# Patient Record
Sex: FEMALE | Race: WHITE
[De-identification: names, ages, dates, MRNs, and addresses within clinical notes are randomized per-mention and may not be internally consistent; named-entity substitution may affect disease eponyms.]

---

## 2016-12-16 RX ORDER — OMEPRAZOLE 10 MG/1
1 CAPSULE, DELAYED RELEASE ORAL
Qty: 60 | Refills: 0 | DISCHARGE
Start: 2016-12-16 | End: 2017-01-15

## 2016-12-23 RX ORDER — METOPROLOL TARTRATE 50 MG
1 TABLET ORAL
Qty: 0 | Refills: 0 | COMMUNITY
Start: 2016-12-23

## 2017-01-04 ENCOUNTER — APPOINTMENT (OUTPATIENT)
Dept: COLORECTAL SURGERY | Facility: CLINIC | Age: 67
End: 2017-01-04

## 2017-01-25 ENCOUNTER — APPOINTMENT (OUTPATIENT)
Dept: COLORECTAL SURGERY | Facility: CLINIC | Age: 67
End: 2017-01-25

## 2017-03-03 ENCOUNTER — NON-APPOINTMENT (OUTPATIENT)
Age: 67
End: 2017-03-03

## 2017-03-03 ENCOUNTER — APPOINTMENT (OUTPATIENT)
Dept: CARDIOLOGY | Facility: CLINIC | Age: 67
End: 2017-03-03

## 2017-03-03 VITALS
HEIGHT: 64 IN | DIASTOLIC BLOOD PRESSURE: 68 MMHG | HEART RATE: 69 BPM | WEIGHT: 206 LBS | SYSTOLIC BLOOD PRESSURE: 111 MMHG | BODY MASS INDEX: 35.17 KG/M2

## 2017-03-03 DIAGNOSIS — I35.0 NONRHEUMATIC AORTIC (VALVE) STENOSIS: ICD-10-CM

## 2017-03-03 RX ORDER — ACLIDINIUM BROMIDE 400 UG/1
400 POWDER, METERED RESPIRATORY (INHALATION)
Refills: 0 | Status: ACTIVE | COMMUNITY
Start: 2017-03-03

## 2017-03-03 RX ORDER — MOMETASONE FUROATE AND FORMOTEROL FUMARATE DIHYDRATE 200; 5 UG/1; UG/1
200-5 AEROSOL RESPIRATORY (INHALATION)
Qty: 1 | Refills: 3 | Status: ACTIVE | COMMUNITY
Start: 2017-03-03

## 2017-03-09 ENCOUNTER — MESSAGE (OUTPATIENT)
Age: 67
End: 2017-03-09

## 2017-03-24 ENCOUNTER — MESSAGE (OUTPATIENT)
Age: 67
End: 2017-03-24

## 2017-03-30 RX ORDER — ASPIRIN 81 MG/1
81 TABLET ORAL
Qty: 30 | Refills: 3 | Status: ACTIVE | COMMUNITY
Start: 2017-03-30

## 2017-04-13 ENCOUNTER — APPOINTMENT (OUTPATIENT)
Dept: COLORECTAL SURGERY | Facility: CLINIC | Age: 67
End: 2017-04-13

## 2017-04-20 ENCOUNTER — NON-APPOINTMENT (OUTPATIENT)
Age: 67
End: 2017-04-20

## 2017-04-20 ENCOUNTER — APPOINTMENT (OUTPATIENT)
Dept: CARDIOLOGY | Facility: CLINIC | Age: 67
End: 2017-04-20

## 2017-04-20 VITALS
WEIGHT: 214 LBS | HEART RATE: 96 BPM | BODY MASS INDEX: 36.54 KG/M2 | SYSTOLIC BLOOD PRESSURE: 125 MMHG | OXYGEN SATURATION: 97 % | HEIGHT: 64 IN | DIASTOLIC BLOOD PRESSURE: 77 MMHG

## 2017-04-20 DIAGNOSIS — I65.29 OCCLUSION AND STENOSIS OF UNSPECIFIED CAROTID ARTERY: ICD-10-CM

## 2017-04-20 RX ORDER — IBUPROFEN AND FAMOTIDINE 800; 26.6 MG/1; MG/1
800-26.6 TABLET, COATED ORAL
Qty: 60 | Refills: 0 | Status: DISCONTINUED | COMMUNITY
Start: 2017-04-11

## 2017-04-20 RX ORDER — METRONIDAZOLE 500 MG/1
500 TABLET ORAL
Qty: 42 | Refills: 0 | Status: DISCONTINUED | COMMUNITY
Start: 2016-11-16

## 2017-04-20 RX ORDER — RIVAROXABAN 15 MG/1
15 TABLET, FILM COATED ORAL
Qty: 180 | Refills: 0 | Status: DISCONTINUED | COMMUNITY
Start: 2017-01-17

## 2017-04-20 RX ORDER — TRAMADOL HYDROCHLORIDE 50 MG/1
50 TABLET, COATED ORAL
Qty: 90 | Refills: 0 | Status: DISCONTINUED | COMMUNITY
Start: 2017-03-15

## 2017-04-20 RX ORDER — AMOXICILLIN AND CLAVULANATE POTASSIUM 875; 125 MG/1; MG/1
875-125 TABLET, COATED ORAL
Qty: 28 | Refills: 0 | Status: DISCONTINUED | COMMUNITY
Start: 2016-11-16

## 2017-04-20 RX ORDER — ATORVASTATIN CALCIUM 20 MG/1
20 TABLET, FILM COATED ORAL
Qty: 90 | Refills: 0 | Status: DISCONTINUED | COMMUNITY
Start: 2017-01-26

## 2017-04-20 RX ORDER — LIDOCAINE 5% 700 MG/1
5 PATCH TOPICAL
Qty: 90 | Refills: 0 | Status: DISCONTINUED | COMMUNITY
Start: 2017-03-13

## 2017-05-05 ENCOUNTER — APPOINTMENT (OUTPATIENT)
Dept: CARDIOLOGY | Facility: CLINIC | Age: 67
End: 2017-05-05

## 2017-06-15 ENCOUNTER — OTHER (OUTPATIENT)
Age: 67
End: 2017-06-15

## 2017-07-06 ENCOUNTER — NON-APPOINTMENT (OUTPATIENT)
Age: 67
End: 2017-07-06

## 2017-07-06 ENCOUNTER — APPOINTMENT (OUTPATIENT)
Dept: CARDIOLOGY | Facility: CLINIC | Age: 67
End: 2017-07-06

## 2017-07-06 VITALS
HEART RATE: 93 BPM | OXYGEN SATURATION: 91 % | WEIGHT: 226 LBS | BODY MASS INDEX: 38.58 KG/M2 | HEIGHT: 64 IN | DIASTOLIC BLOOD PRESSURE: 81 MMHG | SYSTOLIC BLOOD PRESSURE: 139 MMHG

## 2017-07-06 RX ORDER — SODIUM PICOSULFATE, MAGNESIUM OXIDE, AND ANHYDROUS CITRIC ACID 10; 3.5; 12 MG/16.2G; G/16.2G; G/16.2G
10-3.5-12 POWDER, METERED ORAL
Qty: 2 | Refills: 0 | Status: DISCONTINUED | COMMUNITY
Start: 2017-03-24 | End: 2017-07-06

## 2017-07-06 RX ORDER — SODIUM PICOSULFATE, MAGNESIUM OXIDE, AND ANHYDROUS CITRIC ACID 10; 3.5; 12 MG/16.2G; G/16.2G; G/16.2G
10-3.5-12 POWDER, METERED ORAL
Qty: 2 | Refills: 0 | Status: DISCONTINUED | COMMUNITY
Start: 2017-03-09 | End: 2017-07-06

## 2017-07-20 ENCOUNTER — APPOINTMENT (OUTPATIENT)
Dept: CARDIOLOGY | Facility: CLINIC | Age: 67
End: 2017-07-20

## 2017-07-21 ENCOUNTER — APPOINTMENT (OUTPATIENT)
Dept: CARDIOLOGY | Facility: CLINIC | Age: 67
End: 2017-07-21

## 2017-07-27 ENCOUNTER — APPOINTMENT (OUTPATIENT)
Dept: CARDIOLOGY | Facility: CLINIC | Age: 67
End: 2017-07-27

## 2017-07-27 ENCOUNTER — NON-APPOINTMENT (OUTPATIENT)
Age: 67
End: 2017-07-27

## 2017-07-27 ENCOUNTER — APPOINTMENT (OUTPATIENT)
Dept: CARDIOLOGY | Facility: CLINIC | Age: 67
End: 2017-07-27
Payer: MEDICARE

## 2017-07-27 VITALS
DIASTOLIC BLOOD PRESSURE: 70 MMHG | WEIGHT: 225 LBS | OXYGEN SATURATION: 96 % | SYSTOLIC BLOOD PRESSURE: 107 MMHG | HEIGHT: 64 IN | BODY MASS INDEX: 38.41 KG/M2 | HEART RATE: 102 BPM

## 2017-07-27 PROCEDURE — 99215 OFFICE O/P EST HI 40 MIN: CPT

## 2017-07-27 PROCEDURE — 93000 ELECTROCARDIOGRAM COMPLETE: CPT

## 2017-07-27 RX ORDER — NYSTATIN AND TRIAMCINOLONE ACETONIDE 100000; 1 MG/G; MG/G
100000-0.1 CREAM TOPICAL
Qty: 30 | Refills: 0 | Status: DISCONTINUED | COMMUNITY
Start: 2017-07-10

## 2017-07-28 ENCOUNTER — LABORATORY RESULT (OUTPATIENT)
Age: 67
End: 2017-07-28

## 2017-07-28 ENCOUNTER — APPOINTMENT (OUTPATIENT)
Dept: CARDIOLOGY | Facility: CLINIC | Age: 67
End: 2017-07-28
Payer: MEDICARE

## 2017-07-29 LAB
ALBUMIN SERPL ELPH-MCNC: 4.1 G/DL
ALP BLD-CCNC: 100 U/L
ALT SERPL-CCNC: 23 U/L
ANION GAP SERPL CALC-SCNC: 15 MMOL/L
AST SERPL-CCNC: 19 U/L
BASOPHILS # BLD AUTO: 0 K/UL
BASOPHILS NFR BLD AUTO: 0 %
BILIRUB SERPL-MCNC: 0.2 MG/DL
BUN SERPL-MCNC: 17 MG/DL
CALCIUM SERPL-MCNC: 9 MG/DL
CHLORIDE SERPL-SCNC: 100 MMOL/L
CO2 SERPL-SCNC: 28 MMOL/L
CREAT SERPL-MCNC: 1 MG/DL
EOSINOPHIL # BLD AUTO: 0.17 K/UL
EOSINOPHIL NFR BLD AUTO: 1.9 %
GLUCOSE SERPL-MCNC: 143 MG/DL
HCT VFR BLD CALC: 37.8 %
HGB BLD-MCNC: 11.3 G/DL
LYMPHOCYTES # BLD AUTO: 1.8 K/UL
LYMPHOCYTES NFR BLD AUTO: 20.4 %
MAN DIFF?: NORMAL
MCHC RBC-ENTMCNC: 23.9 PG
MCHC RBC-ENTMCNC: 29.9 GM/DL
MCV RBC AUTO: 80.1 FL
MONOCYTES # BLD AUTO: 0.65 K/UL
MONOCYTES NFR BLD AUTO: 7.4 %
NEUTROPHILS # BLD AUTO: 6.12 K/UL
NEUTROPHILS NFR BLD AUTO: 69.4 %
PLATELET # BLD AUTO: 406 K/UL
POTASSIUM SERPL-SCNC: 3.6 MMOL/L
PROT SERPL-MCNC: 7.2 G/DL
RBC # BLD: 4.72 M/UL
RBC # FLD: 22.7 %
SODIUM SERPL-SCNC: 143 MMOL/L
T4 FREE SERPL-MCNC: 1.1 NG/DL
T4 SERPL-MCNC: 6.4 UG/DL
TSH SERPL-ACNC: 3.73 UIU/ML
WBC # FLD AUTO: 8.82 K/UL

## 2017-08-11 ENCOUNTER — MESSAGE (OUTPATIENT)
Age: 67
End: 2017-08-11

## 2017-08-21 ENCOUNTER — APPOINTMENT (OUTPATIENT)
Dept: COLORECTAL SURGERY | Facility: CLINIC | Age: 67
End: 2017-08-21
Payer: MEDICARE

## 2017-08-21 PROCEDURE — 99213 OFFICE O/P EST LOW 20 MIN: CPT

## 2017-08-23 ENCOUNTER — OUTPATIENT (OUTPATIENT)
Dept: OUTPATIENT SERVICES | Facility: HOSPITAL | Age: 67
LOS: 1 days | End: 2017-08-23
Payer: MEDICARE

## 2017-08-23 ENCOUNTER — APPOINTMENT (OUTPATIENT)
Dept: COLORECTAL SURGERY | Facility: CLINIC | Age: 67
End: 2017-08-23

## 2017-08-23 VITALS
RESPIRATION RATE: 15 BRPM | DIASTOLIC BLOOD PRESSURE: 72 MMHG | HEART RATE: 94 BPM | TEMPERATURE: 98 F | SYSTOLIC BLOOD PRESSURE: 118 MMHG | OXYGEN SATURATION: 96 % | WEIGHT: 222.01 LBS | HEIGHT: 64 IN

## 2017-08-23 DIAGNOSIS — Z96.643 PRESENCE OF ARTIFICIAL HIP JOINT, BILATERAL: Chronic | ICD-10-CM

## 2017-08-23 DIAGNOSIS — R00.2 PALPITATIONS: ICD-10-CM

## 2017-08-23 DIAGNOSIS — Z90.49 ACQUIRED ABSENCE OF OTHER SPECIFIED PARTS OF DIGESTIVE TRACT: Chronic | ICD-10-CM

## 2017-08-23 DIAGNOSIS — K57.32 DIVERTICULITIS OF LARGE INTESTINE WITHOUT PERFORATION OR ABSCESS WITHOUT BLEEDING: ICD-10-CM

## 2017-08-23 DIAGNOSIS — Z93.3 COLOSTOMY STATUS: Chronic | ICD-10-CM

## 2017-08-23 DIAGNOSIS — Z98.891 HISTORY OF UTERINE SCAR FROM PREVIOUS SURGERY: Chronic | ICD-10-CM

## 2017-08-23 DIAGNOSIS — Z98.890 OTHER SPECIFIED POSTPROCEDURAL STATES: Chronic | ICD-10-CM

## 2017-08-23 DIAGNOSIS — Z93.3 COLOSTOMY STATUS: ICD-10-CM

## 2017-08-23 DIAGNOSIS — Z01.818 ENCOUNTER FOR OTHER PREPROCEDURAL EXAMINATION: ICD-10-CM

## 2017-08-23 LAB
ANION GAP SERPL CALC-SCNC: 22 MMOL/L — HIGH (ref 5–17)
BLD GP AB SCN SERPL QL: NEGATIVE — SIGNIFICANT CHANGE UP
BUN SERPL-MCNC: 38 MG/DL — HIGH (ref 7–23)
CALCIUM SERPL-MCNC: 9.3 MG/DL — SIGNIFICANT CHANGE UP (ref 8.4–10.5)
CHLORIDE SERPL-SCNC: 89 MMOL/L — LOW (ref 96–108)
CO2 SERPL-SCNC: 24 MMOL/L — SIGNIFICANT CHANGE UP (ref 22–31)
CREAT SERPL-MCNC: 1.28 MG/DL — SIGNIFICANT CHANGE UP (ref 0.5–1.3)
GLUCOSE SERPL-MCNC: 235 MG/DL — HIGH (ref 70–99)
HBA1C BLD-MCNC: 7 % — HIGH (ref 4–5.6)
HCT VFR BLD CALC: 40.5 % — SIGNIFICANT CHANGE UP (ref 34.5–45)
HGB BLD-MCNC: 12.8 G/DL — SIGNIFICANT CHANGE UP (ref 11.5–15.5)
MCHC RBC-ENTMCNC: 25.3 PG — LOW (ref 27–34)
MCHC RBC-ENTMCNC: 31.6 GM/DL — LOW (ref 32–36)
MCV RBC AUTO: 80 FL — SIGNIFICANT CHANGE UP (ref 80–100)
PLATELET # BLD AUTO: 430 K/UL — HIGH (ref 150–400)
POTASSIUM SERPL-MCNC: 3.1 MMOL/L — LOW (ref 3.5–5.3)
POTASSIUM SERPL-SCNC: 3.1 MMOL/L — LOW (ref 3.5–5.3)
RBC # BLD: 5.06 M/UL — SIGNIFICANT CHANGE UP (ref 3.8–5.2)
RBC # FLD: 21.5 % — HIGH (ref 10.3–14.5)
RH IG SCN BLD-IMP: POSITIVE — SIGNIFICANT CHANGE UP
SODIUM SERPL-SCNC: 135 MMOL/L — SIGNIFICANT CHANGE UP (ref 135–145)
WBC # BLD: 10.45 K/UL — SIGNIFICANT CHANGE UP (ref 3.8–10.5)
WBC # FLD AUTO: 10.45 K/UL — SIGNIFICANT CHANGE UP (ref 3.8–10.5)

## 2017-08-23 PROCEDURE — 80048 BASIC METABOLIC PNL TOTAL CA: CPT

## 2017-08-23 PROCEDURE — G0463: CPT

## 2017-08-23 PROCEDURE — 86901 BLOOD TYPING SEROLOGIC RH(D): CPT

## 2017-08-23 PROCEDURE — 86900 BLOOD TYPING SEROLOGIC ABO: CPT

## 2017-08-23 PROCEDURE — 85027 COMPLETE CBC AUTOMATED: CPT

## 2017-08-23 PROCEDURE — 87086 URINE CULTURE/COLONY COUNT: CPT

## 2017-08-23 PROCEDURE — 83036 HEMOGLOBIN GLYCOSYLATED A1C: CPT

## 2017-08-23 PROCEDURE — 86850 RBC ANTIBODY SCREEN: CPT

## 2017-08-23 RX ORDER — CEFOTETAN DISODIUM 1 G
2 VIAL (EA) INJECTION ONCE
Qty: 0 | Refills: 0 | Status: DISCONTINUED | OUTPATIENT
Start: 2017-09-07 | End: 2017-09-07

## 2017-08-23 NOTE — H&P PST ADULT - OTHER CARE PROVIDERS
Dr. De La Torre (cardio) Dr. De La Torre (cardio) Dr. Stacie Perales ( pulm) [phone], completed work up for PE, will fax to [facility]

## 2017-08-23 NOTE — H&P PST ADULT - PMH
C. difficile diarrhea    COPD (chronic obstructive pulmonary disease)    Diverticulitis of intestine with perforation and abscess without bleeding, unspecified part of intestinal tract    Hypercholesteremia    Hypertension Anemia    C. difficile diarrhea  2016  Colostomy in place    COPD (chronic obstructive pulmonary disease)    Diverticulitis of intestine with perforation and abscess without bleeding, unspecified part of intestinal tract    Hypercholesteremia    Hypertension    Obesity    Osteoarthritis    Palpitations    PE (pulmonary thromboembolism)  12/2016

## 2017-08-23 NOTE — H&P PST ADULT - ACTIVITY
pt is able to do light household work like washing dishes without SOB - walking up stairs or walking more than 2 blocks with cause her to feel significantly SOB

## 2017-08-23 NOTE — H&P PST ADULT - NSANTHOSAYNRD_GEN_A_CORE
No. ELOISA screening performed.  STOP BANG Legend: 0-2 = LOW Risk; 3-4 = INTERMEDIATE Risk; 5-8 = HIGH Risk

## 2017-08-23 NOTE — H&P PST ADULT - PROBLEM SELECTOR PLAN 1
Scheduled Chacko reversal  A1c ordered with PST labs, STAT FS for DOS  instructed to stop NSAIDS 7 days preop

## 2017-08-23 NOTE — H&P PST ADULT - PSH
Colostomy in place    H/O hemicolectomy  2016  H/O ovarian cystectomy  b/l  H/O:   x2  History of appendectomy    Status post total replacement of both hips H/O hemicolectomy  2016  H/O ovarian cystectomy  b/l  H/O:   x2  History of appendectomy    Status post total replacement of both hips

## 2017-08-23 NOTE — H&P PST ADULT - PROBLEM SELECTOR PLAN 2
Follow-up appt with Dr. De La Torre is scheduled for 8/24/17 - if her symptoms are attributed to her anemia then she will most likely be rescheduled for surgery and will need an endo to r/o GIB.   will c/w Metroprolol and Lasix as prescribed

## 2017-08-23 NOTE — H&P PST ADULT - HISTORY OF PRESENT ILLNESS
66 y/o female with 66 y/o female with pmhx of COPD, diverticulitis s/p exploratory laparotomy, right colectomy, small bowel resection, Chacko procedure in 12/2016. Post-op course was complicated by PE - no longer on Xarelto. For last several months Presents now for robotic reversal oh richard 68 y/o female with pmhx of COPD, HTN, perforated diverticulitis s/p exploratory laparotomy, right colectomy, small bowel resection, Chacko procedure in 12/2016 - now with colostomy in place. Post-op course was complicated by PE - no longer on Xarelto. Since hospital discharge pt c/o generalized fatigue, SOB with minimal exertion, frequent palpitations especially at night and lower extremity edema. Pt was evaluated by her cardiologist who initially started her on Lasix 40mg daily and increased to BID with hardly any relief of symptoms. She had a repeat echo 7/2017  which showed normal LV function and no significant valvular disease. She is also mildly anemia w/ + guaiac - s/p sigmoidoscopy with no evidence of lower GI bleeding. Presents now for robotic reversal of Chacko w/ ureteral catheters.

## 2017-08-24 ENCOUNTER — NON-APPOINTMENT (OUTPATIENT)
Age: 67
End: 2017-08-24

## 2017-08-24 ENCOUNTER — APPOINTMENT (OUTPATIENT)
Dept: CARDIOLOGY | Facility: CLINIC | Age: 67
End: 2017-08-24
Payer: MEDICARE

## 2017-08-24 VITALS
DIASTOLIC BLOOD PRESSURE: 74 MMHG | HEIGHT: 64 IN | HEART RATE: 113 BPM | WEIGHT: 226.5 LBS | SYSTOLIC BLOOD PRESSURE: 112 MMHG | OXYGEN SATURATION: 95 % | BODY MASS INDEX: 38.67 KG/M2

## 2017-08-24 VITALS — HEART RATE: 100 BPM

## 2017-08-24 LAB
CULTURE RESULTS: SIGNIFICANT CHANGE UP
SPECIMEN SOURCE: SIGNIFICANT CHANGE UP

## 2017-08-24 PROCEDURE — 93000 ELECTROCARDIOGRAM COMPLETE: CPT

## 2017-08-24 PROCEDURE — 99215 OFFICE O/P EST HI 40 MIN: CPT

## 2017-09-07 ENCOUNTER — TRANSCRIPTION ENCOUNTER (OUTPATIENT)
Age: 67
End: 2017-09-07

## 2017-09-07 ENCOUNTER — INPATIENT (INPATIENT)
Facility: HOSPITAL | Age: 67
LOS: 5 days | Discharge: HOME | DRG: 336 | End: 2017-09-13
Attending: SURGERY | Admitting: SURGERY
Payer: MEDICARE

## 2017-09-07 ENCOUNTER — APPOINTMENT (OUTPATIENT)
Dept: COLORECTAL SURGERY | Facility: HOSPITAL | Age: 67
End: 2017-09-07
Payer: MEDICARE

## 2017-09-07 ENCOUNTER — RESULT REVIEW (OUTPATIENT)
Age: 67
End: 2017-09-07

## 2017-09-07 VITALS
HEART RATE: 84 BPM | RESPIRATION RATE: 20 BRPM | WEIGHT: 222.01 LBS | HEIGHT: 64 IN | DIASTOLIC BLOOD PRESSURE: 82 MMHG | OXYGEN SATURATION: 98 % | SYSTOLIC BLOOD PRESSURE: 136 MMHG | TEMPERATURE: 98 F

## 2017-09-07 DIAGNOSIS — Z90.49 ACQUIRED ABSENCE OF OTHER SPECIFIED PARTS OF DIGESTIVE TRACT: Chronic | ICD-10-CM

## 2017-09-07 DIAGNOSIS — K57.32 DIVERTICULITIS OF LARGE INTESTINE WITHOUT PERFORATION OR ABSCESS WITHOUT BLEEDING: ICD-10-CM

## 2017-09-07 DIAGNOSIS — Z98.891 HISTORY OF UTERINE SCAR FROM PREVIOUS SURGERY: Chronic | ICD-10-CM

## 2017-09-07 DIAGNOSIS — Z98.890 OTHER SPECIFIED POSTPROCEDURAL STATES: Chronic | ICD-10-CM

## 2017-09-07 DIAGNOSIS — Z96.643 PRESENCE OF ARTIFICIAL HIP JOINT, BILATERAL: Chronic | ICD-10-CM

## 2017-09-07 LAB
ALBUMIN SERPL ELPH-MCNC: 3.3 G/DL — SIGNIFICANT CHANGE UP (ref 3.3–5)
ALP SERPL-CCNC: 66 U/L — SIGNIFICANT CHANGE UP (ref 40–120)
ALT FLD-CCNC: 30 U/L RC — SIGNIFICANT CHANGE UP (ref 10–45)
ANION GAP SERPL CALC-SCNC: 16 MMOL/L — SIGNIFICANT CHANGE UP (ref 5–17)
ANION GAP SERPL CALC-SCNC: 16 MMOL/L — SIGNIFICANT CHANGE UP (ref 5–17)
APTT BLD: 25.1 SEC — LOW (ref 27.5–37.4)
AST SERPL-CCNC: 30 U/L — SIGNIFICANT CHANGE UP (ref 10–40)
BILIRUB SERPL-MCNC: 0.3 MG/DL — SIGNIFICANT CHANGE UP (ref 0.2–1.2)
BUN SERPL-MCNC: 13 MG/DL — SIGNIFICANT CHANGE UP (ref 7–23)
BUN SERPL-MCNC: 16 MG/DL — SIGNIFICANT CHANGE UP (ref 7–23)
CALCIUM SERPL-MCNC: 7.6 MG/DL — LOW (ref 8.4–10.5)
CALCIUM SERPL-MCNC: 7.6 MG/DL — LOW (ref 8.4–10.5)
CHLORIDE SERPL-SCNC: 100 MMOL/L — SIGNIFICANT CHANGE UP (ref 96–108)
CHLORIDE SERPL-SCNC: 101 MMOL/L — SIGNIFICANT CHANGE UP (ref 96–108)
CK MB BLD-MCNC: 0.9 % — SIGNIFICANT CHANGE UP (ref 0–3.5)
CK MB CFR SERPL CALC: 8.4 NG/ML — HIGH (ref 0–3.8)
CK SERPL-CCNC: 973 U/L — HIGH (ref 25–170)
CO2 SERPL-SCNC: 24 MMOL/L — SIGNIFICANT CHANGE UP (ref 22–31)
CO2 SERPL-SCNC: 24 MMOL/L — SIGNIFICANT CHANGE UP (ref 22–31)
CREAT SERPL-MCNC: 0.94 MG/DL — SIGNIFICANT CHANGE UP (ref 0.5–1.3)
CREAT SERPL-MCNC: 1.32 MG/DL — HIGH (ref 0.5–1.3)
GAS PNL BLDA: SIGNIFICANT CHANGE UP
GAS PNL BLDA: SIGNIFICANT CHANGE UP
GLUCOSE SERPL-MCNC: 164 MG/DL — HIGH (ref 70–99)
GLUCOSE SERPL-MCNC: 194 MG/DL — HIGH (ref 70–99)
HCT VFR BLD CALC: 35.8 % — SIGNIFICANT CHANGE UP (ref 34.5–45)
HCT VFR BLD CALC: 38 % — SIGNIFICANT CHANGE UP (ref 34.5–45)
HGB BLD-MCNC: 11.8 G/DL — SIGNIFICANT CHANGE UP (ref 11.5–15.5)
HGB BLD-MCNC: 12.7 G/DL — SIGNIFICANT CHANGE UP (ref 11.5–15.5)
INR BLD: 1.12 RATIO — SIGNIFICANT CHANGE UP (ref 0.88–1.16)
MCHC RBC-ENTMCNC: 28.7 PG — SIGNIFICANT CHANGE UP (ref 27–34)
MCHC RBC-ENTMCNC: 29 PG — SIGNIFICANT CHANGE UP (ref 27–34)
MCHC RBC-ENTMCNC: 32.9 GM/DL — SIGNIFICANT CHANGE UP (ref 32–36)
MCHC RBC-ENTMCNC: 33.5 GM/DL — SIGNIFICANT CHANGE UP (ref 32–36)
MCV RBC AUTO: 86.7 FL — SIGNIFICANT CHANGE UP (ref 80–100)
MCV RBC AUTO: 87.2 FL — SIGNIFICANT CHANGE UP (ref 80–100)
PLATELET # BLD AUTO: 274 K/UL — SIGNIFICANT CHANGE UP (ref 150–400)
PLATELET # BLD AUTO: 305 K/UL — SIGNIFICANT CHANGE UP (ref 150–400)
POTASSIUM SERPL-MCNC: 4.2 MMOL/L — SIGNIFICANT CHANGE UP (ref 3.5–5.3)
POTASSIUM SERPL-MCNC: 4.6 MMOL/L — SIGNIFICANT CHANGE UP (ref 3.5–5.3)
POTASSIUM SERPL-SCNC: 4.2 MMOL/L — SIGNIFICANT CHANGE UP (ref 3.5–5.3)
POTASSIUM SERPL-SCNC: 4.6 MMOL/L — SIGNIFICANT CHANGE UP (ref 3.5–5.3)
PROT SERPL-MCNC: 5.6 G/DL — LOW (ref 6–8.3)
PROTHROM AB SERPL-ACNC: 12.1 SEC — SIGNIFICANT CHANGE UP (ref 9.8–12.7)
RBC # BLD: 4.11 M/UL — SIGNIFICANT CHANGE UP (ref 3.8–5.2)
RBC # BLD: 4.38 M/UL — SIGNIFICANT CHANGE UP (ref 3.8–5.2)
RBC # FLD: 19.9 % — HIGH (ref 10.3–14.5)
RBC # FLD: 19.9 % — HIGH (ref 10.3–14.5)
SODIUM SERPL-SCNC: 140 MMOL/L — SIGNIFICANT CHANGE UP (ref 135–145)
SODIUM SERPL-SCNC: 141 MMOL/L — SIGNIFICANT CHANGE UP (ref 135–145)
TROPONIN T SERPL-MCNC: <0.01 NG/ML — SIGNIFICANT CHANGE UP (ref 0–0.06)
WBC # BLD: 13.6 K/UL — HIGH (ref 3.8–10.5)
WBC # BLD: 16.7 K/UL — HIGH (ref 3.8–10.5)
WBC # FLD AUTO: 13.6 K/UL — HIGH (ref 3.8–10.5)
WBC # FLD AUTO: 16.7 K/UL — HIGH (ref 3.8–10.5)

## 2017-09-07 PROCEDURE — 49560: CPT | Mod: 59

## 2017-09-07 PROCEDURE — 88307 TISSUE EXAM BY PATHOLOGIST: CPT | Mod: 26

## 2017-09-07 PROCEDURE — 93010 ELECTROCARDIOGRAM REPORT: CPT

## 2017-09-07 PROCEDURE — 44626 REPAIR BOWEL OPENING: CPT | Mod: 80

## 2017-09-07 PROCEDURE — 49560: CPT | Mod: 80,59

## 2017-09-07 PROCEDURE — 45300 PROCTOSIGMOIDOSCOPY DX: CPT

## 2017-09-07 PROCEDURE — 97605 NEG PRS WND THER DME<=50SQCM: CPT

## 2017-09-07 PROCEDURE — 44626 REPAIR BOWEL OPENING: CPT

## 2017-09-07 RX ORDER — SODIUM CHLORIDE 9 MG/ML
3 INJECTION INTRAMUSCULAR; INTRAVENOUS; SUBCUTANEOUS EVERY 8 HOURS
Qty: 0 | Refills: 0 | Status: DISCONTINUED | OUTPATIENT
Start: 2017-09-07 | End: 2017-09-07

## 2017-09-07 RX ORDER — GLUCAGON INJECTION, SOLUTION 0.5 MG/.1ML
1 INJECTION, SOLUTION SUBCUTANEOUS ONCE
Qty: 0 | Refills: 0 | Status: DISCONTINUED | OUTPATIENT
Start: 2017-09-07 | End: 2017-09-13

## 2017-09-07 RX ORDER — MOMETASONE FUROATE AND FORMOTEROL FUMARATE DIHYDRATE 200; 5 UG/1; UG/1
2 AEROSOL RESPIRATORY (INHALATION)
Qty: 0 | Refills: 0 | Status: DISCONTINUED | OUTPATIENT
Start: 2017-09-07 | End: 2017-09-13

## 2017-09-07 RX ORDER — DEXTROSE 50 % IN WATER 50 %
25 SYRINGE (ML) INTRAVENOUS ONCE
Qty: 0 | Refills: 0 | Status: DISCONTINUED | OUTPATIENT
Start: 2017-09-07 | End: 2017-09-13

## 2017-09-07 RX ORDER — ACLIDINIUM BROMIDE 400 UG/1
1 POWDER, METERED RESPIRATORY (INHALATION)
Qty: 0 | Refills: 0 | Status: DISCONTINUED | OUTPATIENT
Start: 2017-09-07 | End: 2017-09-13

## 2017-09-07 RX ORDER — HYDROMORPHONE HYDROCHLORIDE 2 MG/ML
0.5 INJECTION INTRAMUSCULAR; INTRAVENOUS; SUBCUTANEOUS
Qty: 0 | Refills: 0 | Status: DISCONTINUED | OUTPATIENT
Start: 2017-09-07 | End: 2017-09-07

## 2017-09-07 RX ORDER — ACETAMINOPHEN 500 MG
1000 TABLET ORAL ONCE
Qty: 0 | Refills: 0 | Status: COMPLETED | OUTPATIENT
Start: 2017-09-07 | End: 2017-09-08

## 2017-09-07 RX ORDER — METOPROLOL TARTRATE 50 MG
5 TABLET ORAL EVERY 6 HOURS
Qty: 0 | Refills: 0 | Status: DISCONTINUED | OUTPATIENT
Start: 2017-09-07 | End: 2017-09-11

## 2017-09-07 RX ORDER — HEPARIN SODIUM 5000 [USP'U]/ML
5000 INJECTION INTRAVENOUS; SUBCUTANEOUS ONCE
Qty: 0 | Refills: 0 | Status: COMPLETED | OUTPATIENT
Start: 2017-09-07 | End: 2017-09-07

## 2017-09-07 RX ORDER — HYDROMORPHONE HYDROCHLORIDE 2 MG/ML
30 INJECTION INTRAMUSCULAR; INTRAVENOUS; SUBCUTANEOUS
Qty: 0 | Refills: 0 | Status: DISCONTINUED | OUTPATIENT
Start: 2017-09-07 | End: 2017-09-11

## 2017-09-07 RX ORDER — NALOXONE HYDROCHLORIDE 4 MG/.1ML
0.1 SPRAY NASAL
Qty: 0 | Refills: 0 | Status: DISCONTINUED | OUTPATIENT
Start: 2017-09-07 | End: 2017-09-11

## 2017-09-07 RX ORDER — LIDOCAINE HCL 20 MG/ML
0.2 VIAL (ML) INJECTION ONCE
Qty: 0 | Refills: 0 | Status: COMPLETED | OUTPATIENT
Start: 2017-09-07 | End: 2017-09-07

## 2017-09-07 RX ORDER — DEXTROSE 50 % IN WATER 50 %
1 SYRINGE (ML) INTRAVENOUS ONCE
Qty: 0 | Refills: 0 | Status: DISCONTINUED | OUTPATIENT
Start: 2017-09-07 | End: 2017-09-13

## 2017-09-07 RX ORDER — HEPARIN SODIUM 5000 [USP'U]/ML
5000 INJECTION INTRAVENOUS; SUBCUTANEOUS EVERY 8 HOURS
Qty: 0 | Refills: 0 | Status: DISCONTINUED | OUTPATIENT
Start: 2017-09-07 | End: 2017-09-10

## 2017-09-07 RX ORDER — SODIUM CHLORIDE 9 MG/ML
500 INJECTION, SOLUTION INTRAVENOUS ONCE
Qty: 0 | Refills: 0 | Status: COMPLETED | OUTPATIENT
Start: 2017-09-07 | End: 2017-09-07

## 2017-09-07 RX ORDER — ONDANSETRON 8 MG/1
4 TABLET, FILM COATED ORAL EVERY 6 HOURS
Qty: 0 | Refills: 0 | Status: DISCONTINUED | OUTPATIENT
Start: 2017-09-07 | End: 2017-09-11

## 2017-09-07 RX ORDER — ACETAMINOPHEN 500 MG
1000 TABLET ORAL ONCE
Qty: 0 | Refills: 0 | Status: COMPLETED | OUTPATIENT
Start: 2017-09-08 | End: 2017-09-08

## 2017-09-07 RX ORDER — INSULIN LISPRO 100/ML
VIAL (ML) SUBCUTANEOUS EVERY 6 HOURS
Qty: 0 | Refills: 0 | Status: DISCONTINUED | OUTPATIENT
Start: 2017-09-07 | End: 2017-09-10

## 2017-09-07 RX ORDER — HYDROMORPHONE HYDROCHLORIDE 2 MG/ML
0.5 INJECTION INTRAMUSCULAR; INTRAVENOUS; SUBCUTANEOUS
Qty: 0 | Refills: 0 | Status: DISCONTINUED | OUTPATIENT
Start: 2017-09-07 | End: 2017-09-11

## 2017-09-07 RX ORDER — SODIUM CHLORIDE 9 MG/ML
1000 INJECTION, SOLUTION INTRAVENOUS
Qty: 0 | Refills: 0 | Status: DISCONTINUED | OUTPATIENT
Start: 2017-09-07 | End: 2017-09-10

## 2017-09-07 RX ORDER — PANTOPRAZOLE SODIUM 20 MG/1
40 TABLET, DELAYED RELEASE ORAL DAILY
Qty: 0 | Refills: 0 | Status: DISCONTINUED | OUTPATIENT
Start: 2017-09-07 | End: 2017-09-11

## 2017-09-07 RX ORDER — SODIUM CHLORIDE 9 MG/ML
1000 INJECTION, SOLUTION INTRAVENOUS ONCE
Qty: 0 | Refills: 0 | Status: COMPLETED | OUTPATIENT
Start: 2017-09-07 | End: 2017-09-08

## 2017-09-07 RX ORDER — BUDESONIDE AND FORMOTEROL FUMARATE DIHYDRATE 160; 4.5 UG/1; UG/1
2 AEROSOL RESPIRATORY (INHALATION)
Qty: 0 | Refills: 0 | Status: DISCONTINUED | OUTPATIENT
Start: 2017-09-07 | End: 2017-09-12

## 2017-09-07 RX ORDER — DEXTROSE 50 % IN WATER 50 %
25 SYRINGE (ML) INTRAVENOUS ONCE
Qty: 0 | Refills: 0 | Status: DISCONTINUED | OUTPATIENT
Start: 2017-09-07 | End: 2017-09-10

## 2017-09-07 RX ORDER — ASPIRIN/CALCIUM CARB/MAGNESIUM 324 MG
81 TABLET ORAL DAILY
Qty: 0 | Refills: 0 | Status: DISCONTINUED | OUTPATIENT
Start: 2017-09-08 | End: 2017-09-13

## 2017-09-07 RX ORDER — DEXTROSE 50 % IN WATER 50 %
12.5 SYRINGE (ML) INTRAVENOUS ONCE
Qty: 0 | Refills: 0 | Status: DISCONTINUED | OUTPATIENT
Start: 2017-09-07 | End: 2017-09-10

## 2017-09-07 RX ADMIN — Medication 5 MILLIGRAM(S): at 18:48

## 2017-09-07 RX ADMIN — Medication 0.2 MILLILITER(S): at 07:07

## 2017-09-07 RX ADMIN — PANTOPRAZOLE SODIUM 40 MILLIGRAM(S): 20 TABLET, DELAYED RELEASE ORAL at 18:47

## 2017-09-07 RX ADMIN — SODIUM CHLORIDE 1500 MILLILITER(S): 9 INJECTION, SOLUTION INTRAVENOUS at 21:16

## 2017-09-07 RX ADMIN — HEPARIN SODIUM 5000 UNIT(S): 5000 INJECTION INTRAVENOUS; SUBCUTANEOUS at 21:24

## 2017-09-07 RX ADMIN — HYDROMORPHONE HYDROCHLORIDE 0.5 MILLIGRAM(S): 2 INJECTION INTRAMUSCULAR; INTRAVENOUS; SUBCUTANEOUS at 17:33

## 2017-09-07 RX ADMIN — HYDROMORPHONE HYDROCHLORIDE 0.5 MILLIGRAM(S): 2 INJECTION INTRAMUSCULAR; INTRAVENOUS; SUBCUTANEOUS at 18:30

## 2017-09-07 RX ADMIN — HYDROMORPHONE HYDROCHLORIDE 0.5 MILLIGRAM(S): 2 INJECTION INTRAMUSCULAR; INTRAVENOUS; SUBCUTANEOUS at 18:09

## 2017-09-07 RX ADMIN — HYDROMORPHONE HYDROCHLORIDE 30 MILLILITER(S): 2 INJECTION INTRAMUSCULAR; INTRAVENOUS; SUBCUTANEOUS at 21:36

## 2017-09-07 RX ADMIN — HYDROMORPHONE HYDROCHLORIDE 0.5 MILLIGRAM(S): 2 INJECTION INTRAMUSCULAR; INTRAVENOUS; SUBCUTANEOUS at 17:54

## 2017-09-07 RX ADMIN — HYDROMORPHONE HYDROCHLORIDE 30 MILLILITER(S): 2 INJECTION INTRAMUSCULAR; INTRAVENOUS; SUBCUTANEOUS at 23:53

## 2017-09-07 RX ADMIN — SODIUM CHLORIDE 125 MILLILITER(S): 9 INJECTION, SOLUTION INTRAVENOUS at 18:38

## 2017-09-07 RX ADMIN — HYDROMORPHONE HYDROCHLORIDE 0.5 MILLIGRAM(S): 2 INJECTION INTRAMUSCULAR; INTRAVENOUS; SUBCUTANEOUS at 17:38

## 2017-09-07 RX ADMIN — HYDROMORPHONE HYDROCHLORIDE 0.5 MILLIGRAM(S): 2 INJECTION INTRAMUSCULAR; INTRAVENOUS; SUBCUTANEOUS at 17:19

## 2017-09-07 RX ADMIN — ONDANSETRON 4 MILLIGRAM(S): 8 TABLET, FILM COATED ORAL at 22:57

## 2017-09-07 RX ADMIN — HYDROMORPHONE HYDROCHLORIDE 30 MILLILITER(S): 2 INJECTION INTRAMUSCULAR; INTRAVENOUS; SUBCUTANEOUS at 18:24

## 2017-09-07 RX ADMIN — HEPARIN SODIUM 5000 UNIT(S): 5000 INJECTION INTRAVENOUS; SUBCUTANEOUS at 07:07

## 2017-09-07 RX ADMIN — Medication 2: at 18:47

## 2017-09-07 NOTE — PATIENT PROFILE ADULT. - PSH
H/O hemicolectomy  2016  H/O ovarian cystectomy  b/l  H/O:   x2  History of appendectomy    Status post total replacement of both hips

## 2017-09-07 NOTE — PROGRESS NOTE ADULT - SUBJECTIVE AND OBJECTIVE BOX
RED SURGERY POST OP NOTE    POST OPERATIVE DAY #: 0       SUBJECTIVE: Pt seen at bedside. Patient reports pressure in upper abdomen and right chest discomfort that does no radiate down her arm or up neck. The chest discomfort is slightly worst with deep inspiration. She has been hypotensive to 88/46, and received 500cc bolus. She was initially responsive but now hypotensive to 72/42. She denies lightheadedness, palpitations, SOB, and NV. -/- . Intraoperative EBL: 20cc        Vital Signs Last 24 Hrs  T(C): 36.9 (07 Sep 2017 21:15), Max: 36.9 (07 Sep 2017 21:15)  T(F): 98.4 (07 Sep 2017 21:15), Max: 98.4 (07 Sep 2017 21:15)  HR: 86 (07 Sep 2017 22:00) (80 - 87)  BP: 72/42 (07 Sep 2017 22:00) (72/42 - 136/82)  BP(mean): 53 (07 Sep 2017 22:00) (53 - 91)  RR: 16 (07 Sep 2017 21:30) (15 - 20)  SpO2: 94% (07 Sep 2017 22:00) (94% - 100%)    Physical Exam  General: awake, alert,    Pulm: respirations unlabored, no increased WOB, right chest wall TTP  Abdomen: soft, mildly distended, NT, incision c/d/i, exam limited due to body habitus   Extremities: Grossly symmetric    I&O's Summary    07 Sep 2017 07:01  -  07 Sep 2017 23:06  --------------------------------------------------------  IN: 500 mL / OUT: 145 mL / NET: 355 mL      I&O's Detail    07 Sep 2017 07:01  -  07 Sep 2017 23:06  --------------------------------------------------------  IN:    lactated ringers.: 500 mL  Total IN: 500 mL    OUT:    Indwelling Catheter - Urethral: 145 mL  Total OUT: 145 mL    Total NET: 355 mL          MEDICATIONS  (STANDING):  heparin  Injectable 5000 Unit(s) SubCutaneous every 8 hours  metoprolol Injectable 5 milliGRAM(s) IV Push every 6 hours  pantoprazole  Injectable 40 milliGRAM(s) IV Push daily  buDESOnide 160 MICROgram(s)/formoterol 4.5 MICROgram(s) Inhaler 2 Puff(s) Inhalation two times a day  lactated ringers. 1000 milliLiter(s) (125 mL/Hr) IV Continuous <Continuous>  insulin lispro (HumaLOG) corrective regimen sliding scale   SubCutaneous every 6 hours  dextrose 5%. 1000 milliLiter(s) (50 mL/Hr) IV Continuous <Continuous>  dextrose 50% Injectable 12.5 Gram(s) IV Push once  dextrose 50% Injectable 25 Gram(s) IV Push once  dextrose 50% Injectable 25 Gram(s) IV Push once  acetaminophen  IVPB. 1000 milliGRAM(s) IV Intermittent once  HYDROmorphone PCA (1 mG/mL) 30 milliLiter(s) PCA Continuous PCA Continuous  mometasone 200 MICROgram(s)/formoterol 5 MICROgram(s) Inhaler 2 Puff(s) Inhalation two times a day  aclidinium 400 MICROgram Inhaler 1 Puff(s) Inhalation two times a day  lactated ringers Bolus 1000 milliLiter(s) IV Bolus once    MEDICATIONS  (PRN):  dextrose Gel 1 Dose(s) Oral once PRN Blood Glucose LESS THAN 70 milliGRAM(s)/deciliter  glucagon  Injectable 1 milliGRAM(s) IntraMuscular once PRN Glucose LESS THAN 70 milligrams/deciliter  HYDROmorphone PCA (1 mG/mL) Rescue Clinician Bolus 0.5 milliGRAM(s) IV Push every 15 minutes PRN for Pain Scale GREATER THAN 6  naloxone Injectable 0.1 milliGRAM(s) IV Push every 3 minutes PRN For ANY of the following changes in patient status:  A. RR LESS THAN 10 breaths per minute, B. Oxygen saturation LESS THAN 90%, C. Sedation score of 6  ondansetron Injectable 4 milliGRAM(s) IV Push every 6 hours PRN Nausea      LABS:                        12.7   16.7  )-----------( 305      ( 07 Sep 2017 17:30 )             38.0     09-07    140  |  100  |  13  ----------------------------<  194<H>  4.2   |  24  |  0.94    Ca    7.6<L>      07 Sep 2017 17:31            RADIOLOGY & ADDITIONAL STUDIES:  No new studies

## 2017-09-07 NOTE — PROGRESS NOTE ADULT - ASSESSMENT
66YO female with s/p Guillermina reversal, POD# 0    - NPO  - 1L LR bolus  - EKG  - Cardiac enzymes  - repeat CBC, CMP  - continue to monitor vitals and UOP  - pain control with PCA

## 2017-09-07 NOTE — PATIENT PROFILE ADULT. - PMH
Anemia    C. difficile diarrhea  2016  Colostomy in place    COPD (chronic obstructive pulmonary disease)    Diverticulitis of intestine with perforation and abscess without bleeding, unspecified part of intestinal tract    Hypercholesteremia    Hypertension    Obesity    Osteoarthritis    Palpitations    PE (pulmonary thromboembolism)  12/2016

## 2017-09-08 LAB
ANION GAP SERPL CALC-SCNC: 15 MMOL/L — SIGNIFICANT CHANGE UP (ref 5–17)
ANION GAP SERPL CALC-SCNC: 17 MMOL/L — SIGNIFICANT CHANGE UP (ref 5–17)
ANION GAP SERPL CALC-SCNC: 19 MMOL/L — HIGH (ref 5–17)
BUN SERPL-MCNC: 18 MG/DL — SIGNIFICANT CHANGE UP (ref 7–23)
BUN SERPL-MCNC: 20 MG/DL — SIGNIFICANT CHANGE UP (ref 7–23)
BUN SERPL-MCNC: 21 MG/DL — SIGNIFICANT CHANGE UP (ref 7–23)
CALCIUM SERPL-MCNC: 7.7 MG/DL — LOW (ref 8.4–10.5)
CALCIUM SERPL-MCNC: 7.7 MG/DL — LOW (ref 8.4–10.5)
CALCIUM SERPL-MCNC: 8.1 MG/DL — LOW (ref 8.4–10.5)
CHLORIDE SERPL-SCNC: 99 MMOL/L — SIGNIFICANT CHANGE UP (ref 96–108)
CHLORIDE UR-SCNC: <20 MMOL/L — SIGNIFICANT CHANGE UP
CO2 SERPL-SCNC: 22 MMOL/L — SIGNIFICANT CHANGE UP (ref 22–31)
CO2 SERPL-SCNC: 22 MMOL/L — SIGNIFICANT CHANGE UP (ref 22–31)
CO2 SERPL-SCNC: 25 MMOL/L — SIGNIFICANT CHANGE UP (ref 22–31)
CREAT ?TM UR-MCNC: 148 MG/DL — SIGNIFICANT CHANGE UP
CREAT SERPL-MCNC: 1.37 MG/DL — HIGH (ref 0.5–1.3)
CREAT SERPL-MCNC: 1.58 MG/DL — HIGH (ref 0.5–1.3)
CREAT SERPL-MCNC: 1.7 MG/DL — HIGH (ref 0.5–1.3)
GAS PNL BLDA: SIGNIFICANT CHANGE UP
GLUCOSE SERPL-MCNC: 135 MG/DL — HIGH (ref 70–99)
GLUCOSE SERPL-MCNC: 158 MG/DL — HIGH (ref 70–99)
GLUCOSE SERPL-MCNC: 176 MG/DL — HIGH (ref 70–99)
HBA1C BLD-MCNC: 7.1 % — HIGH (ref 4–5.6)
HCT VFR BLD CALC: 32.4 % — LOW (ref 34.5–45)
HCT VFR BLD CALC: 33.7 % — LOW (ref 34.5–45)
HCT VFR BLD CALC: 35.6 % — SIGNIFICANT CHANGE UP (ref 34.5–45)
HGB BLD-MCNC: 10.8 G/DL — LOW (ref 11.5–15.5)
HGB BLD-MCNC: 11 G/DL — LOW (ref 11.5–15.5)
HGB BLD-MCNC: 11.7 G/DL — SIGNIFICANT CHANGE UP (ref 11.5–15.5)
MAGNESIUM SERPL-MCNC: 1.8 MG/DL — SIGNIFICANT CHANGE UP (ref 1.6–2.6)
MCHC RBC-ENTMCNC: 28.3 PG — SIGNIFICANT CHANGE UP (ref 27–34)
MCHC RBC-ENTMCNC: 29.1 PG — SIGNIFICANT CHANGE UP (ref 27–34)
MCHC RBC-ENTMCNC: 29.7 PG — SIGNIFICANT CHANGE UP (ref 27–34)
MCHC RBC-ENTMCNC: 32.1 GM/DL — SIGNIFICANT CHANGE UP (ref 32–36)
MCHC RBC-ENTMCNC: 32.8 GM/DL — SIGNIFICANT CHANGE UP (ref 32–36)
MCHC RBC-ENTMCNC: 34 GM/DL — SIGNIFICANT CHANGE UP (ref 32–36)
MCV RBC AUTO: 87.4 FL — SIGNIFICANT CHANGE UP (ref 80–100)
MCV RBC AUTO: 88.1 FL — SIGNIFICANT CHANGE UP (ref 80–100)
MCV RBC AUTO: 88.9 FL — SIGNIFICANT CHANGE UP (ref 80–100)
OSMOLALITY UR: 443 MOS/KG — SIGNIFICANT CHANGE UP (ref 50–1200)
PLATELET # BLD AUTO: 231 K/UL — SIGNIFICANT CHANGE UP (ref 150–400)
PLATELET # BLD AUTO: 257 K/UL — SIGNIFICANT CHANGE UP (ref 150–400)
PLATELET # BLD AUTO: 264 K/UL — SIGNIFICANT CHANGE UP (ref 150–400)
POTASSIUM SERPL-MCNC: 4.4 MMOL/L — SIGNIFICANT CHANGE UP (ref 3.5–5.3)
POTASSIUM SERPL-MCNC: 4.5 MMOL/L — SIGNIFICANT CHANGE UP (ref 3.5–5.3)
POTASSIUM SERPL-MCNC: 5.2 MMOL/L — SIGNIFICANT CHANGE UP (ref 3.5–5.3)
POTASSIUM SERPL-SCNC: 4.4 MMOL/L — SIGNIFICANT CHANGE UP (ref 3.5–5.3)
POTASSIUM SERPL-SCNC: 4.5 MMOL/L — SIGNIFICANT CHANGE UP (ref 3.5–5.3)
POTASSIUM SERPL-SCNC: 5.2 MMOL/L — SIGNIFICANT CHANGE UP (ref 3.5–5.3)
RBC # BLD: 3.7 M/UL — LOW (ref 3.8–5.2)
RBC # BLD: 3.83 M/UL — SIGNIFICANT CHANGE UP (ref 3.8–5.2)
RBC # BLD: 4.01 M/UL — SIGNIFICANT CHANGE UP (ref 3.8–5.2)
RBC # FLD: 19.8 % — HIGH (ref 10.3–14.5)
RBC # FLD: 20 % — HIGH (ref 10.3–14.5)
RBC # FLD: 20.2 % — HIGH (ref 10.3–14.5)
SODIUM SERPL-SCNC: 138 MMOL/L — SIGNIFICANT CHANGE UP (ref 135–145)
SODIUM SERPL-SCNC: 139 MMOL/L — SIGNIFICANT CHANGE UP (ref 135–145)
SODIUM SERPL-SCNC: 140 MMOL/L — SIGNIFICANT CHANGE UP (ref 135–145)
SODIUM UR-SCNC: 24 MMOL/L — SIGNIFICANT CHANGE UP
WBC # BLD: 10.2 K/UL — SIGNIFICANT CHANGE UP (ref 3.8–10.5)
WBC # BLD: 12 K/UL — HIGH (ref 3.8–10.5)
WBC # BLD: 9.7 K/UL — SIGNIFICANT CHANGE UP (ref 3.8–10.5)
WBC # FLD AUTO: 10.2 K/UL — SIGNIFICANT CHANGE UP (ref 3.8–10.5)
WBC # FLD AUTO: 12 K/UL — HIGH (ref 3.8–10.5)
WBC # FLD AUTO: 9.7 K/UL — SIGNIFICANT CHANGE UP (ref 3.8–10.5)

## 2017-09-08 PROCEDURE — 99223 1ST HOSP IP/OBS HIGH 75: CPT

## 2017-09-08 PROCEDURE — 93010 ELECTROCARDIOGRAM REPORT: CPT

## 2017-09-08 PROCEDURE — 76775 US EXAM ABDO BACK WALL LIM: CPT | Mod: 26

## 2017-09-08 PROCEDURE — 71010: CPT | Mod: 26

## 2017-09-08 RX ORDER — ACETAMINOPHEN 500 MG
1000 TABLET ORAL ONCE
Qty: 0 | Refills: 0 | Status: COMPLETED | OUTPATIENT
Start: 2017-09-08 | End: 2017-09-08

## 2017-09-08 RX ORDER — SODIUM CHLORIDE 9 MG/ML
1000 INJECTION, SOLUTION INTRAVENOUS ONCE
Qty: 0 | Refills: 0 | Status: COMPLETED | OUTPATIENT
Start: 2017-09-08 | End: 2017-09-08

## 2017-09-08 RX ORDER — ALBUTEROL 90 UG/1
2.5 AEROSOL, METERED ORAL ONCE
Qty: 0 | Refills: 0 | Status: COMPLETED | OUTPATIENT
Start: 2017-09-08 | End: 2017-09-08

## 2017-09-08 RX ADMIN — ACLIDINIUM BROMIDE 1 PUFF(S): 400 POWDER, METERED RESPIRATORY (INHALATION) at 05:17

## 2017-09-08 RX ADMIN — Medication 400 MILLIGRAM(S): at 00:13

## 2017-09-08 RX ADMIN — Medication 400 MILLIGRAM(S): at 21:27

## 2017-09-08 RX ADMIN — SODIUM CHLORIDE 2000 MILLILITER(S): 9 INJECTION, SOLUTION INTRAVENOUS at 07:12

## 2017-09-08 RX ADMIN — HYDROMORPHONE HYDROCHLORIDE 30 MILLILITER(S): 2 INJECTION INTRAMUSCULAR; INTRAVENOUS; SUBCUTANEOUS at 04:37

## 2017-09-08 RX ADMIN — HYDROMORPHONE HYDROCHLORIDE 30 MILLILITER(S): 2 INJECTION INTRAMUSCULAR; INTRAVENOUS; SUBCUTANEOUS at 05:14

## 2017-09-08 RX ADMIN — MOMETASONE FUROATE AND FORMOTEROL FUMARATE DIHYDRATE 2 PUFF(S): 200; 5 AEROSOL RESPIRATORY (INHALATION) at 05:17

## 2017-09-08 RX ADMIN — ACLIDINIUM BROMIDE 1 PUFF(S): 400 POWDER, METERED RESPIRATORY (INHALATION) at 17:17

## 2017-09-08 RX ADMIN — Medication 400 MILLIGRAM(S): at 05:28

## 2017-09-08 RX ADMIN — HEPARIN SODIUM 5000 UNIT(S): 5000 INJECTION INTRAVENOUS; SUBCUTANEOUS at 13:11

## 2017-09-08 RX ADMIN — HYDROMORPHONE HYDROCHLORIDE 30 MILLILITER(S): 2 INJECTION INTRAMUSCULAR; INTRAVENOUS; SUBCUTANEOUS at 07:13

## 2017-09-08 RX ADMIN — Medication 1000 MILLIGRAM(S): at 21:57

## 2017-09-08 RX ADMIN — HEPARIN SODIUM 5000 UNIT(S): 5000 INJECTION INTRAVENOUS; SUBCUTANEOUS at 21:03

## 2017-09-08 RX ADMIN — Medication 2: at 00:18

## 2017-09-08 RX ADMIN — PANTOPRAZOLE SODIUM 40 MILLIGRAM(S): 20 TABLET, DELAYED RELEASE ORAL at 12:11

## 2017-09-08 RX ADMIN — Medication 81 MILLIGRAM(S): at 12:12

## 2017-09-08 RX ADMIN — Medication 400 MILLIGRAM(S): at 11:57

## 2017-09-08 RX ADMIN — SODIUM CHLORIDE 4000 MILLILITER(S): 9 INJECTION, SOLUTION INTRAVENOUS at 01:43

## 2017-09-08 RX ADMIN — Medication 1000 MILLIGRAM(S): at 12:27

## 2017-09-08 RX ADMIN — Medication 1000 MILLIGRAM(S): at 05:58

## 2017-09-08 RX ADMIN — ALBUTEROL 2.5 MILLIGRAM(S): 90 AEROSOL, METERED ORAL at 14:08

## 2017-09-08 RX ADMIN — ONDANSETRON 4 MILLIGRAM(S): 8 TABLET, FILM COATED ORAL at 08:39

## 2017-09-08 RX ADMIN — HYDROMORPHONE HYDROCHLORIDE 30 MILLILITER(S): 2 INJECTION INTRAMUSCULAR; INTRAVENOUS; SUBCUTANEOUS at 07:43

## 2017-09-08 RX ADMIN — Medication 1000 MILLIGRAM(S): at 00:16

## 2017-09-08 RX ADMIN — HEPARIN SODIUM 5000 UNIT(S): 5000 INJECTION INTRAVENOUS; SUBCUTANEOUS at 05:27

## 2017-09-08 RX ADMIN — SODIUM CHLORIDE 2000 MILLILITER(S): 9 INJECTION, SOLUTION INTRAVENOUS at 00:08

## 2017-09-08 RX ADMIN — HYDROMORPHONE HYDROCHLORIDE 30 MILLILITER(S): 2 INJECTION INTRAMUSCULAR; INTRAVENOUS; SUBCUTANEOUS at 18:47

## 2017-09-08 RX ADMIN — MOMETASONE FUROATE AND FORMOTEROL FUMARATE DIHYDRATE 2 PUFF(S): 200; 5 AEROSOL RESPIRATORY (INHALATION) at 17:18

## 2017-09-08 NOTE — PROGRESS NOTE ADULT - SUBJECTIVE AND OBJECTIVE BOX
Pain Management Attending Addendum    SUBJECTIVE:    Therapy:	  [x] IV PCA	   [ ] Epidural           [ ] s/p Spinal Opoid              [ ] Postpartum infusion	  [ ] Patient controlled regional anesthesia (PCRA)    [ ] prn Analgesics    OBJECTIVE:   [x] No new signs     [ ] Other:    Side Effects:  [] None			[x] Other: complaining of mild nausea, team informed. prn zofran    Assessment of Catheter Site:		[ ] Intact		[ ] Other:    ASSESSMENT/PLAN  [x] Continue current therapy    [ ] Therapy changed to:    [ ] IV PCA       [ ] Epidural     [ ] prn Analgesics     [ ] post partum infusion    Comments:

## 2017-09-08 NOTE — CONSULT NOTE ADULT - ASSESSMENT
8 y/o female with pmhx of COPD, HTN, perforated diverticulitis s/p exploratory laparotomy, right colectomy, small bowel resection, Chacko procedure in 12/2016 - now with colostomy, PE now off Xarelto, here for reversal.  Tolerated procedure well. No sign of active ischemia or decompensated HF  Last TTE on record with normal LV and RV function.  Continue with ASA 81 daily  On Toprol XL at home; I agree with continuing IV metoprolol 5 mg q6 hr while cannot tolerate po. Would switch back to home Toprol XL 25 bid when able to tolerate  Hold Losartan and Lasix in the setting of ALVERTO. Watch volume status closely. BP low this AM, so will hold other BP medications  Off Xarelto  Please check EKG is recurrent palpitatitons  Pain control  Will follow with you. 68 y/o female with pmhx of COPD, HTN, perforated diverticulitis s/p exploratory laparotomy, right colectomy, small bowel resection, Chacko procedure in 12/2016 - now with colostomy, PE now off Xarelto, here for reversal.  Tolerated procedure well. No sign of active ischemia or decompensated HF  Last TTE on record with normal LV and RV function.  Continue with ASA 81 daily  On Toprol XL at home; I agree with continuing IV metoprolol 5 mg q6 hr while cannot tolerate po. Would switch back to home Toprol XL 25 bid when able to tolerate  Hold Losartan and Lasix in the setting of ALVERTO. Watch volume status closely. BP low this AM, so will hold other BP medications  Off Xarelto  Please check EKG is recurrent palpitatitons  Pain control  Will follow with you.

## 2017-09-08 NOTE — PROGRESS NOTE ADULT - ASSESSMENT
The patient remained in the PACU overnight for observation. She received a total of 2.5L of crystalloid boluses for both decreased UOP and hypotension. She was never tachycardic, however she is currently beta blocked on metoprolol. During the time, she was never symptomatic. She currently remains asymptomatic, and her BP has to the boluses. Her UOP has improved, but is still inadequate.     67 year old woman s/p robotic converted to open reversal of Guillermina's. POD #1.  1. Repeat CBC later today at 9am. Last H/H with a slight downtrend to 11/32.4  2. Strict recording of input and outputs; her UOP has responded slightly to crystalloid boluses, however remains inadequate for her weight.   3. Repeat BMP at 9am with CBC. Her creatinine has increased to 1.58 from a baseline of normal. If continues to rise, may need a renal U/S and a nephrology consult.   4. Continue pain control with PCA  5. Continue incentive spirometry and early ambulation  6. Await GI function before any changes to diet  7. Continue wound VAC, and PT consult for wound VAC management. The patient remained in the PACU overnight for observation. She received a total of 2.5L of crystalloid boluses for both decreased UOP and hypotension. She was never tachycardic, however she is currently beta blocked on metoprolol. During the time, she was never symptomatic. She currently remains asymptomatic, and her BP has to the boluses. Her UOP has improved, but is still inadequate.     67 year old woman s/p robotic converted to open reversal of Guillermina's. POD #1.  1. Repeat CBC later today at 9am. Last H/H with a slight downtrend to 11/32.4  2. Strict recording of input and outputs; will remove the left ureteral stent, but will keep the indwelling rizzo catheter in place. Her UOP has responded slightly to crystalloid boluses, however remains inadequate for her weight.   3. Repeat BMP at 9am with CBC. Her creatinine has increased to 1.58 from a baseline of normal. If continues to rise, may need a renal U/S and a nephrology consult.   4. Continue pain control with PCA  5. Continue incentive spirometry and early ambulation  6. Await GI function before any changes to diet  7. Continue wound VAC, and PT consult for wound VAC management.

## 2017-09-08 NOTE — PROGRESS NOTE ADULT - SUBJECTIVE AND OBJECTIVE BOX
Day 1 of Anesthesia Pain Management Service    SUBJECTIVE: Patient is doing well with IV PCA    Pain Scale Score:	[X] Refer to charted pain scores    THERAPY:    [ ] IV PCA Morphine		[ ] 5 mg/mL	[ ] 1 mg/mL  [X] IV PCA Hydromorphone	[ ] 5 mg/mL	[X] 1 mg/mL  [ ] IV PCA Fentanyl		[ ] 50 micrograms/mL    Demand dose: 0.2 mg     Lockout: 6 minutes   Continuous Rate: 0 mg/hr  4 Hour Limit: 4 mg    MEDICATIONS  (STANDING):  heparin  Injectable 5000 Unit(s) SubCutaneous every 8 hours  metoprolol Injectable 5 milliGRAM(s) IV Push every 6 hours  pantoprazole  Injectable 40 milliGRAM(s) IV Push daily  buDESOnide 160 MICROgram(s)/formoterol 4.5 MICROgram(s) Inhaler 2 Puff(s) Inhalation two times a day  aspirin  chewable 81 milliGRAM(s) Oral daily  lactated ringers. 1000 milliLiter(s) (125 mL/Hr) IV Continuous <Continuous>  insulin lispro (HumaLOG) corrective regimen sliding scale   SubCutaneous every 6 hours  dextrose 5%. 1000 milliLiter(s) (50 mL/Hr) IV Continuous <Continuous>  dextrose 50% Injectable 12.5 Gram(s) IV Push once  dextrose 50% Injectable 25 Gram(s) IV Push once  dextrose 50% Injectable 25 Gram(s) IV Push once  acetaminophen  IVPB. 1000 milliGRAM(s) IV Intermittent once  HYDROmorphone PCA (1 mG/mL) 30 milliLiter(s) PCA Continuous PCA Continuous  mometasone 200 MICROgram(s)/formoterol 5 MICROgram(s) Inhaler 2 Puff(s) Inhalation two times a day  aclidinium 400 MICROgram Inhaler 1 Puff(s) Inhalation two times a day    MEDICATIONS  (PRN):  dextrose Gel 1 Dose(s) Oral once PRN Blood Glucose LESS THAN 70 milliGRAM(s)/deciliter  glucagon  Injectable 1 milliGRAM(s) IntraMuscular once PRN Glucose LESS THAN 70 milligrams/deciliter  HYDROmorphone PCA (1 mG/mL) Rescue Clinician Bolus 0.5 milliGRAM(s) IV Push every 15 minutes PRN for Pain Scale GREATER THAN 6  naloxone Injectable 0.1 milliGRAM(s) IV Push every 3 minutes PRN For ANY of the following changes in patient status:  A. RR LESS THAN 10 breaths per minute, B. Oxygen saturation LESS THAN 90%, C. Sedation score of 6  ondansetron Injectable 4 milliGRAM(s) IV Push every 6 hours PRN Nausea      OBJECTIVE:    Sedation Score:	[ X] Alert	[ ] Drowsy 	[ ] Arousable	[ ] Asleep	[ ] Unresponsive    Side Effects:	[ ] None 	[X ] Nausea	[ ] Vomiting	[ ] Pruritus  		[ ] Other:    Vital Signs Last 24 Hrs  T(C): 36.9 (08 Sep 2017 08:14), Max: 37.1 (07 Sep 2017 23:00)  T(F): 98.5 (08 Sep 2017 08:14), Max: 98.8 (07 Sep 2017 23:00)  HR: 90 (08 Sep 2017 08:14) (64 - 93)  BP: 104/66 (08 Sep 2017 08:14) (72/42 - 130/59)  BP(mean): 72 (08 Sep 2017 04:00) (52 - 91)  RR: 18 (08 Sep 2017 08:14) (15 - 18)  SpO2: 92% (08 Sep 2017 08:14) (91% - 100%)    ASSESSMENT/ PLAN    Therapy to  be:               [X] Continued   [ ] Discontinued   [ ] Changed to PRN Analgesics    Documentation and Verification of current medications:   [X] Done	[ ] Not done, not eligible    Comments: Antiemetics PRN.

## 2017-09-08 NOTE — PROGRESS NOTE ADULT - SUBJECTIVE AND OBJECTIVE BOX
SURGERY DAILY PROGRESS NOTE:     Hospital Day: 2  Postoperative Day: 1    The patient is comfortable in bed.  She is awake, alert, and conversant.   She denies any chest pain, shortness of breath, dizziness, palpitations, or feelings of being light headed.   She states that her abdominal pain is well controlled with the PCA.      Physical Exam  Vital Signs Last 24 Hrs  T(C): 36.4 (08 Sep 2017 04:00), Max: 37.1 (07 Sep 2017 23:00)  T(F): 97.5 (08 Sep 2017 04:00), Max: 98.8 (07 Sep 2017 23:00)  HR: 86 (08 Sep 2017 04:00) (80 - 87)  BP: 101/54 (08 Sep 2017 04:00) (72/42 - 136/82)  BP(mean): 72 (08 Sep 2017 04:00) (52 - 91)  RR: 16 (08 Sep 2017 04:00) (15 - 20)  SpO2: 95% (08 Sep 2017 04:00) (94% - 100%)    Gen: No apparent distress  HEENT: normocephalic, atraumatic, no scleral icterus  Pulm: Airway patent, breathing comfortably on 2L of NC.  Abd: Soft, obese, minimal distension, minimal tenderness at incision sites. Port sites covered with Bandaids. Midline incision covered with VAC dressing, which continues to hold suction well. VAC output is minimal and not bloody. Woodruff catheter and left ureteral stent remain in place. Woodruff output still appropriately blood tinged.    I&O's Detail    07 Sep 2017 07:01  -  08 Sep 2017 04:58  --------------------------------------------------------  IN:    IV PiggyBack: 100 mL    Lactated Ringers IV Bolus: 2500 mL    lactated ringers.: 1000 mL  Total IN: 3600 mL    OUT:    Indwelling Catheter - Urethral: 295 mL  Total OUT: 295 mL    Total NET: 3305 mL          Labs:                        11.0   10.2  )-----------( 231      ( 08 Sep 2017 04:28 )             32.4     09-08    139  |  99  |  18  ----------------------------<  158<H>  4.5   |  25  |  1.58<H>    Ca    7.7<L>      08 Sep 2017 04:28  Mg     1.8     09-08    TPro  5.6<L>  /  Alb  3.3  /  TBili  0.3  /  DBili  x   /  AST  30  /  ALT  30  /  AlkPhos  66  09-07    PT/INR - ( 07 Sep 2017 23:10 )   PT: 12.1 sec;   INR: 1.12 ratio         PTT - ( 07 Sep 2017 23:10 )  PTT:25.1 sec    ABG - ( 07 Sep 2017 15:33 )  pH: 7.33  /  pCO2: 47    /  pO2: 167   / HCO3: 24    / Base Excess: -1.3  /  SaO2: 99

## 2017-09-09 LAB
ANION GAP SERPL CALC-SCNC: 13 MMOL/L — SIGNIFICANT CHANGE UP (ref 5–17)
BUN SERPL-MCNC: 12 MG/DL — SIGNIFICANT CHANGE UP (ref 7–23)
CALCIUM SERPL-MCNC: 8.2 MG/DL — LOW (ref 8.4–10.5)
CHLORIDE SERPL-SCNC: 100 MMOL/L — SIGNIFICANT CHANGE UP (ref 96–108)
CO2 SERPL-SCNC: 24 MMOL/L — SIGNIFICANT CHANGE UP (ref 22–31)
CREAT SERPL-MCNC: 0.87 MG/DL — SIGNIFICANT CHANGE UP (ref 0.5–1.3)
GLUCOSE SERPL-MCNC: 112 MG/DL — HIGH (ref 70–99)
HCT VFR BLD CALC: 30.2 % — LOW (ref 34.5–45)
HGB BLD-MCNC: 9.2 G/DL — LOW (ref 11.5–15.5)
MAGNESIUM SERPL-MCNC: 1.9 MG/DL — SIGNIFICANT CHANGE UP (ref 1.6–2.6)
MCHC RBC-ENTMCNC: 26.6 PG — LOW (ref 27–34)
MCHC RBC-ENTMCNC: 30.5 GM/DL — LOW (ref 32–36)
MCV RBC AUTO: 87.3 FL — SIGNIFICANT CHANGE UP (ref 80–100)
PHOSPHATE SERPL-MCNC: 2 MG/DL — LOW (ref 2.5–4.5)
PLATELET # BLD AUTO: 241 K/UL — SIGNIFICANT CHANGE UP (ref 150–400)
POTASSIUM SERPL-MCNC: 4.2 MMOL/L — SIGNIFICANT CHANGE UP (ref 3.5–5.3)
POTASSIUM SERPL-SCNC: 4.2 MMOL/L — SIGNIFICANT CHANGE UP (ref 3.5–5.3)
RBC # BLD: 3.46 M/UL — LOW (ref 3.8–5.2)
RBC # FLD: 22.3 % — HIGH (ref 10.3–14.5)
SODIUM SERPL-SCNC: 137 MMOL/L — SIGNIFICANT CHANGE UP (ref 135–145)
WBC # BLD: 9.18 K/UL — SIGNIFICANT CHANGE UP (ref 3.8–10.5)
WBC # FLD AUTO: 9.18 K/UL — SIGNIFICANT CHANGE UP (ref 3.8–10.5)

## 2017-09-09 PROCEDURE — 99233 SBSQ HOSP IP/OBS HIGH 50: CPT

## 2017-09-09 RX ORDER — ACETAMINOPHEN 500 MG
1000 TABLET ORAL ONCE
Qty: 0 | Refills: 0 | Status: COMPLETED | OUTPATIENT
Start: 2017-09-09 | End: 2017-09-09

## 2017-09-09 RX ORDER — KETOROLAC TROMETHAMINE 30 MG/ML
15 SYRINGE (ML) INJECTION EVERY 6 HOURS
Qty: 0 | Refills: 0 | Status: DISCONTINUED | OUTPATIENT
Start: 2017-09-09 | End: 2017-09-10

## 2017-09-09 RX ORDER — MAGNESIUM SULFATE 500 MG/ML
1 VIAL (ML) INJECTION ONCE
Qty: 0 | Refills: 0 | Status: COMPLETED | OUTPATIENT
Start: 2017-09-09 | End: 2017-09-09

## 2017-09-09 RX ADMIN — Medication 5 MILLIGRAM(S): at 01:30

## 2017-09-09 RX ADMIN — Medication 81 MILLIGRAM(S): at 12:00

## 2017-09-09 RX ADMIN — HEPARIN SODIUM 5000 UNIT(S): 5000 INJECTION INTRAVENOUS; SUBCUTANEOUS at 21:12

## 2017-09-09 RX ADMIN — HYDROMORPHONE HYDROCHLORIDE 30 MILLILITER(S): 2 INJECTION INTRAMUSCULAR; INTRAVENOUS; SUBCUTANEOUS at 19:18

## 2017-09-09 RX ADMIN — Medication 63.75 MILLIMOLE(S): at 20:15

## 2017-09-09 RX ADMIN — ACLIDINIUM BROMIDE 1 PUFF(S): 400 POWDER, METERED RESPIRATORY (INHALATION) at 05:00

## 2017-09-09 RX ADMIN — MOMETASONE FUROATE AND FORMOTEROL FUMARATE DIHYDRATE 2 PUFF(S): 200; 5 AEROSOL RESPIRATORY (INHALATION) at 05:00

## 2017-09-09 RX ADMIN — Medication 400 MILLIGRAM(S): at 23:13

## 2017-09-09 RX ADMIN — Medication 400 MILLIGRAM(S): at 12:00

## 2017-09-09 RX ADMIN — HEPARIN SODIUM 5000 UNIT(S): 5000 INJECTION INTRAVENOUS; SUBCUTANEOUS at 05:00

## 2017-09-09 RX ADMIN — Medication 1000 MILLIGRAM(S): at 05:03

## 2017-09-09 RX ADMIN — HEPARIN SODIUM 5000 UNIT(S): 5000 INJECTION INTRAVENOUS; SUBCUTANEOUS at 17:18

## 2017-09-09 RX ADMIN — HYDROMORPHONE HYDROCHLORIDE 0.5 MILLIGRAM(S): 2 INJECTION INTRAMUSCULAR; INTRAVENOUS; SUBCUTANEOUS at 10:20

## 2017-09-09 RX ADMIN — ACLIDINIUM BROMIDE 1 PUFF(S): 400 POWDER, METERED RESPIRATORY (INHALATION) at 17:54

## 2017-09-09 RX ADMIN — PANTOPRAZOLE SODIUM 40 MILLIGRAM(S): 20 TABLET, DELAYED RELEASE ORAL at 12:00

## 2017-09-09 RX ADMIN — Medication 15 MILLIGRAM(S): at 20:40

## 2017-09-09 RX ADMIN — Medication 1000 MILLIGRAM(S): at 23:43

## 2017-09-09 RX ADMIN — Medication 400 MILLIGRAM(S): at 04:33

## 2017-09-09 RX ADMIN — Medication 1000 MILLIGRAM(S): at 18:45

## 2017-09-09 RX ADMIN — Medication 5 MILLIGRAM(S): at 17:17

## 2017-09-09 RX ADMIN — Medication 400 MILLIGRAM(S): at 18:15

## 2017-09-09 RX ADMIN — Medication 100 GRAM(S): at 18:28

## 2017-09-09 RX ADMIN — MOMETASONE FUROATE AND FORMOTEROL FUMARATE DIHYDRATE 2 PUFF(S): 200; 5 AEROSOL RESPIRATORY (INHALATION) at 17:54

## 2017-09-09 RX ADMIN — ONDANSETRON 4 MILLIGRAM(S): 8 TABLET, FILM COATED ORAL at 17:17

## 2017-09-09 RX ADMIN — Medication 15 MILLIGRAM(S): at 20:10

## 2017-09-09 NOTE — PHYSICAL THERAPY INITIAL EVALUATION ADULT - MODALITIES TREATMENT COMMENTS
Patient with clean full thickness open post surgical wound RUQ at former stoma site ans with interrupted closure midline abd surgical wound

## 2017-09-09 NOTE — PHYSICAL THERAPY INITIAL EVALUATION ADULT - PERTINENT HX OF CURRENT PROBLEM, REHAB EVAL
PAtient adm for reversal of Hartmans 66 y/o female with pmhx of COPD, HTN, perforated diverticulitis s/p exploratory laparotomy, right colectomy, small bowel resection, Chacko procedure in 12/2016 - now with colostomy in place. Presents now for robotic reversal of Chacko w/ ureteral catheters.

## 2017-09-09 NOTE — PROGRESS NOTE ADULT - ASSESSMENT
A: 67 year old woman s/p robotic converted to open reversal of Guillermina's. POD #2.    P:  - d/c rizzo with decreased Cr  - Monitor GI function- currently negative for flatus and bowel movements  - PCA for pain control  - PT for wound vac management  - Out of bed, Ambulation  - SQH DVT prophylaxis

## 2017-09-09 NOTE — PHYSICAL THERAPY INITIAL EVALUATION ADULT - PLANNED THERAPY INTERVENTIONS, PT EVAL
bed mobility training/transfer training/strengthening/Negative Pressure Wound Therapy/gait training/balance training balance training/gait training/postural re-education/strengthening/transfer training/bed mobility training/Negative Pressure Wound Therapy

## 2017-09-09 NOTE — PHYSICAL THERAPY INITIAL EVALUATION ADULT - ADDITIONAL COMMENTS
Pt lives alone in private home with no stairs to enter or within. Pt owns cane, walker, elevated toilet seat, tub bench.

## 2017-09-09 NOTE — PHYSICAL THERAPY INITIAL EVALUATION ADULT - IMPAIRMENTS FOUND, PT EVAL
gait, locomotion, and balance/integumentary integrity gait, locomotion, and balance/integumentary integrity/muscle strength/aerobic capacity/endurance/posture

## 2017-09-09 NOTE — PHYSICAL THERAPY INITIAL EVALUATION ADULT - GENERAL OBSERVATIONS, REHAB EVAL
Rec'd in recliner, +c/o nausea, +IV, +VAC Rec'd in recliner, +c/o nausea, +IV, +VAC, + 2L NCO2, +PCA pump. Pt alert and agreeable to PT eval.

## 2017-09-09 NOTE — PHYSICAL THERAPY INITIAL EVALUATION ADULT - DISCHARGE DISPOSITION, PT EVAL
to be determined upon completion of functional assessment home w/ outpatient services/Home with outpatient PT for gait & endurance training. Rolling walker with ambulation (pt owns).

## 2017-09-09 NOTE — PHYSICAL THERAPY INITIAL EVALUATION ADULT - PRECAUTIONS/LIMITATIONS, REHAB EVAL
no known precautions/limitations surgical precautions/no known precautions/limitations/fall precautions fall precautions/surgical precautions

## 2017-09-09 NOTE — PROGRESS NOTE ADULT - ATTENDING COMMENTS
patient seen yesterday and today. Computer system down all day yesterday.     9/10/17  Pt with increased SOB and panick attacks. + flatus, pain controlled    AAOx3  abd soft, obese, tender at incision, VAC intact    A/P s/p Guillermina's reversal  pt with h/o PE - will d/w cards re value of CT to eval PE  can restart home anticoag  clears  OOB

## 2017-09-09 NOTE — PROGRESS NOTE ADULT - ASSESSMENT
66 y/o female with pmhx of COPD, HTN, perforated diverticulitis s/p exploratory laparotomy, right colectomy, small bowel resection, Chacko procedure in 12/2016 - now with colostomy, PE now off Xarelto, here for reversal.  Tolerated procedure well. No sign of active ischemia or decompensated HF  Last TTE on record with normal LV and RV function.  Continue with ASA 81 daily  On Toprol XL at home; I agree with continuing IV metoprolol 5 mg q6 hr while cannot tolerate po. Would switch back to home Toprol XL 25 bid when able to tolerate  Hold Losartan and Lasix in the setting of ALVERTO.  cr has improved.   Off Xarelto. resume when ok with surgery  Monitor and replete electrolytes. Keep K>4.0 and Mg>2.0.   Pain control  Further cardiac workup will depend on clinical course.   Incentive Spirometry  Will follow with you.

## 2017-09-09 NOTE — PROGRESS NOTE ADULT - SUBJECTIVE AND OBJECTIVE BOX
[facility] -- Diann Cardenas, Anu Smith Pannella, Patel, Savella  Office # [phone]      Follow Up:  htn    Subjective/Observations: Patient seen and examined. Events noted. Resting comfortably in bed. No complaints of chest pain,   or palpitations reported. Last night complained of dyspnea but was thought to have a panic attack. Now symptoms have resolved.       REVIEW OF SYSTEMS: All other review of systems is negative unless indicated above    PAST MEDICAL & SURGICAL HISTORY:  Colostomy in place  Anemia  Osteoarthritis  Obesity  Palpitations  PE (pulmonary thromboembolism): 2016  Diverticulitis of intestine with perforation and abscess without bleeding, unspecified part of intestinal tract  C. difficile diarrhea: 2016  COPD (chronic obstructive pulmonary disease)  Hypercholesteremia  Hypertension  H/O hemicolectomy: 2016  Status post total replacement of both hips  H/O ovarian cystectomy: b/l  H/O: : x2  History of appendectomy      MEDICATIONS  (STANDING):  heparin  Injectable 5000 Unit(s) SubCutaneous every 8 hours  metoprolol Injectable 5 milliGRAM(s) IV Push every 6 hours  pantoprazole  Injectable 40 milliGRAM(s) IV Push daily  buDESOnide 160 MICROgram(s)/formoterol 4.5 MICROgram(s) Inhaler 2 Puff(s) Inhalation two times a day  aspirin  chewable 81 milliGRAM(s) Oral daily  lactated ringers. 1000 milliLiter(s) (125 mL/Hr) IV Continuous <Continuous>  insulin lispro (HumaLOG) corrective regimen sliding scale   SubCutaneous every 6 hours  dextrose 5%. 1000 milliLiter(s) (50 mL/Hr) IV Continuous <Continuous>  mometasone 200 MICROgram(s)/formoterol 5 MICROgram(s) Inhaler 2 Puff(s) Inhalation two times a day  aclidinium 400 MICROgram Inhaler 1 Puff(s) Inhalation two times a day  dextrose 50% Injectable 12.5 Gram(s) IV Push once  dextrose 50% Injectable 25 Gram(s) IV Push once  dextrose 50% Injectable 25 Gram(s) IV Push once  HYDROmorphone PCA (1 mG/mL) 30 milliLiter(s) PCA Continuous PCA Continuous    MEDICATIONS  (PRN):  dextrose Gel 1 Dose(s) Oral once PRN Blood Glucose LESS THAN 70 milliGRAM(s)/deciliter  glucagon  Injectable 1 milliGRAM(s) IntraMuscular once PRN Glucose LESS THAN 70 milligrams/deciliter  HYDROmorphone PCA (1 mG/mL) Rescue Clinician Bolus 0.5 milliGRAM(s) IV Push every 15 minutes PRN for Pain Scale GREATER THAN 6  naloxone Injectable 0.1 milliGRAM(s) IV Push every 3 minutes PRN For ANY of the following changes in patient status:  A. RR LESS THAN 10 breaths per minute, B. Oxygen saturation LESS THAN 90%, C. Sedation score of 6  ondansetron Injectable 4 milliGRAM(s) IV Push every 6 hours PRN Nausea      Allergies    Spiriva (Unknown)    Intolerances            Vital Signs Last 24 Hrs  T(C): 37.7 (08 Sep 2017 21:16), Max: 37.7 (08 Sep 2017 21:16)  T(F): 99.9 (08 Sep 2017 21:16), Max: 99.9 (08 Sep 2017 21:16)  HR: 101 (08 Sep 2017 21:16) (97 - 101)  BP: 117/70 (08 Sep 2017 21:16) (112/66 - 117/70)  BP(mean): --  RR: 18 (08 Sep 2017 21:16) (18 - 18)  SpO2: 93% (08 Sep 2017 21:16) (92% - 93%)    I&O's Summary    08 Sep 2017 07:  -  09 Sep 2017 07:00  --------------------------------------------------------  IN: 3200 mL / OUT: 975 mL / NET: 2225 mL    09 Sep 2017 07:01  -  09 Sep 2017 17:22  --------------------------------------------------------  IN: 0 mL / OUT: 1275 mL / NET: -1275 mL          PHYSICAL EXAM:     Constitutional: NAD, awake and alert, well-developed  HEENT: Moist Mucous Membranes, Anicteric  Pulmonary: Decreased breath sounds b/l.   Cardiovascular: Regular, S1 and S2, distant  Gastrointestinal: Bowel Sounds present, soft, nontender. distended  Lymph: No peripheral edema. No lymphadenopathy.  Skin: No visible rashes or ulcers.  Psych:  Mood & affect appropriate    LABS: All Labs Reviewed:                        9.2    9.18  )-----------( 241      ( 09 Sep 2017 10:07 )             30.2                         10.8   12.0  )-----------( 264      ( 08 Sep 2017 14:34 )             33.7                         11.7   9.7   )-----------( 257      ( 08 Sep 2017 09:29 )             35.6     09 Sep 2017 10:07    137    |  100    |  12     ----------------------------<  112    4.2     |  24     |  0.87   08 Sep 2017 14:34    138    |  99     |  21     ----------------------------<  135    4.4     |  22     |  1.37   08 Sep 2017 09:29    140    |  99     |  20     ----------------------------<  176    5.2     |  22     |  1.70     Ca    8.2        09 Sep 2017 10:07  Ca    7.7        08 Sep 2017 14:34  Ca    8.1        08 Sep 2017 09:29  Phos  2.0       09 Sep 2017 10:07  Mg     1.9       09 Sep 2017 10:07  Mg     1.8       08 Sep 2017 04:28    TPro  5.6    /  Alb  3.3    /  TBili  0.3    /  DBili  x      /  AST  30     /  ALT  30     /  AlkPhos  66     07 Sep 2017 23:10    PT/INR - ( 07 Sep 2017 23:10 )   PT: 12.1 sec;   INR: 1.12 ratio         PTT - ( 07 Sep 2017 23:10 )  PTT:25.1 sec  CARDIAC MARKERS ( 07 Sep 2017 23:10 )  x     / <0.01 ng/mL / 973 U/L / x     / 8.4 ng/mL         < from: Xray Chest 1 View AP- PORTABLE-Urgent (17 @ 14:27) >    EXAM:  CHEST-PORTABLE URGENT                            PROCEDURE DATE:  2017            INTERPRETATION:  CLINICAL INFORMATION: Dyspnea.    EXAM: Frontal radiograph of the chest.    COMPARISON: Chest radiograph from 2016.    FINDINGS:  Heart size is normal.    No pleural effusions or pneumothorax. No focal area of consolidation.    Multilevel degenerative disease of the spine.    IMPRESSION: Clear lungs.                RAS DWYER M.D., RADIOLOGY RESIDENT  This document has beenelectronically signed.  JENNIFER DELGADO M.D., ATTENDING RADIOLOGIST  This document has been electronically signed. Sep  8 2017  4:59PM                < end of copied text >

## 2017-09-09 NOTE — PROGRESS NOTE ADULT - SUBJECTIVE AND OBJECTIVE BOX
[back-dated note; [facility] down 9/9/17]    Day 2 of Anesthesia Pain Management Service    SUBJECTIVE: "this stuff makes me very nauseous, but this is good for now"    Pain Scale Score	At rest: ___ 	With Activity: ___ 	[x ] Refer to charted pain scores    THERAPY:    [ ] IV PCA Morphine		[ ] 5 mg/mL	[ ] 1 mg/mL  [x ] IV PCA Hydromorphone	[ ] 5 mg/mL	[x ] 1 mg/mL  [ ] IV PCA Fentanyl		[ ] 50 micrograms/mL    Demand dose:  0.2mg lockout: 6 minutes  Continuous Rate: 0      MEDICATIONS  (STANDING):  metoprolol Injectable 5 milliGRAM(s) IV Push every 6 hours  pantoprazole  Injectable 40 milliGRAM(s) IV Push daily  buDESOnide 160 MICROgram(s)/formoterol 4.5 MICROgram(s) Inhaler 2 Puff(s) Inhalation two times a day  aspirin  chewable 81 milliGRAM(s) Oral daily  insulin lispro (HumaLOG) corrective regimen sliding scale   SubCutaneous every 6 hours  dextrose 5%. 1000 milliLiter(s) (50 mL/Hr) IV Continuous <Continuous>  mometasone 200 MICROgram(s)/formoterol 5 MICROgram(s) Inhaler 2 Puff(s) Inhalation two times a day  aclidinium 400 MICROgram Inhaler 1 Puff(s) Inhalation two times a day  dextrose 50% Injectable 12.5 Gram(s) IV Push once  dextrose 50% Injectable 25 Gram(s) IV Push once  dextrose 50% Injectable 25 Gram(s) IV Push once  HYDROmorphone PCA (1 mG/mL) 30 milliLiter(s) PCA Continuous PCA Continuous  dextrose 5% + sodium chloride 0.45% with potassium chloride 20 mEq/L 1000 milliLiter(s) (50 mL/Hr) IV Continuous <Continuous>  rivaroxaban 15 milliGRAM(s) Oral two times a day  rivaroxaban 20 milliGRAM(s) Oral every 24 hours  furosemide   Injectable 40 milliGRAM(s) IV Push once  acetaminophen  IVPB. 1000 milliGRAM(s) IV Intermittent once  potassium phosphate IVPB 15 milliMole(s) IV Intermittent once    MEDICATIONS  (PRN):  dextrose Gel 1 Dose(s) Oral once PRN Blood Glucose LESS THAN 70 milliGRAM(s)/deciliter  glucagon  Injectable 1 milliGRAM(s) IntraMuscular once PRN Glucose LESS THAN 70 milligrams/deciliter  HYDROmorphone PCA (1 mG/mL) Rescue Clinician Bolus 0.5 milliGRAM(s) IV Push every 15 minutes PRN for Pain Scale GREATER THAN 6  naloxone Injectable 0.1 milliGRAM(s) IV Push every 3 minutes PRN For ANY of the following changes in patient status:  A. RR LESS THAN 10 breaths per minute, B. Oxygen saturation LESS THAN 90%, C. Sedation score of 6  ondansetron Injectable 4 milliGRAM(s) IV Push every 6 hours PRN Nausea      OBJECTIVE:    Sedation Score:	[x ] Alert	[ ] Drowsy 	[ ] Arousable	[ ] Asleep	[ ] Unresponsive    Side Effects:	[ ] None	[x ] Nausea	[ ] Vomiting	[ ] Pruritus  		[ ] Other:    Vital Signs Last 24 Hrs  T(C): 37.2 (10 Sep 2017 13:16), Max: 37.7 (09 Sep 2017 17:37)  T(F): 99 (10 Sep 2017 13:16), Max: 99.8 (09 Sep 2017 17:37)  HR: 79 (10 Sep 2017 13:16) (79 - 94)  BP: 126/69 (10 Sep 2017 13:16) (119/72 - 157/86)  BP(mean): --  RR: 18 (10 Sep 2017 13:16) (18 - 18)  SpO2: 99% (10 Sep 2017 13:16) (92% - 99%)    ASSESSMENT/ PLAN    Therapy to  be:	[x ] Continue   [ ] Discontinued   [ ] Change to prn Analgesics    Documentation and Verification of current medications:   [X] Done	[ ] Not done, not eligible

## 2017-09-09 NOTE — PROGRESS NOTE ADULT - SUBJECTIVE AND OBJECTIVE BOX
RED SURGERY DAILY PROGRESS NOTE:     Hospital Day: 3  Postoperative Day: 2    S: Patient says she has discomfort, but no pain. Her PCA in helping. She is ambulating, but is negative for flatus and bowel movements. She has had waves of nausea. We are going to follow her Cr level and will d/c rizzo accordingly. Renal ultrasound showed no hydronephrosis.    O:  Vital Signs Last 24 Hrs  T(C): 37.7 (08 Sep 2017 21:16), Max: 37.7 (08 Sep 2017 21:16)  T(F): 99.9 (08 Sep 2017 21:16), Max: 99.9 (08 Sep 2017 21:16)  HR: 101 (08 Sep 2017 21:16) (87 - 101)  BP: 117/70 (08 Sep 2017 21:16) (96/61 - 117/70)  BP(mean): --  RR: 18 (08 Sep 2017 21:16) (18 - 20)  SpO2: 93% (08 Sep 2017 21:16) (92% - 95%)    Physical Exam  Gen: No apparent distress, rizzo in  HEENT: normocephalic, atraumatic, no scleral icterus  Pulm: Airway patent, breathing comfortably  Abdomen: soft, mildly distended, non-tender, vac midline    I&O's Detail    08 Sep 2017 07:01  -  09 Sep 2017 07:00  --------------------------------------------------------  IN:    IV PiggyBack: 100 mL    lactated ringers.: 1500 mL  Total IN: 1600 mL    OUT:    Indwelling Catheter - Urethral: 975 mL  Total OUT: 975 mL    Total NET: 625 mL    09-09    137  |  100  |  12  ----------------------------<  112<H>  4.2   |  24  |  0.87    Ca    8.2<L>      09 Sep 2017 10:07  Phos  2.0     09-09  Mg     1.9     09-09    TPro  5.6<L>  /  Alb  3.3  /  TBili  0.3  /  DBili  x   /  AST  30  /  ALT  30  /  AlkPhos  66  09-07

## 2017-09-10 LAB
ANION GAP SERPL CALC-SCNC: 12 MMOL/L — SIGNIFICANT CHANGE UP (ref 5–17)
BUN SERPL-MCNC: 12 MG/DL — SIGNIFICANT CHANGE UP (ref 7–23)
CALCIUM SERPL-MCNC: 8.2 MG/DL — LOW (ref 8.4–10.5)
CHLORIDE SERPL-SCNC: 102 MMOL/L — SIGNIFICANT CHANGE UP (ref 96–108)
CO2 SERPL-SCNC: 25 MMOL/L — SIGNIFICANT CHANGE UP (ref 22–31)
CREAT SERPL-MCNC: 0.9 MG/DL — SIGNIFICANT CHANGE UP (ref 0.5–1.3)
GLUCOSE SERPL-MCNC: 94 MG/DL — SIGNIFICANT CHANGE UP (ref 70–99)
HCT VFR BLD CALC: 29.5 % — LOW (ref 34.5–45)
HGB BLD-MCNC: 9.2 G/DL — LOW (ref 11.5–15.5)
MAGNESIUM SERPL-MCNC: 2.3 MG/DL — SIGNIFICANT CHANGE UP (ref 1.6–2.6)
MCHC RBC-ENTMCNC: 27.4 PG — SIGNIFICANT CHANGE UP (ref 27–34)
MCHC RBC-ENTMCNC: 31.2 GM/DL — LOW (ref 32–36)
MCV RBC AUTO: 87.8 FL — SIGNIFICANT CHANGE UP (ref 80–100)
PHOSPHATE SERPL-MCNC: 1.9 MG/DL — LOW (ref 2.5–4.5)
PLATELET # BLD AUTO: 252 K/UL — SIGNIFICANT CHANGE UP (ref 150–400)
POTASSIUM SERPL-MCNC: 4.1 MMOL/L — SIGNIFICANT CHANGE UP (ref 3.5–5.3)
POTASSIUM SERPL-SCNC: 4.1 MMOL/L — SIGNIFICANT CHANGE UP (ref 3.5–5.3)
RBC # BLD: 3.36 M/UL — LOW (ref 3.8–5.2)
RBC # FLD: 22 % — HIGH (ref 10.3–14.5)
SODIUM SERPL-SCNC: 139 MMOL/L — SIGNIFICANT CHANGE UP (ref 135–145)
WBC # BLD: 8.64 K/UL — SIGNIFICANT CHANGE UP (ref 3.8–10.5)
WBC # FLD AUTO: 8.64 K/UL — SIGNIFICANT CHANGE UP (ref 3.8–10.5)

## 2017-09-10 PROCEDURE — 99233 SBSQ HOSP IP/OBS HIGH 50: CPT

## 2017-09-10 PROCEDURE — 93970 EXTREMITY STUDY: CPT | Mod: 26

## 2017-09-10 PROCEDURE — 71275 CT ANGIOGRAPHY CHEST: CPT | Mod: 26

## 2017-09-10 RX ORDER — RIVAROXABAN 15 MG-20MG
15 KIT ORAL
Qty: 0 | Refills: 0 | Status: DISCONTINUED | OUTPATIENT
Start: 2017-09-10 | End: 2017-09-11

## 2017-09-10 RX ORDER — DEXTROSE 50 % IN WATER 50 %
1 SYRINGE (ML) INTRAVENOUS ONCE
Qty: 0 | Refills: 0 | Status: DISCONTINUED | OUTPATIENT
Start: 2017-09-10 | End: 2017-09-13

## 2017-09-10 RX ORDER — DEXTROSE MONOHYDRATE, SODIUM CHLORIDE, AND POTASSIUM CHLORIDE 50; .745; 4.5 G/1000ML; G/1000ML; G/1000ML
1000 INJECTION, SOLUTION INTRAVENOUS
Qty: 0 | Refills: 0 | Status: DISCONTINUED | OUTPATIENT
Start: 2017-09-10 | End: 2017-09-11

## 2017-09-10 RX ORDER — ACETAMINOPHEN 500 MG
1000 TABLET ORAL ONCE
Qty: 0 | Refills: 0 | Status: COMPLETED | OUTPATIENT
Start: 2017-09-10 | End: 2017-09-10

## 2017-09-10 RX ORDER — POTASSIUM PHOSPHATE, MONOBASIC POTASSIUM PHOSPHATE, DIBASIC 236; 224 MG/ML; MG/ML
15 INJECTION, SOLUTION INTRAVENOUS ONCE
Qty: 0 | Refills: 0 | Status: COMPLETED | OUTPATIENT
Start: 2017-09-10 | End: 2017-09-10

## 2017-09-10 RX ORDER — INSULIN LISPRO 100/ML
VIAL (ML) SUBCUTANEOUS
Qty: 0 | Refills: 0 | Status: DISCONTINUED | OUTPATIENT
Start: 2017-09-10 | End: 2017-09-13

## 2017-09-10 RX ORDER — SODIUM CHLORIDE 9 MG/ML
1000 INJECTION, SOLUTION INTRAVENOUS
Qty: 0 | Refills: 0 | Status: DISCONTINUED | OUTPATIENT
Start: 2017-09-10 | End: 2017-09-13

## 2017-09-10 RX ORDER — RIVAROXABAN 15 MG-20MG
20 KIT ORAL EVERY 24 HOURS
Qty: 0 | Refills: 0 | Status: DISCONTINUED | OUTPATIENT
Start: 2017-10-02 | End: 2017-09-11

## 2017-09-10 RX ORDER — FUROSEMIDE 40 MG
40 TABLET ORAL ONCE
Qty: 0 | Refills: 0 | Status: COMPLETED | OUTPATIENT
Start: 2017-09-10 | End: 2017-09-10

## 2017-09-10 RX ORDER — GLUCAGON INJECTION, SOLUTION 0.5 MG/.1ML
1 INJECTION, SOLUTION SUBCUTANEOUS ONCE
Qty: 0 | Refills: 0 | Status: DISCONTINUED | OUTPATIENT
Start: 2017-09-10 | End: 2017-09-13

## 2017-09-10 RX ORDER — DEXTROSE 50 % IN WATER 50 %
12.5 SYRINGE (ML) INTRAVENOUS ONCE
Qty: 0 | Refills: 0 | Status: DISCONTINUED | OUTPATIENT
Start: 2017-09-10 | End: 2017-09-13

## 2017-09-10 RX ORDER — DEXTROSE 50 % IN WATER 50 %
25 SYRINGE (ML) INTRAVENOUS ONCE
Qty: 0 | Refills: 0 | Status: DISCONTINUED | OUTPATIENT
Start: 2017-09-10 | End: 2017-09-13

## 2017-09-10 RX ORDER — DEXTROSE 50 % IN WATER 50 %
25 SYRINGE (ML) INTRAVENOUS ONCE
Qty: 0 | Refills: 0 | Status: DISCONTINUED | OUTPATIENT
Start: 2017-09-10 | End: 2017-09-10

## 2017-09-10 RX ORDER — INSULIN LISPRO 100/ML
VIAL (ML) SUBCUTANEOUS AT BEDTIME
Qty: 0 | Refills: 0 | Status: DISCONTINUED | OUTPATIENT
Start: 2017-09-10 | End: 2017-09-13

## 2017-09-10 RX ADMIN — ACLIDINIUM BROMIDE 1 PUFF(S): 400 POWDER, METERED RESPIRATORY (INHALATION) at 17:46

## 2017-09-10 RX ADMIN — HEPARIN SODIUM 5000 UNIT(S): 5000 INJECTION INTRAVENOUS; SUBCUTANEOUS at 05:15

## 2017-09-10 RX ADMIN — ONDANSETRON 4 MILLIGRAM(S): 8 TABLET, FILM COATED ORAL at 17:35

## 2017-09-10 RX ADMIN — RIVAROXABAN 15 MILLIGRAM(S): KIT at 17:43

## 2017-09-10 RX ADMIN — ACLIDINIUM BROMIDE 1 PUFF(S): 400 POWDER, METERED RESPIRATORY (INHALATION) at 05:22

## 2017-09-10 RX ADMIN — Medication 15 MILLIGRAM(S): at 08:56

## 2017-09-10 RX ADMIN — Medication 400 MILLIGRAM(S): at 17:32

## 2017-09-10 RX ADMIN — Medication 81 MILLIGRAM(S): at 13:35

## 2017-09-10 RX ADMIN — POTASSIUM PHOSPHATE, MONOBASIC POTASSIUM PHOSPHATE, DIBASIC 62.5 MILLIMOLE(S): 236; 224 INJECTION, SOLUTION INTRAVENOUS at 17:41

## 2017-09-10 RX ADMIN — Medication 400 MILLIGRAM(S): at 05:14

## 2017-09-10 RX ADMIN — Medication 40 MILLIGRAM(S): at 17:40

## 2017-09-10 RX ADMIN — Medication 5 MILLIGRAM(S): at 05:14

## 2017-09-10 RX ADMIN — Medication 15 MILLIGRAM(S): at 02:45

## 2017-09-10 RX ADMIN — Medication 5 MILLIGRAM(S): at 00:38

## 2017-09-10 RX ADMIN — HYDROMORPHONE HYDROCHLORIDE 30 MILLILITER(S): 2 INJECTION INTRAMUSCULAR; INTRAVENOUS; SUBCUTANEOUS at 07:22

## 2017-09-10 RX ADMIN — Medication 400 MILLIGRAM(S): at 11:17

## 2017-09-10 RX ADMIN — DEXTROSE MONOHYDRATE, SODIUM CHLORIDE, AND POTASSIUM CHLORIDE 50 MILLILITER(S): 50; .745; 4.5 INJECTION, SOLUTION INTRAVENOUS at 17:41

## 2017-09-10 RX ADMIN — Medication 1000 MILLIGRAM(S): at 11:47

## 2017-09-10 RX ADMIN — MOMETASONE FUROATE AND FORMOTEROL FUMARATE DIHYDRATE 2 PUFF(S): 200; 5 AEROSOL RESPIRATORY (INHALATION) at 17:47

## 2017-09-10 RX ADMIN — MOMETASONE FUROATE AND FORMOTEROL FUMARATE DIHYDRATE 2 PUFF(S): 200; 5 AEROSOL RESPIRATORY (INHALATION) at 05:22

## 2017-09-10 RX ADMIN — PANTOPRAZOLE SODIUM 40 MILLIGRAM(S): 20 TABLET, DELAYED RELEASE ORAL at 13:36

## 2017-09-10 RX ADMIN — Medication 5 MILLIGRAM(S): at 17:43

## 2017-09-10 RX ADMIN — Medication 15 MILLIGRAM(S): at 03:15

## 2017-09-10 RX ADMIN — Medication 5 MILLIGRAM(S): at 13:36

## 2017-09-10 RX ADMIN — HYDROMORPHONE HYDROCHLORIDE 30 MILLILITER(S): 2 INJECTION INTRAMUSCULAR; INTRAVENOUS; SUBCUTANEOUS at 18:56

## 2017-09-10 RX ADMIN — Medication 1000 MILLIGRAM(S): at 16:00

## 2017-09-10 RX ADMIN — Medication 15 MILLIGRAM(S): at 08:26

## 2017-09-10 NOTE — PROGRESS NOTE ADULT - SUBJECTIVE AND OBJECTIVE BOX
[facility] -- Diann Cardenas, Anu Smith Pannella, Patel, Savella  Office # [phone]      Follow Up:  dyspnea, htn    Subjective/Observations: Patient seen and examined. Events noted. Resting  in bed. Now passing gas. Had episode of vomitting yesterday. Complains of increased dyspnea overall especially with ambulation. No complaints of chest pain,  or palpitations reported.       REVIEW OF SYSTEMS: All other review of systems is negative unless indicated above    PAST MEDICAL & SURGICAL HISTORY:  Colostomy in place  Anemia  Osteoarthritis  Obesity  Palpitations  PE (pulmonary thromboembolism): 2016  Diverticulitis of intestine with perforation and abscess without bleeding, unspecified part of intestinal tract  C. difficile diarrhea:   COPD (chronic obstructive pulmonary disease)  Hypercholesteremia  Hypertension  H/O hemicolectomy: 2016  Status post total replacement of both hips  H/O ovarian cystectomy: b/l  H/O: : x2  History of appendectomy      MEDICATIONS  (STANDING):  heparin  Injectable 5000 Unit(s) SubCutaneous every 8 hours  metoprolol Injectable 5 milliGRAM(s) IV Push every 6 hours  pantoprazole  Injectable 40 milliGRAM(s) IV Push daily  buDESOnide 160 MICROgram(s)/formoterol 4.5 MICROgram(s) Inhaler 2 Puff(s) Inhalation two times a day  aspirin  chewable 81 milliGRAM(s) Oral daily  insulin lispro (HumaLOG) corrective regimen sliding scale   SubCutaneous every 6 hours  dextrose 5%. 1000 milliLiter(s) (50 mL/Hr) IV Continuous <Continuous>  mometasone 200 MICROgram(s)/formoterol 5 MICROgram(s) Inhaler 2 Puff(s) Inhalation two times a day  aclidinium 400 MICROgram Inhaler 1 Puff(s) Inhalation two times a day  dextrose 50% Injectable 12.5 Gram(s) IV Push once  dextrose 50% Injectable 25 Gram(s) IV Push once  dextrose 50% Injectable 25 Gram(s) IV Push once  HYDROmorphone PCA (1 mG/mL) 30 milliLiter(s) PCA Continuous PCA Continuous  dextrose 5% + sodium chloride 0.45% with potassium chloride 20 mEq/L 1000 milliLiter(s) (100 mL/Hr) IV Continuous <Continuous>    MEDICATIONS  (PRN):  dextrose Gel 1 Dose(s) Oral once PRN Blood Glucose LESS THAN 70 milliGRAM(s)/deciliter  glucagon  Injectable 1 milliGRAM(s) IntraMuscular once PRN Glucose LESS THAN 70 milligrams/deciliter  HYDROmorphone PCA (1 mG/mL) Rescue Clinician Bolus 0.5 milliGRAM(s) IV Push every 15 minutes PRN for Pain Scale GREATER THAN 6  naloxone Injectable 0.1 milliGRAM(s) IV Push every 3 minutes PRN For ANY of the following changes in patient status:  A. RR LESS THAN 10 breaths per minute, B. Oxygen saturation LESS THAN 90%, C. Sedation score of 6  ondansetron Injectable 4 milliGRAM(s) IV Push every 6 hours PRN Nausea  ketorolac   Injectable 15 milliGRAM(s) IV Push every 6 hours PRN Moderate Pain (4 - 6)      Allergies    Spiriva (Unknown)    Intolerances            Vital Signs Last 24 Hrs  T(C): 37 (10 Sep 2017 05:02), Max: 37.7 (09 Sep 2017 17:37)  T(F): 98.6 (10 Sep 2017 05:02), Max: 99.8 (09 Sep 2017 17:37)  HR: 90 (10 Sep 2017 05:02) (84 - 94)  BP: 157/86 (10 Sep 2017 05:02) (119/72 - 157/86)  BP(mean): --  RR: 18 (10 Sep 2017 05:02) (18 - 18)  SpO2: 96% (10 Sep 2017 05:02) (92% - 96%)    I&O's Summary    09 Sep 2017 07:01  -  10 Sep 2017 07:00  --------------------------------------------------------  IN: 3450 mL / OUT: 2175 mL / NET: 1275 mL          PHYSICAL EXAM:   Constitutional: NAD, awake and alert, well-developed  HEENT: Moist Mucous Membranes, Anicteric  Pulmonary: Decreased breath sounds b/l. with crackles at bases  Cardiovascular: Regular, S1 and S2, distant  Gastrointestinal: Bowel Sounds present, soft, nontender. distended  Lymph: trace - 1 lower extremity edema. No lymphadenopathy.  Skin: No visible rashes or ulcers.  Psych:  Mood & affect appropriate    LABS: All Labs Reviewed:                        9.2    9.18  )-----------( 241      ( 09 Sep 2017 10:07 )             30.2                         10.8   12.0  )-----------( 264      ( 08 Sep 2017 14:34 )             33.7                         11.7   9.7   )-----------( 257      ( 08 Sep 2017 09:29 )             35.6     09 Sep 2017 10:07    137    |  100    |  12     ----------------------------<  112    4.2     |  24     |  0.87   08 Sep 2017 14:34    138    |  99     |  21     ----------------------------<  135    4.4     |  22     |  1.37   08 Sep 2017 09:29    140    |  99     |  20     ----------------------------<  176    5.2     |  22     |  1.70     Ca    8.2        09 Sep 2017 10:07  Ca    7.7        08 Sep 2017 14:34  Ca    8.1        08 Sep 2017 09:29  Phos  2.0       09 Sep 2017 10:07  Mg     1.9       09 Sep 2017 10:07  Mg     1.8       08 Sep 2017 04:28    TPro  5.6    /  Alb  3.3    /  TBili  0.3    /  DBili  x      /  AST  30     /  ALT  30     /  AlkPhos  66     07 Sep 2017 23:10

## 2017-09-10 NOTE — PROVIDER CONTACT NOTE (OTHER) - SITUATION
discrepancy noted w/ PCA Dilaudid reservoir amount
pt c/o SOB
pt c/o blood tinged sputum; coughing
pt still c.o SOB
pt w/ decreased b/p and decreased urine output

## 2017-09-10 NOTE — PROGRESS NOTE ADULT - ASSESSMENT
66 y/o female with pmhx of COPD, HTN, perforated diverticulitis s/p exploratory laparotomy, right colectomy, small bowel resection, Chacko procedure in 12/2016 - now with colostomy, PE now off Xarelto, here for reversal. Tolerated procedure well.    Last TTE on record with normal LV and RV function.  She appears to be now volume overloaded. I would consider decrease IVF rate. Also I would give Lasix 40mg IV x 1 today and see response.   Continue with ASA 81 daily  On Toprol XL at home; I agree with continuing IV metoprolol 5 mg q6 hr while cannot tolerate po. Would switch back to home Toprol XL 25 bid when able to tolerate   Off Xarelto. resume when ok with surgery  Monitor and replete electrolytes. Keep K>4.0 and Mg>2.0.   Pain control  Further cardiac workup will depend on clinical course.   Incentive Spirometry  Will follow with you.

## 2017-09-10 NOTE — PROVIDER CONTACT NOTE (OTHER) - ACTION/TREATMENT ORDERED:
will continue to monitor, AM meds will be given.
MD at bedside. vss, will continue to monitor.
Md aware at bedside on rounds, LR 1L bolus ordered and in progress
pain service, manager and supervisor at bedside, PCA pump and vial brought to pharmacy, new pump and vial placed, will continue to monitor

## 2017-09-10 NOTE — PROGRESS NOTE ADULT - SUBJECTIVE AND OBJECTIVE BOX
RED SURGERY DAILY PROGRESS NOTE:     Hospital Day: 4  Postoperative Day: 3    S: Patient says she has had some "panic attacks" with difficulty breathing. Because of her history of PEs, difficulty breathing, and lower leg edema/tenderness R>L, we are getting a b/l LE Duplex and CTA chest. We are restarting her Xarelto and giving her Lasix per Cardiology's note. She is now positive for flatus and negative for bowel movements.     O:  Vital Signs Last 24 Hrs  T(C): 37.2 (10 Sep 2017 13:16), Max: 37.7 (09 Sep 2017 17:37)  T(F): 99 (10 Sep 2017 13:16), Max: 99.8 (09 Sep 2017 17:37)  HR: 79 (10 Sep 2017 13:16) (79 - 94)  BP: 126/69 (10 Sep 2017 13:16) (119/72 - 157/86)  BP(mean): --  RR: 18 (10 Sep 2017 13:16) (18 - 18)  SpO2: 99% (10 Sep 2017 13:16) (92% - 99%)    Physical Exam  Gen: No apparent distress  HEENT: normocephalic, atraumatic, no scleral icterus  Pulm: Airway patent, breathing comfortably, with nasal canula  Abdomen: soft, non-distended, non-tender, vac midline  Extremities: lower extremities edematous and tender R>L    I&O's Detail    09 Sep 2017 07:01  -  10 Sep 2017 07:00  --------------------------------------------------------  IN:    lactated ringers.: 3000 mL    Solution: 250 mL    Solution: 200 mL  Total IN: 3450 mL    OUT:    Indwelling Catheter - Urethral: 1675 mL    Voided: 500 mL  Total OUT: 2175 mL    Total NET: 1275 mL      10 Sep 2017 07:01  -  10 Sep 2017 13:18  --------------------------------------------------------  IN:  Total IN: 0 mL    OUT:    Voided: 650 mL  Total OUT: 650 mL    Total NET: -650 mL    09-10    139  |  102  |  12  ----------------------------<  94  4.1   |  25  |  0.90    Ca    8.2<L>      10 Sep 2017 08:29  Phos  1.9     09-10  Mg     2.3     09-10

## 2017-09-10 NOTE — CHART NOTE - NSCHARTNOTEFT_GEN_A_CORE
Day __3_ of Anesthesia Pain Management Service    SUBJECTIVE:    Pain Scale Score	At rest: ___ 	With Activity: ___ 	[x ] Refer to charted pain scores    THERAPY:    [ ] IV PCA Morphine		[ ] 5 mg/mL	[ ] 1 mg/mL  [x ] IV PCA Hydromorphone	[ ] 5 mg/mL	[x ] 1 mg/mL  [ ] IV PCA Fentanyl		[ ] 50 micrograms/mL    Demand dose  0.2  lockout  6  (minutes) Continuous Rate 0   Total:     Daily  OBJECTIVE:    Sedation Score:	[ x] Alert	[ ] Drowsy 	[ ] Arousable	[ ] Asleep	[ ] Unresponsive    Side Effects:	[ x] None	[ ] Nausea	[ ] Vomiting	[ ] Pruritus  		[ ] Other:    Vital Signs Last 24 Hrs  T(C): 36.7 (10 Sep 2017 09:36), Max: 37.7 (09 Sep 2017 17:37)  T(F): 98 (10 Sep 2017 09:36), Max: 99.8 (09 Sep 2017 17:37)  HR: 81 (10 Sep 2017 10:01) (80 - 94)  BP: 147/70 (10 Sep 2017 10:01) (119/72 - 157/86)  BP(mean): --  RR: 18 (10 Sep 2017 09:36) (18 - 18)  SpO2: 98% (10 Sep 2017 10:01) (92% - 98%)    ASSESSMENT/ PLAN    Therapy to  be:	[x ] Continue   [ ] Discontinued   [ ] Change to prn Analgesics    Comments: Day __3_ of Anesthesia Pain Management Service    SUBJECTIVE:    Pain Scale Score	At rest: ___ 	With Activity: ___ 	[x ] Refer to charted pain scores    THERAPY:    [ ] IV PCA Morphine		[ ] 5 mg/mL	[ ] 1 mg/mL  [x ] IV PCA Hydromorphone	[ ] 5 mg/mL	[x ] 1 mg/mL  [ ] IV PCA Fentanyl		[ ] 50 micrograms/mL    Demand dose  0.2  lockout  6  (minutes) Continuous Rate 0   Total:     Daily  OBJECTIVE:    Sedation Score:	[ x] Alert	[ ] Drowsy 	[ ] Arousable	[ ] Asleep	[ ] Unresponsive    Side Effects:	[ ] None	[x ] Nausea	[ ] Vomiting	[ ] Pruritus  		[ ] Other:    Vital Signs Last 24 Hrs  T(C): 36.7 (10 Sep 2017 09:36), Max: 37.7 (09 Sep 2017 17:37)  T(F): 98 (10 Sep 2017 09:36), Max: 99.8 (09 Sep 2017 17:37)  HR: 81 (10 Sep 2017 10:01) (80 - 94)  BP: 147/70 (10 Sep 2017 10:01) (119/72 - 157/86)  BP(mean): --  RR: 18 (10 Sep 2017 09:36) (18 - 18)  SpO2: 98% (10 Sep 2017 10:01) (92% - 98%)    ASSESSMENT/ PLAN    Therapy to  be:	[x ] Continue   [ ] Discontinued   [ ] Change to prn Analgesics    Comments:

## 2017-09-10 NOTE — PROGRESS NOTE ADULT - ASSESSMENT
A: 67 year old woman s/p robotic converted to open reversal of Guillermina's. POD #3.    P:  - f/u CTA  - f/u Duplex  - PCA for pain control  - PT for wound vac management  - Out of bed, Ambulation  - NPO  - Add Xarelto  - Lasix IV

## 2017-09-10 NOTE — PHYSICAL THERAPY INITIAL EVALUATION ADULT - PERTINENT HX OF CURRENT PROBLEM, REHAB EVAL
68 y/o female with pmhx of COPD, HTN, perforated diverticulitis s/p exploratory laparotomy, right colectomy, small bowel resection, Chacko procedure in 12/2016 - now with colostomy in place. Presents now for robotic reversal of Chacko w/ ureteral catheters.

## 2017-09-10 NOTE — PROVIDER CONTACT NOTE (OTHER) - ASSESSMENT
O2 sat w/ 2 L O2; 96%. pt walked around the hallway once w/, as pt states "diffulty breathing". AM meds given.
o2 sat 96% on 2L NC, desats to 86% w/o O2
noticed leakage during PCA check at shift change.   pump reservoir states 19ml, visually noted 17ml
pt on 2 L O2 NC, c/o coughing and blood tinged sputum
pt resting comfortably w/ no complaints, pt w/ decreased b/p and urine output

## 2017-09-11 LAB
ANION GAP SERPL CALC-SCNC: 14 MMOL/L — SIGNIFICANT CHANGE UP (ref 5–17)
BUN SERPL-MCNC: 8 MG/DL — SIGNIFICANT CHANGE UP (ref 7–23)
CALCIUM SERPL-MCNC: 8.7 MG/DL — SIGNIFICANT CHANGE UP (ref 8.4–10.5)
CHLORIDE SERPL-SCNC: 104 MMOL/L — SIGNIFICANT CHANGE UP (ref 96–108)
CO2 SERPL-SCNC: 26 MMOL/L — SIGNIFICANT CHANGE UP (ref 22–31)
CREAT SERPL-MCNC: 0.78 MG/DL — SIGNIFICANT CHANGE UP (ref 0.5–1.3)
GLUCOSE SERPL-MCNC: 102 MG/DL — HIGH (ref 70–99)
HCT VFR BLD CALC: 30 % — LOW (ref 34.5–45)
HGB BLD-MCNC: 9.1 G/DL — LOW (ref 11.5–15.5)
MAGNESIUM SERPL-MCNC: 2.1 MG/DL — SIGNIFICANT CHANGE UP (ref 1.6–2.6)
MCHC RBC-ENTMCNC: 26.8 PG — LOW (ref 27–34)
MCHC RBC-ENTMCNC: 30.3 GM/DL — LOW (ref 32–36)
MCV RBC AUTO: 88.2 FL — SIGNIFICANT CHANGE UP (ref 80–100)
PHOSPHATE SERPL-MCNC: 2.1 MG/DL — LOW (ref 2.5–4.5)
PLATELET # BLD AUTO: 288 K/UL — SIGNIFICANT CHANGE UP (ref 150–400)
POTASSIUM SERPL-MCNC: 4 MMOL/L — SIGNIFICANT CHANGE UP (ref 3.5–5.3)
POTASSIUM SERPL-SCNC: 4 MMOL/L — SIGNIFICANT CHANGE UP (ref 3.5–5.3)
RBC # BLD: 3.4 M/UL — LOW (ref 3.8–5.2)
RBC # FLD: 22.1 % — HIGH (ref 10.3–14.5)
SODIUM SERPL-SCNC: 144 MMOL/L — SIGNIFICANT CHANGE UP (ref 135–145)
SURGICAL PATHOLOGY STUDY: SIGNIFICANT CHANGE UP
WBC # BLD: 6.75 K/UL — SIGNIFICANT CHANGE UP (ref 3.8–10.5)
WBC # FLD AUTO: 6.75 K/UL — SIGNIFICANT CHANGE UP (ref 3.8–10.5)

## 2017-09-11 PROCEDURE — 99233 SBSQ HOSP IP/OBS HIGH 50: CPT

## 2017-09-11 RX ORDER — ATORVASTATIN CALCIUM 80 MG/1
20 TABLET, FILM COATED ORAL AT BEDTIME
Qty: 0 | Refills: 0 | Status: DISCONTINUED | OUTPATIENT
Start: 2017-09-11 | End: 2017-09-13

## 2017-09-11 RX ORDER — METOPROLOL TARTRATE 50 MG
25 TABLET ORAL
Qty: 0 | Refills: 0 | Status: DISCONTINUED | OUTPATIENT
Start: 2017-09-11 | End: 2017-09-13

## 2017-09-11 RX ORDER — LOSARTAN POTASSIUM 100 MG/1
50 TABLET, FILM COATED ORAL DAILY
Qty: 0 | Refills: 0 | Status: DISCONTINUED | OUTPATIENT
Start: 2017-09-11 | End: 2017-09-13

## 2017-09-11 RX ORDER — ACETAMINOPHEN 500 MG
650 TABLET ORAL EVERY 6 HOURS
Qty: 0 | Refills: 0 | Status: DISCONTINUED | OUTPATIENT
Start: 2017-09-11 | End: 2017-09-13

## 2017-09-11 RX ORDER — IBUPROFEN 200 MG
400 TABLET ORAL EVERY 6 HOURS
Qty: 0 | Refills: 0 | Status: DISCONTINUED | OUTPATIENT
Start: 2017-09-11 | End: 2017-09-13

## 2017-09-11 RX ORDER — HEPARIN SODIUM 5000 [USP'U]/ML
5000 INJECTION INTRAVENOUS; SUBCUTANEOUS EVERY 8 HOURS
Qty: 0 | Refills: 0 | Status: DISCONTINUED | OUTPATIENT
Start: 2017-09-11 | End: 2017-09-13

## 2017-09-11 RX ORDER — OXYCODONE HYDROCHLORIDE 5 MG/1
5 TABLET ORAL EVERY 4 HOURS
Qty: 0 | Refills: 0 | Status: DISCONTINUED | OUTPATIENT
Start: 2017-09-11 | End: 2017-09-13

## 2017-09-11 RX ORDER — FUROSEMIDE 40 MG
40 TABLET ORAL DAILY
Qty: 0 | Refills: 0 | Status: DISCONTINUED | OUTPATIENT
Start: 2017-09-11 | End: 2017-09-11

## 2017-09-11 RX ORDER — ONDANSETRON 8 MG/1
4 TABLET, FILM COATED ORAL EVERY 6 HOURS
Qty: 0 | Refills: 0 | Status: DISCONTINUED | OUTPATIENT
Start: 2017-09-11 | End: 2017-09-11

## 2017-09-11 RX ORDER — NALOXONE HYDROCHLORIDE 4 MG/.1ML
0.1 SPRAY NASAL
Qty: 0 | Refills: 0 | Status: DISCONTINUED | OUTPATIENT
Start: 2017-09-11 | End: 2017-09-11

## 2017-09-11 RX ORDER — FUROSEMIDE 40 MG
40 TABLET ORAL
Qty: 0 | Refills: 0 | Status: DISCONTINUED | OUTPATIENT
Start: 2017-09-11 | End: 2017-09-13

## 2017-09-11 RX ORDER — ACETAMINOPHEN 500 MG
1000 TABLET ORAL ONCE
Qty: 0 | Refills: 0 | Status: COMPLETED | OUTPATIENT
Start: 2017-09-11 | End: 2017-09-11

## 2017-09-11 RX ORDER — HYDROMORPHONE HYDROCHLORIDE 2 MG/ML
0.5 INJECTION INTRAMUSCULAR; INTRAVENOUS; SUBCUTANEOUS
Qty: 0 | Refills: 0 | Status: DISCONTINUED | OUTPATIENT
Start: 2017-09-11 | End: 2017-09-11

## 2017-09-11 RX ORDER — ACETAMINOPHEN 500 MG
650 TABLET ORAL EVERY 6 HOURS
Qty: 0 | Refills: 0 | Status: DISCONTINUED | OUTPATIENT
Start: 2017-09-11 | End: 2017-09-11

## 2017-09-11 RX ORDER — PANTOPRAZOLE SODIUM 20 MG/1
40 TABLET, DELAYED RELEASE ORAL
Qty: 0 | Refills: 0 | Status: DISCONTINUED | OUTPATIENT
Start: 2017-09-11 | End: 2017-09-13

## 2017-09-11 RX ADMIN — OXYCODONE HYDROCHLORIDE 5 MILLIGRAM(S): 5 TABLET ORAL at 22:45

## 2017-09-11 RX ADMIN — MOMETASONE FUROATE AND FORMOTEROL FUMARATE DIHYDRATE 2 PUFF(S): 200; 5 AEROSOL RESPIRATORY (INHALATION) at 05:47

## 2017-09-11 RX ADMIN — OXYCODONE HYDROCHLORIDE 5 MILLIGRAM(S): 5 TABLET ORAL at 18:29

## 2017-09-11 RX ADMIN — HEPARIN SODIUM 5000 UNIT(S): 5000 INJECTION INTRAVENOUS; SUBCUTANEOUS at 12:00

## 2017-09-11 RX ADMIN — Medication 40 MILLIGRAM(S): at 17:58

## 2017-09-11 RX ADMIN — Medication 5 MILLIGRAM(S): at 00:03

## 2017-09-11 RX ADMIN — OXYCODONE HYDROCHLORIDE 5 MILLIGRAM(S): 5 TABLET ORAL at 22:15

## 2017-09-11 RX ADMIN — Medication 5 MILLIGRAM(S): at 12:00

## 2017-09-11 RX ADMIN — OXYCODONE HYDROCHLORIDE 5 MILLIGRAM(S): 5 TABLET ORAL at 14:32

## 2017-09-11 RX ADMIN — ATORVASTATIN CALCIUM 20 MILLIGRAM(S): 80 TABLET, FILM COATED ORAL at 21:29

## 2017-09-11 RX ADMIN — Medication 1000 MILLIGRAM(S): at 12:15

## 2017-09-11 RX ADMIN — LOSARTAN POTASSIUM 50 MILLIGRAM(S): 100 TABLET, FILM COATED ORAL at 17:58

## 2017-09-11 RX ADMIN — ACLIDINIUM BROMIDE 1 PUFF(S): 400 POWDER, METERED RESPIRATORY (INHALATION) at 05:47

## 2017-09-11 RX ADMIN — HYDROMORPHONE HYDROCHLORIDE 30 MILLILITER(S): 2 INJECTION INTRAMUSCULAR; INTRAVENOUS; SUBCUTANEOUS at 07:17

## 2017-09-11 RX ADMIN — Medication 400 MILLIGRAM(S): at 20:40

## 2017-09-11 RX ADMIN — HEPARIN SODIUM 5000 UNIT(S): 5000 INJECTION INTRAVENOUS; SUBCUTANEOUS at 21:29

## 2017-09-11 RX ADMIN — Medication 650 MILLIGRAM(S): at 17:58

## 2017-09-11 RX ADMIN — Medication 1000 MILLIGRAM(S): at 00:33

## 2017-09-11 RX ADMIN — Medication 650 MILLIGRAM(S): at 11:59

## 2017-09-11 RX ADMIN — OXYCODONE HYDROCHLORIDE 5 MILLIGRAM(S): 5 TABLET ORAL at 10:13

## 2017-09-11 RX ADMIN — Medication 400 MILLIGRAM(S): at 00:03

## 2017-09-11 RX ADMIN — Medication 400 MILLIGRAM(S): at 06:17

## 2017-09-11 RX ADMIN — Medication 5 MILLIGRAM(S): at 05:40

## 2017-09-11 RX ADMIN — MOMETASONE FUROATE AND FORMOTEROL FUMARATE DIHYDRATE 2 PUFF(S): 200; 5 AEROSOL RESPIRATORY (INHALATION) at 18:01

## 2017-09-11 RX ADMIN — OXYCODONE HYDROCHLORIDE 5 MILLIGRAM(S): 5 TABLET ORAL at 17:59

## 2017-09-11 RX ADMIN — ACLIDINIUM BROMIDE 1 PUFF(S): 400 POWDER, METERED RESPIRATORY (INHALATION) at 18:01

## 2017-09-11 RX ADMIN — OXYCODONE HYDROCHLORIDE 5 MILLIGRAM(S): 5 TABLET ORAL at 14:02

## 2017-09-11 RX ADMIN — Medication 1000 MILLIGRAM(S): at 06:47

## 2017-09-11 RX ADMIN — Medication 81 MILLIGRAM(S): at 11:59

## 2017-09-11 RX ADMIN — OXYCODONE HYDROCHLORIDE 5 MILLIGRAM(S): 5 TABLET ORAL at 09:43

## 2017-09-11 RX ADMIN — Medication 400 MILLIGRAM(S): at 21:10

## 2017-09-11 RX ADMIN — PANTOPRAZOLE SODIUM 40 MILLIGRAM(S): 20 TABLET, DELAYED RELEASE ORAL at 11:59

## 2017-09-11 NOTE — PROGRESS NOTE ADULT - SUBJECTIVE AND OBJECTIVE BOX
RED SURGERY DAILY PROGRESS NOTE:     Hospital Day: 5  Postoperative Day: 4    S: Patient had a CTA and a bilateral Duplex study of her lower extremities yesterday. No DVTs or PEs were seen, but she still states that she is dyspnic on exertion. She is currently positive for flatus and bowel movements and wants to be off of her PCA.    O:  Vital Signs Last 24 Hrs  T(C): 36.8 (11 Sep 2017 05:36), Max: 37.2 (10 Sep 2017 13:16)  T(F): 98.3 (11 Sep 2017 05:36), Max: 99 (10 Sep 2017 13:16)  HR: 69 (11 Sep 2017 05:36) (69 - 81)  BP: 148/75 (11 Sep 2017 05:36) (119/70 - 155/77)  BP(mean): --  RR: 18 (11 Sep 2017 05:36) (18 - 18)  SpO2: 95% (11 Sep 2017 05:36) (95% - 100%)    Physical Exam  Gen: No apparent distress  HEENT: normocephalic, atraumatic, no scleral icterus  Pulm: Airway patent, breathing comfortably, with nasal canula  Abdomen: soft, non-distended, non-tender, vac midline  Extremities: lower extremities edematous and tender R>L    I&O's Detail    10 Sep 2017 07:01  -  11 Sep 2017 07:00  --------------------------------------------------------  IN:    dextrose 5% + sodium chloride 0.45% with potassium chloride 20 mEq/L: 1100 mL    Solution: 250 mL    Solution: 300 mL  Total IN: 1650 mL    OUT:    Voided: 2000 mL  Total OUT: 2000 mL    Total NET: -350 mL    09-10    139  |  102  |  12  ----------------------------<  94  4.1   |  25  |  0.90    Ca    8.2<L>      10 Sep 2017 08:29  Phos  1.9     09-10  Mg     2.3     09-10

## 2017-09-11 NOTE — PROGRESS NOTE ADULT - ASSESSMENT
A: 67 year old woman s/p robotic converted to open reversal of Guillermina's. POD #4.    P:  - d/c PCA  - Tylenol and Toradol for pain control (pending Cr)  - PT for wound vac management  - Out of bed, Ambulation  - Follow-up with Pulmonology  - d/c Xarelto and re-start SQH  - Lasix bid and Metolazone  - Advance diet

## 2017-09-11 NOTE — PROGRESS NOTE ADULT - SUBJECTIVE AND OBJECTIVE BOX
Pain Management Attending Addendum    SUBJECTIVE:    Therapy:	  [ x] IV PCA	   [ ] Epidural           [ ] s/p Spinal Opoid              [ ] Postpartum infusion	  [ ] Patient controlled regional anesthesia (PCRA)    [ ] prn Analgesics    OBJECTIVE:   [ x] No new signs     [ ] Other:    Side Effects:  [x ] None			[ ] Other:    Assessment of Catheter Site:		[ ] Intact		[ ] Other:    ASSESSMENT/PLAN  [ ] Continue current therapy    [ ] Therapy changed to:    [ ] IV PCA       [ ] Epidural     [x ] prn Analgesics     [ ] post partum infusion    Comments:

## 2017-09-11 NOTE — PROGRESS NOTE ADULT - SUBJECTIVE AND OBJECTIVE BOX
Day 4 of Anesthesia Pain Management Service    SUBJECTIVE: Patient is doing well with IV PCA    Pain Scale Score:	[X] Refer to charted pain scores    THERAPY:    [ ] IV PCA Morphine		[ ] 5 mg/mL	[ ] 1 mg/mL  [X] IV PCA Hydromorphone	[ ] 5 mg/mL	[X] 1 mg/mL  [ ] IV PCA Fentanyl		[ ] 50 micrograms/mL    Demand dose: 0.2 mg     Lockout: 6 minutes   Continuous Rate: 0 mg/hr  4 Hour Limit: 4 mg    MEDICATIONS  (STANDING):  metoprolol Injectable 5 milliGRAM(s) IV Push every 6 hours  pantoprazole  Injectable 40 milliGRAM(s) IV Push daily  buDESOnide 160 MICROgram(s)/formoterol 4.5 MICROgram(s) Inhaler 2 Puff(s) Inhalation two times a day  aspirin  chewable 81 milliGRAM(s) Oral daily  mometasone 200 MICROgram(s)/formoterol 5 MICROgram(s) Inhaler 2 Puff(s) Inhalation two times a day  aclidinium 400 MICROgram Inhaler 1 Puff(s) Inhalation two times a day  dextrose 50% Injectable 25 Gram(s) IV Push once  insulin lispro (HumaLOG) corrective regimen sliding scale   SubCutaneous three times a day before meals  insulin lispro (HumaLOG) corrective regimen sliding scale   SubCutaneous at bedtime  dextrose 5%. 1000 milliLiter(s) (50 mL/Hr) IV Continuous <Continuous>  dextrose 50% Injectable 12.5 Gram(s) IV Push once  dextrose 50% Injectable 25 Gram(s) IV Push once  heparin  Injectable 5000 Unit(s) SubCutaneous every 8 hours  metolazone 5 milliGRAM(s) Oral daily  furosemide    Tablet 40 milliGRAM(s) Oral two times a day    MEDICATIONS  (PRN):  dextrose Gel 1 Dose(s) Oral once PRN Blood Glucose LESS THAN 70 milliGRAM(s)/deciliter  glucagon  Injectable 1 milliGRAM(s) IntraMuscular once PRN Glucose LESS THAN 70 milligrams/deciliter  dextrose Gel 1 Dose(s) Oral once PRN Blood Glucose LESS THAN 70 milliGRAM(s)/deciliter  glucagon  Injectable 1 milliGRAM(s) IntraMuscular once PRN Glucose LESS THAN 70 milligrams/deciliter  oxyCODONE    IR 5 milliGRAM(s) Oral every 4 hours PRN Moderate Pain (4 - 6)  acetaminophen   Tablet 650 milliGRAM(s) Oral every 6 hours PRN mild pain      OBJECTIVE:    Sedation Score:	[ X] Alert	[ ] Drowsy 	[ ] Arousable	[ ] Asleep	[ ] Unresponsive    Side Effects:	[X ] None	[ ] Nausea	[ ] Vomiting	[ ] Pruritus  		[ ] Other:    Vital Signs Last 24 Hrs  T(C): 36.9 (11 Sep 2017 09:00), Max: 37.2 (10 Sep 2017 13:16)  T(F): 98.5 (11 Sep 2017 09:00), Max: 99 (10 Sep 2017 13:16)  HR: 69 (11 Sep 2017 09:00) (69 - 79)  BP: 120/56 (11 Sep 2017 09:00) (119/70 - 155/77)  BP(mean): --  RR: 18 (11 Sep 2017 09:00) (18 - 18)  SpO2: 98% (11 Sep 2017 09:00) (95% - 100%)    ASSESSMENT/ PLAN    Therapy to  be:               [ ] Continued   [X ] Discontinued   [X ] Changed to PRN Analgesics    Documentation and Verification of current medications:   [X] Done	[ ] Not done, not eligible    Comments:

## 2017-09-11 NOTE — PROGRESS NOTE ADULT - ASSESSMENT
66 y/o female with pmhx of COPD, HTN, perforated diverticulitis s/p exploratory laparotomy, right colectomy, small bowel resection, Chacko procedure in 12/2016 - now with colostomy, PE now off Xarelto, here for reversal. Tolerated procedure well:    - Last TTE on record with normal LV and RV function.  - She continue volume overloaded. D/C IVF as patient is planned to start PO today.  I would give Lasix 40 IV BID in addition to metolazone today and follow urine output.     - Continue with ASA 81 daily  - Continue metoprolol IV at the current dose.  When able to take PO, start 25 PO BID  - Monitor and replete electrolytes. Keep K>4.0 and Mg>2.0.   - Pain control  - Further cardiac workup will depend on clinical course.   - Incentive Spirometry  - Ambulate  - Advance diet per surgery  - DVT ppx  - Will follow with you.

## 2017-09-11 NOTE — PROGRESS NOTE ADULT - SUBJECTIVE AND OBJECTIVE BOX
[facility] - Diann Cardenas, Luis, Anu, Britt, Eric Manuel  Office Number:  [phone]    Patient resting comfortably in bed in chair.  She is still short of breath with exertion and has increasing LE edema.       MEDICATIONS  (STANDING):  metoprolol Injectable 5 milliGRAM(s) IV Push every 6 hours  pantoprazole  Injectable 40 milliGRAM(s) IV Push daily  buDESOnide 160 MICROgram(s)/formoterol 4.5 MICROgram(s) Inhaler 2 Puff(s) Inhalation two times a day  aspirin  chewable 81 milliGRAM(s) Oral daily  mometasone 200 MICROgram(s)/formoterol 5 MICROgram(s) Inhaler 2 Puff(s) Inhalation two times a day  aclidinium 400 MICROgram Inhaler 1 Puff(s) Inhalation two times a day  dextrose 50% Injectable 25 Gram(s) IV Push once  insulin lispro (HumaLOG) corrective regimen sliding scale   SubCutaneous three times a day before meals  insulin lispro (HumaLOG) corrective regimen sliding scale   SubCutaneous at bedtime  dextrose 5%. 1000 milliLiter(s) (50 mL/Hr) IV Continuous <Continuous>  dextrose 50% Injectable 12.5 Gram(s) IV Push once  dextrose 50% Injectable 25 Gram(s) IV Push once  heparin  Injectable 5000 Unit(s) SubCutaneous every 8 hours  furosemide    Tablet 40 milliGRAM(s) Oral daily  metolazone 5 milliGRAM(s) Oral daily    MEDICATIONS  (PRN):  dextrose Gel 1 Dose(s) Oral once PRN Blood Glucose LESS THAN 70 milliGRAM(s)/deciliter  glucagon  Injectable 1 milliGRAM(s) IntraMuscular once PRN Glucose LESS THAN 70 milligrams/deciliter  dextrose Gel 1 Dose(s) Oral once PRN Blood Glucose LESS THAN 70 milliGRAM(s)/deciliter  glucagon  Injectable 1 milliGRAM(s) IntraMuscular once PRN Glucose LESS THAN 70 milligrams/deciliter  oxyCODONE    IR 5 milliGRAM(s) Oral every 4 hours PRN Moderate Pain (4 - 6)  acetaminophen   Tablet 650 milliGRAM(s) Oral every 6 hours PRN mild pain      Allergies    Spiriva (Unknown)        Vital Signs Last 24 Hrs  T(C): 36.9 (11 Sep 2017 09:00), Max: 37.2 (10 Sep 2017 13:16)  T(F): 98.5 (11 Sep 2017 09:00), Max: 99 (10 Sep 2017 13:16)  HR: 69 (11 Sep 2017 09:00) (69 - 79)  BP: 120/56 (11 Sep 2017 09:00) (119/70 - 155/77)  BP(mean): --  RR: 18 (11 Sep 2017 09:00) (18 - 18)  SpO2: 98% (11 Sep 2017 09:00) (95% - 100%)    I&O's Summary    10 Sep 2017 07:01  -  11 Sep 2017 07:00  --------------------------------------------------------  IN: 1650 mL / OUT: 2000 mL / NET: -350 mL    11 Sep 2017 07:01  -  11 Sep 2017 10:01  --------------------------------------------------------  IN: 480 mL / OUT: 400 mL / NET: 80 mL        ON EXAM:    General: NAD, awake and alert, oriented x 3  HEENT: Mucous membranes are moist, anicteric  Lungs: Non-labored, breath sounds are clear bilaterally, No wheezing, rales or rhonchi.  Decreased breath sounds b/l  Cardiovascular: Regular, S1 and S2, no murmurs, rubs, or gallops.  Distant  Gastrointestinal: Bowel Sounds present, soft, nontender.   Lymph: 1+ b/l peripheral edema. No lymphadenopathy.  Skin: No rashes or ulcers  Psych:  Mood & affect appropriate    LABS: All Labs Reviewed:                        9.1    6.75  )-----------( 288      ( 11 Sep 2017 08:47 )             30.0                         9.2    8.64  )-----------( 252      ( 10 Sep 2017 08:21 )             29.5                         9.2    9.18  )-----------( 241      ( 09 Sep 2017 10:07 )             30.2     11 Sep 2017 08:45    144    |  104    |  8      ----------------------------<  102    4.0     |  26     |  0.78   10 Sep 2017 08:29    139    |  102    |  12     ----------------------------<  94     4.1     |  25     |  0.90   09 Sep 2017 10:07    137    |  100    |  12     ----------------------------<  112    4.2     |  24     |  0.87     Ca    8.7        11 Sep 2017 08:45  Ca    8.2        10 Sep 2017 08:29  Ca    8.2        09 Sep 2017 10:07  Phos  2.1       11 Sep 2017 08:45  Phos  1.9       10 Sep 2017 08:29  Phos  2.0       09 Sep 2017 10:07  Mg     2.1       11 Sep 2017 08:45  Mg     2.3       10 Sep 2017 08:29  Mg     1.9       09 Sep 2017 10:07            Blood Culture:

## 2017-09-12 ENCOUNTER — TRANSCRIPTION ENCOUNTER (OUTPATIENT)
Age: 67
End: 2017-09-12

## 2017-09-12 LAB
ANION GAP SERPL CALC-SCNC: 18 MMOL/L — HIGH (ref 5–17)
BUN SERPL-MCNC: 8 MG/DL — SIGNIFICANT CHANGE UP (ref 7–23)
CALCIUM SERPL-MCNC: 9 MG/DL — SIGNIFICANT CHANGE UP (ref 8.4–10.5)
CHLORIDE SERPL-SCNC: 96 MMOL/L — SIGNIFICANT CHANGE UP (ref 96–108)
CO2 SERPL-SCNC: 28 MMOL/L — SIGNIFICANT CHANGE UP (ref 22–31)
CREAT SERPL-MCNC: 0.91 MG/DL — SIGNIFICANT CHANGE UP (ref 0.5–1.3)
GLUCOSE SERPL-MCNC: 114 MG/DL — HIGH (ref 70–99)
HCT VFR BLD CALC: 31.2 % — LOW (ref 34.5–45)
HGB BLD-MCNC: 9.6 G/DL — LOW (ref 11.5–15.5)
MAGNESIUM SERPL-MCNC: 1.8 MG/DL — SIGNIFICANT CHANGE UP (ref 1.6–2.6)
MCHC RBC-ENTMCNC: 27.1 PG — SIGNIFICANT CHANGE UP (ref 27–34)
MCHC RBC-ENTMCNC: 30.8 GM/DL — LOW (ref 32–36)
MCV RBC AUTO: 88.1 FL — SIGNIFICANT CHANGE UP (ref 80–100)
PHOSPHATE SERPL-MCNC: 3.7 MG/DL — SIGNIFICANT CHANGE UP (ref 2.5–4.5)
PLATELET # BLD AUTO: 329 K/UL — SIGNIFICANT CHANGE UP (ref 150–400)
POTASSIUM SERPL-MCNC: 3.7 MMOL/L — SIGNIFICANT CHANGE UP (ref 3.5–5.3)
POTASSIUM SERPL-SCNC: 3.7 MMOL/L — SIGNIFICANT CHANGE UP (ref 3.5–5.3)
RBC # BLD: 3.54 M/UL — LOW (ref 3.8–5.2)
RBC # FLD: 21.3 % — HIGH (ref 10.3–14.5)
SODIUM SERPL-SCNC: 142 MMOL/L — SIGNIFICANT CHANGE UP (ref 135–145)
WBC # BLD: 7.57 K/UL — SIGNIFICANT CHANGE UP (ref 3.8–10.5)
WBC # FLD AUTO: 7.57 K/UL — SIGNIFICANT CHANGE UP (ref 3.8–10.5)

## 2017-09-12 PROCEDURE — 99233 SBSQ HOSP IP/OBS HIGH 50: CPT

## 2017-09-12 PROCEDURE — 71010: CPT | Mod: 26

## 2017-09-12 RX ORDER — POTASSIUM CHLORIDE 20 MEQ
40 PACKET (EA) ORAL ONCE
Qty: 0 | Refills: 0 | Status: COMPLETED | OUTPATIENT
Start: 2017-09-12 | End: 2017-09-12

## 2017-09-12 RX ORDER — FUROSEMIDE 40 MG
40 TABLET ORAL ONCE
Qty: 0 | Refills: 0 | Status: COMPLETED | OUTPATIENT
Start: 2017-09-12 | End: 2017-09-12

## 2017-09-12 RX ORDER — IPRATROPIUM/ALBUTEROL SULFATE 18-103MCG
3 AEROSOL WITH ADAPTER (GRAM) INHALATION EVERY 6 HOURS
Qty: 0 | Refills: 0 | Status: DISCONTINUED | OUTPATIENT
Start: 2017-09-12 | End: 2017-09-13

## 2017-09-12 RX ORDER — FLUTICASONE PROPIONATE 50 MCG
1 SPRAY, SUSPENSION NASAL
Qty: 0 | Refills: 0 | Status: DISCONTINUED | OUTPATIENT
Start: 2017-09-12 | End: 2017-09-13

## 2017-09-12 RX ORDER — MAGNESIUM SULFATE 500 MG/ML
2 VIAL (ML) INJECTION ONCE
Qty: 0 | Refills: 0 | Status: COMPLETED | OUTPATIENT
Start: 2017-09-12 | End: 2017-09-12

## 2017-09-12 RX ADMIN — OXYCODONE HYDROCHLORIDE 5 MILLIGRAM(S): 5 TABLET ORAL at 10:10

## 2017-09-12 RX ADMIN — HEPARIN SODIUM 5000 UNIT(S): 5000 INJECTION INTRAVENOUS; SUBCUTANEOUS at 05:22

## 2017-09-12 RX ADMIN — Medication 1 SPRAY(S): at 18:21

## 2017-09-12 RX ADMIN — OXYCODONE HYDROCHLORIDE 5 MILLIGRAM(S): 5 TABLET ORAL at 02:15

## 2017-09-12 RX ADMIN — Medication 3 MILLILITER(S): at 23:11

## 2017-09-12 RX ADMIN — OXYCODONE HYDROCHLORIDE 5 MILLIGRAM(S): 5 TABLET ORAL at 02:45

## 2017-09-12 RX ADMIN — Medication 40 MILLIGRAM(S): at 06:00

## 2017-09-12 RX ADMIN — OXYCODONE HYDROCHLORIDE 5 MILLIGRAM(S): 5 TABLET ORAL at 18:21

## 2017-09-12 RX ADMIN — OXYCODONE HYDROCHLORIDE 5 MILLIGRAM(S): 5 TABLET ORAL at 22:50

## 2017-09-12 RX ADMIN — Medication 400 MILLIGRAM(S): at 05:22

## 2017-09-12 RX ADMIN — OXYCODONE HYDROCHLORIDE 5 MILLIGRAM(S): 5 TABLET ORAL at 06:20

## 2017-09-12 RX ADMIN — MOMETASONE FUROATE AND FORMOTEROL FUMARATE DIHYDRATE 2 PUFF(S): 200; 5 AEROSOL RESPIRATORY (INHALATION) at 18:24

## 2017-09-12 RX ADMIN — OXYCODONE HYDROCHLORIDE 5 MILLIGRAM(S): 5 TABLET ORAL at 23:20

## 2017-09-12 RX ADMIN — Medication 40 MILLIEQUIVALENT(S): at 12:43

## 2017-09-12 RX ADMIN — Medication 650 MILLIGRAM(S): at 00:08

## 2017-09-12 RX ADMIN — Medication 25 MILLIGRAM(S): at 06:00

## 2017-09-12 RX ADMIN — Medication 40 MILLIGRAM(S): at 12:45

## 2017-09-12 RX ADMIN — Medication 81 MILLIGRAM(S): at 12:44

## 2017-09-12 RX ADMIN — HEPARIN SODIUM 5000 UNIT(S): 5000 INJECTION INTRAVENOUS; SUBCUTANEOUS at 14:18

## 2017-09-12 RX ADMIN — Medication 25 MILLIGRAM(S): at 18:23

## 2017-09-12 RX ADMIN — MOMETASONE FUROATE AND FORMOTEROL FUMARATE DIHYDRATE 2 PUFF(S): 200; 5 AEROSOL RESPIRATORY (INHALATION) at 05:23

## 2017-09-12 RX ADMIN — OXYCODONE HYDROCHLORIDE 5 MILLIGRAM(S): 5 TABLET ORAL at 10:40

## 2017-09-12 RX ADMIN — LOSARTAN POTASSIUM 50 MILLIGRAM(S): 100 TABLET, FILM COATED ORAL at 12:44

## 2017-09-12 RX ADMIN — Medication 3 MILLILITER(S): at 12:47

## 2017-09-12 RX ADMIN — Medication 3 MILLILITER(S): at 18:22

## 2017-09-12 RX ADMIN — Medication 400 MILLIGRAM(S): at 05:52

## 2017-09-12 RX ADMIN — Medication 50 GRAM(S): at 12:43

## 2017-09-12 RX ADMIN — HEPARIN SODIUM 5000 UNIT(S): 5000 INJECTION INTRAVENOUS; SUBCUTANEOUS at 21:56

## 2017-09-12 RX ADMIN — ACLIDINIUM BROMIDE 1 PUFF(S): 400 POWDER, METERED RESPIRATORY (INHALATION) at 05:23

## 2017-09-12 RX ADMIN — Medication 650 MILLIGRAM(S): at 18:23

## 2017-09-12 RX ADMIN — PANTOPRAZOLE SODIUM 40 MILLIGRAM(S): 20 TABLET, DELAYED RELEASE ORAL at 05:22

## 2017-09-12 RX ADMIN — Medication 650 MILLIGRAM(S): at 12:44

## 2017-09-12 RX ADMIN — Medication 40 MILLIGRAM(S): at 18:23

## 2017-09-12 RX ADMIN — Medication 650 MILLIGRAM(S): at 07:58

## 2017-09-12 RX ADMIN — OXYCODONE HYDROCHLORIDE 5 MILLIGRAM(S): 5 TABLET ORAL at 19:00

## 2017-09-12 RX ADMIN — ACLIDINIUM BROMIDE 1 PUFF(S): 400 POWDER, METERED RESPIRATORY (INHALATION) at 18:23

## 2017-09-12 RX ADMIN — Medication 650 MILLIGRAM(S): at 23:12

## 2017-09-12 RX ADMIN — ATORVASTATIN CALCIUM 20 MILLIGRAM(S): 80 TABLET, FILM COATED ORAL at 21:56

## 2017-09-12 RX ADMIN — OXYCODONE HYDROCHLORIDE 5 MILLIGRAM(S): 5 TABLET ORAL at 06:50

## 2017-09-12 RX ADMIN — OXYCODONE HYDROCHLORIDE 5 MILLIGRAM(S): 5 TABLET ORAL at 14:17

## 2017-09-12 NOTE — PROGRESS NOTE ADULT - ASSESSMENT
A: 67 year old woman s/p robotic converted to open reversal of Guillermina's. POD #5.    P:  - Tylenol and Toradol for pain control  - PT for wound vac management  - Out of bed, Ambulation  - Follow-up with Pulmonology  - [facility] DVT prophylaxis  - LRD, d/c fluids  - Lasix 40 IV BID in addition to metolazone today and follow urine output     - Continue with ASA 81 daily  - Per Cards, Metoprolol 25mg PO BID A: 67 year old woman s/p robotic converted to open reversal of Guillermina's. POD #5.    P:  - Tylenol and Motrin for pain control  - PT for wound vac management  - Out of bed, Ambulation  - Follow-up with Pulmonology  - [facility] DVT prophylaxis  - LRD, d/c fluids  - Lasix 40mg BID in addition to metolazone today and follow urine output     - Continue with ASA 81 daily  - Per Cards, Metoprolol 25mg PO BID  - Daily weights  - CXR to assess for pulmonary edema

## 2017-09-12 NOTE — PROGRESS NOTE ADULT - ASSESSMENT
68 y/o female with pmhx of COPD, HTN, perforated diverticulitis s/p exploratory laparotomy, right colectomy, small bowel resection, Chacko procedure in 12/2016 - now with colostomy, PE now off Xarelto, here for reversal. Tolerated procedure well. Now with signs of volume overload with worsening lower extremity edema.    - Hold IVF. I agree with restarting Lasix 40 mg po bid. I have recommended to give her an extra 40 IV x 1 today; She will like need 40 IV bid dosing, but we can re-assess tomorrow and see how she responds  - Last TTE on record with normal LV and RV function.   - Continue with ASA 81 daily  - Continue metoprolol 25 po bid  - Monitor and replete electrolytes. Keep K>4.0 and Mg>2.0.  Watch closely while on metolazone.  - Pain control  - Further cardiac workup will depend on clinical course.   - Incentive Spirometry  - Ambulate  - Advance diet per surgery  - DVT ppx  - Will follow with you.  - Discussed with Surgical PA

## 2017-09-12 NOTE — PROGRESS NOTE ADULT - SUBJECTIVE AND OBJECTIVE BOX
[facility] - Diann Cardenas Grossman, [place], Britt, Eric Manuel  Office Number:  [phone]    Patient resting comfortably in bed in NAD.  + SOB when walking to bathroom. Reports worsening of her lower extremity edema    ROS: negative unless otherwise mentioned.    Telemetry:  Not on tele    MEDICATIONS  (STANDING):  aspirin  chewable 81 milliGRAM(s) Oral daily  mometasone 200 MICROgram(s)/formoterol 5 MICROgram(s) Inhaler 2 Puff(s) Inhalation two times a day  aclidinium 400 MICROgram Inhaler 1 Puff(s) Inhalation two times a day  dextrose 50% Injectable 25 Gram(s) IV Push once  insulin lispro (HumaLOG) corrective regimen sliding scale   SubCutaneous three times a day before meals  insulin lispro (HumaLOG) corrective regimen sliding scale   SubCutaneous at bedtime  dextrose 5%. 1000 milliLiter(s) (50 mL/Hr) IV Continuous <Continuous>  dextrose 50% Injectable 12.5 Gram(s) IV Push once  dextrose 50% Injectable 25 Gram(s) IV Push once  heparin  Injectable 5000 Unit(s) SubCutaneous every 8 hours  metolazone 5 milliGRAM(s) Oral daily  furosemide    Tablet 40 milliGRAM(s) Oral two times a day  acetaminophen   Tablet 650 milliGRAM(s) Oral every 6 hours  pantoprazole    Tablet 40 milliGRAM(s) Oral before breakfast  metoprolol 25 milliGRAM(s) Oral two times a day  atorvastatin 20 milliGRAM(s) Oral at bedtime  losartan 50 milliGRAM(s) Oral daily  ALBUTerol/ipratropium for Nebulization 3 milliLiter(s) Nebulizer every 6 hours  fluticasone propionate 50 MICROgram(s)/spray Nasal Spray 1 Spray(s) Both Nostrils two times a day    MEDICATIONS  (PRN):  dextrose Gel 1 Dose(s) Oral once PRN Blood Glucose LESS THAN 70 milliGRAM(s)/deciliter  glucagon  Injectable 1 milliGRAM(s) IntraMuscular once PRN Glucose LESS THAN 70 milligrams/deciliter  dextrose Gel 1 Dose(s) Oral once PRN Blood Glucose LESS THAN 70 milliGRAM(s)/deciliter  glucagon  Injectable 1 milliGRAM(s) IntraMuscular once PRN Glucose LESS THAN 70 milligrams/deciliter  oxyCODONE    IR 5 milliGRAM(s) Oral every 4 hours PRN Moderate Pain (4 - 6)  ibuprofen  Tablet 400 milliGRAM(s) Oral every 6 hours PRN moderate pain      Allergies    Spiriva (Unknown)    Intolerances        Vital Signs Last 24 Hrs  T(C): 36.8 (12 Sep 2017 13:45), Max: 37.1 (11 Sep 2017 16:46)  T(F): 98.3 (12 Sep 2017 13:45), Max: 98.8 (11 Sep 2017 20:50)  HR: 82 (12 Sep 2017 13:45) (68 - 84)  BP: 123/54 (12 Sep 2017 13:45) (113/73 - 136/71)  BP(mean): --  RR: 18 (12 Sep 2017 13:45) (18 - 18)  SpO2: 96% (12 Sep 2017 13:45) (94% - 98%)    I&O's Summary    11 Sep 2017 07:01  -  12 Sep 2017 07:00  --------------------------------------------------------  IN: 1090 mL / OUT: 3650 mL / NET: -2560 mL    12 Sep 2017 07:01  -  12 Sep 2017 14:32  --------------------------------------------------------  IN: 540 mL / OUT: 2700 mL / NET: -2160 mL        ON EXAM:    General: NAD, awake and alert, oriented x 3  HEENT: Mucous membranes are moist, anicteric  Lungs: Non-labored, breath sounds are clear bilaterally, No wheezing, rales or rhonchi.  Decreased breath sounds b/l  Cardiovascular: Regular, S1 and S2, no murmurs, rubs, or gallops.  Distant  Gastrointestinal: Bowel Sounds present, soft, nontender.   Lymph: 2+ b/l peripheral edema. No lymphadenopathy.  Skin: No rashes or ulcers  Psych:  Mood & affect appropriate    LABS: All Labs Reviewed:                        9.6    7.57  )-----------( 329      ( 12 Sep 2017 08:41 )             31.2                         9.1    6.75  )-----------( 288      ( 11 Sep 2017 08:47 )             30.0                         9.2    8.64  )-----------( 252      ( 10 Sep 2017 08:21 )             29.5     12 Sep 2017 08:46    142    |  96     |  8      ----------------------------<  114    3.7     |  28     |  0.91   11 Sep 2017 08:45    144    |  104    |  8      ----------------------------<  102    4.0     |  26     |  0.78   10 Sep 2017 08:29    139    |  102    |  12     ----------------------------<  94     4.1     |  25     |  0.90     Ca    9.0        12 Sep 2017 08:46  Ca    8.7        11 Sep 2017 08:45  Ca    8.2        10 Sep 2017 08:29  Phos  3.7       12 Sep 2017 08:46  Phos  2.1       11 Sep 2017 08:45  Phos  1.9       10 Sep 2017 08:29  Mg     1.8       12 Sep 2017 08:46  Mg     2.1       11 Sep 2017 08:45  Mg     2.3       10 Sep 2017 08:29            Blood Culture:

## 2017-09-12 NOTE — PROGRESS NOTE ADULT - SUBJECTIVE AND OBJECTIVE BOX
RED SURGERY DAILY PROGRESS NOTE:     Hospital Day: 6  Postoperative Day: 5    S: Yesterday, the patient had her PCA discontinued, she was seen by Cardiology for recommendations, and we contacted her Pulmonologist.    O:  Vital Signs Last 24 Hrs  T(C): 36.9 (12 Sep 2017 05:38), Max: 37.2 (11 Sep 2017 13:10)  T(F): 98.5 (12 Sep 2017 05:38), Max: 98.9 (11 Sep 2017 13:10)  HR: 75 (12 Sep 2017 05:38) (69 - 84)  BP: 136/71 (12 Sep 2017 05:38) (107/60 - 136/71)  BP(mean): --  RR: 18 (12 Sep 2017 05:38) (18 - 18)  SpO2: 98% (12 Sep 2017 05:38) (93% - 98%)    Physical Exam  Gen: No apparent distress  HEENT: normocephalic, atraumatic, no scleral icterus  Pulm: Airway patent, breathing comfortably, with nasal canula  Abdomen: soft, non-distended, non-tender, vac midline  Extremities: lower extremities edematous and tender R>L    I&O's Detail    10 Sep 2017 07:01  -  11 Sep 2017 07:00  --------------------------------------------------------  IN:    dextrose 5% + sodium chloride 0.45% with potassium chloride 20 mEq/L: 1100 mL    Solution: 250 mL    Solution: 300 mL  Total IN: 1650 mL    OUT:    Voided: 2000 mL  Total OUT: 2000 mL    Total NET: -350 mL      11 Sep 2017 07:01  -  12 Sep 2017 05:43  --------------------------------------------------------  IN:    Oral Fluid: 1090 mL  Total IN: 1090 mL    OUT:    Voided: 3250 mL  Total OUT: 3250 mL    Total NET: -2160 mL    09-11    144  |  104  |  8   ----------------------------<  102<H>  4.0   |  26  |  0.78    Ca    8.7      11 Sep 2017 08:45  Phos  2.1     09-11  Mg     2.1     09-11 RED SURGERY DAILY PROGRESS NOTE:     Hospital Day: 6  Postoperative Day: 5    S: Yesterday, the patient had her PCA discontinued, she was seen by Cardiology for recommendations, and we contacted her Pulmonologist who has not given recommendations yet. She still feels dyspnic, so we are going to be more proactive with diuresing her.    O:  Vital Signs Last 24 Hrs  T(C): 36.9 (12 Sep 2017 05:38), Max: 37.2 (11 Sep 2017 13:10)  T(F): 98.5 (12 Sep 2017 05:38), Max: 98.9 (11 Sep 2017 13:10)  HR: 75 (12 Sep 2017 05:38) (69 - 84)  BP: 136/71 (12 Sep 2017 05:38) (107/60 - 136/71)  BP(mean): --  RR: 18 (12 Sep 2017 05:38) (18 - 18)  SpO2: 98% (12 Sep 2017 05:38) (93% - 98%)    Physical Exam  Gen: No apparent distress  HEENT: normocephalic, atraumatic, no scleral icterus  Pulm: Airway patent, breathing comfortably, with nasal canula  Abdomen: softly distended, non-tender, vac midline  Extremities: lower extremities edematous    I&O's Detail    10 Sep 2017 07:01  -  11 Sep 2017 07:00  --------------------------------------------------------  IN:    dextrose 5% + sodium chloride 0.45% with potassium chloride 20 mEq/L: 1100 mL    Solution: 250 mL    Solution: 300 mL  Total IN: 1650 mL    OUT:    Voided: 2000 mL  Total OUT: 2000 mL    Total NET: -350 mL      11 Sep 2017 07:01  -  12 Sep 2017 05:43  --------------------------------------------------------  IN:    Oral Fluid: 1090 mL  Total IN: 1090 mL    OUT:    Voided: 3250 mL  Total OUT: 3250 mL    Total NET: -2160 mL    09-11    144  |  104  |  8   ----------------------------<  102<H>  4.0   |  26  |  0.78    Ca    8.7      11 Sep 2017 08:45  Phos  2.1     09-11  Mg     2.1     09-11

## 2017-09-12 NOTE — CONSULT NOTE ADULT - SUBJECTIVE AND OBJECTIVE BOX
[facility] - Diann Cardenas, Luis, Anu, Kaleb De La Torre  Office Number: [phone]    Initial Consult Note    CHIEF COMPLAINT: Patient is a 67y old  Female who presents with a chief complaint of "colostomy reversal" (07 Sep 2017 07:15)      HPI:  68 y/o female with pmhx of COPD, HTN, perforated diverticulitis s/p exploratory laparotomy, right colectomy, small bowel resection, Chacko procedure in 2016 - now with colostomy in place. Post-op course was complicated by PE - no longer on Xarelto. Since hospital discharge pt c/o generalized fatigue, SOB with minimal exertion, frequent palpitations especially at night and lower extremity edema. Pt was evaluated by her cardiologist who initially started her on Lasix 40mg daily and increased to BID with hardly any relief of symptoms. She had a repeat echo 2017  which showed normal LV function and no significant valvular disease. She is also mildly anemia w/ + guaiac - s/p sigmoidoscopy with no evidence of lower GI bleeding. Presents now for robotic reversal of Chacko w/ ureteral catheters. (23 Aug 2017 11:51)    She was last seen in our office 17     Of note, history of PE, now off Xarelto. She had an echocardiogram that showed normal RV and LV function. She has been having increased LE edema, and was started on Lasix.  Her urine output did not increase much, so I added metolazone to her regimen.  She has started urinating more, and her LE edema has resolved.  She weighed 224 pounds this AM.  She was found to have brief SVT on her Holter, and was started on Toprol 25 QD.  She continues to feel occasional palpitations.     Today she feels better, s/p reversal. Episode of palpitations when walking, but now resolved. Not tolerating po yet. Reports episode of hypotension.    PAST MEDICAL & SURGICAL HISTORY:  Colostomy in place  Anemia  Osteoarthritis  Obesity  Palpitations  PE (pulmonary thromboembolism): 2016  Diverticulitis of intestine with perforation and abscess without bleeding, unspecified part of intestinal tract  C. difficile diarrhea:   COPD (chronic obstructive pulmonary disease)  Hypercholesteremia  Hypertension  H/O hemicolectomy: 2016  Status post total replacement of both hips  H/O ovarian cystectomy: b/l  H/O: : x2  History of appendectomy      SOCIAL HISTORY:  No tobacco, ethanol, or drug abuse.    FAMILY HISTORY:  Family history of hiatal hernia (Sibling)  Family history of chronic kidney disease  Family history of COPD (chronic obstructive pulmonary disease)    No family history of acute MI or sudden cardiac death.    MEDICATIONS  (STANDING):  heparin  Injectable 5000 Unit(s) SubCutaneous every 8 hours  metoprolol Injectable 5 milliGRAM(s) IV Push every 6 hours  pantoprazole  Injectable 40 milliGRAM(s) IV Push daily  buDESOnide 160 MICROgram(s)/formoterol 4.5 MICROgram(s) Inhaler 2 Puff(s) Inhalation two times a day  aspirin  chewable 81 milliGRAM(s) Oral daily  lactated ringers. 1000 milliLiter(s) (125 mL/Hr) IV Continuous <Continuous>  insulin lispro (HumaLOG) corrective regimen sliding scale   SubCutaneous every 6 hours  dextrose 5%. 1000 milliLiter(s) (50 mL/Hr) IV Continuous <Continuous>  dextrose 50% Injectable 12.5 Gram(s) IV Push once  dextrose 50% Injectable 25 Gram(s) IV Push once  dextrose 50% Injectable 25 Gram(s) IV Push once  acetaminophen  IVPB. 1000 milliGRAM(s) IV Intermittent once  HYDROmorphone PCA (1 mG/mL) 30 milliLiter(s) PCA Continuous PCA Continuous  mometasone 200 MICROgram(s)/formoterol 5 MICROgram(s) Inhaler 2 Puff(s) Inhalation two times a day  aclidinium 400 MICROgram Inhaler 1 Puff(s) Inhalation two times a day    MEDICATIONS  (PRN):  dextrose Gel 1 Dose(s) Oral once PRN Blood Glucose LESS THAN 70 milliGRAM(s)/deciliter  glucagon  Injectable 1 milliGRAM(s) IntraMuscular once PRN Glucose LESS THAN 70 milligrams/deciliter  HYDROmorphone PCA (1 mG/mL) Rescue Clinician Bolus 0.5 milliGRAM(s) IV Push every 15 minutes PRN for Pain Scale GREATER THAN 6  naloxone Injectable 0.1 milliGRAM(s) IV Push every 3 minutes PRN For ANY of the following changes in patient status:  A. RR LESS THAN 10 breaths per minute, B. Oxygen saturation LESS THAN 90%, C. Sedation score of 6  ondansetron Injectable 4 milliGRAM(s) IV Push every 6 hours PRN Nausea      Allergies    Spiriva (Unknown)    Intolerances        REVIEW OF SYSTEMS:    CONSTITUTIONAL: No weakness, fevers or chills  EYES/ENT: No visual changes;  No vertigo or throat pain   NECK: No pain or stiffness  RESPIRATORY: No cough, wheezing, hemoptysis; No shortness of breath  CARDIOVASCULAR: No chest pain or palpitations  GASTROINTESTINAL: +abdominal pain. No nausea, vomiting, or hematemesis; No diarrhea or constipation. No melena or hematochezia.  GENITOURINARY: No dysuria, frequency or hematuria  NEUROLOGICAL: No numbness or weakness  SKIN: No itching or rash  All other review of systems is negative unless indicated above    VITAL SIGNS:   Vital Signs Last 24 Hrs  T(C): 36.9 (08 Sep 2017 08:14), Max: 37.1 (07 Sep 2017 23:00)  T(F): 98.5 (08 Sep 2017 08:14), Max: 98.8 (07 Sep 2017 23:00)  HR: 90 (08 Sep 2017 08:14) (64 - 93)  BP: 104/66 (08 Sep 2017 08:14) (72/42 - 130/59)  BP(mean): 72 (08 Sep 2017 04:00) (52 - 91)  RR: 18 (08 Sep 2017 08:14) (15 - 18)  SpO2: 92% (08 Sep 2017 08:14) (91% - 100%)    I&O's Summary    07 Sep 2017 07:01  -  08 Sep 2017 07:00  --------------------------------------------------------  IN: 5075 mL / OUT: 330 mL / NET: 4745 mL    08 Sep 2017 07:01  -  08 Sep 2017 11:38  --------------------------------------------------------  IN: 0 mL / OUT: 150 mL / NET: -150 mL        On Exam:    Constitutional: NAD, alert and oriented x 3  Lungs:  Non-labored, breath sounds are clear bilaterally, No wheezing, rales or rhonchi  Cardiovascular: RRR.  S1 and S2 positive.  No murmurs, rubs, gallops or clicks  Gastrointestinal: Bowel Sounds present, soft, tender, Wound vac in place  Lymph: Trace peripheral edema. No cervical lymphadenopathy.  Neurological: Alert, no focal deficits  Skin: No rashes or ulcers   Psych:  Mood & affect appropriate.    LABS: All Labs Reviewed:                        11.7   9.7   )-----------( 257      ( 08 Sep 2017 09:29 )             35.6                         11.0   10.2  )-----------( 231      ( 08 Sep 2017 04:28 )             32.4                         11.8   13.6  )-----------( 274      ( 07 Sep 2017 23:10 )             35.8     08 Sep 2017 09:29    140    |  99     |  20     ----------------------------<  176    5.2     |  22     |  1.70   08 Sep 2017 04:28    139    |  99     |  18     ----------------------------<  158    4.5     |  25     |  1.58   07 Sep 2017 23:10    141    |  101    |  16     ----------------------------<  164    4.6     |  24     |  1.32     Ca    8.1        08 Sep 2017 09:29  Ca    7.7        08 Sep 2017 04:28  Ca    7.6        07 Sep 2017 23:10  Mg     1.8       08 Sep 2017 04:28    TPro  5.6    /  Alb  3.3    /  TBili  0.3    /  DBili  x      /  AST  30     /  ALT  30     /  AlkPhos  66     07 Sep 2017 23:10    PT/INR - ( 07 Sep 2017 23:10 )   PT: 12.1 sec;   INR: 1.12 ratio         PTT - ( 07 Sep 2017 23:10 )  PTT:25.1 sec  CARDIAC MARKERS ( 07 Sep 2017 23:10 )  x     / <0.01 ng/mL / 973 U/L / x     / 8.4 ng/mL      Blood Culture:         RADIOLOGY:    EKG: SR/ST at 100 bpm at EKG  in office, no ischemic EKG changes    TELE: off tele
Pulmonary Consult Note    RACHANA BARGER  MRN-[id number]    Chief Complaint: Patient is a 67y old  Female who presents with a chief complaint of "colostomy reversal" (07 Sep 2017 07:15)      HPI:  67yFemale   -feeling more short of breath over last few days, required IV fluid for hypotension.  denies cough or sputum production.  +PND.    ROS:  All other systems reviewed and negative    PAST MEDICAL HISTORY: HEALTH ISSUES - PROBLEM Dx:  COPD  Hx PE    SOCIAL HISTORY: former smoker    ACTIVE MEDICATION LIST:  MEDICATIONS  (STANDING):  aspirin  chewable 81 milliGRAM(s) Oral daily  mometasone 200 MICROgram(s)/formoterol 5 MICROgram(s) Inhaler 2 Puff(s) Inhalation two times a day  aclidinium 400 MICROgram Inhaler 1 Puff(s) Inhalation two times a day  dextrose 50% Injectable 25 Gram(s) IV Push once  insulin lispro (HumaLOG) corrective regimen sliding scale   SubCutaneous three times a day before meals  insulin lispro (HumaLOG) corrective regimen sliding scale   SubCutaneous at bedtime  dextrose 5%. 1000 milliLiter(s) (50 mL/Hr) IV Continuous <Continuous>  dextrose 50% Injectable 12.5 Gram(s) IV Push once  dextrose 50% Injectable 25 Gram(s) IV Push once  heparin  Injectable 5000 Unit(s) SubCutaneous every 8 hours  metolazone 5 milliGRAM(s) Oral daily  furosemide    Tablet 40 milliGRAM(s) Oral two times a day  acetaminophen   Tablet 650 milliGRAM(s) Oral every 6 hours  pantoprazole    Tablet 40 milliGRAM(s) Oral before breakfast  metoprolol 25 milliGRAM(s) Oral two times a day  atorvastatin 20 milliGRAM(s) Oral at bedtime  losartan 50 milliGRAM(s) Oral daily  magnesium sulfate  IVPB 2 Gram(s) IV Intermittent once  potassium chloride    Tablet ER 40 milliEquivalent(s) Oral once  ALBUTerol/ipratropium for Nebulization 3 milliLiter(s) Nebulizer every 6 hours    MEDICATIONS  (PRN):  dextrose Gel 1 Dose(s) Oral once PRN Blood Glucose LESS THAN 70 milliGRAM(s)/deciliter  glucagon  Injectable 1 milliGRAM(s) IntraMuscular once PRN Glucose LESS THAN 70 milligrams/deciliter  dextrose Gel 1 Dose(s) Oral once PRN Blood Glucose LESS THAN 70 milliGRAM(s)/deciliter  glucagon  Injectable 1 milliGRAM(s) IntraMuscular once PRN Glucose LESS THAN 70 milligrams/deciliter  oxyCODONE    IR 5 milliGRAM(s) Oral every 4 hours PRN Moderate Pain (4 - 6)  ibuprofen  Tablet 400 milliGRAM(s) Oral every 6 hours PRN moderate pain      EXAM:  Vital Signs Last 24 Hrs  T(C): 37 (12 Sep 2017 09:15), Max: 37.2 (11 Sep 2017 13:10)  T(F): 98.6 (12 Sep 2017 09:15), Max: 98.9 (11 Sep 2017 13:10)  HR: 68 (12 Sep 2017 09:15) (68 - 84)  BP: 113/73 (12 Sep 2017 09:15) (107/60 - 136/71)  BP(mean): --  RR: 18 (12 Sep 2017 09:15) (18 - 18)  SpO2: 96% (12 Sep 2017 09:15) (93% - 98%)  GENERAL: No acute distress  NEURO: Alert and oriented x 3  LUNGS: Clear to auscultation bilaterally, no rales or wheezes  CV: S1/S2, no murmur  ABDOMEN: +BS, nontender  EXTREMITIES: no clubbing, cyanosis, +++LE edema    LABS/IMAGING: reviewed                        9.6    7.57  )-----------( 329      ( 12 Sep 2017 08:41 )             31.2     09-12    142  |  96  |  8   ----------------------------<  114<H>  3.7   |  28  |  0.91    Ca    9.0      12 Sep 2017 08:46  Phos  3.7     09-12  Mg     1.8     09-12      < from: CT Angio Chest w/ IV Cont (09.10.17 @ 16:39) >  IMPRESSION:    No evidence of a pulmonary embolism.  Small bilateral pleural effusions; adjacent subsegmental atelectasis.  Emphysematous changes of the lungs.   7 mm right lower lobe pulmonary nodule, unchanged since 12/22/2016.     < end of copied text >      PROBLEM LIST:  67yFemale with HEALTH ISSUES - PROBLEM Dx:  COPD  Hx PE  pulmonary nodule    RECS:  -COPD: continue home medications dulera and tudorza, steffanie symbicort, add duonebs q6  -pulmonary nodule stable over 1 year, will need fu in 6 months to document stability.  -Add flonase for post nasal drip  -Needs IV diuresis as grossly volume overloaded  -Bowel regimen  -PPI for acid reflux  -Would check ambulatory sat to assess need for home O2 (pt did not need this prior to hospitalization) would check after diuresis.    Thank you for this consultation, please feel free to call with any questions [phone]  Stacie Perales MD

## 2017-09-13 ENCOUNTER — TRANSCRIPTION ENCOUNTER (OUTPATIENT)
Age: 67
End: 2017-09-13

## 2017-09-13 VITALS
OXYGEN SATURATION: 95 % | RESPIRATION RATE: 18 BRPM | SYSTOLIC BLOOD PRESSURE: 129 MMHG | HEART RATE: 84 BPM | TEMPERATURE: 98 F | DIASTOLIC BLOOD PRESSURE: 70 MMHG

## 2017-09-13 LAB
ANION GAP SERPL CALC-SCNC: 16 MMOL/L — SIGNIFICANT CHANGE UP (ref 5–17)
ANION GAP SERPL CALC-SCNC: 20 MMOL/L — HIGH (ref 5–17)
BUN SERPL-MCNC: 12 MG/DL — SIGNIFICANT CHANGE UP (ref 7–23)
BUN SERPL-MCNC: 15 MG/DL — SIGNIFICANT CHANGE UP (ref 7–23)
CALCIUM SERPL-MCNC: 10.1 MG/DL — SIGNIFICANT CHANGE UP (ref 8.4–10.5)
CALCIUM SERPL-MCNC: 9.6 MG/DL — SIGNIFICANT CHANGE UP (ref 8.4–10.5)
CHLORIDE SERPL-SCNC: 87 MMOL/L — LOW (ref 96–108)
CHLORIDE SERPL-SCNC: 88 MMOL/L — LOW (ref 96–108)
CO2 SERPL-SCNC: 32 MMOL/L — HIGH (ref 22–31)
CO2 SERPL-SCNC: 36 MMOL/L — HIGH (ref 22–31)
CREAT SERPL-MCNC: 0.79 MG/DL — SIGNIFICANT CHANGE UP (ref 0.5–1.3)
CREAT SERPL-MCNC: 1.01 MG/DL — SIGNIFICANT CHANGE UP (ref 0.5–1.3)
GLUCOSE SERPL-MCNC: 131 MG/DL — HIGH (ref 70–99)
GLUCOSE SERPL-MCNC: 151 MG/DL — HIGH (ref 70–99)
HCT VFR BLD CALC: 32.5 % — LOW (ref 34.5–45)
HGB BLD-MCNC: 10.1 G/DL — LOW (ref 11.5–15.5)
MAGNESIUM SERPL-MCNC: 1.7 MG/DL — SIGNIFICANT CHANGE UP (ref 1.6–2.6)
MCHC RBC-ENTMCNC: 26.9 PG — LOW (ref 27–34)
MCHC RBC-ENTMCNC: 31.1 GM/DL — LOW (ref 32–36)
MCV RBC AUTO: 86.7 FL — SIGNIFICANT CHANGE UP (ref 80–100)
NT-PROBNP SERPL-SCNC: 529 PG/ML — HIGH (ref 0–300)
PHOSPHATE SERPL-MCNC: 4.2 MG/DL — SIGNIFICANT CHANGE UP (ref 2.5–4.5)
PLATELET # BLD AUTO: 389 K/UL — SIGNIFICANT CHANGE UP (ref 150–400)
POTASSIUM SERPL-MCNC: 3.5 MMOL/L — SIGNIFICANT CHANGE UP (ref 3.5–5.3)
POTASSIUM SERPL-MCNC: 3.8 MMOL/L — SIGNIFICANT CHANGE UP (ref 3.5–5.3)
POTASSIUM SERPL-SCNC: 3.5 MMOL/L — SIGNIFICANT CHANGE UP (ref 3.5–5.3)
POTASSIUM SERPL-SCNC: 3.8 MMOL/L — SIGNIFICANT CHANGE UP (ref 3.5–5.3)
RBC # BLD: 3.75 M/UL — LOW (ref 3.8–5.2)
RBC # FLD: 20.9 % — HIGH (ref 10.3–14.5)
SODIUM SERPL-SCNC: 139 MMOL/L — SIGNIFICANT CHANGE UP (ref 135–145)
SODIUM SERPL-SCNC: 140 MMOL/L — SIGNIFICANT CHANGE UP (ref 135–145)
WBC # BLD: 8.46 K/UL — SIGNIFICANT CHANGE UP (ref 3.8–10.5)
WBC # FLD AUTO: 8.46 K/UL — SIGNIFICANT CHANGE UP (ref 3.8–10.5)

## 2017-09-13 PROCEDURE — 99233 SBSQ HOSP IP/OBS HIGH 50: CPT

## 2017-09-13 RX ORDER — ALBUTEROL 90 UG/1
2 AEROSOL, METERED ORAL
Qty: 4 | Refills: 0 | OUTPATIENT
Start: 2017-09-13

## 2017-09-13 RX ORDER — ALBUTEROL 90 UG/1
2 AEROSOL, METERED ORAL
Qty: 1 | Refills: 0 | OUTPATIENT
Start: 2017-09-13

## 2017-09-13 RX ORDER — FUROSEMIDE 40 MG
1 TABLET ORAL
Qty: 0 | Refills: 0 | COMMUNITY

## 2017-09-13 RX ORDER — IPRATROPIUM/ALBUTEROL SULFATE 18-103MCG
3 AEROSOL WITH ADAPTER (GRAM) INHALATION
Qty: 0 | Refills: 0 | COMMUNITY
Start: 2017-09-13

## 2017-09-13 RX ORDER — POTASSIUM CHLORIDE 20 MEQ
40 PACKET (EA) ORAL ONCE
Qty: 0 | Refills: 0 | Status: COMPLETED | OUTPATIENT
Start: 2017-09-13 | End: 2017-09-13

## 2017-09-13 RX ORDER — FUROSEMIDE 40 MG
1 TABLET ORAL
Qty: 0 | Refills: 0 | DISCHARGE
Start: 2017-09-13

## 2017-09-13 RX ORDER — FUROSEMIDE 40 MG
1 TABLET ORAL
Qty: 0 | Refills: 0 | COMMUNITY
Start: 2017-09-13

## 2017-09-13 RX ORDER — OXYCODONE HYDROCHLORIDE 5 MG/1
1 TABLET ORAL
Qty: 20 | Refills: 0 | OUTPATIENT
Start: 2017-09-13

## 2017-09-13 RX ORDER — OXYCODONE HYDROCHLORIDE 5 MG/1
1 TABLET ORAL
Qty: 0 | Refills: 0 | COMMUNITY
Start: 2017-09-13

## 2017-09-13 RX ORDER — FLUTICASONE PROPIONATE 50 MCG
1 SPRAY, SUSPENSION NASAL
Qty: 0 | Refills: 0 | COMMUNITY
Start: 2017-09-13

## 2017-09-13 RX ORDER — MAGNESIUM SULFATE 500 MG/ML
2 VIAL (ML) INJECTION ONCE
Qty: 0 | Refills: 0 | Status: COMPLETED | OUTPATIENT
Start: 2017-09-13 | End: 2017-09-13

## 2017-09-13 RX ADMIN — Medication 400 MILLIGRAM(S): at 05:58

## 2017-09-13 RX ADMIN — Medication 400 MILLIGRAM(S): at 06:28

## 2017-09-13 RX ADMIN — MOMETASONE FUROATE AND FORMOTEROL FUMARATE DIHYDRATE 2 PUFF(S): 200; 5 AEROSOL RESPIRATORY (INHALATION) at 05:28

## 2017-09-13 RX ADMIN — Medication 50 GRAM(S): at 12:28

## 2017-09-13 RX ADMIN — PANTOPRAZOLE SODIUM 40 MILLIGRAM(S): 20 TABLET, DELAYED RELEASE ORAL at 05:26

## 2017-09-13 RX ADMIN — Medication 400 MILLIGRAM(S): at 12:00

## 2017-09-13 RX ADMIN — LOSARTAN POTASSIUM 50 MILLIGRAM(S): 100 TABLET, FILM COATED ORAL at 05:27

## 2017-09-13 RX ADMIN — Medication 400 MILLIGRAM(S): at 17:18

## 2017-09-13 RX ADMIN — HEPARIN SODIUM 5000 UNIT(S): 5000 INJECTION INTRAVENOUS; SUBCUTANEOUS at 05:26

## 2017-09-13 RX ADMIN — OXYCODONE HYDROCHLORIDE 5 MILLIGRAM(S): 5 TABLET ORAL at 08:56

## 2017-09-13 RX ADMIN — Medication 81 MILLIGRAM(S): at 11:15

## 2017-09-13 RX ADMIN — Medication 650 MILLIGRAM(S): at 12:29

## 2017-09-13 RX ADMIN — OXYCODONE HYDROCHLORIDE 5 MILLIGRAM(S): 5 TABLET ORAL at 14:45

## 2017-09-13 RX ADMIN — Medication 1 SPRAY(S): at 05:27

## 2017-09-13 RX ADMIN — Medication 650 MILLIGRAM(S): at 17:19

## 2017-09-13 RX ADMIN — HEPARIN SODIUM 5000 UNIT(S): 5000 INJECTION INTRAVENOUS; SUBCUTANEOUS at 14:07

## 2017-09-13 RX ADMIN — Medication 650 MILLIGRAM(S): at 05:27

## 2017-09-13 RX ADMIN — Medication 3 MILLILITER(S): at 05:26

## 2017-09-13 RX ADMIN — Medication 3 MILLILITER(S): at 12:18

## 2017-09-13 RX ADMIN — Medication 25 MILLIGRAM(S): at 05:27

## 2017-09-13 RX ADMIN — OXYCODONE HYDROCHLORIDE 5 MILLIGRAM(S): 5 TABLET ORAL at 09:30

## 2017-09-13 RX ADMIN — Medication 40 MILLIGRAM(S): at 05:27

## 2017-09-13 RX ADMIN — OXYCODONE HYDROCHLORIDE 5 MILLIGRAM(S): 5 TABLET ORAL at 03:15

## 2017-09-13 RX ADMIN — Medication 3 MILLILITER(S): at 17:19

## 2017-09-13 RX ADMIN — Medication 40 MILLIEQUIVALENT(S): at 12:28

## 2017-09-13 RX ADMIN — OXYCODONE HYDROCHLORIDE 5 MILLIGRAM(S): 5 TABLET ORAL at 14:05

## 2017-09-13 RX ADMIN — Medication 400 MILLIGRAM(S): at 11:15

## 2017-09-13 RX ADMIN — OXYCODONE HYDROCHLORIDE 5 MILLIGRAM(S): 5 TABLET ORAL at 03:45

## 2017-09-13 RX ADMIN — ACLIDINIUM BROMIDE 1 PUFF(S): 400 POWDER, METERED RESPIRATORY (INHALATION) at 05:28

## 2017-09-13 NOTE — DISCHARGE NOTE ADULT - CARE PROVIDER_API CALL
Luis Martínez), Surgery  [address]  [address]  [place] [address]  Phone: [phone]  Fax: [phone]    Stacie Perales), Internal Medicine; Pulmonary Disease  [address]  [address]  [place] [address]  Phone: [phone]  Fax: [phone]    Edvin Manuel), Internal Medicine  [address] [place] [address]  Phone: [phone]  Fax: [phone]

## 2017-09-13 NOTE — DISCHARGE NOTE ADULT - PATIENT PORTAL LINK FT
“You can access the Follow[facility] Patient Portal, offered by [facility], by registering with the following website: [email or web address]”

## 2017-09-13 NOTE — DIETITIAN INITIAL EVALUATION ADULT. - ENERGY NEEDS
Height: 64 inches, Weight: 220 pounds, BMI: 38.1 kg/m2 IBW: 120 pounds (+/-10%), %IBW: 183%  1+ guille. foot edema, no pressure ulcers.

## 2017-09-13 NOTE — DISCHARGE NOTE ADULT - PLAN OF CARE
WOUND CARE: Staples will be removed at follow up office visit.  Please change wound daily with Aquacel.   BATHING: Please do not submerge wound underwater. You may shower and/or sponge bathe.  ACTIVITY: No heavy lifting anything more than 10-15lbs or straining. Otherwise, you may return to your usual level of physical activity. If you are taking narcotic pain medication (such as Percocet), do NOT drive a car, operate machinery or make important decisions.  DIET: Low fiber diet  NOTIFY YOUR SURGEON IF: You have any bleeding that does not stop, any pus draining from your wound, any fever (over 100.4 F) or chills, persistent nausea/vomiting with inability to tolerate food or liquids, persistent diarrhea, or if your pain is not controlled on your discharge pain medications.  FOLLOW-UP:  1. Please call to make a follow-up appointment within one week of discharge   2. Please follow up with your primary care physician in one week regarding your hospitalization. Resolution of diverticulitis Adequate management of COPD Please take your COPD medication and follow with your pulmonologist

## 2017-09-13 NOTE — DISCHARGE NOTE ADULT - MEDICATION SUMMARY - MEDICATIONS TO TAKE
I will START or STAY ON the medications listed below when I get home from the hospital:    oxyCODONE 5 mg oral tablet  -- 1 tab(s) by mouth every 4 hours, As needed, Moderate Pain (4 - 6) MDD:6  For moderate to severe pain  -- Indication: For Pain control    aspirin 81 mg oral tablet  -- 1 tab(s) by mouth once a day  -- Indication: For Coronary Artery diseae    losartan 50 mg oral tablet  -- 1 tab(s) by mouth once a day  -- Indication: For Blood pressure    atorvastatin 20 mg oral tablet  -- 1 tab(s) by mouth once a day  -- Indication: For Cholesterol     Metoprolol Succinate ER 25 mg oral tablet, extended release  -- 1 tab(s) by mouth 2 times a day  -- Indication: For Blood pressure    ipratropium-albuterol 0.5 mg-2.5 mg/3 mLinhalation solution  -- 3 milliliter(s) inhaled every 6 hours  -- Indication: For CoPD    Dulera 200 mcg-5 mcg/inh inhalation aerosol  -- 2 puff(s) inhaled 2 times a day  -- Indication: For Copd    Tudorza Pressair 400 mcg/inh inhalation powder  -- 1 puff(s) inhaled 2 times a day  -- Indication: For Copd    Lasix 40 mg oral tablet  -- 1 tab(s) by mouth once a day  -- Indication: For Diuretic    furosemide 40 mg oral tablet  -- 1 tab(s) by mouth 2 times a day  -- Indication: For Diuretic    metOLazone 5 mg oral tablet  -- 1 tab(s) by mouth once a day  -- Indication: For Diuretic    Erik-Sequels 50 mg oral tablet, extended release  -- 1 tab(s) by mouth 2 times a day  -- Indication: For Supplement    fluticasone 50 mcg/inh nasal spray  -- 1 spray(s) into nose 2 times a day  -- Indication: For Nasal spray    Florastor 250 mg oral capsule  -- 1 cap(s) by mouth once a day  -- Indication: For Probiotic    omeprazole 40 mg oral delayed release capsule  -- 1 cap(s) by mouth once a day  -- It is very important that you take or use this exactly as directed.  Do not skip doses or discontinue unless directed by your doctor.  Obtain medical advice before taking any non-prescription drugs as some may affect the action of this medication.  Swallow whole.  Do not crush.    -- Indication: For Reflux medication I will START or STAY ON the medications listed below when I get home from the hospital:    oxyCODONE 5 mg oral tablet  -- 1 tab(s) by mouth every 4 hours, As needed, Moderate Pain (4 - 6) MDD:6  For moderate to severe pain  -- Indication: For Pain control    aspirin 81 mg oral tablet  -- 1 tab(s) by mouth once a day  -- Indication: For Coronary Artery diseae    losartan 50 mg oral tablet  -- 1 tab(s) by mouth once a day  -- Indication: For Blood pressure    atorvastatin 20 mg oral tablet  -- 1 tab(s) by mouth once a day  -- Indication: For Cholesterol     Metoprolol Succinate ER 25 mg oral tablet, extended release  -- 1 tab(s) by mouth 2 times a day  Please hold for systolic blood pressure less than 100 or heart rate less than 60 beats per minute  -- Indication: For high blood pressure    albuterol 90 mcg/inh inhalation aerosol  -- 2 puff(s) inhaled every 6 hours, As Needed   -- For inhalation only.  It is very important that you take or use this exactly as directed.  Do not skip doses or discontinue unless directed by your doctor.  Obtain medical advice before taking any non-prescription drugs as some may affect the action of this medication.  Shake well before use.    -- Indication: For trouble breathing    ipratropium-albuterol 0.5 mg-2.5 mg/3 mLinhalation solution  -- 3 milliliter(s) inhaled every 6 hours  -- Indication: For CoPD    Dulera 200 mcg-5 mcg/inh inhalation aerosol  -- 2 puff(s) inhaled 2 times a day  -- Indication: For Copd    Tudorza Pressair 400 mcg/inh inhalation powder  -- 1 puff(s) inhaled 2 times a day  -- Indication: For Copd    furosemide 40 mg oral tablet  -- 1 tab(s) by mouth 2 times a day  -- Indication: For Diuretic    metOLazone 5 mg oral tablet  -- 1 tab(s) by mouth once a day  -- Indication: For Diuretic    Erik-Sequels 50 mg oral tablet, extended release  -- 1 tab(s) by mouth 2 times a day  -- Indication: For Supplement    fluticasone 50 mcg/inh nasal spray  -- 1 spray(s) into nose 2 times a day  -- Indication: For Nasal spray    Florastor 250 mg oral capsule  -- 1 cap(s) by mouth once a day  -- Indication: For Probiotic    omeprazole 40 mg oral delayed release capsule  -- 1 cap(s) by mouth once a day  -- It is very important that you take or use this exactly as directed.  Do not skip doses or discontinue unless directed by your doctor.  Obtain medical advice before taking any non-prescription drugs as some may affect the action of this medication.  Swallow whole.  Do not crush.    -- Indication: For Reflux medication I will START or STAY ON the medications listed below when I get home from the hospital:    oxyCODONE 5 mg oral tablet  -- 1 tab(s) by mouth every 4 hours, As needed, Moderate Pain (4 - 6) MDD:6  For moderate to severe pain  -- Indication: For Pain control    aspirin 81 mg oral tablet  -- 1 tab(s) by mouth once a day  -- Indication: For Coronary Artery diseae    losartan 50 mg oral tablet  -- 1 tab(s) by mouth once a day  -- Indication: For Blood pressure    atorvastatin 20 mg oral tablet  -- 1 tab(s) by mouth once a day  -- Indication: For Cholesterol     Metoprolol Succinate ER 25 mg oral tablet, extended release  -- 1 tab(s) by mouth 2 times a day  Please hold for systolic blood pressure less than 100 or heart rate less than 60 beats per minute  -- Indication: For high blood pressure    albuterol 90 mcg/inh inhalation aerosol  -- 2 puff(s) inhaled every 6 hours, As Needed   -- For inhalation only.  It is very important that you take or use this exactly as directed.  Do not skip doses or discontinue unless directed by your doctor.  Obtain medical advice before taking any non-prescription drugs as some may affect the action of this medication.  Shake well before use.    -- Indication: For trouble breathing    Dulera 200 mcg-5 mcg/inh inhalation aerosol  -- 2 puff(s) inhaled 2 times a day  -- Indication: For Copd    Tudorza Pressair 400 mcg/inh inhalation powder  -- 1 puff(s) inhaled 2 times a day  -- Indication: For Copd    furosemide 40 mg oral tablet  -- 1 tab(s) by mouth 2 times a day  -- Indication: For Diuretic    metOLazone 5 mg oral tablet  -- 1 tab(s) by mouth once a day  -- Indication: For Diuretic    Erik-Sequels 50 mg oral tablet, extended release  -- 1 tab(s) by mouth 2 times a day  -- Indication: For Supplement    fluticasone 50 mcg/inh nasal spray  -- 1 spray(s) into nose 2 times a day  -- Indication: For Nasal spray    Florastor 250 mg oral capsule  -- 1 cap(s) by mouth once a day  -- Indication: For Probiotic    omeprazole 40 mg oral delayed release capsule  -- 1 cap(s) by mouth once a day  -- It is very important that you take or use this exactly as directed.  Do not skip doses or discontinue unless directed by your doctor.  Obtain medical advice before taking any non-prescription drugs as some may affect the action of this medication.  Swallow whole.  Do not crush.    -- Indication: For Reflux medication

## 2017-09-13 NOTE — DIETITIAN INITIAL EVALUATION ADULT. - NS AS NUTRI INTERV ED CONTENT
Type 2 DM and Low Fiber nutrition education reviewed/reinforced. RD to remain available for further nutritional interventions as indicated/requested by medical team/pt.

## 2017-09-13 NOTE — DIETITIAN INITIAL EVALUATION ADULT. - PERTINENT MEDS FT
Lipitor, Lasix, Protonix, Insulin lispro Humalog 3x/day before meals and corrective regimen sliding scale

## 2017-09-13 NOTE — DISCHARGE NOTE ADULT - CARE PLAN
Instructions for follow-up, activity and diet:	WOUND CARE: Staples will be removed at follow up office visit.  Please change wound daily with Aquacel.   BATHING: Please do not submerge wound underwater. You may shower and/or sponge bathe.  ACTIVITY: No heavy lifting anything more than 10-15lbs or straining. Otherwise, you may return to your usual level of physical activity. If you are taking narcotic pain medication (such as Percocet), do NOT drive a car, operate machinery or make important decisions.  DIET: Low fiber diet  NOTIFY YOUR SURGEON IF: You have any bleeding that does not stop, any pus draining from your wound, any fever (over 100.4 F) or chills, persistent nausea/vomiting with inability to tolerate food or liquids, persistent diarrhea, or if your pain is not controlled on your discharge pain medications.  FOLLOW-UP:  1. Please call to make a follow-up appointment within one week of discharge   2. Please follow up with your primary care physician in one week regarding your hospitalization. Principal Discharge DX:	Diverticulitis of intestine with perforation and abscess without bleeding, unspecified part of intestinal tract  Goal:	Resolution of diverticulitis  Instructions for follow-up, activity and diet:	WOUND CARE: Staples will be removed at follow up office visit.  Please change wound daily with Aquacel.   BATHING: Please do not submerge wound underwater. You may shower and/or sponge bathe.  ACTIVITY: No heavy lifting anything more than 10-15lbs or straining. Otherwise, you may return to your usual level of physical activity. If you are taking narcotic pain medication (such as Percocet), do NOT drive a car, operate machinery or make important decisions.  DIET: Low fiber diet  NOTIFY YOUR SURGEON IF: You have any bleeding that does not stop, any pus draining from your wound, any fever (over 100.4 F) or chills, persistent nausea/vomiting with inability to tolerate food or liquids, persistent diarrhea, or if your pain is not controlled on your discharge pain medications.  FOLLOW-UP:  1. Please call to make a follow-up appointment within one week of discharge   2. Please follow up with your primary care physician in one week regarding your hospitalization.  Secondary Diagnosis:	COPD (chronic obstructive pulmonary disease)  Goal:	Adequate management of COPD  Instructions for follow-up, activity and diet:	Please take your COPD medication and follow with your pulmonologist

## 2017-09-13 NOTE — DISCHARGE NOTE ADULT - CARE PROVIDERS DIRECT ADDRESSES
[email or web address]

## 2017-09-13 NOTE — PROGRESS NOTE ADULT - SUBJECTIVE AND OBJECTIVE BOX
RED SURGERY DAILY PROGRESS NOTE:     Hospital Day: 7  Postoperative Day: 6    S: Yesterday, the patient was seen by Pulmonology and Cardiology who provided recommendations. She also had a CXR that showed cleared lungs.    O:  Vital Signs Last 24 Hrs  T(C): 36.8 (13 Sep 2017 01:29), Max: 37.1 (12 Sep 2017 16:57)  T(F): 98.2 (13 Sep 2017 01:29), Max: 98.7 (12 Sep 2017 16:57)  HR: 77 (13 Sep 2017 01:29) (68 - 90)  BP: 117/69 (13 Sep 2017 01:29) (102/65 - 132/75)  BP(mean): --  RR: 18 (13 Sep 2017 01:29) (18 - 18)  SpO2: 96% (13 Sep 2017 01:29) (96% - 97%)    Physical Exam  Gen: No apparent distress  HEENT: normocephalic, atraumatic, no scleral icterus  Pulm: Airway patent, breathing comfortably, with nasal canula  Abdomen: softly distended, non-tender, vac midline  Extremities: lower extremities edematous    I&O's Detail    11 Sep 2017 07:01  -  12 Sep 2017 07:00  --------------------------------------------------------  IN:    Oral Fluid: 1090 mL  Total IN: 1090 mL    OUT:    Voided: 3650 mL  Total OUT: 3650 mL    Total NET: -2560 mL      12 Sep 2017 07:01  -  13 Sep 2017 05:43  --------------------------------------------------------  IN:    IV PiggyBack: 50 mL    Oral Fluid: 1140 mL  Total IN: 1190 mL    OUT:    Voided: 4350 mL  Total OUT: 4350 mL    Total NET: -3160 mL    09-12    142  |  96  |  8   ----------------------------<  114<H>  3.7   |  28  |  0.91    Ca    9.0      12 Sep 2017 08:46  Phos  3.7     09-12  Mg     1.8     09-12    < from: Xray Chest 1 View AP- PORTABLE-Urgent (09.12.17 @ 07:36) >  EXAM:  CHEST-PORTABLE URGENT                            PROCEDURE DATE:  09/12/2017        INTERPRETATION:  CLINICAL INFORMATION: Dyspnea.    EXAM: Frontal radiograph of the chest.    COMPARISON: Chest radiograph from 9/8/2017.    FINDINGS:  Heart size cannot be determined on this AP projection.    No pleural effusions or pneumothorax.    Multilevel degenerative disease of the spine.    IMPRESSION: Clear lungs.    < end of copied text > RED SURGERY DAILY PROGRESS NOTE:     Hospital Day: 7  Postoperative Day: 6    S: Yesterday, the patient was seen by Pulmonology and Cardiology who provided recommendations. She also had a CXR that showed cleared lungs. She is positive for flatus and bowel movements. She was on room air when we saw her. She is very concerned about her pain medication timing.    O:  Vital Signs Last 24 Hrs  T(C): 36.8 (13 Sep 2017 01:29), Max: 37.1 (12 Sep 2017 16:57)  T(F): 98.2 (13 Sep 2017 01:29), Max: 98.7 (12 Sep 2017 16:57)  HR: 77 (13 Sep 2017 01:29) (68 - 90)  BP: 117/69 (13 Sep 2017 01:29) (102/65 - 132/75)  BP(mean): --  RR: 18 (13 Sep 2017 01:29) (18 - 18)  SpO2: 96% (13 Sep 2017 01:29) (96% - 97%)    Physical Exam  Gen: No apparent distress  HEENT: normocephalic, atraumatic, no scleral icterus  Pulm: Airway patent, breathing comfortably, put on nasal canula while we were there  Abdomen: softly distended, non-tender, vac midline  Extremities: lower extremities edematous    I&O's Detail    11 Sep 2017 07:01  -  12 Sep 2017 07:00  --------------------------------------------------------  IN:    Oral Fluid: 1090 mL  Total IN: 1090 mL    OUT:    Voided: 3650 mL  Total OUT: 3650 mL    Total NET: -2560 mL      12 Sep 2017 07:01  -  13 Sep 2017 05:43  --------------------------------------------------------  IN:    IV PiggyBack: 50 mL    Oral Fluid: 1140 mL  Total IN: 1190 mL    OUT:    Voided: 4350 mL  Total OUT: 4350 mL    Total NET: -3160 mL    09-12    142  |  96  |  8   ----------------------------<  114<H>  3.7   |  28  |  0.91    Ca    9.0      12 Sep 2017 08:46  Phos  3.7     09-12  Mg     1.8     09-12    < from: Xray Chest 1 View AP- PORTABLE-Urgent (09.12.17 @ 07:36) >  EXAM:  CHEST-PORTABLE URGENT                            PROCEDURE DATE:  09/12/2017        INTERPRETATION:  CLINICAL INFORMATION: Dyspnea.    EXAM: Frontal radiograph of the chest.    COMPARISON: Chest radiograph from 9/8/2017.    FINDINGS:  Heart size cannot be determined on this AP projection.    No pleural effusions or pneumothorax.    Multilevel degenerative disease of the spine.    IMPRESSION: Clear lungs.    < end of copied text >

## 2017-09-13 NOTE — DISCHARGE NOTE ADULT - HOSPITAL COURSE
66 y/o female with pmhx of COPD, HTN, perforated diverticulitis s/p exploratory laparotomy, right colectomy, small bowel resection, Chacko procedure in 12/2016 - now with colostomy in place. Post-op course was complicated by PE - no longer on Xarelto. Since hospital discharge pt c/o generalized fatigue, SOB with minimal exertion, frequent palpitations especially at night and lower extremity edema. Pt was evaluated by her cardiologist who initially started her on Lasix 40mg daily and increased to BID with hardly any relief of symptoms. She had a repeat echo 7/2017  which showed normal LV function and no significant valvular disease. She is also mildly anemia w/ + guaiac - s/p sigmoidoscopy with no evidence of lower GI bleeding. She had robotic converted to ope Guillermina reversal, cystoscopy and insertion of ureteral catheters.  She tolerated the procedure. 9/7 Patient reports pressure in upper abdomen and right chest discomfort that does no radiate down her arm or up neck. The chest discomfort is slightly worst with deep inspiration. She has been hypotensive to 88/46, and received 500cc bolus. She was initially responsive but now hypotensive to 72/42. She responded after a bolus of lactated Ringers. 9/8 POD2. Her pain is well controlled with PCA. Feels comfortable. Her Creatinine increased to 1.58 from baseline.Was placed on IV metoprolol 5 mg q 6 hours per cards. Was placed off Xarelto.  9/9 PCA controlling pain. Ambulating, but no GI function. She was waves of nausea. Renal ultrasound showed no hydronephrosis. 9/10 Got a b/l LE duplex and CTA chest because of her history of PEs, difficulty breathing, and lower leg edema/tenderness R>L. Xarelto was restarted and she is getting Lasix per Cardiology. She is now positive for flatus and negative for bowel movements. 9/11 No DVTs or PEs were seen on imaging, but she still states that she is dyspneic on exertion. She is currently positive for flatus and bowel movements and wants to be off of her PCA. 9/12 PCA discontinued the day prior. Pulmonology team gave recommendations for continuing her COPD meds (dulera and tudorza). Lasix 40mg PO BID started due to signs of volume overload. IVF held. 9/13 CXR showed clear lungs. Positive for flatus and bowel movements. Tolerating diet. Ready for discharge today. 66 y/o female with pmhx of COPD, HTN, perforated diverticulitis s/p exploratory laparotomy, right colectomy, small bowel resection, Chacko procedure in 12/2016 - now with colostomy in place. Post-op course was complicated by PE - no longer on Xarelto. Since hospital discharge pt c/o generalized fatigue, SOB with minimal exertion, frequent palpitations especially at night and lower extremity edema. Pt was evaluated by her cardiologist who initially started her on Lasix 40mg daily and increased to BID with hardly any relief of symptoms. She had a repeat echo 7/2017  which showed normal LV function and no significant valvular disease. She is also mildly anemia w/ + guaiac - s/p sigmoidoscopy with no evidence of lower GI bleeding. She had robotic converted to ope Guillermina reversal, cystoscopy and insertion of ureteral catheters.  She tolerated the procedure. 9/7 Patient reports pressure in upper abdomen and right chest discomfort that does no radiate down her arm or up neck. The chest discomfort is slightly worst with deep inspiration. She has been hypotensive to 88/46, and received 500cc bolus. She was initially responsive but now hypotensive to 72/42. She responded after a bolus of lactated Ringers. 9/8 POD2. Her pain is well controlled with PCA. Feels comfortable. Her Creatinine increased to 1.58 from baseline with ALVERTO Was placed on IV metoprolol 5 mg q 6 hours per cards. Was placed off Xarelto.  9/9 PCA controlling pain. Ambulating, but no GI function. She was waves of nausea. Renal ultrasound showed no hydronephrosis. 9/10 Got a b/l LE duplex and CTA chest because of her history of PEs, difficulty breathing, and lower leg edema/tenderness R>L. Xarelto was restarted and she is getting Lasix per Cardiology. She is now positive for flatus and negative for bowel movements. 9/11 No DVTs or PEs were seen on imaging, but she still states that she is dyspneic on exertion. She is currently positive for flatus and bowel movements and wants to be off of her PCA. 9/12 PCA discontinued the day prior. Pulmonology team gave recommendations for continuing her COPD meds (dulera and tudorza). Lasix 40mg PO BID started due to signs of volume overload. IVF held. 9/13 CXR showed clear lungs. Positive for flatus and bowel movements. Tolerating diet. Ready for discharge today.

## 2017-09-13 NOTE — DISCHARGE NOTE ADULT - INSTRUCTIONS
Low fiber diet call MD // go to ER:  temperature, nausea, vomiting; check site daily for redness, swelling, warmth, bleeding, drainage with foul odor

## 2017-09-13 NOTE — DISCHARGE NOTE ADULT - ADDITIONAL INSTRUCTIONS
Please follow with Dr. Martínez in 7 to 10 days.  Call their office [phone] for appointment. Please follow with Dr. Martínez, Surgeon in 7 to 10 days.  Call their office [phone] for appointment.  Please follow with Dr. Perales, Pulmonology in 7 to 10 days.  Call their office [phone] for appointment.  Please follow with Dr. Manuel, Cardiology in 7 to 10 days.  Call their office [phone] for appointment.  Notify your surgeon and return to ER for temperatures greater than 101, chills sweats, pain not controlled with pain medications, persistent nausea and vomiting, or acutely concerning matters to you, that may require urgent medical attention.

## 2017-09-13 NOTE — PROGRESS NOTE ADULT - SUBJECTIVE AND OBJECTIVE BOX
[facility] -- Diann Cardenas Grossman, Wachsman, Pannella, Patel, Savella  Office # [phone]      Follow Up:  vol ol    Subjective/Observations: Patient seen and examined. Events noted. Resting comfortably in bed. No complaints of chest pain,  or palpitations reported. Dyspnea and lower extremity edema has improved.       REVIEW OF SYSTEMS: All other review of systems is negative unless indicated above    PAST MEDICAL & SURGICAL HISTORY:  Colostomy in place  Anemia  Osteoarthritis  Obesity  Palpitations  PE (pulmonary thromboembolism): 2016  Diverticulitis of intestine with perforation and abscess without bleeding, unspecified part of intestinal tract  C. difficile diarrhea: 2016  COPD (chronic obstructive pulmonary disease)  Hypercholesteremia  Hypertension  H/O hemicolectomy: 2016  Status post total replacement of both hips  H/O ovarian cystectomy: b/l  H/O: : x2  History of appendectomy      MEDICATIONS  (STANDING):  aspirin  chewable 81 milliGRAM(s) Oral daily  mometasone 200 MICROgram(s)/formoterol 5 MICROgram(s) Inhaler 2 Puff(s) Inhalation two times a day  aclidinium 400 MICROgram Inhaler 1 Puff(s) Inhalation two times a day  dextrose 50% Injectable 25 Gram(s) IV Push once  insulin lispro (HumaLOG) corrective regimen sliding scale   SubCutaneous three times a day before meals  insulin lispro (HumaLOG) corrective regimen sliding scale   SubCutaneous at bedtime  dextrose 5%. 1000 milliLiter(s) (50 mL/Hr) IV Continuous <Continuous>  dextrose 50% Injectable 12.5 Gram(s) IV Push once  dextrose 50% Injectable 25 Gram(s) IV Push once  heparin  Injectable 5000 Unit(s) SubCutaneous every 8 hours  metolazone 5 milliGRAM(s) Oral daily  furosemide    Tablet 40 milliGRAM(s) Oral two times a day  acetaminophen   Tablet 650 milliGRAM(s) Oral every 6 hours  pantoprazole    Tablet 40 milliGRAM(s) Oral before breakfast  metoprolol 25 milliGRAM(s) Oral two times a day  atorvastatin 20 milliGRAM(s) Oral at bedtime  losartan 50 milliGRAM(s) Oral daily  ALBUTerol/ipratropium for Nebulization 3 milliLiter(s) Nebulizer every 6 hours  fluticasone propionate 50 MICROgram(s)/spray Nasal Spray 1 Spray(s) Both Nostrils two times a day    MEDICATIONS  (PRN):  dextrose Gel 1 Dose(s) Oral once PRN Blood Glucose LESS THAN 70 milliGRAM(s)/deciliter  glucagon  Injectable 1 milliGRAM(s) IntraMuscular once PRN Glucose LESS THAN 70 milligrams/deciliter  dextrose Gel 1 Dose(s) Oral once PRN Blood Glucose LESS THAN 70 milliGRAM(s)/deciliter  glucagon  Injectable 1 milliGRAM(s) IntraMuscular once PRN Glucose LESS THAN 70 milligrams/deciliter  oxyCODONE    IR 5 milliGRAM(s) Oral every 4 hours PRN Moderate Pain (4 - 6)  ibuprofen  Tablet 400 milliGRAM(s) Oral every 6 hours PRN moderate pain      Allergies    Spiriva (Unknown)    Intolerances            Vital Signs Last 24 Hrs  T(C): 36.7 (13 Sep 2017 05:42), Max: 37.1 (12 Sep 2017 16:57)  T(F): 98.1 (13 Sep 2017 05:42), Max: 98.7 (12 Sep 2017 16:57)  HR: 85 (13 Sep 2017 05:42) (77 - 90)  BP: 118/71 (13 Sep 2017 05:42) (102/65 - 132/75)  BP(mean): --  RR: 18 (13 Sep 2017 05:42) (18 - 18)  SpO2: 100% (13 Sep 2017 05:42) (96% - 100%)    I&O's Summary    12 Sep 2017 07:01  -  13 Sep 2017 07:00  --------------------------------------------------------  IN: 1190 mL / OUT: 4850 mL / NET: -3660 mL          PHYSICAL EXAM:    Constitutional: NAD, awake and alert, well-developed  HEENT: Moist Mucous Membranes, Anicteric  Pulmonary: Decreased breath sounds b/l.   Cardiovascular: Regular, S1 and S2, No murmurs, rubs, gallops or clicks  Gastrointestinal: Bowel Sounds present, soft, nontender.   Lymph: trace peripheral edema. No lymphadenopathy.  Skin: No visible rashes or ulcers.  Psych:  Mood & affect appropriate    LABS: All Labs Reviewed:                        9.6    7.57  )-----------( 329      ( 12 Sep 2017 08:41 )             31.2                         9.1    6.75  )-----------( 288      ( 11 Sep 2017 08:47 )             30.0     12 Sep 2017 08:46    142    |  96     |  8      ----------------------------<  114    3.7     |  28     |  0.91   11 Sep 2017 08:45    144    |  104    |  8      ----------------------------<  102    4.0     |  26     |  0.78     Ca    9.0        12 Sep 2017 08:46  Ca    8.7        11 Sep 2017 08:45  Phos  3.7       12 Sep 2017 08:46  Phos  2.1       11 Sep 2017 08:45  Mg     1.8       12 Sep 2017 08:46  Mg     2.1       11 Sep 2017 08:45

## 2017-09-13 NOTE — DISCHARGE NOTE ADULT - HOME CARE AGENCY
Your case has been referred to [facility]. A nurse will call you to schedule a visit the day after discharge. If you have any questions you may call [facility] at .

## 2017-09-13 NOTE — PROGRESS NOTE ADULT - PROVIDER SPECIALTY LIST ADULT
Anesthesia
Cardiology
Pulmonology
Surgery
Cardiology

## 2017-09-13 NOTE — DIETITIAN INITIAL EVALUATION ADULT. - ORAL INTAKE PTA
3 meals/day. Pt reports monitoring/restricting CHO intake. Reports consuming protein, fruits/vegetables, starches at meals. Reports incorporating fiber at meals/snacks. Consumed Vitamin B12, Vitamin D3, Vitamin C, Tumeric, Cinnamon supplements pTA./good

## 2017-09-13 NOTE — PROGRESS NOTE ADULT - SUBJECTIVE AND OBJECTIVE BOX
PULMONARY PROGRESS NOTE    RACHANA BARGER  MRN-[id number]    Patient is a 67y old  Female who presents with a chief complaint of "colostomy reversal" (13 Sep 2017 10:28)      HPI:  -Breathing feels much better today, down 8lbs since diuresis.  No cough or wheeze, able to walk without O2.    ROS:   -otherwise negative    ACTIVE MEDICATION LIST:  MEDICATIONS  (STANDING):  aspirin  chewable 81 milliGRAM(s) Oral daily  mometasone 200 MICROgram(s)/formoterol 5 MICROgram(s) Inhaler 2 Puff(s) Inhalation two times a day  aclidinium 400 MICROgram Inhaler 1 Puff(s) Inhalation two times a day  dextrose 50% Injectable 25 Gram(s) IV Push once  insulin lispro (HumaLOG) corrective regimen sliding scale   SubCutaneous three times a day before meals  insulin lispro (HumaLOG) corrective regimen sliding scale   SubCutaneous at bedtime  dextrose 5%. 1000 milliLiter(s) (50 mL/Hr) IV Continuous <Continuous>  dextrose 50% Injectable 12.5 Gram(s) IV Push once  dextrose 50% Injectable 25 Gram(s) IV Push once  heparin  Injectable 5000 Unit(s) SubCutaneous every 8 hours  metolazone 5 milliGRAM(s) Oral daily  furosemide    Tablet 40 milliGRAM(s) Oral two times a day  acetaminophen   Tablet 650 milliGRAM(s) Oral every 6 hours  pantoprazole    Tablet 40 milliGRAM(s) Oral before breakfast  metoprolol 25 milliGRAM(s) Oral two times a day  atorvastatin 20 milliGRAM(s) Oral at bedtime  losartan 50 milliGRAM(s) Oral daily  ALBUTerol/ipratropium for Nebulization 3 milliLiter(s) Nebulizer every 6 hours  fluticasone propionate 50 MICROgram(s)/spray Nasal Spray 1 Spray(s) Both Nostrils two times a day    MEDICATIONS  (PRN):  dextrose Gel 1 Dose(s) Oral once PRN Blood Glucose LESS THAN 70 milliGRAM(s)/deciliter  glucagon  Injectable 1 milliGRAM(s) IntraMuscular once PRN Glucose LESS THAN 70 milligrams/deciliter  dextrose Gel 1 Dose(s) Oral once PRN Blood Glucose LESS THAN 70 milliGRAM(s)/deciliter  glucagon  Injectable 1 milliGRAM(s) IntraMuscular once PRN Glucose LESS THAN 70 milligrams/deciliter  oxyCODONE    IR 5 milliGRAM(s) Oral every 4 hours PRN Moderate Pain (4 - 6)  ibuprofen  Tablet 400 milliGRAM(s) Oral every 6 hours PRN moderate pain      EXAM:  Vital Signs Last 24 Hrs  T(C): 36.6 (13 Sep 2017 09:44), Max: 37.1 (12 Sep 2017 16:57)  T(F): 97.9 (13 Sep 2017 09:44), Max: 98.7 (12 Sep 2017 16:57)  HR: 89 (13 Sep 2017 10:52) (77 - 97)  BP: 111/69 (13 Sep 2017 10:52) (97/49 - 132/75)  BP(mean): --  RR: 18 (13 Sep 2017 10:52) (18 - 18)  SpO2: 96% (13 Sep 2017 10:52) (80% - 100%)    GENERAL: The patient is awake and alert in no apparent distress.     LUNGS: Clear to auscultation without wheezing, rales or rhonchi; respirations unlabored    HEART: Regular rate and rhythm without murmur.    LABS/IMAGING: reviewed                          10.1   8.46  )-----------( 389      ( 13 Sep 2017 08:53 )             32.5     09-13    140  |  88<L>  |  15  ----------------------------<  151<H>  3.8   |  36<H>  |  1.01    Ca    10.1      13 Sep 2017 12:02  Phos  4.2     09-13  Mg     1.7     09-13        PROBLEM LIST:  67y Female with HEALTH ISSUES - PROBLEM Dx:  COPD    RECS:  -COPD stable, continue dulera and tudorza, please send home with albuterol rescue inhaler for PRN use.  -Continue flonase for post nasal drip  -Out patient follow up for pulmonary nodule in 6 months, as well as COPD management.  patient has my information.    Stacie Perales MD   [phone]

## 2017-09-13 NOTE — PROGRESS NOTE ADULT - ASSESSMENT
A: 67 year old woman s/p robotic converted to open reversal of Guillermina's. POD #6.    P:  - Tylenol and Motrin for pain control  - PT for wound vac management  - Out of bed, Ambulation  - SQH DVT prophylaxis  - LRD  - Lasix 40mg BID in addition to metolazone today and follow urine output     - Continue with ASA 81 daily  - Per Cards, Metoprolol 25mg PO BID  - Daily weights  - Per Pulmonology:   	- COPD: continue home medications dulera and tudorza, steffanie symbicort, add duonebs q6  	- pulmonary nodule stable over 1 year, will need fu in 6 months to document stability.  	- Add flonase for post nasal drip  - Per Cardiology  	- Hold IVF. I agree with restarting Lasix 40 mg po bid. I have recommended to give her an extra 40 IV x 1 today; She will like 	need 40 IV bid dosing, but we can re-assess tomorrow and see how she responds A: 67 year old woman s/p robotic converted to open reversal of Guillermina's. POD #6.    P:  - Tylenol and Motrin for pain control  - PT for wound vac management  - Out of bed, Ambulation  - SQH DVT prophylaxis  - LRD  - Lasix 40mg BID in addition to metolazone today and follow urine output     - Continue with ASA 81 daily  - Per Cards, Metoprolol 25mg PO BID  - Daily weights  - Per Pulmonology:   	- COPD: continue home medications dulera and tudorza, steffanie symbicort, add duonebs q6  	- pulmonary nodule stable over 1 year, will need fu in 6 months to document stability.  	- Add flonase for post nasal drip

## 2017-09-13 NOTE — DISCHARGE NOTE ADULT - PRINCIPAL DIAGNOSIS
Diverticulitis of intestine with perforation and abscess without bleeding, unspecified part of intestinal tract

## 2017-09-13 NOTE — DIETITIAN INITIAL EVALUATION ADULT. - OTHER INFO
Pt seen for Length Of Stay. POD #6 s/p Guillermina reversal. Known to RD from previous admission/surgery. Pt Denies N+V. +Flatus. +BMs. States NKFA. No difficulty chewing/swallowing. Reports limited appetite/intake (<50% of meals). Food preferences and snacks discussed. Pt amenable to small/frequent meals/snacks. Pt states prior knowledge of DM and low fiber edu, accepts review/reinforcement.

## 2017-09-13 NOTE — DIETITIAN INITIAL EVALUATION ADULT. - NS AS NUTRI INTERV MEALS SNACK
Continue with low fiber + consistent carbohydrate diet as tolerated. Recommend small/frequent meals/snacks. Provide food preferences as requested.

## 2017-09-13 NOTE — PROGRESS NOTE ADULT - ASSESSMENT
68 y/o female with pmhx of COPD, HTN, perforated diverticulitis s/p exploratory laparotomy, right colectomy, small bowel resection, Chacko procedure in 12/2016 - now with colostomy, PE now off Xarelto, here for reversal. Tolerated procedure well. Now with signs of volume overload with worsening lower extremity edema.    - She has responded to Lasix. I would continue with Lasix 40mg PO Q12. She can continue with Metolazone for now. Monitor Cr.   - Last TTE on record with normal LV and RV function.   - Continue with ASA 81 daily  - Continue metoprolol 25 po bid  - Monitor and replete electrolytes. Keep K>4.0 and Mg>2.0.  Watch closely while on metolazone.  - Pain control  - Further cardiac workup will depend on clinical course.   - Incentive Spirometry  - Ambulate  - Advance diet per surgery  - DVT ppx  - Will follow with you.

## 2017-09-13 NOTE — DISCHARGE NOTE ADULT - OTHER SIGNIFICANT FINDINGS
CT Angio Chest w/ IV Cont (09.10.17 @ 16:39)   EXAM:  CT ANGIO CHEST (W)AW IC                            PROCEDURE DATE:  09/10/2017    No evidence of a pulmonary embolism.  Small bilateral pleural effusions; adjacent subsegmental atelectasis.  Emphysematous changes of the lungs.   7 mm right lower lobe pulmonary nodule, unchanged since 12/22/2016.       EXAM:  CHEST-PORTABLE URGENT                        PROCEDURE DATE:  09/12/2017      IMPRESSION: Clear lungs.

## 2017-09-15 RX ORDER — OXYCODONE HYDROCHLORIDE 5 MG/1
1 TABLET ORAL
Qty: 20 | Refills: 0 | OUTPATIENT
Start: 2017-09-15 | End: 2017-09-20

## 2017-09-18 ENCOUNTER — RX RENEWAL (OUTPATIENT)
Age: 67
End: 2017-09-18

## 2017-09-18 RX ORDER — OXYCODONE HYDROCHLORIDE 5 MG/1
1 TABLET ORAL
Qty: 20 | Refills: 0 | OUTPATIENT
Start: 2017-09-18 | End: 2017-09-23

## 2017-09-20 ENCOUNTER — RX RENEWAL (OUTPATIENT)
Age: 67
End: 2017-09-20

## 2017-09-20 ENCOUNTER — APPOINTMENT (OUTPATIENT)
Dept: COLORECTAL SURGERY | Facility: CLINIC | Age: 67
End: 2017-09-20
Payer: MEDICARE

## 2017-09-20 DIAGNOSIS — Z87.09 PERSONAL HISTORY OF OTHER DISEASES OF THE RESPIRATORY SYSTEM: ICD-10-CM

## 2017-09-20 PROCEDURE — 17250 CHEM CAUT OF GRANLTJ TISSUE: CPT | Mod: 78

## 2017-09-27 ENCOUNTER — APPOINTMENT (OUTPATIENT)
Dept: COLORECTAL SURGERY | Facility: CLINIC | Age: 67
End: 2017-09-27
Payer: MEDICARE

## 2017-09-27 PROCEDURE — 99024 POSTOP FOLLOW-UP VISIT: CPT

## 2017-09-29 ENCOUNTER — APPOINTMENT (OUTPATIENT)
Dept: CARDIOLOGY | Facility: CLINIC | Age: 67
End: 2017-09-29
Payer: MEDICARE

## 2017-09-29 ENCOUNTER — NON-APPOINTMENT (OUTPATIENT)
Age: 67
End: 2017-09-29

## 2017-09-29 VITALS
HEIGHT: 64 IN | DIASTOLIC BLOOD PRESSURE: 80 MMHG | HEART RATE: 75 BPM | WEIGHT: 221 LBS | OXYGEN SATURATION: 97 % | BODY MASS INDEX: 37.73 KG/M2 | SYSTOLIC BLOOD PRESSURE: 131 MMHG

## 2017-09-29 DIAGNOSIS — I26.99 OTHER PULMONARY EMBOLISM W/OUT ACUTE COR PULMONALE: ICD-10-CM

## 2017-09-29 PROCEDURE — 99215 OFFICE O/P EST HI 40 MIN: CPT

## 2017-09-29 PROCEDURE — 93000 ELECTROCARDIOGRAM COMPLETE: CPT

## 2017-09-29 RX ORDER — METRONIDAZOLE 500 MG/1
500 TABLET ORAL
Qty: 3 | Refills: 0 | Status: DISCONTINUED | COMMUNITY
Start: 2017-08-11 | End: 2017-09-29

## 2017-09-29 RX ORDER — NEOMYCIN SULFATE 500 MG/1
500 TABLET ORAL
Qty: 6 | Refills: 0 | Status: DISCONTINUED | COMMUNITY
Start: 2017-08-11 | End: 2017-09-29

## 2017-09-29 RX ORDER — OXYCODONE AND ACETAMINOPHEN 5; 325 MG/1; MG/1
5-325 TABLET ORAL
Qty: 12 | Refills: 0 | Status: DISCONTINUED | COMMUNITY
Start: 2017-09-18 | End: 2017-09-29

## 2017-10-04 ENCOUNTER — LABORATORY RESULT (OUTPATIENT)
Age: 67
End: 2017-10-04

## 2017-10-05 LAB
ALBUMIN SERPL ELPH-MCNC: 4 G/DL
ALP BLD-CCNC: 88 U/L
ALT SERPL-CCNC: 28 U/L
ANION GAP SERPL CALC-SCNC: 20 MMOL/L
AST SERPL-CCNC: 24 U/L
BASOPHILS # BLD AUTO: 0.07 K/UL
BASOPHILS NFR BLD AUTO: 0.8 %
BILIRUB SERPL-MCNC: 0.3 MG/DL
BUN SERPL-MCNC: 35 MG/DL
CALCIUM SERPL-MCNC: 10.1 MG/DL
CHLORIDE SERPL-SCNC: 94 MMOL/L
CO2 SERPL-SCNC: 28 MMOL/L
CREAT SERPL-MCNC: 1.38 MG/DL
EOSINOPHIL # BLD AUTO: 0.35 K/UL
EOSINOPHIL NFR BLD AUTO: 4 %
GLUCOSE SERPL-MCNC: 130 MG/DL
HCT VFR BLD CALC: 37.6 %
HGB BLD-MCNC: 12 G/DL
IMM GRANULOCYTES NFR BLD AUTO: 0.1 %
LYMPHOCYTES # BLD AUTO: 2.73 K/UL
LYMPHOCYTES NFR BLD AUTO: 31.1 %
MAN DIFF?: NORMAL
MCHC RBC-ENTMCNC: 27.9 PG
MCHC RBC-ENTMCNC: 31.9 GM/DL
MCV RBC AUTO: 87.4 FL
MONOCYTES # BLD AUTO: 0.76 K/UL
MONOCYTES NFR BLD AUTO: 8.7 %
NEUTROPHILS # BLD AUTO: 4.86 K/UL
NEUTROPHILS NFR BLD AUTO: 55.3 %
PLATELET # BLD AUTO: 425 K/UL
POTASSIUM SERPL-SCNC: 3.8 MMOL/L
PROT SERPL-MCNC: 7.8 G/DL
RBC # BLD: 4.3 M/UL
RBC # FLD: 16.9 %
SODIUM SERPL-SCNC: 142 MMOL/L
WBC # FLD AUTO: 8.78 K/UL

## 2017-10-11 ENCOUNTER — APPOINTMENT (OUTPATIENT)
Dept: COLORECTAL SURGERY | Facility: CLINIC | Age: 67
End: 2017-10-11
Payer: MEDICARE

## 2017-10-11 DIAGNOSIS — Z93.3 COLOSTOMY STATUS: ICD-10-CM

## 2017-10-11 PROCEDURE — 17250 CHEM CAUT OF GRANLTJ TISSUE: CPT | Mod: 78

## 2017-10-11 RX ORDER — SACCHAROMYCES BOULARDII 50 MG
250 CAPSULE ORAL TWICE DAILY
Refills: 0 | Status: DISCONTINUED | COMMUNITY
Start: 2017-03-03 | End: 2017-10-11

## 2017-10-12 ENCOUNTER — APPOINTMENT (OUTPATIENT)
Dept: CARDIOLOGY | Facility: CLINIC | Age: 67
End: 2017-10-12
Payer: MEDICARE

## 2017-10-12 VITALS
WEIGHT: 217 LBS | OXYGEN SATURATION: 97 % | SYSTOLIC BLOOD PRESSURE: 126 MMHG | BODY MASS INDEX: 37.05 KG/M2 | HEIGHT: 64 IN | HEART RATE: 87 BPM | DIASTOLIC BLOOD PRESSURE: 76 MMHG

## 2017-10-12 DIAGNOSIS — M79.606 PAIN IN LEG, UNSPECIFIED: ICD-10-CM

## 2017-10-12 PROCEDURE — 99215 OFFICE O/P EST HI 40 MIN: CPT

## 2017-10-12 PROCEDURE — 93000 ELECTROCARDIOGRAM COMPLETE: CPT

## 2017-10-18 ENCOUNTER — APPOINTMENT (OUTPATIENT)
Dept: COLORECTAL SURGERY | Facility: CLINIC | Age: 67
End: 2017-10-18
Payer: MEDICARE

## 2017-10-18 PROCEDURE — 17250 CHEM CAUT OF GRANLTJ TISSUE: CPT | Mod: 78

## 2017-10-19 PROCEDURE — 97162 PT EVAL MOD COMPLEX 30 MIN: CPT

## 2017-10-19 PROCEDURE — 84100 ASSAY OF PHOSPHORUS: CPT

## 2017-10-19 PROCEDURE — 82553 CREATINE MB FRACTION: CPT

## 2017-10-19 PROCEDURE — 93005 ELECTROCARDIOGRAM TRACING: CPT

## 2017-10-19 PROCEDURE — C1765: CPT

## 2017-10-19 PROCEDURE — 80053 COMPREHEN METABOLIC PANEL: CPT

## 2017-10-19 PROCEDURE — 94640 AIRWAY INHALATION TREATMENT: CPT

## 2017-10-19 PROCEDURE — 83036 HEMOGLOBIN GLYCOSYLATED A1C: CPT

## 2017-10-19 PROCEDURE — C1758: CPT

## 2017-10-19 PROCEDURE — 93970 EXTREMITY STUDY: CPT

## 2017-10-19 PROCEDURE — 85730 THROMBOPLASTIN TIME PARTIAL: CPT

## 2017-10-19 PROCEDURE — 82570 ASSAY OF URINE CREATININE: CPT

## 2017-10-19 PROCEDURE — 80048 BASIC METABOLIC PNL TOTAL CA: CPT

## 2017-10-19 PROCEDURE — 82435 ASSAY OF BLOOD CHLORIDE: CPT

## 2017-10-19 PROCEDURE — S2900: CPT

## 2017-10-19 PROCEDURE — 83935 ASSAY OF URINE OSMOLALITY: CPT

## 2017-10-19 PROCEDURE — 84132 ASSAY OF SERUM POTASSIUM: CPT

## 2017-10-19 PROCEDURE — 85027 COMPLETE CBC AUTOMATED: CPT

## 2017-10-19 PROCEDURE — 82803 BLOOD GASES ANY COMBINATION: CPT

## 2017-10-19 PROCEDURE — 84295 ASSAY OF SERUM SODIUM: CPT

## 2017-10-19 PROCEDURE — 82947 ASSAY GLUCOSE BLOOD QUANT: CPT

## 2017-10-19 PROCEDURE — 82565 ASSAY OF CREATININE: CPT

## 2017-10-19 PROCEDURE — 82330 ASSAY OF CALCIUM: CPT

## 2017-10-19 PROCEDURE — 84484 ASSAY OF TROPONIN QUANT: CPT

## 2017-10-19 PROCEDURE — 71045 X-RAY EXAM CHEST 1 VIEW: CPT

## 2017-10-19 PROCEDURE — 85014 HEMATOCRIT: CPT

## 2017-10-19 PROCEDURE — 82550 ASSAY OF CK (CPK): CPT

## 2017-10-19 PROCEDURE — 82436 ASSAY OF URINE CHLORIDE: CPT

## 2017-10-19 PROCEDURE — 97530 THERAPEUTIC ACTIVITIES: CPT

## 2017-10-19 PROCEDURE — 84300 ASSAY OF URINE SODIUM: CPT

## 2017-10-19 PROCEDURE — 97116 GAIT TRAINING THERAPY: CPT

## 2017-10-19 PROCEDURE — 83735 ASSAY OF MAGNESIUM: CPT

## 2017-10-19 PROCEDURE — 83880 ASSAY OF NATRIURETIC PEPTIDE: CPT

## 2017-10-19 PROCEDURE — 83605 ASSAY OF LACTIC ACID: CPT

## 2017-10-19 PROCEDURE — 88307 TISSUE EXAM BY PATHOLOGIST: CPT

## 2017-10-19 PROCEDURE — 71275 CT ANGIOGRAPHY CHEST: CPT

## 2017-10-19 PROCEDURE — 97605 NEG PRS WND THER DME<=50SQCM: CPT

## 2017-10-19 PROCEDURE — 76775 US EXAM ABDO BACK WALL LIM: CPT

## 2017-10-19 PROCEDURE — 97602 WOUND(S) CARE NON-SELECTIVE: CPT

## 2017-10-19 PROCEDURE — 85610 PROTHROMBIN TIME: CPT

## 2017-11-01 ENCOUNTER — NON-APPOINTMENT (OUTPATIENT)
Age: 67
End: 2017-11-01

## 2017-11-01 ENCOUNTER — APPOINTMENT (OUTPATIENT)
Dept: CARDIOLOGY | Facility: CLINIC | Age: 67
End: 2017-11-01
Payer: MEDICARE

## 2017-11-01 VITALS
HEART RATE: 80 BPM | BODY MASS INDEX: 37.05 KG/M2 | DIASTOLIC BLOOD PRESSURE: 67 MMHG | SYSTOLIC BLOOD PRESSURE: 119 MMHG | HEIGHT: 64 IN | WEIGHT: 217 LBS

## 2017-11-01 DIAGNOSIS — I10 ESSENTIAL (PRIMARY) HYPERTENSION: ICD-10-CM

## 2017-11-01 LAB
ANION GAP SERPL CALC-SCNC: 20 MMOL/L
BUN SERPL-MCNC: 21 MG/DL
CALCIUM SERPL-MCNC: 9.6 MG/DL
CHLORIDE SERPL-SCNC: 96 MMOL/L
CO2 SERPL-SCNC: 25 MMOL/L
CREAT SERPL-MCNC: 1.15 MG/DL
GLUCOSE SERPL-MCNC: 154 MG/DL
POTASSIUM SERPL-SCNC: 3.7 MMOL/L
SODIUM SERPL-SCNC: 141 MMOL/L

## 2017-11-01 PROCEDURE — 93000 ELECTROCARDIOGRAM COMPLETE: CPT

## 2017-11-01 PROCEDURE — 99215 OFFICE O/P EST HI 40 MIN: CPT

## 2017-11-02 LAB
CHOLEST SERPL-MCNC: 233 MG/DL
CHOLEST/HDLC SERPL: 5.4 RATIO
HBA1C MFR BLD HPLC: 7.2 %
HDLC SERPL-MCNC: 43 MG/DL
LDLC SERPL CALC-MCNC: 152 MG/DL
TRIGL SERPL-MCNC: 188 MG/DL

## 2017-11-08 ENCOUNTER — APPOINTMENT (OUTPATIENT)
Dept: COLORECTAL SURGERY | Facility: CLINIC | Age: 67
End: 2017-11-08
Payer: MEDICARE

## 2017-11-08 DIAGNOSIS — R06.09 OTHER FORMS OF DYSPNEA: ICD-10-CM

## 2017-11-08 DIAGNOSIS — Z87.19 PERSONAL HISTORY OF OTHER DISEASES OF THE DIGESTIVE SYSTEM: ICD-10-CM

## 2017-11-08 PROCEDURE — 17250 CHEM CAUT OF GRANLTJ TISSUE: CPT | Mod: 78

## 2017-11-08 RX ORDER — ALBUTEROL SULFATE 90 UG/1
108 (90 BASE) AEROSOL, METERED RESPIRATORY (INHALATION)
Qty: 8 | Refills: 0 | Status: ACTIVE | COMMUNITY
Start: 2017-09-13

## 2017-11-29 ENCOUNTER — APPOINTMENT (OUTPATIENT)
Dept: COLORECTAL SURGERY | Facility: CLINIC | Age: 67
End: 2017-11-29
Payer: MEDICARE

## 2017-11-29 PROCEDURE — 46600 DIAGNOSTIC ANOSCOPY SPX: CPT | Mod: 78

## 2017-11-29 PROCEDURE — 17250 CHEM CAUT OF GRANLTJ TISSUE: CPT | Mod: 78

## 2017-12-06 ENCOUNTER — NON-APPOINTMENT (OUTPATIENT)
Age: 67
End: 2017-12-06

## 2017-12-06 ENCOUNTER — APPOINTMENT (OUTPATIENT)
Dept: CARDIOLOGY | Facility: CLINIC | Age: 67
End: 2017-12-06
Payer: MEDICARE

## 2017-12-06 VITALS
BODY MASS INDEX: 37.05 KG/M2 | HEIGHT: 64 IN | HEART RATE: 79 BPM | SYSTOLIC BLOOD PRESSURE: 104 MMHG | WEIGHT: 217 LBS | OXYGEN SATURATION: 94 % | DIASTOLIC BLOOD PRESSURE: 64 MMHG

## 2017-12-06 DIAGNOSIS — E78.5 HYPERLIPIDEMIA, UNSPECIFIED: ICD-10-CM

## 2017-12-06 DIAGNOSIS — Z00.00 ENCOUNTER FOR GENERAL ADULT MEDICAL EXAMINATION W/OUT ABNORMAL FINDINGS: ICD-10-CM

## 2017-12-06 PROCEDURE — 93000 ELECTROCARDIOGRAM COMPLETE: CPT

## 2017-12-06 PROCEDURE — 99215 OFFICE O/P EST HI 40 MIN: CPT

## 2017-12-06 RX ORDER — METFORMIN HYDROCHLORIDE 500 MG/1
500 TABLET, COATED ORAL
Qty: 60 | Refills: 0 | Status: DISCONTINUED | COMMUNITY
Start: 2017-11-02 | End: 2017-12-06

## 2017-12-06 RX ORDER — FLUTICASONE PROPIONATE 50 UG/1
50 SPRAY, METERED NASAL
Refills: 0 | Status: DISCONTINUED | COMMUNITY
End: 2017-12-06

## 2017-12-06 RX ORDER — METOPROLOL SUCCINATE 25 MG/1
25 TABLET, EXTENDED RELEASE ORAL TWICE DAILY
Qty: 60 | Refills: 2 | Status: ACTIVE | COMMUNITY
Start: 2017-07-27

## 2017-12-06 RX ORDER — METOLAZONE 5 MG/1
5 TABLET ORAL
Qty: 90 | Refills: 0 | Status: DISCONTINUED | COMMUNITY
Start: 2017-09-20 | End: 2017-12-06

## 2017-12-06 RX ORDER — ATORVASTATIN CALCIUM 40 MG/1
40 TABLET, FILM COATED ORAL
Qty: 30 | Refills: 0 | Status: DISCONTINUED | COMMUNITY
Start: 2017-09-29 | End: 2017-12-06

## 2017-12-28 ENCOUNTER — MEDICATION RENEWAL (OUTPATIENT)
Age: 67
End: 2017-12-28

## 2017-12-28 RX ORDER — ATORVASTATIN CALCIUM 20 MG/1
20 TABLET, FILM COATED ORAL
Qty: 90 | Refills: 3 | Status: ACTIVE | COMMUNITY
Start: 2017-03-03 | End: 1900-01-01

## 2018-01-17 ENCOUNTER — APPOINTMENT (OUTPATIENT)
Dept: CARDIOLOGY | Facility: CLINIC | Age: 68
End: 2018-01-17
Payer: MEDICARE

## 2018-01-17 ENCOUNTER — NON-APPOINTMENT (OUTPATIENT)
Age: 68
End: 2018-01-17

## 2018-01-17 VITALS
BODY MASS INDEX: 37.05 KG/M2 | WEIGHT: 217 LBS | HEART RATE: 87 BPM | DIASTOLIC BLOOD PRESSURE: 60 MMHG | OXYGEN SATURATION: 97 % | SYSTOLIC BLOOD PRESSURE: 94 MMHG | HEIGHT: 64 IN

## 2018-01-17 DIAGNOSIS — R00.2 PALPITATIONS: ICD-10-CM

## 2018-01-17 PROCEDURE — 93000 ELECTROCARDIOGRAM COMPLETE: CPT

## 2018-01-17 PROCEDURE — 99214 OFFICE O/P EST MOD 30 MIN: CPT

## 2018-01-17 RX ORDER — POTASSIUM CHLORIDE 1500 MG/1
20 TABLET, FILM COATED, EXTENDED RELEASE ORAL
Qty: 30 | Refills: 1 | Status: DISCONTINUED | COMMUNITY
Start: 2017-10-10 | End: 2018-01-17

## 2018-02-14 ENCOUNTER — APPOINTMENT (OUTPATIENT)
Dept: CARDIOLOGY | Facility: CLINIC | Age: 68
End: 2018-02-14
Payer: MEDICARE

## 2018-02-14 ENCOUNTER — NON-APPOINTMENT (OUTPATIENT)
Age: 68
End: 2018-02-14

## 2018-02-14 VITALS
HEIGHT: 64 IN | SYSTOLIC BLOOD PRESSURE: 168 MMHG | DIASTOLIC BLOOD PRESSURE: 82 MMHG | WEIGHT: 222 LBS | OXYGEN SATURATION: 97 % | HEART RATE: 87 BPM | BODY MASS INDEX: 37.9 KG/M2

## 2018-02-14 VITALS — SYSTOLIC BLOOD PRESSURE: 130 MMHG | DIASTOLIC BLOOD PRESSURE: 70 MMHG

## 2018-02-14 DIAGNOSIS — E87.70 FLUID OVERLOAD, UNSPECIFIED: ICD-10-CM

## 2018-02-14 DIAGNOSIS — K21.9 GASTRO-ESOPHAGEAL REFLUX DISEASE W/OUT ESOPHAGITIS: ICD-10-CM

## 2018-02-14 DIAGNOSIS — R60.0 LOCALIZED EDEMA: ICD-10-CM

## 2018-02-14 DIAGNOSIS — E66.9 OBESITY, UNSPECIFIED: ICD-10-CM

## 2018-02-14 DIAGNOSIS — I47.1 SUPRAVENTRICULAR TACHYCARDIA: ICD-10-CM

## 2018-02-14 LAB
ALBUMIN SERPL ELPH-MCNC: 3.6 G/DL
ALP BLD-CCNC: 86 U/L
ALT SERPL-CCNC: 39 U/L
ANION GAP SERPL CALC-SCNC: 15 MMOL/L
AST SERPL-CCNC: 26 U/L
BILIRUB SERPL-MCNC: 0.3 MG/DL
BUN SERPL-MCNC: 16 MG/DL
CALCIUM SERPL-MCNC: 9.3 MG/DL
CHLORIDE SERPL-SCNC: 100 MMOL/L
CHOLEST SERPL-MCNC: 156 MG/DL
CHOLEST/HDLC SERPL: 3.5 RATIO
CO2 SERPL-SCNC: 28 MMOL/L
CREAT SERPL-MCNC: 1.31 MG/DL
GLUCOSE SERPL-MCNC: 116 MG/DL
HBA1C MFR BLD HPLC: 9.4 %
HDLC SERPL-MCNC: 44 MG/DL
LDLC SERPL CALC-MCNC: 75 MG/DL
POTASSIUM SERPL-SCNC: 4.5 MMOL/L
PROT SERPL-MCNC: 6.9 G/DL
SODIUM SERPL-SCNC: 143 MMOL/L
TRIGL SERPL-MCNC: 185 MG/DL

## 2018-02-14 PROCEDURE — 99215 OFFICE O/P EST HI 40 MIN: CPT | Mod: PD

## 2018-02-14 PROCEDURE — 93000 ELECTROCARDIOGRAM COMPLETE: CPT | Mod: PD

## 2018-02-14 RX ORDER — POTASSIUM CHLORIDE 750 MG/1
10 TABLET, FILM COATED, EXTENDED RELEASE ORAL
Qty: 30 | Refills: 0 | Status: ACTIVE | COMMUNITY
Start: 2018-02-14 | End: 1900-01-01

## 2018-02-14 RX ORDER — POTASSIUM CHLORIDE 20 MEQ/15ML
20 MEQ/15ML SOLUTION ORAL
Qty: 450 | Refills: 3 | Status: DISCONTINUED | COMMUNITY
Start: 2018-01-17 | End: 2018-02-14

## 2018-02-14 RX ORDER — FUROSEMIDE 40 MG/1
40 TABLET ORAL DAILY
Qty: 30 | Refills: 0 | Status: ACTIVE | COMMUNITY
Start: 2017-07-06 | End: 1900-01-01

## 2018-02-15 ENCOUNTER — APPOINTMENT (OUTPATIENT)
Dept: CARDIOLOGY | Facility: CLINIC | Age: 68
End: 2018-02-15

## 2018-02-16 ENCOUNTER — INPATIENT (INPATIENT)
Facility: HOSPITAL | Age: 68
LOS: 5 days | Discharge: HOME | DRG: 190 | End: 2018-02-22
Attending: HOSPITALIST | Admitting: HOSPITALIST
Payer: MEDICARE

## 2018-02-16 VITALS
TEMPERATURE: 99 F | HEART RATE: 88 BPM | DIASTOLIC BLOOD PRESSURE: 67 MMHG | OXYGEN SATURATION: 98 % | RESPIRATION RATE: 22 BRPM | SYSTOLIC BLOOD PRESSURE: 170 MMHG

## 2018-02-16 DIAGNOSIS — Z98.891 HISTORY OF UTERINE SCAR FROM PREVIOUS SURGERY: Chronic | ICD-10-CM

## 2018-02-16 DIAGNOSIS — Z98.890 OTHER SPECIFIED POSTPROCEDURAL STATES: Chronic | ICD-10-CM

## 2018-02-16 DIAGNOSIS — Z90.49 ACQUIRED ABSENCE OF OTHER SPECIFIED PARTS OF DIGESTIVE TRACT: Chronic | ICD-10-CM

## 2018-02-16 DIAGNOSIS — J44.1 CHRONIC OBSTRUCTIVE PULMONARY DISEASE WITH (ACUTE) EXACERBATION: ICD-10-CM

## 2018-02-16 DIAGNOSIS — Z96.643 PRESENCE OF ARTIFICIAL HIP JOINT, BILATERAL: Chronic | ICD-10-CM

## 2018-02-16 LAB
ALBUMIN SERPL ELPH-MCNC: 3.5 G/DL — SIGNIFICANT CHANGE UP (ref 3.3–5)
ALP SERPL-CCNC: 98 U/L — SIGNIFICANT CHANGE UP (ref 40–120)
ALT FLD-CCNC: 37 U/L RC — SIGNIFICANT CHANGE UP (ref 10–45)
ANION GAP SERPL CALC-SCNC: 13 MMOL/L — SIGNIFICANT CHANGE UP (ref 5–17)
AST SERPL-CCNC: 21 U/L — SIGNIFICANT CHANGE UP (ref 10–40)
BASE EXCESS BLDV CALC-SCNC: 1.2 MMOL/L — SIGNIFICANT CHANGE UP (ref -2–2)
BASOPHILS # BLD AUTO: 0.1 K/UL — SIGNIFICANT CHANGE UP (ref 0–0.2)
BASOPHILS NFR BLD AUTO: 0.9 % — SIGNIFICANT CHANGE UP (ref 0–2)
BILIRUB SERPL-MCNC: 0.2 MG/DL — SIGNIFICANT CHANGE UP (ref 0.2–1.2)
BUN SERPL-MCNC: 13 MG/DL — SIGNIFICANT CHANGE UP (ref 7–23)
CA-I SERPL-SCNC: 1.29 MMOL/L — SIGNIFICANT CHANGE UP (ref 1.12–1.3)
CALCIUM SERPL-MCNC: 9.6 MG/DL — SIGNIFICANT CHANGE UP (ref 8.4–10.5)
CHLORIDE BLDV-SCNC: 105 MMOL/L — SIGNIFICANT CHANGE UP (ref 96–108)
CHLORIDE SERPL-SCNC: 102 MMOL/L — SIGNIFICANT CHANGE UP (ref 96–108)
CK MB CFR SERPL CALC: 1.9 NG/ML — SIGNIFICANT CHANGE UP (ref 0–3.8)
CO2 BLDV-SCNC: 28 MMOL/L — SIGNIFICANT CHANGE UP (ref 22–30)
CO2 SERPL-SCNC: 24 MMOL/L — SIGNIFICANT CHANGE UP (ref 22–31)
CREAT SERPL-MCNC: 0.92 MG/DL — SIGNIFICANT CHANGE UP (ref 0.5–1.3)
EOSINOPHIL # BLD AUTO: 0.2 K/UL — SIGNIFICANT CHANGE UP (ref 0–0.5)
EOSINOPHIL NFR BLD AUTO: 1.8 % — SIGNIFICANT CHANGE UP (ref 0–6)
GAS PNL BLDV: 139 MMOL/L — SIGNIFICANT CHANGE UP (ref 136–145)
GAS PNL BLDV: SIGNIFICANT CHANGE UP
GAS PNL BLDV: SIGNIFICANT CHANGE UP
GLUCOSE BLDV-MCNC: 225 MG/DL — HIGH (ref 70–99)
GLUCOSE SERPL-MCNC: 231 MG/DL — HIGH (ref 70–99)
HCO3 BLDV-SCNC: 26 MMOL/L — SIGNIFICANT CHANGE UP (ref 21–29)
HCT VFR BLD CALC: 31.2 % — LOW (ref 34.5–45)
HCT VFR BLDA CALC: 30 % — LOW (ref 39–50)
HGB BLD CALC-MCNC: 9.8 G/DL — LOW (ref 11.5–15.5)
HGB BLD-MCNC: 9.9 G/DL — LOW (ref 11.5–15.5)
LACTATE BLDV-MCNC: 2.2 MMOL/L — HIGH (ref 0.7–2)
LYMPHOCYTES # BLD AUTO: 2.6 K/UL — SIGNIFICANT CHANGE UP (ref 1–3.3)
LYMPHOCYTES # BLD AUTO: 24.6 % — SIGNIFICANT CHANGE UP (ref 13–44)
MCHC RBC-ENTMCNC: 26.1 PG — LOW (ref 27–34)
MCHC RBC-ENTMCNC: 31.6 GM/DL — LOW (ref 32–36)
MCV RBC AUTO: 82.5 FL — SIGNIFICANT CHANGE UP (ref 80–100)
MONOCYTES # BLD AUTO: 0.9 K/UL — SIGNIFICANT CHANGE UP (ref 0–0.9)
MONOCYTES NFR BLD AUTO: 8.8 % — SIGNIFICANT CHANGE UP (ref 2–14)
NEUTROPHILS # BLD AUTO: 6.8 K/UL — SIGNIFICANT CHANGE UP (ref 1.8–7.4)
NEUTROPHILS NFR BLD AUTO: 63.9 % — SIGNIFICANT CHANGE UP (ref 43–77)
NT-PROBNP SERPL-SCNC: 524 PG/ML — HIGH (ref 0–300)
OTHER CELLS CSF MANUAL: 8 ML/DL — LOW (ref 18–22)
PCO2 BLDV: 46 MMHG — SIGNIFICANT CHANGE UP (ref 35–50)
PH BLDV: 7.37 — SIGNIFICANT CHANGE UP (ref 7.35–7.45)
PLATELET # BLD AUTO: 346 K/UL — SIGNIFICANT CHANGE UP (ref 150–400)
PO2 BLDV: 37 MMHG — SIGNIFICANT CHANGE UP (ref 25–45)
POTASSIUM BLDV-SCNC: 3.9 MMOL/L — SIGNIFICANT CHANGE UP (ref 3.5–5)
POTASSIUM SERPL-MCNC: 4.2 MMOL/L — SIGNIFICANT CHANGE UP (ref 3.5–5.3)
POTASSIUM SERPL-SCNC: 4.2 MMOL/L — SIGNIFICANT CHANGE UP (ref 3.5–5.3)
PROT SERPL-MCNC: 6.6 G/DL — SIGNIFICANT CHANGE UP (ref 6–8.3)
RBC # BLD: 3.79 M/UL — LOW (ref 3.8–5.2)
RBC # FLD: 15.2 % — HIGH (ref 10.3–14.5)
SAO2 % BLDV: 61 % — LOW (ref 67–88)
SODIUM SERPL-SCNC: 139 MMOL/L — SIGNIFICANT CHANGE UP (ref 135–145)
TROPONIN T SERPL-MCNC: <0.01 NG/ML — SIGNIFICANT CHANGE UP (ref 0–0.06)
WBC # BLD: 10.6 K/UL — HIGH (ref 3.8–10.5)
WBC # FLD AUTO: 10.6 K/UL — HIGH (ref 3.8–10.5)

## 2018-02-16 PROCEDURE — 71045 X-RAY EXAM CHEST 1 VIEW: CPT | Mod: 26

## 2018-02-16 PROCEDURE — 93010 ELECTROCARDIOGRAM REPORT: CPT

## 2018-02-16 PROCEDURE — 99285 EMERGENCY DEPT VISIT HI MDM: CPT | Mod: 25,GC

## 2018-02-16 RX ORDER — SODIUM CHLORIDE 9 MG/ML
500 INJECTION INTRAMUSCULAR; INTRAVENOUS; SUBCUTANEOUS ONCE
Qty: 0 | Refills: 0 | Status: COMPLETED | OUTPATIENT
Start: 2018-02-16 | End: 2018-02-16

## 2018-02-16 RX ORDER — IPRATROPIUM/ALBUTEROL SULFATE 18-103MCG
3 AEROSOL WITH ADAPTER (GRAM) INHALATION ONCE
Qty: 0 | Refills: 0 | Status: COMPLETED | OUTPATIENT
Start: 2018-02-16 | End: 2018-02-16

## 2018-02-16 RX ORDER — ACETAMINOPHEN 500 MG
1000 TABLET ORAL ONCE
Qty: 0 | Refills: 0 | Status: COMPLETED | OUTPATIENT
Start: 2018-02-16 | End: 2018-02-16

## 2018-02-16 RX ADMIN — Medication 3 MILLILITER(S): at 22:15

## 2018-02-16 RX ADMIN — SODIUM CHLORIDE 500 MILLILITER(S): 9 INJECTION INTRAMUSCULAR; INTRAVENOUS; SUBCUTANEOUS at 22:45

## 2018-02-16 RX ADMIN — Medication 400 MILLIGRAM(S): at 23:49

## 2018-02-16 RX ADMIN — Medication 125 MILLIGRAM(S): at 22:15

## 2018-02-16 NOTE — ED PROVIDER NOTE - ATTENDING CONTRIBUTION TO CARE
Patient is a 66 yo F with hx of COPD, HTN, extensive surgical history, hx of PE, borderline pulmonary HTN here for progressive shortness of breath x 1 month. Patient reports she has had persistent shortness of breath since mid-January after developing URI symptoms. She was started on a course of prednisone on 2/6/18 and also completed a 5 day course of azithromycin without any improvement in symptoms. Patient states her doctor evaluated her for PE. + lower extremity edema. She reports dyspnea with minimal exertion. She has been skipping doses of her diuretics. Denies fevers, chills, chest pain. + cough. No n/v/d.   VS noted  Gen. no acute distress, Non toxic   HEENT: EOMI, mmm,   Lungs: diffuse expiratory wheezing  CVS: RRR   Abd; Soft non tender, non distended   Ext: b/l LE edema  Skin: no rash  Neuro alert, non focal clear speech  a/p: wheezing, cough, likely copd exacerbation - failing outpatient treatment. Will check labs including cbc, cmp, probnp, ekg, CXR. Will treat with nebs/ steroids. Likely admission.   - Tracee BARBER

## 2018-02-16 NOTE — ED PROVIDER NOTE - MEDICAL DECISION MAKING DETAILS
68 yo F with h/o COPD (not on home O2, no h/o exacerbations), HTN, perforated diverticulitis s/p exlap, R colectomy, small bowel resection, and Chacko procedure (12/2016) with reversal (9/2017), PE (2016, completed tx with Lovenox), borderline pulm HTN, and chronic b/l LE edema presents with progressive SOB x 1 month.

## 2018-02-16 NOTE — ED ADULT TRIAGE NOTE - CHIEF COMPLAINT QUOTE
Pt's lung doctor Dr. Perales put her on Prednisone for "breathing problem," finished one week ago, pt went to PCP today for a follow-up, PCP sent pt in today for IV Prednisone. Pt currently c/o SOB.

## 2018-02-16 NOTE — ED ADULT NURSE NOTE - OBJECTIVE STATEMENT
67y female with hx of diverticulitis and COPD presents to the ER from PCP for shortness of breath. pt is alert and oriented x 4 and speaking coherently. PT states she had a "copd attack" last week. pt put on po steroids and told to come to ER since breath sounds were still decreased. pt in NAD. Pt states she is sob with walking. pt vss. Denies cp, n/v/d. will reassess.

## 2018-02-16 NOTE — ED PROVIDER NOTE - FAMILY HISTORY
Family history of COPD (chronic obstructive pulmonary disease)     Family history of chronic kidney disease     Sibling  Still living? Unknown  Family history of hiatal hernia, Age at diagnosis: Age Unknown

## 2018-02-16 NOTE — ED PROVIDER NOTE - OBJECTIVE STATEMENT
68 yo F with h/o COPD (not on home O2, no h/o exacerbations), HTN, perforated diverticulitis s/p exlap, R colectomy, small bowel resection, and Chacko procedure (12/2016) with reversal (9/2017), PE (2016, completed tx with Lovenox), borderline pulm HTN, and chronic b/l LE edema presents with progressive SOB x 1 month. Pt states that in mid-January, she developed URI symptoms 2/2 influenza. Since then, pt has been having persistent SOB and has been following with a pulmonologist, Dr. Stacie Perales. Was placed on 4 days of Prednisone 40 on 2/6/18 without improvement of her dyspnea, so was also started on 5-day course of Z-pack. Also had PFTs that showed a decline in her FEV1/FVC ratio (55%). Was r/o for PE with insignificantly elevated D-dimer. Pt states that over the past 1 month, she has been getting short of breath with minimal exertion (walking from bed to bathroom in her house) and has needed to use her rescue inhaler every 2 hours. Prior to getting the flu in January, never needed an inhaler. Also endorses rhinorrhea, nonproductive cough, and chest congestion since her flu diagnosis. Has also been having worsening of her chronic b/l LE edema, as pt has been skipping doses of her diuretics due to difficulty getting out of bed to take her meds as scheduled. Denies any recent F/C, CP, palpitations, N/V, diarrhea, or urinary symptoms. No known sick contacts or recent travel. 66 yo F with h/o COPD (not on home O2, no h/o exacerbations), HTN, perforated diverticulitis s/p exlap, R colectomy, small bowel resection, and Chacko procedure (12/2016) with reversal (9/2017), PE (2016, completed tx with Lovenox), borderline pulm HTN, and chronic b/l LE edema presents with progressive SOB x 1 month. Pt states that in mid-January, she developed URI symptoms 2/2 influenza. Since then, pt has been having persistent SOB and has been following with a pulmonologist, Dr. Stacie Perales. Was placed on 4 days of Prednisone 40 on 2/6/18 without improvement of her dyspnea, so was also started on 5-day course of Z-pack. Also had PFTs that showed a decline in her FEV1/FVC ratio (55%). Was r/o for PE with insignificantly elevated D-dimer. Pt states that over the past 1 month, she has been getting short of breath with minimal exertion (walking from bed to bathroom in her house) and has needed to use her rescue inhaler every 2 hours. Prior to getting the flu in January, never needed an inhaler. Also endorses rhinorrhea, nonproductive cough, and chest congestion since her flu diagnosis. Has also been having worsening of her chronic b/l LE edema, as pt has been skipping doses of her diuretics due to difficulty getting out of bed to take her meds as scheduled. Denies any recent F/C, CP, palpitations, N/V, diarrhea, or urinary symptoms. No known sick contacts or recent travel.  Pulmonologist: Dr. Stacie Perales  PCP: Dr. Tiny Guadarrama

## 2018-02-17 DIAGNOSIS — Z29.9 ENCOUNTER FOR PROPHYLACTIC MEASURES, UNSPECIFIED: ICD-10-CM

## 2018-02-17 DIAGNOSIS — M19.91 PRIMARY OSTEOARTHRITIS, UNSPECIFIED SITE: ICD-10-CM

## 2018-02-17 DIAGNOSIS — I50.32 CHRONIC DIASTOLIC (CONGESTIVE) HEART FAILURE: ICD-10-CM

## 2018-02-17 DIAGNOSIS — R10.84 GENERALIZED ABDOMINAL PAIN: ICD-10-CM

## 2018-02-17 DIAGNOSIS — R06.09 OTHER FORMS OF DYSPNEA: ICD-10-CM

## 2018-02-17 DIAGNOSIS — J44.1 CHRONIC OBSTRUCTIVE PULMONARY DISEASE WITH (ACUTE) EXACERBATION: ICD-10-CM

## 2018-02-17 LAB
HBA1C BLD-MCNC: 8.8 % — HIGH (ref 4–5.6)
RAPID RVP RESULT: SIGNIFICANT CHANGE UP

## 2018-02-17 PROCEDURE — 12345: CPT | Mod: NC

## 2018-02-17 PROCEDURE — 99223 1ST HOSP IP/OBS HIGH 75: CPT

## 2018-02-17 RX ORDER — ALBUTEROL 90 UG/1
2 AEROSOL, METERED ORAL EVERY 4 HOURS
Qty: 0 | Refills: 0 | Status: DISCONTINUED | OUTPATIENT
Start: 2018-02-17 | End: 2018-02-17

## 2018-02-17 RX ORDER — DEXTROSE 50 % IN WATER 50 %
25 SYRINGE (ML) INTRAVENOUS ONCE
Qty: 0 | Refills: 0 | Status: DISCONTINUED | OUTPATIENT
Start: 2018-02-17 | End: 2018-02-22

## 2018-02-17 RX ORDER — ALBUTEROL 90 UG/1
2.5 AEROSOL, METERED ORAL EVERY 6 HOURS
Qty: 0 | Refills: 0 | Status: DISCONTINUED | OUTPATIENT
Start: 2018-02-17 | End: 2018-02-22

## 2018-02-17 RX ORDER — ASPIRIN/CALCIUM CARB/MAGNESIUM 324 MG
81 TABLET ORAL DAILY
Qty: 0 | Refills: 0 | Status: DISCONTINUED | OUTPATIENT
Start: 2018-02-17 | End: 2018-02-22

## 2018-02-17 RX ORDER — MOMETASONE FUROATE AND FORMOTEROL FUMARATE DIHYDRATE 200; 5 UG/1; UG/1
2 AEROSOL RESPIRATORY (INHALATION)
Qty: 0 | Refills: 0 | Status: DISCONTINUED | OUTPATIENT
Start: 2018-02-17 | End: 2018-02-22

## 2018-02-17 RX ORDER — METOPROLOL TARTRATE 50 MG
25 TABLET ORAL
Qty: 0 | Refills: 0 | Status: DISCONTINUED | OUTPATIENT
Start: 2018-02-17 | End: 2018-02-22

## 2018-02-17 RX ORDER — IPRATROPIUM/ALBUTEROL SULFATE 18-103MCG
3 AEROSOL WITH ADAPTER (GRAM) INHALATION EVERY 6 HOURS
Qty: 0 | Refills: 0 | Status: DISCONTINUED | OUTPATIENT
Start: 2018-02-17 | End: 2018-02-17

## 2018-02-17 RX ORDER — IBUPROFEN 200 MG
400 TABLET ORAL DAILY
Qty: 0 | Refills: 0 | Status: DISCONTINUED | OUTPATIENT
Start: 2018-02-17 | End: 2018-02-18

## 2018-02-17 RX ORDER — INSULIN LISPRO 100/ML
VIAL (ML) SUBCUTANEOUS
Qty: 0 | Refills: 0 | Status: DISCONTINUED | OUTPATIENT
Start: 2018-02-17 | End: 2018-02-18

## 2018-02-17 RX ORDER — SODIUM CHLORIDE 9 MG/ML
1000 INJECTION, SOLUTION INTRAVENOUS
Qty: 0 | Refills: 0 | Status: DISCONTINUED | OUTPATIENT
Start: 2018-02-17 | End: 2018-02-22

## 2018-02-17 RX ORDER — FUROSEMIDE 40 MG
40 TABLET ORAL DAILY
Qty: 0 | Refills: 0 | Status: DISCONTINUED | OUTPATIENT
Start: 2018-02-17 | End: 2018-02-19

## 2018-02-17 RX ORDER — ACETAMINOPHEN 500 MG
650 TABLET ORAL EVERY 6 HOURS
Qty: 0 | Refills: 0 | Status: DISCONTINUED | OUTPATIENT
Start: 2018-02-17 | End: 2018-02-22

## 2018-02-17 RX ORDER — GLUCAGON INJECTION, SOLUTION 0.5 MG/.1ML
1 INJECTION, SOLUTION SUBCUTANEOUS ONCE
Qty: 0 | Refills: 0 | Status: DISCONTINUED | OUTPATIENT
Start: 2018-02-17 | End: 2018-02-22

## 2018-02-17 RX ORDER — DEXTROSE 50 % IN WATER 50 %
1 SYRINGE (ML) INTRAVENOUS ONCE
Qty: 0 | Refills: 0 | Status: DISCONTINUED | OUTPATIENT
Start: 2018-02-17 | End: 2018-02-22

## 2018-02-17 RX ORDER — LOSARTAN POTASSIUM 100 MG/1
25 TABLET, FILM COATED ORAL DAILY
Qty: 0 | Refills: 0 | Status: DISCONTINUED | OUTPATIENT
Start: 2018-02-17 | End: 2018-02-18

## 2018-02-17 RX ORDER — ALBUTEROL 90 UG/1
2 AEROSOL, METERED ORAL EVERY 4 HOURS
Qty: 0 | Refills: 0 | Status: COMPLETED | OUTPATIENT
Start: 2018-02-17 | End: 2019-01-16

## 2018-02-17 RX ORDER — PANTOPRAZOLE SODIUM 20 MG/1
40 TABLET, DELAYED RELEASE ORAL
Qty: 0 | Refills: 0 | Status: DISCONTINUED | OUTPATIENT
Start: 2018-02-17 | End: 2018-02-22

## 2018-02-17 RX ORDER — SACCHAROMYCES BOULARDII 250 MG
1 POWDER IN PACKET (EA) ORAL
Qty: 0 | Refills: 0 | COMMUNITY

## 2018-02-17 RX ORDER — DEXTROSE 50 % IN WATER 50 %
12.5 SYRINGE (ML) INTRAVENOUS ONCE
Qty: 0 | Refills: 0 | Status: DISCONTINUED | OUTPATIENT
Start: 2018-02-17 | End: 2018-02-22

## 2018-02-17 RX ORDER — ACLIDINIUM BROMIDE 400 UG/1
1 POWDER, METERED RESPIRATORY (INHALATION)
Qty: 0 | Refills: 0 | Status: DISCONTINUED | OUTPATIENT
Start: 2018-02-17 | End: 2018-02-22

## 2018-02-17 RX ORDER — INSULIN LISPRO 100/ML
VIAL (ML) SUBCUTANEOUS AT BEDTIME
Qty: 0 | Refills: 0 | Status: DISCONTINUED | OUTPATIENT
Start: 2018-02-17 | End: 2018-02-19

## 2018-02-17 RX ORDER — HEPARIN SODIUM 5000 [USP'U]/ML
5000 INJECTION INTRAVENOUS; SUBCUTANEOUS EVERY 8 HOURS
Qty: 0 | Refills: 0 | Status: DISCONTINUED | OUTPATIENT
Start: 2018-02-17 | End: 2018-02-22

## 2018-02-17 RX ADMIN — Medication 25 MILLIGRAM(S): at 17:46

## 2018-02-17 RX ADMIN — HEPARIN SODIUM 5000 UNIT(S): 5000 INJECTION INTRAVENOUS; SUBCUTANEOUS at 06:27

## 2018-02-17 RX ADMIN — Medication 400 MILLIGRAM(S): at 06:37

## 2018-02-17 RX ADMIN — HEPARIN SODIUM 5000 UNIT(S): 5000 INJECTION INTRAVENOUS; SUBCUTANEOUS at 21:21

## 2018-02-17 RX ADMIN — LOSARTAN POTASSIUM 25 MILLIGRAM(S): 100 TABLET, FILM COATED ORAL at 06:25

## 2018-02-17 RX ADMIN — Medication 5: at 17:52

## 2018-02-17 RX ADMIN — PANTOPRAZOLE SODIUM 40 MILLIGRAM(S): 20 TABLET, DELAYED RELEASE ORAL at 06:26

## 2018-02-17 RX ADMIN — Medication 20 MILLIGRAM(S): at 06:27

## 2018-02-17 RX ADMIN — MOMETASONE FUROATE AND FORMOTEROL FUMARATE DIHYDRATE 2 PUFF(S): 200; 5 AEROSOL RESPIRATORY (INHALATION) at 06:34

## 2018-02-17 RX ADMIN — Medication 81 MILLIGRAM(S): at 12:37

## 2018-02-17 RX ADMIN — Medication 40 MILLIGRAM(S): at 06:25

## 2018-02-17 RX ADMIN — Medication 650 MILLIGRAM(S): at 21:43

## 2018-02-17 RX ADMIN — Medication 400 MILLIGRAM(S): at 07:10

## 2018-02-17 RX ADMIN — Medication 20 MILLIGRAM(S): at 21:14

## 2018-02-17 RX ADMIN — Medication 650 MILLIGRAM(S): at 22:08

## 2018-02-17 RX ADMIN — ACLIDINIUM BROMIDE 1 PUFF(S): 400 POWDER, METERED RESPIRATORY (INHALATION) at 06:33

## 2018-02-17 RX ADMIN — ACLIDINIUM BROMIDE 1 PUFF(S): 400 POWDER, METERED RESPIRATORY (INHALATION) at 17:47

## 2018-02-17 RX ADMIN — Medication 25 MILLIGRAM(S): at 06:26

## 2018-02-17 RX ADMIN — ALBUTEROL 2.5 MILLIGRAM(S): 90 AEROSOL, METERED ORAL at 11:54

## 2018-02-17 RX ADMIN — ALBUTEROL 2.5 MILLIGRAM(S): 90 AEROSOL, METERED ORAL at 17:46

## 2018-02-17 RX ADMIN — MOMETASONE FUROATE AND FORMOTEROL FUMARATE DIHYDRATE 2 PUFF(S): 200; 5 AEROSOL RESPIRATORY (INHALATION) at 17:47

## 2018-02-17 RX ADMIN — HEPARIN SODIUM 5000 UNIT(S): 5000 INJECTION INTRAVENOUS; SUBCUTANEOUS at 14:06

## 2018-02-17 RX ADMIN — Medication 5: at 12:36

## 2018-02-17 RX ADMIN — Medication 2: at 22:13

## 2018-02-17 RX ADMIN — Medication 20 MILLIGRAM(S): at 14:06

## 2018-02-17 NOTE — CHART NOTE - NSCHARTNOTEFT_GEN_A_CORE
Pt seen at bedside. For full assessment and plan please refer to H/P from earlier today.  Briefly, this is a 67 F pmh COPD (not on home O2), HTN, perforated diverticulitis s/p exlap, R colectomy, small bowel resection, and Chacko procedure (12/2016) with reversal (9/2017), PE (2016 provoked, prev on Xarelto and completed tx with Lovenox), and chronic b/l LE edema presents with progressive SOB in setting of recent Flu infection and failure of oral steroids admitted for COPD exacerbation  - Continuing with IV Solumedrol, Nebulizers Q6, Tudorza  - Pt was refusing insulin this am, FS elevated in 300's. Likely steroid induced hyperglycemia although pt with type two DM, not on any medications currently. Pt counseled on importance of insulin usage  - c/w Lasix and metolazone, check daily weight.   - Should breathing not be improved with above measure would check CTA given h/o provoked PE in the past  - Pulm f/u

## 2018-02-17 NOTE — H&P ADULT - HISTORY OF PRESENT ILLNESS
67 F pmh COPD (not on home O2), HTN, perforated diverticulitis s/p exlap, R colectomy, small bowel resection, and Chacko procedure (12/2016) with reversal (9/2017), PE (2016 provoked, prev on xarelto and completed tx with Lovenox), and chronic b/l LE edema presents with progressive SOB x 1 month.    Pt last admitted in September 2017 for sob/fatigue, during which she had her Chacko procedure reversal. Multiple complications included guaiac + anemia with subsequent negative sigmoidoscopy study, ongoing SOB/edema managed both as chf exacerbation (but with normal LV fnx on echo) as well as possibly copd exac, hypotensive episodes, and ALVERTO.     VS: 98.1, 82, 168/61, 20, 98% RA.  Labs: mild leukocytosis (10.6), chronic stable anemia, stable ckd2, CE neg x 1, and elevated probnp (524) at baseline. VBG shows normal pH with no sig hypercapnia.  CXR prelim shows no emergent findings. Received tylenol, nebs, solumedrol 125 in ER. 67 F pmh COPD (not on home O2), HTN, perforated diverticulitis s/p exlap, R colectomy, small bowel resection, and Chacko procedure (12/2016) with reversal (9/2017), PE (2016 provoked, prev on xarelto and completed tx with Lovenox), and chronic b/l LE edema presents with progressive SOB x 1 month. About 1 month ago, patient had a "chest cold" 2/2 viral uri.  After a few days, her sx worsened, and she was eventually diagnosed with the flu.  She states during this time, she was largely bedridden and very SOB; as a result was very noncompliant with her water pills. Sx persisted, and on 2/6/18 saw her o/p pulmonologist, who started her on a 5 day course of prednisone 40. 2 days later, she was still not feeling well, and she as given 5 day course of Z-jacek, which she finished. During this time, she was noted to have a worsening compared to baseline of her PFTs, with strong improvement after bronchodilater administration. Currently, patient has had a persistent cough, occasionally productive of whitish/yellow sputum. +abd pain in setting of cough; feels there is a lump in her stomach worse with coughing. +LE edema/orthopnea. SOB is constant at rest but worse with movement. Denies CP/f/c/n/v/d/dysuria.  Due to persistent sx, pt was referred to ER by her pulmonologist for IV steroids.     Pt last admitted in September 2017 for sob/fatigue, during which she had her Chacko procedure reversal. Multiple complications included guaiac + anemia with subsequent negative sigmoidoscopy study, ongoing SOB/edema managed both as chf exacerbation (but with normal LV fnx on echo) as well as possibly copd exac, hypotensive episodes, and ALVERTO.     VS: 98.1, 82, 168/61, 20, 98% RA.  Labs: mild leukocytosis (10.6), chronic stable anemia, stable ckd2, CE neg x 1, and elevated probnp (524) at baseline. VBG shows normal pH with no sig hypercapnia.  CXR prelim shows no emergent findings. Received tylenol, nebs, solumedrol 125 in ER. 67 F pmh COPD (not on home O2), HTN, perforated diverticulitis s/p exlap, R colectomy, small bowel resection, and Chacko procedure (12/2016) with reversal (9/2017), PE (2016 provoked, prev on xarelto and completed tx with Lovenox), and chronic b/l LE edema presents with progressive SOB x 1 month. About 1 month ago, patient had a "chest cold" 2/2 viral uri.  After a few days, her sx worsened, and she was eventually diagnosed with the flu.  She states during this time, she was largely bedridden and very SOB; as a result was very noncompliant with her water pills. Sx persisted, and on 2/6/18 saw her o/p pulmonologist, who started her on a 5 day course of prednisone 40. 2 days later, she was still not feeling well, and she as given 5 day course of Z-jacek, which she finished. During this time, she was noted to have a worsening compared to baseline of her PFTs, with strong improvement after bronchodilater administration. Currently, patient has had a persistent cough, occasionally productive of whitish/yellow sputum. +abd pain in setting of cough; feels there is a lump in her stomach worse with coughing. +LE edema/orthopnea. SOB is constant at rest but worse with movement. Denies CP/f/c/n/v/d/dysuria.  Due to persistent sx, pt was referred to ER by her pulmonologist for IV steroids.     Pt last admitted in September 2017 for sob/fatigue, during which she had her Chacko procedure reversal. Multiple complications included guaiac + anemia with subsequent negative sigmoidoscopy study, ongoing SOB/edema managed both as chf exacerbation (but with normal LV fnx on echo) as well as possibly copd exac, hypotensive episodes, and ALVERTO.     VS: 98.1, 82, 168/61, 20, 98% RA.  Labs: mild leukocytosis (10.6), chronic stable anemia, stable ckd2, CE neg x 1, and elevated probnp (524) at baseline. VBG shows normal pH with no sig hypercapnia. RVP negative. CXR prelim shows no emergent findings. Received tylenol, nebs, solumedrol 125 in ER.

## 2018-02-17 NOTE — H&P ADULT - PROBLEM SELECTOR PLAN 1
-Pt with cough, increased sputum, poor air entry b/l on exam, with antecedent viral uri, and interval worsening of o/p PFT with +bronchodilator response.   -However, VBG without acidosis or elevated hypercapnia  -s/p solumedrol 125 x 1; c/w solumedrol 20 q8  -duonebs atc q4 hours  -holding tudorza 2/2 atc short acting ipatropium  -c/w dulera or formulary equivalent  -s/p 5 days Z-jacek; CXR clear; afebrile/hemodyanamically stable/leukocytosis 2/2 steroids, will hold further abx  -Pulm eval in am -Pt with cough, increased sputum, poor air entry b/l on exam, with antecedent viral uri, and interval worsening of o/p PFT with +bronchodilator response.   -However, VBG without acidosis or elevated hypercapnia  -s/p solumedrol 125 x 1; c/w solumedrol 20 q8  -pt with documented intolerance to spiriva; will avoid duonebs then, start albuterol nebs atc q4 hours  -c/w tudorza for long acting anticholinergic tx (pt has own med)  -c/w dulera or formulary equivalent  -s/p 5 days Z-jacek; CXR clear; afebrile/hemodyanamically stable/leukocytosis 2/2 steroids, will hold further abx  -Pulm eval in am

## 2018-02-17 NOTE — H&P ADULT - NSHPREVIEWOFSYSTEMS_GEN_ALL_CORE
REVIEW OF SYSTEMS:  CONSTITUTIONAL: No weakness. No fevers. No chills. No weight loss. Good appetite.  EYES: No visual changes. No eye pain.  ENT: No hearing difficulty. No vertigo. No dysphagia. No Sinusitis/rhinorrhea.  NECK: No pain. No stiffness/rigidity.  CARDIAC: No chest pain. No palpitations.  RESPIRATORY: No cough. No SOB. No hemoptysis.  GASTROINTESTINAL: No abdominal pain. No nausea. No vomiting. No hematemesis. No diarrhea. No constipation. No melena. No hematochezia.  GENITOURINARY: No dysuria. No frequency. No hesitancy. No hematuria.  NEUROLOGICAL: No numbness. No focal weakness. No incontinence. No headache.  BACK: No back pain.  EXTREMITIES: No lower extremity edema. Full ROM.  SKIN: No rashes. No itching. No other lesions.  PSYCHIATRIC: No depression. No anxiety. No SI/HI.  ALLERGIC: No lip swelling. No hives.  All other review of systems is negative unless indicated above.  [  ] Unable to assess ROS because REVIEW OF SYSTEMS:  CONSTITUTIONAL: +weakness. No fevers. No chills. No weight loss. Good appetite.  EYES: No visual changes. No eye pain.  ENT: No hearing difficulty. No vertigo. No dysphagia. No Sinusitis/rhinorrhea.  NECK: No pain. No stiffness/rigidity.  CARDIAC: No chest pain. No palpitations.  RESPIRATORY: +cough. +SOB. No hemoptysis.  GASTROINTESTINAL: +abdominal pain. No nausea. No vomiting. No hematemesis. No diarrhea. No constipation. No melena. No hematochezia.  GENITOURINARY: No dysuria. No frequency. No hesitancy. No hematuria.  NEUROLOGICAL: No numbness. No focal weakness. No incontinence. No headache.  BACK: No back pain.  EXTREMITIES: +lower extremity edema. Full ROM.  SKIN: No rashes. No itching. No other lesions.  PSYCHIATRIC: No depression. No anxiety. No SI/HI.  ALLERGIC: No lip swelling. No hives.  All other review of systems is negative unless indicated above.

## 2018-02-17 NOTE — H&P ADULT - NSHPLABSRESULTS_GEN_ALL_CORE
Labs personally reviewed mild leukocytosis (10.6), chronic stable anemia, stable ckd2, CE neg x 1, and elevated probnp (524) at baseline. VBG shows normal pH with no sig hypercapnia.      Imaging personally reviewed CXR prelim shows no emergent findings.    EKG personally reviewed NSR no overt ischemia Labs personally reviewed mild leukocytosis (10.6), chronic stable anemia, stable ckd2, CE neg x 1, and elevated probnp (524) at baseline. VBG shows normal pH with no sig hypercapnia.   RVP negative    Imaging personally reviewed CXR prelim shows no emergent findings.    EKG personally reviewed NSR no overt ischemia

## 2018-02-17 NOTE — PROGRESS NOTE ADULT - SUBJECTIVE AND OBJECTIVE BOX
PULMONARY PROGRESS NOTE    RACHANA BARGER  MRN-[id number]    Patient is a 67y old  Female who presents with a chief complaint of sob (17 Feb 2018 01:23)      HPI:  -sob, chest tightness, resolved yesterday with nebulizers and steroids.  today  with productive cough and chest tightness.      ROS:   - abdominal discomfort in area of previous surgery      ACTIVE MEDICATION LIST:  MEDICATIONS  (STANDING):  aclidinium 400 MICROgram Inhaler 1 Puff(s) Inhalation two times a day  ALBUTerol    0.083% 2.5 milliGRAM(s) Nebulizer every 6 hours  aspirin enteric coated 81 milliGRAM(s) Oral daily  dextrose 5%. 1000 milliLiter(s) (50 mL/Hr) IV Continuous <Continuous>  dextrose 50% Injectable 12.5 Gram(s) IV Push once  dextrose 50% Injectable 25 Gram(s) IV Push once  dextrose 50% Injectable 25 Gram(s) IV Push once  furosemide    Tablet 40 milliGRAM(s) Oral daily  heparin  Injectable 5000 Unit(s) SubCutaneous every 8 hours  insulin lispro (HumaLOG) corrective regimen sliding scale   SubCutaneous three times a day before meals  insulin lispro (HumaLOG) corrective regimen sliding scale   SubCutaneous at bedtime  losartan 25 milliGRAM(s) Oral daily  methylPREDNISolone sodium succinate Injectable 20 milliGRAM(s) IV Push every 8 hours  metolazone 5 milliGRAM(s) Oral daily  metoprolol     tartrate 25 milliGRAM(s) Oral two times a day  mometasone 200 MICROgram(s)/formoterol 5 MICROgram(s) Inhaler 2 Puff(s) Inhalation two times a day  pantoprazole    Tablet 40 milliGRAM(s) Oral before breakfast    MEDICATIONS  (PRN):  acetaminophen   Tablet. 650 milliGRAM(s) Oral every 6 hours PRN Mild Pain (1 - 3)  dextrose Gel 1 Dose(s) Oral once PRN Blood Glucose LESS THAN 70 milliGRAM(s)/deciliter  glucagon  Injectable 1 milliGRAM(s) IntraMuscular once PRN Glucose LESS THAN 70 milligrams/deciliter  guaiFENesin    Syrup 200 milliGRAM(s) Oral every 6 hours PRN Cough  ibuprofen  Tablet 400 milliGRAM(s) Oral daily PRN moderate or severe pain      EXAM:  Vital Signs Last 24 Hrs  T(C): 36.5 (17 Feb 2018 06:20), Max: 37.1 (16 Feb 2018 20:02)  T(F): 97.7 (17 Feb 2018 06:20), Max: 98.8 (16 Feb 2018 20:02)  HR: 68 (17 Feb 2018 06:20) (68 - 88)  BP: 154/76 (17 Feb 2018 06:20) (151/73 - 170/67)  BP(mean): --  RR: 18 (17 Feb 2018 06:20) (18 - 22)  SpO2: 97% (17 Feb 2018 06:20) (96% - 98%)    GENERAL: The patient is awake and alert in no apparent distress.     SKIN: Warm, dry, no rashes    LUNGS: decreased bs, faint exp wheeze    HEART: Regular rate and rhythm without murmur.    ABDOMEN: +BS, Soft    EXTREMITIES: No clubbing, cyanosis    LABS/IMGAING: reviewed                          9.9    10.6  )-----------( 346      ( 16 Feb 2018 21:46 )             31.2     02-16    139  |  102  |  13    < end of copied text >  --------------------------<  231<H>  4.2   |  24  |  0.92    Ca    9.6      16 Feb 2018 21:46    TPro  6.6  /  Alb  3.5  /  TBili  0.2  /  DBili  x   /  AST  21  /  ALT  37  /  AlkPhos  98  02-16    --< from: Xray Chest 1 View AP/PA (02.16.18 @ 22:30) >  IMPRESSION:   Clear lungs.      PROBLEM LIST:  67y Female with HEALTH ISSUES - PROBLEM Dx:  COPD exacerbation  history of PE  Primary osteoarthritis  Generalized abdominal pain  Chronic diastolic heart failure    RECS:  -COPD exacerbation: continue IV solumedrol, tudorza, dulera and albuterol nebs Q6  -Hx of PE: if breathing does not improve could consider cta given hx of provoked PE in the past.  -Surgical follow up for abdominal pain at previous surgical site, hernia?      Stacie Perales MD  [phone]

## 2018-02-17 NOTE — H&P ADULT - NSHPPHYSICALEXAM_GEN_ALL_CORE
PHYSICAL EXAM:  GENERAL: NAD, well-groomed, well-developed  HEAD:  Atraumatic, Normocephalic  EYES: EOMI, PERRLA, conjunctiva and sclera clear  ENMT: No tonsillar erythema, exudates, or enlargement; Moist mucous membranes, Good dentition, No lesions  NECK: Supple, No JVD, Normal thyroid  CHEST/LUNG: poor air entry b/l, no overt wheezing currently.  no crackles.  no accessory muscle usage, talking in full sentences at rest with mild distress while moving around  HEART: Regular rate and rhythm; No murmurs, rubs, or gallops 2+ b/l Le edema  ABDOMEN: Soft, Nontender, Nondistended; Bowel sounds present  EXTREMITIES:  2+ Peripheral Pulses, No clubbing, cyanosis  LYMPH: No lymphadenopathy noted  SKIN: No rashes or lesions  NERVOUS SYSTEM:  Alert & Oriented X3, Good concentration; Motor Strength 5/5 B/L upper and lower extremities

## 2018-02-17 NOTE — H&P ADULT - ATTENDING COMMENTS
Case endorsed to NP at  Care to be assumed by day hospitalist in am  This patient was assigned to me by the hospitalist in charge; my involvement in this case has consisted of the initial history, physical, chart review, and management plan.  This patient was previously unknown to me. Case endorsed to [place] [id number] [address] at 315 am  Care to be assumed by day hospitalist in am  This patient was assigned to me by the hospitalist in charge; my involvement in this case has consisted of the initial history, physical, chart review, and management plan.  This patient was previously unknown to me.

## 2018-02-17 NOTE — H&P ADULT - NSHPSOCIALHISTORY_GEN_ALL_CORE
Social History:    Marital Status:  (   )    (   ) Single    (   )    ( x )   Occupation: currently works as , previously was special   Lives with: ( x ) alone  (  ) children   (  ) spouse   (  ) parents  (  ) other    Substance Use (street drugs): (x  ) never used  (  ) other:  Tobacco Usage:  (   ) never smoked   ( x  ) former smoker   (   ) current smoker  (   30  ) pack year  (        ) last cigarette date  Alcohol Usage: denies

## 2018-02-17 NOTE — H&P ADULT - PROBLEM SELECTOR PLAN 2
-Pt with likely mildly decompensated dCHF; worsening LE edema and orthopnea in setting of medication nonadherence  -resume Lasix 40 PO qd and metolazone 5 qd; aim to keep net negative and titrate as needed  -Check TTE

## 2018-02-17 NOTE — H&P ADULT - PROBLEM SELECTOR PLAN 5
-pt reports she is taking ibuprofen 600 qhs daily for many weeks  -I explained to her the negative side effects, including but not limted to gastritis/pud/bleeding and renal dysfunction.  She is aware of this and says she has her labs frequently monitored for this; her ibuprofen is the only thing that is helping her with her arthritis. Given ckd is at baseline, anemia is at baseline, and no BUN elevation suggestive of UGIB, will continue with PRN once daily ibuprofen for OA.

## 2018-02-17 NOTE — H&P ADULT - PROBLEM SELECTOR PLAN 4
-pt says worse with coughing; suspect msk etiology related to cough but given extensive abdominal surgery concerned for possible intraabd process vs. hernia  -Will start with abdominal XR to evaluate pain and to r/o free air in setting of frequent coughing, mildly +lactate. Can consider CT a/p if pain persists. Belly is currently mildly TTP over lower quadrants.

## 2018-02-17 NOTE — H&P ADULT - PROBLEM SELECTOR PLAN 3
-Likely multifactorial, with copd exac + mild decompensated dCHF +/- obesity/deconditioning  -differential also includes PE especially given prior hx; however, currently patient is not significantly hypoxic and not tachycardic, with alternate diagnoses accounting for her worsening sob  -If patient does not improve with above outlined treatment, would consider repeat CTA chest to r/o PE and further eval pulmonary parenchyma in setting of progressive dyspnea

## 2018-02-18 DIAGNOSIS — E11.9 TYPE 2 DIABETES MELLITUS WITHOUT COMPLICATIONS: ICD-10-CM

## 2018-02-18 LAB
HBA1C BLD-MCNC: 8.9 % — HIGH (ref 4–5.6)
HCT VFR BLD CALC: 32.6 % — LOW (ref 34.5–45)
HGB BLD-MCNC: 10.1 G/DL — LOW (ref 11.5–15.5)
MCHC RBC-ENTMCNC: 25.3 PG — LOW (ref 27–34)
MCHC RBC-ENTMCNC: 31 GM/DL — LOW (ref 32–36)
MCV RBC AUTO: 81.7 FL — SIGNIFICANT CHANGE UP (ref 80–100)
PLATELET # BLD AUTO: 414 K/UL — HIGH (ref 150–400)
RAPID RVP RESULT: SIGNIFICANT CHANGE UP
RBC # BLD: 3.99 M/UL — SIGNIFICANT CHANGE UP (ref 3.8–5.2)
RBC # FLD: 16.2 % — HIGH (ref 10.3–14.5)
WBC # BLD: 13.58 K/UL — HIGH (ref 3.8–10.5)
WBC # FLD AUTO: 13.58 K/UL — HIGH (ref 3.8–10.5)

## 2018-02-18 PROCEDURE — 93306 TTE W/DOPPLER COMPLETE: CPT | Mod: 26

## 2018-02-18 PROCEDURE — 74019 RADEX ABDOMEN 2 VIEWS: CPT | Mod: 26

## 2018-02-18 PROCEDURE — 99233 SBSQ HOSP IP/OBS HIGH 50: CPT

## 2018-02-18 RX ORDER — IBUPROFEN 200 MG
400 TABLET ORAL EVERY 6 HOURS
Qty: 0 | Refills: 0 | Status: DISCONTINUED | OUTPATIENT
Start: 2018-02-18 | End: 2018-02-22

## 2018-02-18 RX ORDER — FLUTICASONE PROPIONATE 220 MCG
1 AEROSOL WITH ADAPTER (GRAM) INHALATION
Qty: 0 | Refills: 0 | Status: DISCONTINUED | OUTPATIENT
Start: 2018-02-18 | End: 2018-02-22

## 2018-02-18 RX ORDER — DORNASE ALFA 1 MG/ML
2.5 SOLUTION RESPIRATORY (INHALATION) DAILY
Qty: 0 | Refills: 0 | Status: DISCONTINUED | OUTPATIENT
Start: 2018-02-18 | End: 2018-02-20

## 2018-02-18 RX ORDER — INSULIN LISPRO 100/ML
6 VIAL (ML) SUBCUTANEOUS
Qty: 0 | Refills: 0 | Status: DISCONTINUED | OUTPATIENT
Start: 2018-02-18 | End: 2018-02-19

## 2018-02-18 RX ORDER — INSULIN GLARGINE 100 [IU]/ML
12 INJECTION, SOLUTION SUBCUTANEOUS EVERY MORNING
Qty: 0 | Refills: 0 | Status: DISCONTINUED | OUTPATIENT
Start: 2018-02-18 | End: 2018-02-19

## 2018-02-18 RX ORDER — INSULIN LISPRO 100/ML
VIAL (ML) SUBCUTANEOUS
Qty: 0 | Refills: 0 | Status: DISCONTINUED | OUTPATIENT
Start: 2018-02-18 | End: 2018-02-22

## 2018-02-18 RX ORDER — ATORVASTATIN CALCIUM 80 MG/1
20 TABLET, FILM COATED ORAL DAILY
Qty: 0 | Refills: 0 | Status: DISCONTINUED | OUTPATIENT
Start: 2018-02-18 | End: 2018-02-22

## 2018-02-18 RX ORDER — PSYLLIUM SEED (WITH DEXTROSE)
1 POWDER (GRAM) ORAL AT BEDTIME
Qty: 0 | Refills: 0 | Status: DISCONTINUED | OUTPATIENT
Start: 2018-02-18 | End: 2018-02-21

## 2018-02-18 RX ORDER — INSULIN GLARGINE 100 [IU]/ML
8 INJECTION, SOLUTION SUBCUTANEOUS EVERY MORNING
Qty: 0 | Refills: 0 | Status: DISCONTINUED | OUTPATIENT
Start: 2018-02-18 | End: 2018-02-18

## 2018-02-18 RX ORDER — LOSARTAN POTASSIUM 100 MG/1
25 TABLET, FILM COATED ORAL ONCE
Qty: 0 | Refills: 0 | Status: COMPLETED | OUTPATIENT
Start: 2018-02-18 | End: 2018-02-18

## 2018-02-18 RX ORDER — LOSARTAN POTASSIUM 100 MG/1
50 TABLET, FILM COATED ORAL DAILY
Qty: 0 | Refills: 0 | Status: DISCONTINUED | OUTPATIENT
Start: 2018-02-19 | End: 2018-02-22

## 2018-02-18 RX ADMIN — INSULIN GLARGINE 8 UNIT(S): 100 INJECTION, SOLUTION SUBCUTANEOUS at 09:19

## 2018-02-18 RX ADMIN — ACLIDINIUM BROMIDE 1 PUFF(S): 400 POWDER, METERED RESPIRATORY (INHALATION) at 05:39

## 2018-02-18 RX ADMIN — Medication 650 MILLIGRAM(S): at 23:35

## 2018-02-18 RX ADMIN — MOMETASONE FUROATE AND FORMOTEROL FUMARATE DIHYDRATE 2 PUFF(S): 200; 5 AEROSOL RESPIRATORY (INHALATION) at 05:39

## 2018-02-18 RX ADMIN — Medication 6 UNIT(S): at 17:50

## 2018-02-18 RX ADMIN — Medication 400 MILLIGRAM(S): at 20:48

## 2018-02-18 RX ADMIN — LOSARTAN POTASSIUM 25 MILLIGRAM(S): 100 TABLET, FILM COATED ORAL at 05:48

## 2018-02-18 RX ADMIN — Medication 20 MILLIGRAM(S): at 23:05

## 2018-02-18 RX ADMIN — ACLIDINIUM BROMIDE 1 PUFF(S): 400 POWDER, METERED RESPIRATORY (INHALATION) at 17:07

## 2018-02-18 RX ADMIN — Medication 25 MILLIGRAM(S): at 17:09

## 2018-02-18 RX ADMIN — Medication 25 MILLIGRAM(S): at 05:46

## 2018-02-18 RX ADMIN — HEPARIN SODIUM 5000 UNIT(S): 5000 INJECTION INTRAVENOUS; SUBCUTANEOUS at 12:17

## 2018-02-18 RX ADMIN — Medication 20 MILLIGRAM(S): at 17:09

## 2018-02-18 RX ADMIN — Medication 40 MILLIGRAM(S): at 06:21

## 2018-02-18 RX ADMIN — Medication 650 MILLIGRAM(S): at 23:05

## 2018-02-18 RX ADMIN — ALBUTEROL 2.5 MILLIGRAM(S): 90 AEROSOL, METERED ORAL at 12:16

## 2018-02-18 RX ADMIN — ALBUTEROL 2.5 MILLIGRAM(S): 90 AEROSOL, METERED ORAL at 00:19

## 2018-02-18 RX ADMIN — Medication 400 MILLIGRAM(S): at 01:00

## 2018-02-18 RX ADMIN — Medication 1 PUFF(S): at 17:09

## 2018-02-18 RX ADMIN — DORNASE ALFA 2.5 MILLIGRAM(S): 1 SOLUTION RESPIRATORY (INHALATION) at 13:20

## 2018-02-18 RX ADMIN — Medication 4: at 09:18

## 2018-02-18 RX ADMIN — Medication 650 MILLIGRAM(S): at 03:48

## 2018-02-18 RX ADMIN — Medication 4: at 22:02

## 2018-02-18 RX ADMIN — Medication 400 MILLIGRAM(S): at 00:17

## 2018-02-18 RX ADMIN — ALBUTEROL 2.5 MILLIGRAM(S): 90 AEROSOL, METERED ORAL at 05:53

## 2018-02-18 RX ADMIN — Medication 650 MILLIGRAM(S): at 18:11

## 2018-02-18 RX ADMIN — Medication 20 MILLIGRAM(S): at 05:43

## 2018-02-18 RX ADMIN — HEPARIN SODIUM 5000 UNIT(S): 5000 INJECTION INTRAVENOUS; SUBCUTANEOUS at 21:56

## 2018-02-18 RX ADMIN — ALBUTEROL 2.5 MILLIGRAM(S): 90 AEROSOL, METERED ORAL at 17:09

## 2018-02-18 RX ADMIN — Medication 650 MILLIGRAM(S): at 04:15

## 2018-02-18 RX ADMIN — MOMETASONE FUROATE AND FORMOTEROL FUMARATE DIHYDRATE 2 PUFF(S): 200; 5 AEROSOL RESPIRATORY (INHALATION) at 17:07

## 2018-02-18 RX ADMIN — LOSARTAN POTASSIUM 25 MILLIGRAM(S): 100 TABLET, FILM COATED ORAL at 12:45

## 2018-02-18 RX ADMIN — ALBUTEROL 2.5 MILLIGRAM(S): 90 AEROSOL, METERED ORAL at 23:05

## 2018-02-18 RX ADMIN — PANTOPRAZOLE SODIUM 40 MILLIGRAM(S): 20 TABLET, DELAYED RELEASE ORAL at 05:48

## 2018-02-18 RX ADMIN — Medication 4: at 17:50

## 2018-02-18 RX ADMIN — ATORVASTATIN CALCIUM 20 MILLIGRAM(S): 80 TABLET, FILM COATED ORAL at 12:16

## 2018-02-18 RX ADMIN — Medication: at 13:04

## 2018-02-18 RX ADMIN — Medication 650 MILLIGRAM(S): at 17:11

## 2018-02-18 RX ADMIN — HEPARIN SODIUM 5000 UNIT(S): 5000 INJECTION INTRAVENOUS; SUBCUTANEOUS at 05:48

## 2018-02-18 RX ADMIN — Medication 20 MILLIGRAM(S): at 12:16

## 2018-02-18 RX ADMIN — Medication 400 MILLIGRAM(S): at 20:18

## 2018-02-18 RX ADMIN — Medication 81 MILLIGRAM(S): at 12:16

## 2018-02-18 NOTE — PROGRESS NOTE ADULT - PROBLEM SELECTOR PLAN 6
HSQ -pt reports she is taking ibuprofen 600 qhs daily for many weeks  - Pt aware of side effects (PUD, worsening Kidney function)

## 2018-02-18 NOTE — PROGRESS NOTE ADULT - SUBJECTIVE AND OBJECTIVE BOX
PULMONARY PROGRESS NOTE    RACHANA BARGER  MRN-[id number]    Patient is a 67y old  Female who presents with a chief complaint of sob (17 Feb 2018 01:23)  Admitted with COPD exac. severe cough    HPI: Still with severe cough. Noted some SOB.   -Noted bulge in abd wall at site of prior surg  Headache noted from coughing  -  --  -    ACTIVE MEDICATION LIST:  MEDICATIONS  (STANDING):  aclidinium 400 MICROgram Inhaler 1 Puff(s) Inhalation two times a day  ALBUTerol    0.083% 2.5 milliGRAM(s) Nebulizer every 6 hours  aspirin enteric coated 81 milliGRAM(s) Oral daily  atorvastatin 20 milliGRAM(s) Oral daily  dextrose 5%. 1000 milliLiter(s) (50 mL/Hr) IV Continuous <Continuous>  dextrose 50% Injectable 12.5 Gram(s) IV Push once  dextrose 50% Injectable 25 Gram(s) IV Push once  dextrose 50% Injectable 25 Gram(s) IV Push once  dornase daron Solution 2.5 milliGRAM(s) Inhalation daily  fluticasone propionate   220 MICROgram(s) HFA Inhaler 1 Puff(s) Inhalation two times a day  furosemide    Tablet 40 milliGRAM(s) Oral daily  heparin  Injectable 5000 Unit(s) SubCutaneous every 8 hours  insulin glargine Injectable (LANTUS) 8 Unit(s) SubCutaneous every morning  insulin lispro (HumaLOG) corrective regimen sliding scale   SubCutaneous three times a day before meals  insulin lispro (HumaLOG) corrective regimen sliding scale   SubCutaneous at bedtime  losartan 25 milliGRAM(s) Oral daily  methylPREDNISolone sodium succinate Injectable 20 milliGRAM(s) IV Push four times a day  metolazone 5 milliGRAM(s) Oral daily  metoprolol     tartrate 25 milliGRAM(s) Oral two times a day  mometasone 200 MICROgram(s)/formoterol 5 MICROgram(s) Inhaler 2 Puff(s) Inhalation two times a day  pantoprazole    Tablet 40 milliGRAM(s) Oral before breakfast  psyllium Powder 1 Packet(s) Oral at bedtime    MEDICATIONS  (PRN):  acetaminophen   Tablet. 650 milliGRAM(s) Oral every 6 hours PRN Mild Pain (1 - 3)  dextrose Gel 1 Dose(s) Oral once PRN Blood Glucose LESS THAN 70 milliGRAM(s)/deciliter  glucagon  Injectable 1 milliGRAM(s) IntraMuscular once PRN Glucose LESS THAN 70 milligrams/deciliter  guaiFENesin    Syrup 200 milliGRAM(s) Oral every 6 hours PRN Cough  HYDROcodone/homatropine Syrup 5 milliLiter(s) Oral every 6 hours PRN sever cough  ibuprofen  Tablet 400 milliGRAM(s) Oral every 6 hours PRN headache      EXAM:  Vital Signs Last 24 Hrs  T(C): 36.7 (18 Feb 2018 05:35), Max: 37 (17 Feb 2018 14:12)  T(F): 98.1 (18 Feb 2018 05:35), Max: 98.6 (17 Feb 2018 14:12)  HR: 85 (18 Feb 2018 05:35) (78 - 88)  BP: 159/78 (18 Feb 2018 05:35) (124/66 - 159/78)  BP(mean): --  RR: 18 (18 Feb 2018 05:35) (16 - 18)  SpO2: 95% (18 Feb 2018 05:35) (94% - 98%)    GENERAL: The patient is awake and alert in no apparent distress.     SKIN: Warm, dry, no rashes    LUNGS: Clear to auscultation without wheezing, rales or rhonchi; respirations unlabored    HEART: Regular rate and rhythm without murmur.    ABDOMEN: +BS, Soft, Nontender. Mild epigastric tenderness.     EXTREMITIES: No clubbing, cyanosis, edema                              10.1   13.58 )-----------( 414      ( 18 Feb 2018 08:44 )             32.6       02-16    139  |  102  |  13  ----------------------------<  231<H>  4.2   |  24  |  0.92    Ca    9.6      16 Feb 2018 21:46    TPro  6.6  /  Alb  3.5  /  TBili  0.2  /  DBili  x   /  AST  21  /  ALT  37  /  AlkPhos  98  02-16      LIVER FUNCTIONS - ( 16 Feb 2018 21:46 )  Alb: 3.5 g/dL / Pro: 6.6 g/dL / ALK PHOS: 98 U/L / ALT: 37 U/L RC / AST: 21 U/L / GGT: x           < from: Xray Chest 1 View AP/PA (02.16.18 @ 22:30) >    IMPRESSION:   Clear lungs.      < end of copied text >            PROBLEM LIST:  67y Female with HEALTH ISSUES - PROBLEM Dx:  Prophylactic measure: Prophylactic measure  Primary osteoarthritis, unspecified site: Primary osteoarthritis, unspecified site  Generalized abdominal pain: Generalized abdominal pain  Dyspnea on exertion: Dyspnea on exertion  Chronic diastolic heart failure: Chronic diastolic heart failure  COPD exacerbation: COPD exacerbation  Persistent cough        RECS:  Increase steroid dose.  Add hycodan PRN  Increase ibuprofen for Headache  RVP      Clemente Ferrell MD  [phone]

## 2018-02-18 NOTE — PROGRESS NOTE ADULT - PROBLEM SELECTOR PLAN 2
-Pt with likely mildly decompensated dCHF; worsening LE edema and orthopnea in setting of medication nonadherence  -c/w Lasix 40 PO qd and metolazone 5 qd  -Check TTE  - Cardiology consulted

## 2018-02-18 NOTE — PROVIDER CONTACT NOTE (OTHER) - ASSESSMENT
Pt currently on IV solumedrol. Pt resting in bed, daughter at bedside
a&ox4; pt appears to be resting comfortably in bed; call bell within reach

## 2018-02-18 NOTE — PROGRESS NOTE ADULT - SUBJECTIVE AND OBJECTIVE BOX
RACHANA BARGER  67y  Female      Patient is a 67y old  Female who presents with a chief complaint of sob (17 Feb 2018 01:23)      INTERVAL HPI/OVERNIGHT EVENTS:  Seen at bedside        T(C): 36.7 (02-18-18 @ 05:35), Max: 37 (02-17-18 @ 14:12)  HR: 85 (02-18-18 @ 05:35) (78 - 88)  BP: 159/78 (02-18-18 @ 05:35) (124/66 - 159/78)  RR: 18 (02-18-18 @ 05:35) (16 - 18)  SpO2: 95% (02-18-18 @ 05:35) (94% - 98%)  Wt(kg): --Vital Signs Last 24 Hrs  T(C): 36.7 (18 Feb 2018 05:35), Max: 37 (17 Feb 2018 14:12)  T(F): 98.1 (18 Feb 2018 05:35), Max: 98.6 (17 Feb 2018 14:12)  HR: 85 (18 Feb 2018 05:35) (78 - 88)  BP: 159/78 (18 Feb 2018 05:35) (124/66 - 159/78)  BP(mean): --  RR: 18 (18 Feb 2018 05:35) (16 - 18)  SpO2: 95% (18 Feb 2018 05:35) (94% - 98%)    PHYSICAL EXAM:  Constitutional	Well-developed, well nourished  Eyes	EOMI; PERRL; no drainage or redness  ENMT	No oral lesions; no gross abnormalities  Respiratory	CTA b/l,   Cardiovascular	Regular rate & rhythm, normal S1, S2; no murmurs, gallops or rubs; no S3, S4  Gastrointestinal	Soft, non-tender, no hepatosplenomegaly, normal bowel sounds  Extremities	No cyanosis, clubbing or edema  Neurological	Alert & oriented; no sensory, motor or coordination deficits, normal reflexes    Consultant(s) Notes Reviewed:  [x ] YES  [ ] NO  Care Discussed with Consultants/Other Providers [ x] YES  [ ] NO    LABS:                        10.1   13.58 )-----------( 414      ( 18 Feb 2018 08:44 )             32.6     02-16    139  |  102  |  13  ----------------------------<  231<H>  4.2   |  24  |  0.92    Ca    9.6      16 Feb 2018 21:46    TPro  6.6  /  Alb  3.5  /  TBili  0.2  /  DBili  x   /  AST  21  /  ALT  37  /  AlkPhos  98  02-16        CAPILLARY BLOOD GLUCOSE      POCT Blood Glucose.: 324 mg/dL (18 Feb 2018 08:42)  POCT Blood Glucose.: 339 mg/dL (17 Feb 2018 21:31)  POCT Blood Glucose.: 357 mg/dL (17 Feb 2018 17:38)  POCT Blood Glucose.: 396 mg/dL (17 Feb 2018 12:17)            RADIOLOGY & ADDITIONAL TESTS:    Imaging Personally Reviewed:  [ ] YES  [ ] NO RACHANA BARGER  67y  Female      Patient is a 67y old  Female who presents with a chief complaint of sob (17 Feb 2018 01:23)      INTERVAL HPI/OVERNIGHT EVENTS:  Seen at bedside. Continued cough overnight, developed headache. Had difficulty obtaining Motrin for her headache. Did not sleep well.         T(C): 36.7 (02-18-18 @ 05:35), Max: 37 (02-17-18 @ 14:12)  HR: 85 (02-18-18 @ 05:35) (78 - 88)  BP: 159/78 (02-18-18 @ 05:35) (124/66 - 159/78)  RR: 18 (02-18-18 @ 05:35) (16 - 18)  SpO2: 95% (02-18-18 @ 05:35) (94% - 98%)  Wt(kg): --Vital Signs Last 24 Hrs  T(C): 36.7 (18 Feb 2018 05:35), Max: 37 (17 Feb 2018 14:12)  T(F): 98.1 (18 Feb 2018 05:35), Max: 98.6 (17 Feb 2018 14:12)  HR: 85 (18 Feb 2018 05:35) (78 - 88)  BP: 159/78 (18 Feb 2018 05:35) (124/66 - 159/78)  BP(mean): --  RR: 18 (18 Feb 2018 05:35) (16 - 18)  SpO2: 95% (18 Feb 2018 05:35) (94% - 98%)    PHYSICAL EXAM:  Constitutional	Well-developed, well nourished  Eyes	EOMI; PERRL; no drainage or redness  ENMT	No oral lesions; no gross abnormalities  Respiratory	CTA b/l,   Cardiovascular	Regular rate & rhythm, normal S1, S2; no murmurs, gallops or rubs; no S3, S4  Gastrointestinal	Soft, non-tender, no hepatosplenomegaly, normal bowel sounds  Extremities	No cyanosis, clubbing or edema  Neurological	Alert & oriented; no sensory, motor or coordination deficits, normal reflexes    Consultant(s) Notes Reviewed:  [x ] YES  [ ] NO  Care Discussed with Consultants/Other Providers [ x] YES  [ ] NO    LABS:                        10.1   13.58 )-----------( 414      ( 18 Feb 2018 08:44 )             32.6     02-16    139  |  102  |  13  ----------------------------<  231<H>  4.2   |  24  |  0.92    Ca    9.6      16 Feb 2018 21:46    TPro  6.6  /  Alb  3.5  /  TBili  0.2  /  DBili  x   /  AST  21  /  ALT  37  /  AlkPhos  98  02-16        CAPILLARY BLOOD GLUCOSE      POCT Blood Glucose.: 324 mg/dL (18 Feb 2018 08:42)  POCT Blood Glucose.: 339 mg/dL (17 Feb 2018 21:31)  POCT Blood Glucose.: 357 mg/dL (17 Feb 2018 17:38)  POCT Blood Glucose.: 396 mg/dL (17 Feb 2018 12:17)            RADIOLOGY & ADDITIONAL TESTS:    Imaging Personally Reviewed:  [ ] YES  [ ] NO

## 2018-02-18 NOTE — PROVIDER CONTACT NOTE (OTHER) - ACTION/TREATMENT ORDERED:
per provider to order fiber supplementation
NP Kenya Jensen aware, to adjust Pt sliding scale and consult endocrine if necessary, cont to monitor

## 2018-02-18 NOTE — CONSULT NOTE ADULT - ASSESSMENT
67 F PMH COPD (not on home O2), HTN, DM, obesity, PE (2016 provoked, prev on xarelto and completed tx with Lovenox) with RVE, perforated diverticulitis s/p exlap, R colectomy, small bowel resection, and Chacko procedure (12/2016) with reversal (9/2017), , and chronic b/l LE edema presents with progressive SOB x 1 month.  Admitted by her pulmonologist for further management.  She tells me that she is not experiencing any anginal symptoms.  Her trop was negative, BNP = 524, ECG NSR without ST-T wave abnormalities.      Her symptoms are not related with cardiac ischemia  She is mildly volume overloaded and I agree with continuing her home diuretic schedule: metolazone 5mg 30 minutes prior to furosemide 40mg until a weight of 214lbs at which point she stops the metolazone and continues just with furosemide.  Check daily weights, strict I&Os  Monitor electrolytes closely, especially K, as she takes daily KCl supplements as an outpt  TTE 8/2017 showed normal LV function with RVE but preserved RV function, min TR, normal pulmonary pressure. Will repeat TTE   Her BP is elevated, can titrate up losartan   c/w metoprolol  c/w statin  Further management as per pulm and medicine  Thank you for this consult.  Will follow along with you!

## 2018-02-18 NOTE — PROGRESS NOTE ADULT - PROBLEM SELECTOR PROBLEM 5
Primary osteoarthritis, unspecified site Type 2 diabetes mellitus without complication, without long-term current use of insulin

## 2018-02-18 NOTE — PROGRESS NOTE ADULT - PROBLEM SELECTOR PLAN 5
-pt reports she is taking ibuprofen 600 qhs daily for many weeks  - Pt aware of side effects (PUD, worsening Kidney function) A1c 8.9 on admission. Prior A1c was 7. Had been tried on Metformin in the past however developed diarrhea and was stopped. Was not on medication prior to hospitalization. FS now elevated and steroids increased today  - Start Lantus 12 units and Humalog 6 units TID premeal   - MISS

## 2018-02-18 NOTE — PROGRESS NOTE ADULT - PROBLEM SELECTOR PLAN 4
-pt says worse with coughing; suspect msk etiology related to cough but given extensive abdominal surgery concerned for possible intraabd process vs. hernia  -abdominal XR perfomed today    - Can consider CT a/p if pain persists -pt says worse with coughing; suspect msk etiology related to cough but given extensive abdominal surgery concerned for possible intraabd process vs. hernia  -abdominal XR performed today    - Surgery eval for abdominal hernia  - Can consider CT a/p if pain persists

## 2018-02-18 NOTE — CONSULT NOTE ADULT - SUBJECTIVE AND OBJECTIVE BOX
· Subjective and Objective:   [facility] CARDIOLOGY CONSULTANTS:    Diann Cardenas, Luis, Anu, Kaleb De La Torre, Sosa Reyes      [phone]    CHIEF COMPLAINT: Patient is a 67y old  Female who presents with a chief complaint of sob (2018 01:23)    HPI: 67 F PMH COPD (not on home O2), HTN, perforated diverticulitis s/p exlap, R colectomy, small bowel resection, and Chacko procedure (2016) with reversal (2017), PE ( provoked, prev on xarelto and completed tx with Lovenox) with RVE, and chronic b/l LE edema presents with progressive SOB x 1 month. About 1 month ago, patient had a "chest cold" 2/2 viral uri.  After a few days, her sx worsened, and she was eventually diagnosed with the flu.  She states during this time, she was largely bedridden and very SOB; as a result was very noncompliant with her water pills. Sx persisted, and on 18 saw her o/p pulmonologist, who started her on a 5 day course of prednisone 40. 2 days later, she was still not feeling well, and she as given 5 day course of Z-jacek, which she finished. During this time, she was noted to have a worsening compared to baseline of her PFTs, with strong improvement after bronchodilater administration. Currently, patient has had a persistent cough, occasionally productive of whitish/yellow sputum. +abd pain in setting of cough; feels there is a lump in her stomach worse with coughing. +LE edema/orthopnea. SOB is constant at rest but worse with movement. Denies CP/f/c/n/v/d/dysuria.  Due to persistent sx, pt was referred to ER by her pulmonologist for IV steroids.     Pt last admitted in 2017 for sob/fatigue, during which she had her Chacko procedure reversal. Multiple complications included guaiac + anemia with subsequent negative sigmoidoscopy study, ongoing SOB/edema managed both as chf exacerbation (but with normal LV fnx on echo) as well as possibly copd exac, hypotensive episodes, and ALVERTO.     REVIEW OF SYSTEMS:  CONSTITUTIONAL: No fever, weight loss, or fatigue  EYES: No eye pain, visual disturbances, or discharge  ENMT:  No difficulty hearing, tinnitus, vertigo; No sinus or throat pain  NECK: No pain or stiffness  RESPIRATORY: +cough +wheezing, chills or hemoptysis; +shortness of breath  CARDIOVASCULAR: No chest pain,No palpitations, dizziness +leg swelling  GASTROINTESTINAL: No abdominal or epigastric pain. No nausea, vomiting, or hematemesis; No diarrhea , no constipation. No melena or hematochezia.  GENITOURINARY: No dysuria, frequency, hematuria, or incontinence  NEUROLOGICAL: No headaches, memory loss, loss of strength, numbness, or tremors  SKIN: No itching, burning, rashes, or lesions   LYMPH NODES: No enlarged glands  ENDOCRINE: No heat or cold intolerance; No hair loss  MUSCULOSKELETAL: No joint pain or swelling; No muscle, back, or extremity pain  PSYCHIATRIC: No depression, anxiety, mood swings, or difficulty sleeping  HEME/LYMPH: No easy bruising, or bleeding gums  ALLERGY AND IMMUNOLOGIC: No hives or eczema          PAST MEDICAL & SURGICAL HISTORY:  Colostomy in place  Anemia  Osteoarthritis  Obesity  Palpitations  PE (pulmonary thromboembolism): 2016  Diverticulitis of intestine with perforation and abscess without bleeding, unspecified part of intestinal tract  C. difficile diarrhea: 2016  COPD (chronic obstructive pulmonary disease)  Hypercholesteremia  Hypertension  H/O hemicolectomy: 2016  Status post total replacement of both hips  H/O ovarian cystectomy: b/l  H/O: : x2  History of appendectomy      MEDICATIONS  (STANDING):  aclidinium 400 MICROgram Inhaler 1 Puff(s) Inhalation two times a day  ALBUTerol    0.083% 2.5 milliGRAM(s) Nebulizer every 6 hours  aspirin enteric coated 81 milliGRAM(s) Oral daily  atorvastatin 20 milliGRAM(s) Oral daily  dextrose 5%. 1000 milliLiter(s) (50 mL/Hr) IV Continuous <Continuous>  dextrose 50% Injectable 12.5 Gram(s) IV Push once  dextrose 50% Injectable 25 Gram(s) IV Push once  dextrose 50% Injectable 25 Gram(s) IV Push once  dornase daron Solution 2.5 milliGRAM(s) Inhalation daily  fluticasone propionate   220 MICROgram(s) HFA Inhaler 1 Puff(s) Inhalation two times a day  furosemide    Tablet 40 milliGRAM(s) Oral daily  heparin  Injectable 5000 Unit(s) SubCutaneous every 8 hours  insulin glargine Injectable (LANTUS) 8 Unit(s) SubCutaneous every morning  insulin lispro (HumaLOG) corrective regimen sliding scale   SubCutaneous three times a day before meals  insulin lispro (HumaLOG) corrective regimen sliding scale   SubCutaneous at bedtime  losartan 25 milliGRAM(s) Oral daily  methylPREDNISolone sodium succinate Injectable 20 milliGRAM(s) IV Push four times a day  metolazone 5 milliGRAM(s) Oral daily  metoprolol     tartrate 25 milliGRAM(s) Oral two times a day  mometasone 200 MICROgram(s)/formoterol 5 MICROgram(s) Inhaler 2 Puff(s) Inhalation two times a day  pantoprazole    Tablet 40 milliGRAM(s) Oral before breakfast  psyllium Powder 1 Packet(s) Oral at bedtime      Allergies    Spiriva (Unknown)                            10.1   13.58 )-----------( 414      ( 2018 08:44 )             32.6       02-16    139  |  102  |  13  ----------------------------<  231<H>  4.2   |  24  |  0.92    Ca    9.6      2018 21:46    TPro  6.6  /  Alb  3.5  /  TBili  0.2  /  DBili  x   /  AST  21  /  ALT  37  /  AlkPhos  98  02-16      LIVER FUNCTIONS - ( 2018 21:46 )  Alb: 3.5 g/dL / Pro: 6.6 g/dL / ALK PHOS: 98 U/L / ALT: 37 U/L RC / AST: 21 U/L / GGT: x                 CARDIAC MARKERS ( 2018 21:46 )  x     / <0.01 ng/mL / 62 U/L / x     / 1.9 ng/mL                    Daily     Daily     I&O's Summary    2018 07:01  -  2018 07:00  --------------------------------------------------------  IN: 940 mL / OUT: 0 mL / NET: 940 mL        Vital Signs Last 24 Hrs  T(C): 36.7 (2018 05:35), Max: 37 (2018 14:12)  T(F): 98.1 (2018 05:35), Max: 98.6 (2018 14:12)  HR: 85 (2018 05:35) (78 - 88)  BP: 159/78 (2018 05:35) (124/66 - 159/78)  BP(mean): --  RR: 18 (2018 05:35) (16 - 18)  SpO2: 95% (2018 05:35) (94% - 98%)    PHYSICAL EXAM:   · Constitutional	Well-developed, well nourished  · Eyes	EOMI; PERRL; no drainage or redness  · ENMT	No oral lesions; no gross abnormalities  · Neck	No bruits; no thyromegaly or nodules  · Respiratory	Normal breath sounds b/l, No RRW  · Cardiovascular	Regular rate & rhythm, normal S1, S2; no murmurs, gallops or rubs; no S3, S4  · Gastrointestinal	Soft, non-tender, no hepatosplenomegaly, normal bowel sounds  · Extremities	No cyanosis, clubbing or edema  · Vascular	Equal and normal pulses (carotid, femoral, dorsalis pedis)  · Neurological	Alert & oriented; no sensory, motor or coordination deficits, normal reflexes

## 2018-02-19 LAB
ANION GAP SERPL CALC-SCNC: 14 MMOL/L — SIGNIFICANT CHANGE UP (ref 5–17)
BUN SERPL-MCNC: 42 MG/DL — HIGH (ref 7–23)
CALCIUM SERPL-MCNC: 10.3 MG/DL — SIGNIFICANT CHANGE UP (ref 8.4–10.5)
CHLORIDE SERPL-SCNC: 95 MMOL/L — LOW (ref 96–108)
CO2 SERPL-SCNC: 25 MMOL/L — SIGNIFICANT CHANGE UP (ref 22–31)
CREAT SERPL-MCNC: 1.07 MG/DL — SIGNIFICANT CHANGE UP (ref 0.5–1.3)
GLUCOSE SERPL-MCNC: 300 MG/DL — HIGH (ref 70–99)
POTASSIUM SERPL-MCNC: 4.6 MMOL/L — SIGNIFICANT CHANGE UP (ref 3.5–5.3)
POTASSIUM SERPL-SCNC: 4.6 MMOL/L — SIGNIFICANT CHANGE UP (ref 3.5–5.3)
SODIUM SERPL-SCNC: 134 MMOL/L — LOW (ref 135–145)

## 2018-02-19 PROCEDURE — 99233 SBSQ HOSP IP/OBS HIGH 50: CPT

## 2018-02-19 PROCEDURE — 99232 SBSQ HOSP IP/OBS MODERATE 35: CPT

## 2018-02-19 RX ORDER — INSULIN LISPRO 100/ML
VIAL (ML) SUBCUTANEOUS AT BEDTIME
Qty: 0 | Refills: 0 | Status: DISCONTINUED | OUTPATIENT
Start: 2018-02-19 | End: 2018-02-22

## 2018-02-19 RX ORDER — INSULIN LISPRO 100/ML
10 VIAL (ML) SUBCUTANEOUS
Qty: 0 | Refills: 0 | Status: DISCONTINUED | OUTPATIENT
Start: 2018-02-19 | End: 2018-02-20

## 2018-02-19 RX ORDER — INSULIN LISPRO 100/ML
8 VIAL (ML) SUBCUTANEOUS
Qty: 0 | Refills: 0 | Status: DISCONTINUED | OUTPATIENT
Start: 2018-02-19 | End: 2018-02-19

## 2018-02-19 RX ORDER — INSULIN GLARGINE 100 [IU]/ML
20 INJECTION, SOLUTION SUBCUTANEOUS EVERY MORNING
Qty: 0 | Refills: 0 | Status: DISCONTINUED | OUTPATIENT
Start: 2018-02-19 | End: 2018-02-20

## 2018-02-19 RX ORDER — FUROSEMIDE 40 MG
40 TABLET ORAL DAILY
Qty: 0 | Refills: 0 | Status: DISCONTINUED | OUTPATIENT
Start: 2018-02-20 | End: 2018-02-20

## 2018-02-19 RX ADMIN — HEPARIN SODIUM 5000 UNIT(S): 5000 INJECTION INTRAVENOUS; SUBCUTANEOUS at 06:11

## 2018-02-19 RX ADMIN — ATORVASTATIN CALCIUM 20 MILLIGRAM(S): 80 TABLET, FILM COATED ORAL at 22:25

## 2018-02-19 RX ADMIN — MOMETASONE FUROATE AND FORMOTEROL FUMARATE DIHYDRATE 2 PUFF(S): 200; 5 AEROSOL RESPIRATORY (INHALATION) at 06:13

## 2018-02-19 RX ADMIN — Medication 4: at 22:25

## 2018-02-19 RX ADMIN — Medication 6 UNIT(S): at 09:08

## 2018-02-19 RX ADMIN — Medication 6 UNIT(S): at 12:38

## 2018-02-19 RX ADMIN — Medication 20 MILLIGRAM(S): at 17:13

## 2018-02-19 RX ADMIN — Medication 400 MILLIGRAM(S): at 03:05

## 2018-02-19 RX ADMIN — Medication 400 MILLIGRAM(S): at 03:35

## 2018-02-19 RX ADMIN — ALBUTEROL 2.5 MILLIGRAM(S): 90 AEROSOL, METERED ORAL at 22:34

## 2018-02-19 RX ADMIN — ACLIDINIUM BROMIDE 1 PUFF(S): 400 POWDER, METERED RESPIRATORY (INHALATION) at 06:12

## 2018-02-19 RX ADMIN — Medication 25 MILLIGRAM(S): at 06:12

## 2018-02-19 RX ADMIN — ALBUTEROL 2.5 MILLIGRAM(S): 90 AEROSOL, METERED ORAL at 17:14

## 2018-02-19 RX ADMIN — INSULIN GLARGINE 12 UNIT(S): 100 INJECTION, SOLUTION SUBCUTANEOUS at 09:09

## 2018-02-19 RX ADMIN — HEPARIN SODIUM 5000 UNIT(S): 5000 INJECTION INTRAVENOUS; SUBCUTANEOUS at 14:18

## 2018-02-19 RX ADMIN — Medication 650 MILLIGRAM(S): at 07:55

## 2018-02-19 RX ADMIN — Medication 650 MILLIGRAM(S): at 17:50

## 2018-02-19 RX ADMIN — Medication 20 MILLIGRAM(S): at 11:32

## 2018-02-19 RX ADMIN — Medication 400 MILLIGRAM(S): at 22:55

## 2018-02-19 RX ADMIN — Medication 25 MILLIGRAM(S): at 17:13

## 2018-02-19 RX ADMIN — Medication 40 MILLIGRAM(S): at 06:49

## 2018-02-19 RX ADMIN — ALBUTEROL 2.5 MILLIGRAM(S): 90 AEROSOL, METERED ORAL at 11:32

## 2018-02-19 RX ADMIN — Medication 8: at 09:08

## 2018-02-19 RX ADMIN — HEPARIN SODIUM 5000 UNIT(S): 5000 INJECTION INTRAVENOUS; SUBCUTANEOUS at 22:25

## 2018-02-19 RX ADMIN — ALBUTEROL 2.5 MILLIGRAM(S): 90 AEROSOL, METERED ORAL at 06:11

## 2018-02-19 RX ADMIN — Medication 400 MILLIGRAM(S): at 12:30

## 2018-02-19 RX ADMIN — Medication 20 MILLIGRAM(S): at 22:35

## 2018-02-19 RX ADMIN — Medication 650 MILLIGRAM(S): at 08:30

## 2018-02-19 RX ADMIN — MOMETASONE FUROATE AND FORMOTEROL FUMARATE DIHYDRATE 2 PUFF(S): 200; 5 AEROSOL RESPIRATORY (INHALATION) at 17:15

## 2018-02-19 RX ADMIN — Medication 400 MILLIGRAM(S): at 12:01

## 2018-02-19 RX ADMIN — Medication 20 MILLIGRAM(S): at 06:11

## 2018-02-19 RX ADMIN — DORNASE ALFA 2.5 MILLIGRAM(S): 1 SOLUTION RESPIRATORY (INHALATION) at 11:33

## 2018-02-19 RX ADMIN — Medication 81 MILLIGRAM(S): at 11:33

## 2018-02-19 RX ADMIN — LOSARTAN POTASSIUM 50 MILLIGRAM(S): 100 TABLET, FILM COATED ORAL at 06:11

## 2018-02-19 RX ADMIN — PANTOPRAZOLE SODIUM 40 MILLIGRAM(S): 20 TABLET, DELAYED RELEASE ORAL at 06:11

## 2018-02-19 RX ADMIN — Medication 1 PUFF(S): at 06:13

## 2018-02-19 RX ADMIN — Medication 650 MILLIGRAM(S): at 17:18

## 2018-02-19 RX ADMIN — Medication 1 PUFF(S): at 17:15

## 2018-02-19 RX ADMIN — Medication 10 UNIT(S): at 17:36

## 2018-02-19 RX ADMIN — Medication 400 MILLIGRAM(S): at 22:25

## 2018-02-19 RX ADMIN — Medication 6: at 12:38

## 2018-02-19 RX ADMIN — Medication 8: at 17:36

## 2018-02-19 RX ADMIN — ACLIDINIUM BROMIDE 1 PUFF(S): 400 POWDER, METERED RESPIRATORY (INHALATION) at 17:15

## 2018-02-19 NOTE — PHYSICAL THERAPY INITIAL EVALUATION ADULT - DISCHARGE DISPOSITION, PT EVAL
home w/ outpatient services/pt has PT in mind to go to needs prescription ;Outpt PT to increase strength, endurance, balance standing dynamic , gait pattern

## 2018-02-19 NOTE — DIETITIAN INITIAL EVALUATION ADULT. - PROBLEM SELECTOR PLAN 1
-Pt with cough, increased sputum, poor air entry b/l on exam, with antecedent viral uri, and interval worsening of o/p PFT with +bronchodilator response.   -However, VBG without acidosis or elevated hypercapnia  -s/p solumedrol 125 x 1; c/w solumedrol 20 q8  -pt with documented intolerance to spiriva; will avoid duonebs then, start albuterol nebs atc q4 hours  -c/w tudorza for long acting anticholinergic tx (pt has own med)  -c/w dulera or formulary equivalent  -s/p 5 days Z-jacek; CXR clear; afebrile/hemodyanamically stable/leukocytosis 2/2 steroids, will hold further abx  -Pulm eval in am

## 2018-02-19 NOTE — CHART NOTE - NSCHARTNOTEFT_GEN_A_CORE
Pt w/ hyperglycemia- on solumedrol  discussed w/ Endocrine(Dr. Storm)- will see pt in am  Increased Lantus to 20units,  Pre-meal Humalog increased to 10units  Bedtime corrective scale changed to moderate

## 2018-02-19 NOTE — PROGRESS NOTE ADULT - SUBJECTIVE AND OBJECTIVE BOX
RACHANA BARGER  67y  Female      Patient is a 67y old  Female who presents with a chief complaint of sob (17 Feb 2018 01:23)      INTERVAL HPI/OVERNIGHT EVENTS:  Seen sitting in chair. Slept better last evening. Cough medication appears to be helping. No fever/chills, chest pain.       T(C): 36.3 (02-19-18 @ 11:48), Max: 36.9 (02-19-18 @ 06:05)  HR: 70 (02-19-18 @ 11:48) (63 - 80)  BP: 139/66 (02-19-18 @ 11:48) (108/66 - 155/75)  RR: 18 (02-19-18 @ 11:48) (18 - 18)  SpO2: 95% (02-19-18 @ 11:48) (95% - 97%)  Wt(kg): --Vital Signs Last 24 Hrs  T(C): 36.3 (19 Feb 2018 11:48), Max: 36.9 (19 Feb 2018 06:05)  T(F): 97.3 (19 Feb 2018 11:48), Max: 98.4 (19 Feb 2018 06:05)  HR: 70 (19 Feb 2018 11:48) (63 - 80)  BP: 139/66 (19 Feb 2018 11:48) (108/66 - 155/75)  BP(mean): --  RR: 18 (19 Feb 2018 11:48) (18 - 18)  SpO2: 95% (19 Feb 2018 11:48) (95% - 97%)    PHYSICAL EXAM:  Constitutional: Well-developed, well nourished  Eyes: EOMI; PERRL; no drainage or redness  ENMT: No oral lesions; no gross abnormalities  Respiratory: CTA b/l,   Cardiovascular: Regular rate & rhythm, normal S1, S2; no murmurs, gallops or rubs; no S3, S4  Gastrointestinal: Soft, non-tender, no hepatosplenomegaly, normal bowel sounds  Extremities: No cyanosis, clubbing or edema  Neurological: Alert & oriented; no sensory, motor or coordination deficits, normal reflexes    Consultant(s) Notes Reviewed:  [x ] YES  [ ] NO  Care Discussed with Consultants/Other Providers [ x] YES  [ ] NO    LABS:                        10.1   13.58 )-----------( 414      ( 18 Feb 2018 08:44 )             32.6     02-19    134<L>  |  95<L>  |  42<H>  ----------------------------<  300<H>  4.6   |  25  |  1.07    Ca    10.3      19 Feb 2018 07:45          CAPILLARY BLOOD GLUCOSE      POCT Blood Glucose.: 291 mg/dL (19 Feb 2018 12:10)  POCT Blood Glucose.: 328 mg/dL (19 Feb 2018 08:37)  POCT Blood Glucose.: 440 mg/dL (18 Feb 2018 22:00)  POCT Blood Glucose.: 236 mg/dL (18 Feb 2018 17:39)            RADIOLOGY & ADDITIONAL TESTS:    Imaging Personally Reviewed:  [ ] YES  [ ] NO

## 2018-02-19 NOTE — DIETITIAN INITIAL EVALUATION ADULT. - PERTINENT LABORATORY DATA
2/19/18:  glucose - 300;  F/S - 2/19/18: 328;   2/18/18: 236 - 440;   2/17/18: 311 - 396 2/19/18:  glucose - 300;  F/S - 2/19/18: 328;   2/18/18: 236 - 440;   2/17/18: 311 - 396   HbA1c - 2/18/18 - 8.9   2/17/18 - 8.8

## 2018-02-19 NOTE — PHYSICAL THERAPY INITIAL EVALUATION ADULT - ADDITIONAL COMMENTS
pt lives alone ; amb with rolling walker or cane as need ;decline id caregiver ; pt is not on Home O2 pt lives alone in house no steps to enter and everything on one level ; amb with rolling walker or cane as needed when go out usually does not use w/in home ;decline id caregiver ; pt is not on Home O2; pt works from home as  ; pt reports limits self how far goes from home due to BR issues and breathing ;pt may benefit from rollator ; discussed and how to obtain/where

## 2018-02-19 NOTE — PHYSICAL THERAPY INITIAL EVALUATION ADULT - IMPAIRMENTS CONTRIBUTING TO GAIT DEVIATIONS, PT EVAL
impaired postural control/balance fair + but waddling gait w/o device steady independent ; with rolling walekr pt amb steady normalized gait not compensating as much (h/o thr bilateral , abd hernia )/impaired balance

## 2018-02-19 NOTE — PHYSICAL THERAPY INITIAL EVALUATION ADULT - BRACE/ORTHOTICS
no device first amb 200 ft pulse ox 96% RA mild SOB resolve with rest ;pt understood top pace self , positions to control breathlessness ; pt amb with rolling walker 50 ft steady gait more normalized gait pattern

## 2018-02-19 NOTE — PHYSICAL THERAPY INITIAL EVALUATION ADULT - STAIR PATTERN, REHAB EVAL
step to step/pt tires easily on steps decrease endurance (to point where she does not visit dtr as dtr has full flight of steps

## 2018-02-19 NOTE — PROGRESS NOTE ADULT - SUBJECTIVE AND OBJECTIVE BOX
[facility] - Diann Cardenas, Luis, Anu, Britt, Eric Manuel  Office Number:  [phone]    Patient resting comfortably in bed in NAD. Breathing seems better. She reports losing weight and is upset about getting her lasix and metolazone.    ROS: negative unless otherwise mentioned.    Telemetry:  not on tele    MEDICATIONS  (STANDING):  aclidinium 400 MICROgram Inhaler 1 Puff(s) Inhalation two times a day  ALBUTerol    0.083% 2.5 milliGRAM(s) Nebulizer every 6 hours  aspirin enteric coated 81 milliGRAM(s) Oral daily  atorvastatin 20 milliGRAM(s) Oral daily  dextrose 5%. 1000 milliLiter(s) (50 mL/Hr) IV Continuous <Continuous>  dextrose 50% Injectable 12.5 Gram(s) IV Push once  dextrose 50% Injectable 25 Gram(s) IV Push once  dextrose 50% Injectable 25 Gram(s) IV Push once  dornase daron Solution 2.5 milliGRAM(s) Inhalation daily  fluticasone propionate   220 MICROgram(s) HFA Inhaler 1 Puff(s) Inhalation two times a day  furosemide    Tablet 40 milliGRAM(s) Oral daily  heparin  Injectable 5000 Unit(s) SubCutaneous every 8 hours  insulin glargine Injectable (LANTUS) 12 Unit(s) SubCutaneous every morning  insulin lispro (HumaLOG) corrective regimen sliding scale   SubCutaneous three times a day before meals  insulin lispro (HumaLOG) corrective regimen sliding scale   SubCutaneous at bedtime  insulin lispro Injectable (HumaLOG) 6 Unit(s) SubCutaneous three times a day before meals  losartan 50 milliGRAM(s) Oral daily  methylPREDNISolone sodium succinate Injectable 20 milliGRAM(s) IV Push four times a day  metolazone 5 milliGRAM(s) Oral daily  metoprolol     tartrate 25 milliGRAM(s) Oral two times a day  mometasone 200 MICROgram(s)/formoterol 5 MICROgram(s) Inhaler 2 Puff(s) Inhalation two times a day  pantoprazole    Tablet 40 milliGRAM(s) Oral before breakfast  psyllium Powder 1 Packet(s) Oral at bedtime    MEDICATIONS  (PRN):  acetaminophen   Tablet. 650 milliGRAM(s) Oral every 6 hours PRN Mild Pain (1 - 3)  dextrose Gel 1 Dose(s) Oral once PRN Blood Glucose LESS THAN 70 milliGRAM(s)/deciliter  glucagon  Injectable 1 milliGRAM(s) IntraMuscular once PRN Glucose LESS THAN 70 milligrams/deciliter  guaiFENesin    Syrup 200 milliGRAM(s) Oral every 6 hours PRN Cough  HYDROcodone/homatropine Syrup 5 milliLiter(s) Oral every 6 hours PRN sever cough  ibuprofen  Tablet 400 milliGRAM(s) Oral every 6 hours PRN headache      Allergies    Spiriva (Unknown)    Intolerances        Vital Signs Last 24 Hrs  T(C): 36.3 (19 Feb 2018 11:48), Max: 36.9 (19 Feb 2018 06:05)  T(F): 97.3 (19 Feb 2018 11:48), Max: 98.4 (19 Feb 2018 06:05)  HR: 70 (19 Feb 2018 11:48) (63 - 91)  BP: 139/66 (19 Feb 2018 11:48) (108/66 - 155/75)  BP(mean): --  RR: 18 (19 Feb 2018 11:48) (18 - 18)  SpO2: 95% (19 Feb 2018 11:48) (95% - 98%)    I&O's Summary    18 Feb 2018 07:01  -  19 Feb 2018 07:00  --------------------------------------------------------  IN: 120 mL / OUT: 0 mL / NET: 120 mL        ON EXAM:    General: NAD, awake and alert, oriented x 3  HEENT: Mucous membranes are moist, anicteric  Lungs: Non-labored, breath sounds are clear bilaterally, No wheezing, rales or rhonchi  Cardiovascular: Regular, S1 and S2, no murmurs, rubs, or gallops  Gastrointestinal: Bowel Sounds present, soft, nontender.   Lymph: No peripheral edema. No lymphadenopathy.  Skin: No rashes or ulcers  Psych:  Mood & affect appropriate    LABS: All Labs Reviewed:                        10.1   13.58 )-----------( 414      ( 18 Feb 2018 08:44 )             32.6                         9.9    10.6  )-----------( 346      ( 16 Feb 2018 21:46 )             31.2     19 Feb 2018 07:45    134    |  95     |  42     ----------------------------<  300    4.6     |  25     |  1.07   16 Feb 2018 21:46    139    |  102    |  13     ----------------------------<  231    4.2     |  24     |  0.92     Ca    10.3       19 Feb 2018 07:45  Ca    9.6        16 Feb 2018 21:46    TPro  6.6    /  Alb  3.5    /  TBili  0.2    /  DBili  x      /  AST  21     /  ALT  37     /  AlkPhos  98     16 Feb 2018 21:46          Blood Culture:   02-16 @ 21:46  Pro Bnp 524

## 2018-02-19 NOTE — PROGRESS NOTE ADULT - ASSESSMENT
67 F PMH COPD (not on home O2), HTN, DM, obesity, PE (2016 provoked, prev on xarelto and completed tx with Lovenox) with RVE, perforated diverticulitis s/p exlap, R colectomy, small bowel resection, and Chacko procedure (12/2016) with reversal (9/2017), , and chronic b/l LE edema presents with progressive SOB x 1 month.  Admitted by her pulmonologist for further management.  She tells me that she is not experiencing any anginal symptoms.  Her trop was negative, BNP = 524, ECG NSR without ST-T wave abnormalities.      Her symptoms are not related to cardiac ischemia  I think she is dry today with a rise in BUN noted. I would stop her metolazone and lasix for today, then consider restarting only lasix tomorrow if she gains weight.  We will likely restart Lasix tomorrow and continue to hold metolazone.  Check daily weights, strict I&Os  Monitor electrolytes closely, especially K, as she takes daily KCl supplements as an outpt  Echocardiogram from this admission reviewed. Normal LV function, moderate diastolic dysfunction. Her pulmonary pressures have improved with euvolemia.  Continue with losartan and metoprolol  Further management as per pulm and medicine

## 2018-02-19 NOTE — DIETITIAN INITIAL EVALUATION ADULT. - ENERGY NEEDS
Height:  5' 4"    Weight:  213.8 pounds    BMI:  36.6 kg/m2   IBW: 120 pounds  (+/- 10%)    % IBW: 178 %   Edema:  1+ right/left ankle; right /left foot     Skin: no pressure ulcer injury   BM:  last BM 2/18/18    Other pertinent information:  Patient was admitted with COPD exacerbation, chronic diastolic heart failure and dyspnea on exertion.   Noted PMH includes perforated diverticulitis s/p exlap, right colectomy, small bowel resection and Chacko's procedure 12/2016 - reversed 9/2017,  PE 2016,  chronic B/L lower extremity edema.

## 2018-02-19 NOTE — PROGRESS NOTE ADULT - ASSESSMENT
67 F pmh COPD (not on home O2), HTN, perforated diverticulitis s/p exlap, R colectomy, small bowel resection, and Chacko procedure (12/2016) with reversal (9/2017), PE (2016 provoked, prev on xarelto and completed tx with Lovenox), and chronic b/l LE edema presents with progressive SOB x 1 month admitted for COPD exacerbation

## 2018-02-19 NOTE — PHYSICAL THERAPY INITIAL EVALUATION ADULT - ASR EQUIP NEEDS DISCH PT EVAL
rollator/pt can benefit from rollator as she needs to sit when out at times due to breathing Sob ;pt wants to know if ins cover and if not how much

## 2018-02-19 NOTE — PHYSICAL THERAPY INITIAL EVALUATION ADULT - PERTINENT HX OF CURRENT PROBLEM, REHAB EVAL
recent   ; TTE 2/18/18 moderate diastolic dysfunction ; CXR (-) recent   ; TTE 2/18/18 moderate diastolic dysfunction ; CXR (-); surgical consult for abd hernia recent   ; TTE 2/18/18 moderate diastolic dysfunction ; CXR (-); surgical consult for abd hernia (mild bump R uq abd )

## 2018-02-19 NOTE — PROGRESS NOTE ADULT - PROBLEM SELECTOR PLAN 2
-Pt p/w likely mildly decompensated dCHF with worsening LE edema and orthopnea in setting of medication nonadherence  - TTE performed yesterday. Mild diastolic disfunction, normal LV systolic function  -Given rise in Cr and BUN will hold Metolazone for now. Will c/w Lasix should pt gain weight tomorrow as per Cardiology recommendations

## 2018-02-19 NOTE — DIETITIAN INITIAL EVALUATION ADULT. - ORAL INTAKE PTA
good/Patient reports having a good appetite at home.  She enjoys cooking and rarely eats out.  reports eating a whole wheat muffin at breakfast, a large salad at lunch and a protein, starch and vegetable at dinner meal. Patient reports having a good appetite at home.  She enjoys cooking and rarely eats out.  Reports eating a whole wheat muffin at breakfast, a large salad at lunch and a protein, starch and vegetable at dinner meal./good

## 2018-02-19 NOTE — PHYSICAL THERAPY INITIAL EVALUATION ADULT - TRANSFER SAFETY CONCERNS NOTED: SIT/STAND, REHAB EVAL
decreased step length/pt independent no device and at rolling walker demo both properly/decreased weight-shifting ability

## 2018-02-19 NOTE — DIETITIAN INITIAL EVALUATION ADULT. - PERTINENT MEDS FT
lantus insulin - 12 units q am, humalog insulin, atorvastatin, losartan, furosimide, metamucil powder

## 2018-02-19 NOTE — PROGRESS NOTE ADULT - PROBLEM SELECTOR PLAN 5
A1c 8.9 on admission. Prior A1c was 7. Had been tried on Metformin in the past however developed diarrhea and was stopped. Was not on medication prior to hospitalization. FS remain uncontrolled  - Increase Lantus to 20 units and Humalog 10 units TID premeal. Endo consult   - MISS

## 2018-02-19 NOTE — PROGRESS NOTE ADULT - SUBJECTIVE AND OBJECTIVE BOX
PULMONARY PROGRESS NOTE    RACHANA BARGER  MRN-[id number]    Patient is a 67y old  Female who presents with a chief complaint of sob (17 Feb 2018 01:23)      HPI:  -feels a lot better with additional inhalers that were added yesterday, cough "breaking up"     ROS:   - abdominal discomfort around hernia    MEDICATIONS  (STANDING):  aclidinium 400 MICROgram Inhaler 1 Puff(s) Inhalation two times a day  ALBUTerol    0.083% 2.5 milliGRAM(s) Nebulizer every 6 hours  aspirin enteric coated 81 milliGRAM(s) Oral daily  atorvastatin 20 milliGRAM(s) Oral daily  dextrose 5%. 1000 milliLiter(s) (50 mL/Hr) IV Continuous <Continuous>  dextrose 50% Injectable 12.5 Gram(s) IV Push once  dextrose 50% Injectable 25 Gram(s) IV Push once  dextrose 50% Injectable 25 Gram(s) IV Push once  dornase daron Solution 2.5 milliGRAM(s) Inhalation daily  fluticasone propionate   220 MICROgram(s) HFA Inhaler 1 Puff(s) Inhalation two times a day  heparin  Injectable 5000 Unit(s) SubCutaneous every 8 hours  insulin glargine Injectable (LANTUS) 20 Unit(s) SubCutaneous every morning  insulin lispro (HumaLOG) corrective regimen sliding scale   SubCutaneous at bedtime  insulin lispro (HumaLOG) corrective regimen sliding scale   SubCutaneous three times a day before meals  insulin lispro Injectable (HumaLOG) 10 Unit(s) SubCutaneous three times a day before meals  losartan 50 milliGRAM(s) Oral daily  methylPREDNISolone sodium succinate Injectable 20 milliGRAM(s) IV Push four times a day  metoprolol     tartrate 25 milliGRAM(s) Oral two times a day  mometasone 200 MICROgram(s)/formoterol 5 MICROgram(s) Inhaler 2 Puff(s) Inhalation two times a day  pantoprazole    Tablet 40 milliGRAM(s) Oral before breakfast  psyllium Powder 1 Packet(s) Oral at bedtime    MEDICATIONS  (PRN):  acetaminophen   Tablet. 650 milliGRAM(s) Oral every 6 hours PRN Mild Pain (1 - 3)  dextrose Gel 1 Dose(s) Oral once PRN Blood Glucose LESS THAN 70 milliGRAM(s)/deciliter  glucagon  Injectable 1 milliGRAM(s) IntraMuscular once PRN Glucose LESS THAN 70 milligrams/deciliter  guaiFENesin    Syrup 200 milliGRAM(s) Oral every 6 hours PRN Cough  HYDROcodone/homatropine Syrup 5 milliLiter(s) Oral every 6 hours PRN sever cough  ibuprofen  Tablet 400 milliGRAM(s) Oral every 6 hours PRN headache        EXAM:  Vital Signs Last 24 Hrs  T(C): 36.3 (19 Feb 2018 11:48), Max: 36.9 (19 Feb 2018 06:05)  T(F): 97.3 (19 Feb 2018 11:48), Max: 98.4 (19 Feb 2018 06:05)  HR: 70 (19 Feb 2018 11:48) (63 - 80)  BP: 139/66 (19 Feb 2018 11:48) (108/66 - 155/75)  BP(mean): --  RR: 18 (19 Feb 2018 11:48) (18 - 18)  SpO2: 95% (19 Feb 2018 11:48) (95% - 97%)    GENERAL: The patient is awake and alert in no apparent distress.     SKIN: Warm, dry, no rashes    LUNGS: clear    HEART: Regular rate and rhythm without murmur.    ABDOMEN: +BS, Soft    EXTREMITIES: No clubbing, cyanosis    LABS/IMGAING: reviewed                          10.1   13.58 )-----------( 414      ( 18 Feb 2018 08:44 )             32.6     02-19    134<L>  |  95<L>  |  42<H>  ----------------------------<  300<H>  4.6   |  25  |  1.07    Ca    10.3      19 Feb 2018 07:45        --< from: Xray Chest 1 View AP/PA (02.16.18 @ 22:30) >  IMPRESSION:   Clear lungs.      PROBLEM LIST:  67y Female with HEALTH ISSUES - PROBLEM Dx:  COPD exacerbation  history of PE  Primary osteoarthritis  Generalized abdominal pain  Chronic diastolic heart failure    RECS:  -COPD exacerbation: continue IV solumedrol, tudorza, dulera, flovent, albuterol nebs Q6, pulmozyme nebs   -lung nodule: will follow up as outpatient when not acutely ill, planned for march  -hx PE: pt improving on COPD treatment, would not scan for PE at this time as she is much better.      Stacie Perales MD  [phone]

## 2018-02-19 NOTE — DIETITIAN INITIAL EVALUATION ADULT. - OTHER INFO
Patient reports her appetite and meal intakes have been good since her admission.  She denies any difficulty chewing or swallowing and has no nausea/emesis or GI discomfort at the present time.  Patient noted to have dentures.  Lima reports having fluctuations in her weight related to fluid retention.  She weighs herself every morning and her weight ranges from 190 - 220 pounds.  She reports her ability to walk and exercise has been limited.  RD provided verbal and printed diet education on consistent carbohydrate and cardiac diets.  Patient was receptive and verbalized an understanding of carbohydrates, portions and meal planning as she does prepare meals at home on a regular basis.  She has also been observing meals served in the hospital.  She Confirmed NKFA. Patient reports her appetite and meal intakes have been good since her admission.  She denies any difficulty chewing or swallowing and has no nausea/emesis or GI discomfort at the present time.  Patient noted to have dentures.  Patient reports having fluctuations in her weight related to fluid retention.  She weighs herself every morning and her weight ranges from 190 - 220 pounds.  She reports her ability to walk and exercise has been limited.  RD provided verbal and printed diet education on consistent carbohydrate and cardiac diets.  Patient was receptive and verbalized an understanding of carbohydrates, portion control and meal planning as she does prepare meals at home on a regular basis.  She has also been observing meals served in the hospital.  She Confirmed NKFA. Nutrition consult for diet education received and appreciated.  Patient reports her appetite and meal intakes have been good since her admission.  She denies any difficulty chewing or swallowing and has no nausea/emesis or GI discomfort at the present time.  Patient noted to have dentures.  Patient reports having fluctuations in her weight related to fluid retention.  She weighs herself every morning and her weight ranges from 190 - 220 pounds.  She reports her ability to walk and exercise has been limited.  RD provided verbal and printed diet education on consistent carbohydrate and cardiac diets.  Patient was receptive and verbalized an understanding of carbohydrates, portion control and meal planning as she does prepare meals at home on a regular basis.  She has also been observing meals served in the hospital.  She Confirmed NKFA.

## 2018-02-19 NOTE — PHYSICAL THERAPY INITIAL EVALUATION ADULT - ACTIVE RANGE OF MOTION EXAMINATION, REHAB EVAL
except L shoulder 100 degrees , abd 90 ; R shopulder 90 abd , flexion 105 degrees ;pt with frozen shoulders per pt had PT in past for ;rest wfl's ue's/bilateral upper extremity Active ROM was WFL (within functional limits)/bilateral  lower extremity Active ROM was WFL (within functional limits)

## 2018-02-19 NOTE — PHYSICAL THERAPY INITIAL EVALUATION ADULT - GENERAL OBSERVATIONS, REHAB EVAL
pt received coming out of BR amb w/o device steady but waddling gait (h/o thr bilateral ) ; pt agreeable for PT EVAL , mild SIb with exertion pulse ox 96% RA resolve with rest 97-98% RA

## 2018-02-19 NOTE — PHYSICAL THERAPY INITIAL EVALUATION ADULT - PRECAUTIONS/LIMITATIONS, REHAB EVAL
fall precautions/67 F pmh COPD (not on home O2), HTN, perforated diverticulitis s/p exlap, R colectomy, small bowel resection, and Chacko procedure (12/2016) with reversal (9/2017), PE (2016 provoked, prev on xarelto and completed tx with Lovenox), and chronic b/l LE edema presents with progressive SOB x 1 month. About 1 month ago, patient had a "chest cold" 2/2 viral uri.  After a few days, her sx worsened, and she was eventually diagnosed with the flu.  She states during this time, she was largely bedridden and very SOB; as a result was very noncompliant with her water pills. Sx persisted, and on 2/6/18 saw her o/p pulmonologist, who started her on a 5 day course of prednisone 40. 2 days later, she was still not feeling well, and she as given 5 day course of Z-jacek, which she finished. During this time, she was noted to have a worsening compared to baseline of her PFTs, with strong improvement after bronchodilater administration. Currently, patient has had a persistent cough, occasionally productive of whitish/yellow sputum. +abd pain in setting of cough; feels there is a lump in her stomach worse with coughing. +LE edema/orthopnea. SOB is constant at rest but worse with movement

## 2018-02-19 NOTE — PHYSICAL THERAPY INITIAL EVALUATION ADULT - IMPAIRMENTS FOUND, PT EVAL
aerobic capacity/endurance/muscle strength/ventilation and respiration/gas exchange/gait, locomotion, and balance/pt villanueva pulse ox 96% on RA during amb

## 2018-02-19 NOTE — DIETITIAN INITIAL EVALUATION ADULT. - NS AS NUTRI INTERV ED CONTENT
Purpose of the nutrition education Discussed T2DM diet education with emphasis on which foods contain carbohydrates, portion sizes, portion control especially of carbohydrate rich foods, benefit of fiber-rich food choices, mixed meals and combining protein and carbohydrates at meal and snack times, and need for consistency in carbohydrate intake.  Encouraged checking blood glucose levels.  Discussed how to modify previous diet to better control blood glucose.  Reviewed heart healthy nutrition therapy- label reading and recommended food choices to reduce saturated fat/sodium intake./Purpose of the nutrition education

## 2018-02-19 NOTE — PROGRESS NOTE ADULT - PROBLEM SELECTOR PLAN 4
-pt says worse with coughing; suspect msk etiology related to cough but given extensive abdominal surgery concerned for possible intraabd process vs. hernia  -abdominal XR performed today    - Surgery eval for abdominal hernia

## 2018-02-19 NOTE — PHYSICAL THERAPY INITIAL EVALUATION ADULT - PREDICTED DURATION OF THERAPY (DAYS/WKS), PT EVAL
3 weeks ; pt clear for discharge from PT standpoint but will keep on program 1-2 x/wk as pt going for colonoscopy thurs / possible surgical consult for abd hernia

## 2018-02-19 NOTE — PHYSICAL THERAPY INITIAL EVALUATION ADULT - GAIT DEVIATIONS NOTED, PT EVAL
increased time in double stance/pt independent with rolling walker and w/o device/decreased sam/decreased weight-shifting ability/decreased step length/decreased stride length

## 2018-02-20 DIAGNOSIS — I50.33 ACUTE ON CHRONIC DIASTOLIC (CONGESTIVE) HEART FAILURE: ICD-10-CM

## 2018-02-20 DIAGNOSIS — R73.9 HYPERGLYCEMIA, UNSPECIFIED: ICD-10-CM

## 2018-02-20 DIAGNOSIS — I10 ESSENTIAL (PRIMARY) HYPERTENSION: ICD-10-CM

## 2018-02-20 DIAGNOSIS — E78.5 HYPERLIPIDEMIA, UNSPECIFIED: ICD-10-CM

## 2018-02-20 LAB
ANION GAP SERPL CALC-SCNC: 18 MMOL/L — HIGH (ref 5–17)
BUN SERPL-MCNC: 40 MG/DL — HIGH (ref 7–23)
CALCIUM SERPL-MCNC: 10.5 MG/DL — SIGNIFICANT CHANGE UP (ref 8.4–10.5)
CHLORIDE SERPL-SCNC: 94 MMOL/L — LOW (ref 96–108)
CO2 SERPL-SCNC: 23 MMOL/L — SIGNIFICANT CHANGE UP (ref 22–31)
CREAT SERPL-MCNC: 0.94 MG/DL — SIGNIFICANT CHANGE UP (ref 0.5–1.3)
GLUCOSE SERPL-MCNC: 283 MG/DL — HIGH (ref 70–99)
HCT VFR BLD CALC: 36.6 % — SIGNIFICANT CHANGE UP (ref 34.5–45)
HGB BLD-MCNC: 11.5 G/DL — SIGNIFICANT CHANGE UP (ref 11.5–15.5)
MCHC RBC-ENTMCNC: 25.7 PG — LOW (ref 27–34)
MCHC RBC-ENTMCNC: 31.4 GM/DL — LOW (ref 32–36)
MCV RBC AUTO: 81.7 FL — SIGNIFICANT CHANGE UP (ref 80–100)
PLATELET # BLD AUTO: 462 K/UL — HIGH (ref 150–400)
POTASSIUM SERPL-MCNC: 4.5 MMOL/L — SIGNIFICANT CHANGE UP (ref 3.5–5.3)
POTASSIUM SERPL-SCNC: 4.5 MMOL/L — SIGNIFICANT CHANGE UP (ref 3.5–5.3)
RBC # BLD: 4.48 M/UL — SIGNIFICANT CHANGE UP (ref 3.8–5.2)
RBC # FLD: 16.1 % — HIGH (ref 10.3–14.5)
SODIUM SERPL-SCNC: 135 MMOL/L — SIGNIFICANT CHANGE UP (ref 135–145)
WBC # BLD: 12.57 K/UL — HIGH (ref 3.8–10.5)
WBC # FLD AUTO: 12.57 K/UL — HIGH (ref 3.8–10.5)

## 2018-02-20 PROCEDURE — 99233 SBSQ HOSP IP/OBS HIGH 50: CPT

## 2018-02-20 PROCEDURE — 99223 1ST HOSP IP/OBS HIGH 75: CPT | Mod: GC

## 2018-02-20 RX ORDER — INSULIN GLARGINE 100 [IU]/ML
12 INJECTION, SOLUTION SUBCUTANEOUS AT BEDTIME
Qty: 0 | Refills: 0 | Status: COMPLETED | OUTPATIENT
Start: 2018-02-20 | End: 2018-02-20

## 2018-02-20 RX ORDER — DORNASE ALFA 1 MG/ML
2.5 SOLUTION RESPIRATORY (INHALATION) DAILY
Qty: 0 | Refills: 0 | Status: DISCONTINUED | OUTPATIENT
Start: 2018-02-20 | End: 2018-02-20

## 2018-02-20 RX ORDER — INSULIN LISPRO 100/ML
13 VIAL (ML) SUBCUTANEOUS
Qty: 0 | Refills: 0 | Status: DISCONTINUED | OUTPATIENT
Start: 2018-02-20 | End: 2018-02-20

## 2018-02-20 RX ORDER — INSULIN LISPRO 100/ML
16 VIAL (ML) SUBCUTANEOUS
Qty: 0 | Refills: 0 | Status: DISCONTINUED | OUTPATIENT
Start: 2018-02-20 | End: 2018-02-22

## 2018-02-20 RX ORDER — INSULIN GLARGINE 100 [IU]/ML
32 INJECTION, SOLUTION SUBCUTANEOUS AT BEDTIME
Qty: 0 | Refills: 0 | Status: DISCONTINUED | OUTPATIENT
Start: 2018-02-21 | End: 2018-02-22

## 2018-02-20 RX ORDER — DORNASE ALFA 1 MG/ML
2.5 SOLUTION RESPIRATORY (INHALATION)
Qty: 0 | Refills: 0 | Status: DISCONTINUED | OUTPATIENT
Start: 2018-02-20 | End: 2018-02-22

## 2018-02-20 RX ADMIN — ACLIDINIUM BROMIDE 1 PUFF(S): 400 POWDER, METERED RESPIRATORY (INHALATION) at 18:05

## 2018-02-20 RX ADMIN — Medication 650 MILLIGRAM(S): at 18:04

## 2018-02-20 RX ADMIN — Medication 20 MILLIGRAM(S): at 06:16

## 2018-02-20 RX ADMIN — Medication 40 MILLIGRAM(S): at 10:30

## 2018-02-20 RX ADMIN — Medication 400 MILLIGRAM(S): at 06:48

## 2018-02-20 RX ADMIN — INSULIN GLARGINE 20 UNIT(S): 100 INJECTION, SOLUTION SUBCUTANEOUS at 12:50

## 2018-02-20 RX ADMIN — DORNASE ALFA 2.5 MILLIGRAM(S): 1 SOLUTION RESPIRATORY (INHALATION) at 21:24

## 2018-02-20 RX ADMIN — Medication 10 UNIT(S): at 12:49

## 2018-02-20 RX ADMIN — Medication 8: at 17:58

## 2018-02-20 RX ADMIN — Medication 1 PUFF(S): at 06:14

## 2018-02-20 RX ADMIN — Medication 400 MILLIGRAM(S): at 13:28

## 2018-02-20 RX ADMIN — Medication 1 PUFF(S): at 18:00

## 2018-02-20 RX ADMIN — Medication 400 MILLIGRAM(S): at 12:58

## 2018-02-20 RX ADMIN — Medication 25 MILLIGRAM(S): at 18:01

## 2018-02-20 RX ADMIN — ATORVASTATIN CALCIUM 20 MILLIGRAM(S): 80 TABLET, FILM COATED ORAL at 21:24

## 2018-02-20 RX ADMIN — HEPARIN SODIUM 5000 UNIT(S): 5000 INJECTION INTRAVENOUS; SUBCUTANEOUS at 06:17

## 2018-02-20 RX ADMIN — Medication 650 MILLIGRAM(S): at 18:34

## 2018-02-20 RX ADMIN — Medication 4: at 12:48

## 2018-02-20 RX ADMIN — INSULIN GLARGINE 12 UNIT(S): 100 INJECTION, SOLUTION SUBCUTANEOUS at 22:13

## 2018-02-20 RX ADMIN — PANTOPRAZOLE SODIUM 40 MILLIGRAM(S): 20 TABLET, DELAYED RELEASE ORAL at 06:17

## 2018-02-20 RX ADMIN — HEPARIN SODIUM 5000 UNIT(S): 5000 INJECTION INTRAVENOUS; SUBCUTANEOUS at 17:58

## 2018-02-20 RX ADMIN — ALBUTEROL 2.5 MILLIGRAM(S): 90 AEROSOL, METERED ORAL at 12:54

## 2018-02-20 RX ADMIN — Medication 10 UNIT(S): at 08:50

## 2018-02-20 RX ADMIN — Medication 400 MILLIGRAM(S): at 21:00

## 2018-02-20 RX ADMIN — ACLIDINIUM BROMIDE 1 PUFF(S): 400 POWDER, METERED RESPIRATORY (INHALATION) at 06:13

## 2018-02-20 RX ADMIN — MOMETASONE FUROATE AND FORMOTEROL FUMARATE DIHYDRATE 2 PUFF(S): 200; 5 AEROSOL RESPIRATORY (INHALATION) at 06:14

## 2018-02-20 RX ADMIN — Medication 200 MILLIGRAM(S): at 10:31

## 2018-02-20 RX ADMIN — LOSARTAN POTASSIUM 50 MILLIGRAM(S): 100 TABLET, FILM COATED ORAL at 06:17

## 2018-02-20 RX ADMIN — Medication 650 MILLIGRAM(S): at 11:00

## 2018-02-20 RX ADMIN — ALBUTEROL 2.5 MILLIGRAM(S): 90 AEROSOL, METERED ORAL at 17:59

## 2018-02-20 RX ADMIN — ALBUTEROL 2.5 MILLIGRAM(S): 90 AEROSOL, METERED ORAL at 06:16

## 2018-02-20 RX ADMIN — Medication 650 MILLIGRAM(S): at 10:30

## 2018-02-20 RX ADMIN — Medication 400 MILLIGRAM(S): at 21:40

## 2018-02-20 RX ADMIN — Medication 400 MILLIGRAM(S): at 06:18

## 2018-02-20 RX ADMIN — Medication 10: at 08:49

## 2018-02-20 RX ADMIN — Medication 25 MILLIGRAM(S): at 06:17

## 2018-02-20 RX ADMIN — Medication 2: at 21:34

## 2018-02-20 RX ADMIN — Medication 16 UNIT(S): at 17:58

## 2018-02-20 RX ADMIN — MOMETASONE FUROATE AND FORMOTEROL FUMARATE DIHYDRATE 2 PUFF(S): 200; 5 AEROSOL RESPIRATORY (INHALATION) at 18:06

## 2018-02-20 RX ADMIN — Medication 81 MILLIGRAM(S): at 12:55

## 2018-02-20 RX ADMIN — HEPARIN SODIUM 5000 UNIT(S): 5000 INJECTION INTRAVENOUS; SUBCUTANEOUS at 21:24

## 2018-02-20 RX ADMIN — Medication 20 MILLIGRAM(S): at 12:54

## 2018-02-20 NOTE — CONSULT NOTE ADULT - PROBLEM SELECTOR RECOMMENDATION 2
C/w lasix, losartan, and lopressor w/ hold parameters  Monitor BP  Low Na diet FSs in the high 200s/low 300s in setting of high-dose steroids  HbA1C 8.9 on this admission  -Give Lantus 12 units sq tonight and then will need to increase to 32 units sq qhs on 2/21/18  -increase humalog to 16 units tid-ac  -diabetic diet  -FS checks w/ moderate correctional sliding scale at bedtime and premeals

## 2018-02-20 NOTE — PROGRESS NOTE ADULT - PROBLEM SELECTOR PLAN 4
-pt says worse with coughing; suspect msk etiology related to cough vs hernia  -abdominal XR performed full results pending   -C/w abdominal binder

## 2018-02-20 NOTE — PROGRESS NOTE ADULT - PROBLEM SELECTOR PLAN 5
A1c 8.9 on admission. Prior A1c was 7. Had been tried on Metformin in the past however developed diarrhea and was stopped. Was not on medication prior to hospitalization. FS remain uncontrolled  -Lantus increased to 20 units yesterday, increase humalog 13 units TID premeal. Endo consult pending   -ISS

## 2018-02-20 NOTE — CONSULT NOTE ADULT - ATTENDING COMMENTS
Health Care Proxy (HCP) Pt seen and examined. Agree with note as above with addendum: pt had n/v/d with metformin 500mg po bid after 5 days of use, thus on discharge will avoid. She does not have a meter or know how to test. Will likely need a ALVARADO on discharge. RD consult. Pt can f/u at our practice.

## 2018-02-20 NOTE — PROGRESS NOTE ADULT - PROBLEM SELECTOR PLAN 2
-Pt p/w likely mildly decompensated dCHF with worsening LE edema and orthopnea in setting of medication nonadherence  -TTE results noted- Mild diastolic disfunction, normal LV systolic function  -Given rise in Cr and BUN will hold Metolazone for now.   -Lasix 40mg qd resumed today per cardiology   -Monitor I/Os  -Cardiology recs appreciated

## 2018-02-20 NOTE — CONSULT NOTE ADULT - ASSESSMENT
67 F pmh COPD (not on home O2), HTN, perforated diverticulitis s/p exlap, R colectomy, small bowel resection, and Chacko procedure (12/2016) with reversal (9/2017), PE (2016 provoked, prev on xarelto and completed tx with Lovenox), and chronic b/l LE edema presents with progressive SOB x 1 month admitted for COPD exacerbation, endocrine consulted for hyperglycemia in setting of high-dose steroids. 67 F pmh pre-diabetes, COPD (not on home O2), HTN, perforated diverticulitis s/p exlap, R colectomy, small bowel resection, and Chacko procedure (12/2016) with reversal (9/2017), PE (2016 provoked, prev on xarelto and completed tx with Lovenox), and chronic b/l LE edema presents with progressive SOB x 1 month admitted for COPD exacerbation, endocrine consulted for hyperglycemia in setting of high-dose steroids and newly dx DM.

## 2018-02-20 NOTE — CONSULT NOTE ADULT - PROBLEM SELECTOR RECOMMENDATION 9
FSs in the high 200s/low 300s in setting of high-dose steroids  HbA1C 8.9 on this admission  Lantus 20 qAM and humalog 10 tid premeals  Low carb diet  FS checks w/ moderate correctional sliding scale at bedtime and premeals -patient newly diagnosed, so she needs a glucometer with supplies as well as a RD consult.

## 2018-02-20 NOTE — PROGRESS NOTE ADULT - SUBJECTIVE AND OBJECTIVE BOX
Patient is a 67y old  Female who presents with a chief complaint of sob (17 Feb 2018 01:23)      SUBJECTIVE / OVERNIGHT EVENTS: Feels better than before, still dypnic on ambulation in hallway, no cp, chills, abdominal pain, had bm     MEDICATIONS  (STANDING):  aclidinium 400 MICROgram Inhaler 1 Puff(s) Inhalation two times a day  ALBUTerol    0.083% 2.5 milliGRAM(s) Nebulizer every 6 hours  aspirin enteric coated 81 milliGRAM(s) Oral daily  atorvastatin 20 milliGRAM(s) Oral daily  dextrose 5%. 1000 milliLiter(s) (50 mL/Hr) IV Continuous <Continuous>  dextrose 50% Injectable 12.5 Gram(s) IV Push once  dextrose 50% Injectable 25 Gram(s) IV Push once  dextrose 50% Injectable 25 Gram(s) IV Push once  dornase daron Solution 2.5 milliGRAM(s) Inhalation two times a day  fluticasone propionate   220 MICROgram(s) HFA Inhaler 1 Puff(s) Inhalation two times a day  furosemide    Tablet 40 milliGRAM(s) Oral daily  heparin  Injectable 5000 Unit(s) SubCutaneous every 8 hours  insulin glargine Injectable (LANTUS) 20 Unit(s) SubCutaneous every morning  insulin lispro (HumaLOG) corrective regimen sliding scale   SubCutaneous at bedtime  insulin lispro (HumaLOG) corrective regimen sliding scale   SubCutaneous three times a day before meals  insulin lispro Injectable (HumaLOG) 13 Unit(s) SubCutaneous three times a day before meals  losartan 50 milliGRAM(s) Oral daily  metoprolol     tartrate 25 milliGRAM(s) Oral two times a day  mometasone 200 MICROgram(s)/formoterol 5 MICROgram(s) Inhaler 2 Puff(s) Inhalation two times a day  pantoprazole    Tablet 40 milliGRAM(s) Oral before breakfast  predniSONE   Tablet 40 milliGRAM(s) Oral daily  psyllium Powder 1 Packet(s) Oral at bedtime    MEDICATIONS  (PRN):  acetaminophen   Tablet. 650 milliGRAM(s) Oral every 6 hours PRN Mild Pain (1 - 3)  dextrose Gel 1 Dose(s) Oral once PRN Blood Glucose LESS THAN 70 milliGRAM(s)/deciliter  glucagon  Injectable 1 milliGRAM(s) IntraMuscular once PRN Glucose LESS THAN 70 milligrams/deciliter  guaiFENesin   Syrup  (Sugar-Free) 200 milliGRAM(s) Oral every 6 hours PRN Cough  HYDROcodone/homatropine Syrup 5 milliLiter(s) Oral every 6 hours PRN sever cough  ibuprofen  Tablet 400 milliGRAM(s) Oral every 6 hours PRN headache        CAPILLARY BLOOD GLUCOSE      POCT Blood Glucose.: 220 mg/dL (20 Feb 2018 12:38)  POCT Blood Glucose.: 357 mg/dL (20 Feb 2018 08:46)  POCT Blood Glucose.: 314 mg/dL (19 Feb 2018 21:55)  POCT Blood Glucose.: 320 mg/dL (19 Feb 2018 17:21)    I&O's Summary    19 Feb 2018 07:01  -  20 Feb 2018 07:00  --------------------------------------------------------  IN: 1500 mL / OUT: 0 mL / NET: 1500 mL        PHYSICAL EXAM:  GENERAL: NAD, well-developed  HEAD:  Atraumatic, Normocephalic  EYES: EOMI, PERRLA, conjunctiva and sclera clear  NECK: Supple, No JVD  CHEST/LUNG: Clear to auscultation bilaterally; No wheeze  HEART: Regular rate and rhythm; S1S2  ABDOMEN: Soft, Nontender, Nondistended; Bowel sounds present, midline hernia   EXTREMITIES:  trace edema le b/l   PSYCH: AAOx3  NEUROLOGY: non-focal  SKIN: No rashes or lesions    LABS:                        11.5   12.57 )-----------( 462      ( 20 Feb 2018 07:23 )             36.6     02-20    135  |  94<L>  |  40<H>  ----------------------------<  283<H>  4.5   |  23  |  0.94    Ca    10.5      20 Feb 2018 07:19                RADIOLOGY & ADDITIONAL TESTS:    Imaging Personally Reviewed:    Consultant(s) Notes Reviewed:  pulm, cardiology     Care Discussed with Consultants/Other Providers:

## 2018-02-20 NOTE — PROGRESS NOTE ADULT - PROBLEM SELECTOR PLAN 6
-pt reports she is taking ibuprofen 600 qhs daily for many weeks  - Pt aware of side effects (PUD, worsening Kidney function)

## 2018-02-20 NOTE — CONSULT NOTE ADULT - PROBLEM SELECTOR RECOMMENDATION 3
C/w atorvastatin  Low cholesterol diet C/w lasix, losartan, and lopressor w/ hold parameters  Monitor BP  Low Na diet

## 2018-02-20 NOTE — PROGRESS NOTE ADULT - SUBJECTIVE AND OBJECTIVE BOX
PULMONARY PROGRESS NOTE    RACHANA BARGER  MRN-[id number]    Patient is a 67y old  Female who presents with a chief complaint of sob (17 Feb 2018 01:23)      HPI:  -feeling better, able to walk the halls.  still has  alot of congestion and feels pulmozyme helps a lot    ROS:   - abdominal discomfort around hernia    MEDICATIONS  (STANDING):  aclidinium 400 MICROgram Inhaler 1 Puff(s) Inhalation two times a day  ALBUTerol    0.083% 2.5 milliGRAM(s) Nebulizer every 6 hours  aspirin enteric coated 81 milliGRAM(s) Oral daily  atorvastatin 20 milliGRAM(s) Oral daily  dextrose 5%. 1000 milliLiter(s) (50 mL/Hr) IV Continuous <Continuous>  dextrose 50% Injectable 12.5 Gram(s) IV Push once  dextrose 50% Injectable 25 Gram(s) IV Push once  dextrose 50% Injectable 25 Gram(s) IV Push once  dornase daron Solution 2.5 milliGRAM(s) Inhalation two times a day  fluticasone propionate   220 MICROgram(s) HFA Inhaler 1 Puff(s) Inhalation two times a day  furosemide    Tablet 40 milliGRAM(s) Oral daily  heparin  Injectable 5000 Unit(s) SubCutaneous every 8 hours  insulin glargine Injectable (LANTUS) 20 Unit(s) SubCutaneous every morning  insulin lispro (HumaLOG) corrective regimen sliding scale   SubCutaneous at bedtime  insulin lispro (HumaLOG) corrective regimen sliding scale   SubCutaneous three times a day before meals  insulin lispro Injectable (HumaLOG) 10 Unit(s) SubCutaneous three times a day before meals  losartan 50 milliGRAM(s) Oral daily  methylPREDNISolone sodium succinate Injectable 20 milliGRAM(s) IV Push four times a day  metoprolol     tartrate 25 milliGRAM(s) Oral two times a day  mometasone 200 MICROgram(s)/formoterol 5 MICROgram(s) Inhaler 2 Puff(s) Inhalation two times a day  pantoprazole    Tablet 40 milliGRAM(s) Oral before breakfast  psyllium Powder 1 Packet(s) Oral at bedtime    MEDICATIONS  (PRN):  acetaminophen   Tablet. 650 milliGRAM(s) Oral every 6 hours PRN Mild Pain (1 - 3)  dextrose Gel 1 Dose(s) Oral once PRN Blood Glucose LESS THAN 70 milliGRAM(s)/deciliter  glucagon  Injectable 1 milliGRAM(s) IntraMuscular once PRN Glucose LESS THAN 70 milligrams/deciliter  guaiFENesin   Syrup  (Sugar-Free) 200 milliGRAM(s) Oral every 6 hours PRN Cough  HYDROcodone/homatropine Syrup 5 milliLiter(s) Oral every 6 hours PRN sever cough  ibuprofen  Tablet 400 milliGRAM(s) Oral every 6 hours PRN headache          EXAM:  Vital Signs Last 24 Hrs  T(C): 36.7 (20 Feb 2018 06:09), Max: 36.9 (19 Feb 2018 21:10)  T(F): 98 (20 Feb 2018 06:09), Max: 98.5 (19 Feb 2018 21:10)  HR: 84 (20 Feb 2018 06:09) (58 - 84)  BP: 141/76 (20 Feb 2018 06:09) (108/66 - 147/93)  BP(mean): --  RR: 18 (20 Feb 2018 06:09) (18 - 18)  SpO2: 97% (20 Feb 2018 06:09) (95% - 97%)    GENERAL: The patient is awake and alert in no apparent distress.     SKIN: Warm, dry, no rashes    LUNGS: clear    HEART: Regular rate and rhythm without murmur.    ABDOMEN: +BS, Soft    EXTREMITIES: No clubbing, cyanosis    LABS/IMGAING: reviewed                        11.5   12.57 )-----------( 462      ( 20 Feb 2018 07:23 )             36.6     02-20    135  |  94<L>  |  40<H>  ----------------------------<  283<H>  4.5   |  23  |  0.94    Ca    10.5      20 Feb 2018 07:19      --< from: Xray Chest 1 View AP/PA (02.16.18 @ 22:30) >  IMPRESSION:   Clear lungs.      PROBLEM LIST:  67y Female with HEALTH ISSUES - PROBLEM Dx:  COPD exacerbation  history of PE  Primary osteoarthritis  Generalized abdominal pain  Chronic diastolic heart failure    RECS:  -COPD exacerbation: would change solumedrol to prednisone 40mg PO daily, continue symbicort, dulera, flovent, albuterol nebs Q6, pulmozyme nebs--will increase to BID at pt request  -provide patient with acapella to aid in airway clearance  -provide patient with abdominal binder to help with hernia when coughing.   -lung nodule: will follow up as outpatient when not acutely ill, planned for march -hx PE: pt improving on COPD treatment, would not scan for PE at this time as she is much better.  -discharge planning--rehab?  will set patient up with pulmonary rehab when she follows up in the office.      Stacie Perales MD  [phone]

## 2018-02-20 NOTE — CONSULT NOTE ADULT - PROBLEM SELECTOR PROBLEM 1
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

## 2018-02-20 NOTE — CONSULT NOTE ADULT - SUBJECTIVE AND OBJECTIVE BOX
HPI:  67 F pmh COPD (not on home O2), HTN, perforated diverticulitis s/p exlap, R colectomy, small bowel resection, and Chacko procedure (2016) with reversal (2017), PE ( provoked, prev on xarelto and completed tx with Lovenox), and chronic b/l LE edema presents with progressive SOB x 1 month. About 1 month ago, patient had a "chest cold" 2/2 viral uri.  After a few days, her sx worsened, and she was eventually diagnosed with the flu.  She states during this time, she was largely bedridden and very SOB; as a result was very noncompliant with her water pills. Sx persisted, and on 18 saw her o/p pulmonologist, who started her on a 5 day course of prednisone 40. 2 days later, she was still not feeling well, and she as given 5 day course of Z-jacek, which she finished. During this time, she was noted to have a worsening compared to baseline of her PFTs, with strong improvement after bronchodilater administration. Currently, patient has had a persistent cough, occasionally productive of whitish/yellow sputum. +abd pain in setting of cough; feels there is a lump in her stomach worse with coughing. +LE edema/orthopnea. SOB is constant at rest but worse with movement. Denies CP/f/c/n/v/d/dysuria.  Due to persistent sx, pt was referred to ER by her pulmonologist for IV steroids.     Pt last admitted in 2017 for sob/fatigue, during which she had her Chacko procedure reversal. Multiple complications included guaiac + anemia with subsequent negative sigmoidoscopy study, ongoing SOB/edema managed both as chf exacerbation (but with normal LV fnx on echo) as well as possibly copd exac, hypotensive episodes, and ALVERTO.     VS: 98.1, 82, 168/61, 20, 98% RA.  Labs: mild leukocytosis (10.6), chronic stable anemia, stable ckd2, CE neg x 1, and elevated probnp (524) at baseline. VBG shows normal pH with no sig hypercapnia. RVP negative. CXR prelim shows no emergent findings. Received tylenol, nebs, solumedrol 125 in ER. (2018 01:23)    Glycemic hx:  Pt reports having been told that her sugars are borderline high x2 years. Prior to initiation of 5 day course of prednisone as noted in the HPI, pt states her A1C was 6.8. Pt was started on metformin for 5 days. At that time, she experienced vomiting and diarrhea. Prior to that, pt had not been on medication for hyperglycemia. Sees ophthalmologist, Dr. Corwin Villar, for cataracts but has noted worsening clouding over the past few weeks. During this admission for COPD exacerbation, pt has been on high-dose steroids. She is presently on solumedrol 20 q6h. Her fingersticks have been persistently elevated in the high 200s/low 300s. A basal-bolus regimen was started in addition to correctional sliding scale.     Subjective: Endorses cough. Although not productive, pt has sensation of chest congestion. No fever, chills, CP, palpitations, SOB, abd pain, nausea, diarrhea, constipation, numbness, or weakness.    PAST MEDICAL & SURGICAL HISTORY:  Colostomy in place  Anemia  Osteoarthritis  Obesity  Palpitations  PE (pulmonary thromboembolism): 2016  Diverticulitis of intestine with perforation and abscess without bleeding, unspecified part of intestinal tract  C. difficile diarrhea: 2016  COPD (chronic obstructive pulmonary disease)  Hypercholesteremia  Hypertension  H/O hemicolectomy: 2016  Status post total replacement of both hips  H/O ovarian cystectomy: b/l  H/O: : x2  History of appendectomy      FAMILY HISTORY:  Family history of hiatal hernia (Sibling)  Family history of COPD (chronic obstructive pulmonary disease)      SOCIAL HISTORY: Former smoker. Smoked 1 PPD x40 yrs, quit 8 years ago. No alcohol or other illicit substance use.    MEDICATIONS  (STANDING):  aclidinium 400 MICROgram Inhaler 1 Puff(s) Inhalation two times a day  ALBUTerol    0.083% 2.5 milliGRAM(s) Nebulizer every 6 hours  aspirin enteric coated 81 milliGRAM(s) Oral daily  atorvastatin 20 milliGRAM(s) Oral daily  dextrose 5%. 1000 milliLiter(s) (50 mL/Hr) IV Continuous <Continuous>  dextrose 50% Injectable 12.5 Gram(s) IV Push once  dextrose 50% Injectable 25 Gram(s) IV Push once  dextrose 50% Injectable 25 Gram(s) IV Push once  dornase daron Solution 2.5 milliGRAM(s) Inhalation two times a day  fluticasone propionate   220 MICROgram(s) HFA Inhaler 1 Puff(s) Inhalation two times a day  furosemide    Tablet 40 milliGRAM(s) Oral daily  heparin  Injectable 5000 Unit(s) SubCutaneous every 8 hours  insulin glargine Injectable (LANTUS) 20 Unit(s) SubCutaneous every morning  insulin lispro (HumaLOG) corrective regimen sliding scale   SubCutaneous at bedtime  insulin lispro (HumaLOG) corrective regimen sliding scale   SubCutaneous three times a day before meals  insulin lispro Injectable (HumaLOG) 13 Unit(s) SubCutaneous three times a day before meals  losartan 50 milliGRAM(s) Oral daily  metoprolol     tartrate 25 milliGRAM(s) Oral two times a day  mometasone 200 MICROgram(s)/formoterol 5 MICROgram(s) Inhaler 2 Puff(s) Inhalation two times a day  pantoprazole    Tablet 40 milliGRAM(s) Oral before breakfast  predniSONE   Tablet 40 milliGRAM(s) Oral daily  psyllium Powder 1 Packet(s) Oral at bedtime    MEDICATIONS  (PRN):  acetaminophen   Tablet. 650 milliGRAM(s) Oral every 6 hours PRN Mild Pain (1 - 3)  dextrose Gel 1 Dose(s) Oral once PRN Blood Glucose LESS THAN 70 milliGRAM(s)/deciliter  glucagon  Injectable 1 milliGRAM(s) IntraMuscular once PRN Glucose LESS THAN 70 milligrams/deciliter  guaiFENesin   Syrup  (Sugar-Free) 200 milliGRAM(s) Oral every 6 hours PRN Cough  HYDROcodone/homatropine Syrup 5 milliLiter(s) Oral every 6 hours PRN sever cough  ibuprofen  Tablet 400 milliGRAM(s) Oral every 6 hours PRN headache      Allergies    Spiriva - palpitations/increased HR        REVIEW OF SYSTEMS      General: No fever or chills	    Skin/Breast: No rash or lesions  	  Ophthalmologic: +b/l clouding of vision  	  ENMT:	no nasal congestion or sore throat    Respiratory and Thorax: +cough (non-productive), + chest congestion, no SOB  	  Cardiovascular:	no CP or palpitations    Gastrointestinal:	no abd pain, nausea, diarrhea, or constipation    Genitourinary:	+increased urinary frequency in setting of diuretic use    Musculoskeletal:	 no joint pain or swelling    Neurological:	no numbness, tingling, or muscle weakness    Endocrine:  no polydipsia or polyphagia	    CAPILLARY BLOOD GLUCOSE      CAPILLARY BLOOD GLUCOSE      POCT Blood Glucose.: 220 mg/dL (2018 12:38)  POCT Blood Glucose.: 357 mg/dL (2018 08:46)  POCT Blood Glucose.: 314 mg/dL (2018 21:55)  POCT Blood Glucose.: 320 mg/dL (2018 17:21)      Vital Signs Last 24 Hrs  T(C): 36.7 (2018 06:09), Max: 36.9 (2018 21:10)  T(F): 98 (2018 06:09), Max: 98.5 (2018 21:10)  HR: 84 (2018 06:09) (58 - 84)  BP: 141/76 (2018 06:09) (128/58 - 147/93)  BP(mean): --  RR: 18 (2018 06:09) (18 - 18)  SpO2: 97% (2018 06:09) (95% - 97%)    PHYSICAL EXAM:      Constitutional:  NAD, sitting up in chair    Eyes:  PERRL, EOMI b/l    ENMT: MMM, oropharynx clear and nonerythematous    Neck: Supple, no LAD    Respiratory: Breath sounds CTA b/l, no wheeze    Cardiovascular: RRR, S1 and S2 present, no murmur    Gastrointestinal: Abd soft, NT, ND, + bowel sounds, + hernia    Extremities: no clubbing, cyanosis, or edema    Vascular: peripheral pulses 2+ b/l    Neurological: no focal deficits, sensation intact to vibration b/l LEs    Skin: warm and dry, no rash        LABS:                        11.5   12.57 )-----------( 462      ( 2018 07:23 )             36.6     02-20    135  |  94<L>  |  40<H>  ----------------------------<  283<H>  4.5   |  23  |  0.94    Ca    10.5      2018 07:19    Hemoglobin A1C, Whole Blood (18 @ 08:44)    Hemoglobin A1C, Whole Blood: 8.9: Method: Immunoassay       Reference Range                4.0-5.6%       High risk (prediabetic)        5.7-6.4%       Diabetic, diagnostic             >=6.5%       ADA diabetic treatment goal       <7.0%  The Hemoglobin A1c testing is NGSP-certified.Reference ranges are based  upon the 2010 recommendations of  the American Diabetes Association.  Interpretation may vary for children  and adolescents. % HPI: 67 F pmh pre-diabetes, COPD (not on home O2), HTN, perforated diverticulitis s/p exlap, R colectomy, small bowel resection, and Chacko procedure (2016) with reversal (2017), PE ( provoked, prev on xarelto and completed tx with Lovenox), and chronic b/l LE edema presents with progressive SOB x 1 month. About 1 month ago, patient had a "chest cold" 2/2 viral uri.  After a few days, her sx worsened, and she was eventually diagnosed with the flu.  She states during this time, she was largely bedridden and very SOB; as a result was very noncompliant with her water pills. Sx persisted, and on 18 saw her o/p pulmonologist, who started her on a 5 day course of prednisone 40. 2 days later, she was still not feeling well, and she as given 5 day course of Z-jacek, which she finished. During this time, she was noted to have a worsening compared to baseline of her PFTs, with strong improvement after bronchodilater administration. Currently, patient has had a persistent cough, occasionally productive of whitish/yellow sputum. +abd pain in setting of cough; feels there is a lump in her stomach worse with coughing. +LE edema/orthopnea. SOB is constant at rest but worse with movement. Denies CP/f/c/n/v/d/dysuria.  Due to persistent sx, pt was referred to ER by her pulmonologist for IV steroids.     Pt last admitted in 2017 for sob/fatigue, during which she had her Chacko procedure reversal. Multiple complications included guaiac + anemia with subsequent negative sigmoidoscopy study, ongoing SOB/edema managed both as chf exacerbation (but with normal LV fnx on echo) as well as possibly copd exac, hypotensive episodes, and ALVERTO.     VS: 98.1, 82, 168/61, 20, 98% RA.  Labs: mild leukocytosis (10.6), chronic stable anemia, stable ckd2, CE neg x 1, and elevated probnp (524) at baseline. VBG shows normal pH with no sig hypercapnia. RVP negative. CXR prelim shows no emergent findings. Received tylenol, nebs, solumedrol 125 in ER. (2018 01:23)    Glycemic hx:  Pt reports having been told that her sugars are borderline high x2 years. Prior to initiation of 5 day course of prednisone as noted in the HPI, pt states her A1C was 6.8. Pt was started on metformin for 5 days. At that time, she experienced vomiting and diarrhea. Prior to that, pt had not been on medication for hyperglycemia. Sees ophthalmologist, Dr. Corwin Villar, for cataracts but has noted worsening clouding over the past few weeks. During this admission for COPD exacerbation, pt has been on high-dose steroids. She is presently on solumedrol 20 q6h. Her fingersticks have been persistently elevated in the high 200s/low 300s. A basal-bolus regimen was started in addition to correctional sliding scale.     Subjective: Endorses cough. Although not productive, pt has sensation of chest congestion. No fever, chills, CP, palpitations, SOB, abd pain, nausea, diarrhea, constipation, numbness, or weakness.    PAST MEDICAL & SURGICAL HISTORY:  Colostomy in place  Anemia  Osteoarthritis  Obesity  Palpitations  PE (pulmonary thromboembolism): 2016  Diverticulitis of intestine with perforation and abscess without bleeding, unspecified part of intestinal tract  C. difficile diarrhea: 2016  COPD (chronic obstructive pulmonary disease)  Hypercholesteremia  Hypertension  H/O hemicolectomy: 2016  Status post total replacement of both hips  H/O ovarian cystectomy: b/l  H/O: : x2  History of appendectomy      FAMILY HISTORY:  Family history of hiatal hernia (Sibling)  Family history of COPD (chronic obstructive pulmonary disease)      SOCIAL HISTORY: Former smoker. Smoked 1 PPD x40 yrs, quit 8 years ago. No alcohol or other illicit substance use.    MEDICATIONS  (STANDING):  aclidinium 400 MICROgram Inhaler 1 Puff(s) Inhalation two times a day  ALBUTerol    0.083% 2.5 milliGRAM(s) Nebulizer every 6 hours  aspirin enteric coated 81 milliGRAM(s) Oral daily  atorvastatin 20 milliGRAM(s) Oral daily  dextrose 5%. 1000 milliLiter(s) (50 mL/Hr) IV Continuous <Continuous>  dextrose 50% Injectable 12.5 Gram(s) IV Push once  dextrose 50% Injectable 25 Gram(s) IV Push once  dextrose 50% Injectable 25 Gram(s) IV Push once  dornase daron Solution 2.5 milliGRAM(s) Inhalation two times a day  fluticasone propionate   220 MICROgram(s) HFA Inhaler 1 Puff(s) Inhalation two times a day  furosemide    Tablet 40 milliGRAM(s) Oral daily  heparin  Injectable 5000 Unit(s) SubCutaneous every 8 hours  insulin glargine Injectable (LANTUS) 20 Unit(s) SubCutaneous every morning  insulin lispro (HumaLOG) corrective regimen sliding scale   SubCutaneous at bedtime  insulin lispro (HumaLOG) corrective regimen sliding scale   SubCutaneous three times a day before meals  insulin lispro Injectable (HumaLOG) 13 Unit(s) SubCutaneous three times a day before meals  losartan 50 milliGRAM(s) Oral daily  metoprolol     tartrate 25 milliGRAM(s) Oral two times a day  mometasone 200 MICROgram(s)/formoterol 5 MICROgram(s) Inhaler 2 Puff(s) Inhalation two times a day  pantoprazole    Tablet 40 milliGRAM(s) Oral before breakfast  predniSONE   Tablet 40 milliGRAM(s) Oral daily  psyllium Powder 1 Packet(s) Oral at bedtime    MEDICATIONS  (PRN):  acetaminophen   Tablet. 650 milliGRAM(s) Oral every 6 hours PRN Mild Pain (1 - 3)  dextrose Gel 1 Dose(s) Oral once PRN Blood Glucose LESS THAN 70 milliGRAM(s)/deciliter  glucagon  Injectable 1 milliGRAM(s) IntraMuscular once PRN Glucose LESS THAN 70 milligrams/deciliter  guaiFENesin   Syrup  (Sugar-Free) 200 milliGRAM(s) Oral every 6 hours PRN Cough  HYDROcodone/homatropine Syrup 5 milliLiter(s) Oral every 6 hours PRN sever cough  ibuprofen  Tablet 400 milliGRAM(s) Oral every 6 hours PRN headache      Allergies    Spiriva - palpitations/increased HR        REVIEW OF SYSTEMS      General: No fever or chills	    Skin/Breast: No rash or lesions  	  Ophthalmologic: +b/l clouding of vision  	  ENMT:	no nasal congestion or sore throat    Respiratory and Thorax: +cough (non-productive), + chest congestion, no SOB  	  Cardiovascular:	no CP or palpitations    Gastrointestinal:	no abd pain, nausea, diarrhea, or constipation    Genitourinary:	+increased urinary frequency in setting of diuretic use    Musculoskeletal:	 no joint pain or swelling    Neurological:	no numbness, tingling, or muscle weakness    Endocrine:  no polydipsia or polyphagia	    CAPILLARY BLOOD GLUCOSE          POCT Blood Glucose.: 220 mg/dL (2018 12:38)  POCT Blood Glucose.: 357 mg/dL (2018 08:46)  POCT Blood Glucose.: 314 mg/dL (2018 21:55)  POCT Blood Glucose.: 320 mg/dL (2018 17:21)      Vital Signs Last 24 Hrs  T(C): 36.7 (2018 06:09), Max: 36.9 (2018 21:10)  T(F): 98 (2018 06:09), Max: 98.5 (2018 21:10)  HR: 84 (2018 06:09) (58 - 84)  BP: 141/76 (2018 06:09) (128/58 - 147/93)  BP(mean): --  RR: 18 (2018 06:09) (18 - 18)  SpO2: 97% (2018 06:09) (95% - 97%)    PHYSICAL EXAM:      Constitutional:  NAD, sitting up in chair    Eyes:  PERRL, EOMI b/l    ENMT: MMM, oropharynx clear and nonerythematous    Neck: Supple, no LAD    Respiratory: Breath sounds CTA b/l, no wheeze    Cardiovascular: RRR, S1 and S2 present, no murmur    Gastrointestinal: Abd soft, NT, ND, + bowel sounds, + hernia    Extremities: no clubbing, cyanosis, or edema    Vascular: peripheral pulses 2+ b/l    Neurological: no focal deficits, sensation intact to vibration b/l LEs    Skin: warm and dry, no rash, no foot ulcers b/l        LABS:                        11.5   12.57 )-----------( 462      ( 2018 07:23 )             36.6     02-20    135  |  94<L>  |  40<H>  ----------------------------<  283<H>  4.5   |  23  |  0.94    Ca    10.5      2018 07:19    Hemoglobin A1C, Whole Blood (18 @ 08:44)    Hemoglobin A1C, Whole Blood: 8.9: Method: Immunoassay       Reference Range                4.0-5.6%       High risk (prediabetic)        5.7-6.4%       Diabetic, diagnostic             >=6.5%       ADA diabetic treatment goal       <7.0%  The Hemoglobin A1c testing is NGSP-certified.Reference ranges are based  upon the 2010 recommendations of  the American Diabetes Association.  Interpretation may vary for children  and adolescents. %

## 2018-02-21 LAB
ANION GAP SERPL CALC-SCNC: 16 MMOL/L — SIGNIFICANT CHANGE UP (ref 5–17)
BASOPHILS # BLD AUTO: 0 K/UL — SIGNIFICANT CHANGE UP (ref 0–0.2)
BASOPHILS NFR BLD AUTO: 0 % — SIGNIFICANT CHANGE UP (ref 0–2)
BUN SERPL-MCNC: 53 MG/DL — HIGH (ref 7–23)
CALCIUM SERPL-MCNC: 9.6 MG/DL — SIGNIFICANT CHANGE UP (ref 8.4–10.5)
CHLORIDE SERPL-SCNC: 94 MMOL/L — LOW (ref 96–108)
CO2 SERPL-SCNC: 26 MMOL/L — SIGNIFICANT CHANGE UP (ref 22–31)
CREAT SERPL-MCNC: 1.3 MG/DL — SIGNIFICANT CHANGE UP (ref 0.5–1.3)
EOSINOPHIL # BLD AUTO: 0.01 K/UL — SIGNIFICANT CHANGE UP (ref 0–0.5)
EOSINOPHIL NFR BLD AUTO: 0.1 % — SIGNIFICANT CHANGE UP (ref 0–6)
GLUCOSE SERPL-MCNC: 143 MG/DL — HIGH (ref 70–99)
HCT VFR BLD CALC: 34.6 % — SIGNIFICANT CHANGE UP (ref 34.5–45)
HGB BLD-MCNC: 10.8 G/DL — LOW (ref 11.5–15.5)
IMM GRANULOCYTES NFR BLD AUTO: 0.3 % — SIGNIFICANT CHANGE UP (ref 0–1.5)
LYMPHOCYTES # BLD AUTO: 28.5 % — SIGNIFICANT CHANGE UP (ref 13–44)
LYMPHOCYTES # BLD AUTO: 3.81 K/UL — HIGH (ref 1–3.3)
MAGNESIUM SERPL-MCNC: 2 MG/DL — SIGNIFICANT CHANGE UP (ref 1.6–2.6)
MCHC RBC-ENTMCNC: 25.4 PG — LOW (ref 27–34)
MCHC RBC-ENTMCNC: 31.2 GM/DL — LOW (ref 32–36)
MCV RBC AUTO: 81.2 FL — SIGNIFICANT CHANGE UP (ref 80–100)
MONOCYTES # BLD AUTO: 1.4 K/UL — HIGH (ref 0–0.9)
MONOCYTES NFR BLD AUTO: 10.5 % — SIGNIFICANT CHANGE UP (ref 2–14)
NEUTROPHILS # BLD AUTO: 8.13 K/UL — HIGH (ref 1.8–7.4)
NEUTROPHILS NFR BLD AUTO: 60.6 % — SIGNIFICANT CHANGE UP (ref 43–77)
PHOSPHATE SERPL-MCNC: 4.2 MG/DL — SIGNIFICANT CHANGE UP (ref 2.5–4.5)
PLATELET # BLD AUTO: 456 K/UL — HIGH (ref 150–400)
POTASSIUM SERPL-MCNC: 3.5 MMOL/L — SIGNIFICANT CHANGE UP (ref 3.5–5.3)
POTASSIUM SERPL-SCNC: 3.5 MMOL/L — SIGNIFICANT CHANGE UP (ref 3.5–5.3)
RBC # BLD: 4.26 M/UL — SIGNIFICANT CHANGE UP (ref 3.8–5.2)
RBC # FLD: 16.1 % — HIGH (ref 10.3–14.5)
SODIUM SERPL-SCNC: 136 MMOL/L — SIGNIFICANT CHANGE UP (ref 135–145)
WBC # BLD: 13.39 K/UL — HIGH (ref 3.8–10.5)
WBC # FLD AUTO: 13.39 K/UL — HIGH (ref 3.8–10.5)

## 2018-02-21 PROCEDURE — 99232 SBSQ HOSP IP/OBS MODERATE 35: CPT

## 2018-02-21 PROCEDURE — 99232 SBSQ HOSP IP/OBS MODERATE 35: CPT | Mod: GC

## 2018-02-21 PROCEDURE — 99233 SBSQ HOSP IP/OBS HIGH 50: CPT

## 2018-02-21 RX ORDER — POTASSIUM CHLORIDE 20 MEQ
40 PACKET (EA) ORAL ONCE
Qty: 0 | Refills: 0 | Status: COMPLETED | OUTPATIENT
Start: 2018-02-21 | End: 2018-02-21

## 2018-02-21 RX ADMIN — DORNASE ALFA 2.5 MILLIGRAM(S): 1 SOLUTION RESPIRATORY (INHALATION) at 10:31

## 2018-02-21 RX ADMIN — Medication 650 MILLIGRAM(S): at 19:47

## 2018-02-21 RX ADMIN — Medication 4: at 09:05

## 2018-02-21 RX ADMIN — ACLIDINIUM BROMIDE 1 PUFF(S): 400 POWDER, METERED RESPIRATORY (INHALATION) at 05:12

## 2018-02-21 RX ADMIN — Medication 650 MILLIGRAM(S): at 19:48

## 2018-02-21 RX ADMIN — Medication 1 PUFF(S): at 05:11

## 2018-02-21 RX ADMIN — Medication 81 MILLIGRAM(S): at 11:04

## 2018-02-21 RX ADMIN — Medication 6: at 13:08

## 2018-02-21 RX ADMIN — ALBUTEROL 2.5 MILLIGRAM(S): 90 AEROSOL, METERED ORAL at 00:11

## 2018-02-21 RX ADMIN — HEPARIN SODIUM 5000 UNIT(S): 5000 INJECTION INTRAVENOUS; SUBCUTANEOUS at 13:08

## 2018-02-21 RX ADMIN — Medication 400 MILLIGRAM(S): at 04:15

## 2018-02-21 RX ADMIN — ALBUTEROL 2.5 MILLIGRAM(S): 90 AEROSOL, METERED ORAL at 05:20

## 2018-02-21 RX ADMIN — Medication 400 MILLIGRAM(S): at 09:36

## 2018-02-21 RX ADMIN — Medication 16 UNIT(S): at 13:09

## 2018-02-21 RX ADMIN — Medication 25 MILLIGRAM(S): at 17:21

## 2018-02-21 RX ADMIN — HEPARIN SODIUM 5000 UNIT(S): 5000 INJECTION INTRAVENOUS; SUBCUTANEOUS at 21:12

## 2018-02-21 RX ADMIN — Medication 650 MILLIGRAM(S): at 00:15

## 2018-02-21 RX ADMIN — Medication 16 UNIT(S): at 09:06

## 2018-02-21 RX ADMIN — ACLIDINIUM BROMIDE 1 PUFF(S): 400 POWDER, METERED RESPIRATORY (INHALATION) at 17:24

## 2018-02-21 RX ADMIN — ALBUTEROL 2.5 MILLIGRAM(S): 90 AEROSOL, METERED ORAL at 17:21

## 2018-02-21 RX ADMIN — PANTOPRAZOLE SODIUM 40 MILLIGRAM(S): 20 TABLET, DELAYED RELEASE ORAL at 05:21

## 2018-02-21 RX ADMIN — Medication 650 MILLIGRAM(S): at 12:30

## 2018-02-21 RX ADMIN — ALBUTEROL 2.5 MILLIGRAM(S): 90 AEROSOL, METERED ORAL at 11:04

## 2018-02-21 RX ADMIN — Medication 650 MILLIGRAM(S): at 01:15

## 2018-02-21 RX ADMIN — ALBUTEROL 2.5 MILLIGRAM(S): 90 AEROSOL, METERED ORAL at 23:15

## 2018-02-21 RX ADMIN — Medication 400 MILLIGRAM(S): at 22:30

## 2018-02-21 RX ADMIN — Medication 400 MILLIGRAM(S): at 10:05

## 2018-02-21 RX ADMIN — Medication 400 MILLIGRAM(S): at 16:50

## 2018-02-21 RX ADMIN — Medication 40 MILLIGRAM(S): at 05:19

## 2018-02-21 RX ADMIN — INSULIN GLARGINE 32 UNIT(S): 100 INJECTION, SOLUTION SUBCUTANEOUS at 22:29

## 2018-02-21 RX ADMIN — MOMETASONE FUROATE AND FORMOTEROL FUMARATE DIHYDRATE 2 PUFF(S): 200; 5 AEROSOL RESPIRATORY (INHALATION) at 05:12

## 2018-02-21 RX ADMIN — MOMETASONE FUROATE AND FORMOTEROL FUMARATE DIHYDRATE 2 PUFF(S): 200; 5 AEROSOL RESPIRATORY (INHALATION) at 17:23

## 2018-02-21 RX ADMIN — HEPARIN SODIUM 5000 UNIT(S): 5000 INJECTION INTRAVENOUS; SUBCUTANEOUS at 05:14

## 2018-02-21 RX ADMIN — Medication 400 MILLIGRAM(S): at 03:00

## 2018-02-21 RX ADMIN — Medication 25 MILLIGRAM(S): at 05:20

## 2018-02-21 RX ADMIN — ATORVASTATIN CALCIUM 20 MILLIGRAM(S): 80 TABLET, FILM COATED ORAL at 21:12

## 2018-02-21 RX ADMIN — Medication 1 PUFF(S): at 17:23

## 2018-02-21 RX ADMIN — DORNASE ALFA 2.5 MILLIGRAM(S): 1 SOLUTION RESPIRATORY (INHALATION) at 21:12

## 2018-02-21 RX ADMIN — Medication 2: at 18:09

## 2018-02-21 RX ADMIN — LOSARTAN POTASSIUM 50 MILLIGRAM(S): 100 TABLET, FILM COATED ORAL at 05:20

## 2018-02-21 RX ADMIN — Medication 650 MILLIGRAM(S): at 12:01

## 2018-02-21 RX ADMIN — Medication 650 MILLIGRAM(S): at 06:04

## 2018-02-21 RX ADMIN — Medication 650 MILLIGRAM(S): at 07:10

## 2018-02-21 RX ADMIN — Medication 16 UNIT(S): at 18:09

## 2018-02-21 RX ADMIN — Medication 40 MILLIEQUIVALENT(S): at 12:27

## 2018-02-21 RX ADMIN — Medication 400 MILLIGRAM(S): at 16:21

## 2018-02-21 NOTE — PROVIDER CONTACT NOTE (MEDICATION) - RECOMMENDATIONS
PA to speak to patient about not taking her own motrin from home. Pt educated on not being allowed to take her own motrin from home.
PA to consult with pharmacy about getting both nebulizer treatments and patient's own proair (albuterol sulfate) inhaler from home.
PA to order fiber for pt
per provider Grace Fiore NP

## 2018-02-21 NOTE — PROGRESS NOTE ADULT - SUBJECTIVE AND OBJECTIVE BOX
Patient is a 67y old  Female who presents with a chief complaint of sob (17 Feb 2018 01:23)      SUBJECTIVE / OVERNIGHT EVENTS: Breathing and cough are better, no cp, abdominal pain better with binder    MEDICATIONS  (STANDING):  aclidinium 400 MICROgram Inhaler 1 Puff(s) Inhalation two times a day  ALBUTerol    0.083% 2.5 milliGRAM(s) Nebulizer every 6 hours  aspirin enteric coated 81 milliGRAM(s) Oral daily  atorvastatin 20 milliGRAM(s) Oral daily  dextrose 5%. 1000 milliLiter(s) (50 mL/Hr) IV Continuous <Continuous>  dextrose 50% Injectable 12.5 Gram(s) IV Push once  dextrose 50% Injectable 25 Gram(s) IV Push once  dextrose 50% Injectable 25 Gram(s) IV Push once  dornase daron Solution 2.5 milliGRAM(s) Inhalation two times a day  fluticasone propionate   220 MICROgram(s) HFA Inhaler 1 Puff(s) Inhalation two times a day  heparin  Injectable 5000 Unit(s) SubCutaneous every 8 hours  insulin glargine Injectable (LANTUS) 32 Unit(s) SubCutaneous at bedtime  insulin lispro (HumaLOG) corrective regimen sliding scale   SubCutaneous at bedtime  insulin lispro (HumaLOG) corrective regimen sliding scale   SubCutaneous three times a day before meals  insulin lispro Injectable (HumaLOG) 16 Unit(s) SubCutaneous three times a day before meals  losartan 50 milliGRAM(s) Oral daily  metoprolol     tartrate 25 milliGRAM(s) Oral two times a day  mometasone 200 MICROgram(s)/formoterol 5 MICROgram(s) Inhaler 2 Puff(s) Inhalation two times a day  Natural Daily Fiber 4 Tablet(s) 4 Tablet(s) Oral <User Schedule>  pantoprazole    Tablet 40 milliGRAM(s) Oral before breakfast  potassium chloride    Tablet ER 40 milliEquivalent(s) Oral once  predniSONE   Tablet 40 milliGRAM(s) Oral daily    MEDICATIONS  (PRN):  acetaminophen   Tablet. 650 milliGRAM(s) Oral every 6 hours PRN Mild Pain (1 - 3)  dextrose Gel 1 Dose(s) Oral once PRN Blood Glucose LESS THAN 70 milliGRAM(s)/deciliter  glucagon  Injectable 1 milliGRAM(s) IntraMuscular once PRN Glucose LESS THAN 70 milligrams/deciliter  guaiFENesin   Syrup  (Sugar-Free) 200 milliGRAM(s) Oral every 6 hours PRN Cough  HYDROcodone/homatropine Syrup 5 milliLiter(s) Oral every 6 hours PRN sever cough  ibuprofen  Tablet 400 milliGRAM(s) Oral every 6 hours PRN headache        CAPILLARY BLOOD GLUCOSE      POCT Blood Glucose.: 211 mg/dL (21 Feb 2018 08:37)  POCT Blood Glucose.: 267 mg/dL (20 Feb 2018 21:22)  POCT Blood Glucose.: 319 mg/dL (20 Feb 2018 17:44)  POCT Blood Glucose.: 220 mg/dL (20 Feb 2018 12:38)    I&O's Summary    20 Feb 2018 07:01  -  21 Feb 2018 07:00  --------------------------------------------------------  IN: 1040 mL / OUT: 0 mL / NET: 1040 mL        PHYSICAL EXAM:  GENERAL: NAD, well-developed  HEAD:  Atraumatic, Normocephalic  EYES: EOMI, PERRLA, conjunctiva and sclera clear  NECK: Supple, No JVD  CHEST/LUNG: Clear to auscultation bilaterally; No wheeze  HEART: Regular rate and rhythm; S1S2  ABDOMEN: Soft, Nontender, Nondistended;binder in place  EXTREMITIES:  trace edema le b/l  PSYCH: AAOx3  NEUROLOGY: non-focal  SKIN: No rashes or lesions    LABS:                        10.8   13.39 )-----------( 456      ( 21 Feb 2018 09:24 )             34.6     02-21    136  |  94<L>  |  53<H>  ----------------------------<  143<H>  3.5   |  26  |  1.30    Ca    9.6      21 Feb 2018 09:21  Phos  4.2     02-21  Mg     2.0     02-21                RADIOLOGY & ADDITIONAL TESTS:    Imaging Personally Reviewed:    Consultant(s) Notes Reviewed:  pulm, cardiology    Care Discussed with Consultants/Other Providers:

## 2018-02-21 NOTE — PROVIDER CONTACT NOTE (MEDICATION) - ACTION/TREATMENT ORDERED:
PA to speak to patient about not taking her own motrin from home
PA to order fiber for pt
per provider Grace Fiore NP
per provider PAPO Alfred to consult with pharmacy about getting both nebulizer treatments and patient's own proair (albuterol sulfate) inhaler from home.

## 2018-02-21 NOTE — PROVIDER CONTACT NOTE (MEDICATION) - REASON
pt asking if she can take her proair (albuterol sulfate) from home along with hospital nebulizer treatment
pt requests daily fiber to be ordered
pt wants to take own fiber capsules from home
pt requests motrin to be administered more often than once daily for pain

## 2018-02-21 NOTE — PROGRESS NOTE ADULT - SUBJECTIVE AND OBJECTIVE BOX
PULMONARY PROGRESS NOTE    RACHANA BARGER  MRN-[id number]    Patient is a 67y old  Female who presents with a chief complaint of sob (17 Feb 2018 01:23)      HPI:  -feeling better, clearing a lot of sputum, no cp or sob, has abdominal binder on and feels better    ROS:   - negative    MEDICATIONS  (STANDING):  aclidinium 400 MICROgram Inhaler 1 Puff(s) Inhalation two times a day  ALBUTerol    0.083% 2.5 milliGRAM(s) Nebulizer every 6 hours  aspirin enteric coated 81 milliGRAM(s) Oral daily  atorvastatin 20 milliGRAM(s) Oral daily  dextrose 5%. 1000 milliLiter(s) (50 mL/Hr) IV Continuous <Continuous>  dextrose 50% Injectable 12.5 Gram(s) IV Push once  dextrose 50% Injectable 25 Gram(s) IV Push once  dextrose 50% Injectable 25 Gram(s) IV Push once  dornase daron Solution 2.5 milliGRAM(s) Inhalation two times a day  fluticasone propionate   220 MICROgram(s) HFA Inhaler 1 Puff(s) Inhalation two times a day  heparin  Injectable 5000 Unit(s) SubCutaneous every 8 hours  insulin glargine Injectable (LANTUS) 32 Unit(s) SubCutaneous at bedtime  insulin lispro (HumaLOG) corrective regimen sliding scale   SubCutaneous at bedtime  insulin lispro (HumaLOG) corrective regimen sliding scale   SubCutaneous three times a day before meals  insulin lispro Injectable (HumaLOG) 16 Unit(s) SubCutaneous three times a day before meals  losartan 50 milliGRAM(s) Oral daily  metoprolol     tartrate 25 milliGRAM(s) Oral two times a day  mometasone 200 MICROgram(s)/formoterol 5 MICROgram(s) Inhaler 2 Puff(s) Inhalation two times a day  Natural Daily Fiber 4 Tablet(s) 4 Tablet(s) Oral <User Schedule>  pantoprazole    Tablet 40 milliGRAM(s) Oral before breakfast  predniSONE   Tablet 40 milliGRAM(s) Oral daily    MEDICATIONS  (PRN):  acetaminophen   Tablet. 650 milliGRAM(s) Oral every 6 hours PRN Mild Pain (1 - 3)  dextrose Gel 1 Dose(s) Oral once PRN Blood Glucose LESS THAN 70 milliGRAM(s)/deciliter  glucagon  Injectable 1 milliGRAM(s) IntraMuscular once PRN Glucose LESS THAN 70 milligrams/deciliter  guaiFENesin   Syrup  (Sugar-Free) 200 milliGRAM(s) Oral every 6 hours PRN Cough  HYDROcodone/homatropine Syrup 5 milliLiter(s) Oral every 6 hours PRN sever cough  ibuprofen  Tablet 400 milliGRAM(s) Oral every 6 hours PRN headache              EXAM:  Vital Signs Last 24 Hrs  T(C): 36.7 (21 Feb 2018 10:12), Max: 36.7 (21 Feb 2018 10:12)  T(F): 98.1 (21 Feb 2018 10:12), Max: 98.1 (21 Feb 2018 10:12)  HR: 60 (21 Feb 2018 10:12) (60 - 77)  BP: 114/72 (21 Feb 2018 10:12) (114/72 - 127/59)  BP(mean): --  RR: 18 (21 Feb 2018 10:12) (18 - 18)  SpO2: 96% (21 Feb 2018 10:12) (96% - 98%)    GENERAL: The patient is awake and alert in no apparent distress.     SKIN: Warm, dry, no rashes    LUNGS: clear    HEART: Regular rate and rhythm without murmur.    ABDOMEN: +BS, Soft    EXTREMITIES: No clubbing, cyanosis    LABS/IMGAING: reviewed                        10.8   13.39 )-----------( 456      ( 21 Feb 2018 09:24 )             34.6   02-21    136  |  94<L>  |  53<H>  ----------------------------<  143<H>  3.5   |  26  |  1.30    Ca    9.6      21 Feb 2018 09:21  Phos  4.2     02-21  Mg     2.0     02-21          --< from: Xray Chest 1 View AP/PA (02.16.18 @ 22:30) >  IMPRESSION:   Clear lungs.      PROBLEM LIST:  67y Female with HEALTH ISSUES - PROBLEM Dx:  COPD exacerbation  history of PE  Primary osteoarthritis  Generalized abdominal pain  Chronic diastolic heart failure    RECS:  -COPD exacerbation: prednisone 40mg PO daily x 5 days, continue symbicort, dulera, flovent, albuterol nebs Q6, pulmozyme nebs  -acapella to aid in airway clearance  -lung nodule: will follow up as outpatient when not acutely ill, planned for march -hx PE: pt improving on COPD treatment, would not scan for PE at this time as she is much better.  -discharge planning--rehab?  will set patient up with pulmonary rehab when she follows up in the office.      Stacie Perales MD  [phone]

## 2018-02-21 NOTE — PROGRESS NOTE ADULT - PROBLEM SELECTOR PLAN 2
-Pt p/w likely mildly decompensated dCHF with worsening LE edema and orthopnea in setting of medication nonadherence  -TTE results noted- Mild diastolic disfunction, normal LV systolic function  -Given rise in Cr and BUN will hold Metolazone for now.   -Will dose lasix depending on weight, per patient takes lasix 40mg when weight is over 214lbs  -Monitor I/Os  -Supplement Kcl 40meq today   -Cardiology recs appreciated

## 2018-02-21 NOTE — PROGRESS NOTE ADULT - ASSESSMENT
67 F pmh pre-diabetes, COPD (not on home O2), HTN, perforated diverticulitis s/p exlap, R colectomy, small bowel resection, and Chacko procedure (12/2016) with reversal (9/2017), PE (2016 provoked, prev on xarelto and completed tx with Lovenox), and chronic b/l LE edema presents with progressive SOB x 1 month admitted for COPD exacerbation, endocrine consulted for hyperglycemia in setting of high-dose steroids and newly dx DM.

## 2018-02-21 NOTE — PROVIDER CONTACT NOTE (MEDICATION) - BACKGROUND
admit dx COPD with acute exacerbation
admit dx: COPD with acute exacerbation

## 2018-02-21 NOTE — PROVIDER CONTACT NOTE (MEDICATION) - ASSESSMENT
a&ox4; anxious and agitated; ambulates independently; pt had her own fiber capsules in her bag and was   collected from her on 2/18/18 and placed into security bag sent to the safe
a&ox4; anxious and irritable; pt requesting to take own fiber capsules from home
pt a&ox4; resting in bed; lung sounds clear on room air
a&ox4; ambulates independently; pt reports than pain is best managed with motrin and requests it to be given more often than once daily; pt stated "I will take my own motrin from my bag if they don't order me more"; Jasvir BARROS notified that pt has her own supply of motrin in her bag.

## 2018-02-21 NOTE — PROGRESS NOTE ADULT - PROBLEM SELECTOR PLAN 4
-pt says worse with coughing; suspect msk etiology related to cough vs hernia  -abdominal XR performed with retained stool, pt having bm  -C/w abdominal binder

## 2018-02-21 NOTE — PROGRESS NOTE ADULT - PROBLEM SELECTOR PLAN 5
A1c 8.9 on admission. Prior A1c was 7. Had been tried on Metformin in the past however developed diarrhea and was stopped. Was not on medication prior to hospitalization. FS remain uncontrolled  -Lantus increased to 32u, humalog 16 units TID premeal. Endo recs appreciated  -Pending recs for outpt regimen

## 2018-02-21 NOTE — PROVIDER CONTACT NOTE (MEDICATION) - SITUATION
pt asking if she can take her proair (albuterol sulfate) from home along with hospital nebulizer treatment
pt requests daily fiber to be ordered
pt wants to take own fiber capsules from home
pt requests motrin to be administered more often than once daily for pain; she states that "the tylenol is not as effective"

## 2018-02-21 NOTE — PROGRESS NOTE ADULT - SUBJECTIVE AND OBJECTIVE BOX
[facility] - Diann Cardenas, Luis, Anu, Britt, Eric Manuel  Office Number:  [phone]    Patient resting comfortably in bed in NAD.  Laying flat with no respiratory distress.  No complaints of chest pain, dyspnea, palpitations, PND, or orthopnea.  Breathing much better. weight is 212 lbs    ROS: negative unless otherwise mentioned.    Telemetry:  not on tele    MEDICATIONS  (STANDING):  aclidinium 400 MICROgram Inhaler 1 Puff(s) Inhalation two times a day  ALBUTerol    0.083% 2.5 milliGRAM(s) Nebulizer every 6 hours  aspirin enteric coated 81 milliGRAM(s) Oral daily  atorvastatin 20 milliGRAM(s) Oral daily  dextrose 5%. 1000 milliLiter(s) (50 mL/Hr) IV Continuous <Continuous>  dextrose 50% Injectable 12.5 Gram(s) IV Push once  dextrose 50% Injectable 25 Gram(s) IV Push once  dextrose 50% Injectable 25 Gram(s) IV Push once  dornase daron Solution 2.5 milliGRAM(s) Inhalation two times a day  fluticasone propionate   220 MICROgram(s) HFA Inhaler 1 Puff(s) Inhalation two times a day  heparin  Injectable 5000 Unit(s) SubCutaneous every 8 hours  insulin glargine Injectable (LANTUS) 32 Unit(s) SubCutaneous at bedtime  insulin lispro (HumaLOG) corrective regimen sliding scale   SubCutaneous at bedtime  insulin lispro (HumaLOG) corrective regimen sliding scale   SubCutaneous three times a day before meals  insulin lispro Injectable (HumaLOG) 16 Unit(s) SubCutaneous three times a day before meals  losartan 50 milliGRAM(s) Oral daily  metoprolol     tartrate 25 milliGRAM(s) Oral two times a day  mometasone 200 MICROgram(s)/formoterol 5 MICROgram(s) Inhaler 2 Puff(s) Inhalation two times a day  Natural Daily Fiber 4 Tablet(s) 4 Tablet(s) Oral <User Schedule>  pantoprazole    Tablet 40 milliGRAM(s) Oral before breakfast  predniSONE   Tablet 40 milliGRAM(s) Oral daily    MEDICATIONS  (PRN):  acetaminophen   Tablet. 650 milliGRAM(s) Oral every 6 hours PRN Mild Pain (1 - 3)  dextrose Gel 1 Dose(s) Oral once PRN Blood Glucose LESS THAN 70 milliGRAM(s)/deciliter  glucagon  Injectable 1 milliGRAM(s) IntraMuscular once PRN Glucose LESS THAN 70 milligrams/deciliter  guaiFENesin   Syrup  (Sugar-Free) 200 milliGRAM(s) Oral every 6 hours PRN Cough  HYDROcodone/homatropine Syrup 5 milliLiter(s) Oral every 6 hours PRN sever cough  ibuprofen  Tablet 400 milliGRAM(s) Oral every 6 hours PRN headache      Allergies    Spiriva (Unknown)    Intolerances        Vital Signs Last 24 Hrs  T(C): 36.4 (21 Feb 2018 05:15), Max: 36.8 (20 Feb 2018 13:39)  T(F): 97.5 (21 Feb 2018 05:15), Max: 98.2 (20 Feb 2018 13:39)  HR: 77 (21 Feb 2018 05:15) (70 - 77)  BP: 116/73 (21 Feb 2018 05:15) (111/54 - 127/59)  BP(mean): --  RR: 18 (21 Feb 2018 05:15) (18 - 18)  SpO2: 98% (21 Feb 2018 05:15) (96% - 98%)    I&O's Summary    20 Feb 2018 07:01  -  21 Feb 2018 07:00  --------------------------------------------------------  IN: 1040 mL / OUT: 0 mL / NET: 1040 mL        ON EXAM:    General: NAD, awake and alert, oriented x 3  HEENT: Mucous membranes are moist, anicteric  Lungs: Non-labored, breath sounds are clear bilaterally, No wheezing, rales or rhonchi  Cardiovascular: Regular, S1 and S2, no murmurs, rubs, or gallops  Gastrointestinal: Bowel Sounds present, soft, nontender.   Lymph: No peripheral edema. No lymphadenopathy.  Skin: No rashes or ulcers  Psych:  Mood & affect appropriate  LABS: All Labs Reviewed:                        10.8   13.39 )-----------( 456      ( 21 Feb 2018 09:24 )             34.6                         11.5   12.57 )-----------( 462      ( 20 Feb 2018 07:23 )             36.6     21 Feb 2018 09:21    136    |  94     |  53     ----------------------------<  143    3.5     |  26     |  1.30   20 Feb 2018 07:19    135    |  94     |  40     ----------------------------<  283    4.5     |  23     |  0.94   19 Feb 2018 07:45    134    |  95     |  42     ----------------------------<  300    4.6     |  25     |  1.07     Ca    9.6        21 Feb 2018 09:21  Ca    10.5       20 Feb 2018 07:19  Ca    10.3       19 Feb 2018 07:45  Phos  4.2       21 Feb 2018 09:21  Mg     2.0       21 Feb 2018 09:21            Blood Culture:

## 2018-02-21 NOTE — PROGRESS NOTE ADULT - ASSESSMENT
67 F PMH COPD (not on home O2), HTN, DM, obesity, PE (2016 provoked, prev on xarelto and completed tx with Lovenox) with RVE, perforated diverticulitis s/p exlap, R colectomy, small bowel resection, and Chacko procedure (12/2016) with reversal (9/2017), , and chronic b/l LE edema presents with progressive SOB x 1 month.  Admitted by her pulmonologist for further management.  She tells me that she is not experiencing any anginal symptoms.  Her trop was negative, BNP = 524, ECG NSR without ST-T wave abnormalities.      Her symptoms are not related to cardiac ischemia  I think she is still dry. Her diuretics have been held. She should be restarted on Lasix alone tomorrow  Check daily weights, strict I&Os.  Her baseline weight is 214 lbs, and she has a strict regimen at home.  Monitor electrolytes closely, especially K, as she takes daily KCl supplements as an outpt  Echocardiogram from this admission reviewed. Normal LV function, moderate diastolic dysfunction. Her pulmonary pressures have improved with euvolemia.  Continue with losartan and metoprolol.   Further management as per pulm and medicine  She will follow up with Dr. De La Torre and have her blood work checked next week. 67 F PMH COPD (not on home O2), HTN, DM, obesity, PE (2016 provoked, prev on xarelto and completed tx with Lovenox) with RVE, perforated diverticulitis s/p exlap, R colectomy, small bowel resection, and Chacko procedure (12/2016) with reversal (9/2017), , and chronic b/l LE edema presents with progressive SOB x 1 month.  Admitted by her pulmonologist for further management.  She tells me that she is not experiencing any anginal symptoms.  Her trop was negative, BNP = 524, ECG NSR without ST-T wave abnormalities.      Her symptoms are not related to cardiac ischemia  I think she is still dry, and her BUN rising. Her diuretics have been held. She should be restarted on Lasix alone tomorrow, pending her weights  Check daily weights, strict I&Os.  Her baseline weight is 214 lbs, and she has a strict regimen at home.  Monitor electrolytes closely, especially K, as she takes daily KCl supplements as an outpt. I would supplement her K today.  Echocardiogram from this admission reviewed. Normal LV function, moderate diastolic dysfunction. Her pulmonary pressures have improved with euvolemia.  Continue with losartan and metoprolol.   Further management as per pulm and medicine  She will follow up with Dr. De La Torre and have her blood work checked next week.

## 2018-02-21 NOTE — PROGRESS NOTE ADULT - SUBJECTIVE AND OBJECTIVE BOX
INTERVAL HPI/OVERNIGHT EVENTS: Weaned off solumedrol and started on prednisone 40, first dose given this AM. Lasix was d/pushpa. Pt c/o HA. Reports relief of chest congestion w/ pulmozyme. Has been bringing up mucous. Denies fever, SOB, CP, palpitations, abd pain, changes in bowel habits, and urinary complaints. Eating and voiding well.    MEDICATIONS  (STANDING):  aclidinium 400 MICROgram Inhaler 1 Puff(s) Inhalation two times a day  ALBUTerol    0.083% 2.5 milliGRAM(s) Nebulizer every 6 hours  aspirin enteric coated 81 milliGRAM(s) Oral daily  atorvastatin 20 milliGRAM(s) Oral daily  dextrose 5%. 1000 milliLiter(s) (50 mL/Hr) IV Continuous <Continuous>  dextrose 50% Injectable 12.5 Gram(s) IV Push once  dextrose 50% Injectable 25 Gram(s) IV Push once  dextrose 50% Injectable 25 Gram(s) IV Push once  dornase daron Solution 2.5 milliGRAM(s) Inhalation two times a day  fluticasone propionate   220 MICROgram(s) HFA Inhaler 1 Puff(s) Inhalation two times a day  heparin  Injectable 5000 Unit(s) SubCutaneous every 8 hours  insulin glargine Injectable (LANTUS) 32 Unit(s) SubCutaneous at bedtime  insulin lispro (HumaLOG) corrective regimen sliding scale   SubCutaneous at bedtime  insulin lispro (HumaLOG) corrective regimen sliding scale   SubCutaneous three times a day before meals  insulin lispro Injectable (HumaLOG) 16 Unit(s) SubCutaneous three times a day before meals  losartan 50 milliGRAM(s) Oral daily  metoprolol     tartrate 25 milliGRAM(s) Oral two times a day  mometasone 200 MICROgram(s)/formoterol 5 MICROgram(s) Inhaler 2 Puff(s) Inhalation two times a day  Natural Daily Fiber 4 Tablet(s) 4 Tablet(s) Oral <User Schedule>  pantoprazole    Tablet 40 milliGRAM(s) Oral before breakfast  predniSONE   Tablet 40 milliGRAM(s) Oral daily    MEDICATIONS  (PRN):  acetaminophen   Tablet. 650 milliGRAM(s) Oral every 6 hours PRN Mild Pain (1 - 3)  dextrose Gel 1 Dose(s) Oral once PRN Blood Glucose LESS THAN 70 milliGRAM(s)/deciliter  glucagon  Injectable 1 milliGRAM(s) IntraMuscular once PRN Glucose LESS THAN 70 milligrams/deciliter  guaiFENesin   Syrup  (Sugar-Free) 200 milliGRAM(s) Oral every 6 hours PRN Cough  HYDROcodone/homatropine Syrup 5 milliLiter(s) Oral every 6 hours PRN sever cough  ibuprofen  Tablet 400 milliGRAM(s) Oral every 6 hours PRN headache      Allergies    Spiriva (Unknown)    Intolerances        REVIEW OF SYSTEMS      General: No fever or chills	    Respiratory and Thorax: + cough w/ sputum production, no SOB  	  Cardiovascular:	No CP or palpitations    Gastrointestinal:	 No abd pain, + good appetite    Genitourinary: No dysuria or polyuria    Vital Signs Last 24 Hrs  T(C): 36.4 (21 Feb 2018 05:15), Max: 36.8 (20 Feb 2018 13:39)  T(F): 97.5 (21 Feb 2018 05:15), Max: 98.2 (20 Feb 2018 13:39)  HR: 77 (21 Feb 2018 05:15) (70 - 77)  BP: 116/73 (21 Feb 2018 05:15) (111/54 - 127/59)  BP(mean): --  RR: 18 (21 Feb 2018 05:15) (18 - 18)  SpO2: 98% (21 Feb 2018 05:15) (96% - 98%)    PHYSICAL EXAM:      Constitutional: NAD, sitting up in chair    Respiratory: Breath sounds CTA b/l, no wheeze    Cardiovascular: RRR, S1 and S2 present, no murmur    Gastrointestinal: Abd soft, + tenderness at site of hernia, ND, + bowel sounds    Extremities: No clubbing, cyanosis, edema, or LE ulcers    Vascular: Peripheral pulses 2+ b/l    Neurological: Alert and oriented, no focal deficits    Skin: Warm and dry, no rash    Musculoskeletal: No joint swelling or muscle weakness    Psychiatric: Reactive affect, appropriate mood        LABS:                        11.5   12.57 )-----------( 462      ( 20 Feb 2018 07:23 )             36.6     02-21    136  |  94<L>  |  53<H>  ----------------------------<  143<H>  3.5   |  26  |  1.30    Ca    9.6      21 Feb 2018 09:21  Phos  4.2     02-21  Mg     2.0     02-21      CAPILLARY BLOOD GLUCOSE      POCT Blood Glucose.: 211 mg/dL (21 Feb 2018 08:37)  POCT Blood Glucose.: 267 mg/dL (20 Feb 2018 21:22)  POCT Blood Glucose.: 319 mg/dL (20 Feb 2018 17:44)  POCT Blood Glucose.: 220 mg/dL (20 Feb 2018 12:38)

## 2018-02-22 ENCOUNTER — TRANSCRIPTION ENCOUNTER (OUTPATIENT)
Age: 68
End: 2018-02-22

## 2018-02-22 ENCOUNTER — APPOINTMENT (OUTPATIENT)
Dept: COLORECTAL SURGERY | Facility: CLINIC | Age: 68
End: 2018-02-22

## 2018-02-22 VITALS
TEMPERATURE: 98 F | HEART RATE: 96 BPM | RESPIRATION RATE: 18 BRPM | SYSTOLIC BLOOD PRESSURE: 143 MMHG | OXYGEN SATURATION: 96 % | DIASTOLIC BLOOD PRESSURE: 83 MMHG

## 2018-02-22 LAB
ANION GAP SERPL CALC-SCNC: 18 MMOL/L — HIGH (ref 5–17)
BUN SERPL-MCNC: 44 MG/DL — HIGH (ref 7–23)
CALCIUM SERPL-MCNC: 9.7 MG/DL — SIGNIFICANT CHANGE UP (ref 8.4–10.5)
CHLORIDE SERPL-SCNC: 95 MMOL/L — LOW (ref 96–108)
CO2 SERPL-SCNC: 24 MMOL/L — SIGNIFICANT CHANGE UP (ref 22–31)
CREAT SERPL-MCNC: 1.13 MG/DL — SIGNIFICANT CHANGE UP (ref 0.5–1.3)
GLUCOSE SERPL-MCNC: 135 MG/DL — HIGH (ref 70–99)
HCT VFR BLD CALC: 35.8 % — SIGNIFICANT CHANGE UP (ref 34.5–45)
HGB BLD-MCNC: 11.2 G/DL — LOW (ref 11.5–15.5)
MAGNESIUM SERPL-MCNC: 2.1 MG/DL — SIGNIFICANT CHANGE UP (ref 1.6–2.6)
MCHC RBC-ENTMCNC: 25.2 PG — LOW (ref 27–34)
MCHC RBC-ENTMCNC: 31.3 GM/DL — LOW (ref 32–36)
MCV RBC AUTO: 80.4 FL — SIGNIFICANT CHANGE UP (ref 80–100)
PLATELET # BLD AUTO: 449 K/UL — HIGH (ref 150–400)
POTASSIUM SERPL-MCNC: 3.5 MMOL/L — SIGNIFICANT CHANGE UP (ref 3.5–5.3)
POTASSIUM SERPL-SCNC: 3.5 MMOL/L — SIGNIFICANT CHANGE UP (ref 3.5–5.3)
RBC # BLD: 4.45 M/UL — SIGNIFICANT CHANGE UP (ref 3.8–5.2)
RBC # FLD: 15.9 % — HIGH (ref 10.3–14.5)
SODIUM SERPL-SCNC: 137 MMOL/L — SIGNIFICANT CHANGE UP (ref 135–145)
WBC # BLD: 12.54 K/UL — HIGH (ref 3.8–10.5)
WBC # FLD AUTO: 12.54 K/UL — HIGH (ref 3.8–10.5)

## 2018-02-22 PROCEDURE — 84132 ASSAY OF SERUM POTASSIUM: CPT

## 2018-02-22 PROCEDURE — 83735 ASSAY OF MAGNESIUM: CPT

## 2018-02-22 PROCEDURE — 97161 PT EVAL LOW COMPLEX 20 MIN: CPT

## 2018-02-22 PROCEDURE — 93306 TTE W/DOPPLER COMPLETE: CPT

## 2018-02-22 PROCEDURE — 96374 THER/PROPH/DIAG INJ IV PUSH: CPT

## 2018-02-22 PROCEDURE — 99232 SBSQ HOSP IP/OBS MODERATE 35: CPT

## 2018-02-22 PROCEDURE — 80048 BASIC METABOLIC PNL TOTAL CA: CPT

## 2018-02-22 PROCEDURE — 83605 ASSAY OF LACTIC ACID: CPT

## 2018-02-22 PROCEDURE — 85027 COMPLETE CBC AUTOMATED: CPT

## 2018-02-22 PROCEDURE — 83036 HEMOGLOBIN GLYCOSYLATED A1C: CPT

## 2018-02-22 PROCEDURE — 99285 EMERGENCY DEPT VISIT HI MDM: CPT | Mod: 25

## 2018-02-22 PROCEDURE — 85014 HEMATOCRIT: CPT

## 2018-02-22 PROCEDURE — 82435 ASSAY OF BLOOD CHLORIDE: CPT

## 2018-02-22 PROCEDURE — 82962 GLUCOSE BLOOD TEST: CPT

## 2018-02-22 PROCEDURE — 87798 DETECT AGENT NOS DNA AMP: CPT

## 2018-02-22 PROCEDURE — 82947 ASSAY GLUCOSE BLOOD QUANT: CPT

## 2018-02-22 PROCEDURE — 93005 ELECTROCARDIOGRAM TRACING: CPT

## 2018-02-22 PROCEDURE — 82553 CREATINE MB FRACTION: CPT

## 2018-02-22 PROCEDURE — 82550 ASSAY OF CK (CPK): CPT

## 2018-02-22 PROCEDURE — 82330 ASSAY OF CALCIUM: CPT

## 2018-02-22 PROCEDURE — 84295 ASSAY OF SERUM SODIUM: CPT

## 2018-02-22 PROCEDURE — 80053 COMPREHEN METABOLIC PANEL: CPT

## 2018-02-22 PROCEDURE — 84100 ASSAY OF PHOSPHORUS: CPT

## 2018-02-22 PROCEDURE — 71045 X-RAY EXAM CHEST 1 VIEW: CPT

## 2018-02-22 PROCEDURE — 87486 CHLMYD PNEUM DNA AMP PROBE: CPT

## 2018-02-22 PROCEDURE — 74019 RADEX ABDOMEN 2 VIEWS: CPT

## 2018-02-22 PROCEDURE — 87581 M.PNEUMON DNA AMP PROBE: CPT

## 2018-02-22 PROCEDURE — 84484 ASSAY OF TROPONIN QUANT: CPT

## 2018-02-22 PROCEDURE — 99239 HOSP IP/OBS DSCHRG MGMT >30: CPT

## 2018-02-22 PROCEDURE — 83880 ASSAY OF NATRIURETIC PEPTIDE: CPT

## 2018-02-22 PROCEDURE — 94640 AIRWAY INHALATION TREATMENT: CPT

## 2018-02-22 PROCEDURE — 87633 RESP VIRUS 12-25 TARGETS: CPT

## 2018-02-22 PROCEDURE — 82803 BLOOD GASES ANY COMBINATION: CPT

## 2018-02-22 RX ORDER — LOSARTAN POTASSIUM 100 MG/1
1 TABLET, FILM COATED ORAL
Qty: 0 | Refills: 0 | COMMUNITY

## 2018-02-22 RX ORDER — LOSARTAN POTASSIUM 100 MG/1
1 TABLET, FILM COATED ORAL
Qty: 30 | Refills: 0
Start: 2018-02-22 | End: 2018-03-23

## 2018-02-22 RX ORDER — ASPIRIN/CALCIUM CARB/MAGNESIUM 324 MG
1 TABLET ORAL
Qty: 0 | Refills: 0 | DISCHARGE
Start: 2018-02-22

## 2018-02-22 RX ORDER — ASPIRIN/CALCIUM CARB/MAGNESIUM 324 MG
1 TABLET ORAL
Qty: 0 | Refills: 0 | COMMUNITY

## 2018-02-22 RX ORDER — MOMETASONE FUROATE AND FORMOTEROL FUMARATE DIHYDRATE 200; 5 UG/1; UG/1
2 AEROSOL RESPIRATORY (INHALATION)
Qty: 0 | Refills: 0 | COMMUNITY

## 2018-02-22 RX ORDER — ALBUTEROL 90 UG/1
2 AEROSOL, METERED ORAL
Qty: 1 | Refills: 0
Start: 2018-02-22 | End: 2018-03-23

## 2018-02-22 RX ORDER — METOPROLOL TARTRATE 50 MG
1 TABLET ORAL
Qty: 60 | Refills: 0
Start: 2018-02-22 | End: 2018-03-23

## 2018-02-22 RX ORDER — ALBUTEROL 90 UG/1
1 AEROSOL, METERED ORAL
Qty: 120 | Refills: 0 | OUTPATIENT
Start: 2018-02-22 | End: 2018-03-23

## 2018-02-22 RX ORDER — FLUTICASONE PROPIONATE 220 MCG
1 AEROSOL WITH ADAPTER (GRAM) INHALATION
Qty: 1 | Refills: 0
Start: 2018-02-22 | End: 2018-03-23

## 2018-02-22 RX ORDER — DORNASE ALFA 1 MG/ML
2.5 SOLUTION RESPIRATORY (INHALATION)
Qty: 150 | Refills: 0
Start: 2018-02-22 | End: 2018-03-23

## 2018-02-22 RX ORDER — ALBUTEROL 90 UG/1
1 AEROSOL, METERED ORAL
Qty: 120 | Refills: 0
Start: 2018-02-22 | End: 2018-03-23

## 2018-02-22 RX ORDER — MOMETASONE FUROATE AND FORMOTEROL FUMARATE DIHYDRATE 200; 5 UG/1; UG/1
2 AEROSOL RESPIRATORY (INHALATION)
Qty: 2 | Refills: 0
Start: 2018-02-22 | End: 2018-03-23

## 2018-02-22 RX ORDER — GLIMEPIRIDE 1 MG
1 TABLET ORAL
Qty: 30 | Refills: 0
Start: 2018-02-22 | End: 2018-03-23

## 2018-02-22 RX ADMIN — Medication 400 MILLIGRAM(S): at 11:26

## 2018-02-22 RX ADMIN — HEPARIN SODIUM 5000 UNIT(S): 5000 INJECTION INTRAVENOUS; SUBCUTANEOUS at 05:09

## 2018-02-22 RX ADMIN — ACLIDINIUM BROMIDE 1 PUFF(S): 400 POWDER, METERED RESPIRATORY (INHALATION) at 05:15

## 2018-02-22 RX ADMIN — Medication 4: at 08:51

## 2018-02-22 RX ADMIN — ALBUTEROL 2.5 MILLIGRAM(S): 90 AEROSOL, METERED ORAL at 11:00

## 2018-02-22 RX ADMIN — PANTOPRAZOLE SODIUM 40 MILLIGRAM(S): 20 TABLET, DELAYED RELEASE ORAL at 05:09

## 2018-02-22 RX ADMIN — ALBUTEROL 2.5 MILLIGRAM(S): 90 AEROSOL, METERED ORAL at 05:10

## 2018-02-22 RX ADMIN — Medication 16 UNIT(S): at 13:03

## 2018-02-22 RX ADMIN — Medication 650 MILLIGRAM(S): at 03:21

## 2018-02-22 RX ADMIN — Medication 25 MILLIGRAM(S): at 05:10

## 2018-02-22 RX ADMIN — Medication 81 MILLIGRAM(S): at 11:00

## 2018-02-22 RX ADMIN — Medication 16 UNIT(S): at 08:52

## 2018-02-22 RX ADMIN — Medication 400 MILLIGRAM(S): at 10:57

## 2018-02-22 RX ADMIN — LOSARTAN POTASSIUM 50 MILLIGRAM(S): 100 TABLET, FILM COATED ORAL at 05:10

## 2018-02-22 RX ADMIN — HEPARIN SODIUM 5000 UNIT(S): 5000 INJECTION INTRAVENOUS; SUBCUTANEOUS at 13:04

## 2018-02-22 RX ADMIN — Medication 40 MILLIGRAM(S): at 05:10

## 2018-02-22 RX ADMIN — Medication 1 PUFF(S): at 05:16

## 2018-02-22 RX ADMIN — Medication 400 MILLIGRAM(S): at 05:14

## 2018-02-22 RX ADMIN — DORNASE ALFA 2.5 MILLIGRAM(S): 1 SOLUTION RESPIRATORY (INHALATION) at 09:48

## 2018-02-22 RX ADMIN — Medication 650 MILLIGRAM(S): at 09:49

## 2018-02-22 RX ADMIN — MOMETASONE FUROATE AND FORMOTEROL FUMARATE DIHYDRATE 2 PUFF(S): 200; 5 AEROSOL RESPIRATORY (INHALATION) at 05:16

## 2018-02-22 RX ADMIN — Medication 650 MILLIGRAM(S): at 10:30

## 2018-02-22 RX ADMIN — Medication 4: at 13:03

## 2018-02-22 NOTE — DISCHARGE NOTE ADULT - PROVIDER TOKENS
TOKEN:'[id number]',TOKEN:'[id number]',FREE:[LAST:[Aida],FIRST:[Tiny],PHONE:[(   )    -],FAX:[(   )    -],ADDRESS:[Address: [address] [place] [address]  Phone: [phone]]],FREE:[LAST:[[facility]],PHONE:[phone]],FAX:[(   )    -],ADDRESS:[[phone]  [address] [place] [address]]]

## 2018-02-22 NOTE — PROGRESS NOTE ADULT - PROBLEM SELECTOR PROBLEM 2
Type 2 diabetes mellitus without complication, without long-term current use of insulin
Acute on chronic diastolic congestive heart failure
Acute on chronic diastolic congestive heart failure
Chronic diastolic heart failure
Chronic diastolic heart failure
Type 2 diabetes mellitus without complication, without long-term current use of insulin
Acute on chronic diastolic congestive heart failure

## 2018-02-22 NOTE — PROGRESS NOTE ADULT - PROBLEM SELECTOR PLAN 2
-Pt p/w likely mildly decompensated dCHF with worsening LE edema and orthopnea in setting of medication nonadherence  -TTE results noted- Mild diastolic disfunction, normal LV systolic function  -Given rise in Cr and BUN will hold Metolazone for now.   -Will dose lasix depending on weight, per patient takes lasix 40mg when weight is over 214lbs  -Monitor I/Os  -Cardiology recs appreciated

## 2018-02-22 NOTE — DISCHARGE NOTE ADULT - HOME CARE AGENCY
Your home care services has been referred to [facility] ([phone]).  Please call them to inquire about  time of Registered Nurse visit.

## 2018-02-22 NOTE — DISCHARGE NOTE ADULT - HOSPITAL COURSE
Would discharge on glimepiride 4mg qd; If FSs are running low once patient off prednisone, would lower glimepiride to 2mg qd.   Patient was advised to call our office [phone] if she has any concerns or if any hyperglycemia >300 mg/dl or hypoglycemia <70mg/dl events.   Scheduled for appointment with MARITA on 3/2 at 3:30 and Dr. Amaya on 4/2 at 5:50 at [address] [place] [address]. 67 F pmh COPD (not on home O2), HTN, perforated diverticulitis s/p Ex lap, R colectomy, small bowel resection, and Chacko procedure (12/2016) with reversal (9/2017), PE (2016 provoked, prev on Xarelto and completed tx with Lovenox), and chronic b/l LE edema presented  with progressive SOB x 1 month admitted for COPD exacerbation. Pt treated with Tamiflu and Steroids. Pt admitted with Hemoglobin A1C of 8.9 and diagnosed with Hyperglycemia. She was evaluated by Endocrine Service and managed with Insulin as In Pt.  She is now stable for discharge. Pt has appointments scheduled with Endocrine Clinic and is instructed to follow up with her Pulmonologist, Cardiologist and PCP within 1 week after discharge from hospital. 67 F pmh COPD (not on home O2), HTN, perforated diverticulitis s/p Ex lap, R colectomy, small bowel resection, and Chacko procedure (12/2016) with reversal (9/2017), PE (2016 provoked, prev on Xarelto and completed tx with Lovenox), and chronic b/l LE edema presented  with progressive SOB x 1 month admitted for COPD exacerbation. Pt treated with nebs and Steroids. Pt admitted with Hemoglobin A1C of 8.9 and diagnosed with DM2. She was evaluated by Endocrine Service and managed with Insulin as In Pt.  She is now stable for discharge. Pt has appointments scheduled with Endocrine Clinic and is instructed to follow up with her Pulmonologist, Cardiologist and PCP within 1 week after discharge from hospital.

## 2018-02-22 NOTE — DISCHARGE NOTE ADULT - ADDITIONAL INSTRUCTIONS
Take oral Diabetic medication Glimepiride 4mg every day.  If Fingerstick Glucose  are running low after you finish taking Prednisone, decrease Glimepiride to 2mg every day.    Please call Endocrine MD's  office at [phone] for  any concerns or if any hyperglycemia >300 mg/dl or hypoglycemia <70mg/dl events.   You  are scheduled for appointment with Registered Dietician  on 3/2 at 3:30 and Dr. Amaya on 4/2 at 5:50 at [address] [place] [address].  Follow up with your Pulmonologist, Cardiologist and PCP within 1 week after discharge from hospital.   Follow up with your Surgeon Dr. Lim as scheduled.   Call to schedule and confirm appointments.

## 2018-02-22 NOTE — DISCHARGE NOTE ADULT - PLAN OF CARE
Call your Health Care provider upon arrival home to make a follow up appointment within one week.  Take all inhalers as prescribed by your Health Care Provider.  Take steroids as prescribed by your Health Care Provider.  If your cough increases infrequency and severity and/or you have shortness of breath or increased shortness of breath call your Health Care Provider.  If you develop fever, chills, night sweats, malaise, and/or change in mental status call your Health care Provider.  Nutrition is very important.  Eat small frequent meals.  Increase your activity as tolerated.  Do not stay in bed all day Weigh yourself daily.  If you gain 3lbs in 3 days, or 5lbs in a week call your Health Care Provider.  Do not eat or drink foods containing more than 2000mg of salt (sodium) in your diet every day.  Call your Health Care Provider if you have any swelling or increased swelling in your feet, ankles, and/or stomach.  Take all of your medication as directed.  If you become dizzy call your Health Care Provider. Your hemoglobin A1C (HgA1C)was 8.9 on 2/18 on  this admission.  Make sure you get your HgA1c checked every three months.  If you take oral diabetes medications, check your blood glucose two times a day.  If you start to take insulin, check your blood glucose before meals and at bedtime.  It's important not to skip any meals.  Keep a log of your blood glucose results and always take it with you to your doctor appointments.  Keep a list of your current medications including injectables and over the counter medications and bring this medication list with you to all your doctor appointments.  If you have not seen your ophthalmologist this year call for appointment.  Check your feet daily for redness, sores, or openings. Do not self treat. If no improvement in two days call your primary care physician for an appointment.  Low blood sugar (hypoglycemia) is a blood sugar below 70mg/dl. Check your blood sugar if you feel signs/symptoms of hypoglycemia. If your blood sugar is below 70 take 15 grams of carbohydrates (ex 4 oz of apple juice, 3-4 glucose tablets, or 4-6 oz of regular soda) wait 15 minutes and repeat blood sugar to make sure it comes up above 70.  If your blood sugar is above 70 and you are due for a meal, have a meal.  If you are not due for a meal have a snack.  This snack helps keeps your blood sugar at a safe range. Take your medication as prescribed.  Follow up with your medical doctor for routine blood pressure monitoring, and to establish long term blood pressure treatment goals.  Low salt diet  Activity as tolerated.  Notify your doctor if you have any of the following symptoms:   Dizziness, Lightheadedness, Blurry vision, Headache, Chest pain, Shortness of breath Take your medication as prescribed.   Follow up with your medical doctor for routine blood  work monitoring, and to establish long term treatment goals. Take your medication as prescribed.   Follow up with your medical doctor for routine monitoring, and to establish long term treatment goals. Resolution of symptoms.

## 2018-02-22 NOTE — PROGRESS NOTE ADULT - PROBLEM SELECTOR PROBLEM 4
Hyperlipidemia
Generalized abdominal pain
Hyperlipidemia
Generalized abdominal pain

## 2018-02-22 NOTE — PROGRESS NOTE ADULT - PROBLEM SELECTOR PROBLEM 1
Hyperglycemia, drug-induced
COPD exacerbation
Hyperglycemia, drug-induced
COPD exacerbation

## 2018-02-22 NOTE — PROGRESS NOTE ADULT - PROVIDER SPECIALTY LIST ADULT
Cardiology
Cardiology
Endocrinology
Hospitalist
Hospitalist
Internal Medicine
Internal Medicine
Pulmonology
Cardiology
Hospitalist
Endocrinology

## 2018-02-22 NOTE — PROGRESS NOTE ADULT - PROBLEM SELECTOR PLAN 3
C/w losartan and lopressor w/ hold parameters  Monitor BP  Low Na diet
-Likely multifactorial, with copd exac + mild decompensated dCHF +/- obesity/deconditioning  -If patient does not improve with above outlined treatment, would consider repeat CTA chest to r/o PE and further eval pulmonary parenchyma in setting of progressive dyspnea
-Likely multifactorial, with copd exac + mild decompensated dCHF +/- obesity/deconditioning  -If patient does not improve with above outlined treatment, would consider repeat CTA chest to r/o PE and further eval pulmonary parenchyma in setting of progressive dyspnea. Pt was due for repeat CT scan in march
-Likely multifactorial, with copd exac + mild decompensated dCHF +/- obesity/deconditioning  -Outpt pulm follow up for repeat CT chest/eval pulmonary nodule
-Likely multifactorial, with copd exac + mild decompensated dCHF +/- obesity/deconditioning  -Outpt pulm follow up for repeat CT chest/eval pulmonary nodule
C/w losartan and lopressor w/ hold parameters  Monitor BP  Low Na diet
-Likely multifactorial, with copd exac + mild decompensated dCHF +/- obesity/deconditioning  -Outpt pulm follow up for repeat CT chest/eval pulmonary nodule

## 2018-02-22 NOTE — PROGRESS NOTE ADULT - SUBJECTIVE AND OBJECTIVE BOX
Patient is a 67y old  Female who presents with a chief complaint of sob (22 Feb 2018 09:16)      SUBJECTIVE / OVERNIGHT EVENTS: Feels better, cough and breathing are better, no cp, had bm,    MEDICATIONS  (STANDING):  aclidinium 400 MICROgram Inhaler 1 Puff(s) Inhalation two times a day  ALBUTerol    0.083% 2.5 milliGRAM(s) Nebulizer every 6 hours  aspirin enteric coated 81 milliGRAM(s) Oral daily  atorvastatin 20 milliGRAM(s) Oral daily  dextrose 5%. 1000 milliLiter(s) (50 mL/Hr) IV Continuous <Continuous>  dextrose 50% Injectable 12.5 Gram(s) IV Push once  dextrose 50% Injectable 25 Gram(s) IV Push once  dextrose 50% Injectable 25 Gram(s) IV Push once  dornase daron Solution 2.5 milliGRAM(s) Inhalation two times a day  fluticasone propionate   220 MICROgram(s) HFA Inhaler 1 Puff(s) Inhalation two times a day  heparin  Injectable 5000 Unit(s) SubCutaneous every 8 hours  insulin glargine Injectable (LANTUS) 32 Unit(s) SubCutaneous at bedtime  insulin lispro (HumaLOG) corrective regimen sliding scale   SubCutaneous at bedtime  insulin lispro (HumaLOG) corrective regimen sliding scale   SubCutaneous three times a day before meals  insulin lispro Injectable (HumaLOG) 16 Unit(s) SubCutaneous three times a day before meals  losartan 50 milliGRAM(s) Oral daily  metoprolol     tartrate 25 milliGRAM(s) Oral two times a day  mometasone 200 MICROgram(s)/formoterol 5 MICROgram(s) Inhaler 2 Puff(s) Inhalation two times a day  Natural Daily Fiber 4 Tablet(s) 4 Tablet(s) Oral <User Schedule>  pantoprazole    Tablet 40 milliGRAM(s) Oral before breakfast  predniSONE   Tablet 40 milliGRAM(s) Oral daily    MEDICATIONS  (PRN):  acetaminophen   Tablet. 650 milliGRAM(s) Oral every 6 hours PRN Mild Pain (1 - 3)  dextrose Gel 1 Dose(s) Oral once PRN Blood Glucose LESS THAN 70 milliGRAM(s)/deciliter  glucagon  Injectable 1 milliGRAM(s) IntraMuscular once PRN Glucose LESS THAN 70 milligrams/deciliter  guaiFENesin   Syrup  (Sugar-Free) 200 milliGRAM(s) Oral every 6 hours PRN Cough  HYDROcodone/homatropine Syrup 5 milliLiter(s) Oral every 6 hours PRN sever cough  ibuprofen  Tablet 400 milliGRAM(s) Oral every 6 hours PRN headache        CAPILLARY BLOOD GLUCOSE      POCT Blood Glucose.: 213 mg/dL (22 Feb 2018 08:35)  POCT Blood Glucose.: 236 mg/dL (21 Feb 2018 22:20)  POCT Blood Glucose.: 192 mg/dL (21 Feb 2018 17:35)  POCT Blood Glucose.: 276 mg/dL (21 Feb 2018 12:57)    I&O's Summary    21 Feb 2018 07:01  -  22 Feb 2018 07:00  --------------------------------------------------------  IN: 1180 mL / OUT: 0 mL / NET: 1180 mL        PHYSICAL EXAM:  GENERAL: NAD, well-developed  HEAD:  Atraumatic, Normocephalic  EYES: EOMI, PERRLA, conjunctiva and sclera clear  NECK: Supple, No JVD  CHEST/LUNG: Clear to auscultation bilaterally; No wheeze  HEART: Regular rate and rhythm; No murmurs, rubs, or gallops  ABDOMEN: Soft, Nontender, Nondistended; Bowel sounds present  EXTREMITIES: trace edema le b/l  PSYCH: AAOx3  NEUROLOGY: non-focal  SKIN: No rashes or lesions    LABS:                        11.2   12.54 )-----------( 449      ( 22 Feb 2018 07:29 )             35.8     02-22    137  |  95<L>  |  44<H>  ----------------------------<  135<H>  3.5   |  24  |  1.13    Ca    9.7      22 Feb 2018 09:37  Phos  4.2     02-21  Mg     2.1     02-22                RADIOLOGY & ADDITIONAL TESTS:    Imaging Personally Reviewed:    Consultant(s) Notes Reviewed:  endocrine    Care Discussed with Consultants/Other Providers:

## 2018-02-22 NOTE — PROGRESS NOTE ADULT - ASSESSMENT
67 F PMH COPD (not on home O2), HTN, DM, obesity, PE (2016 provoked, prev on xarelto and completed tx with Lovenox) with RVE, perforated diverticulitis s/p exlap, R colectomy, small bowel resection, and Chacko procedure (12/2016) with reversal (9/2017), , and chronic b/l LE edema presents with progressive SOB x 1 month.  Admitted by her pulmonologist for further management.  She tells me that she is not experiencing any anginal symptoms.  Her trop was negative, BNP = 524, ECG NSR without ST-T wave abnormalities.      Her symptoms are not related to cardiac ischemia  She remains dry but BUN improving. Her diuretics have been held. She should be restarted on Lasix alone tomorrow, at home.  Check daily weights, strict I&Os.  Her baseline weight is 214 lbs, and she has a strict regimen at home.  Monitor electrolytes closely, especially K, as she takes daily KCl supplements as an outpt. I would supplement her K today.  Echocardiogram from this admission reviewed. Normal LV function, moderate diastolic dysfunction. Her pulmonary pressures have improved with euvolemia.  Continue with losartan and metoprolol.   Further management as per pulm and medicine  She will follow up with Dr. De La Torre and have her blood work checked next week.

## 2018-02-22 NOTE — PROGRESS NOTE ADULT - PROBLEM SELECTOR PROBLEM 3
Hypertension
Dyspnea on exertion
Hypertension
Dyspnea on exertion

## 2018-02-22 NOTE — PROGRESS NOTE ADULT - SUBJECTIVE AND OBJECTIVE BOX
PULMONARY PROGRESS NOTE    RACHANA BARGER  MRN-[id number]    Patient is a 67y old  Female who presents with a chief complaint of sob (17 Feb 2018 01:23)      HPI:  -no complaints, breathing comfortably, ready to go home.  requests script for pulmozyme and nebulizer to go home with     ROS:   - negative    MEDICATIONS  (STANDING):  aclidinium 400 MICROgram Inhaler 1 Puff(s) Inhalation two times a day  ALBUTerol    0.083% 2.5 milliGRAM(s) Nebulizer every 6 hours  aspirin enteric coated 81 milliGRAM(s) Oral daily  atorvastatin 20 milliGRAM(s) Oral daily  dextrose 5%. 1000 milliLiter(s) (50 mL/Hr) IV Continuous <Continuous>  dextrose 50% Injectable 12.5 Gram(s) IV Push once  dextrose 50% Injectable 25 Gram(s) IV Push once  dextrose 50% Injectable 25 Gram(s) IV Push once  dornase daron Solution 2.5 milliGRAM(s) Inhalation two times a day  fluticasone propionate   220 MICROgram(s) HFA Inhaler 1 Puff(s) Inhalation two times a day  heparin  Injectable 5000 Unit(s) SubCutaneous every 8 hours  insulin glargine Injectable (LANTUS) 32 Unit(s) SubCutaneous at bedtime  insulin lispro (HumaLOG) corrective regimen sliding scale   SubCutaneous at bedtime  insulin lispro (HumaLOG) corrective regimen sliding scale   SubCutaneous three times a day before meals  insulin lispro Injectable (HumaLOG) 16 Unit(s) SubCutaneous three times a day before meals  losartan 50 milliGRAM(s) Oral daily  metoprolol     tartrate 25 milliGRAM(s) Oral two times a day  mometasone 200 MICROgram(s)/formoterol 5 MICROgram(s) Inhaler 2 Puff(s) Inhalation two times a day  Natural Daily Fiber 4 Tablet(s) 4 Tablet(s) Oral <User Schedule>  pantoprazole    Tablet 40 milliGRAM(s) Oral before breakfast  predniSONE   Tablet 40 milliGRAM(s) Oral daily    MEDICATIONS  (PRN):  acetaminophen   Tablet. 650 milliGRAM(s) Oral every 6 hours PRN Mild Pain (1 - 3)  dextrose Gel 1 Dose(s) Oral once PRN Blood Glucose LESS THAN 70 milliGRAM(s)/deciliter  glucagon  Injectable 1 milliGRAM(s) IntraMuscular once PRN Glucose LESS THAN 70 milligrams/deciliter  guaiFENesin   Syrup  (Sugar-Free) 200 milliGRAM(s) Oral every 6 hours PRN Cough  HYDROcodone/homatropine Syrup 5 milliLiter(s) Oral every 6 hours PRN sever cough  ibuprofen  Tablet 400 milliGRAM(s) Oral every 6 hours PRN headache      EXAM:  Vital Signs Last 24 Hrs  T(C): 36.7 (21 Feb 2018 10:12), Max: 36.7 (21 Feb 2018 10:12)  T(F): 98.1 (21 Feb 2018 10:12), Max: 98.1 (21 Feb 2018 10:12)  HR: 60 (21 Feb 2018 10:12) (60 - 77)  BP: 114/72 (21 Feb 2018 10:12) (114/72 - 127/59)  BP(mean): --  RR: 18 (21 Feb 2018 10:12) (18 - 18)  SpO2: 96% (21 Feb 2018 10:12) (96% - 98%)    GENERAL: The patient is awake and alert in no apparent distress.     SKIN: Warm, dry, no rashes    LUNGS: clear    HEART: Regular rate and rhythm without murmur.    ABDOMEN: +BS, Soft      LABS/IMGAING: reviewed                                    11.2   12.54 )-----------( 449      ( 22 Feb 2018 07:29 )             35.8     02-22    137  |  95<L>  |  44<H>  ----------------------------<  135<H>  3.5   |  24  |  1.13    Ca    9.7      22 Feb 2018 09:37  Phos  4.2     02-21  Mg     2.1     02-22        --< from: Xray Chest 1 View AP/PA (02.16.18 @ 22:30) >  IMPRESSION:   Clear lungs.      PROBLEM LIST:  67y Female with HEALTH ISSUES - PROBLEM Dx:  COPD exacerbation  history of PE  Primary osteoarthritis  Generalized abdominal pain  Chronic diastolic heart failure    RECS:  -COPD exacerbation: prednisone 40mg PO daily x 5 days, continue dulera, flovent, tudorza albuterol nebs Q6, pulmozyme nebs BID (pt will need new scripts for flovent and pulmozyme--warned her this may be very expensive)  -acapella to aid in airway clearance  -lung nodule: will follow up as outpatient when not acutely ill, planned for march  -discharge planning, will set patient up with pulmonary rehab when she follows up in the office.      Stacie Perales MD  [phone]

## 2018-02-22 NOTE — PROGRESS NOTE ADULT - PROBLEM SELECTOR PLAN 1
FSs in the 200s/low 300s in setting of steroids  HbA1C 8.9 on this admission  -Given Lantus 12 units sq last night and will increase to 32 units sq qhs tonight  -C/w humalog 16 units tid-ac  -diabetic diet  -FS checks w/ moderate correctional sliding scale at bedtime and premeals
-Pt with cough, increased sputum with antecedent viral uri, and interval worsening of o/p PFT with +bronchodilator response .   -Previously on IV solumedrol, now prednisone 40mg qd x5 days  -c/w Pulmzyme bid and Flovent  -c/w albuterol nebs q6 hours  -c/w tudorza for long acting anticholinergic tx (pt has own med)  -c/w dulera (Pt has own med)  -c/w Hycodan PRN  -Pulm recs appreciated
-Pt with cough, increased sputum with antecedent viral uri, and interval worsening of o/p PFT with +bronchodilator response .   -Previously on IV solumedrol, now prednisone 40mg qd x5 days  -c/w Pulmzyme bid and Flovent  -c/w albuterol nebs q6 hours  -c/w tudorza for long acting anticholinergic tx (pt has own med)  -c/w dulera (Pt has own med)  -c/w Hycodan PRN  -Pulm recs appreciated
-Pt with cough, increased sputum with antecedent viral uri, and interval worsening of o/p PFT with +bronchodilator response .   -Pulm following. Solumedrol Increased to 20mg Q6 yesterday. f/u further recommendations regarding steroid therapy  - c/w Pulmzyme and Flovent, added yesterday  -c/w albuterol nebs q6 hours  -c/w tudorza for long acting anticholinergic tx (pt has own med)  -c/w dulera (Pt has own med)  -c/w Hycodan PRN
-Pt with cough, increased sputum, poor air entry b/l on exam, with antecedent viral uri, and interval worsening of o/p PFT with +bronchodilator response.   -Pulm following. Solumedrol Increased to 20mg Q6  -c/w albuterol nebs q6 hours  -c/w tudorza for long acting anticholinergic tx (pt has own med)  -c/w dulera or formulary equivalent  -Add Hycodan PRN
FSs initially were in the 200s/low 300s in setting of steroids; Presently improved to high 100s/low 200s  HbA1C 8.9 on this admission  -Lantus 32 units sq and humalog 16 units tid-ac  -Diabetic diet  -FS checks w/ moderate correctional sliding scale at bedtime and premeals
-Pt with cough, increased sputum with antecedent viral uri, and interval worsening of o/p PFT with +bronchodilator response .   -Previously on IV solumedrol, now prednisone 40mg qd x5 days last day 2/25  -c/w Pulmzyne bid and Flovent  -c/w albuterol nebs q6 hours  -c/w tudorza for long acting anticholinergic tx (pt has own med)  -c/w dulera (Pt has own med)  -c/w Hycodan PRN  -Pulm recs appreciated

## 2018-02-22 NOTE — DISCHARGE NOTE ADULT - MEDICATION SUMMARY - MEDICATIONS TO TAKE
I will START or STAY ON the medications listed below when I get home from the hospital:    Glucometer  --   Fingerstick Glucose Check 4 times per day -    3 times before meals and at bedtime.   -- Indication: For Type 2 diabetes mellitus without complication, without long-term current use of insulin    Lancets  -- Fingerstick Glucose Check 4 times per day -    3 times before meals and at bedtime.     Dispense 1 bottle  -- Indication: For Type 2 diabetes mellitus without complication, without long-term current use of insulin    Glucose Test Strips  -- Fingerstick Glucose Check 4 times per day -    3 times before meals and at bedtime.     Dispense 1 bottle  -- Indication: For Type 2 diabetes mellitus without complication, without long-term current use of insulin    Alcohol Pads  -- Fingerstick Glu Check 4 times per day -  3 times before meals and at bedtime.    Dispense 1 box  -- Indication: For Type 2 diabetes mellitus without complication, without long-term current use of insulin    Nebulizer  -- Indication: For COPD exacerbation    predniSONE 20 mg oral tablet  -- 2 tab(s) by mouth once a day   For 3 days total.  -- Indication: For COPD exacerbation    ibuprofen 600 mg oral tablet  -- 1  by mouth once a day (at bedtime)  -- Indication: For Pain    aspirin 81 mg oral delayed release tablet  -- 1 tab(s) by mouth once a day  -- Indication: For CAD    losartan 50 mg oral tablet  -- 1 tab(s) by mouth once a day  -- Indication: For Hypertension    Amaryl 4 mg oral tablet  -- 1 tab(s) by mouth once a day    PLEASE DISPENSE 2 MG TABS - PATIENT WILL NEED TO DECREASE DOSE IF GLU IS LOW.  -- Avoid prolonged or excessive exposure to direct and/or artificial sunlight while taking this medication.  Do not drink alcoholic beverages when taking this medication.  It is very important that you take or use this exactly as directed.  Do not skip doses or discontinue unless directed by your doctor.    -- Indication: For Type 2 diabetes mellitus without complication, without long-term current use of insulin    atorvastatin 20 mg oral tablet  -- 1 tab(s) by mouth once a day  -- Indication: For Cholesterol    metoprolol tartrate 25 mg oral tablet  -- 1 tab(s) by mouth 2 times a day  -- Indication: For Hypertension    Tudorza Pressair 400 mcg/inh inhalation powder  -- 1 puff(s) inhaled 2 times a day  -- Indication: For COPD exacerbation    albuterol 90 mcg/inh inhalation aerosol  -- 2 puff(s) inhaled every 6 hours, As Needed   -- For inhalation only.  It is very important that you take or use this exactly as directed.  Do not skip doses or discontinue unless directed by your doctor.  Obtain medical advice before taking any non-prescription drugs as some may affect the action of this medication.  Shake well before use.    -- Indication: For Chronic obstructive pulmonary disease with acute exacerbation    albuterol 2.5 mg/3 mL (0.083%) inhalation solution  -- 1  inhaled every 6 hours, As Needed   -- Indication: For COPD exacerbation    Dulera 200 mcg-5 mcg/inh inhalation aerosol  -- 2 puff(s) inhaled 2 times a day  -- Indication: For COPD exacerbation    furosemide 40 mg oral tablet  -- 1 tab(s) by mouth once a day  -- Indication: For Chronic diastolic heart failure    guaiFENesin 100 mg/5 mL oral liquid  -- 10 milliliter(s) by mouth every 6 hours, As needed, Cough  -- Indication: For COugh    Erik-Sequels 50 mg oral tablet, extended release  -- 1 tab(s) by mouth 2 times a day  -- Indication: For Supplement    dornase daron 2.5 mg/2.5 mL inhalation solution  -- 2.5 milliliter(s) inhaled 2 times a day  -- Indication: For COPD exacerbation    omeprazole 40 mg oral delayed release capsule  -- 1 cap(s) by mouth once a day  -- It is very important that you take or use this exactly as directed.  Do not skip doses or discontinue unless directed by your doctor.  Obtain medical advice before taking any non-prescription drugs as some may affect the action of this medication.  Swallow whole.  Do not crush.    -- Indication: For Stomach acid    fluticasone CFC free 220 mcg/inh inhalation aerosol  -- 1 puff(s) inhaled 2 times a day  -- Indication: For COPD exacerbation    HYDROcodone-homatropine 5 mg-1.5 mg/5 mL oral syrup  -- 5 milliliter(s) by mouth every 8 hours MDD:3  -- Caution federal law prohibits the transfer of this drug to any person other  than the person for whom it was prescribed.  May cause drowsiness.  Alcohol may intensify this effect.  Use care when operating dangerous machinery.  Obtain medical advice before taking any non-prescription drugs as some may affect the action of this medication.    -- Indication: For COugh

## 2018-02-22 NOTE — DISCHARGE NOTE ADULT - NSFTFSERV2RD_GEN_ALL_CORE
Airway patent, Nasal mucosa clear. Mouth with normal mucosa. Throat has no vesicles, no oropharyngeal exudates and uvula is midline. TMs clear bilaterally, no graham sign no raccoon eyes
Physical Therapy

## 2018-02-22 NOTE — PROGRESS NOTE ADULT - SUBJECTIVE AND OBJECTIVE BOX
INTERVAL HPI/OVERNIGHT EVENTS: Given education on diabetes yesterday. Got 2nd dose of 5 day course of prednisone 40 this AM. Continues to have cough w/ sputum production. Denies fever, SOB, CP, palpitations, abd pain, changes in bowel habits, and urinary complaints. Eating and voiding well.    MEDICATIONS  (STANDING):  aclidinium 400 MICROgram Inhaler 1 Puff(s) Inhalation two times a day  ALBUTerol    0.083% 2.5 milliGRAM(s) Nebulizer every 6 hours  aspirin enteric coated 81 milliGRAM(s) Oral daily  atorvastatin 20 milliGRAM(s) Oral daily  dextrose 5%. 1000 milliLiter(s) (50 mL/Hr) IV Continuous <Continuous>  dextrose 50% Injectable 12.5 Gram(s) IV Push once  dextrose 50% Injectable 25 Gram(s) IV Push once  dextrose 50% Injectable 25 Gram(s) IV Push once  dornase daron Solution 2.5 milliGRAM(s) Inhalation two times a day  fluticasone propionate   220 MICROgram(s) HFA Inhaler 1 Puff(s) Inhalation two times a day  heparin  Injectable 5000 Unit(s) SubCutaneous every 8 hours  insulin glargine Injectable (LANTUS) 32 Unit(s) SubCutaneous at bedtime  insulin lispro (HumaLOG) corrective regimen sliding scale   SubCutaneous at bedtime  insulin lispro (HumaLOG) corrective regimen sliding scale   SubCutaneous three times a day before meals  insulin lispro Injectable (HumaLOG) 16 Unit(s) SubCutaneous three times a day before meals  losartan 50 milliGRAM(s) Oral daily  metoprolol     tartrate 25 milliGRAM(s) Oral two times a day  mometasone 200 MICROgram(s)/formoterol 5 MICROgram(s) Inhaler 2 Puff(s) Inhalation two times a day  Natural Daily Fiber 4 Tablet(s) 4 Tablet(s) Oral <User Schedule>  pantoprazole    Tablet 40 milliGRAM(s) Oral before breakfast  predniSONE   Tablet 40 milliGRAM(s) Oral daily    MEDICATIONS  (PRN):  acetaminophen   Tablet. 650 milliGRAM(s) Oral every 6 hours PRN Mild Pain (1 - 3)  dextrose Gel 1 Dose(s) Oral once PRN Blood Glucose LESS THAN 70 milliGRAM(s)/deciliter  glucagon  Injectable 1 milliGRAM(s) IntraMuscular once PRN Glucose LESS THAN 70 milligrams/deciliter  guaiFENesin   Syrup  (Sugar-Free) 200 milliGRAM(s) Oral every 6 hours PRN Cough  HYDROcodone/homatropine Syrup 5 milliLiter(s) Oral every 6 hours PRN sever cough  ibuprofen  Tablet 400 milliGRAM(s) Oral every 6 hours PRN headache      Allergies    Spiriva (Unknown)    REVIEW OF SYSTEMS    General: No fever or chills	    Respiratory and Thorax: + cough w/ sputum production, no SOB  	  Cardiovascular:	No CP or palpitations    Gastrointestinal:	 No abd pain, + good appetite    Genitourinary: No dysuria or polyuria    Vital Signs Last 24 Hrs  T(C): 36.6 (22 Feb 2018 04:10), Max: 36.8 (21 Feb 2018 20:30)  T(F): 97.8 (22 Feb 2018 04:10), Max: 98.3 (21 Feb 2018 20:30)  HR: 67 (22 Feb 2018 04:10) (67 - 80)  BP: 134/80 (22 Feb 2018 04:10) (116/55 - 134/80)  BP(mean): --  RR: 18 (22 Feb 2018 04:10) (18 - 18)  SpO2: 96% (22 Feb 2018 04:10) (96% - 97%)    PHYSICAL EXAM:    Constitutional: NAD, laying in bed    Respiratory: Breath sounds CTA b/l, no wheeze    Cardiovascular: RRR, S1 and S2 present, no murmur    Gastrointestinal: Abd soft, + tenderness at site of hernia, ND, + bowel sounds    Extremities: No clubbing, cyanosis, edema, or LE ulcers    Vascular: Peripheral pulses 2+ b/l    Neurological: Alert and oriented, no focal deficits    Skin: Warm and dry, no rash    Musculoskeletal: No joint swelling or muscle weakness    Psychiatric: Reactive affect, appropriate mood          LABS:                        11.2   12.54 )-----------( 449      ( 22 Feb 2018 07:29 )             35.8     02-22    137  |  95<L>  |  44<H>  ----------------------------<  135<H>  3.5   |  24  |  1.13    Ca    9.7      22 Feb 2018 09:37  Phos  4.2     02-21  Mg     2.1     02-22      CAPILLARY BLOOD GLUCOSE      POCT Blood Glucose.: 213 mg/dL (22 Feb 2018 08:35)  POCT Blood Glucose.: 236 mg/dL (21 Feb 2018 22:20)  POCT Blood Glucose.: 192 mg/dL (21 Feb 2018 17:35)  POCT Blood Glucose.: 276 mg/dL (21 Feb 2018 12:57)          RADIOLOGY & ADDITIONAL TESTS:

## 2018-02-22 NOTE — DISCHARGE NOTE ADULT - CARE PLAN
Principal Discharge DX:	COPD exacerbation  Goal:	Resolution of symptoms.  Assessment and plan of treatment:	Call your Health Care provider upon arrival home to make a follow up appointment within one week.  Take all inhalers as prescribed by your Health Care Provider.  Take steroids as prescribed by your Health Care Provider.  If your cough increases infrequency and severity and/or you have shortness of breath or increased shortness of breath call your Health Care Provider.  If you develop fever, chills, night sweats, malaise, and/or change in mental status call your Health care Provider.  Nutrition is very important.  Eat small frequent meals.  Increase your activity as tolerated.  Do not stay in bed all day  Secondary Diagnosis:	Acute on chronic diastolic congestive heart failure  Assessment and plan of treatment:	Weigh yourself daily.  If you gain 3lbs in 3 days, or 5lbs in a week call your Health Care Provider.  Do not eat or drink foods containing more than 2000mg of salt (sodium) in your diet every day.  Call your Health Care Provider if you have any swelling or increased swelling in your feet, ankles, and/or stomach.  Take all of your medication as directed.  If you become dizzy call your Health Care Provider.  Secondary Diagnosis:	Type 2 diabetes mellitus without complication, without long-term current use of insulin  Assessment and plan of treatment:	Your hemoglobin A1C (HgA1C)was 8.9 on 2/18 on  this admission.  Make sure you get your HgA1c checked every three months.  If you take oral diabetes medications, check your blood glucose two times a day.  If you start to take insulin, check your blood glucose before meals and at bedtime.  It's important not to skip any meals.  Keep a log of your blood glucose results and always take it with you to your doctor appointments.  Keep a list of your current medications including injectables and over the counter medications and bring this medication list with you to all your doctor appointments.  If you have not seen your ophthalmologist this year call for appointment.  Check your feet daily for redness, sores, or openings. Do not self treat. If no improvement in two days call your primary care physician for an appointment.  Low blood sugar (hypoglycemia) is a blood sugar below 70mg/dl. Check your blood sugar if you feel signs/symptoms of hypoglycemia. If your blood sugar is below 70 take 15 grams of carbohydrates (ex 4 oz of apple juice, 3-4 glucose tablets, or 4-6 oz of regular soda) wait 15 minutes and repeat blood sugar to make sure it comes up above 70.  If your blood sugar is above 70 and you are due for a meal, have a meal.  If you are not due for a meal have a snack.  This snack helps keeps your blood sugar at a safe range.  Secondary Diagnosis:	Hypertension  Assessment and plan of treatment:	Take your medication as prescribed.  Follow up with your medical doctor for routine blood pressure monitoring, and to establish long term blood pressure treatment goals.  Low salt diet  Activity as tolerated.  Notify your doctor if you have any of the following symptoms:   Dizziness, Lightheadedness, Blurry vision, Headache, Chest pain, Shortness of breath  Secondary Diagnosis:	Hyperlipidemia  Assessment and plan of treatment:	Take your medication as prescribed.   Follow up with your medical doctor for routine blood  work monitoring, and to establish long term treatment goals.  Secondary Diagnosis:	Osteoarthritis  Assessment and plan of treatment:	Take your medication as prescribed.   Follow up with your medical doctor for routine monitoring, and to establish long term treatment goals.

## 2018-02-22 NOTE — DISCHARGE NOTE ADULT - MEDICATION SUMMARY - MEDICATIONS TO CHANGE
I will SWITCH the dose or number of times a day I take the medications listed below when I get home from the hospital:    losartan 25 mg oral tablet  -- 1 tab(s) by mouth once a day

## 2018-02-22 NOTE — DISCHARGE NOTE ADULT - CARE PROVIDERS DIRECT ADDRESSES
[email or web address],[email or web address],DirectAddress_Unknown

## 2018-02-22 NOTE — PROGRESS NOTE ADULT - PROBLEM SELECTOR PLAN 2
Patient newly diagnosed  Given education on diabetes yesterday  Will need glucometer with supplies on discharge  Would discharge on sulfonylurea as patient previously experienced GI upset on metformin Patient newly diagnosed  Given education on diabetes yesterday  Will need glucometer with supplies on discharge  Would avoid metformin on discharge as patient previously experienced GI upset on metformin  Would discharge on glimepiride 4mg qd; If FSs are running low once patient off prednisone, would lower glimepiride to 2mg qd Patient newly diagnosed  Given education on diabetes yesterday  Will need glucometer with supplies on discharge  Would avoid metformin on discharge as patient previously experienced GI upset on metformin  Would discharge on glimepiride 4mg qd; If FSs are running low once patient off prednisone, would lower glimepiride to 2mg qd  Scheduled for appointment with MARITA on 3/2 at 3:30 and Dr. Amaya on 4/2 at 5:50 at [address] [place] [address] Patient newly diagnosed  Given education on diabetes yesterday  Will need glucometer with supplies on discharge  Would avoid metformin on discharge as patient previously experienced GI upset on metformin  Would discharge on glimepiride 4mg qd; If FSs are running low once patient off prednisone, would lower glimepiride to 2mg qd.   Patient was advised to call our office [phone] if she has any concerns or if any hyperglycemia >300 mg/dl or hypoglycemia <70mg/dl events.   Scheduled for appointment with MARITA on 3/2 at 3:30 and Dr. Amaya on 4/2 at 5:50 at [address] [place] [address]

## 2018-02-22 NOTE — DISCHARGE NOTE ADULT - PATIENT PORTAL LINK FT
You can access the [facility] Portal, offered by [facility], by registering with the following website: [email or web address]

## 2018-02-22 NOTE — DISCHARGE NOTE ADULT - SECONDARY DIAGNOSIS.
Acute on chronic diastolic congestive heart failure Type 2 diabetes mellitus without complication, without long-term current use of insulin Hypertension Hyperlipidemia Osteoarthritis

## 2018-02-22 NOTE — PROGRESS NOTE ADULT - PROBLEM SELECTOR PLAN 5
A1c 8.9 on admission. Prior A1c was 7. Had been tried on Metformin in the past however developed diarrhea and was stopped. Was not on medication prior to hospitalization. FS remain uncontrolled  -Lantus increased to 32u, humalog 16 units TID premeal. Endo recs appreciated

## 2018-02-22 NOTE — DISCHARGE NOTE ADULT - CARE PROVIDER_API CALL
Stacie Perales), Critical Care Medicine; Internal Medicine; Pulmonary Disease  [address] [facility]  [address]  [place] [address]  Phone: [phone]  Fax: [phone]    Ganesh De La Torre), Internal Medicine  [address] [place] [address]  Phone: [phone]  Fax: [phone]    Tiny Parra  Address: [address] [place] [address]  Phone: [phone]  Phone: (   )    -  Fax: (   )    -    Endocrine (Diabetic) Clinic,   [phone] [address] [place], [place]  Phone: [phone]  Fax: (   )    -

## 2018-02-22 NOTE — PROGRESS NOTE ADULT - SUBJECTIVE AND OBJECTIVE BOX
[facility] - Diann Cardenas Grossman, Anu, Britt, Eric Manuel  Office Number:  [phone]    Patient resting comfortably in bed in NAD.  Laying flat with no respiratory distress.  No complaints of chest pain, dyspnea, palpitations, PND, or orthopnea.  Breathing much better    ROS: negative unless otherwise mentioned.    Telemetry:  not on tele    MEDICATIONS  (STANDING):  aclidinium 400 MICROgram Inhaler 1 Puff(s) Inhalation two times a day  ALBUTerol    0.083% 2.5 milliGRAM(s) Nebulizer every 6 hours  aspirin enteric coated 81 milliGRAM(s) Oral daily  atorvastatin 20 milliGRAM(s) Oral daily  dextrose 5%. 1000 milliLiter(s) (50 mL/Hr) IV Continuous <Continuous>  dextrose 50% Injectable 12.5 Gram(s) IV Push once  dextrose 50% Injectable 25 Gram(s) IV Push once  dextrose 50% Injectable 25 Gram(s) IV Push once  dornase daron Solution 2.5 milliGRAM(s) Inhalation two times a day  fluticasone propionate   220 MICROgram(s) HFA Inhaler 1 Puff(s) Inhalation two times a day  heparin  Injectable 5000 Unit(s) SubCutaneous every 8 hours  insulin glargine Injectable (LANTUS) 32 Unit(s) SubCutaneous at bedtime  insulin lispro (HumaLOG) corrective regimen sliding scale   SubCutaneous at bedtime  insulin lispro (HumaLOG) corrective regimen sliding scale   SubCutaneous three times a day before meals  insulin lispro Injectable (HumaLOG) 16 Unit(s) SubCutaneous three times a day before meals  losartan 50 milliGRAM(s) Oral daily  metoprolol     tartrate 25 milliGRAM(s) Oral two times a day  mometasone 200 MICROgram(s)/formoterol 5 MICROgram(s) Inhaler 2 Puff(s) Inhalation two times a day  Natural Daily Fiber 4 Tablet(s) 4 Tablet(s) Oral <User Schedule>  pantoprazole    Tablet 40 milliGRAM(s) Oral before breakfast  predniSONE   Tablet 40 milliGRAM(s) Oral daily    MEDICATIONS  (PRN):  acetaminophen   Tablet. 650 milliGRAM(s) Oral every 6 hours PRN Mild Pain (1 - 3)  dextrose Gel 1 Dose(s) Oral once PRN Blood Glucose LESS THAN 70 milliGRAM(s)/deciliter  glucagon  Injectable 1 milliGRAM(s) IntraMuscular once PRN Glucose LESS THAN 70 milligrams/deciliter  guaiFENesin   Syrup  (Sugar-Free) 200 milliGRAM(s) Oral every 6 hours PRN Cough  HYDROcodone/homatropine Syrup 5 milliLiter(s) Oral every 6 hours PRN sever cough  ibuprofen  Tablet 400 milliGRAM(s) Oral every 6 hours PRN headache      Allergies    Spiriva (Unknown)    Intolerances        Vital Signs Last 24 Hrs  T(C): 36.6 (22 Feb 2018 04:10), Max: 36.8 (21 Feb 2018 20:30)  T(F): 97.8 (22 Feb 2018 04:10), Max: 98.3 (21 Feb 2018 20:30)  HR: 67 (22 Feb 2018 04:10) (67 - 80)  BP: 134/80 (22 Feb 2018 04:10) (116/55 - 134/80)  BP(mean): --  RR: 18 (22 Feb 2018 04:10) (18 - 18)  SpO2: 96% (22 Feb 2018 04:10) (96% - 97%)    I&O's Summary    21 Feb 2018 07:01  -  22 Feb 2018 07:00  --------------------------------------------------------  IN: 1180 mL / OUT: 0 mL / NET: 1180 mL        ON EXAM:    General: NAD, awake and alert, oriented x 3  HEENT: Mucous membranes are moist, anicteric  Lungs: Non-labored, breath sounds are clear bilaterally, No wheezing, rales or rhonchi  Cardiovascular: Regular, S1 and S2, no murmurs, rubs, or gallops  Gastrointestinal: Bowel Sounds present, soft, nontender.   Lymph: No peripheral edema. No lymphadenopathy.  Skin: No rashes or ulcers  Psych:  Mood & affect appropriate    LABS: All Labs Reviewed:                        11.2   12.54 )-----------( 449      ( 22 Feb 2018 07:29 )             35.8                         10.8   13.39 )-----------( 456      ( 21 Feb 2018 09:24 )             34.6                         11.5   12.57 )-----------( 462      ( 20 Feb 2018 07:23 )             36.6     22 Feb 2018 09:37    137    |  95     |  44     ----------------------------<  135    3.5     |  24     |  1.13   21 Feb 2018 09:21    136    |  94     |  53     ----------------------------<  143    3.5     |  26     |  1.30   20 Feb 2018 07:19    135    |  94     |  40     ----------------------------<  283    4.5     |  23     |  0.94     Ca    9.7        22 Feb 2018 09:37  Ca    9.6        21 Feb 2018 09:21  Ca    10.5       20 Feb 2018 07:19  Phos  4.2       21 Feb 2018 09:21  Mg     2.1       22 Feb 2018 09:37  Mg     2.0       21 Feb 2018 09:21            Blood Culture:

## 2018-02-27 ENCOUNTER — CHART COPY (OUTPATIENT)
Age: 68
End: 2018-02-27

## 2018-03-02 ENCOUNTER — APPOINTMENT (OUTPATIENT)
Dept: ENDOCRINOLOGY | Facility: CLINIC | Age: 68
End: 2018-03-02

## 2018-03-07 ENCOUNTER — APPOINTMENT (OUTPATIENT)
Dept: CARDIOLOGY | Facility: CLINIC | Age: 68
End: 2018-03-07

## 2018-03-13 ENCOUNTER — RX RENEWAL (OUTPATIENT)
Age: 68
End: 2018-03-13

## 2018-03-13 RX ORDER — METOLAZONE 5 MG/1
5 TABLET ORAL DAILY
Qty: 90 | Refills: 3 | Status: ACTIVE | COMMUNITY
Start: 2017-08-17 | End: 1900-01-01

## 2018-03-13 RX ORDER — POTASSIUM CHLORIDE 750 MG/1
10 TABLET, FILM COATED, EXTENDED RELEASE ORAL
Qty: 90 | Refills: 3 | Status: ACTIVE | COMMUNITY
Start: 2017-10-10 | End: 1900-01-01

## 2018-04-02 ENCOUNTER — APPOINTMENT (OUTPATIENT)
Dept: ENDOCRINOLOGY | Facility: CLINIC | Age: 68
End: 2018-04-02

## 2018-07-18 NOTE — PROGRESS NOTE ADULT - ASSESSMENT
Yes  Patient: Ana Rosa Rascon Date: 2018   : 1931    87 year old female      INPATIENT WOUND CARE CONSULT NOTE   Supervising Wound Care / Hyperbaric Medicine Physician: Not Applicable  Consulting Provider:  Yassine Harmon MD  Date of Consultation/Last Comprehensive Exam:  2018  Referring  Provider:  MONIQUE Palmer  SUBJECTIVE:    Chief Complaint:  Bilateral ankle wounds      Wound/Ulcer Present:    Venous leg ulcer:  Current Vascular Assessment:  Ankle-brachial indices (HOWARD), Arterial duplex study and Venous insufficiency study   Current Compression Therapy: Dome/Cotton/Coban    Additional Wound Category:  Arterial insufficiency ulcer     Maximum Baseline Ambulatory Status:  Walks with walker, primarily wheelchair bound    History of Present Illness:  This is a 87 year old diabetic female with chronic recurrent venous wounds. She has peripheral arterial disease with prior intervention by Dr. Jauregui. She last saw Dr. Jauregui on 2016, who recommended conservative wound care with no acute need for vascular intervention.    Saw IR on 17 for further evaluation of her venous insufficiency. She was given an Rx for thigh high or CHAP hosiery 30-40 mmHg and recommended bilateral EVLA starting with left leg. Daughter Is not interested in pursuing this due to the need for sedation/anesthesia.    Interval history 2018:   Well known to the wound care service and last seen 2018 at the wound care clinic, we have been following over the years for BLE mixed etiology ulcerations (arterial; venous).  Admit to the hospital on 2018 with weakness and left sided facial droop. Patient seen with the floor RN. Patient is lethargic. Oriented to person only. Opens eyes to verbal stimuli. Unable to provide any history. Appears comfortable. No fever or chills.     Current Treatment Regimen:  Dressing:  Medium tetra HFB and Therabond.   Frequency:  Three times a week   Changed by:  Family      Review of Systems:  Review of systems not obtained due to patient factors.    Past Medical History:   Diagnosis Date   • Anemia    • Arthritis     BACK, USES A CANE   • Asthma    • Asthma    • Breast cancer (CMS/[facility])    • Cardiac failure congestive    • Cardiomegaly    • COPD (chronic obstructive pulmonary disease) (CMS/[facility])    • Diabetes mellitus (CMS/[facility])    • Diverticula of colon    • GERD (gastroesophageal reflux disease)    • Head ache    • High cholesterol    • [place] (hard of hearing)    • HTN (hypertension)    • Hyperlipidemia    • Non-healing ulcer of foot (CMS/[facility])    • Obesity    • PAD (peripheral artery disease) (CMS/[facility])    • Pneumonia    • Sleep apnea     C-PAP   • TIA (transient ischemic attack)    • Venous insufficiency     POOR HEALING WOUNDS, TREATED WITH HYPERBARIC   • Wears glasses      Past Surgical History:   Procedure Laterality Date   • Breast surgery  1994    LEFT MASTECTOMY   • Colonoscopy  12/2000    diverticuli   • Peripheral angiogram/possible pta/possible stent  6/12/12    focus on LLE   • Pta  12/2007    Right proximanl anterior tibial and peroneal arteries   • Pta with stent  4/2010    Right distal SFA and proximal popliteal artery   • Pta with stent  5/9/2012    Right SFA     Social History     Social History   • Marital status:      Spouse name: N/A   • Number of children: N/A   • Years of education: N/A     Occupational History   • Not on file.     Social History Main Topics   • Smoking status: Never Smoker   • Smokeless tobacco: Never Used   • Alcohol use No   • Drug use: No   • Sexual activity: Not on file     Other Topics Concern   • Not on file     Social History Narrative   • No narrative on file     Family History   Problem Relation Age of Onset   • Diabetes Mother    • Diabetes Sister    • Cancer Son        Current Facility-Administered Medications   Medication   • atorvastatin (LIPITOR) tablet 20 mg   • clopidogrel (PLAVIX) tablet 75 mg   • aspirin (ECOTRIN) enteric  67 F pmh COPD (not on home O2), HTN, perforated diverticulitis s/p exlap, R colectomy, small bowel resection, and Chacko procedure (12/2016) with reversal (9/2017), PE (2016 provoked, prev on xarelto and completed tx with Lovenox), and chronic b/l LE edema presents with progressive SOB x 1 month admitted for COPD exacerbation coated tablet 81 mg   • metoPROLOL tartrate (LOPRESSOR) tablet 25 mg   • OLANZapine (ZyPREXA) tablet 2.5 mg   • OLANZapine (ZyPREXA) tablet 5 mg   • polyethylene glycol (GLYCOLAX, MIRALAX) packet 17 g   • verapamil tablet 80 mg   • cholecalciferol (VITAMIN D3) tablet 2,000 Units   • zinc oxide 20 % ointment   • sodium chloride (PF) 0.9 % injection 2 mL   • sodium chloride (PF) 0.9 % injection 2 mL   • sodium chloride (PF) 0.9 % injection 20 mL   • potassium chloride (KLOR-CON M) regina ER tablet 20 mEq   • potassium chloride (KLOR-CON) packet 20 mEq   • potassium chloride (KLOR-CON M) regina ER tablet 40 mEq   • potassium chloride (KLOR-CON) packet 40 mEq   • sodium chloride 0.9 % flush bag 500 mL   • heparin (porcine) injection 5,000 Units   • acetaminophen (TYLENOL) tablet 650 mg   • docusate sodium-sennosides (SENOKOT S) 50-8.6 MG 2 tablet   • magnesium hydroxide (MILK OF MAGNESIA) concentrate 2,400 mg   • bisacodyl (DULCOLAX) suppository 10 mg   • aluminum-magnesium hydroxide-simethicone (MAALOX) 200-200-20 MG/5ML suspension 30 mL   • magnesium sulfate 1 g in dextrose 5% 100 mL IVPB premix   • magnesium sulfate 2 g in 50 mL premix IVPB   • magnesium sulfate 2 g in 50 mL premix IVPB   • sodium chloride 0.45 % infusion   • gentamicin (GARAMYCIN) 0.1 % cream   • dextrose 50 % injection 25 g   • dextrose 5 % infusion   • glucagon (GLUCAGEN) injection 1 mg   • dextrose (GLUTOSE) 40 % gel 15 g   • insulin lispro (HumaLOG) sliding scale injection   • docusate sodium-sennosides (SENOKOT S) 50-8.6 MG 1 tablet        ALLERGIES: no known allergies.    OBJECTIVE:  Vital Signs:    Visit Vitals  /82   Pulse 72   Temp 97.4 °F (36.3 °C) (Oral)   Resp 16   Ht 4' 11\" (1.499 m)   Wt 68.8 kg   SpO2 100%   BMI 30.63 kg/m²         Physical Exam:  General appearance: Appears stated age, thin, oriented to Person and in no distress  Head:   normocephalic without obvious abnormality  Eye:  conjunctivae/sclerae normal. No erythema,  edema or exudate.  Mouth:   dry mucous membranes   Pulmonary: normal respiratory effort  Abdomen: soft, non-tender  Neurologic:  left facial droop  Extremities: No clubbing, No cyanosis and no edema to the BLE  Ulcer and Wound: see below      Bilateral legs without edema.  Flaking epidermis and extremely dry skin to the BLE.     Left medial full thickness ankle wound is healed. Left foot lateral eschars have resolved.  Left dorsum with a small superficial >pea sized granular ulcer, granular.     Right medial full thickness dry ankle wound with viable and nonviable tissue.  Periwound intact.  No warmth, purulence, or malodor. No drainage.     Bilateral heels soft. No erythema. No open ulcers. Nonpalpable pedal pulses.     Wound Bed Quality:  as above      Raine-wound Quality:    as above    Additional Descriptors:  none    Wound Measurements Per Flowsheet:  Wound Ankle Right Medial Non-pressure ulcer (Active)   Wound Length (cm) 2 cm 7/9/2018  2:26 PM   Wound Width (cm) 1 cm 7/9/2018  2:26 PM   Wound Depth (cm) 0.2 cm 7/9/2018  2:26 PM   Wound Surface Area (cm2) 2 cm2 7/9/2018  2:26 PM   Wound Volume (cm3) 0.4 cm3 7/9/2018  2:26 PM   Number of days: 2164       Wound Sloughing Left Medial Ankle (Active)   Number of days: 2164       Wound Sloughing Left Medial Ankle (Active)   Number of days: 2164       Wound Right Medial Ankle (Active)   Number of days: 2164       Wound Non-pressure ulcer Right Medial Ankle (Active)   Number of days: 2164       Wound Heel Right Plantar (Active)   Number of days: 1598       Wound Ankle Left Medial Non-pressure ulcer (Active)   Wound Length (cm) 1 cm 7/9/2018  2:26 PM   Wound Width (cm) 1.3 cm 7/9/2018  2:26 PM   Wound Depth (cm) 0.2 cm 7/9/2018  2:26 PM   Wound Surface Area (cm2) 1.3 cm2 7/9/2018  2:26 PM   Wound Volume (cm3) 0.26 cm3 7/9/2018  2:26 PM   Number of days: 803       Wound Foot Left Lateral (Active)   Wound Length (cm) 6.5 cm 7/9/2018  2:26 PM   Wound Width (cm) 1.2 cm  7/9/2018  2:26 PM   Wound Depth (cm) 0.2 cm 7/9/2018  2:26 PM   Wound Surface Area (cm2) 7.8 cm2 7/9/2018  2:26 PM   Wound Volume (cm3) 1.56 cm3 7/9/2018  2:26 PM   Number of days: 23       Wound Foot Left Medial (Active)   Wound Length (cm) 1 cm 7/9/2018  2:26 PM   Wound Width (cm) 1.7 cm 7/9/2018  2:26 PM   Wound Surface Area (cm2) 1.7 cm2 7/9/2018  2:26 PM   Number of days: 23       Wound Buttock Middle;Upper Pressure injury (Active)   Number of days: 1     PROCEDURE:None performed  Not indicated    Procedure was Performed by:  Not applicable   Laboratory assessments reviewed:  PREALBUMIN (mg/dL)   Date Value   08/17/2012 14.9 (L)      Albumin (g/dL)   Date Value   07/17/2018 2.7 (L)   08/14/2012 3.0 (L)        Recent Labs  Lab 07/18/18  0811   GLUCOSE BEDSIDE 80       Lab Results   Component Value Date    WBC 4.5 07/18/2018    GLUCOSE 93 07/18/2018    CRP 2.9 (H) 08/17/2012    RESR 35 (H) 08/17/2012    CREATININE 0.86 07/18/2018    GFRA 70 07/18/2018    GFRNA 61 07/18/2018        Culture results:  Specimen Description (no units)   Date Value   09/23/2017 URINE, CLEAN CATCH/MIDSTREAM   07/31/2017 SWAB WOUND, DEEP ANKLE RIGHT   04/15/2016 SWAB FOOT FOOT RIGHT MEDIAL ANKLE   02/16/2015 SWAB LEFT POST LEG    CULTURE (no units)   Date Value   09/23/2017 >100,000 CFU/mL ESCHERICHIA COLI (P)   07/31/2017 MANY TRUEPERELLA BERNARDIAE (P)   07/31/2017 (P)    MANY PORPHYROMONAS ASACCHAROLYTICUS Note: The Prevotella/Porphyromonas typically respond to metronidazole, amp/sulbactam, pip/tazo, or meropenem.   07/31/2017     Upon further incubation,culture found to be mixed. Further identification and susceptibility testing discontinued. MODERATE MIXED ANAEROBIC JOEY No B. fragilis group or C. perfringens isolated.   07/31/2017 MODERATE ESCHERICHIA COLI (P)   07/31/2017 MODERATE ENTEROCOCCUS FAECALIS (P)   07/31/2017 MODERATE STAPHYLOCOCCUS AUREUS (P)   07/31/2017 MANY DIPHTHEROIDS (P)        Diagnostic Assessments  Reviewed:  No new update       Nutritional Assessment:  Reviewed    Wound treatment goals are palliative:  Yes    DIAGNOSES:  Peripheral arterial disease BLE  Venous insufficiency ulcer, chronic, lower extremity bilateral medial ankles  Has the patient received adequate compression therapy for equal to or greater than 4 weeks?  Yes  Venous insufficiency, chronic BLEs Has the patient received adequate compression therapy for equal to or greater than 4 weeks?  Yes  Protein malnutrition     Medical Decision Making: reconsult  Bilateral medial ankle wounds, in the setting of PAD and venous insufficiency.    Not amenable to further arterial interventions and patient and family not interested in pursuing venous intervention.     Admit to [facility] 7/17/18: weakness and left sided facial droop.     BLE without edema and extremely dry skin with a small open stable ulcer on the left dorsum and also a stable medial right ankle ulcer. Wounds are very dry. No s/sx of infection. Hydrofera blue was dry and adherent to the wounds upon removal.      -Silvadene daily to the left foot wound and also the right medial ankle wound after washing gently with soap and water.   -Remedy skin repair cream to the BLE BID.   -Needs BLE Prevalon boots when in bed and chair. S  -Skin prep to heels daily.   -No compression needed at this time.       Vascular  Has had recommendation from IR for EVLA but the patient's daughter is not interested in putting her mother through that procedure due to the need for sedation/anesthesia. Reviewed and confirmed again 6/25/18    HOWARD: No evidence of resting arterial insufficiency. Arterial Duplex 6/12/17 demonstrated mild right femoral and popliteal stenoses and moderate left SFA origin stenosis. She last saw Dr. Jauregui on 5/6/2016, who recommended conservative wound care with no acute need for vascular intervention.    Nutrition  Encourage protein rich diet to promote wound healing.    DM  Continue good glucose  control. A1C 5.8 [facility]11[facility]. Repeat HgA1C ordered today 7/18/2018.     ID  Wounds do not appear infected.      Plan of Care:  Advanced Wound Care Recommendations:  See above.  Percent Wound Closure from consult:  NOAH  Care plan to augment wound closure:   NOAH Suarez NP

## 2019-06-03 ENCOUNTER — INPATIENT (INPATIENT)
Facility: HOSPITAL | Age: 69
LOS: 2 days | Discharge: NURSING, REHAB OR LONG-TERM CARE | End: 2019-06-06
Attending: FAMILY MEDICINE | Admitting: FAMILY MEDICINE
Payer: MEDICARE

## 2019-06-03 VITALS
SYSTOLIC BLOOD PRESSURE: 109 MMHG | HEIGHT: 64 IN | RESPIRATION RATE: 17 BRPM | OXYGEN SATURATION: 95 % | WEIGHT: 214.95 LBS | DIASTOLIC BLOOD PRESSURE: 47 MMHG | HEART RATE: 80 BPM | TEMPERATURE: 99 F

## 2019-06-03 DIAGNOSIS — Z90.49 ACQUIRED ABSENCE OF OTHER SPECIFIED PARTS OF DIGESTIVE TRACT: Chronic | ICD-10-CM

## 2019-06-03 DIAGNOSIS — Z96.643 PRESENCE OF ARTIFICIAL HIP JOINT, BILATERAL: Chronic | ICD-10-CM

## 2019-06-03 DIAGNOSIS — Z98.890 OTHER SPECIFIED POSTPROCEDURAL STATES: Chronic | ICD-10-CM

## 2019-06-03 DIAGNOSIS — Z98.891 HISTORY OF UTERINE SCAR FROM PREVIOUS SURGERY: Chronic | ICD-10-CM

## 2019-06-03 LAB
ALBUMIN SERPL ELPH-MCNC: 2.8 G/DL — LOW (ref 3.3–5)
ALP SERPL-CCNC: 104 U/L — SIGNIFICANT CHANGE UP (ref 40–120)
ALT FLD-CCNC: 20 U/L — SIGNIFICANT CHANGE UP (ref 12–78)
ANION GAP SERPL CALC-SCNC: 8 MMOL/L — SIGNIFICANT CHANGE UP (ref 5–17)
ANISOCYTOSIS BLD QL: SLIGHT — SIGNIFICANT CHANGE UP
APPEARANCE UR: ABNORMAL
APTT BLD: 39.3 SEC — HIGH (ref 27.5–36.3)
AST SERPL-CCNC: 10 U/L — LOW (ref 15–37)
BACTERIA # UR AUTO: ABNORMAL
BASOPHILS # BLD AUTO: 0.07 K/UL — SIGNIFICANT CHANGE UP (ref 0–0.2)
BASOPHILS NFR BLD AUTO: 1 % — SIGNIFICANT CHANGE UP (ref 0–2)
BILIRUB SERPL-MCNC: 0.2 MG/DL — SIGNIFICANT CHANGE UP (ref 0.2–1.2)
BILIRUB UR-MCNC: NEGATIVE — SIGNIFICANT CHANGE UP
BUN SERPL-MCNC: 12 MG/DL — SIGNIFICANT CHANGE UP (ref 7–23)
CALCIUM SERPL-MCNC: 8.5 MG/DL — SIGNIFICANT CHANGE UP (ref 8.5–10.1)
CHLORIDE SERPL-SCNC: 104 MMOL/L — SIGNIFICANT CHANGE UP (ref 96–108)
CO2 SERPL-SCNC: 28 MMOL/L — SIGNIFICANT CHANGE UP (ref 22–31)
COLOR SPEC: YELLOW — SIGNIFICANT CHANGE UP
CREAT SERPL-MCNC: 0.85 MG/DL — SIGNIFICANT CHANGE UP (ref 0.5–1.3)
DIFF PNL FLD: ABNORMAL
ELLIPTOCYTES BLD QL SMEAR: SLIGHT — SIGNIFICANT CHANGE UP
EOSINOPHIL # BLD AUTO: 0.06 K/UL — SIGNIFICANT CHANGE UP (ref 0–0.5)
EOSINOPHIL NFR BLD AUTO: 0.8 % — SIGNIFICANT CHANGE UP (ref 0–6)
EPI CELLS # UR: ABNORMAL
GLUCOSE SERPL-MCNC: 159 MG/DL — HIGH (ref 70–99)
GLUCOSE UR QL: NEGATIVE MG/DL — SIGNIFICANT CHANGE UP
HCT VFR BLD CALC: 31.1 % — LOW (ref 34.5–45)
HGB BLD-MCNC: 8.8 G/DL — LOW (ref 11.5–15.5)
IMM GRANULOCYTES NFR BLD AUTO: 0.3 % — SIGNIFICANT CHANGE UP (ref 0–1.5)
INR BLD: 3.12 RATIO — HIGH (ref 0.88–1.16)
KETONES UR-MCNC: ABNORMAL
LEUKOCYTE ESTERASE UR-ACNC: ABNORMAL
LIDOCAIN IGE QN: 115 U/L — SIGNIFICANT CHANGE UP (ref 73–393)
LYMPHOCYTES # BLD AUTO: 2.54 K/UL — SIGNIFICANT CHANGE UP (ref 1–3.3)
LYMPHOCYTES # BLD AUTO: 34.7 % — SIGNIFICANT CHANGE UP (ref 13–44)
MANUAL SMEAR VERIFICATION: SIGNIFICANT CHANGE UP
MCHC RBC-ENTMCNC: 20.1 PG — LOW (ref 27–34)
MCHC RBC-ENTMCNC: 28.3 GM/DL — LOW (ref 32–36)
MCV RBC AUTO: 71.2 FL — LOW (ref 80–100)
MICROCYTES BLD QL: SLIGHT — SIGNIFICANT CHANGE UP
MONOCYTES # BLD AUTO: 0.54 K/UL — SIGNIFICANT CHANGE UP (ref 0–0.9)
MONOCYTES NFR BLD AUTO: 7.4 % — SIGNIFICANT CHANGE UP (ref 2–14)
NEUTROPHILS # BLD AUTO: 4.08 K/UL — SIGNIFICANT CHANGE UP (ref 1.8–7.4)
NEUTROPHILS NFR BLD AUTO: 55.8 % — SIGNIFICANT CHANGE UP (ref 43–77)
NITRITE UR-MCNC: NEGATIVE — SIGNIFICANT CHANGE UP
PH UR: 6 — SIGNIFICANT CHANGE UP (ref 5–8)
PLAT MORPH BLD: NORMAL — SIGNIFICANT CHANGE UP
PLATELET # BLD AUTO: 429 K/UL — HIGH (ref 150–400)
PLATELET COUNT - ESTIMATE: NORMAL — SIGNIFICANT CHANGE UP
POIKILOCYTOSIS BLD QL AUTO: SLIGHT — SIGNIFICANT CHANGE UP
POTASSIUM SERPL-MCNC: 3.5 MMOL/L — SIGNIFICANT CHANGE UP (ref 3.5–5.3)
POTASSIUM SERPL-SCNC: 3.5 MMOL/L — SIGNIFICANT CHANGE UP (ref 3.5–5.3)
PROT SERPL-MCNC: 7 GM/DL — SIGNIFICANT CHANGE UP (ref 6–8.3)
PROT UR-MCNC: 100 MG/DL
PROTHROM AB SERPL-ACNC: 35.9 SEC — HIGH (ref 10–12.9)
RBC # BLD: 4.37 M/UL — SIGNIFICANT CHANGE UP (ref 3.8–5.2)
RBC # FLD: 19.6 % — HIGH (ref 10.3–14.5)
RBC BLD AUTO: SIGNIFICANT CHANGE UP
RBC CASTS # UR COMP ASSIST: >50 /HPF (ref 0–4)
SODIUM SERPL-SCNC: 140 MMOL/L — SIGNIFICANT CHANGE UP (ref 135–145)
SP GR SPEC: 1.02 — SIGNIFICANT CHANGE UP (ref 1.01–1.02)
UROBILINOGEN FLD QL: NEGATIVE MG/DL — SIGNIFICANT CHANGE UP
WBC # BLD: 7.31 K/UL — SIGNIFICANT CHANGE UP (ref 3.8–10.5)
WBC # FLD AUTO: 7.31 K/UL — SIGNIFICANT CHANGE UP (ref 3.8–10.5)
WBC UR QL: >50

## 2019-06-03 PROCEDURE — 93010 ELECTROCARDIOGRAM REPORT: CPT

## 2019-06-03 PROCEDURE — 99285 EMERGENCY DEPT VISIT HI MDM: CPT

## 2019-06-03 PROCEDURE — 74177 CT ABD & PELVIS W/CONTRAST: CPT | Mod: 26

## 2019-06-03 RX ORDER — METHOCARBAMOL 500 MG/1
750 TABLET, FILM COATED ORAL EVERY 8 HOURS
Refills: 0 | Status: DISCONTINUED | OUTPATIENT
Start: 2019-06-03 | End: 2019-06-06

## 2019-06-03 RX ORDER — MORPHINE SULFATE 50 MG/1
4 CAPSULE, EXTENDED RELEASE ORAL ONCE
Refills: 0 | Status: DISCONTINUED | OUTPATIENT
Start: 2019-06-03 | End: 2019-06-03

## 2019-06-03 RX ORDER — SODIUM CHLORIDE 9 MG/ML
3 INJECTION INTRAMUSCULAR; INTRAVENOUS; SUBCUTANEOUS ONCE
Refills: 0 | Status: COMPLETED | OUTPATIENT
Start: 2019-06-03 | End: 2019-06-03

## 2019-06-03 RX ORDER — MAGNESIUM HYDROXIDE 400 MG/1
30 TABLET, CHEWABLE ORAL DAILY
Refills: 0 | Status: DISCONTINUED | OUTPATIENT
Start: 2019-06-03 | End: 2019-06-06

## 2019-06-03 RX ORDER — DEXTROSE 50 % IN WATER 50 %
12.5 SYRINGE (ML) INTRAVENOUS ONCE
Refills: 0 | Status: DISCONTINUED | OUTPATIENT
Start: 2019-06-03 | End: 2019-06-04

## 2019-06-03 RX ORDER — ONDANSETRON 8 MG/1
4 TABLET, FILM COATED ORAL ONCE
Refills: 0 | Status: COMPLETED | OUTPATIENT
Start: 2019-06-03 | End: 2019-06-03

## 2019-06-03 RX ORDER — PANTOPRAZOLE SODIUM 20 MG/1
8 TABLET, DELAYED RELEASE ORAL
Qty: 80 | Refills: 0 | Status: DISCONTINUED | OUTPATIENT
Start: 2019-06-03 | End: 2019-06-04

## 2019-06-03 RX ORDER — FERROUS SULFATE 325(65) MG
325 TABLET ORAL DAILY
Refills: 0 | Status: DISCONTINUED | OUTPATIENT
Start: 2019-06-03 | End: 2019-06-06

## 2019-06-03 RX ORDER — INSULIN LISPRO 100/ML
VIAL (ML) SUBCUTANEOUS AT BEDTIME
Refills: 0 | Status: DISCONTINUED | OUTPATIENT
Start: 2019-06-03 | End: 2019-06-06

## 2019-06-03 RX ORDER — SODIUM CHLORIDE 9 MG/ML
1000 INJECTION INTRAMUSCULAR; INTRAVENOUS; SUBCUTANEOUS
Refills: 0 | Status: DISCONTINUED | OUTPATIENT
Start: 2019-06-03 | End: 2019-06-04

## 2019-06-03 RX ORDER — LEVOTHYROXINE SODIUM 125 MCG
25 TABLET ORAL DAILY
Refills: 0 | Status: DISCONTINUED | OUTPATIENT
Start: 2019-06-03 | End: 2019-06-06

## 2019-06-03 RX ORDER — INSULIN LISPRO 100/ML
VIAL (ML) SUBCUTANEOUS
Refills: 0 | Status: DISCONTINUED | OUTPATIENT
Start: 2019-06-03 | End: 2019-06-06

## 2019-06-03 RX ORDER — DEXTROSE 50 % IN WATER 50 %
15 SYRINGE (ML) INTRAVENOUS ONCE
Refills: 0 | Status: DISCONTINUED | OUTPATIENT
Start: 2019-06-03 | End: 2019-06-04

## 2019-06-03 RX ORDER — ALBUTEROL 90 UG/1
2.5 AEROSOL, METERED ORAL EVERY 6 HOURS
Refills: 0 | Status: DISCONTINUED | OUTPATIENT
Start: 2019-06-03 | End: 2019-06-06

## 2019-06-03 RX ORDER — CEFTRIAXONE 500 MG/1
1000 INJECTION, POWDER, FOR SOLUTION INTRAMUSCULAR; INTRAVENOUS ONCE
Refills: 0 | Status: COMPLETED | OUTPATIENT
Start: 2019-06-03 | End: 2019-06-03

## 2019-06-03 RX ORDER — ACETAMINOPHEN 500 MG
650 TABLET ORAL EVERY 4 HOURS
Refills: 0 | Status: DISCONTINUED | OUTPATIENT
Start: 2019-06-03 | End: 2019-06-06

## 2019-06-03 RX ORDER — ATORVASTATIN CALCIUM 80 MG/1
20 TABLET, FILM COATED ORAL AT BEDTIME
Refills: 0 | Status: DISCONTINUED | OUTPATIENT
Start: 2019-06-03 | End: 2019-06-06

## 2019-06-03 RX ORDER — SODIUM CHLORIDE 9 MG/ML
1000 INJECTION, SOLUTION INTRAVENOUS
Refills: 0 | Status: DISCONTINUED | OUTPATIENT
Start: 2019-06-03 | End: 2019-06-04

## 2019-06-03 RX ORDER — DEXTROSE 50 % IN WATER 50 %
25 SYRINGE (ML) INTRAVENOUS ONCE
Refills: 0 | Status: DISCONTINUED | OUTPATIENT
Start: 2019-06-03 | End: 2019-06-04

## 2019-06-03 RX ORDER — PHYTONADIONE (VIT K1) 5 MG
10 TABLET ORAL ONCE
Refills: 0 | Status: COMPLETED | OUTPATIENT
Start: 2019-06-03 | End: 2019-06-03

## 2019-06-03 RX ORDER — ESCITALOPRAM OXALATE 10 MG/1
5 TABLET, FILM COATED ORAL DAILY
Refills: 0 | Status: DISCONTINUED | OUTPATIENT
Start: 2019-06-03 | End: 2019-06-05

## 2019-06-03 RX ORDER — GLUCAGON INJECTION, SOLUTION 0.5 MG/.1ML
1 INJECTION, SOLUTION SUBCUTANEOUS ONCE
Refills: 0 | Status: DISCONTINUED | OUTPATIENT
Start: 2019-06-03 | End: 2019-06-04

## 2019-06-03 RX ORDER — FUROSEMIDE 40 MG
20 TABLET ORAL
Refills: 0 | Status: DISCONTINUED | OUTPATIENT
Start: 2019-06-03 | End: 2019-06-06

## 2019-06-03 RX ORDER — CEFTRIAXONE 500 MG/1
1000 INJECTION, POWDER, FOR SOLUTION INTRAMUSCULAR; INTRAVENOUS EVERY 24 HOURS
Refills: 0 | Status: DISCONTINUED | OUTPATIENT
Start: 2019-06-04 | End: 2019-06-06

## 2019-06-03 RX ORDER — SODIUM CHLORIDE 9 MG/ML
1000 INJECTION INTRAMUSCULAR; INTRAVENOUS; SUBCUTANEOUS ONCE
Refills: 0 | Status: COMPLETED | OUTPATIENT
Start: 2019-06-03 | End: 2019-06-03

## 2019-06-03 RX ORDER — FLUTICASONE PROPIONATE 50 MCG
1 SPRAY, SUSPENSION NASAL DAILY
Refills: 0 | Status: DISCONTINUED | OUTPATIENT
Start: 2019-06-03 | End: 2019-06-06

## 2019-06-03 RX ORDER — PANTOPRAZOLE SODIUM 20 MG/1
40 TABLET, DELAYED RELEASE ORAL ONCE
Refills: 0 | Status: COMPLETED | OUTPATIENT
Start: 2019-06-03 | End: 2019-06-03

## 2019-06-03 RX ORDER — DRONEDARONE 400 MG/1
400 TABLET, FILM COATED ORAL
Refills: 0 | Status: DISCONTINUED | OUTPATIENT
Start: 2019-06-03 | End: 2019-06-06

## 2019-06-03 RX ORDER — OXYCODONE HYDROCHLORIDE 5 MG/1
10 TABLET ORAL THREE TIMES A DAY
Refills: 0 | Status: DISCONTINUED | OUTPATIENT
Start: 2019-06-03 | End: 2019-06-04

## 2019-06-03 RX ORDER — LACTOBACILLUS ACIDOPHILUS 100MM CELL
1 CAPSULE ORAL
Refills: 0 | Status: DISCONTINUED | OUTPATIENT
Start: 2019-06-03 | End: 2019-06-06

## 2019-06-03 RX ADMIN — MORPHINE SULFATE 4 MILLIGRAM(S): 50 CAPSULE, EXTENDED RELEASE ORAL at 15:01

## 2019-06-03 RX ADMIN — MORPHINE SULFATE 4 MILLIGRAM(S): 50 CAPSULE, EXTENDED RELEASE ORAL at 20:30

## 2019-06-03 RX ADMIN — Medication 102 MILLIGRAM(S): at 19:52

## 2019-06-03 RX ADMIN — ONDANSETRON 4 MILLIGRAM(S): 8 TABLET, FILM COATED ORAL at 16:46

## 2019-06-03 RX ADMIN — MORPHINE SULFATE 4 MILLIGRAM(S): 50 CAPSULE, EXTENDED RELEASE ORAL at 20:45

## 2019-06-03 RX ADMIN — SODIUM CHLORIDE 3 MILLILITER(S): 9 INJECTION INTRAMUSCULAR; INTRAVENOUS; SUBCUTANEOUS at 20:30

## 2019-06-03 RX ADMIN — SODIUM CHLORIDE 100 MILLILITER(S): 9 INJECTION INTRAMUSCULAR; INTRAVENOUS; SUBCUTANEOUS at 21:09

## 2019-06-03 RX ADMIN — CEFTRIAXONE 1000 MILLIGRAM(S): 500 INJECTION, POWDER, FOR SOLUTION INTRAMUSCULAR; INTRAVENOUS at 19:51

## 2019-06-03 RX ADMIN — SODIUM CHLORIDE 1000 MILLILITER(S): 9 INJECTION INTRAMUSCULAR; INTRAVENOUS; SUBCUTANEOUS at 16:46

## 2019-06-03 RX ADMIN — PANTOPRAZOLE SODIUM 10 MG/HR: 20 TABLET, DELAYED RELEASE ORAL at 20:30

## 2019-06-03 RX ADMIN — MORPHINE SULFATE 4 MILLIGRAM(S): 50 CAPSULE, EXTENDED RELEASE ORAL at 16:46

## 2019-06-03 RX ADMIN — SODIUM CHLORIDE 1000 MILLILITER(S): 9 INJECTION INTRAMUSCULAR; INTRAVENOUS; SUBCUTANEOUS at 17:46

## 2019-06-03 RX ADMIN — PANTOPRAZOLE SODIUM 40 MILLIGRAM(S): 20 TABLET, DELAYED RELEASE ORAL at 18:45

## 2019-06-03 NOTE — H&P ADULT - NSICDXPASTMEDICALHX_GEN_ALL_CORE_FT
PAST MEDICAL HISTORY:  Cerebrovascular accident (CVA)     Diabetes mellitus     DVT of lower limb, acute     History of atrial fibrillation     HTN (hypertension)

## 2019-06-03 NOTE — ED ADULT NURSE NOTE - CHIEF COMPLAINT QUOTE
pt brought by EMS from [facility] living presenting for eval of rlq abd pain for past 2 weeks. states she has been worked up for UTI and possible c.diff w/ negative results.

## 2019-06-03 NOTE — ED ADULT NURSE NOTE - NSIMPLEMENTINTERV_GEN_ALL_ED
Implemented All Fall Risk Interventions:  [place] to call system. Call bell, personal items and telephone within reach. Instruct patient to call for assistance. Room bathroom lighting operational. Non-slip footwear when patient is off stretcher. Physically safe environment: no spills, clutter or unnecessary equipment. Stretcher in lowest position, wheels locked, appropriate side rails in place. Provide visual cue, wrist band, yellow gown, etc. Monitor gait and stability. Monitor for mental status changes and reorient to person, place, and time. Review medications for side effects contributing to fall risk. Reinforce activity limits and safety measures with patient and family.

## 2019-06-03 NOTE — H&P ADULT - NSHPPHYSICALEXAM_GEN_ALL_CORE
PHYSICAL EXAM:    GENERAL: obese, pale, NAD    HEENT:  pupils equal and reactive no oropharyngeal lesions, erythema, exudates, oral thrush    NECK: supple, no carotid bruits, no palpable lymph nodes, no thyromegaly    CV:  S1S2, RRR,  no m/c/r    RESP:  lungs clear to auscultation bilaterally, no wheezing, rales, rhonchi, good air entry bilaterally    BREAST:  not examined    GI: abnormally pale abdominal skin (?d/t stretching from distention) +abdominal distention with visible hernia, roughly healed midline surgical scar with small area of dehiscence, soft, +tenderness lower abd wall, normal BS, no bruits, no palpable masses    RECTAL:  not examined,     :  +tenderness    MSK/EXT:  L hemparesis, cannot lift LUE/LLE, +drop foot to LLE, moderately swollen (chronic as per pt), chronic vascular changes to BLE, no cyanosis, no calf pain or erythema    VASCULAR:  pulses equal and symmetric in the upper and lower extremities    NEURO:  AAOX3, good historian,  follows all commands, able to move RUE/RLE spontaneously    SKIN:  impressively pales especially in abd    Vital Signs Last 24 Hrs  T(C): 37.4 (03 Jun 2019 19:58), Max: 37.4 (03 Jun 2019 19:58)  T(F): 99.3 (03 Jun 2019 19:58), Max: 99.3 (03 Jun 2019 19:58)  HR: 68 (03 Jun 2019 19:58) (68 - 80)  BP: 103/54 (03 Jun 2019 19:58) (103/54 - 109/47)  BP(mean): --  RR: 18 (03 Jun 2019 19:59) (17 - 18)  SpO2: 96% (03 Jun 2019 19:59) (91% - 96%)

## 2019-06-03 NOTE — ED PROVIDER NOTE - NEUROLOGICAL, MLM
Alert and oriented. +left hemiparesis- residual from prior CVA. Otherwise no focal deficits, no motor or sensory deficits.

## 2019-06-03 NOTE — H&P ADULT - NSHPREVIEWOFSYSTEMS_GEN_ALL_CORE
REVIEW OF SYSTEM: ROS comprehensively negative except as above and reports she has external hemorrhoids

## 2019-06-03 NOTE — ED ADULT NURSE NOTE - OBJECTIVE STATEMENT
Pt presents to ED for abdominal pain X 2 weeks. Pt states pain is in RLQ and is 10/10. Pt also reports pain with bowel movements and urination. She has been r/o for UTI. Last BM 1hr PTA. Complete left side paralysis from CVA 1yr prior. Denies any other discomfort at this time.

## 2019-06-03 NOTE — H&P ADULT - NSHPSOCIALHISTORY_GEN_ALL_CORE
Quit 40 pack hx cigarette smoking 10 yrs ago  lives at [facility] x ~2 yrs  total assistance with bed mobility (yuliana lifted)

## 2019-06-03 NOTE — H&P ADULT - HISTORY OF PRESENT ILLNESS
69 year old Woman with hx of dyslipidemia, hypothyroid, DM, pAfib, R MCA CVA with residual left hemiparesis 3/2018 at [facility], course c/b LLE DVT & PNA,  COPD on O2 HS, polyneuropathy, GERD,  diverticulitis s/p colostomy and reversal, abd hernia, hip replacement, presented to the ED BIBA from [facility] for abd pain. Pt reports she has been have lower abd pain with urination and BM, and lower abd pressure at rest. Was oozing stool per [facility]'s chart documentation,. Denies CP/palpitation/SOB/HA/dizziness/n/v/d/f/c.     In the ED T 993 (oral), HR 80, /47, O2 95% rm air, H/H/plt at baseline, WBC wnl,  INR 3.1, ED doc reports +guiac, CT a/p devoid of bowel obstruction or gross bowel wall thickening +multiple large anterior abdominal wall hernias containing nonobstructive small and large bowel as well as a right upper quadrant hernia containing nonobstructive gastric antrum and Intramural bladder diverticula with circumferential thickening of the bladder wall. No hydronephrosis.  Urinalysis grossly abnormal. 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 Pt reports she has been have lower abd pain with urination and BM, and lower abd pressure at rest. Was oozing stool per [facility]'s chart documentation,. Denies CP/palpitation/SOB/HA/dizziness/n/v/d/f/c.     In the ED T 993 (oral), HR 80, /47, O2 95% rm air, H/H/plt at baseline, WBC wnl,  INR 3.1, ED doc reports +guiac, CT a/p devoid of bowel obstruction or gross bowel wall thickening +multiple large anterior abdominal wall hernias containing nonobstructive small and large bowel as well as a right upper quadrant hernia containing nonobstructive gastric antrum and Intramural bladder diverticula with circumferential thickening of the bladder wall. No hydronephrosis.  Urinalysis grossly abnormal. In ED She received CFXN 1gm IVP,  had large BM    FAMILY HX: Mother-emphysema; Father-CVA

## 2019-06-03 NOTE — ED PROVIDER NOTE - OBJECTIVE STATEMENT
70 y/o female with a PMHx of CVA with residual left hemiparesis, COPD, neuropathy, diverticulitis s/p colostomy and reversal, hip replacement,  x3 presents to the ED BIBEMS from [facility] c/o right lower abd pain described as pressure x2 weeks. +constipation and associated rectal pain with straining to have a BM. +dysuria. Pt had urine culture done at [facility] which was negative. Pt had an ultrasound recently which showed uterine fibroids. Sent into ED for CT. On oxycodone- last dose 1pm. Denies vomiting, diarrhea. Pt is DNR/DNI. Allergic to Cipro and Spiriva.

## 2019-06-03 NOTE — H&P ADULT - ASSESSMENT
Acute symptomatic UTI/fecal retention likely cause of abd pain +distention  con't ceftriaxone IVP 1gm Q24hrs  f/u urine cx / blood cx  TAP water enema x1  suspect +guiac likely hemorrhoidal related vs GIB in setting of borderline high INR  protonix IVP f/b drip ordered in ED  GI consult  monitor     Anemia - Iron def  4/14/19 iron studies: Serum iron 17 / Unsaturated Iron Binding Capacity 276 ug/dL / TIBC 293 ug/dL/ iron sat 6%  H/H stable (baseline 8.3 - 9.5 / 30 - 32.9 March to May)  con't home dose iron supplement  monitor CBC    Hypothyroid  TSH 1.36 (3/2019)  con't home dose LT4    DM - poorly controlled  A1c 8.3% (3/2019)  con't home regimen  fingerstick QID with RAZIA    pAfib  NSR  [id number] score = 6 (age-1, female-1, HTN-1, CVA-2, DM-1)  hold coumadin for supratherapetic INR  daily INR  con't home dose multaq    CVA w/ L sided residual  assist with all ADL's  con't statin  holding AC for supratherapeutic iNR    Neuropathy  con't home meds    Abd Wound dehiscence (chronic)  wound consult    COPD - stable  con't home nebs PRN  O2 HS    Dispo  DNR/DNI- MOLST accompanied pt from [facility] - reviewed with patient and updated (no change)  pt agreed with discussed plan of care Acute symptomatic UTI/fecal retention likely cause of abd pain +distention, GI Bleed  con't ceftriaxone IVP 1gm Q24hrs  f/u urine cx / blood cx  TAP water enema x1  suspect +guiac likely hemorrhoidal related vs GIB in setting of borderline high INR  protonix IVP f/b drip ordered in ED  GI consult  monitor     Anemia - Iron def  4/14/19 iron studies: Serum iron 17 / Unsaturated Iron Binding Capacity 276 ug/dL / TIBC 293 ug/dL/ iron sat 6%  H/H stable (baseline 8.3 - 9.5 / 30 - 32.9 March to May)  con't home dose iron supplement  monitor CBC    Hypothyroid  TSH 1.36 (3/2019)  con't home dose LT4    DM - poorly controlled  A1c 8.3% (3/2019)  con't home regimen  fingerstick QID with RAZIA    pAfib  NSR  [id number] score = 6 (age-1, female-1, HTN-1, CVA-2, DM-1)  hold coumadin for supratherapetic INR  daily INR  con't home dose multaq    CVA w/ L sided residual  assist with all ADL's  con't statin  holding AC for supratherapeutic iNR    Neuropathy  con't home meds    Abd Wound dehiscence (chronic)  wound consult    COPD - stable  con't home nebs PRN  O2 HS    Dispo  DNR/DNI- MOLST accompanied pt from [facility] - reviewed with patient and updated (no change)  pt agreed with discussed plan of care

## 2019-06-03 NOTE — ED PROVIDER NOTE - CLINICAL SUMMARY MEDICAL DECISION MAKING FREE TEXT BOX
CT with nonobstructive bowel containing hernias.  Hgb 8.8.  Guaiac positive, but no active bleeding in ED.  Hemodynamically stable.  UA grossly dirty.  Rocephin given.  Protonix started.  Admit to medicine.

## 2019-06-03 NOTE — ED ADULT TRIAGE NOTE - CHIEF COMPLAINT QUOTE
pt brought by EMS from [facility] living presenting for eval of rlq abd pain for past 2 weeks. states she has been worked up for UTI and possible c.diff w/ negative results.

## 2019-06-04 DIAGNOSIS — Z96.649 PRESENCE OF UNSPECIFIED ARTIFICIAL HIP JOINT: Chronic | ICD-10-CM

## 2019-06-04 DIAGNOSIS — Z93.3 COLOSTOMY STATUS: Chronic | ICD-10-CM

## 2019-06-04 DIAGNOSIS — Z98.890 OTHER SPECIFIED POSTPROCEDURAL STATES: Chronic | ICD-10-CM

## 2019-06-04 DIAGNOSIS — Z98.891 HISTORY OF UTERINE SCAR FROM PREVIOUS SURGERY: Chronic | ICD-10-CM

## 2019-06-04 LAB
ANION GAP SERPL CALC-SCNC: 6 MMOL/L — SIGNIFICANT CHANGE UP (ref 5–17)
BASOPHILS # BLD AUTO: 0.04 K/UL — SIGNIFICANT CHANGE UP (ref 0–0.2)
BASOPHILS NFR BLD AUTO: 0.5 % — SIGNIFICANT CHANGE UP (ref 0–2)
BUN SERPL-MCNC: 13 MG/DL — SIGNIFICANT CHANGE UP (ref 7–23)
CALCIUM SERPL-MCNC: 8.6 MG/DL — SIGNIFICANT CHANGE UP (ref 8.5–10.1)
CHLORIDE SERPL-SCNC: 105 MMOL/L — SIGNIFICANT CHANGE UP (ref 96–108)
CO2 SERPL-SCNC: 27 MMOL/L — SIGNIFICANT CHANGE UP (ref 22–31)
CREAT SERPL-MCNC: 0.75 MG/DL — SIGNIFICANT CHANGE UP (ref 0.5–1.3)
EOSINOPHIL # BLD AUTO: 0.1 K/UL — SIGNIFICANT CHANGE UP (ref 0–0.5)
EOSINOPHIL NFR BLD AUTO: 1.3 % — SIGNIFICANT CHANGE UP (ref 0–6)
GLUCOSE BLDC GLUCOMTR-MCNC: 123 MG/DL — HIGH (ref 70–99)
GLUCOSE BLDC GLUCOMTR-MCNC: 159 MG/DL — HIGH (ref 70–99)
GLUCOSE BLDC GLUCOMTR-MCNC: 173 MG/DL — HIGH (ref 70–99)
GLUCOSE BLDC GLUCOMTR-MCNC: 199 MG/DL — HIGH (ref 70–99)
GLUCOSE BLDC GLUCOMTR-MCNC: 248 MG/DL — HIGH (ref 70–99)
GLUCOSE SERPL-MCNC: 150 MG/DL — HIGH (ref 70–99)
HBA1C BLD-MCNC: 8 % — HIGH (ref 4–5.6)
HCT VFR BLD CALC: 29.4 % — LOW (ref 34.5–45)
HCV AB S/CO SERPL IA: 0.15 S/CO — SIGNIFICANT CHANGE UP (ref 0–0.99)
HCV AB SERPL-IMP: SIGNIFICANT CHANGE UP
HGB BLD-MCNC: 8.5 G/DL — LOW (ref 11.5–15.5)
IMM GRANULOCYTES NFR BLD AUTO: 0.4 % — SIGNIFICANT CHANGE UP (ref 0–1.5)
INR BLD: 1.45 RATIO — HIGH (ref 0.88–1.16)
LYMPHOCYTES # BLD AUTO: 2.4 K/UL — SIGNIFICANT CHANGE UP (ref 1–3.3)
LYMPHOCYTES # BLD AUTO: 31.1 % — SIGNIFICANT CHANGE UP (ref 13–44)
MCHC RBC-ENTMCNC: 20.5 PG — LOW (ref 27–34)
MCHC RBC-ENTMCNC: 28.9 GM/DL — LOW (ref 32–36)
MCV RBC AUTO: 71 FL — LOW (ref 80–100)
MONOCYTES # BLD AUTO: 0.54 K/UL — SIGNIFICANT CHANGE UP (ref 0–0.9)
MONOCYTES NFR BLD AUTO: 7 % — SIGNIFICANT CHANGE UP (ref 2–14)
NEUTROPHILS # BLD AUTO: 4.61 K/UL — SIGNIFICANT CHANGE UP (ref 1.8–7.4)
NEUTROPHILS NFR BLD AUTO: 59.7 % — SIGNIFICANT CHANGE UP (ref 43–77)
PLATELET # BLD AUTO: 388 K/UL — SIGNIFICANT CHANGE UP (ref 150–400)
POTASSIUM SERPL-MCNC: 3.7 MMOL/L — SIGNIFICANT CHANGE UP (ref 3.5–5.3)
POTASSIUM SERPL-SCNC: 3.7 MMOL/L — SIGNIFICANT CHANGE UP (ref 3.5–5.3)
PROTHROM AB SERPL-ACNC: 16.3 SEC — HIGH (ref 10–12.9)
RBC # BLD: 4.14 M/UL — SIGNIFICANT CHANGE UP (ref 3.8–5.2)
RBC # FLD: 19.8 % — HIGH (ref 10.3–14.5)
SODIUM SERPL-SCNC: 138 MMOL/L — SIGNIFICANT CHANGE UP (ref 135–145)
WBC # BLD: 7.72 K/UL — SIGNIFICANT CHANGE UP (ref 3.8–10.5)
WBC # FLD AUTO: 7.72 K/UL — SIGNIFICANT CHANGE UP (ref 3.8–10.5)

## 2019-06-04 PROCEDURE — 99223 1ST HOSP IP/OBS HIGH 75: CPT

## 2019-06-04 PROCEDURE — 93971 EXTREMITY STUDY: CPT | Mod: 26,LT

## 2019-06-04 RX ORDER — PANTOPRAZOLE SODIUM 20 MG/1
40 TABLET, DELAYED RELEASE ORAL
Refills: 0 | Status: DISCONTINUED | OUTPATIENT
Start: 2019-06-04 | End: 2019-06-06

## 2019-06-04 RX ORDER — SIMETHICONE 80 MG/1
80 TABLET, CHEWABLE ORAL THREE TIMES A DAY
Refills: 0 | Status: DISCONTINUED | OUTPATIENT
Start: 2019-06-04 | End: 2019-06-06

## 2019-06-04 RX ORDER — WARFARIN SODIUM 2.5 MG/1
4.5 TABLET ORAL ONCE
Refills: 0 | Status: COMPLETED | OUTPATIENT
Start: 2019-06-04 | End: 2019-06-04

## 2019-06-04 RX ORDER — MULTIVIT WITH MIN/MFOLATE/K2 340-15/3 G
1 POWDER (GRAM) ORAL ONCE
Refills: 0 | Status: COMPLETED | OUTPATIENT
Start: 2019-06-04 | End: 2019-06-04

## 2019-06-04 RX ORDER — OXYCODONE HYDROCHLORIDE 5 MG/1
10 TABLET ORAL
Refills: 0 | Status: DISCONTINUED | OUTPATIENT
Start: 2019-06-04 | End: 2019-06-06

## 2019-06-04 RX ADMIN — Medication 650 MILLIGRAM(S): at 02:24

## 2019-06-04 RX ADMIN — Medication 25 MICROGRAM(S): at 05:33

## 2019-06-04 RX ADMIN — PANTOPRAZOLE SODIUM 40 MILLIGRAM(S): 20 TABLET, DELAYED RELEASE ORAL at 12:08

## 2019-06-04 RX ADMIN — Medication 1 TABLET(S): at 17:20

## 2019-06-04 RX ADMIN — CEFTRIAXONE 1000 MILLIGRAM(S): 500 INJECTION, POWDER, FOR SOLUTION INTRAMUSCULAR; INTRAVENOUS at 23:44

## 2019-06-04 RX ADMIN — Medication 1: at 08:20

## 2019-06-04 RX ADMIN — DRONEDARONE 400 MILLIGRAM(S): 400 TABLET, FILM COATED ORAL at 17:20

## 2019-06-04 RX ADMIN — OXYCODONE HYDROCHLORIDE 10 MILLIGRAM(S): 5 TABLET ORAL at 09:34

## 2019-06-04 RX ADMIN — Medication 650 MILLIGRAM(S): at 10:38

## 2019-06-04 RX ADMIN — Medication 1 SPRAY(S): at 17:21

## 2019-06-04 RX ADMIN — OXYCODONE HYDROCHLORIDE 10 MILLIGRAM(S): 5 TABLET ORAL at 08:34

## 2019-06-04 RX ADMIN — OXYCODONE HYDROCHLORIDE 10 MILLIGRAM(S): 5 TABLET ORAL at 15:56

## 2019-06-04 RX ADMIN — OXYCODONE HYDROCHLORIDE 10 MILLIGRAM(S): 5 TABLET ORAL at 14:56

## 2019-06-04 RX ADMIN — Medication 1 TABLET(S): at 05:33

## 2019-06-04 RX ADMIN — ATORVASTATIN CALCIUM 20 MILLIGRAM(S): 80 TABLET, FILM COATED ORAL at 22:00

## 2019-06-04 RX ADMIN — Medication 650 MILLIGRAM(S): at 17:26

## 2019-06-04 RX ADMIN — OXYCODONE HYDROCHLORIDE 10 MILLIGRAM(S): 5 TABLET ORAL at 06:36

## 2019-06-04 RX ADMIN — OXYCODONE HYDROCHLORIDE 10 MILLIGRAM(S): 5 TABLET ORAL at 05:31

## 2019-06-04 RX ADMIN — Medication 650 MILLIGRAM(S): at 03:25

## 2019-06-04 RX ADMIN — ESCITALOPRAM OXALATE 5 MILLIGRAM(S): 10 TABLET, FILM COATED ORAL at 12:08

## 2019-06-04 RX ADMIN — DRONEDARONE 400 MILLIGRAM(S): 400 TABLET, FILM COATED ORAL at 05:33

## 2019-06-04 RX ADMIN — Medication 325 MILLIGRAM(S): at 12:08

## 2019-06-04 RX ADMIN — SIMETHICONE 80 MILLIGRAM(S): 80 TABLET, CHEWABLE ORAL at 22:14

## 2019-06-04 RX ADMIN — WARFARIN SODIUM 4.5 MILLIGRAM(S): 2.5 TABLET ORAL at 22:15

## 2019-06-04 RX ADMIN — Medication 50 MILLIGRAM(S): at 22:00

## 2019-06-04 RX ADMIN — Medication 20 MILLIGRAM(S): at 17:20

## 2019-06-04 RX ADMIN — Medication 1 BOTTLE: at 12:08

## 2019-06-04 RX ADMIN — PANTOPRAZOLE SODIUM 10 MG/HR: 20 TABLET, DELAYED RELEASE ORAL at 06:11

## 2019-06-04 RX ADMIN — Medication 1: at 12:08

## 2019-06-04 RX ADMIN — Medication 20 MILLIGRAM(S): at 05:33

## 2019-06-04 RX ADMIN — Medication 50 MILLIGRAM(S): at 14:24

## 2019-06-04 RX ADMIN — OXYCODONE HYDROCHLORIDE 10 MILLIGRAM(S): 5 TABLET ORAL at 01:03

## 2019-06-04 RX ADMIN — Medication 650 MILLIGRAM(S): at 18:26

## 2019-06-04 RX ADMIN — Medication 1: at 17:21

## 2019-06-04 RX ADMIN — CEFTRIAXONE 1000 MILLIGRAM(S): 500 INJECTION, POWDER, FOR SOLUTION INTRAMUSCULAR; INTRAVENOUS at 00:17

## 2019-06-04 RX ADMIN — SIMETHICONE 80 MILLIGRAM(S): 80 TABLET, CHEWABLE ORAL at 17:21

## 2019-06-04 RX ADMIN — Medication 50 MILLIGRAM(S): at 05:35

## 2019-06-04 RX ADMIN — OXYCODONE HYDROCHLORIDE 10 MILLIGRAM(S): 5 TABLET ORAL at 22:00

## 2019-06-04 RX ADMIN — Medication 650 MILLIGRAM(S): at 09:38

## 2019-06-04 NOTE — CONSULT NOTE ADULT - ASSESSMENT
70yo female with multiple medical issues admitted with issues with BM and UTI    pt with chronic anemia - hemoccult positive stool with no evidence of significant active bleeding. no plans for endoscopic evaluation unless signs increase gi bleeding  will d/c protonix drip and start PO  will give bottle magnesium citrate as main issue seems to be constipation causing her discomfort

## 2019-06-04 NOTE — ADVANCED PRACTICE NURSE CONSULT - RECOMMEDATIONS
1) Continue to turn and position every 2 hours  2) Continue to elevate heels off mattress.   3) Change dressing to midline incision every other day with foam dressing  4) Albumin 2.8 on 6/3.  Nutrition consult placed to assist with wound healing.

## 2019-06-04 NOTE — CONSULT NOTE ADULT - SUBJECTIVE AND OBJECTIVE BOX
Patient is a 69y old  Female who presents with a chief complaint of Abdominal pain / UTI (03 Jun 2019 22:19)      HPI:  69 year old female with hx of dyslipidemia, hypothyroid, DM, pAfib, R MCA CVA with residual left hemiparesis 3/2018 at [facility], course c/b LLE DVT & PNA,  COPD on O2 HS, polyneuropathy, GERD,  diverticulitis s/p colostomy and reversal, abd hernia, hip replacement, presented to the ED BIBA from [facility] for abd pain. Pt reports she has been have lower abd pain with difficulty with urination and BM, and lower abd pressure at rest.   Patient feeling a little better this AM with less discomfort.  She had larger BM in the ER but still feels backed up. CT scan reviewed with multiple abdominal wall hernias with stool retention but no obstruction  Patient also notes some discomfort from hemorrhoids at times.    PAST MEDICAL & SURGICAL HISTORY:  DVT of lower limb, acute  Cerebrovascular accident (CVA)  Diabetes mellitus  HTN (hypertension)  History of atrial fibrillation  History of colostomy reversal  MEDICATIONS  (STANDING):  atorvastatin 20 milliGRAM(s) Oral at bedtime  cefTRIAXone Injectable. 1000 milliGRAM(s) IV Push every 24 hours  dextrose 5%. 1000 milliLiter(s) (50 mL/Hr) IV Continuous <Continuous>  dextrose 50% Injectable 12.5 Gram(s) IV Push once  dextrose 50% Injectable 25 Gram(s) IV Push once  dextrose 50% Injectable 25 Gram(s) IV Push once  dronedarone 400 milliGRAM(s) Oral two times a day  escitalopram 5 milliGRAM(s) Oral daily  ferrous    sulfate 325 milliGRAM(s) Oral daily  fluticasone propionate 50 MICROgram(s)/spray Nasal Spray 1 Spray(s) Both Nostrils daily  furosemide    Tablet 20 milliGRAM(s) Oral two times a day  insulin lispro (HumaLOG) corrective regimen sliding scale   SubCutaneous three times a day before meals  insulin lispro (HumaLOG) corrective regimen sliding scale   SubCutaneous at bedtime  lactobacillus acidophilus 1 Tablet(s) Oral two times a day  levothyroxine 25 MICROGram(s) Oral daily  magnesium citrate Oral Solution 1 Bottle Oral once  pantoprazole    Tablet 40 milliGRAM(s) Oral before breakfast  pregabalin 50 milliGRAM(s) Oral three times a day  sodium chloride 0.45%. 1000 milliLiter(s) (75 mL/Hr) IV Continuous <Continuous>  sodium chloride 0.9%. 1000 milliLiter(s) (100 mL/Hr) IV Continuous <Continuous>    MEDICATIONS  (PRN):  acetaminophen   Tablet .. 650 milliGRAM(s) Oral every 4 hours PRN Temp greater or equal to 38C (100.4F), Mild Pain (1 - 3)  ALBUTerol   0.5% 2.5 milliGRAM(s) Nebulizer every 6 hours PRN Shortness of Breath and/or Wheezing  dextrose 40% Gel 15 Gram(s) Oral once PRN Blood Glucose LESS THAN 70 milliGRAM(s)/deciliter  glucagon  Injectable 1 milliGRAM(s) IntraMuscular once PRN Glucose LESS THAN 70 milligrams/deciliter  magnesium hydroxide Suspension 30 milliLiter(s) Oral daily PRN Constipation  methocarbamol 750 milliGRAM(s) Oral every 8 hours PRN for muscle spasm, for moderate pain  oxyCODONE    IR 10 milliGRAM(s) Oral three times a day PRN Severe Pain (7 - 10)      Allergies  Cipro (Rash)  Spiriva (Rash)    SOCIAL HISTORY: lives at [facility]    FAMILY HISTORY: noncontributory    REVIEW OF SYSTEMS:    CONSTITUTIONAL: weakness  EYES/ENT: No visual changes;  No vertigo or throat pain   NECK: No pain or stiffness  RESPIRATORY: No cough, wheezing, hemoptysis; No shortness of breath  CARDIOVASCULAR: No chest pain or palpitations  GASTROINTESTINAL: as above  GENITOURINARY: as above  NEUROLOGICAL: No numbness  SKIN: No itching, burning, rashes, or lesions   PSYCH: Normal mood and affect  All other review of systems is negative unless indicated above.    Vital Signs Last 24 Hrs  T(C): 36.7 (04 Jun 2019 05:16), Max: 37.4 (03 Jun 2019 19:58)  T(F): 98 (04 Jun 2019 05:16), Max: 99.3 (03 Jun 2019 19:58)  HR: 74 (04 Jun 2019 05:16) (68 - 80)  BP: 141/64 (04 Jun 2019 05:16) (103/54 - 141/64)  BP(mean): --  RR: 18 (04 Jun 2019 05:16) (17 - 18)  SpO2: 97% (04 Jun 2019 05:16) (91% - 98%)    PHYSICAL EXAM:    Constitutional: NAD  HEENT: MMM  Neck: No LAD  Respiratory: CTA  Cardiovascular: S1 and S2  Gastrointestinal: BS+, soft, mildly distended with hernias, no focal tenderness  Extremities: trace edema  Vascular: 1+ peripheral pulses  Neurological: A/O x 3, no focal deficits  Psychiatric: Normal mood, normal affect  Skin: No rashes    LABS:                        8.8    7.31  )-----------( 429      ( 03 Jun 2019 16:34 )             31.1     06-03    140  |  104  |  12  ----------------------------<  159<H>  3.5   |  28  |  0.85    Ca    8.5      03 Jun 2019 16:34    TPro  7.0  /  Alb  2.8<L>  /  TBili  0.2  /  DBili  x   /  AST  10<L>  /  ALT  20  /  AlkPhos  104  06-03    PT/INR - ( 03 Jun 2019 16:34 )   PT: 35.9 sec;   INR: 3.12 ratio         PTT - ( 03 Jun 2019 16:34 )  PTT:39.3 sec  LIVER FUNCTIONS - ( 03 Jun 2019 16:34 )  Alb: 2.8 g/dL / Pro: 7.0 gm/dL / ALK PHOS: 104 U/L / ALT: 20 U/L / AST: 10 U/L / GGT: x             RADIOLOGY & ADDITIONAL STUDIES:

## 2019-06-04 NOTE — ADVANCED PRACTICE NURSE CONSULT - ASSESSMENT
This is a 69 year female that was admitted to the hospital on 6/3/2019 for abdominal pain.  PMH- dyslipidemia, hypothyroid, DM, pAfib, R MCA CVA with residual left hemiparesis 3/2018 at [facility], course c/b LLE DVT & PNA,  COPD on O2 HS, polyneuropathy, GERD,  diverticulitis s/p colostomy and reversal, abd hernia, hip replacement.    Requested by NP to assess patient's abdominal wound. Patient presents resting on an [employer] 9000 MRS on her right side. Patient reports that she had her revision of her colostomy at [place]. Wound to midline incision noted. Wound is partial thickness and measures 2cmx0.5cmx0.2cm.  Wound bed with 100% pink tissue. No odor, periwound intact. Foam dressing placed over wound to promote moist wound healing. Patient remains on her right side and both heels remain elevated off mattress.

## 2019-06-05 LAB
GLUCOSE BLDC GLUCOMTR-MCNC: 139 MG/DL — HIGH (ref 70–99)
GLUCOSE BLDC GLUCOMTR-MCNC: 186 MG/DL — HIGH (ref 70–99)
GLUCOSE BLDC GLUCOMTR-MCNC: 214 MG/DL — HIGH (ref 70–99)
GLUCOSE BLDC GLUCOMTR-MCNC: 214 MG/DL — HIGH (ref 70–99)
INR BLD: 1.19 RATIO — HIGH (ref 0.88–1.16)
PROTHROM AB SERPL-ACNC: 13.3 SEC — HIGH (ref 10–12.9)

## 2019-06-05 PROCEDURE — 99231 SBSQ HOSP IP/OBS SF/LOW 25: CPT

## 2019-06-05 RX ORDER — ESCITALOPRAM OXALATE 10 MG/1
10 TABLET, FILM COATED ORAL DAILY
Refills: 0 | Status: DISCONTINUED | OUTPATIENT
Start: 2019-06-05 | End: 2019-06-06

## 2019-06-05 RX ORDER — POLYETHYLENE GLYCOL 3350 17 G/17G
17 POWDER, FOR SOLUTION ORAL DAILY
Refills: 0 | Status: DISCONTINUED | OUTPATIENT
Start: 2019-06-05 | End: 2019-06-06

## 2019-06-05 RX ORDER — DOCUSATE SODIUM 100 MG
100 CAPSULE ORAL THREE TIMES A DAY
Refills: 0 | Status: DISCONTINUED | OUTPATIENT
Start: 2019-06-05 | End: 2019-06-06

## 2019-06-05 RX ORDER — WARFARIN SODIUM 2.5 MG/1
4.5 TABLET ORAL ONCE
Refills: 0 | Status: COMPLETED | OUTPATIENT
Start: 2019-06-05 | End: 2019-06-05

## 2019-06-05 RX ORDER — ZINC OXIDE 200 MG/G
1 OINTMENT TOPICAL
Refills: 0 | Status: DISCONTINUED | OUTPATIENT
Start: 2019-06-05 | End: 2019-06-06

## 2019-06-05 RX ORDER — SENNA PLUS 8.6 MG/1
2 TABLET ORAL AT BEDTIME
Refills: 0 | Status: DISCONTINUED | OUTPATIENT
Start: 2019-06-05 | End: 2019-06-06

## 2019-06-05 RX ADMIN — Medication 650 MILLIGRAM(S): at 23:37

## 2019-06-05 RX ADMIN — DRONEDARONE 400 MILLIGRAM(S): 400 TABLET, FILM COATED ORAL at 05:19

## 2019-06-05 RX ADMIN — Medication 650 MILLIGRAM(S): at 05:17

## 2019-06-05 RX ADMIN — Medication 20 MILLIGRAM(S): at 05:19

## 2019-06-05 RX ADMIN — Medication 25 MICROGRAM(S): at 05:19

## 2019-06-05 RX ADMIN — Medication 50 MILLIGRAM(S): at 13:35

## 2019-06-05 RX ADMIN — Medication 1 TABLET(S): at 17:01

## 2019-06-05 RX ADMIN — ESCITALOPRAM OXALATE 5 MILLIGRAM(S): 10 TABLET, FILM COATED ORAL at 11:55

## 2019-06-05 RX ADMIN — CEFTRIAXONE 1000 MILLIGRAM(S): 500 INJECTION, POWDER, FOR SOLUTION INTRAMUSCULAR; INTRAVENOUS at 23:37

## 2019-06-05 RX ADMIN — Medication 20 MILLIGRAM(S): at 17:01

## 2019-06-05 RX ADMIN — Medication 1 TABLET(S): at 05:19

## 2019-06-05 RX ADMIN — PANTOPRAZOLE SODIUM 40 MILLIGRAM(S): 20 TABLET, DELAYED RELEASE ORAL at 05:19

## 2019-06-05 RX ADMIN — Medication 1 SPRAY(S): at 11:55

## 2019-06-05 RX ADMIN — Medication 650 MILLIGRAM(S): at 17:01

## 2019-06-05 RX ADMIN — Medication 325 MILLIGRAM(S): at 11:55

## 2019-06-05 RX ADMIN — POLYETHYLENE GLYCOL 3350 17 GRAM(S): 17 POWDER, FOR SOLUTION ORAL at 14:22

## 2019-06-05 RX ADMIN — Medication 2: at 17:07

## 2019-06-05 RX ADMIN — Medication 100 MILLIGRAM(S): at 13:47

## 2019-06-05 RX ADMIN — WARFARIN SODIUM 4.5 MILLIGRAM(S): 2.5 TABLET ORAL at 21:45

## 2019-06-05 RX ADMIN — SIMETHICONE 80 MILLIGRAM(S): 80 TABLET, CHEWABLE ORAL at 05:19

## 2019-06-05 RX ADMIN — Medication 650 MILLIGRAM(S): at 10:02

## 2019-06-05 RX ADMIN — OXYCODONE HYDROCHLORIDE 10 MILLIGRAM(S): 5 TABLET ORAL at 07:43

## 2019-06-05 RX ADMIN — SIMETHICONE 80 MILLIGRAM(S): 80 TABLET, CHEWABLE ORAL at 21:45

## 2019-06-05 RX ADMIN — OXYCODONE HYDROCHLORIDE 10 MILLIGRAM(S): 5 TABLET ORAL at 14:25

## 2019-06-05 RX ADMIN — Medication 50 MILLIGRAM(S): at 21:45

## 2019-06-05 RX ADMIN — ATORVASTATIN CALCIUM 20 MILLIGRAM(S): 80 TABLET, FILM COATED ORAL at 21:45

## 2019-06-05 RX ADMIN — Medication 2: at 13:28

## 2019-06-05 RX ADMIN — OXYCODONE HYDROCHLORIDE 10 MILLIGRAM(S): 5 TABLET ORAL at 21:42

## 2019-06-05 RX ADMIN — Medication 50 MILLIGRAM(S): at 05:19

## 2019-06-05 RX ADMIN — ZINC OXIDE 1 APPLICATION(S): 200 OINTMENT TOPICAL at 17:02

## 2019-06-05 RX ADMIN — DRONEDARONE 400 MILLIGRAM(S): 400 TABLET, FILM COATED ORAL at 17:01

## 2019-06-05 RX ADMIN — METHOCARBAMOL 750 MILLIGRAM(S): 500 TABLET, FILM COATED ORAL at 10:02

## 2019-06-05 NOTE — CONSULT NOTE ADULT - ASSESSMENT
69F with multiple comorbidities, including COPD on Home O2, CVA, DNR, with ventral hernia non obstructive in nature      -poor surgical candidate  -either way patient is refusing any surgical intervention  -stool softeners  -signing off    thanks   d/w Dr Richard

## 2019-06-05 NOTE — PROGRESS NOTE ADULT - ASSESSMENT
68 yo female with chronic constipation and abdominal pain. Difficult to assess patient. Would continue bowel regimen and pain control.

## 2019-06-05 NOTE — CONSULT NOTE ADULT - SUBJECTIVE AND OBJECTIVE BOX
Surgery called to evaluate this 69 year old Woman with hx of dyslipidemia, hypothyroid, DM, pAfib, R MCA CVA with residual left hemiparesis 3/2018 at [facility], course c/b LLE DVT & PNA,  COPD on O2 HS, polyneuropathy, GERD,  diverticulitis s/p colostomy and reversal, abd hernia, hip replacement, presented to the ED BIBA from [facility] for abd pain. Pt reports she has been have lower abd pain with urination and BM. Surgery called to evaluate her ventral hernias which patient states occured after her ostomy reversal. As per patient she had a BM an hour ago, however complains of constipation, chronic in nature- attributes her abdominal pain to her constipation episodes. Denies any fevers or chills. No bloody BM      ICU Vital Signs Last 24 Hrs  T(C): 37.2 (05 Jun 2019 16:10), Max: 37.2 (05 Jun 2019 16:10)  T(F): 99 (05 Jun 2019 16:10), Max: 99 (05 Jun 2019 16:10)  HR: 77 (05 Jun 2019 16:10) (65 - 82)  BP: 123/48 (05 Jun 2019 16:10) (107/42 - 124/46)  BP(mean): 65 (05 Jun 2019 05:13) (65 - 65)  ABP: --  ABP(mean): --  RR: 18 (05 Jun 2019 16:10) (16 - 18)  SpO2: 92% (05 Jun 2019 16:10) (91% - 99%)      Gen obese, alert awkae  cardiac s1 s2   Lungs SOB at rest,   Abd soft nt nd, ventral hernias palpable, easily reducible, no signs of incarceration or stranglation +BS  'ext full rom +Dp b.l                            8.5    7.72  )-----------( 388      ( 04 Jun 2019 08:31 )             29.4   06-04    138  |  105  |  13  ----------------------------<  150<H>  3.7   |  27  |  0.75    Ca    8.6      04 Jun 2019 08:31

## 2019-06-06 VITALS
TEMPERATURE: 98 F | RESPIRATION RATE: 18 BRPM | HEART RATE: 75 BPM | DIASTOLIC BLOOD PRESSURE: 74 MMHG | OXYGEN SATURATION: 92 % | SYSTOLIC BLOOD PRESSURE: 190 MMHG

## 2019-06-06 LAB
GLUCOSE BLDC GLUCOMTR-MCNC: 139 MG/DL — HIGH (ref 70–99)
GLUCOSE BLDC GLUCOMTR-MCNC: 143 MG/DL — HIGH (ref 70–99)
GLUCOSE BLDC GLUCOMTR-MCNC: 239 MG/DL — HIGH (ref 70–99)
HCT VFR BLD CALC: 28.7 % — LOW (ref 34.5–45)
HGB BLD-MCNC: 8 G/DL — LOW (ref 11.5–15.5)
INR BLD: 1.2 RATIO — HIGH (ref 0.88–1.16)
MCHC RBC-ENTMCNC: 20.1 PG — LOW (ref 27–34)
MCHC RBC-ENTMCNC: 27.9 GM/DL — LOW (ref 32–36)
MCV RBC AUTO: 72.1 FL — LOW (ref 80–100)
PLATELET # BLD AUTO: 358 K/UL — SIGNIFICANT CHANGE UP (ref 150–400)
PROTHROM AB SERPL-ACNC: 13.4 SEC — HIGH (ref 10–12.9)
RBC # BLD: 3.98 M/UL — SIGNIFICANT CHANGE UP (ref 3.8–5.2)
RBC # FLD: 20.3 % — HIGH (ref 10.3–14.5)
WBC # BLD: 6.2 K/UL — SIGNIFICANT CHANGE UP (ref 3.8–10.5)
WBC # FLD AUTO: 6.2 K/UL — SIGNIFICANT CHANGE UP (ref 3.8–10.5)

## 2019-06-06 PROCEDURE — 99231 SBSQ HOSP IP/OBS SF/LOW 25: CPT

## 2019-06-06 RX ORDER — ACETAMINOPHEN 500 MG
2 TABLET ORAL
Qty: 0 | Refills: 0 | DISCHARGE
Start: 2019-06-06

## 2019-06-06 RX ORDER — ESCITALOPRAM OXALATE 10 MG/1
1 TABLET, FILM COATED ORAL
Qty: 0 | Refills: 0 | DISCHARGE
Start: 2019-06-06

## 2019-06-06 RX ORDER — POLYETHYLENE GLYCOL 3350 17 G/17G
17 POWDER, FOR SOLUTION ORAL
Qty: 0 | Refills: 0 | DISCHARGE
Start: 2019-06-06

## 2019-06-06 RX ORDER — SIMETHICONE 80 MG/1
1 TABLET, CHEWABLE ORAL
Qty: 0 | Refills: 0 | DISCHARGE
Start: 2019-06-06

## 2019-06-06 RX ORDER — SENNA PLUS 8.6 MG/1
2 TABLET ORAL
Qty: 0 | Refills: 0 | DISCHARGE
Start: 2019-06-06

## 2019-06-06 RX ORDER — ZINC OXIDE 200 MG/G
1 OINTMENT TOPICAL
Qty: 0 | Refills: 0 | DISCHARGE
Start: 2019-06-06

## 2019-06-06 RX ORDER — DOCUSATE SODIUM 100 MG
1 CAPSULE ORAL
Qty: 0 | Refills: 0 | DISCHARGE
Start: 2019-06-06

## 2019-06-06 RX ORDER — OXYCODONE HYDROCHLORIDE 5 MG/1
1 TABLET ORAL
Qty: 0 | Refills: 0 | DISCHARGE
Start: 2019-06-06

## 2019-06-06 RX ADMIN — Medication 50 MILLIGRAM(S): at 05:19

## 2019-06-06 RX ADMIN — Medication 20 MILLIGRAM(S): at 05:19

## 2019-06-06 RX ADMIN — OXYCODONE HYDROCHLORIDE 10 MILLIGRAM(S): 5 TABLET ORAL at 03:26

## 2019-06-06 RX ADMIN — Medication 100 MILLIGRAM(S): at 13:35

## 2019-06-06 RX ADMIN — POLYETHYLENE GLYCOL 3350 17 GRAM(S): 17 POWDER, FOR SOLUTION ORAL at 11:23

## 2019-06-06 RX ADMIN — PANTOPRAZOLE SODIUM 40 MILLIGRAM(S): 20 TABLET, DELAYED RELEASE ORAL at 05:19

## 2019-06-06 RX ADMIN — DRONEDARONE 400 MILLIGRAM(S): 400 TABLET, FILM COATED ORAL at 05:19

## 2019-06-06 RX ADMIN — METHOCARBAMOL 750 MILLIGRAM(S): 500 TABLET, FILM COATED ORAL at 12:28

## 2019-06-06 RX ADMIN — Medication 650 MILLIGRAM(S): at 12:29

## 2019-06-06 RX ADMIN — Medication 1 SPRAY(S): at 11:23

## 2019-06-06 RX ADMIN — Medication 50 MILLIGRAM(S): at 13:34

## 2019-06-06 RX ADMIN — ZINC OXIDE 1 APPLICATION(S): 200 OINTMENT TOPICAL at 05:20

## 2019-06-06 RX ADMIN — Medication 2: at 12:23

## 2019-06-06 RX ADMIN — Medication 1 TABLET(S): at 17:09

## 2019-06-06 RX ADMIN — Medication 25 MICROGRAM(S): at 05:19

## 2019-06-06 RX ADMIN — Medication 0.2 MILLIGRAM(S): at 17:15

## 2019-06-06 RX ADMIN — OXYCODONE HYDROCHLORIDE 10 MILLIGRAM(S): 5 TABLET ORAL at 09:49

## 2019-06-06 RX ADMIN — Medication 325 MILLIGRAM(S): at 11:23

## 2019-06-06 RX ADMIN — SIMETHICONE 80 MILLIGRAM(S): 80 TABLET, CHEWABLE ORAL at 05:19

## 2019-06-06 RX ADMIN — Medication 20 MILLIGRAM(S): at 17:09

## 2019-06-06 RX ADMIN — ESCITALOPRAM OXALATE 10 MILLIGRAM(S): 10 TABLET, FILM COATED ORAL at 11:23

## 2019-06-06 RX ADMIN — Medication 1 TABLET(S): at 05:19

## 2019-06-06 RX ADMIN — DRONEDARONE 400 MILLIGRAM(S): 400 TABLET, FILM COATED ORAL at 17:09

## 2019-06-06 RX ADMIN — OXYCODONE HYDROCHLORIDE 10 MILLIGRAM(S): 5 TABLET ORAL at 16:53

## 2019-06-06 NOTE — DISCHARGE NOTE NURSING/CASE MANAGEMENT/SOCIAL WORK - NSDCDPATPORTLINK_GEN_ALL_CORE
You can access the [facility], offered by [facility], by registering with the following website: [email or web address]

## 2019-06-06 NOTE — DISCHARGE NOTE PROVIDER - NSDCCPCAREPLAN_GEN_ALL_CORE_FT
PRINCIPAL DISCHARGE DIAGNOSIS  Diagnosis: GI bleed  Assessment and Plan of Treatment:       SECONDARY DISCHARGE DIAGNOSES  Diagnosis: UTI (urinary tract infection)  Assessment and Plan of Treatment:

## 2019-06-06 NOTE — PROGRESS NOTE ADULT - ASSESSMENT
68yo female with UTI, multiple abdominal wall hernias and constipation  I agree with Miralax added yesterday as having more regular looser Bm should lead to less distention and pain  continue colace, senokot  increased pain meds may further slow her bowels- we could consider adding movantik for opioid-induced constipation to her regimen as outpt if current measures are ineffective

## 2019-06-06 NOTE — DIETITIAN INITIAL EVALUATION ADULT. - OTHER INFO
Pt seen as consult for unhealing wound. Pt is a 68 yo w/ PMH dyslipidemia, hypothyroid, DM, pAfib, R MCA CVA with residual left hemiparesis 3/2018 at [facility], course c/b LLE DVT & PNA,  COPD on O2 HS, polyneuropathy, GERD,  diverticulitis s/p colostomy and reversal, abd hernia, hip replacement, presented to the ED BIBA from [facility] for abd pain. Upon observation pt appears obese w/ BMI 36.9. As per pt she has a good appetite. Pt reports intentional wt loss of ~20# d/t altering diet. Pt currently noted w/ moderate edema l/r foot and ankle. D/w pt consistent CHO diet to further control BGs. Pt was receptive to information. A1c 8.0%. Pt noted w/ x 3 stage 1 on heels and buttocks and  abd wound dehiscence and stage 2 on abdomen. Pt requires increased protein to promote wound healing. PT noted w/ chronic constipation and c/o difficuly w/ BM x2 weeks PTA. since admission pt reports BMs daily however pt still strains which causes pain to abdomen. GUIAC +. Recommendations: 1. add consistent cho to diet order. 2. add glucerna 1x/day and encourage protein at each meal. 3. reinforce diet edu PRN. 4. weekly wt. 5. monitor poct bg 6. add vitamin c 500 mg daily and 220 mg zinc BID x 10days Pt seen as consult for unhealing wound. Pt is a 68 yo w/ PMH dyslipidemia, hypothyroid, DM, pAfib, R MCA CVA with residual left hemiparesis 3/2018 at [facility], course c/b LLE DVT & PNA,  COPD on O2 HS, polyneuropathy, GERD,  diverticulitis s/p colostomy and reversal, abd hernia, hip replacement, presented to the ED BIBA from [facility] for abd pain. Upon observation pt appears obese w/ BMI 36.9. As per pt she has a good appetite. Pt reports intentional wt loss of ~20# d/t altering diet. Pt currently noted w/ moderate edema l/r foot and ankle. D/w pt consistent CHO diet to further control BGs. Pt was receptive to information. A1c 8.0%. Pt noted w/ x 3 stage 1 on heels and buttocks and  abd wound dehiscence and stage 2 on abdomen. Pt requires increased protein to promote wound healing. PT noted w/ chronic constipation and c/o difficuly w/ BM x2 weeks PTA. since admission pt reports BMs daily however pt still strains which causes pain to abdomen. GUIAC +. Recommendations: 1. add consistent cho to diet order. 2.encourage protein at each meal. 3. reinforce diet edu PRN. 4. weekly wt. 5. monitor poct bg 6. add vitamin c 500 mg daily and 220 mg zinc BID x 10days Pt seen as consult for unhealing wound. Pt is a 70 yo w/ PMH dyslipidemia, hypothyroid, DM, pAfib, R MCA CVA with residual left hemiparesis 3/2018 at [facility], course c/b LLE DVT & PNA,  COPD on O2 HS, polyneuropathy, GERD,  diverticulitis s/p colostomy and reversal, abd hernia, hip replacement, presented to the ED BIBA from [facility] for abd pain. Upon observation pt appears obese w/ BMI 36.9. As per pt she has a good appetite. Pt reports intentional wt loss of ~20# d/t altering diet. Pt currently noted w/ moderate edema l/r foot and ankle. D/w pt consistent CHO diet to further control BGs. Pt was receptive to information. A1c 8.0%. Pt noted w/ x 3 stage 1 on heels and buttocks and  abd wound dehiscence and stage 2 on abdomen. Pt requires increased protein to promote wound healing. PT noted w/ chronic constipation and c/o difficuly w/ BM x2 weeks PTA. since admission pt reports BMs daily however pt still strains which causes pain to abdomen. GUIAC +. Recommendations: 1. add consistent cho to diet order. 2.encourage protein at each meal and w/ snacks. 3. reinforce diet edu PRN. 4. weekly wt. 5. monitor poct bg 6. add vitamin c 500 mg daily and 220 mg zinc BID x 10days

## 2019-06-06 NOTE — DISCHARGE NOTE PROVIDER - HOSPITAL COURSE
69 year old Woman with hx of dyslipidemia, hypothyroid, DM, pAfib, R MCA CVA with residual left hemiparesis 3/2018 at [facility], course c/b LLE DVT & PNA,  COPD on O2 HS, polyneuropathy, GERD,  diverticulitis s/p colostomy and reversal, abd hernia, hip replacement, presented to the ED BIBA from [facility] for abd pain. Pt reports she has been have lower abd pain with urination and BM, and lower abd pressure at rest. Was oozing stool per [facility]'s chart documentation,. Denies CP/palpitation/SOB/HA/dizziness/n/v/d/f/c.         In the ED T 993 (oral), HR 80, /47, O2 95% rm air, H/H/plt at baseline, WBC wnl,  INR 3.1, ED doc reports +guiac, CT a/p devoid of bowel obstruction or gross bowel wall thickening +multiple large anterior abdominal wall hernias containing nonobstructive small and large bowel as well as a right upper quadrant hernia containing nonobstructive gastric antrum and Intramural bladder diverticula with circumferential thickening of the bladder wall. No hydronephrosis.  Urinalysis grossly abnormal. In ED She received CFXN 1gm IVP,  had llarge BM. Medicine was asked for evaluation and admission. She was placed on the medical floor and bowel regimen continued with the help of GI. Course complicated by intractable pain, for which the patient continued to request narcotics. She also developed some leg swelling for which US was ordered. Pain persisted and pt not cooperative with assessment, so urology and surgery called to help exclude organic source of pain.                *Suspected opioid induced constipation with intractable abdominal pain    -can not exclude constipation worsened due to urinary retention as well    -reports increased narcotics at [facility], taking oxycodone 10 TID without stool softeners due to hip pain    -CT with large amount of stool and bladder diverticuli, abdomen still distended    -magnesium citrate today, tap water enema yesterday    -continue bowel regimen    -attempt to minimize narcotics    -simethicone PRN    -For completeness, consulted surgery given hernias as pt difficult to assess (agree with GI notes). no intervention indicated            symptomatic UTI    -UA abnormal, but dirty with many epitheleal cells, culture not sent. Repeat UA with culture given continued disuria    -CTX day 3/3    -Urology consult given pt's continued complaints and CT abnormalities, can be completed at [facility]            Anemia - Iron def    4/14/19 iron studies: Serum iron 17 /  Unsaturated Iron Binding Capacity 276 ug/dL / TIBC 293 ug/dL/ iron sat 6%    H/H stable (baseline 8.3 - 9.5 / 30 - 32.9 March to May)    con't home dose iron supplement    monitor CBC    suspect +guiac likely hemorrhoidal related vs GIB in setting of borderline high INR    protonix IVP f/b drip ordered in ED    GI consult        Hypothyroid    TSH 1.36 (3/2019)    con't home dose LT4        DM - poorly controlled    A1c 8    con't home regimen        pAfib on chronic anticoagulation    NSR    [id number] score = 6 (age-1, female-1, HTN-1, CVA-2, DM-1)    held coumadin for supratherapetic INR on arrival, now resuming    daily INR    con't home dose multaq for rhythm control        CVA w/ L sided residual deficit    assist with all ADL's    con't statin            Neuropathy    con't home meds        Abd Wound dehiscence (chronic)    wound consult: Change dressing to midline incision every other day with foam dressing        COPD with chronic hypoxic respiratory failure - stable    con't home nebs PRN    O2 HS        Groin Pain with Bilateral hip replacements    -CT reports bilateral hip replacements.        Dispo    DNR/DNI- MOLST accompanied pt from [place] - reviewed with patient and updated (no change)    pt agreed with discussed plan of care

## 2019-06-06 NOTE — DIETITIAN INITIAL EVALUATION ADULT. - PERTINENT MEDS FT
MEDICATIONS  (STANDING):  atorvastatin 20 milliGRAM(s) Oral at bedtime  cefTRIAXone Injectable. 1000 milliGRAM(s) IV Push every 24 hours  docusate sodium 100 milliGRAM(s) Oral three times a day  dronedarone 400 milliGRAM(s) Oral two times a day  escitalopram 10 milliGRAM(s) Oral daily  ferrous    sulfate 325 milliGRAM(s) Oral daily  fluticasone propionate 50 MICROgram(s)/spray Nasal Spray 1 Spray(s) Both Nostrils daily  furosemide    Tablet 20 milliGRAM(s) Oral two times a day  insulin lispro (HumaLOG) corrective regimen sliding scale   SubCutaneous three times a day before meals  insulin lispro (HumaLOG) corrective regimen sliding scale   SubCutaneous at bedtime  lactobacillus acidophilus 1 Tablet(s) Oral two times a day  levothyroxine 25 MICROGram(s) Oral daily  pantoprazole    Tablet 40 milliGRAM(s) Oral before breakfast  polyethylene glycol 3350 17 Gram(s) Oral daily  pregabalin 50 milliGRAM(s) Oral three times a day  senna 2 Tablet(s) Oral at bedtime  simethicone 80 milliGRAM(s) Chew three times a day  zinc oxide 40% Ointment 1 Application(s) Topical two times a day    MEDICATIONS  (PRN):  acetaminophen   Tablet .. 650 milliGRAM(s) Oral every 4 hours PRN Temp greater or equal to 38C (100.4F), Mild Pain (1 - 3)  ALBUTerol   0.5% 2.5 milliGRAM(s) Nebulizer every 6 hours PRN Shortness of Breath and/or Wheezing  magnesium hydroxide Suspension 30 milliLiter(s) Oral daily PRN Constipation  methocarbamol 750 milliGRAM(s) Oral every 8 hours PRN for muscle spasm, for moderate pain  oxyCODONE    IR 10 milliGRAM(s) Oral four times a day PRN Severe Pain (7 - 10)

## 2019-06-06 NOTE — PROGRESS NOTE ADULT - SUBJECTIVE AND OBJECTIVE BOX
HOSPITAL COURSE AND ASSESSMENT    69 year old Woman with hx of dyslipidemia, hypothyroid, DM, pAfib, R MCA CVA with residual left hemiparesis 3/2018 at [facility], course c/b LLE DVT & PNA,  COPD on O2 HS, polyneuropathy, GERD,  diverticulitis s/p colostomy and reversal, abd hernia, hip replacement, presented to the ED BIBA from [facility] for abd pain. Pt reports she has been have lower abd pain with urination and BM, and lower abd pressure at rest. Was oozing stool per [facility]'s chart documentation,. Denies CP/palpitation/SOB/HA/dizziness/n/v/d/f/c.     In the ED T 993 (oral), HR 80, /47, O2 95% rm air, H/H/plt at baseline, WBC wnl,  INR 3.1, ED doc reports +guiac, CT a/p devoid of bowel obstruction or gross bowel wall thickening +multiple large anterior abdominal wall hernias containing nonobstructive small and large bowel as well as a right upper quadrant hernia containing nonobstructive gastric antrum and Intramural bladder diverticula with circumferential thickening of the bladder wall. No hydronephrosis.  Urinalysis grossly abnormal. In ED She received CFXN 1gm IVP,  had llarge BM. Medicine was asked for evaluation and admission. She was placed on the medical floor and bowel regimen continued with the help of GI. Course complicated by intractable pain, for which the patient continued to request narcotics. She also developed some leg swelling for which US was ordered. Pain persisted and pt not coopreative with assessment, so urology and surgery called to help exclude organic source of pain.    Anticipate discharge in 1-2 days with pain controlled.   Possible repeat CT in AM should pain persist.      *Suspected opioid induced constipation with intractable abdominal pain  -can not exclude constipation worsened due to urinary retention as well  -reports increased narcotics at [facility], taking oxycodone 10 TID without stool softeners due to hip pain  -CT with large amount of stool and bladder diverticuli, abdomen still distended  -magnesium citrate today, tap water enema yesterday  -continue bowel regimen  -attempt to minimize narcotics  -simethicone PRN  -For completeness, will consult surgery given hernias as pt difficult to assess (agree with GI notes)      symptomatic UTI  -UA abnormal, but dirty with many epitheleal cells, culture not sent. Repeat UA with culture given continued disuria  -CTX day 3/3  -Urology consult given pt's continued complaints and CT abnormalities      Anemia - Iron def  4/14/19 iron studies: Serum iron 17 / {\l ""} Unsaturated Iron Binding Capacity 276 ug/dL / TIBC 293 ug/dL/ iron sat 6%  H/H stable (baseline 8.3 - 9.5 / 30 - 32.9 March to May)  con't home dose iron supplement  monitor CBC  suspect +guiac likely hemorrhoidal related vs GIB in setting of borderline high INR  protonix IVP f/b drip ordered in ED  GI consult    Hypothyroid  TSH 1.36 (3/2019)  con't home dose LT4    DM - poorly controlled  A1c 8  con't home regimen    pAfib on chronic anticoagulation  NSR  [id number] score = 6 (age-1, female-1, HTN-1, CVA-2, DM-1)  held coumadin for supratherapetic INR on arrival, now resuming  daily INR  con't home dose multaq for rhythm control    CVA w/ L sided residual deficit  assist with all ADL's  con't statin      Neuropathy  con't home meds    Abd Wound dehiscence (chronic)  wound consult: Change dressing to midline incision every other day with foam dressing    COPD with chronic hypoxic respiratory failure - stable  con't home nebs PRN  O2 HS    Groin Pain with Bilateral hip replacements  -CT reports bilateral hip replacements.    Dispo  DNR/DNI- MOLST accompanied pt from [facility] - reviewed with patient and updated (no change)  pt agreed with discussed plan of care      ----------------------------------------------------------------------------------------------  CHIEF COMPLAINT:  abdominal pain    SUBJECTIVE:     tolerating PO. patient sleeping and awoken for exam. She reports pain better, but still with dysuria with urination and abdominal pain. reports she will go home. Called the nurse to call the MD back to say she is intractable abdominal pain and she will not leave.    REVIEW OF SYSTEMS:  All other review of systems is negative unless indicated above    Vital Signs Last 24 Hrs  T(C): 36.8 (05 Jun 2019 11:47), Max: 37.1 (04 Jun 2019 16:26)  T(F): 98.3 (05 Jun 2019 11:47), Max: 98.7 (04 Jun 2019 16:26)  HR: 75 (05 Jun 2019 11:47) (65 - 87)  BP: 124/46 (05 Jun 2019 11:47) (107/42 - 148/63)  BP(mean): 65 (05 Jun 2019 05:13) (65 - 65)  RR: 16 (05 Jun 2019 11:47) (16 - 18)  SpO2: 91% (05 Jun 2019 11:47) (91% - 99%)  CAPILLARY BLOOD GLUCOSE      POCT Blood Glucose.: 139 mg/dL (05 Jun 2019 08:14)  POCT Blood Glucose.: 248 mg/dL (04 Jun 2019 22:04)  POCT Blood Glucose.: 199 mg/dL (04 Jun 2019 17:19)      PHYSICAL EXAM:  Constitutional: NAD, NCAT  HEENT: EOMI, Normal Hearing, MMM, fair dentition  Neck: trachea midline, No JVD  Respiratory: Breath sounds are clear, unlabored respiration  Cardiovascular: S1 and S2, regular rate   Gastrointestinal: Bowel Sounds present, soft, tender RLQ, nondistended  Extremities: No peripheral edema  Vascular: 2+ radial pulse  Neurological: awake, alert, follows commands, sensation grossly intact  Psych: flat  affect, fair insight  Musculoskeletal: moves all extremities  Skin: No rashes    ALLERGIES  Cipro (Rash)  Spiriva (Rash)      MEDICATIONS  (STANDING):  atorvastatin 20 milliGRAM(s) Oral at bedtime  cefTRIAXone Injectable. 1000 milliGRAM(s) IV Push every 24 hours  docusate sodium 100 milliGRAM(s) Oral three times a day  dronedarone 400 milliGRAM(s) Oral two times a day  escitalopram 10 milliGRAM(s) Oral daily  ferrous    sulfate 325 milliGRAM(s) Oral daily  fluticasone propionate 50 MICROgram(s)/spray Nasal Spray 1 Spray(s) Both Nostrils daily  furosemide    Tablet 20 milliGRAM(s) Oral two times a day  insulin lispro (HumaLOG) corrective regimen sliding scale   SubCutaneous three times a day before meals  insulin lispro (HumaLOG) corrective regimen sliding scale   SubCutaneous at bedtime  lactobacillus acidophilus 1 Tablet(s) Oral two times a day  levothyroxine 25 MICROGram(s) Oral daily  pantoprazole    Tablet 40 milliGRAM(s) Oral before breakfast  polyethylene glycol 3350 17 Gram(s) Oral daily  pregabalin 50 milliGRAM(s) Oral three times a day  senna 2 Tablet(s) Oral at bedtime  simethicone 80 milliGRAM(s) Chew three times a day  warfarin 4.5 milliGRAM(s) Oral once      LABS: All Labs Reviewed:                        8.5    7.72  )-----------( 388      ( 04 Jun 2019 08:31 )             29.4     06-04    138  |  105  |  13  ----------------------------<  150<H>  3.7   |  27  |  0.75    Ca    8.6      04 Jun 2019 08:31    TPro  7.0  /  Alb  2.8<L>  /  TBili  0.2  /  DBili  x   /  AST  10<L>  /  ALT  20  /  AlkPhos  104  06-03    PT/INR - ( 05 Jun 2019 08:31 )   PT: 13.3 sec;   INR: 1.19 ratio         PTT - ( 03 Jun 2019 16:34 )  PTT:39.3 sec      Blood Culture:
HOSPITAL COURSE AND ASSESSMENT  69 year old Woman with hx of dyslipidemia, hypothyroid, DM, pAfib, R MCA CVA with residual left hemiparesis 3/2018 at [facility], course c/b LLE DVT & PNA,  COPD on O2 HS, polyneuropathy, GERD,  diverticulitis s/p colostomy and reversal, abd hernia, hip replacement, presented to the ED BIBA from [facility] for abd pain. Pt reports she has been have lower abd pain with urination and BM, and lower abd pressure at rest. Was oozing stool per [facility]'s chart documentation,. Denies CP/palpitation/SOB/HA/dizziness/n/v/d/f/c.     In the ED T 993 (oral), HR 80, /47, O2 95% rm air, H/H/plt at baseline, WBC wnl,  INR 3.1, ED doc reports +guiac, CT a/p devoid of bowel obstruction or gross bowel wall thickening +multiple large anterior abdominal wall hernias containing nonobstructive small and large bowel as well as a right upper quadrant hernia containing nonobstructive gastric antrum and Intramural bladder diverticula with circumferential thickening of the bladder wall. No hydronephrosis.  Urinalysis grossly abnormal. In ED She received CFXN 1gm IVP,  had llarge BM. Medicine was asked for evaluation and admission. She was placed on the medical floor and bowel regimen continued with the help of GI. Course complicated by intractable pain, for which the patient continued to request narcotics. She also developed some leg swelling.      *Suspected opioid induced constipation with intractable abdominal pain  -can not exclude constipation worsened due to urinary retention as well  -reports increased narcotics at [facility], taking oxycodone 10 TID without stool softners due to hip pain  -CT with large amount of stool, abdomen still distended  -magnesium citrate today, tap water enema yesterday  -continue bowel reigmen  -may need to consider relistor/movantik  -attempt to minimize narcotics  -simethicone PRN      symptomatic UTI  -UA abnormal, but dirty with many epitheleal cells, culture pending  -CTX day 2/3      Anemia - Iron def  4/14/19 iron studies: Serum iron 17 / {\l ""} Unsaturated Iron Binding Capacity 276 ug/dL / TIBC 293 ug/dL/ iron sat 6%  H/H stable (baseline 8.3 - 9.5 / 30 - 32.9 March to May)  con't home dose iron supplement  monitor CBC  suspect +guiac likely hemorrhoidal related vs GIB in setting of borderline high INR  protonix IVP f/b drip ordered in ED  GI consult    Hypothyroid  TSH 1.36 (3/2019)  con't home dose LT4    DM - poorly controlled  A1c 8  con't home regimen    pAfib on chronic anticoagulation  NSR  [id number] score = 6 (age-1, female-1, HTN-1, CVA-2, DM-1)  held coumadin for supratherapetic INR on arrival, now resuming  daily INR  con't home dose multaq for rhythm control    CVA w/ L sided residual deficit  assist with all ADL's  con't statin      Neuropathy  con't home meds    Abd Wound dehiscence (chronic)  wound consult: Change dressing to midline incision every other day with foam dressing    COPD with chronic hypoxic respiratory failure - stable  con't home nebs PRN  O2 HS    Groin Pain with BIlateral hip replacements  -CT reports bilateral hip replacements.    Dispo  DNR/DNI- MOLST accompanied pt from [facility] - reviewed with patient and updated (no change)  pt agreed with discussed plan of care          ----------------------------------------------------------------------------------------------  CHIEF COMPLAINT:  abdominal pain    SUBJECTIVE:     tolerating PO. not oob. wants morphine IV. reports groin pain.    REVIEW OF SYSTEMS:  All other review of systems is negative unless indicated above    Vital Signs Last 24 Hrs  T(C): 36.7 (04 Jun 2019 11:34), Max: 37.4 (03 Jun 2019 19:58)  T(F): 98.1 (04 Jun 2019 11:34), Max: 99.3 (03 Jun 2019 19:58)  HR: 76 (04 Jun 2019 11:34) (68 - 80)  BP: 117/54 (04 Jun 2019 11:34) (103/54 - 141/64)  BP(mean): --  RR: 18 (04 Jun 2019 11:34) (17 - 18)  SpO2: 94% (04 Jun 2019 11:34) (91% - 98%)  CAPILLARY BLOOD GLUCOSE      POCT Blood Glucose.: 173 mg/dL (04 Jun 2019 12:05)  POCT Blood Glucose.: 159 mg/dL (04 Jun 2019 08:18)  POCT Blood Glucose.: 123 mg/dL (04 Jun 2019 00:23)      PHYSICAL EXAM:  Constitutional: NAD, NCAT  HEENT: EOMI, Normal Hearing, MMM, fair dentition  Neck: trachea midline, No JVD  Respiratory: Breath sounds are clear, unlabored respiration  Cardiovascular: S1 and S2, regular rate   Gastrointestinal: Bowel Sounds present, soft, nontender, distended  Extremities: No peripheral edema  Vascular: 2+ radial pulse  Neurological: awake, alert, follows commands, sensation grossly intact  Psych: appropriate affect, fair insight  Musculoskeletal: moves all extremities  Skin: No rashes    ALLERGIES  Cipro (Rash)  Spiriva (Rash)      MEDICATIONS  (STANDING):  atorvastatin 20 milliGRAM(s) Oral at bedtime  cefTRIAXone Injectable. 1000 milliGRAM(s) IV Push every 24 hours  dronedarone 400 milliGRAM(s) Oral two times a day  escitalopram 5 milliGRAM(s) Oral daily  ferrous    sulfate 325 milliGRAM(s) Oral daily  fluticasone propionate 50 MICROgram(s)/spray Nasal Spray 1 Spray(s) Both Nostrils daily  furosemide    Tablet 20 milliGRAM(s) Oral two times a day  insulin lispro (HumaLOG) corrective regimen sliding scale   SubCutaneous three times a day before meals  insulin lispro (HumaLOG) corrective regimen sliding scale   SubCutaneous at bedtime  lactobacillus acidophilus 1 Tablet(s) Oral two times a day  levothyroxine 25 MICROGram(s) Oral daily  pantoprazole    Tablet 40 milliGRAM(s) Oral before breakfast  pregabalin 50 milliGRAM(s) Oral three times a day  simethicone 80 milliGRAM(s) Chew three times a day      LABS: All Labs Reviewed:                        8.5    7.72  )-----------( 388      ( 04 Jun 2019 08:31 )             29.4     06-04    138  |  105  |  13  ----------------------------<  150<H>  3.7   |  27  |  0.75    Ca    8.6      04 Jun 2019 08:31    TPro  7.0  /  Alb  2.8<L>  /  TBili  0.2  /  DBili  x   /  AST  10<L>  /  ALT  20  /  AlkPhos  104  06-03    PT/INR - ( 04 Jun 2019 08:31 )   PT: 16.3 sec;   INR: 1.45 ratio         PTT - ( 03 Jun 2019 16:34 )  PTT:39.3 sec      Blood Culture:
Patient is a 69y old  Female who presents with a chief complaint of Abdominal pain / UTI (2019 15:04)      HPI:  69 year old Woman with hx of dyslipidemia, hypothyroid, DM, pAfib, R MCA CVA with residual left hemiparesis 3/2018 at [facility], course c/b LLE DVT & PNA,  COPD on O2 HS, polyneuropathy, GERD,  diverticulitis s/p colostomy and reversal, abd hernia, hip replacement, presented to the ED BIBA from [facility] for abd pain. Pt reports she has been have lower abd pain with urination and BM, and lower abd pressure at rest. Was oozing stool per [facility]'s chart documentation,. Denies CP/palpitation/SOB/HA/dizziness/n/v/d/f/c.     In the ED T 993 (oral), HR 80, /47, O2 95% rm air, H/H/plt at baseline, WBC wnl,  INR 3.1, ED doc reports +guiac, CT a/p devoid of bowel obstruction or gross bowel wall thickening +multiple large anterior abdominal wall hernias containing nonobstructive small and large bowel as well as a right upper quadrant hernia containing nonobstructive gastric antrum and Intramural bladder diverticula with circumferential thickening of the bladder wall. No hydronephrosis.  Urinalysis grossly abnormal. In ED She received CFXN 1gm IVP,  had large BM      Patient feels mildly improved after bowel movement. Still notes some abdominal pain.    FAMILY HX: Mother-emphysema; Father-CVA (2019 22:19)      PAST MEDICAL & SURGICAL HISTORY:  Diverticulitis  Neuropathy  COPD (chronic obstructive pulmonary disease)  CVA (cerebral vascular accident)  DVT of lower limb, acute  Cerebrovascular accident (CVA)  Diabetes mellitus  HTN (hypertension)  History of atrial fibrillation  S/P hip replacement  S/P   S/P colostomy  History of colostomy reversal  History of colostomy reversal      MEDICATIONS  (STANDING):  atorvastatin 20 milliGRAM(s) Oral at bedtime  cefTRIAXone Injectable. 1000 milliGRAM(s) IV Push every 24 hours  dronedarone 400 milliGRAM(s) Oral two times a day  escitalopram 5 milliGRAM(s) Oral daily  ferrous    sulfate 325 milliGRAM(s) Oral daily  fluticasone propionate 50 MICROgram(s)/spray Nasal Spray 1 Spray(s) Both Nostrils daily  furosemide    Tablet 20 milliGRAM(s) Oral two times a day  insulin lispro (HumaLOG) corrective regimen sliding scale   SubCutaneous three times a day before meals  insulin lispro (HumaLOG) corrective regimen sliding scale   SubCutaneous at bedtime  lactobacillus acidophilus 1 Tablet(s) Oral two times a day  levothyroxine 25 MICROGram(s) Oral daily  pantoprazole    Tablet 40 milliGRAM(s) Oral before breakfast  pregabalin 50 milliGRAM(s) Oral three times a day  simethicone 80 milliGRAM(s) Chew three times a day    MEDICATIONS  (PRN):  acetaminophen   Tablet .. 650 milliGRAM(s) Oral every 4 hours PRN Temp greater or equal to 38C (100.4F), Mild Pain (1 - 3)  ALBUTerol   0.5% 2.5 milliGRAM(s) Nebulizer every 6 hours PRN Shortness of Breath and/or Wheezing  magnesium hydroxide Suspension 30 milliLiter(s) Oral daily PRN Constipation  methocarbamol 750 milliGRAM(s) Oral every 8 hours PRN for muscle spasm, for moderate pain  oxyCODONE    IR 10 milliGRAM(s) Oral four times a day PRN Severe Pain (7 - 10)      Allergies    Cipro (Rash)  Spiriva (Rash)    Intolerances          Vital Signs Last 24 Hrs  T(C): 36.8 (2019 11:47), Max: 37.1 (2019 16:26)  T(F): 98.3 (2019 11:47), Max: 98.7 (2019 16:26)  HR: 75 (2019 11:47) (65 - 87)  BP: 124/46 (2019 11:47) (107/42 - 148/63)  BP(mean): 65 (2019 05:13) (65 - 65)  RR: 16 (2019 11:47) (16 - 18)  SpO2: 91% (2019 11:47) (91% - 99%)    PHYSICAL EXAM:      Respiratory: CTAB  Cardiovascular: S1 and S2, RRR, no M/G/R  Gastrointestinal: multiple abdominal wall hernias  LABS:                        8.5    7.72  )-----------( 388      ( 2019 08:31 )             29.4     06-04    138  |  105  |  13  ----------------------------<  150<H>  3.7   |  27  |  0.75    Ca    8.6      2019 08:31    TPro  7.0  /  Alb  2.8<L>  /  TBili  0.2  /  DBili  x   /  AST  10<L>  /  ALT  20  /  AlkPhos  104  06-03    PT/INR - ( 2019 08:31 )   PT: 13.3 sec;   INR: 1.19 ratio         PTT - ( 2019 16:34 )  PTT:39.3 sec  LIVER FUNCTIONS - ( 2019 16:34 )  Alb: 2.8 g/dL / Pro: 7.0 gm/dL / ALK PHOS: 104 U/L / ALT: 20 U/L / AST: 10 U/L / GGT: x             RADIOLOGY & ADDITIONAL STUDIES:
Patient is a 69y old  Female who presents with a chief complaint of Abdominal pain / UTI (2019 17:46)      HPI:  pt resting comfortably and is sleeping.  She was easily roused this AM.  States she currently has abdominal pain and feels a BM coming.  She inquires as to what can be done with her pain medications as she feels they are inadequate for her abdominal pain exacerbated by her constipation.  She is having BM daily 1-2/day    PAST MEDICAL & SURGICAL HISTORY:  Diverticulitis  Neuropathy  COPD (chronic obstructive pulmonary disease)  CVA (cerebral vascular accident)  DVT of lower limb, acute  Cerebrovascular accident (CVA)  Diabetes mellitus  HTN (hypertension)  History of atrial fibrillation  S/P hip replacement  S/P   S/P colostomy  History of colostomy reversal  History of colostomy reversal    MEDICATIONS  (STANDING):  atorvastatin 20 milliGRAM(s) Oral at bedtime  cefTRIAXone Injectable. 1000 milliGRAM(s) IV Push every 24 hours  docusate sodium 100 milliGRAM(s) Oral three times a day  dronedarone 400 milliGRAM(s) Oral two times a day  escitalopram 10 milliGRAM(s) Oral daily  ferrous    sulfate 325 milliGRAM(s) Oral daily  fluticasone propionate 50 MICROgram(s)/spray Nasal Spray 1 Spray(s) Both Nostrils daily  furosemide    Tablet 20 milliGRAM(s) Oral two times a day  insulin lispro (HumaLOG) corrective regimen sliding scale   SubCutaneous three times a day before meals  insulin lispro (HumaLOG) corrective regimen sliding scale   SubCutaneous at bedtime  lactobacillus acidophilus 1 Tablet(s) Oral two times a day  levothyroxine 25 MICROGram(s) Oral daily  pantoprazole    Tablet 40 milliGRAM(s) Oral before breakfast  polyethylene glycol 3350 17 Gram(s) Oral daily  pregabalin 50 milliGRAM(s) Oral three times a day  senna 2 Tablet(s) Oral at bedtime  simethicone 80 milliGRAM(s) Chew three times a day  zinc oxide 40% Ointment 1 Application(s) Topical two times a day    MEDICATIONS  (PRN):  acetaminophen   Tablet .. 650 milliGRAM(s) Oral every 4 hours PRN Temp greater or equal to 38C (100.4F), Mild Pain (1 - 3)  ALBUTerol   0.5% 2.5 milliGRAM(s) Nebulizer every 6 hours PRN Shortness of Breath and/or Wheezing  magnesium hydroxide Suspension 30 milliLiter(s) Oral daily PRN Constipation  methocarbamol 750 milliGRAM(s) Oral every 8 hours PRN for muscle spasm, for moderate pain  oxyCODONE    IR 10 milliGRAM(s) Oral four times a day PRN Severe Pain (7 - 10)    Allergies  Cipro (Rash)  Spiriva (Rash)    REVIEW OF SYSTEMS:    CONSTITUTIONAL: weakness  RESPIRATORY: No cough, wheezing, hemoptysis; No shortness of breath  CARDIOVASCULAR: No chest pain or palpitations  GASTROINTESTINAL: as above  All other review of systems is negative unless indicated above.    Vital Signs Last 24 Hrs  T(C): 36.5 (2019 04:55), Max: 37.2 (2019 16:10)  T(F): 97.7 (2019 04:55), Max: 99 (2019 16:10)  HR: 60 (2019 04:55) (60 - 77)  BP: 129/61 (2019 04:55) (123/48 - 129/61)  BP(mean): --  RR: 18 (2019 04:55) (16 - 18)  SpO2: 94% (2019 04:55) (91% - 94%)    PHYSICAL EXAM:    Constitutional: appears chronically ill  Cardiovascular: S1 and S2  Gastrointestinal: BS+, soft, mildly distended with multiple hernias  Extremities: peripheral edema 1+    LABS:                        8.0    6.20  )-----------( 358      ( 2019 06:23 )             28.7     -    138  |  105  |  13  ----------------------------<  150<H>  3.7   |  27  |  0.75    Ca    8.6      2019 08:31      PT/INR - ( 2019 06:23 )   PT: 13.4 sec;   INR: 1.20 ratio               RADIOLOGY & ADDITIONAL STUDIES:

## 2019-06-06 NOTE — DISCHARGE NOTE PROVIDER - NSDCFUADDINST_GEN_ALL_CORE_FT
Rehab MD: follow up 1-3 days.     If no bowel movement in 24-48 hours, may need to consider movantik.     INR monitoring per protocol.    Glucose monitoring per protocol.     Urology assessment as available at the facility.     Pain management as available at the facility.     Wound care to abdomen.

## 2019-06-06 NOTE — DIETITIAN INITIAL EVALUATION ADULT. - ADHERENCE
a1c 8.0%; pt reports she is on a NCS diet at [facility] however dietary recall reveals diet high in CHO/poor

## 2019-06-10 DIAGNOSIS — T40.2X5A ADVERSE EFFECT OF OTHER OPIOIDS, INITIAL ENCOUNTER: ICD-10-CM

## 2019-06-10 DIAGNOSIS — T81.30XA DISRUPTION OF WOUND, UNSPECIFIED, INITIAL ENCOUNTER: ICD-10-CM

## 2019-06-10 DIAGNOSIS — K43.9 VENTRAL HERNIA WITHOUT OBSTRUCTION OR GANGRENE: ICD-10-CM

## 2019-06-10 DIAGNOSIS — Z79.84 LONG TERM (CURRENT) USE OF ORAL HYPOGLYCEMIC DRUGS: ICD-10-CM

## 2019-06-10 DIAGNOSIS — D50.9 IRON DEFICIENCY ANEMIA, UNSPECIFIED: ICD-10-CM

## 2019-06-10 DIAGNOSIS — J96.11 CHRONIC RESPIRATORY FAILURE WITH HYPOXIA: ICD-10-CM

## 2019-06-10 DIAGNOSIS — N39.0 URINARY TRACT INFECTION, SITE NOT SPECIFIED: ICD-10-CM

## 2019-06-10 DIAGNOSIS — Z79.51 LONG TERM (CURRENT) USE OF INHALED STEROIDS: ICD-10-CM

## 2019-06-10 DIAGNOSIS — K59.03 DRUG INDUCED CONSTIPATION: ICD-10-CM

## 2019-06-10 DIAGNOSIS — Z87.01 PERSONAL HISTORY OF PNEUMONIA (RECURRENT): ICD-10-CM

## 2019-06-10 DIAGNOSIS — E03.9 HYPOTHYROIDISM, UNSPECIFIED: ICD-10-CM

## 2019-06-10 DIAGNOSIS — Z86.718 PERSONAL HISTORY OF OTHER VENOUS THROMBOSIS AND EMBOLISM: ICD-10-CM

## 2019-06-10 DIAGNOSIS — E78.5 HYPERLIPIDEMIA, UNSPECIFIED: ICD-10-CM

## 2019-06-10 DIAGNOSIS — E11.65 TYPE 2 DIABETES MELLITUS WITH HYPERGLYCEMIA: ICD-10-CM

## 2019-06-10 DIAGNOSIS — Z79.01 LONG TERM (CURRENT) USE OF ANTICOAGULANTS: ICD-10-CM

## 2019-06-10 DIAGNOSIS — K21.9 GASTRO-ESOPHAGEAL REFLUX DISEASE WITHOUT ESOPHAGITIS: ICD-10-CM

## 2019-06-10 DIAGNOSIS — Z87.19 PERSONAL HISTORY OF OTHER DISEASES OF THE DIGESTIVE SYSTEM: ICD-10-CM

## 2019-06-10 DIAGNOSIS — E11.42 TYPE 2 DIABETES MELLITUS WITH DIABETIC POLYNEUROPATHY: ICD-10-CM

## 2019-06-10 DIAGNOSIS — Z66 DO NOT RESUSCITATE: ICD-10-CM

## 2019-06-10 DIAGNOSIS — I69.354 HEMIPLEGIA AND HEMIPARESIS FOLLOWING CEREBRAL INFARCTION AFFECTING LEFT NON-DOMINANT SIDE: ICD-10-CM

## 2019-06-10 DIAGNOSIS — Z88.1 ALLERGY STATUS TO OTHER ANTIBIOTIC AGENTS STATUS: ICD-10-CM

## 2019-06-10 DIAGNOSIS — Z88.8 ALLERGY STATUS TO OTHER DRUGS, MEDICAMENTS AND BIOLOGICAL SUBSTANCES: ICD-10-CM

## 2019-06-10 DIAGNOSIS — Z99.81 DEPENDENCE ON SUPPLEMENTAL OXYGEN: ICD-10-CM

## 2019-06-10 DIAGNOSIS — I48.0 PAROXYSMAL ATRIAL FIBRILLATION: ICD-10-CM

## 2019-06-10 DIAGNOSIS — J44.9 CHRONIC OBSTRUCTIVE PULMONARY DISEASE, UNSPECIFIED: ICD-10-CM

## 2019-07-08 NOTE — H&P PST ADULT - NSANTHAGERD_ENT_A_CORE
Include Z78.9 (Other Specified Conditions Influencing Health Status) As An Associated Diagnosis?: No Medical Necessity Clause: This procedure was medically necessary because the lesions that were treated were: Consent: The patient's consent was obtained including but not limited to risks of crusting, scabbing, blistering, scarring, darker or lighter pigmentary change, recurrence, incomplete removal and infection. Post-Care Instructions: I reviewed with the patient in detail post-care instructions. Patient is to wear sunprotection, and avoid picking at any of the treated lesions. Pt may apply Vaseline to crusted or scabbing areas. Duration Of Freeze Thaw-Cycle (Seconds): 10 Medical Necessity Information: It is in your best interest to select a reason for this procedure from the list below. All of these items fulfill various CMS LCD requirements except the new and changing color options. Number Of Freeze-Thaw Cycles: 1 freeze-thaw cycle Detail Level: Detailed Yes

## 2019-07-19 NOTE — H&P ADULT - ASSESSMENT
67 F pmh COPD (not on home O2), HTN, perforated diverticulitis s/p exlap, R colectomy, small bowel resection, and Chacko procedure (12/2016) with reversal (9/2017), PE (2016 provoked, prev on xarelto and completed tx with Lovenox), and chronic b/l LE edema presents with progressive SOB x 1 month.
Spine appears normal, range of motion is not limited, no muscle or joint tenderness

## 2019-10-28 PROBLEM — E66.9 OBESITY, UNSPECIFIED: Chronic | Status: ACTIVE | Noted: 2017-08-23

## 2019-10-28 PROBLEM — R00.2 PALPITATIONS: Chronic | Status: ACTIVE | Noted: 2017-08-23

## 2019-10-28 PROBLEM — I26.99 OTHER PULMONARY EMBOLISM WITHOUT ACUTE COR PULMONALE: Chronic | Status: ACTIVE | Noted: 2017-08-23

## 2019-10-28 PROBLEM — Z93.3 COLOSTOMY STATUS: Chronic | Status: ACTIVE | Noted: 2017-08-23

## 2019-10-28 PROBLEM — D64.9 ANEMIA, UNSPECIFIED: Chronic | Status: ACTIVE | Noted: 2017-08-23

## 2019-10-28 PROBLEM — M19.90 UNSPECIFIED OSTEOARTHRITIS, UNSPECIFIED SITE: Chronic | Status: ACTIVE | Noted: 2017-08-23

## 2019-11-11 ENCOUNTER — APPOINTMENT (OUTPATIENT)
Dept: COLORECTAL SURGERY | Facility: CLINIC | Age: 69
End: 2019-11-11

## 2020-01-10 NOTE — PROVIDER CONTACT NOTE (OTHER) - RECOMMENDATIONS
MD at bedside, administer AM meds; call MD if pt does not feel better after taking AM respiratory meds
continue to monitor- MD will come do rounds within the hour.will relay information to day RN. pt currently in bed resting. vss
MD at bedside. - continue to monitor
bolus?
notified pain service, Manager and supervisor
0 = swallows foods/liquids without difficulty

## 2020-01-21 ENCOUNTER — APPOINTMENT (OUTPATIENT)
Dept: CT IMAGING | Facility: CLINIC | Age: 70
End: 2020-01-21
Payer: MEDICARE

## 2020-01-28 ENCOUNTER — APPOINTMENT (OUTPATIENT)
Dept: CT IMAGING | Facility: CLINIC | Age: 70
End: 2020-01-28

## 2020-01-28 ENCOUNTER — OUTPATIENT (OUTPATIENT)
Dept: OUTPATIENT SERVICES | Facility: HOSPITAL | Age: 70
LOS: 1 days | End: 2020-01-28
Payer: MEDICARE

## 2020-01-28 DIAGNOSIS — Z00.8 ENCOUNTER FOR OTHER GENERAL EXAMINATION: ICD-10-CM

## 2020-01-28 DIAGNOSIS — Z98.891 HISTORY OF UTERINE SCAR FROM PREVIOUS SURGERY: Chronic | ICD-10-CM

## 2020-01-28 DIAGNOSIS — Z96.649 PRESENCE OF UNSPECIFIED ARTIFICIAL HIP JOINT: Chronic | ICD-10-CM

## 2020-01-28 DIAGNOSIS — Z90.49 ACQUIRED ABSENCE OF OTHER SPECIFIED PARTS OF DIGESTIVE TRACT: Chronic | ICD-10-CM

## 2020-01-28 DIAGNOSIS — Z98.890 OTHER SPECIFIED POSTPROCEDURAL STATES: Chronic | ICD-10-CM

## 2020-01-28 DIAGNOSIS — Z96.643 PRESENCE OF ARTIFICIAL HIP JOINT, BILATERAL: Chronic | ICD-10-CM

## 2020-01-28 DIAGNOSIS — R19.4 CHANGE IN BOWEL HABIT: ICD-10-CM

## 2020-01-28 DIAGNOSIS — Z93.3 COLOSTOMY STATUS: Chronic | ICD-10-CM

## 2020-01-28 PROCEDURE — 74261 CT COLONOGRAPHY DX: CPT | Mod: 26

## 2020-01-28 PROCEDURE — 74261 CT COLONOGRAPHY DX: CPT

## 2020-01-31 NOTE — PHYSICAL THERAPY INITIAL EVALUATION ADULT - SITTING BALANCE: STATIC
HPI     [place]:01/2019  Glasses? no  Contacts? Yes [employer] toric OS having issues with clarity    H/o eye surgery, injections or laser: scleral buckle OS   H/o eye injury: no  Known eye conditions? RD OS   Family h/o eye conditions? no  Eye gtts?Rewetting drops     (-) Flashes (+) Floaters OU unchanged  (-) Mucous   (-) Tearing (-) Itching (-) Burning   (+) Headaches not sure location, time varies  (-) Eye Pain/discomfort (-)   Irritation   (-) Redness (-) Double vision (+) Blurry vision night va     Diabetic? (-)  A1c?  (Hemoglobin A1C       Date                     Value               Ref Range             Status                11/14/2019               5.4                 4.0 - 5.6 %           Final              Comment:    ADA Screening Guidelines:  5.7-6.4%  Consistent with   prediabetes  >or=6.5%  Consistent with diabetes  High levels of fetal   hemoglobin interfere with the HbA1C  assay. Heterozygous hemoglobin   variants (HbS, HgC, etc)do  not significantly interfere with this assay.     However, presence of multiple variants may affect accuracy.         08/29/2017               5.1                 4.0 - 5.6 %           Final              Comment:    According to ADA guidelines, hemoglobin A1c <7.0% represents  optimal   control in non-pregnant diabetic patients. Different  metrics may apply to   specific patient populations.   Standards of Medical Care in   Diabetes-2016.  For the purpose of screening for the presence of   diabetes:  <5.7%     Consistent with the absence of diabetes  5.7-6.4%    Consistent with increasing risk for diabetes   (prediabetes)  >or=6.5%    Consistent with diabetes  Currently, no consensus exists for use of   hemoglobin A1c  for diagnosis of diabetes for children.  This Hemoglobin   A1c assay has significant interference with fetal   hemoglobin   (HbF).   The results are invalid for patients with abnormal amounts of   HbF,     including those with known Hereditary Persistence   of  Fetal Hemoglobin.   Heterozygous hemoglobin variants (HbAS, HbAC,   HbAD, HbAE, HbA2) do not   significantly interfere with this assay;   however, presence of multiple   variants in a sample may impact the %   interference.    ----------)        Last edited by Italia Blankenship on 1/31/2020  8:19 AM. (History)            Assessment /Plan     For exam results, see Encounter Report.    Myopia with astigmatism, bilateral    Visual floaters, bilateral    History of retinal detachment      1. CLRx and SRx released to patient. Patient educated on lens options. Normal ocular health. RTC 1 year for routine exam.   2-3. Pt declined DFE today b/c of work. Pt educated on importance of DFE and need for DFE. Pt shows understanding.                   fair balance good minus

## 2021-03-02 NOTE — PATIENT PROFILE ADULT. - NS PRO AD PATIENT TYPE
"Chief Complaint   Patient presents with     Procedure     skin lesion right shin       Initial /62 (BP Location: Right arm, Patient Position: Sitting, Cuff Size: Adult Regular)   Pulse 64   Temp 98.3  F (36.8  C) (Tympanic)   Resp 16   Wt 79.8 kg (176 lb)   BMI 27.62 kg/m   Estimated body mass index is 27.62 kg/m  as calculated from the following:    Height as of 2/23/21: 1.7 m (5' 6.93\").    Weight as of this encounter: 79.8 kg (176 lb).  Medication Reconciliation: Completed     Prior to the start of the procedure and with procedural staff participation, I verbally confirmed the patient s identity using two indicators, relevant allergies, that the procedure was appropriate and matched the consent or emergent situation, and that the correct equipment/implants were available. Immediately prior to starting the procedure I conducted the Time Out with the procedural staff and re-confirmed the patient s name, procedure, and site/side. (The Joint Commission universal protocol was followed.)  Yes    Sedation (Moderate or Deep): None      Tiarra Martin LPN  " Health Care Proxy (HCP)

## 2021-04-21 ENCOUNTER — EMERGENCY (EMERGENCY)
Facility: HOSPITAL | Age: 71
LOS: 0 days | Discharge: HOME | End: 2021-04-21
Attending: HOSPITALIST
Payer: MEDICARE

## 2021-04-21 VITALS
DIASTOLIC BLOOD PRESSURE: 66 MMHG | SYSTOLIC BLOOD PRESSURE: 121 MMHG | HEART RATE: 95 BPM | RESPIRATION RATE: 18 BRPM | WEIGHT: 220.02 LBS | HEIGHT: 64 IN | OXYGEN SATURATION: 92 % | TEMPERATURE: 99 F

## 2021-04-21 VITALS
HEART RATE: 80 BPM | DIASTOLIC BLOOD PRESSURE: 67 MMHG | OXYGEN SATURATION: 99 % | RESPIRATION RATE: 18 BRPM | TEMPERATURE: 98 F | SYSTOLIC BLOOD PRESSURE: 126 MMHG

## 2021-04-21 DIAGNOSIS — R10.30 LOWER ABDOMINAL PAIN, UNSPECIFIED: ICD-10-CM

## 2021-04-21 DIAGNOSIS — Z20.822 CONTACT WITH AND (SUSPECTED) EXPOSURE TO COVID-19: ICD-10-CM

## 2021-04-21 DIAGNOSIS — Z98.890 OTHER SPECIFIED POSTPROCEDURAL STATES: Chronic | ICD-10-CM

## 2021-04-21 DIAGNOSIS — Z99.81 DEPENDENCE ON SUPPLEMENTAL OXYGEN: ICD-10-CM

## 2021-04-21 DIAGNOSIS — Z90.49 ACQUIRED ABSENCE OF OTHER SPECIFIED PARTS OF DIGESTIVE TRACT: Chronic | ICD-10-CM

## 2021-04-21 DIAGNOSIS — Z96.643 PRESENCE OF ARTIFICIAL HIP JOINT, BILATERAL: Chronic | ICD-10-CM

## 2021-04-21 DIAGNOSIS — Z96.649 PRESENCE OF UNSPECIFIED ARTIFICIAL HIP JOINT: Chronic | ICD-10-CM

## 2021-04-21 DIAGNOSIS — Z93.3 COLOSTOMY STATUS: Chronic | ICD-10-CM

## 2021-04-21 DIAGNOSIS — I48.91 UNSPECIFIED ATRIAL FIBRILLATION: ICD-10-CM

## 2021-04-21 DIAGNOSIS — Z79.01 LONG TERM (CURRENT) USE OF ANTICOAGULANTS: ICD-10-CM

## 2021-04-21 DIAGNOSIS — J44.9 CHRONIC OBSTRUCTIVE PULMONARY DISEASE, UNSPECIFIED: ICD-10-CM

## 2021-04-21 DIAGNOSIS — E11.9 TYPE 2 DIABETES MELLITUS WITHOUT COMPLICATIONS: ICD-10-CM

## 2021-04-21 DIAGNOSIS — Z98.891 HISTORY OF UTERINE SCAR FROM PREVIOUS SURGERY: Chronic | ICD-10-CM

## 2021-04-21 DIAGNOSIS — Z88.1 ALLERGY STATUS TO OTHER ANTIBIOTIC AGENTS STATUS: ICD-10-CM

## 2021-04-21 DIAGNOSIS — I10 ESSENTIAL (PRIMARY) HYPERTENSION: ICD-10-CM

## 2021-04-21 LAB
ALBUMIN SERPL ELPH-MCNC: 2.8 G/DL — LOW (ref 3.3–5)
ALP SERPL-CCNC: 87 U/L — SIGNIFICANT CHANGE UP (ref 40–120)
ALT FLD-CCNC: 21 U/L — SIGNIFICANT CHANGE UP (ref 12–78)
ANION GAP SERPL CALC-SCNC: 6 MMOL/L — SIGNIFICANT CHANGE UP (ref 5–17)
APPEARANCE UR: ABNORMAL
AST SERPL-CCNC: 15 U/L — SIGNIFICANT CHANGE UP (ref 15–37)
BASOPHILS # BLD AUTO: 0.1 K/UL — SIGNIFICANT CHANGE UP (ref 0–0.2)
BASOPHILS NFR BLD AUTO: 1.2 % — SIGNIFICANT CHANGE UP (ref 0–2)
BILIRUB SERPL-MCNC: 0.2 MG/DL — SIGNIFICANT CHANGE UP (ref 0.2–1.2)
BILIRUB UR-MCNC: NEGATIVE — SIGNIFICANT CHANGE UP
BUN SERPL-MCNC: 11 MG/DL — SIGNIFICANT CHANGE UP (ref 7–23)
CALCIUM SERPL-MCNC: 8.2 MG/DL — LOW (ref 8.5–10.1)
CHLORIDE SERPL-SCNC: 103 MMOL/L — SIGNIFICANT CHANGE UP (ref 96–108)
CO2 SERPL-SCNC: 28 MMOL/L — SIGNIFICANT CHANGE UP (ref 22–31)
COLOR SPEC: YELLOW — SIGNIFICANT CHANGE UP
CREAT SERPL-MCNC: 0.83 MG/DL — SIGNIFICANT CHANGE UP (ref 0.5–1.3)
DIFF PNL FLD: ABNORMAL
EOSINOPHIL # BLD AUTO: 0.21 K/UL — SIGNIFICANT CHANGE UP (ref 0–0.5)
EOSINOPHIL NFR BLD AUTO: 2.4 % — SIGNIFICANT CHANGE UP (ref 0–6)
GLUCOSE SERPL-MCNC: 181 MG/DL — HIGH (ref 70–99)
GLUCOSE UR QL: NEGATIVE MG/DL — SIGNIFICANT CHANGE UP
HCT VFR BLD CALC: 35 % — SIGNIFICANT CHANGE UP (ref 34.5–45)
HGB BLD-MCNC: 10.8 G/DL — LOW (ref 11.5–15.5)
IMM GRANULOCYTES NFR BLD AUTO: 0.2 % — SIGNIFICANT CHANGE UP (ref 0–1.5)
KETONES UR-MCNC: NEGATIVE — SIGNIFICANT CHANGE UP
LACTATE SERPL-SCNC: 2 MMOL/L — SIGNIFICANT CHANGE UP (ref 0.7–2)
LEUKOCYTE ESTERASE UR-ACNC: ABNORMAL
LIDOCAIN IGE QN: 55 U/L — LOW (ref 73–393)
LYMPHOCYTES # BLD AUTO: 2.58 K/UL — SIGNIFICANT CHANGE UP (ref 1–3.3)
LYMPHOCYTES # BLD AUTO: 29.9 % — SIGNIFICANT CHANGE UP (ref 13–44)
MCHC RBC-ENTMCNC: 23.3 PG — LOW (ref 27–34)
MCHC RBC-ENTMCNC: 30.9 GM/DL — LOW (ref 32–36)
MCV RBC AUTO: 75.6 FL — LOW (ref 80–100)
MONOCYTES # BLD AUTO: 0.59 K/UL — SIGNIFICANT CHANGE UP (ref 0–0.9)
MONOCYTES NFR BLD AUTO: 6.8 % — SIGNIFICANT CHANGE UP (ref 2–14)
NEUTROPHILS # BLD AUTO: 5.13 K/UL — SIGNIFICANT CHANGE UP (ref 1.8–7.4)
NEUTROPHILS NFR BLD AUTO: 59.5 % — SIGNIFICANT CHANGE UP (ref 43–77)
NITRITE UR-MCNC: NEGATIVE — SIGNIFICANT CHANGE UP
PH UR: 5 — SIGNIFICANT CHANGE UP (ref 5–8)
PLATELET # BLD AUTO: 362 K/UL — SIGNIFICANT CHANGE UP (ref 150–400)
POTASSIUM SERPL-MCNC: 3.6 MMOL/L — SIGNIFICANT CHANGE UP (ref 3.5–5.3)
POTASSIUM SERPL-SCNC: 3.6 MMOL/L — SIGNIFICANT CHANGE UP (ref 3.5–5.3)
PROT SERPL-MCNC: 7 GM/DL — SIGNIFICANT CHANGE UP (ref 6–8.3)
PROT UR-MCNC: 100 MG/DL
RBC # BLD: 4.63 M/UL — SIGNIFICANT CHANGE UP (ref 3.8–5.2)
RBC # FLD: 17.6 % — HIGH (ref 10.3–14.5)
SODIUM SERPL-SCNC: 137 MMOL/L — SIGNIFICANT CHANGE UP (ref 135–145)
SP GR SPEC: 1.01 — SIGNIFICANT CHANGE UP (ref 1.01–1.02)
UROBILINOGEN FLD QL: NEGATIVE MG/DL — SIGNIFICANT CHANGE UP
WBC # BLD: 8.63 K/UL — SIGNIFICANT CHANGE UP (ref 3.8–10.5)
WBC # FLD AUTO: 8.63 K/UL — SIGNIFICANT CHANGE UP (ref 3.8–10.5)

## 2021-04-21 PROCEDURE — 87040 BLOOD CULTURE FOR BACTERIA: CPT

## 2021-04-21 PROCEDURE — 86850 RBC ANTIBODY SCREEN: CPT

## 2021-04-21 PROCEDURE — U0005: CPT

## 2021-04-21 PROCEDURE — 86901 BLOOD TYPING SEROLOGIC RH(D): CPT

## 2021-04-21 PROCEDURE — 36415 COLL VENOUS BLD VENIPUNCTURE: CPT

## 2021-04-21 PROCEDURE — 87086 URINE CULTURE/COLONY COUNT: CPT

## 2021-04-21 PROCEDURE — 83690 ASSAY OF LIPASE: CPT

## 2021-04-21 PROCEDURE — 83605 ASSAY OF LACTIC ACID: CPT

## 2021-04-21 PROCEDURE — 74177 CT ABD & PELVIS W/CONTRAST: CPT | Mod: 26

## 2021-04-21 PROCEDURE — 96374 THER/PROPH/DIAG INJ IV PUSH: CPT | Mod: XU

## 2021-04-21 PROCEDURE — 96361 HYDRATE IV INFUSION ADD-ON: CPT

## 2021-04-21 PROCEDURE — 93005 ELECTROCARDIOGRAM TRACING: CPT

## 2021-04-21 PROCEDURE — U0003: CPT

## 2021-04-21 PROCEDURE — 86900 BLOOD TYPING SEROLOGIC ABO: CPT

## 2021-04-21 PROCEDURE — 74177 CT ABD & PELVIS W/CONTRAST: CPT

## 2021-04-21 PROCEDURE — 80053 COMPREHEN METABOLIC PANEL: CPT

## 2021-04-21 PROCEDURE — 99284 EMERGENCY DEPT VISIT MOD MDM: CPT | Mod: 25

## 2021-04-21 PROCEDURE — 85025 COMPLETE CBC W/AUTO DIFF WBC: CPT

## 2021-04-21 PROCEDURE — 99285 EMERGENCY DEPT VISIT HI MDM: CPT

## 2021-04-21 PROCEDURE — 93010 ELECTROCARDIOGRAM REPORT: CPT

## 2021-04-21 PROCEDURE — 81001 URINALYSIS AUTO W/SCOPE: CPT

## 2021-04-21 RX ORDER — MORPHINE SULFATE 50 MG/1
4 CAPSULE, EXTENDED RELEASE ORAL ONCE
Refills: 0 | Status: DISCONTINUED | OUTPATIENT
Start: 2021-04-21 | End: 2021-04-21

## 2021-04-21 RX ORDER — SODIUM CHLORIDE 9 MG/ML
1000 INJECTION INTRAMUSCULAR; INTRAVENOUS; SUBCUTANEOUS ONCE
Refills: 0 | Status: COMPLETED | OUTPATIENT
Start: 2021-04-21 | End: 2021-04-21

## 2021-04-21 RX ADMIN — SODIUM CHLORIDE 1000 MILLILITER(S): 9 INJECTION INTRAMUSCULAR; INTRAVENOUS; SUBCUTANEOUS at 20:10

## 2021-04-21 RX ADMIN — SODIUM CHLORIDE 1000 MILLILITER(S): 9 INJECTION INTRAMUSCULAR; INTRAVENOUS; SUBCUTANEOUS at 21:28

## 2021-04-21 RX ADMIN — MORPHINE SULFATE 4 MILLIGRAM(S): 50 CAPSULE, EXTENDED RELEASE ORAL at 20:14

## 2021-04-21 RX ADMIN — MORPHINE SULFATE 4 MILLIGRAM(S): 50 CAPSULE, EXTENDED RELEASE ORAL at 20:35

## 2021-04-21 NOTE — ED PROVIDER NOTE - OBJECTIVE STATEMENT
71 y/o female with PMHx of diverticulitis s/p colostomy and reversal, neuropathy, COPD on supplemental O2, CVA with residual left hemiparesis (2018), DVT, DM, HTN, Afib on Coumadin presents to the ED BIBEMS from [facility] c/o groin pain, x weeks, worsening today. Pt states she has been having frequent BMs since colostomy reversal; states it is a "constant flow."

## 2021-04-21 NOTE — ED ADULT NURSE NOTE - NSIMPLEMENTINTERV_GEN_ALL_ED
Implemented All Fall Risk Interventions:  [place] to call system. Call bell, personal items and telephone within reach. Instruct patient to call for assistance. Room bathroom lighting operational. Non-slip footwear when patient is off stretcher. Physically safe environment: no spills, clutter or unnecessary equipment. Stretcher in lowest position, wheels locked, appropriate side rails in place. Provide visual cue, wrist band, yellow gown, etc. Monitor gait and stability. Monitor for mental status changes and reorient to person, place, and time. Review medications for side effects contributing to fall risk. Reinforce activity limits and safety measures with patient and family.

## 2021-04-21 NOTE — ED PROVIDER NOTE - PATIENT PORTAL LINK FT
You can access the FollowMyHealth Patient Portal offered by [facility] by registering at the following website: [email or web address]. By joining [employer]’s FollowMyHealth portal, you will also be able to view your health information using other applications (apps) compatible with our system.

## 2021-04-21 NOTE — ED PROVIDER NOTE - PMH
Cerebrovascular accident (CVA)    COPD (chronic obstructive pulmonary disease)    CVA (cerebral vascular accident)    Diabetes mellitus    Diverticulitis    DVT of lower limb, acute    History of atrial fibrillation    HTN (hypertension)    Neuropathy

## 2021-04-21 NOTE — ED PROVIDER NOTE - CLINICAL SUMMARY MEDICAL DECISION MAKING FREE TEXT BOX
69 y/o s/p reversal of colostomy with worsening abd pain. Continuos leakage of stool and abnormal urinary habits. Will obtain labs, CT abd pelvis, pain control.

## 2021-04-21 NOTE — ED ADULT NURSE REASSESSMENT NOTE - NS ED NURSE REASSESS COMMENT FT1
Pt incontinent, damien-care performed. Pt attached to [employer], pending urine collection at this time. Patient went for CT A&P, back in ER, resting in stretcher in nad. Will ctm.

## 2021-04-21 NOTE — ED ADULT TRIAGE NOTE - CHIEF COMPLAINT QUOTE
PT. c/o abd. pain, hx of stroke with left sided deficits, pt. sent from [facility] for CT scan

## 2021-04-21 NOTE — ED PROVIDER NOTE - SKIN, MLM
Skin normal color for race, warm, dry and intact. No evidence of rash. Skin normal color for race, warm, dry and intact. No evidence of rash. +healing ventral scar to lower abd

## 2021-04-21 NOTE — ED PROVIDER NOTE - GASTROINTESTINAL, MLM
Abdomen soft, non-tender, no guarding. Abdomen soft, no guarding. +RLQ TTP, +healing ventral scar to lower abd

## 2021-04-21 NOTE — ED PROVIDER NOTE - NS_ ATTENDINGSCRIBEDETAILS _ED_A_ED_FT
Theresa Vale MD: The history, relevant review of systems, past medical and surgical history, medical decision making, and physical examination was documented by the scribe in my presence and I attest to the accuracy of the documentation.

## 2021-04-21 NOTE — ED ADULT NURSE NOTE - OBJECTIVE STATEMENT
Patient sent in from [facility] for abd pain. Patient states she had a colostomy reversal and since then has had abd pain and loose stool

## 2021-04-21 NOTE — ED ADULT NURSE NOTE - CHIEF COMPLAINT QUOTE
PT. c/o abd. pain, hx of stroke with left sided deficits, pt. sent from [facility] for CT scan

## 2021-04-22 LAB — SARS-COV-2 RNA SPEC QL NAA+PROBE: SIGNIFICANT CHANGE UP

## 2021-04-23 LAB
CULTURE RESULTS: SIGNIFICANT CHANGE UP
SPECIMEN SOURCE: SIGNIFICANT CHANGE UP

## 2021-04-27 LAB
CULTURE RESULTS: SIGNIFICANT CHANGE UP
CULTURE RESULTS: SIGNIFICANT CHANGE UP
SPECIMEN SOURCE: SIGNIFICANT CHANGE UP
SPECIMEN SOURCE: SIGNIFICANT CHANGE UP

## 2021-05-13 NOTE — PHYSICAL THERAPY INITIAL EVALUATION ADULT - AMBULATION SKILLS, REHAB EVAL
Received referral from Lisa Pierre PA-C for finger wound not healing. Please schedule with Dr. Robles or Dr. Holley or Elaine Abarca PA-C   
Scheduled patient 5/24/21 with Dr. Holley.  
independent

## 2021-07-15 NOTE — H&P PST ADULT - EYES
bilateral upper extremities/bilateral lower extremities/normal detailed exam PERRL/conjunctiva clear

## 2021-08-11 ENCOUNTER — EMERGENCY (EMERGENCY)
Facility: HOSPITAL | Age: 71
LOS: 0 days | Discharge: HOME | End: 2021-08-12
Attending: EMERGENCY MEDICINE
Payer: MEDICARE

## 2021-08-11 VITALS
TEMPERATURE: 99 F | HEIGHT: 64 IN | HEART RATE: 96 BPM | DIASTOLIC BLOOD PRESSURE: 54 MMHG | SYSTOLIC BLOOD PRESSURE: 101 MMHG | RESPIRATION RATE: 18 BRPM | OXYGEN SATURATION: 96 % | WEIGHT: 201.94 LBS

## 2021-08-11 DIAGNOSIS — J44.9 CHRONIC OBSTRUCTIVE PULMONARY DISEASE, UNSPECIFIED: ICD-10-CM

## 2021-08-11 DIAGNOSIS — Z79.01 LONG TERM (CURRENT) USE OF ANTICOAGULANTS: ICD-10-CM

## 2021-08-11 DIAGNOSIS — Z96.649 PRESENCE OF UNSPECIFIED ARTIFICIAL HIP JOINT: ICD-10-CM

## 2021-08-11 DIAGNOSIS — N12 TUBULO-INTERSTITIAL NEPHRITIS, NOT SPECIFIED AS ACUTE OR CHRONIC: ICD-10-CM

## 2021-08-11 DIAGNOSIS — Z93.3 COLOSTOMY STATUS: ICD-10-CM

## 2021-08-11 DIAGNOSIS — Z86.718 PERSONAL HISTORY OF OTHER VENOUS THROMBOSIS AND EMBOLISM: ICD-10-CM

## 2021-08-11 DIAGNOSIS — Z98.890 OTHER SPECIFIED POSTPROCEDURAL STATES: Chronic | ICD-10-CM

## 2021-08-11 DIAGNOSIS — Z86.73 PERSONAL HISTORY OF TRANSIENT ISCHEMIC ATTACK (TIA), AND CEREBRAL INFARCTION WITHOUT RESIDUAL DEFICITS: ICD-10-CM

## 2021-08-11 DIAGNOSIS — Z79.891 LONG TERM (CURRENT) USE OF OPIATE ANALGESIC: ICD-10-CM

## 2021-08-11 DIAGNOSIS — Z87.19 PERSONAL HISTORY OF OTHER DISEASES OF THE DIGESTIVE SYSTEM: ICD-10-CM

## 2021-08-11 DIAGNOSIS — Z96.643 PRESENCE OF ARTIFICIAL HIP JOINT, BILATERAL: Chronic | ICD-10-CM

## 2021-08-11 DIAGNOSIS — R10.2 PELVIC AND PERINEAL PAIN: ICD-10-CM

## 2021-08-11 DIAGNOSIS — Z79.899 OTHER LONG TERM (CURRENT) DRUG THERAPY: ICD-10-CM

## 2021-08-11 DIAGNOSIS — R10.9 UNSPECIFIED ABDOMINAL PAIN: ICD-10-CM

## 2021-08-11 DIAGNOSIS — R30.0 DYSURIA: ICD-10-CM

## 2021-08-11 DIAGNOSIS — Z93.3 COLOSTOMY STATUS: Chronic | ICD-10-CM

## 2021-08-11 DIAGNOSIS — Z98.891 HISTORY OF UTERINE SCAR FROM PREVIOUS SURGERY: Chronic | ICD-10-CM

## 2021-08-11 DIAGNOSIS — Z90.49 ACQUIRED ABSENCE OF OTHER SPECIFIED PARTS OF DIGESTIVE TRACT: Chronic | ICD-10-CM

## 2021-08-11 DIAGNOSIS — R39.198 OTHER DIFFICULTIES WITH MICTURITION: ICD-10-CM

## 2021-08-11 DIAGNOSIS — Z88.1 ALLERGY STATUS TO OTHER ANTIBIOTIC AGENTS STATUS: ICD-10-CM

## 2021-08-11 DIAGNOSIS — I10 ESSENTIAL (PRIMARY) HYPERTENSION: ICD-10-CM

## 2021-08-11 DIAGNOSIS — Z79.02 LONG TERM (CURRENT) USE OF ANTITHROMBOTICS/ANTIPLATELETS: ICD-10-CM

## 2021-08-11 DIAGNOSIS — Z79.890 HORMONE REPLACEMENT THERAPY: ICD-10-CM

## 2021-08-11 DIAGNOSIS — R50.9 FEVER, UNSPECIFIED: ICD-10-CM

## 2021-08-11 DIAGNOSIS — Z96.649 PRESENCE OF UNSPECIFIED ARTIFICIAL HIP JOINT: Chronic | ICD-10-CM

## 2021-08-11 DIAGNOSIS — K59.00 CONSTIPATION, UNSPECIFIED: ICD-10-CM

## 2021-08-11 DIAGNOSIS — E11.9 TYPE 2 DIABETES MELLITUS WITHOUT COMPLICATIONS: ICD-10-CM

## 2021-08-11 LAB
APPEARANCE UR: ABNORMAL
BILIRUB UR-MCNC: NEGATIVE — SIGNIFICANT CHANGE UP
COLOR SPEC: YELLOW — SIGNIFICANT CHANGE UP
DIFF PNL FLD: ABNORMAL
GLUCOSE UR QL: NEGATIVE MG/DL — SIGNIFICANT CHANGE UP
KETONES UR-MCNC: NEGATIVE — SIGNIFICANT CHANGE UP
LEUKOCYTE ESTERASE UR-ACNC: ABNORMAL
NITRITE UR-MCNC: NEGATIVE — SIGNIFICANT CHANGE UP
PH UR: 5 — SIGNIFICANT CHANGE UP (ref 5–8)
PROT UR-MCNC: 100 MG/DL
RAPID RVP RESULT: DETECTED
RV+EV RNA SPEC QL NAA+PROBE: DETECTED
SARS-COV-2 RNA SPEC QL NAA+PROBE: SIGNIFICANT CHANGE UP
SP GR SPEC: 1.02 — SIGNIFICANT CHANGE UP (ref 1.01–1.02)
UROBILINOGEN FLD QL: NEGATIVE MG/DL — SIGNIFICANT CHANGE UP

## 2021-08-11 PROCEDURE — 0225U NFCT DS DNA&RNA 21 SARSCOV2: CPT

## 2021-08-11 PROCEDURE — 71045 X-RAY EXAM CHEST 1 VIEW: CPT | Mod: 26

## 2021-08-11 PROCEDURE — 80053 COMPREHEN METABOLIC PANEL: CPT

## 2021-08-11 PROCEDURE — 99284 EMERGENCY DEPT VISIT MOD MDM: CPT | Mod: 25

## 2021-08-11 PROCEDURE — 71045 X-RAY EXAM CHEST 1 VIEW: CPT

## 2021-08-11 PROCEDURE — 99285 EMERGENCY DEPT VISIT HI MDM: CPT

## 2021-08-11 PROCEDURE — 36415 COLL VENOUS BLD VENIPUNCTURE: CPT

## 2021-08-11 PROCEDURE — 85025 COMPLETE CBC W/AUTO DIFF WBC: CPT

## 2021-08-11 PROCEDURE — 74176 CT ABD & PELVIS W/O CONTRAST: CPT | Mod: 26,MA

## 2021-08-11 PROCEDURE — 83605 ASSAY OF LACTIC ACID: CPT

## 2021-08-11 PROCEDURE — 87040 BLOOD CULTURE FOR BACTERIA: CPT

## 2021-08-11 PROCEDURE — 80048 BASIC METABOLIC PNL TOTAL CA: CPT

## 2021-08-11 PROCEDURE — 96374 THER/PROPH/DIAG INJ IV PUSH: CPT

## 2021-08-11 PROCEDURE — 74176 CT ABD & PELVIS W/O CONTRAST: CPT

## 2021-08-11 PROCEDURE — 81001 URINALYSIS AUTO W/SCOPE: CPT

## 2021-08-11 PROCEDURE — 87086 URINE CULTURE/COLONY COUNT: CPT

## 2021-08-11 RX ORDER — CEFTRIAXONE 500 MG/1
1000 INJECTION, POWDER, FOR SOLUTION INTRAMUSCULAR; INTRAVENOUS ONCE
Refills: 0 | Status: COMPLETED | OUTPATIENT
Start: 2021-08-11 | End: 2021-08-11

## 2021-08-11 RX ORDER — SODIUM CHLORIDE 9 MG/ML
500 INJECTION INTRAMUSCULAR; INTRAVENOUS; SUBCUTANEOUS ONCE
Refills: 0 | Status: COMPLETED | OUTPATIENT
Start: 2021-08-11 | End: 2021-08-11

## 2021-08-11 RX ADMIN — CEFTRIAXONE 1000 MILLIGRAM(S): 500 INJECTION, POWDER, FOR SOLUTION INTRAMUSCULAR; INTRAVENOUS at 23:08

## 2021-08-11 RX ADMIN — SODIUM CHLORIDE 500 MILLILITER(S): 9 INJECTION INTRAMUSCULAR; INTRAVENOUS; SUBCUTANEOUS at 18:31

## 2021-08-11 NOTE — ED ADULT NURSE NOTE - NSIMPLEMENTINTERV_GEN_ALL_ED
Implemented All Universal Safety Interventions:  [place] to call system. Call bell, personal items and telephone within reach. Instruct patient to call for assistance. Room bathroom lighting operational. Non-slip footwear when patient is off stretcher. Physically safe environment: no spills, clutter or unnecessary equipment. Stretcher in lowest position, wheels locked, appropriate side rails in place.

## 2021-08-11 NOTE — ED PROVIDER NOTE - MUSCULOSKELETAL, MLM
No focal extremity weakness or edema, neck nontender. Spine appears normal, range of motion is not limited, no muscle or joint tenderness

## 2021-08-11 NOTE — ED PROVIDER NOTE - PATIENT PORTAL LINK FT
You can access the FollowMyHealth Patient Portal offered by [facility] by registering at the following website: [email or web address]. By joining [employer]’s FollowMyHealth portal, you will also be able to view your health information using other applications (apps) compatible with our system.

## 2021-08-11 NOTE — ED PROVIDER NOTE - CONSTITUTIONAL, MLM
normal... bese elderly white female Well appearing, awake, alert, oriented to person, place, time/situation and in no apparent distress.

## 2021-08-11 NOTE — ED ADULT TRIAGE NOTE - CHIEF COMPLAINT QUOTE
pt. c/o right flank pain x 1 month with dysuria. hx of stroke with left sided residual weakness, pt. A&oX4. 18G placed by EMS and fluids started. pt. from [place].

## 2021-08-11 NOTE — ED PROVIDER NOTE - OBJECTIVE STATEMENT
70 y/o female with a PMHx left sided weakness, +Polst form, COPD, DM type 2 , morbit obesity, HTN, GERD, anemia, HLD, hypothyroidism, prior COVID infection, Afib reports to the ED from [facility] c/o groin pain x 1 month with dysuria. Pt describes constant pain last night.+ dysuria, +difficult to urinate, +Belly pain, + feeling constipation. Has daily bowel movements and diet is normal. Last night lower back/"kidney" pain. Saw the urologist a month ago. Denies N/V. + 101 degree fever.  Pt had a colostomy bag a year ago. 72 y/o female with a PMHx of left sided weakness, COPD, DM type 2 , morbid obesity, HTN, GERD, anemia, HLD, hypothyroidism, prior COVID infection, Afib reports to the ED from [facility] c/o groin pain x 1 month with dysuria. Pt describes constant pain last night.+ dysuria, +difficult to urinate, +Belly pain, + feeling constipation, +101 degree fever. Last night, pt reported having lower back/"kidney" pain. Has daily bowel movements and diet is normal. Saw the urologist a month ago. Denies N/V. Pt had a colostomy bag a year ago. 70 y/o female with a PMHx of left sided weakness, COPD, DM type 2 , morbid obesity, HTN, GERD, anemia, HLD, hypothyroidism, prior COVID infection, Afib reports to the ED from [facility] c/o groin pain x 1 month with dysuria. Pt describes constant pain last night. + dysuria, +difficult to urinate, +Belly pain, + feeling constipation, +101 degree fever. Last night, pt reported having lower back/"kidney" pain. Has daily bowel movements and diet is normal. Saw the urologist a month ago. Denies N/V. Pt had a colostomy bag a year ago.

## 2021-08-11 NOTE — ED PROVIDER NOTE - NSICDXPASTMEDICALHX_GEN_ALL_CORE_FT
PAST MEDICAL HISTORY:  Cerebrovascular accident (CVA)     COPD (chronic obstructive pulmonary disease)     CVA (cerebral vascular accident)     Diabetes mellitus     Diverticulitis     DVT of lower limb, acute     History of atrial fibrillation     HTN (hypertension)     Neuropathy

## 2021-08-11 NOTE — ED ADULT NURSE NOTE - OBJECTIVE STATEMENT
Pt c/o dysuria, abd pain, constipation. Pt states she felt pain to her back. Pt has left arm paralysis of past stroke.

## 2021-08-11 NOTE — ED PROVIDER NOTE - RESPIRATORY, MLM
Pulse oxygen 93-96 % chronic 3 L/min, pt baseline on o2 at facility. Normal radial pulse. Breath sounds clear and equal bilaterally.

## 2021-08-11 NOTE — ED PROVIDER NOTE - NSICDXPASTSURGICALHX_GEN_ALL_CORE_FT
PAST SURGICAL HISTORY:  History of colostomy reversal     History of colostomy reversal     S/P      S/P colostomy     S/P hip replacement

## 2021-08-11 NOTE — ED PROVIDER NOTE - PROGRESS NOTE DETAILS
TRINY Murillo MD:  Pt w/ UTI, clinically suspect pyelo.  No obstructive uropathy on CT.  No fever in ED, WBC/lactate normal.  Pt stable for D/C back to NH for continued IV ceftriaxone next 3 days.

## 2021-08-11 NOTE — ED PROVIDER NOTE - NSFOLLOWUPINSTRUCTIONS_ED_ALL_ED_FT
She will require IV ceftriaxone 1000 mg daily next 3 days for pyelonephritis.  [facility] doctor to arrange the IV antibiotic.  Continue regular medications as per routine.  Pt is constipated, consider oral laxative.        Kidney Infection    WHAT YOU NEED TO KNOW:    A kidney infection, or pyelonephritis, is a bacterial infection. The infection usually starts in your bladder or urethra and moves into your kidney. One or both kidneys may be infected.     Kidney, Ureters, Bladder         DISCHARGE INSTRUCTIONS:    Return to the emergency department if:   •You have a fever and chills.       •You cannot stop vomiting.      •You have severe pain in your abdomen, lower back, or sides.      Contact your healthcare provider if:   •You continue to have a fever after you take antibiotics for 3 days.      •You have pain when you urinate, even after treatment.      •Your signs and symptoms return.      •You have questions or concerns about your condition or care.      Medicines: You may need any of the following:   •Antibiotics treat your bacterial infection.       •Acetaminophen decreases pain and fever. It is available without a doctor's order. Ask how much to take and how often to take it. Follow directions. Read the labels of all other medicines you are using to see if they also contain acetaminophen, or ask your doctor or pharmacist. Acetaminophen can cause liver damage if not taken correctly. Do not use more than 4 grams (4,000 milligrams) total of acetaminophen in one day.       •NSAIDs, such as ibuprofen, help decrease swelling, pain, and fever. This medicine is available with or without a doctor's order. NSAIDs can cause stomach bleeding or kidney problems in certain people. If you take blood thinner medicine, always ask if NSAIDs are safe for you. Always read the medicine label and follow directions. Do not give these medicines to children under 6 months of age without direction from your child's healthcare provider.      •Prescription pain medicine may be given. Ask how to take this medicine safely.      •Take your medicine as directed. Contact your healthcare provider if you think your medicine is not helping or if you have side effects. Tell him of her if you are allergic to any medicine. Keep a list of the medicines, vitamins, and herbs you take. Include the amounts, and when and why you take them. Bring the list or the pill bottles to follow-up visits. Carry your medicine list with you in case of an emergency.      Drink liquids as directed: You may need to drink extra liquids to help flush your kidneys and urinary system. Water is the best liquid to drink. Ask your healthcare provider how much liquid to drink each day and which liquids are best for you.    Urinate as soon as you feel the urge: This will help flush bacteria from your urinary system. Do not wait or hold your urine for too long.     Clean your genital area every day with soap and water: Wipe from front to back after you urinate or have a bowel movement. Wear cotton underwear. Fabrics such as nylon and polyester can stay damp. This can increase your risk for infection. Urinate within 15 minutes after you have sex.    Follow up with your doctor as directed: Write down your questions so you remember to ask them during your visits.      How to get your Coronavirus (COVID-19) Testing Results:   Please be advised that you were tested for the coronavirus (COVID-19) in the Emergency Department at [facility].  You are to maintain self-quarantine procedures for 14 days until instructed otherwise by one of our healthcare agents. Please note that the test may take up to 2-4 days to result.  If you do not hear from us within 72 hours and you'd like to check on your results, you can call on of our coronavirus specialists at [address] [place] [address] (available 24/7).  Please DO NOT call the site where you received the test to obtain your results.

## 2021-08-11 NOTE — ED PROVIDER NOTE - GASTROINTESTINAL, MLM
Bowel sounds positive. Midline abd surgical wound within place, no discharge ,no obvious infection. Abdomen soft, non-tender, no guarding.

## 2021-08-11 NOTE — ED PROVIDER NOTE - CLINICAL SUMMARY MEDICAL DECISION MAKING FREE TEXT BOX
70 y/o female s/p prior CVA, COPD , DM type 2, obesity , afib , on warfarin .BIBA from [facility], regarding suprapubic discomfort, dysuria, 101 fever last night, mild b/l CVA tenderness, abd benign , pt didn't meet sepsis criteria upon initial evaluation. Plan: CXR, labs: pan culture, serum lactate, COVID test, IV fluid. Monitor, observe, reassess.

## 2021-08-11 NOTE — ED PROVIDER NOTE - ENMT, MLM
Oropharynx clear, mucous membranes mildly dry, +denture. Airway patent, Nasal mucosa clear. Mouth with normal mucosa. Throat has no vesicles, no oropharyngeal exudates and uvula is midline.

## 2021-08-12 VITALS
TEMPERATURE: 98 F | RESPIRATION RATE: 17 BRPM | HEART RATE: 84 BPM | DIASTOLIC BLOOD PRESSURE: 59 MMHG | OXYGEN SATURATION: 95 % | SYSTOLIC BLOOD PRESSURE: 110 MMHG

## 2021-08-13 LAB
CULTURE RESULTS: SIGNIFICANT CHANGE UP
SPECIMEN SOURCE: SIGNIFICANT CHANGE UP

## 2021-09-03 NOTE — PATIENT PROFILE ADULT. - PRO SERVICES AT [facility]
Rn aware of pending medication administration. Pt aox4 resting on cot in stable condition no s/s of acute distress noted at this time.    none

## 2021-10-06 PROBLEM — I10 ESSENTIAL HYPERTENSION: Status: ACTIVE | Noted: 2017-03-03

## 2021-11-11 NOTE — H&P PST ADULT - BACK EXAM
Personally reviewed labs.   Personally reviewed imaging.   Personally reviewed EKG.                           12.3   5.11  )-----------( 177      ( 10 Nov 2021 11:36 )             33.9     143  |  104  |  17  ----------------------------<  105<H>  3.5   |  23  |  0.49<L>    Ca    10.1      10 Nov 2021 11:36    TPro  7.3  /  Alb  5.0  /  TBili  0.7  /  DBili  x   /  AST  18  /  ALT  19  /  AlkPhos  82  11-10    LIVER FUNCTIONS - ( 10 Nov 2021 11:36 )  Alb: 5.0 g/dL / Pro: 7.3 g/dL / ALK PHOS: 82 U/L / ALT: 19 U/L / AST: 18 U/L / GGT: x             PT/INR - ( 10 Nov 2021 11:36 )   PT: 12.5 sec;   INR: 1.04 ratio    PTT - ( 10 Nov 2021 11:36 )  PTT:31.3 sec    Urinalysis Basic - ( 10 Nov 2021 11:20 )  Color: Light Yellow / Appearance: Clear / S.019 / pH: x  Gluc: x / Ketone: Negative  / Bili: Negative / Urobili: Negative   Blood: x / Protein: 30 mg/dL / Nitrite: Negative   Leuk Esterase: Negative / RBC: 17 /hpf / WBC 1 /HPF   Sq Epi: x / Non Sq Epi: 0 /hpf / Bacteria: Negative Personally reviewed labs.   Personally reviewed imaging.   Personally reviewed EKG.                           12.3   5.11  )-----------( 177      ( 10 Nov 2021 11:36 )             33.9     143  |  104  |  17  ----------------------------<  105<H>  3.5   |  23  |  0.49<L>    Ca    10.1      10 Nov 2021 11:36    TPro  7.3  /  Alb  5.0  /  TBili  0.7  /  DBili  x   /  AST  18  /  ALT  19  /  AlkPhos  82  11-10    LIVER FUNCTIONS - ( 10 Nov 2021 11:36 )  Alb: 5.0 g/dL / Pro: 7.3 g/dL / ALK PHOS: 82 U/L / ALT: 19 U/L / AST: 18 U/L / GGT: x             PT/INR - ( 10 Nov 2021 11:36 )   PT: 12.5 sec;   INR: 1.04 ratio    PTT - ( 10 Nov 2021 11:36 )  PTT:31.3 sec    Urinalysis Basic - ( 10 Nov 2021 11:20 )  Color: Light Yellow / Appearance: Clear / S.019 / pH: x  Gluc: x / Ketone: Negative  / Bili: Negative / Urobili: Negative   Blood: x / Protein: 30 mg/dL / Nitrite: Negative   Leuk Esterase: Negative / RBC: 17 /hpf / WBC 1 /HPF   Sq Epi: x / Non Sq Epi: 0 /hpf / Bacteria: Negative    MR thoracic and lumbar spine:   Multilevel degenerative changes, most prominent level as follows:    L4-L5 disc bulge and superimposed right central disc protrusion with slight superior migration. Resultant moderate to severe spinal canal stenosis and mild bilateral neuroforaminal stenosis. normal shape

## 2021-11-20 RX ORDER — AZTREONAM 2 G
1 VIAL (EA) INJECTION
Qty: 0 | Refills: 0 | DISCHARGE
Start: 2021-11-20 | End: 2021-11-27

## 2021-11-27 RX ORDER — POVIDONE-IODINE 5 %
0 AEROSOL (ML) TOPICAL
Qty: 0 | Refills: 0 | DISCHARGE
Start: 2021-11-27

## 2021-11-29 ENCOUNTER — INPATIENT (INPATIENT)
Facility: HOSPITAL | Age: 71
LOS: 8 days | Discharge: NURSING, REHAB OR LONG-TERM CARE | DRG: 388 | End: 2021-12-08
Attending: STUDENT IN AN ORGANIZED HEALTH CARE EDUCATION/TRAINING PROGRAM | Admitting: STUDENT IN AN ORGANIZED HEALTH CARE EDUCATION/TRAINING PROGRAM
Payer: MEDICARE

## 2021-11-29 VITALS
TEMPERATURE: 98 F | DIASTOLIC BLOOD PRESSURE: 74 MMHG | RESPIRATION RATE: 18 BRPM | HEIGHT: 64 IN | WEIGHT: 119.93 LBS | SYSTOLIC BLOOD PRESSURE: 157 MMHG | OXYGEN SATURATION: 95 % | HEART RATE: 105 BPM

## 2021-11-29 DIAGNOSIS — K56.609 UNSPECIFIED INTESTINAL OBSTRUCTION, UNSPECIFIED AS TO PARTIAL VERSUS COMPLETE OBSTRUCTION: ICD-10-CM

## 2021-11-29 DIAGNOSIS — Z93.3 COLOSTOMY STATUS: Chronic | ICD-10-CM

## 2021-11-29 DIAGNOSIS — Z90.49 ACQUIRED ABSENCE OF OTHER SPECIFIED PARTS OF DIGESTIVE TRACT: Chronic | ICD-10-CM

## 2021-11-29 DIAGNOSIS — Z98.891 HISTORY OF UTERINE SCAR FROM PREVIOUS SURGERY: Chronic | ICD-10-CM

## 2021-11-29 DIAGNOSIS — Z98.890 OTHER SPECIFIED POSTPROCEDURAL STATES: Chronic | ICD-10-CM

## 2021-11-29 DIAGNOSIS — Z96.649 PRESENCE OF UNSPECIFIED ARTIFICIAL HIP JOINT: Chronic | ICD-10-CM

## 2021-11-29 DIAGNOSIS — Z96.643 PRESENCE OF ARTIFICIAL HIP JOINT, BILATERAL: Chronic | ICD-10-CM

## 2021-11-29 LAB
ALBUMIN SERPL ELPH-MCNC: 2.7 G/DL — LOW (ref 3.3–5)
ALP SERPL-CCNC: 63 U/L — SIGNIFICANT CHANGE UP (ref 40–120)
ALT FLD-CCNC: 22 U/L — SIGNIFICANT CHANGE UP (ref 12–78)
ANION GAP SERPL CALC-SCNC: 9 MMOL/L — SIGNIFICANT CHANGE UP (ref 5–17)
APTT BLD: 33.9 SEC — SIGNIFICANT CHANGE UP (ref 27.5–35.5)
AST SERPL-CCNC: 15 U/L — SIGNIFICANT CHANGE UP (ref 15–37)
BASOPHILS # BLD AUTO: 0.06 K/UL — SIGNIFICANT CHANGE UP (ref 0–0.2)
BASOPHILS NFR BLD AUTO: 0.4 % — SIGNIFICANT CHANGE UP (ref 0–2)
BILIRUB SERPL-MCNC: 0.5 MG/DL — SIGNIFICANT CHANGE UP (ref 0.2–1.2)
BUN SERPL-MCNC: 20 MG/DL — SIGNIFICANT CHANGE UP (ref 7–23)
CALCIUM SERPL-MCNC: 8.8 MG/DL — SIGNIFICANT CHANGE UP (ref 8.5–10.1)
CHLORIDE SERPL-SCNC: 105 MMOL/L — SIGNIFICANT CHANGE UP (ref 96–108)
CO2 SERPL-SCNC: 25 MMOL/L — SIGNIFICANT CHANGE UP (ref 22–31)
CREAT SERPL-MCNC: 0.9 MG/DL — SIGNIFICANT CHANGE UP (ref 0.5–1.3)
EOSINOPHIL # BLD AUTO: 0 K/UL — SIGNIFICANT CHANGE UP (ref 0–0.5)
EOSINOPHIL NFR BLD AUTO: 0 % — SIGNIFICANT CHANGE UP (ref 0–6)
GLUCOSE BLDC GLUCOMTR-MCNC: 120 MG/DL — HIGH (ref 70–99)
GLUCOSE SERPL-MCNC: 155 MG/DL — HIGH (ref 70–99)
HCT VFR BLD CALC: 38.2 % — SIGNIFICANT CHANGE UP (ref 34.5–45)
HGB BLD-MCNC: 12.1 G/DL — SIGNIFICANT CHANGE UP (ref 11.5–15.5)
IMM GRANULOCYTES NFR BLD AUTO: 0.6 % — SIGNIFICANT CHANGE UP (ref 0–1.5)
INR BLD: 2.2 RATIO — HIGH (ref 0.88–1.16)
LACTATE SERPL-SCNC: 1 MMOL/L — SIGNIFICANT CHANGE UP (ref 0.7–2)
LIDOCAIN IGE QN: 77 U/L — SIGNIFICANT CHANGE UP (ref 73–393)
LYMPHOCYTES # BLD AUTO: 1.88 K/UL — SIGNIFICANT CHANGE UP (ref 1–3.3)
LYMPHOCYTES # BLD AUTO: 14.1 % — SIGNIFICANT CHANGE UP (ref 13–44)
MCHC RBC-ENTMCNC: 24.9 PG — LOW (ref 27–34)
MCHC RBC-ENTMCNC: 31.7 GM/DL — LOW (ref 32–36)
MCV RBC AUTO: 78.6 FL — LOW (ref 80–100)
MONOCYTES # BLD AUTO: 0.83 K/UL — SIGNIFICANT CHANGE UP (ref 0–0.9)
MONOCYTES NFR BLD AUTO: 6.2 % — SIGNIFICANT CHANGE UP (ref 2–14)
NEUTROPHILS # BLD AUTO: 10.52 K/UL — HIGH (ref 1.8–7.4)
NEUTROPHILS NFR BLD AUTO: 78.7 % — HIGH (ref 43–77)
PLATELET # BLD AUTO: 326 K/UL — SIGNIFICANT CHANGE UP (ref 150–400)
POTASSIUM SERPL-MCNC: 3.5 MMOL/L — SIGNIFICANT CHANGE UP (ref 3.5–5.3)
POTASSIUM SERPL-SCNC: 3.5 MMOL/L — SIGNIFICANT CHANGE UP (ref 3.5–5.3)
PROT SERPL-MCNC: 7.6 GM/DL — SIGNIFICANT CHANGE UP (ref 6–8.3)
PROTHROM AB SERPL-ACNC: 24.6 SEC — HIGH (ref 10.6–13.6)
RBC # BLD: 4.86 M/UL — SIGNIFICANT CHANGE UP (ref 3.8–5.2)
RBC # FLD: 19.6 % — HIGH (ref 10.3–14.5)
SODIUM SERPL-SCNC: 139 MMOL/L — SIGNIFICANT CHANGE UP (ref 135–145)
WBC # BLD: 13.37 K/UL — HIGH (ref 3.8–10.5)
WBC # FLD AUTO: 13.37 K/UL — HIGH (ref 3.8–10.5)

## 2021-11-29 PROCEDURE — 83690 ASSAY OF LIPASE: CPT

## 2021-11-29 PROCEDURE — 85025 COMPLETE CBC W/AUTO DIFF WBC: CPT

## 2021-11-29 PROCEDURE — 71045 X-RAY EXAM CHEST 1 VIEW: CPT | Mod: 26,76

## 2021-11-29 PROCEDURE — 81001 URINALYSIS AUTO W/SCOPE: CPT

## 2021-11-29 PROCEDURE — 83735 ASSAY OF MAGNESIUM: CPT

## 2021-11-29 PROCEDURE — 99285 EMERGENCY DEPT VISIT HI MDM: CPT

## 2021-11-29 PROCEDURE — 87631 RESP VIRUS 3-5 TARGETS: CPT

## 2021-11-29 PROCEDURE — 80076 HEPATIC FUNCTION PANEL: CPT

## 2021-11-29 PROCEDURE — U0003: CPT

## 2021-11-29 PROCEDURE — 71250 CT THORAX DX C-: CPT

## 2021-11-29 PROCEDURE — 36415 COLL VENOUS BLD VENIPUNCTURE: CPT

## 2021-11-29 PROCEDURE — 85610 PROTHROMBIN TIME: CPT

## 2021-11-29 PROCEDURE — U0005: CPT

## 2021-11-29 PROCEDURE — 87040 BLOOD CULTURE FOR BACTERIA: CPT

## 2021-11-29 PROCEDURE — 80048 BASIC METABOLIC PNL TOTAL CA: CPT

## 2021-11-29 PROCEDURE — 84443 ASSAY THYROID STIM HORMONE: CPT

## 2021-11-29 PROCEDURE — 93005 ELECTROCARDIOGRAM TRACING: CPT

## 2021-11-29 PROCEDURE — 92526 ORAL FUNCTION THERAPY: CPT | Mod: GN

## 2021-11-29 PROCEDURE — 71045 X-RAY EXAM CHEST 1 VIEW: CPT

## 2021-11-29 PROCEDURE — 87449 NOS EACH ORGANISM AG IA: CPT

## 2021-11-29 PROCEDURE — 84439 ASSAY OF FREE THYROXINE: CPT

## 2021-11-29 PROCEDURE — 87899 AGENT NOS ASSAY W/OPTIC: CPT

## 2021-11-29 PROCEDURE — 83880 ASSAY OF NATRIURETIC PEPTIDE: CPT

## 2021-11-29 PROCEDURE — 74176 CT ABD & PELVIS W/O CONTRAST: CPT

## 2021-11-29 PROCEDURE — 85027 COMPLETE CBC AUTOMATED: CPT

## 2021-11-29 PROCEDURE — 87086 URINE CULTURE/COLONY COUNT: CPT

## 2021-11-29 PROCEDURE — 0225U NFCT DS DNA&RNA 21 SARSCOV2: CPT

## 2021-11-29 PROCEDURE — 83036 HEMOGLOBIN GLYCOSYLATED A1C: CPT

## 2021-11-29 PROCEDURE — 84100 ASSAY OF PHOSPHORUS: CPT

## 2021-11-29 PROCEDURE — 82962 GLUCOSE BLOOD TEST: CPT

## 2021-11-29 PROCEDURE — 83605 ASSAY OF LACTIC ACID: CPT

## 2021-11-29 PROCEDURE — 93306 TTE W/DOPPLER COMPLETE: CPT

## 2021-11-29 PROCEDURE — 74250 X-RAY XM SM INT 1CNTRST STD: CPT

## 2021-11-29 PROCEDURE — 92610 EVALUATE SWALLOWING FUNCTION: CPT | Mod: GN

## 2021-11-29 PROCEDURE — 94640 AIRWAY INHALATION TREATMENT: CPT

## 2021-11-29 PROCEDURE — 85730 THROMBOPLASTIN TIME PARTIAL: CPT

## 2021-11-29 PROCEDURE — 74019 RADEX ABDOMEN 2 VIEWS: CPT

## 2021-11-29 PROCEDURE — 74177 CT ABD & PELVIS W/CONTRAST: CPT | Mod: 26,MA

## 2021-11-29 PROCEDURE — C9113: CPT

## 2021-11-29 RX ORDER — DRONEDARONE 400 MG/1
400 TABLET, FILM COATED ORAL
Refills: 0 | Status: DISCONTINUED | OUTPATIENT
Start: 2021-11-29 | End: 2021-12-03

## 2021-11-29 RX ORDER — INSULIN GLARGINE 100 [IU]/ML
10 INJECTION, SOLUTION SUBCUTANEOUS AT BEDTIME
Refills: 0 | Status: DISCONTINUED | OUTPATIENT
Start: 2021-11-29 | End: 2021-12-02

## 2021-11-29 RX ORDER — ENOXAPARIN SODIUM 100 MG/ML
40 INJECTION SUBCUTANEOUS DAILY
Refills: 0 | Status: DISCONTINUED | OUTPATIENT
Start: 2021-11-29 | End: 2021-12-01

## 2021-11-29 RX ORDER — INSULIN LISPRO 100/ML
VIAL (ML) SUBCUTANEOUS
Refills: 0 | Status: DISCONTINUED | OUTPATIENT
Start: 2021-11-29 | End: 2021-12-08

## 2021-11-29 RX ORDER — ONDANSETRON 8 MG/1
4 TABLET, FILM COATED ORAL ONCE
Refills: 0 | Status: COMPLETED | OUTPATIENT
Start: 2021-11-29 | End: 2021-11-29

## 2021-11-29 RX ORDER — MORPHINE SULFATE 50 MG/1
4 CAPSULE, EXTENDED RELEASE ORAL ONCE
Refills: 0 | Status: DISCONTINUED | OUTPATIENT
Start: 2021-11-29 | End: 2021-11-29

## 2021-11-29 RX ORDER — FUROSEMIDE 40 MG
20 TABLET ORAL DAILY
Refills: 0 | Status: DISCONTINUED | OUTPATIENT
Start: 2021-11-29 | End: 2021-12-01

## 2021-11-29 RX ORDER — DEXTROSE 50 % IN WATER 50 %
15 SYRINGE (ML) INTRAVENOUS ONCE
Refills: 0 | Status: DISCONTINUED | OUTPATIENT
Start: 2021-11-29 | End: 2021-12-08

## 2021-11-29 RX ORDER — ONDANSETRON 8 MG/1
4 TABLET, FILM COATED ORAL EVERY 6 HOURS
Refills: 0 | Status: DISCONTINUED | OUTPATIENT
Start: 2021-11-29 | End: 2021-12-08

## 2021-11-29 RX ORDER — DEXTROSE 50 % IN WATER 50 %
12.5 SYRINGE (ML) INTRAVENOUS ONCE
Refills: 0 | Status: DISCONTINUED | OUTPATIENT
Start: 2021-11-29 | End: 2021-12-08

## 2021-11-29 RX ORDER — ACETAMINOPHEN 500 MG
1000 TABLET ORAL EVERY 6 HOURS
Refills: 0 | Status: DISCONTINUED | OUTPATIENT
Start: 2021-11-29 | End: 2021-12-04

## 2021-11-29 RX ORDER — DEXTROSE 50 % IN WATER 50 %
25 SYRINGE (ML) INTRAVENOUS ONCE
Refills: 0 | Status: DISCONTINUED | OUTPATIENT
Start: 2021-11-29 | End: 2021-12-08

## 2021-11-29 RX ORDER — MORPHINE SULFATE 50 MG/1
2 CAPSULE, EXTENDED RELEASE ORAL EVERY 4 HOURS
Refills: 0 | Status: DISCONTINUED | OUTPATIENT
Start: 2021-11-29 | End: 2021-12-04

## 2021-11-29 RX ORDER — SODIUM CHLORIDE 9 MG/ML
1000 INJECTION, SOLUTION INTRAVENOUS
Refills: 0 | Status: DISCONTINUED | OUTPATIENT
Start: 2021-11-29 | End: 2021-12-06

## 2021-11-29 RX ORDER — ALBUTEROL 90 UG/1
2 AEROSOL, METERED ORAL EVERY 6 HOURS
Refills: 0 | Status: DISCONTINUED | OUTPATIENT
Start: 2021-11-29 | End: 2021-12-04

## 2021-11-29 RX ORDER — SODIUM CHLORIDE 9 MG/ML
1000 INJECTION INTRAMUSCULAR; INTRAVENOUS; SUBCUTANEOUS
Refills: 0 | Status: DISCONTINUED | OUTPATIENT
Start: 2021-11-29 | End: 2021-12-02

## 2021-11-29 RX ORDER — SODIUM CHLORIDE 9 MG/ML
1000 INJECTION INTRAMUSCULAR; INTRAVENOUS; SUBCUTANEOUS ONCE
Refills: 0 | Status: COMPLETED | OUTPATIENT
Start: 2021-11-29 | End: 2021-11-29

## 2021-11-29 RX ORDER — FLUTICASONE PROPIONATE 50 MCG
1 SPRAY, SUSPENSION NASAL
Refills: 0 | Status: DISCONTINUED | OUTPATIENT
Start: 2021-11-29 | End: 2021-12-08

## 2021-11-29 RX ORDER — PANTOPRAZOLE SODIUM 20 MG/1
40 TABLET, DELAYED RELEASE ORAL DAILY
Refills: 0 | Status: DISCONTINUED | OUTPATIENT
Start: 2021-11-29 | End: 2021-12-04

## 2021-11-29 RX ORDER — GLUCAGON INJECTION, SOLUTION 0.5 MG/.1ML
1 INJECTION, SOLUTION SUBCUTANEOUS ONCE
Refills: 0 | Status: DISCONTINUED | OUTPATIENT
Start: 2021-11-29 | End: 2021-12-04

## 2021-11-29 RX ADMIN — SODIUM CHLORIDE 1000 MILLILITER(S): 9 INJECTION INTRAMUSCULAR; INTRAVENOUS; SUBCUTANEOUS at 13:13

## 2021-11-29 RX ADMIN — SODIUM CHLORIDE 1000 MILLILITER(S): 9 INJECTION INTRAMUSCULAR; INTRAVENOUS; SUBCUTANEOUS at 17:17

## 2021-11-29 RX ADMIN — MORPHINE SULFATE 2 MILLIGRAM(S): 50 CAPSULE, EXTENDED RELEASE ORAL at 20:30

## 2021-11-29 RX ADMIN — MORPHINE SULFATE 4 MILLIGRAM(S): 50 CAPSULE, EXTENDED RELEASE ORAL at 13:26

## 2021-11-29 RX ADMIN — DRONEDARONE 400 MILLIGRAM(S): 400 TABLET, FILM COATED ORAL at 23:42

## 2021-11-29 RX ADMIN — Medication 1 SPRAY(S): at 23:42

## 2021-11-29 RX ADMIN — ONDANSETRON 4 MILLIGRAM(S): 8 TABLET, FILM COATED ORAL at 13:27

## 2021-11-29 NOTE — ED ADULT NURSE NOTE - OBJECTIVE STATEMENT
pt presents to ED from  [facility], via EMS, pt alert and orientedx4, vSs pt c/o abdominal pain with NV since friday with fever, pt denies diarrhea, pt states she has multiple abdominal hernias and therefore she becomes constiapted at times, pt denies fall trauma or blooduy stools, safety maintained pt does nto have use of left arm and is not a,bulatory due to stroke 1.5 years ago,

## 2021-11-29 NOTE — H&P ADULT - HISTORY OF PRESENT ILLNESS
70 y/o female with a PMHx of Afib, CVA, COPD, DM, diverticulitis, DVT, HTN neuropathy presents to the ED BIBA from [facility] c/o abd pain, abd distention and n/v/d x3 days. Pt reports her emesis and BMs are green. No other complaints at this time. Patient reports she had BM in the ED. patient reports she had nausea and vomiting since friday. Patient reports she has abdominal distension and pain. Patient denies CP, SOB, dizziness, and headache.

## 2021-11-29 NOTE — ED ADULT TRIAGE NOTE - NURSING HOM[facility]

## 2021-11-29 NOTE — ED PROVIDER NOTE - OBJECTIVE STATEMENT
70 y/o female with a PMHx of Afib, CVA, COPD, DM, diverticulitis, DVT, HTN neuropathy presents to the ED BIBA from [facility] c/o abd pain, abd distention and n/v/d x3 days. Pt reports her emesis and BMs are green. No other complaints at this time.

## 2021-11-29 NOTE — ED PROVIDER NOTE - PRINCIPAL DIAGNOSIS
RUTH LOWENBERG is a 75y Female s/p LEFT TOTAL SHOULDER VS LEFT REVERSE SHOULDER ARTHROPLASTY,BI  by Dr. Navas on 9/14/20; complains of postop pain; patient tolerated surgery well.    ROS: no pulmonary, cardiovascular, gastrointestinal, urological or neurological symptoms.     PHYS: T(C): 36.8 (09-15-20 @ 03:30), Max: 37.2 (09-14-20 @ 23:30)  HR: 63 (09-15-20 @ 03:30) (54 - 74)  BP: 103/51 (09-15-20 @ 03:30) (103/51 - 140/76)  RR: 16 (09-15-20 @ 03:30) (15 - 20)  SpO2: 98% (09-15-20 @ 03:30) (93% - 99%)  vss; lungs, clear; heart, reg rhythm, no murmurs, rubs or gallops;   abd, soft, non tender, normal bowel sounds, no calf tenderness or edema                         10.4   7.20  )-----------( 141      ( 15 Sep 2020 06:40 )             32.1     Assessment and Plan: status post left total shoulder replacement; history of migraine; pain control; deep vein thrombophlebitis prophylaxis; physical therapy; bowel regimen; nutrition support; follow up labs; will follow. SBO (small bowel obstruction)

## 2021-11-29 NOTE — ED ADULT NURSE NOTE - CHIEF COMPLAINT QUOTE
Pt comes to ED from [facility] for abd pain, N/V/D that started over the weekend. Pt reports epigastric pain 6/10. Denies chest pain, fever chills. Reports SOB.

## 2021-11-29 NOTE — H&P ADULT - NSHPPHYSICALEXAM_GEN_ALL_CORE
Gen: NAD, cooperative   HEENT: supple, no JVD, EOMI  Lungs: CTA b/l, aerating well   CV: S1 and S2 present, tachycardic   Abd: BS present, Distended, nontender, abdomina wound dressing in place   Ext: weakness of the RUE and RLE, no cynosis, No edema   Skin: warm, dry

## 2021-11-29 NOTE — ED ADULT TRIAGE NOTE - CHIEF COMPLAINT QUOTE
Pt comes to ED from [facility] for abd pain, N/V/D that started over the weekend. Pt reports epigastric pain 6/10. Denies chest pain, fever chills. Reports SOB.

## 2021-11-29 NOTE — PHARMACOTHERAPY INTERVENTION NOTE - COMMENTS
Medication reconciliation completed.  Reviewed Medication list and confirmed med allergies with list from [facility]; confirmed with Dr. First Medgabriel.

## 2021-11-29 NOTE — H&P ADULT - ASSESSMENT
70 yo F presents with nausea and vomiting with hx of ventral hernia and SBO  72 yo F presents with nausea and vomiting with hx of ventral hernia and SBO     Plan:   admit to Dr. Harley   monitor vitals   pain control   nausea control   NPO, IVF hydration   encourage IS use   med rec  medical consult   hold AC currently on therapuetic Lovenox  Home o2 continue NC   NGT to LWS, replete 1:1 over a 1L output   monitor I and OS   serial abdominal exams   monitor bowel function   DVT/GI ppx     plan discussed with Dr. Harley

## 2021-11-29 NOTE — ED ADULT NURSE REASSESSMENT NOTE - NS ED NURSE REASSESS COMMENT FT1
Pt. received by VALERIA Cardoso.  VSS, pt. resting comfortably. Pt. has no complaints at this time, NG tube in place at low intermittent suction output 100ml

## 2021-11-30 LAB
A1C WITH ESTIMATED AVERAGE GLUCOSE RESULT: 7 % — HIGH (ref 4–5.6)
ANION GAP SERPL CALC-SCNC: 8 MMOL/L — SIGNIFICANT CHANGE UP (ref 5–17)
BUN SERPL-MCNC: 20 MG/DL — SIGNIFICANT CHANGE UP (ref 7–23)
CALCIUM SERPL-MCNC: 8.8 MG/DL — SIGNIFICANT CHANGE UP (ref 8.5–10.1)
CHLORIDE SERPL-SCNC: 108 MMOL/L — SIGNIFICANT CHANGE UP (ref 96–108)
CO2 SERPL-SCNC: 25 MMOL/L — SIGNIFICANT CHANGE UP (ref 22–31)
CREAT SERPL-MCNC: 0.71 MG/DL — SIGNIFICANT CHANGE UP (ref 0.5–1.3)
ESTIMATED AVERAGE GLUCOSE: 154 MG/DL — HIGH (ref 68–114)
GLUCOSE BLDC GLUCOMTR-MCNC: 100 MG/DL — HIGH (ref 70–99)
GLUCOSE BLDC GLUCOMTR-MCNC: 121 MG/DL — HIGH (ref 70–99)
GLUCOSE BLDC GLUCOMTR-MCNC: 127 MG/DL — HIGH (ref 70–99)
GLUCOSE BLDC GLUCOMTR-MCNC: 94 MG/DL — SIGNIFICANT CHANGE UP (ref 70–99)
GLUCOSE SERPL-MCNC: 125 MG/DL — HIGH (ref 70–99)
HCT VFR BLD CALC: 36.8 % — SIGNIFICANT CHANGE UP (ref 34.5–45)
HGB BLD-MCNC: 11.3 G/DL — LOW (ref 11.5–15.5)
MAGNESIUM SERPL-MCNC: 2.2 MG/DL — SIGNIFICANT CHANGE UP (ref 1.6–2.6)
MCHC RBC-ENTMCNC: 24.6 PG — LOW (ref 27–34)
MCHC RBC-ENTMCNC: 30.7 GM/DL — LOW (ref 32–36)
MCV RBC AUTO: 80 FL — SIGNIFICANT CHANGE UP (ref 80–100)
PHOSPHATE SERPL-MCNC: 2.5 MG/DL — SIGNIFICANT CHANGE UP (ref 2.5–4.5)
PLATELET # BLD AUTO: 324 K/UL — SIGNIFICANT CHANGE UP (ref 150–400)
POTASSIUM SERPL-MCNC: 3.6 MMOL/L — SIGNIFICANT CHANGE UP (ref 3.5–5.3)
POTASSIUM SERPL-SCNC: 3.6 MMOL/L — SIGNIFICANT CHANGE UP (ref 3.5–5.3)
RBC # BLD: 4.6 M/UL — SIGNIFICANT CHANGE UP (ref 3.8–5.2)
RBC # FLD: 19.3 % — HIGH (ref 10.3–14.5)
SODIUM SERPL-SCNC: 141 MMOL/L — SIGNIFICANT CHANGE UP (ref 135–145)
WBC # BLD: 11.09 K/UL — HIGH (ref 3.8–10.5)
WBC # FLD AUTO: 11.09 K/UL — HIGH (ref 3.8–10.5)

## 2021-11-30 PROCEDURE — 71045 X-RAY EXAM CHEST 1 VIEW: CPT | Mod: 26

## 2021-11-30 RX ORDER — BENZOCAINE AND MENTHOL 5; 1 G/100ML; G/100ML
1 LIQUID ORAL
Refills: 0 | Status: DISCONTINUED | OUTPATIENT
Start: 2021-11-30 | End: 2021-12-04

## 2021-11-30 RX ADMIN — ONDANSETRON 4 MILLIGRAM(S): 8 TABLET, FILM COATED ORAL at 13:05

## 2021-11-30 RX ADMIN — SODIUM CHLORIDE 80 MILLILITER(S): 9 INJECTION INTRAMUSCULAR; INTRAVENOUS; SUBCUTANEOUS at 23:00

## 2021-11-30 RX ADMIN — MORPHINE SULFATE 2 MILLIGRAM(S): 50 CAPSULE, EXTENDED RELEASE ORAL at 06:03

## 2021-11-30 RX ADMIN — Medication 1 SPRAY(S): at 21:49

## 2021-11-30 RX ADMIN — Medication 20 MILLIGRAM(S): at 10:05

## 2021-11-30 RX ADMIN — ENOXAPARIN SODIUM 40 MILLIGRAM(S): 100 INJECTION SUBCUTANEOUS at 13:05

## 2021-11-30 RX ADMIN — DRONEDARONE 400 MILLIGRAM(S): 400 TABLET, FILM COATED ORAL at 21:50

## 2021-11-30 RX ADMIN — DRONEDARONE 400 MILLIGRAM(S): 400 TABLET, FILM COATED ORAL at 10:05

## 2021-11-30 RX ADMIN — MORPHINE SULFATE 2 MILLIGRAM(S): 50 CAPSULE, EXTENDED RELEASE ORAL at 22:11

## 2021-11-30 RX ADMIN — Medication 1 SPRAY(S): at 10:05

## 2021-11-30 RX ADMIN — PANTOPRAZOLE SODIUM 40 MILLIGRAM(S): 20 TABLET, DELAYED RELEASE ORAL at 10:05

## 2021-11-30 RX ADMIN — MORPHINE SULFATE 2 MILLIGRAM(S): 50 CAPSULE, EXTENDED RELEASE ORAL at 21:56

## 2021-11-30 RX ADMIN — BENZOCAINE AND MENTHOL 1 LOZENGE: 5; 1 LIQUID ORAL at 23:01

## 2021-11-30 RX ADMIN — MORPHINE SULFATE 2 MILLIGRAM(S): 50 CAPSULE, EXTENDED RELEASE ORAL at 06:18

## 2021-11-30 NOTE — PROGRESS NOTE ADULT - SUBJECTIVE AND OBJECTIVE BOX
SURGERY DAILY PROGRESS NOTE:     Subjective:  Patient seen and examined this AM at bedside. No acute events overnight and patient resting comfortably. Feeling better after NG tube. Had one small BM overnight. Denies fever/chills, shortness of breath, chest pain. VS reviewed    Objective:    MEDICATIONS  (STANDING):  acetaminophen   IVPB .. 1000 milliGRAM(s) IV Intermittent every 6 hours  dextrose 40% Gel 15 Gram(s) Oral once  dextrose 5%. 1000 milliLiter(s) (50 mL/Hr) IV Continuous <Continuous>  dextrose 5%. 1000 milliLiter(s) (100 mL/Hr) IV Continuous <Continuous>  dextrose 50% Injectable 25 Gram(s) IV Push once  dextrose 50% Injectable 12.5 Gram(s) IV Push once  dextrose 50% Injectable 25 Gram(s) IV Push once  dronedarone 400 milliGRAM(s) Oral two times a day  enoxaparin Injectable 40 milliGRAM(s) SubCutaneous daily  fluticasone propionate 50 MICROgram(s)/spray Nasal Spray 1 Spray(s) Both Nostrils two times a day  furosemide   Injectable 20 milliGRAM(s) IV Push daily  glucagon  Injectable 1 milliGRAM(s) IntraMuscular once  insulin glargine Injectable (LANTUS) 10 Unit(s) SubCutaneous at bedtime  insulin lispro (ADMELOG) corrective regimen sliding scale   SubCutaneous three times a day before meals  pantoprazole  Injectable 40 milliGRAM(s) IV Push daily  sodium chloride 0.9%. 1000 milliLiter(s) (80 mL/Hr) IV Continuous <Continuous>    MEDICATIONS  (PRN):  ALBUTerol    90 MICROgram(s) HFA Inhaler 2 Puff(s) Inhalation every 6 hours PRN Bronchospasm  morphine  - Injectable 2 milliGRAM(s) IV Push every 4 hours PRN Severe Pain (7 - 10)  ondansetron Injectable 4 milliGRAM(s) IV Push every 6 hours PRN Nausea and/or Vomiting      Vital Signs Last 24 Hrs  T(C): 36.6 (30 Nov 2021 09:34), Max: 37.4 (29 Nov 2021 19:56)  T(F): 97.8 (30 Nov 2021 09:34), Max: 99.3 (29 Nov 2021 19:56)  HR: 88 (30 Nov 2021 09:34) (88 - 105)  BP: 152/64 (30 Nov 2021 09:34) (132/51 - 161/72)  BP(mean): 101 (29 Nov 2021 19:56) (101 - 101)  RR: 18 (30 Nov 2021 09:34) (15 - 19)  SpO2: 98% (30 Nov 2021 09:34) (92% - 99%)      PHYSICAL EXAM   Gen: NAD, cooperative   HEENT: supple, no JVD, EOMI  Lungs: CTA b/l, aerating well   CV: S1 and S2 present, tachycardic   Abd: BS present, Distended, nontender, abdomina wound dressing in place . NG tube in place  Ext: weakness of the RUE and RLE, no cynosis, No edema   Skin: warm, dry    I&O's Detail    29 Nov 2021 07:01  -  30 Nov 2021 07:00  --------------------------------------------------------  IN:  Total IN: 0 mL    OUT:    Nasogastric/Oral tube (mL): 100 mL  Total OUT: 100 mL    Total NET: -100 mL          Daily Height in cm: 162.56 (29 Nov 2021 12:18)    Daily     LABS:                        11.3   11.09 )-----------( 324      ( 30 Nov 2021 09:21 )             36.8     11-30    141  |  108  |  20  ----------------------------<  125<H>  3.6   |  25  |  0.71    Ca    8.8      30 Nov 2021 09:21  Phos  2.5     11-30  Mg     2.2     11-30    TPro  7.6  /  Alb  2.7<L>  /  TBili  0.5  /  DBili  x   /  AST  15  /  ALT  22  /  AlkPhos  63  11-29    PT/INR - ( 29 Nov 2021 17:30 )   PT: 24.6 sec;   INR: 2.20 ratio         PTT - ( 29 Nov 2021 17:30 )  PTT:33.9 sec      RADIOLOGY & ADDITIONAL STUDIES:

## 2021-11-30 NOTE — PROGRESS NOTE ADULT - ASSESSMENT
6 yo F presents with nausea and vomiting with hx of ventral hernia and SBO  NG tube placed, small bowel movement overnight     Plan:   monitor vitals   pain control   nausea control   NPO  NG tube to LWS - replace 1:1  small bowel series today  monitor BF  Serial abd exam  IVF hydration   med rec  medical consult   hold AC currently on therapuetic Lovenox  Home o2 continue NC   Encourage IS/OOB/ambulation    Plan discussed with surgery team and attending, Dr. Harley

## 2021-12-01 LAB
ANION GAP SERPL CALC-SCNC: 10 MMOL/L — SIGNIFICANT CHANGE UP (ref 5–17)
BASOPHILS # BLD AUTO: 0 K/UL — SIGNIFICANT CHANGE UP (ref 0–0.2)
BASOPHILS NFR BLD AUTO: 0 % — SIGNIFICANT CHANGE UP (ref 0–2)
BUN SERPL-MCNC: 15 MG/DL — SIGNIFICANT CHANGE UP (ref 7–23)
CALCIUM SERPL-MCNC: 8.8 MG/DL — SIGNIFICANT CHANGE UP (ref 8.5–10.1)
CHLORIDE SERPL-SCNC: 111 MMOL/L — HIGH (ref 96–108)
CO2 SERPL-SCNC: 24 MMOL/L — SIGNIFICANT CHANGE UP (ref 22–31)
CREAT SERPL-MCNC: 0.64 MG/DL — SIGNIFICANT CHANGE UP (ref 0.5–1.3)
EOSINOPHIL # BLD AUTO: 0 K/UL — SIGNIFICANT CHANGE UP (ref 0–0.5)
EOSINOPHIL NFR BLD AUTO: 0 % — SIGNIFICANT CHANGE UP (ref 0–6)
GLUCOSE BLDC GLUCOMTR-MCNC: 131 MG/DL — HIGH (ref 70–99)
GLUCOSE BLDC GLUCOMTR-MCNC: 157 MG/DL — HIGH (ref 70–99)
GLUCOSE BLDC GLUCOMTR-MCNC: 186 MG/DL — HIGH (ref 70–99)
GLUCOSE BLDC GLUCOMTR-MCNC: 203 MG/DL — HIGH (ref 70–99)
GLUCOSE SERPL-MCNC: 149 MG/DL — HIGH (ref 70–99)
HCT VFR BLD CALC: 38.1 % — SIGNIFICANT CHANGE UP (ref 34.5–45)
HGB BLD-MCNC: 11.7 G/DL — SIGNIFICANT CHANGE UP (ref 11.5–15.5)
INR BLD: 1.71 RATIO — HIGH (ref 0.88–1.16)
LYMPHOCYTES # BLD AUTO: 1.34 K/UL — SIGNIFICANT CHANGE UP (ref 1–3.3)
LYMPHOCYTES # BLD AUTO: 9 % — LOW (ref 13–44)
MAGNESIUM SERPL-MCNC: 2.3 MG/DL — SIGNIFICANT CHANGE UP (ref 1.6–2.6)
MCHC RBC-ENTMCNC: 24.7 PG — LOW (ref 27–34)
MCHC RBC-ENTMCNC: 30.7 GM/DL — LOW (ref 32–36)
MCV RBC AUTO: 80.5 FL — SIGNIFICANT CHANGE UP (ref 80–100)
MONOCYTES # BLD AUTO: 0.3 K/UL — SIGNIFICANT CHANGE UP (ref 0–0.9)
MONOCYTES NFR BLD AUTO: 2 % — SIGNIFICANT CHANGE UP (ref 2–14)
NEUTROPHILS # BLD AUTO: 13.24 K/UL — HIGH (ref 1.8–7.4)
NEUTROPHILS NFR BLD AUTO: 88 % — HIGH (ref 43–77)
NRBC # BLD: SIGNIFICANT CHANGE UP /100 WBCS (ref 0–0)
PHOSPHATE SERPL-MCNC: 2.1 MG/DL — LOW (ref 2.5–4.5)
PLATELET # BLD AUTO: 319 K/UL — SIGNIFICANT CHANGE UP (ref 150–400)
POTASSIUM SERPL-MCNC: 3.1 MMOL/L — LOW (ref 3.5–5.3)
POTASSIUM SERPL-SCNC: 3.1 MMOL/L — LOW (ref 3.5–5.3)
PROTHROM AB SERPL-ACNC: 19.3 SEC — HIGH (ref 10.6–13.6)
RBC # BLD: 4.73 M/UL — SIGNIFICANT CHANGE UP (ref 3.8–5.2)
RBC # FLD: 19.5 % — HIGH (ref 10.3–14.5)
SODIUM SERPL-SCNC: 145 MMOL/L — SIGNIFICANT CHANGE UP (ref 135–145)
WBC # BLD: 14.88 K/UL — HIGH (ref 3.8–10.5)
WBC # FLD AUTO: 14.88 K/UL — HIGH (ref 3.8–10.5)

## 2021-12-01 PROCEDURE — 99221 1ST HOSP IP/OBS SF/LOW 40: CPT

## 2021-12-01 PROCEDURE — 74250 X-RAY XM SM INT 1CNTRST STD: CPT | Mod: 26

## 2021-12-01 PROCEDURE — 99223 1ST HOSP IP/OBS HIGH 75: CPT

## 2021-12-01 RX ORDER — POTASSIUM PHOSPHATE, MONOBASIC POTASSIUM PHOSPHATE, DIBASIC 236; 224 MG/ML; MG/ML
15 INJECTION, SOLUTION INTRAVENOUS ONCE
Refills: 0 | Status: COMPLETED | OUTPATIENT
Start: 2021-12-01 | End: 2021-12-01

## 2021-12-01 RX ORDER — POTASSIUM CHLORIDE 20 MEQ
40 PACKET (EA) ORAL EVERY 4 HOURS
Refills: 0 | Status: DISCONTINUED | OUTPATIENT
Start: 2021-12-01 | End: 2021-12-01

## 2021-12-01 RX ORDER — ENOXAPARIN SODIUM 100 MG/ML
60 INJECTION SUBCUTANEOUS ONCE
Refills: 0 | Status: COMPLETED | OUTPATIENT
Start: 2021-12-01 | End: 2021-12-01

## 2021-12-01 RX ORDER — ENOXAPARIN SODIUM 100 MG/ML
40 INJECTION SUBCUTANEOUS ONCE
Refills: 0 | Status: COMPLETED | OUTPATIENT
Start: 2021-12-01 | End: 2021-12-01

## 2021-12-01 RX ORDER — SODIUM,POTASSIUM PHOSPHATES 278-250MG
1 POWDER IN PACKET (EA) ORAL ONCE
Refills: 0 | Status: DISCONTINUED | OUTPATIENT
Start: 2021-12-01 | End: 2021-12-01

## 2021-12-01 RX ORDER — LEVOTHYROXINE SODIUM 125 MCG
25 TABLET ORAL DAILY
Refills: 0 | Status: DISCONTINUED | OUTPATIENT
Start: 2021-12-01 | End: 2021-12-08

## 2021-12-01 RX ORDER — POTASSIUM CHLORIDE 20 MEQ
10 PACKET (EA) ORAL
Refills: 0 | Status: COMPLETED | OUTPATIENT
Start: 2021-12-01 | End: 2021-12-01

## 2021-12-01 RX ORDER — WARFARIN SODIUM 2.5 MG/1
4 TABLET ORAL DAILY
Refills: 0 | Status: DISCONTINUED | OUTPATIENT
Start: 2021-12-01 | End: 2021-12-01

## 2021-12-01 RX ORDER — METHOCARBAMOL 500 MG/1
750 TABLET, FILM COATED ORAL EVERY 6 HOURS
Refills: 0 | Status: DISCONTINUED | OUTPATIENT
Start: 2021-12-01 | End: 2021-12-08

## 2021-12-01 RX ORDER — OXYBUTYNIN CHLORIDE 5 MG
5 TABLET ORAL DAILY
Refills: 0 | Status: DISCONTINUED | OUTPATIENT
Start: 2021-12-01 | End: 2021-12-08

## 2021-12-01 RX ORDER — ENOXAPARIN SODIUM 100 MG/ML
100 INJECTION SUBCUTANEOUS EVERY 12 HOURS
Refills: 0 | Status: DISCONTINUED | OUTPATIENT
Start: 2021-12-01 | End: 2021-12-04

## 2021-12-01 RX ORDER — WARFARIN SODIUM 2.5 MG/1
5 TABLET ORAL
Refills: 0 | Status: DISCONTINUED | OUTPATIENT
Start: 2021-12-01 | End: 2021-12-04

## 2021-12-01 RX ORDER — WARFARIN SODIUM 2.5 MG/1
4 TABLET ORAL
Refills: 0 | Status: DISCONTINUED | OUTPATIENT
Start: 2021-12-01 | End: 2021-12-04

## 2021-12-01 RX ORDER — ATORVASTATIN CALCIUM 80 MG/1
20 TABLET, FILM COATED ORAL AT BEDTIME
Refills: 0 | Status: DISCONTINUED | OUTPATIENT
Start: 2021-12-01 | End: 2021-12-08

## 2021-12-01 RX ORDER — LOSARTAN POTASSIUM 100 MG/1
25 TABLET, FILM COATED ORAL DAILY
Refills: 0 | Status: DISCONTINUED | OUTPATIENT
Start: 2021-12-01 | End: 2021-12-04

## 2021-12-01 RX ORDER — WARFARIN SODIUM 2.5 MG/1
5 TABLET ORAL DAILY
Refills: 0 | Status: DISCONTINUED | OUTPATIENT
Start: 2021-12-01 | End: 2021-12-01

## 2021-12-01 RX ORDER — DULOXETINE HYDROCHLORIDE 30 MG/1
60 CAPSULE, DELAYED RELEASE ORAL DAILY
Refills: 0 | Status: DISCONTINUED | OUTPATIENT
Start: 2021-12-01 | End: 2021-12-08

## 2021-12-01 RX ADMIN — LOSARTAN POTASSIUM 25 MILLIGRAM(S): 100 TABLET, FILM COATED ORAL at 18:53

## 2021-12-01 RX ADMIN — Medication 1 SPRAY(S): at 11:08

## 2021-12-01 RX ADMIN — WARFARIN SODIUM 4 MILLIGRAM(S): 2.5 TABLET ORAL at 22:04

## 2021-12-01 RX ADMIN — POTASSIUM PHOSPHATE, MONOBASIC POTASSIUM PHOSPHATE, DIBASIC 62.5 MILLIMOLE(S): 236; 224 INJECTION, SOLUTION INTRAVENOUS at 21:57

## 2021-12-01 RX ADMIN — ALBUTEROL 2 PUFF(S): 90 AEROSOL, METERED ORAL at 19:11

## 2021-12-01 RX ADMIN — Medication 100 MILLIEQUIVALENT(S): at 18:52

## 2021-12-01 RX ADMIN — Medication 100 MILLIEQUIVALENT(S): at 12:20

## 2021-12-01 RX ADMIN — PANTOPRAZOLE SODIUM 40 MILLIGRAM(S): 20 TABLET, DELAYED RELEASE ORAL at 11:08

## 2021-12-01 RX ADMIN — Medication 100 MILLIGRAM(S): at 22:04

## 2021-12-01 RX ADMIN — Medication 1 SPRAY(S): at 22:01

## 2021-12-01 RX ADMIN — DRONEDARONE 400 MILLIGRAM(S): 400 TABLET, FILM COATED ORAL at 11:08

## 2021-12-01 RX ADMIN — INSULIN GLARGINE 10 UNIT(S): 100 INJECTION, SOLUTION SUBCUTANEOUS at 21:56

## 2021-12-01 RX ADMIN — MORPHINE SULFATE 2 MILLIGRAM(S): 50 CAPSULE, EXTENDED RELEASE ORAL at 02:06

## 2021-12-01 RX ADMIN — DULOXETINE HYDROCHLORIDE 60 MILLIGRAM(S): 30 CAPSULE, DELAYED RELEASE ORAL at 11:08

## 2021-12-01 RX ADMIN — Medication 5 MILLIGRAM(S): at 14:50

## 2021-12-01 RX ADMIN — DRONEDARONE 400 MILLIGRAM(S): 400 TABLET, FILM COATED ORAL at 22:04

## 2021-12-01 RX ADMIN — Medication 100 MILLIEQUIVALENT(S): at 14:51

## 2021-12-01 RX ADMIN — Medication 100 MILLIGRAM(S): at 14:50

## 2021-12-01 RX ADMIN — ENOXAPARIN SODIUM 60 MILLIGRAM(S): 100 INJECTION SUBCUTANEOUS at 13:19

## 2021-12-01 RX ADMIN — Medication 4: at 13:18

## 2021-12-01 RX ADMIN — Medication 2: at 18:53

## 2021-12-01 RX ADMIN — ATORVASTATIN CALCIUM 20 MILLIGRAM(S): 80 TABLET, FILM COATED ORAL at 22:04

## 2021-12-01 RX ADMIN — ENOXAPARIN SODIUM 40 MILLIGRAM(S): 100 INJECTION SUBCUTANEOUS at 14:49

## 2021-12-01 RX ADMIN — MORPHINE SULFATE 2 MILLIGRAM(S): 50 CAPSULE, EXTENDED RELEASE ORAL at 01:51

## 2021-12-01 NOTE — PHYSICAL THERAPY INITIAL EVALUATION ADULT - PASSIVE RANGE OF MOTION EXAMINATION, REHAB EVAL
PROM R ankle to 40degrees PF (unable to DF ) L ankle 45 degrees (+spasticity,non-sustained clonus elicited)

## 2021-12-01 NOTE — CONSULT NOTE ADULT - SUBJECTIVE AND OBJECTIVE BOX
HPI:  72 y/o female with a PMHx of Afib on coumadin,[id number] Score: 6  , CVA, COPD, DM, diverticulitis, DVT, HTN neuropathy presents to the ED BIBA from [facility] c/o abd pain, abd distention and n/v/d x3 days. Pt reports her emesis and BMs are green. No other complaints at this time. Patient reports she had BM in the ED. patient reports she had nausea and vomiting since . Patient reports she has abdominal distension and pain. Patient denies CP, SOB, dizziness, and headache.  (2021 17:24)      Patient is a 71y old  Female who presents with a chief complaint of abdominal pain with n/v and diarrhea.      Consulted by Dr. Manjeet Harley for VTE prophylaxis, risk stratification, and anticoagulation management.    PAST MEDICAL & SURGICAL HISTORY:  History of atrial fibrillation    HTN (hypertension)    Diabetes mellitus    Cerebrovascular accident (CVA)    DVT of lower limb    CVA (cerebral vascular accident)    COPD (chronic obstructive pulmonary disease)    Neuropathy    Diverticulitis    History of colostomy reversal    History of colostomy reversal    S/P colostomy    S/P     S/P hip replacement        FAMILY HISTORY:      Interval Note:        Caprini VTE Risk Score:    IMPROVE VTE Risk Score:    [id number] Score:     IMPROVE Bleeding Risk Score    Falls Risk:   High (  )  Mod (  )  Low (  )  crcl:         cr:          BMI             EBL:   ml  Time procedure    : starts:              :ends      Tourniquet time:        Denies any personal or familial history of clotting or bleeding disorders.    Allergies    Cipro (Rash)  Spiriva (Rash)    Intolerances        REVIEW OF SYSTEMS    (  )Fever	     (  )Constipation	(  )SOB				(  )Headache	(  )Dysuria  (  )Chills	     (  )Melena	(  )Dyspnea present on exertion	                    (  )Dizziness                    (  )Polyuria  (  )Nausea	     (  )Hematochezia	(  )Cough			                    (  )Syncope   	(  )Hematuria  (  )Vomiting    (  )Chest Pain	(  )Wheezing			(  )Weakness  (  )Diarrhea     (  )Palpitations	(  )Anorexia			(  )Myalgia    Pertinent positives in HPI and daily subjective.  All other ROS negative.    Unable to obtain review of systems due to:  Vital Signs Last 24 Hrs  T(C): 36.7 (01 Dec 2021 09:10), Max: 37.2 (2021 20:17)  T(F): 98 (01 Dec 2021 09:10), Max: 99 (2021 20:17)  HR: 87 (01 Dec 2021 09:10) (82 - 87)  BP: 178/73 (01 Dec 2021 09:10) (155/62 - 178/73)  BP(mean): 98 (01 Dec 2021 09:10) (98 - 98)  RR: 16 (01 Dec 2021 09:10) (16 - 18)  SpO2: 96% (01 Dec 2021 09:10) (94% - 97%)    PHYSICAL EXAM:    Constitutional: Appears Well    Neurological: A& O x 3    Skin: Warm    Respiratory and Thorax: normal effort; Breath sounds: normal; No rales/wheezing/rhonchi  	  Cardiovascular: S1, S2, regular, NMBR	    Gastrointestinal: BS + x 4Q, nontender	    Genitourinary:  Bladder nondistended, nontender    Musculoskeletal:   General Right:   no muscle/joint tenderness,   normal tone, no joint swelling,   ROM: limited/full	    General Left:   no muscle/joint tenderness,   normal tone, no joint swelling,   ROM: limited/full    Hip:  Right: Dressing CDI; Drain: hemovac ; Type of drng.: serosang.; Amt. of drng: small            Left: Dressing CDI; Drain: hemovac ; Type of drng.: serosang.; Amt. of drng: small      Knee:  Right: Incision: ; Dressing CDI; Drain: hemovac ; Type of drng.: serosang.; Amt. of drng: small               Left: Incision: ; Dressing CDI; Drain: hemovac ; Type of drng.: serosang.; Amt. of drng: small    Shoulder:  Rt: Drsing CDI; Sling/immob. noted; Cap refill good; Radial pulse +; Sensation intact                     Lt: Drsing CDI; Sling/immob. noted; Cap refill good; Radial pulse +; Sensation intact    Lower extrems:   Right: no calf tenderness              negative shara's sign               + pedal pulses    Left:   no calf tenderness              negative shara's sign               + pedal pulses                          11.7   14.88 )-----------( 319      ( 01 Dec 2021 07:39 )             38.1       12-    145  |  111<H>  |  15  ----------------------------<  149<H>  3.1<L>   |  24  |  0.64    Ca    8.8      01 Dec 2021 07:39  Phos  2.1     12  Mg     2.3         TPro  7.6  /  Alb  2.7<L>  /  TBili  0.5  /  DBili  x   /  AST  15  /  ALT  22  /  AlkPhos  63  11      PT/INR - ( 01 Dec 2021 09:57 )   PT: 19.3 sec;   INR: 1.71 ratio         PTT - ( 2021 17:30 )  PTT:33.9 sec				    MEDICATIONS  (STANDING):  acetaminophen   IVPB .. 1000 milliGRAM(s) IV Intermittent every 6 hours  atorvastatin 20 milliGRAM(s) Oral at bedtime  dextrose 40% Gel 15 Gram(s) Oral once  dextrose 5%. 1000 milliLiter(s) IV Continuous <Continuous>  dextrose 5%. 1000 milliLiter(s) IV Continuous <Continuous>  dextrose 50% Injectable 25 Gram(s) IV Push once  dextrose 50% Injectable 12.5 Gram(s) IV Push once  dextrose 50% Injectable 25 Gram(s) IV Push once  dronedarone 400 milliGRAM(s) Oral two times a day  DULoxetine 60 milliGRAM(s) Oral daily  enoxaparin Injectable 60 milliGRAM(s) SubCutaneous once  fluticasone propionate 50 MICROgram(s)/spray Nasal Spray 1 Spray(s) Both Nostrils two times a day  glucagon  Injectable 1 milliGRAM(s) IntraMuscular once  insulin glargine Injectable (LANTUS) 10 Unit(s) SubCutaneous at bedtime  insulin lispro (ADMELOG) corrective regimen sliding scale   SubCutaneous three times a day before meals  levothyroxine 25 MICROGram(s) Oral daily  oxybutynin 5 milliGRAM(s) Oral daily  pantoprazole  Injectable 40 milliGRAM(s) IV Push daily  potassium chloride  10 mEq/100 mL IVPB 10 milliEquivalent(s) IV Intermittent every 1 hour  potassium phosphate IVPB 15 milliMole(s) IV Intermittent once  pregabalin 100 milliGRAM(s) Oral three times a day  sodium chloride 0.9%. 1000 milliLiter(s) IV Continuous <Continuous>  warfarin 4 milliGRAM(s) Oral <User Schedule>  warfarin 5 milliGRAM(s) Oral <User Schedule>          DVT Prophylaxis:  LMWH                   (  )  Heparin SQ           (  )  Coumadin             (  )  Xarelto                  (  )  Eliquis                   (  )  Venodynes           (  )  Ambulation          (  )  UFH                       (  )  Contraindicated  (  )  EC Aspirin             (  )           HPI:  72 y/o female with a PMHx of Afib on coumadin,[id number] Score: 6  , CVA, COPD, DM, diverticulitis, DVT, HTN neuropathy presents to the ED BIBA from [facility] c/o abd pain, abd distention and n/v/d x3 days. Pt reports her emesis and BMs are green. No other complaints at this time. Patient reports she had BM in the ED. patient reports she had nausea and vomiting since . Patient reports she has abdominal distension and pain. Patient denies CP, SOB, dizziness, and headache.  (2021 17:24)      Patient is a 71y old  Female who presents with a chief complaint of abdominal pain with n/v and diarrhea.  Pt has a hx of SOB and ventral hernia repair.  NGT  was placed yesterday and now it is out.  Pt having clear liquids and asper surg note pt refused to have NGT  reinserted.      Consulted by Dr. Manjeet Harley for VTE prophylaxis, risk stratification, and anticoagulation management. Asper Surg note they want pt to resume her coumadin today.     PAST MEDICAL & SURGICAL HISTORY:  History of atrial fibrillation on coumadin    HTN (hypertension)    Diabetes mellitus    Cerebrovascular accident (CVA)    DVT of lower limb    CVA (cerebral vascular accident)    COPD (chronic obstructive pulmonary disease)    Neuropathy    Diverticulitis    History of colostomy reversal    History of colostomy reversal    S/P colostomy    S/P     S/P hip replacement        FAMILY HISTORY:      Interval Note:  2021 Pt seen at bedside on [facility].   PT alert an oriented x 3.  Discussed her resuming her coumadin tonight.  States she dose not want to go back to [facility].  She wants to stay here. Pt informed she will need to go to an assisted living once stable. Pt states she has not been able to walk since she had her CVA  ABOUT 1 1/2 YEARS AGO.        IMPROVE VTE Individual Risk Assessment    RISK                                                                Points    [x  ] Previous VTE                                                  3    [  ] Thrombophilia                                               2    [x  ] Lower limb paralysis                                      2        (unable to hold up >15 seconds)      [  ] Current Cancer                                              2         (within 6 months)    [ x ] Immobilization > 24 hrs                                1    [  ] ICU/CCU stay > 24 hours                              1    [ x ] Age > 60                                                      1    IMPROVE VTE Score ___7______    IMPROVE Score 0-1: Low Risk, No VTE prophylaxis required for most patients, encourage ambulation.   IMPROVE Score 2-3: At risk, pharmacologic VTE prophylaxis is indicated for most patients (in the absence of a contraindication)  IMPROVE Score > or = 4: High Risk, pharmacologic VTE prophylaxis is indicated for most patients (in the absence of a contraindication)      [id number] Score: 6    IMPROVE Bleeding Risk Score: 1.5    Falls Risk:   High ( x )  Mod (  )  Low (  )  crcl:  68.7       cr: .64         BMI               Denies any personal or familial history of clotting or bleeding disorders.    Allergies    Cipro (Rash)  Spiriva (Rash)    Intolerances        REVIEW OF SYSTEMS    (  )Fever	     (  )Constipation	(  )SOB				(  )Headache	(  )Dysuria  (  )Chills	     (  )Melena	(  )Dyspnea present on exertion	                    (  )Dizziness                    (  )Polyuria  (  )Nausea	     (  )Hematochezia	(  )Cough			                    (  )Syncope   	(  )Hematuria  (  )Vomiting    (  )Chest Pain	(  )Wheezing			(x  )Weakness  (x)  abdominal pain  (  )Diarrhea     (  )Palpitations	(  )Anorexia			(  )Myalgia    Pertinent positives in HPI and daily subjective.  All other ROS negative.      Vital Signs Last 24 Hrs  T(C): 36.7 (01 Dec 2021 09:10), Max: 37.2 (2021 20:17)  T(F): 98 (01 Dec 2021 09:10), Max: 99 (2021 20:17)  HR: 87 (01 Dec 2021 09:10) (82 - 87)  BP: 178/73 (01 Dec 2021 09:10) (155/62 - 178/73)  BP(mean): 98 (01 Dec 2021 09:10) (98 - 98)  RR: 16 (01 Dec 2021 09:10) (16 - 18)  SpO2: 96% (01 Dec 2021 09:10) (94% - 97%)    PHYSICAL EXAM:    Constitutional: Appears Well    Neurological: A& O x 3    Skin: Warm    Respiratory and Thorax: normal effort; Breath sounds: normal; No rales/wheezing/rhonchi  	  Cardiovascular: S1, S2, regular, NMBR	    Gastrointestinal: BS + x 4Q, + genralitender	    Genitourinary:  Bladder nondistended, nontender    Musculoskeletal:   General Right:   no muscle/joint tenderness,   normal tone, no joint swelling,   ROM: limited	    General Left:   no muscle/joint tenderness,   normal tone, no joint swelling,   ROM: limited      Lower extrems:   Right: no calf tenderness              negative shara's sign               + pedal pulses    Left:   no calf tenderness              negative shara's sign               + pedal pulses                          11.7   14.88 )-----------( 319      ( 01 Dec 2021 07:39 )             38.1       12-    145  |  111<H>  |  15  ----------------------------<  149<H>  3.1<L>   |  24  |  0.64    Ca    8.8      01 Dec 2021 07:39  Phos  2.1     12  Mg     2.3         TPro  7.6  /  Alb  2.7<L>  /  TBili  0.5  /  DBili  x   /  AST  15  /  ALT  22  /  AlkPhos  63  11-      PT/INR - ( 01 Dec 2021 09:57 )   PT: 19.3 sec;   INR: 1.71 ratio         PTT - ( 2021 17:30 )  PTT:33.9 sec				  MEDICATIONS  (STANDING):  acetaminophen   IVPB .. 1000 milliGRAM(s) IV Intermittent every 6 hours  atorvastatin 20 milliGRAM(s) Oral at bedtime  dextrose 40% Gel 15 Gram(s) Oral once  dextrose 5%. 1000 milliLiter(s) IV Continuous <Continuous>  dextrose 5%. 1000 milliLiter(s) IV Continuous <Continuous>  dextrose 50% Injectable 25 Gram(s) IV Push once  dextrose 50% Injectable 12.5 Gram(s) IV Push once  dextrose 50% Injectable 25 Gram(s) IV Push once  dronedarone 400 milliGRAM(s) Oral two times a day  DULoxetine 60 milliGRAM(s) Oral daily  enoxaparin Injectable 40 milliGRAM(s) SubCutaneous once  fluticasone propionate 50 MICROgram(s)/spray Nasal Spray 1 Spray(s) Both Nostrils two times a day  glucagon  Injectable 1 milliGRAM(s) IntraMuscular once  insulin glargine Injectable (LANTUS) 10 Unit(s) SubCutaneous at bedtime  insulin lispro (ADMELOG) corrective regimen sliding scale   SubCutaneous three times a day before meals  levothyroxine 25 MICROGram(s) Oral daily  oxybutynin 5 milliGRAM(s) Oral daily  pantoprazole  Injectable 40 milliGRAM(s) IV Push daily  potassium chloride  10 mEq/100 mL IVPB 10 milliEquivalent(s) IV Intermittent every 1 hour  potassium phosphate IVPB 15 milliMole(s) IV Intermittent once  pregabalin 100 milliGRAM(s) Oral three times a day  sodium chloride 0.9%. 1000 milliLiter(s) IV Continuous <Continuous>  warfarin 4 milliGRAM(s) Oral <User Schedule>  warfarin 5 milliGRAM(s) Oral <User Schedule>            DVT Prophylaxis:  LMWH                   ( x )  Heparin SQ           (  )  Coumadin             ( x )  Xarelto                  (  )  Eliquis                   (  )  Venodynes           ( x )  Ambulation          (  )  UFH                       (  )  Contraindicated  (  )  EC Aspirin             (  )           HPI:  72 y/o female with a PMHx of Afib on coumadin,[id number] Score: 6  , CVA, COPD, DM, diverticulitis, DVT, HTN neuropathy presents to the ED BIBA from [facility] c/o abd pain, abd distention and n/v/d x3 days. Pt reports her emesis and BMs are green. No other complaints at this time. Patient reports she had BM in the ED. patient reports she had nausea and vomiting since . Patient reports she has abdominal distension and pain. Patient denies CP, SOB, dizziness, and headache.  (2021 17:24)      Patient is a 71y old  Female who presents with a chief complaint of abdominal pain with n/v and diarrhea.  Pt has a hx of SOB and ventral hernia repair.  NGT  was placed yesterday and now it is out.  Pt having clear liquids and asper surg note pt refused to have NGT  reinserted.      Consulted by Dr. Manjeet Harley for VTE prophylaxis, risk stratification, and anticoagulation management. Asper Surg note they want pt to resume her coumadin today.     PAST MEDICAL & SURGICAL HISTORY:  History of atrial fibrillation on coumadin    HTN (hypertension)    Diabetes mellitus    Cerebrovascular accident (CVA)    DVT of lower limb    CVA (cerebral vascular accident)    COPD (chronic obstructive pulmonary disease)    Neuropathy    Diverticulitis    History of colostomy reversal    History of colostomy reversal    S/P colostomy    S/P     S/P hip replacement        FAMILY HISTORY:      Interval Note:  2021 Pt seen at bedside on [facility].   PT alert an oriented x 3.  Discussed her resuming her coumadin tonight.  States she dose not want to go back to [facility].  She wants to stay here. Pt informed she will need to go to an assisted living once stable. Pt states she has not been able to walk since she had her CVA  ABOUT 1 1/2 YEARS AGO.        IMPROVE VTE Individual Risk Assessment    RISK                                                                Points    [x  ] Previous VTE                                                  3    [  ] Thrombophilia                                               2    [x  ] Lower limb paralysis                                      2        (unable to hold up >15 seconds)      [  ] Current Cancer                                              2         (within 6 months)    [ x ] Immobilization > 24 hrs                                1    [  ] ICU/CCU stay > 24 hours                              1    [ x ] Age > 60                                                      1    IMPROVE VTE Score ___7______    IMPROVE Score 0-1: Low Risk, No VTE prophylaxis required for most patients, encourage ambulation.   IMPROVE Score 2-3: At risk, pharmacologic VTE prophylaxis is indicated for most patients (in the absence of a contraindication)  IMPROVE Score > or = 4: High Risk, pharmacologic VTE prophylaxis is indicated for most patients (in the absence of a contraindication)      [id number] Score: 6    IMPROVE Bleeding Risk Score: 1.5    Falls Risk:   High ( x )  Mod (  )  Low (  )  crcl:  68.7       cr: .64         BMI               Denies any personal or familial history of clotting or bleeding disorders.    Allergies    Cipro (Rash)  Spiriva (Rash)    Intolerances        REVIEW OF SYSTEMS    (  )Fever	     (  )Constipation	(  )SOB				(  )Headache	(  )Dysuria  (  )Chills	     (  )Melena	(  )Dyspnea present on exertion	                    (  )Dizziness                    (  )Polyuria  (  )Nausea	     (  )Hematochezia	(  )Cough			                    (  )Syncope   	(  )Hematuria  (  )Vomiting    (  )Chest Pain	(  )Wheezing			(x  )Weakness  (x)  abdominal pain  (  )Diarrhea     (  )Palpitations	(  )Anorexia			(  )Myalgia    Pertinent positives in HPI and daily subjective.  All other ROS negative.      Vital Signs Last 24 Hrs  T(C): 36.7 (01 Dec 2021 09:10), Max: 37.2 (2021 20:17)  T(F): 98 (01 Dec 2021 09:10), Max: 99 (2021 20:17)  HR: 87 (01 Dec 2021 09:10) (82 - 87)  BP: 178/73 (01 Dec 2021 09:10) (155/62 - 178/73)  BP(mean): 98 (01 Dec 2021 09:10) (98 - 98)  RR: 16 (01 Dec 2021 09:10) (16 - 18)  SpO2: 96% (01 Dec 2021 09:10) (94% - 97%)    PHYSICAL EXAM:    Constitutional: Appears Well    Neurological: A& O x 3    Skin: Warm    Respiratory and Thorax: normal effort; Breath sounds: normal; No rales/wheezing/rhonchi  	  Cardiovascular: S1, S2, regular, NMBR	    Gastrointestinal: BS + x 4Q, distended abdomen, dressing without drainage, beneath wound unhealed not approximated, but without drainage	    Genitourinary:  Bladder nondistended, nontender    Musculoskeletal:   General Right:   no muscle/joint tenderness,   normal tone, no joint swelling,   ROM: limited	    General Left:   no muscle/joint tenderness,   normal tone, no joint swelling,   ROM: limited      Lower extrems:   Right: no calf tenderness              negative shara's sign               + pedal pulses    Left:   no calf tenderness              negative shara's sign               + pedal pulses                          11.7   14.88 )-----------( 319      ( 01 Dec 2021 07:39 )             38.1       12    145  |  111<H>  |  15  ----------------------------<  149<H>  3.1<L>   |  24  |  0.64    Ca    8.8      01 Dec 2021 07:39  Phos  2.1       Mg     2.3         TPro  7.6  /  Alb  2.7<L>  /  TBili  0.5  /  DBili  x   /  AST  15  /  ALT  22  /  AlkPhos  63  11      PT/INR - ( 01 Dec 2021 09:57 )   PT: 19.3 sec;   INR: 1.71 ratio         PTT - ( 2021 17:30 )  PTT:33.9 sec				  MEDICATIONS  (STANDING):  acetaminophen   IVPB .. 1000 milliGRAM(s) IV Intermittent every 6 hours  atorvastatin 20 milliGRAM(s) Oral at bedtime  dextrose 40% Gel 15 Gram(s) Oral once  dextrose 5%. 1000 milliLiter(s) IV Continuous <Continuous>  dextrose 5%. 1000 milliLiter(s) IV Continuous <Continuous>  dextrose 50% Injectable 25 Gram(s) IV Push once  dextrose 50% Injectable 12.5 Gram(s) IV Push once  dextrose 50% Injectable 25 Gram(s) IV Push once  dronedarone 400 milliGRAM(s) Oral two times a day  DULoxetine 60 milliGRAM(s) Oral daily  enoxaparin Injectable 40 milliGRAM(s) SubCutaneous once  fluticasone propionate 50 MICROgram(s)/spray Nasal Spray 1 Spray(s) Both Nostrils two times a day  glucagon  Injectable 1 milliGRAM(s) IntraMuscular once  insulin glargine Injectable (LANTUS) 10 Unit(s) SubCutaneous at bedtime  insulin lispro (ADMELOG) corrective regimen sliding scale   SubCutaneous three times a day before meals  levothyroxine 25 MICROGram(s) Oral daily  oxybutynin 5 milliGRAM(s) Oral daily  pantoprazole  Injectable 40 milliGRAM(s) IV Push daily  potassium chloride  10 mEq/100 mL IVPB 10 milliEquivalent(s) IV Intermittent every 1 hour  potassium phosphate IVPB 15 milliMole(s) IV Intermittent once  pregabalin 100 milliGRAM(s) Oral three times a day  sodium chloride 0.9%. 1000 milliLiter(s) IV Continuous <Continuous>  warfarin 4 milliGRAM(s) Oral <User Schedule>  warfarin 5 milliGRAM(s) Oral <User Schedule>            DVT Prophylaxis:  LMWH                   ( x )  Heparin SQ           (  )  Coumadin             ( x )  Xarelto                  (  )  Eliquis                   (  )  Venodynes           ( x )  Ambulation          (  )  UFH                       (  )  Contraindicated  (  )  EC Aspirin             (  )

## 2021-12-01 NOTE — PHYSICAL THERAPY INITIAL EVALUATION ADULT - MANUAL MUSCLE TESTING RESULTS, REHAB EVAL
dense L spastic hemiplegia with adaptive muscle shortening LUE flexor mm groups including elbow/wrist/hand ; B ankle PFs (severe)

## 2021-12-01 NOTE — PROGRESS NOTE ADULT - ASSESSMENT
8 yo F presents with nausea and vomiting with hx of ventral hernia and SBO  NG tube removed, bowel movement overnight     Plan:   monitor vitals   pain control   nausea control   CLD today  monitor BF  Serial abd exam  IVF hydration   med rec  resume AC warfarin and therapeutic Lovenox  Home o2 continue NC   Encourage IS/OOB/ambulation    Plan discussed with surgery team and attending, Dr. Harley

## 2021-12-01 NOTE — CONSULT NOTE ADULT - ASSESSMENT
This is a Afib on coumadin,[id number] Score: 6  , CVA, COPD, DM, diverticulitis, DVT, HTN neuropathy presents to the ED BIBA from [facility] c/o abd pain, abd distention and n/v/d x3 days.  Pt admitted to [facility] for SBO and NGT  inserted.  NGT out today and pt is on clear liquid.  Pt has high thrombosis risk and requires treatment dose of anticoagulation.      12-1-2021 Spoke with Dr Berrios (surgery) and he states cleared to resume her lovenox over lapping with coumadin.    plan:  start coumadin TO PT'S Normal dose 4mg Mon, Wed and Friday, 5mg all the other days.   Increase lovenox to 100mg sq q12h,  pt's wt is 103kgs, treatment dose  :daily cbc/pt/inr  JANUSZ bentley  :thanks for consult will f/u

## 2021-12-01 NOTE — PHYSICAL THERAPY INITIAL EVALUATION ADULT - GENERAL OBSERVATIONS, REHAB EVAL
recumbent in bed, awake. alert but c/o feeling loopy when sister phoned; B plantiflexion contracture deformities ,LUE with flexion deformities L elbow/wrist/hand ,contracted digits

## 2021-12-01 NOTE — PROGRESS NOTE ADULT - SUBJECTIVE AND OBJECTIVE BOX
SURGERY DAILY PROGRESS NOTE:     Subjective:  Patient seen and examined this AM at bedside. Pt removed her NGT overnight and refused to have it replaced.  Patient is resting comfortably.  Had BMs overnight and passing flatus. Denies fever/chills, shortness of breath, chest pain. VS reviewed    Objective:  MEDICATIONS  (STANDING):  acetaminophen   IVPB .. 1000 milliGRAM(s) IV Intermittent every 6 hours  dextrose 40% Gel 15 Gram(s) Oral once  dextrose 5%. 1000 milliLiter(s) (50 mL/Hr) IV Continuous <Continuous>  dextrose 5%. 1000 milliLiter(s) (100 mL/Hr) IV Continuous <Continuous>  dextrose 50% Injectable 25 Gram(s) IV Push once  dextrose 50% Injectable 12.5 Gram(s) IV Push once  dextrose 50% Injectable 25 Gram(s) IV Push once  dronedarone 400 milliGRAM(s) Oral two times a day  enoxaparin Injectable 40 milliGRAM(s) SubCutaneous daily  fluticasone propionate 50 MICROgram(s)/spray Nasal Spray 1 Spray(s) Both Nostrils two times a day  furosemide   Injectable 20 milliGRAM(s) IV Push daily  glucagon  Injectable 1 milliGRAM(s) IntraMuscular once  insulin glargine Injectable (LANTUS) 10 Unit(s) SubCutaneous at bedtime  insulin lispro (ADMELOG) corrective regimen sliding scale   SubCutaneous three times a day before meals  pantoprazole  Injectable 40 milliGRAM(s) IV Push daily  sodium chloride 0.9%. 1000 milliLiter(s) (80 mL/Hr) IV Continuous <Continuous>    MEDICATIONS  (PRN):  ALBUTerol    90 MICROgram(s) HFA Inhaler 2 Puff(s) Inhalation every 6 hours PRN Bronchospasm  benzocaine 15 mG/menthol 3.6 mG (Sugar-Free) Lozenge 1 Lozenge Oral every 3 hours PRN Sore Throat  morphine  - Injectable 2 milliGRAM(s) IV Push every 4 hours PRN Severe Pain (7 - 10)  ondansetron Injectable 4 milliGRAM(s) IV Push every 6 hours PRN Nausea and/or Vomiting    Vital Signs Last 24 Hrs  T(C): 37.1 (01 Dec 2021 00:38), Max: 37.2 (30 Nov 2021 20:17)  T(F): 98.7 (01 Dec 2021 00:38), Max: 99 (30 Nov 2021 20:17)  HR: 82 (01 Dec 2021 02:19) (82 - 88)  BP: 159/59 (01 Dec 2021 02:19) (152/64 - 159/59)  RR: 18 (01 Dec 2021 00:38) (18 - 18)  SpO2: 94% (01 Dec 2021 00:38) (94% - 98%)    PHYSICAL EXAM   Gen: NAD, cooperative   HEENT: supple, no JVD, EOMI  Lungs: CTA b/l, aerating well   CV: S1 and S2 present, tachycardic   Abd: BS present, Distended, nontender, abdomina wound dressing in place . NG tube in place  Ext: weakness of the RUE and RLE, no cynosis, No edema   Skin: warm, dry    I&O's Detail    30 Nov 2021 07:01  -  01 Dec 2021 07:00  --------------------------------------------------------  IN:  Total IN: 0 mL    OUT:    Nasogastric/Oral tube (mL): 20 mL  Total OUT: 20 mL    Total NET: -20 mL      LABS:                        11.7   14.88 )-----------( 319      ( 01 Dec 2021 07:39 )             38.1     01 Dec 2021 07:39    145    |  111    |  15     ----------------------------<  149    3.1     |  24     |  0.64     Ca    8.8        01 Dec 2021 07:39  Phos  2.1       01 Dec 2021 07:39  Mg     2.3       01 Dec 2021 07:39      PT/INR - ( 29 Nov 2021 17:30 )   PT: 24.6 sec;   INR: 2.20 ratio         PTT - ( 29 Nov 2021 17:30 )  PTT:33.9 sec

## 2021-12-01 NOTE — CONSULT NOTE ADULT - SUBJECTIVE AND OBJECTIVE BOX
PCP:     CHIEF COMPLAINT: abdominal pain    HISTORY OF THE PRESENT ILLNESS: this is a 70 yo female who resides a [facility], with a pmh afib CV#6 on coumadin long term, CVA in 2018, COPD, Type 2 DM, neuropathy,  diverticulitis, dvt found in 2018 at time of CVA, HTN who was BIBA from [facility] with cc of abd pain/distention and N/V/D since friday 11/26.  Per pt her emesis and BM's were green. Denies f/c/r, denies CP/SOB/dizziness/Headaches.  Seen By Surgery.  Imaging + for SBO, NGT was placed to LWS, however pt pulled out her NGT and refused to have it replaced.  Pt is seen at Bedside, awake alert, answering questions appropriately, génesis clear liquid diet, no further N/V, + BM overnight.  we are consulted for medical management    PAST MEDICAL HISTORY: as above    PAST SURGICAL HISTORY: colostomy and then reversal, C/S, THR    FAMILY HISTORY: Dad: dec age 92: MI,  mother: dec: age 91:  COPD    SOCIAL HISTORY: former smoker quit 5 years ago,  no alcohol, no drugs    ALLERGIES:  Cipro, spiriva----> rash    HOME MEDS: see med rec    REVIEW OF SYSTEMS:   All 10 systems reviewed in detailed and found to be negative with the exception of what has already been described above    MEDICATIONS  (STANDING):  acetaminophen   IVPB .. 1000 milliGRAM(s) IV Intermittent every 6 hours  atorvastatin 20 milliGRAM(s) Oral at bedtime  dextrose 40% Gel 15 Gram(s) Oral once  dextrose 5%. 1000 milliLiter(s) (50 mL/Hr) IV Continuous <Continuous>  dextrose 5%. 1000 milliLiter(s) (100 mL/Hr) IV Continuous <Continuous>  dextrose 50% Injectable 25 Gram(s) IV Push once  dextrose 50% Injectable 12.5 Gram(s) IV Push once  dextrose 50% Injectable 25 Gram(s) IV Push once  dronedarone 400 milliGRAM(s) Oral two times a day  DULoxetine 60 milliGRAM(s) Oral daily  fluticasone propionate 50 MICROgram(s)/spray Nasal Spray 1 Spray(s) Both Nostrils two times a day  glucagon  Injectable 1 milliGRAM(s) IntraMuscular once  insulin glargine Injectable (LANTUS) 10 Unit(s) SubCutaneous at bedtime  insulin lispro (ADMELOG) corrective regimen sliding scale   SubCutaneous three times a day before meals  levothyroxine 25 MICROGram(s) Oral daily  oxybutynin 5 milliGRAM(s) Oral daily  pantoprazole  Injectable 40 milliGRAM(s) IV Push daily  potassium chloride  10 mEq/100 mL IVPB 10 milliEquivalent(s) IV Intermittent every 1 hour  potassium phosphate IVPB 15 milliMole(s) IV Intermittent once  pregabalin 100 milliGRAM(s) Oral three times a day  sodium chloride 0.9%. 1000 milliLiter(s) (80 mL/Hr) IV Continuous <Continuous>  warfarin 4 milliGRAM(s) Oral <User Schedule>  warfarin 5 milliGRAM(s) Oral <User Schedule>    MEDICATIONS  (PRN):  ALBUTerol    90 MICROgram(s) HFA Inhaler 2 Puff(s) Inhalation every 6 hours PRN Bronchospasm  benzocaine 15 mG/menthol 3.6 mG (Sugar-Free) Lozenge 1 Lozenge Oral every 3 hours PRN Sore Throat  methocarbamol 750 milliGRAM(s) Oral every 6 hours PRN Muscle Spasm  morphine  - Injectable 2 milliGRAM(s) IV Push every 4 hours PRN Severe Pain (7 - 10)  ondansetron Injectable 4 milliGRAM(s) IV Push every 6 hours PRN Nausea and/or Vomiting      VITALS:  T(F): 98 (12-01-21 @ 09:10), Max: 99 (11-30-21 @ 20:17)  HR: 87 (12-01-21 @ 09:10) (82 - 87)  BP: 178/73 (12-01-21 @ 09:10) (155/62 - 178/73)  RR: 16 (12-01-21 @ 09:10) (16 - 18)  SpO2: 96% (12-01-21 @ 09:10) (94% - 97%)  Wt(kg): --    I&O's Summary    30 Nov 2021 07:01  -  01 Dec 2021 07:00  --------------------------------------------------------  IN: 0 mL / OUT: 20 mL / NET: -20 mL        CAPILLARY BLOOD GLUCOSE      POCT Blood Glucose.: 203 mg/dL (01 Dec 2021 12:26)  POCT Blood Glucose.: 131 mg/dL (01 Dec 2021 05:17)  POCT Blood Glucose.: 94 mg/dL (30 Nov 2021 21:47)  POCT Blood Glucose.: 100 mg/dL (30 Nov 2021 18:20)    PHYSICAL EXAM:    GENERAL: Comfortable, no acute distress   HEAD:  Normocephalic, atraumatic  EYES: EOMI, PERRLA  HEENT: Moist mucous membranes  NECK: Supple, No JVD  NERVOUS SYSTEM:  Alert & Oriented X3, Motor Strength 5/5 B/L upper and lower extremities  CHEST/LUNG: Clear to auscultation bilaterally  HEART: Regular rate and rhythm  ABDOMEN: Soft, obese, distended, Bowel sounds present, pt noted with LLQ chronic appearing wound, no drainage  GENITOURINARY: Voiding, no palpable bladder  EXTREMITIES:   No clubbing, cyanosis, or edema  MUSCULOSKELETAL- No muscle tenderness, no joint tenderness  SKIN-no rash      LABS:                        11.7   14.88 )-----------( 319      ( 01 Dec 2021 07:39 )             38.1     12-01    145  |  111<H>  |  15  ----------------------------<  149<H>  3.1<L>   |  24  |  0.64    Ca    8.8      01 Dec 2021 07:39  Phos  2.1     12-01  Mg     2.3     12-01          PT/INR - ( 01 Dec 2021 09:57 )   PT: 19.3 sec;   INR: 1.71 ratio         PTT - ( 29 Nov 2021 17:30 )  PTT:33.9 sec          RADIOLOGY STUDIES:    < from: Xray Small Bowel Series (12.01.21 @ 09:08) >    EXAM:  XR SM INTEST SNGL CON STDY                            PROCEDURE DATE:  12/01/2021          INTERPRETATION:  XR SMALL BOWEL ONLY    HISTORY:  Follow-up small bowel obstruction    VIEWS: Serial Supine views of the abdomen are obtained before and after oral contrast administration through a pre-existing NG tube    COMPARISON:  CT abdomen pelvis 11/29/2021     image demonstrates bilateral hip arthroplasty and NG tube in the stomach. No dilated bowel loops.    2 hour film shows contrast in the stomach but not in the small bowel.    4 hour film shows continued presence of contrast in the stomach without distal passage into small bowel.    16 hours postcontrast administration, oral contrast seen to transit all the way to the rectum with still persistent contrast in the stomach is well. No dilated bowel loops.    IMPRESSION:    Markedly delayed gastric emptying greater than 4 hours.    Contrast passes into the rectum at 16 hours without visualization of dilated bowel loops.    Persistent contrast retention in the stomach at 16 hours as well. Correlate for gastroparesis.    < end of copied text >  < from: Xray Chest 1 View-PORTABLE IMMEDIATE (Xray Chest 1 View-PORTABLE IMMEDIATE .) (11.29.21 @ 19:43) >    EXAM:  XR CHEST PORTABLE IMMED 1V                          EXAM:  XR CHEST PORTABLE ROUTINE 1V                            PROCEDURE DATE:  11/29/2021          INTERPRETATION:  Portable chest radiograph    CLINICAL INFORMATION: Abdominal pain. Admission chest    TECHNIQUE:  Portable  AP view of the chest.    COMPARISON: No previous examinations are available for review.    FINDINGS:  RIGHT basilar multifocal  airspace opacities/infiltrates. Remaining visualized lung parenchyma clear.  No pleuraleffusion..   No pneumothorax.    The heart and mediastinum size and configuration are within normal limits.    Visualized osseous structures are intact.    IMPRESSION:   Bibasilar multifocal airspace opacities/infiltrates..      < from: CT Abdomen and Pelvis w/ IV Cont (11.29.21 @ 14:47) >  EXAM:  CT ABDOMEN AND PELVIS IC                            PROCEDURE DATE:  11/29/2021          INTERPRETATION:  CLINICAL INFORMATION: Abdominal pain. Question small bowel obstruction.    COMPARISON: CT 8/11/2021.    CONTRAST/COMPLICATIONS:  IV Contrast: Omnipaque 350  90 cc administered   10 cc discarded  Oral Contrast: NONE  Complications: None reported at time of study completion    PROCEDURE:  CT of the Abdomen and Pelvis was performed.  Sagittal and coronal reformats were performed.    FINDINGS:  LOWER CHEST: Coronary artery calcifications. Numerous ill-defined right middle and bilateral lower lobe nodular opacities, question infection.    LIVER: Within normal limits.  BILE DUCTS: Normal caliber.  GALLBLADDER: Within normal limits.  SPLEEN: Within normal limits.  PANCREAS: Fatty changes.  ADRENALS: Within normal limits.  KIDNEYS/URETERS: 1 cm left lower pole renal calcification with overlying cortical scar. The right kidney is within normal limits. No hydronephrosis.    BLADDER: Limited visualization due to streak artifact, grossly unremarkable.  REPRODUCTIVE ORGANS: Uterus and adnexa within normal limits.    BOWEL: Dilated small bowel loops noted with decompressed distal/right lower quadrant loops, in keeping with small bowel obstruction. Transition point is noted in the pelvis (2, 83) just distal to the small bowel anastomosis, however mildly prominent, air-filled loops are noted distal to this point, with gradual decrease in caliber within the hernia. No bowel wall thickening, pneumatosis or pneumoperitoneum. Rectosigmoid, right colonic and small bowel anastomoses again noted from prior resection. Scattered colonic diverticula. Appendix is not visualized. No evidence of inflammation in the pericecal region.  PERITONEUM: No ascites or pneumoperitoneum.  VESSELS: Atherosclerotic changes.  RETROPERITONEUM/LYMPH NODES: No lymphadenopathy.  ABDOMINAL WALL: Large wide mouth ventral hernia containing small and large bowel. A separate upper abdominal hernia contains partof the distal stomach.  BONES: Bilateral hip prostheses create streak artifact that limits visualization of the pelvis. Degenerative changes.    IMPRESSION:  Small bowel obstruction, with a mild transition point just distal to the small bowel anastomosis in the anterior pelvis. Mildly prominent air-filled loops are noted distal to this point within a large, widemouth ventral hernia, with gradual transition to decompressed distal small bowel. No bowel wall thickening, pneumatosis, pneumoperitoneumor ascites.    Numerous ill-defined bibasilar lung opacities, suspicious for infection, including atypical viral etiologies such as Covid 19 pneumonia. Consider dedicated chest CT for full evaluation, as clinically indicated.        IMPRESSION:  70 yo female with above pmh a/w:  #SBO  admit to surgery  génesis clears presently  monitor cbc/bmp  pain control    # H/o afib CV#6/DVT/h/o CVA  on coumadin/Multaq  AC services consult appreciated    #DM/neuropathy  BGM/ss  Lantus q hs  cont Lyrica    #HLD  cont statin    #H/o HTN  does not appear to be on anti-htns'    #COPD  cont inhalers             PCP:     CHIEF COMPLAINT: abdominal pain    HISTORY OF THE PRESENT ILLNESS: this is a 72 yo female who resides a [facility], with a pmh afib CV#6 on coumadin long term, CVA in 2018 with left sided weakness, Morbid obesity, peripheral edema, COPD, Type 2 DM, neuropathy,  diverticulitis, dvt found in 2018 at time of CVA, HTN who was BIBA from [facility] with cc of abd pain/distention and N/V/D since friday 11/26.  Per pt her emesis and BM's were green. Denies f/c/r, denies CP/SOB/dizziness/Headaches.  Seen By Surgery.  Imaging + for SBO, NGT was placed to LWS, however pt pulled out her NGT and refused to have it replaced.  Pt is seen at Bedside, awake alert, answering questions appropriately, génesis clear liquid diet, no further N/V, + BM overnight.  we are consulted for medical management    PAST MEDICAL HISTORY: as above    PAST SURGICAL HISTORY: colostomy and then reversal, C/S, THR    FAMILY HISTORY: Dad: dec age 92: MI,  mother: dec: age 91:  COPD    SOCIAL HISTORY: former smoker quit 5 years ago,  no alcohol, no drugs    ALLERGIES:  Cipro, spiriva----> rash    HOME MEDS: see med rec    REVIEW OF SYSTEMS:   All 10 systems reviewed in detailed and found to be negative with the exception of what has already been described above    MEDICATIONS  (STANDING):  acetaminophen   IVPB .. 1000 milliGRAM(s) IV Intermittent every 6 hours  atorvastatin 20 milliGRAM(s) Oral at bedtime  dextrose 40% Gel 15 Gram(s) Oral once  dextrose 5%. 1000 milliLiter(s) (50 mL/Hr) IV Continuous <Continuous>  dextrose 5%. 1000 milliLiter(s) (100 mL/Hr) IV Continuous <Continuous>  dextrose 50% Injectable 25 Gram(s) IV Push once  dextrose 50% Injectable 12.5 Gram(s) IV Push once  dextrose 50% Injectable 25 Gram(s) IV Push once  dronedarone 400 milliGRAM(s) Oral two times a day  DULoxetine 60 milliGRAM(s) Oral daily  fluticasone propionate 50 MICROgram(s)/spray Nasal Spray 1 Spray(s) Both Nostrils two times a day  glucagon  Injectable 1 milliGRAM(s) IntraMuscular once  insulin glargine Injectable (LANTUS) 10 Unit(s) SubCutaneous at bedtime  insulin lispro (ADMELOG) corrective regimen sliding scale   SubCutaneous three times a day before meals  levothyroxine 25 MICROGram(s) Oral daily  oxybutynin 5 milliGRAM(s) Oral daily  pantoprazole  Injectable 40 milliGRAM(s) IV Push daily  potassium chloride  10 mEq/100 mL IVPB 10 milliEquivalent(s) IV Intermittent every 1 hour  potassium phosphate IVPB 15 milliMole(s) IV Intermittent once  pregabalin 100 milliGRAM(s) Oral three times a day  sodium chloride 0.9%. 1000 milliLiter(s) (80 mL/Hr) IV Continuous <Continuous>  warfarin 4 milliGRAM(s) Oral <User Schedule>  warfarin 5 milliGRAM(s) Oral <User Schedule>    MEDICATIONS  (PRN):  ALBUTerol    90 MICROgram(s) HFA Inhaler 2 Puff(s) Inhalation every 6 hours PRN Bronchospasm  benzocaine 15 mG/menthol 3.6 mG (Sugar-Free) Lozenge 1 Lozenge Oral every 3 hours PRN Sore Throat  methocarbamol 750 milliGRAM(s) Oral every 6 hours PRN Muscle Spasm  morphine  - Injectable 2 milliGRAM(s) IV Push every 4 hours PRN Severe Pain (7 - 10)  ondansetron Injectable 4 milliGRAM(s) IV Push every 6 hours PRN Nausea and/or Vomiting      VITALS:  T(F): 98 (12-01-21 @ 09:10), Max: 99 (11-30-21 @ 20:17)  HR: 87 (12-01-21 @ 09:10) (82 - 87)  BP: 178/73 (12-01-21 @ 09:10) (155/62 - 178/73)  RR: 16 (12-01-21 @ 09:10) (16 - 18)  SpO2: 96% (12-01-21 @ 09:10) (94% - 97%)  Wt(kg): --    I&O's Summary    30 Nov 2021 07:01  -  01 Dec 2021 07:00  --------------------------------------------------------  IN: 0 mL / OUT: 20 mL / NET: -20 mL        CAPILLARY BLOOD GLUCOSE      POCT Blood Glucose.: 203 mg/dL (01 Dec 2021 12:26)  POCT Blood Glucose.: 131 mg/dL (01 Dec 2021 05:17)  POCT Blood Glucose.: 94 mg/dL (30 Nov 2021 21:47)  POCT Blood Glucose.: 100 mg/dL (30 Nov 2021 18:20)    PHYSICAL EXAM:    GENERAL: Comfortable, no acute distress   HEAD:  Normocephalic, atraumatic  EYES: EOMI, PERRLA  HEENT: Moist mucous membranes  NECK: Supple, No JVD  NERVOUS SYSTEM:  Alert & Oriented X3, Motor Strength 5/5 B/L upper and lower extremities  CHEST/LUNG: Clear to auscultation bilaterally  HEART: Regular rate and rhythm  ABDOMEN: Soft, obese, distended, Bowel sounds present, pt noted with LLQ chronic appearing wound, no drainage  GENITOURINARY: Voiding, no palpable bladder  EXTREMITIES:   No clubbing, cyanosis, or edema  MUSCULOSKELETAL- No muscle tenderness, no joint tenderness  SKIN-no rash      LABS:                        11.7   14.88 )-----------( 319      ( 01 Dec 2021 07:39 )             38.1     12-01    145  |  111<H>  |  15  ----------------------------<  149<H>  3.1<L>   |  24  |  0.64    Ca    8.8      01 Dec 2021 07:39  Phos  2.1     12-01  Mg     2.3     12-01          PT/INR - ( 01 Dec 2021 09:57 )   PT: 19.3 sec;   INR: 1.71 ratio         PTT - ( 29 Nov 2021 17:30 )  PTT:33.9 sec          RADIOLOGY STUDIES:    < from: Xray Small Bowel Series (12.01.21 @ 09:08) >    EXAM:  XR SM INTEST SNGL CON STDY                            PROCEDURE DATE:  12/01/2021          INTERPRETATION:  XR SMALL BOWEL ONLY    HISTORY:  Follow-up small bowel obstruction    VIEWS: Serial Supine views of the abdomen are obtained before and after oral contrast administration through a pre-existing NG tube    COMPARISON:  CT abdomen pelvis 11/29/2021     image demonstrates bilateral hip arthroplasty and NG tube in the stomach. No dilated bowel loops.    2 hour film shows contrast in the stomach but not in the small bowel.    4 hour film shows continued presence of contrast in the stomach without distal passage into small bowel.    16 hours postcontrast administration, oral contrast seen to transit all the way to the rectum with still persistent contrast in the stomach is well. No dilated bowel loops.    IMPRESSION:    Markedly delayed gastric emptying greater than 4 hours.    Contrast passes into the rectum at 16 hours without visualization of dilated bowel loops.    Persistent contrast retention in the stomach at 16 hours as well. Correlate for gastroparesis.    < end of copied text >  < from: Xray Chest 1 View-PORTABLE IMMEDIATE (Xray Chest 1 View-PORTABLE IMMEDIATE .) (11.29.21 @ 19:43) >    EXAM:  XR CHEST PORTABLE IMMED 1V                          EXAM:  XR CHEST PORTABLE ROUTINE 1V                            PROCEDURE DATE:  11/29/2021          INTERPRETATION:  Portable chest radiograph    CLINICAL INFORMATION: Abdominal pain. Admission chest    TECHNIQUE:  Portable  AP view of the chest.    COMPARISON: No previous examinations are available for review.    FINDINGS:  RIGHT basilar multifocal  airspace opacities/infiltrates. Remaining visualized lung parenchyma clear.  No pleuraleffusion..   No pneumothorax.    The heart and mediastinum size and configuration are within normal limits.    Visualized osseous structures are intact.    IMPRESSION:   Bibasilar multifocal airspace opacities/infiltrates..      < from: CT Abdomen and Pelvis w/ IV Cont (11.29.21 @ 14:47) >  EXAM:  CT ABDOMEN AND PELVIS IC                            PROCEDURE DATE:  11/29/2021          INTERPRETATION:  CLINICAL INFORMATION: Abdominal pain. Question small bowel obstruction.    COMPARISON: CT 8/11/2021.    CONTRAST/COMPLICATIONS:  IV Contrast: Omnipaque 350  90 cc administered   10 cc discarded  Oral Contrast: NONE  Complications: None reported at time of study completion    PROCEDURE:  CT of the Abdomen and Pelvis was performed.  Sagittal and coronal reformats were performed.    FINDINGS:  LOWER CHEST: Coronary artery calcifications. Numerous ill-defined right middle and bilateral lower lobe nodular opacities, question infection.    LIVER: Within normal limits.  BILE DUCTS: Normal caliber.  GALLBLADDER: Within normal limits.  SPLEEN: Within normal limits.  PANCREAS: Fatty changes.  ADRENALS: Within normal limits.  KIDNEYS/URETERS: 1 cm left lower pole renal calcification with overlying cortical scar. The right kidney is within normal limits. No hydronephrosis.    BLADDER: Limited visualization due to streak artifact, grossly unremarkable.  REPRODUCTIVE ORGANS: Uterus and adnexa within normal limits.    BOWEL: Dilated small bowel loops noted with decompressed distal/right lower quadrant loops, in keeping with small bowel obstruction. Transition point is noted in the pelvis (2, 83) just distal to the small bowel anastomosis, however mildly prominent, air-filled loops are noted distal to this point, with gradual decrease in caliber within the hernia. No bowel wall thickening, pneumatosis or pneumoperitoneum. Rectosigmoid, right colonic and small bowel anastomoses again noted from prior resection. Scattered colonic diverticula. Appendix is not visualized. No evidence of inflammation in the pericecal region.  PERITONEUM: No ascites or pneumoperitoneum.  VESSELS: Atherosclerotic changes.  RETROPERITONEUM/LYMPH NODES: No lymphadenopathy.  ABDOMINAL WALL: Large wide mouth ventral hernia containing small and large bowel. A separate upper abdominal hernia contains partof the distal stomach.  BONES: Bilateral hip prostheses create streak artifact that limits visualization of the pelvis. Degenerative changes.    IMPRESSION:  Small bowel obstruction, with a mild transition point just distal to the small bowel anastomosis in the anterior pelvis. Mildly prominent air-filled loops are noted distal to this point within a large, widemouth ventral hernia, with gradual transition to decompressed distal small bowel. No bowel wall thickening, pneumatosis, pneumoperitoneumor ascites.    Numerous ill-defined bibasilar lung opacities, suspicious for infection, including atypical viral etiologies such as Covid 19 pneumonia. Consider dedicated chest CT for full evaluation, as clinically indicated.        IMPRESSION:  72 yo female with above pmh a/w:  #SBO  admit to surgery  génesis clears presently  monitor cbc/bmp  pain control        # H/o afib CV#6/DVT/h/o CVA with left sided weakness  on coumadin/Multaq  AC services consult appreciated  PT eval        #DM/neuropathy  BGM/ss  Lantus q hs  cont Lyrica    #HLD  cont statin    #H/o HTN  does not appear to be on anti-htn's  will start Losartan 25 mg po daily  hold lasix for now     #COPD  cont inhalers    #chronic abd wound  wound care per surgery      #vte prophylaxis    on coumadin   venodynes  mobilize    thank you for the consult, will follow with you           PCP:     CHIEF COMPLAINT: abdominal pain    HISTORY OF THE PRESENT ILLNESS: this is a 70 yo female who resides a [facility], with a pmh afib CV#6 on coumadin long term, CVA in 2018 with left sided weakness, Morbid obesity, peripheral edema, COPD, Type 2 DM, neuropathy,  diverticulitis, dvt found in 2018 at time of CVA, HTN who was BIBA from [facility] with cc of abd pain/distention and N/V/D since friday 11/26.  Per pt her emesis and BM's were green. Denies f/c/r, denies CP/SOB/dizziness/Headaches.  Seen By Surgery.  Imaging + for SBO, NGT was placed to LWS, however pt pulled out her NGT and refused to have it replaced.  Pt is seen at Bedside, awake alert, answering questions appropriately, génesis clear liquid diet, no further N/V, + BM overnight.  we are consulted for medical management    PAST MEDICAL HISTORY: as above    PAST SURGICAL HISTORY: colostomy and then reversal, C/S, THR    FAMILY HISTORY: Dad: dec age 92: MI,  mother: dec: age 91:  COPD    SOCIAL HISTORY: former smoker quit 5 years ago,  no alcohol, no drugs    ALLERGIES:  Cipro, spiriva----> rash    HOME MEDS: see med rec    REVIEW OF SYSTEMS:   All 10 systems reviewed in detailed and found to be negative with the exception of what has already been described above    MEDICATIONS  (STANDING):  acetaminophen   IVPB .. 1000 milliGRAM(s) IV Intermittent every 6 hours  atorvastatin 20 milliGRAM(s) Oral at bedtime  dextrose 40% Gel 15 Gram(s) Oral once  dextrose 5%. 1000 milliLiter(s) (50 mL/Hr) IV Continuous <Continuous>  dextrose 5%. 1000 milliLiter(s) (100 mL/Hr) IV Continuous <Continuous>  dextrose 50% Injectable 25 Gram(s) IV Push once  dextrose 50% Injectable 12.5 Gram(s) IV Push once  dextrose 50% Injectable 25 Gram(s) IV Push once  dronedarone 400 milliGRAM(s) Oral two times a day  DULoxetine 60 milliGRAM(s) Oral daily  fluticasone propionate 50 MICROgram(s)/spray Nasal Spray 1 Spray(s) Both Nostrils two times a day  glucagon  Injectable 1 milliGRAM(s) IntraMuscular once  insulin glargine Injectable (LANTUS) 10 Unit(s) SubCutaneous at bedtime  insulin lispro (ADMELOG) corrective regimen sliding scale   SubCutaneous three times a day before meals  levothyroxine 25 MICROGram(s) Oral daily  oxybutynin 5 milliGRAM(s) Oral daily  pantoprazole  Injectable 40 milliGRAM(s) IV Push daily  potassium chloride  10 mEq/100 mL IVPB 10 milliEquivalent(s) IV Intermittent every 1 hour  potassium phosphate IVPB 15 milliMole(s) IV Intermittent once  pregabalin 100 milliGRAM(s) Oral three times a day  sodium chloride 0.9%. 1000 milliLiter(s) (80 mL/Hr) IV Continuous <Continuous>  warfarin 4 milliGRAM(s) Oral <User Schedule>  warfarin 5 milliGRAM(s) Oral <User Schedule>    MEDICATIONS  (PRN):  ALBUTerol    90 MICROgram(s) HFA Inhaler 2 Puff(s) Inhalation every 6 hours PRN Bronchospasm  benzocaine 15 mG/menthol 3.6 mG (Sugar-Free) Lozenge 1 Lozenge Oral every 3 hours PRN Sore Throat  methocarbamol 750 milliGRAM(s) Oral every 6 hours PRN Muscle Spasm  morphine  - Injectable 2 milliGRAM(s) IV Push every 4 hours PRN Severe Pain (7 - 10)  ondansetron Injectable 4 milliGRAM(s) IV Push every 6 hours PRN Nausea and/or Vomiting      VITALS:  T(F): 98 (12-01-21 @ 09:10), Max: 99 (11-30-21 @ 20:17)  HR: 87 (12-01-21 @ 09:10) (82 - 87)  BP: 178/73 (12-01-21 @ 09:10) (155/62 - 178/73)  RR: 16 (12-01-21 @ 09:10) (16 - 18)  SpO2: 96% (12-01-21 @ 09:10) (94% - 97%)  Wt(kg): --    I&O's Summary    30 Nov 2021 07:01  -  01 Dec 2021 07:00  --------------------------------------------------------  IN: 0 mL / OUT: 20 mL / NET: -20 mL        CAPILLARY BLOOD GLUCOSE      POCT Blood Glucose.: 203 mg/dL (01 Dec 2021 12:26)  POCT Blood Glucose.: 131 mg/dL (01 Dec 2021 05:17)  POCT Blood Glucose.: 94 mg/dL (30 Nov 2021 21:47)  POCT Blood Glucose.: 100 mg/dL (30 Nov 2021 18:20)    PHYSICAL EXAM:    GENERAL: Comfortable, no acute distress   HEAD:  Normocephalic, atraumatic  EYES: EOMI, PERRLA  HEENT: Moist mucous membranes  NECK: Supple, No JVD  NERVOUS SYSTEM:  Alert & Oriented X3, Motor Strength 5/5 B/L upper and lower extremities  CHEST/LUNG: Clear to auscultation bilaterally  HEART: Regular rate and rhythm  ABDOMEN: Soft, obese, distended, Bowel sounds present, pt noted with LLQ chronic appearing wound, no drainage  GENITOURINARY: Voiding, no palpable bladder  EXTREMITIES:   No clubbing, cyanosis, or edema  MUSCULOSKELETAL- No muscle tenderness, no joint tenderness  SKIN-no rash      LABS:                        11.7   14.88 )-----------( 319      ( 01 Dec 2021 07:39 )             38.1     12-01    145  |  111<H>  |  15  ----------------------------<  149<H>  3.1<L>   |  24  |  0.64    Ca    8.8      01 Dec 2021 07:39  Phos  2.1     12-01  Mg     2.3     12-01          PT/INR - ( 01 Dec 2021 09:57 )   PT: 19.3 sec;   INR: 1.71 ratio         PTT - ( 29 Nov 2021 17:30 )  PTT:33.9 sec          RADIOLOGY STUDIES:    < from: Xray Small Bowel Series (12.01.21 @ 09:08) >    EXAM:  XR SM INTEST SNGL CON STDY                            PROCEDURE DATE:  12/01/2021          INTERPRETATION:  XR SMALL BOWEL ONLY    HISTORY:  Follow-up small bowel obstruction    VIEWS: Serial Supine views of the abdomen are obtained before and after oral contrast administration through a pre-existing NG tube    COMPARISON:  CT abdomen pelvis 11/29/2021     image demonstrates bilateral hip arthroplasty and NG tube in the stomach. No dilated bowel loops.    2 hour film shows contrast in the stomach but not in the small bowel.    4 hour film shows continued presence of contrast in the stomach without distal passage into small bowel.    16 hours postcontrast administration, oral contrast seen to transit all the way to the rectum with still persistent contrast in the stomach is well. No dilated bowel loops.    IMPRESSION:    Markedly delayed gastric emptying greater than 4 hours.    Contrast passes into the rectum at 16 hours without visualization of dilated bowel loops.    Persistent contrast retention in the stomach at 16 hours as well. Correlate for gastroparesis.    < end of copied text >  < from: Xray Chest 1 View-PORTABLE IMMEDIATE (Xray Chest 1 View-PORTABLE IMMEDIATE .) (11.29.21 @ 19:43) >    EXAM:  XR CHEST PORTABLE IMMED 1V                          EXAM:  XR CHEST PORTABLE ROUTINE 1V                            PROCEDURE DATE:  11/29/2021          INTERPRETATION:  Portable chest radiograph    CLINICAL INFORMATION: Abdominal pain. Admission chest    TECHNIQUE:  Portable  AP view of the chest.    COMPARISON: No previous examinations are available for review.    FINDINGS:  RIGHT basilar multifocal  airspace opacities/infiltrates. Remaining visualized lung parenchyma clear.  No pleuraleffusion..   No pneumothorax.    The heart and mediastinum size and configuration are within normal limits.    Visualized osseous structures are intact.    IMPRESSION:   Bibasilar multifocal airspace opacities/infiltrates..      < from: CT Abdomen and Pelvis w/ IV Cont (11.29.21 @ 14:47) >  EXAM:  CT ABDOMEN AND PELVIS IC                            PROCEDURE DATE:  11/29/2021          INTERPRETATION:  CLINICAL INFORMATION: Abdominal pain. Question small bowel obstruction.    COMPARISON: CT 8/11/2021.    CONTRAST/COMPLICATIONS:  IV Contrast: Omnipaque 350  90 cc administered   10 cc discarded  Oral Contrast: NONE  Complications: None reported at time of study completion    PROCEDURE:  CT of the Abdomen and Pelvis was performed.  Sagittal and coronal reformats were performed.    FINDINGS:  LOWER CHEST: Coronary artery calcifications. Numerous ill-defined right middle and bilateral lower lobe nodular opacities, question infection.    LIVER: Within normal limits.  BILE DUCTS: Normal caliber.  GALLBLADDER: Within normal limits.  SPLEEN: Within normal limits.  PANCREAS: Fatty changes.  ADRENALS: Within normal limits.  KIDNEYS/URETERS: 1 cm left lower pole renal calcification with overlying cortical scar. The right kidney is within normal limits. No hydronephrosis.    BLADDER: Limited visualization due to streak artifact, grossly unremarkable.  REPRODUCTIVE ORGANS: Uterus and adnexa within normal limits.    BOWEL: Dilated small bowel loops noted with decompressed distal/right lower quadrant loops, in keeping with small bowel obstruction. Transition point is noted in the pelvis (2, 83) just distal to the small bowel anastomosis, however mildly prominent, air-filled loops are noted distal to this point, with gradual decrease in caliber within the hernia. No bowel wall thickening, pneumatosis or pneumoperitoneum. Rectosigmoid, right colonic and small bowel anastomoses again noted from prior resection. Scattered colonic diverticula. Appendix is not visualized. No evidence of inflammation in the pericecal region.  PERITONEUM: No ascites or pneumoperitoneum.  VESSELS: Atherosclerotic changes.  RETROPERITONEUM/LYMPH NODES: No lymphadenopathy.  ABDOMINAL WALL: Large wide mouth ventral hernia containing small and large bowel. A separate upper abdominal hernia contains partof the distal stomach.  BONES: Bilateral hip prostheses create streak artifact that limits visualization of the pelvis. Degenerative changes.    IMPRESSION:  Small bowel obstruction, with a mild transition point just distal to the small bowel anastomosis in the anterior pelvis. Mildly prominent air-filled loops are noted distal to this point within a large, widemouth ventral hernia, with gradual transition to decompressed distal small bowel. No bowel wall thickening, pneumatosis, pneumoperitoneumor ascites.    Numerous ill-defined bibasilar lung opacities, suspicious for infection, including atypical viral etiologies such as Covid 19 pneumonia. Consider dedicated chest CT for full evaluation, as clinically indicated.        IMPRESSION:  70 yo female with above pmh a/w:  #SBO  admit to surgery  génesis clears presently  monitor cbc/bmp  pain control        # H/o afib CV#6/DVT/h/o CVA with left sided weakness  on coumadin/Multaq  AC services consult appreciated  PT eval    #hypokalemia/hypophosphatemia  replete and recheck in am    #DM/neuropathy  BGM/ss  Lantus q hs  cont Lyrica    #HLD  cont statin    #H/o HTN  does not appear to be on anti-htn's  will start Losartan 25 mg po daily  hold lasix for now     #COPD  cont inhalers    #chronic abd wound  wound care per surgery        #vte prophylaxis    on coumadin   venodynes  mobilize    thank you for the consult, will follow with you              CHIEF COMPLAINT: abdominal pain    HISTORY OF THE PRESENT ILLNESS: this is a 72 yo female who resides a [facility], with a pmh afib CV#6 on coumadin long term, CVA in 2018 with left sided weakness, Morbid obesity, peripheral edema, COPD, Type 2 DM, neuropathy,  diverticulitis, dvt found in 2018 at time of CVA, HTN who was BIBA from [facility] with cc of abd pain/distention and N/V/D since friday 11/26.  Per pt her emesis and BM's were green. Denies f/c/r, denies CP/SOB/dizziness/Headaches.  Seen By Surgery.  Imaging + for SBO, NGT was placed to LWS, however pt pulled out her NGT and refused to have it replaced.  Pt is seen at Bedside, awake alert, answering questions appropriately, génesis clear liquid diet, no further N/V, + BM overnight.  we are consulted for medical management    PAST MEDICAL HISTORY: as above    PAST SURGICAL HISTORY: colostomy and then reversal, C/S, THR    FAMILY HISTORY: Dad: dec age 92: MI,  mother: dec: age 91:  COPD    SOCIAL HISTORY: former smoker quit 5 years ago,  no alcohol, no drugs    ALLERGIES:  Cipro, spiriva----> rash    HOME MEDS: see med rec    REVIEW OF SYSTEMS:   All 10 systems reviewed in detailed and found to be negative with the exception of what has already been described above    MEDICATIONS  (STANDING):  acetaminophen   IVPB .. 1000 milliGRAM(s) IV Intermittent every 6 hours  atorvastatin 20 milliGRAM(s) Oral at bedtime  dextrose 40% Gel 15 Gram(s) Oral once  dextrose 5%. 1000 milliLiter(s) (50 mL/Hr) IV Continuous <Continuous>  dextrose 5%. 1000 milliLiter(s) (100 mL/Hr) IV Continuous <Continuous>  dextrose 50% Injectable 25 Gram(s) IV Push once  dextrose 50% Injectable 12.5 Gram(s) IV Push once  dextrose 50% Injectable 25 Gram(s) IV Push once  dronedarone 400 milliGRAM(s) Oral two times a day  DULoxetine 60 milliGRAM(s) Oral daily  fluticasone propionate 50 MICROgram(s)/spray Nasal Spray 1 Spray(s) Both Nostrils two times a day  glucagon  Injectable 1 milliGRAM(s) IntraMuscular once  insulin glargine Injectable (LANTUS) 10 Unit(s) SubCutaneous at bedtime  insulin lispro (ADMELOG) corrective regimen sliding scale   SubCutaneous three times a day before meals  levothyroxine 25 MICROGram(s) Oral daily  oxybutynin 5 milliGRAM(s) Oral daily  pantoprazole  Injectable 40 milliGRAM(s) IV Push daily  potassium chloride  10 mEq/100 mL IVPB 10 milliEquivalent(s) IV Intermittent every 1 hour  potassium phosphate IVPB 15 milliMole(s) IV Intermittent once  pregabalin 100 milliGRAM(s) Oral three times a day  sodium chloride 0.9%. 1000 milliLiter(s) (80 mL/Hr) IV Continuous <Continuous>  warfarin 4 milliGRAM(s) Oral <User Schedule>  warfarin 5 milliGRAM(s) Oral <User Schedule>    MEDICATIONS  (PRN):  ALBUTerol    90 MICROgram(s) HFA Inhaler 2 Puff(s) Inhalation every 6 hours PRN Bronchospasm  benzocaine 15 mG/menthol 3.6 mG (Sugar-Free) Lozenge 1 Lozenge Oral every 3 hours PRN Sore Throat  methocarbamol 750 milliGRAM(s) Oral every 6 hours PRN Muscle Spasm  morphine  - Injectable 2 milliGRAM(s) IV Push every 4 hours PRN Severe Pain (7 - 10)  ondansetron Injectable 4 milliGRAM(s) IV Push every 6 hours PRN Nausea and/or Vomiting      VITALS:  T(F): 98 (12-01-21 @ 09:10), Max: 99 (11-30-21 @ 20:17)  HR: 87 (12-01-21 @ 09:10) (82 - 87)  BP: 178/73 (12-01-21 @ 09:10) (155/62 - 178/73)  RR: 16 (12-01-21 @ 09:10) (16 - 18)  SpO2: 96% (12-01-21 @ 09:10) (94% - 97%)    PHYSICAL EXAM:    GENERAL: Comfortable, no acute distress   HEAD:  Normocephalic, atraumatic  EYES: EOMI, PERRLA  HEENT: Moist mucous membranes  NECK: Supple, No JVD  NERVOUS SYSTEM:  Alert & Oriented X3, Motor Strength 5/5 B/L upper and lower extremities  CHEST/LUNG: Clear to auscultation bilaterally  HEART: Regular rate and rhythm  ABDOMEN: Soft, obese, distended, Bowel sounds present, pt noted with LLQ chronic appearing wound, no drainage  GENITOURINARY: Voiding, no palpable bladder  EXTREMITIES:   No clubbing, cyanosis, or edema  MUSCULOSKELETAL- No muscle tenderness, no joint tenderness  SKIN-no rash      LABS:                        11.7   14.88 )-----------( 319      ( 01 Dec 2021 07:39 )             38.1     12-01    145  |  111<H>  |  15  ----------------------------<  149<H>  3.1<L>   |  24  |  0.64    Ca    8.8      01 Dec 2021 07:39  Phos  2.1     12-01  Mg     2.3     12-01          PT/INR - ( 01 Dec 2021 09:57 )   PT: 19.3 sec;   INR: 1.71 ratio         PTT - ( 29 Nov 2021 17:30 )  PTT:33.9 sec          RADIOLOGY STUDIES:    < from: Xray Small Bowel Series (12.01.21 @ 09:08) >    EXAM:  XR SM INTEST SNGL CON STDY                            PROCEDURE DATE:  12/01/2021          INTERPRETATION:  XR SMALL BOWEL ONLY    HISTORY:  Follow-up small bowel obstruction    VIEWS: Serial Supine views of the abdomen are obtained before and after oral contrast administration through a pre-existing NG tube    COMPARISON:  CT abdomen pelvis 11/29/2021     image demonstrates bilateral hip arthroplasty and NG tube in the stomach. No dilated bowel loops.    2 hour film shows contrast in the stomach but not in the small bowel.    4 hour film shows continued presence of contrast in the stomach without distal passage into small bowel.    16 hours postcontrast administration, oral contrast seen to transit all the way to the rectum with still persistent contrast in the stomach is well. No dilated bowel loops.    IMPRESSION:    Markedly delayed gastric emptying greater than 4 hours.    Contrast passes into the rectum at 16 hours without visualization of dilated bowel loops.    Persistent contrast retention in the stomach at 16 hours as well. Correlate for gastroparesis.    < end of copied text >  < from: Xray Chest 1 View-PORTABLE IMMEDIATE (Xray Chest 1 View-PORTABLE IMMEDIATE .) (11.29.21 @ 19:43) >    EXAM:  XR CHEST PORTABLE IMMED 1V                          EXAM:  XR CHEST PORTABLE ROUTINE 1V                            PROCEDURE DATE:  11/29/2021          INTERPRETATION:  Portable chest radiograph    CLINICAL INFORMATION: Abdominal pain. Admission chest    TECHNIQUE:  Portable  AP view of the chest.    COMPARISON: No previous examinations are available for review.    FINDINGS:  RIGHT basilar multifocal  airspace opacities/infiltrates. Remaining visualized lung parenchyma clear.  No pleuraleffusion..   No pneumothorax.    The heart and mediastinum size and configuration are within normal limits.    Visualized osseous structures are intact.    IMPRESSION:   Bibasilar multifocal airspace opacities/infiltrates..      < from: CT Abdomen and Pelvis w/ IV Cont (11.29.21 @ 14:47) >  EXAM:  CT ABDOMEN AND PELVIS IC                            PROCEDURE DATE:  11/29/2021          INTERPRETATION:  CLINICAL INFORMATION: Abdominal pain. Question small bowel obstruction.    COMPARISON: CT 8/11/2021.    CONTRAST/COMPLICATIONS:  IV Contrast: Omnipaque 350  90 cc administered   10 cc discarded  Oral Contrast: NONE  Complications: None reported at time of study completion    PROCEDURE:  CT of the Abdomen and Pelvis was performed.  Sagittal and coronal reformats were performed.    FINDINGS:  LOWER CHEST: Coronary artery calcifications. Numerous ill-defined right middle and bilateral lower lobe nodular opacities, question infection.    LIVER: Within normal limits.  BILE DUCTS: Normal caliber.  GALLBLADDER: Within normal limits.  SPLEEN: Within normal limits.  PANCREAS: Fatty changes.  ADRENALS: Within normal limits.  KIDNEYS/URETERS: 1 cm left lower pole renal calcification with overlying cortical scar. The right kidney is within normal limits. No hydronephrosis.    BLADDER: Limited visualization due to streak artifact, grossly unremarkable.  REPRODUCTIVE ORGANS: Uterus and adnexa within normal limits.    BOWEL: Dilated small bowel loops noted with decompressed distal/right lower quadrant loops, in keeping with small bowel obstruction. Transition point is noted in the pelvis (2, 83) just distal to the small bowel anastomosis, however mildly prominent, air-filled loops are noted distal to this point, with gradual decrease in caliber within the hernia. No bowel wall thickening, pneumatosis or pneumoperitoneum. Rectosigmoid, right colonic and small bowel anastomoses again noted from prior resection. Scattered colonic diverticula. Appendix is not visualized. No evidence of inflammation in the pericecal region.  PERITONEUM: No ascites or pneumoperitoneum.  VESSELS: Atherosclerotic changes.  RETROPERITONEUM/LYMPH NODES: No lymphadenopathy.  ABDOMINAL WALL: Large wide mouth ventral hernia containing small and large bowel. A separate upper abdominal hernia contains partof the distal stomach.  BONES: Bilateral hip prostheses create streak artifact that limits visualization of the pelvis. Degenerative changes.    IMPRESSION:  Small bowel obstruction, with a mild transition point just distal to the small bowel anastomosis in the anterior pelvis. Mildly prominent air-filled loops are noted distal to this point within a large, widemouth ventral hernia, with gradual transition to decompressed distal small bowel. No bowel wall thickening, pneumatosis, pneumoperitoneumor ascites.    Numerous ill-defined bibasilar lung opacities, suspicious for infection, including atypical viral etiologies such as Covid 19 pneumonia. Consider dedicated chest CT for full evaluation, as clinically indicated.        IMPRESSION:  72 yo female with above pmh a/w:    #SBO  admitted to surgery  génesis clears presently  monitor cbc/bmp  pain control  ivf  incentive spirometry        # H/o afib CV#6/DVT/h/o CVA with left sided weakness  on coumadin/Multaq  AC services consult appreciated  PT eval    #hypokalemia/hypophosphatemia  replete and recheck in am    #DM/neuropathy  BGM/ss  Lantus q hs  cont Lyrica    #HLD  cont statin    #H/o HTN  does not appear to be on anti-htn's  will start Losartan 25 mg po daily  hold lasix for now     #COPD  cont inhalers    #chronic abd wound  wound care per surgery        #vte prophylaxis    on coumadin   venodynes  mobilize    thank you for the consult, will follow with you

## 2021-12-01 NOTE — PROGRESS NOTE ADULT - ATTENDING COMMENTS
patient seen and examined, this AM. Will advance CLD, restart anticoagulation and home rx    DCP tomorrow pending tolerating diet

## 2021-12-01 NOTE — PHYSICAL THERAPY INITIAL EVALUATION ADULT - LEVEL OF INDEPENDENCE: SIT/STAND, REHAB EVAL
NTd ,pt with severe PF contractures B ankles from prolonged bed/chair rest ,has been non-amb >1 year

## 2021-12-01 NOTE — CONSULT NOTE ADULT - ATTENDING COMMENTS
PT seen and examined with np bert leon this am. agree with documentation as above with changes made where appropriate.     71F, from [facility], with pmh afib on coumadin, CVA with left sided waekness, COPD, DMII, DVT, HTN who presented to the ER with n/v, admitted with SBO.   NGT self discontined  bowel function seems to have returned  tolerating clears  ivf  incentive spirometry  physical therapy eval  continue home meds as above except for lasix  start arb for HTN  thank you, will follow

## 2021-12-01 NOTE — PHYSICAL THERAPY INITIAL EVALUATION ADULT - PRECAUTIONS/LIMITATIONS, REHAB EVAL
chronic dense L spastic hemiplegia; per patient was on thickened liquids and NO STRAW drinking at SNF,h/o OA s/p B THRs,prior h/o provoked PE 12/ 2016/aspiration precautions/fall precautions/bilateral hip precautions

## 2021-12-02 DIAGNOSIS — R10.9 UNSPECIFIED ABDOMINAL PAIN: ICD-10-CM

## 2021-12-02 DIAGNOSIS — J44.1 CHRONIC OBSTRUCTIVE PULMONARY DISEASE WITH (ACUTE) EXACERBATION: ICD-10-CM

## 2021-12-02 DIAGNOSIS — J98.01 ACUTE BRONCHOSPASM: ICD-10-CM

## 2021-12-02 LAB
ANION GAP SERPL CALC-SCNC: 12 MMOL/L — SIGNIFICANT CHANGE UP (ref 5–17)
APTT BLD: 25.6 SEC — LOW (ref 27.5–35.5)
BASOPHILS # BLD AUTO: 0.19 K/UL — SIGNIFICANT CHANGE UP (ref 0–0.2)
BASOPHILS NFR BLD AUTO: 1.5 % — SIGNIFICANT CHANGE UP (ref 0–2)
BUN SERPL-MCNC: 16 MG/DL — SIGNIFICANT CHANGE UP (ref 7–23)
CALCIUM SERPL-MCNC: 9.1 MG/DL — SIGNIFICANT CHANGE UP (ref 8.5–10.1)
CHLORIDE SERPL-SCNC: 111 MMOL/L — HIGH (ref 96–108)
CO2 SERPL-SCNC: 22 MMOL/L — SIGNIFICANT CHANGE UP (ref 22–31)
CREAT SERPL-MCNC: 0.89 MG/DL — SIGNIFICANT CHANGE UP (ref 0.5–1.3)
EOSINOPHIL # BLD AUTO: 0 K/UL — SIGNIFICANT CHANGE UP (ref 0–0.5)
EOSINOPHIL NFR BLD AUTO: 0 % — SIGNIFICANT CHANGE UP (ref 0–6)
GLUCOSE BLDC GLUCOMTR-MCNC: 194 MG/DL — HIGH (ref 70–99)
GLUCOSE BLDC GLUCOMTR-MCNC: 201 MG/DL — HIGH (ref 70–99)
GLUCOSE BLDC GLUCOMTR-MCNC: 214 MG/DL — HIGH (ref 70–99)
GLUCOSE BLDC GLUCOMTR-MCNC: 214 MG/DL — HIGH (ref 70–99)
GLUCOSE SERPL-MCNC: 217 MG/DL — HIGH (ref 70–99)
HCT VFR BLD CALC: 44.4 % — SIGNIFICANT CHANGE UP (ref 34.5–45)
HGB BLD-MCNC: 13.6 G/DL — SIGNIFICANT CHANGE UP (ref 11.5–15.5)
IMM GRANULOCYTES NFR BLD AUTO: 4.6 % — HIGH (ref 0–1.5)
INR BLD: 1.43 RATIO — HIGH (ref 0.88–1.16)
LACTATE SERPL-SCNC: 1.1 MMOL/L — SIGNIFICANT CHANGE UP (ref 0.7–2)
LYMPHOCYTES # BLD AUTO: 2.77 K/UL — SIGNIFICANT CHANGE UP (ref 1–3.3)
LYMPHOCYTES # BLD AUTO: 21.5 % — SIGNIFICANT CHANGE UP (ref 13–44)
MAGNESIUM SERPL-MCNC: 2.2 MG/DL — SIGNIFICANT CHANGE UP (ref 1.6–2.6)
MCHC RBC-ENTMCNC: 24.7 PG — LOW (ref 27–34)
MCHC RBC-ENTMCNC: 30.6 GM/DL — LOW (ref 32–36)
MCV RBC AUTO: 80.7 FL — SIGNIFICANT CHANGE UP (ref 80–100)
MONOCYTES # BLD AUTO: 1.25 K/UL — HIGH (ref 0–0.9)
MONOCYTES NFR BLD AUTO: 9.7 % — SIGNIFICANT CHANGE UP (ref 2–14)
NEUTROPHILS # BLD AUTO: 8.07 K/UL — HIGH (ref 1.8–7.4)
NEUTROPHILS NFR BLD AUTO: 62.7 % — SIGNIFICANT CHANGE UP (ref 43–77)
PHOSPHATE SERPL-MCNC: 2.6 MG/DL — SIGNIFICANT CHANGE UP (ref 2.5–4.5)
PLATELET # BLD AUTO: 374 K/UL — SIGNIFICANT CHANGE UP (ref 150–400)
POTASSIUM SERPL-MCNC: 3.3 MMOL/L — LOW (ref 3.5–5.3)
POTASSIUM SERPL-SCNC: 3.3 MMOL/L — LOW (ref 3.5–5.3)
PROTHROM AB SERPL-ACNC: 16.5 SEC — HIGH (ref 10.6–13.6)
RBC # BLD: 5.5 M/UL — HIGH (ref 3.8–5.2)
RBC # FLD: 20.2 % — HIGH (ref 10.3–14.5)
SODIUM SERPL-SCNC: 145 MMOL/L — SIGNIFICANT CHANGE UP (ref 135–145)
WBC # BLD: 12.87 K/UL — HIGH (ref 3.8–10.5)
WBC # FLD AUTO: 12.87 K/UL — HIGH (ref 3.8–10.5)

## 2021-12-02 PROCEDURE — 99223 1ST HOSP IP/OBS HIGH 75: CPT

## 2021-12-02 PROCEDURE — 99232 SBSQ HOSP IP/OBS MODERATE 35: CPT

## 2021-12-02 PROCEDURE — 99231 SBSQ HOSP IP/OBS SF/LOW 25: CPT

## 2021-12-02 PROCEDURE — 74019 RADEX ABDOMEN 2 VIEWS: CPT | Mod: 26

## 2021-12-02 PROCEDURE — 93010 ELECTROCARDIOGRAM REPORT: CPT | Mod: 76

## 2021-12-02 PROCEDURE — 71045 X-RAY EXAM CHEST 1 VIEW: CPT | Mod: 26

## 2021-12-02 RX ORDER — ACETAMINOPHEN 500 MG
1000 TABLET ORAL ONCE
Refills: 0 | Status: COMPLETED | OUTPATIENT
Start: 2021-12-02 | End: 2021-12-02

## 2021-12-02 RX ORDER — POTASSIUM CHLORIDE 20 MEQ
10 PACKET (EA) ORAL
Refills: 0 | Status: DISCONTINUED | OUTPATIENT
Start: 2021-12-02 | End: 2021-12-02

## 2021-12-02 RX ORDER — SODIUM CHLORIDE 9 MG/ML
500 INJECTION, SOLUTION INTRAVENOUS ONCE
Refills: 0 | Status: COMPLETED | OUTPATIENT
Start: 2021-12-02 | End: 2021-12-02

## 2021-12-02 RX ORDER — BUDESONIDE AND FORMOTEROL FUMARATE DIHYDRATE 160; 4.5 UG/1; UG/1
2 AEROSOL RESPIRATORY (INHALATION)
Refills: 0 | Status: DISCONTINUED | OUTPATIENT
Start: 2021-12-02 | End: 2021-12-08

## 2021-12-02 RX ORDER — HUMAN INSULIN 100 [IU]/ML
16 INJECTION, SUSPENSION SUBCUTANEOUS AT BEDTIME
Refills: 0 | Status: DISCONTINUED | OUTPATIENT
Start: 2021-12-02 | End: 2021-12-04

## 2021-12-02 RX ORDER — INSULIN LISPRO 100/ML
4 VIAL (ML) SUBCUTANEOUS
Refills: 0 | Status: DISCONTINUED | OUTPATIENT
Start: 2021-12-02 | End: 2021-12-03

## 2021-12-02 RX ORDER — POTASSIUM CHLORIDE 20 MEQ
40 PACKET (EA) ORAL ONCE
Refills: 0 | Status: COMPLETED | OUTPATIENT
Start: 2021-12-02 | End: 2021-12-02

## 2021-12-02 RX ADMIN — Medication 100 MILLIGRAM(S): at 21:38

## 2021-12-02 RX ADMIN — Medication 40 MILLIGRAM(S): at 14:47

## 2021-12-02 RX ADMIN — ALBUTEROL 2 PUFF(S): 90 AEROSOL, METERED ORAL at 21:27

## 2021-12-02 RX ADMIN — HUMAN INSULIN 16 UNIT(S): 100 INJECTION, SUSPENSION SUBCUTANEOUS at 21:39

## 2021-12-02 RX ADMIN — Medication 5 MILLIGRAM(S): at 09:19

## 2021-12-02 RX ADMIN — Medication 40 MILLIGRAM(S): at 09:18

## 2021-12-02 RX ADMIN — LOSARTAN POTASSIUM 25 MILLIGRAM(S): 100 TABLET, FILM COATED ORAL at 09:19

## 2021-12-02 RX ADMIN — Medication 100 MILLIGRAM(S): at 05:34

## 2021-12-02 RX ADMIN — Medication 1000 MILLIGRAM(S): at 23:21

## 2021-12-02 RX ADMIN — ENOXAPARIN SODIUM 100 MILLIGRAM(S): 100 INJECTION SUBCUTANEOUS at 09:18

## 2021-12-02 RX ADMIN — WARFARIN SODIUM 5 MILLIGRAM(S): 2.5 TABLET ORAL at 21:38

## 2021-12-02 RX ADMIN — DRONEDARONE 400 MILLIGRAM(S): 400 TABLET, FILM COATED ORAL at 09:19

## 2021-12-02 RX ADMIN — ATORVASTATIN CALCIUM 20 MILLIGRAM(S): 80 TABLET, FILM COATED ORAL at 21:38

## 2021-12-02 RX ADMIN — ALBUTEROL 2 PUFF(S): 90 AEROSOL, METERED ORAL at 08:17

## 2021-12-02 RX ADMIN — Medication 100 MILLIGRAM(S): at 14:36

## 2021-12-02 RX ADMIN — ENOXAPARIN SODIUM 100 MILLIGRAM(S): 100 INJECTION SUBCUTANEOUS at 21:38

## 2021-12-02 RX ADMIN — Medication 4: at 18:08

## 2021-12-02 RX ADMIN — Medication 1 SPRAY(S): at 09:19

## 2021-12-02 RX ADMIN — MORPHINE SULFATE 2 MILLIGRAM(S): 50 CAPSULE, EXTENDED RELEASE ORAL at 21:37

## 2021-12-02 RX ADMIN — Medication 4: at 08:33

## 2021-12-02 RX ADMIN — Medication 4 UNIT(S): at 18:08

## 2021-12-02 RX ADMIN — DULOXETINE HYDROCHLORIDE 60 MILLIGRAM(S): 30 CAPSULE, DELAYED RELEASE ORAL at 09:19

## 2021-12-02 RX ADMIN — DRONEDARONE 400 MILLIGRAM(S): 400 TABLET, FILM COATED ORAL at 21:38

## 2021-12-02 RX ADMIN — ALBUTEROL 2 PUFF(S): 90 AEROSOL, METERED ORAL at 16:01

## 2021-12-02 RX ADMIN — Medication 1 SPRAY(S): at 21:39

## 2021-12-02 RX ADMIN — SODIUM CHLORIDE 500 MILLILITER(S): 9 INJECTION, SOLUTION INTRAVENOUS at 22:00

## 2021-12-02 RX ADMIN — PANTOPRAZOLE SODIUM 40 MILLIGRAM(S): 20 TABLET, DELAYED RELEASE ORAL at 09:18

## 2021-12-02 RX ADMIN — Medication 40 MILLIEQUIVALENT(S): at 14:36

## 2021-12-02 RX ADMIN — Medication 40 MILLIGRAM(S): at 21:38

## 2021-12-02 RX ADMIN — BUDESONIDE AND FORMOTEROL FUMARATE DIHYDRATE 2 PUFF(S): 160; 4.5 AEROSOL RESPIRATORY (INHALATION) at 21:58

## 2021-12-02 RX ADMIN — Medication 25 MICROGRAM(S): at 05:34

## 2021-12-02 RX ADMIN — Medication 400 MILLIGRAM(S): at 22:51

## 2021-12-02 RX ADMIN — MORPHINE SULFATE 2 MILLIGRAM(S): 50 CAPSULE, EXTENDED RELEASE ORAL at 22:07

## 2021-12-02 RX ADMIN — Medication 2: at 12:01

## 2021-12-02 NOTE — CONSULT NOTE ADULT - ASSESSMENT
- cxr noted  - start symbicort; cont albuterol  - add iv steroids  - will need to monitor blood sugars  - incentive jeff  - dvt proph

## 2021-12-02 NOTE — PROGRESS NOTE ADULT - SUBJECTIVE AND OBJECTIVE BOX
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 with cc of abd pain/distention and N/V/D since friday 11/26.  Per pt her emesis and BM's were green. Denies f/c/r, denies CP/SOB/dizziness/Headaches.  Seen By Surgery.  Imaging + for SBO, NGT was placed to LWS, however pt pulled out her NGT and refused to have it replaced. Her diet is slowly being advanced.     pt seen and examined this am. HAving productive cough/wheeze. Low grade temps early this am. denies pain        REVIEW OF SYSTEMS:   All 10 systems reviewed in detailed and found to be negative with the exception of what has already been described above    Vital Signs Last 24 Hrs  T(C): 37.2 (02 Dec 2021 09:06), Max: 37.8 (01 Dec 2021 23:59)  T(F): 99 (02 Dec 2021 09:06), Max: 100.1 (01 Dec 2021 23:59)  HR: 125 (02 Dec 2021 09:06) (62 - 125)  BP: 159/92 (02 Dec 2021 09:06) (140/75 - 178/91)  BP(mean): 109 (02 Dec 2021 09:06) (109 - 109)  RR: 16 (02 Dec 2021 09:06) (16 - 20)  SpO2: 94% (02 Dec 2021 09:06) (92% - 95%)  PHYSICAL EXAM:    GENERAL: Comfortable, no acute distress   HEAD:  Normocephalic, atraumatic  EYES: EOMI, PERRLA  HEENT: Moist mucous membranes  NECK: Supple, No JVD  NERVOUS SYSTEM: forgetful  CHEST/LUNG: Clear to auscultation bilaterally  HEART: Regular rate and rhythm  ABDOMEN: Soft, obese, distended, Bowel sounds present, dressing over chronic wound c/d/i  GENITOURINARY: Voiding, no palpable bladder  EXTREMITIES:   No clubbing, cyanosis, or edema  MUSCULOSKELETAL- No muscle tenderness, no joint tenderness  SKIN-no rash      LABS:                        13.6   12.87 )-----------( 374      ( 02 Dec 2021 09:16 )             44.4     12-02    145  |  111<H>  |  16  ----------------------------<  217<H>  3.3<L>   |  22  |  0.89    Ca    9.1      02 Dec 2021 09:16  Phos  2.6     12-02  Mg     2.2     12-02      PT/INR - ( 02 Dec 2021 09:16 )   PT: 16.5 sec;   INR: 1.43 ratio        RADIOLOGY STUDIES:    Xray Small Bowel Series (12.01.21 @ 09:08) >  EXAM:  XR SM INTEST SNGL CON STDY                        PROCEDURE DATE:  12/01/2021    INTERPRETATION:  XR SMALL BOWEL ONLY    HISTORY:  Follow-up small bowel obstruction    VIEWS: Serial Supine views of the abdomen are obtained before and after oral contrast administration through a pre-existing NG tube    COMPARISON:  CT abdomen pelvis 11/29/2021     image demonstrates bilateral hip arthroplasty and NG tube in the stomach. No dilated bowel loops.    2 hour film shows contrast in the stomach but not in the small bowel.    4 hour film shows continued presence of contrast in the stomach without distal passage into small bowel.    16 hours postcontrast administration, oral contrast seen to transit all the way to the rectum with still persistent contrast in the stomach is well. No dilated bowel loops.    IMPRESSION:    Markedly delayed gastric emptying greater than 4 hours.    Contrast passes into the rectum at 16 hours without visualization of dilated bowel loops.    Persistent contrast retention in the stomach at 16 hours as well. Correlate for gastroparesis.      Xray Chest 1 View-PORTABLE IMMEDIATE (Xray Chest 1 View-PORTABLE IMMEDIATE .) (11.29.21 @ 19:43) >    EXAM:  XR CHEST PORTABLE IMMED 1V                          EXAM:  XR CHEST PORTABLE ROUTINE 1V                            PROCEDURE DATE:  11/29/2021          INTERPRETATION:  Portable chest radiograph    CLINICAL INFORMATION: Abdominal pain. Admission chest    TECHNIQUE:  Portable  AP view of the chest.    COMPARISON: No previous examinations are available for review.    FINDINGS:  RIGHT basilar multifocal  airspace opacities/infiltrates. Remaining visualized lung parenchyma clear.  No pleuraleffusion..   No pneumothorax.    The heart and mediastinum size and configuration are within normal limits.    Visualized osseous structures are intact.    IMPRESSION:   Bibasilar multifocal airspace opacities/infiltrates..      < from: CT Abdomen and Pelvis w/ IV Cont (11.29.21 @ 14:47) >  EXAM:  CT ABDOMEN AND PELVIS IC                            PROCEDURE DATE:  11/29/2021          INTERPRETATION:  CLINICAL INFORMATION: Abdominal pain. Question small bowel obstruction.    COMPARISON: CT 8/11/2021.    CONTRAST/COMPLICATIONS:  IV Contrast: Omnipaque 350  90 cc administered   10 cc discarded  Oral Contrast: NONE  Complications: None reported at time of study completion    PROCEDURE:  CT of the Abdomen and Pelvis was performed.  Sagittal and coronal reformats were performed.    FINDINGS:  LOWER CHEST: Coronary artery calcifications. Numerous ill-defined right middle and bilateral lower lobe nodular opacities, question infection.    LIVER: Within normal limits.  BILE DUCTS: Normal caliber.  GALLBLADDER: Within normal limits.  SPLEEN: Within normal limits.  PANCREAS: Fatty changes.  ADRENALS: Within normal limits.  KIDNEYS/URETERS: 1 cm left lower pole renal calcification with overlying cortical scar. The right kidney is within normal limits. No hydronephrosis.    BLADDER: Limited visualization due to streak artifact, grossly unremarkable.  REPRODUCTIVE ORGANS: Uterus and adnexa within normal limits.    BOWEL: Dilated small bowel loops noted with decompressed distal/right lower quadrant loops, in keeping with small bowel obstruction. Transition point is noted in the pelvis (2, 83) just distal to the small bowel anastomosis, however mildly prominent, air-filled loops are noted distal to this point, with gradual decrease in caliber within the hernia. No bowel wall thickening, pneumatosis or pneumoperitoneum. Rectosigmoid, right colonic and small bowel anastomoses again noted from prior resection. Scattered colonic diverticula. Appendix is not visualized. No evidence of inflammation in the pericecal region.  PERITONEUM: No ascites or pneumoperitoneum.  VESSELS: Atherosclerotic changes.  RETROPERITONEUM/LYMPH NODES: No lymphadenopathy.  ABDOMINAL WALL: Large wide mouth ventral hernia containing small and large bowel. A separate upper abdominal hernia contains partof the distal stomach.  BONES: Bilateral hip prostheses create streak artifact that limits visualization of the pelvis. Degenerative changes.    IMPRESSION:  Small bowel obstruction, with a mild transition point just distal to the small bowel anastomosis in the anterior pelvis. Mildly prominent air-filled loops are noted distal to this point within a large, widemouth ventral hernia, with gradual transition to decompressed distal small bowel. No bowel wall thickening, pneumatosis, pneumoperitoneumor ascites.    Numerous ill-defined bibasilar lung opacities, suspicious for infection, including atypical viral etiologies such as Covid 19 pneumonia. Consider dedicated chest CT for full evaluation, as clinically indicated.        IMPRESSION:  70 yo female with above pmh a/w:    #SBO  -improving.   -diet being advanced by surgery  -NGT out.      # H/o afib CV#6/DVT/h/o CVA with left sided weakness  on coumadin/Multaq  AC services consult appreciated  HR fast, will check ekg.    #hypokalemia/hypophosphatemia  repleted    #DM/neuropathy  -start premeal insulin  -increase lantus/ continue ss    #HLD  cont statin    #H/o HTN  does not appear to be on anti-htn's  will start Losartan 25 mg po daily  hold lasix for now     #Acute copd exacerbation:  -pulm consulted noted  -started on iv steroids/inhalers/singulair    #chronic abd wound  wound care per surgery    #vte prophylaxis    on coumadin   venodynes  mobilize            CHIEF COMPLAINT: abdominal pain    HPI: 71F,  who resides a [facility], with a pmh afib on coumadin long term, CVA in 2018 with left sided weakness, Morbid obesity, peripheral edema, COPD, Type 2 DM, neuropathy,  diverticulitis, dvt found in 2018 at time of CVA, HTN who was BIBA from [facility] with cc of abd pain/distention and N/V/D since friday 11/26.  Per pt her emesis and BM's were green. Denies f/c/r, denies CP/SOB/dizziness/Headaches.  Seen By Surgery.  Imaging + for SBO, NGT was placed to LWS, however pt pulled out her NGT and refused to have it replaced. Her diet is slowly being advanced.     pt seen and examined this am. HAving productive cough/wheeze. Low grade temps early this am. denies pain        REVIEW OF SYSTEMS:   All 10 systems reviewed in detailed and found to be negative with the exception of what has already been described above    Vital Signs Last 24 Hrs  T(C): 37.2 (02 Dec 2021 09:06), Max: 37.8 (01 Dec 2021 23:59)  T(F): 99 (02 Dec 2021 09:06), Max: 100.1 (01 Dec 2021 23:59)  HR: 125 (02 Dec 2021 09:06) (62 - 125)  BP: 159/92 (02 Dec 2021 09:06) (140/75 - 178/91)  BP(mean): 109 (02 Dec 2021 09:06) (109 - 109)  RR: 16 (02 Dec 2021 09:06) (16 - 20)  SpO2: 94% (02 Dec 2021 09:06) (92% - 95%)  PHYSICAL EXAM:    GENERAL: Comfortable, no acute distress   HEAD:  Normocephalic, atraumatic  EYES: EOMI, PERRLA  HEENT: Moist mucous membranes  NECK: Supple, No JVD  NERVOUS SYSTEM: forgetful  CHEST/LUNG: Clear to auscultation bilaterally  HEART: Regular rate and rhythm  ABDOMEN: Soft, obese, distended, Bowel sounds present, dressing over chronic wound c/d/i  GENITOURINARY: Voiding, no palpable bladder  EXTREMITIES:   No clubbing, cyanosis, or edema  MUSCULOSKELETAL- No muscle tenderness, no joint tenderness  SKIN-no rash      LABS:                        13.6   12.87 )-----------( 374      ( 02 Dec 2021 09:16 )             44.4     12-02    145  |  111<H>  |  16  ----------------------------<  217<H>  3.3<L>   |  22  |  0.89    Ca    9.1      02 Dec 2021 09:16  Phos  2.6     12-02  Mg     2.2     12-02      PT/INR - ( 02 Dec 2021 09:16 )   PT: 16.5 sec;   INR: 1.43 ratio        RADIOLOGY STUDIES:    Xray Small Bowel Series (12.01.21 @ 09:08) >  EXAM:  XR SM INTEST SNGL CON STDY                        PROCEDURE DATE:  12/01/2021    INTERPRETATION:  XR SMALL BOWEL ONLY    HISTORY:  Follow-up small bowel obstruction    VIEWS: Serial Supine views of the abdomen are obtained before and after oral contrast administration through a pre-existing NG tube    COMPARISON:  CT abdomen pelvis 11/29/2021     image demonstrates bilateral hip arthroplasty and NG tube in the stomach. No dilated bowel loops.    2 hour film shows contrast in the stomach but not in the small bowel.    4 hour film shows continued presence of contrast in the stomach without distal passage into small bowel.    16 hours postcontrast administration, oral contrast seen to transit all the way to the rectum with still persistent contrast in the stomach is well. No dilated bowel loops.    IMPRESSION:    Markedly delayed gastric emptying greater than 4 hours.    Contrast passes into the rectum at 16 hours without visualization of dilated bowel loops.    Persistent contrast retention in the stomach at 16 hours as well. Correlate for gastroparesis.      Xray Chest 1 View-PORTABLE IMMEDIATE (Xray Chest 1 View-PORTABLE IMMEDIATE .) (11.29.21 @ 19:43) >    EXAM:  XR CHEST PORTABLE IMMED 1V                          EXAM:  XR CHEST PORTABLE ROUTINE 1V                            PROCEDURE DATE:  11/29/2021          INTERPRETATION:  Portable chest radiograph    CLINICAL INFORMATION: Abdominal pain. Admission chest    TECHNIQUE:  Portable  AP view of the chest.    COMPARISON: No previous examinations are available for review.    FINDINGS:  RIGHT basilar multifocal  airspace opacities/infiltrates. Remaining visualized lung parenchyma clear.  No pleuraleffusion..   No pneumothorax.    The heart and mediastinum size and configuration are within normal limits.    Visualized osseous structures are intact.    IMPRESSION:   Bibasilar multifocal airspace opacities/infiltrates..      < from: CT Abdomen and Pelvis w/ IV Cont (11.29.21 @ 14:47) >  EXAM:  CT ABDOMEN AND PELVIS IC                            PROCEDURE DATE:  11/29/2021          INTERPRETATION:  CLINICAL INFORMATION: Abdominal pain. Question small bowel obstruction.    COMPARISON: CT 8/11/2021.    CONTRAST/COMPLICATIONS:  IV Contrast: Omnipaque 350  90 cc administered   10 cc discarded  Oral Contrast: NONE  Complications: None reported at time of study completion    PROCEDURE:  CT of the Abdomen and Pelvis was performed.  Sagittal and coronal reformats were performed.    FINDINGS:  LOWER CHEST: Coronary artery calcifications. Numerous ill-defined right middle and bilateral lower lobe nodular opacities, question infection.    LIVER: Within normal limits.  BILE DUCTS: Normal caliber.  GALLBLADDER: Within normal limits.  SPLEEN: Within normal limits.  PANCREAS: Fatty changes.  ADRENALS: Within normal limits.  KIDNEYS/URETERS: 1 cm left lower pole renal calcification with overlying cortical scar. The right kidney is within normal limits. No hydronephrosis.    BLADDER: Limited visualization due to streak artifact, grossly unremarkable.  REPRODUCTIVE ORGANS: Uterus and adnexa within normal limits.    BOWEL: Dilated small bowel loops noted with decompressed distal/right lower quadrant loops, in keeping with small bowel obstruction. Transition point is noted in the pelvis (2, 83) just distal to the small bowel anastomosis, however mildly prominent, air-filled loops are noted distal to this point, with gradual decrease in caliber within the hernia. No bowel wall thickening, pneumatosis or pneumoperitoneum. Rectosigmoid, right colonic and small bowel anastomoses again noted from prior resection. Scattered colonic diverticula. Appendix is not visualized. No evidence of inflammation in the pericecal region.  PERITONEUM: No ascites or pneumoperitoneum.  VESSELS: Atherosclerotic changes.  RETROPERITONEUM/LYMPH NODES: No lymphadenopathy.  ABDOMINAL WALL: Large wide mouth ventral hernia containing small and large bowel. A separate upper abdominal hernia contains partof the distal stomach.  BONES: Bilateral hip prostheses create streak artifact that limits visualization of the pelvis. Degenerative changes.    IMPRESSION:  Small bowel obstruction, with a mild transition point just distal to the small bowel anastomosis in the anterior pelvis. Mildly prominent air-filled loops are noted distal to this point within a large, widemouth ventral hernia, with gradual transition to decompressed distal small bowel. No bowel wall thickening, pneumatosis, pneumoperitoneumor ascites.    Numerous ill-defined bibasilar lung opacities, suspicious for infection, including atypical viral etiologies such as Covid 19 pneumonia. Consider dedicated chest CT for full evaluation, as clinically indicated.        IMPRESSION:  70 yo female with above pmh a/w:    #SBO  -improving.   -diet being advanced by surgery  -NGT out.      # H/o paroxysmal afib CV#6/DVT/h/o CVA with left sided weakness  on coumadin/Multaq  AC services consult appreciated  HR fast, will check ekg.    #hypokalemia/hypophosphatemia  repleted    #DM/neuropathy  -start premeal insulin  -increase lantus/ continue ss    #HLD  cont statin    #H/o HTN  does not appear to be on anti-htn's  will start Losartan 25 mg po daily  hold lasix for now     #Acute copd exacerbation:  -pulm consulted noted  -started on iv steroids/inhalers/singulair    #chronic abd wound  wound care per surgery    #vte prophylaxis    on coumadin   venodynes  mobilize

## 2021-12-02 NOTE — PROGRESS NOTE ADULT - SUBJECTIVE AND OBJECTIVE BOX
SURGERY DAILY PROGRESS NOTE:     Subjective:  Patient seen and examined this AM at bedside. Pt tolerated liquid yesterday.  Patient is resting comfortably.  Pt had BMs overnight and passing flatus. Denies fever/chills, shortness of breath, chest pain. VS reviewed    Objective:    MEDICATIONS  (STANDING):  acetaminophen   IVPB .. 1000 milliGRAM(s) IV Intermittent every 6 hours  atorvastatin 20 milliGRAM(s) Oral at bedtime  budesonide 160 MICROgram(s)/formoterol 4.5 MICROgram(s) Inhaler 2 Puff(s) Inhalation two times a day  dextrose 40% Gel 15 Gram(s) Oral once  dextrose 5%. 1000 milliLiter(s) (50 mL/Hr) IV Continuous <Continuous>  dextrose 5%. 1000 milliLiter(s) (100 mL/Hr) IV Continuous <Continuous>  dextrose 50% Injectable 25 Gram(s) IV Push once  dextrose 50% Injectable 12.5 Gram(s) IV Push once  dextrose 50% Injectable 25 Gram(s) IV Push once  dronedarone 400 milliGRAM(s) Oral two times a day  DULoxetine 60 milliGRAM(s) Oral daily  enoxaparin Injectable 100 milliGRAM(s) SubCutaneous every 12 hours  fluticasone propionate 50 MICROgram(s)/spray Nasal Spray 1 Spray(s) Both Nostrils two times a day  glucagon  Injectable 1 milliGRAM(s) IntraMuscular once  insulin glargine Injectable (LANTUS) 10 Unit(s) SubCutaneous at bedtime  insulin lispro (ADMELOG) corrective regimen sliding scale   SubCutaneous three times a day before meals  levothyroxine 25 MICROGram(s) Oral daily  losartan 25 milliGRAM(s) Oral daily  methylPREDNISolone sodium succinate Injectable 40 milliGRAM(s) IV Push every 8 hours  oxybutynin 5 milliGRAM(s) Oral daily  pantoprazole  Injectable 40 milliGRAM(s) IV Push daily  pregabalin 100 milliGRAM(s) Oral three times a day  sodium chloride 0.9%. 1000 milliLiter(s) (80 mL/Hr) IV Continuous <Continuous>  warfarin 4 milliGRAM(s) Oral <User Schedule>  warfarin 5 milliGRAM(s) Oral <User Schedule>    MEDICATIONS  (PRN):  ALBUTerol    90 MICROgram(s) HFA Inhaler 2 Puff(s) Inhalation every 6 hours PRN Bronchospasm  benzocaine 15 mG/menthol 3.6 mG (Sugar-Free) Lozenge 1 Lozenge Oral every 3 hours PRN Sore Throat  methocarbamol 750 milliGRAM(s) Oral every 6 hours PRN Muscle Spasm  morphine  - Injectable 2 milliGRAM(s) IV Push every 4 hours PRN Severe Pain (7 - 10)  ondansetron Injectable 4 milliGRAM(s) IV Push every 6 hours PRN Nausea and/or Vomiting    Vital Signs Last 24 Hrs  T(C): 37.2 (02 Dec 2021 09:06), Max: 37.8 (01 Dec 2021 23:59)  T(F): 99 (02 Dec 2021 09:06), Max: 100.1 (01 Dec 2021 23:59)  HR: 125 (02 Dec 2021 05:21) (62 - 125)  BP: 159/92 (02 Dec 2021 09:06) (140/75 - 178/91)  BP(mean): 109 (02 Dec 2021 09:06) (98 - 109)  RR: 16 (02 Dec 2021 09:06) (16 - 20)  SpO2: 94% (02 Dec 2021 09:06) (92% - 96%)    PHYSICAL EXAM   Gen: NAD, cooperative   HEENT: supple, no JVD, EOMI  Lungs: CTA b/l, aerating well   CV: S1 and S2 present, tachycardic   Abd: BS present, Distended, nontender, abdominal wound dressing in place .  Ext: weakness of the RUE and RLE, no cynosis, No edema   Skin: warm, dry    I&O's Detail    LABS:      Ca    8.8        01 Dec 2021 07:39      PT/INR - ( 01 Dec 2021 09:57 )   PT: 19.3 sec;   INR: 1.71 ratio

## 2021-12-02 NOTE — CONSULT NOTE ADULT - SUBJECTIVE AND OBJECTIVE BOX
HPI:  70 y/o female with a PMHx of Afib, CVA, COPD, DM, diverticulitis, DVT, HTN neuropathy presents to the ED BIBA from [facility] c/o abd pain, abd distention and n/v/d x3 days. Pt reports her emesis and BMs are green. No other complaints at this time. Patient reports she had BM in the ED. patient reports she had nausea and vomiting since friday. Patient reports she has abdominal distension and pain. Patient denies CP, SOB, dizziness, and headache.  (2021 17:24)    History as above. Patient c/o sob. Has audible wwheezing.      PAST MEDICAL & SURGICAL HISTORY:  History of atrial fibrillation    HTN (hypertension)    Diabetes mellitus    Cerebrovascular accident (CVA)    DVT of lower limb, acute    CVA (cerebral vascular accident)    COPD (chronic obstructive pulmonary disease)    Neuropathy    Diverticulitis    History of colostomy reversal    History of colostomy reversal    S/P colostomy    S/P     S/P hip replacement        MEDICATIONS  (STANDING):  acetaminophen   IVPB .. 1000 milliGRAM(s) IV Intermittent every 6 hours  atorvastatin 20 milliGRAM(s) Oral at bedtime  budesonide 160 MICROgram(s)/formoterol 4.5 MICROgram(s) Inhaler 2 Puff(s) Inhalation two times a day  dextrose 40% Gel 15 Gram(s) Oral once  dextrose 5%. 1000 milliLiter(s) (50 mL/Hr) IV Continuous <Continuous>  dextrose 5%. 1000 milliLiter(s) (100 mL/Hr) IV Continuous <Continuous>  dextrose 50% Injectable 25 Gram(s) IV Push once  dextrose 50% Injectable 12.5 Gram(s) IV Push once  dextrose 50% Injectable 25 Gram(s) IV Push once  dronedarone 400 milliGRAM(s) Oral two times a day  DULoxetine 60 milliGRAM(s) Oral daily  enoxaparin Injectable 100 milliGRAM(s) SubCutaneous every 12 hours  fluticasone propionate 50 MICROgram(s)/spray Nasal Spray 1 Spray(s) Both Nostrils two times a day  glucagon  Injectable 1 milliGRAM(s) IntraMuscular once  insulin glargine Injectable (LANTUS) 10 Unit(s) SubCutaneous at bedtime  insulin lispro (ADMELOG) corrective regimen sliding scale   SubCutaneous three times a day before meals  levothyroxine 25 MICROGram(s) Oral daily  losartan 25 milliGRAM(s) Oral daily  methylPREDNISolone sodium succinate Injectable 40 milliGRAM(s) IV Push every 8 hours  oxybutynin 5 milliGRAM(s) Oral daily  pantoprazole  Injectable 40 milliGRAM(s) IV Push daily  pregabalin 100 milliGRAM(s) Oral three times a day  sodium chloride 0.9%. 1000 milliLiter(s) (80 mL/Hr) IV Continuous <Continuous>  warfarin 4 milliGRAM(s) Oral <User Schedule>  warfarin 5 milliGRAM(s) Oral <User Schedule>    MEDICATIONS  (PRN):  ALBUTerol    90 MICROgram(s) HFA Inhaler 2 Puff(s) Inhalation every 6 hours PRN Bronchospasm  benzocaine 15 mG/menthol 3.6 mG (Sugar-Free) Lozenge 1 Lozenge Oral every 3 hours PRN Sore Throat  methocarbamol 750 milliGRAM(s) Oral every 6 hours PRN Muscle Spasm  morphine  - Injectable 2 milliGRAM(s) IV Push every 4 hours PRN Severe Pain (7 - 10)  ondansetron Injectable 4 milliGRAM(s) IV Push every 6 hours PRN Nausea and/or Vomiting      Allergies    Cipro (Rash)  Spiriva (Rash)    Intolerances        SOCIAL HISTORY: Denies tobacco, etoh abuse or illicit drug use    FAMILY HISTORY:      Vital Signs Last 24 Hrs  T(C): 37.3 (02 Dec 2021 05:21), Max: 37.8 (01 Dec 2021 23:59)  T(F): 99.2 (02 Dec 2021 05:21), Max: 100.1 (01 Dec 2021 23:59)  HR: 125 (02 Dec 2021 05:21) (62 - 125)  BP: 145/86 (02 Dec 2021 05:21) (140/75 - 178/91)  BP(mean): 98 (01 Dec 2021 09:10) (98 - 98)  RR: 18 (02 Dec 2021 05:21) (16 - 20)  SpO2: 95% (02 Dec 2021 05:21) (92% - 96%)    REVIEW OF SYSTEMS:    CONSTITUTIONAL:  As per HPI.  SKIN: no rashes  HEENT:  Eyes:  No diplopia or blurred vision. ENT:  No earache, sore throat or runny nose.  CARDIOVASCULAR:  No pressure, squeezing, tightness, heaviness or aching about the chest, neck, axilla or epigastrium.  RESPIRATORY:  No cough, shortness of breath, PND or orthopnea.  GASTROINTESTINAL: N+V  GENITOURINARY:  No dysuria, frequency or urgency.  MUSCULOSKELETAL:  As per HPI.  SKIN:  No change in skin, hair or nails.  NEUROLOGIC:  No paresthesias, fasciculations, seizures or weakness.  PSYCHIATRIC:  No disorder of thought or mood.  ENDOCRINE:  No heat or cold intolerance, polyuria or polydipsia.  HEMATOLOGICAL:  No easy bruising or bleedings:  .     PHYSICAL EXAMINATION:    GENERAL APPEARANCE:  Pt. is not currently dyspneic, in no distress. Pt. is alert, oriented, and pleasant.  HEENT:  Pupils are normal and react normally. No icterus. Mucous membranes well colored.  NECK:  Supple. No lymphadenopathy. Jugular venous pressure not elevated. Carotids equal.   HEART:   The cardiac impulse has a normal quality. Regular. Normal S1 and S2. There are no murmurs, rubs or gallops noted  CHEST:  bilat exp ronchi  ABDOMEN:  Soft and nontender. distended  EXTREMITIES:  There is no cyanosis, clubbing or edema.   SKIN:  No rash or significant lesions are noted.    LABS:                        11.7   14.88 )-----------( 319      ( 01 Dec 2021 07:39 )             38.1     12-    145  |  111<H>  |  15  ----------------------------<  149<H>  3.1<L>   |  24  |  0.64    Ca    8.8      01 Dec 2021 07:39  Phos  2.1     12  Mg     2.3     12        PT/INR - ( 01 Dec 2021 09:57 )   PT: 19.3 sec;   INR: 1.71 ratio                     RADIOLOGY & ADDITIONAL STUDIES:

## 2021-12-02 NOTE — CDI QUERY NOTE - NSCDIOTHERTXTBX_GEN_ALL_CORE_HH
Clinical documentation indicates that this patient has a history of atrial fibrillation.  Please further specify:    -Chronic:  -Paroxysmal:   -Other:  -Unable to determine    SUPPORTING DOCUMENTATION AND/OR CLINICAL EVIDENCE:    EKG:< from: 12 Lead ECG (11.29.21 @ 12:30) >Diagnosis Line Sinus rhythm withPremature supraventricular complexes  Nonspecific ST and T wave abnormality  Abnormal ECGWhen compared with ECG of 21-APR-2021 19:34,  Premature supraventricular complexes are now PresentT wave inversion now evident in Anterior leadsConfirmed by RENITA SOLIS MD ([id number]) on 11/29/2021 1:04:59 PM      dronedarone 400 milliGRAM(s) Oral two times a daylosartan 25 milliGRAM(s) Oral daily  	  dronedarone 400 milliGRAM(s) Oral (12-02-21 @ 09:19) 400 milliGRAM(s) Oral (12-01-21 @ 22:04) 400 milliGRAM(s) Oral (12-01-21 @ 11:08)    losartan 25 milliGRAM(s) Oral (12-02-21 @ 09:19) 25 milliGRAM(s) Oral (12-01-21 @ 18:53)    PN Hospitalist HPI: 71F,  who resides a [facility], with a pmh afib on coumadin long term, 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  Per pt her emesis and BM's were green. Denies f/c/r, denies CP/SOB/dizziness/Headaches.  Seen By Surgery.  Imaging + for SBO, NGT was placed to LWS, however pt pulled out her NGT and refused to have it replaced. Her diet is slowly being advanced.     pt seen and examined this am. HAving productive cough/wheeze. Low grade temps early this am. denies pain    # H/o afib CV#6/DVT/h/o CVA with left sided weaknesson coumadin/MultaqAC services consult appreciated HR fast, will check ekg.

## 2021-12-02 NOTE — PROGRESS NOTE ADULT - ASSESSMENT
70 yo F presents with nausea and vomiting with hx of ventral hernia and SBO  SBO resolving, treated conservatively, NG tube removed, bowel movement overnight     Plan:   monitor vitals   pain control   nausea control   advance to LRD today  monitor BF  Serial abd exam  IVF hydration   med rec  resume AC warfarin and therapeutic Lovenox  Home o2 continue NC   Encourage IS/OOB/ambulation  Dispo: plan for discharge to nursing home tomorrow    Plan discussed with surgery team and attending, Dr. Harley

## 2021-12-02 NOTE — PROGRESS NOTE ADULT - SUBJECTIVE AND OBJECTIVE BOX
HPI:  70 y/o female with a PMHx of Afib on coumadin,[id number] Score: 6  , CVA, COPD, DM, diverticulitis, DVT, HTN neuropathy presents to the ED BIBA from [facility] c/o abd pain, abd distention and n/v/d x3 days. Pt reports her emesis and BMs are green. No other complaints at this time. Patient reports she had BM in the ED. patient reports she had nausea and vomiting since . Patient reports she has abdominal distension and pain. Patient denies CP, SOB, dizziness, and headache.  (2021 17:24)      Patient is a 71y old  Female who presents with a chief complaint of abdominal pain with n/v and diarrhea.  Pt has a hx of SOB and ventral hernia repair.  NGT  was placed yesterday and now it is out.  Pt having clear liquids and asper surg note pt refused to have NGT  reinserted.      Consulted by Dr. Manjeet Harley for VTE prophylaxis, risk stratification, and anticoagulation management. Asper Surg note they want pt to resume her coumadin today.     PAST MEDICAL & SURGICAL HISTORY:  History of atrial fibrillation on coumadin    HTN (hypertension)    Diabetes mellitus    Cerebrovascular accident (CVA)    DVT of lower limb    CVA (cerebral vascular accident)    COPD (chronic obstructive pulmonary disease)    Neuropathy    Diverticulitis    History of colostomy reversal    History of colostomy reversal    S/P colostomy    S/P     S/P hip replacement        FAMILY HISTORY:      Interval Note:  2021 Pt seen at bedside on [facility].   PT alert an oriented x 3.  Discussed her resuming her coumadin tonight.  States she dose not want to go back to [facility].  She wants to stay here. Pt informed she will need to go to an assisted living once stable. Pt states she has not been able to walk since she had her CVA  ABOUT 1 1/2 YEARS AGO.    21: Patient seen at bedside with increased shortness of breath, reporting productive cough. Denies nausea. Tolerating diet thus far. Dressing- abd CDI, wound without drainage.       IMPROVE VTE Individual Risk Assessment    RISK                                                                Points    [x  ] Previous VTE                                                  3    [  ] Thrombophilia                                               2    [x  ] Lower limb paralysis                                      2        (unable to hold up >15 seconds)      [  ] Current Cancer                                              2         (within 6 months)    [ x ] Immobilization > 24 hrs                                1    [  ] ICU/CCU stay > 24 hours                              1    [ x ] Age > 60                                                      1    IMPROVE VTE Score ___7______    IMPROVE Score 0-1: Low Risk, No VTE prophylaxis required for most patients, encourage ambulation.   IMPROVE Score 2-3: At risk, pharmacologic VTE prophylaxis is indicated for most patients (in the absence of a contraindication)  IMPROVE Score > or = 4: High Risk, pharmacologic VTE prophylaxis is indicated for most patients (in the absence of a contraindication)      [id number] Score: 6    IMPROVE Bleeding Risk Score: 1.5    Falls Risk:   High ( x )  Mod (  )  Low (  )  crcl:  68.7       cr: .64         BMI               Denies any personal or familial history of clotting or bleeding disorders.    Allergies    Cipro (Rash)  Spiriva (Rash)    Intolerances        REVIEW OF SYSTEMS    (  )Fever	     (  )Constipation	(  )SOB				(  )Headache	(  )Dysuria  (  )Chills	     (  )Melena	(  )Dyspnea present on exertion	                    (  )Dizziness                    (  )Polyuria  (  )Nausea	     (  )Hematochezia	(  )Cough			                    (  )Syncope   	(  )Hematuria  (  )Vomiting    (  )Chest Pain	(  )Wheezing			(x  )Weakness  (x)  abdominal pain  (  )Diarrhea     (  )Palpitations	(  )Anorexia			(  )Myalgia    Pertinent positives in HPI and daily subjective.  All other ROS negative.    Vital Signs Last 24 Hrs  T(C): 37.2 (21 @ 09:06), Max: 37.8 (21 @ 23:59)  T(F): 99 (21 @ 09:06), Max: 100.1 (21 @ 23:59)  HR: 125 (21 @ 05:21) (62 - 125)  BP: 159/92 (21 @ 09:06) (140/75 - 178/91)  BP(mean): 109 (21 @ 09:06) (109 - 109)  RR: 16 (21 @ 09:06) (16 - 20)  SpO2: 94% (21 @ 09:06) (92% - 95%)    PHYSICAL EXAM:    Constitutional: Appears frail    Neurological: A& O x 3    Skin: Warm    Respiratory and Thorax: normal effort; Breath sounds: normal; No rales/wheezing/rhonchi  	  Cardiovascular: S1, S2, regular, NMBR	    Gastrointestinal: BS + x 4Q, distended abdomen, dressing without drainage, beneath wound unhealed not approximated, but without drainage	    Musculoskeletal:   General Right:   no muscle/joint tenderness,   normal tone, no joint swelling,   ROM: limited	    General Left:   no muscle/joint tenderness,   normal tone, no joint swelling,   ROM: limited      Lower extrems:   Right: no calf tenderness              negative shara's sign               + pedal pulses    Left:   no calf tenderness              negative shara's sign               + pedal pulses                          13.6   12.87 )-----------( 374      ( 02 Dec 2021 09:16 )             44.4       12-    145  |  111<H>  |  16  ----------------------------<  217<H>  3.3<L>   |  22  |  0.89    Ca    9.1      02 Dec 2021 09:16  Phos  2.6     12-02  Mg     2.2     12-        PT/INR - ( 02 Dec 2021 09:16 )   PT: 16.5 sec;   INR: 1.43 ratio         PTT - ( 02 Dec 2021 09:16 )  PTT:25.6 sec                        11.7   14.88 )-----------( 319      ( 01 Dec 2021 07:39 )             38.1       12-    145  |  111<H>  |  15  ----------------------------<  149<H>  3.1<L>   |  24  |  0.64    Ca    8.8      01 Dec 2021 07:39  Phos  2.1     12-  Mg     2.3     12-    TPro  7.6  /  Alb  2.7<L>  /  TBili  0.5  /  DBili  x   /  AST  15  /  ALT  22  /  AlkPhos  63  11-29    PT/INR - ( 02 Dec 2021 09:16 )   PT: 16.5 sec;   INR: 1.43 ratio    PT/INR - ( 01 Dec 2021 09:57 )   PT: 19.3 sec;   INR: 1.71 ratio         MEDICATIONS  (STANDING):  acetaminophen   IVPB .. 1000 milliGRAM(s) IV Intermittent every 6 hours  atorvastatin 20 milliGRAM(s) Oral at bedtime  budesonide 160 MICROgram(s)/formoterol 4.5 MICROgram(s) Inhaler 2 Puff(s) Inhalation two times a day  dextrose 40% Gel 15 Gram(s) Oral once  dextrose 5%. 1000 milliLiter(s) IV Continuous <Continuous>  dextrose 5%. 1000 milliLiter(s) IV Continuous <Continuous>  dextrose 50% Injectable 25 Gram(s) IV Push once  dextrose 50% Injectable 12.5 Gram(s) IV Push once  dextrose 50% Injectable 25 Gram(s) IV Push once  dronedarone 400 milliGRAM(s) Oral two times a day  DULoxetine 60 milliGRAM(s) Oral daily  enoxaparin Injectable 100 milliGRAM(s) SubCutaneous every 12 hours  fluticasone propionate 50 MICROgram(s)/spray Nasal Spray 1 Spray(s) Both Nostrils two times a day  glucagon  Injectable 1 milliGRAM(s) IntraMuscular once  insulin glargine Injectable (LANTUS) 10 Unit(s) SubCutaneous at bedtime  insulin lispro (ADMELOG) corrective regimen sliding scale   SubCutaneous three times a day before meals  levothyroxine 25 MICROGram(s) Oral daily  losartan 25 milliGRAM(s) Oral daily  methylPREDNISolone sodium succinate Injectable 40 milliGRAM(s) IV Push every 8 hours  oxybutynin 5 milliGRAM(s) Oral daily  pantoprazole  Injectable 40 milliGRAM(s) IV Push daily  pregabalin 100 milliGRAM(s) Oral three times a day  warfarin 4 milliGRAM(s) Oral <User Schedule>  warfarin 5 milliGRAM(s) Oral <User Schedule>            DVT Prophylaxis:  LMWH                   ( x )  Heparin SQ           (  )  Coumadin             ( x )  Xarelto                  (  )  Eliquis                   (  )  Venodynes           ( x )  Ambulation          (  )  UFH                       (  )  Contraindicated  (  )  EC Aspirin             (  )

## 2021-12-02 NOTE — PROGRESS NOTE ADULT - ASSESSMENT
This is a Afib on coumadin,[id number] Score: 6  , CVA, COPD, DM, diverticulitis, DVT, HTN neuropathy presents to the ED BIBA from [facility] c/o abd pain, abd distention and n/v/d x3 days.  Pt admitted to [facility] for SBO and NGT  inserted.  NGT out today and pt is on clear liquid.  Pt has high thrombosis risk and requires treatment dose of anticoagulation.    12-1-2021 Spoke with Dr Berrios (surgery) and he states cleared to resume her lovenox over lapping with coumadin.    Plan:  ::Continue Coumadin: 4mg Mon, Wed and Friday, 5mg all the other days. (usual dose)  ::Maintain Lovenox 100mg sq q12h until INR 2.0 or >  ::Daily CBC/PT/INR  ::JANUSZ bentley    Will continue to follow.  Dispo:

## 2021-12-03 LAB
ADD ON TEST-SPECIMEN IN LAB: SIGNIFICANT CHANGE UP
ANION GAP SERPL CALC-SCNC: 10 MMOL/L — SIGNIFICANT CHANGE UP (ref 5–17)
APPEARANCE UR: CLEAR — SIGNIFICANT CHANGE UP
BASOPHILS # BLD AUTO: 0.09 K/UL — SIGNIFICANT CHANGE UP (ref 0–0.2)
BASOPHILS NFR BLD AUTO: 0.6 % — SIGNIFICANT CHANGE UP (ref 0–2)
BILIRUB UR-MCNC: NEGATIVE — SIGNIFICANT CHANGE UP
BUN SERPL-MCNC: 26 MG/DL — HIGH (ref 7–23)
CALCIUM SERPL-MCNC: 9 MG/DL — SIGNIFICANT CHANGE UP (ref 8.5–10.1)
CHLORIDE SERPL-SCNC: 116 MMOL/L — HIGH (ref 96–108)
CO2 SERPL-SCNC: 22 MMOL/L — SIGNIFICANT CHANGE UP (ref 22–31)
COLOR SPEC: YELLOW — SIGNIFICANT CHANGE UP
CREAT SERPL-MCNC: 1.09 MG/DL — SIGNIFICANT CHANGE UP (ref 0.5–1.3)
DIFF PNL FLD: ABNORMAL
EOSINOPHIL # BLD AUTO: 0 K/UL — SIGNIFICANT CHANGE UP (ref 0–0.5)
EOSINOPHIL NFR BLD AUTO: 0 % — SIGNIFICANT CHANGE UP (ref 0–6)
FLU A RESULT: SIGNIFICANT CHANGE UP
FLU A RESULT: SIGNIFICANT CHANGE UP
FLUAV AG NPH QL: SIGNIFICANT CHANGE UP
FLUBV AG NPH QL: SIGNIFICANT CHANGE UP
GLUCOSE BLDC GLUCOMTR-MCNC: 205 MG/DL — HIGH (ref 70–99)
GLUCOSE BLDC GLUCOMTR-MCNC: 207 MG/DL — HIGH (ref 70–99)
GLUCOSE BLDC GLUCOMTR-MCNC: 241 MG/DL — HIGH (ref 70–99)
GLUCOSE BLDC GLUCOMTR-MCNC: 256 MG/DL — HIGH (ref 70–99)
GLUCOSE SERPL-MCNC: 287 MG/DL — HIGH (ref 70–99)
GLUCOSE UR QL: NEGATIVE MG/DL — SIGNIFICANT CHANGE UP
HCT VFR BLD CALC: 42.3 % — SIGNIFICANT CHANGE UP (ref 34.5–45)
HGB BLD-MCNC: 12.7 G/DL — SIGNIFICANT CHANGE UP (ref 11.5–15.5)
IMM GRANULOCYTES NFR BLD AUTO: 3.1 % — HIGH (ref 0–1.5)
INR BLD: 1.76 RATIO — HIGH (ref 0.88–1.16)
KETONES UR-MCNC: NEGATIVE — SIGNIFICANT CHANGE UP
LEUKOCYTE ESTERASE UR-ACNC: ABNORMAL
LYMPHOCYTES # BLD AUTO: 15.4 % — SIGNIFICANT CHANGE UP (ref 13–44)
LYMPHOCYTES # BLD AUTO: 2.2 K/UL — SIGNIFICANT CHANGE UP (ref 1–3.3)
MAGNESIUM SERPL-MCNC: 2.2 MG/DL — SIGNIFICANT CHANGE UP (ref 1.6–2.6)
MCHC RBC-ENTMCNC: 24.4 PG — LOW (ref 27–34)
MCHC RBC-ENTMCNC: 30 GM/DL — LOW (ref 32–36)
MCV RBC AUTO: 81.2 FL — SIGNIFICANT CHANGE UP (ref 80–100)
MONOCYTES # BLD AUTO: 1.27 K/UL — HIGH (ref 0–0.9)
MONOCYTES NFR BLD AUTO: 8.9 % — SIGNIFICANT CHANGE UP (ref 2–14)
NEUTROPHILS # BLD AUTO: 10.33 K/UL — HIGH (ref 1.8–7.4)
NEUTROPHILS NFR BLD AUTO: 72 % — SIGNIFICANT CHANGE UP (ref 43–77)
NITRITE UR-MCNC: NEGATIVE — SIGNIFICANT CHANGE UP
NT-PROBNP SERPL-SCNC: 1858 PG/ML — HIGH (ref 0–125)
PH UR: 5 — SIGNIFICANT CHANGE UP (ref 5–8)
PHOSPHATE SERPL-MCNC: 2.7 MG/DL — SIGNIFICANT CHANGE UP (ref 2.5–4.5)
PLATELET # BLD AUTO: 391 K/UL — SIGNIFICANT CHANGE UP (ref 150–400)
POTASSIUM SERPL-MCNC: 3.5 MMOL/L — SIGNIFICANT CHANGE UP (ref 3.5–5.3)
POTASSIUM SERPL-SCNC: 3.5 MMOL/L — SIGNIFICANT CHANGE UP (ref 3.5–5.3)
PROT UR-MCNC: 100 MG/DL
PROTHROM AB SERPL-ACNC: 19.9 SEC — HIGH (ref 10.6–13.6)
RBC # BLD: 5.21 M/UL — HIGH (ref 3.8–5.2)
RBC # FLD: 20.2 % — HIGH (ref 10.3–14.5)
RSV RESULT: SIGNIFICANT CHANGE UP
RSV RNA RESP QL NAA+PROBE: SIGNIFICANT CHANGE UP
SODIUM SERPL-SCNC: 148 MMOL/L — HIGH (ref 135–145)
SP GR SPEC: 1.01 — SIGNIFICANT CHANGE UP (ref 1.01–1.02)
UROBILINOGEN FLD QL: NEGATIVE MG/DL — SIGNIFICANT CHANGE UP
WBC # BLD: 14.33 K/UL — HIGH (ref 3.8–10.5)
WBC # FLD AUTO: 14.33 K/UL — HIGH (ref 3.8–10.5)

## 2021-12-03 PROCEDURE — 74019 RADEX ABDOMEN 2 VIEWS: CPT | Mod: 26

## 2021-12-03 PROCEDURE — 71045 X-RAY EXAM CHEST 1 VIEW: CPT | Mod: 26

## 2021-12-03 PROCEDURE — 99233 SBSQ HOSP IP/OBS HIGH 50: CPT

## 2021-12-03 PROCEDURE — 99231 SBSQ HOSP IP/OBS SF/LOW 25: CPT

## 2021-12-03 PROCEDURE — 93010 ELECTROCARDIOGRAM REPORT: CPT

## 2021-12-03 RX ORDER — SODIUM CHLORIDE 9 MG/ML
1000 INJECTION INTRAMUSCULAR; INTRAVENOUS; SUBCUTANEOUS
Refills: 0 | Status: COMPLETED | OUTPATIENT
Start: 2021-12-03 | End: 2021-12-03

## 2021-12-03 RX ORDER — METOPROLOL TARTRATE 50 MG
5 TABLET ORAL EVERY 6 HOURS
Refills: 0 | Status: DISCONTINUED | OUTPATIENT
Start: 2021-12-03 | End: 2021-12-03

## 2021-12-03 RX ORDER — CEFEPIME 1 G/1
2000 INJECTION, POWDER, FOR SOLUTION INTRAMUSCULAR; INTRAVENOUS ONCE
Refills: 0 | Status: COMPLETED | OUTPATIENT
Start: 2021-12-03 | End: 2021-12-03

## 2021-12-03 RX ORDER — METOPROLOL TARTRATE 50 MG
50 TABLET ORAL EVERY 6 HOURS
Refills: 0 | Status: DISCONTINUED | OUTPATIENT
Start: 2021-12-03 | End: 2021-12-03

## 2021-12-03 RX ORDER — ACETAMINOPHEN 500 MG
1000 TABLET ORAL ONCE
Refills: 0 | Status: COMPLETED | OUTPATIENT
Start: 2021-12-03 | End: 2021-12-03

## 2021-12-03 RX ORDER — AMIODARONE HYDROCHLORIDE 400 MG/1
400 TABLET ORAL EVERY 8 HOURS
Refills: 0 | Status: DISCONTINUED | OUTPATIENT
Start: 2021-12-03 | End: 2021-12-08

## 2021-12-03 RX ORDER — INSULIN LISPRO 100/ML
6 VIAL (ML) SUBCUTANEOUS
Refills: 0 | Status: DISCONTINUED | OUTPATIENT
Start: 2021-12-03 | End: 2021-12-08

## 2021-12-03 RX ORDER — CEFEPIME 1 G/1
2000 INJECTION, POWDER, FOR SOLUTION INTRAMUSCULAR; INTRAVENOUS EVERY 12 HOURS
Refills: 0 | Status: DISCONTINUED | OUTPATIENT
Start: 2021-12-03 | End: 2021-12-08

## 2021-12-03 RX ORDER — FUROSEMIDE 40 MG
40 TABLET ORAL ONCE
Refills: 0 | Status: COMPLETED | OUTPATIENT
Start: 2021-12-03 | End: 2021-12-03

## 2021-12-03 RX ORDER — ACETAMINOPHEN 500 MG
1000 TABLET ORAL ONCE
Refills: 0 | Status: DISCONTINUED | OUTPATIENT
Start: 2021-12-03 | End: 2021-12-04

## 2021-12-03 RX ORDER — CEFEPIME 1 G/1
INJECTION, POWDER, FOR SOLUTION INTRAMUSCULAR; INTRAVENOUS
Refills: 0 | Status: DISCONTINUED | OUTPATIENT
Start: 2021-12-03 | End: 2021-12-08

## 2021-12-03 RX ORDER — ACETAMINOPHEN 500 MG
650 TABLET ORAL EVERY 6 HOURS
Refills: 0 | Status: DISCONTINUED | OUTPATIENT
Start: 2021-12-03 | End: 2021-12-08

## 2021-12-03 RX ADMIN — ENOXAPARIN SODIUM 100 MILLIGRAM(S): 100 INJECTION SUBCUTANEOUS at 22:31

## 2021-12-03 RX ADMIN — Medication 40 MILLIGRAM(S): at 08:16

## 2021-12-03 RX ADMIN — Medication 6: at 08:35

## 2021-12-03 RX ADMIN — Medication 4 UNIT(S): at 13:00

## 2021-12-03 RX ADMIN — Medication 400 MILLIGRAM(S): at 08:17

## 2021-12-03 RX ADMIN — Medication 40 MILLIGRAM(S): at 22:31

## 2021-12-03 RX ADMIN — SODIUM CHLORIDE 250 MILLILITER(S): 9 INJECTION INTRAMUSCULAR; INTRAVENOUS; SUBCUTANEOUS at 14:47

## 2021-12-03 RX ADMIN — MORPHINE SULFATE 2 MILLIGRAM(S): 50 CAPSULE, EXTENDED RELEASE ORAL at 10:58

## 2021-12-03 RX ADMIN — ALBUTEROL 2 PUFF(S): 90 AEROSOL, METERED ORAL at 06:48

## 2021-12-03 RX ADMIN — ENOXAPARIN SODIUM 100 MILLIGRAM(S): 100 INJECTION SUBCUTANEOUS at 10:42

## 2021-12-03 RX ADMIN — Medication 50 MILLIGRAM(S): at 14:25

## 2021-12-03 RX ADMIN — WARFARIN SODIUM 4 MILLIGRAM(S): 2.5 TABLET ORAL at 22:31

## 2021-12-03 RX ADMIN — Medication 25 MICROGRAM(S): at 06:21

## 2021-12-03 RX ADMIN — Medication 1000 MILLIGRAM(S): at 08:17

## 2021-12-03 RX ADMIN — Medication 1 SPRAY(S): at 10:42

## 2021-12-03 RX ADMIN — Medication 40 MILLIGRAM(S): at 06:21

## 2021-12-03 RX ADMIN — LOSARTAN POTASSIUM 25 MILLIGRAM(S): 100 TABLET, FILM COATED ORAL at 10:42

## 2021-12-03 RX ADMIN — Medication 4 UNIT(S): at 08:35

## 2021-12-03 RX ADMIN — Medication 5 MILLIGRAM(S): at 10:42

## 2021-12-03 RX ADMIN — HUMAN INSULIN 16 UNIT(S): 100 INJECTION, SUSPENSION SUBCUTANEOUS at 22:32

## 2021-12-03 RX ADMIN — AMIODARONE HYDROCHLORIDE 400 MILLIGRAM(S): 400 TABLET ORAL at 17:47

## 2021-12-03 RX ADMIN — Medication 100 MILLIGRAM(S): at 14:25

## 2021-12-03 RX ADMIN — Medication 100 MILLIGRAM(S): at 06:21

## 2021-12-03 RX ADMIN — MORPHINE SULFATE 2 MILLIGRAM(S): 50 CAPSULE, EXTENDED RELEASE ORAL at 10:43

## 2021-12-03 RX ADMIN — Medication 100 MILLIGRAM(S): at 22:34

## 2021-12-03 RX ADMIN — Medication 4: at 18:15

## 2021-12-03 RX ADMIN — ATORVASTATIN CALCIUM 20 MILLIGRAM(S): 80 TABLET, FILM COATED ORAL at 22:31

## 2021-12-03 RX ADMIN — DRONEDARONE 400 MILLIGRAM(S): 400 TABLET, FILM COATED ORAL at 10:42

## 2021-12-03 RX ADMIN — AMIODARONE HYDROCHLORIDE 400 MILLIGRAM(S): 400 TABLET ORAL at 22:31

## 2021-12-03 RX ADMIN — PANTOPRAZOLE SODIUM 40 MILLIGRAM(S): 20 TABLET, DELAYED RELEASE ORAL at 10:42

## 2021-12-03 RX ADMIN — Medication 40 MILLIGRAM(S): at 14:25

## 2021-12-03 RX ADMIN — Medication 4: at 12:59

## 2021-12-03 RX ADMIN — Medication 6 UNIT(S): at 18:16

## 2021-12-03 RX ADMIN — DULOXETINE HYDROCHLORIDE 60 MILLIGRAM(S): 30 CAPSULE, DELAYED RELEASE ORAL at 10:42

## 2021-12-03 RX ADMIN — CEFEPIME 100 MILLIGRAM(S): 1 INJECTION, POWDER, FOR SOLUTION INTRAMUSCULAR; INTRAVENOUS at 18:17

## 2021-12-03 RX ADMIN — CEFEPIME 100 MILLIGRAM(S): 1 INJECTION, POWDER, FOR SOLUTION INTRAMUSCULAR; INTRAVENOUS at 10:42

## 2021-12-03 NOTE — PROGRESS NOTE ADULT - SUBJECTIVE AND OBJECTIVE BOX
HPI:  70 y/o female with a PMHx of Afib on coumadin,[id number] Score: 6  , CVA, COPD, DM, diverticulitis, DVT, HTN neuropathy presents to the ED BIBA from [facility] c/o abd pain, abd distention and n/v/d x3 days. Pt reports her emesis and BMs are green. No other complaints at this time. Patient reports she had BM in the ED. patient reports she had nausea and vomiting since . Patient reports she has abdominal distension and pain. Patient denies CP, SOB, dizziness, and headache.  (2021 17:24)      Patient is a 71y old  Female who presents with a chief complaint of abdominal pain with n/v and diarrhea.  Pt has a hx of SOB and ventral hernia repair.  NGT  was placed yesterday and now it is out.  Pt having clear liquids and asper surg note pt refused to have NGT  reinserted.      Consulted by Dr. Manjeet Harley for VTE prophylaxis, risk stratification, and anticoagulation management. Asper Surg note they want pt to resume her coumadin today.     PAST MEDICAL & SURGICAL HISTORY:  History of atrial fibrillation on coumadin    HTN (hypertension)    Diabetes mellitus    Cerebrovascular accident (CVA)    DVT of lower limb    CVA (cerebral vascular accident)    COPD (chronic obstructive pulmonary disease)    Neuropathy    Diverticulitis    History of colostomy reversal    History of colostomy reversal    S/P colostomy    S/P     S/P hip replacement        FAMILY HISTORY:      Interval Note:  2021 Pt seen at bedside on [facility].   PT alert an oriented x 3.  Discussed her resuming her coumadin tonight.  States she dose not want to go back to [facility].  She wants to stay here. Pt informed she will need to go to an assisted living once stable. Pt states she has not been able to walk since she had her CVA  ABOUT 1 1/2 YEARS AGO.    21: Patient seen at bedside with increased shortness of breath, reporting productive cough. Denies nausea. Tolerating diet thus far. Dressing- abd CDI, wound without drainage.   12-3-2021 Pt seen at bedside on [facility].  Pt not as verbal as before and states her abdomen hurts a little more, Denies SOB/NAUSEA OR CP now.      IMPROVE VTE Individual Risk Assessment    RISK                                                                Points    [x  ] Previous VTE                                                  3    [  ] Thrombophilia                                               2    [x  ] Lower limb paralysis                                      2        (unable to hold up >15 seconds)      [  ] Current Cancer                                              2         (within 6 months)    [ x ] Immobilization > 24 hrs                                1    [  ] ICU/CCU stay > 24 hours                              1    [ x ] Age > 60                                                      1    IMPROVE VTE Score ___7______    IMPROVE Score 0-1: Low Risk, No VTE prophylaxis required for most patients, encourage ambulation.   IMPROVE Score 2-3: At risk, pharmacologic VTE prophylaxis is indicated for most patients (in the absence of a contraindication)  IMPROVE Score > or = 4: High Risk, pharmacologic VTE prophylaxis is indicated for most patients (in the absence of a contraindication)      [id number] Score: 6    IMPROVE Bleeding Risk Score: 1.5    Falls Risk:   High ( x )  Mod (  )  Low (  )  crcl:  68.7       cr: .64         BMI 39.0           Denies any personal or familial history of clotting or bleeding disorders.    Allergies    Cipro (Rash)  Spiriva (Rash)    Intolerances        REVIEW OF SYSTEMS    (  )Fever	     (  )Constipation	(  )SOB				(  )Headache	(  )Dysuria  (  )Chills	     (  )Melena	(  )Dyspnea present on exertion	                    (  )Dizziness                    (  )Polyuria  (  )Nausea	     (  )Hematochezia	(  )Cough			                    (  )Syncope   	(  )Hematuria  (  )Vomiting    (  )Chest Pain	(  )Wheezing			(x  )Weakness  (x)  abdominal pain  (  )Diarrhea     (  )Palpitations	(  )Anorexia			( x )Myalgia    Pertinent positives in HPI and daily subjective.  All other ROS negative.    Vital Signs Last 24 Hrs  T(C): 38.7 (21 @ 08:00), Max: 38.8 (21 @ 22:44)  T(F): 101.6 (21 @ 08:00), Max: 101.8 (21 @ 22:44)  HR: 128 (21 @ 08:00) (109 - 130)  BP: 175/75 (21 @ 08:00) (146/70 - 175/75)  BP(mean): --  RR: 30 (21 @ 08:00) (16 - 30)  SpO2: 97% (21 @ 08:00) (92% - 97%)    PHYSICAL EXAM:    Constitutional: Appears frail    Neurological: A& O x 3    Skin: Warm    Respiratory and Thorax: normal effort; Breath sounds: normal; No rales/wheezing/rhonchi  	  Cardiovascular: S1, S2, regular, NMBR	    Gastrointestinal: BS + x 4Q, distended abdomen, dressing without drainage, beneath wound unhealed not approximated, but without drainage	    Musculoskeletal:   General Right:   no muscle/joint tenderness,   normal tone, no joint swelling,   ROM: limited	    General Left:   no muscle/joint tenderness,   normal tone, no joint swelling,   ROM: limited      Lower extrems:   Right: no calf tenderness              negative shara's sign               + pedal pulses    Left:   no calf tenderness              negative shara's sign               + pedal pulses                                   12.7   14.33 )-----------( 391      ( 03 Dec 2021 08:00 )             42.3       12-03    148<H>  |  116<H>  |  26<H>  ----------------------------<  287<H>  3.5   |  22  |  1.09    Ca    9.0      03 Dec 2021 08:00  Phos  2.7     12-03  Mg     2.2     12-03       PTT - ( 02 Dec 2021 09:16 )  PTT:25.6 sec             13.6   12.87 )-----------( 374      ( 02 Dec 2021 09:16 )             44.4       12-    145  |  111<H>  |  16  ----------------------------<  217<H>  3.3<L>   |  22  |  0.89    Ca    9.1      02 Dec 2021 09:16  Phos  2.6     12-02  Mg     2.2     12-02        PT/INR - ( 02 Dec 2021 09:16 )   PT: 16.5 sec;   INR: 1.43 ratio         PTT - ( 02 Dec 2021 09:16 )  PTT:25.6 sec                        11.7   14.88 )-----------( 319      ( 01 Dec 2021 07:39 )             38.1       12-    145  |  111<H>  |  15  ----------------------------<  149<H>  3.1<L>   |  24  |  0.64    Ca    8.8      01 Dec 2021 07:39  Phos  2.1     12  Mg     2.3         TPro  7.6  /  Alb  2.7<L>  /  TBili  0.5  /  DBili  x   /  AST  15  /  ALT  22  /  AlkPhos  63  11-29      PT/INR - ( 03 Dec 2021 09:39 )   PT: 19.9 sec;   INR: 1.76 ratio    PT/INR - ( 02 Dec 2021 09:16 )   PT: 16.5 sec;   INR: 1.43 ratio    PT/INR - ( 01 Dec 2021 09:57 )   PT: 19.3 sec;   INR: 1.71 ratio         MEDICATIONS  (STANDING):  acetaminophen   IVPB .. 1000 milliGRAM(s) IV Intermittent every 6 hours  atorvastatin 20 milliGRAM(s) Oral at bedtime  budesonide 160 MICROgram(s)/formoterol 4.5 MICROgram(s) Inhaler 2 Puff(s) Inhalation two times a day  cefepime   IVPB      cefepime   IVPB 2000 milliGRAM(s) IV Intermittent every 12 hours  dextrose 40% Gel 15 Gram(s) Oral once  dextrose 5%. 1000 milliLiter(s) IV Continuous <Continuous>  dextrose 5%. 1000 milliLiter(s) IV Continuous <Continuous>  dextrose 50% Injectable 25 Gram(s) IV Push once  dextrose 50% Injectable 12.5 Gram(s) IV Push once  dextrose 50% Injectable 25 Gram(s) IV Push once  dronedarone 400 milliGRAM(s) Oral two times a day  DULoxetine 60 milliGRAM(s) Oral daily  enoxaparin Injectable 100 milliGRAM(s) SubCutaneous every 12 hours  fluticasone propionate 50 MICROgram(s)/spray Nasal Spray 1 Spray(s) Both Nostrils two times a day  glucagon  Injectable 1 milliGRAM(s) IntraMuscular once  insulin lispro (ADMELOG) corrective regimen sliding scale   SubCutaneous three times a day before meals  insulin lispro Injectable (ADMELOG) 4 Unit(s) SubCutaneous three times a day before meals  insulin NPH human recombinant 16 Unit(s) SubCutaneous at bedtime  levothyroxine 25 MICROGram(s) Oral daily  losartan 25 milliGRAM(s) Oral daily  methylPREDNISolone sodium succinate Injectable 40 milliGRAM(s) IV Push every 8 hours  metoprolol tartrate 50 milliGRAM(s) Oral every 6 hours  oxybutynin 5 milliGRAM(s) Oral daily  pantoprazole  Injectable 40 milliGRAM(s) IV Push daily  pregabalin 100 milliGRAM(s) Oral three times a day  sodium chloride 0.9%. 1000 milliLiter(s) IV Continuous <Continuous>  warfarin 4 milliGRAM(s) Oral <User Schedule>  warfarin 5 milliGRAM(s) Oral <User Schedule>              DVT Prophylaxis:  LMWH                   ( x )  Heparin SQ           (  )  Coumadin             ( x )  Xarelto                  (  )  Eliquis                   (  )  Venodynes           ( x )  Ambulation          (  )  UFH                       (  )  Contraindicated  (  )  EC Aspirin             (  )

## 2021-12-03 NOTE — SWALLOW BEDSIDE ASSESSMENT ADULT - SWALLOW EVAL: RECOMMENDED FEEDING/EATING TECHNIQUES
allow for swallow between intakes/alternate food with liquid/crush medication (when feasible)/hard swallow w/ each bite or sip/maintain upright posture during/after eating for 30 mins/no straws/small sips/bites

## 2021-12-03 NOTE — CONSULT NOTE ADULT - ASSESSMENT
patient on 2N with SBO s/p reversal of colostomy with ventral hernia, now with suspected Aspiration pneumonia with fever, elevated WBC, hypoxia and tachycardia  tachycardia and PAFib a result of concomitant medical conditions.   Will need fluids and antibiotics; check and replete electrolytes.  Get ECHO and if further decompensation-consider transfer to telemetry;   patient refusing replacement of NGT and is currently DNR/DNI patient on 2N with SBO s/p reversal of colostomy with ventral hernia, now with suspected Aspiration pneumonia with fever, elevated WBC, hypoxia and tachycardia  tachycardia and PAFib a result of concomitant medical conditions.   Will need fluids and antibiotics; check and replete electrolytes.  Get ECHO and if further decompensation-consider transfer to telemetry; will consider changin antiarrhythmics to better control afib-amiodarone 400q6 in place of multaq and metoprolol-  patient refusing replacement of NGT and is currently DNR/DNI patient on 2N with SBO s/p reversal of colostomy with ventral hernia, now with suspected Aspiration pneumonia with fever, elevated WBC, hypoxia and tachycardia  tachycardia and PAFib a result of concomitant medical conditions.   Will need fluids and antibiotics; check and replete electrolytes.  Get ECHO and if further decompensation-consider transfer to telemetry; will consider changin antiarrhythmics to better control afib-amiodarone 400 q6 in place of multaq and metoprolol-  patient refusing replacement of NGT and is currently DNR/DNI

## 2021-12-03 NOTE — SWALLOW BEDSIDE ASSESSMENT ADULT - SLP GENERAL OBSERVATIONS
Pt seated in bed, leaning slightly to the left; sleepy but rousable and able to participate; verbally interactive but mostly only responsive.

## 2021-12-03 NOTE — PROGRESS NOTE ADULT - SUBJECTIVE AND OBJECTIVE BOX
SURGERY DAILY PROGRESS NOTE:     Subjective:  Patient seen and examined at bedside during am rounds, pt was tachycardiac overnight and sob w/wheeze. Pt received neb tx and this morning still reports sob. Pt was started on soft diet and reports passing flatus/BMDenies any fevers, chills, n/v/d, chest pain or shortness of breath    Objective:    MEDICATIONS  (STANDING):  acetaminophen   IVPB .. 1000 milliGRAM(s) IV Intermittent every 6 hours  atorvastatin 20 milliGRAM(s) Oral at bedtime  budesonide 160 MICROgram(s)/formoterol 4.5 MICROgram(s) Inhaler 2 Puff(s) Inhalation two times a day  dextrose 40% Gel 15 Gram(s) Oral once  dextrose 5%. 1000 milliLiter(s) (50 mL/Hr) IV Continuous <Continuous>  dextrose 5%. 1000 milliLiter(s) (100 mL/Hr) IV Continuous <Continuous>  dextrose 50% Injectable 25 Gram(s) IV Push once  dextrose 50% Injectable 12.5 Gram(s) IV Push once  dextrose 50% Injectable 25 Gram(s) IV Push once  dronedarone 400 milliGRAM(s) Oral two times a day  DULoxetine 60 milliGRAM(s) Oral daily  enoxaparin Injectable 100 milliGRAM(s) SubCutaneous every 12 hours  fluticasone propionate 50 MICROgram(s)/spray Nasal Spray 1 Spray(s) Both Nostrils two times a day  glucagon  Injectable 1 milliGRAM(s) IntraMuscular once  insulin lispro (ADMELOG) corrective regimen sliding scale   SubCutaneous three times a day before meals  insulin lispro Injectable (ADMELOG) 4 Unit(s) SubCutaneous three times a day before meals  insulin NPH human recombinant 16 Unit(s) SubCutaneous at bedtime  levothyroxine 25 MICROGram(s) Oral daily  losartan 25 milliGRAM(s) Oral daily  methylPREDNISolone sodium succinate Injectable 40 milliGRAM(s) IV Push every 8 hours  oxybutynin 5 milliGRAM(s) Oral daily  pantoprazole  Injectable 40 milliGRAM(s) IV Push daily  pregabalin 100 milliGRAM(s) Oral three times a day  warfarin 4 milliGRAM(s) Oral <User Schedule>  warfarin 5 milliGRAM(s) Oral <User Schedule>    MEDICATIONS  (PRN):  ALBUTerol    90 MICROgram(s) HFA Inhaler 2 Puff(s) Inhalation every 6 hours PRN Bronchospasm  benzocaine 15 mG/menthol 3.6 mG (Sugar-Free) Lozenge 1 Lozenge Oral every 3 hours PRN Sore Throat  methocarbamol 750 milliGRAM(s) Oral every 6 hours PRN Muscle Spasm  morphine  - Injectable 2 milliGRAM(s) IV Push every 4 hours PRN Severe Pain (7 - 10)  ondansetron Injectable 4 milliGRAM(s) IV Push every 6 hours PRN Nausea and/or Vomiting      Vital Signs Last 24 Hrs  T(C): 36.7 (03 Dec 2021 05:12), Max: 38.8 (02 Dec 2021 22:44)  T(F): 98 (03 Dec 2021 05:12), Max: 101.8 (02 Dec 2021 22:44)  HR: 130 (03 Dec 2021 05:12) (109 - 130)  BP: 157/71 (03 Dec 2021 05:12) (146/70 - 165/78)  BP(mean): --  RR: 16 (03 Dec 2021 05:12) (16 - 16)  SpO2: 96% (03 Dec 2021 05:12) (92% - 97%)      PHYSICAL EXAM   Gen: well-appearing, in no acute distress  CV: pulse regularly present   Resp: airway patent, non-labored breathing  Abd: soft, non-tender, non-distended  ext. no cyanosis or edema      I&O's Detail      Daily     Daily     LABS:                        12.7   14.33 )-----------( 391      ( 03 Dec 2021 08:00 )             42.3     12-03    148<H>  |  116<H>  |  26<H>  ----------------------------<  287<H>  3.5   |  22  |  1.09    Ca    9.0      03 Dec 2021 08:00  Phos  2.7     12-03  Mg     2.2     12-03      PT/INR - ( 02 Dec 2021 09:16 )   PT: 16.5 sec;   INR: 1.43 ratio         PTT - ( 02 Dec 2021 09:16 )  PTT:25.6 sec

## 2021-12-03 NOTE — PROGRESS NOTE ADULT - ASSESSMENT
This is a Afib on coumadin,[id number] Score: 6  , CVA, COPD, DM, diverticulitis, DVT, HTN neuropathy presents to the ED BIBA from [facility] c/o abd pain, abd distention and n/v/d x3 days.  Pt admitted to [facility] for SBO and NGT  inserted.  NGT out today and pt is on clear liquid.  Pt has high thrombosis risk and requires treatment dose of anticoagulation.    12-1-2021 Spoke with Dr Berrios (surgery) and he states cleared to resume her lovenox over lapping with coumadin.    Plan:  ::Continue Coumadin: 4mg Mon, Wed and Friday, 5mg all the other days. (usual dose)  ::Maintain Lovenox 100mg sq q12h until INR 2.0 or >  ::Daily CBC/PT/INR  ::JANUSZ bentley    Will continue to follow.  Dispo: NOEMÍ

## 2021-12-03 NOTE — CONSULT NOTE ADULT - SUBJECTIVE AND OBJECTIVE BOX
CHIEF COMPLAINT: Patient is a 71y old  Female who presents with a chief complaint of Abd pain (03 Dec 2021 13:09)      HPI:  72 y/o female with a PMHx of Afib, CVA, COPD, DM, diverticulitis, DVT, HTN neuropathy presents to the ED BIBA from [facility] c/o abd pain, abd distention and n/v/d x3 days. Pt reports her emesis and BMs are green. No other complaints at this time. Patient reports she had BM in the ED. patient reports she had nausea and vomiting since friday. Patient reports she has abdominal distension and pain. Patient denies CP, SOB, dizziness, and headache.  (2021 17:24)    12/3-patient has been in the hospital  for the past 3 days-came in with SBO.  She has  aventral hernia from colostomy reversal prior.    NGT decompression was started and she was NPO.  Her Afib was controlled and she was on AC.   Lovenox started and patient initially improved with decompression; eventually tube removed, but patient developed wheezing, infiltrates on CT scan and fever with elevated WBC.   Afib with RVR has resulted and increased oxygen demand has resulted.        Patient came in on multaq but on this admission, was started on metoprolol 25mg Q6, losartan added and lasix stopped.   Om Multaq as well  .  EKG done this morning showing sinus tachycardia at 110-with PAF on EKG at 140.  Concern for patient now with aspiration pneumonia         PMHx: PAST MEDICAL & SURGICAL HISTORY:  History of atrial fibrillation    HTN (hypertension)    Diabetes mellitus    Cerebrovascular accident (CVA)    DVT of lower limb, acute    CVA (cerebral vascular accident)    COPD (chronic obstructive pulmonary disease)    Neuropathy    Diverticulitis    History of colostomy reversal    History of colostomy reversal    S/P colostomy    S/P     S/P hip replacement          Soc Hx:       Allergies: Allergies    Cipro (Rash)  Spiriva (Rash)    Intolerances          REVIEW OF SYSTEMS:    CONSTITUTIONAL: No weakness, fevers or chills  EYES/ENT: No visual changes;  No vertigo or throat pain   NECK: No pain or stiffness  RESPIRATORY: No cough, wheezing, shortness of breath  CARDIOVASCULAR:  palpitations  GASTROINTESTINAL: No abdominal or epigastric pain. No nausea, vomiting, or hematemesis; No diarrhea or constipation. No melena or hematochezia.  GENITOURINARY: No dysuria, frequency or hematuria  NEUROLOGICAL: No numbness or weakness      Vital Signs Last 24 Hrs  T(C): 38.7 (03 Dec 2021 08:00), Max: 38.8 (02 Dec 2021 22:44)  T(F): 101.6 (03 Dec 2021 08:00), Max: 101.8 (02 Dec 2021 22:44)  HR: 128 (03 Dec 2021 08:00) (109 - 130)  BP: 175/75 (03 Dec 2021 08:00) (146/70 - 175/75)  BP(mean): --  RR: 30 (03 Dec 2021 08:00) (16 - 30)  SpO2: 97% (03 Dec 2021 08:00) (92% - 97%)    I&O's Summary      CAPILLARY BLOOD GLUCOSE      POCT Blood Glucose.: 241 mg/dL (03 Dec 2021 12:25)  POCT Blood Glucose.: 256 mg/dL (03 Dec 2021 08:32)  POCT Blood Glucose.: 214 mg/dL (02 Dec 2021 21:16)  POCT Blood Glucose.: 214 mg/dL (02 Dec 2021 17:10)      PHYSICAL EXAM:   Constitutional: NAD, awake and alert, well-developed  HEENT: PERR, EOMI, Normal Hearing, MMM  Neck: Soft and supple, No LAD, No JVD  Respiratory: Breath sounds are clear bilaterally, No wheezing, rales or rhonchi  Cardiovascular: S1 and S2, regular rate and rhythm, no Murmurs, gallops or rubs  Gastrointestinal: Bowel Sounds present, soft, nontender, nondistended, no guarding, no rebound  Extremities: No peripheral edema  Vascular: 2+ peripheral pulses  Neurological: A/O x 3, no focal deficits  Musculoskeletal: 5/5 strength b/l upper and lower extremities  Skin: No rashes    MEDICATIONS:  MEDICATIONS  (STANDING):  acetaminophen   IVPB .. 1000 milliGRAM(s) IV Intermittent every 6 hours  atorvastatin 20 milliGRAM(s) Oral at bedtime  budesonide 160 MICROgram(s)/formoterol 4.5 MICROgram(s) Inhaler 2 Puff(s) Inhalation two times a day  cefepime   IVPB      cefepime   IVPB 2000 milliGRAM(s) IV Intermittent every 12 hours  dextrose 40% Gel 15 Gram(s) Oral once  dextrose 5%. 1000 milliLiter(s) (50 mL/Hr) IV Continuous <Continuous>  dextrose 5%. 1000 milliLiter(s) (100 mL/Hr) IV Continuous <Continuous>  dextrose 50% Injectable 25 Gram(s) IV Push once  dextrose 50% Injectable 12.5 Gram(s) IV Push once  dextrose 50% Injectable 25 Gram(s) IV Push once  dronedarone 400 milliGRAM(s) Oral two times a day  DULoxetine 60 milliGRAM(s) Oral daily  enoxaparin Injectable 100 milliGRAM(s) SubCutaneous every 12 hours  fluticasone propionate 50 MICROgram(s)/spray Nasal Spray 1 Spray(s) Both Nostrils two times a day  glucagon  Injectable 1 milliGRAM(s) IntraMuscular once  insulin lispro (ADMELOG) corrective regimen sliding scale   SubCutaneous three times a day before meals  insulin lispro Injectable (ADMELOG) 6 Unit(s) SubCutaneous three times a day before meals  insulin NPH human recombinant 16 Unit(s) SubCutaneous at bedtime  levothyroxine 25 MICROGram(s) Oral daily  losartan 25 milliGRAM(s) Oral daily  methylPREDNISolone sodium succinate Injectable 40 milliGRAM(s) IV Push every 8 hours  metoprolol tartrate 50 milliGRAM(s) Oral every 6 hours  oxybutynin 5 milliGRAM(s) Oral daily  pantoprazole  Injectable 40 milliGRAM(s) IV Push daily  pregabalin 100 milliGRAM(s) Oral three times a day  sodium chloride 0.9%. 1000 milliLiter(s) (250 mL/Hr) IV Continuous <Continuous>  warfarin 4 milliGRAM(s) Oral <User Schedule>  warfarin 5 milliGRAM(s) Oral <User Schedule>      LABS: All Labs Reviewed:                        12.7   14.33 )-----------( 391      ( 03 Dec 2021 08:00 )             42.3     12-03    148<H>  |  116<H>  |  26<H>  ----------------------------<  287<H>  3.5   |  22  |  1.09    Ca    9.0      03 Dec 2021 08:00  Phos  2.7     12-03  Mg     2.2     12-03      PT/INR - ( 03 Dec 2021 09:39 )   PT: 19.9 sec;   INR: 1.76 ratio         PTT - ( 02 Dec 2021 09:16 )  PTT:25.6 sec        Blood Culture:   lipid profile       RADIOLOGY:    EKG:  sinus tachycarrdia with PAFib    Telemetry:    ECHO:  none done     CHIEF COMPLAINT: Patient is a 71y old  Female who presents with a chief complaint of Abd pain (03 Dec 2021 13:09)      HPI:  70 y/o female with a PMHx of Afib, CVA, COPD, DM, diverticulitis, DVT, HTN neuropathy presents to the ED BIBA from [facility] c/o abd pain, abd distention and n/v/d x3 days. Pt reports her emesis and BMs are green. No other complaints at this time. Patient reports she had BM in the ED. patient reports she had nausea and vomiting since friday. Patient reports she has abdominal distension and pain. Patient denies CP, SOB, dizziness, and headache.  (2021 17:24)    12/3-patient has been in the hospital  for the past 3 days-came in with SBO.  She has  aventral hernia from colostomy reversal prior.    NGT decompression was started and she was NPO.  Her Afib was controlled and she was on AC.   Lovenox started and patient initially improved with decompression; eventually tube removed, but patient developed wheezing, infiltrates on CT scan and fever with elevated WBC.   Afib with RVR has resulted and increased oxygen demand has resulted.        Patient came in on multaq but on this admission, was started on metoprolol 25mg Q6, losartan added and lasix stopped.   On Multaq as well  .  EKG done this morning showing sinus tachycardia at 110-with PAF on EKG at 140.  Concern for patient now with aspiration pneumonia   Patient is lethargic but arousable      PMHx: PAST MEDICAL & SURGICAL HISTORY:  History of atrial fibrillation    HTN (hypertension)    Diabetes mellitus    Cerebrovascular accident (CVA)    DVT of lower limb, acute    CVA (cerebral vascular accident)    COPD (chronic obstructive pulmonary disease)    Neuropathy    Diverticulitis    History of colostomy reversal    History of colostomy reversal    S/P colostomy    S/P     S/P hip replacement          Soc Hx:       Allergies: Allergies    Cipro (Rash)  Spiriva (Rash)    Intolerances          REVIEW OF SYSTEMS:    CONSTITUTIONAL: No weakness, fevers or chills  EYES/ENT: No visual changes;  No vertigo or throat pain   NECK: No pain or stiffness  RESPIRATORY: No cough, wheezing, shortness of breath  CARDIOVASCULAR:  palpitations  GASTROINTESTINAL: No abdominal or epigastric pain. No nausea, vomiting, or hematemesis; No diarrhea or constipation. No melena or hematochezia.  GENITOURINARY: No dysuria, frequency or hematuria  NEUROLOGICAL: No numbness or weakness      Vital Signs Last 24 Hrs  T(C): 38.7 (03 Dec 2021 08:00), Max: 38.8 (02 Dec 2021 22:44)  T(F): 101.6 (03 Dec 2021 08:00), Max: 101.8 (02 Dec 2021 22:44)  HR: 128 (03 Dec 2021 08:00) (109 - 130)  BP: 175/75 (03 Dec 2021 08:00) (146/70 - 175/75)  BP(mean): --  RR: 30 (03 Dec 2021 08:00) (16 - 30)  SpO2: 97% (03 Dec 2021 08:00) (92% - 97%)    I&O's Summary      CAPILLARY BLOOD GLUCOSE      POCT Blood Glucose.: 241 mg/dL (03 Dec 2021 12:25)  POCT Blood Glucose.: 256 mg/dL (03 Dec 2021 08:32)  POCT Blood Glucose.: 214 mg/dL (02 Dec 2021 21:16)  POCT Blood Glucose.: 214 mg/dL (02 Dec 2021 17:10)      PHYSICAL EXAM:   Constitutional: NAD, awake and alert, well-developed  HEENT: PERR, EOMI, Normal Hearing, MMM  Neck: Soft and supple, No LAD, No JVD  Respiratory: Breath sounds are clear bilaterally, No wheezing, rales or rhonchi  Cardiovascular: S1 and S2, regular rate and rhythm, no Murmurs, gallops or rubs  Gastrointestinal: Bowel Sounds present, soft, nontender, nondistended, no guarding, no rebound  Extremities: No peripheral edema  Vascular: 2+ peripheral pulses  Neurological: A/O x 3, no focal deficits  Musculoskeletal: 5/5 strength b/l upper and lower extremities  Skin: No rashes    MEDICATIONS:  MEDICATIONS  (STANDING):  acetaminophen   IVPB .. 1000 milliGRAM(s) IV Intermittent every 6 hours  atorvastatin 20 milliGRAM(s) Oral at bedtime  budesonide 160 MICROgram(s)/formoterol 4.5 MICROgram(s) Inhaler 2 Puff(s) Inhalation two times a day  cefepime   IVPB      cefepime   IVPB 2000 milliGRAM(s) IV Intermittent every 12 hours  dextrose 40% Gel 15 Gram(s) Oral once  dextrose 5%. 1000 milliLiter(s) (50 mL/Hr) IV Continuous <Continuous>  dextrose 5%. 1000 milliLiter(s) (100 mL/Hr) IV Continuous <Continuous>  dextrose 50% Injectable 25 Gram(s) IV Push once  dextrose 50% Injectable 12.5 Gram(s) IV Push once  dextrose 50% Injectable 25 Gram(s) IV Push once  dronedarone 400 milliGRAM(s) Oral two times a day  DULoxetine 60 milliGRAM(s) Oral daily  enoxaparin Injectable 100 milliGRAM(s) SubCutaneous every 12 hours  fluticasone propionate 50 MICROgram(s)/spray Nasal Spray 1 Spray(s) Both Nostrils two times a day  glucagon  Injectable 1 milliGRAM(s) IntraMuscular once  insulin lispro (ADMELOG) corrective regimen sliding scale   SubCutaneous three times a day before meals  insulin lispro Injectable (ADMELOG) 6 Unit(s) SubCutaneous three times a day before meals  insulin NPH human recombinant 16 Unit(s) SubCutaneous at bedtime  levothyroxine 25 MICROGram(s) Oral daily  losartan 25 milliGRAM(s) Oral daily  methylPREDNISolone sodium succinate Injectable 40 milliGRAM(s) IV Push every 8 hours  metoprolol tartrate 50 milliGRAM(s) Oral every 6 hours  oxybutynin 5 milliGRAM(s) Oral daily  pantoprazole  Injectable 40 milliGRAM(s) IV Push daily  pregabalin 100 milliGRAM(s) Oral three times a day  sodium chloride 0.9%. 1000 milliLiter(s) (250 mL/Hr) IV Continuous <Continuous>  warfarin 4 milliGRAM(s) Oral <User Schedule>  warfarin 5 milliGRAM(s) Oral <User Schedule>      LABS: All Labs Reviewed:                        12.7   14.33 )-----------( 391      ( 03 Dec 2021 08:00 )             42.3     12-03    148<H>  |  116<H>  |  26<H>  ----------------------------<  287<H>  3.5   |  22  |  1.09    Ca    9.0      03 Dec 2021 08:00  Phos  2.7     12-03  Mg     2.2     12-03      PT/INR - ( 03 Dec 2021 09:39 )   PT: 19.9 sec;   INR: 1.76 ratio         PTT - ( 02 Dec 2021 09:16 )  PTT:25.6 sec        Blood Culture:   lipid profile       RADIOLOGY:    EKG:  sinus tachycarrdia with PAFib    Telemetry:  atrial fibrillation 118bpm,     ECHO:  none done

## 2021-12-03 NOTE — PROGRESS NOTE ADULT - ATTENDING COMMENTS
Pt seen and examined this AM and PM, appeared SOB this AM, given 40 lasix.     Repeat CXR looked clear, pt was tachycardic and febrile, sinus tach on EKG. Given 1L bolus with some improvement. Pt possibly aspirated, will obtain SLP eval and continue pt on diet for now.     Her SBO is resolving, pt with multiple BMs, abdomen is soft. Abdominal wound is clean without signs of infection.

## 2021-12-03 NOTE — SWALLOW BEDSIDE ASSESSMENT ADULT - COMMENTS
72 y/o female with a PMHx of Afib on coumadin,[id number] Score: 6  , CVA, COPD, DM, diverticulitis, DVT, HTN neuropathy presents to the ED BIBA from [facility] c/o abd pain, abd distention and n/v/d x3 days. Pt reports her emesis and BMs are green. No other complaints at this time. Patient reports she had BM in the ED. patient reports she had nausea and vomiting since Friday11-26. Patient reports she has abdominal distension and pain. Patient denies CP, SOB, dizziness, and headache.  (29 Nov 2021 17:24).   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

## 2021-12-03 NOTE — PROGRESS NOTE ADULT - SUBJECTIVE AND OBJECTIVE BOX
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  Per pt her emesis and BM's were green. Denies f/c/r, denies CP/SOB/dizziness/Headaches.  Seen By Surgery.  Imaging + for SBO, NGT was placed to LWS, however pt pulled out her NGT and refused to have it replaced. Her bowel function has since improved and her diet has been advanced.   hospital course complicated by sob/wheeze/productive cough.   Started on iv steroids per pulm for acute copd exacerbation.     pt seen and examined this am.   Febrile to 101.6. Still with cough/wheeze.   Denies pain. NO n/v. Was previously on thin liquids which was eventually changed to thickened liquids as she was taking prior to admission.     REVIEW OF SYSTEMS:   All 10 systems reviewed in detailed and found to be negative with the exception of what has already been described above    Vital Signs Last 24 Hrs  T(C): 38.7 (03 Dec 2021 08:00), Max: 38.8 (02 Dec 2021 22:44)  T(F): 101.6 (03 Dec 2021 08:00), Max: 101.8 (02 Dec 2021 22:44)  HR: 128 (03 Dec 2021 08:00) (109 - 130)  BP: 175/75 (03 Dec 2021 08:00) (146/70 - 175/75)  RR: 30 (03 Dec 2021 08:00) (16 - 30)  SpO2: 97% (03 Dec 2021 08:00) (92% - 97%)      PHYSICAL EXAM:    GENERAL: Comfortable, no acute distress   HEAD:  Normocephalic, atraumatic  EYES: EOMI, PERRLA  HEENT: Moist mucous membranes  NECK: Supple, No JVD  NERVOUS SYSTEM: forgetful  CHEST/LUNG: bilateral wheeze, crackles posteriorly.  HEART: Regular rate and rhythm  ABDOMEN: Soft, obese, distended, Bowel sounds present, dressing over chronic wound c/d/i  GENITOURINARY: Voiding, no palpable bladder  EXTREMITIES:   No clubbing, cyanosis, or edema  MUSCULOSKELETAL- No muscle tenderness, no joint tenderness  SKIN-no rash  LABS:                        12.7   14.33 )-----------( 391      ( 03 Dec 2021 08:00 )             42.3     12-    148<H>  |  116<H>  |  26<H>  ----------------------------<  287<H>  3.5   |  22  |  1.09    Ca    9.0      03 Dec 2021 08:00  Phos  2.7     12  Mg     2.2     12-03      PT/INR - ( 03 Dec 2021 09:39 )   PT: 19.9 sec;   INR: 1.76 ratio         PTT - ( 02 Dec 2021 09:16 )  PTT:25.6 sec  Urinalysis Basic - ( 03 Dec 2021 11:00 )    Color: Yellow / Appearance: Clear / S.010 / pH: x  Gluc: x / Ketone: Negative  / Bili: Negative / Urobili: Negative mg/dL   Blood: x / Protein: 100 mg/dL / Nitrite: Negative   Leuk Esterase: Small / RBC: 3-5 /HPF / WBC 3-5   Sq Epi: x / Non Sq Epi: Occasional / Bacteria: Occasional        RADIOLOGY STUDIES:    Xray Small Bowel Series (21 @ 09:08) >  EXAM:  XR SM INTEST SNGL CON STDY                        PROCEDURE DATE:  2021    INTERPRETATION:  XR SMALL BOWEL ONLY    HISTORY:  Follow-up small bowel obstruction    VIEWS: Serial Supine views of the abdomen are obtained before and after oral contrast administration through a pre-existing NG tube    COMPARISON:  CT abdomen pelvis 2021     image demonstrates bilateral hip arthroplasty and NG tube in the stomach. No dilated bowel loops.    2 hour film shows contrast in the stomach but not in the small bowel.    4 hour film shows continued presence of contrast in the stomach without distal passage into small bowel.    16 hours postcontrast administration, oral contrast seen to transit all the way to the rectum with still persistent contrast in the stomach is well. No dilated bowel loops.    IMPRESSION:    Markedly delayed gastric emptying greater than 4 hours.    Contrast passes into the rectum at 16 hours without visualization of dilated bowel loops.    Persistent contrast retention in the stomach at 16 hours as well. Correlate for gastroparesis.      Xray Chest 1 View-PORTABLE IMMEDIATE (Xray Chest 1 View-PORTABLE IMMEDIATE .) (21 @ 19:43) >    EXAM:  XR CHEST PORTABLE IMMED 1V                          EXAM:  XR CHEST PORTABLE ROUTINE 1V                            PROCEDURE DATE:  2021          INTERPRETATION:  Portable chest radiograph    CLINICAL INFORMATION: Abdominal pain. Admission chest    TECHNIQUE:  Portable  AP view of the chest.    COMPARISON: No previous examinations are available for review.    FINDINGS:  RIGHT basilar multifocal  airspace opacities/infiltrates. Remaining visualized lung parenchyma clear.  No pleuraleffusion..   No pneumothorax.    The heart and mediastinum size and configuration are within normal limits.    Visualized osseous structures are intact.    IMPRESSION:   Bibasilar multifocal airspace opacities/infiltrates..      < from: CT Abdomen and Pelvis w/ IV Cont (21 @ 14:47) >  EXAM:  CT ABDOMEN AND PELVIS IC                            PROCEDURE DATE:  2021          INTERPRETATION:  CLINICAL INFORMATION: Abdominal pain. Question small bowel obstruction.    COMPARISON: CT 2021.    CONTRAST/COMPLICATIONS:  IV Contrast: Omnipaque 350  90 cc administered   10 cc discarded  Oral Contrast: NONE  Complications: None reported at time of study completion    PROCEDURE:  CT of the Abdomen and Pelvis was performed.  Sagittal and coronal reformats were performed.    FINDINGS:  LOWER CHEST: Coronary artery calcifications. Numerous ill-defined right middle and bilateral lower lobe nodular opacities, question infection.    LIVER: Within normal limits.  BILE DUCTS: Normal caliber.  GALLBLADDER: Within normal limits.  SPLEEN: Within normal limits.  PANCREAS: Fatty changes.  ADRENALS: Within normal limits.  KIDNEYS/URETERS: 1 cm left lower pole renal calcification with overlying cortical scar. The right kidney is within normal limits. No hydronephrosis.    BLADDER: Limited visualization due to streak artifact, grossly unremarkable.  REPRODUCTIVE ORGANS: Uterus and adnexa within normal limits.    BOWEL: Dilated small bowel loops noted with decompressed distal/right lower quadrant loops, in keeping with small bowel obstruction. Transition point is noted in the pelvis (2, 83) just distal to the small bowel anastomosis, however mildly prominent, air-filled loops are noted distal to this point, with gradual decrease in caliber within the hernia. No bowel wall thickening, pneumatosis or pneumoperitoneum. Rectosigmoid, right colonic and small bowel anastomoses again noted from prior resection. Scattered colonic diverticula. Appendix is not visualized. No evidence of inflammation in the pericecal region.  PERITONEUM: No ascites or pneumoperitoneum.  VESSELS: Atherosclerotic changes.  RETROPERITONEUM/LYMPH NODES: No lymphadenopathy.  ABDOMINAL WALL: Large wide mouth ventral hernia containing small and large bowel. A separate upper abdominal hernia contains partof the distal stomach.  BONES: Bilateral hip prostheses create streak artifact that limits visualization of the pelvis. Degenerative changes.    IMPRESSION:  Small bowel obstruction, with a mild transition point just distal to the small bowel anastomosis in the anterior pelvis. Mildly prominent air-filled loops are noted distal to this point within a large, widemouth ventral hernia, with gradual transition to decompressed distal small bowel. No bowel wall thickening, pneumatosis, pneumoperitoneumor ascites.    Numerous ill-defined bibasilar lung opacities, suspicious for infection, including atypical viral etiologies such as Covid 19 pneumonia. Consider dedicated chest CT for full evaluation, as clinically indicated.    MEDICATIONS  (STANDING):  acetaminophen   IVPB .. 1000 milliGRAM(s) IV Intermittent every 6 hours  atorvastatin 20 milliGRAM(s) Oral at bedtime  budesonide 160 MICROgram(s)/formoterol 4.5 MICROgram(s) Inhaler 2 Puff(s) Inhalation two times a day  cefepime   IVPB      cefepime   IVPB 2000 milliGRAM(s) IV Intermittent every 12 hours  dextrose 40% Gel 15 Gram(s) Oral once  dextrose 5%. 1000 milliLiter(s) (50 mL/Hr) IV Continuous <Continuous>  dextrose 5%. 1000 milliLiter(s) (100 mL/Hr) IV Continuous <Continuous>  dextrose 50% Injectable 25 Gram(s) IV Push once  dextrose 50% Injectable 12.5 Gram(s) IV Push once  dextrose 50% Injectable 25 Gram(s) IV Push once  dronedarone 400 milliGRAM(s) Oral two times a day  DULoxetine 60 milliGRAM(s) Oral daily  enoxaparin Injectable 100 milliGRAM(s) SubCutaneous every 12 hours  fluticasone propionate 50 MICROgram(s)/spray Nasal Spray 1 Spray(s) Both Nostrils two times a day  glucagon  Injectable 1 milliGRAM(s) IntraMuscular once  insulin lispro (ADMELOG) corrective regimen sliding scale   SubCutaneous three times a day before meals  insulin lispro Injectable (ADMELOG) 4 Unit(s) SubCutaneous three times a day before meals  insulin NPH human recombinant 16 Unit(s) SubCutaneous at bedtime  levothyroxine 25 MICROGram(s) Oral daily  losartan 25 milliGRAM(s) Oral daily  methylPREDNISolone sodium succinate Injectable 40 milliGRAM(s) IV Push every 8 hours  metoprolol tartrate 50 milliGRAM(s) Oral every 6 hours  oxybutynin 5 milliGRAM(s) Oral daily  pantoprazole  Injectable 40 milliGRAM(s) IV Push daily  pregabalin 100 milliGRAM(s) Oral three times a day  sodium chloride 0.9%. 1000 milliLiter(s) (250 mL/Hr) IV Continuous <Continuous>  warfarin 4 milliGRAM(s) Oral <User Schedule>  warfarin 5 milliGRAM(s) Oral <User Schedule>    MEDICATIONS  (PRN):  ALBUTerol    90 MICROgram(s) HFA Inhaler 2 Puff(s) Inhalation every 6 hours PRN Bronchospasm  benzocaine 15 mG/menthol 3.6 mG (Sugar-Free) Lozenge 1 Lozenge Oral every 3 hours PRN Sore Throat  methocarbamol 750 milliGRAM(s) Oral every 6 hours PRN Muscle Spasm  morphine  - Injectable 2 milliGRAM(s) IV Push every 4 hours PRN Severe Pain (7 - 10)  ondansetron Injectable 4 milliGRAM(s) IV Push every 6 hours PRN Nausea and/or Vomiting      IMPRESSION:  72 yo female with above pmh a/w:    #SBO   -improved  -diet advanced.     #Acute copd exacerbation/acute hypoxic respiratory failure:  -suspect underlying aspiration as cause.   -diet changed to thickened liquids.   -on iv steroids  -started on iv cefepime.   -aspiration precautions.   -would not give any more lasix as pt appears to be intravascularly depleted.     # H/o paroxysmal afib CV#6/DVT/h/o CVA with left sided weakness  on coumadin/Multaq  AC services consult appreciated    #hypokalemia/hypophosphatemia  repleted    #DM/neuropathy  -continue lantus/ss  -increase premeal insulin    #HLD  cont statin    #H/o HTN  -lasix on hold.   -started on arb, titrate up for better bp control.     #chronic abd wound  wound care per surgery    #vte prophylaxis    on coumadin   venodynes  mobilize

## 2021-12-03 NOTE — SWALLOW BEDSIDE ASSESSMENT ADULT - NS SPL SWALLOW CLINIC TRIAL FT
Note pt was not wearing her upper dentures for the evaluation.  Even so, pt will still have difficulty with regular consistency with this dysphagia profile.   Rx: Soft./bite size and moderately thick liquids.  NO STRAW use small cup or teaspoon.  meds crushed in puree.  drink in single small sips with repetitive swallows.   Disc with NSg.  Service riley follow.

## 2021-12-03 NOTE — SWALLOW BEDSIDE ASSESSMENT ADULT - ORAL PREPARATORY PHASE
orientation to eating routines; anticipatory lip protrusion and mouth opening to accept the cup and spoon; lip seal on utensil..

## 2021-12-03 NOTE — SWALLOW BEDSIDE ASSESSMENT ADULT - SWALLOW EVAL: SECRETION MANAGEMENT
reduced freqwuency of reflexive swallow. mouth dry and sticky. with saliva. Mouth care provided before po samples given

## 2021-12-03 NOTE — SWALLOW BEDSIDE ASSESSMENT ADULT - ORAL PHASE
immediate bolus formation: munch-mash pattern for chewable with lingual sweep for attempted collectio. reduced ability to maintain a cohesive bolus for dry chewable.  AP transfer of thin liquid and mildly thick liquid not well controlled.  Improved control of mod. thick and for puree.

## 2021-12-03 NOTE — PROGRESS NOTE ADULT - ASSESSMENT
72 yo F presents with nausea and vomiting with hx of ventral hernia and SBO  SBO resolving, treated conservatively, NG tube removed, bowel movement overnight     Plan:   Lasix 40mg IVP  f/u CXR  Neb tx  monitor vitals   pain control   nausea control   c/w warfarin and therapeutic Lovenox  Home o2 continue NC   Encourage IS/OOB/ambulation      Plan discussed with surgery team and attending, Dr. Harley

## 2021-12-03 NOTE — SWALLOW BEDSIDE ASSESSMENT ADULT - ASR SWALLOW DENTITION
missing lateral teeth in mandible; Unable to place upper dentures in her mouth; pt culd not open her mouth sufficiently to allow the teeth to be placed by clinician and simply held them in her hand and did not try to place them herself.  pt stated that she does wear them when she eats. Unclear if tis is true.

## 2021-12-03 NOTE — SWALLOW BEDSIDE ASSESSMENT ADULT - SWALLOW EVAL: DIAGNOSIS
oral-pharyngeal dysphagia for modified texture diet in a setting of multiple co-morbidities, including left sided paresis from CVA, COPD,, and acute event of SBO, now with suspected flu.
Negative

## 2021-12-03 NOTE — SWALLOW BEDSIDE ASSESSMENT ADULT - PHARYNGEAL PHASE
MIld latency in onset of pharyngeal swallow, but there is observable laryngeal lift.  IMMEDIATE COUGH RESPONSE ON THIN AND MILDLY THICK LIQUIDS.  NO OVERT S/S ASPIRATION ON MOD, THICK, PUREE AND SOFT/BITE SIZE.

## 2021-12-03 NOTE — CHART NOTE - NSCHARTNOTEFT_GEN_A_CORE
called by [facility] nursing staff that pt who is now on tele monitor was in HIWOT 160's.  Went to see pt, ,     B/p stable 119/76, pt still febrile 101, Cardiac: S2 S2 irreg, MP afib with RVR. Lungs with crackles B/l to mid lobes, O2 sats 97%, flu/RSV  swab neg  pt is responsive and answering questions  speech and swallow eval completed earlier: recommended honey thickened liqs, pureed and soft bite foods  IV out, restarted with #20 gauge cath in right AC  Spoke with Cardio: Jamison:  D/C Multaq and Metoprolol: start Amio 400 mg po q 6 h  transferred to tele    IMP: 72 yo female adm with SBO, now resolved  HIWOT  CXR's without any acute changes however, fevers most likely from micro aspirations as she was on thin liqs  cont cefipime  IVF's  tele monitoring  would not give further Lasix for now as pt is hypovolemic  no further betablockers as pt is COPD exac

## 2021-12-03 NOTE — SWALLOW BEDSIDE ASSESSMENT ADULT - SWALLOW EVAL: RECOMMENDED DIET
SOFT/BITE SIZE AND MODERATELY THICK LIQUIDS.  USE SPOON OR SMALL CUP. NO STRAW. DRINK IN SINGLE SMALL REPETITIVE SIPS, NOT MULTIPLE SUCCESSIVE SWALLOWS.

## 2021-12-04 DIAGNOSIS — N17.9 ACUTE KIDNEY FAILURE, UNSPECIFIED: ICD-10-CM

## 2021-12-04 DIAGNOSIS — Z71.89 OTHER SPECIFIED COUNSELING: ICD-10-CM

## 2021-12-04 DIAGNOSIS — I48.91 UNSPECIFIED ATRIAL FIBRILLATION: ICD-10-CM

## 2021-12-04 DIAGNOSIS — J44.1 CHRONIC OBSTRUCTIVE PULMONARY DISEASE WITH (ACUTE) EXACERBATION: ICD-10-CM

## 2021-12-04 DIAGNOSIS — K56.609 UNSPECIFIED INTESTINAL OBSTRUCTION, UNSPECIFIED AS TO PARTIAL VERSUS COMPLETE OBSTRUCTION: ICD-10-CM

## 2021-12-04 DIAGNOSIS — J69.0 PNEUMONITIS DUE TO INHALATION OF FOOD AND VOMIT: ICD-10-CM

## 2021-12-04 DIAGNOSIS — J96.91 RESPIRATORY FAILURE, UNSPECIFIED WITH HYPOXIA: ICD-10-CM

## 2021-12-04 LAB
ADD ON TEST-SPECIMEN IN LAB: SIGNIFICANT CHANGE UP
ADD ON TEST-SPECIMEN IN LAB: SIGNIFICANT CHANGE UP
ALBUMIN SERPL ELPH-MCNC: 2.3 G/DL — LOW (ref 3.3–5)
ALP SERPL-CCNC: 53 U/L — SIGNIFICANT CHANGE UP (ref 40–120)
ALT FLD-CCNC: 39 U/L — SIGNIFICANT CHANGE UP (ref 12–78)
ANION GAP SERPL CALC-SCNC: 8 MMOL/L — SIGNIFICANT CHANGE UP (ref 5–17)
AST SERPL-CCNC: 22 U/L — SIGNIFICANT CHANGE UP (ref 15–37)
BILIRUB DIRECT SERPL-MCNC: 0.1 MG/DL — SIGNIFICANT CHANGE UP (ref 0–0.3)
BILIRUB INDIRECT FLD-MCNC: 0.2 MG/DL — SIGNIFICANT CHANGE UP (ref 0.2–1)
BILIRUB SERPL-MCNC: 0.3 MG/DL — SIGNIFICANT CHANGE UP (ref 0.2–1.2)
BUN SERPL-MCNC: 35 MG/DL — HIGH (ref 7–23)
CALCIUM SERPL-MCNC: 8.5 MG/DL — SIGNIFICANT CHANGE UP (ref 8.5–10.1)
CHLORIDE SERPL-SCNC: 116 MMOL/L — HIGH (ref 96–108)
CO2 SERPL-SCNC: 25 MMOL/L — SIGNIFICANT CHANGE UP (ref 22–31)
CREAT SERPL-MCNC: 1.29 MG/DL — SIGNIFICANT CHANGE UP (ref 0.5–1.3)
GLUCOSE BLDC GLUCOMTR-MCNC: 191 MG/DL — HIGH (ref 70–99)
GLUCOSE BLDC GLUCOMTR-MCNC: 195 MG/DL — HIGH (ref 70–99)
GLUCOSE BLDC GLUCOMTR-MCNC: 215 MG/DL — HIGH (ref 70–99)
GLUCOSE BLDC GLUCOMTR-MCNC: 223 MG/DL — HIGH (ref 70–99)
GLUCOSE SERPL-MCNC: 265 MG/DL — HIGH (ref 70–99)
HCT VFR BLD CALC: 42.9 % — SIGNIFICANT CHANGE UP (ref 34.5–45)
HGB BLD-MCNC: 12.7 G/DL — SIGNIFICANT CHANGE UP (ref 11.5–15.5)
INR BLD: 2.24 RATIO — HIGH (ref 0.88–1.16)
LACTATE SERPL-SCNC: 1.4 MMOL/L — SIGNIFICANT CHANGE UP (ref 0.7–2)
LIDOCAIN IGE QN: 162 U/L — SIGNIFICANT CHANGE UP (ref 73–393)
MAGNESIUM SERPL-MCNC: 2.2 MG/DL — SIGNIFICANT CHANGE UP (ref 1.6–2.6)
MCHC RBC-ENTMCNC: 24.3 PG — LOW (ref 27–34)
MCHC RBC-ENTMCNC: 29.6 GM/DL — LOW (ref 32–36)
MCV RBC AUTO: 82.2 FL — SIGNIFICANT CHANGE UP (ref 80–100)
PHOSPHATE SERPL-MCNC: 2.9 MG/DL — SIGNIFICANT CHANGE UP (ref 2.5–4.5)
PLATELET # BLD AUTO: 344 K/UL — SIGNIFICANT CHANGE UP (ref 150–400)
POTASSIUM SERPL-MCNC: 3.5 MMOL/L — SIGNIFICANT CHANGE UP (ref 3.5–5.3)
POTASSIUM SERPL-SCNC: 3.5 MMOL/L — SIGNIFICANT CHANGE UP (ref 3.5–5.3)
PROT SERPL-MCNC: 6.9 GM/DL — SIGNIFICANT CHANGE UP (ref 6–8.3)
PROTHROM AB SERPL-ACNC: 25.2 SEC — HIGH (ref 10.6–13.6)
RAPID RVP RESULT: SIGNIFICANT CHANGE UP
RBC # BLD: 5.22 M/UL — HIGH (ref 3.8–5.2)
RBC # FLD: 20.3 % — HIGH (ref 10.3–14.5)
SARS-COV-2 RNA SPEC QL NAA+PROBE: SIGNIFICANT CHANGE UP
SODIUM SERPL-SCNC: 149 MMOL/L — HIGH (ref 135–145)
T4 FREE SERPL-MCNC: 0.99 NG/DL — SIGNIFICANT CHANGE UP (ref 0.76–1.46)
TSH SERPL-MCNC: 0.97 UU/ML — SIGNIFICANT CHANGE UP (ref 0.34–4.82)
WBC # BLD: 9.1 K/UL — SIGNIFICANT CHANGE UP (ref 3.8–10.5)
WBC # FLD AUTO: 9.1 K/UL — SIGNIFICANT CHANGE UP (ref 3.8–10.5)

## 2021-12-04 PROCEDURE — 99233 SBSQ HOSP IP/OBS HIGH 50: CPT

## 2021-12-04 PROCEDURE — 71250 CT THORAX DX C-: CPT | Mod: 26

## 2021-12-04 RX ORDER — OXYCODONE HYDROCHLORIDE 5 MG/1
5 TABLET ORAL EVERY 6 HOURS
Refills: 0 | Status: DISCONTINUED | OUTPATIENT
Start: 2021-12-04 | End: 2021-12-08

## 2021-12-04 RX ORDER — DILTIAZEM HCL 120 MG
5 CAPSULE, EXT RELEASE 24 HR ORAL
Qty: 125 | Refills: 0 | Status: DISCONTINUED | OUTPATIENT
Start: 2021-12-04 | End: 2021-12-04

## 2021-12-04 RX ORDER — SODIUM CHLORIDE 9 MG/ML
500 INJECTION INTRAMUSCULAR; INTRAVENOUS; SUBCUTANEOUS ONCE
Refills: 0 | Status: COMPLETED | OUTPATIENT
Start: 2021-12-04 | End: 2021-12-04

## 2021-12-04 RX ORDER — METOPROLOL TARTRATE 50 MG
25 TABLET ORAL
Refills: 0 | Status: DISCONTINUED | OUTPATIENT
Start: 2021-12-04 | End: 2021-12-08

## 2021-12-04 RX ORDER — PANTOPRAZOLE SODIUM 20 MG/1
40 TABLET, DELAYED RELEASE ORAL
Refills: 0 | Status: DISCONTINUED | OUTPATIENT
Start: 2021-12-04 | End: 2021-12-08

## 2021-12-04 RX ORDER — DILTIAZEM HCL 120 MG
90 CAPSULE, EXT RELEASE 24 HR ORAL EVERY 6 HOURS
Refills: 0 | Status: DISCONTINUED | OUTPATIENT
Start: 2021-12-04 | End: 2021-12-05

## 2021-12-04 RX ORDER — ALBUTEROL 90 UG/1
2 AEROSOL, METERED ORAL EVERY 6 HOURS
Refills: 0 | Status: DISCONTINUED | OUTPATIENT
Start: 2021-12-04 | End: 2021-12-08

## 2021-12-04 RX ORDER — INSULIN GLARGINE 100 [IU]/ML
15 INJECTION, SOLUTION SUBCUTANEOUS AT BEDTIME
Refills: 0 | Status: DISCONTINUED | OUTPATIENT
Start: 2021-12-04 | End: 2021-12-06

## 2021-12-04 RX ORDER — DILTIAZEM HCL 120 MG
30 CAPSULE, EXT RELEASE 24 HR ORAL EVERY 6 HOURS
Refills: 0 | Status: DISCONTINUED | OUTPATIENT
Start: 2021-12-04 | End: 2021-12-04

## 2021-12-04 RX ORDER — POLYETHYLENE GLYCOL 3350 17 G/17G
17 POWDER, FOR SOLUTION ORAL DAILY
Refills: 0 | Status: DISCONTINUED | OUTPATIENT
Start: 2021-12-04 | End: 2021-12-08

## 2021-12-04 RX ORDER — DIGOXIN 250 MCG
125 TABLET ORAL ONCE
Refills: 0 | Status: COMPLETED | OUTPATIENT
Start: 2021-12-04 | End: 2021-12-04

## 2021-12-04 RX ORDER — INSULIN GLARGINE 100 [IU]/ML
20 INJECTION, SOLUTION SUBCUTANEOUS AT BEDTIME
Refills: 0 | Status: DISCONTINUED | OUTPATIENT
Start: 2021-12-04 | End: 2021-12-04

## 2021-12-04 RX ORDER — SENNA PLUS 8.6 MG/1
2 TABLET ORAL AT BEDTIME
Refills: 0 | Status: DISCONTINUED | OUTPATIENT
Start: 2021-12-04 | End: 2021-12-08

## 2021-12-04 RX ORDER — WARFARIN SODIUM 2.5 MG/1
4 TABLET ORAL DAILY
Refills: 0 | Status: DISCONTINUED | OUTPATIENT
Start: 2021-12-04 | End: 2021-12-06

## 2021-12-04 RX ORDER — OXYCODONE HYDROCHLORIDE 5 MG/1
10 TABLET ORAL EVERY 8 HOURS
Refills: 0 | Status: DISCONTINUED | OUTPATIENT
Start: 2021-12-04 | End: 2021-12-08

## 2021-12-04 RX ADMIN — Medication 4: at 13:01

## 2021-12-04 RX ADMIN — Medication 25 MICROGRAM(S): at 06:19

## 2021-12-04 RX ADMIN — AMIODARONE HYDROCHLORIDE 400 MILLIGRAM(S): 400 TABLET ORAL at 14:25

## 2021-12-04 RX ADMIN — Medication 650 MILLIGRAM(S): at 03:45

## 2021-12-04 RX ADMIN — Medication 6 UNIT(S): at 08:52

## 2021-12-04 RX ADMIN — Medication 650 MILLIGRAM(S): at 03:14

## 2021-12-04 RX ADMIN — Medication 40 MILLIGRAM(S): at 14:25

## 2021-12-04 RX ADMIN — Medication 100 MILLIGRAM(S): at 14:25

## 2021-12-04 RX ADMIN — MORPHINE SULFATE 2 MILLIGRAM(S): 50 CAPSULE, EXTENDED RELEASE ORAL at 06:26

## 2021-12-04 RX ADMIN — ALBUTEROL 2 PUFF(S): 90 AEROSOL, METERED ORAL at 09:06

## 2021-12-04 RX ADMIN — OXYCODONE HYDROCHLORIDE 5 MILLIGRAM(S): 5 TABLET ORAL at 14:27

## 2021-12-04 RX ADMIN — SODIUM CHLORIDE 50 MILLILITER(S): 9 INJECTION INTRAMUSCULAR; INTRAVENOUS; SUBCUTANEOUS at 16:57

## 2021-12-04 RX ADMIN — Medication 5 MG/HR: at 07:29

## 2021-12-04 RX ADMIN — BUDESONIDE AND FORMOTEROL FUMARATE DIHYDRATE 2 PUFF(S): 160; 4.5 AEROSOL RESPIRATORY (INHALATION) at 20:40

## 2021-12-04 RX ADMIN — ATORVASTATIN CALCIUM 20 MILLIGRAM(S): 80 TABLET, FILM COATED ORAL at 22:10

## 2021-12-04 RX ADMIN — Medication 650 MILLIGRAM(S): at 23:30

## 2021-12-04 RX ADMIN — WARFARIN SODIUM 4 MILLIGRAM(S): 2.5 TABLET ORAL at 22:10

## 2021-12-04 RX ADMIN — Medication 90 MILLIGRAM(S): at 22:10

## 2021-12-04 RX ADMIN — DULOXETINE HYDROCHLORIDE 60 MILLIGRAM(S): 30 CAPSULE, DELAYED RELEASE ORAL at 14:23

## 2021-12-04 RX ADMIN — ALBUTEROL 2 PUFF(S): 90 AEROSOL, METERED ORAL at 20:40

## 2021-12-04 RX ADMIN — ALBUTEROL 2 PUFF(S): 90 AEROSOL, METERED ORAL at 14:04

## 2021-12-04 RX ADMIN — Medication 650 MILLIGRAM(S): at 22:11

## 2021-12-04 RX ADMIN — PANTOPRAZOLE SODIUM 40 MILLIGRAM(S): 20 TABLET, DELAYED RELEASE ORAL at 22:11

## 2021-12-04 RX ADMIN — Medication 125 MICROGRAM(S): at 16:55

## 2021-12-04 RX ADMIN — INSULIN GLARGINE 15 UNIT(S): 100 INJECTION, SOLUTION SUBCUTANEOUS at 22:09

## 2021-12-04 RX ADMIN — Medication 650 MILLIGRAM(S): at 14:26

## 2021-12-04 RX ADMIN — Medication 40 MILLIGRAM(S): at 06:18

## 2021-12-04 RX ADMIN — CEFEPIME 100 MILLIGRAM(S): 1 INJECTION, POWDER, FOR SOLUTION INTRAMUSCULAR; INTRAVENOUS at 06:18

## 2021-12-04 RX ADMIN — Medication 100 MILLIGRAM(S): at 06:21

## 2021-12-04 RX ADMIN — AMIODARONE HYDROCHLORIDE 400 MILLIGRAM(S): 400 TABLET ORAL at 03:54

## 2021-12-04 RX ADMIN — Medication 1 SPRAY(S): at 22:10

## 2021-12-04 RX ADMIN — Medication 30 MILLIGRAM(S): at 14:24

## 2021-12-04 RX ADMIN — AMIODARONE HYDROCHLORIDE 400 MILLIGRAM(S): 400 TABLET ORAL at 22:10

## 2021-12-04 RX ADMIN — Medication 100 MILLIGRAM(S): at 22:09

## 2021-12-04 RX ADMIN — Medication 5 MILLIGRAM(S): at 14:24

## 2021-12-04 RX ADMIN — Medication 4: at 08:51

## 2021-12-04 RX ADMIN — Medication 2: at 17:01

## 2021-12-04 RX ADMIN — BUDESONIDE AND FORMOTEROL FUMARATE DIHYDRATE 2 PUFF(S): 160; 4.5 AEROSOL RESPIRATORY (INHALATION) at 09:06

## 2021-12-04 RX ADMIN — Medication 25 MILLIGRAM(S): at 22:10

## 2021-12-04 RX ADMIN — CEFEPIME 100 MILLIGRAM(S): 1 INJECTION, POWDER, FOR SOLUTION INTRAMUSCULAR; INTRAVENOUS at 18:24

## 2021-12-04 RX ADMIN — SENNA PLUS 2 TABLET(S): 8.6 TABLET ORAL at 22:09

## 2021-12-04 RX ADMIN — Medication 6 UNIT(S): at 17:02

## 2021-12-04 RX ADMIN — Medication 1 SPRAY(S): at 01:19

## 2021-12-04 NOTE — PROGRESS NOTE ADULT - ATTENDING COMMENTS
pt seen and examined, from a surgical perspective much improved, her abdomen is soft, tolerating diet, multiple BMs.     pt respiratory status does appear guarded at this time, surgery will continue to follow, please call with any questions or concerns

## 2021-12-04 NOTE — PROGRESS NOTE ADULT - SUBJECTIVE AND OBJECTIVE BOX
SUBJECTIVE:    Seen and examined today.  She has no complaints but is lethargic and does not answer questions well.   She is on NC.     Review of systems unable to obtain patient does answer questions.     Vital Signs Last 24 Hrs  T(C): 37.2 (04 Dec 2021 09:21), Max: 38.3 (03 Dec 2021 14:35)  T(F): 99 (04 Dec 2021 09:21), Max: 101 (03 Dec 2021 14:35)  HR: 115 (04 Dec 2021 09:21) (109 - 129)  BP: 137/60 (04 Dec 2021 09:21) (119/56 - 170/68)  BP(mean): 71 (03 Dec 2021 15:28) (71 - 71)  RR: 22 (04 Dec 2021 09:21) (19 - 32)  SpO2: 94% (04 Dec 2021 09:21) (94% - 98%)    PHYSICAL EXAMINATION:    GENERAL APPEARANCE:  Lethargic and chronic ill appearing.   HEAD: Normocephalic atraumatic   EYES: Pupils are normal. No icterus. Sclera white.   HEART:  Normal S1 and S2. There are no murmurs, rubs or gallops noted  CHEST:  Coarse ronchi and crackles diffusely   ABDOMEN:  Soft and nontender. Nondistended.   NEURO: Sleepy, awakens to verbal stimuli, answers yes/no but does not give any detail regarding medical history       RADIOLOGY & ADDITIONAL STUDIES:   *Imaging reviewed    < from: CT Chest No Cont (12.04.21 @ 11:47) >  EXAM:  CT CHEST                            PROCEDURE DATE:  12/04/2021          INTERPRETATION:  INDICATION: Fever, shortness of breath, possible pneumonia    TECHNIQUE: A volumetric CT acquisition of the chest was obtained from the thoracic inlet to the upper abdomen without the use of intravenous contrast. Coronal and sagittal reconstructed images are provided.    COMPARISON: There are no prior chest CTs available for comparison. Abdominal CT 11/29/2021    FINDINGS:    Lungs/Airways/Pleura: Extensive airway secretions involving the distal trachea, bilateral mainstem bronchi and multilobar segmental/subsegmental involvement with associated bronchial wall thickening. There are patchy centrilobular groundglass and tree-in-bud nodules likely related to multifocal aspiration in this clinical setting. Overall findings have mildly improved since the abdominal CT from 11/29/2021. Emphysema is present. No pleural effusion.    Mediastinum/Lymph nodes: Unremarkable thyroid. No thoracic lymphadenopathy.    Heart and Vessels: Mid ascending aorta measures 3.8 cm. The pulmonary artery is normal in size. The heart is normal in size. There is lipomatous hypertrophy of the interatrial septum. Severe coronary arterial calcification is present. Thereis posterior mitral annular calcification. No pericardial effusion.    Upper Abdomen: Fatty replacement of the pancreas. Hepatic steatosis. Extensive infrarenal aortic calcification.    Osseous structures and Soft Tissues: Mild T6 compression deformity. No aggressive osseous lesion.    IMPRESSION:  Mildly improved bilateral multilobar aspiration since 11/29/2021 abdominal CT.    < end of copied text >

## 2021-12-04 NOTE — CHART NOTE - NSCHARTNOTEFT_GEN_A_CORE
keep patient NPO except meds for now d/w pulm and speech and swallow due to current clinical condition Keep patient NPO except meds for now d/w pulm and speech and swallow due to current clinical condition.  Afib rates still 120 to occasionally 150  Cardizem gtt at 15/hr  Cardizem PO increased to 90mg q6 w/ parameters  Gentle hydration x10 hours.  Dig iv x1. Keep patient NPO except meds for now d/w pulm and speech and swallow due to current clinical condition.  Afib rates still 120 to occasionally 150  Cardizem gtt at 15/hr  Cardizem PO increased to 90mg q6 w/ parameters  Gentle hydration x10 hours.  Dig iv x1.  Reduced Lantus dose due to patients NPO status currently Keep patient NPO except meds for now d/w pulm and speech and swallow due to current clinical condition.  Afib rates still 120 to occasionally 150  Cardizem gtt at 15/hr --> will increase Cardizem PO increased to 90mg q6 w/ parameters d/c gtt and added lopressor 25mg BID. dig IV x1  Gentle hydration x10 hours.  Reduced Lantus dose due to patients NPO status currently Keep patient NPO except meds for now d/w pulm and speech and swallow due to current clinical condition.  Afib rates still 120 to occasionally 150  Cardizem gtt at 15/hr --> will increase Cardizem PO increased to 90mg q6 w/ parameters d/c gtt and added lopressor 25mg BID. dig IV x1  Currently on amiodarone 400mg q8  Gentle hydration x10 hours.  Reduced Lantus dose due to patients NPO status currently  taper solumedrol to 40mg q12    d/w RN

## 2021-12-04 NOTE — PROGRESS NOTE ADULT - SUBJECTIVE AND OBJECTIVE BOX
CHIEF COMPLAINT: Patient is a 71y old  Female who presents with a chief complaint of SBO (03 Dec 2021 14:17)      HPI:  72 y/o female with a PMHx of Afib, CVA, COPD, DM, diverticulitis, DVT, HTN neuropathy presents to the ED BIBA from [facility] c/o abd pain, abd distention and n/v/d x3 days. Pt reports her emesis and BMs are green. No other complaints at this time. Patient reports she had BM in the ED. patient reports she had nausea and vomiting since friday. Patient reports she has abdominal distension and pain. Patient denies CP, SOB, dizziness, and headache.  (2021 17:24)    12/3-patient has been in the hospital  for the past 3 days-came in with SBO.  She has  a ventral hernia from colostomy reversal prior.    NGT decompression was started and she was NPO.  Her Afib was controlled and she was on AC.   Lovenox started and patient initially improved with decompression; eventually tube removed, but patient developed wheezing, infiltrates on CT scan and fever with elevated WBC.   Afib with RVR has resulted and increased oxygen demand has resulted.        Patient came in on multaq but on this admission, was started on metoprolol 25mg Q6, losartan added and lasix stopped.   On Multaq as well.  EKG done this morning showing sinus tachycardia at 110-with PAF on EKG at 140.  Concern for patient now with aspiration pneumonia   Patient is lethargic but arousable.    -patient with AFib/RVR all night long; on amiodarone load, held multaq and metoprolol yesterday.   Patient lethargic but taking PO.         PMHx: PAST MEDICAL & SURGICAL HISTORY:  History of atrial fibrillation    HTN (hypertension)    Diabetes mellitus    Cerebrovascular accident (CVA)    DVT of lower limb, acute    CVA (cerebral vascular accident)    COPD (chronic obstructive pulmonary disease)    Neuropathy    Diverticulitis    History of colostomy reversal    History of colostomy reversal    S/P colostomy    S/P     S/P hip replacement        Allergies: Allergies    Cipro (Rash)  Spiriva (Rash)    Intolerances          REVIEW OF SYSTEMS:    CONSTITUTIONAL:  weakness, fevers lethargy  EYES/ENT: No visual changes;  No vertigo or throat pain   NECK: No pain or stiffness  RESPIRATORY: No cough, wheezing, hemoptysis; No shortness of breath  CARDIOVASCULAR:  palpitations  GASTROINTESTINAL: No abdominal or epigastric pain. No nausea, vomiting, or hematemesis; No diarrhea or constipation. No melena or hematochezia.  GENITOURINARY: No dysuria, frequency or hematuria  NEUROLOGICAL: No numbness or weakness  SKIN: No itching, burning, rashes, or lesions   All other review of systems is negative unless indicated above    Vital Signs Last 24 Hrs  T(C): 38.3 (04 Dec 2021 03:05), Max: 38.7 (03 Dec 2021 08:00)  T(F): 101 (04 Dec 2021 03:05), Max: 101.6 (03 Dec 2021 08:00)  HR: 118 (04 Dec 2021 03:05) (117 - 129)  BP: 143/57 (04 Dec 2021 03:05) (119/56 - 175/75)  BP(mean): 71 (03 Dec 2021 15:28) (71 - 71)  RR: 32 (04 Dec 2021 03:05) (19 - 32)  SpO2: 95% (04 Dec 2021 03:05) (95% - 98%)    I&O's Summary    03 Dec 2021 07:01  -  04 Dec 2021 06:27  --------------------------------------------------------  IN: 0 mL / OUT: 250 mL / NET: -250 mL            PHYSICAL EXAM:   Constitutional: NAD, awake and alert, well-developed  HEENT: PERR, EOMI, Normal Hearing, MMM  Neck:  JVD  Respiratory: Breath sounds are clear bilaterally, No wheezing, rales or rhonchi  Cardiovascular: S1 and S2, irregular rate and rhythm, Murmurs, gallops or rubs  Gastrointestinal: Bowel Sounds present, soft, nontender, nondistended, no guarding, no rebound  Extremities: peripheral edema  Vascular: 2+ peripheral pulses  Neurological: A/O x 3, no focal deficits  Musculoskeletal: 5/5 strength b/l upper and lower extremities  Skin: No rashes    MEDICATIONS:  MEDICATIONS  (STANDING):  acetaminophen   IVPB .. 1000 milliGRAM(s) IV Intermittent every 6 hours  aMIOdarone    Tablet 400 milliGRAM(s) Oral every 8 hours  atorvastatin 20 milliGRAM(s) Oral at bedtime  budesonide 160 MICROgram(s)/formoterol 4.5 MICROgram(s) Inhaler 2 Puff(s) Inhalation two times a day  cefepime   IVPB      cefepime   IVPB 2000 milliGRAM(s) IV Intermittent every 12 hours  dextrose 40% Gel 15 Gram(s) Oral once  dextrose 5%. 1000 milliLiter(s) (50 mL/Hr) IV Continuous <Continuous>  dextrose 5%. 1000 milliLiter(s) (100 mL/Hr) IV Continuous <Continuous>  dextrose 50% Injectable 25 Gram(s) IV Push once  dextrose 50% Injectable 12.5 Gram(s) IV Push once  dextrose 50% Injectable 25 Gram(s) IV Push once  DULoxetine 60 milliGRAM(s) Oral daily  enoxaparin Injectable 100 milliGRAM(s) SubCutaneous every 12 hours  fluticasone propionate 50 MICROgram(s)/spray Nasal Spray 1 Spray(s) Both Nostrils two times a day  glucagon  Injectable 1 milliGRAM(s) IntraMuscular once  insulin lispro (ADMELOG) corrective regimen sliding scale   SubCutaneous three times a day before meals  insulin lispro Injectable (ADMELOG) 6 Unit(s) SubCutaneous three times a day before meals  insulin NPH human recombinant 16 Unit(s) SubCutaneous at bedtime  levothyroxine 25 MICROGram(s) Oral daily  losartan 25 milliGRAM(s) Oral daily  methylPREDNISolone sodium succinate Injectable 40 milliGRAM(s) IV Push every 8 hours  oxybutynin 5 milliGRAM(s) Oral daily  pantoprazole  Injectable 40 milliGRAM(s) IV Push daily  pregabalin 100 milliGRAM(s) Oral three times a day  warfarin 4 milliGRAM(s) Oral <User Schedule>  warfarin 5 milliGRAM(s) Oral <User Schedule>      LABS: All Labs Reviewed:                        12.7   14.33 )-----------( 391      ( 03 Dec 2021 08:00 )             42.3     12-03    148<H>  |  116<H>  |  26<H>  ----------------------------<  287<H>  3.5   |  22  |  1.09    Ca    9.0      03 Dec 2021 08:00  Phos  2.7     12-  Mg     2.2     12-03      PT/INR - ( 03 Dec 2021 09:39 )   PT: 19.9 sec;   INR: 1.76 ratio         PTT - ( 02 Dec 2021 09:16 )  PTT:25.6 sec      Blood Culture:       BNP Serum Pro-Brain Natriuretic Peptide: 1858 pg/mL (21 @ 08:00)      RADIOLOGY:    EKG:      Telemetry:    ECHO:

## 2021-12-04 NOTE — PROVIDER CONTACT NOTE (OTHER) - REASON
patient pulled out NGT
Pt tachy, febrile and c/o abd pain
pt temp increase as well as burst of rapid afib
pulmonary
AM medical consult

## 2021-12-04 NOTE — PROGRESS NOTE ADULT - NUTRITIONAL ASSESSMENT
afib on coumadin long term, CVA in 2018 with left sided weakness, Morbid obesity, peripheral edema, COPD, Type 2 DM, neuropathy,  diverticulitis, dvt found in 2018 at time of CVA, HTN

## 2021-12-04 NOTE — PROVIDER CONTACT NOTE (OTHER) - SITUATION
Pt -130  /74, temp 99.7
Calling to follow up on previous call regarding HR.
left message with service for morning consult.
patient presents with SBO with NGT. patient pulled out NGT.
Pt continues to be Tachy t 101.8 surgery aware as well IV tylenol ordered.  Will continue to monitor
  /71, afebrile, see VS flowsheet
Pt admitted with SBO
pt had 8 sec burst of rapid afib so went to check the patient

## 2021-12-04 NOTE — PROGRESS NOTE ADULT - SUBJECTIVE AND OBJECTIVE BOX
Patient was seen and examined at the bedside this morning  No acute events overnight  Still having shortness of breath  Pain is better controlled  No nausea or vomiting  Tolerating diet and passing bowel movements    O/E:  T(C): 37.2 (12-04-21 @ 09:21), Max: 38.3 (12-03-21 @ 14:35)  HR: 115 (12-04-21 @ 09:21) (109 - 129)  BP: 137/60 (12-04-21 @ 09:21) (119/56 - 170/68)  RR: 22 (12-04-21 @ 09:21) (19 - 32)  SpO2: 94% (12-04-21 @ 09:21) (94% - 98%)  Alert, conscious, oriented  No pallor, jaundice or cyanosis  Not in pain or distress  Chest clear, equal air entry bilaterally  Abdomen soft and lax, no tenderness, active bowel sounds  Extremities: No swelling or tenderness    12-03-21 @ 07:01  -  12-04-21 @ 07:00  --------------------------------------------------------  IN: 0 mL / OUT: 250 mL / NET: -250 mL                            12.7   9.10  )-----------( 344      ( 04 Dec 2021 07:26 )             42.9   12-04    149<H>  |  116<H>  |  35<H>  ----------------------------<  265<H>  3.5   |  25  |  1.29    Ca    8.5      04 Dec 2021 07:26  Phos  2.9     12-04  Mg     2.2     12-04    MEDICATIONS  (STANDING):  ALBUTerol    90 MICROgram(s) HFA Inhaler 2 Puff(s) Inhalation every 6 hours  aMIOdarone    Tablet 400 milliGRAM(s) Oral every 8 hours  atorvastatin 20 milliGRAM(s) Oral at bedtime  budesonide 160 MICROgram(s)/formoterol 4.5 MICROgram(s) Inhaler 2 Puff(s) Inhalation two times a day  cefepime   IVPB 2000 milliGRAM(s) IV Intermittent every 12 hours  cefepime   IVPB      dextrose 40% Gel 15 Gram(s) Oral once  dextrose 5%. 1000 milliLiter(s) (50 mL/Hr) IV Continuous <Continuous>  dextrose 5%. 1000 milliLiter(s) (100 mL/Hr) IV Continuous <Continuous>  dextrose 50% Injectable 25 Gram(s) IV Push once  dextrose 50% Injectable 12.5 Gram(s) IV Push once  dextrose 50% Injectable 25 Gram(s) IV Push once  diltiazem    Tablet 30 milliGRAM(s) Oral every 6 hours  diltiazem Infusion 5 mG/Hr (5 mL/Hr) IV Continuous <Continuous>  DULoxetine 60 milliGRAM(s) Oral daily  fluticasone propionate 50 MICROgram(s)/spray Nasal Spray 1 Spray(s) Both Nostrils two times a day  insulin glargine Injectable (LANTUS) 20 Unit(s) SubCutaneous at bedtime  insulin lispro (ADMELOG) corrective regimen sliding scale   SubCutaneous three times a day before meals  insulin lispro Injectable (ADMELOG) 6 Unit(s) SubCutaneous three times a day before meals  levothyroxine 25 MICROGram(s) Oral daily  methylPREDNISolone sodium succinate Injectable 40 milliGRAM(s) IV Push every 8 hours  oxybutynin 5 milliGRAM(s) Oral daily  pantoprazole    Tablet 40 milliGRAM(s) Oral two times a day  polyethylene glycol 3350 17 Gram(s) Oral daily  pregabalin 100 milliGRAM(s) Oral three times a day  senna 2 Tablet(s) Oral at bedtime  warfarin 4 milliGRAM(s) Oral daily

## 2021-12-04 NOTE — CONSULT NOTE ADULT - CONSULT REASON
fever  pna
copd
hx of A. Fib and DVT, risk stratification and anticoagulation management
medical management
tachycardia

## 2021-12-04 NOTE — PROGRESS NOTE ADULT - ASSESSMENT
patient admitted with SBP post op for colostomy repair, now complicated with AFib RVR due to partial SBO, fever and aspiration  1-Afib/RVR-now on amiodarone; will add further rate controlling medications-will start cardizem drip today  2-CHF-will get ECHO today  3-no evidence of active CAD

## 2021-12-04 NOTE — PROVIDER CONTACT NOTE (OTHER) - DATE AND TIME:
02-Dec-2021 22:48
03-Dec-2021 05:14
01-Dec-2021
01-Dec-2021 04:04
03-Dec-2021 01:11
03-Dec-2021 06:05
01-Dec-2021 04:55
04-Dec-2021 03:05

## 2021-12-04 NOTE — PROVIDER CONTACT NOTE (OTHER) - BACKGROUND
Denies chest pain or SOB
PMH COPD, HTN, DM, CVA
denies chest pain and  denies difficulty breathing
pt admitted for colostomy removal. became afebrile and temp into 160s. transferred from  to 3E for closer monitoring.

## 2021-12-04 NOTE — CONSULT NOTE ADULT - ASSESSMENT
72 y/o female with a PMHx of Afib, CVA, COPD, DM, diverticulitis, DVT, HTN neuropathy presents to the ED BIBA from [facility] c/o abd pain, abd distention and n/v/d x3 days. Pt reports her emesis and BMs are green. No other complaints at this time. Patient reports she had BM in the ED. patient reports she had nausea and vomiting since friday. Patient reports she has abdominal distension and pain. Patient denies CP, SOB, dizziness, and headache. Initially found to have SBO s/p NGT, bowl rest - now resolved.  Hospital course recently c/b resp failure, cough, wheezing, copd exacerbation, started on steroids, noted with fevers to 101, worsening cough, sob, noted with aspiration pna on CT chest started on cefepime.     1. fever. multilobar pneumonia - aspiration. copd. resolved sbo  - imaging reviewed  - agree with IV cefepime [id number]  - f/u RVP, blood cultures  - on iv steroids for copd  - monitor temps  - tolerating abx well so far; no side effects noted  - reason for abx use and side effects reviewed with patient  - supportive care  - aspiration precautions  - fu cbc    2. other issues - care per medicine

## 2021-12-04 NOTE — PROVIDER CONTACT NOTE (OTHER) - ACTION/TREATMENT ORDERED:
surgery team to re-eval in AM
MD aware, awaiting bedside evaluation
MD to review chart. Will call back with instruction.
No new orders at this time, continue to monitor
No new orders, continue to monitor, recheck vitals in one hour
MD advised to give Tylenol and would come up to unit

## 2021-12-04 NOTE — PROGRESS NOTE ADULT - ASSESSMENT
A 71 year old female with multiple medical comorbidities and small bowel obstruction, resolved    Plan:  Diet as tolerated  Ambulation and incentive spirometry  Transfer to medicine for further management of her medical comorbidities and exacerbation of COPD  Surgery will continue to follow peripherally    Plan discussed with Dr Harley

## 2021-12-04 NOTE — CONSULT NOTE ADULT - SUBJECTIVE AND OBJECTIVE BOX
Patient is a 71y old  Female who presents with a chief complaint of SBO (04 Dec 2021 11:25)    HPI:  72 y/o female with a PMHx of Afib, CVA, COPD, DM, diverticulitis, DVT, HTN neuropathy presents to the ED BIBA from [facility] c/o abd pain, abd distention and n/v/d x3 days. Pt reports her emesis and BMs are green. No other complaints at this time. Patient reports she had BM in the ED. patient reports she had nausea and vomiting since friday. Patient reports she has abdominal distension and pain. Patient denies CP, SOB, dizziness, and headache. Initially found to have SBO s/p NGT, bowl rest - now resolved.  Hospital course recently c/b resp failure, cough, wheezing, copd exacerbation, started on steroids, noted with fevers to 101, worsening cough, sob, noted with aspiration pna on CT chest started on cefepime.       PMH: as above  PSH: as above  Meds: per reconciliation sheet, noted below  MEDICATIONS  (STANDING):  ALBUTerol    90 MICROgram(s) HFA Inhaler 2 Puff(s) Inhalation every 6 hours  aMIOdarone    Tablet 400 milliGRAM(s) Oral every 8 hours  atorvastatin 20 milliGRAM(s) Oral at bedtime  budesonide 160 MICROgram(s)/formoterol 4.5 MICROgram(s) Inhaler 2 Puff(s) Inhalation two times a day  cefepime   IVPB 2000 milliGRAM(s) IV Intermittent every 12 hours  cefepime   IVPB      dextrose 40% Gel 15 Gram(s) Oral once  dextrose 5%. 1000 milliLiter(s) (50 mL/Hr) IV Continuous <Continuous>  dextrose 5%. 1000 milliLiter(s) (100 mL/Hr) IV Continuous <Continuous>  dextrose 50% Injectable 25 Gram(s) IV Push once  dextrose 50% Injectable 12.5 Gram(s) IV Push once  dextrose 50% Injectable 25 Gram(s) IV Push once  diltiazem    Tablet 30 milliGRAM(s) Oral every 6 hours  diltiazem Infusion 5 mG/Hr (5 mL/Hr) IV Continuous <Continuous>  DULoxetine 60 milliGRAM(s) Oral daily  fluticasone propionate 50 MICROgram(s)/spray Nasal Spray 1 Spray(s) Both Nostrils two times a day  insulin glargine Injectable (LANTUS) 20 Unit(s) SubCutaneous at bedtime  insulin lispro (ADMELOG) corrective regimen sliding scale   SubCutaneous three times a day before meals  insulin lispro Injectable (ADMELOG) 6 Unit(s) SubCutaneous three times a day before meals  levothyroxine 25 MICROGram(s) Oral daily  methylPREDNISolone sodium succinate Injectable 40 milliGRAM(s) IV Push every 8 hours  oxybutynin 5 milliGRAM(s) Oral daily  pantoprazole    Tablet 40 milliGRAM(s) Oral two times a day  polyethylene glycol 3350 17 Gram(s) Oral daily  pregabalin 100 milliGRAM(s) Oral three times a day  senna 2 Tablet(s) Oral at bedtime  warfarin 4 milliGRAM(s) Oral daily      Allergies    Cipro (Rash)  Spiriva (Rash)    Intolerances      Social: no smoking, no alcohol, no illegal drugs; no recent travel, no exposure to TB  FAMILY HISTORY:     no history of premature cardiovascular disease in first degree relatives    ROS: unable to obtain d/t medical condition     All other systems reviewed and are negative    Vital Signs Last 24 Hrs  T(C): 37.2 (04 Dec 2021 09:21), Max: 38.3 (03 Dec 2021 14:35)  T(F): 99 (04 Dec 2021 09:21), Max: 101 (03 Dec 2021 14:35)  HR: 115 (04 Dec 2021 09:21) (109 - 129)  BP: 137/60 (04 Dec 2021 09:21) (119/56 - 170/68)  BP(mean): 71 (03 Dec 2021 15:28) (71 - 71)  RR: 22 (04 Dec 2021 09:21) (19 - 32)  SpO2: 94% (04 Dec 2021 09:21) (94% - 98%)  Daily     Daily Weight in k.2 (04 Dec 2021 05:58)    PE:  Constitutional: frail looking  HEENT: NC/AT, EOMI, PERRLA, conjunctivae clear; ears and nose atraumatic; pharynx benign  Neck: supple; thyroid not palpable  Back: no tenderness  Respiratory: decreased breath sounds, rhonchi  Cardiovascular: S1S2 regular, no murmurs  Abdomen: soft, not tender,  distended, positive BS; liver and spleen WNL  Genitourinary: no suprapubic tenderness  Lymphatic: no LN palpable  Musculoskeletal: no muscle tenderness, no joint swelling or tenderness  Extremities: no pedal edema  Neurological/ Psychiatric:  moving all extremities  Skin: no rashes; no palpable lesions    Labs: all available labs reviewed                        .   9.10  )-----------( 344      ( 04 Dec 2021 07:26 )             42.9     12-04    149<H>  |  116<H>  |  35<H>  ----------------------------<  265<H>  3.5   |  25  |  1.29    Ca    8.5      04 Dec 2021 07:26  Phos  2.9     12-04  Mg     2.2     12-04         Urinalysis Basic - ( 03 Dec 2021 11:00 )    Color: Yellow / Appearance: Clear / S.010 / pH: x  Gluc: x / Ketone: Negative  / Bili: Negative / Urobili: Negative mg/dL   Blood: x / Protein: 100 mg/dL / Nitrite: Negative   Leuk Esterase: Small / RBC: 3-5 /HPF / WBC 3-5   Sq Epi: x / Non Sq Epi: Occasional / Bacteria: Occasional          Radiology: all available radiological tests reviewed      EXAM:  CT CHEST                            PROCEDURE DATE:  2021          INTERPRETATION:  INDICATION: Fever, shortness of breath, possible pneumonia    TECHNIQUE: A volumetric CT acquisition of the chest was obtained from the thoracic inlet to the upper abdomen without the use of intravenous contrast. Coronal and sagittal reconstructed images are provided.    COMPARISON: There are no prior chest CTs available for comparison. Abdominal CT 2021    FINDINGS:    Lungs/Airways/Pleura: Extensive airway secretions involving the distal trachea, bilateral mainstem bronchi and multilobar segmental/subsegmental involvement with associated bronchial wall thickening. There are patchy centrilobular groundglass and tree-in-bud nodules likely related to multifocal aspiration in this clinical setting. Overall findings have mildly improved since the abdominal CT from 2021. Emphysema is present. No pleural effusion.    Mediastinum/Lymph nodes: Unremarkable thyroid. No thoracic lymphadenopathy.    Heart and Vessels: Mid ascending aorta measures 3.8 cm. The pulmonary artery is normal in size. The heart is normal in size. There is lipomatous hypertrophy of the interatrial septum. Severe coronary arterial calcification is present. Thereis posterior mitral annular calcification. No pericardial effusion.    Upper Abdomen: Fatty replacement of the pancreas. Hepatic steatosis. Extensive infrarenal aortic calcification.    Osseous structures and Soft Tissues: Mild T6 compression deformity. No aggressive osseous lesion.    IMPRESSION:  Mildly improved bilateral multilobar aspiration since 2021 abdominal CT.    --- End of Report ---        < end of copied text >    Advanced directives addressed: full resuscitation

## 2021-12-04 NOTE — PROGRESS NOTE ADULT - ASSESSMENT
Assessment:   71 year-old woman, with SBO s/p NGT, bowl rest - now resolved.    --Hospital course recently c/b resp failure, cough, wheezing managed for copd exacerbation  --Today she appears lethargic with coarse breath sounds - possible aspiration event and not clearing secretions well.      Recommendations:  --On antibiotics as per ID for possible aspiration pneumonia  --Lethargic today does not appear to be clearing secretions well - would make NPO.  --Monitor resp. status closely  --c/w supplemental oxygen  --already on IV steroids - no wheezing today.       Notify me with any questions or concerns

## 2021-12-04 NOTE — PROGRESS NOTE ADULT - ASSESSMENT
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  Per pt her emesis and BM's were green. Denies f/c/r, denies CP/SOB/dizziness/Headaches.  Seen By Surgery.  Imaging + for SBO, NGT was placed to LWS, however pt pulled out her NGT and refused to have it replaced. Her bowel function has since improved and her diet has been advanced.   Hospital course complicated by       #SBO   -improved  -diet advanced. tolerating PO    #Acute copd exacerbation/acute hypoxic respiratory failure:  -suspect underlying aspiration as cause.   -diet changed to thickened liquids.   -on iv steroids  -started on iv cefepime.   -aspiration precautions.   -would not give any more lasix as pt appears to be intravascularly depleted.     # PAF w/ RVR  Monitor on tele. tele review as above  Amiodarone started on 12/3  Cardizem gtt added this Am due to uncontrolled HR    Cont. BB or CCB  CHADsVasc =   Cont.   for stroke ppx  F/u TSH  Cardio consult      paroxysmal afib CV          #6/DVT/h/o CVA with left sided weakness  on coumadin/Multaq  AC services consult appreciated    #hypokalemia/hypophosphatemia  repleted    #DM/neuropathy  -continue lantus/ss  -increase premeal insulin    #HLD  cont statin    #H/o HTN  -lasix on hold.   -started on arb, titrate up for better bp control.     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  Per pt her emesis and BM's were green. Denies f/c/r, denies CP/SOB/dizziness/Headaches.  Seen By Surgery.  Imaging + for SBO, NGT was placed to LWS, however pt pulled out her NGT and refused to have it replaced. Her bowel function has since improved and her diet has been advanced.   Hospital course complicated by     #SBO   Improved. Diet advanced. tolerating PO  d/c IV pain meds - resume oxycodone (home med)  Add BM regimen    #Acute hypoxic respiratory failure due to COPD exacerbation. Possible Aspiration PNA.  Monitor on tele. 02 monitoring  Supplemental 02 - 2/3L NC  CXR reviewed. CT chest pending   Cont. IV steroids 40mg q8  Cefepime IV  Albuterol ATC, Symbicort BID -- allergy to Spiriva  F/u BCx x2, UCx, lactate, RVP, legionalla/strepU  Speech and swallow eval appreciated -- thickened liquids initiated     # PAF w/ RVR  Monitor on tele. tele review as above  Amiodarone started on 12/3  Cardizem gtt added this Am due to uncontrolled HR  Add Cardizem 30mg Q6 w/ parameters  F/u TSH  F/u echocardiogram  Cont. Coumadin for stroke ppx. INR daily   Cardio consult: Shaheen    # hx DVT, CVA with left sided weakness  Cont. Coumadin. INR daily  AC services consult appreciated    #Type 2 Diabetes Mellitus. Hyperglycemia | Diabetic Neuropathy   Cont. ISS, Lantus, pre - meal  12/4 - up titrated Lantus to 20U HS. monitor BGMS and re-adjust   diabetic diet restrictions added  C/w Lyrica, Cymbalta     #Dysphagia  Seen by S/S ~ diet adjusted to soft/thickened liquids     #HTN   d/c Losartan due to additional rate control meds. see plan above.  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  Per pt her emesis and BM's were green. Denies f/c/r, denies CP/SOB/dizziness/Headaches.  Seen By Surgery.  Imaging + for SBO, NGT was placed to LWS, however pt pulled out her NGT and refused to have it replaced. Her bowel function has since improved and her diet has been advanced.   Hospital course complicated by     #SBO   Improved. Diet advanced. tolerating PO  d/c IV pain meds - resume oxycodone (home med)  Add BM regimen    #Acute hypoxic respiratory failure due to COPD exacerbation. Possible Aspiration PNA.  Monitor on tele. 02 monitoring  Supplemental 02 - 2/3L NC  CXR reviewed. CT chest pending   Cont. IV steroids 40mg q8  Cefepime IV  Albuterol ATC, Symbicort BID -- allergy to Spiriva  F/u BCx x2, UCx, lactate, RVP, legionalla/strepU  Speech and swallow eval appreciated -- thickened liquids initiated     # PAF w/ RVR  Monitor on tele. tele review as above  Amiodarone started on 12/3  Cardizem gtt added this Am due to uncontrolled HR  Add Cardizem 30mg Q6 w/ parameters  F/u TSH  F/u echocardiogram  CHADs Vasc = 6  Cont. Coumadin for stroke ppx. INR daily   Cardio consult: Shaheen    # hx DVT, CVA with left sided weakness  Cont. Coumadin. INR daily  AC services consult appreciated    #Type 2 Diabetes Mellitus. Hyperglycemia | Diabetic Neuropathy   Cont. ISS, Lantus, pre - meal  12/4 - up titrated Lantus to 20U HS. monitor BGMS and re-adjust   diabetic diet restrictions added  C/w Lyrica, Cymbalta     #HTN   d/c Losartan due to additional rate control meds. see plan above.  Lasix on hold    #GERD - d/c IV Protonix - switch to home PPI BID    #Chronic abd wound  Adaptic/abd pad w/ tape change daily   Wound care consult    #Physical Debility  #Dysphagia  Seen by S/S ~ diet adjusted to soft/thickened liquids   PT consulted - unable to be performed ~ TBD  Michelle resident    #DVT ppx  Coumadin. 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  Per pt her emesis and BM's were green. Denies f/c/r, denies CP/SOB/dizziness/Headaches.  Seen By Surgery.  Imaging + for SBO, NGT was placed to LWS, however pt pulled out her NGT and refused to have it replaced. Her bowel function has since improved and her diet has been advanced.   Hospital course complicated by     #SBO   Improved. Diet advanced. tolerating PO  d/c IV pain meds - resume oxycodone (home med)  Add BM regimen    #Acute hypoxic respiratory failure due to COPD exacerbation. Possible Aspiration PNA.  Monitor on tele. 02 monitoring  Supplemental 02 - 2/3L NC  CXR reviewed. CT chest pending   Cont. IV steroids 40mg q8  Cefepime IV  Albuterol ATC, Symbicort BID -- allergy to Spiriva  F/u BCx x2, UCx, lactate, RVP, legionalla/strepU  Speech and swallow eval appreciated -- thickened liquids initiated     # PAF w/ RVR  Monitor on tele. tele review as above  Amiodarone started on 12/3  Cardizem gtt added this Am due to uncontrolled HR  Add Cardizem 30mg Q6 w/ parameters  F/u TSH  F/u echocardiogram  CHADs Vasc = 6  Cont. Coumadin for stroke ppx. INR daily   Cardio consult: Shaheen    # hx DVT, CVA with left sided weakness  Cont. Coumadin. INR daily  AC services consult appreciated    #Type 2 Diabetes Mellitus. Hyperglycemia | Diabetic Neuropathy   Cont. ISS, Lantus, pre - meal  12/4 - up titrated Lantus to 20U HS. monitor BGMS and re-adjust   diabetic diet restrictions added  C/w Lyrica, Cymbalta     #HTN   d/c Losartan due to additional rate control meds. see plan above.  Lasix on hold    #GERD - d/c IV Protonix - switch to home PPI BID    #Chronic abd wound  Adaptic/abd pad w/ tape change daily   Wound care consult    #Physical Debility  #Dysphagia  Seen by S/S ~ diet adjusted to soft/thickened liquids   PT consulted - unable to be performed ~ ANTOND  [facility] resident    #GOC/ Advanced Directives  DNR/ DNI - MOLST in chart. confirmed w/ daughter Margarita over phone.     #DVT ppx  Coumadin. 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 with cc of abd pain/distention and N/V/D since friday 11/26.  Per pt her emesis and BM's were green. Denies f/c/r, denies CP/SOB/dizziness/Headaches.  Seen By Surgery.  Imaging + for SBO, NGT was placed to LWS, however pt pulled out her NGT and refused to have it replaced. Her bowel function has since improved and her diet has been advanced.   Hospital course complicated by     #SBO   Improved. Diet advanced. tolerating PO  d/c IV pain meds - resume oxycodone (home med)  Add BM regimen    #Acute hypoxic respiratory failure due to COPD exacerbation. Possible Aspiration PNA.  Monitor on tele. 02 monitoring  Supplemental 02 - 2/3L NC  CXR reviewed. CT chest pending   Cont. IV steroids 40mg q8  Cefepime IV  Albuterol ATC, Symbicort BID -- allergy to Spiriva  F/u BCx x2, UCx, lactate, RVP, legionalla/strepU  Speech and swallow eval appreciated -- thickened liquids initiated     # PAF w/ RVR  Monitor on tele. tele review as above  Amiodarone started on 12/3  Cardizem gtt added this Am due to uncontrolled HR  Add Cardizem 30mg Q6 w/ parameters  F/u TSH  F/u echocardiogram  CHADs Vasc = 6  Cont. Coumadin for stroke ppx. INR daily   Cardio consult: Shaheen    # hx DVT, CVA with left sided weakness  Cont. Coumadin. INR daily  AC services consult appreciated    #Type 2 Diabetes Mellitus. Hyperglycemia | Diabetic Neuropathy   Cont. ISS, Lantus, pre - meal  12/4 - up titrated Lantus to 20U HS. monitor BGMS and re-adjust   diabetic diet restrictions added  C/w Lyrica, Cymbalta     #HTN   d/c Losartan due to additional rate control meds. see plan above.  Lasix on hold    #GERD - d/c IV Protonix - switch to home PPI BID    #ALVERTO on CKD Stage 2  monitor. hold Lasix for now.  UA neg    #Chronic abd wound  Adaptic/abd pad w/ tape change daily   Wound care consult    #Physical Debility  #Dysphagia  Seen by S/S ~ diet adjusted to soft/thickened liquids   PT consulted - unable to be performed ~ ANTOND  [facility] resident    #GOC/ Advanced Directives  DNR/ DNI - MOLST in chart. confirmed w/ daughter Margarita over phone.     #DVT ppx  Coumadin. INR daily    Updated Margarita (daughter) [phone] 12/4 - all questions/concerns addressed

## 2021-12-04 NOTE — PROGRESS NOTE ADULT - SUBJECTIVE AND OBJECTIVE BOX
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  Per pt her emesis and BM's were green. Denies f/c/r, denies CP/SOB/dizziness/Headaches.  Seen By Surgery.  Imaging + for SBO, NGT was placed to LWS, however pt pulled out her NGT and refused to have it replaced. Her bowel function has since improved and her diet has been advanced.   hospital course complicated by sob/wheeze/productive cough.   Started on iv steroids per pulm for acute copd exacerbation.         SUBJECTIVE:  All other review of systems is negative unless indicated above    PHYSICAL EXAM:  Constitutional: NAD, awake and alert  HEENT: PERR, EOMI, Normal Hearing, MMM  Neck: Soft and supple, No LAD, No JVD  Respiratory: Breath sounds are clear bilaterally, No wheezing, rales or rhonchi  Cardiovascular: S1 and S2, regular rate and rhythm, no Murmurs, gallops or rubs  Gastrointestinal: Bowel Sounds present, soft, nontender, nondistended, no guarding, no rebound  Extremities: No peripheral edema  Vascular: 2+ peripheral pulses  Neurological: A/O x 3, no focal deficits  Musculoskeletal: 5/5 strength b/l upper and lower extremities  Skin: No rashes    Vital Signs Last 24 Hrs  T(C): 37.2 (04 Dec 2021 09:21), Max: 38.3 (03 Dec 2021 14:35)  T(F): 99 (04 Dec 2021 09:21), Max: 101 (03 Dec 2021 14:35)  HR: 115 (04 Dec 2021 09:21) (109 - 129)  BP: 137/60 (04 Dec 2021 09:21) (119/56 - 170/68)  BP(mean): 71 (03 Dec 2021 15:28) (71 - 71)  RR: 22 (04 Dec 2021 09:21) (19 - 32)  SpO2: 94% (04 Dec 2021 09:21) (94% - 98%)      LABS: All Labs Reviewed:                        12.7   9.10  )-----------( 344      ( 04 Dec 2021 07:26 )             42.9     12-04    149<H>  |  116<H>  |  35<H>  ----------------------------<  265<H>  3.5   |  25  |  1.29    Ca    8.5      04 Dec 2021 07:26  Phos  2.9     12-04  Mg     2.2     12-04      PT/INR - ( 04 Dec 2021 07:26 )   PT: 25.2 sec;   INR: 2.24 ratio               Blood Culture:           BNP    RADIOLOGY:        ECHOCARDIOGRAM/CARDIAC CATHTERIZATION/STRESS TEST:      MEDICATIONS:  MEDICATIONS  (STANDING):  acetaminophen   IVPB .. 1000 milliGRAM(s) IV Intermittent every 6 hours  aMIOdarone    Tablet 400 milliGRAM(s) Oral every 8 hours  atorvastatin 20 milliGRAM(s) Oral at bedtime  budesonide 160 MICROgram(s)/formoterol 4.5 MICROgram(s) Inhaler 2 Puff(s) Inhalation two times a day  cefepime   IVPB      cefepime   IVPB 2000 milliGRAM(s) IV Intermittent every 12 hours  dextrose 40% Gel 15 Gram(s) Oral once  dextrose 5%. 1000 milliLiter(s) (50 mL/Hr) IV Continuous <Continuous>  dextrose 5%. 1000 milliLiter(s) (100 mL/Hr) IV Continuous <Continuous>  dextrose 50% Injectable 25 Gram(s) IV Push once  dextrose 50% Injectable 12.5 Gram(s) IV Push once  dextrose 50% Injectable 25 Gram(s) IV Push once  diltiazem Infusion 5 mG/Hr (5 mL/Hr) IV Continuous <Continuous>  DULoxetine 60 milliGRAM(s) Oral daily  fluticasone propionate 50 MICROgram(s)/spray Nasal Spray 1 Spray(s) Both Nostrils two times a day  glucagon  Injectable 1 milliGRAM(s) IntraMuscular once  insulin lispro (ADMELOG) corrective regimen sliding scale   SubCutaneous three times a day before meals  insulin lispro Injectable (ADMELOG) 6 Unit(s) SubCutaneous three times a day before meals  insulin NPH human recombinant 16 Unit(s) SubCutaneous at bedtime  levothyroxine 25 MICROGram(s) Oral daily  losartan 25 milliGRAM(s) Oral daily  methylPREDNISolone sodium succinate Injectable 40 milliGRAM(s) IV Push every 8 hours  oxybutynin 5 milliGRAM(s) Oral daily  pantoprazole  Injectable 40 milliGRAM(s) IV Push daily  pregabalin 100 milliGRAM(s) Oral three times a day  warfarin 4 milliGRAM(s) Oral daily    MEDICATIONS  (PRN):  acetaminophen     Tablet .. 650 milliGRAM(s) Oral every 6 hours PRN Temp greater or equal to 38C (100.4F)  acetaminophen   IVPB .. 1000 milliGRAM(s) IV Intermittent once PRN Temp greater or equal to 38.5C (101.3F), Mild Pain (1 - 3)  ALBUTerol    90 MICROgram(s) HFA Inhaler 2 Puff(s) Inhalation every 6 hours PRN Bronchospasm  benzocaine 15 mG/menthol 3.6 mG (Sugar-Free) Lozenge 1 Lozenge Oral every 3 hours PRN Sore Throat  methocarbamol 750 milliGRAM(s) Oral every 6 hours PRN Muscle Spasm  morphine  - Injectable 2 milliGRAM(s) IV Push every 4 hours PRN Severe Pain (7 - 10)  ondansetron Injectable 4 milliGRAM(s) IV Push every 6 hours PRN Nausea and/or Vomiting          TELEMETRY REVIEW:   CHIEF COMPLAINT/DIAGNOSIS:    HPI: 72 y/o female who resides a [facility], with a pmhx afib on coumadin long term, CVA in 2018 with left sided weakness, Morbid obesity, peripheral edema, COPD, Type 2 DM, neuropathy,  diverticulitis, dvt found in 2018 at time of CVA, HTN who was BIBA from [facility] with cc of abd pain/distention and N/V/D since friday 11/26.  Per pt her emesis and BM's were green. Denies f/c/r, denies CP/SOB/dizziness/Headaches.  Seen By Surgery.  Imaging + for SBO, NGT was placed to LWS, however pt pulled out her NGT and refused to have it replaced. Her bowel function has since improved and her diet has been advanced.   hospital course complicated by sob/wheeze/productive cough.   Started on iv steroids per pulm for acute copd exacerbation.         SUBJECTIVE:  All other review of systems is negative unless indicated above    PHYSICAL EXAM:  Constitutional: NAD, awake and alert  HEENT: PERR, EOMI, Normal Hearing, MMM  Neck: Soft and supple, No LAD, No JVD  Respiratory: Breath sounds are clear bilaterally, No wheezing, rales or rhonchi  Cardiovascular: S1 and S2, regular rate and rhythm, no Murmurs, gallops or rubs  Gastrointestinal: Bowel Sounds present, soft, nontender, nondistended, no guarding, no rebound  Extremities: No peripheral edema  Vascular: 2+ peripheral pulses  Neurological: A/O x 3, no focal deficits  Musculoskeletal: 5/5 strength b/l upper and lower extremities  Skin: No rashes    Vital Signs Last 24 Hrs  T(C): 37.2 (04 Dec 2021 09:21), Max: 38.3 (03 Dec 2021 14:35)  T(F): 99 (04 Dec 2021 09:21), Max: 101 (03 Dec 2021 14:35)  HR: 115 (04 Dec 2021 09:21) (109 - 129)  BP: 137/60 (04 Dec 2021 09:21) (119/56 - 170/68)  BP(mean): 71 (03 Dec 2021 15:28) (71 - 71)  RR: 22 (04 Dec 2021 09:21) (19 - 32)  SpO2: 94% (04 Dec 2021 09:21) (94% - 98%)      LABS: All Labs Reviewed:                        12.7   9.10  )-----------( 344      ( 04 Dec 2021 07:26 )             42.9     12-04    149<H>  |  116<H>  |  35<H>  ----------------------------<  265<H>  3.5   |  25  |  1.29    Ca    8.5      04 Dec 2021 07:26  Phos  2.9     12-04  Mg     2.2     12-04      PT/INR - ( 04 Dec 2021 07:26 )   PT: 25.2 sec;   INR: 2.24 ratio               Blood Culture:           BNP    RADIOLOGY:        ECHOCARDIOGRAM/CARDIAC CATHTERIZATION/STRESS TEST:      MEDICATIONS:  MEDICATIONS  (STANDING):  acetaminophen   IVPB .. 1000 milliGRAM(s) IV Intermittent every 6 hours  aMIOdarone    Tablet 400 milliGRAM(s) Oral every 8 hours  atorvastatin 20 milliGRAM(s) Oral at bedtime  budesonide 160 MICROgram(s)/formoterol 4.5 MICROgram(s) Inhaler 2 Puff(s) Inhalation two times a day  cefepime   IVPB      cefepime   IVPB 2000 milliGRAM(s) IV Intermittent every 12 hours  dextrose 40% Gel 15 Gram(s) Oral once  dextrose 5%. 1000 milliLiter(s) (50 mL/Hr) IV Continuous <Continuous>  dextrose 5%. 1000 milliLiter(s) (100 mL/Hr) IV Continuous <Continuous>  dextrose 50% Injectable 25 Gram(s) IV Push once  dextrose 50% Injectable 12.5 Gram(s) IV Push once  dextrose 50% Injectable 25 Gram(s) IV Push once  diltiazem Infusion 5 mG/Hr (5 mL/Hr) IV Continuous <Continuous>  DULoxetine 60 milliGRAM(s) Oral daily  fluticasone propionate 50 MICROgram(s)/spray Nasal Spray 1 Spray(s) Both Nostrils two times a day  glucagon  Injectable 1 milliGRAM(s) IntraMuscular once  insulin lispro (ADMELOG) corrective regimen sliding scale   SubCutaneous three times a day before meals  insulin lispro Injectable (ADMELOG) 6 Unit(s) SubCutaneous three times a day before meals  insulin NPH human recombinant 16 Unit(s) SubCutaneous at bedtime  levothyroxine 25 MICROGram(s) Oral daily  losartan 25 milliGRAM(s) Oral daily  methylPREDNISolone sodium succinate Injectable 40 milliGRAM(s) IV Push every 8 hours  oxybutynin 5 milliGRAM(s) Oral daily  pantoprazole  Injectable 40 milliGRAM(s) IV Push daily  pregabalin 100 milliGRAM(s) Oral three times a day  warfarin 4 milliGRAM(s) Oral daily    MEDICATIONS  (PRN):  acetaminophen     Tablet .. 650 milliGRAM(s) Oral every 6 hours PRN Temp greater or equal to 38C (100.4F)  acetaminophen   IVPB .. 1000 milliGRAM(s) IV Intermittent once PRN Temp greater or equal to 38.5C (101.3F), Mild Pain (1 - 3)  ALBUTerol    90 MICROgram(s) HFA Inhaler 2 Puff(s) Inhalation every 6 hours PRN Bronchospasm  benzocaine 15 mG/menthol 3.6 mG (Sugar-Free) Lozenge 1 Lozenge Oral every 3 hours PRN Sore Throat  methocarbamol 750 milliGRAM(s) Oral every 6 hours PRN Muscle Spasm  morphine  - Injectable 2 milliGRAM(s) IV Push every 4 hours PRN Severe Pain (7 - 10)  ondansetron Injectable 4 milliGRAM(s) IV Push every 6 hours PRN Nausea and/or Vomiting    Home Medications:  acetaminophen 325 mg oral tablet: 2 tab(s) orally every 6 hours, As Needed - 3) (29 Nov 2021 21:27)  Advair Diskus 250 mcg-50 mcg inhalation powder: 1 puff(s) inhaled 2 times a day (29 Nov 2021 21:27)  albuterol 90 mcg/inh inhalation aerosol: 2 puff(s) inhaled 4 times a day (29 Nov 2021 21:27)  atorvastatin 20 mg oral tablet: 1 tab(s) orally every other day (at bedtime) (29 Nov 2021 21:27)  Basaglar KwikPen 100 units/mL subcutaneous solution: 15 unit(s) subcutaneous once a day (at bedtime) (29 Nov 2021 21:27)  Betadine 10% topical solution: apply to abdomen topically every day shift for abdominal wound (29 Nov 2021 21:27)  cholecalciferol 1250 mcg (50,000 intl units) oral capsule: 1 cap(s) orally once a week on wednesdays (29 Nov 2021 21:27)  cyanocobalamin 1000 mcg oral tablet: 1 tab(s) orally once a day (29 Nov 2021 21:27)  diclofenac 1% topical gel: Apply 2 grams to shoulder, elbow, and wrist topically to affected area 3 times a day (29 Nov 2021 21:27)  diclofenac 1% topical gel: Apply 4 grams to left knee, ankle, and back topically to affected area 3 times a day (29 Nov 2021 21:27)  dronedarone 400 mg oral tablet: 1 tab(s) orally 2 times a day (with meals) (29 Nov 2021 21:27)  DULoxetine 60 mg oral delayed release capsule: 1 cap(s) orally once a day (29 Nov 2021 21:27)  Ferrex-150 oral capsule: 1 cap(s) orally once a day (at bedtime) (29 Nov 2021 21:27)  Fleet Enema 19 g-7 g rectal enema: 133 milliliter(s) rectal Monday, Wednesday, and Friday at bedtime for constipation (29 Nov 2021 21:27)  Flonase 50 mcg/inh nasal spray: 1 spray(s) in each nostril once a day (29 Nov 2021 21:27)  furosemide 20 mg oral tablet: 1 tab(s) orally once a day (29 Nov 2021 21:27)  glipiZIDE 10 mg oral tablet, extended release: 2 tab(s) orally once a day (29 Nov 2021 21:27)  insulin lispro 100 units/mL subcutaneous solution: 10 unit(s) subcutaneous 3 times a day (29 Nov 2021 21:27)  insulin lispro 100 units/mL subcutaneous solution: Sliding Scale subcutaneously before meals and at bedtime:  levothyroxine 25 mcg (0.025 mg) oral tablet: 1 tab(s) orally once a day (29 Nov 2021 21:27)  methocarbamol 750 mg oral tablet: 1 tab(s) orally every 6 hours, As Needed - for muscle spasm, for moderate pain (29 Nov 2021 21:27)  Milk of Magnesia 8% oral suspension: 30 milliliter(s) orally once a day, As Needed - for constipation (29 Nov 2021 21:27)  oxyCODONE 10 mg oral tablet: 1 tab(s) orally 3 times a day, As Needed - for severe pain - 10) (29 Nov 2021 21:27)  oxyCODONE 5 mg oral tablet: 1 tab(s) orally every 6 hours, As Needed - for moderate pain (29 Nov 2021 21:27)  pantoprazole 40 mg oral delayed release tablet: 1 tab(s) orally 2 times a day (29 Nov 2021 21:27)  polyethylene glycol 3350 oral powder for reconstitution: 17 gram(s) orally 2 times a day (29 Nov 2021 21:27)  pregabalin 100 mg oral capsule: 1 cap(s) orally 3 times a day (29 Nov 2021 21:27)  Preparation H Medicated Wipes 50% topical pad: Apply topically to affected area 2 times a day (29 Nov 2021 21:27)  Senna Plus 50 mg-8.6 mg oral tablet: 2 tab(s) orally once a day (at bedtime) (29 Nov 2021 21:27)  simethicone 80 mg oral tablet, chewable: 1 tab(s) orally once a day, As Needed (29 Nov 2021 21:27)  tolterodine 1 mg oral tablet: 1 tab(s) orally once a day (29 Nov 2021 21:27)  Tradjenta 5 mg oral tablet: 1 tab(s) orally once a day (29 Nov 2021 21:27)  warfarin 4 mg oral tablet: 1 tab(s) orally Monday, Wednesday, and Friday  (29 Nov 2021 21:27)  warfarin 5 mg oral tablet: 1 tab(s) orally Tuesday, Thursday, Saturday, Sunday (29 Nov 2021 21:27)  Zeasorb-AF 2% topical powder: Apply topically to affected area 3 times a day (29 Nov 2021 21:27)    TELEMETRY REVIEW:  12/4/21: CHIEF COMPLAINT/DIAGNOSIS: abdominal pain, distension    HPI: 72 y/o female who resides a [facility], with a pmhx afib on coumadin long term, CVA in 2018 with left sided weakness, Morbid obesity, peripheral edema, COPD, Type 2 DM, neuropathy,  diverticulitis, dvt found in 2018 at time of CVA, HTN who was BIBA from [facility] with cc of abd pain/distention and N/V/D since friday 11/26.  Per pt her emesis and BM's were green. Denies f/c/r, denies CP/SOB/dizziness/Headaches.  Seen By Surgery.  Imaging + for SBO, NGT was placed to LWS, however pt pulled out her NGT and refused to have it replaced. Her bowel function has since improved and her diet has been advanced.   hospital course complicated by sob/wheeze/productive cough.   Started on iv steroids per pulm for acute copd exacerbation.     Limited ROS due to current clinical condition, alert to person only. no acute distress. on supplemental 02 - 2/3L. abdominal exam benign.   All other review of systems is negative unless indicated above    PHYSICAL EXAM:  Constitutional: Awake and alert, ill appearing.   HEENT: Normal Hearing, dry mucus membranes   Neck: Soft and supple   Respiratory: Breath sounds are diminished b/l, slight rhonchi  Cardiovascular: S1 and S2, IR IR   Gastrointestinal: Bowel Sounds present, soft, nontender   Extremities:+ BLE peripheral edema  Vascular: 2+ peripheral pulses  Neurological: A/O x person only  Musculoskeletal: 5/5 strength b/l upper and lower extremities  Skin: No rashes    Vital Signs Last 24 Hrs  T(C): 37.2 (04 Dec 2021 09:21), Max: 38.3 (03 Dec 2021 14:35)  T(F): 99 (04 Dec 2021 09:21), Max: 101 (03 Dec 2021 14:35)  HR: 115 (04 Dec 2021 09:21) (109 - 129)  BP: 137/60 (04 Dec 2021 09:21) (119/56 - 170/68)  BP(mean): 71 (03 Dec 2021 15:28) (71 - 71)  RR: 22 (04 Dec 2021 09:21) (19 - 32)  SpO2: 94% (04 Dec 2021 09:21) (94% - 98%) 2/3L NC    LABS: All Labs Reviewed:                        12.7   9.10  )-----------( 344      ( 04 Dec 2021 07:26 )             42.9     12-04    149<H>  |  116<H>  |  35<H>  ----------------------------<  265<H>  3.5   |  25  |  1.29    Ca    8.5      04 Dec 2021 07:26  Phos  2.9     12-04  Mg     2.2     12-04      PT/INR - ( 04 Dec 2021 07:26 )   PT: 25.2 sec;   INR: 2.24 ratio      Blood Culture: 12/4 x2 pending  Urine Culture: pending     RADIOLOGY:  12/3/21: Xray Abdomen 2 Views: Bowel gas findings as above. No acute chest finding.    11/29/21: CT Abdomen and Pelvis w/ IV Cont: Small bowel obstruction, with a mild transition point just distal to the small bowel anastomosis in the anterior pelvis. Mildly prominent air-filled loops are noted distal to this point within a large, widemouth ventral hernia, with gradual transition to decompressed distal small bowel. No bowel wall thickening, pneumatosis, pneumoperitoneum ascites. Numerous ill-defined bibasilar lung opacities, suspicious for infection, including atypical viral etiologies such as COVID19 pneumonia. Consider dedicated chest CT for full evaluation, as clinically indicated.    ECHOCARDIOGRAM: pending     MEDICATIONS  (STANDING):  acetaminophen   IVPB .. 1000 milliGRAM(s) IV Intermittent every 6 hours  aMIOdarone    Tablet 400 milliGRAM(s) Oral every 8 hours  atorvastatin 20 milliGRAM(s) Oral at bedtime  budesonide 160 MICROgram(s)/formoterol 4.5 MICROgram(s) Inhaler 2 Puff(s) Inhalation two times a day  cefepime   IVPB      cefepime   IVPB 2000 milliGRAM(s) IV Intermittent every 12 hours  dextrose 40% Gel 15 Gram(s) Oral once  dextrose 5%. 1000 milliLiter(s) (50 mL/Hr) IV Continuous <Continuous>  dextrose 5%. 1000 milliLiter(s) (100 mL/Hr) IV Continuous <Continuous>  dextrose 50% Injectable 25 Gram(s) IV Push once  dextrose 50% Injectable 12.5 Gram(s) IV Push once  dextrose 50% Injectable 25 Gram(s) IV Push once  diltiazem Infusion 5 mG/Hr (5 mL/Hr) IV Continuous <Continuous>  DULoxetine 60 milliGRAM(s) Oral daily  fluticasone propionate 50 MICROgram(s)/spray Nasal Spray 1 Spray(s) Both Nostrils two times a day  glucagon  Injectable 1 milliGRAM(s) IntraMuscular once  insulin lispro (ADMELOG) corrective regimen sliding scale   SubCutaneous three times a day before meals  insulin lispro Injectable (ADMELOG) 6 Unit(s) SubCutaneous three times a day before meals  insulin NPH human recombinant 16 Unit(s) SubCutaneous at bedtime  levothyroxine 25 MICROGram(s) Oral daily  losartan 25 milliGRAM(s) Oral daily  methylPREDNISolone sodium succinate Injectable 40 milliGRAM(s) IV Push every 8 hours  oxybutynin 5 milliGRAM(s) Oral daily  pantoprazole  Injectable 40 milliGRAM(s) IV Push daily  pregabalin 100 milliGRAM(s) Oral three times a day  warfarin 4 milliGRAM(s) Oral daily    MEDICATIONS  (PRN):  acetaminophen     Tablet .. 650 milliGRAM(s) Oral every 6 hours PRN Temp greater or equal to 38C (100.4F)  acetaminophen   IVPB .. 1000 milliGRAM(s) IV Intermittent once PRN Temp greater or equal to 38.5C (101.3F), Mild Pain (1 - 3)  ALBUTerol    90 MICROgram(s) HFA Inhaler 2 Puff(s) Inhalation every 6 hours PRN Bronchospasm  benzocaine 15 mG/menthol 3.6 mG (Sugar-Free) Lozenge 1 Lozenge Oral every 3 hours PRN Sore Throat  methocarbamol 750 milliGRAM(s) Oral every 6 hours PRN Muscle Spasm  morphine  - Injectable 2 milliGRAM(s) IV Push every 4 hours PRN Severe Pain (7 - 10)  ondansetron Injectable 4 milliGRAM(s) IV Push every 6 hours PRN Nausea and/or Vomiting    Home Medications:  acetaminophen 325 mg oral tablet: 2 tab(s) orally every 6 hours, As Needed - 3) (29 Nov 2021 21:27)  Advair Diskus 250 mcg-50 mcg inhalation powder: 1 puff(s) inhaled 2 times a day (29 Nov 2021 21:27)  albuterol 90 mcg/inh inhalation aerosol: 2 puff(s) inhaled 4 times a day (29 Nov 2021 21:27)  atorvastatin 20 mg oral tablet: 1 tab(s) orally every other day (at bedtime) (29 Nov 2021 21:27)  Basaglar KwikPen 100 units/mL subcutaneous solution: 15 unit(s) subcutaneous once a day (at bedtime) (29 Nov 2021 21:27)  Betadine 10% topical solution: apply to abdomen topically every day shift for abdominal wound (29 Nov 2021 21:27)  cholecalciferol 1250 mcg (50,000 intl units) oral capsule: 1 cap(s) orally once a week on wednesdays (29 Nov 2021 21:27)  cyanocobalamin 1000 mcg oral tablet: 1 tab(s) orally once a day (29 Nov 2021 21:27)  diclofenac 1% topical gel: Apply 2 grams to shoulder, elbow, and wrist topically to affected area 3 times a day (29 Nov 2021 21:27)  diclofenac 1% topical gel: Apply 4 grams to left knee, ankle, and back topically to affected area 3 times a day (29 Nov 2021 21:27)  dronedarone 400 mg oral tablet: 1 tab(s) orally 2 times a day (with meals) (29 Nov 2021 21:27)  DULoxetine 60 mg oral delayed release capsule: 1 cap(s) orally once a day (29 Nov 2021 21:27)  Ferrex-150 oral capsule: 1 cap(s) orally once a day (at bedtime) (29 Nov 2021 21:27)  Fleet Enema 19 g-7 g rectal enema: 133 milliliter(s) rectal Monday, Wednesday, and Friday at bedtime for constipation (29 Nov 2021 21:27)  Flonase 50 mcg/inh nasal spray: 1 spray(s) in each nostril once a day (29 Nov 2021 21:27)  furosemide 20 mg oral tablet: 1 tab(s) orally once a day (29 Nov 2021 21:27)  glipiZIDE 10 mg oral tablet, extended release: 2 tab(s) orally once a day (29 Nov 2021 21:27)  insulin lispro 100 units/mL subcutaneous solution: 10 unit(s) subcutaneous 3 times a day (29 Nov 2021 21:27)  insulin lispro 100 units/mL subcutaneous solution: Sliding Scale subcutaneously before meals and at bedtime:  levothyroxine 25 mcg (0.025 mg) oral tablet: 1 tab(s) orally once a day (29 Nov 2021 21:27)  methocarbamol 750 mg oral tablet: 1 tab(s) orally every 6 hours, As Needed - for muscle spasm, for moderate pain (29 Nov 2021 21:27)  Milk of Magnesia 8% oral suspension: 30 milliliter(s) orally once a day, As Needed - for constipation (29 Nov 2021 21:27)  oxyCODONE 10 mg oral tablet: 1 tab(s) orally 3 times a day, As Needed - for severe pain - 10) (29 Nov 2021 21:27)  oxyCODONE 5 mg oral tablet: 1 tab(s) orally every 6 hours, As Needed - for moderate pain (29 Nov 2021 21:27)  pantoprazole 40 mg oral delayed release tablet: 1 tab(s) orally 2 times a day (29 Nov 2021 21:27)  polyethylene glycol 3350 oral powder for reconstitution: 17 gram(s) orally 2 times a day (29 Nov 2021 21:27)  pregabalin 100 mg oral capsule: 1 cap(s) orally 3 times a day (29 Nov 2021 21:27)  Preparation H Medicated Wipes 50% topical pad: Apply topically to affected area 2 times a day (29 Nov 2021 21:27)  Senna Plus 50 mg-8.6 mg oral tablet: 2 tab(s) orally once a day (at bedtime) (29 Nov 2021 21:27)  simethicone 80 mg oral tablet, chewable: 1 tab(s) orally once a day, As Needed (29 Nov 2021 21:27)  tolterodine 1 mg oral tablet: 1 tab(s) orally once a day (29 Nov 2021 21:27)  Tradjenta 5 mg oral tablet: 1 tab(s) orally once a day (29 Nov 2021 21:27)  warfarin 4 mg oral tablet: 1 tab(s) orally Monday, Wednesday, and Friday  (29 Nov 2021 21:27)  warfarin 5 mg oral tablet: 1 tab(s) orally Tuesday, Thursday, Saturday, Sunday (29 Nov 2021 21:27)  Zeasorb-AF 2% topical powder: Apply topically to affected area 3 times a day (29 Nov 2021 21:27)    TELEMETRY REVIEW:  12/4/21: afib 120s, sustaining 110-120s CHIEF COMPLAINT/DIAGNOSIS: abdominal pain, distension    HPI: 72 y/o female who resides a [facility], with a pmhx afib on coumadin long term, CVA in 2018 with left sided weakness, Morbid obesity, peripheral edema, COPD, Type 2 DM, neuropathy,  diverticulitis, dvt found in 2018 at time of CVA, HTN who was BIBA from [facility] with cc of abd pain/distention and N/V/D since friday 11/26.  Per pt her emesis and BM's were green. Denies f/c/r, denies CP/SOB/dizziness/Headaches.  Seen By Surgery.  Imaging + for SBO, NGT was placed to LWS, however pt pulled out her NGT and refused to have it replaced. Her bowel function has since improved and her diet has been advanced.   hospital course complicated by sob/wheeze/productive cough.   Started on iv steroids per pulm for acute copd exacerbation.     Limited ROS due to current clinical condition, alert to person only. no acute distress. on supplemental 02 - 2/3L. abdominal exam benign.   All other review of systems is negative unless indicated above    PHYSICAL EXAM:  Constitutional: Awake and alert, ill appearing.   HEENT: Normal Hearing, dry mucus membranes   Neck: Soft and supple   Respiratory: Breath sounds are diminished b/l, slight rhonchi  Cardiovascular: S1 and S2, IR IR   Gastrointestinal: Bowel Sounds present, soft, nontender   Extremities:+ BLE peripheral edema  Vascular: 2+ peripheral pulses  Neurological: A/O x person only  Musculoskeletal: 5/5 strength b/l upper and lower extremities  Skin: abd wound, no drainage or redness.     Vital Signs Last 24 Hrs  T(C): 37.2 (04 Dec 2021 09:21), Max: 38.3 (03 Dec 2021 14:35)  T(F): 99 (04 Dec 2021 09:21), Max: 101 (03 Dec 2021 14:35)  HR: 115 (04 Dec 2021 09:21) (109 - 129)  BP: 137/60 (04 Dec 2021 09:21) (119/56 - 170/68)  BP(mean): 71 (03 Dec 2021 15:28) (71 - 71)  RR: 22 (04 Dec 2021 09:21) (19 - 32)  SpO2: 94% (04 Dec 2021 09:21) (94% - 98%) 2/3L NC    LABS: All Labs Reviewed:                        12.7   9.10  )-----------( 344      ( 04 Dec 2021 07:26 )             42.9     12-04    149<H>  |  116<H>  |  35<H>  ----------------------------<  265<H>  3.5   |  25  |  1.29    Ca    8.5      04 Dec 2021 07:26  Phos  2.9     12-04  Mg     2.2     12-04      PT/INR - ( 04 Dec 2021 07:26 )   PT: 25.2 sec;   INR: 2.24 ratio      Blood Culture: 12/4 x2 pending  Urine Culture: pending     RADIOLOGY:  12/3/21: Xray Abdomen 2 Views: Bowel gas findings as above. No acute chest finding.    11/29/21: CT Abdomen and Pelvis w/ IV Cont: Small bowel obstruction, with a mild transition point just distal to the small bowel anastomosis in the anterior pelvis. Mildly prominent air-filled loops are noted distal to this point within a large, widemouth ventral hernia, with gradual transition to decompressed distal small bowel. No bowel wall thickening, pneumatosis, pneumoperitoneum ascites. Numerous ill-defined bibasilar lung opacities, suspicious for infection, including atypical viral etiologies such as COVID19 pneumonia. Consider dedicated chest CT for full evaluation, as clinically indicated.    ECHOCARDIOGRAM: pending     MEDICATIONS  (STANDING):  acetaminophen   IVPB .. 1000 milliGRAM(s) IV Intermittent every 6 hours  aMIOdarone    Tablet 400 milliGRAM(s) Oral every 8 hours  atorvastatin 20 milliGRAM(s) Oral at bedtime  budesonide 160 MICROgram(s)/formoterol 4.5 MICROgram(s) Inhaler 2 Puff(s) Inhalation two times a day  cefepime   IVPB      cefepime   IVPB 2000 milliGRAM(s) IV Intermittent every 12 hours  dextrose 40% Gel 15 Gram(s) Oral once  dextrose 5%. 1000 milliLiter(s) (50 mL/Hr) IV Continuous <Continuous>  dextrose 5%. 1000 milliLiter(s) (100 mL/Hr) IV Continuous <Continuous>  dextrose 50% Injectable 25 Gram(s) IV Push once  dextrose 50% Injectable 12.5 Gram(s) IV Push once  dextrose 50% Injectable 25 Gram(s) IV Push once  diltiazem Infusion 5 mG/Hr (5 mL/Hr) IV Continuous <Continuous>  DULoxetine 60 milliGRAM(s) Oral daily  fluticasone propionate 50 MICROgram(s)/spray Nasal Spray 1 Spray(s) Both Nostrils two times a day  glucagon  Injectable 1 milliGRAM(s) IntraMuscular once  insulin lispro (ADMELOG) corrective regimen sliding scale   SubCutaneous three times a day before meals  insulin lispro Injectable (ADMELOG) 6 Unit(s) SubCutaneous three times a day before meals  insulin NPH human recombinant 16 Unit(s) SubCutaneous at bedtime  levothyroxine 25 MICROGram(s) Oral daily  losartan 25 milliGRAM(s) Oral daily  methylPREDNISolone sodium succinate Injectable 40 milliGRAM(s) IV Push every 8 hours  oxybutynin 5 milliGRAM(s) Oral daily  pantoprazole  Injectable 40 milliGRAM(s) IV Push daily  pregabalin 100 milliGRAM(s) Oral three times a day  warfarin 4 milliGRAM(s) Oral daily    MEDICATIONS  (PRN):  acetaminophen     Tablet .. 650 milliGRAM(s) Oral every 6 hours PRN Temp greater or equal to 38C (100.4F)  acetaminophen   IVPB .. 1000 milliGRAM(s) IV Intermittent once PRN Temp greater or equal to 38.5C (101.3F), Mild Pain (1 - 3)  ALBUTerol    90 MICROgram(s) HFA Inhaler 2 Puff(s) Inhalation every 6 hours PRN Bronchospasm  benzocaine 15 mG/menthol 3.6 mG (Sugar-Free) Lozenge 1 Lozenge Oral every 3 hours PRN Sore Throat  methocarbamol 750 milliGRAM(s) Oral every 6 hours PRN Muscle Spasm  morphine  - Injectable 2 milliGRAM(s) IV Push every 4 hours PRN Severe Pain (7 - 10)  ondansetron Injectable 4 milliGRAM(s) IV Push every 6 hours PRN Nausea and/or Vomiting    Home Medications:  acetaminophen 325 mg oral tablet: 2 tab(s) orally every 6 hours, As Needed - 3) (29 Nov 2021 21:27)  Advair Diskus 250 mcg-50 mcg inhalation powder: 1 puff(s) inhaled 2 times a day (29 Nov 2021 21:27)  albuterol 90 mcg/inh inhalation aerosol: 2 puff(s) inhaled 4 times a day (29 Nov 2021 21:27)  atorvastatin 20 mg oral tablet: 1 tab(s) orally every other day (at bedtime) (29 Nov 2021 21:27)  Basaglar KwikPen 100 units/mL subcutaneous solution: 15 unit(s) subcutaneous once a day (at bedtime) (29 Nov 2021 21:27)  Betadine 10% topical solution: apply to abdomen topically every day shift for abdominal wound (29 Nov 2021 21:27)  cholecalciferol 1250 mcg (50,000 intl units) oral capsule: 1 cap(s) orally once a week on wednesdays (29 Nov 2021 21:27)  cyanocobalamin 1000 mcg oral tablet: 1 tab(s) orally once a day (29 Nov 2021 21:27)  diclofenac 1% topical gel: Apply 2 grams to shoulder, elbow, and wrist topically to affected area 3 times a day (29 Nov 2021 21:27)  diclofenac 1% topical gel: Apply 4 grams to left knee, ankle, and back topically to affected area 3 times a day (29 Nov 2021 21:27)  dronedarone 400 mg oral tablet: 1 tab(s) orally 2 times a day (with meals) (29 Nov 2021 21:27)  DULoxetine 60 mg oral delayed release capsule: 1 cap(s) orally once a day (29 Nov 2021 21:27)  Ferrex-150 oral capsule: 1 cap(s) orally once a day (at bedtime) (29 Nov 2021 21:27)  Fleet Enema 19 g-7 g rectal enema: 133 milliliter(s) rectal Monday, Wednesday, and Friday at bedtime for constipation (29 Nov 2021 21:27)  Flonase 50 mcg/inh nasal spray: 1 spray(s) in each nostril once a day (29 Nov 2021 21:27)  furosemide 20 mg oral tablet: 1 tab(s) orally once a day (29 Nov 2021 21:27)  glipiZIDE 10 mg oral tablet, extended release: 2 tab(s) orally once a day (29 Nov 2021 21:27)  insulin lispro 100 units/mL subcutaneous solution: 10 unit(s) subcutaneous 3 times a day (29 Nov 2021 21:27)  insulin lispro 100 units/mL subcutaneous solution: Sliding Scale subcutaneously before meals and at bedtime:  levothyroxine 25 mcg (0.025 mg) oral tablet: 1 tab(s) orally once a day (29 Nov 2021 21:27)  methocarbamol 750 mg oral tablet: 1 tab(s) orally every 6 hours, As Needed - for muscle spasm, for moderate pain (29 Nov 2021 21:27)  Milk of Magnesia 8% oral suspension: 30 milliliter(s) orally once a day, As Needed - for constipation (29 Nov 2021 21:27)  oxyCODONE 10 mg oral tablet: 1 tab(s) orally 3 times a day, As Needed - for severe pain - 10) (29 Nov 2021 21:27)  oxyCODONE 5 mg oral tablet: 1 tab(s) orally every 6 hours, As Needed - for moderate pain (29 Nov 2021 21:27)  pantoprazole 40 mg oral delayed release tablet: 1 tab(s) orally 2 times a day (29 Nov 2021 21:27)  polyethylene glycol 3350 oral powder for reconstitution: 17 gram(s) orally 2 times a day (29 Nov 2021 21:27)  pregabalin 100 mg oral capsule: 1 cap(s) orally 3 times a day (29 Nov 2021 21:27)  Preparation H Medicated Wipes 50% topical pad: Apply topically to affected area 2 times a day (29 Nov 2021 21:27)  Senna Plus 50 mg-8.6 mg oral tablet: 2 tab(s) orally once a day (at bedtime) (29 Nov 2021 21:27)  simethicone 80 mg oral tablet, chewable: 1 tab(s) orally once a day, As Needed (29 Nov 2021 21:27)  tolterodine 1 mg oral tablet: 1 tab(s) orally once a day (29 Nov 2021 21:27)  Tradjenta 5 mg oral tablet: 1 tab(s) orally once a day (29 Nov 2021 21:27)  warfarin 4 mg oral tablet: 1 tab(s) orally Monday, Wednesday, and Friday  (29 Nov 2021 21:27)  warfarin 5 mg oral tablet: 1 tab(s) orally Tuesday, Thursday, Saturday, Sunday (29 Nov 2021 21:27)  Zeasorb-AF 2% topical powder: Apply topically to affected area 3 times a day (29 Nov 2021 21:27)    TELEMETRY REVIEW:  12/4/21: afib 120s, sustaining 110-120s

## 2021-12-05 LAB
ALBUMIN SERPL ELPH-MCNC: 2.2 G/DL — LOW (ref 3.3–5)
ALP SERPL-CCNC: 44 U/L — SIGNIFICANT CHANGE UP (ref 40–120)
ALT FLD-CCNC: 30 U/L — SIGNIFICANT CHANGE UP (ref 12–78)
ANION GAP SERPL CALC-SCNC: 4 MMOL/L — LOW (ref 5–17)
ANION GAP SERPL CALC-SCNC: 6 MMOL/L — SIGNIFICANT CHANGE UP (ref 5–17)
APPEARANCE UR: CLEAR — SIGNIFICANT CHANGE UP
AST SERPL-CCNC: 15 U/L — SIGNIFICANT CHANGE UP (ref 15–37)
BACTERIA # UR AUTO: ABNORMAL
BILIRUB DIRECT SERPL-MCNC: <0.1 MG/DL — SIGNIFICANT CHANGE UP (ref 0–0.3)
BILIRUB INDIRECT FLD-MCNC: >0.2 MG/DL — SIGNIFICANT CHANGE UP (ref 0.2–1)
BILIRUB SERPL-MCNC: 0.3 MG/DL — SIGNIFICANT CHANGE UP (ref 0.2–1.2)
BILIRUB UR-MCNC: NEGATIVE — SIGNIFICANT CHANGE UP
BUN SERPL-MCNC: 38 MG/DL — HIGH (ref 7–23)
BUN SERPL-MCNC: 39 MG/DL — HIGH (ref 7–23)
CALCIUM SERPL-MCNC: 8.6 MG/DL — SIGNIFICANT CHANGE UP (ref 8.5–10.1)
CALCIUM SERPL-MCNC: 8.7 MG/DL — SIGNIFICANT CHANGE UP (ref 8.5–10.1)
CHLORIDE SERPL-SCNC: 121 MMOL/L — HIGH (ref 96–108)
CHLORIDE SERPL-SCNC: 122 MMOL/L — HIGH (ref 96–108)
CO2 SERPL-SCNC: 27 MMOL/L — SIGNIFICANT CHANGE UP (ref 22–31)
CO2 SERPL-SCNC: 28 MMOL/L — SIGNIFICANT CHANGE UP (ref 22–31)
COLOR SPEC: YELLOW — SIGNIFICANT CHANGE UP
CREAT SERPL-MCNC: 1.14 MG/DL — SIGNIFICANT CHANGE UP (ref 0.5–1.3)
CREAT SERPL-MCNC: 1.2 MG/DL — SIGNIFICANT CHANGE UP (ref 0.5–1.3)
DIFF PNL FLD: ABNORMAL
EPI CELLS # UR: ABNORMAL
GLUCOSE BLDC GLUCOMTR-MCNC: 138 MG/DL — HIGH (ref 70–99)
GLUCOSE BLDC GLUCOMTR-MCNC: 146 MG/DL — HIGH (ref 70–99)
GLUCOSE BLDC GLUCOMTR-MCNC: 180 MG/DL — HIGH (ref 70–99)
GLUCOSE BLDC GLUCOMTR-MCNC: 191 MG/DL — HIGH (ref 70–99)
GLUCOSE SERPL-MCNC: 166 MG/DL — HIGH (ref 70–99)
GLUCOSE SERPL-MCNC: 203 MG/DL — HIGH (ref 70–99)
GLUCOSE UR QL: NEGATIVE MG/DL — SIGNIFICANT CHANGE UP
HCT VFR BLD CALC: 39.5 % — SIGNIFICANT CHANGE UP (ref 34.5–45)
HGB BLD-MCNC: 11.8 G/DL — SIGNIFICANT CHANGE UP (ref 11.5–15.5)
INR BLD: 2.39 RATIO — HIGH (ref 0.88–1.16)
KETONES UR-MCNC: ABNORMAL
LACTATE SERPL-SCNC: 1.9 MMOL/L — SIGNIFICANT CHANGE UP (ref 0.7–2)
LEUKOCYTE ESTERASE UR-ACNC: NEGATIVE — SIGNIFICANT CHANGE UP
MAGNESIUM SERPL-MCNC: 2.4 MG/DL — SIGNIFICANT CHANGE UP (ref 1.6–2.6)
MCHC RBC-ENTMCNC: 25 PG — LOW (ref 27–34)
MCHC RBC-ENTMCNC: 29.9 GM/DL — LOW (ref 32–36)
MCV RBC AUTO: 83.7 FL — SIGNIFICANT CHANGE UP (ref 80–100)
NITRITE UR-MCNC: NEGATIVE — SIGNIFICANT CHANGE UP
PH UR: 5 — SIGNIFICANT CHANGE UP (ref 5–8)
PLATELET # BLD AUTO: 250 K/UL — SIGNIFICANT CHANGE UP (ref 150–400)
POTASSIUM SERPL-MCNC: 3.8 MMOL/L — SIGNIFICANT CHANGE UP (ref 3.5–5.3)
POTASSIUM SERPL-MCNC: 4.4 MMOL/L — SIGNIFICANT CHANGE UP (ref 3.5–5.3)
POTASSIUM SERPL-SCNC: 3.8 MMOL/L — SIGNIFICANT CHANGE UP (ref 3.5–5.3)
POTASSIUM SERPL-SCNC: 4.4 MMOL/L — SIGNIFICANT CHANGE UP (ref 3.5–5.3)
PROT SERPL-MCNC: 6.2 GM/DL — SIGNIFICANT CHANGE UP (ref 6–8.3)
PROT UR-MCNC: 100 MG/DL
PROTHROM AB SERPL-ACNC: 26.6 SEC — HIGH (ref 10.6–13.6)
RBC # BLD: 4.72 M/UL — SIGNIFICANT CHANGE UP (ref 3.8–5.2)
RBC # FLD: 20.3 % — HIGH (ref 10.3–14.5)
RBC CASTS # UR COMP ASSIST: ABNORMAL /HPF (ref 0–4)
SODIUM SERPL-SCNC: 154 MMOL/L — HIGH (ref 135–145)
SODIUM SERPL-SCNC: 154 MMOL/L — HIGH (ref 135–145)
SP GR SPEC: 1.02 — SIGNIFICANT CHANGE UP (ref 1.01–1.02)
UROBILINOGEN FLD QL: NEGATIVE MG/DL — SIGNIFICANT CHANGE UP
WBC # BLD: 11.24 K/UL — HIGH (ref 3.8–10.5)
WBC # FLD AUTO: 11.24 K/UL — HIGH (ref 3.8–10.5)
WBC UR QL: ABNORMAL

## 2021-12-05 PROCEDURE — 99233 SBSQ HOSP IP/OBS HIGH 50: CPT

## 2021-12-05 PROCEDURE — 74176 CT ABD & PELVIS W/O CONTRAST: CPT | Mod: 26

## 2021-12-05 PROCEDURE — 99231 SBSQ HOSP IP/OBS SF/LOW 25: CPT

## 2021-12-05 RX ORDER — SODIUM CHLORIDE 9 MG/ML
1000 INJECTION, SOLUTION INTRAVENOUS
Refills: 0 | Status: DISCONTINUED | OUTPATIENT
Start: 2021-12-05 | End: 2021-12-06

## 2021-12-05 RX ORDER — DILTIAZEM HCL 120 MG
60 CAPSULE, EXT RELEASE 24 HR ORAL EVERY 6 HOURS
Refills: 0 | Status: DISCONTINUED | OUTPATIENT
Start: 2021-12-05 | End: 2021-12-06

## 2021-12-05 RX ORDER — ACETAZOLAMIDE 250 MG/1
1000 TABLET ORAL ONCE
Refills: 0 | Status: DISCONTINUED | OUTPATIENT
Start: 2021-12-05 | End: 2021-12-05

## 2021-12-05 RX ORDER — ACETAMINOPHEN 500 MG
1000 TABLET ORAL ONCE
Refills: 0 | Status: COMPLETED | OUTPATIENT
Start: 2021-12-05 | End: 2021-12-05

## 2021-12-05 RX ORDER — POLYETHYLENE GLYCOL 3350 17 G/17G
17 POWDER, FOR SOLUTION ORAL ONCE
Refills: 0 | Status: COMPLETED | OUTPATIENT
Start: 2021-12-05 | End: 2021-12-05

## 2021-12-05 RX ADMIN — PANTOPRAZOLE SODIUM 40 MILLIGRAM(S): 20 TABLET, DELAYED RELEASE ORAL at 12:13

## 2021-12-05 RX ADMIN — Medication 100 MILLIGRAM(S): at 05:42

## 2021-12-05 RX ADMIN — Medication 40 MILLIGRAM(S): at 21:33

## 2021-12-05 RX ADMIN — Medication 60 MILLIGRAM(S): at 12:10

## 2021-12-05 RX ADMIN — Medication 1 SPRAY(S): at 21:36

## 2021-12-05 RX ADMIN — Medication 6 UNIT(S): at 13:00

## 2021-12-05 RX ADMIN — ALBUTEROL 2 PUFF(S): 90 AEROSOL, METERED ORAL at 21:30

## 2021-12-05 RX ADMIN — POLYETHYLENE GLYCOL 3350 17 GRAM(S): 17 POWDER, FOR SOLUTION ORAL at 21:42

## 2021-12-05 RX ADMIN — Medication 100 MILLIGRAM(S): at 17:24

## 2021-12-05 RX ADMIN — Medication 25 MILLIGRAM(S): at 21:35

## 2021-12-05 RX ADMIN — ATORVASTATIN CALCIUM 20 MILLIGRAM(S): 80 TABLET, FILM COATED ORAL at 21:33

## 2021-12-05 RX ADMIN — Medication 100 MILLIGRAM(S): at 21:33

## 2021-12-05 RX ADMIN — BUDESONIDE AND FORMOTEROL FUMARATE DIHYDRATE 2 PUFF(S): 160; 4.5 AEROSOL RESPIRATORY (INHALATION) at 07:59

## 2021-12-05 RX ADMIN — Medication 40 MILLIGRAM(S): at 12:11

## 2021-12-05 RX ADMIN — WARFARIN SODIUM 4 MILLIGRAM(S): 2.5 TABLET ORAL at 21:35

## 2021-12-05 RX ADMIN — PANTOPRAZOLE SODIUM 40 MILLIGRAM(S): 20 TABLET, DELAYED RELEASE ORAL at 21:33

## 2021-12-05 RX ADMIN — Medication 650 MILLIGRAM(S): at 21:37

## 2021-12-05 RX ADMIN — AMIODARONE HYDROCHLORIDE 400 MILLIGRAM(S): 400 TABLET ORAL at 05:40

## 2021-12-05 RX ADMIN — INSULIN GLARGINE 15 UNIT(S): 100 INJECTION, SOLUTION SUBCUTANEOUS at 21:34

## 2021-12-05 RX ADMIN — AMIODARONE HYDROCHLORIDE 400 MILLIGRAM(S): 400 TABLET ORAL at 21:32

## 2021-12-05 RX ADMIN — Medication 25 MICROGRAM(S): at 05:41

## 2021-12-05 RX ADMIN — Medication 650 MILLIGRAM(S): at 04:11

## 2021-12-05 RX ADMIN — Medication 5 MILLIGRAM(S): at 12:12

## 2021-12-05 RX ADMIN — CEFEPIME 100 MILLIGRAM(S): 1 INJECTION, POWDER, FOR SOLUTION INTRAMUSCULAR; INTRAVENOUS at 19:07

## 2021-12-05 RX ADMIN — Medication 650 MILLIGRAM(S): at 04:41

## 2021-12-05 RX ADMIN — Medication 650 MILLIGRAM(S): at 20:31

## 2021-12-05 RX ADMIN — Medication 90 MILLIGRAM(S): at 04:11

## 2021-12-05 RX ADMIN — AMIODARONE HYDROCHLORIDE 400 MILLIGRAM(S): 400 TABLET ORAL at 17:20

## 2021-12-05 RX ADMIN — Medication 400 MILLIGRAM(S): at 02:56

## 2021-12-05 RX ADMIN — BUDESONIDE AND FORMOTEROL FUMARATE DIHYDRATE 2 PUFF(S): 160; 4.5 AEROSOL RESPIRATORY (INHALATION) at 21:51

## 2021-12-05 RX ADMIN — ALBUTEROL 2 PUFF(S): 90 AEROSOL, METERED ORAL at 07:59

## 2021-12-05 RX ADMIN — CEFEPIME 100 MILLIGRAM(S): 1 INJECTION, POWDER, FOR SOLUTION INTRAMUSCULAR; INTRAVENOUS at 05:40

## 2021-12-05 RX ADMIN — Medication 60 MILLIGRAM(S): at 19:07

## 2021-12-05 RX ADMIN — ALBUTEROL 2 PUFF(S): 90 AEROSOL, METERED ORAL at 14:09

## 2021-12-05 RX ADMIN — Medication 1000 MILLIGRAM(S): at 03:34

## 2021-12-05 RX ADMIN — Medication 25 MILLIGRAM(S): at 12:12

## 2021-12-05 RX ADMIN — DULOXETINE HYDROCHLORIDE 60 MILLIGRAM(S): 30 CAPSULE, DELAYED RELEASE ORAL at 12:11

## 2021-12-05 RX ADMIN — SENNA PLUS 2 TABLET(S): 8.6 TABLET ORAL at 21:32

## 2021-12-05 NOTE — PROGRESS NOTE ADULT - ASSESSMENT
72 y/o female with a PMHx of Afib, CVA, COPD, DM, diverticulitis, DVT, HTN neuropathy presents to the ED BIBA from [facility] c/o abd pain, abd distention and n/v/d x3 days. Pt reports her emesis and BMs are green. No other complaints at this time. Patient reports she had BM in the ED. patient reports she had nausea and vomiting since friday. Patient reports she has abdominal distension and pain. Patient denies CP, SOB, dizziness, and headache. Initially found to have SBO s/p NGT, bowl rest - now resolved.  Hospital course recently c/b resp failure, cough, wheezing, copd exacerbation, started on steroids, noted with fevers to 101, worsening cough, sob, noted with aspiration pna on CT chest started on cefepime.     1. fever. multilobar pneumonia - aspiration. copd. resolved sbo  - imaging reviewed temps could be d/t recurrent aspiration  - CT abd/pelvis and recent CT chest reviewed   - on IV cefepime [id number] #2  - continue current abx  - RVP (-) fu , blood cultures  - on iv steroids for copd  - monitor temps  - tolerating abx well so far; no side effects noted  - reason for abx use and side effects reviewed with patient  - supportive care  - aspiration precautions  - fu cbc    2. other issues - care per medicine

## 2021-12-05 NOTE — CHART NOTE - NSCHARTNOTEFT_GEN_A_CORE
Called by RN regarding pt's uncontrolled elevated temp (101.4F). Pt received tylenol 1g IV and 650mg PO x2. AMS at baseline.    Pt seen and evaluated at bedside.    Gen: AAOx1. Doesn't answer questions appropriately. Pt states she is having abdominal pain though can't point where.  Abd: Soft. Non-tender. No signs of rigidity or rebound.    Plan  Cooling blanket  DNR/DNI so ICU likely will not accept patient  Continue to observe  BCx pending  Continue Cefepime

## 2021-12-05 NOTE — PROGRESS NOTE ADULT - SUBJECTIVE AND OBJECTIVE BOX
Date of service: 12-05-21 @ 16:04    pt seen and examined  febrile early this am to 102  currently afebrile  lethargic, laying in bed    ROS: unable to obtain d/t medical condition    MEDICATIONS  (STANDING):  ALBUTerol    90 MICROgram(s) HFA Inhaler 2 Puff(s) Inhalation every 6 hours  aMIOdarone    Tablet 400 milliGRAM(s) Oral every 8 hours  atorvastatin 20 milliGRAM(s) Oral at bedtime  budesonide 160 MICROgram(s)/formoterol 4.5 MICROgram(s) Inhaler 2 Puff(s) Inhalation two times a day  cefepime   IVPB 2000 milliGRAM(s) IV Intermittent every 12 hours  cefepime   IVPB      dextrose 40% Gel 15 Gram(s) Oral once  dextrose 5%. 1000 milliLiter(s) (50 mL/Hr) IV Continuous <Continuous>  dextrose 5%. 1000 milliLiter(s) (100 mL/Hr) IV Continuous <Continuous>  dextrose 5%. 1000 milliLiter(s) (100 mL/Hr) IV Continuous <Continuous>  dextrose 50% Injectable 25 Gram(s) IV Push once  dextrose 50% Injectable 12.5 Gram(s) IV Push once  dextrose 50% Injectable 25 Gram(s) IV Push once  diltiazem    Tablet 60 milliGRAM(s) Oral every 6 hours  DULoxetine 60 milliGRAM(s) Oral daily  fluticasone propionate 50 MICROgram(s)/spray Nasal Spray 1 Spray(s) Both Nostrils two times a day  insulin glargine Injectable (LANTUS) 15 Unit(s) SubCutaneous at bedtime  insulin lispro (ADMELOG) corrective regimen sliding scale   SubCutaneous three times a day before meals  insulin lispro Injectable (ADMELOG) 6 Unit(s) SubCutaneous three times a day before meals  levothyroxine 25 MICROGram(s) Oral daily  methylPREDNISolone sodium succinate Injectable 40 milliGRAM(s) IV Push two times a day  metoprolol tartrate 25 milliGRAM(s) Oral two times a day  oxybutynin 5 milliGRAM(s) Oral daily  pantoprazole    Tablet 40 milliGRAM(s) Oral two times a day  polyethylene glycol 3350 17 Gram(s) Oral daily  pregabalin 100 milliGRAM(s) Oral three times a day  senna 2 Tablet(s) Oral at bedtime  warfarin 4 milliGRAM(s) Oral daily      Vital Signs Last 24 Hrs  T(C): 37.1 (05 Dec 2021 09:03), Max: 39.1 (05 Dec 2021 02:37)  T(F): 98.8 (05 Dec 2021 09:03), Max: 102.4 (05 Dec 2021 02:37)  HR: 72 (05 Dec 2021 14:16) (64 - 129)  BP: 115/51 (05 Dec 2021 09:03) (115/51 - 132/61)  BP(mean): --  RR: 19 (05 Dec 2021 09:03) (19 - 23)  SpO2: 94% (05 Dec 2021 09:03) (94% - 100%)          PE:  Constitutional: frail looking  HEENT: NC/AT, EOMI, PERRLA, conjunctivae clear; ears and nose atraumatic; pharynx benign  Neck: supple; thyroid not palpable  Back: no tenderness  Respiratory: decreased breath sounds, rhonchi  Cardiovascular: S1S2 regular, no murmurs  Abdomen: soft, not tender,  distended, positive BS; liver and spleen WNL  Genitourinary: no suprapubic tenderness  Lymphatic: no LN palpable  Musculoskeletal: no muscle tenderness, no joint swelling or tenderness  Extremities: no pedal edema  Neurological/ Psychiatric:  moving all extremities  Skin: no rashes; no palpable lesions    Labs: all available labs reviewed                        11.8 11.24 )-----------( 250      ( 05 Dec 2021 06:57 )             39.5     12-05    154<H>  |  122<H>  |  39<H>  ----------------------------<  166<H>  4.4   |  28  |  1.20    Ca    8.7      05 Dec 2021 12:51  Phos  2.9     12-04  Mg     2.4     12-05    TPro  6.2  /  Alb  2.2<L>  /  TBili  0.3  /  DBili  <0.1  /  AST  15  /  ALT  30  /  AlkPhos  44  12-05               Radiology: all available radiological tests reviewed  < from: CT Abdomen and Pelvis No Cont (12.05.21 @ 11:18) >  EXAM:  CT ABDOMEN AND PELVIS                            PROCEDURE DATE:  12/05/2021          INTERPRETATION:  CLINICAL INFORMATION: Abdominal pain    COMPARISON: CT of the abdomen and pelvis from November 29, 2021    CONTRAST/COMPLICATIONS:  IV Contrast: Omnipaque 350  90 cc administered   10 cc discarded  Oral Contrast: NONE  Complications: None reported at time of study completion    PROCEDURE:  CT of the Abdomen and Pelvis was performed.  Sagittal and coronal reformats were performed.    FINDINGS:  LOWER CHEST: There are multiple foci of groundglass attenuation and tree-in-bud nodules within the right lung base. No pleural effusions.    LIVER: Within normal limits.  BILE DUCTS: Normal caliber.  GALLBLADDER: Within normal limits.  SPLEEN:Within normal limits.  PANCREAS: Fatty changes.  ADRENALS: Within normal limits.  KIDNEYS/URETERS: No hydronephrosis. Redemonstrated 1 cm left lower pole renal parenchymal calcification. The right kidney is within normal limits.    BLADDER: Limited evaluation due to streak artifact from bilateral hip prostheses, grossly unremarkable.  REPRODUCTIVE ORGANS: Limited evaluation due to streak artifact from bilateral hip prostheses.    BOWEL: No bowel obstruction. Redemonstrated small bowel, right colonic, and rectosigmoid anastomoses indicating prior resection. Scattered colonic diverticula, no evidence of diverticulitis. Appendix is not visualized. No evidence of inflammation in the pericecal region.  PERITONEUM: No ascites.  VESSELS: Atherosclerotic changes.  RETROPERITONEUM/LYMPH NODES: No lymphadenopathy.  ABDOMINAL WALL: Within the upper abdominal wall, there is a ventral hernia containing part of the distal stomach. Within the mid abdominal wall, there is a large widemouth ventral herniacontaining parts of small bowel and large bowel.  BONES: Bilateral hip arthroplasty. Degenerative changes. No acute bony abnormalities.    IMPRESSION:  Multiple foci of groundglass attenuation and tree-in-bud nodules in the right lung base, this may represent infectious process.     No acute intra-abdominal or intrapelvic pathology.    < end of copied text >      EXAM:  CT CHEST                            PROCEDURE DATE:  12/04/2021          INTERPRETATION:  INDICATION: Fever, shortness of breath, possible pneumonia    TECHNIQUE: A volumetric CT acquisition of the chest was obtained from the thoracic inlet to the upper abdomen without the use of intravenous contrast. Coronal and sagittal reconstructed images are provided.    COMPARISON: There are no prior chest CTs available for comparison. Abdominal CT 11/29/2021    FINDINGS:    Lungs/Airways/Pleura: Extensive airway secretions involving the distal trachea, bilateral mainstem bronchi and multilobar segmental/subsegmental involvement with associated bronchial wall thickening. There are patchy centrilobular groundglass and tree-in-bud nodules likely related to multifocal aspiration in this clinical setting. Overall findings have mildly improved since the abdominal CT from 11/29/2021. Emphysema is present. No pleural effusion.    Mediastinum/Lymph nodes: Unremarkable thyroid. No thoracic lymphadenopathy.    Heart and Vessels: Mid ascending aorta measures 3.8 cm. The pulmonary artery is normal in size. The heart is normal in size. There is lipomatous hypertrophy of the interatrial septum. Severe coronary arterial calcification is present. Thereis posterior mitral annular calcification. No pericardial effusion.    Upper Abdomen: Fatty replacement of the pancreas. Hepatic steatosis. Extensive infrarenal aortic calcification.    Osseous structures and Soft Tissues: Mild T6 compression deformity. No aggressive osseous lesion.    IMPRESSION:  Mildly improved bilateral multilobar aspiration since 11/29/2021 abdominal CT.    --- End of Report ---        < end of copied text >    Advanced directives addressed: full resuscitation

## 2021-12-05 NOTE — PROGRESS NOTE ADULT - SUBJECTIVE AND OBJECTIVE BOX
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  Per pt her emesis and BM's were green. Denies f/c/r, denies CP/SOB/dizziness/Headaches.  Seen By Surgery.  Imaging + for SBO, NGT was placed to LWS, however pt pulled out her NGT and refused to have it replaced. Her bowel function has since improved and her diet has been advanced.   hospital course complicated by sob/wheeze/productive cough.   Started on iv steroids per pulm for acute copd exacerbation.     Limited ROS due to current clinical condition, alert to person only. no acute distress. on supplemental 02 - 2/3L. abdominal exam benign.   All other review of systems is negative unless indicated above    PHYSICAL EXAM:  Constitutional: Awake and alert, ill appearing.   HEENT: Normal Hearing, dry mucus membranes   Neck: Soft and supple   Respiratory: Breath sounds are diminished b/l, slight rhonchi  Cardiovascular: S1 and S2, IR IR   Gastrointestinal: Bowel Sounds present, soft, nontender   Extremities:+ BLE peripheral edema  Vascular: 2+ peripheral pulses  Neurological: A/O x person only  Musculoskeletal: 5/5 strength b/l upper and lower extremities  Skin: abd wound, no drainage or redness.     Vital Signs Last 24 Hrs  T(C): 37.2 (04 Dec 2021 09:21), Max: 38.3 (03 Dec 2021 14:35)  T(F): 99 (04 Dec 2021 09:21), Max: 101 (03 Dec 2021 14:35)  HR: 115 (04 Dec 2021 09:21) (109 - 129)  BP: 137/60 (04 Dec 2021 09:21) (119/56 - 170/68)  BP(mean): 71 (03 Dec 2021 15:28) (71 - 71)  RR: 22 (04 Dec 2021 09:21) (19 - 32)  SpO2: 94% (04 Dec 2021 09:21) (94% - 98%) 2/3L NC    LABS: All Labs Reviewed:                        12.7   9.10  )-----------( 344      ( 04 Dec 2021 07:26 )             42.9     12-04    149<H>  |  116<H>  |  35<H>  ----------------------------<  265<H>  3.5   |  25  |  1.29    Ca    8.5      04 Dec 2021 07:26  Phos  2.9     12-04  Mg     2.2     12-04      PT/INR - ( 04 Dec 2021 07:26 )   PT: 25.2 sec;   INR: 2.24 ratio      Blood Culture: 12/4 x2 pending  Urine Culture: pending     RADIOLOGY:  12/4/21:  CT Chest No Cont: Mildly improved bilateral multilobar aspiration since 11/29/2021 abdominal CT.    12/3/21: Xray Abdomen 2 Views: Bowel gas findings as above. No acute chest finding.    11/29/21: CT Abdomen and Pelvis w/ IV Cont: Small bowel obstruction, with a mild transition point just distal to the small bowel anastomosis in the anterior pelvis. Mildly prominent air-filled loops are noted distal to this point within a large, widemouth ventral hernia, with gradual transition to decompressed distal small bowel. No bowel wall thickening, pneumatosis, pneumoperitoneum ascites. Numerous ill-defined bibasilar lung opacities, suspicious for infection, including atypical viral etiologies such as COVID19 pneumonia. Consider dedicated chest CT for full evaluation, as clinically indicated.    ECHOCARDIOGRAM: pending     MEDICATIONS  (STANDING):  acetaminophen   IVPB .. 1000 milliGRAM(s) IV Intermittent every 6 hours  aMIOdarone    Tablet 400 milliGRAM(s) Oral every 8 hours  atorvastatin 20 milliGRAM(s) Oral at bedtime  budesonide 160 MICROgram(s)/formoterol 4.5 MICROgram(s) Inhaler 2 Puff(s) Inhalation two times a day  cefepime   IVPB      cefepime   IVPB 2000 milliGRAM(s) IV Intermittent every 12 hours  dextrose 40% Gel 15 Gram(s) Oral once  dextrose 5%. 1000 milliLiter(s) (50 mL/Hr) IV Continuous <Continuous>  dextrose 5%. 1000 milliLiter(s) (100 mL/Hr) IV Continuous <Continuous>  dextrose 50% Injectable 25 Gram(s) IV Push once  dextrose 50% Injectable 12.5 Gram(s) IV Push once  dextrose 50% Injectable 25 Gram(s) IV Push once  diltiazem Infusion 5 mG/Hr (5 mL/Hr) IV Continuous <Continuous>  DULoxetine 60 milliGRAM(s) Oral daily  fluticasone propionate 50 MICROgram(s)/spray Nasal Spray 1 Spray(s) Both Nostrils two times a day  glucagon  Injectable 1 milliGRAM(s) IntraMuscular once  insulin lispro (ADMELOG) corrective regimen sliding scale   SubCutaneous three times a day before meals  insulin lispro Injectable (ADMELOG) 6 Unit(s) SubCutaneous three times a day before meals  insulin NPH human recombinant 16 Unit(s) SubCutaneous at bedtime  levothyroxine 25 MICROGram(s) Oral daily  losartan 25 milliGRAM(s) Oral daily  methylPREDNISolone sodium succinate Injectable 40 milliGRAM(s) IV Push every 8 hours  oxybutynin 5 milliGRAM(s) Oral daily  pantoprazole  Injectable 40 milliGRAM(s) IV Push daily  pregabalin 100 milliGRAM(s) Oral three times a day  warfarin 4 milliGRAM(s) Oral daily    MEDICATIONS  (PRN):  acetaminophen     Tablet .. 650 milliGRAM(s) Oral every 6 hours PRN Temp greater or equal to 38C (100.4F)  acetaminophen   IVPB .. 1000 milliGRAM(s) IV Intermittent once PRN Temp greater or equal to 38.5C (101.3F), Mild Pain (1 - 3)  ALBUTerol    90 MICROgram(s) HFA Inhaler 2 Puff(s) Inhalation every 6 hours PRN Bronchospasm  benzocaine 15 mG/menthol 3.6 mG (Sugar-Free) Lozenge 1 Lozenge Oral every 3 hours PRN Sore Throat  methocarbamol 750 milliGRAM(s) Oral every 6 hours PRN Muscle Spasm  morphine  - Injectable 2 milliGRAM(s) IV Push every 4 hours PRN Severe Pain (7 - 10)  ondansetron Injectable 4 milliGRAM(s) IV Push every 6 hours PRN Nausea and/or Vomiting    Home Medications:  acetaminophen 325 mg oral tablet: 2 tab(s) orally every 6 hours, As Needed - 3) (29 Nov 2021 21:27)  Advair Diskus 250 mcg-50 mcg inhalation powder: 1 puff(s) inhaled 2 times a day (29 Nov 2021 21:27)  albuterol 90 mcg/inh inhalation aerosol: 2 puff(s) inhaled 4 times a day (29 Nov 2021 21:27)  atorvastatin 20 mg oral tablet: 1 tab(s) orally every other day (at bedtime) (29 Nov 2021 21:27)  Basaglar KwikPen 100 units/mL subcutaneous solution: 15 unit(s) subcutaneous once a day (at bedtime) (29 Nov 2021 21:27)  Betadine 10% topical solution: apply to abdomen topically every day shift for abdominal wound (29 Nov 2021 21:27)  cholecalciferol 1250 mcg (50,000 intl units) oral capsule: 1 cap(s) orally once a week on wednesdays (29 Nov 2021 21:27)  cyanocobalamin 1000 mcg oral tablet: 1 tab(s) orally once a day (29 Nov 2021 21:27)  diclofenac 1% topical gel: Apply 2 grams to shoulder, elbow, and wrist topically to affected area 3 times a day (29 Nov 2021 21:27)  diclofenac 1% topical gel: Apply 4 grams to left knee, ankle, and back topically to affected area 3 times a day (29 Nov 2021 21:27)  dronedarone 400 mg oral tablet: 1 tab(s) orally 2 times a day (with meals) (29 Nov 2021 21:27)  DULoxetine 60 mg oral delayed release capsule: 1 cap(s) orally once a day (29 Nov 2021 21:27)  Ferrex-150 oral capsule: 1 cap(s) orally once a day (at bedtime) (29 Nov 2021 21:27)  Fleet Enema 19 g-7 g rectal enema: 133 milliliter(s) rectal Monday, Wednesday, and Friday at bedtime for constipation (29 Nov 2021 21:27)  Flonase 50 mcg/inh nasal spray: 1 spray(s) in each nostril once a day (29 Nov 2021 21:27)  furosemide 20 mg oral tablet: 1 tab(s) orally once a day (29 Nov 2021 21:27)  glipiZIDE 10 mg oral tablet, extended release: 2 tab(s) orally once a day (29 Nov 2021 21:27)  insulin lispro 100 units/mL subcutaneous solution: 10 unit(s) subcutaneous 3 times a day (29 Nov 2021 21:27)  insulin lispro 100 units/mL subcutaneous solution: Sliding Scale subcutaneously before meals and at bedtime:  levothyroxine 25 mcg (0.025 mg) oral tablet: 1 tab(s) orally once a day (29 Nov 2021 21:27)  methocarbamol 750 mg oral tablet: 1 tab(s) orally every 6 hours, As Needed - for muscle spasm, for moderate pain (29 Nov 2021 21:27)  Milk of Magnesia 8% oral suspension: 30 milliliter(s) orally once a day, As Needed - for constipation (29 Nov 2021 21:27)  oxyCODONE 10 mg oral tablet: 1 tab(s) orally 3 times a day, As Needed - for severe pain - 10) (29 Nov 2021 21:27)  oxyCODONE 5 mg oral tablet: 1 tab(s) orally every 6 hours, As Needed - for moderate pain (29 Nov 2021 21:27)  pantoprazole 40 mg oral delayed release tablet: 1 tab(s) orally 2 times a day (29 Nov 2021 21:27)  polyethylene glycol 3350 oral powder for reconstitution: 17 gram(s) orally 2 times a day (29 Nov 2021 21:27)  pregabalin 100 mg oral capsule: 1 cap(s) orally 3 times a day (29 Nov 2021 21:27)  Preparation H Medicated Wipes 50% topical pad: Apply topically to affected area 2 times a day (29 Nov 2021 21:27)  Senna Plus 50 mg-8.6 mg oral tablet: 2 tab(s) orally once a day (at bedtime) (29 Nov 2021 21:27)  simethicone 80 mg oral tablet, chewable: 1 tab(s) orally once a day, As Needed (29 Nov 2021 21:27)  tolterodine 1 mg oral tablet: 1 tab(s) orally once a day (29 Nov 2021 21:27)  Tradjenta 5 mg oral tablet: 1 tab(s) orally once a day (29 Nov 2021 21:27)  warfarin 4 mg oral tablet: 1 tab(s) orally Monday, Wednesday, and Friday  (29 Nov 2021 21:27)  warfarin 5 mg oral tablet: 1 tab(s) orally Tuesday, Thursday, Saturday, Sunday (29 Nov 2021 21:27)  Zeasorb-AF 2% topical powder: Apply topically to affected area 3 times a day (29 Nov 2021 21:27)    TELEMETRY REVIEW:  12/4/21: afib 120s, sustaining 110-120s CHIEF COMPLAINT/DIAGNOSIS: abdominal pain, distension    HPI: 70 y/o female who resides a [facility], with a pmhx afib on coumadin long term, 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  Per pt her emesis and BM's were green. Denies f/c/r, denies CP/SOB/dizziness/Headaches.  Seen By Surgery.  Imaging + for SBO, NGT was placed to LWS, however pt pulled out her NGT and refused to have it replaced. Her bowel function has since improved and her diet has been advanced.   hospital course complicated by sob/wheeze/productive cough.   Started on iv steroids per pulm for acute copd exacerbation.     Limited ROS due to current clinical condition, alert to person only. no acute distress. on supplemental 02 - 2/3L.   patient reporting some abd pain - will re-order CT scan, lethargic but arousable    All other review of systems is negative unless indicated above    PHYSICAL EXAM:  Constitutional: Awake and alert, ill appearing.   HEENT: Normal Hearing, dry mucus membranes   Neck: Soft and supple   Respiratory: Breath sounds are diminished b/l, slight rhonchi  Cardiovascular: S1 and S2, IR IR   Gastrointestinal: Bowel Sounds present, soft, tender to palp  Extremities:+ BLE peripheral edema  Vascular: 2+ peripheral pulses  Neurological: A/O x person only, lethargic but arousable   Musculoskeletal: 5/5 strength b/l upper and lower extremities  Skin: abd wound, no drainage or redness.     Vital Signs Last 24 Hrs  T(C): 37.1 (05 Dec 2021 09:03), Max: 39.1 (05 Dec 2021 02:37)  T(F): 98.8 (05 Dec 2021 09:03), Max: 102.4 (05 Dec 2021 02:37)  HR: 64 (05 Dec 2021 09:03) (64 - 133)  BP: 115/51 (05 Dec 2021 09:03) (115/51 - 132/61)  BP(mean): --  RR: 19 (05 Dec 2021 09:03) (19 - 23)  SpO2: 94% (05 Dec 2021 09:03) (94% - 100%) 2/3L    LABS: All Labs Reviewed:                        11.8   11.24 )-----------( 250      ( 05 Dec 2021 06:57 )             39.5     12-05    154<H>  |  121<H>  |  38<H>  ----------------------------<  203<H>  3.8   |  27  |  1.14    Ca    8.6      05 Dec 2021 06:57  Phos  2.9     12-04  Mg     2.4     12-05    TPro  6.2  /  Alb  2.2<L>  /  TBili  0.3  /  DBili  <0.1  /  AST  15  /  ALT  30  /  AlkPhos  44  12-05    PT/INR - ( 05 Dec 2021 06:57 )   PT: 26.6 sec;   INR: 2.39 ratio      Blood Culture: 12/4 x2 pending  Urine Culture: pending     RADIOLOGY:  12/4/21:  CT Chest No Cont: Mildly improved bilateral multilobar aspiration since 11/29/2021 abdominal CT.    12/3/21: Xray Abdomen 2 Views: Bowel gas findings as above. No acute chest finding.    11/29/21: CT Abdomen and Pelvis w/ IV Cont: Small bowel obstruction, with a mild transition point just distal to the small bowel anastomosis in the anterior pelvis. Mildly prominent air-filled loops are noted distal to this point within a large, widemouth ventral hernia, with gradual transition to decompressed distal small bowel. No bowel wall thickening, pneumatosis, pneumoperitoneum ascites. Numerous ill-defined bibasilar lung opacities, suspicious for infection, including atypical viral etiologies such as COVID19 pneumonia. Consider dedicated chest CT for full evaluation, as clinically indicated.    ECHOCARDIOGRAM: pending    MEDICATIONS  (STANDING):  acetaminophen   IVPB .. 1000 milliGRAM(s) IV Intermittent every 6 hours  aMIOdarone    Tablet 400 milliGRAM(s) Oral every 8 hours  atorvastatin 20 milliGRAM(s) Oral at bedtime  budesonide 160 MICROgram(s)/formoterol 4.5 MICROgram(s) Inhaler 2 Puff(s) Inhalation two times a day  cefepime   IVPB      cefepime   IVPB 2000 milliGRAM(s) IV Intermittent every 12 hours  dextrose 40% Gel 15 Gram(s) Oral once  dextrose 5%. 1000 milliLiter(s) (50 mL/Hr) IV Continuous <Continuous>  dextrose 5%. 1000 milliLiter(s) (100 mL/Hr) IV Continuous <Continuous>  dextrose 50% Injectable 25 Gram(s) IV Push once  dextrose 50% Injectable 12.5 Gram(s) IV Push once  dextrose 50% Injectable 25 Gram(s) IV Push once  diltiazem Infusion 5 mG/Hr (5 mL/Hr) IV Continuous <Continuous>  DULoxetine 60 milliGRAM(s) Oral daily  fluticasone propionate 50 MICROgram(s)/spray Nasal Spray 1 Spray(s) Both Nostrils two times a day  glucagon  Injectable 1 milliGRAM(s) IntraMuscular once  insulin lispro (ADMELOG) corrective regimen sliding scale   SubCutaneous three times a day before meals  insulin lispro Injectable (ADMELOG) 6 Unit(s) SubCutaneous three times a day before meals  insulin NPH human recombinant 16 Unit(s) SubCutaneous at bedtime  levothyroxine 25 MICROGram(s) Oral daily  losartan 25 milliGRAM(s) Oral daily  methylPREDNISolone sodium succinate Injectable 40 milliGRAM(s) IV Push every 8 hours  oxybutynin 5 milliGRAM(s) Oral daily  pantoprazole  Injectable 40 milliGRAM(s) IV Push daily  pregabalin 100 milliGRAM(s) Oral three times a day  warfarin 4 milliGRAM(s) Oral daily    MEDICATIONS  (PRN):  acetaminophen     Tablet .. 650 milliGRAM(s) Oral every 6 hours PRN Temp greater or equal to 38C (100.4F)  acetaminophen   IVPB .. 1000 milliGRAM(s) IV Intermittent once PRN Temp greater or equal to 38.5C (101.3F), Mild Pain (1 - 3)  ALBUTerol    90 MICROgram(s) HFA Inhaler 2 Puff(s) Inhalation every 6 hours PRN Bronchospasm  benzocaine 15 mG/menthol 3.6 mG (Sugar-Free) Lozenge 1 Lozenge Oral every 3 hours PRN Sore Throat  methocarbamol 750 milliGRAM(s) Oral every 6 hours PRN Muscle Spasm  morphine  - Injectable 2 milliGRAM(s) IV Push every 4 hours PRN Severe Pain (7 - 10)  ondansetron Injectable 4 milliGRAM(s) IV Push every 6 hours PRN Nausea and/or Vomiting    Home Medications:  acetaminophen 325 mg oral tablet: 2 tab(s) orally every 6 hours, As Needed - 3) (29 Nov 2021 21:27)  Advair Diskus 250 mcg-50 mcg inhalation powder: 1 puff(s) inhaled 2 times a day (29 Nov 2021 21:27)  albuterol 90 mcg/inh inhalation aerosol: 2 puff(s) inhaled 4 times a day (29 Nov 2021 21:27)  atorvastatin 20 mg oral tablet: 1 tab(s) orally every other day (at bedtime) (29 Nov 2021 21:27)  Basaglar KwikPen 100 units/mL subcutaneous solution: 15 unit(s) subcutaneous once a day (at bedtime) (29 Nov 2021 21:27)  Betadine 10% topical solution: apply to abdomen topically every day shift for abdominal wound (29 Nov 2021 21:27)  cholecalciferol 1250 mcg (50,000 intl units) oral capsule: 1 cap(s) orally once a week on wednesdays (29 Nov 2021 21:27)  cyanocobalamin 1000 mcg oral tablet: 1 tab(s) orally once a day (29 Nov 2021 21:27)  diclofenac 1% topical gel: Apply 2 grams to shoulder, elbow, and wrist topically to affected area 3 times a day (29 Nov 2021 21:27)  diclofenac 1% topical gel: Apply 4 grams to left knee, ankle, and back topically to affected area 3 times a day (29 Nov 2021 21:27)  dronedarone 400 mg oral tablet: 1 tab(s) orally 2 times a day (with meals) (29 Nov 2021 21:27)  DULoxetine 60 mg oral delayed release capsule: 1 cap(s) orally once a day (29 Nov 2021 21:27)  Ferrex-150 oral capsule: 1 cap(s) orally once a day (at bedtime) (29 Nov 2021 21:27)  Fleet Enema 19 g-7 g rectal enema: 133 milliliter(s) rectal Monday, Wednesday, and Friday at bedtime for constipation (29 Nov 2021 21:27)  Flonase 50 mcg/inh nasal spray: 1 spray(s) in each nostril once a day (29 Nov 2021 21:27)  furosemide 20 mg oral tablet: 1 tab(s) orally once a day (29 Nov 2021 21:27)  glipiZIDE 10 mg oral tablet, extended release: 2 tab(s) orally once a day (29 Nov 2021 21:27)  insulin lispro 100 units/mL subcutaneous solution: 10 unit(s) subcutaneous 3 times a day (29 Nov 2021 21:27)  insulin lispro 100 units/mL subcutaneous solution: Sliding Scale subcutaneously before meals and at bedtime:  levothyroxine 25 mcg (0.025 mg) oral tablet: 1 tab(s) orally once a day (29 Nov 2021 21:27)  methocarbamol 750 mg oral tablet: 1 tab(s) orally every 6 hours, As Needed - for muscle spasm, for moderate pain (29 Nov 2021 21:27)  Milk of Magnesia 8% oral suspension: 30 milliliter(s) orally once a day, As Needed - for constipation (29 Nov 2021 21:27)  oxyCODONE 10 mg oral tablet: 1 tab(s) orally 3 times a day, As Needed - for severe pain - 10) (29 Nov 2021 21:27)  oxyCODONE 5 mg oral tablet: 1 tab(s) orally every 6 hours, As Needed - for moderate pain (29 Nov 2021 21:27)  pantoprazole 40 mg oral delayed release tablet: 1 tab(s) orally 2 times a day (29 Nov 2021 21:27)  polyethylene glycol 3350 oral powder for reconstitution: 17 gram(s) orally 2 times a day (29 Nov 2021 21:27)  pregabalin 100 mg oral capsule: 1 cap(s) orally 3 times a day (29 Nov 2021 21:27)  Preparation H Medicated Wipes 50% topical pad: Apply topically to affected area 2 times a day (29 Nov 2021 21:27)  Senna Plus 50 mg-8.6 mg oral tablet: 2 tab(s) orally once a day (at bedtime) (29 Nov 2021 21:27)  simethicone 80 mg oral tablet, chewable: 1 tab(s) orally once a day, As Needed (29 Nov 2021 21:27)  tolterodine 1 mg oral tablet: 1 tab(s) orally once a day (29 Nov 2021 21:27)  Tradjenta 5 mg oral tablet: 1 tab(s) orally once a day (29 Nov 2021 21:27)  warfarin 4 mg oral tablet: 1 tab(s) orally Monday, Wednesday, and Friday  (29 Nov 2021 21:27)  warfarin 5 mg oral tablet: 1 tab(s) orally Tuesday, Thursday, Saturday, Sunday (29 Nov 2021 21:27)  Zeasorb-AF 2% topical powder: Apply topically to affected area 3 times a day (29 Nov 2021 21:27)    TELEMETRY REVIEW:  12/4/21: afib 120s, sustaining 110-120s  12/5/21: NSR 60-70s

## 2021-12-05 NOTE — PROGRESS NOTE ADULT - ATTENDING COMMENTS
pt seen and examined, SBO resolved at this time, tolerating diet and safe for PO intake per SLP eval.    PNA improving at this time, continue abx per ID    Pt does require daily dressing changes at her abdominal midline would with adaptec dressing, ABD pad and paper tape    Surgery to follow PRN

## 2021-12-05 NOTE — PROGRESS NOTE ADULT - ASSESSMENT
72 y/o female who resides a [facility], with a pmhx afib on coumadin long term, CVA in 2018 with left sided weakness, Morbid obesity, peripheral edema, COPD, Type 2 DM, neuropathy,  diverticulitis, dvt found in 2018 at time of CVA, HTN who was BIBA from [facility] with cc of abd pain/distention and N/V/D since friday 11/26.  Per pt her emesis and BM's were green. Denies f/c/r, denies CP/SOB/dizziness/Headaches.  Seen By Surgery.  Imaging + for SBO, NGT was placed to LWS, however pt pulled out her NGT and refused to have it replaced. Her bowel function has since improved and her diet has been advanced.   Hospital course complicated by     #SBO   Improved. Diet advanced. tolerating PO  d/c IV pain meds - resume oxycodone (home med)  Add BM regimen    #Acute hypoxic respiratory failure due to COPD exacerbation. Possible Aspiration PNA.  Monitor on tele. 02 monitoring  Supplemental 02 - 2/3L NC  CT chest w/ mildly improved bilateral multilobar aspiration  Cont. IV steroids 40mg q12  Cefepime IV  Albuterol ATC, Symbicort BID -- allergy to Spiriva  BCx x2, UCx, legionalla/strepU pending  RVP neg  Speech and swallow eval appreciated -- thickened liquids initiated. Kept patient NPO on 12/4 due to clinical condition.    # PAF w/ RVR  Monitor on tele. tele review as above  Amiodarone started on 12/3  Cont. Cardizem 60mgq6, Lopressor BID  Dig IV x1 on 12/4  TSH wnl  F/u echocardiogram  CHADs Vasc = 6. Cont. Coumadin for stroke ppx. INR daily   Cardio consult: Shaheen    #Hypernatremia  corrected w/ hyperglycemia 156  D5W and repeat labs in afternoon    # hx DVT, CVA with left sided weakness  Cont. Coumadin. INR daily  AC services consult appreciated    #Type 2 Diabetes Mellitus. Hyperglycemia | Diabetic Neuropathy   Cont. ISS, Lantus, pre - meal  Lantus 15 - readjust when taking PO. monitor BGMS and re-adjust   diabetic diet restrictions added  C/w Lyrica, Cymbalta     #HTN   d/c Losartan due to additional rate control meds. see plan above.  Lasix on hold    #GERD - d/c IV Protonix - switch to home PPI BID    #ALVERTO on CKD Stage 2  monitor. hold Lasix for now.  UA neg    #Chronic abd wound  Adaptic/abd pad w/ tape change daily   Wound care consult    #Physical Debility  #Dysphagia  Seen by S/S ~ diet adjusted to soft/thickened liquids   PT consulted - unable to be performed ~ TBD  [facility] resident    #GOC/ Advanced Directives  DNR/ DNI - MOLST in chart. confirmed w/ daughter Margarita over phone.     #DVT ppx  Coumadin. 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  Per pt her emesis and BM's were green. Denies f/c/r, denies CP/SOB/dizziness/Headaches.  Seen By Surgery.  Imaging + for SBO, NGT was placed to LWS, however pt pulled out her NGT and refused to have it replaced. Her bowel function has since improved and her diet has been advanced.   Hospital course complicated by     #SBO   Improved. Diet advanced. tolerating PO  d/c IV pain meds - resume oxycodone (home med)  Add BM regimen  Repeat CT A/P >    #Acute hypoxic respiratory failure due to COPD exacerbation. Possible Aspiration PNA.  Monitor on tele. 02 monitoring  Supplemental 02 - 2/3L NC  CT chest w/ mildly improved bilateral multilobar aspiration  Cont. IV steroids 40mg q12  Cefepime IV  Albuterol ATC, Symbicort BID -- allergy to Spiriva  BCx x2, UCx, legionalla/strepU pending  RVP neg  Speech and swallow eval appreciated -- thickened liquids initiated. Kept patient NPO on 12/4 due to clinical condition, speech to re-eval today    # PAF w/ RVR -- resolved currently NSR  Monitor on tele. tele review as above  Cont. amiodarone 400mg Q8, Cardizem 60mgq6, Lopressor 25mg BID  Dig IV x1 on 12/4  TSH wnl  F/u echocardiogram  CHADs Vasc = 6. Cont. Coumadin for stroke ppx. INR daily   Cardio consult: Jamison    #Hypernatremia  corrected w/ hyperglycemia 156  D5W and repeat labs in afternoon    # hx DVT, CVA with left sided weakness  Cont. Coumadin. INR daily  AC services consult appreciated    #Type 2 Diabetes Mellitus. Hyperglycemia | Diabetic Neuropathy   Cont. ISS, Lantus, pre - meal  Lantus 15 - readjust when taking PO. monitor BGMS and re-adjust   diabetic diet restrictions added  C/w Lyrica, Cymbalta     #HTN   d/c Losartan due to additional rate control meds. see plan above.  Lasix on hold    #GERD - d/c IV Protonix - switch to home PPI BID    #ALVERTO on CKD Stage 2  monitor. hold Lasix for now.  UA neg    #Chronic abd wound  Adaptic/abd pad w/ tape change daily   Wound care consult    #Physical Debility  #Dysphagia  Seen by S/S ~ diet adjusted to soft/thickened liquids   PT consulted - unable to be performed ~ TBD  [facility] resident    #GOC/ Advanced Directives  DNR/ DNI - MOLST in chart. confirmed w/ daughter Margarita over phone.     #DVT ppx  Coumadin. INR daily    Updated Margarita (daughter) [phone] 12/4 - all questions/concerns addressed

## 2021-12-05 NOTE — PROGRESS NOTE ADULT - SUBJECTIVE AND OBJECTIVE BOX
HPI:  70 y/o female with a PMHx of Afib on coumadin,[id number] Score: 6  , CVA, COPD, DM, diverticulitis, DVT, HTN neuropathy presents to the ED BIBA from [facility] c/o abd pain, abd distention and n/v/d x3 days. Pt reports her emesis and BMs are green. No other complaints at this time. Patient reports she had BM in the ED. patient reports she had nausea and vomiting since . Patient reports she has abdominal distension and pain. Patient denies CP, SOB, dizziness, and headache.  (2021 17:24)      Patient is a 71y old  Female who presents with a chief complaint of abdominal pain with n/v and diarrhea.  Pt has a hx of SOB and ventral hernia repair.  NGT  was placed yesterday and now it is out.  Pt having clear liquids and asper surg note pt refused to have NGT  reinserted.      Consulted by Dr. Manjeet Harley for VTE prophylaxis, risk stratification, and anticoagulation management. Asper Surg note they want pt to resume her coumadin today.     PAST MEDICAL & SURGICAL HISTORY:  History of atrial fibrillation on coumadin    HTN (hypertension)    Diabetes mellitus    Cerebrovascular accident (CVA)    DVT of lower limb    CVA (cerebral vascular accident)    COPD (chronic obstructive pulmonary disease)    Neuropathy    Diverticulitis    History of colostomy reversal    History of colostomy reversal    S/P colostomy    S/P     S/P hip replacement        FAMILY HISTORY:      Interval Note:  2021 Pt seen at bedside on 2north.   PT alert an oriented x 3.  Discussed her resuming her coumadin tonight.  States she dose not want to go back to [facility].  She wants to stay here. Pt informed she will need to go to an assisted living once stable. Pt states she has not been able to walk since she had her CVA  ABOUT 1 1/2 YEARS AGO.    21: Patient seen at bedside with increased shortness of breath, reporting productive cough. Denies nausea. Tolerating diet thus far. Dressing- abd CDI, wound without drainage.   12-3-2021 Pt seen at bedside on 2north.  Pt not as verbal as before and states her abdomen hurts a little more, Denies SOB/NAUSEA OR CP now.    2021: pt seen at bedside on 3east.  States she is fine no abd pain.  She feels cold. Discussed her INR  and dosing. noted fever of 102.4 earlier today.    IMPROVE VTE Individual Risk Assessment    RISK                                                                Points    [x  ] Previous VTE                                                  3    [  ] Thrombophilia                                               2    [x  ] Lower limb paralysis                                      2        (unable to hold up >15 seconds)      [  ] Current Cancer                                              2         (within 6 months)    [ x ] Immobilization > 24 hrs                                1    [  ] ICU/CCU stay > 24 hours                              1    [ x ] Age > 60                                                      1    IMPROVE VTE Score ___7______    IMPROVE Score 0-1: Low Risk, No VTE prophylaxis required for most patients, encourage ambulation.   IMPROVE Score 2-3: At risk, pharmacologic VTE prophylaxis is indicated for most patients (in the absence of a contraindication)  IMPROVE Score > or = 4: High Risk, pharmacologic VTE prophylaxis is indicated for most patients (in the absence of a contraindication)      [id number] Score: 6    IMPROVE Bleeding Risk Score: 1.5    Falls Risk:   High ( x )  Mod (  )  Low (  )  crcl:  68.7       cr: .64         BMI 39.0           Denies any personal or familial history of clotting or bleeding disorders.    Allergies    Cipro (Rash)  Spiriva (Rash)    Intolerances        REVIEW OF SYSTEMS    (  )Fever	     (  )Constipation	(  )SOB				(  )Headache	(  )Dysuria  (  )Chills	     (  )Melena	(  )Dyspnea present on exertion	                    (  )Dizziness                    (  )Polyuria  (  )Nausea	     (  )Hematochezia	(  )Cough			                    (  )Syncope   	(  )Hematuria  (  )Vomiting    (  )Chest Pain	(  )Wheezing			(x  )Weakness  ()  abdominal pain  (  )Diarrhea     (  )Palpitations	(  )Anorexia			( x )Myalgia    Pertinent positives in HPI and daily subjective.  All other ROS negative.    Vital Signs Last 24 Hrs  T(C): 37.1 (21 @ 09:03), Max: 39.1 (21 @ 02:37)  T(F): 98.8 (21 @ 09:03), Max: 102.4 (21 @ 02:37)  HR: 64 (21 @ 09:03) (64 - 133)  BP: 115/51 (21 @ 09:03) (115/51 - 132/61)  BP(mean): --  RR: 19 (21 @ 09:03) (19 - 23)  SpO2: 94% (21 @ 09:03) (94% - 100%)  PHYSICAL EXAM:    Constitutional: Appears frail    Neurological: A& O x 3    Skin: Warm    Respiratory and Thorax: normal effort; Breath sounds: normal; No rales/wheezing/rhonchi  	  Cardiovascular: S1, S2, regular, NMBR	    Gastrointestinal: BS + x 4Q, distended abdomen, dressing without drainage, beneath wound unhealed not approximated, but without drainage	    Musculoskeletal:   General Right:   no muscle/joint tenderness,   normal tone, no joint swelling,   ROM: limited	    General Left:   no muscle/joint tenderness,   normal tone, no joint swelling,   ROM: limited      Lower extrems:   Right: no calf tenderness              negative shara's sign               + pedal pulses    Left:   no calf tenderness              negative shara's sign               + pedal pulses                                         11.8   11.24 )-----------( 250      ( 05 Dec 2021 06:57 )             39.5       12-    154<H>  |  121<H>  |  38<H>  ----------------------------<  203<H>  3.8   |  27  |  1.14    Ca    8.6      05 Dec 2021 06:57  Phos  2.9     12-04  Mg     2.4     12-    TPro  6.2  /  Alb  2.2<L>  /  TBili  0.3  /  DBili  <0.1  /  AST  15  /  ALT  30  /  AlkPhos  44  12-05                     12.7   14.33 )-----------( 391      ( 03 Dec 2021 08:00 )             42.3       12-03    148<H>  |  116<H>  |  26<H>  ----------------------------<  287<H>  3.5   |  22  |  1.09    Ca    9.0      03 Dec 2021 08:00  Phos  2.7     12-03  Mg     2.2     12-03       PTT - ( 02 Dec 2021 09:16 )  PTT:25.6 sec             13.6   12.87 )-----------( 374      ( 02 Dec 2021 09:16 )             44.4       12-02    145  |  111<H>  |  16  ----------------------------<  217<H>  3.3<L>   |  22  |  0.89    Ca    9.1      02 Dec 2021 09:16  Phos  2.6     12-02  Mg     2.2     12-        PT/INR - ( 02 Dec 2021 09:16 )   PT: 16.5 sec;   INR: 1.43 ratio         PTT - ( 02 Dec 2021 09:16 )  PTT:25.6 sec                        11.7   14.88 )-----------( 319      ( 01 Dec 2021 07:39 )             38.1       12    145  |  111<H>  |  15  ----------------------------<  149<H>  3.1<L>   |  24  |  0.64    Ca    8.8      01 Dec 2021 07:39  Phos  2.1     12-  Mg     2.3     12    TPro  7.6  /  Alb  2.7<L>  /  TBili  0.5  /  DBili  x   /  AST  15  /  ALT  22  /  AlkPhos  63  11-29    PT/INR - ( 05 Dec 2021 06:57 )   PT: 26.6 sec;   INR: 2.39 ratio                                    DEC 5TH  7:26                INR 2.24 ratio  PT/INR - ( 03 Dec 2021 09:39 )   PT: 19.9 sec;   INR: 1.76 ratio    PT/INR - ( 02 Dec 2021 09:16 )   PT: 16.5 sec;   INR: 1.43 ratio    PT/INR - ( 01 Dec 2021 09:57 )   PT: 19.3 sec;   INR: 1.71 ratio         MEDICATIONS  (STANDING):  ALBUTerol    90 MICROgram(s) HFA Inhaler 2 Puff(s) Inhalation every 6 hours  aMIOdarone    Tablet 400 milliGRAM(s) Oral every 8 hours  atorvastatin 20 milliGRAM(s) Oral at bedtime  budesonide 160 MICROgram(s)/formoterol 4.5 MICROgram(s) Inhaler 2 Puff(s) Inhalation two times a day  cefepime   IVPB 2000 milliGRAM(s) IV Intermittent every 12 hours  cefepime   IVPB      dextrose 40% Gel 15 Gram(s) Oral once  dextrose 5%. 1000 milliLiter(s) IV Continuous <Continuous>  dextrose 5%. 1000 milliLiter(s) IV Continuous <Continuous>  dextrose 5%. 1000 milliLiter(s) IV Continuous <Continuous>  dextrose 50% Injectable 25 Gram(s) IV Push once  dextrose 50% Injectable 12.5 Gram(s) IV Push once  dextrose 50% Injectable 25 Gram(s) IV Push once  diltiazem    Tablet 60 milliGRAM(s) Oral every 6 hours  DULoxetine 60 milliGRAM(s) Oral daily  fluticasone propionate 50 MICROgram(s)/spray Nasal Spray 1 Spray(s) Both Nostrils two times a day  insulin glargine Injectable (LANTUS) 15 Unit(s) SubCutaneous at bedtime  insulin lispro (ADMELOG) corrective regimen sliding scale   SubCutaneous three times a day before meals  insulin lispro Injectable (ADMELOG) 6 Unit(s) SubCutaneous three times a day before meals  levothyroxine 25 MICROGram(s) Oral daily  methylPREDNISolone sodium succinate Injectable 40 milliGRAM(s) IV Push two times a day  metoprolol tartrate 25 milliGRAM(s) Oral two times a day  oxybutynin 5 milliGRAM(s) Oral daily  pantoprazole    Tablet 40 milliGRAM(s) Oral two times a day  polyethylene glycol 3350 17 Gram(s) Oral daily  pregabalin 100 milliGRAM(s) Oral three times a day  senna 2 Tablet(s) Oral at bedtime  warfarin 4 milliGRAM(s) Oral daily                DVT Prophylaxis:  LMWH                   ( x )  Heparin SQ           (  )  Coumadin             ( x )  Xarelto                  (  )  Eliquis                   (  )  Venodynes           ( x )  Ambulation          (  )  UFH                       (  )  Contraindicated  (  )  EC Aspirin             (  )

## 2021-12-05 NOTE — PROGRESS NOTE ADULT - SUBJECTIVE AND OBJECTIVE BOX
Patient was seen and examined at the bedside this morning  No acute events overnight  Pain is better controlled  No nausea or vomiting  Tolerating diet and passing bowel movements    O/E:  T(C): 37.1 (12-05-21 @ 09:03), Max: 39.1 (12-05-21 @ 02:37)  HR: 64 (12-05-21 @ 09:03) (64 - 133)  BP: 115/51 (12-05-21 @ 09:03) (115/51 - 132/61)  RR: 19 (12-05-21 @ 09:03) (19 - 23)  SpO2: 94% (12-05-21 @ 09:03) (94% - 100%)  Alert, conscious, oriented  No pallor, jaundice or cyanosis  Not in pain or distress  Chest clear, equal air entry bilaterally  Abdomen soft and lax, no tenderness  Extremities: No swelling or tenderness                          11.8   11.24 )-----------( 250      ( 05 Dec 2021 06:57 )             39.5   12-05    154<H>  |  121<H>  |  38<H>  ----------------------------<  203<H>  3.8   |  27  |  1.14    Ca    8.6      05 Dec 2021 06:57  Phos  2.9     12-04  Mg     2.4     12-05    TPro  6.2  /  Alb  2.2<L>  /  TBili  0.3  /  DBili  <0.1  /  AST  15  /  ALT  30  /  AlkPhos  44  12-05

## 2021-12-05 NOTE — PROGRESS NOTE ADULT - ASSESSMENT
A 71 year old female with adhesive small bowel obstruction  Resolved with conservative management    Plan:  No surgical intervention needed at this time  Surgery will sign off at this time  Please reconsult as needed    Plan discussed with Dr Harley

## 2021-12-05 NOTE — PROGRESS NOTE ADULT - ASSESSMENT
This is a Afib on coumadin,[id number] Score: 6  , CVA, COPD, DM, diverticulitis, DVT, HTN neuropathy presents to the ED BIBA from [facility] c/o abd pain, abd distention and n/v/d x3 days.  Pt admitted to [facility] for SBO and NGT  inserted.  NGT out today and pt is on clear liquid.  Pt has high thrombosis risk and requires treatment dose of anticoagulation.    12-1-2021 Spoke with Dr Berrios (surgery) and he states cleared to resume her lovenox over lapping with coumadin.  12-5-2021: noted pt started on amiodarone 400mg po q8h yesterday due to A. Fib with rvr.    Plan:  ::decreased  Coumadin: 4mg po daily adjust with INR.  ::DCED Lovenox YESTERDAY   ::Daily CBC/PT/INR  ::JANUSZ bentley    Will continue to follow.  Dispo: NOEMÍ

## 2021-12-05 NOTE — PROGRESS NOTE ADULT - SUBJECTIVE AND OBJECTIVE BOX
SUBJECTIVE:    Seen and examined john  Has no complaints today.  Still sleepy but awakens and follows commands.   She is on NC.   No fevers overnight          PHYSICAL EXAMINATION:    GENERAL APPEARANCE:  Lethargic and chronic ill appearing.   HEAD: Normocephalic atraumatic   EYES: Pupils are normal. No icterus. Sclera white.   HEART:  Normal S1 and S2. There are no murmurs, rubs or gallops noted  CHEST:  Ronchorous breath sounds - significantly improved since yesterday  ABDOMEN:  Soft and nontender. Nondistended.   NEURO: Sleepy, awakens to verbal stimuli, answers yes/no but does not give any detail regarding medical history               Vital Signs Last 24 Hrs  T(C): 37.1 (05 Dec 2021 09:03), Max: 39.1 (05 Dec 2021 02:37)  T(F): 98.8 (05 Dec 2021 09:03), Max: 102.4 (05 Dec 2021 02:37)  HR: 64 (05 Dec 2021 09:03) (64 - 133)  BP: 115/51 (05 Dec 2021 09:03) (115/51 - 132/61)  BP(mean): --  RR: 19 (05 Dec 2021 09:03) (19 - 23)  SpO2: 94% (05 Dec 2021 09:03) (94% - 100%)    MEDICATIONS  (STANDING):  ALBUTerol    90 MICROgram(s) HFA Inhaler 2 Puff(s) Inhalation every 6 hours  aMIOdarone    Tablet 400 milliGRAM(s) Oral every 8 hours  atorvastatin 20 milliGRAM(s) Oral at bedtime  budesonide 160 MICROgram(s)/formoterol 4.5 MICROgram(s) Inhaler 2 Puff(s) Inhalation two times a day  cefepime   IVPB 2000 milliGRAM(s) IV Intermittent every 12 hours  cefepime   IVPB      dextrose 40% Gel 15 Gram(s) Oral once  dextrose 5%. 1000 milliLiter(s) (50 mL/Hr) IV Continuous <Continuous>  dextrose 5%. 1000 milliLiter(s) (100 mL/Hr) IV Continuous <Continuous>  dextrose 5%. 1000 milliLiter(s) (100 mL/Hr) IV Continuous <Continuous>  dextrose 50% Injectable 25 Gram(s) IV Push once  dextrose 50% Injectable 12.5 Gram(s) IV Push once  dextrose 50% Injectable 25 Gram(s) IV Push once  diltiazem    Tablet 60 milliGRAM(s) Oral every 6 hours  DULoxetine 60 milliGRAM(s) Oral daily  fluticasone propionate 50 MICROgram(s)/spray Nasal Spray 1 Spray(s) Both Nostrils two times a day  insulin glargine Injectable (LANTUS) 15 Unit(s) SubCutaneous at bedtime  insulin lispro (ADMELOG) corrective regimen sliding scale   SubCutaneous three times a day before meals  insulin lispro Injectable (ADMELOG) 6 Unit(s) SubCutaneous three times a day before meals  levothyroxine 25 MICROGram(s) Oral daily  methylPREDNISolone sodium succinate Injectable 40 milliGRAM(s) IV Push two times a day  metoprolol tartrate 25 milliGRAM(s) Oral two times a day  oxybutynin 5 milliGRAM(s) Oral daily  pantoprazole    Tablet 40 milliGRAM(s) Oral two times a day  polyethylene glycol 3350 17 Gram(s) Oral daily  pregabalin 100 milliGRAM(s) Oral three times a day  senna 2 Tablet(s) Oral at bedtime  warfarin 4 milliGRAM(s) Oral daily    MEDICATIONS  (PRN):  acetaminophen     Tablet .. 650 milliGRAM(s) Oral every 6 hours PRN Temp greater or equal to 38C (100.4F)  methocarbamol 750 milliGRAM(s) Oral every 6 hours PRN Muscle Spasm  ondansetron Injectable 4 milliGRAM(s) IV Push every 6 hours PRN Nausea and/or Vomiting  oxyCODONE    IR 10 milliGRAM(s) Oral every 8 hours PRN Severe Pain (7 - 10)  oxyCODONE    IR 5 milliGRAM(s) Oral every 6 hours PRN Moderate Pain (4 - 6)      Allergies    Cipro (Rash)  Spiriva (Rash)    Intolerances      LABS:                        11.8   11.24 )-----------( 250      ( 05 Dec 2021 06:57 )             39.5     12-05    154<H>  |  121<H>  |  38<H>  ----------------------------<  203<H>  3.8   |  27  |  1.14    Ca    8.6      05 Dec 2021 06:57  Phos  2.9     12-04  Mg     2.4     12-05    TPro  6.2  /  Alb  2.2<L>  /  TBili  0.3  /  DBili  <0.1  /  AST  15  /  ALT  30  /  AlkPhos  44  12-05    LIVER FUNCTIONS - ( 05 Dec 2021 06:57 )  Alb: 2.2 g/dL / Pro: 6.2 gm/dL / ALK PHOS: 44 U/L / ALT: 30 U/L / AST: 15 U/L / GGT: x           PT/INR - ( 05 Dec 2021 06:57 )   PT: 26.6 sec;   INR: 2.39 ratio            RADIOLOGY & ADDITIONAL STUDIES:   *Imaging personally reviewed.     < from: CT Chest No Cont (12.04.21 @ 11:47) >  EXAM:  CT CHEST                            PROCEDURE DATE:  12/04/2021          INTERPRETATION:  INDICATION: Fever, shortness of breath, possible pneumonia    TECHNIQUE: A volumetric CT acquisition of the chest was obtained from the thoracic inlet to the upper abdomen without the use of intravenous contrast. Coronal and sagittal reconstructed images are provided.    COMPARISON: There are no prior chest CTs available for comparison. Abdominal CT 11/29/2021    FINDINGS:    Lungs/Airways/Pleura: Extensive airway secretions involving the distal trachea, bilateral mainstem bronchi and multilobar segmental/subsegmental involvement with associated bronchial wall thickening. There are patchy centrilobular groundglass and tree-in-bud nodules likely related to multifocal aspiration in this clinical setting. Overall findings have mildly improved since the abdominal CT from 11/29/2021. Emphysema is present. No pleural effusion.    Mediastinum/Lymph nodes: Unremarkable thyroid. No thoracic lymphadenopathy.    Heart and Vessels: Mid ascending aorta measures 3.8 cm. The pulmonary artery is normal in size. The heart is normal in size. There is lipomatous hypertrophy of the interatrial septum. Severe coronary arterial calcification is present. Thereis posterior mitral annular calcification. No pericardial effusion.    Upper Abdomen: Fatty replacement of the pancreas. Hepatic steatosis. Extensive infrarenal aortic calcification.    Osseous structures and Soft Tissues: Mild T6 compression deformity. No aggressive osseous lesion.    IMPRESSION:  Mildly improved bilateral multilobar aspiration since 11/29/2021 abdominal CT.    < end of copied text >

## 2021-12-05 NOTE — PROGRESS NOTE ADULT - ASSESSMENT
12/4/21:  patient admitted with SBP post op for colostomy repair, now complicated with AFib RVR due to partial SBO, fever and aspiration  1-Afib/RVR-now on amiodarone; will add further rate controlling medications-will start Cardizem drip today  2-CHF-will get ECHO today  3-no evidence of active CAD    12/5/21:  Pt having fevers with HR rising appropriately for the fever.  Otherwise resting comfortably this AM.  Rate controlled with occasional rate related aberrancy.  would continue her current meds and Dr Jamison will follow up in the AM.

## 2021-12-05 NOTE — PROGRESS NOTE ADULT - SUBJECTIVE AND OBJECTIVE BOX
CHIEF COMPLAINT: Patient is a 71y old  Female who presents with a chief complaint of SBO (03 Dec 2021 14:17)      HPI:  70 y/o female with a PMHx of Afib, CVA, COPD, DM, diverticulitis, DVT, HTN neuropathy presents to the ED BIBA from [facility] c/o abd pain, abd distention and n/v/d x3 days. Pt reports her emesis and BMs are green. No other complaints at this time. Patient reports she had BM in the ED. patient reports she had nausea and vomiting since friday. Patient reports she has abdominal distension and pain. Patient denies CP, SOB, dizziness, and headache.  (2021 17:24)    12/3-patient has been in the hospital  for the past 3 days-came in with SBO.  She has  a ventral hernia from colostomy reversal prior.    NGT decompression was started and she was NPO.  Her Afib was controlled and she was on AC.   Lovenox started and patient initially improved with decompression; eventually tube removed, but patient developed wheezing, infiltrates on CT scan and fever with elevated WBC.   Afib with RVR has resulted and increased oxygen demand has resulted.  Patient came in on Multaq but on this admission, was started on metoprolol 25mg Q6, losartan added and Lasix stopped.   On Multaq as well.  EKG done this morning showing sinus tachycardia at 110-with PAF on EKG at 140.  Concern for patient now with aspiration pneumonia   Patient is lethargic but arousable.    -patient with AFib/RVR all night long; on amiodarone load, held Multaq and metoprolol yesterday.   Patient lethargic but taking PO.       21:  Pt having fevers with HR rising appropriately for the fever.  Otherwise resting comfortably this AM.  Rate controlled with occasional rate related aberrancy.      PMHx: PAST MEDICAL & SURGICAL HISTORY:  History of atrial fibrillation  HTN (hypertension)  Diabetes mellitus  Cerebrovascular accident (CVA)  DVT of lower limb, acute  CVA (cerebral vascular accident)  COPD (chronic obstructive pulmonary disease)  Neuropathy  Diverticulitis  History of colostomy reversal  History of colostomy reversal  S/P colostomy  S/P   S/P hip replacement        Allergies: Allergies  Cipro (Rash)  Spiriva (Rash)        REVIEW OF SYSTEMS:    CONSTITUTIONAL:  weakness, fevers lethargy  EYES/ENT: No visual changes;  No vertigo or throat pain   NECK: No pain or stiffness  RESPIRATORY: No cough, wheezing, hemoptysis; No shortness of breath  CARDIOVASCULAR:  palpitations  GASTROINTESTINAL: No abdominal or epigastric pain. No nausea, vomiting, or hematemesis; No diarrhea or constipation. No melena or hematochezia.  GENITOURINARY: No dysuria, frequency or hematuria  NEUROLOGICAL: No numbness or weakness  SKIN: No itching, burning, rashes, or lesions   All other review of systems is negative unless indicated above      Vital Signs Last 24 Hrs  T(C): 38.4 (05 Dec 2021 05:31), Max: 39.1 (05 Dec 2021 02:37)  T(F): 101.2 (05 Dec 2021 05:31), Max: 102.4 (05 Dec 2021 02:37)  HR: 85 (05 Dec 2021 08:05) (85 - 133)  BP: 132/61 (05 Dec 2021 02:37) (121/51 - 137/60)  BP(mean): --  RR: 20 (05 Dec 2021 02:37) (20 - 23)  SpO2: 100% (05 Dec 2021 02:37) (94% - 100%)      I&O's Summary    03 Dec 2021 07:01  -  04 Dec 2021 06:27  --------------------------------------------------------  IN: 0 mL / OUT: 250 mL / NET: -250 mL        PHYSICAL EXAM:   Constitutional: NAD, awake and alert, well-developed  HEENT: PERR, EOMI, Normal Hearing, MMM  Neck:  JVD  Respiratory: Breath sounds are clear bilaterally, No wheezing, rales or rhonchi  Cardiovascular: S1 and S2, irregular rate and rhythm, Murmurs, gallops or rubs  Gastrointestinal: Bowel Sounds present, soft, nontender, nondistended, no guarding, no rebound  Extremities: peripheral edema  Vascular: 2+ peripheral pulses  Neurological: A/O x 3, no focal deficits  Musculoskeletal: 5/5 strength b/l upper and lower extremities  Skin: No rashes      MEDICATIONS  (STANDING):  ALBUTerol    90 MICROgram(s) HFA Inhaler 2 Puff(s) Inhalation every 6 hours  aMIOdarone    Tablet 400 milliGRAM(s) Oral every 8 hours  atorvastatin 20 milliGRAM(s) Oral at bedtime  budesonide 160 MICROgram(s)/formoterol 4.5 MICROgram(s) Inhaler 2 Puff(s) Inhalation two times a day  cefepime   IVPB 2000 milliGRAM(s) IV Intermittent every 12 hours  cefepime   IVPB      dextrose 40% Gel 15 Gram(s) Oral once  dextrose 5%. 1000 milliLiter(s) (50 mL/Hr) IV Continuous <Continuous>  dextrose 5%. 1000 milliLiter(s) (100 mL/Hr) IV Continuous <Continuous>  dextrose 5%. 1000 milliLiter(s) (100 mL/Hr) IV Continuous <Continuous>  dextrose 50% Injectable 25 Gram(s) IV Push once  dextrose 50% Injectable 12.5 Gram(s) IV Push once  dextrose 50% Injectable 25 Gram(s) IV Push once  diltiazem    Tablet 60 milliGRAM(s) Oral every 6 hours  DULoxetine 60 milliGRAM(s) Oral daily  fluticasone propionate 50 MICROgram(s)/spray Nasal Spray 1 Spray(s) Both Nostrils two times a day  insulin glargine Injectable (LANTUS) 15 Unit(s) SubCutaneous at bedtime  insulin lispro (ADMELOG) corrective regimen sliding scale   SubCutaneous three times a day before meals  insulin lispro Injectable (ADMELOG) 6 Unit(s) SubCutaneous three times a day before meals  levothyroxine 25 MICROGram(s) Oral daily  methylPREDNISolone sodium succinate Injectable 40 milliGRAM(s) IV Push two times a day  metoprolol tartrate 25 milliGRAM(s) Oral two times a day  oxybutynin 5 milliGRAM(s) Oral daily  pantoprazole    Tablet 40 milliGRAM(s) Oral two times a day  polyethylene glycol 3350 17 Gram(s) Oral daily  pregabalin 100 milliGRAM(s) Oral three times a day  senna 2 Tablet(s) Oral at bedtime  warfarin 4 milliGRAM(s) Oral daily    MEDICATIONS  (PRN):  acetaminophen     Tablet .. 650 milliGRAM(s) Oral every 6 hours PRN Temp greater or equal to 38C (100.4F)  methocarbamol 750 milliGRAM(s) Oral every 6 hours PRN Muscle Spasm  ondansetron Injectable 4 milliGRAM(s) IV Push every 6 hours PRN Nausea and/or Vomiting  oxyCODONE    IR 10 milliGRAM(s) Oral every 8 hours PRN Severe Pain (7 - 10)  oxyCODONE    IR 5 milliGRAM(s) Oral every 6 hours PRN Moderate Pain (4 - 6)      LABS: All Labs Reviewed:                        11.8   11.24 )-----------( 250      ( 05 Dec 2021 06:57 )             39.5                           12.7   14.33 )-----------( 391      ( 03 Dec 2021 08:00 )             42.3       12-05    154<H>  |  121<H>  |  38<H>  ----------------------------<  203<H>  3.8   |  27  |  1.14    Ca    8.6      05 Dec 2021 06:57  Phos  2.9     12-  Mg     2.4     12-    TPro  6.2  /  Alb  2.2<L>  /  TBili  0.3  /  DBili  <0.1  /  AST  15  /  ALT  30  /  AlkPhos  44  12-05      12-03    148<H>  |  116<H>  |  26<H>  ----------------------------<  287<H>  3.5   |  22  |  1.09    Ca    9.0      03 Dec 2021 08:00  Phos  2.7     12-  Mg     2.2     12-      PT/INR - ( 03 Dec 2021 09:39 )   PT: 19.9 sec;   INR: 1.76 ratio         PTT - ( 02 Dec 2021 09:16 )  PTT:25.6 sec      Blood Culture:       BNP Serum Pro-Brain Natriuretic Peptide: 1858 pg/mL (21 @ 08:00)      RADIOLOGY:    EK/3/21:  Sinus tachycardiawith frequent PACs  ST & T wave abnormality, consider inferior ischemia      Telemetry:    ECHO:

## 2021-12-05 NOTE — PROGRESS NOTE ADULT - ASSESSMENT
Assessment:   71 year-old woman, with SBO s/p NGT, bowl rest - now resolved.    --Hospital course recently c/b resp failure, cough, wheezing managed for copd exacerbation and possible aspiration event/aspiration pneumonia.   --Breathing slightly improved today    Recommendations:  --On antibiotics as per ID for possible aspiration pneumonia  --c/w supplemental oxygen  --already on IV steroids - no wheezing today and inhaler regimen.     Notify me with any questions or concerns

## 2021-12-06 LAB
ANION GAP SERPL CALC-SCNC: 7 MMOL/L — SIGNIFICANT CHANGE UP (ref 5–17)
BUN SERPL-MCNC: 32 MG/DL — HIGH (ref 7–23)
CALCIUM SERPL-MCNC: 8.8 MG/DL — SIGNIFICANT CHANGE UP (ref 8.5–10.1)
CHLORIDE SERPL-SCNC: 111 MMOL/L — HIGH (ref 96–108)
CO2 SERPL-SCNC: 27 MMOL/L — SIGNIFICANT CHANGE UP (ref 22–31)
CREAT SERPL-MCNC: 0.94 MG/DL — SIGNIFICANT CHANGE UP (ref 0.5–1.3)
CULTURE RESULTS: NO GROWTH — SIGNIFICANT CHANGE UP
GLUCOSE BLDC GLUCOMTR-MCNC: 202 MG/DL — HIGH (ref 70–99)
GLUCOSE BLDC GLUCOMTR-MCNC: 218 MG/DL — HIGH (ref 70–99)
GLUCOSE BLDC GLUCOMTR-MCNC: 262 MG/DL — HIGH (ref 70–99)
GLUCOSE BLDC GLUCOMTR-MCNC: 293 MG/DL — HIGH (ref 70–99)
GLUCOSE BLDC GLUCOMTR-MCNC: 319 MG/DL — HIGH (ref 70–99)
GLUCOSE SERPL-MCNC: 310 MG/DL — HIGH (ref 70–99)
HCT VFR BLD CALC: 40.8 % — SIGNIFICANT CHANGE UP (ref 34.5–45)
HGB BLD-MCNC: 11.9 G/DL — SIGNIFICANT CHANGE UP (ref 11.5–15.5)
INR BLD: 3 RATIO — HIGH (ref 0.88–1.16)
LEGIONELLA AG UR QL: NEGATIVE — SIGNIFICANT CHANGE UP
MAGNESIUM SERPL-MCNC: 2.4 MG/DL — SIGNIFICANT CHANGE UP (ref 1.6–2.6)
MCHC RBC-ENTMCNC: 24.5 PG — LOW (ref 27–34)
MCHC RBC-ENTMCNC: 29.2 GM/DL — LOW (ref 32–36)
MCV RBC AUTO: 84.1 FL — SIGNIFICANT CHANGE UP (ref 80–100)
PLATELET # BLD AUTO: 212 K/UL — SIGNIFICANT CHANGE UP (ref 150–400)
POTASSIUM SERPL-MCNC: 3.7 MMOL/L — SIGNIFICANT CHANGE UP (ref 3.5–5.3)
POTASSIUM SERPL-SCNC: 3.7 MMOL/L — SIGNIFICANT CHANGE UP (ref 3.5–5.3)
PROTHROM AB SERPL-ACNC: 33.3 SEC — HIGH (ref 10.6–13.6)
RBC # BLD: 4.85 M/UL — SIGNIFICANT CHANGE UP (ref 3.8–5.2)
RBC # FLD: 19.2 % — HIGH (ref 10.3–14.5)
SODIUM SERPL-SCNC: 145 MMOL/L — SIGNIFICANT CHANGE UP (ref 135–145)
SPECIMEN SOURCE: SIGNIFICANT CHANGE UP
WBC # BLD: 12.32 K/UL — HIGH (ref 3.8–10.5)
WBC # FLD AUTO: 12.32 K/UL — HIGH (ref 3.8–10.5)

## 2021-12-06 PROCEDURE — 93306 TTE W/DOPPLER COMPLETE: CPT | Mod: 26

## 2021-12-06 PROCEDURE — 99233 SBSQ HOSP IP/OBS HIGH 50: CPT

## 2021-12-06 PROCEDURE — 99231 SBSQ HOSP IP/OBS SF/LOW 25: CPT

## 2021-12-06 RX ORDER — FUROSEMIDE 40 MG
20 TABLET ORAL DAILY
Refills: 0 | Status: DISCONTINUED | OUTPATIENT
Start: 2021-12-06 | End: 2021-12-08

## 2021-12-06 RX ORDER — DILTIAZEM HCL 120 MG
120 CAPSULE, EXT RELEASE 24 HR ORAL ONCE
Refills: 0 | Status: COMPLETED | OUTPATIENT
Start: 2021-12-06 | End: 2021-12-06

## 2021-12-06 RX ORDER — INSULIN GLARGINE 100 [IU]/ML
20 INJECTION, SOLUTION SUBCUTANEOUS AT BEDTIME
Refills: 0 | Status: DISCONTINUED | OUTPATIENT
Start: 2021-12-06 | End: 2021-12-07

## 2021-12-06 RX ORDER — INSULIN LISPRO 100/ML
VIAL (ML) SUBCUTANEOUS AT BEDTIME
Refills: 0 | Status: DISCONTINUED | OUTPATIENT
Start: 2021-12-06 | End: 2021-12-08

## 2021-12-06 RX ORDER — WARFARIN SODIUM 2.5 MG/1
3 TABLET ORAL DAILY
Refills: 0 | Status: DISCONTINUED | OUTPATIENT
Start: 2021-12-06 | End: 2021-12-07

## 2021-12-06 RX ORDER — DILTIAZEM HCL 120 MG
240 CAPSULE, EXT RELEASE 24 HR ORAL DAILY
Refills: 0 | Status: DISCONTINUED | OUTPATIENT
Start: 2021-12-07 | End: 2021-12-08

## 2021-12-06 RX ADMIN — Medication 5 MILLIGRAM(S): at 09:22

## 2021-12-06 RX ADMIN — Medication 120 MILLIGRAM(S): at 13:00

## 2021-12-06 RX ADMIN — Medication 60 MILLIGRAM(S): at 01:56

## 2021-12-06 RX ADMIN — ATORVASTATIN CALCIUM 20 MILLIGRAM(S): 80 TABLET, FILM COATED ORAL at 22:33

## 2021-12-06 RX ADMIN — AMIODARONE HYDROCHLORIDE 400 MILLIGRAM(S): 400 TABLET ORAL at 06:37

## 2021-12-06 RX ADMIN — Medication 60 MILLIGRAM(S): at 12:02

## 2021-12-06 RX ADMIN — AMIODARONE HYDROCHLORIDE 400 MILLIGRAM(S): 400 TABLET ORAL at 14:10

## 2021-12-06 RX ADMIN — Medication 100 MILLIGRAM(S): at 22:33

## 2021-12-06 RX ADMIN — ALBUTEROL 2 PUFF(S): 90 AEROSOL, METERED ORAL at 07:45

## 2021-12-06 RX ADMIN — Medication 100 MILLIGRAM(S): at 06:37

## 2021-12-06 RX ADMIN — Medication 6: at 11:49

## 2021-12-06 RX ADMIN — Medication 1 SPRAY(S): at 09:22

## 2021-12-06 RX ADMIN — OXYCODONE HYDROCHLORIDE 5 MILLIGRAM(S): 5 TABLET ORAL at 01:55

## 2021-12-06 RX ADMIN — BUDESONIDE AND FORMOTEROL FUMARATE DIHYDRATE 2 PUFF(S): 160; 4.5 AEROSOL RESPIRATORY (INHALATION) at 07:46

## 2021-12-06 RX ADMIN — METHOCARBAMOL 750 MILLIGRAM(S): 500 TABLET, FILM COATED ORAL at 01:54

## 2021-12-06 RX ADMIN — WARFARIN SODIUM 3 MILLIGRAM(S): 2.5 TABLET ORAL at 22:32

## 2021-12-06 RX ADMIN — Medication 40 MILLIGRAM(S): at 09:21

## 2021-12-06 RX ADMIN — ALBUTEROL 2 PUFF(S): 90 AEROSOL, METERED ORAL at 01:45

## 2021-12-06 RX ADMIN — INSULIN GLARGINE 20 UNIT(S): 100 INJECTION, SOLUTION SUBCUTANEOUS at 22:33

## 2021-12-06 RX ADMIN — SENNA PLUS 2 TABLET(S): 8.6 TABLET ORAL at 22:32

## 2021-12-06 RX ADMIN — CEFEPIME 100 MILLIGRAM(S): 1 INJECTION, POWDER, FOR SOLUTION INTRAMUSCULAR; INTRAVENOUS at 06:37

## 2021-12-06 RX ADMIN — Medication 40 MILLIGRAM(S): at 22:31

## 2021-12-06 RX ADMIN — BUDESONIDE AND FORMOTEROL FUMARATE DIHYDRATE 2 PUFF(S): 160; 4.5 AEROSOL RESPIRATORY (INHALATION) at 20:34

## 2021-12-06 RX ADMIN — Medication 20 MILLIGRAM(S): at 14:09

## 2021-12-06 RX ADMIN — PANTOPRAZOLE SODIUM 40 MILLIGRAM(S): 20 TABLET, DELAYED RELEASE ORAL at 09:22

## 2021-12-06 RX ADMIN — ALBUTEROL 2 PUFF(S): 90 AEROSOL, METERED ORAL at 14:15

## 2021-12-06 RX ADMIN — DULOXETINE HYDROCHLORIDE 60 MILLIGRAM(S): 30 CAPSULE, DELAYED RELEASE ORAL at 09:22

## 2021-12-06 RX ADMIN — Medication 6 UNIT(S): at 11:49

## 2021-12-06 RX ADMIN — OXYCODONE HYDROCHLORIDE 5 MILLIGRAM(S): 5 TABLET ORAL at 02:30

## 2021-12-06 RX ADMIN — Medication 6: at 07:33

## 2021-12-06 RX ADMIN — Medication 25 MICROGRAM(S): at 06:37

## 2021-12-06 RX ADMIN — AMIODARONE HYDROCHLORIDE 400 MILLIGRAM(S): 400 TABLET ORAL at 22:33

## 2021-12-06 RX ADMIN — ALBUTEROL 2 PUFF(S): 90 AEROSOL, METERED ORAL at 20:34

## 2021-12-06 RX ADMIN — PANTOPRAZOLE SODIUM 40 MILLIGRAM(S): 20 TABLET, DELAYED RELEASE ORAL at 22:32

## 2021-12-06 RX ADMIN — Medication 4: at 16:34

## 2021-12-06 RX ADMIN — Medication 25 MILLIGRAM(S): at 09:22

## 2021-12-06 RX ADMIN — CEFEPIME 100 MILLIGRAM(S): 1 INJECTION, POWDER, FOR SOLUTION INTRAMUSCULAR; INTRAVENOUS at 18:00

## 2021-12-06 RX ADMIN — Medication 6 UNIT(S): at 16:34

## 2021-12-06 RX ADMIN — Medication 25 MILLIGRAM(S): at 22:33

## 2021-12-06 RX ADMIN — Medication 6 UNIT(S): at 07:33

## 2021-12-06 RX ADMIN — Medication 60 MILLIGRAM(S): at 06:37

## 2021-12-06 RX ADMIN — Medication 100 MILLIGRAM(S): at 14:10

## 2021-12-06 RX ADMIN — POLYETHYLENE GLYCOL 3350 17 GRAM(S): 17 POWDER, FOR SOLUTION ORAL at 09:22

## 2021-12-06 NOTE — PROGRESS NOTE ADULT - ASSESSMENT
72 y/o female who resides a [facility], with a pmhx afib on coumadin long term, CVA in 2018 with left sided weakness, Morbid obesity, peripheral edema, COPD, Type 2 DM, neuropathy,  diverticulitis, dvt found in 2018 at time of CVA, HTN who was BIBA from [facility] with cc of abd pain/distention and N/V/D since friday 11/26.  Per pt her emesis and BM's were green. Denies f/c/r, denies CP/SOB/dizziness/Headaches.  Seen By Surgery.  Imaging + for SBO, NGT was placed to LWS, however pt pulled out her NGT and refused to have it replaced. Her bowel function has since improved and her diet has been advanced.   Hospital course complicated by     #SBO   Improved. Diet advanced. tolerating PO  d/c IV pain meds - resume oxycodone (home med)  Add BM regimen  Repeat CT A/P 12/5 w/o acute intra-abdominal or intrapelvic pathology.    #Acute hypoxic respiratory failure due to COPD exacerbation. Possible Aspiration PNA.  Monitor on tele. 02 monitoring  Supplemental 02 - 2/3L NC  CT chest w/ mildly improved bilateral multilobar aspiration  Cont. IV steroids 40mg q12  Cefepime IV  Albuterol ATC, Symbicort BID -- allergy to Spiriva  BCx x2, UCx, legionalla/strepU  -- NGTD  RVP neg  Speech and swallow eval appreciated -- thickened liquids initiated. Kept patient NPO on 12/4 due to clinical condition, speech to re-eval today    # PAF w/ RVR -- resolved currently NSR  Monitor on tele. tele review as above  Cont. amiodarone 400mg Q8  Cardizem 60mgq6 --> switch to 240mg QD  Lopressor 25mg BID  Dig IV x1 on 12/4  TSH wnl  F/u echocardiogram  CHADs Vasc = 6. Cont. Coumadin for stroke ppx. INR daily   Cardio consult: [facility]    #Hypernatremia - resolved, s/p D5W    # hx DVT, CVA with left sided weakness  Cont. Coumadin. INR daily  AC services consult appreciated    #Type 2 Diabetes Mellitus. Hyperglycemia | Diabetic Neuropathy   Cont. ISS, Lantus, pre - meal 6U  Lantus 20U (increased on 12/6)  diabetic diet restrictions added  C/w LyricMichael chenmbalta     #HTN   d/c Losartan due to additional rate control meds. see plan above.  Lasix on hold    #GERD - d/c IV Protonix - switch to home PPI BID    #ALVERTO on CKD Stage 2  monitor. hold Lasix for now.  UA neg    #Chronic abd wound  Adaptic/abd pad w/ tape change daily   Wound care consult    #Physical Debility  #Dysphagia  Seen by S/S ~ diet adjusted to soft/thickened liquids   PT consulted - unable to be performed ~ ANTOND  [facility] resident    #GOC/ Advanced Directives  DNR/ DNI - MOLST in chart. confirmed w/ daughter Margarita over phone.     #DVT ppx  Coumadin. INR daily    Updated Margarita (daughter) [phone] 12/4 - all questions/concerns addressed   70 y/o female who resides a [facility], with a pmhx afib on coumadin long term, CVA in 2018 with left sided weakness, Morbid obesity, peripheral edema, COPD, Type 2 DM, neuropathy,  diverticulitis, dvt found in 2018 at time of CVA, HTN who was BIBA from [facility] with cc of abd pain/distention and N/V/D since friday 11/26.  Per pt her emesis and BM's were green. Denies f/c/r, denies CP/SOB/dizziness/Headaches.  Seen By Surgery.  Imaging + for SBO, NGT was placed to LWS, however pt pulled out her NGT and refused to have it replaced. Her bowel function has since improved and her diet has been advanced.   Hospital course complicated by     #SBO   Improved. Diet advanced. tolerating PO  d/c IV pain meds - resume oxycodone (home med)  Add BM regimen  Repeat CT A/P 12/5 w/o acute intra-abdominal or intrapelvic pathology.    #Acute hypoxic respiratory failure due to COPD exacerbation. Possible Aspiration PNA.  Monitor on tele. 02 monitoring  Supplemental 02 - 2/3L NC  CT chest w/ mildly improved bilateral multilobar aspiration  Cont. IV steroids 40mg q12  Cefepime IV  Albuterol ATC, Symbicort BID -- allergy to Spiriva  BCx x2, UCx, legionalla/strepU  -- NGTD  RVP neg  Speech and swallow eval appreciated -- thickened liquids initiated. Kept patient NPO on 12/4 due to clinical condition, speech to re-eval today    # PAF w/ RVR -- resolved currently NSR  Monitor on tele. tele review as above  Cont. amiodarone 400mg Q8  Cardizem 60mgq6 --> switch to 240mg QD  Lopressor 25mg BID  Dig IV x1 on 12/4  TSH wnl  F/u echocardiogram  CHADs Vasc = 6. Cont. Coumadin for stroke ppx. INR daily   Cardio consult: [facility]    #Hypernatremia - resolved, s/p D5W    # hx DVT, CVA with left sided weakness  Cont. Coumadin. INR daily  AC services consult appreciated    #Type 2 Diabetes Mellitus. Hyperglycemia | Diabetic Neuropathy   Cont. ISS, Lantus, pre - meal 6U  Lantus 20U (increased on 12/6)  diabetic diet restrictions added  C/w Lyrica, Cymbalta     #HTN   d/c Losartan due to additional rate control meds. see plan above.  Lasix on hold    #GERD - d/c IV Protonix - switch to home PPI BID    #ALVERTO on CKD Stage 2  monitor. hold Lasix for now.  UA neg    #Chronic abd wound  Adaptic/abd pad w/ tape change daily   Wound care consult    #Physical Debility  #Dysphagia  Seen by S/S ~ diet adjusted to soft/thickened liquids   PT consulted - unable to be performed ~ ANTOND  [facility] resident    #GOC/ Advanced Directives  DNR/ DNI - MOLST in chart. confirmed w/ daughter Margarita over phone.   pall meeting at 2PM    #DVT ppx  Coumadin. INR daily    Updated Margarita (daughter) [phone] 12/4 - all questions/concerns addressed.  *D/c planning, home w/ 24 hour care or [facility]*

## 2021-12-06 NOTE — PROGRESS NOTE ADULT - SUBJECTIVE AND OBJECTIVE BOX
SUBJECTIVE:    Seen and examined today.  She appears more awake.   She has chronic shortness of breath, but no bothersome today.  She has some cough.   Remains on NC  No fevers overnight Tmax 99.2    ROS otherwise negative.     PHYSICAL EXAMINATION:    GENERAL APPEARANCE:  Lethargic and chronic ill appearing.   HEAD: Normocephalic atraumatic   EYES: Pupils are normal. No icterus. Sclera white.   HEART:  Normal S1 and S2. There are no murmurs, rubs or gallops noted  CHEST:  Ronchorous breath sounds - significantly improved since yesterday  ABDOMEN:  Soft and nontender. Nondistended.   NEURO: Sleepy, awakens to verbal stimuli, answers yes/no but does not give any detail regarding medical history       Vital Signs Last 24 Hrs  T(C): 37.3 (06 Dec 2021 08:33), Max: 37.3 (06 Dec 2021 08:33)  T(F): 99.2 (06 Dec 2021 08:33), Max: 99.2 (06 Dec 2021 08:33)  HR: 62 (06 Dec 2021 12:04) (62 - 85)  BP: 128/47 (06 Dec 2021 12:04) (106/66 - 129/65)  BP(mean): 66 (06 Dec 2021 12:04) (66 - 66)  RR: 18 (06 Dec 2021 08:33) (18 - 18)  SpO2: 95% (06 Dec 2021 08:33) (95% - 95%)    MEDICATIONS  (STANDING):  ALBUTerol    90 MICROgram(s) HFA Inhaler 2 Puff(s) Inhalation every 6 hours  aMIOdarone    Tablet 400 milliGRAM(s) Oral every 8 hours  atorvastatin 20 milliGRAM(s) Oral at bedtime  budesonide 160 MICROgram(s)/formoterol 4.5 MICROgram(s) Inhaler 2 Puff(s) Inhalation two times a day  cefepime   IVPB 2000 milliGRAM(s) IV Intermittent every 12 hours  cefepime   IVPB      dextrose 40% Gel 15 Gram(s) Oral once  dextrose 50% Injectable 25 Gram(s) IV Push once  dextrose 50% Injectable 12.5 Gram(s) IV Push once  dextrose 50% Injectable 25 Gram(s) IV Push once  diltiazem    Tablet 60 milliGRAM(s) Oral every 6 hours  DULoxetine 60 milliGRAM(s) Oral daily  fluticasone propionate 50 MICROgram(s)/spray Nasal Spray 1 Spray(s) Both Nostrils two times a day  insulin glargine Injectable (LANTUS) 20 Unit(s) SubCutaneous at bedtime  insulin lispro (ADMELOG) corrective regimen sliding scale   SubCutaneous three times a day before meals  insulin lispro Injectable (ADMELOG) 6 Unit(s) SubCutaneous three times a day before meals  levothyroxine 25 MICROGram(s) Oral daily  methylPREDNISolone sodium succinate Injectable 40 milliGRAM(s) IV Push two times a day  metoprolol tartrate 25 milliGRAM(s) Oral two times a day  oxybutynin 5 milliGRAM(s) Oral daily  pantoprazole    Tablet 40 milliGRAM(s) Oral two times a day  polyethylene glycol 3350 17 Gram(s) Oral daily  pregabalin 100 milliGRAM(s) Oral three times a day  senna 2 Tablet(s) Oral at bedtime  warfarin 4 milliGRAM(s) Oral daily    MEDICATIONS  (PRN):  acetaminophen     Tablet .. 650 milliGRAM(s) Oral every 6 hours PRN Temp greater or equal to 38C (100.4F)  methocarbamol 750 milliGRAM(s) Oral every 6 hours PRN Muscle Spasm  ondansetron Injectable 4 milliGRAM(s) IV Push every 6 hours PRN Nausea and/or Vomiting  oxyCODONE    IR 5 milliGRAM(s) Oral every 6 hours PRN Moderate Pain (4 - 6)  oxyCODONE    IR 10 milliGRAM(s) Oral every 8 hours PRN Severe Pain (7 - 10)      Allergies    Cipro (Rash)  Spiriva (Rash)    Intolerances          LABS:                        11.9   12.32 )-----------( 212      ( 06 Dec 2021 08:29 )             40.8     12-06    145  |  111<H>  |  32<H>  ----------------------------<  310<H>  3.7   |  27  |  0.94    Ca    8.8      06 Dec 2021 08:29  Mg     2.4     12-06    TPro  6.2  /  Alb  2.2<L>  /  TBili  0.3  /  DBili  <0.1  /  AST  15  /  ALT  30  /  AlkPhos  44  12-05    LIVER FUNCTIONS - ( 05 Dec 2021 06:57 )  Alb: 2.2 g/dL / Pro: 6.2 gm/dL / ALK PHOS: 44 U/L / ALT: 30 U/L / AST: 15 U/L / GGT: x           PT/INR - ( 06 Dec 2021 08:29 )   PT: 33.3 sec;   INR: 3.00 ratio           RADIOLOGY & ADDITIONAL STUDIES:   *Imaging personally reviewed.     < from: CT Chest No Cont (12.04.21 @ 11:47) >  EXAM:  CT CHEST                            PROCEDURE DATE:  12/04/2021          INTERPRETATION:  INDICATION: Fever, shortness of breath, possible pneumonia    TECHNIQUE: A volumetric CT acquisition of the chest was obtained from the thoracic inlet to the upper abdomen without the use of intravenous contrast. Coronal and sagittal reconstructed images are provided.    COMPARISON: There are no prior chest CTs available for comparison. Abdominal CT 11/29/2021    FINDINGS:    Lungs/Airways/Pleura: Extensive airway secretions involving the distal trachea, bilateral mainstem bronchi and multilobar segmental/subsegmental involvement with associated bronchial wall thickening. There are patchy centrilobular groundglass and tree-in-bud nodules likely related to multifocal aspiration in this clinical setting. Overall findings have mildly improved since the abdominal CT from 11/29/2021. Emphysema is present. No pleural effusion.    Mediastinum/Lymph nodes: Unremarkable thyroid. No thoracic lymphadenopathy.    Heart and Vessels: Mid ascending aorta measures 3.8 cm. The pulmonary artery is normal in size. The heart is normal in size. There is lipomatous hypertrophy of the interatrial septum. Severe coronary arterial calcification is present. Thereis posterior mitral annular calcification. No pericardial effusion.    Upper Abdomen: Fatty replacement of the pancreas. Hepatic steatosis. Extensive infrarenal aortic calcification.    Osseous structures and Soft Tissues: Mild T6 compression deformity. No aggressive osseous lesion.    IMPRESSION:  Mildly improved bilateral multilobar aspiration since 11/29/2021 abdominal CT.    < end of copied text >

## 2021-12-06 NOTE — PROGRESS NOTE ADULT - ASSESSMENT
This is a Afib on coumadin,[id number] Score: 6  , CVA, COPD, DM, diverticulitis, DVT, HTN neuropathy presents to the ED BIBA from [facility] c/o abd pain, abd distention and n/v/d x3 days.  Pt admitted to [facility] for SBO and NGT  inserted.  NGT out today and pt is on clear liquid.  Pt has high thrombosis risk and requires treatment dose of anticoagulation.    12-1-2021 Spoke with Dr Berrios (surgery) and he states cleared to resume her lovenox over lapping with coumadin.  12-5-2021: noted pt started on amiodarone 400mg po q8h yesterday due to A. Fib with rvr.    Plan:  ::decreased  Coumadin: 3mg po daily adjust with INR.  ::Daily CBC/PT/INR  ::JANUSZ bentley    Will continue to follow.  Dispo: [facility]   This is a Afib on coumadin,[id number] Score: 6  , CVA, COPD, DM, diverticulitis, DVT, HTN neuropathy presents to the ED BIBA from [facility] c/o abd pain, abd distention and n/v/d x3 days.  Pt admitted to [facility] for SBO and NGT  inserted.  NGT out today and pt is on clear liquid.  Pt has high thrombosis risk and requires treatment dose of anticoagulation.    12-1-2021 Spoke with Dr Berrios (surgery) and he states cleared to resume her lovenox over lapping with coumadin.  12-5-2021: noted pt started on amiodarone 400mg po q8h yesterday due to A. Fib with rvr.    12-6-2021 remains on Amiodarone, cefepime and methylprednisolone.  INR 3.0 today FROM 2.3 yesterday.      Plan:  ::decreased  Coumadin: 3mg po daily adjust with INR. inr 3.0 today from 2.39 yesterday.  ::Daily CBC/PT/INR  ::JANUSZ bentley  Will continue to follow.  Dispo: [facility]

## 2021-12-06 NOTE — PROGRESS NOTE ADULT - ASSESSMENT
72 y/o female with a PMHx of Afib, CVA, COPD, DM, diverticulitis, DVT, HTN neuropathy presents to the ED BIBA from [facility] c/o abd pain, abd distention and n/v/d x3 days. Pt reports her emesis and BMs are green. No other complaints at this time. Patient reports she had BM in the ED. patient reports she had nausea and vomiting since friday. Patient reports she has abdominal distension and pain. Patient denies CP, SOB, dizziness, and headache. Initially found to have SBO s/p NGT, bowl rest - now resolved.  Hospital course recently c/b resp failure, cough, wheezing, copd exacerbation, started on steroids, noted with fevers to 101, worsening cough, sob, noted with aspiration pna on CT chest started on cefepime.     1. fever. multilobar pneumonia - aspiration. copd. resolved sbo  - imaging reviewed temps down  - CT abd/pelvis and recent CT chest reviewed   - on IV cefepime [id number]  - continue antibiotic coverage   - RVP (-)  blood cultures no growth   - on iv steroids for copd  - monitor temps  - tolerating abx well so far; no side effects noted  - reason for abx use and side effects reviewed with patient  - supportive care  - aspiration precautions  - fu cbc    2. other issues - care per medicine

## 2021-12-06 NOTE — PROGRESS NOTE ADULT - SUBJECTIVE AND OBJECTIVE BOX
Date of service: 12-06-21 @ 10:40    pt seen and examined  temps down  ms better  alert, awake no resp distress  on o2     ROS: unable to obtain d/t medical condition      MEDICATIONS  (STANDING):  ALBUTerol    90 MICROgram(s) HFA Inhaler 2 Puff(s) Inhalation every 6 hours  aMIOdarone    Tablet 400 milliGRAM(s) Oral every 8 hours  atorvastatin 20 milliGRAM(s) Oral at bedtime  budesonide 160 MICROgram(s)/formoterol 4.5 MICROgram(s) Inhaler 2 Puff(s) Inhalation two times a day  cefepime   IVPB 2000 milliGRAM(s) IV Intermittent every 12 hours  cefepime   IVPB      dextrose 40% Gel 15 Gram(s) Oral once  dextrose 50% Injectable 25 Gram(s) IV Push once  dextrose 50% Injectable 12.5 Gram(s) IV Push once  dextrose 50% Injectable 25 Gram(s) IV Push once  diltiazem    Tablet 60 milliGRAM(s) Oral every 6 hours  DULoxetine 60 milliGRAM(s) Oral daily  fluticasone propionate 50 MICROgram(s)/spray Nasal Spray 1 Spray(s) Both Nostrils two times a day  insulin glargine Injectable (LANTUS) 15 Unit(s) SubCutaneous at bedtime  insulin lispro (ADMELOG) corrective regimen sliding scale   SubCutaneous three times a day before meals  insulin lispro Injectable (ADMELOG) 6 Unit(s) SubCutaneous three times a day before meals  levothyroxine 25 MICROGram(s) Oral daily  methylPREDNISolone sodium succinate Injectable 40 milliGRAM(s) IV Push two times a day  metoprolol tartrate 25 milliGRAM(s) Oral two times a day  oxybutynin 5 milliGRAM(s) Oral daily  pantoprazole    Tablet 40 milliGRAM(s) Oral two times a day  polyethylene glycol 3350 17 Gram(s) Oral daily  pregabalin 100 milliGRAM(s) Oral three times a day  senna 2 Tablet(s) Oral at bedtime  warfarin 4 milliGRAM(s) Oral daily      Vital Signs Last 24 Hrs  T(C): 37.3 (06 Dec 2021 08:33), Max: 37.3 (06 Dec 2021 08:33)  T(F): 99.2 (06 Dec 2021 08:33), Max: 99.2 (06 Dec 2021 08:33)  HR: 85 (06 Dec 2021 08:33) (70 - 85)  BP: 129/65 (06 Dec 2021 08:33) (106/66 - 129/65)  BP(mean): --  RR: 18 (06 Dec 2021 08:33) (18 - 18)  SpO2: 95% (06 Dec 2021 08:33) (95% - 95%)    PE:  Constitutional: frail looking  HEENT: NC/AT, EOMI, PERRLA, conjunctivae clear; ears and nose atraumatic; pharynx benign  Neck: supple; thyroid not palpable  Back: no tenderness  Respiratory: decreased breath sounds, rhonchi  Cardiovascular: S1S2 regular, no murmurs  Abdomen: soft, not tender,  distended, positive BS; liver and spleen WNL  Genitourinary: no suprapubic tenderness  Lymphatic: no LN palpable  Musculoskeletal: no muscle tenderness, no joint swelling or tenderness  Extremities: no pedal edema  Neurological/ Psychiatric:  moving all extremities  Skin: no rashes; no palpable lesions    Labs: all available labs reviewed                        11.9   12.32 )-----------( 212      ( 06 Dec 2021 08:29 )             40.8     12-06    145  |  111<H>  |  32<H>  ----------------------------<  310<H>  3.7   |  27  |  0.94    Ca    8.8      06 Dec 2021 08:29  Mg     2.4     12-06    TPro  6.2  /  Alb  2.2<L>  /  TBili  0.3  /  DBili  <0.1  /  AST  15  /  ALT  30  /  AlkPhos  44  12-05           Cultures:     Culture - Blood (12.04.21 @ 11:25)   Specimen Source: .Blood None   Culture Results:   No growth to date. Culture - Blood (12.04.21 @ 11:25)   Specimen Source: .Blood None   Culture Results:   No growth to date.         Radiology: all available radiological tests reviewed  < from: CT Abdomen and Pelvis No Cont (12.05.21 @ 11:18) >  EXAM:  CT ABDOMEN AND PELVIS                            PROCEDURE DATE:  12/05/2021          INTERPRETATION:  CLINICAL INFORMATION: Abdominal pain    COMPARISON: CT of the abdomen and pelvis from November 29, 2021    CONTRAST/COMPLICATIONS:  IV Contrast: Omnipaque 350  90 cc administered   10 cc discarded  Oral Contrast: NONE  Complications: None reported at time of study completion    PROCEDURE:  CT of the Abdomen and Pelvis was performed.  Sagittal and coronal reformats were performed.    FINDINGS:  LOWER CHEST: There are multiple foci of groundglass attenuation and tree-in-bud nodules within the right lung base. No pleural effusions.    LIVER: Within normal limits.  BILE DUCTS: Normal caliber.  GALLBLADDER: Within normal limits.  SPLEEN:Within normal limits.  PANCREAS: Fatty changes.  ADRENALS: Within normal limits.  KIDNEYS/URETERS: No hydronephrosis. Redemonstrated 1 cm left lower pole renal parenchymal calcification. The right kidney is within normal limits.    BLADDER: Limited evaluation due to streak artifact from bilateral hip prostheses, grossly unremarkable.  REPRODUCTIVE ORGANS: Limited evaluation due to streak artifact from bilateral hip prostheses.    BOWEL: No bowel obstruction. Redemonstrated small bowel, right colonic, and rectosigmoid anastomoses indicating prior resection. Scattered colonic diverticula, no evidence of diverticulitis. Appendix is not visualized. No evidence of inflammation in the pericecal region.  PERITONEUM: No ascites.  VESSELS: Atherosclerotic changes.  RETROPERITONEUM/LYMPH NODES: No lymphadenopathy.  ABDOMINAL WALL: Within the upper abdominal wall, there is a ventral hernia containing part of the distal stomach. Within the mid abdominal wall, there is a large wid[place] ventral herniacontaining parts of small bowel and large bowel.  BONES: Bilateral hip arthroplasty. Degenerative changes. No acute bony abnormalities.    IMPRESSION:  Multiple foci of groundglass attenuation and tree-in-bud nodules in the right lung base, this may represent infectious process.     No acute intra-abdominal or intrapelvic pathology.    < end of copied text >      EXAM:  CT CHEST                            PROCEDURE DATE:  12/04/2021          INTERPRETATION:  INDICATION: Fever, shortness of breath, possible pneumonia    TECHNIQUE: A volumetric CT acquisition of the chest was obtained from the thoracic inlet to the upper abdomen without the use of intravenous contrast. Coronal and sagittal reconstructed images are provided.    COMPARISON: There are no prior chest CTs available for comparison. Abdominal CT 11/29/2021    FINDINGS:    Lungs/Airways/Pleura: Extensive airway secretions involving the distal trachea, bilateral mainstem bronchi and multilobar segmental/subsegmental involvement with associated bronchial wall thickening. There are patchy centrilobular groundglass and tree-in-bud nodules likely related to multifocal aspiration in this clinical setting. Overall findings have mildly improved since the abdominal CT from 11/29/2021. Emphysema is present. No pleural effusion.    Mediastinum/Lymph nodes: Unremarkable thyroid. No thoracic lymphadenopathy.    Heart and Vessels: Mid ascending aorta measures 3.8 cm. The pulmonary artery is normal in size. The heart is normal in size. There is lipomatous hypertrophy of the interatrial septum. Severe coronary arterial calcification is present. Thereis posterior mitral annular calcification. No pericardial effusion.    Upper Abdomen: Fatty replacement of the pancreas. Hepatic steatosis. Extensive infrarenal aortic calcification.    Osseous structures and Soft Tissues: Mild T6 compression deformity. No aggressive osseous lesion.    IMPRESSION:  Mildly improved bilateral multilobar aspiration since 11/29/2021 abdominal CT.    --- End of Report ---        < end of copied text >    Advanced directives addressed: full resuscitation

## 2021-12-06 NOTE — PROGRESS NOTE ADULT - SUBJECTIVE AND OBJECTIVE BOX
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  Per pt her emesis and BM's were green. Denies f/c/r, denies CP/SOB/dizziness/Headaches.  Seen By Surgery.  Imaging + for SBO, NGT was placed to LWS, however pt pulled out her NGT and refused to have it replaced. Her bowel function has since improved and her diet has been advanced.   hospital course complicated by sob/wheeze/productive cough.   Started on iv steroids per pulm for acute copd exacerbation.     12/5/21: Limited ROS due to current clinical condition, alert to person only. no acute distress. on supplemental 02 - 2/3L. patient reporting some abd pain - will re-order CT scan, lethargic but arousable  12/6/21:     All other review of systems is negative unless indicated above    PHYSICAL EXAM:  Constitutional: Awake and alert, ill appearing.   HEENT: Normal Hearing, dry mucus membranes   Neck: Soft and supple   Respiratory: Breath sounds are diminished b/l, slight rhonchi  Cardiovascular: S1 and S2, IR IR   Gastrointestinal: Bowel Sounds present, soft, tender to palp  Extremities:+ BLE peripheral edema  Vascular: 2+ peripheral pulses  Neurological: A/O x person only, lethargic but arousable   Musculoskeletal: 5/5 strength b/l upper and lower extremities  Skin: abd wound, no drainage or redness.     Vital Signs Last 24 Hrs  T(C): 37.1 (05 Dec 2021 09:03), Max: 39.1 (05 Dec 2021 02:37)  T(F): 98.8 (05 Dec 2021 09:03), Max: 102.4 (05 Dec 2021 02:37)  HR: 64 (05 Dec 2021 09:03) (64 - 133)  BP: 115/51 (05 Dec 2021 09:03) (115/51 - 132/61)  BP(mean): --  RR: 19 (05 Dec 2021 09:03) (19 - 23)  SpO2: 94% (05 Dec 2021 09:03) (94% - 100%) 2/3L    LABS: All Labs Reviewed:                        11.8   11.24 )-----------( 250      ( 05 Dec 2021 06:57 )             39.5     12-05    154<H>  |  121<H>  |  38<H>  ----------------------------<  203<H>  3.8   |  27  |  1.14    Ca    8.6      05 Dec 2021 06:57  Phos  2.9     12-04  Mg     2.4     12-05    TPro  6.2  /  Alb  2.2<L>  /  TBili  0.3  /  DBili  <0.1  /  AST  15  /  ALT  30  /  AlkPhos  44  12-05    PT/INR - ( 05 Dec 2021 06:57 )   PT: 26.6 sec;   INR: 2.39 ratio      Blood Culture: 12/4 x2 pending  Urine Culture: pending     RADIOLOGY:  12/5/21: CT Abdomen and Pelvis No Cont: Multiple foci of groundglass attenuation and tree-in-bud nodules in the right lung base, this may represent infectious process.  No acute intra-abdominal or intrapelvic pathology.    12/4/21:  CT Chest No Cont: Mildly improved bilateral multilobar aspiration since 11/29/2021 abdominal CT.    12/3/21: Xray Abdomen 2 Views: Bowel gas findings as above. No acute chest finding.    11/29/21: CT Abdomen and Pelvis w/ IV Cont: Small bowel obstruction, with a mild transition point just distal to the small bowel anastomosis in the anterior pelvis. Mildly prominent air-filled loops are noted distal to this point within a large, widemouth ventral hernia, with gradual transition to decompressed distal small bowel. No bowel wall thickening, pneumatosis, pneumoperitoneum ascites. Numerous ill-defined bibasilar lung opacities, suspicious for infection, including atypical viral etiologies such as COVID19 pneumonia. Consider dedicated chest CT for full evaluation, as clinically indicated.    ECHOCARDIOGRAM: pending    MEDICATIONS  (STANDING):  ALBUTerol    90 MICROgram(s) HFA Inhaler 2 Puff(s) Inhalation every 6 hours  aMIOdarone    Tablet 400 milliGRAM(s) Oral every 8 hours  atorvastatin 20 milliGRAM(s) Oral at bedtime  budesonide 160 MICROgram(s)/formoterol 4.5 MICROgram(s) Inhaler 2 Puff(s) Inhalation two times a day  cefepime   IVPB 2000 milliGRAM(s) IV Intermittent every 12 hours  cefepime   IVPB      dextrose 40% Gel 15 Gram(s) Oral once  dextrose 50% Injectable 25 Gram(s) IV Push once  dextrose 50% Injectable 12.5 Gram(s) IV Push once  dextrose 50% Injectable 25 Gram(s) IV Push once  diltiazem    milliGRAM(s) Oral once  DULoxetine 60 milliGRAM(s) Oral daily  fluticasone propionate 50 MICROgram(s)/spray Nasal Spray 1 Spray(s) Both Nostrils two times a day  insulin glargine Injectable (LANTUS) 20 Unit(s) SubCutaneous at bedtime  insulin lispro (ADMELOG) corrective regimen sliding scale   SubCutaneous three times a day before meals  insulin lispro Injectable (ADMELOG) 6 Unit(s) SubCutaneous three times a day before meals  levothyroxine 25 MICROGram(s) Oral daily  methylPREDNISolone sodium succinate Injectable 40 milliGRAM(s) IV Push <User Schedule>  metoprolol tartrate 25 milliGRAM(s) Oral two times a day  oxybutynin 5 milliGRAM(s) Oral daily  pantoprazole    Tablet 40 milliGRAM(s) Oral two times a day  polyethylene glycol 3350 17 Gram(s) Oral daily  pregabalin 100 milliGRAM(s) Oral three times a day  senna 2 Tablet(s) Oral at bedtime  warfarin 4 milliGRAM(s) Oral daily    MEDICATIONS  (PRN):  acetaminophen     Tablet .. 650 milliGRAM(s) Oral every 6 hours PRN Temp greater or equal to 38C (100.4F)  methocarbamol 750 milliGRAM(s) Oral every 6 hours PRN Muscle Spasm  ondansetron Injectable 4 milliGRAM(s) IV Push every 6 hours PRN Nausea and/or Vomiting  oxyCODONE    IR 5 milliGRAM(s) Oral every 6 hours PRN Moderate Pain (4 - 6)  oxyCODONE    IR 10 milliGRAM(s) Oral every 8 hours PRN Severe Pain (7 - 10)      Home Medications:  acetaminophen 325 mg oral tablet: 2 tab(s) orally every 6 hours, As Needed - 3) (29 Nov 2021 21:27)  Advair Diskus 250 mcg-50 mcg inhalation powder: 1 puff(s) inhaled 2 times a day (29 Nov 2021 21:27)  albuterol 90 mcg/inh inhalation aerosol: 2 puff(s) inhaled 4 times a day (29 Nov 2021 21:27)  atorvastatin 20 mg oral tablet: 1 tab(s) orally every other day (at bedtime) (29 Nov 2021 21:27)  Basaglar KwikPen 100 units/mL subcutaneous solution: 15 unit(s) subcutaneous once a day (at bedtime) (29 Nov 2021 21:27)  Betadine 10% topical solution: apply to abdomen topically every day shift for abdominal wound (29 Nov 2021 21:27)  cholecalciferol 1250 mcg (50,000 intl units) oral capsule: 1 cap(s) orally once a week on wednesdays (29 Nov 2021 21:27)  cyanocobalamin 1000 mcg oral tablet: 1 tab(s) orally once a day (29 Nov 2021 21:27)  diclofenac 1% topical gel: Apply 2 grams to shoulder, elbow, and wrist topically to affected area 3 times a day (29 Nov 2021 21:27)  diclofenac 1% topical gel: Apply 4 grams to left knee, ankle, and back topically to affected area 3 times a day (29 Nov 2021 21:27)  dronedarone 400 mg oral tablet: 1 tab(s) orally 2 times a day (with meals) (29 Nov 2021 21:27)  DULoxetine 60 mg oral delayed release capsule: 1 cap(s) orally once a day (29 Nov 2021 21:27)  Ferrex-150 oral capsule: 1 cap(s) orally once a day (at bedtime) (29 Nov 2021 21:27)  Fleet Enema 19 g-7 g rectal enema: 133 milliliter(s) rectal Monday, Wednesday, and Friday at bedtime for constipation (29 Nov 2021 21:27)  Flonase 50 mcg/inh nasal spray: 1 spray(s) in each nostril once a day (29 Nov 2021 21:27)  furosemide 20 mg oral tablet: 1 tab(s) orally once a day (29 Nov 2021 21:27)  glipiZIDE 10 mg oral tablet, extended release: 2 tab(s) orally once a day (29 Nov 2021 21:27)  insulin lispro 100 units/mL subcutaneous solution: 10 unit(s) subcutaneous 3 times a day (29 Nov 2021 21:27)  insulin lispro 100 units/mL subcutaneous solution: Sliding Scale subcutaneously before meals and at bedtime:  levothyroxine 25 mcg (0.025 mg) oral tablet: 1 tab(s) orally once a day (29 Nov 2021 21:27)  methocarbamol 750 mg oral tablet: 1 tab(s) orally every 6 hours, As Needed - for muscle spasm, for moderate pain (29 Nov 2021 21:27)  Milk of Magnesia 8% oral suspension: 30 milliliter(s) orally once a day, As Needed - for constipation (29 Nov 2021 21:27)  oxyCODONE 10 mg oral tablet: 1 tab(s) orally 3 times a day, As Needed - for severe pain - 10) (29 Nov 2021 21:27)  oxyCODONE 5 mg oral tablet: 1 tab(s) orally every 6 hours, As Needed - for moderate pain (29 Nov 2021 21:27)  pantoprazole 40 mg oral delayed release tablet: 1 tab(s) orally 2 times a day (29 Nov 2021 21:27)  polyethylene glycol 3350 oral powder for reconstitution: 17 gram(s) orally 2 times a day (29 Nov 2021 21:27)  pregabalin 100 mg oral capsule: 1 cap(s) orally 3 times a day (29 Nov 2021 21:27)  Preparation H Medicated Wipes 50% topical pad: Apply topically to affected area 2 times a day (29 Nov 2021 21:27)  Senna Plus 50 mg-8.6 mg oral tablet: 2 tab(s) orally once a day (at bedtime) (29 Nov 2021 21:27)  simethicone 80 mg oral tablet, chewable: 1 tab(s) orally once a day, As Needed (29 Nov 2021 21:27)  tolterodine 1 mg oral tablet: 1 tab(s) orally once a day (29 Nov 2021 21:27)  Tradjenta 5 mg oral tablet: 1 tab(s) orally once a day (29 Nov 2021 21:27)  warfarin 4 mg oral tablet: 1 tab(s) orally Monday, Wednesday, and Friday  (29 Nov 2021 21:27)  warfarin 5 mg oral tablet: 1 tab(s) orally Tuesday, Thursday, Saturday, Sunday (29 Nov 2021 21:27)  Zeasorb-AF 2% topical powder: Apply topically to affected area 3 times a day (29 Nov 2021 21:27)    TELEMETRY REVIEW:  12/4/21: afib 120s, sustaining 110-120s  12/5/21: NSR 60-70s CHIEF COMPLAINT/DIAGNOSIS: abdominal pain, distension    HPI: 72 y/o female who resides a [facility], with a pmhx afib on coumadin long term, 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  Per pt her emesis and BM's were green. Denies f/c/r, denies CP/SOB/dizziness/Headaches.  Seen By Surgery.  Imaging + for SBO, NGT was placed to LWS, however pt pulled out her NGT and refused to have it replaced. Her bowel function has since improved and her diet has been advanced.   hospital course complicated by sob/wheeze/productive cough.   Started on iv steroids per pulm for acute copd exacerbation.     12/5/21: Limited ROS due to current clinical condition, alert to person only. no acute distress. on supplemental 02 - 2/3L. patient reporting some abd pain - will re-order CT scan, lethargic but arousable  12/6/21: More alert, communicating and making needs known, +back pain. moist cough. restart Lasix. attempt to wean 02    All other review of systems is negative unless indicated above    PHYSICAL EXAM:  Constitutional: Awake and alert, ill appearing.   HEENT: Normal Hearing, dry mucus membranes   Neck: Soft and supple   Respiratory: Breath sounds are slight rhonchi b/l  Cardiovascular: S1 and S2, IR IR   Gastrointestinal: Bowel Sounds present, soft, tender to palp  Extremities:+ BLE peripheral edema  Vascular: 2+ peripheral pulses  Neurological: A/O x 3 - improved.  Musculoskeletal: 5/5 strength b/l upper and lower extremities/ LT arm w/ brace  Skin: abd wound, no drainage or redness.     Vital Signs Last 24 Hrs  T(C): 37.3 (06 Dec 2021 08:33), Max: 37.3 (06 Dec 2021 08:33)  T(F): 99.2 (06 Dec 2021 08:33), Max: 99.2 (06 Dec 2021 08:33)  HR: 62 (06 Dec 2021 12:04) (62 - 85)  BP: 128/47 (06 Dec 2021 12:04) (106/66 - 129/65)  BP(mean): 66 (06 Dec 2021 12:04) (66 - 66)  RR: 18 (06 Dec 2021 08:33) (18 - 18)  SpO2: 95% (06 Dec 2021 08:33) (95% - 95%) 2/3L    LABS: All Labs Reviewed:                        11.9   12.32 )-----------( 212      ( 06 Dec 2021 08:29 )             40.8     12-06    145  |  111<H>  |  32<H>  ----------------------------<  310<H>  3.7   |  27  |  0.94    Ca    8.8      06 Dec 2021 08:29  Mg     2.4     12-06    TPro  6.2  /  Alb  2.2<L>  /  TBili  0.3  /  DBili  <0.1  /  AST  15  /  ALT  30  /  AlkPhos  44  12-05    PT/INR - ( 06 Dec 2021 08:29 )   PT: 33.3 sec;   INR: 3.00 ratio      Blood Culture: 12/4 x2 NGTD  Urine Culture: NGTD    RADIOLOGY:  12/5/21: CT Abdomen and Pelvis No Cont: Multiple foci of groundglass attenuation and tree-in-bud nodules in the right lung base, this may represent infectious process.  No acute intra-abdominal or intrapelvic pathology.    12/4/21:  CT Chest No Cont: Mildly improved bilateral multilobar aspiration since 11/29/2021 abdominal CT.    12/3/21: Xray Abdomen 2 Views: Bowel gas findings as above. No acute chest finding.    11/29/21: CT Abdomen and Pelvis w/ IV Cont: Small bowel obstruction, with a mild transition point just distal to the small bowel anastomosis in the anterior pelvis. Mildly prominent air-filled loops are noted distal to this point within a large, widemouth ventral hernia, with gradual transition to decompressed distal small bowel. No bowel wall thickening, pneumatosis, pneumoperitoneum ascites. Numerous ill-defined bibasilar lung opacities, suspicious for infection, including atypical viral etiologies such as COVID19 pneumonia. Consider dedicated chest CT for full evaluation, as clinically indicated.    ECHOCARDIOGRAM: pending    MEDICATIONS  (STANDING):  ALBUTerol    90 MICROgram(s) HFA Inhaler 2 Puff(s) Inhalation every 6 hours  aMIOdarone    Tablet 400 milliGRAM(s) Oral every 8 hours  atorvastatin 20 milliGRAM(s) Oral at bedtime  budesonide 160 MICROgram(s)/formoterol 4.5 MICROgram(s) Inhaler 2 Puff(s) Inhalation two times a day  cefepime   IVPB 2000 milliGRAM(s) IV Intermittent every 12 hours  cefepime   IVPB      dextrose 40% Gel 15 Gram(s) Oral once  dextrose 50% Injectable 25 Gram(s) IV Push once  dextrose 50% Injectable 12.5 Gram(s) IV Push once  dextrose 50% Injectable 25 Gram(s) IV Push once  diltiazem    milliGRAM(s) Oral once  DULoxetine 60 milliGRAM(s) Oral daily  fluticasone propionate 50 MICROgram(s)/spray Nasal Spray 1 Spray(s) Both Nostrils two times a day  insulin glargine Injectable (LANTUS) 20 Unit(s) SubCutaneous at bedtime  insulin lispro (ADMELOG) corrective regimen sliding scale   SubCutaneous three times a day before meals  insulin lispro Injectable (ADMELOG) 6 Unit(s) SubCutaneous three times a day before meals  levothyroxine 25 MICROGram(s) Oral daily  methylPREDNISolone sodium succinate Injectable 40 milliGRAM(s) IV Push <User Schedule>  metoprolol tartrate 25 milliGRAM(s) Oral two times a day  oxybutynin 5 milliGRAM(s) Oral daily  pantoprazole    Tablet 40 milliGRAM(s) Oral two times a day  polyethylene glycol 3350 17 Gram(s) Oral daily  pregabalin 100 milliGRAM(s) Oral three times a day  senna 2 Tablet(s) Oral at bedtime  warfarin 4 milliGRAM(s) Oral daily    MEDICATIONS  (PRN):  acetaminophen     Tablet .. 650 milliGRAM(s) Oral every 6 hours PRN Temp greater or equal to 38C (100.4F)  methocarbamol 750 milliGRAM(s) Oral every 6 hours PRN Muscle Spasm  ondansetron Injectable 4 milliGRAM(s) IV Push every 6 hours PRN Nausea and/or Vomiting  oxyCODONE    IR 5 milliGRAM(s) Oral every 6 hours PRN Moderate Pain (4 - 6)  oxyCODONE    IR 10 milliGRAM(s) Oral every 8 hours PRN Severe Pain (7 - 10)      Home Medications:  acetaminophen 325 mg oral tablet: 2 tab(s) orally every 6 hours, As Needed - 3) (29 Nov 2021 21:27)  Advair Diskus 250 mcg-50 mcg inhalation powder: 1 puff(s) inhaled 2 times a day (29 Nov 2021 21:27)  albuterol 90 mcg/inh inhalation aerosol: 2 puff(s) inhaled 4 times a day (29 Nov 2021 21:27)  atorvastatin 20 mg oral tablet: 1 tab(s) orally every other day (at bedtime) (29 Nov 2021 21:27)  Basaglar KwikPen 100 units/mL subcutaneous solution: 15 unit(s) subcutaneous once a day (at bedtime) (29 Nov 2021 21:27)  Betadine 10% topical solution: apply to abdomen topically every day shift for abdominal wound (29 Nov 2021 21:27)  cholecalciferol 1250 mcg (50,000 intl units) oral capsule: 1 cap(s) orally once a week on wednesdays (29 Nov 2021 21:27)  cyanocobalamin 1000 mcg oral tablet: 1 tab(s) orally once a day (29 Nov 2021 21:27)  diclofenac 1% topical gel: Apply 2 grams to shoulder, elbow, and wrist topically to affected area 3 times a day (29 Nov 2021 21:27)  diclofenac 1% topical gel: Apply 4 grams to left knee, ankle, and back topically to affected area 3 times a day (29 Nov 2021 21:27)  dronedarone 400 mg oral tablet: 1 tab(s) orally 2 times a day (with meals) (29 Nov 2021 21:27)  DULoxetine 60 mg oral delayed release capsule: 1 cap(s) orally once a day (29 Nov 2021 21:27)  Ferrex-150 oral capsule: 1 cap(s) orally once a day (at bedtime) (29 Nov 2021 21:27)  Fleet Enema 19 g-7 g rectal enema: 133 milliliter(s) rectal Monday, Wednesday, and Friday at bedtime for constipation (29 Nov 2021 21:27)  Flonase 50 mcg/inh nasal spray: 1 spray(s) in each nostril once a day (29 Nov 2021 21:27)  furosemide 20 mg oral tablet: 1 tab(s) orally once a day (29 Nov 2021 21:27)  glipiZIDE 10 mg oral tablet, extended release: 2 tab(s) orally once a day (29 Nov 2021 21:27)  insulin lispro 100 units/mL subcutaneous solution: 10 unit(s) subcutaneous 3 times a day (29 Nov 2021 21:27)  insulin lispro 100 units/mL subcutaneous solution: Sliding Scale subcutaneously before meals and at bedtime:  levothyroxine 25 mcg (0.025 mg) oral tablet: 1 tab(s) orally once a day (29 Nov 2021 21:27)  methocarbamol 750 mg oral tablet: 1 tab(s) orally every 6 hours, As Needed - for muscle spasm, for moderate pain (29 Nov 2021 21:27)  Milk of Magnesia 8% oral suspension: 30 milliliter(s) orally once a day, As Needed - for constipation (29 Nov 2021 21:27)  oxyCODONE 10 mg oral tablet: 1 tab(s) orally 3 times a day, As Needed - for severe pain - 10) (29 Nov 2021 21:27)  oxyCODONE 5 mg oral tablet: 1 tab(s) orally every 6 hours, As Needed - for moderate pain (29 Nov 2021 21:27)  pantoprazole 40 mg oral delayed release tablet: 1 tab(s) orally 2 times a day (29 Nov 2021 21:27)  polyethylene glycol 3350 oral powder for reconstitution: 17 gram(s) orally 2 times a day (29 Nov 2021 21:27)  pregabalin 100 mg oral capsule: 1 cap(s) orally 3 times a day (29 Nov 2021 21:27)  Preparation H Medicated Wipes 50% topical pad: Apply topically to affected area 2 times a day (29 Nov 2021 21:27)  Senna Plus 50 mg-8.6 mg oral tablet: 2 tab(s) orally once a day (at bedtime) (29 Nov 2021 21:27)  simethicone 80 mg oral tablet, chewable: 1 tab(s) orally once a day, As Needed (29 Nov 2021 21:27)  tolterodine 1 mg oral tablet: 1 tab(s) orally once a day (29 Nov 2021 21:27)  Tradjenta 5 mg oral tablet: 1 tab(s) orally once a day (29 Nov 2021 21:27)  warfarin 4 mg oral tablet: 1 tab(s) orally Monday, Wednesday, and Friday  (29 Nov 2021 21:27)  warfarin 5 mg oral tablet: 1 tab(s) orally Tuesday, Thursday, Saturday, Sunday (29 Nov 2021 21:27)  Zeasorb-AF 2% topical powder: Apply topically to affected area 3 times a day (29 Nov 2021 21:27)    TELEMETRY REVIEW:  12/4/21: afib 120s, sustaining 110-120s  12/5/21: NSR 60-70s  12/6/21: NSR 60-70S, short run of PAT - d/c tele

## 2021-12-06 NOTE — PROGRESS NOTE ADULT - SUBJECTIVE AND OBJECTIVE BOX
HPI:  70 y/o female with a PMHx of Afib on coumadin,[id number] Score: 6  , CVA, COPD, DM, diverticulitis, DVT, HTN neuropathy presents to the ED BIBA from [facility] c/o abd pain, abd distention and n/v/d x3 days. Pt reports her emesis and BMs are green. No other complaints at this time. Patient reports she had BM in the ED. patient reports she had nausea and vomiting since . Patient reports she has abdominal distension and pain. Patient denies CP, SOB, dizziness, and headache.  (2021 17:24)      Patient is a 71y old  Female who presents with a chief complaint of abdominal pain with n/v and diarrhea.  Pt has a hx of SOB and ventral hernia repair.  NGT  was placed yesterday and now it is out.  Pt having clear liquids and asper surg note pt refused to have NGT  reinserted.      Consulted by Dr. Manjeet Harley for VTE prophylaxis, risk stratification, and anticoagulation management. Asper Surg note they want pt to resume her coumadin today.     PAST MEDICAL & SURGICAL HISTORY:  History of atrial fibrillation on coumadin    HTN (hypertension)    Diabetes mellitus    Cerebrovascular accident (CVA)    DVT of lower limb    CVA (cerebral vascular accident)    COPD (chronic obstructive pulmonary disease)    Neuropathy    Diverticulitis    History of colostomy reversal    History of colostomy reversal    S/P colostomy    S/P     S/P hip replacement        FAMILY HISTORY:      Interval Note:  2021 Pt seen at bedside on [facility].   PT alert an oriented x 3.  Discussed her resuming her coumadin tonight.  States she dose not want to go back to [facility].  She wants to stay here. Pt informed she will need to go to an assisted living once stable. Pt states she has not been able to walk since she had her CVA  ABOUT 1 1/2 YEARS AGO.    21: Patient seen at bedside with increased shortness of breath, reporting productive cough. Denies nausea. Tolerating diet thus far. Dressing- abd CDI, wound without drainage.   12-3-2021 Pt seen at bedside on 2[facility].  Pt not as verbal as before and states her abdomen hurts a little more, Denies SOB/NAUSEA OR CP now.    2021: pt seen at bedside on 3[facility].  States she is fine no abd pain.  She feels cold. Discussed her INR  and dosing. noted fever of 102.4 earlier today.  2021 Pt seen at bedside today on 3 east.  States her abd pain is back but not as bad.  Temp noted 99.2  remains on Amiodarone cefepime and methylprednisolone.  INR 3.0 FROM 2.3 yesterday.    IMPROVE VTE Individual Risk Assessment    RISK                                                                Points    [x  ] Previous VTE                                                  3    [  ] Thrombophilia                                               2    [x  ] Lower limb paralysis                                      2        (unable to hold up >15 seconds)      [  ] Current Cancer                                              2         (within 6 months)    [ x ] Immobilization > 24 hrs                                1    [  ] ICU/CCU stay > 24 hours                              1    [ x ] Age > 60                                                      1    IMPROVE VTE Score ___7______    IMPROVE Score 0-1: Low Risk, No VTE prophylaxis required for most patients, encourage ambulation.   IMPROVE Score 2-3: At risk, pharmacologic VTE prophylaxis is indicated for most patients (in the absence of a contraindication)  IMPROVE Score > or = 4: High Risk, pharmacologic VTE prophylaxis is indicated for most patients (in the absence of a contraindication)      [id number]-LDYs Score: 6    IMPROVE Bleeding Risk Score: 1.5    Falls Risk:   High ( x )  Mod (  )  Low (  )  crcl:  68.7       cr: .64         BMI 39.0           Denies any personal or familial history of clotting or bleeding disorders.    Allergies    Cipro (Rash)  Spiriva (Rash)    Intolerances        REVIEW OF SYSTEMS    (  )Fever	     (  )Constipation	(  )SOB				(  )Headache	(  )Dysuria  (  )Chills	     (  )Melena	(  )Dyspnea present on exertion	                    (  )Dizziness                    (  )Polyuria  (  )Nausea	     (  )Hematochezia	(  )Cough			                    (  )Syncope   	(  )Hematuria  (  )Vomiting    (  )Chest Pain	(  )Wheezing			(x  )Weakness  ()  abdominal pain  (  )Diarrhea     (  )Palpitations	(  )Anorexia			( x )Myalgia    Pertinent positives in HPI and daily subjective.  All other ROS negative.    Vital Signs Last 24 Hrs  T(C): 37.1 (21 @ 09:03), Max: 39.1 (21 @ 02:37)  T(F): 98.8 (21 @ 09:03), Max: 102.4 (21 @ 02:37)  HR: 64 (21 @ 09:03) (64 - 133)  BP: 115/51 (21 @ 09:03) (115/51 - 132/61)  BP(mean): --  RR: 19 (21 @ 09:03) (19 - 23)  SpO2: 94% (21 @ 09:03) (94% - 100%)  PHYSICAL EXAM:    Constitutional: Appears frail    Neurological: A& O x 3    Skin: Warm    Respiratory and Thorax: normal effort; Breath sounds: normal; No rales/wheezing/rhonchi  	  Cardiovascular: S1, S2, regular, NMBR	    Gastrointestinal: BS + x 4Q, distended abdomen, dressing without drainage, beneath wound unhealed not approximated, but without drainage	    Musculoskeletal:   General Right:   no muscle/joint tenderness,   normal tone, no joint swelling,   ROM: limited	    General Left:   no muscle/joint tenderness,   normal tone, no joint swelling,   ROM: limited      Lower extrems:   Right: no calf tenderness              negative shara's sign               + pedal pulses    Left:   no calf tenderness              negative shara's sign               + pedal pulses                                         11.8   11.24 )-----------( 250      ( 05 Dec 2021 06:57 )             39.5       12-    154<H>  |  121<H>  |  38<H>  ----------------------------<  203<H>  3.8   |  27  |  1.14    Ca    8.6      05 Dec 2021 06:57  Phos  2.9     12-04  Mg     2.4     -    TPro  6.2  /  Alb  2.2<L>  /  TBili  0.3  /  DBili  <0.1  /  AST  15  /  ALT  30  /  AlkPhos  44  12-                     12.7   14.33 )-----------( 391      ( 03 Dec 2021 08:00 )             42.3       12-    148<H>  |  116<H>  |  26<H>  ----------------------------<  287<H>  3.5   |  22  |  1.09    Ca    9.0      03 Dec 2021 08:00  Phos  2.7     12-03  Mg     2.2     12-03       PTT - ( 02 Dec 2021 09:16 )  PTT:25.6 sec             13.6   12.87 )-----------( 374      ( 02 Dec 2021 09:16 )             44.4       12-    145  |  111<H>  |  16  ----------------------------<  217<H>  3.3<L>   |  22  |  0.89    Ca    9.1      02 Dec 2021 09:16  Phos  2.6     12-  Mg     2.2     12-        PT/INR - ( 02 Dec 2021 09:16 )   PT: 16.5 sec;   INR: 1.43 ratio         PTT - ( 02 Dec 2021 09:16 )  PTT:25.6 sec                        11.7   14.88 )-----------( 319      ( 01 Dec 2021 07:39 )             38.1       12-    145  |  111<H>  |  15  ----------------------------<  149<H>  3.1<L>   |  24  |  0.64    Ca    8.8      01 Dec 2021 07:39  Phos  2.1     12-  Mg     2.3         TPro  7.6  /  Alb  2.7<L>  /  TBili  0.5  /  DBili  x   /  AST  15  /  ALT  22  /  AlkPhos  63  11-29    PT/INR - ( 05 Dec 2021 06:57 )   PT: 26.6 sec;   INR: 2.39 ratio                                    DEC 5TH  7:26                INR 2.24 ratio  PT/INR - ( 03 Dec 2021 09:39 )   PT: 19.9 sec;   INR: 1.76 ratio    PT/INR - ( 02 Dec 2021 09:16 )   PT: 16.5 sec;   INR: 1.43 ratio    PT/INR - ( 01 Dec 2021 09:57 )   PT: 19.3 sec;   INR: 1.71 ratio         MEDICATIONS  (STANDING):  ALBUTerol    90 MICROgram(s) HFA Inhaler 2 Puff(s) Inhalation every 6 hours  aMIOdarone    Tablet 400 milliGRAM(s) Oral every 8 hours  atorvastatin 20 milliGRAM(s) Oral at bedtime  budesonide 160 MICROgram(s)/formoterol 4.5 MICROgram(s) Inhaler 2 Puff(s) Inhalation two times a day  cefepime   IVPB 2000 milliGRAM(s) IV Intermittent every 12 hours  cefepime   IVPB      dextrose 40% Gel 15 Gram(s) Oral once  dextrose 50% Injectable 25 Gram(s) IV Push once  dextrose 50% Injectable 12.5 Gram(s) IV Push once  dextrose 50% Injectable 25 Gram(s) IV Push once  DULoxetine 60 milliGRAM(s) Oral daily  fluticasone propionate 50 MICROgram(s)/spray Nasal Spray 1 Spray(s) Both Nostrils two times a day  insulin glargine Injectable (LANTUS) 20 Unit(s) SubCutaneous at bedtime  insulin lispro (ADMELOG) corrective regimen sliding scale   SubCutaneous three times a day before meals  insulin lispro Injectable (ADMELOG) 6 Unit(s) SubCutaneous three times a day before meals  levothyroxine 25 MICROGram(s) Oral daily  methylPREDNISolone sodium succinate Injectable 40 milliGRAM(s) IV Push <User Schedule>  metoprolol tartrate 25 milliGRAM(s) Oral two times a day  oxybutynin 5 milliGRAM(s) Oral daily  pantoprazole    Tablet 40 milliGRAM(s) Oral two times a day  polyethylene glycol 3350 17 Gram(s) Oral daily  pregabalin 100 milliGRAM(s) Oral three times a day  senna 2 Tablet(s) Oral at bedtime  warfarin 3 milliGRAM(s) Oral daily                  DVT Prophylaxis:  LMWH                   ( x )  Heparin SQ           (  )  Coumadin             ( x )  Xarelto                  (  )  Eliquis                   (  )  Venodynes           ( x )  Ambulation          (  )  UFH                       (  )  Contraindicated  (  )  EC Aspirin             (  )           HPI:  70 y/o female with a PMHx of Afib on coumadin,[id number] Score: 6  , CVA, COPD, DM, diverticulitis, DVT, HTN neuropathy presents to the ED BIBA from [facility] c/o abd pain, abd distention and n/v/d x3 days. Pt reports her emesis and BMs are green. No other complaints at this time. Patient reports she had BM in the ED. patient reports she had nausea and vomiting since . Patient reports she has abdominal distension and pain. Patient denies CP, SOB, dizziness, and headache.  (2021 17:24)      Patient is a 71y old  Female who presents with a chief complaint of abdominal pain with n/v and diarrhea.  Pt has a hx of SOB and ventral hernia repair.  NGT  was placed yesterday and now it is out.  Pt having clear liquids and asper surg note pt refused to have NGT  reinserted.      Consulted by Dr. Manjeet Harley for VTE prophylaxis, risk stratification, and anticoagulation management. Asper Surg note they want pt to resume her coumadin today.     PAST MEDICAL & SURGICAL HISTORY:  History of atrial fibrillation on coumadin    HTN (hypertension)    Diabetes mellitus    Cerebrovascular accident (CVA)    DVT of lower limb    CVA (cerebral vascular accident)    COPD (chronic obstructive pulmonary disease)    Neuropathy    Diverticulitis    History of colostomy reversal    History of colostomy reversal    S/P colostomy    S/P     S/P hip replacement        FAMILY HISTORY:      Interval Note:  2021 Pt seen at bedside on [facility].   PT alert an oriented x 3.  Discussed her resuming her coumadin tonight.  States she dose not want to go back to [facility].  She wants to stay here. Pt informed she will need to go to an assisted living once stable. Pt states she has not been able to walk since she had her CVA  ABOUT 1 1/2 YEARS AGO.    21: Patient seen at bedside with increased shortness of breath, reporting productive cough. Denies nausea. Tolerating diet thus far. Dressing- abd CDI, wound without drainage.   12-3-2021 Pt seen at bedside on 2[facility].  Pt not as verbal as before and states her abdomen hurts a little more, Denies SOB/NAUSEA OR CP now.    2021: pt seen at bedside on 3east.  States she is fine no abd pain.  She feels cold. Discussed her INR  and dosing. noted fever of 102.4 earlier today.  2021 Pt seen at bedside today on 3 east.  States her abd pain is back but not as bad.  Temp noted 99.2  remains on Amiodarone cefepime and methylprednisolone.  INR 3.0 FROM 2.3 yesterday.    IMPROVE VTE Individual Risk Assessment    RISK                                                                Points    [x  ] Previous VTE                                                  3    [  ] Thrombophilia                                               2    [x  ] Lower limb paralysis                                      2        (unable to hold up >15 seconds)      [  ] Current Cancer                                              2         (within 6 months)    [ x ] Immobilization > 24 hrs                                1    [  ] ICU/CCU stay > 24 hours                              1    [ x ] Age > 60                                                      1    IMPROVE VTE Score ___7______    IMPROVE Score 0-1: Low Risk, No VTE prophylaxis required for most patients, encourage ambulation.   IMPROVE Score 2-3: At risk, pharmacologic VTE prophylaxis is indicated for most patients (in the absence of a contraindication)  IMPROVE Score > or = 4: High Risk, pharmacologic VTE prophylaxis is indicated for most patients (in the absence of a contraindication)      [id number] Score: 6    IMPROVE Bleeding Risk Score: 1.5    Falls Risk:   High ( x )  Mod (  )  Low (  )  crcl:  68.7       cr: .64         BMI 39.0           Denies any personal or familial history of clotting or bleeding disorders.    Allergies    Cipro (Rash)  Spiriva (Rash)    Intolerances        REVIEW OF SYSTEMS    (  )Fever	     (  )Constipation	(  )SOB				(  )Headache	(  )Dysuria  (  )Chills	     (  )Melena	(  )Dyspnea present on exertion	                    (  )Dizziness                    (  )Polyuria  (  )Nausea	     (  )Hematochezia	(  )Cough			                    (  )Syncope   	(  )Hematuria  (  )Vomiting    (  )Chest Pain	(  )Wheezing			(x  )Weakness  ()  abdominal pain  (  )Diarrhea     (  )Palpitations	(  )Anorexia			( x )Myalgia    Pertinent positives in HPI and daily subjective.  All other ROS negative.    Vital Signs Last 24 Hrs  T(C): 37.1 (21 @ 09:03), Max: 39.1 (21 @ 02:37)  T(F): 98.8 (21 @ 09:03), Max: 102.4 (21 @ 02:37)  HR: 64 (21 @ 09:03) (64 - 133)  BP: 115/51 (21 @ 09:03) (115/51 - 132/61)  BP(mean): --  RR: 19 (21 @ 09:03) (19 - 23)  SpO2: 94% (21 @ 09:03) (94% - 100%)  PHYSICAL EXAM:    Constitutional: Appears frail    Neurological: A& O x 3    Skin: Warm    Respiratory and Thorax: normal effort; Breath sounds: normal; No rales/wheezing/rhonchi  	  Cardiovascular: S1, S2, regular, NMBR	    Gastrointestinal: BS + x 4Q, distended abdomen, dressing without drainage, beneath wound unhealed not approximated, but without drainage	    Musculoskeletal:   General Right:   no muscle/joint tenderness,   normal tone, no joint swelling,   ROM: limited	    General Left:   no muscle/joint tenderness,   normal tone, no joint swelling,   ROM: limited      Lower extrems:   Right: no calf tenderness              negative shara's sign               + pedal pulses    Left:   no calf tenderness              negative shara's sign               + pedal pulses                        11.9   12.32 )-----------( 212      ( 06 Dec 2021 08:29 )             40.8       12-06    145  |  111<H>  |  32<H>  ----------------------------<  310<H>  3.7   |  27  |  0.94    Ca    8.8      06 Dec 2021 08:29  Mg     2.4     12-06    TPro  6.2  /  Alb  2.2<L>  /  TBili  0.3  /  DBili  <0.1  /  AST  15  /  ALT  30  /  AlkPhos  44  12-05                                          11.8   11.24 )-----------( 250      ( 05 Dec 2021 06:57 )             39.5       12-05    154<H>  |  121<H>  |  38<H>  ----------------------------<  203<H>  3.8   |  27  |  1.14    Ca    8.6      05 Dec 2021 06:57  Phos  2.9     12-04  Mg     2.4     12-05    TPro  6.2  /  Alb  2.2<L>  /  TBili  0.3  /  DBili  <0.1  /  AST  15  /  ALT  30  /  AlkPhos  44  12-05                     12.7   14.33 )-----------( 391      ( 03 Dec 2021 08:00 )             42.3       12-03    148<H>  |  116<H>  |  26<H>  ----------------------------<  287<H>  3.5   |  22  |  1.09    Ca    9.0      03 Dec 2021 08:00  Phos  2.7     12-03  Mg     2.2     12-03       PTT - ( 02 Dec 2021 09:16 )  PTT:25.6 sec             13.6   12.87 )-----------( 374      ( 02 Dec 2021 09:16 )             44.4       12-02    145  |  111<H>  |  16  ----------------------------<  217<H>  3.3<L>   |  22  |  0.89    Ca    9.1      02 Dec 2021 09:16  Phos  2.6     12-02  Mg     2.2     12-02        PT/INR - ( 02 Dec 2021 09:16 )   PT: 16.5 sec;   INR: 1.43 ratio         PTT - ( 02 Dec 2021 09:16 )  PTT:25.6 sec                        11.7   14.88 )-----------( 319      ( 01 Dec 2021 07:39 )             38.1       12-    145  |  111<H>  |  15  ----------------------------<  149<H>  3.1<L>   |  24  |  0.64    Ca    8.8      01 Dec 2021 07:39  Phos  2.1     12-01  Mg     2.3     12-    TPro  7.6  /  Alb  2.7<L>  /  TBili  0.5  /  DBili  x   /  AST  15  /  ALT  22  /  AlkPhos  63  11-29  PT/INR - ( 06 Dec 2021 08:29 )   PT: 33.3 sec;   INR: 3.00 ratio    PT/INR - ( 05 Dec 2021 06:57 )   PT: 26.6 sec;   INR: 2.39 ratio                                    DEC 5TH  7:26                INR 2.24 ratio  PT/INR - ( 03 Dec 2021 09:39 )   PT: 19.9 sec;   INR: 1.76 ratio    PT/INR - ( 02 Dec 2021 09:16 )   PT: 16.5 sec;   INR: 1.43 ratio    PT/INR - ( 01 Dec 2021 09:57 )   PT: 19.3 sec;   INR: 1.71 ratio         MEDICATIONS  (STANDING):  ALBUTerol    90 MICROgram(s) HFA Inhaler 2 Puff(s) Inhalation every 6 hours  aMIOdarone    Tablet 400 milliGRAM(s) Oral every 8 hours  atorvastatin 20 milliGRAM(s) Oral at bedtime  budesonide 160 MICROgram(s)/formoterol 4.5 MICROgram(s) Inhaler 2 Puff(s) Inhalation two times a day  cefepime   IVPB 2000 milliGRAM(s) IV Intermittent every 12 hours  cefepime   IVPB      dextrose 40% Gel 15 Gram(s) Oral once  dextrose 50% Injectable 25 Gram(s) IV Push once  dextrose 50% Injectable 12.5 Gram(s) IV Push once  dextrose 50% Injectable 25 Gram(s) IV Push once  DULoxetine 60 milliGRAM(s) Oral daily  fluticasone propionate 50 MICROgram(s)/spray Nasal Spray 1 Spray(s) Both Nostrils two times a day  insulin glargine Injectable (LANTUS) 20 Unit(s) SubCutaneous at bedtime  insulin lispro (ADMELOG) corrective regimen sliding scale   SubCutaneous three times a day before meals  insulin lispro Injectable (ADMELOG) 6 Unit(s) SubCutaneous three times a day before meals  levothyroxine 25 MICROGram(s) Oral daily  methylPREDNISolone sodium succinate Injectable 40 milliGRAM(s) IV Push <User Schedule>  metoprolol tartrate 25 milliGRAM(s) Oral two times a day  oxybutynin 5 milliGRAM(s) Oral daily  pantoprazole    Tablet 40 milliGRAM(s) Oral two times a day  polyethylene glycol 3350 17 Gram(s) Oral daily  pregabalin 100 milliGRAM(s) Oral three times a day  senna 2 Tablet(s) Oral at bedtime  warfarin 3 milliGRAM(s) Oral daily                  DVT Prophylaxis:  LMWH                   ( x )  Heparin SQ           (  )  Coumadin             ( x )  Xarelto                  (  )  Eliquis                   (  )  Venodynes           ( x )  Ambulation          (  )  UFH                       (  )  Contraindicated  (  )  EC Aspirin             (  )

## 2021-12-06 NOTE — PROGRESS NOTE ADULT - ASSESSMENT
Assessment:   71 year-old woman, with SBO s/p NGT, bowl rest - now resolved.    --Hospital course recently c/b resp failure, cough, wheezing managed for copd exacerbation and possible aspiration event/aspiration pneumonia.   --Breathing improved today  --Mental status improved today     Recommendations:  --On antibiotics as per ID for possible aspiration pneumonia - - On cefepime day #   --c/w supplemental oxygen  --already on IV steroids - Can likely transition to PO tomorrow     Notify me with any questions or concerns

## 2021-12-07 LAB
ANION GAP SERPL CALC-SCNC: 4 MMOL/L — LOW (ref 5–17)
BUN SERPL-MCNC: 28 MG/DL — HIGH (ref 7–23)
CALCIUM SERPL-MCNC: 8.5 MG/DL — SIGNIFICANT CHANGE UP (ref 8.5–10.1)
CHLORIDE SERPL-SCNC: 110 MMOL/L — HIGH (ref 96–108)
CO2 SERPL-SCNC: 26 MMOL/L — SIGNIFICANT CHANGE UP (ref 22–31)
CREAT SERPL-MCNC: 0.79 MG/DL — SIGNIFICANT CHANGE UP (ref 0.5–1.3)
GLUCOSE BLDC GLUCOMTR-MCNC: 203 MG/DL — HIGH (ref 70–99)
GLUCOSE BLDC GLUCOMTR-MCNC: 222 MG/DL — HIGH (ref 70–99)
GLUCOSE BLDC GLUCOMTR-MCNC: 252 MG/DL — HIGH (ref 70–99)
GLUCOSE BLDC GLUCOMTR-MCNC: 273 MG/DL — HIGH (ref 70–99)
GLUCOSE SERPL-MCNC: 285 MG/DL — HIGH (ref 70–99)
INR BLD: 3.2 RATIO — HIGH (ref 0.88–1.16)
MAGNESIUM SERPL-MCNC: 2.5 MG/DL — SIGNIFICANT CHANGE UP (ref 1.6–2.6)
POTASSIUM SERPL-MCNC: 3.9 MMOL/L — SIGNIFICANT CHANGE UP (ref 3.5–5.3)
POTASSIUM SERPL-SCNC: 3.9 MMOL/L — SIGNIFICANT CHANGE UP (ref 3.5–5.3)
PROTHROM AB SERPL-ACNC: 35.1 SEC — HIGH (ref 10.6–13.6)
S PNEUM AG UR QL: NEGATIVE — SIGNIFICANT CHANGE UP
SODIUM SERPL-SCNC: 140 MMOL/L — SIGNIFICANT CHANGE UP (ref 135–145)

## 2021-12-07 PROCEDURE — 99233 SBSQ HOSP IP/OBS HIGH 50: CPT

## 2021-12-07 PROCEDURE — 99231 SBSQ HOSP IP/OBS SF/LOW 25: CPT

## 2021-12-07 RX ORDER — INSULIN GLARGINE 100 [IU]/ML
25 INJECTION, SOLUTION SUBCUTANEOUS AT BEDTIME
Refills: 0 | Status: DISCONTINUED | OUTPATIENT
Start: 2021-12-07 | End: 2021-12-08

## 2021-12-07 RX ADMIN — Medication 6: at 08:11

## 2021-12-07 RX ADMIN — BUDESONIDE AND FORMOTEROL FUMARATE DIHYDRATE 2 PUFF(S): 160; 4.5 AEROSOL RESPIRATORY (INHALATION) at 20:55

## 2021-12-07 RX ADMIN — Medication 240 MILLIGRAM(S): at 11:10

## 2021-12-07 RX ADMIN — ATORVASTATIN CALCIUM 20 MILLIGRAM(S): 80 TABLET, FILM COATED ORAL at 21:47

## 2021-12-07 RX ADMIN — Medication 6 UNIT(S): at 12:03

## 2021-12-07 RX ADMIN — ALBUTEROL 2 PUFF(S): 90 AEROSOL, METERED ORAL at 07:53

## 2021-12-07 RX ADMIN — Medication 1 SPRAY(S): at 11:11

## 2021-12-07 RX ADMIN — PANTOPRAZOLE SODIUM 40 MILLIGRAM(S): 20 TABLET, DELAYED RELEASE ORAL at 11:10

## 2021-12-07 RX ADMIN — AMIODARONE HYDROCHLORIDE 400 MILLIGRAM(S): 400 TABLET ORAL at 21:47

## 2021-12-07 RX ADMIN — BUDESONIDE AND FORMOTEROL FUMARATE DIHYDRATE 2 PUFF(S): 160; 4.5 AEROSOL RESPIRATORY (INHALATION) at 07:53

## 2021-12-07 RX ADMIN — AMIODARONE HYDROCHLORIDE 400 MILLIGRAM(S): 400 TABLET ORAL at 14:15

## 2021-12-07 RX ADMIN — Medication 20 MILLIGRAM(S): at 11:10

## 2021-12-07 RX ADMIN — CEFEPIME 100 MILLIGRAM(S): 1 INJECTION, POWDER, FOR SOLUTION INTRAMUSCULAR; INTRAVENOUS at 18:04

## 2021-12-07 RX ADMIN — Medication 40 MILLIGRAM(S): at 11:11

## 2021-12-07 RX ADMIN — Medication 100 MILLIGRAM(S): at 21:47

## 2021-12-07 RX ADMIN — Medication 100 MILLIGRAM(S): at 14:15

## 2021-12-07 RX ADMIN — Medication 4: at 17:01

## 2021-12-07 RX ADMIN — ALBUTEROL 2 PUFF(S): 90 AEROSOL, METERED ORAL at 20:55

## 2021-12-07 RX ADMIN — INSULIN GLARGINE 25 UNIT(S): 100 INJECTION, SOLUTION SUBCUTANEOUS at 21:46

## 2021-12-07 RX ADMIN — DULOXETINE HYDROCHLORIDE 60 MILLIGRAM(S): 30 CAPSULE, DELAYED RELEASE ORAL at 11:10

## 2021-12-07 RX ADMIN — Medication 6 UNIT(S): at 08:10

## 2021-12-07 RX ADMIN — Medication 25 MICROGRAM(S): at 05:59

## 2021-12-07 RX ADMIN — Medication 6: at 12:03

## 2021-12-07 RX ADMIN — Medication 5 MILLIGRAM(S): at 11:11

## 2021-12-07 RX ADMIN — Medication 6 UNIT(S): at 17:01

## 2021-12-07 RX ADMIN — CEFEPIME 100 MILLIGRAM(S): 1 INJECTION, POWDER, FOR SOLUTION INTRAMUSCULAR; INTRAVENOUS at 06:00

## 2021-12-07 RX ADMIN — Medication 6 UNIT(S): at 08:12

## 2021-12-07 RX ADMIN — ALBUTEROL 2 PUFF(S): 90 AEROSOL, METERED ORAL at 13:45

## 2021-12-07 RX ADMIN — AMIODARONE HYDROCHLORIDE 400 MILLIGRAM(S): 400 TABLET ORAL at 06:00

## 2021-12-07 RX ADMIN — Medication 25 MILLIGRAM(S): at 21:47

## 2021-12-07 RX ADMIN — Medication 25 MILLIGRAM(S): at 11:11

## 2021-12-07 RX ADMIN — PANTOPRAZOLE SODIUM 40 MILLIGRAM(S): 20 TABLET, DELAYED RELEASE ORAL at 21:47

## 2021-12-07 RX ADMIN — Medication 100 MILLIGRAM(S): at 05:59

## 2021-12-07 NOTE — ADVANCED PRACTICE NURSE CONSULT - ASSESSMENT
HPI: 70 y/o female who resides a [facility], with a PMH:  afib on coumadin long term, CVA in 2018 with left sided weakness, Morbid obesity, peripheral edema, COPD, Type 2 DM, neuropathy,  diverticulitis, dvt found in 2018 at time of CVA, HTN.   Assessed patient on 3 [facility]. Abdominal wound measures 4cm x 1.5cmx wound is covered with scab. Periwound with dry skin. Kept wound STEFANIE if gown or sheet bother patient will place dry dressing for comfort.

## 2021-12-07 NOTE — ADVANCED PRACTICE NURSE CONSULT - RECOMMEDATIONS
1)Continue to Elevate heels off of Mattress  2)Continue to Turn and position every 2 Hours  3)Consult Dietitian for Albumin 2.2  4) Keep abd scab STEFANIE or cover with dry gauze for patient comfort

## 2021-12-07 NOTE — PROGRESS NOTE ADULT - ASSESSMENT
70 y/o female who resides a [facility], with a pmhx afib on coumadin long term, CVA in 2018 with left sided weakness, Morbid obesity, peripheral edema, COPD, Type 2 DM, neuropathy,  diverticulitis, dvt found in 2018 at time of CVA, HTN who was BIBA from [facility] with cc of abd pain/distention and N/V/D since friday 11/26.  Per pt her emesis and BM's were green. Denies f/c/r, denies CP/SOB/dizziness/Headaches.  Seen By Surgery.  Imaging + for SBO, NGT was placed to LWS, however pt pulled out her NGT and refused to have it replaced. Her bowel function has since improved and her diet has been advanced.   Hospital course complicated by     #SBO   Improved. Diet advanced. tolerating PO  d/c IV pain meds - resume oxycodone (home med)  Add BM regimen  Repeat CT A/P 12/5 w/o acute intra-abdominal or intrapelvic pathology.    #Acute hypoxic respiratory failure due to COPD exacerbation.  Aspiration PNA.  Monitor on tele. 02 monitoring  Supplemental 02 weaned down to 1L/RA  CT chest w/ mildly improved bilateral multilobar aspiration  s/p IV steroids -- now on PO Prednisone.   Cefepime IV  Albuterol ATC, Symbicort BID -- allergy to Spiriva  BCx x2, UCx, legionalla/strepU  -- NGTD  RVP neg    # PAF w/ RVR -- resolved currently NSR  Monitor on tele. tele review as above  Cont. amiodarone 400mg Q8  Cardizem 60mgq6 --> switch to 240mg QD  Lopressor 25mg BID  Dig IV x1 on 12/4  TSH wnl  Echocardiogram as above. EF 55-60 %  CHADs Vasc = 6. Cont. Coumadin for stroke ppx. INR daily   Cardio consult: Shaheen    #Hypernatremia - resolved, s/p D5W    # hx DVT, CVA with left sided weakness  Cont. Coumadin. INR daily  AC services consult appreciated    #Type 2 Diabetes Mellitus. Hyperglycemia | Diabetic Neuropathy   Cont. ISS, Lantus, pre - meal 6U  Lantus 25U (increased on 12/6)  diabetic diet restrictions added  C/w Lyrica, Cymbalta     #HTN   d/c Losartan due to additional rate control meds. see plan above.  Resumed PO Lasix     #GERD - d/c IV Protonix - switch to home PPI BID    #ALVERTO on CKD Stage 2  monitor. UA neg    #Chronic abd wound  Adaptic/abd pad w/ tape change daily   Wound care consult    #Physical Debility  #Dysphagia  Seen by S/S ~ diet adjusted to mod thickened liquids   PT consulted - [facility]    #GOC/ Advanced Directives  DNR/ DNI - MOLST in chart. confirmed w/ daughter Margarita over phone.     #DVT ppx  Coumadin. INR daily  Hold coumadin tonight INR >3    Updated Margarita (daughter) [phone] 12/4 - all questions/concerns addressed.  *D/c [facility]. Likely in AM.

## 2021-12-07 NOTE — PROGRESS NOTE ADULT - SUBJECTIVE AND OBJECTIVE BOX
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  Per pt her emesis and BM's were green. Denies f/c/r, denies CP/SOB/dizziness/Headaches.  Seen By Surgery.  Imaging + for SBO, NGT was placed to LWS, however pt pulled out her NGT and refused to have it replaced. Her bowel function has since improved and her diet has been advanced.   hospital course complicated by sob/wheeze/productive cough.   Started on iv steroids per pulm for acute copd exacerbation.     12/5/21: Limited ROS due to current clinical condition, alert to person only. no acute distress. on supplemental 02 - 2/3L. patient reporting some abd pain - will re-order CT scan, lethargic but arousable  12/6/21: More alert, communicating and making needs known, +back pain. moist cough. restart Lasix. attempt to wean 02  12/7/21: patient feeling well, still coughing, able to wean down 02 1L/RA, d/c planning for tm.    All other review of systems is negative unless indicated above    PHYSICAL EXAM:  Constitutional: Awake and alert, ill appearing.   HEENT: Normal Hearing, dry mucus membranes   Neck: Soft and supple   Respiratory: Breath sounds are slight rhonchi b/l   Cardiovascular: S1 and S2, IR IR   Gastrointestinal: Bowel Sounds present, soft, tender to palp  Extremities:+ BLE peripheral edema  Vascular: 2+ peripheral pulses  Neurological: A/O x 3 - improved.  Musculoskeletal: 5/5 strength b/l upper and lower extremities/ LT arm w/ brace  Skin: abd wound, no drainage or redness.     Vital Signs Last 24 Hrs  T(C): 36.9 (07 Dec 2021 07:34), Max: 36.9 (07 Dec 2021 07:34)  T(F): 98.4 (07 Dec 2021 07:34), Max: 98.4 (07 Dec 2021 07:34)  HR: 59 (07 Dec 2021 07:34) (59 - 101)  BP: 136/46 (07 Dec 2021 07:34) (127/47 - 138/52)  BP(mean): --  RR: 18 (07 Dec 2021 07:34) (18 - 18)  SpO2: 96% (07 Dec 2021 12:52) (96% - 99%) 1/2 L    LABS: All Labs Reviewed:                        11.9   12.32 )-----------( 212      ( 06 Dec 2021 08:29 )             40.8     12-07    140  |  110<H>  |  28<H>  ----------------------------<  285<H>  3.9   |  26  |  0.79    Ca    8.5      07 Dec 2021 06:35  Mg     2.5     12-07    PT/INR - ( 07 Dec 2021 06:35 )   PT: 35.1 sec;   INR: 3.20 ratio      Blood Culture: 12/4 x2 NGTD  Urine Culture: NGTD    RADIOLOGY:  12/5/21: CT Abdomen and Pelvis No Cont: Multiple foci of groundglass attenuation and tree-in-bud nodules in the right lung base, this may represent infectious process.  No acute intra-abdominal or intrapelvic pathology.    12/4/21:  CT Chest No Cont: Mildly improved bilateral multilobar aspiration since 11/29/2021 abdominal CT.    12/3/21: Xray Abdomen 2 Views: Bowel gas findings as above. No acute chest finding.    11/29/21: CT Abdomen and Pelvis w/ IV Cont: Small bowel obstruction, with a mild transition point just distal to the small bowel anastomosis in the anterior pelvis. Mildly prominent air-filled loops are noted distal to this point within a large, widemouth ventral hernia, with gradual transition to decompressed distal small bowel. No bowel wall thickening, pneumatosis, pneumoperitoneum ascites. Numerous ill-defined bibasilar lung opacities, suspicious for infection, including atypical viral etiologies such as COVID19 pneumonia. Consider dedicated chest CT for full evaluation, as clinically indicated.    ECHOCARDIOGRAM: 12/6/21:    Fibrocalcific changes noted to the mitral valve leaflets with preserved   leaflet excursion.   Mild (1+) mitral regurgitation is present.   Mild mitral annular calcification is present.   EA reversal of the mitral inflow consistent with reduced compliance of the   left ventricle.   The aortic valve is not well visualized, appears mildly calcified. Valve   opening seems to be normal.   Normal appearing tricuspid valve structure and function.   Trace tricuspid valve regurgitation is present.   Estimated left ventricular ejection fraction is 55-60 %.   The left ventricle is normal in size, wall thickness, wall motion and   contractility as seen in limited views.   The left atrium is mildly dilated.   Normal appearing right ventricle structure and function.    MEDICATIONS  (STANDING):  ALBUTerol    90 MICROgram(s) HFA Inhaler 2 Puff(s) Inhalation every 6 hours  aMIOdarone    Tablet 400 milliGRAM(s) Oral every 8 hours  atorvastatin 20 milliGRAM(s) Oral at bedtime  budesonide 160 MICROgram(s)/formoterol 4.5 MICROgram(s) Inhaler 2 Puff(s) Inhalation two times a day  cefepime   IVPB      cefepime   IVPB 2000 milliGRAM(s) IV Intermittent every 12 hours  dextrose 40% Gel 15 Gram(s) Oral once  dextrose 50% Injectable 25 Gram(s) IV Push once  dextrose 50% Injectable 12.5 Gram(s) IV Push once  dextrose 50% Injectable 25 Gram(s) IV Push once  diltiazem    milliGRAM(s) Oral daily  DULoxetine 60 milliGRAM(s) Oral daily  fluticasone propionate 50 MICROgram(s)/spray Nasal Spray 1 Spray(s) Both Nostrils two times a day  furosemide    Tablet 20 milliGRAM(s) Oral daily  insulin glargine Injectable (LANTUS) 25 Unit(s) SubCutaneous at bedtime  insulin lispro (ADMELOG) corrective regimen sliding scale   SubCutaneous three times a day before meals  insulin lispro (ADMELOG) corrective regimen sliding scale.   SubCutaneous at bedtime  insulin lispro Injectable (ADMELOG) 6 Unit(s) SubCutaneous three times a day before meals  levothyroxine 25 MICROGram(s) Oral daily  metoprolol tartrate 25 milliGRAM(s) Oral two times a day  oxybutynin 5 milliGRAM(s) Oral daily  pantoprazole    Tablet 40 milliGRAM(s) Oral two times a day  polyethylene glycol 3350 17 Gram(s) Oral daily  predniSONE   Tablet 40 milliGRAM(s) Oral daily  pregabalin 100 milliGRAM(s) Oral three times a day  senna 2 Tablet(s) Oral at bedtime    MEDICATIONS  (PRN):  acetaminophen     Tablet .. 650 milliGRAM(s) Oral every 6 hours PRN Temp greater or equal to 38C (100.4F)  methocarbamol 750 milliGRAM(s) Oral every 6 hours PRN Muscle Spasm  ondansetron Injectable 4 milliGRAM(s) IV Push every 6 hours PRN Nausea and/or Vomiting  oxyCODONE    IR 5 milliGRAM(s) Oral every 6 hours PRN Moderate Pain (4 - 6)  oxyCODONE    IR 10 milliGRAM(s) Oral every 8 hours PRN Severe Pain (7 - 10)    Home Medications:  acetaminophen 325 mg oral tablet: 2 tab(s) orally every 6 hours, As Needed - 3) (29 Nov 2021 21:27)  Advair Diskus 250 mcg-50 mcg inhalation powder: 1 puff(s) inhaled 2 times a day (29 Nov 2021 21:27)  albuterol 90 mcg/inh inhalation aerosol: 2 puff(s) inhaled 4 times a day (29 Nov 2021 21:27)  atorvastatin 20 mg oral tablet: 1 tab(s) orally every other day (at bedtime) (29 Nov 2021 21:27)  Basaglar KwikPen 100 units/mL subcutaneous solution: 15 unit(s) subcutaneous once a day (at bedtime) (29 Nov 2021 21:27)  Betadine 10% topical solution: apply to abdomen topically every day shift for abdominal wound (29 Nov 2021 21:27)  cholecalciferol 1250 mcg (50,000 intl units) oral capsule: 1 cap(s) orally once a week on wednesdays (29 Nov 2021 21:27)  cyanocobalamin 1000 mcg oral tablet: 1 tab(s) orally once a day (29 Nov 2021 21:27)  diclofenac 1% topical gel: Apply 2 grams to shoulder, elbow, and wrist topically to affected area 3 times a day (29 Nov 2021 21:27)  diclofenac 1% topical gel: Apply 4 grams to left knee, ankle, and back topically to affected area 3 times a day (29 Nov 2021 21:27)  dronedarone 400 mg oral tablet: 1 tab(s) orally 2 times a day (with meals) (29 Nov 2021 21:27)  DULoxetine 60 mg oral delayed release capsule: 1 cap(s) orally once a day (29 Nov 2021 21:27)  Ferrex-150 oral capsule: 1 cap(s) orally once a day (at bedtime) (29 Nov 2021 21:27)  Fleet Enema 19 g-7 g rectal enema: 133 milliliter(s) rectal Monday, Wednesday, and Friday at bedtime for constipation (29 Nov 2021 21:27)  Flonase 50 mcg/inh nasal spray: 1 spray(s) in each nostril once a day (29 Nov 2021 21:27)  furosemide 20 mg oral tablet: 1 tab(s) orally once a day (29 Nov 2021 21:27)  glipiZIDE 10 mg oral tablet, extended release: 2 tab(s) orally once a day (29 Nov 2021 21:27)  insulin lispro 100 units/mL subcutaneous solution: 10 unit(s) subcutaneous 3 times a day (29 Nov 2021 21:27)  insulin lispro 100 units/mL subcutaneous solution: Sliding Scale subcutaneously before meals and at bedtime:  levothyroxine 25 mcg (0.025 mg) oral tablet: 1 tab(s) orally once a day (29 Nov 2021 21:27)  methocarbamol 750 mg oral tablet: 1 tab(s) orally every 6 hours, As Needed - for muscle spasm, for moderate pain (29 Nov 2021 21:27)  Milk of Magnesia 8% oral suspension: 30 milliliter(s) orally once a day, As Needed - for constipation (29 Nov 2021 21:27)  oxyCODONE 10 mg oral tablet: 1 tab(s) orally 3 times a day, As Needed - for severe pain - 10) (29 Nov 2021 21:27)  oxyCODONE 5 mg oral tablet: 1 tab(s) orally every 6 hours, As Needed - for moderate pain (29 Nov 2021 21:27)  pantoprazole 40 mg oral delayed release tablet: 1 tab(s) orally 2 times a day (29 Nov 2021 21:27)  polyethylene glycol 3350 oral powder for reconstitution: 17 gram(s) orally 2 times a day (29 Nov 2021 21:27)  pregabalin 100 mg oral capsule: 1 cap(s) orally 3 times a day (29 Nov 2021 21:27)  Preparation H Medicated Wipes 50% topical pad: Apply topically to affected area 2 times a day (29 Nov 2021 21:27)  Senna Plus 50 mg-8.6 mg oral tablet: 2 tab(s) orally once a day (at bedtime) (29 Nov 2021 21:27)  simethicone 80 mg oral tablet, chewable: 1 tab(s) orally once a day, As Needed (29 Nov 2021 21:27)  tolterodine 1 mg oral tablet: 1 tab(s) orally once a day (29 Nov 2021 21:27)  Tradjenta 5 mg oral tablet: 1 tab(s) orally once a day (29 Nov 2021 21:27)  warfarin 4 mg oral tablet: 1 tab(s) orally Monday, Wednesday, and Friday  (29 Nov 2021 21:27)  warfarin 5 mg oral tablet: 1 tab(s) orally Tuesday, Thursday, Saturday, Sunday (29 Nov 2021 21:27)  Zeasorb-AF 2% topical powder: Apply topically to affected area 3 times a day (29 Nov 2021 21:27)    TELEMETRY REVIEW:  12/4/21: afib 120s, sustaining 110-120s  12/5/21: NSR 60-70s  12/6/21: NSR 60-70S, short run of PAT - d/c tele

## 2021-12-07 NOTE — PROGRESS NOTE ADULT - SUBJECTIVE AND OBJECTIVE BOX
HPI:  72 y/o female with a PMHx of Afib on coumadin,[id number] Score: 6  , CVA, COPD, DM, diverticulitis, DVT, HTN neuropathy presents to the ED BIBA from [facility] c/o abd pain, abd distention and n/v/d x3 days. Pt reports her emesis and BMs are green. No other complaints at this time. Patient reports she had BM in the ED. patient reports she had nausea and vomiting since . Patient reports she has abdominal distension and pain. Patient denies CP, SOB, dizziness, and headache.  (2021 17:24)      Patient is a 71y old  Female who presents with a chief complaint of abdominal pain with n/v and diarrhea.  Pt has a hx of SOB and ventral hernia repair.  NGT  was placed yesterday and now it is out.  Pt having clear liquids and asper surg note pt refused to have NGT  reinserted.      Consulted by Dr. Manjeet Harley for VTE prophylaxis, risk stratification, and anticoagulation management. Asper Surg note they want pt to resume her coumadin today.     PAST MEDICAL & SURGICAL HISTORY:  History of atrial fibrillation on coumadin    HTN (hypertension)    Diabetes mellitus    Cerebrovascular accident (CVA)    DVT of lower limb    CVA (cerebral vascular accident)    COPD (chronic obstructive pulmonary disease)    Neuropathy    Diverticulitis    History of colostomy reversal    History of colostomy reversal    S/P colostomy    S/P     S/P hip replacement        FAMILY HISTORY:      Interval Note:  2021 Pt seen at bedside on 2north.   PT alert an oriented x 3.  Discussed her resuming her coumadin tonight.  States she dose not want to go back to [facility].  She wants to stay here. Pt informed she will need to go to an assisted living once stable. Pt states she has not been able to walk since she had her CVA  ABOUT 1 1/2 YEARS AGO.    21: Patient seen at bedside with increased shortness of breath, reporting productive cough. Denies nausea. Tolerating diet thus far. Dressing- abd CDI, wound without drainage.   12-3-2021 Pt seen at bedside on 2north.  Pt not as verbal as before and states her abdomen hurts a little more, Denies SOB/NAUSEA OR CP now.    2021: pt seen at bedside on 3east.  States she is fine no abd pain.  She feels cold. Discussed her INR  and dosing. noted fever of 102.4 earlier today.  2021 Pt seen at bedside today on 3 east.  States her abd pain is back but not as bad.  Temp noted 99.2  remains on Amiodarone cefepime and methylprednisolone.  INR 3.0 FROM 2.3 yesterday.    21: patient seen at bedside comfortable. INR 3.2 from 3.0 yesterday. Will hold today then reduce dose to 3 mg likely tomorrow.    IMPROVE VTE Individual Risk Assessment    RISK                                                                Points    [x  ] Previous VTE                                                  3    [  ] Thrombophilia                                               2    [x  ] Lower limb paralysis                                      2        (unable to hold up >15 seconds)      [  ] Current Cancer                                              2         (within 6 months)    [ x ] Immobilization > 24 hrs                                1    [  ] ICU/CCU stay > 24 hours                              1    [ x ] Age > 60                                                      1    IMPROVE VTE Score ___7______    IMPROVE Score 0-1: Low Risk, No VTE prophylaxis required for most patients, encourage ambulation.   IMPROVE Score 2-3: At risk, pharmacologic VTE prophylaxis is indicated for most patients (in the absence of a contraindication)  IMPROVE Score > or = 4: High Risk, pharmacologic VTE prophylaxis is indicated for most patients (in the absence of a contraindication)    [id number] Score: 6    IMPROVE Bleeding Risk Score: 1.5    Falls Risk:   High ( x )  Mod (  )  Low (  )  crcl:  68.7       cr: .64         BMI 39.0           Denies any personal or familial history of clotting or bleeding disorders.    Allergies    Cipro (Rash)  Spiriva (Rash)    Intolerances        REVIEW OF SYSTEMS    (  )Fever	     (  )Constipation	(  )SOB				(  )Headache	(  )Dysuria  (  )Chills	     (  )Melena	(  )Dyspnea present on exertion	                    (  )Dizziness                    (  )Polyuria  (  )Nausea	     (  )Hematochezia	(  )Cough			                    (  )Syncope   	(  )Hematuria  (  )Vomiting    (  )Chest Pain	(  )Wheezing			(x  )Weakness  ()  abdominal pain  (  )Diarrhea     (  )Palpitations	(  )Anorexia			( x )Myalgia    Pertinent positives in HPI and daily subjective.  All other ROS negative.    Vital Signs Last 24 Hrs  T(C): 36.9 (21 @ 07:34), Max: 36.9 (21 @ 07:34)  T(F): 98.4 (21 @ 07:34), Max: 98.4 (21 @ 07:34)  HR: 59 (21 @ 07:34) (59 - 101)  BP: 136/46 (21 @ 07:34) (127/47 - 138/52)  BP(mean): --  RR: 18 (21 @ 07:34) (18 - 18)  SpO2: 96% (21 @ 12:52) (96% - 99%)    PHYSICAL EXAM:    Constitutional: Appears frail    Neurological: A& O x 3    Skin: Warm    Respiratory and Thorax: normal effort; Breath sounds: normal; No rales/wheezing/rhonchi  	  Cardiovascular: S1, S2, regular, NMBR	    Gastrointestinal: BS + x 4Q, distended abdomen, dressing without drainage, beneath wound unhealed not approximated, but without drainage	    Musculoskeletal:   General Right:   no muscle/joint tenderness,   normal tone, no joint swelling,   ROM: limited	    General Left:   no muscle/joint tenderness,   normal tone, no joint swelling,   ROM: limited      Lower extrems:   Right: no calf tenderness              negative shara's sign               + pedal pulses    Left:   no calf tenderness              negative shara's sign               + pedal pulses                                   11.9   12.32 )-----------( 212      ( 06 Dec 2021 08:29 )             40.8           140  |  110<H>  |  28<H>  ----------------------------<  285<H>  3.9   |  26  |  0.79    Ca    8.5      07 Dec 2021 06:35  Mg     2.5         PT/INR - ( 07 Dec 2021 06:35 )   PT: 35.1 sec;   INR: 3.20 ratio    PT/INR - ( 06 Dec 2021 08:29 )   PT: 33.3 sec;   INR: 3.00 ratio    PT/INR - ( 05 Dec 2021 06:57 )   PT: 26.6 sec;   INR: 2.39 ratio                                    DEC 5TH  7:26                INR 2.24 ratio  PT/INR - ( 03 Dec 2021 09:39 )   PT: 19.9 sec;   INR: 1.76 ratio    PT/INR - ( 02 Dec 2021 09:16 )   PT: 16.5 sec;   INR: 1.43 ratio    PT/INR - ( 01 Dec 2021 09:57 )   PT: 19.3 sec;   INR: 1.71 ratio         MEDICATIONS  (STANDING):  ALBUTerol    90 MICROgram(s) HFA Inhaler 2 Puff(s) Inhalation every 6 hours  aMIOdarone    Tablet 400 milliGRAM(s) Oral every 8 hours  atorvastatin 20 milliGRAM(s) Oral at bedtime  budesonide 160 MICROgram(s)/formoterol 4.5 MICROgram(s) Inhaler 2 Puff(s) Inhalation two times a day  cefepime   IVPB 2000 milliGRAM(s) IV Intermittent every 12 hours  cefepime   IVPB      dextrose 40% Gel 15 Gram(s) Oral once  dextrose 50% Injectable 25 Gram(s) IV Push once  dextrose 50% Injectable 12.5 Gram(s) IV Push once  dextrose 50% Injectable 25 Gram(s) IV Push once  DULoxetine 60 milliGRAM(s) Oral daily  fluticasone propionate 50 MICROgram(s)/spray Nasal Spray 1 Spray(s) Both Nostrils two times a day  insulin glargine Injectable (LANTUS) 20 Unit(s) SubCutaneous at bedtime  insulin lispro (ADMELOG) corrective regimen sliding scale   SubCutaneous three times a day before meals  insulin lispro Injectable (ADMELOG) 6 Unit(s) SubCutaneous three times a day before meals  levothyroxine 25 MICROGram(s) Oral daily  methylPREDNISolone sodium succinate Injectable 40 milliGRAM(s) IV Push <User Schedule>  metoprolol tartrate 25 milliGRAM(s) Oral two times a day  oxybutynin 5 milliGRAM(s) Oral daily  pantoprazole    Tablet 40 milliGRAM(s) Oral two times a day  polyethylene glycol 3350 17 Gram(s) Oral daily  pregabalin 100 milliGRAM(s) Oral three times a day  senna 2 Tablet(s) Oral at bedtime    DVT Prophylaxis:  LMWH                   ( )  Heparin SQ           (  )  Coumadin             ( hold )  Xarelto                  (  )  Eliquis                   (  )  Venodynes           ( x )  Ambulation          (  )  UFH                       (  )  Contraindicated  (  )  EC Aspirin             (  )

## 2021-12-07 NOTE — PROGRESS NOTE ADULT - ASSESSMENT
This is a Afib on coumadin,[id number] Score: 6  , CVA, COPD, DM, diverticulitis, DVT, HTN neuropathy presents to the ED BIBA from [facility] c/o abd pain, abd distention and n/v/d x3 days.  Pt admitted to [facility] for SBO and NGT  inserted.  NGT out today and pt is on clear liquid.  Pt has high thrombosis risk and requires treatment dose of anticoagulation.    12-1-2021 Spoke with Dr Berrios (surgery) and he states cleared to resume her lovenox over lapping with coumadin.  12-5-2021: noted pt started on amiodarone 400mg po q8h yesterday due to A. Fib with rvr.    12-6-2021 remains on Amiodarone, cefepime and methylprednisolone.  INR 3.0 today FROM 2.3 yesterday.      Plan:  ::Hold Coumadin tonight then likely start reduced dose 3mg daily tomorrow 12/8  ::Daily CBC/PT/INR  ::JANUSZ bentley    Will continue to follow.  Dispo: NOEMÍ

## 2021-12-08 VITALS
TEMPERATURE: 98 F | RESPIRATION RATE: 18 BRPM | DIASTOLIC BLOOD PRESSURE: 52 MMHG | OXYGEN SATURATION: 93 % | SYSTOLIC BLOOD PRESSURE: 137 MMHG | HEART RATE: 56 BPM

## 2021-12-08 LAB
ANION GAP SERPL CALC-SCNC: 5 MMOL/L — SIGNIFICANT CHANGE UP (ref 5–17)
BUN SERPL-MCNC: 27 MG/DL — HIGH (ref 7–23)
CALCIUM SERPL-MCNC: 8.2 MG/DL — LOW (ref 8.5–10.1)
CHLORIDE SERPL-SCNC: 109 MMOL/L — HIGH (ref 96–108)
CO2 SERPL-SCNC: 28 MMOL/L — SIGNIFICANT CHANGE UP (ref 22–31)
CREAT SERPL-MCNC: 0.77 MG/DL — SIGNIFICANT CHANGE UP (ref 0.5–1.3)
GLUCOSE BLDC GLUCOMTR-MCNC: 270 MG/DL — HIGH (ref 70–99)
GLUCOSE BLDC GLUCOMTR-MCNC: 279 MG/DL — HIGH (ref 70–99)
GLUCOSE SERPL-MCNC: 281 MG/DL — HIGH (ref 70–99)
INR BLD: 3.6 RATIO — HIGH (ref 0.88–1.16)
MAGNESIUM SERPL-MCNC: 2.5 MG/DL — SIGNIFICANT CHANGE UP (ref 1.6–2.6)
POTASSIUM SERPL-MCNC: 3.2 MMOL/L — LOW (ref 3.5–5.3)
POTASSIUM SERPL-SCNC: 3.2 MMOL/L — LOW (ref 3.5–5.3)
PROTHROM AB SERPL-ACNC: 39.6 SEC — HIGH (ref 10.6–13.6)
SARS-COV-2 RNA SPEC QL NAA+PROBE: SIGNIFICANT CHANGE UP
SODIUM SERPL-SCNC: 142 MMOL/L — SIGNIFICANT CHANGE UP (ref 135–145)

## 2021-12-08 PROCEDURE — 99233 SBSQ HOSP IP/OBS HIGH 50: CPT

## 2021-12-08 PROCEDURE — 99239 HOSP IP/OBS DSCHRG MGMT >30: CPT

## 2021-12-08 RX ORDER — DILTIAZEM HCL 120 MG
180 CAPSULE, EXT RELEASE 24 HR ORAL DAILY
Refills: 0 | Status: DISCONTINUED | OUTPATIENT
Start: 2021-12-08 | End: 2021-12-08

## 2021-12-08 RX ORDER — AMIODARONE HYDROCHLORIDE 400 MG/1
1 TABLET ORAL
Qty: 0 | Refills: 0 | DISCHARGE
Start: 2021-12-08

## 2021-12-08 RX ORDER — POTASSIUM CHLORIDE 20 MEQ
40 PACKET (EA) ORAL EVERY 4 HOURS
Refills: 0 | Status: COMPLETED | OUTPATIENT
Start: 2021-12-08 | End: 2021-12-08

## 2021-12-08 RX ORDER — FUROSEMIDE 40 MG
1 TABLET ORAL
Qty: 0 | Refills: 0 | DISCHARGE
Start: 2021-12-08

## 2021-12-08 RX ORDER — AMIODARONE HYDROCHLORIDE 400 MG/1
200 TABLET ORAL DAILY
Refills: 0 | Status: DISCONTINUED | OUTPATIENT
Start: 2021-12-08 | End: 2021-12-08

## 2021-12-08 RX ORDER — DILTIAZEM HCL 120 MG
1 CAPSULE, EXT RELEASE 24 HR ORAL
Qty: 0 | Refills: 0 | DISCHARGE
Start: 2021-12-08

## 2021-12-08 RX ORDER — METOPROLOL TARTRATE 50 MG
1 TABLET ORAL
Qty: 0 | Refills: 0 | DISCHARGE
Start: 2021-12-08

## 2021-12-08 RX ORDER — DULOXETINE HYDROCHLORIDE 30 MG/1
1 CAPSULE, DELAYED RELEASE ORAL
Qty: 0 | Refills: 0 | DISCHARGE
Start: 2021-12-08

## 2021-12-08 RX ADMIN — Medication 25 MILLIGRAM(S): at 11:05

## 2021-12-08 RX ADMIN — ALBUTEROL 2 PUFF(S): 90 AEROSOL, METERED ORAL at 14:35

## 2021-12-08 RX ADMIN — Medication 5 MILLIGRAM(S): at 13:34

## 2021-12-08 RX ADMIN — Medication 180 MILLIGRAM(S): at 13:36

## 2021-12-08 RX ADMIN — OXYCODONE HYDROCHLORIDE 10 MILLIGRAM(S): 5 TABLET ORAL at 01:44

## 2021-12-08 RX ADMIN — Medication 6: at 12:05

## 2021-12-08 RX ADMIN — Medication 20 MILLIGRAM(S): at 09:17

## 2021-12-08 RX ADMIN — Medication 40 MILLIGRAM(S): at 09:17

## 2021-12-08 RX ADMIN — BUDESONIDE AND FORMOTEROL FUMARATE DIHYDRATE 2 PUFF(S): 160; 4.5 AEROSOL RESPIRATORY (INHALATION) at 08:17

## 2021-12-08 RX ADMIN — ALBUTEROL 2 PUFF(S): 90 AEROSOL, METERED ORAL at 08:16

## 2021-12-08 RX ADMIN — Medication 1 SPRAY(S): at 09:16

## 2021-12-08 RX ADMIN — Medication 40 MILLIEQUIVALENT(S): at 13:34

## 2021-12-08 RX ADMIN — PANTOPRAZOLE SODIUM 40 MILLIGRAM(S): 20 TABLET, DELAYED RELEASE ORAL at 09:18

## 2021-12-08 RX ADMIN — Medication 25 MICROGRAM(S): at 05:36

## 2021-12-08 RX ADMIN — Medication 6 UNIT(S): at 09:12

## 2021-12-08 RX ADMIN — DULOXETINE HYDROCHLORIDE 60 MILLIGRAM(S): 30 CAPSULE, DELAYED RELEASE ORAL at 09:16

## 2021-12-08 RX ADMIN — CEFEPIME 100 MILLIGRAM(S): 1 INJECTION, POWDER, FOR SOLUTION INTRAMUSCULAR; INTRAVENOUS at 05:36

## 2021-12-08 RX ADMIN — Medication 100 MILLIGRAM(S): at 13:34

## 2021-12-08 RX ADMIN — AMIODARONE HYDROCHLORIDE 400 MILLIGRAM(S): 400 TABLET ORAL at 05:36

## 2021-12-08 RX ADMIN — Medication 100 MILLIGRAM(S): at 05:36

## 2021-12-08 RX ADMIN — Medication 40 MILLIEQUIVALENT(S): at 09:26

## 2021-12-08 RX ADMIN — Medication 6: at 09:26

## 2021-12-08 RX ADMIN — Medication 6 UNIT(S): at 12:02

## 2021-12-08 RX ADMIN — POLYETHYLENE GLYCOL 3350 17 GRAM(S): 17 POWDER, FOR SOLUTION ORAL at 09:18

## 2021-12-08 NOTE — DISCHARGE NOTE PROVIDER - HOSPITAL COURSE
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  Per pt her emesis and BM's were green. Denies f/c/r, denies CP/SOB/dizziness/Headaches.  Seen By Surgery.  Imaging + for SBO, NGT was placed to LWS, however pt pulled out her NGT and refused to have it replaced. Her bowel function has since improved and her diet has been advanced.   Hospital course complicated by     #SBO   Improved. Diet advanced. tolerating PO  d/c IV pain meds - resume oxycodone (home med)  Add BM regimen  Repeat CT A/P 12/5 w/o acute intra-abdominal or intrapelvic pathology.    #Acute hypoxic respiratory failure due to COPD exacerbation.  Aspiration PNA.  Monitor on tele. 02 monitoring  Supplemental 02 weaned down to room air  CT chest w/ mildly improved bilateral multilobar aspiration  s/p IV steroids -- now on PO Prednisone.   Cefepime IV -- d/c on PO  Albuterol ATC, Symbicort BID -- allergy to Spiriva  BCx x2, UCx, legionalla/strepU  -- NGTD  RVP neg    # PAF w/ RVR -- resolved currently NSR  Monitor on tele. tele review as above  s/p amiodarone loading, cont. 200mg daily   Cardizem 60mgq6 --> switch to 240mg QD --> 12/8 Reduced to 180mg daily  Lopressor 25mg BID  Dig IV x1 on 12/4  TSH wnl  Echocardiogram as above. EF 55-60 %  CHADs Vasc = 6. Cont. Coumadin for stroke ppx. INR daily   Cardio consult: [facility]    #Hypernatremia - resolved, s/p D5W    # hx DVT, CVA with left sided weakness  Cont. Coumadin. INR daily  AC services consult appreciated    #Type 2 Diabetes Mellitus. Hyperglycemia | Diabetic Neuropathy   Cont. ISS, Lantus, pre - meal 6U  Lantus 25U (increased on 12/6)  diabetic diet restrictions added  C/w LyricaMichaelmbalta     #HTN   d/c Losartan due to additional rate control meds. see plan above.  PO Lasix     #GERD - d/c IV Protonix - switch to home PPI BID    #ALVERTO on CKD Stage 2  monitor. UA neg    #Chronic abd wound  Adaptic/abd pad w/ tape change daily   Wound care consult    #Physical Debility  #Dysphagia  Seen by S/S ~ diet adjusted to mod thickened liquids   [facility] consulted - [facility]    #GOC/ Advanced Directives  DNR/ DNI - MOLST in chart. confirmed w/ daughter Margarita over phone.     #DVT ppx  Coumadin. INR daily  Hold coumadin tonight INR >3    Updated Margarita (daughter) [phone] 12/4 - all questions/concerns addressed.  *D/c planning, [facility]*    Dispo: discharge to *[facility] in stable condition    Final diagnosis, treatment plan, and follow-up recommendations were discussed and explained to the patient. The patient was given an opportunity to ask questions concerning the diagnosis and treatment plan. The patient acknowledged understanding of the diagnosis, treatment, and follow-up recommendations. The patient was advised to seek urgent care upon discharge if worsening symptoms develop prior to scheduled follow-up. Time spent on discharge included time with the patient, and also coordinating discharge care as outlined below.    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  Per pt her emesis and BM's were green. Denies f/c/r, denies CP/SOB/dizziness/Headaches.  Seen By Surgery.  Imaging + for SBO, NGT was placed to LWS, however pt pulled out her NGT and refused to have it replaced. Her bowel function has since improved and her diet has been advanced.   Hospital course complicated by afib rvr, asp pna, hypernatremia.    #SBO   Improved. Diet advanced. tolerating PO  d/c IV pain meds - resume oxycodone (home med)  Add BM regimen  Repeat CT A/P 12/5 w/o acute intra-abdominal or intrapelvic pathology.    #Acute hypoxic respiratory failure due to COPD exacerbation.  Aspiration PNA.  Monitor on tele. 02 monitoring  Supplemental 02 weaned down to room air  CT chest w/ mildly improved bilateral multilobar aspiration  s/p IV steroids -- now on PO Prednisone -- cont. taper on discharge.   Cefepime IV -- d/c on PO Augmentin for total of 10 days  Albuterol ATC, Symbicort BID -- allergy to Spiriva  BCx x2, UCx, legionalla/strepU  -- NGTD  RVP neg    # PAF w/ RVR -- resolved currently NSR  Monitor on tele. tele review as above  s/p amiodarone loading, cont. 200mg daily   Cardizem 60mgq6 --> switch to 240mg QD --> 12/8 Reduced to 180mg daily  Lopressor 25mg BID  STOPPED Dronedarone   Dig IV x1 on 12/4  TSH wnl  Echocardiogram as above. EF 55-60 %  CHADs Vasc = 6. Cont. Coumadin for stroke ppx. INR daily   Cardio consult: [facility]    #Hypernatremia - resolved, s/p D5W    # hx DVT, CVA with left sided weakness  Cont. Coumadin. INR daily  AC services consult appreciated    #Type 2 Diabetes Mellitus. Hyperglycemia | Diabetic Neuropathy   Cont. ISS, Lantus, pre - meal 6U  Lantus 25U (increased on 12/6)  diabetic diet restrictions added  C/w LyricMichael chenmbalta     #HTN   d/c Losartan due to additional rate control meds. see plan above.  PO Lasix     #GERD - d/c IV Protonix - switch to home PPI BID    #ALVERTO on CKD Stage 2  monitor. UA neg    #Chronic abd wound  Adaptic/abd pad w/ tape change daily   Wound care consult    #Physical Debility  #Dysphagia  Seen by S/S ~ diet adjusted to mod thickened liquids   PT consulted - [facility]    #GOC/ Advanced Directives  DNR/ DNI - MOLST in chart. confirmed w/ daughter Margarita over phone.     #DVT ppx  Coumadin. INR daily  Hold coumadin tonight INR >3. check INR at facility.    Updated Margarita (daughter) [phone] 12/4, 12/8 - all questions/concerns addressed.  *D/c planning, [facility]*    Dispo: discharge to *[facility] in stable condition    Final diagnosis, treatment plan, and follow-up recommendations were discussed and explained to the patient. The patient was given an opportunity to ask questions concerning the diagnosis and treatment plan. The patient acknowledged understanding of the diagnosis, treatment, and follow-up recommendations. The patient was advised to seek urgent care upon discharge if worsening symptoms develop prior to scheduled follow-up. Time spent on discharge included time with the patient, and also coordinating discharge care as outlined below.    Total time spent: 50 min SEE FULL PROGRESS NOTE FROM 12/8/21:  ASSESSMENT AND PLAN:    70 y/o female who resides a [facility], with a pmhx afib on coumadin long term, CVA in 2018 with left sided weakness, Morbid obesity, peripheral edema, COPD, Type 2 DM, neuropathy,  diverticulitis, dvt found in 2018 at time of CVA, HTN who was BIBA from [facility] with cc of abd pain/distention and N/V/D since friday 11/26.  Per pt her emesis and BM's were green. Denies f/c/r, denies CP/SOB/dizziness/Headaches.  Seen By Surgery.  Imaging + for SBO, NGT was placed to LWS, however pt pulled out her NGT and refused to have it replaced. Her bowel function has since improved and her diet has been advanced.   Hospital course complicated by afib rvr, asp pna, hypernatremia.    #SBO   Improved. Diet advanced. tolerating PO  d/c IV pain meds - resume oxycodone (home med)  Add BM regimen  Repeat CT A/P 12/5 w/o acute intra-abdominal or intrapelvic pathology.    #Acute hypoxic respiratory failure due to COPD exacerbation.  Aspiration PNA.  Monitor on tele. 02 monitoring  Supplemental 02 weaned down to room air  CT chest w/ mildly improved bilateral multilobar aspiration  s/p IV steroids -- now on PO Prednisone -- cont. taper on discharge.   Cefepime IV -- d/c on PO Augmentin for total of 10 days  Albuterol ATC, Symbicort BID -- allergy to Spiriva  BCx x2, UCx, legionalla/strepU  -- NGTD  RVP neg    # PAF w/ RVR -- resolved currently NSR  Monitor on tele. tele review as above  s/p amiodarone loading, cont. 200mg daily   Cardizem 60mgq6 --> switch to 240mg QD --> 12/8 Reduced to 180mg daily  Lopressor 25mg BID  STOPPED Dronedarone   Dig IV x1 on 12/4  TSH wnl  Echocardiogram as above. EF 55-60 %  CHADs Vasc = 6. Cont. Coumadin for stroke ppx. INR daily   Cardio consult: [facility]    #Hypernatremia - resolved, s/p D5W    # hx DVT, CVA with left sided weakness  Cont. Coumadin. INR daily  AC services consult appreciated    #Type 2 Diabetes Mellitus. Hyperglycemia | Diabetic Neuropathy   Cont. ISS, Lantus, pre - meal 6U  Lantus 25U (increased on 12/6)  diabetic diet restrictions added  C/w LyricMichael chenmbalta     #HTN   d/c Losartan due to additional rate control meds. see plan above.  PO Lasix     #GERD - d/c IV Protonix - switch to home PPI BID    #ALVERTO on CKD Stage 2  monitor. UA neg    #Chronic abd wound  Adaptic/abd pad w/ tape change daily   Wound care consult    #Physical Debility  #Dysphagia  Seen by S/S ~ diet adjusted to mod thickened liquids   PT consulted - [facility]    #GOC/ Advanced Directives  DNR/ DNI - MOLST in chart. confirmed w/ daughter Margarita over phone.     #DVT ppx  Coumadin. INR daily  Hold coumadin tonight INR >3. check INR at facility.    Updated Margarita (daughter) [phone] 12/4, 12/8 - all questions/concerns addressed.  *D/c planning, [facility]*    Dispo: discharge to *[facility] in stable condition    Final diagnosis, treatment plan, and follow-up recommendations were discussed and explained to the patient. The patient was given an opportunity to ask questions concerning the diagnosis and treatment plan. The patient acknowledged understanding of the diagnosis, treatment, and follow-up recommendations. The patient was advised to seek urgent care upon discharge if worsening symptoms develop prior to scheduled follow-up. Time spent on discharge included time with the patient, and also coordinating discharge care as outlined below.    Total time spent: 50 min    I have seen and examined pt on day of d/c and agree with A&P as above.  -Carrie Holt MD

## 2021-12-08 NOTE — DISCHARGE NOTE PROVIDER - NSDCMRMEDTOKEN_GEN_ALL_CORE_FT
acetaminophen 325 mg oral tablet: 2 tab(s) orally every 6 hours, As Needed - 3)  Advair Diskus 250 mcg-50 mcg inhalation powder: 1 puff(s) inhaled 2 times a day  albuterol 90 mcg/inh inhalation aerosol: 2 puff(s) inhaled 4 times a day  atorvastatin 20 mg oral tablet: 1 tab(s) orally every other day (at bedtime)  Bactrim  mg-160 mg oral tablet: 1 tab(s) orally every 12 hours for 7 days  ***Course completed on 11-27-21***  Basaglar KwikPen 100 units/mL subcutaneous solution: 15 unit(s) subcutaneous once a day (at bedtime)  Betadine 10% topical solution: apply to abdomen topically every day shift for abdominal wound  cholecalciferol 1250 mcg (50,000 intl units) oral capsule: 1 cap(s) orally once a week on wednesdays  cyanocobalamin 1000 mcg oral tablet: 1 tab(s) orally once a day  diclofenac 1% topical gel: Apply 2 grams to shoulder, elbow, and wrist topically to affected area 3 times a day  diclofenac 1% topical gel: Apply 4 grams to left knee, ankle, and back topically to affected area 3 times a day  dronedarone 400 mg oral tablet: 1 tab(s) orally 2 times a day (with meals)  DULoxetine 60 mg oral delayed release capsule: 1 cap(s) orally once a day  Ferrex-150 oral capsule: 1 cap(s) orally once a day (at bedtime)  Fleet Enema 19 g-7 g rectal enema: 133 milliliter(s) rectal Monday, Wednesday, and Friday at bedtime for constipation  Flonase 50 mcg/inh nasal spray: 1 spray(s) in each nostril once a day  Fluzone High-Dose Quadrivalent PF [id number] intramuscular suspension: 0.7 milliliter(s) intramuscular once  ***Dose given in November 2021***  furosemide 20 mg oral tablet: 1 tab(s) orally once a day  glipiZIDE 10 mg oral tablet, extended release: 2 tab(s) orally once a day  insulin lispro 100 units/mL subcutaneous solution: 10 unit(s) subcutaneous 3 times a day  insulin lispro 100 units/mL subcutaneous solution: Sliding Scale subcutaneously before meals and at bedtime:  0 - 70 = hypoglycemia treatment  150 - 200 = 1 unit  201 - 250 = 2 units  251 - 300 = 3 units  301 - 350 = 4 units  351 - 400 = 5 units  401 - 999 = notify MD  levothyroxine 25 mcg (0.025 mg) oral tablet: 1 tab(s) orally once a day  methocarbamol 750 mg oral tablet: 1 tab(s) orally every 6 hours, As Needed - for muscle spasm, for moderate pain  Milk of Magnesia 8% oral suspension: 30 milliliter(s) orally once a day, As Needed - for constipation  oxyCODONE 10 mg oral tablet: 1 tab(s) orally 3 times a day, As Needed - for severe pain - 10)  oxyCODONE 5 mg oral tablet: 1 tab(s) orally every 6 hours, As Needed - for moderate pain  pantoprazole 40 mg oral delayed release tablet: 1 tab(s) orally 2 times a day  polyethylene glycol 3350 oral powder for reconstitution: 17 gram(s) orally 2 times a day  pregabalin 100 mg oral capsule: 1 cap(s) orally 3 times a day  Preparation H Medicated Wipes 50% topical pad: Apply topically to affected area 2 times a day  Senna Plus 50 mg-8.6 mg oral tablet: 2 tab(s) orally once a day (at bedtime)  simethicone 80 mg oral tablet, chewable: 1 tab(s) orally once a day, As Needed  tolterodine 1 mg oral tablet: 1 tab(s) orally once a day  Tradjenta 5 mg oral tablet: 1 tab(s) orally once a day  warfarin 4 mg oral tablet: 1 tab(s) orally Monday, Wednesday, and Friday   warfarin 5 mg oral tablet: 1 tab(s) orally Tuesday, Thursday, Saturday, Sunday  Zeasorb-AF 2% topical powder: Apply topically to affected area 3 times a day   acetaminophen 325 mg oral tablet: 2 tab(s) orally every 6 hours, As Needed - 3)  Advair Diskus 250 mcg-50 mcg inhalation powder: 1 puff(s) inhaled 2 times a day  albuterol 90 mcg/inh inhalation aerosol: 2 puff(s) inhaled 4 times a day, As Needed (SOB/wheezing)  amiodarone 200 mg oral tablet: 1 tab(s) orally once a day  atorvastatin 20 mg oral tablet: 1 tab(s) orally every other day (at bedtime)  Augmentin 875 mg-125 mg oral tablet: 1 tab(s) orally every 12 hours (UNTIL 12/12)  Basaglar KwikPen 100 units/mL subcutaneous solution: 15 unit(s) subcutaneous once a day (at bedtime)  Betadine 10% topical solution: apply to abdomen topically every day shift for abdominal wound  cholecalciferol 1250 mcg (50,000 intl units) oral capsule: 1 cap(s) orally once a week on wednesdays  cyanocobalamin 1000 mcg oral tablet: 1 tab(s) orally once a day  diclofenac 1% topical gel: Apply 2 grams to shoulder, elbow, and wrist topically to affected area 3 times a day  diclofenac 1% topical gel: Apply 4 grams to left knee, ankle, and back topically to affected area 3 times a day  dilTIAZem 180 mg/24 hours oral capsule, extended release: 1 cap(s) orally once a day  DULoxetine 60 mg oral delayed release capsule: 1 cap(s) orally once a day  Ferrex-150 oral capsule: 1 cap(s) orally once a day (at bedtime)  Fleet Enema 19 g-7 g rectal enema: 133 milliliter(s) rectal Monday, Wednesday, and Friday at bedtime for constipation  Flonase 50 mcg/inh nasal spray: 1 spray(s) in each nostril once a day  furosemide 20 mg oral tablet: 1 tab(s) orally once a day  glipiZIDE 10 mg oral tablet, extended release: 2 tab(s) orally once a day  insulin lispro 100 units/mL subcutaneous solution: 10 unit(s) subcutaneous 3 times a day  insulin lispro 100 units/mL subcutaneous solution: 70 = hypoglycemia treatment  150 - 200 = 1 unit  201 - 250 = 2 units  251 - 300 = 3 units  301 - 350 = 4 units  351 - 400 = 5 units  401 - 999 = notify MD  levothyroxine 25 mcg (0.025 mg) oral tablet: 1 tab(s) orally once a day  methocarbamol 750 mg oral tablet: 1 tab(s) orally every 6 hours, As Needed - for muscle spasm, for moderate pain  metoprolol tartrate 25 mg oral tablet: 1 tab(s) orally 2 times a day  Milk of Magnesia 8% oral suspension: 30 milliliter(s) orally once a day, As Needed - for constipation  oxyCODONE 10 mg oral tablet: 1 tab(s) orally 3 times a day, As Needed - for severe pain - 10)  oxyCODONE 5 mg oral tablet: 1 tab(s) orally every 6 hours, As Needed - for moderate pain  pantoprazole 40 mg oral delayed release tablet: 1 tab(s) orally 2 times a day  polyethylene glycol 3350 oral powder for reconstitution: 17 gram(s) orally 2 times a day  predniSONE 10 mg oral tablet: 4 tab(s) orally once a day x 1 days  3 tab(s) orally once a day x 3 days  2 tab(s) orally once a day x 3 days  1 tab(s) orally once a day x 3 days  pregabalin 100 mg oral capsule: 1 cap(s) orally 3 times a day  Preparation H Medicated Wipes 50% topical pad: Apply topically to affected area 2 times a day  Senna Plus 50 mg-8.6 mg oral tablet: 2 tab(s) orally once a day (at bedtime)  tolterodine 1 mg oral tablet: 1 tab(s) orally once a day  Tradjenta 5 mg oral tablet: 1 tab(s) orally once a day  warfarin 4 mg oral tablet: 1 tab(s) orally Monday, Wednesday, and Friday   warfarin 5 mg oral tablet: 1 tab(s) orally Tuesday, Thursday, Saturday, Sunday  Zeasorb-AF 2% topical powder: Apply topically to affected area 3 times a day

## 2021-12-08 NOTE — PROGRESS NOTE ADULT - SUBJECTIVE AND OBJECTIVE BOX
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  Per pt her emesis and BM's were green. Denies f/c/r, denies CP/SOB/dizziness/Headaches.  Seen By Surgery.  Imaging + for SBO, NGT was placed to LWS, however pt pulled out her NGT and refused to have it replaced. Her bowel function has since improved and her diet has been advanced.   hospital course complicated by sob/wheeze/productive cough.   Started on iv steroids per pulm for acute copd exacerbation.     12/5/21: Limited ROS due to current clinical condition, alert to person only. no acute distress. on supplemental 02 - 2/3L. patient reporting some abd pain - will re-order CT scan, lethargic but arousable  12/6/21: More alert, communicating and making needs known, +back pain. moist cough. restart Lasix. attempt to wean 02  12/7/21: patient feeling well, still coughing, able to wean down 02 1L/RA, d/c planning for tm.  12/8/21: some abd pain, however straining w/ BMS, optimize bowel regimens.     All other review of systems is negative unless indicated above    PHYSICAL EXAM:  Constitutional: Awake and alert, ill appearing.   HEENT: Normal Hearing, dry mucus membranes   Neck: Soft and supple   Respiratory: Breath sounds are slight rhonchi b/l   Cardiovascular: S1 and S2, IR IR   Gastrointestinal: Bowel Sounds present, soft   Extremities:+ BLE peripheral edema  Vascular: 2+ peripheral pulses  Neurological: A/O x 3 - improved.  Musculoskeletal: 5/5 strength b/l upper and lower extremities/ LT arm w/ brace  Skin: abd wound, no drainage or redness.     Vital Signs Last 24 Hrs  T(C): 36.4 (08 Dec 2021 07:45), Max: 36.7 (07 Dec 2021 20:17)  T(F): 97.6 (08 Dec 2021 07:45), Max: 98 (07 Dec 2021 20:17)  HR: 56 (08 Dec 2021 07:45) (56 - 71)  BP: 137/52 (08 Dec 2021 07:45) (133/56 - 137/52)  BP(mean): --  RR: 18 (08 Dec 2021 07:45) (18 - 18)  SpO2: 93% (08 Dec 2021 07:45) (93% - 97%)    LABS: All Labs Reviewed:    12-08    142  |  109<H>  |  27<H>  ----------------------------<  281<H>  3.2<L>   |  28  |  0.77    Ca    8.2<L>      08 Dec 2021 07:43  Mg     2.5     12-08    PT/INR - ( 08 Dec 2021 07:43 )   PT: 39.6 sec;   INR: 3.60 ratio      Blood Culture: 12/4 x2 NGTD  Urine Culture: NGTD    RADIOLOGY:  12/5/21: CT Abdomen and Pelvis No Cont: Multiple foci of groundglass attenuation and tree-in-bud nodules in the right lung base, this may represent infectious process.  No acute intra-abdominal or intrapelvic pathology.    12/4/21:  CT Chest No Cont: Mildly improved bilateral multilobar aspiration since 11/29/2021 abdominal CT.    12/3/21: Xray Abdomen 2 Views: Bowel gas findings as above. No acute chest finding.    11/29/21: CT Abdomen and Pelvis w/ IV Cont: Small bowel obstruction, with a mild transition point just distal to the small bowel anastomosis in the anterior pelvis. Mildly prominent air-filled loops are noted distal to this point within a large, widemouth ventral hernia, with gradual transition to decompressed distal small bowel. No bowel wall thickening, pneumatosis, pneumoperitoneum ascites. Numerous ill-defined bibasilar lung opacities, suspicious for infection, including atypical viral etiologies such as COVID19 pneumonia. Consider dedicated chest CT for full evaluation, as clinically indicated.    ECHOCARDIOGRAM: 12/6/21:    Fibrocalcific changes noted to the mitral valve leaflets with preserved   leaflet excursion.   Mild (1+) mitral regurgitation is present.   Mild mitral annular calcification is present.   EA reversal of the mitral inflow consistent with reduced compliance of the   left ventricle.   The aortic valve is not well visualized, appears mildly calcified. Valve   opening seems to be normal.   Normal appearing tricuspid valve structure and function.   Trace tricuspid valve regurgitation is present.   Estimated left ventricular ejection fraction is 55-60 %.   The left ventricle is normal in size, wall thickness, wall motion and   contractility as seen in limited views.   The left atrium is mildly dilated.   Normal appearing right ventricle structure and function.    MEDICATIONS  (STANDING):  ALBUTerol    90 MICROgram(s) HFA Inhaler 2 Puff(s) Inhalation every 6 hours  aMIOdarone    Tablet 200 milliGRAM(s) Oral daily  atorvastatin 20 milliGRAM(s) Oral at bedtime  budesonide 160 MICROgram(s)/formoterol 4.5 MICROgram(s) Inhaler 2 Puff(s) Inhalation two times a day  cefepime   IVPB 2000 milliGRAM(s) IV Intermittent every 12 hours  cefepime   IVPB      dextrose 40% Gel 15 Gram(s) Oral once  dextrose 50% Injectable 25 Gram(s) IV Push once  dextrose 50% Injectable 12.5 Gram(s) IV Push once  dextrose 50% Injectable 25 Gram(s) IV Push once  diltiazem    milliGRAM(s) Oral daily  DULoxetine 60 milliGRAM(s) Oral daily  fluticasone propionate 50 MICROgram(s)/spray Nasal Spray 1 Spray(s) Both Nostrils two times a day  furosemide    Tablet 20 milliGRAM(s) Oral daily  insulin glargine Injectable (LANTUS) 25 Unit(s) SubCutaneous at bedtime  insulin lispro (ADMELOG) corrective regimen sliding scale   SubCutaneous three times a day before meals  insulin lispro (ADMELOG) corrective regimen sliding scale.   SubCutaneous at bedtime  insulin lispro Injectable (ADMELOG) 6 Unit(s) SubCutaneous three times a day before meals  levothyroxine 25 MICROGram(s) Oral daily  metoprolol tartrate 25 milliGRAM(s) Oral two times a day  oxybutynin 5 milliGRAM(s) Oral daily  pantoprazole    Tablet 40 milliGRAM(s) Oral two times a day  polyethylene glycol 3350 17 Gram(s) Oral daily  potassium chloride    Tablet ER 40 milliEquivalent(s) Oral every 4 hours  predniSONE   Tablet 40 milliGRAM(s) Oral daily  pregabalin 100 milliGRAM(s) Oral three times a day  senna 2 Tablet(s) Oral at bedtime    MEDICATIONS  (PRN):  acetaminophen     Tablet .. 650 milliGRAM(s) Oral every 6 hours PRN Temp greater or equal to 38C (100.4F)  methocarbamol 750 milliGRAM(s) Oral every 6 hours PRN Muscle Spasm  ondansetron Injectable 4 milliGRAM(s) IV Push every 6 hours PRN Nausea and/or Vomiting  oxyCODONE    IR 5 milliGRAM(s) Oral every 6 hours PRN Moderate Pain (4 - 6)  oxyCODONE    IR 10 milliGRAM(s) Oral every 8 hours PRN Severe Pain (7 - 10)    Home Medications:  acetaminophen 325 mg oral tablet: 2 tab(s) orally every 6 hours, As Needed - 3) (29 Nov 2021 21:27)  Advair Diskus 250 mcg-50 mcg inhalation powder: 1 puff(s) inhaled 2 times a day (29 Nov 2021 21:27)  albuterol 90 mcg/inh inhalation aerosol: 2 puff(s) inhaled 4 times a day (29 Nov 2021 21:27)  atorvastatin 20 mg oral tablet: 1 tab(s) orally every other day (at bedtime) (29 Nov 2021 21:27)  Basaglar KwikPen 100 units/mL subcutaneous solution: 15 unit(s) subcutaneous once a day (at bedtime) (29 Nov 2021 21:27)  Betadine 10% topical solution: apply to abdomen topically every day shift for abdominal wound (29 Nov 2021 21:27)  cholecalciferol 1250 mcg (50,000 intl units) oral capsule: 1 cap(s) orally once a week on wednesdays (29 Nov 2021 21:27)  cyanocobalamin 1000 mcg oral tablet: 1 tab(s) orally once a day (29 Nov 2021 21:27)  diclofenac 1% topical gel: Apply 2 grams to shoulder, elbow, and wrist topically to affected area 3 times a day (29 Nov 2021 21:27)  diclofenac 1% topical gel: Apply 4 grams to left knee, ankle, and back topically to affected area 3 times a day (29 Nov 2021 21:27)  dronedarone 400 mg oral tablet: 1 tab(s) orally 2 times a day (with meals) (29 Nov 2021 21:27)  DULoxetine 60 mg oral delayed release capsule: 1 cap(s) orally once a day (29 Nov 2021 21:27)  Ferrex-150 oral capsule: 1 cap(s) orally once a day (at bedtime) (29 Nov 2021 21:27)  Fleet Enema 19 g-7 g rectal enema: 133 milliliter(s) rectal Monday, Wednesday, and Friday at bedtime for constipation (29 Nov 2021 21:27)  Flonase 50 mcg/inh nasal spray: 1 spray(s) in each nostril once a day (29 Nov 2021 21:27)  furosemide 20 mg oral tablet: 1 tab(s) orally once a day (29 Nov 2021 21:27)  glipiZIDE 10 mg oral tablet, extended release: 2 tab(s) orally once a day (29 Nov 2021 21:27)  insulin lispro 100 units/mL subcutaneous solution: 10 unit(s) subcutaneous 3 times a day (29 Nov 2021 21:27)  insulin lispro 100 units/mL subcutaneous solution: Sliding Scale subcutaneously before meals and at bedtime:  levothyroxine 25 mcg (0.025 mg) oral tablet: 1 tab(s) orally once a day (29 Nov 2021 21:27)  methocarbamol 750 mg oral tablet: 1 tab(s) orally every 6 hours, As Needed - for muscle spasm, for moderate pain (29 Nov 2021 21:27)  Milk of Magnesia 8% oral suspension: 30 milliliter(s) orally once a day, As Needed - for constipation (29 Nov 2021 21:27)  oxyCODONE 10 mg oral tablet: 1 tab(s) orally 3 times a day, As Needed - for severe pain - 10) (29 Nov 2021 21:27)  oxyCODONE 5 mg oral tablet: 1 tab(s) orally every 6 hours, As Needed - for moderate pain (29 Nov 2021 21:27)  pantoprazole 40 mg oral delayed release tablet: 1 tab(s) orally 2 times a day (29 Nov 2021 21:27)  polyethylene glycol 3350 oral powder for reconstitution: 17 gram(s) orally 2 times a day (29 Nov 2021 21:27)  pregabalin 100 mg oral capsule: 1 cap(s) orally 3 times a day (29 Nov 2021 21:27)  Preparation H Medicated Wipes 50% topical pad: Apply topically to affected area 2 times a day (29 Nov 2021 21:27)  Senna Plus 50 mg-8.6 mg oral tablet: 2 tab(s) orally once a day (at bedtime) (29 Nov 2021 21:27)  simethicone 80 mg oral tablet, chewable: 1 tab(s) orally once a day, As Needed (29 Nov 2021 21:27)  tolterodine 1 mg oral tablet: 1 tab(s) orally once a day (29 Nov 2021 21:27)  Tradjenta 5 mg oral tablet: 1 tab(s) orally once a day (29 Nov 2021 21:27)  warfarin 4 mg oral tablet: 1 tab(s) orally Monday, Wednesday, and Friday  (29 Nov 2021 21:27)  warfarin 5 mg oral tablet: 1 tab(s) orally Tuesday, Thursday, Saturday, Sunday (29 Nov 2021 21:27)  Zeasorb-AF 2% topical powder: Apply topically to affected area 3 times a day (29 Nov 2021 21:27)    TELEMETRY REVIEW:  12/4/21: afib 120s, sustaining 110-120s  12/5/21: NSR 60-70s  12/6/21: NSR 60-70S, short run of PAT - d/c tele

## 2021-12-08 NOTE — PROGRESS NOTE ADULT - PROBLEM SELECTOR PROBLEM 1
COPD exacerbation
SBO (small bowel obstruction)
SBO (small bowel obstruction)
COPD exacerbation
Afib
COPD exacerbation
COPD exacerbation
SBO (small bowel obstruction)

## 2021-12-08 NOTE — PROGRESS NOTE ADULT - SUBJECTIVE AND OBJECTIVE BOX
Subjective:  feeling better  no distress    MEDICATIONS  (STANDING):  ALBUTerol    90 MICROgram(s) HFA Inhaler 2 Puff(s) Inhalation every 6 hours  aMIOdarone    Tablet 200 milliGRAM(s) Oral daily  atorvastatin 20 milliGRAM(s) Oral at bedtime  budesonide 160 MICROgram(s)/formoterol 4.5 MICROgram(s) Inhaler 2 Puff(s) Inhalation two times a day  cefepime   IVPB 2000 milliGRAM(s) IV Intermittent every 12 hours  cefepime   IVPB      dextrose 40% Gel 15 Gram(s) Oral once  dextrose 50% Injectable 25 Gram(s) IV Push once  dextrose 50% Injectable 12.5 Gram(s) IV Push once  dextrose 50% Injectable 25 Gram(s) IV Push once  diltiazem    milliGRAM(s) Oral daily  DULoxetine 60 milliGRAM(s) Oral daily  fluticasone propionate 50 MICROgram(s)/spray Nasal Spray 1 Spray(s) Both Nostrils two times a day  furosemide    Tablet 20 milliGRAM(s) Oral daily  insulin glargine Injectable (LANTUS) 25 Unit(s) SubCutaneous at bedtime  insulin lispro (ADMELOG) corrective regimen sliding scale   SubCutaneous three times a day before meals  insulin lispro (ADMELOG) corrective regimen sliding scale.   SubCutaneous at bedtime  insulin lispro Injectable (ADMELOG) 6 Unit(s) SubCutaneous three times a day before meals  levothyroxine 25 MICROGram(s) Oral daily  metoprolol tartrate 25 milliGRAM(s) Oral two times a day  oxybutynin 5 milliGRAM(s) Oral daily  pantoprazole    Tablet 40 milliGRAM(s) Oral two times a day  polyethylene glycol 3350 17 Gram(s) Oral daily  potassium chloride    Tablet ER 40 milliEquivalent(s) Oral every 4 hours  predniSONE   Tablet 40 milliGRAM(s) Oral daily  pregabalin 100 milliGRAM(s) Oral three times a day  senna 2 Tablet(s) Oral at bedtime    MEDICATIONS  (PRN):  acetaminophen     Tablet .. 650 milliGRAM(s) Oral every 6 hours PRN Temp greater or equal to 38C (100.4F)  methocarbamol 750 milliGRAM(s) Oral every 6 hours PRN Muscle Spasm  ondansetron Injectable 4 milliGRAM(s) IV Push every 6 hours PRN Nausea and/or Vomiting  oxyCODONE    IR 5 milliGRAM(s) Oral every 6 hours PRN Moderate Pain (4 - 6)  oxyCODONE    IR 10 milliGRAM(s) Oral every 8 hours PRN Severe Pain (7 - 10)      Allergies    Cipro (Rash)  Spiriva (Rash)    Intolerances        REVIEW OF SYSTEMS:    CONSTITUTIONAL:  As per HPI.  HEENT:  Eyes:  No diplopia or blurred vision. ENT:  No earache, sore throat or runny nose.  CARDIOVASCULAR:  No pressure, squeezing, tightness, heaviness or aching about the chest, neck, axilla or epigastrium.  RESPIRATORY:  No cough, shortness of breath, PND or orthopnea.  GASTROINTESTINAL:  No nausea, vomiting or diarrhea.  GENITOURINARY:  No dysuria, frequency or urgency.  MUSCULOSKELETAL:  no joint pain, deformity, tenderness  EXTREMITIES: no clubbing cyanosis,edema  SKIN:  No change in skin, hair or nails.  NEUROLOGIC:  No paresthesias, fasciculations, seizures or weakness.  PSYCHIATRIC:  No disorder of thought or mood.  ENDOCRINE:  No heat or cold intolerance, polyuria or polydipsia.  HEMATOLOGICAL:  No easy bruising or bleedings:    Vital Signs Last 24 Hrs  T(C): 36.4 (08 Dec 2021 07:45), Max: 36.7 (07 Dec 2021 20:17)  T(F): 97.6 (08 Dec 2021 07:45), Max: 98 (07 Dec 2021 20:17)  HR: 56 (08 Dec 2021 07:45) (56 - 71)  BP: 137/52 (08 Dec 2021 07:45) (133/56 - 137/52)  BP(mean): --  RR: 18 (08 Dec 2021 07:45) (18 - 18)  SpO2: 93% (08 Dec 2021 07:45) (93% - 97%)    PHYSICAL EXAMINATION:  SKIN: no rashes  HEAD: NC/AT  EYES: PERRLA, EOMI  EARS: TM's intact  NOSE: no abnormalities  NECK:  Supple. No lymphadenopathy. Jugular venous pressure not elevated. Carotids equal.   HEART:   The cardiac impulse has a normal quality. Reg., Nl S1 and S2.  There are no murmurs, rubs or gallops noted  CHEST:  Chest is clear to auscultation. Normal respiratory effort.  ABDOMEN:  Soft and nontender.   EXTREMITIES:  no C/C/E  NEURO: AAO x 3, no focal deficts       LABS:    12-08    142  |  109<H>  |  27<H>  ----------------------------<  281<H>  3.2<L>   |  28  |  0.77    Ca    8.2<L>      08 Dec 2021 07:43  Mg     2.5     12-08      PT/INR - ( 08 Dec 2021 07:43 )   PT: 39.6 sec;   INR: 3.60 ratio               RADIOLOGY & ADDITIONAL TESTS:

## 2021-12-08 NOTE — PROGRESS NOTE ADULT - REASON FOR ADMISSION
Abd pain
abdominal pain, distension
Abd pain
Abd pain
SBO
Abd pain
SBO
Abd pain
abdominal pain, distension
abdominal pain, distension
afib w RVR
afib w RVR
abdominal pain, distension
abdominal pain, distension

## 2021-12-08 NOTE — PROGRESS NOTE ADULT - ASSESSMENT
70 y/o female with a PMHx of Afib, CVA, COPD, DM, diverticulitis, DVT, HTN neuropathy presents to the ED BIBA from [facility] c/o abd pain, abd distention and n/v/d x3 days. Pt reports her emesis and BMs are green. No other complaints at this time. Patient reports she had BM in the ED. patient reports she had nausea and vomiting since friday. Patient reports she has abdominal distension and pain. Patient denies CP, SOB, dizziness, and headache. Initially found to have SBO s/p NGT, bowl rest - now resolved.  Hospital course recently c/b resp failure, cough, wheezing, copd exacerbation, started on steroids, noted with fevers to 101, worsening cough, sob, noted with aspiration pna on CT chest started on cefepime.     1. fever. multilobar pneumonia - aspiration. copd. resolved sbo  - imaging reviewed temps down  - CT abd/pelvis and recent CT chest reviewed   - on IV cefepime [id number] #5  - continue antibiotic coverage   - RVP (-)  blood cultures no growth   - on steroids for copd  - monitor temps  - tolerating abx well so far; no side effects noted  - reason for abx use and side effects reviewed with patient  - on dc po ceftin 500mg BID complete 7-10 day course   - supportive care  - aspiration precautions  - fu cbc    2. other issues - care per medicine

## 2021-12-08 NOTE — DISCHARGE NOTE NURSING/CASE MANAGEMENT/SOCIAL WORK - NSDCPEFALRISK_GEN_ALL_CORE
For information on Fall & Injury Prevention, visit: [email or web address] OR  [email or web address] OR  https://www.cdc.gov/steadi/patient.html

## 2021-12-08 NOTE — PROGRESS NOTE ADULT - PROBLEM SELECTOR PROBLEM 4
Abdominal pain with vomiting
Pneumonia, aspiration
Abdominal pain with vomiting
Pneumonia, aspiration

## 2021-12-08 NOTE — PROGRESS NOTE ADULT - PROBLEM SELECTOR PROBLEM 6
ALVERTO (acute kidney injury)
ALVERTO (acute kidney injury)
ALVETRO (acute kidney injury)
ALVERTO (acute kidney injury)
ALVERTO (acute kidney injury)

## 2021-12-08 NOTE — PROGRESS NOTE ADULT - PROBLEM SELECTOR PROBLEM 2
SBO (small bowel obstruction)
COPD exacerbation
Acute bronchospasm
COPD exacerbation

## 2021-12-08 NOTE — DISCHARGE NOTE NURSING/CASE MANAGEMENT/SOCIAL WORK - PATIENT PORTAL LINK FT
You can access the FollowMyHealth Patient Portal offered by [facility] by registering at the following website: [email or web address]. By joining [employer]’s FollowMyHealth portal, you will also be able to view your health information using other applications (apps) compatible with our system.

## 2021-12-08 NOTE — PROGRESS NOTE ADULT - PROBLEM SELECTOR PROBLEM 3
Abdominal pain
COPD exacerbation
Respiratory failure with hypoxia

## 2021-12-08 NOTE — PROGRESS NOTE ADULT - SUBJECTIVE AND OBJECTIVE BOX
Date of service: 12-08-21 @ 10:13    pt seen and examined  temps down  ms better  denies abd discomfort  feels better    ROS: unable to obtain d/t medical condition    MEDICATIONS  (STANDING):  ALBUTerol    90 MICROgram(s) HFA Inhaler 2 Puff(s) Inhalation every 6 hours  aMIOdarone    Tablet 200 milliGRAM(s) Oral daily  atorvastatin 20 milliGRAM(s) Oral at bedtime  budesonide 160 MICROgram(s)/formoterol 4.5 MICROgram(s) Inhaler 2 Puff(s) Inhalation two times a day  cefepime   IVPB 2000 milliGRAM(s) IV Intermittent every 12 hours  cefepime   IVPB      dextrose 40% Gel 15 Gram(s) Oral once  dextrose 50% Injectable 25 Gram(s) IV Push once  dextrose 50% Injectable 12.5 Gram(s) IV Push once  dextrose 50% Injectable 25 Gram(s) IV Push once  diltiazem    milliGRAM(s) Oral daily  DULoxetine 60 milliGRAM(s) Oral daily  fluticasone propionate 50 MICROgram(s)/spray Nasal Spray 1 Spray(s) Both Nostrils two times a day  furosemide    Tablet 20 milliGRAM(s) Oral daily  insulin glargine Injectable (LANTUS) 25 Unit(s) SubCutaneous at bedtime  insulin lispro (ADMELOG) corrective regimen sliding scale   SubCutaneous three times a day before meals  insulin lispro (ADMELOG) corrective regimen sliding scale.   SubCutaneous at bedtime  insulin lispro Injectable (ADMELOG) 6 Unit(s) SubCutaneous three times a day before meals  levothyroxine 25 MICROGram(s) Oral daily  metoprolol tartrate 25 milliGRAM(s) Oral two times a day  oxybutynin 5 milliGRAM(s) Oral daily  pantoprazole    Tablet 40 milliGRAM(s) Oral two times a day  polyethylene glycol 3350 17 Gram(s) Oral daily  potassium chloride    Tablet ER 40 milliEquivalent(s) Oral every 4 hours  predniSONE   Tablet 40 milliGRAM(s) Oral daily  pregabalin 100 milliGRAM(s) Oral three times a day  senna 2 Tablet(s) Oral at bedtime      Vital Signs Last 24 Hrs  T(C): 36.4 (08 Dec 2021 07:45), Max: 36.7 (07 Dec 2021 20:17)  T(F): 97.6 (08 Dec 2021 07:45), Max: 98 (07 Dec 2021 20:17)  HR: 56 (08 Dec 2021 07:45) (56 - 71)  BP: 137/52 (08 Dec 2021 07:45) (133/56 - 137/52)  BP(mean): --  RR: 18 (08 Dec 2021 07:45) (18 - 18)  SpO2: 93% (08 Dec 2021 07:45) (93% - 97%)    PE:  Constitutional: frail looking  HEENT: NC/AT, EOMI, PERRLA, conjunctivae clear; ears and nose atraumatic; pharynx benign  Neck: supple; thyroid not palpable  Back: no tenderness  Respiratory: decreased breath sounds, rhonchi  Cardiovascular: S1S2 regular, no murmurs  Abdomen: soft, not tender,  distended, positive BS; liver and spleen WNL  Genitourinary: no suprapubic tenderness  Lymphatic: no LN palpable  Musculoskeletal: no muscle tenderness, no joint swelling or tenderness  Extremities: no pedal edema  Neurological/ Psychiatric:  moving all extremities  Skin: no rashes; no palpable lesions    Labs: all available labs reviewed               12-08    142  |  109<H>  |  27<H>  ----------------------------<  281<H>  3.2<L>   |  28  |  0.77    Ca    8.2<L>      08 Dec 2021 07:43  Mg     2.5     12-08      Cultures:   Culture - Blood (12.04.21 @ 11:25)   Specimen Source: .Blood None   Culture Results:   No growth to date.   Culture - Blood (12.04.21 @ 11:25)   Specimen Source: .Blood None   Culture Results:   No growth to date.   Radiology: all available radiological tests reviewed    EXAM:  CT ABDOMEN AND PELVIS                            PROCEDURE DATE:  12/05/2021          INTERPRETATION:  CLINICAL INFORMATION: Abdominal pain    COMPARISON: CT of the abdomen and pelvis from November 29, 2021    CONTRAST/COMPLICATIONS:  IV Contrast: Omnipaque 350  90 cc administered   10 cc discarded  Oral Contrast: NONE  Complications: None reported at time of study completion    PROCEDURE:  CT of the Abdomen and Pelvis was performed.  Sagittal and coronal reformats were performed.    FINDINGS:  LOWER CHEST: There are multiple foci of groundglass attenuation and tree-in-bud nodules within the right lung base. No pleural effusions.    LIVER: Within normal limits.  BILE DUCTS: Normal caliber.  GALLBLADDER: Within normal limits.  SPLEEN:Within normal limits.  PANCREAS: Fatty changes.  ADRENALS: Within normal limits.  KIDNEYS/URETERS: No hydronephrosis. Redemonstrated 1 cm left lower pole renal parenchymal calcification. The right kidney is within normal limits.    BLADDER: Limited evaluation due to streak artifact from bilateral hip prostheses, grossly unremarkable.  REPRODUCTIVE ORGANS: Limited evaluation due to streak artifact from bilateral hip prostheses.    BOWEL: No bowel obstruction. Redemonstrated small bowel, right colonic, and rectosigmoid anastomoses indicating prior resection. Scattered colonic diverticula, no evidence of diverticulitis. Appendix is not visualized. No evidence of inflammation in the pericecal region.  PERITONEUM: No ascites.  VESSELS: Atherosclerotic changes.  RETROPERITONEUM/LYMPH NODES: No lymphadenopathy.  ABDOMINAL WALL: Within the upper abdominal wall, there is a ventral hernia containing part of the distal stomach. Within the mid abdominal wall, there is a large [place] ventral herniacontaining parts of small bowel and large bowel.  BONES: Bilateral hip arthroplasty. Degenerative changes. No acute bony abnormalities.    IMPRESSION:  Multiple foci of groundglass attenuation and tree-in-bud nodules in the right lung base, this may represent infectious process.     No acute intra-abdominal or intrapelvic pathology.    < end of copied text >      EXAM:  CT CHEST                            PROCEDURE DATE:  12/04/2021          INTERPRETATION:  INDICATION: Fever, shortness of breath, possible pneumonia    TECHNIQUE: A volumetric CT acquisition of the chest was obtained from the thoracic inlet to the upper abdomen without the use of intravenous contrast. Coronal and sagittal reconstructed images are provided.    COMPARISON: There are no prior chest CTs available for comparison. Abdominal CT 11/29/2021    FINDINGS:    Lungs/Airways/Pleura: Extensive airway secretions involving the distal trachea, bilateral mainstem bronchi and multilobar segmental/subsegmental involvement with associated bronchial wall thickening. There are patchy centrilobular groundglass and tree-in-bud nodules likely related to multifocal aspiration in this clinical setting. Overall findings have mildly improved since the abdominal CT from 11/29/2021. Emphysema is present. No pleural effusion.    Mediastinum/Lymph nodes: Unremarkable thyroid. No thoracic lymphadenopathy.    Heart and Vessels: Mid ascending aorta measures 3.8 cm. The pulmonary artery is normal in size. The heart is normal in size. There is lipomatous hypertrophy of the interatrial septum. Severe coronary arterial calcification is present. Thereis posterior mitral annular calcification. No pericardial effusion.    Upper Abdomen: Fatty replacement of the pancreas. Hepatic steatosis. Extensive infrarenal aortic calcification.    Osseous structures and Soft Tissues: Mild T6 compression deformity. No aggressive osseous lesion.    IMPRESSION:  Mildly improved bilateral multilobar aspiration since 11/29/2021 abdominal CT.    --- End of Report ---        < end of copied text >    Advanced directives addressed: full resuscitation

## 2021-12-08 NOTE — DISCHARGE NOTE PROVIDER - NSDCCPCAREPLAN_GEN_ALL_CORE_FT
PRINCIPAL DISCHARGE DIAGNOSIS  Diagnosis: SBO (small bowel obstruction)  Assessment and Plan of Treatment: SBO - resolved.  Continue bowel regimen      SECONDARY DISCHARGE DIAGNOSES  Diagnosis: COPD exacerbation  Assessment and Plan of Treatment: COPD exacerbation  Continue Prednisone taper and inhalers.    Diagnosis: Pneumonia, aspiration  Assessment and Plan of Treatment: Continue Augmentin until 12/12/21  Dysphagia - patient need moderately thickened liquids    Diagnosis: Afib  Assessment and Plan of Treatment: Afib with RVR  Amiodarone 200mg daily   Cardizem 180mg daily  Lopressor 25mg twice a day   STOP Dronedarone    Diagnosis: Elevated INR  Assessment and Plan of Treatment: INR today 3.6, repeat INR at facility  hold coumadin for tonight.

## 2021-12-08 NOTE — PROGRESS NOTE ADULT - ASSESSMENT
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  Per pt her emesis and BM's were green. Denies f/c/r, denies CP/SOB/dizziness/Headaches.  Seen By Surgery.  Imaging + for SBO, NGT was placed to LWS, however pt pulled out her NGT and refused to have it replaced. Her bowel function has since improved and her diet has been advanced.   Hospital course complicated by     #SBO   Improved. Diet advanced. tolerating PO  d/c IV pain meds - resume oxycodone (home med)  Add BM regimen  Repeat CT A/P 12/5 w/o acute intra-abdominal or intrapelvic pathology.    #Acute hypoxic respiratory failure due to COPD exacerbation.  Aspiration PNA.  Monitor on tele. 02 monitoring  Supplemental 02 weaned down to room air  CT chest w/ mildly improved bilateral multilobar aspiration  s/p IV steroids -- now on PO Prednisone.   Cefepime IV -- d/c on PO  Albuterol ATC, Symbicort BID -- allergy to Spiriva  BCx x2, UCx, legionalla/strepU  -- NGTD  RVP neg    # PAF w/ RVR -- resolved currently NSR  Monitor on tele. tele review as above  s/p amiodarone loading, cont. 200mg daily   Cardizem 60mgq6 --> switch to 240mg QD --> 12/8 Reduced to 180mg daily  Lopressor 25mg BID  Dig IV x1 on 12/4  TSH wnl  Echocardiogram as above. EF 55-60 %  CHADs Vasc = 6. Cont. Coumadin for stroke ppx. INR daily   Cardio consult: Shaheen    #Hypernatremia - resolved, s/p D5W    # hx DVT, CVA with left sided weakness  Cont. Coumadin. INR daily  AC services consult appreciated    #Type 2 Diabetes Mellitus. Hyperglycemia | Diabetic Neuropathy   Cont. ISS, Lantus, pre - meal 6U  Lantus 25U (increased on 12/6)  diabetic diet restrictions added  C/w Lyrica, Cymbalta     #HTN   d/c Losartan due to additional rate control meds. see plan above.  PO Lasix     #GERD - d/c IV Protonix - switch to home PPI BID    #ALVERTO on CKD Stage 2  monitor. UA neg    #Chronic abd wound  Adaptic/abd pad w/ tape change daily   Wound care consult    #Physical Debility  #Dysphagia  Seen by S/S ~ diet adjusted to mod thickened liquids   [facility]    #GOC/ Advanced Directives  DNR/ DNI - MOLST in chart. confirmed w/ daughter Margarita over phone.     #DVT ppx  Coumadin. INR daily  Hold coumadin tonight INR >3    Updated Margarita (daughter) [phone] 12/4 - all questions/concerns addressed.  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  Per pt her emesis and BM's were green. Denies f/c/r, denies CP/SOB/dizziness/Headaches.  Seen By Surgery.  Imaging + for SBO, NGT was placed to LWS, however pt pulled out her NGT and refused to have it replaced. Her bowel function has since improved and her diet has been advanced.     #SBO   Improved. Diet advanced. tolerating PO  d/c IV pain meds - resume oxycodone (home med)  Add BM regimen  Repeat CT A/P 12/5 w/o acute intra-abdominal or intrapelvic pathology.    #Acute hypoxic respiratory failure due to COPD exacerbation.  Aspiration PNA.  Monitor on tele. 02 monitoring  Supplemental 02 weaned down to room air  CT chest w/ mildly improved bilateral multilobar aspiration  s/p IV steroids -- now on PO Prednisone.   Cefepime IV -- d/c on PO  Albuterol ATC, Symbicort BID -- allergy to Spiriva  BCx x2, UCx, legionalla/strepU  -- NGTD  RVP neg    # PAF w/ RVR -- resolved currently NSR  Monitor on tele. tele review as above  s/p amiodarone loading, cont. 200mg daily   Cardizem 60mgq6 --> switch to 240mg QD --> 12/8 Reduced to 180mg daily  Lopressor 25mg BID  Dig IV x1 on 12/4  TSH wnl  Echocardiogram as above. EF 55-60 %  CHADs Vasc = 6. Cont. Coumadin for stroke ppx. INR daily   Cardio consult: Shaheen    #Hypernatremia - resolved, s/p D5W    # hx DVT, CVA with left sided weakness  Cont. Coumadin. INR daily  AC services consult appreciated    #Type 2 Diabetes Mellitus. Hyperglycemia | Diabetic Neuropathy   Cont. ISS, Lantus, pre - meal 6U  Lantus 25U (increased on 12/6)  diabetic diet restrictions added  C/w Lyrica, Cymbalta     #HTN   d/c Losartan due to additional rate control meds. see plan above.  PO Lasix     #GERD - d/c IV Protonix - switch to home PPI BID    #ALVERTO on CKD Stage 2  monitor. UA neg    #Chronic abd wound  Adaptic/abd pad w/ tape change daily   Wound care consult    #Physical Debility  #Dysphagia  Seen by S/S ~ diet adjusted to mod thickened liquids   [facility]    #GOC/ Advanced Directives  DNR/ DNI - MOLST in chart. confirmed w/ daughter Margarita over phone.     #DVT ppx  Coumadin. INR daily  Hold coumadin tonight INR >3    Updated Margarita (daughter) [phone] 12/4 - all questions/concerns addressed.  *D/c planning, Michelle*

## 2021-12-08 NOTE — PROGRESS NOTE ADULT - PROVIDER SPECIALTY LIST ADULT
Hospitalist
Hospitalist
Surgery
Cardiology
Cardiology
Infectious Disease
Surgery
Surgery
Anticoag Management
Bariatric Surgery
Hospitalist
Hospitalist
Infectious Disease
Infectious Disease
Pulmonology
Surgery
Anticoag Management
Anticoag Management
Bariatric Surgery
Pulmonology
Hospitalist

## 2021-12-08 NOTE — PROGRESS NOTE ADULT - ASSESSMENT
Assessment:   71 year-old woman, with SBO s/p NGT, bowl rest - now resolved.    --Hospital course recently c/b resp failure, cough, wheezing managed for copd exacerbation and possible aspiration event/aspiration pneumonia.   --Breathing improved today  --Mental status improved today     Recommendations:  --On antibiotics as per ID for possible aspiration pneumonia - - On cefepime   --c/w supplemental oxygen  --already on IV steroids - Can likely transition to PO tomorrow   will follow as needed

## 2021-12-08 NOTE — DISCHARGE NOTE PROVIDER - CARE PROVIDER_API CALL
James Sen)  Internal Medicine; Pulmonary Disease  [address]  [place] [address]  Phone: [phone]  Fax: [phone]  Follow Up Time:     Davon Jamison)  Cardiovascular Disease; Internal Medicine  [address], [address]  [place] [address]  Phone: [phone]  Fax: [phone]  Follow Up Time:

## 2021-12-08 NOTE — DISCHARGE NOTE PROVIDER - NSDCCAREPROVSEEN_GEN_ALL_CORE_FT
Eliza Lopez (NP Hospitalist)  Carrie Holt (Hospitalist)  Jenny Jamison (Cardiologist)  James Sen (Pulmonologist)

## 2021-12-14 DIAGNOSIS — K43.9 VENTRAL HERNIA WITHOUT OBSTRUCTION OR GANGRENE: ICD-10-CM

## 2021-12-14 DIAGNOSIS — E86.1 HYPOVOLEMIA: ICD-10-CM

## 2021-12-14 DIAGNOSIS — Z87.19 PERSONAL HISTORY OF OTHER DISEASES OF THE DIGESTIVE SYSTEM: ICD-10-CM

## 2021-12-14 DIAGNOSIS — I69.354 HEMIPLEGIA AND HEMIPARESIS FOLLOWING CEREBRAL INFARCTION AFFECTING LEFT NON-DOMINANT SIDE: ICD-10-CM

## 2021-12-14 DIAGNOSIS — E66.01 MORBID (SEVERE) OBESITY DUE TO EXCESS CALORIES: ICD-10-CM

## 2021-12-14 DIAGNOSIS — N18.2 CHRONIC KIDNEY DISEASE, STAGE 2 (MILD): ICD-10-CM

## 2021-12-14 DIAGNOSIS — Z98.891 HISTORY OF UTERINE SCAR FROM PREVIOUS SURGERY: ICD-10-CM

## 2021-12-14 DIAGNOSIS — J69.0 PNEUMONITIS DUE TO INHALATION OF FOOD AND VOMIT: ICD-10-CM

## 2021-12-14 DIAGNOSIS — J44.1 CHRONIC OBSTRUCTIVE PULMONARY DISEASE WITH (ACUTE) EXACERBATION: ICD-10-CM

## 2021-12-14 DIAGNOSIS — Z79.51 LONG TERM (CURRENT) USE OF INHALED STEROIDS: ICD-10-CM

## 2021-12-14 DIAGNOSIS — Z90.49 ACQUIRED ABSENCE OF OTHER SPECIFIED PARTS OF DIGESTIVE TRACT: ICD-10-CM

## 2021-12-14 DIAGNOSIS — Z99.81 DEPENDENCE ON SUPPLEMENTAL OXYGEN: ICD-10-CM

## 2021-12-14 DIAGNOSIS — Z88.1 ALLERGY STATUS TO OTHER ANTIBIOTIC AGENTS STATUS: ICD-10-CM

## 2021-12-14 DIAGNOSIS — Z79.4 LONG TERM (CURRENT) USE OF INSULIN: ICD-10-CM

## 2021-12-14 DIAGNOSIS — Z66 DO NOT RESUSCITATE: ICD-10-CM

## 2021-12-14 DIAGNOSIS — R13.10 DYSPHAGIA, UNSPECIFIED: ICD-10-CM

## 2021-12-14 DIAGNOSIS — E11.40 TYPE 2 DIABETES MELLITUS WITH DIABETIC NEUROPATHY, UNSPECIFIED: ICD-10-CM

## 2021-12-14 DIAGNOSIS — Z88.8 ALLERGY STATUS TO OTHER DRUGS, MEDICAMENTS AND BIOLOGICAL SUBSTANCES: ICD-10-CM

## 2021-12-14 DIAGNOSIS — R54 AGE-RELATED PHYSICAL DEBILITY: ICD-10-CM

## 2021-12-14 DIAGNOSIS — E87.6 HYPOKALEMIA: ICD-10-CM

## 2021-12-14 DIAGNOSIS — E83.39 OTHER DISORDERS OF PHOSPHORUS METABOLISM: ICD-10-CM

## 2021-12-14 DIAGNOSIS — E11.628 TYPE 2 DIABETES MELLITUS WITH OTHER SKIN COMPLICATIONS: ICD-10-CM

## 2021-12-14 DIAGNOSIS — N17.9 ACUTE KIDNEY FAILURE, UNSPECIFIED: ICD-10-CM

## 2021-12-14 DIAGNOSIS — K56.609 UNSPECIFIED INTESTINAL OBSTRUCTION, UNSPECIFIED AS TO PARTIAL VERSUS COMPLETE OBSTRUCTION: ICD-10-CM

## 2021-12-14 DIAGNOSIS — E87.0 HYPEROSMOLALITY AND HYPERNATREMIA: ICD-10-CM

## 2021-12-14 DIAGNOSIS — Z96.649 PRESENCE OF UNSPECIFIED ARTIFICIAL HIP JOINT: ICD-10-CM

## 2021-12-14 DIAGNOSIS — E11.65 TYPE 2 DIABETES MELLITUS WITH HYPERGLYCEMIA: ICD-10-CM

## 2021-12-14 DIAGNOSIS — I48.0 PAROXYSMAL ATRIAL FIBRILLATION: ICD-10-CM

## 2021-12-14 DIAGNOSIS — Z79.01 LONG TERM (CURRENT) USE OF ANTICOAGULANTS: ICD-10-CM

## 2021-12-14 DIAGNOSIS — J96.01 ACUTE RESPIRATORY FAILURE WITH HYPOXIA: ICD-10-CM

## 2021-12-14 DIAGNOSIS — I12.9 HYPERTENSIVE CHRONIC KIDNEY DISEASE WITH STAGE 1 THROUGH STAGE 4 CHRONIC KIDNEY DISEASE, OR UNSPECIFIED CHRONIC KIDNEY DISEASE: ICD-10-CM

## 2021-12-26 ENCOUNTER — EMERGENCY (EMERGENCY)
Facility: HOSPITAL | Age: 71
LOS: 0 days | Discharge: HOME | End: 2021-12-27
Attending: EMERGENCY MEDICINE
Payer: MEDICARE

## 2021-12-26 VITALS
HEART RATE: 85 BPM | TEMPERATURE: 98 F | OXYGEN SATURATION: 95 % | WEIGHT: 210.1 LBS | RESPIRATION RATE: 18 BRPM | DIASTOLIC BLOOD PRESSURE: 65 MMHG | HEIGHT: 64 IN | SYSTOLIC BLOOD PRESSURE: 126 MMHG

## 2021-12-26 DIAGNOSIS — Z98.890 OTHER SPECIFIED POSTPROCEDURAL STATES: Chronic | ICD-10-CM

## 2021-12-26 DIAGNOSIS — I69.954 HEMIPLEGIA AND HEMIPARESIS FOLLOWING UNSPECIFIED CEREBROVASCULAR DISEASE AFFECTING LEFT NON-DOMINANT SIDE: ICD-10-CM

## 2021-12-26 DIAGNOSIS — I82.409 ACUTE EMBOLISM AND THROMBOSIS OF UNSPECIFIED DEEP VEINS OF UNSPECIFIED LOWER EXTREMITY: ICD-10-CM

## 2021-12-26 DIAGNOSIS — Z96.649 PRESENCE OF UNSPECIFIED ARTIFICIAL HIP JOINT: Chronic | ICD-10-CM

## 2021-12-26 DIAGNOSIS — Z88.1 ALLERGY STATUS TO OTHER ANTIBIOTIC AGENTS STATUS: ICD-10-CM

## 2021-12-26 DIAGNOSIS — Z79.01 LONG TERM (CURRENT) USE OF ANTICOAGULANTS: ICD-10-CM

## 2021-12-26 DIAGNOSIS — Z90.49 ACQUIRED ABSENCE OF OTHER SPECIFIED PARTS OF DIGESTIVE TRACT: Chronic | ICD-10-CM

## 2021-12-26 DIAGNOSIS — R30.0 DYSURIA: ICD-10-CM

## 2021-12-26 DIAGNOSIS — E11.40 TYPE 2 DIABETES MELLITUS WITH DIABETIC NEUROPATHY, UNSPECIFIED: ICD-10-CM

## 2021-12-26 DIAGNOSIS — Z93.3 COLOSTOMY STATUS: Chronic | ICD-10-CM

## 2021-12-26 DIAGNOSIS — Z98.891 HISTORY OF UTERINE SCAR FROM PREVIOUS SURGERY: Chronic | ICD-10-CM

## 2021-12-26 DIAGNOSIS — N39.0 URINARY TRACT INFECTION, SITE NOT SPECIFIED: ICD-10-CM

## 2021-12-26 DIAGNOSIS — Z79.4 LONG TERM (CURRENT) USE OF INSULIN: ICD-10-CM

## 2021-12-26 DIAGNOSIS — J44.9 CHRONIC OBSTRUCTIVE PULMONARY DISEASE, UNSPECIFIED: ICD-10-CM

## 2021-12-26 DIAGNOSIS — Z96.643 PRESENCE OF ARTIFICIAL HIP JOINT, BILATERAL: Chronic | ICD-10-CM

## 2021-12-26 DIAGNOSIS — I48.91 UNSPECIFIED ATRIAL FIBRILLATION: ICD-10-CM

## 2021-12-26 DIAGNOSIS — Z88.8 ALLERGY STATUS TO OTHER DRUGS, MEDICAMENTS AND BIOLOGICAL SUBSTANCES: ICD-10-CM

## 2021-12-26 DIAGNOSIS — R11.2 NAUSEA WITH VOMITING, UNSPECIFIED: ICD-10-CM

## 2021-12-26 DIAGNOSIS — Z79.84 LONG TERM (CURRENT) USE OF ORAL HYPOGLYCEMIC DRUGS: ICD-10-CM

## 2021-12-26 LAB
ALBUMIN SERPL ELPH-MCNC: 2.5 G/DL — LOW (ref 3.3–5)
ALP SERPL-CCNC: 90 U/L — SIGNIFICANT CHANGE UP (ref 40–120)
ALT FLD-CCNC: 53 U/L — SIGNIFICANT CHANGE UP (ref 12–78)
ANION GAP SERPL CALC-SCNC: 3 MMOL/L — LOW (ref 5–17)
APPEARANCE UR: ABNORMAL
APTT BLD: 43.5 SEC — HIGH (ref 27.5–35.5)
AST SERPL-CCNC: 28 U/L — SIGNIFICANT CHANGE UP (ref 15–37)
BASOPHILS # BLD AUTO: 0.03 K/UL — SIGNIFICANT CHANGE UP (ref 0–0.2)
BASOPHILS NFR BLD AUTO: 0.6 % — SIGNIFICANT CHANGE UP (ref 0–2)
BILIRUB SERPL-MCNC: 0.5 MG/DL — SIGNIFICANT CHANGE UP (ref 0.2–1.2)
BILIRUB UR-MCNC: NEGATIVE — SIGNIFICANT CHANGE UP
BUN SERPL-MCNC: 11 MG/DL — SIGNIFICANT CHANGE UP (ref 7–23)
CALCIUM SERPL-MCNC: 8.9 MG/DL — SIGNIFICANT CHANGE UP (ref 8.5–10.1)
CHLORIDE SERPL-SCNC: 103 MMOL/L — SIGNIFICANT CHANGE UP (ref 96–108)
CO2 SERPL-SCNC: 32 MMOL/L — HIGH (ref 22–31)
COLOR SPEC: ABNORMAL
CREAT SERPL-MCNC: 0.83 MG/DL — SIGNIFICANT CHANGE UP (ref 0.5–1.3)
DIFF PNL FLD: ABNORMAL
EOSINOPHIL # BLD AUTO: 0.04 K/UL — SIGNIFICANT CHANGE UP (ref 0–0.5)
EOSINOPHIL NFR BLD AUTO: 0.8 % — SIGNIFICANT CHANGE UP (ref 0–6)
GLUCOSE SERPL-MCNC: 187 MG/DL — HIGH (ref 70–99)
GLUCOSE UR QL: NEGATIVE MG/DL — SIGNIFICANT CHANGE UP
HCT VFR BLD CALC: 42.5 % — SIGNIFICANT CHANGE UP (ref 34.5–45)
HGB BLD-MCNC: 13.4 G/DL — SIGNIFICANT CHANGE UP (ref 11.5–15.5)
IMM GRANULOCYTES NFR BLD AUTO: 0.4 % — SIGNIFICANT CHANGE UP (ref 0–1.5)
INR BLD: 2.67 RATIO — HIGH (ref 0.88–1.16)
KETONES UR-MCNC: ABNORMAL
LEUKOCYTE ESTERASE UR-ACNC: ABNORMAL
LIDOCAIN IGE QN: 41 U/L — LOW (ref 73–393)
LYMPHOCYTES # BLD AUTO: 1.36 K/UL — SIGNIFICANT CHANGE UP (ref 1–3.3)
LYMPHOCYTES # BLD AUTO: 26.7 % — SIGNIFICANT CHANGE UP (ref 13–44)
MCHC RBC-ENTMCNC: 25.8 PG — LOW (ref 27–34)
MCHC RBC-ENTMCNC: 31.5 GM/DL — LOW (ref 32–36)
MCV RBC AUTO: 81.9 FL — SIGNIFICANT CHANGE UP (ref 80–100)
MONOCYTES # BLD AUTO: 0.42 K/UL — SIGNIFICANT CHANGE UP (ref 0–0.9)
MONOCYTES NFR BLD AUTO: 8.3 % — SIGNIFICANT CHANGE UP (ref 2–14)
NEUTROPHILS # BLD AUTO: 3.22 K/UL — SIGNIFICANT CHANGE UP (ref 1.8–7.4)
NEUTROPHILS NFR BLD AUTO: 63.2 % — SIGNIFICANT CHANGE UP (ref 43–77)
NITRITE UR-MCNC: NEGATIVE — SIGNIFICANT CHANGE UP
PH UR: 5 — SIGNIFICANT CHANGE UP (ref 5–8)
PLATELET # BLD AUTO: 191 K/UL — SIGNIFICANT CHANGE UP (ref 150–400)
POTASSIUM SERPL-MCNC: 3.7 MMOL/L — SIGNIFICANT CHANGE UP (ref 3.5–5.3)
POTASSIUM SERPL-SCNC: 3.7 MMOL/L — SIGNIFICANT CHANGE UP (ref 3.5–5.3)
PROT SERPL-MCNC: 6.4 GM/DL — SIGNIFICANT CHANGE UP (ref 6–8.3)
PROT UR-MCNC: 100 MG/DL
PROTHROM AB SERPL-ACNC: 29.6 SEC — HIGH (ref 10.6–13.6)
RBC # BLD: 5.19 M/UL — SIGNIFICANT CHANGE UP (ref 3.8–5.2)
RBC # FLD: 20.9 % — HIGH (ref 10.3–14.5)
SARS-COV-2 RNA SPEC QL NAA+PROBE: SIGNIFICANT CHANGE UP
SODIUM SERPL-SCNC: 138 MMOL/L — SIGNIFICANT CHANGE UP (ref 135–145)
SP GR SPEC: 1.02 — SIGNIFICANT CHANGE UP (ref 1.01–1.02)
UROBILINOGEN FLD QL: NEGATIVE MG/DL — SIGNIFICANT CHANGE UP
WBC # BLD: 5.09 K/UL — SIGNIFICANT CHANGE UP (ref 3.8–10.5)
WBC # FLD AUTO: 5.09 K/UL — SIGNIFICANT CHANGE UP (ref 3.8–10.5)

## 2021-12-26 PROCEDURE — 87635 SARS-COV-2 COVID-19 AMP PRB: CPT

## 2021-12-26 PROCEDURE — 71045 X-RAY EXAM CHEST 1 VIEW: CPT | Mod: 26

## 2021-12-26 PROCEDURE — 74177 CT ABD & PELVIS W/CONTRAST: CPT | Mod: 26,MA

## 2021-12-26 PROCEDURE — 86850 RBC ANTIBODY SCREEN: CPT

## 2021-12-26 PROCEDURE — 87086 URINE CULTURE/COLONY COUNT: CPT

## 2021-12-26 PROCEDURE — 96375 TX/PRO/DX INJ NEW DRUG ADDON: CPT

## 2021-12-26 PROCEDURE — 74177 CT ABD & PELVIS W/CONTRAST: CPT | Mod: MA

## 2021-12-26 PROCEDURE — 71045 X-RAY EXAM CHEST 1 VIEW: CPT

## 2021-12-26 PROCEDURE — 85730 THROMBOPLASTIN TIME PARTIAL: CPT

## 2021-12-26 PROCEDURE — 86901 BLOOD TYPING SEROLOGIC RH(D): CPT

## 2021-12-26 PROCEDURE — 99285 EMERGENCY DEPT VISIT HI MDM: CPT

## 2021-12-26 PROCEDURE — 80053 COMPREHEN METABOLIC PANEL: CPT

## 2021-12-26 PROCEDURE — 81001 URINALYSIS AUTO W/SCOPE: CPT

## 2021-12-26 PROCEDURE — 83690 ASSAY OF LIPASE: CPT

## 2021-12-26 PROCEDURE — 85610 PROTHROMBIN TIME: CPT

## 2021-12-26 PROCEDURE — 85025 COMPLETE CBC W/AUTO DIFF WBC: CPT

## 2021-12-26 PROCEDURE — 93005 ELECTROCARDIOGRAM TRACING: CPT

## 2021-12-26 PROCEDURE — 36415 COLL VENOUS BLD VENIPUNCTURE: CPT

## 2021-12-26 PROCEDURE — 96374 THER/PROPH/DIAG INJ IV PUSH: CPT

## 2021-12-26 PROCEDURE — 86900 BLOOD TYPING SEROLOGIC ABO: CPT

## 2021-12-26 PROCEDURE — 93010 ELECTROCARDIOGRAM REPORT: CPT

## 2021-12-26 PROCEDURE — 99284 EMERGENCY DEPT VISIT MOD MDM: CPT | Mod: 25

## 2021-12-26 RX ORDER — CEFPODOXIME PROXETIL 100 MG
1 TABLET ORAL
Qty: 14 | Refills: 0
Start: 2021-12-26 | End: 2022-01-01

## 2021-12-26 RX ORDER — SODIUM CHLORIDE 9 MG/ML
1000 INJECTION INTRAMUSCULAR; INTRAVENOUS; SUBCUTANEOUS ONCE
Refills: 0 | Status: COMPLETED | OUTPATIENT
Start: 2021-12-26 | End: 2021-12-26

## 2021-12-26 RX ORDER — CEFTRIAXONE 500 MG/1
1000 INJECTION, POWDER, FOR SOLUTION INTRAMUSCULAR; INTRAVENOUS ONCE
Refills: 0 | Status: COMPLETED | OUTPATIENT
Start: 2021-12-26 | End: 2021-12-26

## 2021-12-26 RX ORDER — CEFTRIAXONE 500 MG/1
1000 INJECTION, POWDER, FOR SOLUTION INTRAMUSCULAR; INTRAVENOUS ONCE
Refills: 0 | Status: DISCONTINUED | OUTPATIENT
Start: 2021-12-26 | End: 2021-12-26

## 2021-12-26 RX ORDER — ONDANSETRON 8 MG/1
4 TABLET, FILM COATED ORAL ONCE
Refills: 0 | Status: COMPLETED | OUTPATIENT
Start: 2021-12-26 | End: 2021-12-26

## 2021-12-26 RX ADMIN — SODIUM CHLORIDE 1000 MILLILITER(S): 9 INJECTION INTRAMUSCULAR; INTRAVENOUS; SUBCUTANEOUS at 19:36

## 2021-12-26 RX ADMIN — CEFTRIAXONE 100 MILLIGRAM(S): 500 INJECTION, POWDER, FOR SOLUTION INTRAMUSCULAR; INTRAVENOUS at 23:10

## 2021-12-26 RX ADMIN — ONDANSETRON 4 MILLIGRAM(S): 8 TABLET, FILM COATED ORAL at 19:37

## 2021-12-26 NOTE — ED PROVIDER NOTE - PHYSICAL EXAMINATION
*GEN: No acute distress, well appearing   *HEAD: Normocephalic, Atraumatic  *EYES/NOSE: b/l Pupils symmetric & Reactive to ligth, EOMI b/l  *THROAT: airway patent, moist mucous membranes  *NECK: Neck supple  *PULMONARY: No Respiratory distress, symmetric b/l chest rise  *CARDIAC: s1s2, regular rhythm   *ABDOMEN:  Non Tender, Non Distended, soft, no guarding, no rebound, no masses   *BACK: no CVA tenderness, No midline vertebral tenderness to palpation   *EXTREMITIES: symmetric pulses, 2+ DP & radial pulses, no cyanosis, no edema   *SKIN: no rash, no bruising   *NEUROLOGIC: alert,  full active & passive ROM in all 4 extremities,   *PSYCH: appropriate concern about symptoms, pleasant *GEN: No acute distress, well appearing   *HEAD: Normocephalic, Atraumatic  *EYES/NOSE: b/l Pupils symmetric & Reactive to light, EOMI b/l  *THROAT: airway patent, moist mucous membranes  *NECK: Neck supple  *PULMONARY: No Respiratory distress, symmetric b/l chest rise  *CARDIAC: s1s2, regular rhythm   *ABDOMEN:  Non Tender, Non Distended, soft, no guarding, no rebound, no masses, chronic wound on abd   *BACK: no CVA tenderness, No midline vertebral tenderness to palpation   *EXTREMITIES: symmetric pulses, 2+ DP & radial pulses, no cyanosis, no edema   *SKIN: no rash, no bruising   *NEUROLOGIC: alert,  full active & passive ROM in all 4 extremities,   *PSYCH: appropriate concern about symptoms, pleasant

## 2021-12-26 NOTE — ED PROVIDER NOTE - NS ED ROS FT
Review of Systems:  	•	CONSTITUTIONAL: no fever  	•	SKIN: no rash  	•	RESPIRATORY: no shortness of breath  	•	CARDIAC: no chest pain  	•	GI:  no abd pain, no nausea, +vomiting, no diarrhea  	•	GENITO-URINARY:  no dysuria  	•	MUSCULOSKELETAL:  no back pain  	•	NEUROLOGIC: no weakness  	•	ALLERGY: no rhinorrhea  	•	PSYSCHIATRIC: appropriate concern about symptoms Review of Systems:  	•	CONSTITUTIONAL: no fever  	•	SKIN: no rash  	•	RESPIRATORY: no shortness of breath  	•	CARDIAC: no chest pain  	•	GI:  no abd pain, no nausea, +vomiting, no diarrhea  	•	GENITO-URINARY:  +dysuria  	•	MUSCULOSKELETAL:  no back pain  	•	NEUROLOGIC: no weakness  	•	ALLERGY: no rhinorrhea  	•	PSYSCHIATRIC: appropriate concern about symptoms

## 2021-12-26 NOTE — ED PROVIDER NOTE - PATIENT PORTAL LINK FT
You can access the FollowMyHealth Patient Portal offered by [facility] by registering at the following website: [email or web address]. By joining [employer]’s FollowMyHealth portal, you will also be able to view your health information using other applications (apps) compatible with our system.

## 2021-12-26 NOTE — ED PROVIDER NOTE - ATTENDING CONTRIBUTION TO CARE
Attending Attestation:  Aron Benitez MD.  I have personally performed a history and physical examination of the patient . I have discussed management with the ACP & the patient.  I reviewed the ACP's note and agree with the documented findings and plan of care.  I have authored and modified critical sections of the Provider Note, including but not limited to HPI, ROS, Physical Exam and MDM.

## 2021-12-26 NOTE — ED ADULT TRIAGE NOTE - HEIGHT IN FEET
Writer left message for patient regarding urine culture result. Patient advised patient to complete prescribed Antibiotic entirely. Patient advised to follow up with PCP if symptoms are not improving. Writer left clinic call back number in case of questions.   
5

## 2021-12-26 NOTE — ED PROVIDER NOTE - CLINICAL SUMMARY MEDICAL DECISION MAKING FREE TEXT BOX
nausea, vomiting, with soft bowel movements possibly due to gastroenteritis, pt without any abd pain, will get CT given recent SBO

## 2021-12-26 NOTE — ED PROVIDER NOTE - PROGRESS NOTE DETAILS
72 y/o presents with vomiting since last night. pt reports several episodes of vomiting since last night. Not tolerating PO. She had a BM today. Recently discharged for SBO. Deneis fever/chills, diarrhea, CP, SOB, or other complaints at this time. -Michael Dunlap PA-C Results reviewed and discussed with pt. Will treat for uti. Pt otherwise well appearing, tolerating PO, stable for dc. Discussed importance of close FU with PMD. Pt asked to return to ED immediately for any new or concerning sx or worsening. Pt acknowledges and understands plan -Michael Dunlap PA-C

## 2021-12-26 NOTE — ED ADULT NURSE NOTE - CHIEF COMPLAINT QUOTE
pt JAMES from [facility], A&O x4. presents to ED for persistent vomiting since last night. states hx SBO since colostomy reversal 1.5 years ago. last BM this AM. denies abdominal pain, fever.

## 2021-12-26 NOTE — ED ADULT NURSE NOTE - PAIN RATING/NUMBER SCALE (0-10): ACTIVITY
After obtaining consent, and per orders of Dr. Sinan Amaya, injection of Rocephin given in Right upper quad. gluteus by Theresa Dela Cruz. Patient instructed to remain in clinic for 20 minutes afterwards, and to report any adverse reaction to me immediately. 0

## 2021-12-26 NOTE — ED PROVIDER NOTE - OBJECTIVE STATEMENT
Pertinent HPI/PMH/PSH/FHx/SHx and Review of Systems contained within  HPI:  72 y/o F p/w CC  vomiting x  days, new onset, pt BIBEMS from [facility], A&O x4. presents to ED for persistent vomiting since last  . last BM this AM. denies abdominal pain, fever.  vomiting  PMH/PSH relevant for:  Afib, CVA, COPD, DM, diverticulitis, DVT, HTN, neuropathy,  SBO since colostomy reversal 1.5 years ago  ROS negative for: fever, Chest pain, SOB, Nausea, diarrhea, abdominal pain, dysuria    FamilyHx and SocialHx not otherwise contributory Pertinent HPI/PMH/PSH/FHx/SHx and Review of Systems contained within  HPI:  72 y/o F p/w CC  vomiting x  days, new onset, pt BIBEMS from [facility], A&O x4. presents to ED for persistent vomiting. last BM this AM. denies abdominal pain, fever. pt was d/c  from hospital 3 weeks ago after she had an SBO managed by NG tube. Pt also suffered a aspiration pneumonia.   PMH/PSH relevant for:  Afib on coumadin, CVA with left sided weakness, COPD, DM, diverticulitis, DVT, HTN, neuropathy,  SBO since colostomy reversal 1.5 years ago, chronic lower extremity edema on 20mg of lasix,   ROS negative for: fever, Chest pain, SOB, Nausea, diarrhea, abdominal pain, dysuria    FamilyHx and SocialHx not otherwise contributory Pertinent HPI/PMH/PSH/FHx/SHx and Review of Systems contained within  HPI:  70 y/o F p/w CC  vomiting x  days, new onset, pt BIBEMS from [facility], A&O x4. presents to ED for persistent vomiting. last BM this AM. +dysuria. denies abdominal pain, fever, chest pain, nausea, diarrhea. pt was d/c  from hospital 3 weeks ago after she had an SBO managed by NG tube. Pt also suffered a aspiration pneumonia.   PMH/PSH relevant for:  Afib on coumadin, CVA with left sided weakness, COPD, DM, diverticulitis, DVT, HTN, neuropathy,  SBO since colostomy reversal 1.5 years ago, chronic lower extremity edema on 20mg of lasix, incontinence, chronic abd wound  ROS negative for: fever, Chest pain, SOB, Nausea, diarrhea, abdominal pain,   FamilyHx and SocialHx not otherwise contributory

## 2021-12-26 NOTE — ED ADULT NURSE NOTE - OBJECTIVE STATEMENT
Pt presented to the ER with c/o vomiting which started yesterday. Pt is unable to tolerate any PO intake. Pt was recently admitted with a SOB. Pt had a colostomy reversal 1.5 years ago and wound is still open, dressing CD&I. Pt had normal BM today. Pt denies any fevers, diarrhea, or SOB.

## 2021-12-26 NOTE — ED ADULT TRIAGE NOTE - CHIEF COMPLAINT QUOTE
pt JAMES from [facility], A&O x4. presents to ED for persistent vomiting since last night. states hx SBO since colostomy reversal 1.5 years ago. last BM this AM. denies abdominal pain, fever.

## 2021-12-26 NOTE — ED PROVIDER NOTE - NSFOLLOWUPINSTRUCTIONS_ED_ALL_ED_FT
Please follow up with your Primary MD in 1-2 days. Return to ED immediately for any new or concerning symptoms or worsening symptoms    Urinary Tract Infection, Adult  A urinary tract infection (UTI) is an infection of any part of the urinary tract, which includes the kidneys, ureters, bladder, and urethra. These organs make, store, and get rid of urine in the body. UTI can be a bladder infection (cystitis) or kidney infection (pyelonephritis).    What are the causes?  This infection may be caused by fungi, viruses, or bacteria. Bacteria are the most common cause of UTIs. This condition can also be caused by repeated incomplete emptying of the bladder during urination.    What increases the risk?  This condition is more likely to develop if:    You ignore your need to urinate or hold urine for long periods of time.  You do not empty your bladder completely during urination.  You wipe back to front after urinating or having a bowel movement, if you are female.  You are uncircumcised, if you are male.  You are constipated.  You have a urinary catheter that stays in place (indwelling).  You have a weak defense (immune) system.  You have a medical condition that affects your bowels, kidneys, or bladder.  You have diabetes.  You take antibiotic medicines frequently or for long periods of time, and the antibiotics no longer work well against certain types of infections (antibiotic resistance).  You take medicines that irritate your urinary tract.  You are exposed to chemicals that irritate your urinary tract.  You are female.    What are the signs or symptoms?  Symptoms of this condition include:    Fever.  Frequent urination or passing small amounts of urine frequently.  Needing to urinate urgently.  Pain or burning with urination.  Urine that smells bad or unusual.  Cloudy urine.  Pain in the lower abdomen or back.  Trouble urinating.  Blood in the urine.  Vomiting or being less hungry than normal.  Diarrhea or abdominal pain.  Vaginal discharge, if you are female.    How is this diagnosed?  This condition is diagnosed with a medical history and physical exam. You will also need to provide a urine sample to test your urine. Other tests may be done, including:    Blood tests.  Sexually transmitted disease (STD) testing.    If you have had more than one UTI, a cystoscopy or imaging studies may be done to determine the cause of the infections.    How is this treated?  Treatment for this condition often includes a combination of two or more of the following:    Antibiotic medicine.  Other medicines to treat less common causes of UTI.  Over-the-counter medicines to treat pain.  Drinking enough water to stay hydrated.    Follow these instructions at home:  Take over-the-counter and prescription medicines only as told by your health care provider.  If you were prescribed an antibiotic, take it as told by your health care provider. Do not stop taking the antibiotic even if you start to feel better.  Avoid alcohol, caffeine, tea, and carbonated beverages. They can irritate your bladder.  Drink enough fluid to keep your urine clear or pale yellow.  Keep all follow-up visits as told by your health care provider. This is important.  ImageMake sure to:    Empty your bladder often and completely. Do not hold urine for long periods of time.  Empty your bladder before and after sex.  Wipe from front to back after a bowel movement if you are female. Use each tissue one time when you wipe.    Contact a health care provider if:  You have back pain.  You have a fever.  You feel nauseous or vomit.  Your symptoms do not get better after 3 days.  Your symptoms go away and then return.  Get help right away if:  You have severe back pain or lower abdominal pain.  You are vomiting and cannot keep down any medicines or water.  This information is not intended to replace advice given to you by your health care provider. Make sure you discuss any questions you have with your health care provider.     Acute Nausea and Vomiting    WHAT YOU NEED TO KNOW:    Acute nausea and vomiting start suddenly, worsen quickly, and last a short time.    DISCHARGE INSTRUCTIONS:    Return to the emergency department if:     You see blood in your vomit or your bowel movements.      You have sudden, severe pain in your chest and upper abdomen after hard vomiting or retching.      You have swelling in your neck and chest.       You are dizzy, cold, and thirsty and your eyes and mouth are dry.      You are urinating very little or not at all.      You have muscle weakness, leg cramps, and trouble breathing.       Your heart is beating much faster than normal.       You continue to vomit for more than 48 hours.     Contact your healthcare provider if:     You have frequent dry heaves (vomiting but nothing comes out).      Your nausea and vomiting does not get better or go away after you use medicine.      You have questions or concerns about your condition or treatment.    Medicines: You may need any of the following:     Medicines may be given to calm your stomach and stop your vomiting. You may also need medicines to help you feel more relaxed or to stop nausea and vomiting caused by motion sickness.      Gastrointestinal stimulants are used to help empty your stomach and bowels. This may help decrease nausea and vomiting.      Take your medicine as directed. Contact your healthcare provider if you think your medicine is not helping or if you have side effects. Tell him or her if you are allergic to any medicine. Keep a list of the medicines, vitamins, and herbs you take. Include the amounts, and when and why you take them. Bring the list or the pill bottles to follow-up visits. Carry your medicine list with you in case of an emergency.    Prevent or manage acute nausea and vomiting:     Do not drink alcohol. Alcohol may upset or irritate your stomach. Too much alcohol can also cause acute nausea and vomiting.      Control stress. Headaches due to stress may cause nausea and vomiting. Find ways to relax and manage your stress. Get more rest and sleep.      Drink more liquids as directed. Vomiting can lead to dehydration. It is important to drink more liquids to help replace lost body fluids. Ask your healthcare provider how much liquid to drink each day and which liquids are best for you. Your provider may recommend that you drink an oral rehydration solution (ORS). ORS contains water, salts, and sugar that are needed to replace the lost body fluids. Ask what kind of ORS to use, how much to drink, and where to get it.      Eat smaller meals, more often. Eat small amounts of food every 2 to 3 hours, even if you are not hungry. Food in your stomach may decrease your nausea.      Talk to your healthcare provider before you take over-the-counter (OTC) medicines. These medicines can cause serious problems if you use certain other medicines, or you have a medical condition. You may have problems if you use too much or use them for longer than the label says. Follow directions on the label carefully.     Follow up with your healthcare provider as directed: Write down your questions so you remember to ask them during your follow-up visits.

## 2021-12-27 VITALS
RESPIRATION RATE: 18 BRPM | OXYGEN SATURATION: 97 % | TEMPERATURE: 98 F | SYSTOLIC BLOOD PRESSURE: 139 MMHG | HEART RATE: 74 BPM | DIASTOLIC BLOOD PRESSURE: 69 MMHG

## 2021-12-28 LAB
CULTURE RESULTS: SIGNIFICANT CHANGE UP
SPECIMEN SOURCE: SIGNIFICANT CHANGE UP

## 2022-01-24 ENCOUNTER — EMERGENCY (EMERGENCY)
Facility: HOSPITAL | Age: 72
LOS: 1 days | Discharge: HOME | End: 2022-01-24
Attending: EMERGENCY MEDICINE
Payer: MEDICARE

## 2022-01-24 VITALS
OXYGEN SATURATION: 96 % | WEIGHT: 250 LBS | HEIGHT: 64 IN | HEART RATE: 95 BPM | SYSTOLIC BLOOD PRESSURE: 145 MMHG | DIASTOLIC BLOOD PRESSURE: 75 MMHG | RESPIRATION RATE: 16 BRPM | TEMPERATURE: 99 F

## 2022-01-24 DIAGNOSIS — K43.9 VENTRAL HERNIA WITHOUT OBSTRUCTION OR GANGRENE: ICD-10-CM

## 2022-01-24 DIAGNOSIS — R11.2 NAUSEA WITH VOMITING, UNSPECIFIED: ICD-10-CM

## 2022-01-24 DIAGNOSIS — Z98.891 HISTORY OF UTERINE SCAR FROM PREVIOUS SURGERY: Chronic | ICD-10-CM

## 2022-01-24 DIAGNOSIS — Z98.890 OTHER SPECIFIED POSTPROCEDURAL STATES: Chronic | ICD-10-CM

## 2022-01-24 DIAGNOSIS — I69.354 HEMIPLEGIA AND HEMIPARESIS FOLLOWING CEREBRAL INFARCTION AFFECTING LEFT NON-DOMINANT SIDE: ICD-10-CM

## 2022-01-24 DIAGNOSIS — Z79.84 LONG TERM (CURRENT) USE OF ORAL HYPOGLYCEMIC DRUGS: ICD-10-CM

## 2022-01-24 DIAGNOSIS — Z93.3 COLOSTOMY STATUS: Chronic | ICD-10-CM

## 2022-01-24 DIAGNOSIS — Z90.49 ACQUIRED ABSENCE OF OTHER SPECIFIED PARTS OF DIGESTIVE TRACT: Chronic | ICD-10-CM

## 2022-01-24 DIAGNOSIS — Z86.718 PERSONAL HISTORY OF OTHER VENOUS THROMBOSIS AND EMBOLISM: ICD-10-CM

## 2022-01-24 DIAGNOSIS — Z96.649 PRESENCE OF UNSPECIFIED ARTIFICIAL HIP JOINT: ICD-10-CM

## 2022-01-24 DIAGNOSIS — R10.9 UNSPECIFIED ABDOMINAL PAIN: ICD-10-CM

## 2022-01-24 DIAGNOSIS — R60.9 EDEMA, UNSPECIFIED: ICD-10-CM

## 2022-01-24 DIAGNOSIS — J44.9 CHRONIC OBSTRUCTIVE PULMONARY DISEASE, UNSPECIFIED: ICD-10-CM

## 2022-01-24 DIAGNOSIS — Z79.01 LONG TERM (CURRENT) USE OF ANTICOAGULANTS: ICD-10-CM

## 2022-01-24 DIAGNOSIS — Z87.59 PERSONAL HISTORY OF OTHER COMPLICATIONS OF PREGNANCY, CHILDBIRTH AND THE PUERPERIUM: ICD-10-CM

## 2022-01-24 DIAGNOSIS — Z79.4 LONG TERM (CURRENT) USE OF INSULIN: ICD-10-CM

## 2022-01-24 DIAGNOSIS — E11.40 TYPE 2 DIABETES MELLITUS WITH DIABETIC NEUROPATHY, UNSPECIFIED: ICD-10-CM

## 2022-01-24 DIAGNOSIS — Z96.643 PRESENCE OF ARTIFICIAL HIP JOINT, BILATERAL: Chronic | ICD-10-CM

## 2022-01-24 DIAGNOSIS — E66.9 OBESITY, UNSPECIFIED: ICD-10-CM

## 2022-01-24 DIAGNOSIS — Z88.8 ALLERGY STATUS TO OTHER DRUGS, MEDICAMENTS AND BIOLOGICAL SUBSTANCES STATUS: ICD-10-CM

## 2022-01-24 DIAGNOSIS — U07.1 COVID-19: ICD-10-CM

## 2022-01-24 DIAGNOSIS — I10 ESSENTIAL (PRIMARY) HYPERTENSION: ICD-10-CM

## 2022-01-24 DIAGNOSIS — Z93.3 COLOSTOMY STATUS: ICD-10-CM

## 2022-01-24 DIAGNOSIS — Z88.1 ALLERGY STATUS TO OTHER ANTIBIOTIC AGENTS STATUS: ICD-10-CM

## 2022-01-24 DIAGNOSIS — Z96.649 PRESENCE OF UNSPECIFIED ARTIFICIAL HIP JOINT: Chronic | ICD-10-CM

## 2022-01-24 PROCEDURE — 96375 TX/PRO/DX INJ NEW DRUG ADDON: CPT

## 2022-01-24 PROCEDURE — 80053 COMPREHEN METABOLIC PANEL: CPT

## 2022-01-24 PROCEDURE — 74177 CT ABD & PELVIS W/CONTRAST: CPT | Mod: MG

## 2022-01-24 PROCEDURE — 81001 URINALYSIS AUTO W/SCOPE: CPT

## 2022-01-24 PROCEDURE — 36415 COLL VENOUS BLD VENIPUNCTURE: CPT

## 2022-01-24 PROCEDURE — 99285 EMERGENCY DEPT VISIT HI MDM: CPT

## 2022-01-24 PROCEDURE — 87086 URINE CULTURE/COLONY COUNT: CPT

## 2022-01-24 PROCEDURE — 85025 COMPLETE CBC W/AUTO DIFF WBC: CPT

## 2022-01-24 PROCEDURE — 71045 X-RAY EXAM CHEST 1 VIEW: CPT

## 2022-01-24 PROCEDURE — 82962 GLUCOSE BLOOD TEST: CPT

## 2022-01-24 PROCEDURE — G1004: CPT

## 2022-01-24 PROCEDURE — 96374 THER/PROPH/DIAG INJ IV PUSH: CPT

## 2022-01-24 PROCEDURE — 99284 EMERGENCY DEPT VISIT MOD MDM: CPT | Mod: 25

## 2022-01-24 PROCEDURE — 0225U NFCT DS DNA&RNA 21 SARSCOV2: CPT

## 2022-01-24 PROCEDURE — 83690 ASSAY OF LIPASE: CPT

## 2022-01-24 NOTE — ED ADULT TRIAGE NOTE - CHIEF COMPLAINT QUOTE
patient brought in by EMS from [facility] c/o abdominal pain.  patient c/o decreased PO intake and abdominal pain x 3 days.  N/V this morning, has since resolved.  BGM with EMS 42, no meds given.  upon arrival to ED, BGM 54.  patient is A&Ox4.  hx DM, CVA with left sided weakness.   IV in right hand in place from [facility].

## 2022-01-25 VITALS
SYSTOLIC BLOOD PRESSURE: 153 MMHG | DIASTOLIC BLOOD PRESSURE: 72 MMHG | OXYGEN SATURATION: 98 % | TEMPERATURE: 99 F | RESPIRATION RATE: 20 BRPM | HEART RATE: 86 BPM

## 2022-01-25 LAB
ALBUMIN SERPL ELPH-MCNC: 2.3 G/DL — LOW (ref 3.3–5)
ALP SERPL-CCNC: 78 U/L — SIGNIFICANT CHANGE UP (ref 40–120)
ALT FLD-CCNC: 30 U/L — SIGNIFICANT CHANGE UP (ref 12–78)
ANION GAP SERPL CALC-SCNC: 4 MMOL/L — LOW (ref 5–17)
APPEARANCE UR: ABNORMAL
AST SERPL-CCNC: 22 U/L — SIGNIFICANT CHANGE UP (ref 15–37)
BASOPHILS # BLD AUTO: 0.04 K/UL — SIGNIFICANT CHANGE UP (ref 0–0.2)
BASOPHILS NFR BLD AUTO: 0.4 % — SIGNIFICANT CHANGE UP (ref 0–2)
BILIRUB SERPL-MCNC: 0.4 MG/DL — SIGNIFICANT CHANGE UP (ref 0.2–1.2)
BILIRUB UR-MCNC: NEGATIVE — SIGNIFICANT CHANGE UP
BUN SERPL-MCNC: 6 MG/DL — LOW (ref 7–23)
CALCIUM SERPL-MCNC: 8.3 MG/DL — LOW (ref 8.5–10.1)
CHLORIDE SERPL-SCNC: 102 MMOL/L — SIGNIFICANT CHANGE UP (ref 96–108)
CO2 SERPL-SCNC: 28 MMOL/L — SIGNIFICANT CHANGE UP (ref 22–31)
COLOR SPEC: YELLOW — SIGNIFICANT CHANGE UP
CREAT SERPL-MCNC: 0.56 MG/DL — SIGNIFICANT CHANGE UP (ref 0.5–1.3)
DIFF PNL FLD: ABNORMAL
EOSINOPHIL # BLD AUTO: 0.01 K/UL — SIGNIFICANT CHANGE UP (ref 0–0.5)
EOSINOPHIL NFR BLD AUTO: 0.1 % — SIGNIFICANT CHANGE UP (ref 0–6)
GLUCOSE SERPL-MCNC: 47 MG/DL — CRITICAL LOW (ref 70–99)
GLUCOSE UR QL: 250
HCT VFR BLD CALC: 39.4 % — SIGNIFICANT CHANGE UP (ref 34.5–45)
HGB BLD-MCNC: 12.8 G/DL — SIGNIFICANT CHANGE UP (ref 11.5–15.5)
IMM GRANULOCYTES NFR BLD AUTO: 0.5 % — SIGNIFICANT CHANGE UP (ref 0–1.5)
KETONES UR-MCNC: ABNORMAL
LEUKOCYTE ESTERASE UR-ACNC: ABNORMAL
LIDOCAIN IGE QN: 73 U/L — SIGNIFICANT CHANGE UP (ref 73–393)
LYMPHOCYTES # BLD AUTO: 19.6 % — SIGNIFICANT CHANGE UP (ref 13–44)
LYMPHOCYTES # BLD AUTO: 2.02 K/UL — SIGNIFICANT CHANGE UP (ref 1–3.3)
MCHC RBC-ENTMCNC: 26.8 PG — LOW (ref 27–34)
MCHC RBC-ENTMCNC: 32.5 GM/DL — SIGNIFICANT CHANGE UP (ref 32–36)
MCV RBC AUTO: 82.4 FL — SIGNIFICANT CHANGE UP (ref 80–100)
MONOCYTES # BLD AUTO: 1 K/UL — HIGH (ref 0–0.9)
MONOCYTES NFR BLD AUTO: 9.7 % — SIGNIFICANT CHANGE UP (ref 2–14)
NEUTROPHILS # BLD AUTO: 7.18 K/UL — SIGNIFICANT CHANGE UP (ref 1.8–7.4)
NEUTROPHILS NFR BLD AUTO: 69.7 % — SIGNIFICANT CHANGE UP (ref 43–77)
NITRITE UR-MCNC: NEGATIVE — SIGNIFICANT CHANGE UP
PH UR: 6 — SIGNIFICANT CHANGE UP (ref 5–8)
PLATELET # BLD AUTO: 347 K/UL — SIGNIFICANT CHANGE UP (ref 150–400)
POTASSIUM SERPL-MCNC: 3.5 MMOL/L — SIGNIFICANT CHANGE UP (ref 3.5–5.3)
POTASSIUM SERPL-SCNC: 3.5 MMOL/L — SIGNIFICANT CHANGE UP (ref 3.5–5.3)
PROT SERPL-MCNC: 6.3 GM/DL — SIGNIFICANT CHANGE UP (ref 6–8.3)
PROT UR-MCNC: 30 MG/DL
RAPID RVP RESULT: DETECTED
RBC # BLD: 4.78 M/UL — SIGNIFICANT CHANGE UP (ref 3.8–5.2)
RBC # FLD: 18.4 % — HIGH (ref 10.3–14.5)
SARS-COV-2 RNA SPEC QL NAA+PROBE: DETECTED
SODIUM SERPL-SCNC: 134 MMOL/L — LOW (ref 135–145)
SP GR SPEC: 1.01 — SIGNIFICANT CHANGE UP (ref 1.01–1.02)
UROBILINOGEN FLD QL: NEGATIVE — SIGNIFICANT CHANGE UP
WBC # BLD: 10.3 K/UL — SIGNIFICANT CHANGE UP (ref 3.8–10.5)
WBC # FLD AUTO: 10.3 K/UL — SIGNIFICANT CHANGE UP (ref 3.8–10.5)

## 2022-01-25 PROCEDURE — G1004: CPT

## 2022-01-25 PROCEDURE — 74177 CT ABD & PELVIS W/CONTRAST: CPT | Mod: 26,MG

## 2022-01-25 PROCEDURE — 71045 X-RAY EXAM CHEST 1 VIEW: CPT | Mod: 26

## 2022-01-25 RX ORDER — ONDANSETRON 8 MG/1
4 TABLET, FILM COATED ORAL ONCE
Refills: 0 | Status: COMPLETED | OUTPATIENT
Start: 2022-01-25 | End: 2022-01-25

## 2022-01-25 RX ORDER — DEXTROSE 50 % IN WATER 50 %
50 SYRINGE (ML) INTRAVENOUS ONCE
Refills: 0 | Status: COMPLETED | OUTPATIENT
Start: 2022-01-25 | End: 2022-01-25

## 2022-01-25 RX ADMIN — ONDANSETRON 4 MILLIGRAM(S): 8 TABLET, FILM COATED ORAL at 00:21

## 2022-01-25 RX ADMIN — Medication 50 MILLILITER(S): at 00:33

## 2022-01-25 NOTE — ED PROVIDER NOTE - NSFOLLOWUPINSTRUCTIONS_ED_ALL_ED_FT
please follow up with your her Dr Sanchez in 2-3 days.   please self isolate as directed.    keep mask on in public.   drink plenty of fluids.   return to ED for any concerns

## 2022-01-25 NOTE — ED PROVIDER NOTE - PATIENT PORTAL LINK FT
You can access the FollowMyHealth Patient Portal offered by [facility] by registering at the following website: [email or web address]. By joining [employer]’s FollowMyHealth portal, you will also be able to view your health information using other applications (apps) compatible with our system.

## 2022-01-25 NOTE — ED PROVIDER NOTE - PROGRESS NOTE DETAILS
pt tolerated PO in ED with no emesis.   pt told of results and that she is COVID positive.   advised to f/u with her surgeon Dr Sanchez.  jeanine thompson and NT. pt again asked if another surgeon can fix her chronic hernia now.   pt again advised to f/u with her surgeon about further treatment for her hernias.   abd soft and NT.   reducible ventral hernias palpable.

## 2022-01-25 NOTE — ED ADULT NURSE NOTE - OBJECTIVE STATEMENT
71 yr old female, A&Ox4, brought in by EMS from [facility] c/o abdominal pain.  patient c/o decreased PO intake and abdominal pain x 3 days, pt stated she hasn't had a BM in past three days, this morning she had receive a fleet Enema at [facility], but she did not go fully and still feels very impacted. N/V this morning, has since resolved.  BGM with EMS 42, no meds given.  upon arrival to ED, BGM 54, pt remains asymptomatic, hx DM, CVA with left sided weakness.  glucose gel given by mouth, upon reassessment BGM 45, repeat 50. DR JAMES made aware.

## 2022-01-25 NOTE — ED ADULT NURSE REASSESSMENT NOTE - NS ED NURSE REASSESS COMMENT FT1
called Michelle to notify staff patient came back COVID positive.  no answer, left message to call ED ASAP.
pt awake and responsive to all, satting 97% on 2LPM, pt tolerated thickened water and apple sauce. denies any abdominal pain or nausea. pt stable to be d/c to [facility]. awaiting transport

## 2022-01-25 NOTE — ED ADULT NURSE NOTE - CHIEF COMPLAINT QUOTE
patient brought in by EMS from [facility] c/o abdominal pain.  patient c/o decreased PO intake and abdominal pain x 3 days.  N/V this morning, has since resolved.  BGM with EMS 42, no meds given.  upon arrival to ED, BGM 54.  patient is A&Ox4.  hx DM, CVA with left sided weakness.   IV in right hand in place from [facility].

## 2022-01-25 NOTE — ED PROVIDER NOTE - OBJECTIVE STATEMENT
72 y/o female in ED from NH c/o n/v/abd pain x 2 days.   pt states h/o SBO.   states received fleet enema at NH and had small BM this morning.   pt states feels similar to previous sbo.   pt denies any fever, HA, cp, sob, diarrhea.   tolerating some PO fluids.   no sick contacts or recent travel

## 2022-01-26 LAB
CULTURE RESULTS: SIGNIFICANT CHANGE UP
SPECIMEN SOURCE: SIGNIFICANT CHANGE UP

## 2022-02-18 NOTE — H&P PST ADULT - NS NEC GEN PE MLT EXAM PC
Epidural Block    Patient location during procedure: OB  Start time: 2/17/2022 8:42 PM  End time: 2/17/2022 8:45 PM  Reason for block: labor analgesia requested by patient and obstetrician  Staffing  Performed: anesthesiologist   Anesthesiologist: Juan Brantley MD  Preanesthetic Checklist  Completed: patient identified, surgical consent, pre-op evaluation, timeout performed, IV checked, risks and benefits discussed, monitors and equipment checked and sterile field maintained during procedure  Needle  Needle gauge: 17 G  Needle length: 3.5 in  Needle insertion depth: 5 cm  Catheter type: Single orifice  Catheter size: 19 G  Catheter at skin depth: 10 cm  Test dose: negative and lidocaine 1.5% with epinephrine 1-to-200,000  Additional Notes  Procedure well tolerated. Vital signs stable. No paresthesia.  Analgesia satisfactory. Patients nurse to notify anesthesiologist if hemodynamically unstable.    Medications Administered - 2/17/2022 8:42 PM   Lidocaine 1.5%-EPINEPHrine 1:200,000 PF (XYLOCAINE W/EPI) injection, 5 mL              
detailed exam

## 2022-03-09 ENCOUNTER — INPATIENT (INPATIENT)
Facility: HOSPITAL | Age: 72
LOS: 4 days | Discharge: NURSING, REHAB OR LONG-TERM CARE | DRG: 389 | End: 2022-03-14
Attending: SURGERY | Admitting: SURGERY
Payer: MEDICARE

## 2022-03-09 VITALS
OXYGEN SATURATION: 98 % | HEIGHT: 64 IN | RESPIRATION RATE: 20 BRPM | DIASTOLIC BLOOD PRESSURE: 73 MMHG | WEIGHT: 250 LBS | HEART RATE: 92 BPM | SYSTOLIC BLOOD PRESSURE: 120 MMHG | TEMPERATURE: 99 F

## 2022-03-09 DIAGNOSIS — Z98.891 HISTORY OF UTERINE SCAR FROM PREVIOUS SURGERY: Chronic | ICD-10-CM

## 2022-03-09 DIAGNOSIS — Z98.890 OTHER SPECIFIED POSTPROCEDURAL STATES: Chronic | ICD-10-CM

## 2022-03-09 DIAGNOSIS — Z96.649 PRESENCE OF UNSPECIFIED ARTIFICIAL HIP JOINT: Chronic | ICD-10-CM

## 2022-03-09 DIAGNOSIS — Z96.643 PRESENCE OF ARTIFICIAL HIP JOINT, BILATERAL: Chronic | ICD-10-CM

## 2022-03-09 DIAGNOSIS — Z90.49 ACQUIRED ABSENCE OF OTHER SPECIFIED PARTS OF DIGESTIVE TRACT: Chronic | ICD-10-CM

## 2022-03-09 DIAGNOSIS — Z93.3 COLOSTOMY STATUS: Chronic | ICD-10-CM

## 2022-03-09 DIAGNOSIS — K56.600 PARTIAL INTESTINAL OBSTRUCTION, UNSPECIFIED AS TO CAUSE: ICD-10-CM

## 2022-03-09 LAB
ALBUMIN SERPL ELPH-MCNC: 3.6 G/DL — SIGNIFICANT CHANGE UP (ref 3.3–5)
ALP SERPL-CCNC: 85 U/L — SIGNIFICANT CHANGE UP (ref 40–120)
ALT FLD-CCNC: 19 U/L — SIGNIFICANT CHANGE UP (ref 10–45)
ANION GAP SERPL CALC-SCNC: 13 MMOL/L — SIGNIFICANT CHANGE UP (ref 5–17)
APTT BLD: 44.8 SEC — HIGH (ref 27.5–35.5)
AST SERPL-CCNC: 22 U/L — SIGNIFICANT CHANGE UP (ref 10–40)
BASOPHILS # BLD AUTO: 0.09 K/UL — SIGNIFICANT CHANGE UP (ref 0–0.2)
BASOPHILS NFR BLD AUTO: 0.8 % — SIGNIFICANT CHANGE UP (ref 0–2)
BILIRUB SERPL-MCNC: 0.3 MG/DL — SIGNIFICANT CHANGE UP (ref 0.2–1.2)
BLD GP AB SCN SERPL QL: NEGATIVE — SIGNIFICANT CHANGE UP
BUN SERPL-MCNC: 11 MG/DL — SIGNIFICANT CHANGE UP (ref 7–23)
CALCIUM SERPL-MCNC: 9.3 MG/DL — SIGNIFICANT CHANGE UP (ref 8.4–10.5)
CHLORIDE SERPL-SCNC: 100 MMOL/L — SIGNIFICANT CHANGE UP (ref 96–108)
CO2 SERPL-SCNC: 26 MMOL/L — SIGNIFICANT CHANGE UP (ref 22–31)
CREAT SERPL-MCNC: 0.65 MG/DL — SIGNIFICANT CHANGE UP (ref 0.5–1.3)
EGFR: 94 ML/MIN/1.73M2 — SIGNIFICANT CHANGE UP
EOSINOPHIL # BLD AUTO: 0.02 K/UL — SIGNIFICANT CHANGE UP (ref 0–0.5)
EOSINOPHIL NFR BLD AUTO: 0.2 % — SIGNIFICANT CHANGE UP (ref 0–6)
GLUCOSE SERPL-MCNC: 185 MG/DL — HIGH (ref 70–99)
HCT VFR BLD CALC: 42 % — SIGNIFICANT CHANGE UP (ref 34.5–45)
HGB BLD-MCNC: 13.3 G/DL — SIGNIFICANT CHANGE UP (ref 11.5–15.5)
IMM GRANULOCYTES NFR BLD AUTO: 1.1 % — SIGNIFICANT CHANGE UP (ref 0–1.5)
INR BLD: 2.51 RATIO — HIGH (ref 0.88–1.16)
LYMPHOCYTES # BLD AUTO: 1.54 K/UL — SIGNIFICANT CHANGE UP (ref 1–3.3)
LYMPHOCYTES # BLD AUTO: 14.3 % — SIGNIFICANT CHANGE UP (ref 13–44)
MCHC RBC-ENTMCNC: 26.9 PG — LOW (ref 27–34)
MCHC RBC-ENTMCNC: 31.7 GM/DL — LOW (ref 32–36)
MCV RBC AUTO: 85 FL — SIGNIFICANT CHANGE UP (ref 80–100)
MONOCYTES # BLD AUTO: 0.68 K/UL — SIGNIFICANT CHANGE UP (ref 0–0.9)
MONOCYTES NFR BLD AUTO: 6.3 % — SIGNIFICANT CHANGE UP (ref 2–14)
NEUTROPHILS # BLD AUTO: 8.31 K/UL — HIGH (ref 1.8–7.4)
NEUTROPHILS NFR BLD AUTO: 77.3 % — HIGH (ref 43–77)
NRBC # BLD: 0 /100 WBCS — SIGNIFICANT CHANGE UP (ref 0–0)
PLATELET # BLD AUTO: 485 K/UL — HIGH (ref 150–400)
POTASSIUM SERPL-MCNC: 4.5 MMOL/L — SIGNIFICANT CHANGE UP (ref 3.5–5.3)
POTASSIUM SERPL-SCNC: 4.5 MMOL/L — SIGNIFICANT CHANGE UP (ref 3.5–5.3)
PROT SERPL-MCNC: 6.9 G/DL — SIGNIFICANT CHANGE UP (ref 6–8.3)
PROTHROM AB SERPL-ACNC: 29.4 SEC — HIGH (ref 10.5–13.4)
RBC # BLD: 4.94 M/UL — SIGNIFICANT CHANGE UP (ref 3.8–5.2)
RBC # FLD: 17 % — HIGH (ref 10.3–14.5)
RH IG SCN BLD-IMP: POSITIVE — SIGNIFICANT CHANGE UP
SARS-COV-2 RNA SPEC QL NAA+PROBE: SIGNIFICANT CHANGE UP
SODIUM SERPL-SCNC: 139 MMOL/L — SIGNIFICANT CHANGE UP (ref 135–145)
WBC # BLD: 10.76 K/UL — HIGH (ref 3.8–10.5)
WBC # FLD AUTO: 10.76 K/UL — HIGH (ref 3.8–10.5)

## 2022-03-09 PROCEDURE — 74177 CT ABD & PELVIS W/CONTRAST: CPT | Mod: 26,MA

## 2022-03-09 PROCEDURE — 93010 ELECTROCARDIOGRAM REPORT: CPT

## 2022-03-09 PROCEDURE — 99223 1ST HOSP IP/OBS HIGH 75: CPT

## 2022-03-09 PROCEDURE — 71045 X-RAY EXAM CHEST 1 VIEW: CPT | Mod: 26

## 2022-03-09 PROCEDURE — 99285 EMERGENCY DEPT VISIT HI MDM: CPT | Mod: 25

## 2022-03-09 RX ORDER — ONDANSETRON 8 MG/1
4 TABLET, FILM COATED ORAL ONCE
Refills: 0 | Status: COMPLETED | OUTPATIENT
Start: 2022-03-09 | End: 2022-03-09

## 2022-03-09 RX ORDER — ENOXAPARIN SODIUM 100 MG/ML
40 INJECTION SUBCUTANEOUS EVERY 24 HOURS
Refills: 0 | Status: DISCONTINUED | OUTPATIENT
Start: 2022-03-09 | End: 2022-03-09

## 2022-03-09 RX ORDER — SODIUM CHLORIDE 9 MG/ML
250 INJECTION INTRAMUSCULAR; INTRAVENOUS; SUBCUTANEOUS ONCE
Refills: 0 | Status: COMPLETED | OUTPATIENT
Start: 2022-03-09 | End: 2022-03-09

## 2022-03-09 RX ORDER — MORPHINE SULFATE 50 MG/1
4 CAPSULE, EXTENDED RELEASE ORAL ONCE
Refills: 0 | Status: DISCONTINUED | OUTPATIENT
Start: 2022-03-09 | End: 2022-03-09

## 2022-03-09 RX ORDER — FUROSEMIDE 40 MG
20 TABLET ORAL DAILY
Refills: 0 | Status: DISCONTINUED | OUTPATIENT
Start: 2022-03-09 | End: 2022-03-10

## 2022-03-09 RX ORDER — ACLIDINIUM BROMIDE 400 UG/1
1 POWDER, METERED RESPIRATORY (INHALATION)
Qty: 0 | Refills: 0 | DISCHARGE

## 2022-03-09 RX ORDER — IBUPROFEN 200 MG
1 TABLET ORAL
Qty: 0 | Refills: 0 | DISCHARGE

## 2022-03-09 RX ORDER — ENOXAPARIN SODIUM 100 MG/ML
100 INJECTION SUBCUTANEOUS EVERY 12 HOURS
Refills: 0 | Status: DISCONTINUED | OUTPATIENT
Start: 2022-03-09 | End: 2022-03-09

## 2022-03-09 RX ORDER — PANTOPRAZOLE SODIUM 20 MG/1
40 TABLET, DELAYED RELEASE ORAL DAILY
Refills: 0 | Status: DISCONTINUED | OUTPATIENT
Start: 2022-03-09 | End: 2022-03-10

## 2022-03-09 RX ORDER — BUDESONIDE AND FORMOTEROL FUMARATE DIHYDRATE 160; 4.5 UG/1; UG/1
2 AEROSOL RESPIRATORY (INHALATION)
Refills: 0 | Status: DISCONTINUED | OUTPATIENT
Start: 2022-03-09 | End: 2022-03-14

## 2022-03-09 RX ORDER — LEVOTHYROXINE SODIUM 125 MCG
25 TABLET ORAL AT BEDTIME
Refills: 0 | Status: DISCONTINUED | OUTPATIENT
Start: 2022-03-09 | End: 2022-03-10

## 2022-03-09 RX ORDER — FERROUS FUMARATE 350(115)MG
1 TABLET ORAL
Qty: 0 | Refills: 0 | DISCHARGE

## 2022-03-09 RX ORDER — ACETAMINOPHEN 500 MG
1000 TABLET ORAL ONCE
Refills: 0 | Status: COMPLETED | OUTPATIENT
Start: 2022-03-09 | End: 2022-03-09

## 2022-03-09 RX ORDER — METOPROLOL TARTRATE 50 MG
2.5 TABLET ORAL EVERY 6 HOURS
Refills: 0 | Status: DISCONTINUED | OUTPATIENT
Start: 2022-03-09 | End: 2022-03-10

## 2022-03-09 RX ADMIN — Medication 400 MILLIGRAM(S): at 14:44

## 2022-03-09 RX ADMIN — Medication 25 MICROGRAM(S): at 22:26

## 2022-03-09 RX ADMIN — MORPHINE SULFATE 4 MILLIGRAM(S): 50 CAPSULE, EXTENDED RELEASE ORAL at 16:10

## 2022-03-09 RX ADMIN — MORPHINE SULFATE 4 MILLIGRAM(S): 50 CAPSULE, EXTENDED RELEASE ORAL at 14:44

## 2022-03-09 RX ADMIN — ONDANSETRON 4 MILLIGRAM(S): 8 TABLET, FILM COATED ORAL at 14:15

## 2022-03-09 RX ADMIN — Medication 1000 MILLIGRAM(S): at 16:09

## 2022-03-09 RX ADMIN — SODIUM CHLORIDE 125 MILLILITER(S): 9 INJECTION INTRAMUSCULAR; INTRAVENOUS; SUBCUTANEOUS at 15:11

## 2022-03-09 NOTE — ED ADULT TRIAGE NOTE - TEMPERATURE IN CELSIUS (DEGREES C)
I think this medications should be refilled by PCP or Dr. Lucas Gabriel. I'll manage antiarrhythmic therapy. Thanks for help.
37.3

## 2022-03-09 NOTE — H&P ADULT - ASSESSMENT
Assessment:  71y Female with multiple medical issues, PSH of right hemicolectomy, Hartmanns followed by reversal, chronic ventral hernia here with partial SBO.    Plan:  - patient refusing NGT placement despite multiple attempts  - strict NPO  - IV fluids  - PO meds switched to IV wherever possible, will restart PO meds when tolerating PO  - medical comanagement given extensive medical history  - daily INR, patient takes warfarin at home for a fib  - will refer to Dr Jimenez for management of hernia (discussed with Dr Jimenez)  - plan dw Dr Martínez    Red Team Assessment:  71y Female with multiple medical issues, PSH of right hemicolectomy, Hartmanns followed by reversal, chronic ventral hernia here with partial SBO.    Plan:  - patient refusing NGT placement despite multiple attempts  - strict NPO  - IV fluids  - PO meds switched to IV wherever possible, will restart PO meds when tolerating PO  - medical comanagement given extensive medical history  - daily INR, patient takes warfarin at home for a fib, INR currently 2.5  - will refer to Dr Jimenez for management of hernia (discussed with Dr Jimenez)  - plan dw Dr Martínez    Red Team

## 2022-03-09 NOTE — ED ADULT NURSE NOTE - NSICDXFAMILYHX_GEN_ALL_CORE_FT
FAMILY HISTORY:  Family history of chronic kidney disease  Family history of COPD (chronic obstructive pulmonary disease)    Sibling  Still living? Unknown  Family history of hiatal hernia, Age at diagnosis: Age Unknown

## 2022-03-09 NOTE — ED ADULT NURSE REASSESSMENT NOTE - NS ED NURSE REASSESS COMMENT FT1
Patient resting, states pain is at a level of 8/10, states "it hurts really bad but not as bad as before," VSS, breathing unlabored, side rails raised, comfort and safety maintained.

## 2022-03-09 NOTE — ED ADULT NURSE NOTE - NSICDXPASTMEDICALHX_GEN_ALL_CORE_FT
PAST MEDICAL HISTORY:  Anemia     C. difficile diarrhea 2016    Colostomy in place     COPD (chronic obstructive pulmonary disease)     Diverticulitis of intestine with perforation and abscess without bleeding, unspecified part of intestinal tract     Hypercholesteremia     Hypertension     Obesity     Osteoarthritis     Palpitations     PE (pulmonary thromboembolism) 12/2016

## 2022-03-09 NOTE — ED PROVIDER NOTE - ATTENDING CONTRIBUTION TO CARE
I, Vernon Ferrera, performed a history and physical exam of the patient and discussed their management with the resident and/or advanced care provider. I reviewed the resident and/or advanced care provider's note and agree with the documented findings and plan of care. I was present and available for all procedures.    72 y/o F PSHx: diverticulitis with R colectomy and SB resection 12/8/2016 (Dr. Martínez and Donaldo),   s/p robotic converted to open reversal of Guillermina's 9/7/2017 (Dr. Martínez and Dr. No) PMHx HTN, HLD, T2DM, hypothyroidism,COPD, obesity, PE, CVA with paraplegia, BIBEMS from [facility], for abdominal pain concern for SBO. Pt reports moderate pain in RLQ pain since yesterday worse with BMs or urination, constant waxing and waning, some improvement with tylenol + oxycodone 10mg taken this morning. Pt also notes n/v, improved with one dose of zofran given at her facility. Pt reports having abdominal XR which showed concern for SBO prompting ED visit. Pt reports passing normal brown formed BM this morning and passing flatus. Pt reports hx of medically managed SBO.    Well appearing and in NAD, head normal appearing atraumatic, trachea midline, no respiratory distress, lungs cta bilaterally, rrr no murmurs, soft diffusely ttp distended abdomen no rt, no visible extremity deformities, Alert and oriented, non focal neuro exam, skin warm and dry, normal affect and mood    concerning for sbo or intraabd surgical process will eval with ctap otherwise screening labs analgesia, npo ivf, admit for further mgmt

## 2022-03-09 NOTE — ED ADULT NURSE NOTE - NSICDXPASTSURGICALHX_GEN_ALL_CORE_FT
PAST SURGICAL HISTORY:  H/O hemicolectomy 2016    H/O ovarian cystectomy b/l    H/O:  x2    History of appendectomy     Status post total replacement of both hips

## 2022-03-09 NOTE — ED ADULT NURSE REASSESSMENT NOTE - NS ED NURSE REASSESS COMMENT FT1
Patient cleaned, fresh linens provided, placed on primafit, patient tolerated well, VSS, side rails raised, call bell within reach, comfort and safety maintained.

## 2022-03-09 NOTE — H&P ADULT - NSHPLABSRESULTS_GEN_ALL_CORE
Labs:  CAPILLARY BLOOD GLUCOSE                              13.3   10.76 )-----------( 485      ( 09 Mar 2022 14:13 )             42.0       Auto Neutrophil %: 77.3 % (03-09-22 @ 14:13)  Auto Immature Granulocyte %: 1.1 % (03-09-22 @ 14:13)    03-09    139  |  100  |  11  ----------------------------<  185<H>  4.5   |  26  |  0.65      Calcium, Total Serum: 9.3 mg/dL (03-09-22 @ 14:13)      LFTs:             6.9  | 0.3  | 22       ------------------[85      ( 09 Mar 2022 14:13 )  3.6  | x    | 19          Lipase:x      Amylase:x         Lactate, Blood: 1.2 mmol/L (03-09-22 @ 16:38)  Blood Gas Venous - Lactate: 1.9 mmol/L (03-09-22 @ 14:39)      Coags:     29.4   ----< 2.51    ( 09 Mar 2022 14:13 )     44.8      Radiology:  < from: CT Abdomen and Pelvis w/ Oral Cont and w/ IV Cont (03.09.22 @ 15:53) >    Status post right hemicolectomy with rectosigmoid and ileocolic   anastomoses. Dilated loops of small bowel with air and fluid with   narrowing noted at the level of the ventral hernia (2-73). Distal colon   is minimally distended with stool.  Findings are suggestive of partial small bowel obstruction.    < end of copied text >

## 2022-03-09 NOTE — H&P ADULT - HISTORY OF PRESENT ILLNESS
71F with multiple PMH and extensive past surgical hx of right hemicolectomy, Guillermina's followed by reversal in 2017 here with abdominal pain with nausea and vomiting, patient reported passing gas and BM today. Patient otherwise is stable. Workup in ED consistent with partial SBO. Patient has chronic ventral hernia.

## 2022-03-09 NOTE — ED PROVIDER NOTE - PHYSICAL EXAMINATION
GEN: Morbidly obese female, in NAD, appears chronically ill.  PSYCH: Flat affect.  EYES: Sclera white w/o injection.   ENT: Head NCAT. MMM. Neck supple FROM.  RESP: Coarse breath sound b/l, no wheezes. No evidence of respiratory distress.  CARDIAC: RRR, clear distinct S1, S2, no appreciable murmurs.  ABD: Abdomen protuberant with midline sagittal scar extending inferior to the umbilicus, firm focus palpated in b/l LQ, mild RLQ ttp, no rebound or guarding.  VASC: 2+ radial and dorsalis pedis pulses b/l. Trace b/l LE edema, no calf tenderness.  SKIN: No rashes on the trunk.

## 2022-03-09 NOTE — ED PROVIDER NOTE - OBJECTIVE STATEMENT
70 y/o F PSHx: diverticulitis with R colectomy and SB resection 12/8/2016 (Dr. Martínez and Donaldo),   s/p robotic converted to open reversal of Guillermina's 9/7/2017 (Dr. Martínez and Dr. No) PMHx HTN, HLD, T2DM, hypothyroidism,COPD, obesity, PE, CVA with paraplegia, BIBEMS from [facility], for abdominal pain concern for SBO. Pt reports moderate pain in RLQ pain since yesterday worse with BMs or urination, constant waxing and waning, some improvement with tylenol + oxycodone 10mg taken this morning. Pt also notes n/v, improved with one dose of zofran given at her facility. Pt reports having abdominal XR which showed concern for SBO prompting ED visit. Pt reports passing normal brown formed BM this morning and passing flatus. Pt reports hx of medically managed SBO.

## 2022-03-09 NOTE — ED PROVIDER NOTE - CLINICAL SUMMARY MEDICAL DECISION MAKING FREE TEXT BOX
71 F multiple medical issues and multipple prior abdominal surgeries p/w RLQ abd pain worse with urination and defecation, BIBEMS concern for SBO on outpatient XR. Pt reports passing normal BM this morning, passing flatus, +n/v. Mild RLQ abd tenderness on exam. r/o SBO, r/o colitis/diverticulitis, consider cystitis or ascending UTI. Plan for CTAP PO/IV contrast, labs including preoperative lab work, CXR, ECG, analgesia, antiemetcs, and reassess. Further consultation with surgery and disposition pending workup. -Luis Carter PA-C

## 2022-03-09 NOTE — ED PROVIDER NOTE - PROGRESS NOTE DETAILS
CT shows partial SBO, results d/w pt, pt comfortable at this time. d/w surgery who will be down to evaluate pt. -Luis Carter PA-C Cristiano PGY3 - Called surgery again, who stated pt. can be admitted to Dr. Martínez.

## 2022-03-09 NOTE — ED ADULT NURSE NOTE - OBJECTIVE STATEMENT
71 year old female presents to ED via EMS from [facility] complaining of n/v and abdominal pain x1 day.   Upon assessment A&O x4. Complaining of lower abdominal pain and diarrhea. Last formed bowel movement a few days ago per pt. Pt endorsing pain with urination and pain with bowel movements as well. 71 year old female presents to ED via EMS from assistant living home complaining of n/v and abdominal pain x1 day. PMH CVA with residual left sided paralysis, colostomy reversal, diverticulitis, C-diff in 2016. Got an XRAY done at assisted living and was told she has possible SBO. Upon assessment A&O x4. Complaining of lower abdominal pain and diarrhea. Last formed bowel movement a few days ago per pt. Pt endorsing pain with urination and pain with bowel movements as well. On 3L oxygen baseline, oxygen saturation 100% on 3L. IV placed, blood work drawn. VS as documented. Bed locked and lowered. Comfort and safety measures maintained.

## 2022-03-09 NOTE — H&P ADULT - NSHPPHYSICALEXAM_GEN_ALL_CORE
T(C): 37 (03-09-22 @ 18:01), Max: 37.6 (03-09-22 @ 13:58)  HR: 97 (03-09-22 @ 18:01) (92 - 99)  BP: 150/74 (03-09-22 @ 18:01) (120/73 - 150/74)  RR: 20 (03-09-22 @ 18:01) (20 - 20)  SpO2: 99% (03-09-22 @ 18:01) (98% - 100%)    Physical Exam:  General: NAD, alert  HEENT: normocephalic, PERRLA  CVS: RRR, no murmur  Chest: CTABL  Abdomen: soft, NTND, no rebound tenderness, well healed scars  Extremities: peripheral pulses palpable

## 2022-03-09 NOTE — ED ADULT NURSE REASSESSMENT NOTE - NS ED NURSE REASSESS COMMENT FT1
Patient states there has been no improvement in pain, reports abdominal pain 10/10, no nonverbal signs of distress, MD aware, breathing unlabored, VSS, will continue to monitor VS and reassess pain.

## 2022-03-10 DIAGNOSIS — I10 ESSENTIAL (PRIMARY) HYPERTENSION: ICD-10-CM

## 2022-03-10 DIAGNOSIS — E03.9 HYPOTHYROIDISM, UNSPECIFIED: ICD-10-CM

## 2022-03-10 DIAGNOSIS — N32.81 OVERACTIVE BLADDER: ICD-10-CM

## 2022-03-10 DIAGNOSIS — D63.8 ANEMIA IN OTHER CHRONIC DISEASES CLASSIFIED ELSEWHERE: ICD-10-CM

## 2022-03-10 DIAGNOSIS — K21.9 GASTRO-ESOPHAGEAL REFLUX DISEASE WITHOUT ESOPHAGITIS: ICD-10-CM

## 2022-03-10 DIAGNOSIS — E78.5 HYPERLIPIDEMIA, UNSPECIFIED: ICD-10-CM

## 2022-03-10 DIAGNOSIS — E11.9 TYPE 2 DIABETES MELLITUS WITHOUT COMPLICATIONS: ICD-10-CM

## 2022-03-10 DIAGNOSIS — N39.0 URINARY TRACT INFECTION, SITE NOT SPECIFIED: ICD-10-CM

## 2022-03-10 DIAGNOSIS — I48.20 CHRONIC ATRIAL FIBRILLATION, UNSPECIFIED: ICD-10-CM

## 2022-03-10 DIAGNOSIS — I50.32 CHRONIC DIASTOLIC (CONGESTIVE) HEART FAILURE: ICD-10-CM

## 2022-03-10 DIAGNOSIS — Z87.09 PERSONAL HISTORY OF OTHER DISEASES OF THE RESPIRATORY SYSTEM: ICD-10-CM

## 2022-03-10 DIAGNOSIS — I50.9 HEART FAILURE, UNSPECIFIED: ICD-10-CM

## 2022-03-10 LAB
ANION GAP SERPL CALC-SCNC: 12 MMOL/L — SIGNIFICANT CHANGE UP (ref 5–17)
APPEARANCE UR: ABNORMAL
APTT BLD: 37.9 SEC — HIGH (ref 27.5–35.5)
BACTERIA # UR AUTO: ABNORMAL
BASOPHILS # BLD AUTO: 0.07 K/UL — SIGNIFICANT CHANGE UP (ref 0–0.2)
BASOPHILS NFR BLD AUTO: 0.8 % — SIGNIFICANT CHANGE UP (ref 0–2)
BILIRUB UR-MCNC: NEGATIVE — SIGNIFICANT CHANGE UP
BUN SERPL-MCNC: 10 MG/DL — SIGNIFICANT CHANGE UP (ref 7–23)
CALCIUM SERPL-MCNC: 9.2 MG/DL — SIGNIFICANT CHANGE UP (ref 8.4–10.5)
CHLORIDE SERPL-SCNC: 102 MMOL/L — SIGNIFICANT CHANGE UP (ref 96–108)
CO2 SERPL-SCNC: 26 MMOL/L — SIGNIFICANT CHANGE UP (ref 22–31)
COLOR SPEC: YELLOW — SIGNIFICANT CHANGE UP
CREAT SERPL-MCNC: 0.61 MG/DL — SIGNIFICANT CHANGE UP (ref 0.5–1.3)
DIFF PNL FLD: ABNORMAL
EGFR: 96 ML/MIN/1.73M2 — SIGNIFICANT CHANGE UP
EOSINOPHIL # BLD AUTO: 0.17 K/UL — SIGNIFICANT CHANGE UP (ref 0–0.5)
EOSINOPHIL NFR BLD AUTO: 2 % — SIGNIFICANT CHANGE UP (ref 0–6)
EPI CELLS # UR: 1 /HPF — SIGNIFICANT CHANGE UP
GLUCOSE BLDC GLUCOMTR-MCNC: 120 MG/DL — HIGH (ref 70–99)
GLUCOSE BLDC GLUCOMTR-MCNC: 120 MG/DL — HIGH (ref 70–99)
GLUCOSE BLDC GLUCOMTR-MCNC: 85 MG/DL — SIGNIFICANT CHANGE UP (ref 70–99)
GLUCOSE SERPL-MCNC: 123 MG/DL — HIGH (ref 70–99)
GLUCOSE UR QL: NEGATIVE — SIGNIFICANT CHANGE UP
HCT VFR BLD CALC: 38.2 % — SIGNIFICANT CHANGE UP (ref 34.5–45)
HCV AB S/CO SERPL IA: 0.15 S/CO — SIGNIFICANT CHANGE UP (ref 0–0.99)
HCV AB SERPL-IMP: SIGNIFICANT CHANGE UP
HGB BLD-MCNC: 11.7 G/DL — SIGNIFICANT CHANGE UP (ref 11.5–15.5)
HYALINE CASTS # UR AUTO: 1 /LPF — SIGNIFICANT CHANGE UP (ref 0–2)
IMM GRANULOCYTES NFR BLD AUTO: 1.1 % — SIGNIFICANT CHANGE UP (ref 0–1.5)
INR BLD: 2.22 RATIO — HIGH (ref 0.88–1.16)
KETONES UR-MCNC: NEGATIVE — SIGNIFICANT CHANGE UP
LEUKOCYTE ESTERASE UR-ACNC: ABNORMAL
LYMPHOCYTES # BLD AUTO: 1.67 K/UL — SIGNIFICANT CHANGE UP (ref 1–3.3)
LYMPHOCYTES # BLD AUTO: 19.8 % — SIGNIFICANT CHANGE UP (ref 13–44)
MAGNESIUM SERPL-MCNC: 2.2 MG/DL — SIGNIFICANT CHANGE UP (ref 1.6–2.6)
MCHC RBC-ENTMCNC: 26.5 PG — LOW (ref 27–34)
MCHC RBC-ENTMCNC: 30.6 GM/DL — LOW (ref 32–36)
MCV RBC AUTO: 86.4 FL — SIGNIFICANT CHANGE UP (ref 80–100)
MONOCYTES # BLD AUTO: 0.68 K/UL — SIGNIFICANT CHANGE UP (ref 0–0.9)
MONOCYTES NFR BLD AUTO: 8.1 % — SIGNIFICANT CHANGE UP (ref 2–14)
NEUTROPHILS # BLD AUTO: 5.75 K/UL — SIGNIFICANT CHANGE UP (ref 1.8–7.4)
NEUTROPHILS NFR BLD AUTO: 68.2 % — SIGNIFICANT CHANGE UP (ref 43–77)
NITRITE UR-MCNC: NEGATIVE — SIGNIFICANT CHANGE UP
NRBC # BLD: 0 /100 WBCS — SIGNIFICANT CHANGE UP (ref 0–0)
PH UR: 6 — SIGNIFICANT CHANGE UP (ref 5–8)
PHOSPHATE SERPL-MCNC: 3.5 MG/DL — SIGNIFICANT CHANGE UP (ref 2.5–4.5)
PLATELET # BLD AUTO: 398 K/UL — SIGNIFICANT CHANGE UP (ref 150–400)
POTASSIUM SERPL-MCNC: 3.8 MMOL/L — SIGNIFICANT CHANGE UP (ref 3.5–5.3)
POTASSIUM SERPL-SCNC: 3.8 MMOL/L — SIGNIFICANT CHANGE UP (ref 3.5–5.3)
PROT UR-MCNC: 100 — SIGNIFICANT CHANGE UP
PROTHROM AB SERPL-ACNC: 26 SEC — HIGH (ref 10.5–13.4)
RBC # BLD: 4.42 M/UL — SIGNIFICANT CHANGE UP (ref 3.8–5.2)
RBC # FLD: 16.8 % — HIGH (ref 10.3–14.5)
RBC CASTS # UR COMP ASSIST: 32 /HPF — HIGH (ref 0–4)
SODIUM SERPL-SCNC: 140 MMOL/L — SIGNIFICANT CHANGE UP (ref 135–145)
SP GR SPEC: >1.05 (ref 1.01–1.02)
UROBILINOGEN FLD QL: NEGATIVE — SIGNIFICANT CHANGE UP
WBC # BLD: 8.43 K/UL — SIGNIFICANT CHANGE UP (ref 3.8–10.5)
WBC # FLD AUTO: 8.43 K/UL — SIGNIFICANT CHANGE UP (ref 3.8–10.5)
WBC UR QL: 546 /HPF — HIGH (ref 0–5)

## 2022-03-10 PROCEDURE — 99223 1ST HOSP IP/OBS HIGH 75: CPT

## 2022-03-10 PROCEDURE — 99232 SBSQ HOSP IP/OBS MODERATE 35: CPT

## 2022-03-10 RX ORDER — CHOLECALCIFEROL (VITAMIN D3) 125 MCG
50000 CAPSULE ORAL ONCE
Refills: 0 | Status: CANCELLED | OUTPATIENT
Start: 2022-03-16 | End: 2022-03-14

## 2022-03-10 RX ORDER — POTASSIUM CHLORIDE 20 MEQ
20 PACKET (EA) ORAL ONCE
Refills: 0 | Status: COMPLETED | OUTPATIENT
Start: 2022-03-10 | End: 2022-03-10

## 2022-03-10 RX ORDER — PANTOPRAZOLE SODIUM 20 MG/1
40 TABLET, DELAYED RELEASE ORAL EVERY 12 HOURS
Refills: 0 | Status: DISCONTINUED | OUTPATIENT
Start: 2022-03-10 | End: 2022-03-14

## 2022-03-10 RX ORDER — DILTIAZEM HCL 120 MG
180 CAPSULE, EXT RELEASE 24 HR ORAL DAILY
Refills: 0 | Status: DISCONTINUED | OUTPATIENT
Start: 2022-03-10 | End: 2022-03-10

## 2022-03-10 RX ORDER — DEXTROSE 50 % IN WATER 50 %
12.5 SYRINGE (ML) INTRAVENOUS ONCE
Refills: 0 | Status: DISCONTINUED | OUTPATIENT
Start: 2022-03-10 | End: 2022-03-14

## 2022-03-10 RX ORDER — PANTOPRAZOLE SODIUM 20 MG/1
40 TABLET, DELAYED RELEASE ORAL DAILY
Refills: 0 | Status: DISCONTINUED | OUTPATIENT
Start: 2022-03-10 | End: 2022-03-10

## 2022-03-10 RX ORDER — SODIUM CHLORIDE 9 MG/ML
1000 INJECTION, SOLUTION INTRAVENOUS
Refills: 0 | Status: DISCONTINUED | OUTPATIENT
Start: 2022-03-10 | End: 2022-03-10

## 2022-03-10 RX ORDER — DEXTROSE 50 % IN WATER 50 %
25 SYRINGE (ML) INTRAVENOUS ONCE
Refills: 0 | Status: DISCONTINUED | OUTPATIENT
Start: 2022-03-10 | End: 2022-03-14

## 2022-03-10 RX ORDER — DILTIAZEM HCL 120 MG
180 CAPSULE, EXT RELEASE 24 HR ORAL DAILY
Refills: 0 | Status: DISCONTINUED | OUTPATIENT
Start: 2022-03-10 | End: 2022-03-14

## 2022-03-10 RX ORDER — ATORVASTATIN CALCIUM 80 MG/1
20 TABLET, FILM COATED ORAL AT BEDTIME
Refills: 0 | Status: DISCONTINUED | OUTPATIENT
Start: 2022-03-10 | End: 2022-03-14

## 2022-03-10 RX ORDER — CEFTRIAXONE 500 MG/1
1000 INJECTION, POWDER, FOR SOLUTION INTRAMUSCULAR; INTRAVENOUS EVERY 24 HOURS
Refills: 0 | Status: DISCONTINUED | OUTPATIENT
Start: 2022-03-10 | End: 2022-03-14

## 2022-03-10 RX ORDER — SODIUM CHLORIDE 9 MG/ML
1000 INJECTION, SOLUTION INTRAVENOUS
Refills: 0 | Status: DISCONTINUED | OUTPATIENT
Start: 2022-03-10 | End: 2022-03-14

## 2022-03-10 RX ORDER — FUROSEMIDE 40 MG
20 TABLET ORAL DAILY
Refills: 0 | Status: DISCONTINUED | OUTPATIENT
Start: 2022-03-11 | End: 2022-03-14

## 2022-03-10 RX ORDER — PREGABALIN 225 MG/1
1000 CAPSULE ORAL DAILY
Refills: 0 | Status: DISCONTINUED | OUTPATIENT
Start: 2022-03-10 | End: 2022-03-14

## 2022-03-10 RX ORDER — INSULIN LISPRO 100/ML
VIAL (ML) SUBCUTANEOUS
Refills: 0 | Status: DISCONTINUED | OUTPATIENT
Start: 2022-03-10 | End: 2022-03-14

## 2022-03-10 RX ORDER — DULOXETINE HYDROCHLORIDE 30 MG/1
60 CAPSULE, DELAYED RELEASE ORAL DAILY
Refills: 0 | Status: DISCONTINUED | OUTPATIENT
Start: 2022-03-10 | End: 2022-03-14

## 2022-03-10 RX ORDER — ALBUTEROL 90 UG/1
2 AEROSOL, METERED ORAL EVERY 6 HOURS
Refills: 0 | Status: DISCONTINUED | OUTPATIENT
Start: 2022-03-10 | End: 2022-03-14

## 2022-03-10 RX ORDER — GLUCAGON INJECTION, SOLUTION 0.5 MG/.1ML
1 INJECTION, SOLUTION SUBCUTANEOUS ONCE
Refills: 0 | Status: DISCONTINUED | OUTPATIENT
Start: 2022-03-10 | End: 2022-03-14

## 2022-03-10 RX ORDER — METOPROLOL TARTRATE 50 MG
25 TABLET ORAL
Refills: 0 | Status: DISCONTINUED | OUTPATIENT
Start: 2022-03-10 | End: 2022-03-10

## 2022-03-10 RX ORDER — LEVOTHYROXINE SODIUM 125 MCG
25 TABLET ORAL DAILY
Refills: 0 | Status: DISCONTINUED | OUTPATIENT
Start: 2022-03-10 | End: 2022-03-14

## 2022-03-10 RX ORDER — METOPROLOL TARTRATE 50 MG
25 TABLET ORAL
Refills: 0 | Status: DISCONTINUED | OUTPATIENT
Start: 2022-03-10 | End: 2022-03-14

## 2022-03-10 RX ORDER — FLUTICASONE PROPIONATE 50 MCG
1 SPRAY, SUSPENSION NASAL
Refills: 0 | Status: DISCONTINUED | OUTPATIENT
Start: 2022-03-11 | End: 2022-03-14

## 2022-03-10 RX ORDER — ENOXAPARIN SODIUM 100 MG/ML
100 INJECTION SUBCUTANEOUS EVERY 12 HOURS
Refills: 0 | Status: DISCONTINUED | OUTPATIENT
Start: 2022-03-10 | End: 2022-03-11

## 2022-03-10 RX ORDER — AMIODARONE HYDROCHLORIDE 400 MG/1
200 TABLET ORAL DAILY
Refills: 0 | Status: DISCONTINUED | OUTPATIENT
Start: 2022-03-10 | End: 2022-03-14

## 2022-03-10 RX ORDER — INSULIN LISPRO 100/ML
VIAL (ML) SUBCUTANEOUS AT BEDTIME
Refills: 0 | Status: DISCONTINUED | OUTPATIENT
Start: 2022-03-10 | End: 2022-03-14

## 2022-03-10 RX ORDER — DEXTROSE 50 % IN WATER 50 %
15 SYRINGE (ML) INTRAVENOUS ONCE
Refills: 0 | Status: DISCONTINUED | OUTPATIENT
Start: 2022-03-10 | End: 2022-03-14

## 2022-03-10 RX ORDER — SODIUM CHLORIDE 9 MG/ML
1000 INJECTION, SOLUTION INTRAVENOUS
Refills: 0 | Status: DISCONTINUED | OUTPATIENT
Start: 2022-03-10 | End: 2022-03-11

## 2022-03-10 RX ADMIN — SODIUM CHLORIDE 75 MILLILITER(S): 9 INJECTION, SOLUTION INTRAVENOUS at 18:23

## 2022-03-10 RX ADMIN — SODIUM CHLORIDE 75 MILLILITER(S): 9 INJECTION, SOLUTION INTRAVENOUS at 08:15

## 2022-03-10 RX ADMIN — CEFTRIAXONE 100 MILLIGRAM(S): 500 INJECTION, POWDER, FOR SOLUTION INTRAMUSCULAR; INTRAVENOUS at 13:30

## 2022-03-10 RX ADMIN — Medication 25 MILLIGRAM(S): at 18:23

## 2022-03-10 RX ADMIN — ATORVASTATIN CALCIUM 20 MILLIGRAM(S): 80 TABLET, FILM COATED ORAL at 21:46

## 2022-03-10 RX ADMIN — ENOXAPARIN SODIUM 100 MILLIGRAM(S): 100 INJECTION SUBCUTANEOUS at 18:23

## 2022-03-10 RX ADMIN — SODIUM CHLORIDE 30 MILLILITER(S): 9 INJECTION, SOLUTION INTRAVENOUS at 00:49

## 2022-03-10 RX ADMIN — SODIUM CHLORIDE 75 MILLILITER(S): 9 INJECTION, SOLUTION INTRAVENOUS at 21:47

## 2022-03-10 RX ADMIN — Medication 2.5 MILLIGRAM(S): at 05:28

## 2022-03-10 RX ADMIN — Medication 20 MILLIGRAM(S): at 05:28

## 2022-03-10 RX ADMIN — AMIODARONE HYDROCHLORIDE 200 MILLIGRAM(S): 400 TABLET ORAL at 13:29

## 2022-03-10 RX ADMIN — PREGABALIN 1000 MICROGRAM(S): 225 CAPSULE ORAL at 13:29

## 2022-03-10 RX ADMIN — DULOXETINE HYDROCHLORIDE 60 MILLIGRAM(S): 30 CAPSULE, DELAYED RELEASE ORAL at 13:29

## 2022-03-10 RX ADMIN — Medication 20 MILLIEQUIVALENT(S): at 10:00

## 2022-03-10 RX ADMIN — BUDESONIDE AND FORMOTEROL FUMARATE DIHYDRATE 2 PUFF(S): 160; 4.5 AEROSOL RESPIRATORY (INHALATION) at 18:23

## 2022-03-10 RX ADMIN — Medication 100 MILLIGRAM(S): at 13:30

## 2022-03-10 RX ADMIN — Medication 2.5 MILLIGRAM(S): at 00:49

## 2022-03-10 RX ADMIN — BUDESONIDE AND FORMOTEROL FUMARATE DIHYDRATE 2 PUFF(S): 160; 4.5 AEROSOL RESPIRATORY (INHALATION) at 05:28

## 2022-03-10 RX ADMIN — Medication 180 MILLIGRAM(S): at 13:29

## 2022-03-10 RX ADMIN — Medication 100 MILLIGRAM(S): at 21:46

## 2022-03-10 RX ADMIN — PANTOPRAZOLE SODIUM 40 MILLIGRAM(S): 20 TABLET, DELAYED RELEASE ORAL at 18:23

## 2022-03-10 NOTE — CONSULT NOTE ADULT - PROBLEM SELECTOR RECOMMENDATION 10
-Patient with a history of CHF with preserved EF (75% on echo in 2018), continue with home dose of lasix 20mg daily

## 2022-03-10 NOTE — CONSULT NOTE ADULT - PROBLEM SELECTOR RECOMMENDATION 7
-Patient with a history of DM on Basaglar 15 units at bedtime, 10 units of Lispro premeal, insulin sliding scale, Glipizide ER 20mg daily and Tradjenta 5mg daily  -Patient's most recent glucose 123 and has not been taking much PO due to SBO currently on CLD so would hold other insulin for now to avoid hypoglycemia and continue ISS, increase insulin as tolerated-will likely need insulin restarted once taking solid foods again  -Continue ISS  -Follow up A1C  -When patient advanced to solids ensure consistent carbohydrate diet  -Continue Duloxetine 60mg daily for diabetic neuropathy  -Continue Pregabalin 100mg TID for diabetic neuropathy

## 2022-03-10 NOTE — CONSULT NOTE ADULT - SUBJECTIVE AND OBJECTIVE BOX
[facility] Division of Hospital Medicine  Luis GuidoDO semaj  Pager (CHRISTIE, 8A-5P): [phone]  Other Times:  [phone]    Patient is a 71y old  Female who presents with a chief complaint of partial SBO (10 Mar 2022 10:52)    HPI: 71 year old female with extensive surgical history including diverticulitis with R colectomy and SB resection, s/p robotic converted to open reversal of Guillermina's, ovarian cystectomy, multiple c-sections, appendectomy and bilateral hip replacements with medical history including HTN, HLD, T2DM, hypothyroidism, COPD, obesity, PE, CVA with paraplegia, atrial fibrillation presenting with abdominal pain and nausea concerning for SBO. The patient endorses RLQ pain with multiple episodes of NBNB that prompted an abdominal Xray at her facility that showed possible SBO so she was sent to the ED. In the ER, she was found to have partial SBO and admitted to the surgical suervice. This morning, she endorses having BM and tolerating her breakfast. Medicine has been consulted for assistance with her medical problems.    REVIEW OF SYSTEMS:    CONSTITUTIONAL: No fevers or chills  EYES/ENT: No visual changes;  No vertigo or throat pain   NECK: No pain or stiffness  RESPIRATORY: No cough, wheezing, hemoptysis; No shortness of breath  CARDIOVASCULAR: No chest pain or palpitations  GASTROINTESTINAL: Endorses abdominal pain, nausea, vomiting, and constipation  GENITOURINARY: Endorses dysuria, denies frequency or hematuria  NEUROLOGICAL: Endorses L sided weakness (chronic)  SKIN: No itching, burning, rashes, or lesions  MSK: No joint pain, no back pain  HEME: No easy bleeding, no easy bruising  All other review of systems is negative unless indicated above.    Allergies: Cipro and Spiriva    Medications: Amiodarone 200mg daily, Atorvastatin 20mg daily, Basaglar 15 units at bedtime, Coumadin 4mg at bedtime, Cyanocobalamin 1000mcg daily, Cholecalciferol 50,000 once weekly on Wednesdays, Diltiazem 180mg daily, Duloxetine 60mg daily, Ferrex 150mg daily, Fleet enema on M/W/F, Fluticasone Propionate Suspension 50mcg/act 1 spray in each nostril daily, Lasix 20mg daily, Glipizide ER 20mg daily, Levothyroxine 25mcg daily, Milk of magnesia 30mL daily, senna 2 tablets at bedtime, Tolterodine 1mg daily, Tradjenta 5mg daily, Fluticsaone-Salmeterol aerosol powder  250-50mcg/dose 1 inhalation every 12 hours, polyethylene glycol 17g BID, Metoprolol tartrate 25mg BID,oxyocdone 10mg every 12 hours, pantoprazole 40mg BID, Lispro 10 units premeal, Pregabalin 100mg TID, Insulin sliding scale, Tylenol 650mg q8 hours PRN, Albuterol Sulfate (90 base) MCG/ACT 2 puffs every 6 hours PRN, Methocarbamol 750mg q8 for muscle spasm, oxycodone 5mg every 6 hours for breakthrough pain, calamine lotion to upper back every 8 hours as needed, Diclofenac gel to left knee, ankle, shoulder, wrist and back every shift, preparation H, Lotrisone cream every day and evening to posterior neck, bilateral arms    Surgical History: Diverticulitis with R colectomy and SB resection, s/p robotic converted to open reversal of Guillermina's, ovarian cystectomy, multiple c-sections, appendectomy and bilateral hip replacements     Social History: Quit smoking 5 years ago, smoked 1PPD for 40 years, denies recent alcohol or drug use    Family History Father was an alcoholic    MEDICATIONS  (STANDING):  budesonide 160 MICROgram(s)/formoterol 4.5 MICROgram(s) Inhaler 2 Puff(s) Inhalation two times a day  lactated ringers. 1000 milliLiter(s) (75 mL/Hr) IV Continuous <Continuous>  levothyroxine 25 MICROGram(s) Oral daily  metoprolol tartrate Injectable 2.5 milliGRAM(s) IV Push every 6 hours  pantoprazole    Tablet 40 milliGRAM(s) Oral daily    MEDICATIONS  (PRN):      CAPILLARY BLOOD GLUCOSE        I&O's Summary    09 Mar 2022 07:01  -  10 Mar 2022 07:00  --------------------------------------------------------  IN: 0 mL / OUT: 750 mL / NET: -750 mL    10 Mar 2022 07:01  -  10 Mar 2022 10:54  --------------------------------------------------------  IN: 0 mL / OUT: 300 mL / NET: -300 mL        PHYSICAL EXAM:  Vital Signs Last 24 Hrs  T(C): 36.9 (10 Mar 2022 08:50), Max: 37.6 (09 Mar 2022 13:58)  T(F): 98.4 (10 Mar 2022 08:50), Max: 99.7 (09 Mar 2022 13:58)  HR: 67 (10 Mar 2022 08:50) (67 - 99)  BP: 151/64 (10 Mar 2022 08:50) (120/73 - 169/80)  BP(mean): --  RR: 18 (10 Mar 2022 08:50) (18 - 21)  SpO2: 100% (10 Mar 2022 08:50) (96% - 100%)    CONSTITUTIONAL: Elderly, chronically ill appearing female laying in bed NAD  EYES: EOMI; conjunctiva and sclera clear  ENMT: Moist oral mucosa, no pharyngeal injection or exudates  RESPIRATORY: Normal respiratory effort; lungs with bilateral wheezing but no rhonchi or rales  CARDIOVASCULAR: Regular rate and rhythm, normal S1 and S2, no murmur/rub/gallop  ABDOMEN: Soft, nontender to palpation, normoactive bowel sounds, protuberant, erythematous LLQ scar  MUSCULOSKELETAL: No clubbing or cyanosis of digits; no joint swelling or tenderness to palpation  PSYCH: A+O to person, place, and time; flat affect  NEUROLOGY: No sensory deficits, unable to lift L leg off of bed, R hand with pill rolling tremor   SKIN: No rashes, abdominal surgical scar as noted    LABS:                        11.7   8.43  )-----------( 398      ( 10 Mar 2022 07:29 )             38.2     03-10    140  |  102  |  10  ----------------------------<  123<H>  3.8   |  26  |  0.61    Ca    9.2      10 Mar 2022 07:29  Phos  3.5     03-10  Mg     2.2     03-10    TPro  6.9  /  Alb  3.6  /  TBili  0.3  /  DBili  x   /  AST  22  /  ALT  19  /  AlkPhos  85  03-09    PT/INR - ( 10 Mar 2022 07:29 )   PT: 26.0 sec;   INR: 2.22 ratio         PTT - ( 10 Mar 2022 07:29 )  PTT:37.9 sec      Urinalysis Basic - ( 10 Mar 2022 04:20 )    Color: Yellow / Appearance: Slightly Turbid / SG: >1.050 / pH: x  Gluc: x / Ketone: Negative  / Bili: Negative / Urobili: Negative   Blood: x / Protein: 100 / Nitrite: Negative   Leuk Esterase: Large / RBC: 32 /hpf /  /HPF   Sq Epi: x / Non Sq Epi: 1 /hpf / Bacteria: Few          RADIOLOGY & ADDITIONAL TESTS:  Results Reviewed:   Imaging Personally Reviewed:  Electrocardiogram Personally Reviewed:    COORDINATION OF CARE:  Care Discussed with Consultants/Other Providers [Y/N]:  Prior or Outpatient Records Reviewed [Y/N]:   [facility] Division of Hospital Medicine  Luis GuidoDO semaj  Pager (CHRISTIE, 8A-5P): [phone]  Other Times:  [phone]    Patient is a 71y old  Female who presents with a chief complaint of partial SBO (10 Mar 2022 10:52)    HPI: 71 year old female with extensive surgical history including diverticulitis with R colectomy and SB resection, s/p robotic converted to open reversal of Guillermina's, ovarian cystectomy, multiple c-sections, appendectomy and bilateral hip replacements with medical history including HTN, HLD, T2DM, hypothyroidism, COPD, obesity, PE, CVA with paraplegia, atrial fibrillation presenting with abdominal pain and nausea concerning for SBO. The patient endorses RLQ pain with multiple episodes of NBNB that prompted an abdominal Xray at her facility that showed possible SBO so she was sent to the ED. In the ER, she was found to have partial SBO and admitted to the surgical suervice. This morning, she endorses having BM and tolerating her breakfast. Medicine has been consulted for assistance with her medical problems.    REVIEW OF SYSTEMS:    CONSTITUTIONAL: No fevers or chills  EYES/ENT: No visual changes;  No vertigo or throat pain   NECK: No pain or stiffness  RESPIRATORY: No cough, wheezing, hemoptysis; No shortness of breath  CARDIOVASCULAR: No chest pain or palpitations  GASTROINTESTINAL: Endorses abdominal pain, nausea, vomiting, and constipation  GENITOURINARY: Endorses dysuria, denies frequency or hematuria  NEUROLOGICAL: Endorses L sided weakness (chronic)  SKIN: No itching, burning, rashes, or lesions  MSK: No joint pain, no back pain  HEME: No easy bleeding, no easy bruising  All other review of systems is negative unless indicated above.    Allergies: Cipro and Spiriva    Medications: Amiodarone 200mg daily, Atorvastatin 20mg daily, Basaglar 15 units at bedtime, Coumadin 4mg at bedtime, Cyanocobalamin 1000mcg daily, Cholecalciferol 50,000 once weekly on Wednesdays, Diltiazem 180mg daily, Duloxetine 60mg daily, Ferrex 150mg daily, Fleet enema on M/W/F, Fluticasone Propionate Suspension 50mcg/act 1 spray in each nostril daily, Lasix 20mg daily, Glipizide ER 20mg daily, Levothyroxine 25mcg daily, Milk of magnesia 30mL daily, senna 2 tablets at bedtime, Tolterodine 1mg daily, Tradjenta 5mg daily, Fluticsaone-Salmeterol aerosol powder  250-50mcg/dose 1 inhalation every 12 hours, polyethylene glycol 17g BID, Metoprolol tartrate 25mg BID,oxyocdone 10mg every 12 hours, pantoprazole 40mg BID, Lispro 10 units premeal, Pregabalin 100mg TID, Insulin sliding scale, Tylenol 650mg q8 hours PRN, Albuterol Sulfate (90 base) MCG/ACT 2 puffs every 6 hours PRN, Methocarbamol 750mg q8 for muscle spasm, oxycodone 5mg every 6 hours for breakthrough pain, calamine lotion to upper back every 8 hours as needed, Diclofenac gel to left knee, ankle, shoulder, wrist and back every shift, preparation H, Lotrisone cream every day and evening to posterior neck, bilateral arms    Surgical History: Diverticulitis with R colectomy and SB resection, s/p robotic converted to open reversal of Guillermina's, ovarian cystectomy, multiple c-sections, appendectomy and bilateral hip replacements     Social History: Quit smoking 5 years ago, smoked 1PPD for 40 years, denies recent alcohol or drug use    Family History Father was an alcoholic    MEDICATIONS  (STANDING):  budesonide 160 MICROgram(s)/formoterol 4.5 MICROgram(s) Inhaler 2 Puff(s) Inhalation two times a day  lactated ringers. 1000 milliLiter(s) (75 mL/Hr) IV Continuous <Continuous>  levothyroxine 25 MICROGram(s) Oral daily  metoprolol tartrate Injectable 2.5 milliGRAM(s) IV Push every 6 hours  pantoprazole    Tablet 40 milliGRAM(s) Oral daily    MEDICATIONS  (PRN):      CAPILLARY BLOOD GLUCOSE        I&O's Summary    09 Mar 2022 07:01  -  10 Mar 2022 07:00  --------------------------------------------------------  IN: 0 mL / OUT: 750 mL / NET: -750 mL    10 Mar 2022 07:01  -  10 Mar 2022 10:54  --------------------------------------------------------  IN: 0 mL / OUT: 300 mL / NET: -300 mL        PHYSICAL EXAM:  Vital Signs Last 24 Hrs  T(C): 36.9 (10 Mar 2022 08:50), Max: 37.6 (09 Mar 2022 13:58)  T(F): 98.4 (10 Mar 2022 08:50), Max: 99.7 (09 Mar 2022 13:58)  HR: 67 (10 Mar 2022 08:50) (67 - 99)  BP: 151/64 (10 Mar 2022 08:50) (120/73 - 169/80)  BP(mean): --  RR: 18 (10 Mar 2022 08:50) (18 - 21)  SpO2: 100% (10 Mar 2022 08:50) (96% - 100%)    CONSTITUTIONAL: Elderly, chronically ill appearing female laying in bed NAD  EYES: EOMI; conjunctiva and sclera clear  ENMT: Moist oral mucosa, no pharyngeal injection or exudates  RESPIRATORY: Normal respiratory effort; lungs with bilateral wheezing but no rhonchi or rales  CARDIOVASCULAR: Regular rate and rhythm, normal S1 and S2, no murmur/rub/gallop  ABDOMEN: Soft, nontender to palpation, normoactive bowel sounds, protuberant, erythematous LLQ scar  MUSCULOSKELETAL: No clubbing or cyanosis of digits; no joint swelling or tenderness to palpation  PSYCH: A+O to person, place, and time; flat affect  NEUROLOGY: No sensory deficits, unable to lift L leg off of bed, R hand with pill rolling tremor   SKIN: No rashes, abdominal surgical scar as noted    LABS:                        11.7   8.43  )-----------( 398      ( 10 Mar 2022 07:29 )             38.2     03-10    140  |  102  |  10  ----------------------------<  123<H>  3.8   |  26  |  0.61    Ca    9.2      10 Mar 2022 07:29  Phos  3.5     03-10  Mg     2.2     03-10    TPro  6.9  /  Alb  3.6  /  TBili  0.3  /  DBili  x   /  AST  22  /  ALT  19  /  AlkPhos  85  03-09    PT/INR - ( 10 Mar 2022 07:29 )   PT: 26.0 sec;   INR: 2.22 ratio         PTT - ( 10 Mar 2022 07:29 )  PTT:37.9 sec      Urinalysis Basic - ( 10 Mar 2022 04:20 )    Color: Yellow / Appearance: Slightly Turbid / SG: >1.050 / pH: x  Gluc: x / Ketone: Negative  / Bili: Negative / Urobili: Negative   Blood: x / Protein: 100 / Nitrite: Negative   Leuk Esterase: Large / RBC: 32 /hpf /  /HPF   Sq Epi: x / Non Sq Epi: 1 /hpf / Bacteria: Few

## 2022-03-10 NOTE — CONSULT NOTE ADULT - ASSESSMENT
71 year old female with extensive surgical history including diverticulitis with R colectomy and SB resection, s/p robotic converted to open reversal of Guillermina's, ovarian cystectomy, multiple c-sections, appendectomy and bilateral hip replacements with medical history including HTN, HLD, T2DM, hypothyroidism, COPD, obesity, PE, CVA with paraplegia, atrial fibrillation presenting with abdominal pain and nausea concerning for SBO.

## 2022-03-10 NOTE — PROGRESS NOTE ADULT - ASSESSMENT
Assessment:  71y Female with multiple medical issues, PSH of right hemicolectomy, Hartmanns followed by reversal, chronic ventral hernia here with partial SBO.    Plan:  - patient refusing NGT placement despite multiple attempts  - strict NPO  - gentle IV fluids  - PO meds switched to IV wherever possible, will restart PO meds when tolerating PO  - medical comanagement given extensive medical history; consult medicine team  - daily INR, patient takes warfarin at home for a fib, INR currently 2.5  - will refer to Dr Jimenez for management of hernia (discussed with Dr Jimenez)  - plan dw Dr Martínez    Red Team Assessment:  71y Female with multiple medical issues, PSH of right hemicolectomy, Hartmanns followed by reversal, chronic ventral hernia here with partial SBO.    Plan:  - patient refusing NGT placement despite multiple attempts  - strict NPO  - gentle IV fluids  - PO meds switched to IV wherever possible, will restart PO meds when tolerating PO  - medical comanagement given extensive medical history; consult medicine team; clarify if AC needed and start tx lovenox  - CLD  - Empiric Abx for UTI  - daily INR, patient takes warfarin at home for a fib, INR currently 2.5  - will refer to Dr Jimenez for management of hernia (discussed with Dr Jimenez)  - UA results appreciated  - Urine culture    Red Team

## 2022-03-10 NOTE — CONSULT NOTE ADULT - PROBLEM SELECTOR RECOMMENDATION 8
-Patient with history of OAB and takes Tolterodine 1mg daily however, we only have oxybutynin here  -Would hold oxybutynin for now as it can worsen constipation, restart as tolerated

## 2022-03-10 NOTE — CONSULT NOTE ADULT - PROBLEM SELECTOR RECOMMENDATION 2
-Patient with a history of chronic atrial fibrillation on warfarin 4mg daily with INR goal 2-3  -Would continue warfarin 4mg daily and follow up daily INR  -If patient is going to OR, recommend heparin drip for anticoagulation  -Continue with amiodarone 200mg daily

## 2022-03-10 NOTE — PATIENT PROFILE ADULT - FALL HARM RISK - HARM RISK INTERVENTIONS
Assistance with ambulation/Assistance OOB with selected safe patient handling equipment/Communicate Risk of Fall with Harm to all staff/Discuss with provider need for PT consult/Monitor gait and stability/Reinforce activity limits and safety measures with patient and family/Tailored Fall Risk Interventions/Visual Cue: Yellow wristband and red socks/Bed in lowest position, wheels locked, appropriate side rails in place/Call bell, personal items and telephone in reach/Instruct patient to call for assistance before getting out of bed or chair/Non-slip footwear when patient is out of bed/[place] to call system/Physically safe environment - no spills, clutter or unnecessary equipment/Purposeful Proactive Rounding/Room/bathroom lighting operational, light cord in reach

## 2022-03-10 NOTE — CONSULT NOTE ADULT - PROBLEM SELECTOR RECOMMENDATION 3
-Patient with a history of HTN, continue home doses of lasix 20mg daily, lopressor 25mg BID and diltiazem 180mg daily  -If patient starts taking more PO, can DC fluids as that can raise BP

## 2022-03-10 NOTE — PROGRESS NOTE ADULT - SUBJECTIVE AND OBJECTIVE BOX
[facility] Progress Note    INTERVAL EVENTS: No acute events overnight.  SUBJECTIVE:     OBJECTIVE:    Vital Signs Last 24 Hrs  T(C): 36.6 (10 Mar 2022 00:14), Max: 37.6 (09 Mar 2022 13:58)  T(F): 97.8 (10 Mar 2022 00:14), Max: 99.7 (09 Mar 2022 13:58)  HR: 82 (10 Mar 2022 00:14) (75 - 99)  BP: 140/72 (10 Mar 2022 00:14) (120/73 - 151/70)  BP(mean): --  RR: 18 (10 Mar 2022 00:14) (18 - 21)  SpO2: 99% (10 Mar 2022 00:14) (98% - 100%)I&O's Detail    09 Mar 2022 07:01  -  10 Mar 2022 03:12  --------------------------------------------------------  IN:  Total IN: 0 mL    OUT:    Voided (mL): 50 mL  Total OUT: 50 mL    Total NET: -50 mL      MEDICATIONS  (STANDING):  budesonide 160 MICROgram(s)/formoterol 4.5 MICROgram(s) Inhaler 2 Puff(s) Inhalation two times a day  furosemide   Injectable 20 milliGRAM(s) IV Push daily  lactated ringers. 1000 milliLiter(s) (30 mL/Hr) IV Continuous <Continuous>  levothyroxine Injectable 25 MICROGram(s) IV Push at bedtime  metoprolol tartrate Injectable 2.5 milliGRAM(s) IV Push every 6 hours  pantoprazole  Injectable 40 milliGRAM(s) IV Push daily    MEDICATIONS  (PRN):      Physical Exam:  General: NAD, alert  HEENT: normocephalic, PERRLA  CVS: RRR, no murmur  Chest: CTABL  Abdomen: soft, NTND, no rebound tenderness, well healed scars  Extremities: peripheral pulses palpable    LABS:                        13.3   10.76 )-----------( 485      ( 09 Mar 2022 14:13 )             42.0     03-09    139  |  100  |  11  ----------------------------<  185<H>  4.5   |  26  |  0.65    Ca    9.3      09 Mar 2022 14:13  Mg     2.2     03-09    TPro  6.9  /  Alb  3.6  /  TBili  0.3  /  DBili  x   /  AST  22  /  ALT  19  /  AlkPhos  85  03-09    PT/INR - ( 09 Mar 2022 14:13 )   PT: 29.4 sec;   INR: 2.51 ratio         PTT - ( 09 Mar 2022 14:13 )  PTT:44.8 sec  LIVER FUNCTIONS - ( 09 Mar 2022 14:13 )  Alb: 3.6 g/dL / Pro: 6.9 g/dL / ALK PHOS: 85 U/L / ALT: 19 U/L / AST: 22 U/L / GGT: x             ABO Interpretation: O (03-09-22 @ 16:29)      IMAGING:     [facility] Progress Note    SUBJECTIVE/INTERVAL EVENTS: No acute events overnight.      OBJECTIVE:     Vital Signs Last 24 Hrs  T(C): 36.7 (10 Mar 2022 04:59), Max: 37.6 (09 Mar 2022 13:58)  T(F): 98 (10 Mar 2022 04:59), Max: 99.7 (09 Mar 2022 13:58)  HR: 82 (10 Mar 2022 04:59) (75 - 99)  BP: 169/80 (10 Mar 2022 04:59) (120/73 - 169/80)  BP(mean): --  RR: 18 (10 Mar 2022 04:59) (18 - 21)  SpO2: 96% (10 Mar 2022 04:59) (96% - 100%)      MEDICATIONS  (STANDING):  budesonide 160 MICROgram(s)/formoterol 4.5 MICROgram(s) Inhaler 2 Puff(s) Inhalation two times a day  furosemide   Injectable 20 milliGRAM(s) IV Push daily  lactated ringers. 1000 milliLiter(s) (30 mL/Hr) IV Continuous <Continuous>  levothyroxine Injectable 25 MICROGram(s) IV Push at bedtime  metoprolol tartrate Injectable 2.5 milliGRAM(s) IV Push every 6 hours  pantoprazole  Injectable 40 milliGRAM(s) IV Push daily    MEDICATIONS  (PRN):      Physical Exam:  General: NAD, alert  HEENT: normocephalic, PERRLA  CVS: RRR, no murmur  Chest: CTABL  Abdomen: soft, NTND, no rebound tenderness, erythematous scar LLQ      LABS:                        13.3   10.76 )-----------( 485      ( 09 Mar 2022 14:13 )             42.0     03-09    139  |  100  |  11  ----------------------------<  185<H>  4.5   |  26  |  0.65    Ca    9.3      09 Mar 2022 14:13  Mg     2.2     03-09    TPro  6.9  /  Alb  3.6  /  TBili  0.3  /  DBili  x   /  AST  22  /  ALT  19  /  AlkPhos  85  03-09    PT/INR - ( 09 Mar 2022 14:13 )   PT: 29.4 sec;   INR: 2.51 ratio         PTT - ( 09 Mar 2022 14:13 )  PTT:44.8 sec  LIVER FUNCTIONS - ( 09 Mar 2022 14:13 )  Alb: 3.6 g/dL / Pro: 6.9 g/dL / ALK PHOS: 85 U/L / ALT: 19 U/L / AST: 22 U/L / GGT: x             ABO Interpretation: O (03-09-22 @ 16:29)      IMAGING:

## 2022-03-10 NOTE — CONSULT NOTE ADULT - PROBLEM SELECTOR RECOMMENDATION 4
-Patient with a history of COPD, continue home dose of Albuterol PRN  -Patient also takes Fluticasone-Salmeterol at home-continue Symbicort via therapeutic interchange  -Patient uses 3L oxygen at home but saturating well on room air here, can continue to monitor off of O2 with goal oxygen level>88

## 2022-03-10 NOTE — CONSULT NOTE ADULT - PROBLEM SELECTOR RECOMMENDATION 9
-Patient with a history of GERD, continue home dose of protonix 40mg BID -Patient with dysuria and UA + for 546 WBC and large leukocyte esterase  -Obtain urine culture  -After culture obtained, can start ceftriaxone 1g daily  -Follow up culture results to narrow antibiotics and switch to PO on discharge

## 2022-03-11 ENCOUNTER — TRANSCRIPTION ENCOUNTER (OUTPATIENT)
Age: 72
End: 2022-03-11

## 2022-03-11 LAB
A1C WITH ESTIMATED AVERAGE GLUCOSE RESULT: 6 % — HIGH (ref 4–5.6)
ALBUMIN SERPL ELPH-MCNC: 3.2 G/DL — LOW (ref 3.3–5)
ALP SERPL-CCNC: 67 U/L — SIGNIFICANT CHANGE UP (ref 40–120)
ALT FLD-CCNC: 15 U/L — SIGNIFICANT CHANGE UP (ref 10–45)
ANION GAP SERPL CALC-SCNC: 9 MMOL/L — SIGNIFICANT CHANGE UP (ref 5–17)
APTT BLD: 40.8 SEC — HIGH (ref 27.5–35.5)
AST SERPL-CCNC: 11 U/L — SIGNIFICANT CHANGE UP (ref 10–40)
BILIRUB SERPL-MCNC: 0.2 MG/DL — SIGNIFICANT CHANGE UP (ref 0.2–1.2)
BUN SERPL-MCNC: 9 MG/DL — SIGNIFICANT CHANGE UP (ref 7–23)
CALCIUM SERPL-MCNC: 9 MG/DL — SIGNIFICANT CHANGE UP (ref 8.4–10.5)
CHLORIDE SERPL-SCNC: 102 MMOL/L — SIGNIFICANT CHANGE UP (ref 96–108)
CO2 SERPL-SCNC: 28 MMOL/L — SIGNIFICANT CHANGE UP (ref 22–31)
CREAT SERPL-MCNC: 0.67 MG/DL — SIGNIFICANT CHANGE UP (ref 0.5–1.3)
EGFR: 93 ML/MIN/1.73M2 — SIGNIFICANT CHANGE UP
ESTIMATED AVERAGE GLUCOSE: 126 MG/DL — HIGH (ref 68–114)
GLUCOSE BLDC GLUCOMTR-MCNC: 133 MG/DL — HIGH (ref 70–99)
GLUCOSE BLDC GLUCOMTR-MCNC: 144 MG/DL — HIGH (ref 70–99)
GLUCOSE BLDC GLUCOMTR-MCNC: 181 MG/DL — HIGH (ref 70–99)
GLUCOSE BLDC GLUCOMTR-MCNC: 98 MG/DL — SIGNIFICANT CHANGE UP (ref 70–99)
GLUCOSE SERPL-MCNC: 138 MG/DL — HIGH (ref 70–99)
HCT VFR BLD CALC: 35.7 % — SIGNIFICANT CHANGE UP (ref 34.5–45)
HGB BLD-MCNC: 11.1 G/DL — LOW (ref 11.5–15.5)
INR BLD: 2.31 RATIO — HIGH (ref 0.88–1.16)
MAGNESIUM SERPL-MCNC: 2.1 MG/DL — SIGNIFICANT CHANGE UP (ref 1.6–2.6)
MCHC RBC-ENTMCNC: 26.9 PG — LOW (ref 27–34)
MCHC RBC-ENTMCNC: 31.1 GM/DL — LOW (ref 32–36)
MCV RBC AUTO: 86.7 FL — SIGNIFICANT CHANGE UP (ref 80–100)
NRBC # BLD: 0 /100 WBCS — SIGNIFICANT CHANGE UP (ref 0–0)
PHOSPHATE SERPL-MCNC: 3.2 MG/DL — SIGNIFICANT CHANGE UP (ref 2.5–4.5)
PLATELET # BLD AUTO: 359 K/UL — SIGNIFICANT CHANGE UP (ref 150–400)
POTASSIUM SERPL-MCNC: 4 MMOL/L — SIGNIFICANT CHANGE UP (ref 3.5–5.3)
POTASSIUM SERPL-SCNC: 4 MMOL/L — SIGNIFICANT CHANGE UP (ref 3.5–5.3)
PROT SERPL-MCNC: 5.9 G/DL — LOW (ref 6–8.3)
PROTHROM AB SERPL-ACNC: 27 SEC — HIGH (ref 10.5–13.4)
RBC # BLD: 4.12 M/UL — SIGNIFICANT CHANGE UP (ref 3.8–5.2)
RBC # FLD: 16.4 % — HIGH (ref 10.3–14.5)
SODIUM SERPL-SCNC: 139 MMOL/L — SIGNIFICANT CHANGE UP (ref 135–145)
WBC # BLD: 7.03 K/UL — SIGNIFICANT CHANGE UP (ref 3.8–10.5)
WBC # FLD AUTO: 7.03 K/UL — SIGNIFICANT CHANGE UP (ref 3.8–10.5)

## 2022-03-11 PROCEDURE — 99232 SBSQ HOSP IP/OBS MODERATE 35: CPT

## 2022-03-11 RX ORDER — DICLOFENAC SODIUM 75 MG/1
25 TABLET, DELAYED RELEASE ORAL EVERY 12 HOURS
Refills: 0 | Status: DISCONTINUED | OUTPATIENT
Start: 2022-03-11 | End: 2022-03-12

## 2022-03-11 RX ORDER — WARFARIN SODIUM 2.5 MG/1
4 TABLET ORAL ONCE
Refills: 0 | Status: COMPLETED | OUTPATIENT
Start: 2022-03-11 | End: 2022-03-11

## 2022-03-11 RX ADMIN — DULOXETINE HYDROCHLORIDE 60 MILLIGRAM(S): 30 CAPSULE, DELAYED RELEASE ORAL at 13:43

## 2022-03-11 RX ADMIN — CEFTRIAXONE 100 MILLIGRAM(S): 500 INJECTION, POWDER, FOR SOLUTION INTRAMUSCULAR; INTRAVENOUS at 13:44

## 2022-03-11 RX ADMIN — Medication 2: at 08:04

## 2022-03-11 RX ADMIN — DICLOFENAC SODIUM 25 MILLIGRAM(S): 75 TABLET, DELAYED RELEASE ORAL at 18:44

## 2022-03-11 RX ADMIN — ENOXAPARIN SODIUM 100 MILLIGRAM(S): 100 INJECTION SUBCUTANEOUS at 05:46

## 2022-03-11 RX ADMIN — Medication 25 MILLIGRAM(S): at 05:45

## 2022-03-11 RX ADMIN — PREGABALIN 1000 MICROGRAM(S): 225 CAPSULE ORAL at 13:44

## 2022-03-11 RX ADMIN — ATORVASTATIN CALCIUM 20 MILLIGRAM(S): 80 TABLET, FILM COATED ORAL at 21:40

## 2022-03-11 RX ADMIN — Medication 25 MICROGRAM(S): at 05:48

## 2022-03-11 RX ADMIN — PANTOPRAZOLE SODIUM 40 MILLIGRAM(S): 20 TABLET, DELAYED RELEASE ORAL at 05:45

## 2022-03-11 RX ADMIN — DICLOFENAC SODIUM 25 MILLIGRAM(S): 75 TABLET, DELAYED RELEASE ORAL at 18:14

## 2022-03-11 RX ADMIN — BUDESONIDE AND FORMOTEROL FUMARATE DIHYDRATE 2 PUFF(S): 160; 4.5 AEROSOL RESPIRATORY (INHALATION) at 18:14

## 2022-03-11 RX ADMIN — Medication 100 MILLIGRAM(S): at 05:59

## 2022-03-11 RX ADMIN — BUDESONIDE AND FORMOTEROL FUMARATE DIHYDRATE 2 PUFF(S): 160; 4.5 AEROSOL RESPIRATORY (INHALATION) at 05:45

## 2022-03-11 RX ADMIN — Medication 25 MILLIGRAM(S): at 18:14

## 2022-03-11 RX ADMIN — WARFARIN SODIUM 4 MILLIGRAM(S): 2.5 TABLET ORAL at 21:40

## 2022-03-11 RX ADMIN — PANTOPRAZOLE SODIUM 40 MILLIGRAM(S): 20 TABLET, DELAYED RELEASE ORAL at 18:14

## 2022-03-11 RX ADMIN — Medication 100 MILLIGRAM(S): at 15:34

## 2022-03-11 RX ADMIN — Medication 180 MILLIGRAM(S): at 05:45

## 2022-03-11 RX ADMIN — Medication 20 MILLIGRAM(S): at 05:47

## 2022-03-11 RX ADMIN — Medication 1 SPRAY(S): at 08:04

## 2022-03-11 RX ADMIN — Medication 100 MILLIGRAM(S): at 21:40

## 2022-03-11 RX ADMIN — AMIODARONE HYDROCHLORIDE 200 MILLIGRAM(S): 400 TABLET ORAL at 05:46

## 2022-03-11 NOTE — PROGRESS NOTE ADULT - PROBLEM SELECTOR PLAN 3
on Basaglar 15 units at bedtime, 10 units of Lispro premeal, insulin sliding scale, Glipizide ER 20mg daily and Tradjenta 5mg daily as outpatient  -Patient's most recent glucose 181, increasing now that pt is on diet.   - Continue to hold Long acting and pre-meal insulin to avoid hypoglycemia  - continue ISS, increase insulin as tolerated. Would resume home insulin regimen on discharge  - A1C sent, results pending  -Continue Duloxetine 60mg daily for diabetic neuropathy  -Continue Pregabalin 100mg TID for diabetic neuropathy

## 2022-03-11 NOTE — PROGRESS NOTE ADULT - PROBLEM SELECTOR PLAN 1
Patient with dysuria and UA + for 546 WBC and large leukocyte esterase  -On Ceftriaxone 1gm daily. Urine culture sent, f/u sensitivities  -Follow up culture results to narrow antibiotics and switch to PO on discharge. Would plan to treat for 3-5 day course pending clinical improvement and abx sensitivities

## 2022-03-11 NOTE — DISCHARGE NOTE PROVIDER - NSDCCPCAREPLAN_GEN_ALL_CORE_FT
PRINCIPAL DISCHARGE DIAGNOSIS  Diagnosis: Partial small bowel obstruction  Assessment and Plan of Treatment: NOTIFY YOUR SURGEON IF: You have any bleeding that does not stop, any fever (over 100.4 F) or chills, persistent nausea/vomiting with inability to tolerate food or liquids, persistent diarrhea, or if your pain is not controlled on your discharge pain medications.  FOLLOW-UP:  Please follow up with Dr. Jimenez in 1-2 weeks regarding your hospitalization.       PRINCIPAL DISCHARGE DIAGNOSIS  Diagnosis: Partial small bowel obstruction  Assessment and Plan of Treatment: resolved with conservative management  NOTIFY YOUR SURGEON IF: You have any fever (over 100.4 F) or chills, persistent nausea/vomiting with inability to tolerate food or liquids, lack of bowel movemenst/gas, or if your pain is not controlled on your discharge pain medications.  FOLLOW-UP:  Please follow up with Dr. Jimenez in 1-2 weeks regarding your hospitalization.      SECONDARY DISCHARGE DIAGNOSES  Diagnosis: Acute UTI  Assessment and Plan of Treatment: continue antibiotics as prescribed to complete course  please follow up with your primary doctor for further management     PRINCIPAL DISCHARGE DIAGNOSIS  Diagnosis: Partial small bowel obstruction  Assessment and Plan of Treatment: resolved with conservative management  NOTIFY YOUR SURGEON IF: You have any fever (over 100.4 F) or chills, persistent nausea/vomiting with inability to tolerate food or liquids, lack of bowel movemenst/gas, or if your pain is not controlled on your discharge pain medications.  FOLLOW-UP:  Please follow up with Dr. Jimenez in 1-2 weeks regarding your hospitalization.      SECONDARY DISCHARGE DIAGNOSES  Diagnosis: Acute UTI  Assessment and Plan of Treatment: continue antibiotics as prescribed to complete course  please follow up with your primary doctor for further management    Diagnosis: Diabetes mellitus  Assessment and Plan of Treatment: you were seen by internal medicine while in the hospital, they recommended to resume home diabetic medications on discharge (see discharge medication list- Basaglar 15 units at bedtime, 10 units of Lispro premeal, insulin sliding scale, Glipizide ER 20mg daily and Tradjenta 5mg daily as outpatient)  please follow up with your primary care doctor after discharge for further management     PRINCIPAL DISCHARGE DIAGNOSIS  Diagnosis: Partial small bowel obstruction  Assessment and Plan of Treatment: resolved with conservative management  continue bowel regimen  NOTIFY YOUR SURGEON IF: You have any fever (over 100.4 F) or chills, persistent nausea/vomiting with inability to tolerate food or liquids, lack of bowel movemenst/gas, or if your pain is not controlled on your discharge pain medications.  FOLLOW-UP:  Please follow up with Dr. Jimenez in 1-2 weeks regarding your hospitalization.      SECONDARY DISCHARGE DIAGNOSES  Diagnosis: Acute UTI  Assessment and Plan of Treatment: continue antibiotics as prescribed to complete course  please follow up with your primary doctor for further management    Diagnosis: Chronic atrial fibrillation  Assessment and Plan of Treatment: continue coumadin as per discharge medication list; will need daily INR checks for any further adjustment of coumadin dosing    Diagnosis: Diabetes mellitus  Assessment and Plan of Treatment: you were seen by internal medicine while in the hospital, they recommended to resume home diabetic medications on discharge (see discharge medication list- Basaglar 15 units at bedtime, 10 units of Lispro premeal, insulin sliding scale, Glipizide ER 20mg daily and Tradjenta 5mg daily as outpatient)  please follow up with your primary care doctor after discharge for further management

## 2022-03-11 NOTE — PROGRESS NOTE ADULT - PROBLEM SELECTOR PLAN 9
takes Tolterodine 1mg daily however, we only have oxybutynin here  - Hold oxybutynin for now as it can worsen constipation, restart as tolerated

## 2022-03-11 NOTE — DISCHARGE NOTE PROVIDER - NSDCACTIVITY_GEN_ALL_CORE
No heavy lifting/straining/Follow Instructions Provided by your Surgical Team Showering allowed/Follow Instructions Provided by your Surgical Team

## 2022-03-11 NOTE — PROGRESS NOTE ADULT - ASSESSMENT
Assessment:  71y Female with multiple medical issues, PSH of right hemicolectomy, Hartmanns followed by reversal, chronic ventral hernia here with partial SBO.    Plan:  - patient refusing NGT placement despite multiple attempts  - strict NPO  - gentle IV fluids  - PO meds switched to IV wherever possible, will restart PO meds when tolerating PO  - medical comanagement given extensive medical history; consult medicine team; clarify if AC needed and start tx lovenox  - CLD  - Empiric Abx for UTI  - daily INR, patient takes warfarin at home for a fib, INR currently 2.5  - will refer to Dr Jimenez for management of hernia (discussed with Dr Jimenez)  - UA results appreciated  - Urine culture    Red Team Assessment:  71y Female with multiple medical issues, PSH of right hemicolectomy, Hartmanns followed by reversal, chronic ventral hernia here with partial SBO.    Plan:  - Diet: pt tolerated diet, will advance to LRD  - Appreciate Medicine recs, pt to be restarted on coumadin today  - CLD  - Empiric Abx for UTI  - daily INR, patient takes warfarin at home for a fib, INR currently 2.5  - will refer to Dr Jimenez for management of hernia (discussed with Dr Jimenez)  - Dispo: Awaiting urine culture results to determine antibiotic regimen    Red Team  [phone]

## 2022-03-11 NOTE — PROGRESS NOTE ADULT - PROBLEM SELECTOR PLAN 4
BP better today. Overall BP at goal.  Continue lopressor 25mg BID and diltiazem 180mg daily  Would d/c IVF as pt is now on diet as is also on Lasix

## 2022-03-11 NOTE — DISCHARGE NOTE PROVIDER - NSDCMRMEDTOKEN_GEN_ALL_CORE_FT
amiodarone 200 mg oral tablet: 1 tab(s) orally once a day  atorvastatin 20 mg oral tablet: 1 tab(s) orally once a day  dilTIAZem 180 mg/24 hours oral tablet, extended release: 1 tab(s) orally once a day  DULoxetine 60 mg oral delayed release capsule: 1 cap(s) orally once a day  fluticasone CFC free 220 mcg/inh inhalation aerosol: 1 puff(s) inhaled 2 times a day  Lasix 20 mg oral tablet: 1 tab(s) orally once a day  metoprolol tartrate 25 mg oral tablet: 1 tab(s) orally 2 times a day  omeprazole 40 mg oral delayed release capsule: 1 cap(s) orally once a day  tolterodine 1 mg oral tablet: 1 tab(s) orally 2 times a day  warfarin 1 mg oral tablet: 1 tab(s) orally once a day   amiodarone 200 mg oral tablet: 1 tab(s) orally once a day  atorvastatin 20 mg oral tablet: 1 tab(s) orally once a day  dilTIAZem 180 mg/24 hours oral tablet, extended release: 1 tab(s) orally once a day  DULoxetine 60 mg oral delayed release capsule: 1 cap(s) orally once a day  fluticasone CFC free 220 mcg/inh inhalation aerosol: 1 puff(s) inhaled 2 times a day  Lasix 20 mg oral tablet: 1 tab(s) orally once a day  metoprolol tartrate 25 mg oral tablet: 1 tab(s) orally 2 times a day  omeprazole 40 mg oral delayed release capsule: 1 cap(s) orally once a day  polyethylene glycol 3350 oral powder for reconstitution: 17 gram(s) orally 2 times a day  warfarin 1 mg oral tablet: 1 tab(s) orally once a day   albuterol 90 mcg/inh inhalation aerosol: 2 puff(s) inhaled every 6 hours, As needed, Shortness of Breath and/or Wheezing  amiodarone 200 mg oral tablet: 1 tab(s) orally once a day  amoxicillin-clavulanate 875 mg-125 mg oral tablet: 1 tab(s) orally every 12 hours through 3/19    atorvastatin 20 mg oral tablet: 1 tab(s) orally once a day  budesonide-formoterol 160 mcg-4.5 mcg/inh inhalation aerosol: 2 puff(s) inhaled 2 times a day  cholecalciferol oral tablet: 32465 unit(s) orally once  cyanocobalamin 1000 mcg oral tablet: 1 tab(s) orally once a day  dilTIAZem 180 mg/24 hours oral tablet, extended release: 1 tab(s) orally once a day  DULoxetine 60 mg oral delayed release capsule: 1 cap(s) orally once a day  fluticasone 50 mcg/inh nasal spray: 1 spray(s) nasal   fluticasone CFC free 220 mcg/inh inhalation aerosol: 1 puff(s) inhaled 2 times a day  Lasix 20 mg oral tablet: 1 tab(s) orally once a day  levothyroxine 25 mcg (0.025 mg) oral tablet: 1 tab(s) orally once a day  methocarbamol 500 mg oral tablet: 1 tab(s) orally every 6 hours  metoprolol tartrate 25 mg oral tablet: 1 tab(s) orally 2 times a day  oxyCODONE: 2.5 milligram(s) orally every 4-6 hours, As Needed for moderate joint/muscle pain  pantoprazole 40 mg oral delayed release tablet: 1 tab(s) orally every 12 hours  polyethylene glycol 3350 oral powder for reconstitution: 17 gram(s) orally 2 times a day  pregabalin 100 mg oral capsule: 1 cap(s) orally 3 times a day  warfarin 1 mg oral tablet: 1 tab(s) orally once a day   Admelog 100 units/mL injectable solution: 10 unit(s) injectable 3 times a day (before meals)  albuterol 90 mcg/inh inhalation aerosol: 2 puff(s) inhaled every 6 hours, As needed, Shortness of Breath and/or Wheezing  amiodarone 200 mg oral tablet: 1 tab(s) orally once a day  amoxicillin-clavulanate 875 mg-125 mg oral tablet: 1 tab(s) orally every 12 hours through 3/19    atorvastatin 20 mg oral tablet: 1 tab(s) orally once a day  Basaglar KwikPen 100 units/mL subcutaneous solution: 15 unit(s) subcutaneous once a day (at bedtime)  budesonide-formoterol 160 mcg-4.5 mcg/inh inhalation aerosol: 2 puff(s) inhaled 2 times a day  cholecalciferol oral tablet: 69537 unit(s) orally once  cyanocobalamin 1000 mcg oral tablet: 1 tab(s) orally once a day  dilTIAZem 180 mg/24 hours oral tablet, extended release: 1 tab(s) orally once a day  DULoxetine 60 mg oral delayed release capsule: 1 cap(s) orally once a day  fluticasone 50 mcg/inh nasal spray: 1 spray(s) nasal   fluticasone CFC free 220 mcg/inh inhalation aerosol: 1 puff(s) inhaled 2 times a day  glipiZIDE extended release: 20 milligram(s) orally once a day  Lasix 20 mg oral tablet: 1 tab(s) orally once a day  levothyroxine 25 mcg (0.025 mg) oral tablet: 1 tab(s) orally once a day  methocarbamol 500 mg oral tablet: 1 tab(s) orally every 6 hours  metoprolol tartrate 25 mg oral tablet: 1 tab(s) orally 2 times a day  oxyCODONE: 2.5 milligram(s) orally every 4-6 hours, As Needed for moderate joint/muscle pain  pantoprazole 40 mg oral delayed release tablet: 1 tab(s) orally every 12 hours  polyethylene glycol 3350 oral powder for reconstitution: 17 gram(s) orally 2 times a day  pregabalin 100 mg oral capsule: 1 cap(s) orally 3 times a day  Tradjenta 5 mg oral tablet: 1 tab(s) orally once a day  warfarin 1 mg oral tablet: 1 tab(s) orally once a day   Admelog 100 units/mL injectable solution: 10 unit(s) injectable 3 times a day (before meals)  albuterol 90 mcg/inh inhalation aerosol: 2 puff(s) inhaled every 6 hours, As needed, Shortness of Breath and/or Wheezing  amiodarone 200 mg oral tablet: 1 tab(s) orally once a day  amoxicillin-clavulanate 875 mg-125 mg oral tablet: 1 tab(s) orally every 12 hours through 3/19    atorvastatin 20 mg oral tablet: 1 tab(s) orally once a day  Basaglar KwikPen 100 units/mL subcutaneous solution: 15 unit(s) subcutaneous once a day (at bedtime)  budesonide-formoterol 160 mcg-4.5 mcg/inh inhalation aerosol: 2 puff(s) inhaled 2 times a day  cholecalciferol oral tablet: 91086 unit(s) orally once  Coumadin 4 mg oral tablet: 1 tab(s) orally once a day (at bedtime); check daily INR&#x27;s and adjust medication as needed (3/14/22 INR = 2.1)  cyanocobalamin 1000 mcg oral tablet: 1 tab(s) orally once a day  dilTIAZem 180 mg/24 hours oral tablet, extended release: 1 tab(s) orally once a day  DULoxetine 60 mg oral delayed release capsule: 1 cap(s) orally once a day  fluticasone 50 mcg/inh nasal spray: 1 spray(s) nasal once a day  glipiZIDE extended release: 20 milligram(s) orally once a day  Lasix 20 mg oral tablet: 1 tab(s) orally once a day  levothyroxine 25 mcg (0.025 mg) oral tablet: 1 tab(s) orally once a day  methocarbamol 500 mg oral tablet: 1 tab(s) orally every 6 hours  metoprolol tartrate 25 mg oral tablet: 1 tab(s) orally 2 times a day  oxyCODONE: 2.5 milligram(s) orally every 4-6 hours, As Needed for moderate joint/muscle pain  pantoprazole 40 mg oral delayed release tablet: 1 tab(s) orally every 12 hours  polyethylene glycol 3350 oral powder for reconstitution: 17 gram(s) orally 2 times a day  pregabalin 100 mg oral capsule: 1 cap(s) orally 3 times a day  Tradjenta 5 mg oral tablet: 1 tab(s) orally once a day   Admelog 100 units/mL injectable solution: 10 unit(s) injectable 3 times a day (before meals)  albuterol 90 mcg/inh inhalation aerosol: 2 puff(s) inhaled every 6 hours, As needed, Shortness of Breath and/or Wheezing  amiodarone 200 mg oral tablet: 1 tab(s) orally once a day  amoxicillin-clavulanate 875 mg-125 mg oral tablet: 1 tab(s) orally every 12 hours through 3/19    atorvastatin 20 mg oral tablet: 1 tab(s) orally once a day  Basaglar KwikPen 100 units/mL subcutaneous solution: 15 unit(s) subcutaneous once a day (at bedtime)  budesonide-formoterol 160 mcg-4.5 mcg/inh inhalation aerosol: 2 puff(s) inhaled 2 times a day  cholecalciferol oral tablet: 03499 unit(s) orally once  Coumadin 4 mg oral tablet: 1 tab(s) orally once a day (at bedtime); check daily INR&#x27;s and adjust medication as needed (3/14/22 INR = 2.1)  cyanocobalamin 1000 mcg oral tablet: 1 tab(s) orally once a day  dilTIAZem 180 mg/24 hours oral tablet, extended release: 1 tab(s) orally once a day  DULoxetine 60 mg oral delayed release capsule: 1 cap(s) orally once a day  fluticasone 50 mcg/inh nasal spray: 1 spray(s) nasal once a day  glipiZIDE extended release: 20 milligram(s) orally once a day  Lasix 20 mg oral tablet: 1 tab(s) orally once a day  levothyroxine 25 mcg (0.025 mg) oral tablet: 1 tab(s) orally once a day  methocarbamol 500 mg oral tablet: 1 tab(s) orally every 6 hours  metoprolol tartrate 25 mg oral tablet: 1 tab(s) orally 2 times a day  oxyCODONE: 2.5 milligram(s) orally every 4-6 hours, As Needed for moderate joint/muscle pain  pantoprazole 40 mg oral delayed release tablet: 1 tab(s) orally every 12 hours  polyethylene glycol 3350 oral powder for reconstitution: 17 gram(s) orally 2 times a day  pregabalin 100 mg oral capsule: 1 cap(s) orally 3 times a day  Senna 8.6 mg oral tablet: 1 tab(s) orally once a day (at bedtime  hold for diarrhea)  Tradjenta 5 mg oral tablet: 1 tab(s) orally once a day

## 2022-03-11 NOTE — DISCHARGE NOTE PROVIDER - HOSPITAL COURSE
71 year old female with extensive surgical history including diverticulitis with R colectomy and SB resection, s/p robotic converted to open reversal of Guillermina's, ovarian cystectomy, multiple c-sections, appendectomy and bilateral hip replacements with medical history including HTN, HLD, T2DM, hypothyroidism, COPD, obesity, PE, CVA with paraplegia, atrial fibrillation presenting with abdominal pain and nausea concerning for SBO. The patient endorsed RLQ pain with multiple episodes of NBNB that prompted an abdominal Xray at her facility that showed possible SBO so she was sent to the ED. In the ER, she was found to have partial SBO and admitted to the surgical service. Patient was monitored on the surgical service and diet advanced as tolerated.   Patient with dysuria and UA + for 546 WBC and large leukocyte esterase. He was placed on Ceftriaxone 1gm daily. Urine culture was sent and followed. Once sensitivities returned patient placed on appropriate antibx. On day of discharge, the patient was tolerating diet, having gi function and pain controlled. All questions were answered and patient safely discharged to rehab. 71 year old female with extensive surgical history including diverticulitis with R colectomy and SB resection, s/p robotic converted to open reversal of Guillermina's, ovarian cystectomy, multiple c-sections, appendectomy and bilateral hip replacements with medical history including HTN, HLD, T2DM, hypothyroidism, COPD, obesity, PE, CVA with paraplegia, atrial fibrillation presenting with abdominal pain and nausea concerning for SBO. The patient endorsed RLQ pain with multiple episodes of NBNB that prompted an abdominal Xray at her facility that showed possible SBO so she was sent to the ED. In the ER, she was found to have partial SBO and admitted to the surgical service. Patient was monitored on the surgical service and diet advanced as tolerated.   Patient with dysuria and UA + for 546 WBC and large leukocyte esterase. He was placed on Ceftriaxone 1gm daily. Urine culture was sent and followed. Once sensitivities returned patient placed on appropriate antibx. INR followerd and continued on home coumadin dosing, will need follow up/monitoring at rehab. On day of discharge, the patient was tolerating diet, having gi function and pain controlled. All questions were answered and patient safely discharged to rehab. 71 year old female with extensive surgical history including diverticulitis with R colectomy and SB resection, s/p robotic converted to open reversal of Guillermina's, ovarian cystectomy, multiple c-sections, appendectomy and bilateral hip replacements with medical history including HTN, HLD, T2DM, hypothyroidism, COPD, obesity, PE, CVA with paraplegia, atrial fibrillation presenting with abdominal pain and nausea concerning for SBO. The patient endorsed RLQ pain with multiple episodes of NBNB that prompted an abdominal Xray at her facility that showed possible SBO so she was sent to the ED. In the ER, she was found to have partial SBO and admitted to the surgical service. Patient was monitored on the surgical service and diet advanced as tolerated.   Patient with dysuria and UA + for 546 WBC and large leukocyte esterase. He was placed on Ceftriaxone 1gm daily. Urine culture was sent and followed. Once sensitivities returned patient placed on appropriate antibx as per medicine recd (ucx growing e faecalis, switched to augmentin for 5 more days). INR followerd and continued on home coumadin dosing, will need follow up/monitoring at rehab. On day of discharge, the patient was tolerating diet, having gi function and pain controlled. All questions were answered and patient safely discharged to rehab. 71 year old female with extensive surgical history including diverticulitis with R colectomy and SB resection, s/p robotic converted to open reversal of Guillermina's, ovarian cystectomy, multiple c-sections, appendectomy and bilateral hip replacements with medical history including HTN, HLD, T2DM, hypothyroidism, COPD, obesity, PE, CVA with paraplegia, atrial fibrillation presenting with abdominal pain and nausea concerning for SBO. The patient endorsed RLQ pain with multiple episodes of NBNB that prompted an abdominal Xray at her facility that showed possible SBO so she was sent to the ED. In the ER, she was found to have partial SBO and admitted to the surgical service. Patient was monitored on the surgical service and diet advanced as tolerated.   Patient with dysuria and UA + for 546 WBC and large leukocyte esterase. He was placed on Ceftriaxone 1gm daily. Urine culture was sent and followed. Once sensitivities returned patient placed on appropriate antibx as per medicine recd (ucx growing e faecalis, switched to augmentin for 5 more days). INR followerd and continued on home coumadin dosing, will need follow up/monitoring at rehab. On dc, medicine recommending to resume home diabetic medications ( Basaglar 15 units at bedtime, 10 units of Lispro premeal, insulin sliding scale, Glipizide ER 20mg daily and Tradjenta 5mg daily as outpatient)On day of discharge, the patient was tolerating diet, having gi function and pain controlled. All questions were answered and patient safely discharged to rehab. 71 year old female with extensive surgical history including diverticulitis with R colectomy and SB resection, s/p robotic converted to open reversal of Guillermina's, ovarian cystectomy, multiple c-sections, appendectomy and bilateral hip replacements with medical history including HTN, HLD, T2DM, hypothyroidism, COPD, obesity, PE, CVA with paraplegia, atrial fibrillation presenting with abdominal pain and nausea concerning for SBO. The patient endorsed RLQ pain with multiple episodes of NBNB that prompted an abdominal Xray at her facility that showed possible SBO so she was sent to the ED. In the ER, she was found to have partial SBO and admitted to the surgical service. Patient was monitored  conservatively on the surgical service and diet advanced as tolerated.   Patient with dysuria and UA + for 546 WBC and large leukocyte esterase. He was placed on Ceftriaxone 1gm daily. Urine culture was sent and followed. Once sensitivities returned patient placed on appropriate antibx as per medicine recd (ucx growing e faecalis, switched to augmentin for 5 more days per medicine recs). INR followerd and continued on home coumadin dosing, will need follow up/monitoring at rehab. On dc, medicine recommending to resume home diabetic medications ( Basaglar 15 units at bedtime, 10 units of Lispro premeal, insulin sliding scale, Glipizide ER 20mg daily and Tradjenta 5mg daily as outpatient)On day of discharge, the patient was tolerating diet, having gi function and pain controlled. All questions were answered and patient safely discharged to rehab.

## 2022-03-11 NOTE — PROGRESS NOTE ADULT - PROBLEM SELECTOR PLAN 6
history of CHF with preserved EF (75% on echo in 2018). Not in exacerbation  - continue with home dose of lasix 20mg daily

## 2022-03-11 NOTE — PROGRESS NOTE ADULT - PROBLEM SELECTOR PLAN 2
Patient with a history of chronic atrial fibrillation on warfarin 4mg daily with INR goal 2-3.   - Currently INR is at goal. 2.3. Given pt is therapeutically anticoagulated would d/c therapeutic Lovenox  -Would continue warfarin 4mg daily and follow up daily INR  -Continue with amiodarone 200mg daily for rhythm control and  Lopressor and Diltiazem for rate control 4.6

## 2022-03-11 NOTE — PROGRESS NOTE ADULT - PROBLEM SELECTOR PLAN 5
Not in exacerbation at present.  - continue home dose of Albuterol PRN  - continue Symbicort via therapeutic interchange (on Fluticasone-Salmeterol at home)  - Patient uses 3L PRN oxygen at home. Saturating well on room air here, can continue to monitor off of O2 with goal oxygen level>88.

## 2022-03-11 NOTE — PROGRESS NOTE ADULT - SUBJECTIVE AND OBJECTIVE BOX
TEAM Surgery Progress Note    INTERVAL EVENTS: No acute events overnight.  SUBJECTIVE: Patient seen and examined at bedside with surgical team.    OBJECTIVE:    Vital Signs Last 24 Hrs  T(C): 36.6 (11 Mar 2022 00:45), Max: 37 (10 Mar 2022 13:21)  T(F): 97.8 (11 Mar 2022 00:45), Max: 98.6 (10 Mar 2022 13:21)  HR: 66 (11 Mar 2022 00:45) (66 - 82)  BP: 135/64 (11 Mar 2022 00:45) (135/64 - 169/80)  BP(mean): --  RR: 19 (11 Mar 2022 00:45) (18 - 19)  SpO2: 100% (11 Mar 2022 00:45) (96% - 100%)I&O's Detail    09 Mar 2022 07:01  -  10 Mar 2022 07:00  --------------------------------------------------------  IN:  Total IN: 0 mL    OUT:    Voided (mL): 750 mL  Total OUT: 750 mL    Total NET: -750 mL      10 Mar 2022 07:01  -  11 Mar 2022 02:32  --------------------------------------------------------  IN:    IV PiggyBack: 50 mL    Lactated Ringers: 855 mL    Oral Fluid: 580 mL  Total IN: 1485 mL    OUT:    Intermittent Catheterization - Urethral (mL): 100 mL    Voided (mL): 650 mL  Total OUT: 750 mL    Total NET: 735 mL      MEDICATIONS  (STANDING):  aMIOdarone    Tablet 200 milliGRAM(s) Oral daily  atorvastatin 20 milliGRAM(s) Oral at bedtime  budesonide 160 MICROgram(s)/formoterol 4.5 MICROgram(s) Inhaler 2 Puff(s) Inhalation two times a day  cefTRIAXone   IVPB 1000 milliGRAM(s) IV Intermittent every 24 hours  cyanocobalamin 1000 MICROGram(s) Oral daily  dextrose 40% Gel 15 Gram(s) Oral once  dextrose 5%. 1000 milliLiter(s) (50 mL/Hr) IV Continuous <Continuous>  dextrose 5%. 1000 milliLiter(s) (100 mL/Hr) IV Continuous <Continuous>  dextrose 50% Injectable 25 Gram(s) IV Push once  dextrose 50% Injectable 12.5 Gram(s) IV Push once  dextrose 50% Injectable 25 Gram(s) IV Push once  diltiazem    milliGRAM(s) Oral daily  DULoxetine 60 milliGRAM(s) Oral daily  enoxaparin Injectable 100 milliGRAM(s) SubCutaneous every 12 hours  fluticasone propionate 50 MICROgram(s)/spray Nasal Spray 1 Spray(s) Both Nostrils <User Schedule>  furosemide    Tablet 20 milliGRAM(s) Oral daily  glucagon  Injectable 1 milliGRAM(s) IntraMuscular once  insulin lispro (ADMELOG) corrective regimen sliding scale   SubCutaneous three times a day before meals  insulin lispro (ADMELOG) corrective regimen sliding scale   SubCutaneous at bedtime  lactated ringers. 1000 milliLiter(s) (75 mL/Hr) IV Continuous <Continuous>  levothyroxine 25 MICROGram(s) Oral daily  metoprolol tartrate 25 milliGRAM(s) Oral two times a day  pantoprazole    Tablet 40 milliGRAM(s) Oral every 12 hours  pregabalin 100 milliGRAM(s) Oral three times a day    MEDICATIONS  (PRN):  ALBUTerol    90 MICROgram(s) HFA Inhaler 2 Puff(s) Inhalation every 6 hours PRN Shortness of Breath and/or Wheezing    Physical Exam:  General: NAD, alert  HEENT: normocephalic, PERRLA  CVS: RRR, no murmur  Chest: CTABL  Abdomen: soft, NTND, no rebound tenderness, erythematous scar LLQ        LABS:                        11.7   8.43  )-----------( 398      ( 10 Mar 2022 07:29 )             38.2     03-10    140  |  102  |  10  ----------------------------<  123<H>  3.8   |  26  |  0.61    Ca    9.2      10 Mar 2022 07:29  Phos  3.5     03-10  Mg     2.2     03-10    TPro  6.9  /  Alb  3.6  /  TBili  0.3  /  DBili  x   /  AST  22  /  ALT  19  /  AlkPhos  85  03-09    PT/INR - ( 10 Mar 2022 07:29 )   PT: 26.0 sec;   INR: 2.22 ratio         PTT - ( 10 Mar 2022 07:29 )  PTT:37.9 sec  LIVER FUNCTIONS - ( 09 Mar 2022 14:13 )  Alb: 3.6 g/dL / Pro: 6.9 g/dL / ALK PHOS: 85 U/L / ALT: 19 U/L / AST: 22 U/L / GGT: x           Urinalysis Basic - ( 10 Mar 2022 04:20 )    Color: Yellow / Appearance: Slightly Turbid / SG: >1.050 / pH: x  Gluc: x / Ketone: Negative  / Bili: Negative / Urobili: Negative   Blood: x / Protein: 100 / Nitrite: Negative   Leuk Esterase: Large / RBC: 32 /hpf /  /HPF   Sq Epi: x / Non Sq Epi: 1 /hpf / Bacteria: Few          IMAGING:     TEAM Surgery Progress Note    INTERVAL EVENTS: No acute events overnight.  SUBJECTIVE: Patient seen and examined at bedside with surgical team. Patient reports tolerating clear liquid diet. She reports passing gas and denies nausea/vomiting.   OBJECTIVE:  Vital Signs Last 24 Hrs  T(C): 36.6 (11 Mar 2022 00:45), Max: 37 (10 Mar 2022 13:21)  T(F): 97.8 (11 Mar 2022 00:45), Max: 98.6 (10 Mar 2022 13:21)  HR: 66 (11 Mar 2022 00:45) (66 - 82)  BP: 135/64 (11 Mar 2022 00:45) (135/64 - 169/80)  BP(mean): --  RR: 19 (11 Mar 2022 00:45) (18 - 19)  SpO2: 100% (11 Mar 2022 00:45) (96% - 100%)I&O's Detail    09 Mar 2022 07:01  -  10 Mar 2022 07:00  --------------------------------------------------------  IN:  Total IN: 0 mL    OUT:    Voided (mL): 750 mL  Total OUT: 750 mL  Total NET: -750 mL      10 Mar 2022 07:01  -  11 Mar 2022 02:32  --------------------------------------------------------  IN:    IV PiggyBack: 50 mL    Lactated Ringers: 855 mL    Oral Fluid: 580 mL  Total IN: 1485 mL    OUT:    Intermittent Catheterization - Urethral (mL): 100 mL    Voided (mL): 650 mL  Total OUT: 750 mL  Total NET: 735 mL    MEDICATIONS  (STANDING):  aMIOdarone    Tablet 200 milliGRAM(s) Oral daily  atorvastatin 20 milliGRAM(s) Oral at bedtime  budesonide 160 MICROgram(s)/formoterol 4.5 MICROgram(s) Inhaler 2 Puff(s) Inhalation two times a day  cefTRIAXone   IVPB 1000 milliGRAM(s) IV Intermittent every 24 hours  cyanocobalamin 1000 MICROGram(s) Oral daily  dextrose 40% Gel 15 Gram(s) Oral once  dextrose 5%. 1000 milliLiter(s) (50 mL/Hr) IV Continuous <Continuous>  dextrose 5%. 1000 milliLiter(s) (100 mL/Hr) IV Continuous <Continuous>  dextrose 50% Injectable 25 Gram(s) IV Push once  dextrose 50% Injectable 12.5 Gram(s) IV Push once  dextrose 50% Injectable 25 Gram(s) IV Push once  diltiazem    milliGRAM(s) Oral daily  DULoxetine 60 milliGRAM(s) Oral daily  enoxaparin Injectable 100 milliGRAM(s) SubCutaneous every 12 hours  fluticasone propionate 50 MICROgram(s)/spray Nasal Spray 1 Spray(s) Both Nostrils <User Schedule>  furosemide    Tablet 20 milliGRAM(s) Oral daily  glucagon  Injectable 1 milliGRAM(s) IntraMuscular once  insulin lispro (ADMELOG) corrective regimen sliding scale   SubCutaneous three times a day before meals  insulin lispro (ADMELOG) corrective regimen sliding scale   SubCutaneous at bedtime  lactated ringers. 1000 milliLiter(s) (75 mL/Hr) IV Continuous <Continuous>  levothyroxine 25 MICROGram(s) Oral daily  metoprolol tartrate 25 milliGRAM(s) Oral two times a day  pantoprazole    Tablet 40 milliGRAM(s) Oral every 12 hours  pregabalin 100 milliGRAM(s) Oral three times a day    MEDICATIONS  (PRN):  ALBUTerol    90 MICROgram(s) HFA Inhaler 2 Puff(s) Inhalation every 6 hours PRN Shortness of Breath and/or Wheezing    Physical Exam:  General: NAD, alert  HEENT: normocephalic, PERRLA  Chest: No labored breathing noted   Abdomen: soft, NTND, no rebound tenderness, erythematous scar LLQ        LABS:                        11.7   8.43  )-----------( 398      ( 10 Mar 2022 07:29 )             38.2     03-10    140  |  102  |  10  ----------------------------<  123<H>  3.8   |  26  |  0.61    Ca    9.2      10 Mar 2022 07:29  Phos  3.5     03-10  Mg     2.2     03-10    TPro  6.9  /  Alb  3.6  /  TBili  0.3  /  DBili  x   /  AST  22  /  ALT  19  /  AlkPhos  85  03-09  PT/INR - ( 10 Mar 2022 07:29 )   PT: 26.0 sec;   INR: 2.22 ratio         PTT - ( 10 Mar 2022 07:29 )  PTT:37.9 sec  LIVER FUNCTIONS - ( 09 Mar 2022 14:13 )  Alb: 3.6 g/dL / Pro: 6.9 g/dL / ALK PHOS: 85 U/L / ALT: 19 U/L / AST: 22 U/L / GGT: x           Urinalysis Basic - ( 10 Mar 2022 04:20 )  Color: Yellow / Appearance: Slightly Turbid / SG: >1.050 / pH: x  Gluc: x / Ketone: Negative  / Bili: Negative / Urobili: Negative   Blood: x / Protein: 100 / Nitrite: Negative   Leuk Esterase: Large / RBC: 32 /hpf /  /HPF   Sq Epi: x / Non Sq Epi: 1 /hpf / Bacteria: Few    IMAGING:

## 2022-03-11 NOTE — DISCHARGE NOTE PROVIDER - CARE PROVIDER_API CALL
Nathan Jimenez)  Surgery  [address] [place] [address]  Phone: [phone]  Fax: [phone]  Follow Up Time: 2 weeks

## 2022-03-11 NOTE — PROGRESS NOTE ADULT - SUBJECTIVE AND OBJECTIVE BOX
Rosendo Damon MD  Division of Hospital Medicine  Available via MS teams  If no response or off hours page: [phone]  ---------------------------------------------------------    RACHANA BARGER  71y  Female      Patient is a 71y old  Female who presents with a chief complaint of partial SBO (11 Mar 2022 02:32)      INTERVAL HPI/OVERNIGHT EVENTS:  Seen at bedside. States is passing gas, had 6-7 BM over last 24 hrs, states they were soft. No abdominal pain.       REVIEW OF SYSTEMS: 10 point ROS negative unless listed above    T(C): 36.8 (03-11-22 @ 04:43), Max: 37 (03-10-22 @ 13:21)  HR: 62 (03-11-22 @ 04:43) (62 - 81)  BP: 138/64 (03-11-22 @ 04:43) (135/64 - 165/83)  RR: 18 (03-11-22 @ 04:43) (18 - 19)  SpO2: 98% (03-11-22 @ 04:43) (96% - 100%)  Wt(kg): --Vital Signs Last 24 Hrs  T(C): 36.8 (11 Mar 2022 04:43), Max: 37 (10 Mar 2022 13:21)  T(F): 98.2 (11 Mar 2022 04:43), Max: 98.6 (10 Mar 2022 13:21)  HR: 62 (11 Mar 2022 04:43) (62 - 81)  BP: 138/64 (11 Mar 2022 04:43) (135/64 - 165/83)  BP(mean): --  RR: 18 (11 Mar 2022 04:43) (18 - 19)  SpO2: 98% (11 Mar 2022 04:43) (96% - 100%)    PHYSICAL EXAM:  CONSTITUTIONAL: Elderly, chronically ill appearing female laying in bed NAD  RESPIRATORY: Normal respiratory effort; lungs with bilateral wheezing but no rhonchi or rales  CARDIOVASCULAR: Regular rate and rhythm, normal S1 and S2, no murmur/rub/gallop  ABDOMEN: Soft, nontender to palpation, normoactive bowel sounds, protuberant, erythematous LLQ scar  PSYCH: A+O to person, place, and time; flat affect  NEUROLOGY: No sensory deficits, unable to lift L leg off of bed, R hand with pill rolling tremor   SKIN: No rashes, abdominal surgical scar as noted    Consultant(s) Notes Reviewed:  [x ] YES  [ ] NO  Care Discussed with Consultants/Other Providers [ x] YES  [ ] NO    LABS:                        11.1   7.03  )-----------( 359      ( 11 Mar 2022 07:12 )             35.7     03-11    139  |  102  |  9   ----------------------------<  138<H>  4.0   |  28  |  0.67    Ca    9.0      11 Mar 2022 07:14  Phos  3.2     03-11  Mg     2.1     03-11    TPro  5.9<L>  /  Alb  3.2<L>  /  TBili  0.2  /  DBili  x   /  AST  11  /  ALT  15  /  AlkPhos  67  03-11    PT/INR - ( 11 Mar 2022 07:16 )   PT: 27.0 sec;   INR: 2.31 ratio         PTT - ( 11 Mar 2022 07:16 )  PTT:40.8 sec  Urinalysis Basic - ( 10 Mar 2022 04:20 )    Color: Yellow / Appearance: Slightly Turbid / SG: >1.050 / pH: x  Gluc: x / Ketone: Negative  / Bili: Negative / Urobili: Negative   Blood: x / Protein: 100 / Nitrite: Negative   Leuk Esterase: Large / RBC: 32 /hpf /  /HPF   Sq Epi: x / Non Sq Epi: 1 /hpf / Bacteria: Few      CAPILLARY BLOOD GLUCOSE      POCT Blood Glucose.: 181 mg/dL (11 Mar 2022 07:57)  POCT Blood Glucose.: 85 mg/dL (10 Mar 2022 21:26)  POCT Blood Glucose.: 120 mg/dL (10 Mar 2022 16:46)  POCT Blood Glucose.: 120 mg/dL (10 Mar 2022 11:58)        Urinalysis Basic - ( 10 Mar 2022 04:20 )    Color: Yellow / Appearance: Slightly Turbid / SG: >1.050 / pH: x  Gluc: x / Ketone: Negative  / Bili: Negative / Urobili: Negative   Blood: x / Protein: 100 / Nitrite: Negative   Leuk Esterase: Large / RBC: 32 /hpf /  /HPF   Sq Epi: x / Non Sq Epi: 1 /hpf / Bacteria: Few        RADIOLOGY & ADDITIONAL TESTS:    Imaging Personally Reviewed:  [ ] YES  [ ] NO

## 2022-03-12 LAB
ALBUMIN SERPL ELPH-MCNC: 3.2 G/DL — LOW (ref 3.3–5)
ALP SERPL-CCNC: 66 U/L — SIGNIFICANT CHANGE UP (ref 40–120)
ALT FLD-CCNC: 17 U/L — SIGNIFICANT CHANGE UP (ref 10–45)
ANION GAP SERPL CALC-SCNC: 13 MMOL/L — SIGNIFICANT CHANGE UP (ref 5–17)
APTT BLD: 36.4 SEC — HIGH (ref 27.5–35.5)
AST SERPL-CCNC: 15 U/L — SIGNIFICANT CHANGE UP (ref 10–40)
BILIRUB SERPL-MCNC: 0.2 MG/DL — SIGNIFICANT CHANGE UP (ref 0.2–1.2)
BUN SERPL-MCNC: 10 MG/DL — SIGNIFICANT CHANGE UP (ref 7–23)
CALCIUM SERPL-MCNC: 9 MG/DL — SIGNIFICANT CHANGE UP (ref 8.4–10.5)
CHLORIDE SERPL-SCNC: 102 MMOL/L — SIGNIFICANT CHANGE UP (ref 96–108)
CO2 SERPL-SCNC: 25 MMOL/L — SIGNIFICANT CHANGE UP (ref 22–31)
CREAT SERPL-MCNC: 0.77 MG/DL — SIGNIFICANT CHANGE UP (ref 0.5–1.3)
EGFR: 82 ML/MIN/1.73M2 — SIGNIFICANT CHANGE UP
GLUCOSE BLDC GLUCOMTR-MCNC: 141 MG/DL — HIGH (ref 70–99)
GLUCOSE BLDC GLUCOMTR-MCNC: 171 MG/DL — HIGH (ref 70–99)
GLUCOSE BLDC GLUCOMTR-MCNC: 184 MG/DL — HIGH (ref 70–99)
GLUCOSE BLDC GLUCOMTR-MCNC: 200 MG/DL — HIGH (ref 70–99)
GLUCOSE SERPL-MCNC: 121 MG/DL — HIGH (ref 70–99)
HCT VFR BLD CALC: 37.9 % — SIGNIFICANT CHANGE UP (ref 34.5–45)
HGB BLD-MCNC: 11.7 G/DL — SIGNIFICANT CHANGE UP (ref 11.5–15.5)
INR BLD: 1.78 RATIO — HIGH (ref 0.88–1.16)
MAGNESIUM SERPL-MCNC: 2 MG/DL — SIGNIFICANT CHANGE UP (ref 1.6–2.6)
MCHC RBC-ENTMCNC: 27.2 PG — SIGNIFICANT CHANGE UP (ref 27–34)
MCHC RBC-ENTMCNC: 30.9 GM/DL — LOW (ref 32–36)
MCV RBC AUTO: 88.1 FL — SIGNIFICANT CHANGE UP (ref 80–100)
NRBC # BLD: 0 /100 WBCS — SIGNIFICANT CHANGE UP (ref 0–0)
PHOSPHATE SERPL-MCNC: 4.1 MG/DL — SIGNIFICANT CHANGE UP (ref 2.5–4.5)
PLATELET # BLD AUTO: 314 K/UL — SIGNIFICANT CHANGE UP (ref 150–400)
POTASSIUM SERPL-MCNC: 3.6 MMOL/L — SIGNIFICANT CHANGE UP (ref 3.5–5.3)
POTASSIUM SERPL-SCNC: 3.6 MMOL/L — SIGNIFICANT CHANGE UP (ref 3.5–5.3)
PROT SERPL-MCNC: 5.9 G/DL — LOW (ref 6–8.3)
PROTHROM AB SERPL-ACNC: 20.6 SEC — HIGH (ref 10.5–13.4)
RBC # BLD: 4.3 M/UL — SIGNIFICANT CHANGE UP (ref 3.8–5.2)
RBC # FLD: 16.6 % — HIGH (ref 10.3–14.5)
SODIUM SERPL-SCNC: 140 MMOL/L — SIGNIFICANT CHANGE UP (ref 135–145)
WBC # BLD: 6.33 K/UL — SIGNIFICANT CHANGE UP (ref 3.8–10.5)
WBC # FLD AUTO: 6.33 K/UL — SIGNIFICANT CHANGE UP (ref 3.8–10.5)

## 2022-03-12 PROCEDURE — 99232 SBSQ HOSP IP/OBS MODERATE 35: CPT

## 2022-03-12 RX ORDER — POTASSIUM CHLORIDE 20 MEQ
20 PACKET (EA) ORAL
Refills: 0 | Status: COMPLETED | OUTPATIENT
Start: 2022-03-12 | End: 2022-03-12

## 2022-03-12 RX ORDER — DICLOFENAC SODIUM 30 MG/G
2 GEL TOPICAL
Refills: 0 | Status: DISCONTINUED | OUTPATIENT
Start: 2022-03-12 | End: 2022-03-14

## 2022-03-12 RX ORDER — WARFARIN SODIUM 2.5 MG/1
4 TABLET ORAL ONCE
Refills: 0 | Status: COMPLETED | OUTPATIENT
Start: 2022-03-12 | End: 2022-03-12

## 2022-03-12 RX ORDER — OXYCODONE HYDROCHLORIDE 5 MG/1
2.5 TABLET ORAL ONCE
Refills: 0 | Status: DISCONTINUED | OUTPATIENT
Start: 2022-03-12 | End: 2022-03-12

## 2022-03-12 RX ADMIN — DICLOFENAC SODIUM 2 GRAM(S): 30 GEL TOPICAL at 21:26

## 2022-03-12 RX ADMIN — Medication 2: at 17:22

## 2022-03-12 RX ADMIN — DULOXETINE HYDROCHLORIDE 60 MILLIGRAM(S): 30 CAPSULE, DELAYED RELEASE ORAL at 11:50

## 2022-03-12 RX ADMIN — Medication 25 MILLIGRAM(S): at 17:23

## 2022-03-12 RX ADMIN — ATORVASTATIN CALCIUM 20 MILLIGRAM(S): 80 TABLET, FILM COATED ORAL at 21:25

## 2022-03-12 RX ADMIN — Medication 20 MILLIGRAM(S): at 05:09

## 2022-03-12 RX ADMIN — Medication 1 SPRAY(S): at 09:06

## 2022-03-12 RX ADMIN — Medication 100 MILLIGRAM(S): at 21:33

## 2022-03-12 RX ADMIN — CEFTRIAXONE 100 MILLIGRAM(S): 500 INJECTION, POWDER, FOR SOLUTION INTRAMUSCULAR; INTRAVENOUS at 11:49

## 2022-03-12 RX ADMIN — Medication 20 MILLIEQUIVALENT(S): at 10:54

## 2022-03-12 RX ADMIN — Medication 25 MICROGRAM(S): at 05:09

## 2022-03-12 RX ADMIN — DICLOFENAC SODIUM 25 MILLIGRAM(S): 75 TABLET, DELAYED RELEASE ORAL at 05:10

## 2022-03-12 RX ADMIN — PREGABALIN 1000 MICROGRAM(S): 225 CAPSULE ORAL at 11:51

## 2022-03-12 RX ADMIN — WARFARIN SODIUM 4 MILLIGRAM(S): 2.5 TABLET ORAL at 21:33

## 2022-03-12 RX ADMIN — Medication 100 MILLIGRAM(S): at 15:57

## 2022-03-12 RX ADMIN — Medication 100 MILLIGRAM(S): at 05:20

## 2022-03-12 RX ADMIN — BUDESONIDE AND FORMOTEROL FUMARATE DIHYDRATE 2 PUFF(S): 160; 4.5 AEROSOL RESPIRATORY (INHALATION) at 17:22

## 2022-03-12 RX ADMIN — Medication 20 MILLIEQUIVALENT(S): at 11:51

## 2022-03-12 RX ADMIN — OXYCODONE HYDROCHLORIDE 2.5 MILLIGRAM(S): 5 TABLET ORAL at 09:05

## 2022-03-12 RX ADMIN — BUDESONIDE AND FORMOTEROL FUMARATE DIHYDRATE 2 PUFF(S): 160; 4.5 AEROSOL RESPIRATORY (INHALATION) at 05:08

## 2022-03-12 RX ADMIN — PANTOPRAZOLE SODIUM 40 MILLIGRAM(S): 20 TABLET, DELAYED RELEASE ORAL at 05:08

## 2022-03-12 RX ADMIN — AMIODARONE HYDROCHLORIDE 200 MILLIGRAM(S): 400 TABLET ORAL at 05:09

## 2022-03-12 RX ADMIN — OXYCODONE HYDROCHLORIDE 2.5 MILLIGRAM(S): 5 TABLET ORAL at 09:30

## 2022-03-12 RX ADMIN — PANTOPRAZOLE SODIUM 40 MILLIGRAM(S): 20 TABLET, DELAYED RELEASE ORAL at 17:22

## 2022-03-12 RX ADMIN — Medication 180 MILLIGRAM(S): at 05:09

## 2022-03-12 RX ADMIN — Medication 25 MILLIGRAM(S): at 05:09

## 2022-03-12 RX ADMIN — Medication 2: at 13:29

## 2022-03-12 NOTE — PROGRESS NOTE ADULT - SUBJECTIVE AND OBJECTIVE BOX
TEAM Surgery Progress Note    INTERVAL EVENTS: Started on coumadin No acute events overnight.  SUBJECTIVE:     OBJECTIVE:    Vital Signs Last 24 Hrs  T(C): 36.6 (12 Mar 2022 01:14), Max: 37.3 (11 Mar 2022 09:39)  T(F): 97.9 (12 Mar 2022 01:14), Max: 99.2 (11 Mar 2022 16:10)  HR: 60 (12 Mar 2022 01:14) (60 - 74)  BP: 139/57 (12 Mar 2022 01:14) (131/57 - 160/68)  BP(mean): --  RR: 18 (12 Mar 2022 01:14) (18 - 18)  SpO2: 97% (12 Mar 2022 01:14) (93% - 98%)I&O's Detail    10 Mar 2022 07:01  -  11 Mar 2022 07:00  --------------------------------------------------------  IN:    IV PiggyBack: 50 mL    Lactated Ringers: 1755 mL    Oral Fluid: 580 mL  Total IN: 2385 mL    OUT:    Intermittent Catheterization - Urethral (mL): 100 mL    Voided (mL): 1300 mL  Total OUT: 1400 mL    Total NET: 985 mL      11 Mar 2022 07:01  -  12 Mar 2022 01:22  --------------------------------------------------------  IN:    IV PiggyBack: 50 mL    Oral Fluid: 380 mL  Total IN: 430 mL    OUT:    Oral Fluid: 0 mL    Voided (mL): 1650 mL  Total OUT: 1650 mL    Total NET: -1220 mL      MEDICATIONS  (STANDING):  aMIOdarone    Tablet 200 milliGRAM(s) Oral daily  atorvastatin 20 milliGRAM(s) Oral at bedtime  budesonide 160 MICROgram(s)/formoterol 4.5 MICROgram(s) Inhaler 2 Puff(s) Inhalation two times a day  cefTRIAXone   IVPB 1000 milliGRAM(s) IV Intermittent every 24 hours  cyanocobalamin 1000 MICROGram(s) Oral daily  dextrose 40% Gel 15 Gram(s) Oral once  dextrose 5%. 1000 milliLiter(s) (50 mL/Hr) IV Continuous <Continuous>  dextrose 5%. 1000 milliLiter(s) (100 mL/Hr) IV Continuous <Continuous>  dextrose 50% Injectable 25 Gram(s) IV Push once  dextrose 50% Injectable 12.5 Gram(s) IV Push once  dextrose 50% Injectable 25 Gram(s) IV Push once  diclofenac 25 milliGRAM(s) Oral every 12 hours  diltiazem    milliGRAM(s) Oral daily  DULoxetine 60 milliGRAM(s) Oral daily  fluticasone propionate 50 MICROgram(s)/spray Nasal Spray 1 Spray(s) Both Nostrils <User Schedule>  furosemide    Tablet 20 milliGRAM(s) Oral daily  glucagon  Injectable 1 milliGRAM(s) IntraMuscular once  insulin lispro (ADMELOG) corrective regimen sliding scale   SubCutaneous three times a day before meals  insulin lispro (ADMELOG) corrective regimen sliding scale   SubCutaneous at bedtime  levothyroxine 25 MICROGram(s) Oral daily  metoprolol tartrate 25 milliGRAM(s) Oral two times a day  pantoprazole    Tablet 40 milliGRAM(s) Oral every 12 hours  pregabalin 100 milliGRAM(s) Oral three times a day    MEDICATIONS  (PRN):  ALBUTerol    90 MICROgram(s) HFA Inhaler 2 Puff(s) Inhalation every 6 hours PRN Shortness of Breath and/or Wheezing      Physical Exam:  General: NAD, alert  HEENT: normocephalic, PERRLA  Chest: No labored breathing noted   Abdomen: soft, NTND, no rebound tenderness, erythematous scar LLQ    LABS:                        11.1   7.03  )-----------( 359      ( 11 Mar 2022 07:12 )             35.7     03-11    139  |  102  |  9   ----------------------------<  138<H>  4.0   |  28  |  0.67    Ca    9.0      11 Mar 2022 07:14  Phos  3.2     03-11  Mg     2.1     03-11    TPro  5.9<L>  /  Alb  3.2<L>  /  TBili  0.2  /  DBili  x   /  AST  11 /  ALT  15  /  AlkPhos  67  03-11    PT/INR - ( 11 Mar 2022 07:16 )   PT: 27.0 sec;   INR: 2.31 ratio         PTT - ( 11 Mar 2022 07:16 )  PTT:40.8 sec  LIVER FUNCTIONS - ( 11 Mar 2022 07:14 )  Alb: 3.2 g/dL / Pro: 5.9 g/dL / ALK PHOS: 67 U/L / ALT: 15 U/L / AST: 11 U/L / GGT: x           Urinalysis Basic - ( 10 Mar 2022 04:20 )    Color: Yellow / Appearance: Slightly Turbid / SG: >1.050 / pH: x  Gluc: x / Ketone: Negative  / Bili: Negative / Urobili: Negative   Blood: x / Protein: 100 / Nitrite: Negative   Leuk Esterase: Large / RBC: 32 /hpf /  /HPF   Sq Epi: x / Non Sq Epi: 1 /hpf / Bacteria: Few          IMAGING:     TEAM Surgery Progress Note    INTERVAL EVENTS: Started on coumadin No acute events overnight.  SUBJECTIVE: Patient seen and examined at bedside, No complains. No pain, nausea or vomiting.     OBJECTIVE:    Vital Signs Last 24 Hrs  T(C): 36.6 (12 Mar 2022 01:14), Max: 37.3 (11 Mar 2022 09:39)  T(F): 97.9 (12 Mar 2022 01:14), Max: 99.2 (11 Mar 2022 16:10)  HR: 60 (12 Mar 2022 01:14) (60 - 74)  BP: 139/57 (12 Mar 2022 01:14) (131/57 - 160/68)  BP(mean): --  RR: 18 (12 Mar 2022 01:14) (18 - 18)  SpO2: 97% (12 Mar 2022 01:14) (93% - 98%)I&O's Detail    10 Mar 2022 07:01  -  11 Mar 2022 07:00  --------------------------------------------------------  IN:    IV PiggyBack: 50 mL    Lactated Ringers: 1755 mL    Oral Fluid: 580 mL  Total IN: 2385 mL    OUT:    Intermittent Catheterization - Urethral (mL): 100 mL    Voided (mL): 1300 mL  Total OUT: 1400 mL    Total NET: 985 mL      11 Mar 2022 07:01  -  12 Mar 2022 01:22  --------------------------------------------------------  IN:    IV PiggyBack: 50 mL    Oral Fluid: 380 mL  Total IN: 430 mL    OUT:    Oral Fluid: 0 mL    Voided (mL): 1650 mL  Total OUT: 1650 mL    Total NET: -1220 mL      MEDICATIONS  (STANDING):  aMIOdarone    Tablet 200 milliGRAM(s) Oral daily  atorvastatin 20 milliGRAM(s) Oral at bedtime  budesonide 160 MICROgram(s)/formoterol 4.5 MICROgram(s) Inhaler 2 Puff(s) Inhalation two times a day  cefTRIAXone   IVPB 1000 milliGRAM(s) IV Intermittent every 24 hours  cyanocobalamin 1000 MICROGram(s) Oral daily  dextrose 40% Gel 15 Gram(s) Oral once  dextrose 5%. 1000 milliLiter(s) (50 mL/Hr) IV Continuous <Continuous>  dextrose 5%. 1000 milliLiter(s) (100 mL/Hr) IV Continuous <Continuous>  dextrose 50% Injectable 25 Gram(s) IV Push once  dextrose 50% Injectable 12.5 Gram(s) IV Push once  dextrose 50% Injectable 25 Gram(s) IV Push once  diclofenac 25 milliGRAM(s) Oral every 12 hours  diltiazem    milliGRAM(s) Oral daily  DULoxetine 60 milliGRAM(s) Oral daily  fluticasone propionate 50 MICROgram(s)/spray Nasal Spray 1 Spray(s) Both Nostrils <User Schedule>  furosemide    Tablet 20 milliGRAM(s) Oral daily  glucagon  Injectable 1 milliGRAM(s) IntraMuscular once  insulin lispro (ADMELOG) corrective regimen sliding scale   SubCutaneous three times a day before meals  insulin lispro (ADMELOG) corrective regimen sliding scale   SubCutaneous at bedtime  levothyroxine 25 MICROGram(s) Oral daily  metoprolol tartrate 25 milliGRAM(s) Oral two times a day  pantoprazole    Tablet 40 milliGRAM(s) Oral every 12 hours  pregabalin 100 milliGRAM(s) Oral three times a day    MEDICATIONS  (PRN):  ALBUTerol    90 MICROgram(s) HFA Inhaler 2 Puff(s) Inhalation every 6 hours PRN Shortness of Breath and/or Wheezing      Physical Exam:  General: NAD, alert  HEENT: normocephalic, PERRLA  Chest: No labored breathing noted   Abdomen: soft, NTND, no rebound tenderness, erythematous scar LLQ    LABS:                        11.1   7.03  )-----------( 359      ( 11 Mar 2022 07:12 )             35.7     03-11    139  |  102  |  9   ----------------------------<  138<H>  4.0   |  28  |  0.67    Ca    9.0      11 Mar 2022 07:14  Phos  3.2     03-11  Mg     2.1     03-11    TPro  5.9<L>  /  Alb  3.2<L>  /  TBili  0.2  /  DBili  x   /  AST  11  /  ALT  15  /  AlkPhos  67  03-11    PT/INR - ( 11 Mar 2022 07:16 )   PT: 27.0 sec;   INR: 2.31 ratio         PTT - ( 11 Mar 2022 07:16 )  PTT:40.8 sec  LIVER FUNCTIONS - ( 11 Mar 2022 07:14 )  Alb: 3.2 g/dL / Pro: 5.9 g/dL / ALK PHOS: 67 U/L / ALT: 15 U/L / AST: 11 U/L / GGT: x           Urinalysis Basic - ( 10 Mar 2022 04:20 )    Color: Yellow / Appearance: Slightly Turbid / SG: >1.050 / pH: x  Gluc: x / Ketone: Negative  / Bili: Negative / Urobili: Negative   Blood: x / Protein: 100 / Nitrite: Negative   Leuk Esterase: Large / RBC: 32 /hpf /  /HPF   Sq Epi: x / Non Sq Epi: 1 /hpf / Bacteria: Few          IMAGING:

## 2022-03-12 NOTE — PROGRESS NOTE ADULT - ASSESSMENT
Assessment:  71y Female with multiple medical issues, PSH of right hemicolectomy, Hartmanns followed by reversal, chronic ventral hernia here with partial SBO.    Plan:  - Diet: LRD   - Appreciate Medicine recs, restarted on coumadin 3/11  - Empiric Abx for UTI  - daily INR, patient takes warfarin at home for a fib, INR currently 2.5  - will refer to Dr Jimenez for management of hernia (discussed with Dr Jimenez)  - Dispo: Awaiting urine culture results to determine antibiotic regimen    Red Team  [phone]

## 2022-03-13 LAB
ALBUMIN SERPL ELPH-MCNC: 3.6 G/DL — SIGNIFICANT CHANGE UP (ref 3.3–5)
ALP SERPL-CCNC: 99 U/L — SIGNIFICANT CHANGE UP (ref 40–120)
ALT FLD-CCNC: 20 U/L — SIGNIFICANT CHANGE UP (ref 10–45)
ANION GAP SERPL CALC-SCNC: 12 MMOL/L — SIGNIFICANT CHANGE UP (ref 5–17)
APTT BLD: 29.7 SEC — SIGNIFICANT CHANGE UP (ref 27.5–35.5)
AST SERPL-CCNC: 13 U/L — SIGNIFICANT CHANGE UP (ref 10–40)
BILIRUB SERPL-MCNC: 0.2 MG/DL — SIGNIFICANT CHANGE UP (ref 0.2–1.2)
BUN SERPL-MCNC: 11 MG/DL — SIGNIFICANT CHANGE UP (ref 7–23)
CALCIUM SERPL-MCNC: 9.1 MG/DL — SIGNIFICANT CHANGE UP (ref 8.4–10.5)
CHLORIDE SERPL-SCNC: 100 MMOL/L — SIGNIFICANT CHANGE UP (ref 96–108)
CO2 SERPL-SCNC: 26 MMOL/L — SIGNIFICANT CHANGE UP (ref 22–31)
CREAT SERPL-MCNC: 0.64 MG/DL — SIGNIFICANT CHANGE UP (ref 0.5–1.3)
EGFR: 94 ML/MIN/1.73M2 — SIGNIFICANT CHANGE UP
GLUCOSE BLDC GLUCOMTR-MCNC: 157 MG/DL — HIGH (ref 70–99)
GLUCOSE BLDC GLUCOMTR-MCNC: 192 MG/DL — HIGH (ref 70–99)
GLUCOSE BLDC GLUCOMTR-MCNC: 207 MG/DL — HIGH (ref 70–99)
GLUCOSE BLDC GLUCOMTR-MCNC: 232 MG/DL — HIGH (ref 70–99)
GLUCOSE SERPL-MCNC: 156 MG/DL — HIGH (ref 70–99)
HCT VFR BLD CALC: 40 % — SIGNIFICANT CHANGE UP (ref 34.5–45)
HGB BLD-MCNC: 12.3 G/DL — SIGNIFICANT CHANGE UP (ref 11.5–15.5)
INR BLD: 1.75 RATIO — HIGH (ref 0.88–1.16)
MAGNESIUM SERPL-MCNC: 1.9 MG/DL — SIGNIFICANT CHANGE UP (ref 1.6–2.6)
MCHC RBC-ENTMCNC: 26.5 PG — LOW (ref 27–34)
MCHC RBC-ENTMCNC: 30.8 GM/DL — LOW (ref 32–36)
MCV RBC AUTO: 86.2 FL — SIGNIFICANT CHANGE UP (ref 80–100)
NRBC # BLD: 0 /100 WBCS — SIGNIFICANT CHANGE UP (ref 0–0)
PHOSPHATE SERPL-MCNC: 3.6 MG/DL — SIGNIFICANT CHANGE UP (ref 2.5–4.5)
PLATELET # BLD AUTO: 326 K/UL — SIGNIFICANT CHANGE UP (ref 150–400)
POTASSIUM SERPL-MCNC: 3.6 MMOL/L — SIGNIFICANT CHANGE UP (ref 3.5–5.3)
POTASSIUM SERPL-SCNC: 3.6 MMOL/L — SIGNIFICANT CHANGE UP (ref 3.5–5.3)
PROT SERPL-MCNC: 6.7 G/DL — SIGNIFICANT CHANGE UP (ref 6–8.3)
PROTHROM AB SERPL-ACNC: 20.2 SEC — HIGH (ref 10.5–13.4)
RBC # BLD: 4.64 M/UL — SIGNIFICANT CHANGE UP (ref 3.8–5.2)
RBC # FLD: 16.3 % — HIGH (ref 10.3–14.5)
SARS-COV-2 RNA SPEC QL NAA+PROBE: SIGNIFICANT CHANGE UP
SODIUM SERPL-SCNC: 138 MMOL/L — SIGNIFICANT CHANGE UP (ref 135–145)
WBC # BLD: 8.83 K/UL — SIGNIFICANT CHANGE UP (ref 3.8–10.5)
WBC # FLD AUTO: 8.83 K/UL — SIGNIFICANT CHANGE UP (ref 3.8–10.5)

## 2022-03-13 PROCEDURE — 99232 SBSQ HOSP IP/OBS MODERATE 35: CPT

## 2022-03-13 RX ORDER — METHOCARBAMOL 500 MG/1
500 TABLET, FILM COATED ORAL EVERY 6 HOURS
Refills: 0 | Status: DISCONTINUED | OUTPATIENT
Start: 2022-03-13 | End: 2022-03-14

## 2022-03-13 RX ORDER — POLYETHYLENE GLYCOL 3350 17 G/17G
17 POWDER, FOR SOLUTION ORAL
Refills: 0 | Status: DISCONTINUED | OUTPATIENT
Start: 2022-03-13 | End: 2022-03-14

## 2022-03-13 RX ORDER — WARFARIN SODIUM 2.5 MG/1
4 TABLET ORAL ONCE
Refills: 0 | Status: COMPLETED | OUTPATIENT
Start: 2022-03-13 | End: 2022-03-13

## 2022-03-13 RX ORDER — OXYCODONE HYDROCHLORIDE 5 MG/1
2.5 TABLET ORAL EVERY 6 HOURS
Refills: 0 | Status: DISCONTINUED | OUTPATIENT
Start: 2022-03-13 | End: 2022-03-14

## 2022-03-13 RX ORDER — MAGNESIUM SULFATE 500 MG/ML
1 VIAL (ML) INJECTION ONCE
Refills: 0 | Status: COMPLETED | OUTPATIENT
Start: 2022-03-13 | End: 2022-03-13

## 2022-03-13 RX ORDER — POTASSIUM CHLORIDE 20 MEQ
20 PACKET (EA) ORAL ONCE
Refills: 0 | Status: COMPLETED | OUTPATIENT
Start: 2022-03-13 | End: 2022-03-13

## 2022-03-13 RX ORDER — OXYCODONE HYDROCHLORIDE 5 MG/1
2.5 TABLET ORAL ONCE
Refills: 0 | Status: DISCONTINUED | OUTPATIENT
Start: 2022-03-13 | End: 2022-03-13

## 2022-03-13 RX ADMIN — Medication 2: at 17:02

## 2022-03-13 RX ADMIN — BUDESONIDE AND FORMOTEROL FUMARATE DIHYDRATE 2 PUFF(S): 160; 4.5 AEROSOL RESPIRATORY (INHALATION) at 05:34

## 2022-03-13 RX ADMIN — OXYCODONE HYDROCHLORIDE 2.5 MILLIGRAM(S): 5 TABLET ORAL at 20:23

## 2022-03-13 RX ADMIN — Medication 100 MILLIGRAM(S): at 15:01

## 2022-03-13 RX ADMIN — OXYCODONE HYDROCHLORIDE 2.5 MILLIGRAM(S): 5 TABLET ORAL at 21:23

## 2022-03-13 RX ADMIN — METHOCARBAMOL 500 MILLIGRAM(S): 500 TABLET, FILM COATED ORAL at 13:04

## 2022-03-13 RX ADMIN — BUDESONIDE AND FORMOTEROL FUMARATE DIHYDRATE 2 PUFF(S): 160; 4.5 AEROSOL RESPIRATORY (INHALATION) at 17:02

## 2022-03-13 RX ADMIN — OXYCODONE HYDROCHLORIDE 2.5 MILLIGRAM(S): 5 TABLET ORAL at 06:03

## 2022-03-13 RX ADMIN — POLYETHYLENE GLYCOL 3350 17 GRAM(S): 17 POWDER, FOR SOLUTION ORAL at 17:06

## 2022-03-13 RX ADMIN — Medication 1 SPRAY(S): at 08:29

## 2022-03-13 RX ADMIN — CEFTRIAXONE 100 MILLIGRAM(S): 500 INJECTION, POWDER, FOR SOLUTION INTRAMUSCULAR; INTRAVENOUS at 13:04

## 2022-03-13 RX ADMIN — Medication 25 MILLIGRAM(S): at 17:02

## 2022-03-13 RX ADMIN — Medication 100 MILLIGRAM(S): at 22:40

## 2022-03-13 RX ADMIN — METHOCARBAMOL 500 MILLIGRAM(S): 500 TABLET, FILM COATED ORAL at 17:01

## 2022-03-13 RX ADMIN — DICLOFENAC SODIUM 2 GRAM(S): 30 GEL TOPICAL at 05:34

## 2022-03-13 RX ADMIN — Medication 20 MILLIEQUIVALENT(S): at 09:57

## 2022-03-13 RX ADMIN — Medication 100 GRAM(S): at 09:57

## 2022-03-13 RX ADMIN — AMIODARONE HYDROCHLORIDE 200 MILLIGRAM(S): 400 TABLET ORAL at 05:35

## 2022-03-13 RX ADMIN — Medication 180 MILLIGRAM(S): at 05:36

## 2022-03-13 RX ADMIN — ATORVASTATIN CALCIUM 20 MILLIGRAM(S): 80 TABLET, FILM COATED ORAL at 21:21

## 2022-03-13 RX ADMIN — DULOXETINE HYDROCHLORIDE 60 MILLIGRAM(S): 30 CAPSULE, DELAYED RELEASE ORAL at 12:56

## 2022-03-13 RX ADMIN — DICLOFENAC SODIUM 2 GRAM(S): 30 GEL TOPICAL at 17:02

## 2022-03-13 RX ADMIN — OXYCODONE HYDROCHLORIDE 2.5 MILLIGRAM(S): 5 TABLET ORAL at 05:33

## 2022-03-13 RX ADMIN — OXYCODONE HYDROCHLORIDE 2.5 MILLIGRAM(S): 5 TABLET ORAL at 10:27

## 2022-03-13 RX ADMIN — Medication 25 MICROGRAM(S): at 05:36

## 2022-03-13 RX ADMIN — Medication 100 MILLIGRAM(S): at 05:47

## 2022-03-13 RX ADMIN — PREGABALIN 1000 MICROGRAM(S): 225 CAPSULE ORAL at 12:56

## 2022-03-13 RX ADMIN — PANTOPRAZOLE SODIUM 40 MILLIGRAM(S): 20 TABLET, DELAYED RELEASE ORAL at 17:02

## 2022-03-13 RX ADMIN — Medication 20 MILLIGRAM(S): at 05:50

## 2022-03-13 RX ADMIN — WARFARIN SODIUM 4 MILLIGRAM(S): 2.5 TABLET ORAL at 21:21

## 2022-03-13 RX ADMIN — Medication 25 MILLIGRAM(S): at 05:37

## 2022-03-13 RX ADMIN — PANTOPRAZOLE SODIUM 40 MILLIGRAM(S): 20 TABLET, DELAYED RELEASE ORAL at 05:47

## 2022-03-13 RX ADMIN — Medication 4: at 12:55

## 2022-03-13 RX ADMIN — Medication 2: at 08:29

## 2022-03-13 RX ADMIN — OXYCODONE HYDROCHLORIDE 2.5 MILLIGRAM(S): 5 TABLET ORAL at 09:57

## 2022-03-13 RX ADMIN — DICLOFENAC SODIUM 2 GRAM(S): 30 GEL TOPICAL at 00:15

## 2022-03-13 RX ADMIN — DICLOFENAC SODIUM 2 GRAM(S): 30 GEL TOPICAL at 12:56

## 2022-03-13 NOTE — PROGRESS NOTE ADULT - ASSESSMENT
Assessment:  71y Female with multiple medical issues, PSH of right hemicolectomy, Hartmanns followed by reversal, chronic ventral hernia here with partial SBO.    Plan:  - Diet: LRD   - Appreciate Medicine recs, restarted on coumadin 3/11  - Empiric Abx for UTI  - FU urine culture specificity.   - daily INR, patient takes warfarin at home for a fib, INR currently 2.5  - will refer to Dr Jimenez for management of hernia (discussed with Dr Jimenez)  - Dispo: Awaiting urine culture results to determine antibiotic regimen    Red Team  [phone] Assessment:  71y Female with multiple medical issues, PSH of right hemicolectomy, Hartmanns followed by reversal, chronic ventral hernia here with partial SBO.    Plan:  - Diet: LRD   - Appreciate Medicine recs, restarted on coumadin 3/11  - Empiric Abx for UTI, F/U with Medicine regarding antibiotics regimen based on culture result  - FU urine culture specificity  - Restarted home robaxin  - Oxy 2.5 PRN for joint pain and miralax to prevent constipation   - Replete electrolytes  - daily INR, patient takes warfarin at home for a fib, INR currently 1.75  - will refer to Dr Jimenez for management of hernia (discussed with Dr Jimenez)  - Dispo: Awaiting urine culture results to determine antibiotic regimen    Red Team  [phone]

## 2022-03-13 NOTE — PROGRESS NOTE ADULT - SUBJECTIVE AND OBJECTIVE BOX
TEAM Surgery Progress Note    INTERVAL EVENTS: No acute events overnight. Complained of joint pain and pain in left thigh.     SUBJECTIVE: Patient seen and examined at bedside,    OBJECTIVE:    Vital Signs Last 24 Hrs  T(C): 36.6 (12 Mar 2022 01:14), Max: 37.3 (11 Mar 2022 09:39)  T(F): 97.9 (12 Mar 2022 01:14), Max: 99.2 (11 Mar 2022 16:10)  HR: 60 (12 Mar 2022 01:14) (60 - 74)  BP: 139/57 (12 Mar 2022 01:14) (131/57 - 160/68)  BP(mean): --  RR: 18 (12 Mar 2022 01:14) (18 - 18)  SpO2: 97% (12 Mar 2022 01:14) (93% - 98%)I&O's Detail    10 Mar 2022 07:01  -  11 Mar 2022 07:00  --------------------------------------------------------  IN:    IV PiggyBack: 50 mL    Lactated Ringers: 1755 mL    Oral Fluid: 580 mL  Total IN: 2385 mL    OUT:    Intermittent Catheterization - Urethral (mL): 100 mL    Voided (mL): 1300 mL  Total OUT: 1400 mL    Total NET: 985 mL      11 Mar 2022 07:01  -  12 Mar 2022 01:22  --------------------------------------------------------  IN:    IV PiggyBack: 50 mL    Oral Fluid: 380 mL  Total IN: 430 mL    OUT:    Oral Fluid: 0 mL    Voided (mL): 1650 mL  Total OUT: 1650 mL    Total NET: -1220 mL      MEDICATIONS  (STANDING):  aMIOdarone    Tablet 200 milliGRAM(s) Oral daily  atorvastatin 20 milliGRAM(s) Oral at bedtime  budesonide 160 MICROgram(s)/formoterol 4.5 MICROgram(s) Inhaler 2 Puff(s) Inhalation two times a day  cefTRIAXone   IVPB 1000 milliGRAM(s) IV Intermittent every 24 hours  cyanocobalamin 1000 MICROGram(s) Oral daily  dextrose 40% Gel 15 Gram(s) Oral once  dextrose 5%. 1000 milliLiter(s) (50 mL/Hr) IV Continuous <Continuous>  dextrose 5%. 1000 milliLiter(s) (100 mL/Hr) IV Continuous <Continuous>  dextrose 50% Injectable 25 Gram(s) IV Push once  dextrose 50% Injectable 12.5 Gram(s) IV Push once  dextrose 50% Injectable 25 Gram(s) IV Push once  diclofenac 25 milliGRAM(s) Oral every 12 hours  diltiazem    milliGRAM(s) Oral daily  DULoxetine 60 milliGRAM(s) Oral daily  fluticasone propionate 50 MICROgram(s)/spray Nasal Spray 1 Spray(s) Both Nostrils <User Schedule>  furosemide    Tablet 20 milliGRAM(s) Oral daily  glucagon  Injectable 1 milliGRAM(s) IntraMuscular once  insulin lispro (ADMELOG) corrective regimen sliding scale   SubCutaneous three times a day before meals  insulin lispro (ADMELOG) corrective regimen sliding scale   SubCutaneous at bedtime  levothyroxine 25 MICROGram(s) Oral daily  metoprolol tartrate 25 milliGRAM(s) Oral two times a day  pantoprazole    Tablet 40 milliGRAM(s) Oral every 12 hours  pregabalin 100 milliGRAM(s) Oral three times a day    MEDICATIONS  (PRN):  ALBUTerol    90 MICROgram(s) HFA Inhaler 2 Puff(s) Inhalation every 6 hours PRN Shortness of Breath and/or Wheezing      Physical Exam:  General: NAD, alert  HEENT: normocephalic, PERRLA  Chest: No labored breathing noted   Abdomen: soft, NTND, no rebound tenderness, erythematous scar LLQ    LABS:                        11.1   7.03  )-----------( 359      ( 11 Mar 2022 07:12 )             35.7     03-11    139  |  102  |  9   ----------------------------<  138<H>  4.0   |  28  |  0.67    Ca    9.0      11 Mar 2022 07:14  Phos  3.2     03-11  Mg     2.1     03-11    TPro  5.9<L>  /  Alb  3.2<L>  /  TBili  0.2  /  DBili  x   /  AST  11  /  ALT  15  /  AlkPhos  67  03-11    PT/INR - ( 11 Mar 2022 07:16 )   PT: 27.0 sec;   INR: 2.31 ratio         PTT - ( 11 Mar 2022 07:16 )  PTT:40.8 sec  LIVER FUNCTIONS - ( 11 Mar 2022 07:14 )  Alb: 3.2 g/dL / Pro: 5.9 g/dL / ALK PHOS: 67 U/L / ALT: 15 U/L / AST: 11 U/L / GGT: x           Urinalysis Basic - ( 10 Mar 2022 04:20 )    Color: Yellow / Appearance: Slightly Turbid / SG: >1.050 / pH: x  Gluc: x / Ketone: Negative  / Bili: Negative / Urobili: Negative   Blood: x / Protein: 100 / Nitrite: Negative   Leuk Esterase: Large / RBC: 32 /hpf /  /HPF   Sq Epi: x / Non Sq Epi: 1 /hpf / Bacteria: Few          IMAGING:     Red TEAM Surgery Progress Note    SUBJECTIVE/INTERVAL EVENTS: Patient seen and examined at bedside, No acute events overnight. Complained of joint pain and pain in left thigh. Patient has bowel movements.         OBJECTIVE:   Vital Signs Last 24 Hrs  T(C): 37 (13 Mar 2022 05:16), Max: 37.8 (12 Mar 2022 14:21)  T(F): 98.6 (13 Mar 2022 05:16), Max: 100 (12 Mar 2022 14:21)  HR: 61 (13 Mar 2022 05:16) (60 - 67)  BP: 135/51 (13 Mar 2022 05:16) (135/51 - 156/66)  BP(mean): --  RR: 18 (13 Mar 2022 05:16) (18 - 18)  SpO2: 95% (13 Mar 2022 05:16) (92% - 95%)    I&O's Detail    12 Mar 2022 06:01  -  13 Mar 2022 07:00  --------------------------------------------------------  IN:    Oral Fluid: 680 mL  Total IN: 680 mL    OUT:    Voided (mL): 450 mL  Total OUT: 450 mL    Total NET: 230 mL      MEDICATIONS  (STANDING):  aMIOdarone    Tablet 200 milliGRAM(s) Oral daily  atorvastatin 20 milliGRAM(s) Oral at bedtime  budesonide 160 MICROgram(s)/formoterol 4.5 MICROgram(s) Inhaler 2 Puff(s) Inhalation two times a day  cefTRIAXone   IVPB 1000 milliGRAM(s) IV Intermittent every 24 hours  cyanocobalamin 1000 MICROGram(s) Oral daily  dextrose 40% Gel 15 Gram(s) Oral once  dextrose 5%. 1000 milliLiter(s) (50 mL/Hr) IV Continuous <Continuous>  dextrose 5%. 1000 milliLiter(s) (100 mL/Hr) IV Continuous <Continuous>  dextrose 50% Injectable 25 Gram(s) IV Push once  dextrose 50% Injectable 12.5 Gram(s) IV Push once  dextrose 50% Injectable 25 Gram(s) IV Push once  diclofenac 25 milliGRAM(s) Oral every 12 hours  diltiazem    milliGRAM(s) Oral daily  DULoxetine 60 milliGRAM(s) Oral daily  fluticasone propionate 50 MICROgram(s)/spray Nasal Spray 1 Spray(s) Both Nostrils <User Schedule>  furosemide    Tablet 20 milliGRAM(s) Oral daily  glucagon  Injectable 1 milliGRAM(s) IntraMuscular once  insulin lispro (ADMELOG) corrective regimen sliding scale   SubCutaneous three times a day before meals  insulin lispro (ADMELOG) corrective regimen sliding scale   SubCutaneous at bedtime  levothyroxine 25 MICROGram(s) Oral daily  metoprolol tartrate 25 milliGRAM(s) Oral two times a day  pantoprazole    Tablet 40 milliGRAM(s) Oral every 12 hours  pregabalin 100 milliGRAM(s) Oral three times a day    MEDICATIONS  (PRN):  ALBUTerol    90 MICROgram(s) HFA Inhaler 2 Puff(s) Inhalation every 6 hours PRN Shortness of Breath and/or Wheezing      Physical Exam:  General: NAD, alert  HEENT: normocephalic, PERRLA  Chest: No labored breathing noted   Abdomen: soft, NTND, no rebound tenderness, erythematous scar LLQ       LABS:  cret                        12.3   8.83  )-----------( 326      ( 13 Mar 2022 07:31 )             40.0     03-13    138  |  100  |  11  ----------------------------<  156<H>  3.6   |  26  |  0.64    Ca    9.1      13 Mar 2022 07:31  Phos  3.6     03-13  Mg     1.9     03-13    TPro  6.7  /  Alb  3.6  /  TBili  0.2  /  DBili  x   /  AST  13  /  ALT  20  /  AlkPhos  99  03-13    PT/INR - ( 13 Mar 2022 07:31 )   PT: 20.2 sec;   INR: 1.75 ratio         PTT - ( 13 Mar 2022 07:31 )  PTT:29.7 sec        Urinalysis Basic - ( 10 Mar 2022 04:20 )    Color: Yellow / Appearance: Slightly Turbid / SG: >1.050 / pH: x  Gluc: x / Ketone: Negative  / Bili: Negative / Urobili: Negative   Blood: x / Protein: 100 / Nitrite: Negative   Leuk Esterase: Large / RBC: 32 /hpf /  /HPF   Sq Epi: x / Non Sq Epi: 1 /hpf / Bacteria: Few

## 2022-03-14 ENCOUNTER — TRANSCRIPTION ENCOUNTER (OUTPATIENT)
Age: 72
End: 2022-03-14

## 2022-03-14 VITALS
OXYGEN SATURATION: 96 % | HEART RATE: 68 BPM | SYSTOLIC BLOOD PRESSURE: 108 MMHG | DIASTOLIC BLOOD PRESSURE: 53 MMHG | TEMPERATURE: 99 F | RESPIRATION RATE: 18 BRPM

## 2022-03-14 LAB
-  AMPICILLIN: SIGNIFICANT CHANGE UP
-  CIPROFLOXACIN: SIGNIFICANT CHANGE UP
-  LEVOFLOXACIN: SIGNIFICANT CHANGE UP
-  NITROFURANTOIN: SIGNIFICANT CHANGE UP
-  TETRACYCLINE: SIGNIFICANT CHANGE UP
-  VANCOMYCIN: SIGNIFICANT CHANGE UP
ALBUMIN SERPL ELPH-MCNC: 3.3 G/DL — SIGNIFICANT CHANGE UP (ref 3.3–5)
ALP SERPL-CCNC: 84 U/L — SIGNIFICANT CHANGE UP (ref 40–120)
ALT FLD-CCNC: 16 U/L — SIGNIFICANT CHANGE UP (ref 10–45)
ANION GAP SERPL CALC-SCNC: 12 MMOL/L — SIGNIFICANT CHANGE UP (ref 5–17)
APTT BLD: 34.9 SEC — SIGNIFICANT CHANGE UP (ref 27.5–35.5)
AST SERPL-CCNC: 12 U/L — SIGNIFICANT CHANGE UP (ref 10–40)
BILIRUB SERPL-MCNC: 0.1 MG/DL — LOW (ref 0.2–1.2)
BUN SERPL-MCNC: 12 MG/DL — SIGNIFICANT CHANGE UP (ref 7–23)
CALCIUM SERPL-MCNC: 8.6 MG/DL — SIGNIFICANT CHANGE UP (ref 8.4–10.5)
CHLORIDE SERPL-SCNC: 99 MMOL/L — SIGNIFICANT CHANGE UP (ref 96–108)
CO2 SERPL-SCNC: 27 MMOL/L — SIGNIFICANT CHANGE UP (ref 22–31)
CREAT SERPL-MCNC: 0.62 MG/DL — SIGNIFICANT CHANGE UP (ref 0.5–1.3)
CULTURE RESULTS: SIGNIFICANT CHANGE UP
EGFR: 95 ML/MIN/1.73M2 — SIGNIFICANT CHANGE UP
GLUCOSE BLDC GLUCOMTR-MCNC: 171 MG/DL — HIGH (ref 70–99)
GLUCOSE BLDC GLUCOMTR-MCNC: 222 MG/DL — HIGH (ref 70–99)
GLUCOSE SERPL-MCNC: 234 MG/DL — HIGH (ref 70–99)
HCT VFR BLD CALC: 38.4 % — SIGNIFICANT CHANGE UP (ref 34.5–45)
HGB BLD-MCNC: 11.8 G/DL — SIGNIFICANT CHANGE UP (ref 11.5–15.5)
INR BLD: 2.12 RATIO — HIGH (ref 0.88–1.16)
MAGNESIUM SERPL-MCNC: 1.8 MG/DL — SIGNIFICANT CHANGE UP (ref 1.6–2.6)
MCHC RBC-ENTMCNC: 26.7 PG — LOW (ref 27–34)
MCHC RBC-ENTMCNC: 30.7 GM/DL — LOW (ref 32–36)
MCV RBC AUTO: 86.9 FL — SIGNIFICANT CHANGE UP (ref 80–100)
METHOD TYPE: SIGNIFICANT CHANGE UP
NRBC # BLD: 0 /100 WBCS — SIGNIFICANT CHANGE UP (ref 0–0)
ORGANISM # SPEC MICROSCOPIC CNT: SIGNIFICANT CHANGE UP
ORGANISM # SPEC MICROSCOPIC CNT: SIGNIFICANT CHANGE UP
PLATELET # BLD AUTO: 318 K/UL — SIGNIFICANT CHANGE UP (ref 150–400)
POTASSIUM SERPL-MCNC: 3.8 MMOL/L — SIGNIFICANT CHANGE UP (ref 3.5–5.3)
POTASSIUM SERPL-SCNC: 3.8 MMOL/L — SIGNIFICANT CHANGE UP (ref 3.5–5.3)
PROT SERPL-MCNC: 6 G/DL — SIGNIFICANT CHANGE UP (ref 6–8.3)
PROTHROM AB SERPL-ACNC: 24.6 SEC — HIGH (ref 10.5–13.4)
RBC # BLD: 4.42 M/UL — SIGNIFICANT CHANGE UP (ref 3.8–5.2)
RBC # FLD: 16.5 % — HIGH (ref 10.3–14.5)
SODIUM SERPL-SCNC: 138 MMOL/L — SIGNIFICANT CHANGE UP (ref 135–145)
SPECIMEN SOURCE: SIGNIFICANT CHANGE UP
WBC # BLD: 9.93 K/UL — SIGNIFICANT CHANGE UP (ref 3.8–10.5)
WBC # FLD AUTO: 9.93 K/UL — SIGNIFICANT CHANGE UP (ref 3.8–10.5)

## 2022-03-14 PROCEDURE — 85014 HEMATOCRIT: CPT

## 2022-03-14 PROCEDURE — 83735 ASSAY OF MAGNESIUM: CPT

## 2022-03-14 PROCEDURE — 86850 RBC ANTIBODY SCREEN: CPT

## 2022-03-14 PROCEDURE — 82565 ASSAY OF CREATININE: CPT

## 2022-03-14 PROCEDURE — 87086 URINE CULTURE/COLONY COUNT: CPT

## 2022-03-14 PROCEDURE — 94640 AIRWAY INHALATION TREATMENT: CPT

## 2022-03-14 PROCEDURE — 84132 ASSAY OF SERUM POTASSIUM: CPT

## 2022-03-14 PROCEDURE — 71045 X-RAY EXAM CHEST 1 VIEW: CPT

## 2022-03-14 PROCEDURE — 87186 SC STD MICRODIL/AGAR DIL: CPT

## 2022-03-14 PROCEDURE — 86803 HEPATITIS C AB TEST: CPT

## 2022-03-14 PROCEDURE — 99285 EMERGENCY DEPT VISIT HI MDM: CPT

## 2022-03-14 PROCEDURE — 85730 THROMBOPLASTIN TIME PARTIAL: CPT

## 2022-03-14 PROCEDURE — U0003: CPT

## 2022-03-14 PROCEDURE — 86901 BLOOD TYPING SEROLOGIC RH(D): CPT

## 2022-03-14 PROCEDURE — 96374 THER/PROPH/DIAG INJ IV PUSH: CPT

## 2022-03-14 PROCEDURE — 85018 HEMOGLOBIN: CPT

## 2022-03-14 PROCEDURE — 74177 CT ABD & PELVIS W/CONTRAST: CPT | Mod: MA

## 2022-03-14 PROCEDURE — 36415 COLL VENOUS BLD VENIPUNCTURE: CPT

## 2022-03-14 PROCEDURE — 99238 HOSP IP/OBS DSCHRG MGMT 30/<: CPT

## 2022-03-14 PROCEDURE — 83605 ASSAY OF LACTIC ACID: CPT

## 2022-03-14 PROCEDURE — 85027 COMPLETE CBC AUTOMATED: CPT

## 2022-03-14 PROCEDURE — 80048 BASIC METABOLIC PNL TOTAL CA: CPT

## 2022-03-14 PROCEDURE — 84100 ASSAY OF PHOSPHORUS: CPT

## 2022-03-14 PROCEDURE — 84295 ASSAY OF SERUM SODIUM: CPT

## 2022-03-14 PROCEDURE — 82803 BLOOD GASES ANY COMBINATION: CPT

## 2022-03-14 PROCEDURE — 82435 ASSAY OF BLOOD CHLORIDE: CPT

## 2022-03-14 PROCEDURE — U0005: CPT

## 2022-03-14 PROCEDURE — 85610 PROTHROMBIN TIME: CPT

## 2022-03-14 PROCEDURE — 82330 ASSAY OF CALCIUM: CPT

## 2022-03-14 PROCEDURE — 82947 ASSAY GLUCOSE BLOOD QUANT: CPT

## 2022-03-14 PROCEDURE — 83036 HEMOGLOBIN GLYCOSYLATED A1C: CPT

## 2022-03-14 PROCEDURE — 81001 URINALYSIS AUTO W/SCOPE: CPT

## 2022-03-14 PROCEDURE — 85025 COMPLETE CBC W/AUTO DIFF WBC: CPT

## 2022-03-14 PROCEDURE — 86900 BLOOD TYPING SEROLOGIC ABO: CPT

## 2022-03-14 PROCEDURE — 82962 GLUCOSE BLOOD TEST: CPT

## 2022-03-14 PROCEDURE — 93005 ELECTROCARDIOGRAM TRACING: CPT

## 2022-03-14 PROCEDURE — 96375 TX/PRO/DX INJ NEW DRUG ADDON: CPT

## 2022-03-14 PROCEDURE — 80053 COMPREHEN METABOLIC PANEL: CPT

## 2022-03-14 RX ORDER — TOLTERODINE TARTRATE 1 MG/1
1 TABLET, FILM COATED ORAL
Qty: 0 | Refills: 0 | DISCHARGE

## 2022-03-14 RX ORDER — FLUTICASONE PROPIONATE 50 MCG
1 SPRAY, SUSPENSION NASAL
Qty: 0 | Refills: 0 | DISCHARGE
Start: 2022-03-14

## 2022-03-14 RX ORDER — PANTOPRAZOLE SODIUM 20 MG/1
1 TABLET, DELAYED RELEASE ORAL
Qty: 0 | Refills: 0 | DISCHARGE
Start: 2022-03-14

## 2022-03-14 RX ORDER — ALBUTEROL 90 UG/1
2 AEROSOL, METERED ORAL
Qty: 0 | Refills: 0 | DISCHARGE
Start: 2022-03-14

## 2022-03-14 RX ORDER — PREGABALIN 225 MG/1
1 CAPSULE ORAL
Qty: 0 | Refills: 0 | DISCHARGE
Start: 2022-03-14

## 2022-03-14 RX ORDER — CHOLECALCIFEROL (VITAMIN D3) 125 MCG
50000 CAPSULE ORAL
Qty: 0 | Refills: 0 | DISCHARGE
Start: 2022-03-14

## 2022-03-14 RX ORDER — WARFARIN SODIUM 2.5 MG/1
1 TABLET ORAL
Qty: 0 | Refills: 0 | DISCHARGE

## 2022-03-14 RX ORDER — POLYETHYLENE GLYCOL 3350 17 G/17G
17 POWDER, FOR SOLUTION ORAL
Qty: 0 | Refills: 0 | DISCHARGE
Start: 2022-03-14

## 2022-03-14 RX ORDER — METHOCARBAMOL 500 MG/1
1 TABLET, FILM COATED ORAL
Qty: 0 | Refills: 0 | DISCHARGE
Start: 2022-03-14

## 2022-03-14 RX ORDER — LEVOTHYROXINE SODIUM 125 MCG
1 TABLET ORAL
Qty: 0 | Refills: 0 | DISCHARGE
Start: 2022-03-14

## 2022-03-14 RX ORDER — BUDESONIDE AND FORMOTEROL FUMARATE DIHYDRATE 160; 4.5 UG/1; UG/1
2 AEROSOL RESPIRATORY (INHALATION)
Qty: 0 | Refills: 0 | DISCHARGE
Start: 2022-03-14

## 2022-03-14 RX ORDER — OXYCODONE HYDROCHLORIDE 5 MG/1
2.5 TABLET ORAL
Qty: 0 | Refills: 0 | DISCHARGE
Start: 2022-03-14

## 2022-03-14 RX ADMIN — PANTOPRAZOLE SODIUM 40 MILLIGRAM(S): 20 TABLET, DELAYED RELEASE ORAL at 17:18

## 2022-03-14 RX ADMIN — POLYETHYLENE GLYCOL 3350 17 GRAM(S): 17 POWDER, FOR SOLUTION ORAL at 17:18

## 2022-03-14 RX ADMIN — AMIODARONE HYDROCHLORIDE 200 MILLIGRAM(S): 400 TABLET ORAL at 06:28

## 2022-03-14 RX ADMIN — OXYCODONE HYDROCHLORIDE 2.5 MILLIGRAM(S): 5 TABLET ORAL at 11:02

## 2022-03-14 RX ADMIN — METHOCARBAMOL 500 MILLIGRAM(S): 500 TABLET, FILM COATED ORAL at 06:33

## 2022-03-14 RX ADMIN — Medication 1 SPRAY(S): at 07:50

## 2022-03-14 RX ADMIN — Medication 20 MILLIGRAM(S): at 06:29

## 2022-03-14 RX ADMIN — DICLOFENAC SODIUM 2 GRAM(S): 30 GEL TOPICAL at 00:50

## 2022-03-14 RX ADMIN — Medication 100 MILLIGRAM(S): at 06:51

## 2022-03-14 RX ADMIN — Medication 2: at 07:48

## 2022-03-14 RX ADMIN — BUDESONIDE AND FORMOTEROL FUMARATE DIHYDRATE 2 PUFF(S): 160; 4.5 AEROSOL RESPIRATORY (INHALATION) at 06:32

## 2022-03-14 RX ADMIN — POLYETHYLENE GLYCOL 3350 17 GRAM(S): 17 POWDER, FOR SOLUTION ORAL at 06:32

## 2022-03-14 RX ADMIN — PREGABALIN 1000 MICROGRAM(S): 225 CAPSULE ORAL at 12:25

## 2022-03-14 RX ADMIN — PANTOPRAZOLE SODIUM 40 MILLIGRAM(S): 20 TABLET, DELAYED RELEASE ORAL at 06:28

## 2022-03-14 RX ADMIN — METHOCARBAMOL 500 MILLIGRAM(S): 500 TABLET, FILM COATED ORAL at 12:24

## 2022-03-14 RX ADMIN — Medication 100 MILLIGRAM(S): at 13:58

## 2022-03-14 RX ADMIN — METHOCARBAMOL 500 MILLIGRAM(S): 500 TABLET, FILM COATED ORAL at 00:50

## 2022-03-14 RX ADMIN — OXYCODONE HYDROCHLORIDE 2.5 MILLIGRAM(S): 5 TABLET ORAL at 11:32

## 2022-03-14 RX ADMIN — BUDESONIDE AND FORMOTEROL FUMARATE DIHYDRATE 2 PUFF(S): 160; 4.5 AEROSOL RESPIRATORY (INHALATION) at 17:18

## 2022-03-14 RX ADMIN — Medication 4: at 12:24

## 2022-03-14 RX ADMIN — METHOCARBAMOL 500 MILLIGRAM(S): 500 TABLET, FILM COATED ORAL at 17:18

## 2022-03-14 RX ADMIN — Medication 25 MICROGRAM(S): at 06:28

## 2022-03-14 RX ADMIN — DICLOFENAC SODIUM 2 GRAM(S): 30 GEL TOPICAL at 06:32

## 2022-03-14 RX ADMIN — DICLOFENAC SODIUM 2 GRAM(S): 30 GEL TOPICAL at 12:24

## 2022-03-14 RX ADMIN — DULOXETINE HYDROCHLORIDE 60 MILLIGRAM(S): 30 CAPSULE, DELAYED RELEASE ORAL at 12:24

## 2022-03-14 RX ADMIN — DICLOFENAC SODIUM 2 GRAM(S): 30 GEL TOPICAL at 17:18

## 2022-03-14 RX ADMIN — Medication 25 MILLIGRAM(S): at 17:18

## 2022-03-14 NOTE — PROGRESS NOTE ADULT - ASSESSMENT
Assessment:  71y Female with multiple medical issues, PSH of right hemicolectomy, Hartmanns followed by reversal, chronic ventral hernia here with partial SBO.    Plan:  - Diet: LRD   - Appreciate Medicine recs, restarted on coumadin 3/11  - Empiric Abx for UTI, F/U with Medicine regarding antibiotics regimen based on culture result (3/10 positive for enteroccocus)  - FU urine culture specificity  - Restarted home robaxin  - Oxy 2.5 PRN for joint pain and miralax to prevent constipation   - Replete electrolytes  - daily INR, patient takes warfarin at home for a fib, INR currently 1.75  - will refer to Dr Jimenez for management of hernia (discussed with Dr Jimenez)  - Dispo: Awaiting urine culture results to determine antibiotic regimen    Red Team  [phone] Assessment:  71y Female with multiple medical issues, PSH of right hemicolectomy, Hartmanns followed by reversal, chronic ventral hernia here with partial SBO.    Plan:  - Diet: LRD   - Appreciate Medicine recs, restarted on coumadin 3/11  - Empiric Abx for UTI, F/U with Medicine regarding antibiotics regimen based on culture result, pending sensitivities (3/10 positive for enteroccocus)  - FU urine culture specificity  - Restarted home robaxin  - Oxy 2.5 PRN for joint pain and miralax to prevent constipation   - Replete electrolytes  - daily INR, patient takes warfarin at home for a fib, INR currently 1.75  - will refer to Dr Jimenez for management of hernia (discussed with Dr Jimenez)  - Dispo: Awaiting urine culture results to determine antibiotic regimen and authorization to return to nursing home    Red Team  [phone]

## 2022-03-14 NOTE — DISCHARGE NOTE NURSING/CASE MANAGEMENT/SOCIAL WORK - PATIENT PORTAL LINK FT
You can access the FollowMyHealth Patient Portal offered by [facility] by registering at the following website: [email or web address]. By joining [facility]’s FollowMyHealth portal, you will also be able to view your health information using other applications (apps) compatible with our system.

## 2022-03-14 NOTE — DISCHARGE NOTE NURSING/CASE MANAGEMENT/SOCIAL WORK - NSDCPEFALRISK_GEN_ALL_CORE
For information on Fall & Injury Prevention, visit: [email or web address] OR  [email or web address] OR  https://www.cdc.gov/steadi/patient.html

## 2022-03-14 NOTE — PROGRESS NOTE ADULT - SUBJECTIVE AND OBJECTIVE BOX
TEAM Surgery Progress Note    INTERVAL EVENTS: No acute events overnight. Elevated blood glucose to 200s. Awaiting auth.  SUBJECTIVE:       OBJECTIVE:    Vital Signs Last 24 Hrs  T(C): 37.4 (13 Mar 2022 21:13), Max: 37.4 (13 Mar 2022 21:13)  T(F): 99.3 (13 Mar 2022 21:13), Max: 99.3 (13 Mar 2022 21:13)  HR: 60 (13 Mar 2022 21:13) (58 - 65)  BP: 128/90 (13 Mar 2022 21:13) (115/43 - 135/51)  BP(mean): 54 (13 Mar 2022 13:11) (54 - 54)  RR: 18 (13 Mar 2022 21:13) (18 - 18)  SpO2: 99% (13 Mar 2022 21:13) (94% - 99%)I&O's Detail    12 Mar 2022 06:01  -  13 Mar 2022 07:00  --------------------------------------------------------  IN:    Oral Fluid: 680 mL  Total IN: 680 mL    OUT:    Voided (mL): 450 mL  Total OUT: 450 mL    Total NET: 230 mL      13 Mar 2022 07:01  -  14 Mar 2022 01:12  --------------------------------------------------------  IN:    IV PiggyBack: 50 mL    Oral Fluid: 820 mL  Total IN: 870 mL    OUT:    Voided (mL): 1000 mL  Total OUT: 1000 mL    Total NET: -130 mL      MEDICATIONS  (STANDING):  aMIOdarone    Tablet 200 milliGRAM(s) Oral daily  atorvastatin 20 milliGRAM(s) Oral at bedtime  budesonide 160 MICROgram(s)/formoterol 4.5 MICROgram(s) Inhaler 2 Puff(s) Inhalation two times a day  cefTRIAXone   IVPB 1000 milliGRAM(s) IV Intermittent every 24 hours  cyanocobalamin 1000 MICROGram(s) Oral daily  dextrose 40% Gel 15 Gram(s) Oral once  dextrose 5%. 1000 milliLiter(s) (100 mL/Hr) IV Continuous <Continuous>  dextrose 5%. 1000 milliLiter(s) (50 mL/Hr) IV Continuous <Continuous>  dextrose 50% Injectable 25 Gram(s) IV Push once  dextrose 50% Injectable 12.5 Gram(s) IV Push once  dextrose 50% Injectable 25 Gram(s) IV Push once  diclofenac sodium 1% Gel 2 Gram(s) Topical four times a day  diltiazem    milliGRAM(s) Oral daily  DULoxetine 60 milliGRAM(s) Oral daily  fluticasone propionate 50 MICROgram(s)/spray Nasal Spray 1 Spray(s) Both Nostrils <User Schedule>  furosemide    Tablet 20 milliGRAM(s) Oral daily  glucagon  Injectable 1 milliGRAM(s) IntraMuscular once  insulin lispro (ADMELOG) corrective regimen sliding scale   SubCutaneous three times a day before meals  insulin lispro (ADMELOG) corrective regimen sliding scale   SubCutaneous at bedtime  levothyroxine 25 MICROGram(s) Oral daily  methocarbamol 500 milliGRAM(s) Oral every 6 hours  metoprolol tartrate 25 milliGRAM(s) Oral two times a day  pantoprazole    Tablet 40 milliGRAM(s) Oral every 12 hours  polyethylene glycol 3350 17 Gram(s) Oral two times a day  pregabalin 100 milliGRAM(s) Oral three times a day    MEDICATIONS  (PRN):  ALBUTerol    90 MICROgram(s) HFA Inhaler 2 Puff(s) Inhalation every 6 hours PRN Shortness of Breath and/or Wheezing  oxyCODONE    IR 2.5 milliGRAM(s) Oral every 6 hours PRN Moderate Pain (4 - 6)    Physical Exam:  General: NAD, alert  HEENT: normocephalic, PERRLA  Chest: No labored breathing noted   Abdomen: soft, NTND, no rebound tenderness, erythematous scar LLQ    LABS:                        12.3   8.83  )-----------( 326      ( 13 Mar 2022 07:31 )             40.0     03-13    138  |  100  |  11  ----------------------------<  156<H>  3.6   |  26  |  0.64    Ca    9.1      13 Mar 2022 07:31  Phos  3.6     03-13  Mg     1.9     03-13    TPro  6.7  /  Alb  3.6  /  TBili  0.2  /  DBili  x   /  AST  13  /  ALT  20  /  AlkPhos  99  03-13    PT/INR - ( 13 Mar 2022 07:31 )   PT: 20.2 sec;   INR: 1.75 ratio         PTT - ( 13 Mar 2022 07:31 )  PTT:29.7 sec  LIVER FUNCTIONS - ( 13 Mar 2022 07:31 )  Alb: 3.6 g/dL / Pro: 6.7 g/dL / ALK PHOS: 99 U/L / ALT: 20 U/L / AST: 13 U/L / GGT: x                 IMAGING:     TEAM Surgery Progress Note    INTERVAL EVENTS: Patient was seen and examined at bedside. Feeling fine without complaints. No acute events overnight. Elevated blood glucose to 200s. Awaiting authorization.    SUBJECTIVE:       OBJECTIVE:     Vital Signs Last 24 Hrs  T(C): 36.9 (14 Mar 2022 06:32), Max: 37.4 (13 Mar 2022 21:13)  T(F): 98.4 (14 Mar 2022 06:32), Max: 99.3 (13 Mar 2022 21:13)  HR: 72 (14 Mar 2022 06:32) (58 - 73)  BP: 101/56 (14 Mar 2022 06:32) (101/56 - 151/62)  BP(mean): 54 (13 Mar 2022 13:11) (54 - 54)  RR: 18 (14 Mar 2022 06:32) (18 - 18)  SpO2: 95% (14 Mar 2022 06:32) (93% - 99%)    I&O's Detail    13 Mar 2022 07:01  -  14 Mar 2022 07:00  --------------------------------------------------------  IN:    IV PiggyBack: 50 mL    Oral Fluid: 820 mL  Total IN: 870 mL    OUT:    Voided (mL): 1000 mL  Total OUT: 1000 mL    Total NET: -130 mL          MEDICATIONS  (STANDING):  aMIOdarone    Tablet 200 milliGRAM(s) Oral daily  atorvastatin 20 milliGRAM(s) Oral at bedtime  budesonide 160 MICROgram(s)/formoterol 4.5 MICROgram(s) Inhaler 2 Puff(s) Inhalation two times a day  cefTRIAXone   IVPB 1000 milliGRAM(s) IV Intermittent every 24 hours  cyanocobalamin 1000 MICROGram(s) Oral daily  dextrose 40% Gel 15 Gram(s) Oral once  dextrose 5%. 1000 milliLiter(s) (100 mL/Hr) IV Continuous <Continuous>  dextrose 5%. 1000 milliLiter(s) (50 mL/Hr) IV Continuous <Continuous>  dextrose 50% Injectable 25 Gram(s) IV Push once  dextrose 50% Injectable 12.5 Gram(s) IV Push once  dextrose 50% Injectable 25 Gram(s) IV Push once  diclofenac sodium 1% Gel 2 Gram(s) Topical four times a day  diltiazem    milliGRAM(s) Oral daily  DULoxetine 60 milliGRAM(s) Oral daily  fluticasone propionate 50 MICROgram(s)/spray Nasal Spray 1 Spray(s) Both Nostrils <User Schedule>  furosemide    Tablet 20 milliGRAM(s) Oral daily  glucagon  Injectable 1 milliGRAM(s) IntraMuscular once  insulin lispro (ADMELOG) corrective regimen sliding scale   SubCutaneous three times a day before meals  insulin lispro (ADMELOG) corrective regimen sliding scale   SubCutaneous at bedtime  levothyroxine 25 MICROGram(s) Oral daily  methocarbamol 500 milliGRAM(s) Oral every 6 hours  metoprolol tartrate 25 milliGRAM(s) Oral two times a day  pantoprazole    Tablet 40 milliGRAM(s) Oral every 12 hours  polyethylene glycol 3350 17 Gram(s) Oral two times a day  pregabalin 100 milliGRAM(s) Oral three times a day    MEDICATIONS  (PRN):  ALBUTerol    90 MICROgram(s) HFA Inhaler 2 Puff(s) Inhalation every 6 hours PRN Shortness of Breath and/or Wheezing  oxyCODONE    IR 2.5 milliGRAM(s) Oral every 6 hours PRN Moderate Pain (4 - 6)    Physical Exam:  General: NAD, alert  HEENT: normocephalic, PERRLA  Chest: No labored breathing noted   Abdomen: soft, NTND, no rebound tenderness, erythematous scar LLQ    LABS:                        12.3   8.83  )-----------( 326      ( 13 Mar 2022 07:31 )             40.0     03-13    138  |  100  |  11  ----------------------------<  156<H>  3.6   |  26  |  0.64    Ca    9.1      13 Mar 2022 07:31  Phos  3.6     03-13  Mg     1.9     03-13    TPro  6.7  /  Alb  3.6  /  TBili  0.2  /  DBili  x   /  AST  13  /  ALT  20  /  AlkPhos  99  03-13    PT/INR - ( 13 Mar 2022 07:31 )   PT: 20.2 sec;   INR: 1.75 ratio         PTT - ( 13 Mar 2022 07:31 )  PTT:29.7 sec  LIVER FUNCTIONS - ( 13 Mar 2022 07:31 )  Alb: 3.6 g/dL / Pro: 6.7 g/dL / ALK PHOS: 99 U/L / ALT: 20 U/L / AST: 13 U/L / GGT: x                 IMAGING:

## 2022-03-31 ENCOUNTER — APPOINTMENT (OUTPATIENT)
Dept: SURGERY | Facility: CLINIC | Age: 72
End: 2022-03-31
Payer: MEDICARE

## 2022-03-31 VITALS
HEART RATE: 67 BPM | WEIGHT: 193 LBS | HEIGHT: 64 IN | OXYGEN SATURATION: 94 % | BODY MASS INDEX: 32.95 KG/M2 | TEMPERATURE: 98.2 F | SYSTOLIC BLOOD PRESSURE: 111 MMHG | DIASTOLIC BLOOD PRESSURE: 68 MMHG

## 2022-03-31 DIAGNOSIS — K43.9 VENTRAL HERNIA W/OUT OBSTRUCTION OR GANGRENE: ICD-10-CM

## 2022-03-31 PROCEDURE — 99202 OFFICE O/P NEW SF 15 MIN: CPT

## 2022-03-31 NOTE — HISTORY OF PRESENT ILLNESS
[de-identified] : 70 yo female with recurrent bouts of small bowel obstruction(etiology adhesions vs hernia) for follow up after admission. She states that she continues to get abdominal pain and is tired of frequent admission to the hospital with sbo. She would like solution for her current problem. The issue that is preventing her from this is that she will need cardiac and pulmonary evaluation before we can proceed w the operation. Plan of care after the surgery. All questions were answered in detail and the patient expressed full understanding.

## 2022-04-28 RX ORDER — PANTOPRAZOLE SODIUM 20 MG/1
1 TABLET, DELAYED RELEASE ORAL
Qty: 0 | Refills: 0 | DISCHARGE

## 2022-04-28 RX ORDER — INSULIN LISPRO 100/ML
10 VIAL (ML) SUBCUTANEOUS
Qty: 0 | Refills: 0 | DISCHARGE

## 2022-04-28 RX ORDER — MICONAZOLE NITRATE 2 %
1 CREAM (GRAM) TOPICAL
Qty: 0 | Refills: 0 | DISCHARGE

## 2022-04-28 RX ORDER — MOMETASONE FUROATE AND FORMOTEROL FUMARATE DIHYDRATE 200; 5 UG/1; UG/1
2 AEROSOL RESPIRATORY (INHALATION)
Qty: 0 | Refills: 0 | DISCHARGE

## 2022-04-28 RX ORDER — LACTOBACILLUS ACIDOPHILUS 100MM CELL
1 CAPSULE ORAL
Qty: 0 | Refills: 0 | DISCHARGE

## 2022-04-28 RX ORDER — FUROSEMIDE 40 MG
1 TABLET ORAL
Qty: 0 | Refills: 0 | DISCHARGE

## 2022-04-28 RX ORDER — ALBUTEROL 90 UG/1
1 AEROSOL, METERED ORAL
Qty: 0 | Refills: 0 | DISCHARGE

## 2022-04-28 RX ORDER — INSULIN LISPRO 100/ML
0 VIAL (ML) SUBCUTANEOUS
Qty: 0 | Refills: 0 | DISCHARGE

## 2022-04-28 RX ORDER — INFLUENZA VIRUS VACCINE 15; 15; 15; 15 UG/.5ML; UG/.5ML; UG/.5ML; UG/.5ML
0.7 SUSPENSION INTRAMUSCULAR
Qty: 0 | Refills: 0 | DISCHARGE

## 2022-04-28 RX ORDER — ACETAMINOPHEN 500 MG
1 TABLET ORAL
Qty: 0 | Refills: 0 | DISCHARGE

## 2022-04-28 RX ORDER — ALBUTEROL 90 UG/1
2 AEROSOL, METERED ORAL
Qty: 0 | Refills: 0 | DISCHARGE

## 2022-04-28 RX ORDER — ESCITALOPRAM OXALATE 10 MG/1
1 TABLET, FILM COATED ORAL
Qty: 0 | Refills: 0 | DISCHARGE

## 2022-04-28 RX ORDER — CLOTRIMAZOLE AND BETAMETHASONE DIPROPIONATE 10; .5 MG/G; MG/G
1 CREAM TOPICAL
Qty: 0 | Refills: 0 | DISCHARGE

## 2022-04-28 RX ORDER — OXYCODONE HYDROCHLORIDE 5 MG/1
1 TABLET ORAL
Qty: 0 | Refills: 0 | DISCHARGE

## 2022-04-28 RX ORDER — ATORVASTATIN CALCIUM 80 MG/1
1 TABLET, FILM COATED ORAL
Qty: 0 | Refills: 0 | DISCHARGE

## 2022-04-28 RX ORDER — ASCORBIC ACID 60 MG
1 TABLET,CHEWABLE ORAL
Qty: 0 | Refills: 0 | DISCHARGE

## 2022-04-28 RX ORDER — FLUTICASONE PROPIONATE 50 MCG
2 SPRAY, SUSPENSION NASAL
Qty: 0 | Refills: 0 | DISCHARGE

## 2022-04-28 RX ORDER — WARFARIN SODIUM 2.5 MG/1
4.5 TABLET ORAL
Qty: 0 | Refills: 0 | DISCHARGE

## 2022-04-28 RX ORDER — DRONEDARONE 400 MG/1
1 TABLET, FILM COATED ORAL
Qty: 0 | Refills: 0 | DISCHARGE

## 2022-04-28 RX ORDER — METHOCARBAMOL 500 MG/1
1 TABLET, FILM COATED ORAL
Qty: 0 | Refills: 0 | DISCHARGE

## 2022-04-28 RX ORDER — DULOXETINE HYDROCHLORIDE 30 MG/1
1 CAPSULE, DELAYED RELEASE ORAL
Qty: 0 | Refills: 0 | DISCHARGE

## 2022-04-28 RX ORDER — MUPIROCIN 20 MG/G
1 OINTMENT TOPICAL
Qty: 0 | Refills: 0 | DISCHARGE

## 2022-06-29 NOTE — ED ADULT NURSE NOTE - CAS ELECT INFOMATION PROVIDED
Problem List Items Addressed This Visit        Other    Attention deficit hyperactivity disorder (ADHD), combined type - Primary          D: This therapist met with Mariola Sahara for an individual therapy session  Therapist worked with Mariola Shoemaker by spending time talking with him about how he had run over with his Dad to make the appointment on time and that he was tired but liked running  He was very distracted in the beginning of the session, wanting to stop and look at pictures in the hallway and having difficulty with transitioning back - wanted to have passwords that therapist had to answer in order to get back  Therapist played along with his passwords as well as doing wrong answers to prompt him to communicate instead of just saying she needed to guess  Mariolaadalid Shoemaker was very interested in pretend play with puppets and talked to a shark character as if it was a real person, and at the end of session asked if they could play with the shark again and that he liked talking to him  Mariola Shoemaker did very well with trying to express his ideas today and responded well to modeled communication on appropriately sharing when something wasn't what he expected  He also did well sharing when he felt like therapist was doing something to just make him feel better and processed why that upset him as well as being able to understand that therapist was just drawing with her left hand so the art wasn't great  A: Mariolaadalid Shoemaker was oriented x3  He was focused and engaged  Mariolaadalid Shoemaker did not present with HI SI or SIB  He was in a good mood and showed positive response to prompts and modeled communication  P: Maksim's next session is scheduled for 1 week, will work with him on increasing his ability to express his ideas to others, especially when upset      Psychotherapy Provided: Individual Psychotherapy 45 minutes     Length of time in session: 45 minutes, follow up in 1 week    Goals addressed in session: Goal 1     Pain:      none    0    Current suicide risk : Low       Behavioral Health Treatment Plan [facility]: Diagnosis and Treatment Plan explained to Alexandrea Puga relates understanding diagnosis and is agreeable to Treatment Plan   Yes DC instructions

## 2022-11-10 NOTE — ED PROVIDER NOTE - CPE EDP [facility] NORM
normal... Cibinqo Pregnancy And Lactation Text: It is unknown if this medication will adversely affect pregnancy or breast feeding.  You should not take this medication if you are currently pregnant or planning a pregnancy or while breastfeeding.

## 2022-11-30 NOTE — ED PROVIDER NOTE - ENMT, MLM
Airway patent, Nasal mucosa clear. Mouth with normal mucosa. Throat has no vesicles, no oropharyngeal exudates and uvula is midline.
Terra Gomez, MS, RDN, CDN, [facility] [phone]

## 2022-12-10 ENCOUNTER — INPATIENT (INPATIENT)
Facility: HOSPITAL | Age: 72
LOS: 5 days | Discharge: HOME | DRG: 394 | End: 2022-12-16
Attending: SURGERY | Admitting: SURGERY
Payer: MEDICARE

## 2022-12-10 VITALS
WEIGHT: 134.92 LBS | HEIGHT: 65 IN | SYSTOLIC BLOOD PRESSURE: 131 MMHG | DIASTOLIC BLOOD PRESSURE: 51 MMHG | RESPIRATION RATE: 20 BRPM | HEART RATE: 97 BPM | TEMPERATURE: 100 F | OXYGEN SATURATION: 95 %

## 2022-12-10 DIAGNOSIS — Z96.643 PRESENCE OF ARTIFICIAL HIP JOINT, BILATERAL: Chronic | ICD-10-CM

## 2022-12-10 DIAGNOSIS — Z90.49 ACQUIRED ABSENCE OF OTHER SPECIFIED PARTS OF DIGESTIVE TRACT: Chronic | ICD-10-CM

## 2022-12-10 DIAGNOSIS — Z98.890 OTHER SPECIFIED POSTPROCEDURAL STATES: Chronic | ICD-10-CM

## 2022-12-10 DIAGNOSIS — K56.609 UNSPECIFIED INTESTINAL OBSTRUCTION, UNSPECIFIED AS TO PARTIAL VERSUS COMPLETE OBSTRUCTION: ICD-10-CM

## 2022-12-10 DIAGNOSIS — Z93.3 COLOSTOMY STATUS: Chronic | ICD-10-CM

## 2022-12-10 DIAGNOSIS — Z98.891 HISTORY OF UTERINE SCAR FROM PREVIOUS SURGERY: Chronic | ICD-10-CM

## 2022-12-10 DIAGNOSIS — Z96.649 PRESENCE OF UNSPECIFIED ARTIFICIAL HIP JOINT: Chronic | ICD-10-CM

## 2022-12-10 LAB
ALBUMIN SERPL ELPH-MCNC: 3.3 G/DL — SIGNIFICANT CHANGE UP (ref 3.3–5)
ALP SERPL-CCNC: 71 U/L — SIGNIFICANT CHANGE UP (ref 40–120)
ALT FLD-CCNC: 45 U/L — SIGNIFICANT CHANGE UP (ref 12–78)
ANION GAP SERPL CALC-SCNC: 5 MMOL/L — SIGNIFICANT CHANGE UP (ref 5–17)
APPEARANCE UR: ABNORMAL
APTT BLD: 44 SEC — HIGH (ref 27.5–35.5)
AST SERPL-CCNC: 22 U/L — SIGNIFICANT CHANGE UP (ref 15–37)
BASOPHILS # BLD AUTO: 0.04 K/UL — SIGNIFICANT CHANGE UP (ref 0–0.2)
BASOPHILS NFR BLD AUTO: 0.4 % — SIGNIFICANT CHANGE UP (ref 0–2)
BILIRUB SERPL-MCNC: 0.3 MG/DL — SIGNIFICANT CHANGE UP (ref 0.2–1.2)
BILIRUB UR-MCNC: NEGATIVE — SIGNIFICANT CHANGE UP
BUN SERPL-MCNC: 18 MG/DL — SIGNIFICANT CHANGE UP (ref 7–23)
CALCIUM SERPL-MCNC: 9.2 MG/DL — SIGNIFICANT CHANGE UP (ref 8.5–10.1)
CHLORIDE SERPL-SCNC: 107 MMOL/L — SIGNIFICANT CHANGE UP (ref 96–108)
CO2 SERPL-SCNC: 29 MMOL/L — SIGNIFICANT CHANGE UP (ref 22–31)
COLOR SPEC: YELLOW — SIGNIFICANT CHANGE UP
CREAT SERPL-MCNC: 0.85 MG/DL — SIGNIFICANT CHANGE UP (ref 0.5–1.3)
DIFF PNL FLD: ABNORMAL
EGFR: 73 ML/MIN/1.73M2 — SIGNIFICANT CHANGE UP
EOSINOPHIL # BLD AUTO: 0 K/UL — SIGNIFICANT CHANGE UP (ref 0–0.5)
EOSINOPHIL NFR BLD AUTO: 0 % — SIGNIFICANT CHANGE UP (ref 0–6)
FLUAV AG NPH QL: SIGNIFICANT CHANGE UP
FLUBV AG NPH QL: SIGNIFICANT CHANGE UP
GLUCOSE SERPL-MCNC: 164 MG/DL — HIGH (ref 70–99)
GLUCOSE UR QL: NEGATIVE — SIGNIFICANT CHANGE UP
HCT VFR BLD CALC: 39.6 % — SIGNIFICANT CHANGE UP (ref 34.5–45)
HGB BLD-MCNC: 12.5 G/DL — SIGNIFICANT CHANGE UP (ref 11.5–15.5)
IMM GRANULOCYTES NFR BLD AUTO: 0.4 % — SIGNIFICANT CHANGE UP (ref 0–0.9)
INR BLD: 2.21 RATIO — HIGH (ref 0.88–1.16)
KETONES UR-MCNC: ABNORMAL
LACTATE SERPL-SCNC: 1.4 MMOL/L — SIGNIFICANT CHANGE UP (ref 0.7–2)
LEUKOCYTE ESTERASE UR-ACNC: ABNORMAL
LIDOCAIN IGE QN: 63 U/L — LOW (ref 73–393)
LYMPHOCYTES # BLD AUTO: 1.49 K/UL — SIGNIFICANT CHANGE UP (ref 1–3.3)
LYMPHOCYTES # BLD AUTO: 16.3 % — SIGNIFICANT CHANGE UP (ref 13–44)
MCHC RBC-ENTMCNC: 26.4 PG — LOW (ref 27–34)
MCHC RBC-ENTMCNC: 31.6 GM/DL — LOW (ref 32–36)
MCV RBC AUTO: 83.7 FL — SIGNIFICANT CHANGE UP (ref 80–100)
MONOCYTES # BLD AUTO: 0.92 K/UL — HIGH (ref 0–0.9)
MONOCYTES NFR BLD AUTO: 10.1 % — SIGNIFICANT CHANGE UP (ref 2–14)
NEUTROPHILS # BLD AUTO: 6.63 K/UL — SIGNIFICANT CHANGE UP (ref 1.8–7.4)
NEUTROPHILS NFR BLD AUTO: 72.8 % — SIGNIFICANT CHANGE UP (ref 43–77)
NITRITE UR-MCNC: POSITIVE
PH UR: 6 — SIGNIFICANT CHANGE UP (ref 5–8)
PLATELET # BLD AUTO: 341 K/UL — SIGNIFICANT CHANGE UP (ref 150–400)
POTASSIUM SERPL-MCNC: 4 MMOL/L — SIGNIFICANT CHANGE UP (ref 3.5–5.3)
POTASSIUM SERPL-SCNC: 4 MMOL/L — SIGNIFICANT CHANGE UP (ref 3.5–5.3)
PROT SERPL-MCNC: 8 GM/DL — SIGNIFICANT CHANGE UP (ref 6–8.3)
PROT UR-MCNC: 100
PROTHROM AB SERPL-ACNC: 25.9 SEC — HIGH (ref 10.5–13.4)
RBC # BLD: 4.73 M/UL — SIGNIFICANT CHANGE UP (ref 3.8–5.2)
RBC # FLD: 17.2 % — HIGH (ref 10.3–14.5)
RSV RNA NPH QL NAA+NON-PROBE: DETECTED
SARS-COV-2 RNA SPEC QL NAA+PROBE: SIGNIFICANT CHANGE UP
SODIUM SERPL-SCNC: 141 MMOL/L — SIGNIFICANT CHANGE UP (ref 135–145)
SP GR SPEC: 1.02 — SIGNIFICANT CHANGE UP (ref 1.01–1.02)
UROBILINOGEN FLD QL: NEGATIVE — SIGNIFICANT CHANGE UP
WBC # BLD: 9.12 K/UL — SIGNIFICANT CHANGE UP (ref 3.8–10.5)
WBC # FLD AUTO: 9.12 K/UL — SIGNIFICANT CHANGE UP (ref 3.8–10.5)

## 2022-12-10 PROCEDURE — 85730 THROMBOPLASTIN TIME PARTIAL: CPT

## 2022-12-10 PROCEDURE — 85025 COMPLETE CBC W/AUTO DIFF WBC: CPT

## 2022-12-10 PROCEDURE — 80053 COMPREHEN METABOLIC PANEL: CPT

## 2022-12-10 PROCEDURE — 85610 PROTHROMBIN TIME: CPT

## 2022-12-10 PROCEDURE — 83880 ASSAY OF NATRIURETIC PEPTIDE: CPT

## 2022-12-10 PROCEDURE — 74177 CT ABD & PELVIS W/CONTRAST: CPT | Mod: 26,MG

## 2022-12-10 PROCEDURE — 82962 GLUCOSE BLOOD TEST: CPT

## 2022-12-10 PROCEDURE — 80048 BASIC METABOLIC PNL TOTAL CA: CPT

## 2022-12-10 PROCEDURE — 85027 COMPLETE CBC AUTOMATED: CPT

## 2022-12-10 PROCEDURE — 93970 EXTREMITY STUDY: CPT | Mod: 26

## 2022-12-10 PROCEDURE — U0003: CPT

## 2022-12-10 PROCEDURE — 93970 EXTREMITY STUDY: CPT

## 2022-12-10 PROCEDURE — 71045 X-RAY EXAM CHEST 1 VIEW: CPT | Mod: 26,77

## 2022-12-10 PROCEDURE — 94640 AIRWAY INHALATION TREATMENT: CPT

## 2022-12-10 PROCEDURE — 84100 ASSAY OF PHOSPHORUS: CPT

## 2022-12-10 PROCEDURE — U0005: CPT

## 2022-12-10 PROCEDURE — 71045 X-RAY EXAM CHEST 1 VIEW: CPT | Mod: 26

## 2022-12-10 PROCEDURE — 74250 X-RAY XM SM INT 1CNTRST STD: CPT

## 2022-12-10 PROCEDURE — 99223 1ST HOSP IP/OBS HIGH 75: CPT

## 2022-12-10 PROCEDURE — 93010 ELECTROCARDIOGRAM REPORT: CPT

## 2022-12-10 PROCEDURE — C9113: CPT

## 2022-12-10 PROCEDURE — 93005 ELECTROCARDIOGRAM TRACING: CPT

## 2022-12-10 PROCEDURE — 36415 COLL VENOUS BLD VENIPUNCTURE: CPT

## 2022-12-10 PROCEDURE — 87086 URINE CULTURE/COLONY COUNT: CPT

## 2022-12-10 PROCEDURE — G1004: CPT

## 2022-12-10 PROCEDURE — 99285 EMERGENCY DEPT VISIT HI MDM: CPT

## 2022-12-10 PROCEDURE — 71045 X-RAY EXAM CHEST 1 VIEW: CPT

## 2022-12-10 PROCEDURE — 83036 HEMOGLOBIN GLYCOSYLATED A1C: CPT

## 2022-12-10 PROCEDURE — 74019 RADEX ABDOMEN 2 VIEWS: CPT

## 2022-12-10 PROCEDURE — 83735 ASSAY OF MAGNESIUM: CPT

## 2022-12-10 RX ORDER — INSULIN LISPRO 100/ML
0 VIAL (ML) SUBCUTANEOUS
Qty: 0 | Refills: 0 | DISCHARGE

## 2022-12-10 RX ORDER — METHOCARBAMOL 500 MG/1
1 TABLET, FILM COATED ORAL
Qty: 0 | Refills: 0 | DISCHARGE

## 2022-12-10 RX ORDER — ACETAMINOPHEN 500 MG
1000 TABLET ORAL ONCE
Refills: 0 | Status: DISCONTINUED | OUTPATIENT
Start: 2022-12-10 | End: 2022-12-16

## 2022-12-10 RX ORDER — LEVOTHYROXINE SODIUM 125 MCG
66 TABLET ORAL AT BEDTIME
Refills: 0 | Status: DISCONTINUED | OUTPATIENT
Start: 2022-12-10 | End: 2022-12-16

## 2022-12-10 RX ORDER — ONDANSETRON 8 MG/1
4 TABLET, FILM COATED ORAL EVERY 6 HOURS
Refills: 0 | Status: DISCONTINUED | OUTPATIENT
Start: 2022-12-10 | End: 2022-12-16

## 2022-12-10 RX ORDER — DICLOFENAC SODIUM 30 MG/G
2 GEL TOPICAL
Qty: 0 | Refills: 0 | DISCHARGE

## 2022-12-10 RX ORDER — MAGNESIUM HYDROXIDE 400 MG/1
30 TABLET, CHEWABLE ORAL
Qty: 0 | Refills: 0 | DISCHARGE

## 2022-12-10 RX ORDER — METOPROLOL TARTRATE 50 MG
5 TABLET ORAL EVERY 6 HOURS
Refills: 0 | Status: DISCONTINUED | OUTPATIENT
Start: 2022-12-10 | End: 2022-12-11

## 2022-12-10 RX ORDER — OXYCODONE HYDROCHLORIDE 5 MG/1
1 TABLET ORAL
Qty: 0 | Refills: 0 | DISCHARGE

## 2022-12-10 RX ORDER — DILTIAZEM HCL 120 MG
180 CAPSULE, EXT RELEASE 24 HR ORAL DAILY
Refills: 0 | Status: DISCONTINUED | OUTPATIENT
Start: 2022-12-10 | End: 2022-12-16

## 2022-12-10 RX ORDER — FLUTICASONE PROPIONATE AND SALMETEROL 50; 250 UG/1; UG/1
1 POWDER ORAL; RESPIRATORY (INHALATION)
Qty: 0 | Refills: 0 | DISCHARGE

## 2022-12-10 RX ORDER — ALBUTEROL 90 UG/1
2 AEROSOL, METERED ORAL EVERY 6 HOURS
Refills: 0 | Status: DISCONTINUED | OUTPATIENT
Start: 2022-12-10 | End: 2022-12-16

## 2022-12-10 RX ORDER — INSULIN GLARGINE 100 [IU]/ML
15 INJECTION, SOLUTION SUBCUTANEOUS AT BEDTIME
Refills: 0 | Status: DISCONTINUED | OUTPATIENT
Start: 2022-12-10 | End: 2022-12-11

## 2022-12-10 RX ORDER — WARFARIN SODIUM 2.5 MG/1
1 TABLET ORAL
Qty: 0 | Refills: 0 | DISCHARGE

## 2022-12-10 RX ORDER — DULOXETINE HYDROCHLORIDE 30 MG/1
60 CAPSULE, DELAYED RELEASE ORAL DAILY
Refills: 0 | Status: DISCONTINUED | OUTPATIENT
Start: 2022-12-10 | End: 2022-12-16

## 2022-12-10 RX ORDER — SODIUM CHLORIDE 9 MG/ML
500 INJECTION INTRAMUSCULAR; INTRAVENOUS; SUBCUTANEOUS ONCE
Refills: 0 | Status: COMPLETED | OUTPATIENT
Start: 2022-12-10 | End: 2022-12-10

## 2022-12-10 RX ORDER — DULOXETINE HYDROCHLORIDE 30 MG/1
1 CAPSULE, DELAYED RELEASE ORAL
Qty: 0 | Refills: 0 | DISCHARGE

## 2022-12-10 RX ORDER — POVIDONE-IODINE 5 %
1 AEROSOL (ML) TOPICAL
Refills: 0 | Status: DISCONTINUED | OUTPATIENT
Start: 2022-12-10 | End: 2022-12-10

## 2022-12-10 RX ORDER — AER TRAVELER 0.5 G/1
1 SOLUTION RECTAL; TOPICAL
Qty: 0 | Refills: 0 | DISCHARGE

## 2022-12-10 RX ORDER — KETOROLAC TROMETHAMINE 30 MG/ML
30 SYRINGE (ML) INJECTION EVERY 6 HOURS
Refills: 0 | Status: DISCONTINUED | OUTPATIENT
Start: 2022-12-10 | End: 2022-12-15

## 2022-12-10 RX ORDER — INSULIN GLARGINE 100 [IU]/ML
15 INJECTION, SOLUTION SUBCUTANEOUS
Qty: 0 | Refills: 0 | DISCHARGE

## 2022-12-10 RX ORDER — PANTOPRAZOLE SODIUM 20 MG/1
40 TABLET, DELAYED RELEASE ORAL DAILY
Refills: 0 | Status: DISCONTINUED | OUTPATIENT
Start: 2022-12-10 | End: 2022-12-16

## 2022-12-10 RX ORDER — PREGABALIN 225 MG/1
1 CAPSULE ORAL
Qty: 0 | Refills: 0 | DISCHARGE

## 2022-12-10 RX ORDER — BUDESONIDE AND FORMOTEROL FUMARATE DIHYDRATE 160; 4.5 UG/1; UG/1
2 AEROSOL RESPIRATORY (INHALATION)
Refills: 0 | Status: DISCONTINUED | OUTPATIENT
Start: 2022-12-10 | End: 2022-12-10

## 2022-12-10 RX ORDER — INSULIN LISPRO 100/ML
VIAL (ML) SUBCUTANEOUS
Refills: 0 | Status: DISCONTINUED | OUTPATIENT
Start: 2022-12-10 | End: 2022-12-16

## 2022-12-10 RX ORDER — LINAGLIPTIN 5 MG/1
1 TABLET, FILM COATED ORAL
Qty: 0 | Refills: 0 | DISCHARGE

## 2022-12-10 RX ORDER — FAMOTIDINE 10 MG/ML
20 INJECTION INTRAVENOUS ONCE
Refills: 0 | Status: COMPLETED | OUTPATIENT
Start: 2022-12-10 | End: 2022-12-10

## 2022-12-10 RX ORDER — BUDESONIDE AND FORMOTEROL FUMARATE DIHYDRATE 160; 4.5 UG/1; UG/1
2 AEROSOL RESPIRATORY (INHALATION)
Refills: 0 | Status: DISCONTINUED | OUTPATIENT
Start: 2022-12-10 | End: 2022-12-16

## 2022-12-10 RX ORDER — ENOXAPARIN SODIUM 100 MG/ML
60 INJECTION SUBCUTANEOUS EVERY 12 HOURS
Refills: 0 | Status: DISCONTINUED | OUTPATIENT
Start: 2022-12-11 | End: 2022-12-11

## 2022-12-10 RX ORDER — DILTIAZEM HCL 120 MG
1 CAPSULE, EXT RELEASE 24 HR ORAL
Qty: 0 | Refills: 0 | DISCHARGE

## 2022-12-10 RX ORDER — DICLOFENAC SODIUM 30 MG/G
4 GEL TOPICAL
Qty: 0 | Refills: 0 | DISCHARGE

## 2022-12-10 RX ORDER — TOLTERODINE TARTRATE 1 MG/1
1 TABLET, FILM COATED ORAL
Qty: 0 | Refills: 0 | DISCHARGE

## 2022-12-10 RX ORDER — ALBUTEROL 90 UG/1
2 AEROSOL, METERED ORAL
Qty: 0 | Refills: 0 | DISCHARGE

## 2022-12-10 RX ORDER — ATORVASTATIN CALCIUM 80 MG/1
20 TABLET, FILM COATED ORAL AT BEDTIME
Refills: 0 | Status: DISCONTINUED | OUTPATIENT
Start: 2022-12-10 | End: 2022-12-16

## 2022-12-10 RX ORDER — FLUTICASONE PROPIONATE 50 MCG
1 SPRAY, SUSPENSION NASAL
Qty: 0 | Refills: 0 | DISCHARGE

## 2022-12-10 RX ORDER — DEXTROSE 50 % IN WATER 50 %
25 SYRINGE (ML) INTRAVENOUS ONCE
Refills: 0 | Status: DISCONTINUED | OUTPATIENT
Start: 2022-12-10 | End: 2022-12-16

## 2022-12-10 RX ORDER — KETOROLAC TROMETHAMINE 30 MG/ML
15 SYRINGE (ML) INJECTION ONCE
Refills: 0 | Status: DISCONTINUED | OUTPATIENT
Start: 2022-12-10 | End: 2022-12-10

## 2022-12-10 RX ORDER — AMIODARONE HYDROCHLORIDE 400 MG/1
200 TABLET ORAL DAILY
Refills: 0 | Status: DISCONTINUED | OUTPATIENT
Start: 2022-12-10 | End: 2022-12-16

## 2022-12-10 RX ORDER — METOPROLOL TARTRATE 50 MG
25 TABLET ORAL
Refills: 0 | Status: DISCONTINUED | OUTPATIENT
Start: 2022-12-10 | End: 2022-12-16

## 2022-12-10 RX ORDER — ONDANSETRON 8 MG/1
1 TABLET, FILM COATED ORAL
Qty: 0 | Refills: 0 | DISCHARGE
Start: 2022-12-10

## 2022-12-10 RX ORDER — SENNA PLUS 8.6 MG/1
1 TABLET ORAL
Qty: 0 | Refills: 0 | DISCHARGE

## 2022-12-10 RX ORDER — OXYBUTYNIN CHLORIDE 5 MG
5 TABLET ORAL
Refills: 0 | Status: DISCONTINUED | OUTPATIENT
Start: 2022-12-10 | End: 2022-12-16

## 2022-12-10 RX ORDER — AMIODARONE HYDROCHLORIDE 400 MG/1
1 TABLET ORAL
Qty: 0 | Refills: 0 | DISCHARGE

## 2022-12-10 RX ORDER — PANTOPRAZOLE SODIUM 20 MG/1
1 TABLET, DELAYED RELEASE ORAL
Qty: 0 | Refills: 0 | DISCHARGE

## 2022-12-10 RX ORDER — IRON POLYSACCHARIDE COMPLEX 150 MG
1 CAPSULE ORAL
Qty: 0 | Refills: 0 | DISCHARGE

## 2022-12-10 RX ORDER — ONDANSETRON 8 MG/1
4 TABLET, FILM COATED ORAL ONCE
Refills: 0 | Status: COMPLETED | OUTPATIENT
Start: 2022-12-10 | End: 2022-12-10

## 2022-12-10 RX ORDER — PIPERACILLIN AND TAZOBACTAM 4; .5 G/20ML; G/20ML
3.38 INJECTION, POWDER, LYOPHILIZED, FOR SOLUTION INTRAVENOUS ONCE
Refills: 0 | Status: COMPLETED | OUTPATIENT
Start: 2022-12-10 | End: 2022-12-10

## 2022-12-10 RX ORDER — PIPERACILLIN AND TAZOBACTAM 4; .5 G/20ML; G/20ML
3.38 INJECTION, POWDER, LYOPHILIZED, FOR SOLUTION INTRAVENOUS ONCE
Refills: 0 | Status: DISCONTINUED | OUTPATIENT
Start: 2022-12-10 | End: 2022-12-14

## 2022-12-10 RX ORDER — IPRATROPIUM/ALBUTEROL SULFATE 18-103MCG
1 AEROSOL WITH ADAPTER (GRAM) INHALATION
Qty: 0 | Refills: 0 | DISCHARGE
Start: 2022-12-10

## 2022-12-10 RX ORDER — INSULIN LISPRO 100/ML
10 VIAL (ML) SUBCUTANEOUS
Qty: 0 | Refills: 0 | DISCHARGE

## 2022-12-10 RX ORDER — ATORVASTATIN CALCIUM 80 MG/1
1 TABLET, FILM COATED ORAL
Qty: 0 | Refills: 0 | DISCHARGE

## 2022-12-10 RX ORDER — LEVOTHYROXINE SODIUM 125 MCG
1 TABLET ORAL
Qty: 0 | Refills: 0 | DISCHARGE

## 2022-12-10 RX ORDER — DEXTROSE 50 % IN WATER 50 %
15 SYRINGE (ML) INTRAVENOUS ONCE
Refills: 0 | Status: DISCONTINUED | OUTPATIENT
Start: 2022-12-10 | End: 2022-12-16

## 2022-12-10 RX ORDER — ASCORBIC ACID 60 MG
2 TABLET,CHEWABLE ORAL
Qty: 0 | Refills: 0 | DISCHARGE

## 2022-12-10 RX ORDER — PIPERACILLIN AND TAZOBACTAM 4; .5 G/20ML; G/20ML
3.38 INJECTION, POWDER, LYOPHILIZED, FOR SOLUTION INTRAVENOUS ONCE
Refills: 0 | Status: COMPLETED | OUTPATIENT
Start: 2022-12-11 | End: 2022-12-11

## 2022-12-10 RX ORDER — DEXTROSE 50 % IN WATER 50 %
12.5 SYRINGE (ML) INTRAVENOUS ONCE
Refills: 0 | Status: DISCONTINUED | OUTPATIENT
Start: 2022-12-10 | End: 2022-12-16

## 2022-12-10 RX ORDER — MICONAZOLE NITRATE 2 %
1 CREAM (GRAM) TOPICAL
Qty: 0 | Refills: 0 | DISCHARGE

## 2022-12-10 RX ORDER — SODIUM CHLORIDE 9 MG/ML
1000 INJECTION, SOLUTION INTRAVENOUS
Refills: 0 | Status: DISCONTINUED | OUTPATIENT
Start: 2022-12-10 | End: 2022-12-16

## 2022-12-10 RX ORDER — ASCORBIC ACID 60 MG
1 TABLET,CHEWABLE ORAL
Qty: 0 | Refills: 0 | DISCHARGE

## 2022-12-10 RX ORDER — HYDRALAZINE HCL 50 MG
10 TABLET ORAL EVERY 6 HOURS
Refills: 0 | Status: DISCONTINUED | OUTPATIENT
Start: 2022-12-10 | End: 2022-12-16

## 2022-12-10 RX ORDER — SODIUM CHLORIDE 9 MG/ML
1000 INJECTION INTRAMUSCULAR; INTRAVENOUS; SUBCUTANEOUS
Refills: 0 | Status: DISCONTINUED | OUTPATIENT
Start: 2022-12-10 | End: 2022-12-10

## 2022-12-10 RX ORDER — SENNOSIDES/DOCUSATE SODIUM 8.6MG-50MG
2 TABLET ORAL
Qty: 0 | Refills: 0 | DISCHARGE

## 2022-12-10 RX ORDER — METHOCARBAMOL 500 MG/1
750 TABLET, FILM COATED ORAL EVERY 6 HOURS
Refills: 0 | Status: DISCONTINUED | OUTPATIENT
Start: 2022-12-10 | End: 2022-12-16

## 2022-12-10 RX ORDER — CHOLECALCIFEROL (VITAMIN D3) 125 MCG
1 CAPSULE ORAL
Qty: 0 | Refills: 0 | DISCHARGE

## 2022-12-10 RX ORDER — MORPHINE SULFATE 50 MG/1
2 CAPSULE, EXTENDED RELEASE ORAL EVERY 4 HOURS
Refills: 0 | Status: DISCONTINUED | OUTPATIENT
Start: 2022-12-10 | End: 2022-12-16

## 2022-12-10 RX ORDER — SODIUM CHLORIDE 9 MG/ML
1000 INJECTION INTRAMUSCULAR; INTRAVENOUS; SUBCUTANEOUS
Refills: 0 | Status: DISCONTINUED | OUTPATIENT
Start: 2022-12-10 | End: 2022-12-12

## 2022-12-10 RX ORDER — GLUCAGON INJECTION, SOLUTION 0.5 MG/.1ML
1 INJECTION, SOLUTION SUBCUTANEOUS ONCE
Refills: 0 | Status: DISCONTINUED | OUTPATIENT
Start: 2022-12-10 | End: 2022-12-16

## 2022-12-10 RX ORDER — FUROSEMIDE 40 MG
20 TABLET ORAL DAILY
Refills: 0 | Status: DISCONTINUED | OUTPATIENT
Start: 2022-12-10 | End: 2022-12-10

## 2022-12-10 RX ORDER — LEVOTHYROXINE SODIUM 125 MCG
12.5 TABLET ORAL AT BEDTIME
Refills: 0 | Status: DISCONTINUED | OUTPATIENT
Start: 2022-12-10 | End: 2022-12-10

## 2022-12-10 RX ADMIN — SODIUM CHLORIDE 500 MILLILITER(S): 9 INJECTION INTRAMUSCULAR; INTRAVENOUS; SUBCUTANEOUS at 15:08

## 2022-12-10 RX ADMIN — PIPERACILLIN AND TAZOBACTAM 200 GRAM(S): 4; .5 INJECTION, POWDER, LYOPHILIZED, FOR SOLUTION INTRAVENOUS at 22:17

## 2022-12-10 RX ADMIN — ONDANSETRON 4 MILLIGRAM(S): 8 TABLET, FILM COATED ORAL at 16:12

## 2022-12-10 RX ADMIN — FAMOTIDINE 20 MILLIGRAM(S): 10 INJECTION INTRAVENOUS at 15:08

## 2022-12-10 NOTE — CONSULT NOTE ADULT - ATTENDING COMMENTS
A/P:  SBO  NGT  NPO, Iv hydration  GI/DVT prophylaxis  Duplex of lower ext to r/o dvt  Pulmonary consult  Cardiology consult  Hospitalist consult for medical mgmt  Pain control  Serial abd exams  Monitor for return of bowel function  IV antibiotics for UTI  Pt aware of and agrees with all of the above

## 2022-12-10 NOTE — ED PROVIDER NOTE - NS PRO AD PATIENT TYPE
+ MOLST: positive DNR/DNI, limited medical intervention. IV fluids/antibiotics allowed if clinically indicated. Feeding tube not allowed./Medical Orders for Life-Sustaining Treatment (MOLST)

## 2022-12-10 NOTE — ED ADULT NURSE NOTE - OBJECTIVE STATEMENT
pt presents to the ED with N/V/D which has gotten increasingly worse in the last 2 days. pt reports she had a colostomy reversal 2 years ago and since then she has had GI difficulty. pt reports incontinence at this time. no sick contacts reported. pt sent from nursing home for further evaluation. labs sent. pt cleaned. safety maintained. VS as charted. no other complaints at this time.

## 2022-12-10 NOTE — ED ADULT NURSE NOTE - NSIMPLEMENTINTERV_GEN_ALL_ED
Implemented All Fall with Harm Risk Interventions:  [place] to call system. Call bell, personal items and telephone within reach. Instruct patient to call for assistance. Room bathroom lighting operational. Non-slip footwear when patient is off stretcher. Physically safe environment: no spills, clutter or unnecessary equipment. Stretcher in lowest position, wheels locked, appropriate side rails in place. Provide visual cue, wrist band, yellow gown, etc. Monitor gait and stability. Monitor for mental status changes and reorient to person, place, and time. Review medications for side effects contributing to fall risk. Reinforce activity limits and safety measures with patient and family. Provide visual clues: red socks.

## 2022-12-10 NOTE — ED PROVIDER NOTE - OBJECTIVE STATEMENT
73 y/o female with PMHx of diverticulitis with R colectomy and SB resection 12/8/2016 (Dr. Martínez and Donaldo),  s/p robotic converted to open reversal of Guillermina's 9/7/2017 (Dr. Martínez and Dr. No), ventral hernia (probably from one of the surgical areas), HTN, HLD, DM Type II on insulin and PO meds, hypothyroidism, COPD, obesity, PE, CVA with paraplegia, CVA, DVT, anemia, neuropathy, Afib, CAD, PE a year ago, osteoarthritis, obesity, hypercholesteremia presents to the ED BIBEMS from [facility] c/o nausea/vomiting since last night, with intermittent left abdominal pain at her ventral hernia site x6 months that she describes as sharp and severe. Patient vomited 4 times overnight and 3 times today. Her last bowel movement was last night. Patient reports passing some flatus. She reports pain and n/v similar to 3 prior SBOs. Denies fever. Patient sent in from [facility] to r/o SBO caused by hernia. Patient is on Coumadin. Patient with positive DNR/DNI, limited medical intervention. IV fluids/antibiotics allowed if clinically indicated. Feeding tube not allowed. Allergic to Cipro. Patient is bedbound and has residual left-sided weakness from stroke. 71 y/o female with PMHx of diverticulitis with R colectomy and SB resection 12/8/2016 (Dr. Martínez and Donaldo),  s/p robotic converted to open reversal of Guillermina's 9/7/2017 (Dr. Martínez and Dr. No), ventral hernia (probably from one of the surgical areas), HTN, HLD, DM Type II on insulin and PO meds, hypothyroidism, COPD, obesity, PE, CVA with paraplegia, CVA, DVT, anemia, neuropathy, Afib on Coumadin, CAD, PE a year ago, osteoarthritis, obesity, hypercholesteremia presents to the ED BIBEMS from [facility] c/o nausea/vomiting since last night, with intermittent left abdominal pain at her ventral hernia site x6 months that she describes as sharp and severe. Patient vomited 4 times overnight and 3 times today. Her last bowel movement was last night. Patient reports passing some flatus. She reports pain and n/v similar to 3 prior SBOs. Denies fever. Patient sent in from [facility] to r/o SBO caused by hernia.  + MOLST: positive DNR/DNI, limited medical intervention. IV fluids/antibiotics allowed if clinically indicated. Feeding tube not allowed. Allergic to Cipro. Patient is bedbound and has residual left-sided weakness from stroke.

## 2022-12-10 NOTE — ED PROVIDER NOTE - GASTROINTESTINAL, MLM
Midline lower abdominal pain at site of known ventral hernia. Positive ventral hernia palpated; tender, partially but not completely reducible. Bowel sounds minimal, not obviously high-pitched.

## 2022-12-10 NOTE — ED PROVIDER NOTE - RESPIRATORY, MLM
No obvious respiratory distress. Lungs normal respirations. Transmittent upper airway sounds but grossly clear. No obvious respiratory distress. Lungs normal respirations. Transmitted upper airway sounds but grossly clear.

## 2022-12-10 NOTE — H&P ADULT - NSHPLABSRESULTS_GEN_ALL_CORE
12.5   9.12  )-----------( 341      ( 10 Dec 2022 14:39 )             39.6   12-10    141  |  107  |  18  ----------------------------<  164<H>  4.0   |  29  |  0.85    Ca    9.2      10 Dec 2022 14:39    TPro  8.0  /  Alb  3.3  /  TBili  0.3  /  DBili  x   /  AST  22  /  ALT  45  /  AlkPhos  71  12-10          CT Abdomen and Pelvis w/ Oral Cont and w/ IV Cont (12.10.22 @ 19:47)    BOWEL: Multiple dilated proximal small bowel loops, suspect transition   point at level of ventral hernia on the left (2:86; saved as key images),   small bowel distally is collapsed. No wall thickening or signs of   surrounding inflammation. Stomach is distended with air and layering   fluid. No pneumatosis. Prior right hemicolectomy with rectosigmoid and   ileocolic anastomosis. Abundant retained stool in rectum. Few colonic   diverticula.  PERITONEUM: No ascites. No pneumoperitoneum. No abscess.  VESSELS: Atherosclerotic changes.  RETROPERITONEUM/LYMPH NODES: No lymphadenopathy.  ABDOMINAL WALL: Rectus diastases with large complex ventral hernia   containing intra-abdominal fat, distal stomach, small and large bowel   loops.  BONES: Bilateral total hip arthroplasty. Spinal degenerative changes. labs                      12.5   9.12  )-----------( 341      ( 10 Dec 2022 14:39 )             39.6   CBC Full  -  ( 10 Dec 2022 14:39 )  WBC Count : 9.12 K/uL  RBC Count : 4.73 M/uL  Hemoglobin : 12.5 g/dL  Hematocrit : 39.6 %  Platelet Count - Automated : 341 K/uL  Mean Cell Volume : 83.7 fl  Mean Cell Hemoglobin : 26.4 pg  Mean Cell Hemoglobin Concentration : 31.6 gm/dL  Auto Neutrophil # : 6.63 K/uL  Auto Lymphocyte # : 1.49 K/uL  Auto Monocyte # : 0.92 K/uL  Auto Eosinophil # : 0.00 K/uL  Auto Basophil # : 0.04 K/uL  Auto Neutrophil % : 72.8 %  Auto Lymphocyte % : 16.3 %  Auto Monocyte % : 10.1 %  Auto Eosinophil % : 0.0 %  Auto Basophil % : 0.4 %    141  |  107  |  18  ----------------------------<  164<H>  4.0   |  29  |  0.85  Ca    9.2      10 Dec 2022 14:39  TPro  8.0  /  Alb  3.3  /  TBili  0.3  /  DBili  x   /  AST  22  /  ALT  45  /  AlkPhos  71  12-10  LIVER FUNCTIONS - ( 10 Dec 2022 14:39 )  Alb: 3.3 g/dL / Pro: 8.0 gm/dL / ALK PHOS: 71 U/L / ALT: 45 U/L / AST: 22 U/L / GGT: x         PT/INR - ( 10 Dec 2022 14:39 )   PT: 25.9 sec;   INR: 2.21 ratio    PTT - ( 10 Dec 2022 14:39 )  PTT:44.0 sec  LACTATE: 1.4    Radiology:    ACC: [id number] EXAM:  CT ABDOMEN AND PELVIS OC IC                        PROCEDURE DATE:  12/10/2022    INTERPRETATION:  CLINICAL INFORMATION: History of ventral hernia and   prior abdominal surgery.  COMPARISON: CT abdomen pelvis March 9,2022 and additional prior imaging   dating back to November 21, 2016.  IMPRESSION:  Small bowel obstruction, transition point at left side of large complex   ventral hernia.

## 2022-12-10 NOTE — PHARMACOTHERAPY INTERVENTION NOTE - COMMENTS
Medication history complete. Medications and allergies reviewed with list provided by [facility] and confirmed with .

## 2022-12-10 NOTE — H&P ADULT - NSICDXPASTSURGICALHX_GEN_ALL_CORE_FT
PAST SURGICAL HISTORY:  H/O hemicolectomy 2016    H/O ovarian cystectomy b/l    H/O:  x2    History of appendectomy     History of colostomy reversal     History of colostomy reversal     S/P      S/P colostomy     S/P hip replacement     Status post total replacement of both hips

## 2022-12-10 NOTE — ED PROVIDER NOTE - CLINICAL SUMMARY MEDICAL DECISION MAKING FREE TEXT BOX
71 y/o white female with multiple PMHx including PE 1 year ago, Afib on Coumadin, positive past small bowel surgeries with residual ventral hernia, past PSBO/SBO, COPD, BIBA from nursing home regarding pain at ventral hernia site with frequent nausea/vomiting. Positive clinical concern for SBO due to hernia. Positive MOLST: DNR/DNI. Plan for labs, IV fluid, IV Zofran/Pepcid, CT abd/pelvis, CXR, surgery consult. 71 y/o white female with multiple PMHx including PE 1 year ago, Afib on Coumadin, positive past small bowel surgeries with residual ventral hernia, past PSBO/SBO, COPD, BIBA from nursing home regarding pain at ventral hernia site with frequent nausea/vomiting. Positive clinical concern for SBO due to hernia. Positive MOLST: DNR/DNI.   Plan for labs, IV fluid, IV Zofran/Pepcid, CT abd/pelvis, CXR, surgery consult.

## 2022-12-10 NOTE — H&P ADULT - ATTENDING COMMENTS
A/P:  Chronic large ventral/incisional hernia/diastasis rectii with loss of domain  SBO  NGT  NPO, Iv hydration  GI/DVT prophylaxis  Duplex of lower ext to r/o dvt  Pulmonary consult  Cardiology consult  Hospitalist consult for medical mgmt  Pain control  Serial abd exams  Monitor for return of bowel function  IV antibiotics for UTI  F/U labs  F/U venous duplex to r/o dvt (h/o PE in past and pt with chronic left ext paralysis from CVA)  Pt aware of and agrees with all of the above

## 2022-12-10 NOTE — ED ADULT TRIAGE NOTE - CHIEF COMPLAINT QUOTE
Patient is alert and oriented X3, presenting to the ER by EMS with c/o nausea and vomiting. As per EMS "the nausea and vomiting started early this morning. She was given a total of 8mg of Zofran, most recent dose about 2 hours ago." Patient reports "the nausea and vomiting started last night. I have pain in my abdomen where I have my hernias." Denies CP, fevers or diarrhea. Patient endorses pain to left abdomen and by her bladder. Patient is alert and oriented X3, presenting to the ER by EMS with c/o nausea and vomiting. As per EMS "the nausea and vomiting started early this morning. She was given a total of 8mg of Zofran, most recent dose about 2 hours ago." Patient reports "the nausea and vomiting started last night. I have pain in my abdomen where I have my hernias." Denies CP, fevers or diarrhea. Patient endorses pain to left abdomen and by her bladder. patient taken in to room 12 for EKG

## 2022-12-10 NOTE — H&P ADULT - HISTORY OF PRESENT ILLNESS
73 yo female with h/o afib on warfarin, diverticulitis s/p Guillermina with SBR (2016), and subsequent colostomy reversal (2017), DM, CVA with residual L sided hemiplegia, COPD, HTN, DV, PE (1 yr ago), hypothyroidism, obesity, OA, HLD, ventral hernia and multiple SBO in the past presents with L sided abdominal pain. Acc ot the patient, the pain started yesterday evening, sudden onset, sharp in nature, associated with nausea and 7 episodes of vomiting since yesterday. She denies fever. Her last BM was last night and she passed flatus this AM. She mentions her pain is similar to her previous episodes of SBO. She also mentions she has had pain in her R groin due to a ventral hernia for the past 6 months that is less severe in intensity. She is aware she is high risk for surgery and has been follownig up with a cardiologist and pulmonologist to obtain clearance for an elective hernia repair that she has had for the last 3 years.   She is bedbound and has not walked since her CVA 3 years ago when the rehab and PT was stopped at the nursing home and she stopped making progress with her extremities.

## 2022-12-10 NOTE — ED PROVIDER NOTE - NEUROLOGICAL, MLM
Alert, normal speech, cranial nerves grossly intact. Positive chronic LUE, LLE motor weakness from past CVA. Chronic right leg weakness.

## 2022-12-10 NOTE — H&P ADULT - NSICDXPASTMEDICALHX_GEN_ALL_CORE_FT
PAST MEDICAL HISTORY:  Anemia     C. difficile diarrhea 2016    Cerebrovascular accident (CVA)     Colostomy in place     COPD (chronic obstructive pulmonary disease)     COPD (chronic obstructive pulmonary disease)     CVA (cerebral vascular accident)     Diabetes mellitus     Diverticulitis     Diverticulitis of intestine with perforation and abscess without bleeding, unspecified part of intestinal tract     DVT of lower limb, acute     History of atrial fibrillation     HTN (hypertension)     Hypercholesteremia     Hypertension     Neuropathy     Obesity     Osteoarthritis     Palpitations     PE (pulmonary thromboembolism) 12/2016

## 2022-12-10 NOTE — ED PROVIDER NOTE - MUSCULOSKELETAL, MLM
Left leg mildly tender, no swelling, 0/4 motor (tenderness and weakness chronic as per patient). Right RLE nontender, 3+/4 motor (chronic). Right arm 4-/5 (chronic). LUE 0/5 (chronic).

## 2022-12-10 NOTE — ED PROVIDER NOTE - PROGRESS NOTE DETAILS
Shiva Murillo: Surgery resident aware of ED consult. Request CT with IV and oral contrast. CT order changed as per their request. TRINY Murillo MD:  Informed by Surgery resident that admission + accepted under Dr. Matthews for SBO. Shiva Murillo: Surgery resident aware of ED consult. Requests CT with IV and oral contrast. CT order changed as per their request.

## 2022-12-10 NOTE — H&P ADULT - ASSESSMENT
A/P:   Admit to med surg unit under Dr Matthews  SBO  Large ventral hernias   NPO   IV hydration   NG tube to low intermittent suction  Pain control PRN  Nausea control PRN  Hold warfarin, start theraputic Lov tomorrow  h/o DVT  obtain bilateral vdup   venodynes if vdup negative   COPD, RSV  Pulmonology consult   Hospitalist consult for co management  Afib, HTN  Cardiology consult   Continue home meds  Hypothyroidism, on levothyroxine 25mcg   No lactic acidosis  Repeat labs in AM  Monitor bowel function   Monitor vitals     Plan discussed with Dr Matthews

## 2022-12-10 NOTE — ED ADULT NURSE NOTE - CHIEF COMPLAINT QUOTE
Patient is alert and oriented X3, presenting to the ER by EMS with c/o nausea and vomiting. As per EMS "the nausea and vomiting started early this morning. She was given a total of 8mg of Zofran, most recent dose about 2 hours ago." Patient reports "the nausea and vomiting started last night. I have pain in my abdomen where I have my hernias." Denies CP, fevers or diarrhea. Patient endorses pain to left abdomen and by her bladder. patient taken in to room 12 for EKG

## 2022-12-10 NOTE — H&P ADULT - NSHPPHYSICALEXAM_GEN_ALL_CORE
Pt is AAOx3  General: Well developed, in no acute distress.   Chest: Lungs clear, no rales, no rhonchi, no wheezes.   Heart: RR, no murmurs, no rubs, no gallops.   Abdomen: Soft, distended, mildl diffuse tenderness L> R. Hernia appreciated a the L periumbilical hernia with thinning of the overlying skin and palpable bowel.    Neuro: L Left hemiplegia, R sided motor strength 10/10 Pt is AAOx3  General: Well developed, in no acute distress.   Chest: Lungs clear, no rales, no rhonchi, no wheezes.   Heart: RR, no murmurs, no rubs, no gallops.   Abdomen: Soft, distended, mildl diffuse tenderness L> R. Hernia appreciated a the L periumbilical hernia with thinning of the overlying skin and palpable bowel.    Neuro: L Left hemiplegia, R sided motor strength 10/10    Attending exam:  Pt is AAOx3  Pt in no acute distress  Neuro: CNII-XII grossly intact   Psych: normal affect  HEENT: Normocephalic, atraumatic, REYES, EOM wnl  Neck: No crepitus, no ecchymosis, no hematoma, to exam, no JVD, no tracheal deviation  Cardiovascular: S1S2 Present  Respiratory: Respiratory Effort normal; no wheezes, rales or rhonchi to exam, CTAB  ABD: bowel sounds (+), soft, mild left sided abdominal tenderness to exam, + large chronic ventral/incisional hernia/diastasis rectii, no rebound, no guarding, no rigidity, no skin changes to exam. No pelvic instability to exam, no skin changes, negative causey's sign to exam  Musculoskeletal: known chronic left sided paralysis from prior CVA. All digits are warm and well perfused. Pt demonstrates grossly intact sensoromotor function to right ext. Pt has good capillary refill to digits, no gross calf edema or tenderness to exam.  Skin: no jaundice or icteric sclera to exam b/l, no skin changes to exam

## 2022-12-11 PROBLEM — I63.9 CEREBRAL INFARCTION, UNSPECIFIED: Chronic | Status: ACTIVE | Noted: 2019-06-04

## 2022-12-11 PROBLEM — I82.409 ACUTE EMBOLISM AND THROMBOSIS OF UNSPECIFIED DEEP VEINS OF UNSPECIFIED LOWER EXTREMITY: Chronic | Status: ACTIVE | Noted: 2019-06-03

## 2022-12-11 PROBLEM — J44.9 CHRONIC OBSTRUCTIVE PULMONARY DISEASE, UNSPECIFIED: Chronic | Status: ACTIVE | Noted: 2019-06-04

## 2022-12-11 PROBLEM — I10 ESSENTIAL (PRIMARY) HYPERTENSION: Chronic | Status: ACTIVE | Noted: 2019-06-03

## 2022-12-11 PROBLEM — Z86.79 PERSONAL HISTORY OF OTHER DISEASES OF THE CIRCULATORY SYSTEM: Chronic | Status: ACTIVE | Noted: 2019-06-03

## 2022-12-11 PROBLEM — E11.9 TYPE 2 DIABETES MELLITUS WITHOUT COMPLICATIONS: Chronic | Status: ACTIVE | Noted: 2019-06-03

## 2022-12-11 PROBLEM — G62.9 POLYNEUROPATHY, UNSPECIFIED: Chronic | Status: ACTIVE | Noted: 2019-06-04

## 2022-12-11 PROBLEM — K57.92 DIVERTICULITIS OF INTESTINE, PART UNSPECIFIED, WITHOUT PERFORATION OR ABSCESS WITHOUT BLEEDING: Chronic | Status: ACTIVE | Noted: 2019-06-04

## 2022-12-11 PROBLEM — I63.9 CEREBRAL INFARCTION, UNSPECIFIED: Chronic | Status: ACTIVE | Noted: 2019-06-03

## 2022-12-11 LAB
A1C WITH ESTIMATED AVERAGE GLUCOSE RESULT: 6 % — HIGH (ref 4–5.6)
ANION GAP SERPL CALC-SCNC: 8 MMOL/L — SIGNIFICANT CHANGE UP (ref 5–17)
APTT BLD: 36.1 SEC — HIGH (ref 27.5–35.5)
BASOPHILS # BLD AUTO: 0.03 K/UL — SIGNIFICANT CHANGE UP (ref 0–0.2)
BASOPHILS NFR BLD AUTO: 0.4 % — SIGNIFICANT CHANGE UP (ref 0–2)
BUN SERPL-MCNC: 17 MG/DL — SIGNIFICANT CHANGE UP (ref 7–23)
CALCIUM SERPL-MCNC: 8.5 MG/DL — SIGNIFICANT CHANGE UP (ref 8.5–10.1)
CHLORIDE SERPL-SCNC: 110 MMOL/L — HIGH (ref 96–108)
CO2 SERPL-SCNC: 25 MMOL/L — SIGNIFICANT CHANGE UP (ref 22–31)
CREAT SERPL-MCNC: 0.75 MG/DL — SIGNIFICANT CHANGE UP (ref 0.5–1.3)
EGFR: 85 ML/MIN/1.73M2 — SIGNIFICANT CHANGE UP
EOSINOPHIL # BLD AUTO: 0.02 K/UL — SIGNIFICANT CHANGE UP (ref 0–0.5)
EOSINOPHIL NFR BLD AUTO: 0.3 % — SIGNIFICANT CHANGE UP (ref 0–6)
ESTIMATED AVERAGE GLUCOSE: 126 MG/DL — HIGH (ref 68–114)
GLUCOSE SERPL-MCNC: 146 MG/DL — HIGH (ref 70–99)
HCT VFR BLD CALC: 35.5 % — SIGNIFICANT CHANGE UP (ref 34.5–45)
HGB BLD-MCNC: 11 G/DL — LOW (ref 11.5–15.5)
IMM GRANULOCYTES NFR BLD AUTO: 0.3 % — SIGNIFICANT CHANGE UP (ref 0–0.9)
INR BLD: 2.11 RATIO — HIGH (ref 0.88–1.16)
LYMPHOCYTES # BLD AUTO: 1.37 K/UL — SIGNIFICANT CHANGE UP (ref 1–3.3)
LYMPHOCYTES # BLD AUTO: 18.4 % — SIGNIFICANT CHANGE UP (ref 13–44)
MCHC RBC-ENTMCNC: 26 PG — LOW (ref 27–34)
MCHC RBC-ENTMCNC: 31 GM/DL — LOW (ref 32–36)
MCV RBC AUTO: 83.9 FL — SIGNIFICANT CHANGE UP (ref 80–100)
MONOCYTES # BLD AUTO: 0.84 K/UL — SIGNIFICANT CHANGE UP (ref 0–0.9)
MONOCYTES NFR BLD AUTO: 11.3 % — SIGNIFICANT CHANGE UP (ref 2–14)
NEUTROPHILS # BLD AUTO: 5.16 K/UL — SIGNIFICANT CHANGE UP (ref 1.8–7.4)
NEUTROPHILS NFR BLD AUTO: 69.3 % — SIGNIFICANT CHANGE UP (ref 43–77)
PLATELET # BLD AUTO: 281 K/UL — SIGNIFICANT CHANGE UP (ref 150–400)
POTASSIUM SERPL-MCNC: 3.7 MMOL/L — SIGNIFICANT CHANGE UP (ref 3.5–5.3)
POTASSIUM SERPL-SCNC: 3.7 MMOL/L — SIGNIFICANT CHANGE UP (ref 3.5–5.3)
PROTHROM AB SERPL-ACNC: 24.7 SEC — HIGH (ref 10.5–13.4)
RBC # BLD: 4.23 M/UL — SIGNIFICANT CHANGE UP (ref 3.8–5.2)
RBC # FLD: 17.2 % — HIGH (ref 10.3–14.5)
SODIUM SERPL-SCNC: 143 MMOL/L — SIGNIFICANT CHANGE UP (ref 135–145)
WBC # BLD: 7.44 K/UL — SIGNIFICANT CHANGE UP (ref 3.8–10.5)
WBC # FLD AUTO: 7.44 K/UL — SIGNIFICANT CHANGE UP (ref 3.8–10.5)

## 2022-12-11 PROCEDURE — 74019 RADEX ABDOMEN 2 VIEWS: CPT | Mod: 26

## 2022-12-11 PROCEDURE — 99231 SBSQ HOSP IP/OBS SF/LOW 25: CPT

## 2022-12-11 PROCEDURE — 99221 1ST HOSP IP/OBS SF/LOW 40: CPT

## 2022-12-11 RX ORDER — GUAIFENESIN/DEXTROMETHORPHAN 600MG-30MG
5 TABLET, EXTENDED RELEASE 12 HR ORAL
Refills: 0 | Status: DISCONTINUED | OUTPATIENT
Start: 2022-12-11 | End: 2022-12-15

## 2022-12-11 RX ORDER — INSULIN GLARGINE 100 [IU]/ML
7 INJECTION, SOLUTION SUBCUTANEOUS AT BEDTIME
Refills: 0 | Status: DISCONTINUED | OUTPATIENT
Start: 2022-12-11 | End: 2022-12-16

## 2022-12-11 RX ADMIN — SODIUM CHLORIDE 100 MILLILITER(S): 9 INJECTION INTRAMUSCULAR; INTRAVENOUS; SUBCUTANEOUS at 17:51

## 2022-12-11 RX ADMIN — Medication 25 MILLIGRAM(S): at 11:00

## 2022-12-11 RX ADMIN — MORPHINE SULFATE 2 MILLIGRAM(S): 50 CAPSULE, EXTENDED RELEASE ORAL at 06:15

## 2022-12-11 RX ADMIN — ATORVASTATIN CALCIUM 20 MILLIGRAM(S): 80 TABLET, FILM COATED ORAL at 22:04

## 2022-12-11 RX ADMIN — MORPHINE SULFATE 2 MILLIGRAM(S): 50 CAPSULE, EXTENDED RELEASE ORAL at 00:56

## 2022-12-11 RX ADMIN — BUDESONIDE AND FORMOTEROL FUMARATE DIHYDRATE 2 PUFF(S): 160; 4.5 AEROSOL RESPIRATORY (INHALATION) at 20:14

## 2022-12-11 RX ADMIN — PIPERACILLIN AND TAZOBACTAM 25 GRAM(S): 4; .5 INJECTION, POWDER, LYOPHILIZED, FOR SOLUTION INTRAVENOUS at 02:35

## 2022-12-11 RX ADMIN — AMIODARONE HYDROCHLORIDE 200 MILLIGRAM(S): 400 TABLET ORAL at 10:58

## 2022-12-11 RX ADMIN — Medication 180 MILLIGRAM(S): at 10:58

## 2022-12-11 RX ADMIN — Medication 5 MILLIGRAM(S): at 11:04

## 2022-12-11 RX ADMIN — MORPHINE SULFATE 2 MILLIGRAM(S): 50 CAPSULE, EXTENDED RELEASE ORAL at 06:45

## 2022-12-11 RX ADMIN — MORPHINE SULFATE 2 MILLIGRAM(S): 50 CAPSULE, EXTENDED RELEASE ORAL at 21:00

## 2022-12-11 RX ADMIN — Medication 5 MILLIGRAM(S): at 00:56

## 2022-12-11 RX ADMIN — Medication 100 MILLIGRAM(S): at 14:35

## 2022-12-11 RX ADMIN — Medication 5 MILLILITER(S): at 18:22

## 2022-12-11 RX ADMIN — Medication 100 MILLIGRAM(S): at 22:03

## 2022-12-11 RX ADMIN — PANTOPRAZOLE SODIUM 40 MILLIGRAM(S): 20 TABLET, DELAYED RELEASE ORAL at 10:41

## 2022-12-11 RX ADMIN — Medication 66 MICROGRAM(S): at 22:02

## 2022-12-11 RX ADMIN — ONDANSETRON 4 MILLIGRAM(S): 8 TABLET, FILM COATED ORAL at 00:56

## 2022-12-11 RX ADMIN — INSULIN GLARGINE 15 UNIT(S): 100 INJECTION, SOLUTION SUBCUTANEOUS at 01:21

## 2022-12-11 RX ADMIN — Medication 5 MILLIGRAM(S): at 06:15

## 2022-12-11 RX ADMIN — MORPHINE SULFATE 2 MILLIGRAM(S): 50 CAPSULE, EXTENDED RELEASE ORAL at 01:20

## 2022-12-11 RX ADMIN — Medication 25 MILLIGRAM(S): at 22:04

## 2022-12-11 RX ADMIN — SODIUM CHLORIDE 100 MILLILITER(S): 9 INJECTION INTRAMUSCULAR; INTRAVENOUS; SUBCUTANEOUS at 00:57

## 2022-12-11 RX ADMIN — Medication 5 MILLIGRAM(S): at 22:03

## 2022-12-11 RX ADMIN — MORPHINE SULFATE 2 MILLIGRAM(S): 50 CAPSULE, EXTENDED RELEASE ORAL at 20:26

## 2022-12-11 RX ADMIN — DULOXETINE HYDROCHLORIDE 60 MILLIGRAM(S): 30 CAPSULE, DELAYED RELEASE ORAL at 10:58

## 2022-12-11 NOTE — PROVIDER CONTACT NOTE (OTHER) - ASSESSMENT
Pt hemodynamically stable, A&O 3, confused at times. Output 275 ml. Pt refused reinsertion of new NGT.

## 2022-12-11 NOTE — PROGRESS NOTE ADULT - SUBJECTIVE AND OBJECTIVE BOX
73 yo female with h/o DM2, CVA, Left sided hemiplegia, HTN, HLD, afib on warfarin, HFpEF, DVT/PE, COPD,   diverticulitis s/p Guillermina with SBR (2016), and subsequent colostomy reversal (2017),  hypothyroidism, obesity,multiple admissions with bowel obstruction with resolution w conservative management    Subjective:  Patient removed her NGT overnight  she had no bouts of vomiting since  she is passing flatus    CC:Patient is a 72y old  Female who presents with a chief complaint of SBO (11 Dec 2022 14:06)    ROS:  otherwise as abovementioned ROS    Vital Signs Last 24 Hrs  T(C): 37.2 (11 Dec 2022 09:25), Max: 37.6 (10 Dec 2022 23:43)  T(F): 99 (11 Dec 2022 09:25), Max: 99.6 (10 Dec 2022 23:43)  HR: 79 (11 Dec 2022 09:25) (79 - 92)  BP: 132/76 (11 Dec 2022 09:25) (126/61 - 161/59)  BP(mean): --  RR: 18 (11 Dec 2022 09:25) (16 - 18)  SpO2: 91% (11 Dec 2022 09:25) (91% - 99%)    Parameters below as of 11 Dec 2022 09:25  Patient On (Oxygen Delivery Method): nasal cannula  O2 Flow (L/min): 2      Labs:                                11.0   7.44  )-----------( 281      ( 11 Dec 2022 08:00 )             35.5     CBC Full  -  ( 11 Dec 2022 08:00 )  WBC Count : 7.44 K/uL  RBC Count : 4.23 M/uL  Hemoglobin : 11.0 g/dL  Hematocrit : 35.5 %  Platelet Count - Automated : 281 K/uL  Mean Cell Volume : 83.9 fl  Mean Cell Hemoglobin : 26.0 pg  Mean Cell Hemoglobin Concentration : 31.0 gm/dL  Auto Neutrophil # : 5.16 K/uL  Auto Lymphocyte # : 1.37 K/uL  Auto Monocyte # : 0.84 K/uL  Auto Eosinophil # : 0.02 K/uL  Auto Basophil # : 0.03 K/uL  Auto Neutrophil % : 69.3 %  Auto Lymphocyte % : 18.4 %  Auto Monocyte % : 11.3 %  Auto Eosinophil % : 0.3 %  Auto Basophil % : 0.4 %    12-11    143  |  110<H>  |  17  ----------------------------<  146<H>  3.7   |  25  |  0.75    Ca    8.5      11 Dec 2022 08:00  Phos  2.7     12-11  Mg     2.2     12-11    TPro  8.0  /  Alb  3.3  /  TBili  0.3  /  DBili  x   /  AST  22  /  ALT  45  /  AlkPhos  71  12-10    LIVER FUNCTIONS - ( 10 Dec 2022 14:39 )  Alb: 3.3 g/dL / Pro: 8.0 gm/dL / ALK PHOS: 71 U/L / ALT: 45 U/L / AST: 22 U/L / GGT: x           PT/INR - ( 11 Dec 2022 08:00 )   PT: 24.7 sec;   INR: 2.11 ratio         PTT - ( 11 Dec 2022 08:00 )  PTT:36.1 sec      Meds:  acetaminophen   IVPB .. 1000 milliGRAM(s) IV Intermittent Once PRN  albuterol    90 MICROgram(s) HFA Inhaler 2 Puff(s) Inhalation every 6 hours PRN  aMIOdarone    Tablet 200 milliGRAM(s) Oral daily  atorvastatin 20 milliGRAM(s) Oral at bedtime  budesonide 160 MICROgram(s)/formoterol 4.5 MICROgram(s) Inhaler 2 Puff(s) Inhalation two times a day  dextrose 5%. 1000 milliLiter(s) IV Continuous <Continuous>  dextrose 5%. 1000 milliLiter(s) IV Continuous <Continuous>  dextrose 50% Injectable 25 Gram(s) IV Push once  dextrose 50% Injectable 12.5 Gram(s) IV Push once  dextrose 50% Injectable 25 Gram(s) IV Push once  dextrose Oral Gel 15 Gram(s) Oral once PRN  diltiazem    milliGRAM(s) Oral daily  DULoxetine 60 milliGRAM(s) Oral daily  glucagon  Injectable 1 milliGRAM(s) IntraMuscular once  guaifenesin/dextromethorphan Oral Liquid 5 milliLiter(s) Oral four times a day  hydrALAZINE Injectable 10 milliGRAM(s) IV Push every 6 hours PRN  insulin glargine Injectable (LANTUS) 7 Unit(s) SubCutaneous at bedtime  insulin lispro (ADMELOG) corrective regimen sliding scale   SubCutaneous three times a day before meals  ketorolac   Injectable 30 milliGRAM(s) IV Push every 6 hours PRN  levothyroxine Injectable 66 MICROGram(s) IV Push at bedtime  methocarbamol 750 milliGRAM(s) Oral every 6 hours PRN  metoprolol tartrate 25 milliGRAM(s) Oral two times a day  morphine  - Injectable 2 milliGRAM(s) IV Push every 4 hours PRN  ondansetron Injectable 4 milliGRAM(s) IV Push every 6 hours PRN  oxybutynin 5 milliGRAM(s) Oral two times a day  pantoprazole  Injectable 40 milliGRAM(s) IV Push daily  piperacillin/tazobactam IVPB.. 3.375 Gram(s) IV Intermittent Once  pregabalin 100 milliGRAM(s) Oral three times a day  sodium chloride 0.9%. 1000 milliLiter(s) IV Continuous <Continuous>      Radiology:      Physical exam:  GCS of 15  Pt is aaox3  Pt in no acute distress  Airway is patent  Breathing is symmetric and + wheezing  Neuro: CN II-XII grossly intact  Psych: normal affect  HEENT: normocephalic, REYES, EOM wnl, no gross craniofacial bony pathology to exam  Neck: No tracheal deviation, no JVD, no crepitus, no ecchymosis, no hematoma  Chest: No gross rib or sternal pathology or tenderness to exam, no crepitus, no ecchymosis, no hematoma  Resp: CTAB  CVS: S1S2(+)  ABD: obsese,,bowel sounds (+), soft, nontender, non distended, no rebound, no guarding, no rigidity  EXT: no calf tenderness or edema to exam b/l, pt has good capillary refill in all digits. Sensoromotor function grossly intact, on VTE prophylaxis  Skin: no adverse skin changes to exam

## 2022-12-11 NOTE — PATIENT PROFILE ADULT - FALL HARM RISK - HARM RISK INTERVENTIONS
Assistance with ambulation/Assistance OOB with selected safe patient handling equipment/Communicate Risk of Fall with Harm to all staff/Discuss with provider need for PT consult/Monitor gait and stability/Reinforce activity limits and safety measures with patient and family/Tailored Fall Risk Interventions/Visual Cue: Yellow wristband and red socks/Bed in lowest position, wheels locked, appropriate side rails in place/Call bell, personal items and telephone in reach/Instruct patient to call for assistance before getting out of bed or chair/Non-slip footwear when patient is out of bed/[place] to call system/Physically safe environment - no spills, clutter or unnecessary equipment/Purposeful Proactive Rounding/Room/bathroom lighting operational, light cord in reach

## 2022-12-11 NOTE — PROGRESS NOTE ADULT - ASSESSMENT
71 yo female with h/o DM2, CVA, Left sided hemiplegia, HTN, HLD, afib on warfarin, HFpEF, DVT/PE, COPD,   diverticulitis s/p Guillermina with SBR (2016), and subsequent colostomy reversal (2017),  hypothyroidism,  presented with bowel obstruction at her ventral hernia  no more vomiting  passing flatus  soft non tender abd  can have icechips and sips of water  hospitalist management appreciated  f/up card and pulm consults

## 2022-12-12 LAB
ALBUMIN SERPL ELPH-MCNC: 2.5 G/DL — LOW (ref 3.3–5)
ALP SERPL-CCNC: 44 U/L — SIGNIFICANT CHANGE UP (ref 40–120)
ALT FLD-CCNC: 36 U/L — SIGNIFICANT CHANGE UP (ref 12–78)
ANION GAP SERPL CALC-SCNC: 4 MMOL/L — LOW (ref 5–17)
ANION GAP SERPL CALC-SCNC: 5 MMOL/L — SIGNIFICANT CHANGE UP (ref 5–17)
AST SERPL-CCNC: 21 U/L — SIGNIFICANT CHANGE UP (ref 15–37)
BILIRUB SERPL-MCNC: 0.3 MG/DL — SIGNIFICANT CHANGE UP (ref 0.2–1.2)
BUN SERPL-MCNC: 13 MG/DL — SIGNIFICANT CHANGE UP (ref 7–23)
BUN SERPL-MCNC: 13 MG/DL — SIGNIFICANT CHANGE UP (ref 7–23)
CALCIUM SERPL-MCNC: 8.2 MG/DL — LOW (ref 8.5–10.1)
CALCIUM SERPL-MCNC: 8.7 MG/DL — SIGNIFICANT CHANGE UP (ref 8.5–10.1)
CHLORIDE SERPL-SCNC: 111 MMOL/L — HIGH (ref 96–108)
CHLORIDE SERPL-SCNC: 113 MMOL/L — HIGH (ref 96–108)
CO2 SERPL-SCNC: 28 MMOL/L — SIGNIFICANT CHANGE UP (ref 22–31)
CO2 SERPL-SCNC: 28 MMOL/L — SIGNIFICANT CHANGE UP (ref 22–31)
CREAT SERPL-MCNC: 0.57 MG/DL — SIGNIFICANT CHANGE UP (ref 0.5–1.3)
CREAT SERPL-MCNC: 0.58 MG/DL — SIGNIFICANT CHANGE UP (ref 0.5–1.3)
EGFR: 96 ML/MIN/1.73M2 — SIGNIFICANT CHANGE UP
EGFR: 96 ML/MIN/1.73M2 — SIGNIFICANT CHANGE UP
GLUCOSE SERPL-MCNC: 113 MG/DL — HIGH (ref 70–99)
GLUCOSE SERPL-MCNC: 122 MG/DL — HIGH (ref 70–99)
HCT VFR BLD CALC: 33.9 % — LOW (ref 34.5–45)
HCT VFR BLD CALC: 35.1 % — SIGNIFICANT CHANGE UP (ref 34.5–45)
HGB BLD-MCNC: 10.4 G/DL — LOW (ref 11.5–15.5)
HGB BLD-MCNC: 10.7 G/DL — LOW (ref 11.5–15.5)
INR BLD: 2.1 RATIO — HIGH (ref 0.88–1.16)
MAGNESIUM SERPL-MCNC: 2.1 MG/DL — SIGNIFICANT CHANGE UP (ref 1.6–2.6)
MCHC RBC-ENTMCNC: 26 PG — LOW (ref 27–34)
MCHC RBC-ENTMCNC: 26.1 PG — LOW (ref 27–34)
MCHC RBC-ENTMCNC: 30.5 GM/DL — LOW (ref 32–36)
MCHC RBC-ENTMCNC: 30.7 GM/DL — LOW (ref 32–36)
MCV RBC AUTO: 85.2 FL — SIGNIFICANT CHANGE UP (ref 80–100)
MCV RBC AUTO: 85.2 FL — SIGNIFICANT CHANGE UP (ref 80–100)
PHOSPHATE SERPL-MCNC: 2.4 MG/DL — LOW (ref 2.5–4.5)
PLATELET # BLD AUTO: 251 K/UL — SIGNIFICANT CHANGE UP (ref 150–400)
PLATELET # BLD AUTO: 264 K/UL — SIGNIFICANT CHANGE UP (ref 150–400)
POTASSIUM SERPL-MCNC: 3.4 MMOL/L — LOW (ref 3.5–5.3)
POTASSIUM SERPL-MCNC: 3.6 MMOL/L — SIGNIFICANT CHANGE UP (ref 3.5–5.3)
POTASSIUM SERPL-SCNC: 3.4 MMOL/L — LOW (ref 3.5–5.3)
POTASSIUM SERPL-SCNC: 3.6 MMOL/L — SIGNIFICANT CHANGE UP (ref 3.5–5.3)
PROT SERPL-MCNC: 6.1 GM/DL — SIGNIFICANT CHANGE UP (ref 6–8.3)
PROTHROM AB SERPL-ACNC: 24.5 SEC — HIGH (ref 10.5–13.4)
RBC # BLD: 3.98 M/UL — SIGNIFICANT CHANGE UP (ref 3.8–5.2)
RBC # BLD: 4.12 M/UL — SIGNIFICANT CHANGE UP (ref 3.8–5.2)
RBC # FLD: 17.1 % — HIGH (ref 10.3–14.5)
RBC # FLD: 17.2 % — HIGH (ref 10.3–14.5)
SODIUM SERPL-SCNC: 144 MMOL/L — SIGNIFICANT CHANGE UP (ref 135–145)
SODIUM SERPL-SCNC: 145 MMOL/L — SIGNIFICANT CHANGE UP (ref 135–145)
WBC # BLD: 5.18 K/UL — SIGNIFICANT CHANGE UP (ref 3.8–10.5)
WBC # BLD: 5.53 K/UL — SIGNIFICANT CHANGE UP (ref 3.8–10.5)
WBC # FLD AUTO: 5.18 K/UL — SIGNIFICANT CHANGE UP (ref 3.8–10.5)
WBC # FLD AUTO: 5.53 K/UL — SIGNIFICANT CHANGE UP (ref 3.8–10.5)

## 2022-12-12 PROCEDURE — 99223 1ST HOSP IP/OBS HIGH 75: CPT

## 2022-12-12 PROCEDURE — 99231 SBSQ HOSP IP/OBS SF/LOW 25: CPT

## 2022-12-12 PROCEDURE — 99232 SBSQ HOSP IP/OBS MODERATE 35: CPT

## 2022-12-12 PROCEDURE — 74250 X-RAY XM SM INT 1CNTRST STD: CPT | Mod: 26

## 2022-12-12 RX ORDER — SODIUM CHLORIDE 9 MG/ML
1000 INJECTION, SOLUTION INTRAVENOUS
Refills: 0 | Status: DISCONTINUED | OUTPATIENT
Start: 2022-12-12 | End: 2022-12-15

## 2022-12-12 RX ORDER — CEFTRIAXONE 500 MG/1
1000 INJECTION, POWDER, FOR SOLUTION INTRAMUSCULAR; INTRAVENOUS EVERY 24 HOURS
Refills: 0 | Status: DISCONTINUED | OUTPATIENT
Start: 2022-12-12 | End: 2022-12-14

## 2022-12-12 RX ADMIN — Medication 30 MILLIGRAM(S): at 12:33

## 2022-12-12 RX ADMIN — Medication 5 MILLILITER(S): at 06:29

## 2022-12-12 RX ADMIN — Medication 30 MILLIGRAM(S): at 22:20

## 2022-12-12 RX ADMIN — Medication 30 MILLIGRAM(S): at 12:18

## 2022-12-12 RX ADMIN — Medication 5 MILLIGRAM(S): at 12:57

## 2022-12-12 RX ADMIN — Medication 5 MILLIGRAM(S): at 22:34

## 2022-12-12 RX ADMIN — Medication 5 MILLILITER(S): at 00:41

## 2022-12-12 RX ADMIN — AMIODARONE HYDROCHLORIDE 200 MILLIGRAM(S): 400 TABLET ORAL at 12:56

## 2022-12-12 RX ADMIN — BUDESONIDE AND FORMOTEROL FUMARATE DIHYDRATE 2 PUFF(S): 160; 4.5 AEROSOL RESPIRATORY (INHALATION) at 21:12

## 2022-12-12 RX ADMIN — DULOXETINE HYDROCHLORIDE 60 MILLIGRAM(S): 30 CAPSULE, DELAYED RELEASE ORAL at 12:57

## 2022-12-12 RX ADMIN — Medication 100 MILLIGRAM(S): at 06:29

## 2022-12-12 RX ADMIN — Medication 5 MILLILITER(S): at 17:41

## 2022-12-12 RX ADMIN — ATORVASTATIN CALCIUM 20 MILLIGRAM(S): 80 TABLET, FILM COATED ORAL at 22:34

## 2022-12-12 RX ADMIN — BUDESONIDE AND FORMOTEROL FUMARATE DIHYDRATE 2 PUFF(S): 160; 4.5 AEROSOL RESPIRATORY (INHALATION) at 08:47

## 2022-12-12 RX ADMIN — Medication 5 MILLILITER(S): at 12:58

## 2022-12-12 RX ADMIN — SODIUM CHLORIDE 75 MILLILITER(S): 9 INJECTION, SOLUTION INTRAVENOUS at 14:27

## 2022-12-12 RX ADMIN — Medication 100 MILLIGRAM(S): at 13:00

## 2022-12-12 RX ADMIN — Medication 30 MILLIGRAM(S): at 21:50

## 2022-12-12 RX ADMIN — Medication 66 MICROGRAM(S): at 22:35

## 2022-12-12 RX ADMIN — CEFTRIAXONE 1000 MILLIGRAM(S): 500 INJECTION, POWDER, FOR SOLUTION INTRAMUSCULAR; INTRAVENOUS at 14:26

## 2022-12-12 RX ADMIN — Medication 25 MILLIGRAM(S): at 22:34

## 2022-12-12 RX ADMIN — Medication 100 MILLIGRAM(S): at 22:35

## 2022-12-12 RX ADMIN — PANTOPRAZOLE SODIUM 40 MILLIGRAM(S): 20 TABLET, DELAYED RELEASE ORAL at 12:18

## 2022-12-12 RX ADMIN — Medication 180 MILLIGRAM(S): at 12:56

## 2022-12-12 RX ADMIN — Medication 25 MILLIGRAM(S): at 12:57

## 2022-12-12 NOTE — PROGRESS NOTE ADULT - SUBJECTIVE AND OBJECTIVE BOX
c/c: abdominal pain/n/v    HPI:  73 yo female with h/o DM2, CVA, Left sided hemiplegia, HTN, HLD, afib on warfarin, HFpEF, DVT/PE, COPD,   diverticulitis s/p Guillermina with SBR (2016), and subsequent colostomy reversal (2017),  hypothyroidism, obesity, OA,  multiple SBOs  in the past with large ventral hernia,  tentatively scheduled for surgery in february presented with L sided abdominal pain, nausea and vomiting. She is admitted to surgery with sbo. Tested positive for RSV.  Hospitalist service consulted for medical comanagement.   NGT removed by patient       pt seen and examined this am. hungry. Wants to eat. No n/v. +bms since last night. No abd pain.      Review of system- All 10 systems reviewed and is as per HPI otherwise negative.       Vital Signs Last 24 Hrs  T(C): 37.3 (12 Dec 2022 08:59), Max: 37.3 (12 Dec 2022 08:59)  T(F): 99.1 (12 Dec 2022 08:59), Max: 99.1 (12 Dec 2022 08:59)  HR: 77 (12 Dec 2022 08:59) (72 - 89)  BP: 146/64 (12 Dec 2022 08:59) (109/71 - 146/64)  RR: 19 (12 Dec 2022 08:59) (19 - 19)  SpO2: 90% (12 Dec 2022 08:59) (90% - 96%)    Parameters below as of 12 Dec 2022 08:59  Patient On (Oxygen Delivery Method): nasal cannula  O2 Flow (L/min): 2    PHYSICAL EXAM:    GENERAL: Comfortable, obese, pleasant, no acute distress  HEAD:  Atraumatic, Normocephalic  EYES: EOMI, PERRLA  HEENT: Moist mucous membranes  NECK: Supple, No JVD  NERVOUS SYSTEM:  Alert & Oriented X3, Left sided hemiplegia.   CHEST/LUNG: Clear to auscultation bilaterally  HEART: Regular rate and rhythm;   ABDOMEN: Soft, large ventral hernia, bs+, non tender  GENITOURINARY- Voiding, no palpable bladder  EXTREMITIES:  No clubbing, cyanosis, or edema  MUSCULOSKELETAL- No muscle tenderness, No joint tenderness  SKIN-no rash    LABS:                        10.7   5.18  )-----------( 251      ( 12 Dec 2022 11:55 )             35.1     12-12    145  |  113<H>  |  13  ----------------------------<  122<H>  3.6   |  28  |  0.58    Ca    8.7      12 Dec 2022 11:55  Phos  2.4     12-12  Mg     2.1     12    TPro  6.1  /  Alb  2.5<L>  /  TBili  0.3  /  DBili  x   /  AST  21  /  ALT  36  /  AlkPhos  44  12-12    PT/INR - ( 12 Dec 2022 08:27 )   PT: 24.5 sec;   INR: 2.10 ratio         PTT - ( 11 Dec 2022 08:00 )  PTT:36.1 sec  Urinalysis Basic - ( 10 Dec 2022 20:02 )    Color: Yellow / Appearance: very cloudy / S.020 / pH: x  Gluc: x / Ketone: Moderate  / Bili: Negative / Urobili: Negative   Blood: x / Protein: 100 / Nitrite: Positive   Leuk Esterase: Moderate / RBC: 11-25 /HPF / WBC >50 /HPF   Sq Epi: x / Non Sq Epi: Occasional / Bacteria: TNTC        MEDS  acetaminophen   IVPB .. 1000 milliGRAM(s) IV Intermittent Once PRN  albuterol    90 MICROgram(s) HFA Inhaler 2 Puff(s) Inhalation every 6 hours PRN  aMIOdarone    Tablet 200 milliGRAM(s) Oral daily  atorvastatin 20 milliGRAM(s) Oral at bedtime  budesonide 160 MICROgram(s)/formoterol 4.5 MICROgram(s) Inhaler 2 Puff(s) Inhalation two times a day  dextrose 5%. 1000 milliLiter(s) IV Continuous <Continuous>  dextrose 5%. 1000 milliLiter(s) IV Continuous <Continuous>  dextrose 50% Injectable 25 Gram(s) IV Push once  dextrose 50% Injectable 12.5 Gram(s) IV Push once  dextrose 50% Injectable 25 Gram(s) IV Push once  dextrose Oral Gel 15 Gram(s) Oral once PRN  diltiazem    milliGRAM(s) Oral daily  DULoxetine 60 milliGRAM(s) Oral daily  glucagon  Injectable 1 milliGRAM(s) IntraMuscular once  hydrALAZINE Injectable 10 milliGRAM(s) IV Push every 6 hours PRN  insulin glargine Injectable (LANTUS) 15 Unit(s) SubCutaneous at bedtime  insulin lispro (ADMELOG) corrective regimen sliding scale   SubCutaneous three times a day before meals  ketorolac   Injectable 30 milliGRAM(s) IV Push every 6 hours PRN  levothyroxine Injectable 66 MICROGram(s) IV Push at bedtime  methocarbamol 750 milliGRAM(s) Oral every 6 hours PRN  metoprolol tartrate 25 milliGRAM(s) Oral two times a day  metoprolol tartrate Injectable 5 milliGRAM(s) IV Push every 6 hours  morphine  - Injectable 2 milliGRAM(s) IV Push every 4 hours PRN  ondansetron Injectable 4 milliGRAM(s) IV Push every 6 hours PRN  oxybutynin 5 milliGRAM(s) Oral two times a day  pantoprazole  Injectable 40 milliGRAM(s) IV Push daily  piperacillin/tazobactam IVPB.. 3.375 Gram(s) IV Intermittent Once  pregabalin 100 milliGRAM(s) Oral three times a day  sodium chloride 0.9%. 1000 milliLiter(s) IV Continuous <Continuous>    ASSESSMENT AND PLAN:  72f PMH as above a/w    1. SBO/Large ventral hernia:  -seems to be improving.   -d/w surgery, ok to start clears.   -dc ivf when tolerating po.     2. UTI  -not on abx.   -start rocephin.   -f/u urine cx.     3. Paroxysmal atrial fibrillation:  -continue bb, ccb amiodarone.   -continue bb  -hold lovenox as INR therapeutic.     4 .COPD/ RSV/hronic respiratory failure.   -on nc 2-3L at [facility]  -continue symbicort, albuterol  -guafenisin    4. Hypothyroidism:  -synthroid.     5. HFpEF:  -continue to hold lasix for today.   -monitor closely for s/s of exacerbation.     6. h/o DVT/PE  -dc lovenox as inr therapeutic.   -start once inr<2.0  -dopplers negative here.    7. h/o CVA:  -continue statin  -INR therapeutic.     8. IDDM:  -lantus 7+ss  -    9. DVT px:  -inr therapeutic.   -start therapeutic lovenox once subtherapeutic.

## 2022-12-12 NOTE — PROGRESS NOTE ADULT - ASSESSMENT
A/P:  Chronic large ventral/incisional hernia/diastasis rectii with loss of domain  SBO, clinically resolving with flatus and diminished/resolving/resolved abd pain  NGT to suction  NPO, IV hydration  GI/DVT prophylaxis  Hospitalist on consult for medical mgmt  Pain control  Serial abd exams stable and improved  Monitor bowel function  IV antibiotics for UTI  F/U labs, radiologic studies  Pt with multiple medical comorbidities including chronic left ext paralysis from CVA, PE, on anticoagulation therapy  Anticoagulation consult  Pt aware of and agrees with all of the above

## 2022-12-12 NOTE — PROGRESS NOTE ADULT - SUBJECTIVE AND OBJECTIVE BOX
CC:Patient is a 72y old  Female who presents with a chief complaint of SBO (11 Dec 2022 14:38)      Subjective:  Pt seen and examined at bedside. Pt is AAOx3, pt in no acute distress. Pt denied c/o fever, chills, chest pain, SOB, abd pain, N/V/D, extremity pain or dysfunction, hemoptysis, hematemesis, hematuria, hematochexia, headache, diplopia, vertigo, dizzyness. Pt states (+) void, (+) bowel function of flatus    ROS:  otherwise as abovementioned ROS    Vital Signs Last 24 Hrs  T(C): 37.3 (12 Dec 2022 08:59), Max: 37.3 (12 Dec 2022 08:59)  T(F): 99.1 (12 Dec 2022 08:59), Max: 99.1 (12 Dec 2022 08:59)  HR: 77 (12 Dec 2022 08:59) (72 - 89)  BP: 146/64 (12 Dec 2022 08:59) (109/71 - 146/64)  BP(mean): --  RR: 19 (12 Dec 2022 08:59) (19 - 19)  SpO2: 90% (12 Dec 2022 08:59) (90% - 96%)    Parameters below as of 12 Dec 2022 08:59  Patient On (Oxygen Delivery Method): nasal cannula  O2 Flow (L/min): 2      Labs:                                10.7   5.18  )-----------( 251      ( 12 Dec 2022 11:55 )             35.1     CBC Full  -  ( 12 Dec 2022 11:55 )  WBC Count : 5.18 K/uL  RBC Count : 4.12 M/uL  Hemoglobin : 10.7 g/dL  Hematocrit : 35.1 %  Platelet Count - Automated : 251 K/uL  Mean Cell Volume : 85.2 fl  Mean Cell Hemoglobin : 26.0 pg  Mean Cell Hemoglobin Concentration : 30.5 gm/dL  Auto Neutrophil # : x  Auto Lymphocyte # : x  Auto Monocyte # : x  Auto Eosinophil # : x  Auto Basophil # : x  Auto Neutrophil % : x  Auto Lymphocyte % : x  Auto Monocyte % : x  Auto Eosinophil % : x  Auto Basophil % : x    12-12    145  |  113<H>  |  13  ----------------------------<  122<H>  3.6   |  28  |  0.58    Ca    8.7      12 Dec 2022 11:55  Phos  2.4     12-12  Mg     2.1     12-12    TPro  6.1  /  Alb  2.5<L>  /  TBili  0.3  /  DBili  x   /  AST  21  /  ALT  36  /  AlkPhos  44  12-12    LIVER FUNCTIONS - ( 12 Dec 2022 08:27 )  Alb: 2.5 g/dL / Pro: 6.1 gm/dL / ALK PHOS: 44 U/L / ALT: 36 U/L / AST: 21 U/L / GGT: x           PT/INR - ( 12 Dec 2022 08:27 )   PT: 24.5 sec;   INR: 2.10 ratio         PTT - ( 11 Dec 2022 08:00 )  PTT:36.1 sec      Meds:  acetaminophen   IVPB .. 1000 milliGRAM(s) IV Intermittent Once PRN  albuterol    90 MICROgram(s) HFA Inhaler 2 Puff(s) Inhalation every 6 hours PRN  aMIOdarone    Tablet 200 milliGRAM(s) Oral daily  atorvastatin 20 milliGRAM(s) Oral at bedtime  budesonide 160 MICROgram(s)/formoterol 4.5 MICROgram(s) Inhaler 2 Puff(s) Inhalation two times a day  dextrose 5%. 1000 milliLiter(s) IV Continuous <Continuous>  dextrose 5%. 1000 milliLiter(s) IV Continuous <Continuous>  dextrose 50% Injectable 25 Gram(s) IV Push once  dextrose 50% Injectable 12.5 Gram(s) IV Push once  dextrose 50% Injectable 25 Gram(s) IV Push once  dextrose Oral Gel 15 Gram(s) Oral once PRN  diltiazem    milliGRAM(s) Oral daily  DULoxetine 60 milliGRAM(s) Oral daily  glucagon  Injectable 1 milliGRAM(s) IntraMuscular once  guaifenesin/dextromethorphan Oral Liquid 5 milliLiter(s) Oral four times a day  hydrALAZINE Injectable 10 milliGRAM(s) IV Push every 6 hours PRN  insulin glargine Injectable (LANTUS) 7 Unit(s) SubCutaneous at bedtime  insulin lispro (ADMELOG) corrective regimen sliding scale   SubCutaneous three times a day before meals  ketorolac   Injectable 30 milliGRAM(s) IV Push every 6 hours PRN  levothyroxine Injectable 66 MICROGram(s) IV Push at bedtime  methocarbamol 750 milliGRAM(s) Oral every 6 hours PRN  metoprolol tartrate 25 milliGRAM(s) Oral two times a day  morphine  - Injectable 2 milliGRAM(s) IV Push every 4 hours PRN  ondansetron Injectable 4 milliGRAM(s) IV Push every 6 hours PRN  oxybutynin 5 milliGRAM(s) Oral two times a day  pantoprazole  Injectable 40 milliGRAM(s) IV Push daily  piperacillin/tazobactam IVPB.. 3.375 Gram(s) IV Intermittent Once  pregabalin 100 milliGRAM(s) Oral three times a day  sodium chloride 0.9%. 1000 milliLiter(s) IV Continuous <Continuous>      Radiology:  Pending small bowel series 12/12/22    Physical exam:  Pt is aaox3  Pt in no acute distress  Psych: normal affect  Resp: CTAB  CVS: S1S2(+)  ABD: bowel sounds (+), soft, obese habitus, no rebound, no guarding, no rigidity, no skin changes to exam. No tenderness to exam. Known large chronic ventral incisional hernia/diastasis rectii without tenderness or skin changes  EXT: no calf tenderness to exam b/l, on VTE prophylaxis  Skin: no adverse skin changes to exam

## 2022-12-13 LAB
ANION GAP SERPL CALC-SCNC: 5 MMOL/L — SIGNIFICANT CHANGE UP (ref 5–17)
BUN SERPL-MCNC: 17 MG/DL — SIGNIFICANT CHANGE UP (ref 7–23)
CALCIUM SERPL-MCNC: 8.3 MG/DL — LOW (ref 8.5–10.1)
CHLORIDE SERPL-SCNC: 111 MMOL/L — HIGH (ref 96–108)
CO2 SERPL-SCNC: 27 MMOL/L — SIGNIFICANT CHANGE UP (ref 22–31)
CREAT SERPL-MCNC: 0.77 MG/DL — SIGNIFICANT CHANGE UP (ref 0.5–1.3)
CULTURE RESULTS: SIGNIFICANT CHANGE UP
EGFR: 82 ML/MIN/1.73M2 — SIGNIFICANT CHANGE UP
GLUCOSE SERPL-MCNC: 144 MG/DL — HIGH (ref 70–99)
HCT VFR BLD CALC: 34.8 % — SIGNIFICANT CHANGE UP (ref 34.5–45)
HGB BLD-MCNC: 10.6 G/DL — LOW (ref 11.5–15.5)
INR BLD: 2.13 RATIO — HIGH (ref 0.88–1.16)
MCHC RBC-ENTMCNC: 25.9 PG — LOW (ref 27–34)
MCHC RBC-ENTMCNC: 30.5 GM/DL — LOW (ref 32–36)
MCV RBC AUTO: 85.1 FL — SIGNIFICANT CHANGE UP (ref 80–100)
PLATELET # BLD AUTO: 258 K/UL — SIGNIFICANT CHANGE UP (ref 150–400)
POTASSIUM SERPL-MCNC: 3.3 MMOL/L — LOW (ref 3.5–5.3)
POTASSIUM SERPL-SCNC: 3.3 MMOL/L — LOW (ref 3.5–5.3)
PROTHROM AB SERPL-ACNC: 24.9 SEC — HIGH (ref 10.5–13.4)
RBC # BLD: 4.09 M/UL — SIGNIFICANT CHANGE UP (ref 3.8–5.2)
RBC # FLD: 16.6 % — HIGH (ref 10.3–14.5)
SODIUM SERPL-SCNC: 143 MMOL/L — SIGNIFICANT CHANGE UP (ref 135–145)
SPECIMEN SOURCE: SIGNIFICANT CHANGE UP
WBC # BLD: 5.98 K/UL — SIGNIFICANT CHANGE UP (ref 3.8–10.5)
WBC # FLD AUTO: 5.98 K/UL — SIGNIFICANT CHANGE UP (ref 3.8–10.5)

## 2022-12-13 PROCEDURE — 99231 SBSQ HOSP IP/OBS SF/LOW 25: CPT

## 2022-12-13 PROCEDURE — 99233 SBSQ HOSP IP/OBS HIGH 50: CPT

## 2022-12-13 PROCEDURE — 99231 SBSQ HOSP IP/OBS SF/LOW 25: CPT | Mod: GC

## 2022-12-13 RX ORDER — POTASSIUM CHLORIDE 20 MEQ
20 PACKET (EA) ORAL
Refills: 0 | Status: COMPLETED | OUTPATIENT
Start: 2022-12-13 | End: 2022-12-13

## 2022-12-13 RX ORDER — WARFARIN SODIUM 2.5 MG/1
2 TABLET ORAL DAILY
Refills: 0 | Status: DISCONTINUED | OUTPATIENT
Start: 2022-12-13 | End: 2022-12-14

## 2022-12-13 RX ADMIN — Medication 5 MILLIGRAM(S): at 12:46

## 2022-12-13 RX ADMIN — ONDANSETRON 4 MILLIGRAM(S): 8 TABLET, FILM COATED ORAL at 20:11

## 2022-12-13 RX ADMIN — INSULIN GLARGINE 7 UNIT(S): 100 INJECTION, SOLUTION SUBCUTANEOUS at 22:11

## 2022-12-13 RX ADMIN — Medication 5 MILLILITER(S): at 06:02

## 2022-12-13 RX ADMIN — Medication 20 MILLIEQUIVALENT(S): at 15:24

## 2022-12-13 RX ADMIN — ALBUTEROL 2 PUFF(S): 90 AEROSOL, METERED ORAL at 21:06

## 2022-12-13 RX ADMIN — AMIODARONE HYDROCHLORIDE 200 MILLIGRAM(S): 400 TABLET ORAL at 10:03

## 2022-12-13 RX ADMIN — ALBUTEROL 2 PUFF(S): 90 AEROSOL, METERED ORAL at 08:34

## 2022-12-13 RX ADMIN — Medication 20 MILLIEQUIVALENT(S): at 22:12

## 2022-12-13 RX ADMIN — WARFARIN SODIUM 2 MILLIGRAM(S): 2.5 TABLET ORAL at 22:11

## 2022-12-13 RX ADMIN — Medication 25 MILLIGRAM(S): at 22:09

## 2022-12-13 RX ADMIN — MORPHINE SULFATE 2 MILLIGRAM(S): 50 CAPSULE, EXTENDED RELEASE ORAL at 06:32

## 2022-12-13 RX ADMIN — Medication 5 MILLILITER(S): at 18:26

## 2022-12-13 RX ADMIN — BUDESONIDE AND FORMOTEROL FUMARATE DIHYDRATE 2 PUFF(S): 160; 4.5 AEROSOL RESPIRATORY (INHALATION) at 08:32

## 2022-12-13 RX ADMIN — ATORVASTATIN CALCIUM 20 MILLIGRAM(S): 80 TABLET, FILM COATED ORAL at 22:18

## 2022-12-13 RX ADMIN — BUDESONIDE AND FORMOTEROL FUMARATE DIHYDRATE 2 PUFF(S): 160; 4.5 AEROSOL RESPIRATORY (INHALATION) at 21:06

## 2022-12-13 RX ADMIN — PANTOPRAZOLE SODIUM 40 MILLIGRAM(S): 20 TABLET, DELAYED RELEASE ORAL at 10:02

## 2022-12-13 RX ADMIN — DULOXETINE HYDROCHLORIDE 60 MILLIGRAM(S): 30 CAPSULE, DELAYED RELEASE ORAL at 10:02

## 2022-12-13 RX ADMIN — CEFTRIAXONE 1000 MILLIGRAM(S): 500 INJECTION, POWDER, FOR SOLUTION INTRAMUSCULAR; INTRAVENOUS at 15:24

## 2022-12-13 RX ADMIN — Medication 5 MILLILITER(S): at 12:46

## 2022-12-13 RX ADMIN — Medication 20 MILLIEQUIVALENT(S): at 18:25

## 2022-12-13 RX ADMIN — Medication 100 MILLIGRAM(S): at 06:03

## 2022-12-13 RX ADMIN — Medication 100 MILLIGRAM(S): at 15:23

## 2022-12-13 RX ADMIN — MORPHINE SULFATE 2 MILLIGRAM(S): 50 CAPSULE, EXTENDED RELEASE ORAL at 06:02

## 2022-12-13 RX ADMIN — Medication 66 MICROGRAM(S): at 22:12

## 2022-12-13 RX ADMIN — Medication 4: at 13:00

## 2022-12-13 RX ADMIN — Medication 180 MILLIGRAM(S): at 10:03

## 2022-12-13 RX ADMIN — Medication 5 MILLIGRAM(S): at 22:18

## 2022-12-13 RX ADMIN — METHOCARBAMOL 750 MILLIGRAM(S): 500 TABLET, FILM COATED ORAL at 22:10

## 2022-12-13 RX ADMIN — Medication 25 MILLIGRAM(S): at 10:03

## 2022-12-13 RX ADMIN — Medication 100 MILLIGRAM(S): at 22:08

## 2022-12-13 NOTE — PROGRESS NOTE ADULT - ASSESSMENT
71 yo female with h/o afib on warfarin,[id number] Score: 7, diverticulitis s/p Guillermina with SBR (2016), and subsequent colostomy reversal (2017), DM, CVA with residual L sided hemiplegia, COPD, HTN, DVT, PE (1 yr ago), hypothyroidism, obesity, OA, HLD, ventral hernia and multiple SBO in the past presents with L sided abdominal pain on 12- which started on 12-9-2022.  Pt was dx with SBO. which is now resolving.  Pt is starting on clear liquids today.  Pt has high thrombosis risk and requires anticoagulation treatment dose.       Plan:  Hold ac until pt is sub therapeutic INR (INR 2.11 TODAY) the start on tx dose Lovenox   : When cleard by surg will resume coumadin  :le venodynes  :daily cbc/bmp, pt/inr  :will f/u 71 yo female with h/o afib on warfarin,[id number] Score: 7, diverticulitis s/p Guillermina with SBR (2016), and subsequent colostomy reversal (2017), DM, CVA with residual L sided hemiplegia, COPD, HTN, DVT, PE (1 yr ago), hypothyroidism, obesity, OA, HLD, ventral hernia and multiple SBO in the past presents with L sided abdominal pain on 12- which started on 12-9-2022.  Pt was dx with SBO. which is now resolving.  Pt is starting on clear liquids today.  Pt has high thrombosis risk and requires anticoagulation treatment dose.       Plan:  Hold ac until pt is sub therapeutic INR (INR 2.11 TODAY) the start on tx dose Lovenox   : When cleard by surg will resume coumadin  :le venodynes  :daily cbc/bmp, pt/inr  :will f/u  : Dispo:rehab  Addendum  Case d/w Surgical resident Dr.Khan melendez to resume Coumadin will resume Coumadin 2mg tonight and will recheck INR tomorrow

## 2022-12-13 NOTE — PROGRESS NOTE ADULT - SUBJECTIVE AND OBJECTIVE BOX
HPI:  73 yo female with h/o afib on warfarin, diverticulitis s/p Guillermina with SBR (2016), and subsequent colostomy reversal (2017), DM, CVA with residual L sided hemiplegia, COPD, HTN, DVT, PE (1 yr ago), hypothyroidism, obesity, OA, HLD, ventral hernia and multiple SBO in the past presents with L sided abdominal pain. Acc ot the patient, the pain started yesterday evening, sudden onset, sharp in nature, associated with nausea and 7 episodes of vomiting since yesterday. She denies fever. Her last BM was last night and she passed flatus this AM. She mentions her pain is similar to her previous episodes of SBO. She also mentions she has had pain in her R groin due to a ventral hernia for the past 6 months that is less severe in intensity. She is aware she is high risk for surgery and has been follownig up with a cardiologist and pulmonologist to obtain clearance for an elective hernia repair that she has had for the last 3 years.   She is bedbound and has not walked since her CVA 3 years ago when the rehab and PT was stopped at the nursing home and she stopped making progress with her extremities.  (10 Dec 2022 20:51)      Patient is a 72y old  Female who presents with a chief complaint of SBO (12 Dec 2022 13:41)      Consulted by Dr. Magdalena Matthews for VTE prophylaxis, risk stratification, and anticoagulation management.    PAST MEDICAL & SURGICAL HISTORY:  Hypertension      Hypercholesteremia      COPD (chronic obstructive pulmonary disease)      C. difficile diarrhea  2016      Diverticulitis of intestine with perforation and abscess without bleeding, unspecified part of intestinal tract      PE (pulmonary thromboembolism)  2016      Palpitations      Obesity      Osteoarthritis      Anemia      Colostomy in place      History of atrial fibrillation on coumadin      HTN (hypertension)      Diabetes mellitus      Cerebrovascular accident (CVA)      DVT of lower limb      CVA (cerebral vascular accident)      COPD (chronic obstructive pulmonary disease)      Neuropathy      Diverticulitis      History of appendectomy      H/O:   x2      H/O ovarian cystectomy  b/l      Status post total replacement of both hips      H/O hemicolectomy  2016      History of colostomy reversal      History of colostomy reversal      S/P colostomy      S/P       S/P hip replacement          FAMILY HISTORY:  Family history of COPD (chronic obstructive pulmonary disease)    Family history of chronic kidney disease    Family history of hiatal hernia (Sibling)        Interval Note:  2022:  Pt seen at bedside on [facility].  Discussed her either going on Lovenox tx dose if she is still npo tomorrow or resuming her coumadin if she is allowed po med and no procedures scheduled.   Pt state she is passing gas and her abc d feels much better.     2022:  Pt seen at bedside on [facility].  Patient is allowded clears will start Lovenox treatment dose once INR<2.0     IMPROVE VTE Individual Risk Assessment    RISK                                                                Points    [x  ] Previous VTE                                                  3    [  ] Thrombophilia                                               2    [x  ] Lower limb paralysis                                      2        (unable to hold up >15 seconds)      [  ] Current Cancer                                              2         (within 6 months)    [  ] Immobilization > 24 hrs                                1    [  ] ICU/CCU stay > 24 hours                              1    [x  ] Age > 60                                                      1    IMPROVE VTE Score ___6______    IMPROVE Score 0-1: Low Risk, No VTE prophylaxis required for most patients, encourage ambulation.   IMPROVE Score 2-3: At risk, pharmacologic VTE prophylaxis is indicated for most patients (in the absence of a contraindication)  IMPROVE Score > or = 4: High Risk, pharmacologic VTE prophylaxis is indicated for most patients (in the absence of a contraindication)    [id number] Score: 7    IMPROVE Bleeding Risk Score: 1.5    Falls Risk:   High (x  )  Mod (  )  Low (  )  crcl: 85.5        cr: .54          BMI: 22.5               Denies any personal or familial history of clotting or bleeding disorders.    Allergies    Cipro (Rash)  Cipro (Unknown)  Spiriva (Rash)  Spiriva (Unknown)    Intolerances        REVIEW OF SYSTEMS    (  )Fever	     (  )Constipation	(  )SOB				(  )Headache	(  )Dysuria  (  )Chills	     (  )Melena	(  )Dyspnea present on exertion	                    (  )Dizziness                    (  )Polyuria  (  )Nausea	     (  )Hematochezia	(  )Cough			                    (  )Syncope   	(  )Hematuria  (  )Vomiting    (  )Chest Pain	(  )Wheezing			( x )Weakness  (x) thirsty.   (  )Diarrhea     (  )Palpitations	(  )Anorexia			(  )Myalgia       (x) abd discomfort but much better    Pertinent positives in HPI and daily subjective.  All other ROS negative.    Vital Signs Last 24 Hrs  T(C): 36.7 (13 Dec 2022 08:24), Max: 37.2 (12 Dec 2022 17:24)  T(F): 98 (13 Dec 2022 08:24), Max: 98.9 (12 Dec 2022 17:24)  HR: 72 (13 Dec 2022 08:35) (72 - 88)  BP: 160/69 (13 Dec 2022 08:24) (133/64 - 160/69)  BP(mean): --  RR: 18 (13 Dec 2022 08:24) (17 - 18)  SpO2: 94% (13 Dec 2022 08:24) (92% - 95%)  PHYSICAL EXAM:    Constitutional: Appears Well    Neurological: A& O x 3    Skin: Warm    Respiratory and Thorax: normal effort; Breath sounds: normal; No rales/wheezing/rhonchi  	  Cardiovascular: S1, S2, regular, NMBR	    Gastrointestinal: BS + DECREASED x 4Q, nontender, Pt states she is passing gas	    Genitourinary:  Bladder nondistended, nontender    Musculoskeletal:   General Right:   no muscle/joint tenderness,   normal tone, no joint swelling,   ROM: full	    General Left:   no muscle/joint tenderness,   normal tone, no joint swelling,   ROM: limited        Lower extrems:   Right: no calf tenderness              negative shara's sign               + pedal pulses    Left:   no calf tenderness              negative shara's sign               + pedal pulses               + left side residual from CVA IN 2019                         10.6   5.98  )-----------( 258      ( 13 Dec 2022 08:29 )             34.8       1213    143  |  111<H>  |  17  ----------------------------<  144<H>  3.3<L>   |  27  |  0.77    Ca    8.3<L>      13 Dec 2022 08:29  Phos  2.4     12-12  Mg     2.1     12-12    TPro  6.1  /  Alb  2.5<L>  /  TBili  0.3  /  DBili  x   /  AST  21  /  ALT  36  /  AlkPhos  44  12-12                                     10.7   5.18  )-----------( 251      ( 12 Dec 2022 11:55 )             35.1       12-12    145  |  113<H>  |  13  ----------------------------<  122<H>  3.6   |  28  |  0.58    Ca    8.7      12 Dec 2022 11:55  Phos  2.4     12-  Mg     2.1         TPro  6.1  /  Alb  2.5<L>  /  TBili  0.3  /  DBili  x   /  AST  21  /  ALT  36  /  AlkPhos  44  12-12    PT/INR - ( 13 Dec 2022 09:56 )   PT: 24.9 sec;   INR: 2.13 ratio    PT/INR - ( 12 Dec 2022 08:27 )   PT: 24.5 sec;   INR: 2.10 ratio         PTT - ( 11 Dec 2022 08:00 )  PTT:36.1 sec				    MEDICATIONS  (STANDING):  aMIOdarone    Tablet 200 milliGRAM(s) Oral daily  atorvastatin 20 milliGRAM(s) Oral at bedtime  budesonide 160 MICROgram(s)/formoterol 4.5 MICROgram(s) Inhaler 2 Puff(s) Inhalation two times a day  cefTRIAXone Injectable. 1000 milliGRAM(s) IV Push every 24 hours  dextrose 5% + sodium chloride 0.45%. 1000 milliLiter(s) IV Continuous <Continuous>  dextrose 5%. 1000 milliLiter(s) IV Continuous <Continuous>  dextrose 5%. 1000 milliLiter(s) IV Continuous <Continuous>  dextrose 50% Injectable 25 Gram(s) IV Push once  dextrose 50% Injectable 12.5 Gram(s) IV Push once  dextrose 50% Injectable 25 Gram(s) IV Push once  diltiazem    milliGRAM(s) Oral daily  DULoxetine 60 milliGRAM(s) Oral daily  glucagon  Injectable 1 milliGRAM(s) IntraMuscular once  guaifenesin/dextromethorphan Oral Liquid 5 milliLiter(s) Oral four times a day  insulin glargine Injectable (LANTUS) 7 Unit(s) SubCutaneous at bedtime  insulin lispro (ADMELOG) corrective regimen sliding scale   SubCutaneous three times a day before meals  levothyroxine Injectable 66 MICROGram(s) IV Push at bedtime  metoprolol tartrate 25 milliGRAM(s) Oral two times a day  oxybutynin 5 milliGRAM(s) Oral two times a day  pantoprazole  Injectable 40 milliGRAM(s) IV Push daily  piperacillin/tazobactam IVPB.. 3.375 Gram(s) IV Intermittent Once  pregabalin 100 milliGRAM(s) Oral three times a day          DVT Prophylaxis:  LMWH                   (hold  )  Heparin SQ           (  )  Coumadin             (  )  Xarelto                  (  )  Eliquis                   (  )  Venodynes           (x  )  Ambulation          (  )  UFH                       (  )  Contraindicated  (  )  EC Aspirin             (  )

## 2022-12-13 NOTE — PROGRESS NOTE ADULT - ASSESSMENT
A/P:  Chronic large ventral/incisional hernia/diastasis rectii with loss of domain  SBO, clinically resolving with flatus and diminished/resolving/resolved abd pain  SBS shows resolution of SBO  Monitor tolerance to low fiber diet  GI/DVT prophylaxis  Hospitalist on consult for medical mgmt  Pain control  Serial abd exams stable and improved  Monitor bowel function  IV antibiotics for UTI  Pt with multiple medical comorbidities including chronic left ext paralysis from CVA, PE, on anticoagulation therapy  Anticoagulation consult: therapeutic lov held      Plan discussed with Dr Matthews

## 2022-12-13 NOTE — PROGRESS NOTE ADULT - SUBJECTIVE AND OBJECTIVE BOX
SURGERY DAILY PROGRESS NOTE:     Subjective:  Patient seen and examined at bedside during am rounds. Toleratign FLD, passing flatus. AVSS. Denies any fevers, chills, n/v/d, chest pain or shortness of breath    Objective:    MEDICATIONS  (STANDING):  aMIOdarone    Tablet 200 milliGRAM(s) Oral daily  atorvastatin 20 milliGRAM(s) Oral at bedtime  budesonide 160 MICROgram(s)/formoterol 4.5 MICROgram(s) Inhaler 2 Puff(s) Inhalation two times a day  cefTRIAXone Injectable. 1000 milliGRAM(s) IV Push every 24 hours  dextrose 5% + sodium chloride 0.45%. 1000 milliLiter(s) (75 mL/Hr) IV Continuous <Continuous>  dextrose 5%. 1000 milliLiter(s) (50 mL/Hr) IV Continuous <Continuous>  dextrose 5%. 1000 milliLiter(s) (100 mL/Hr) IV Continuous <Continuous>  dextrose 50% Injectable 25 Gram(s) IV Push once  dextrose 50% Injectable 12.5 Gram(s) IV Push once  dextrose 50% Injectable 25 Gram(s) IV Push once  diltiazem    milliGRAM(s) Oral daily  DULoxetine 60 milliGRAM(s) Oral daily  glucagon  Injectable 1 milliGRAM(s) IntraMuscular once  guaifenesin/dextromethorphan Oral Liquid 5 milliLiter(s) Oral four times a day  insulin glargine Injectable (LANTUS) 7 Unit(s) SubCutaneous at bedtime  insulin lispro (ADMELOG) corrective regimen sliding scale   SubCutaneous three times a day before meals  levothyroxine Injectable 66 MICROGram(s) IV Push at bedtime  metoprolol tartrate 25 milliGRAM(s) Oral two times a day  oxybutynin 5 milliGRAM(s) Oral two times a day  pantoprazole  Injectable 40 milliGRAM(s) IV Push daily  piperacillin/tazobactam IVPB.. 3.375 Gram(s) IV Intermittent Once  potassium chloride    Tablet ER 20 milliEquivalent(s) Oral every 2 hours  pregabalin 100 milliGRAM(s) Oral three times a day    MEDICATIONS  (PRN):  acetaminophen   IVPB .. 1000 milliGRAM(s) IV Intermittent Once PRN Mild Pain (1 - 3)  albuterol    90 MICROgram(s) HFA Inhaler 2 Puff(s) Inhalation every 6 hours PRN Shortness of Breath and/or Wheezing  dextrose Oral Gel 15 Gram(s) Oral once PRN Blood Glucose LESS THAN 70 milliGRAM(s)/deciliter  hydrALAZINE Injectable 10 milliGRAM(s) IV Push every 6 hours PRN if SBP > 145  ketorolac   Injectable 30 milliGRAM(s) IV Push every 6 hours PRN Moderate Pain (4 - 6)  methocarbamol 750 milliGRAM(s) Oral every 6 hours PRN Muscle Spasm  morphine  - Injectable 2 milliGRAM(s) IV Push every 4 hours PRN Severe Pain (7 - 10)  ondansetron Injectable 4 milliGRAM(s) IV Push every 6 hours PRN Nausea      Vital Signs Last 24 Hrs  T(C): 36.7 (13 Dec 2022 08:24), Max: 37.2 (12 Dec 2022 17:24)  T(F): 98 (13 Dec 2022 08:24), Max: 98.9 (12 Dec 2022 17:24)  HR: 72 (13 Dec 2022 08:35) (72 - 88)  BP: 160/69 (13 Dec 2022 08:24) (133/64 - 160/69)  BP(mean): --  RR: 18 (13 Dec 2022 08:24) (17 - 18)  SpO2: 94% (13 Dec 2022 08:24) (92% - 95%)    Parameters below as of 13 Dec 2022 08:35  Patient On (Oxygen Delivery Method): nasal cannula          PHYSICAL EXAM   Pt is aaox3  Pt in no acute distress  Psych: normal affect  Resp: CTAB  CVS: S1S2(+)  ABD: bowel sounds (+), soft, obese habitus, no rebound, no guarding, no rigidity, no skin changes to exam. No tenderness to exam. Known large chronic ventral incisional hernia/diastasis rectii without tenderness or skin changes  EXT: no calf tenderness to exam b/l, on VTE prophylaxis  Skin: no adverse skin changes to exam      I&O's Detail      Daily     Daily Weight in k.2 (13 Dec 2022 05:50)    LABS:                        10.6   5.98  )-----------( 258      ( 13 Dec 2022 08:29 )             34.8     12-13    143  |  111<H>  |  17  ----------------------------<  144<H>  3.3<L>   |  27  |  0.77    Ca    8.3<L>      13 Dec 2022 08:29  Phos  2.4     12-12  Mg     2.1     12-12    TPro  6.1  /  Alb  2.5<L>  /  TBili  0.3  /  DBili  x   /  AST  21  /  ALT  36  /  AlkPhos  44  12-12    PT/INR - ( 13 Dec 2022 09:56 )   PT: 24.9 sec;   INR: 2.13 ratio               RADIOLOGY & ADDITIONAL STUDIES:    ASSESSMENT/PLAN:

## 2022-12-14 LAB
ANION GAP SERPL CALC-SCNC: 5 MMOL/L — SIGNIFICANT CHANGE UP (ref 5–17)
APTT BLD: 30.5 SEC — SIGNIFICANT CHANGE UP (ref 27.5–35.5)
BASOPHILS # BLD AUTO: 0 K/UL — SIGNIFICANT CHANGE UP (ref 0–0.2)
BASOPHILS NFR BLD AUTO: 0 % — SIGNIFICANT CHANGE UP (ref 0–2)
BUN SERPL-MCNC: 14 MG/DL — SIGNIFICANT CHANGE UP (ref 7–23)
CALCIUM SERPL-MCNC: 8.5 MG/DL — SIGNIFICANT CHANGE UP (ref 8.5–10.1)
CHLORIDE SERPL-SCNC: 113 MMOL/L — HIGH (ref 96–108)
CO2 SERPL-SCNC: 27 MMOL/L — SIGNIFICANT CHANGE UP (ref 22–31)
CREAT SERPL-MCNC: 0.72 MG/DL — SIGNIFICANT CHANGE UP (ref 0.5–1.3)
EGFR: 89 ML/MIN/1.73M2 — SIGNIFICANT CHANGE UP
EOSINOPHIL # BLD AUTO: 0 K/UL — SIGNIFICANT CHANGE UP (ref 0–0.5)
EOSINOPHIL NFR BLD AUTO: 0 % — SIGNIFICANT CHANGE UP (ref 0–6)
GLUCOSE SERPL-MCNC: 178 MG/DL — HIGH (ref 70–99)
HCT VFR BLD CALC: 35.3 % — SIGNIFICANT CHANGE UP (ref 34.5–45)
HGB BLD-MCNC: 10.9 G/DL — LOW (ref 11.5–15.5)
INR BLD: 2.05 RATIO — HIGH (ref 0.88–1.16)
LYMPHOCYTES # BLD AUTO: 1.71 K/UL — SIGNIFICANT CHANGE UP (ref 1–3.3)
LYMPHOCYTES # BLD AUTO: 25 % — SIGNIFICANT CHANGE UP (ref 13–44)
MCHC RBC-ENTMCNC: 26.1 PG — LOW (ref 27–34)
MCHC RBC-ENTMCNC: 30.9 GM/DL — LOW (ref 32–36)
MCV RBC AUTO: 84.7 FL — SIGNIFICANT CHANGE UP (ref 80–100)
MONOCYTES # BLD AUTO: 0.62 K/UL — SIGNIFICANT CHANGE UP (ref 0–0.9)
MONOCYTES NFR BLD AUTO: 9 % — SIGNIFICANT CHANGE UP (ref 2–14)
NEUTROPHILS # BLD AUTO: 4.17 K/UL — SIGNIFICANT CHANGE UP (ref 1.8–7.4)
NEUTROPHILS NFR BLD AUTO: 61 % — SIGNIFICANT CHANGE UP (ref 43–77)
NRBC # BLD: SIGNIFICANT CHANGE UP /100 WBCS (ref 0–0)
PHOSPHATE SERPL-MCNC: 2.4 MG/DL — LOW (ref 2.5–4.5)
PLATELET # BLD AUTO: 271 K/UL — SIGNIFICANT CHANGE UP (ref 150–400)
POTASSIUM SERPL-MCNC: 3.6 MMOL/L — SIGNIFICANT CHANGE UP (ref 3.5–5.3)
POTASSIUM SERPL-SCNC: 3.6 MMOL/L — SIGNIFICANT CHANGE UP (ref 3.5–5.3)
PROTHROM AB SERPL-ACNC: 23.9 SEC — HIGH (ref 10.5–13.4)
RBC # BLD: 4.17 M/UL — SIGNIFICANT CHANGE UP (ref 3.8–5.2)
RBC # FLD: 16.8 % — HIGH (ref 10.3–14.5)
SODIUM SERPL-SCNC: 145 MMOL/L — SIGNIFICANT CHANGE UP (ref 135–145)
WBC # BLD: 6.84 K/UL — SIGNIFICANT CHANGE UP (ref 3.8–10.5)
WBC # FLD AUTO: 6.84 K/UL — SIGNIFICANT CHANGE UP (ref 3.8–10.5)

## 2022-12-14 PROCEDURE — 99231 SBSQ HOSP IP/OBS SF/LOW 25: CPT

## 2022-12-14 PROCEDURE — 99232 SBSQ HOSP IP/OBS MODERATE 35: CPT

## 2022-12-14 PROCEDURE — 99232 SBSQ HOSP IP/OBS MODERATE 35: CPT | Mod: GC

## 2022-12-14 RX ORDER — WARFARIN SODIUM 2.5 MG/1
3 TABLET ORAL DAILY
Refills: 0 | Status: DISCONTINUED | OUTPATIENT
Start: 2022-12-14 | End: 2022-12-15

## 2022-12-14 RX ADMIN — DULOXETINE HYDROCHLORIDE 60 MILLIGRAM(S): 30 CAPSULE, DELAYED RELEASE ORAL at 11:18

## 2022-12-14 RX ADMIN — ALBUTEROL 2 PUFF(S): 90 AEROSOL, METERED ORAL at 09:45

## 2022-12-14 RX ADMIN — Medication 100 MILLIGRAM(S): at 05:43

## 2022-12-14 RX ADMIN — WARFARIN SODIUM 3 MILLIGRAM(S): 2.5 TABLET ORAL at 21:21

## 2022-12-14 RX ADMIN — Medication 30 MILLIGRAM(S): at 13:30

## 2022-12-14 RX ADMIN — Medication 66 MICROGRAM(S): at 21:20

## 2022-12-14 RX ADMIN — INSULIN GLARGINE 7 UNIT(S): 100 INJECTION, SOLUTION SUBCUTANEOUS at 21:30

## 2022-12-14 RX ADMIN — MORPHINE SULFATE 2 MILLIGRAM(S): 50 CAPSULE, EXTENDED RELEASE ORAL at 18:20

## 2022-12-14 RX ADMIN — Medication 30 MILLIGRAM(S): at 13:04

## 2022-12-14 RX ADMIN — Medication 100 MILLIGRAM(S): at 14:48

## 2022-12-14 RX ADMIN — Medication 5 MILLIGRAM(S): at 11:17

## 2022-12-14 RX ADMIN — PANTOPRAZOLE SODIUM 40 MILLIGRAM(S): 20 TABLET, DELAYED RELEASE ORAL at 11:16

## 2022-12-14 RX ADMIN — Medication 5 MILLILITER(S): at 11:21

## 2022-12-14 RX ADMIN — ALBUTEROL 2 PUFF(S): 90 AEROSOL, METERED ORAL at 22:21

## 2022-12-14 RX ADMIN — Medication 2: at 08:55

## 2022-12-14 RX ADMIN — Medication 5 MILLIGRAM(S): at 21:20

## 2022-12-14 RX ADMIN — Medication 180 MILLIGRAM(S): at 11:17

## 2022-12-14 RX ADMIN — ATORVASTATIN CALCIUM 20 MILLIGRAM(S): 80 TABLET, FILM COATED ORAL at 21:20

## 2022-12-14 RX ADMIN — Medication 25 MILLIGRAM(S): at 21:29

## 2022-12-14 RX ADMIN — ONDANSETRON 4 MILLIGRAM(S): 8 TABLET, FILM COATED ORAL at 02:38

## 2022-12-14 RX ADMIN — BUDESONIDE AND FORMOTEROL FUMARATE DIHYDRATE 2 PUFF(S): 160; 4.5 AEROSOL RESPIRATORY (INHALATION) at 22:19

## 2022-12-14 RX ADMIN — Medication 25 MILLIGRAM(S): at 11:18

## 2022-12-14 RX ADMIN — AMIODARONE HYDROCHLORIDE 200 MILLIGRAM(S): 400 TABLET ORAL at 11:17

## 2022-12-14 RX ADMIN — Medication 100 MILLIGRAM(S): at 21:16

## 2022-12-14 RX ADMIN — BUDESONIDE AND FORMOTEROL FUMARATE DIHYDRATE 2 PUFF(S): 160; 4.5 AEROSOL RESPIRATORY (INHALATION) at 09:42

## 2022-12-14 NOTE — PROGRESS NOTE ADULT - SUBJECTIVE AND OBJECTIVE BOX
HPI:  73 yo female with h/o afib on warfarin, diverticulitis s/p Guillermina with SBR (2016), and subsequent colostomy reversal (2017), DM, CVA with residual L sided hemiplegia, COPD, HTN, DVT, PE (1 yr ago), hypothyroidism, obesity, OA, HLD, ventral hernia and multiple SBO in the past presents with L sided abdominal pain. Acc ot the patient, the pain started yesterday evening, sudden onset, sharp in nature, associated with nausea and 7 episodes of vomiting since yesterday. She denies fever. Her last BM was last night and she passed flatus this AM. She mentions her pain is similar to her previous episodes of SBO. She also mentions she has had pain in her R groin due to a ventral hernia for the past 6 months that is less severe in intensity. She is aware she is high risk for surgery and has been follownig up with a cardiologist and pulmonologist to obtain clearance for an elective hernia repair that she has had for the last 3 years.   She is bedbound and has not walked since her CVA 3 years ago when the rehab and PT was stopped at the nursing home and she stopped making progress with her extremities.  (10 Dec 2022 20:51)      Patient is a 72y old  Female who presents with a chief complaint of SBO (12 Dec 2022 13:41)      Consulted by Dr. Magdalena Matthews for VTE prophylaxis, risk stratification, and anticoagulation management.    PAST MEDICAL & SURGICAL HISTORY:  Hypertension      Hypercholesteremia      COPD (chronic obstructive pulmonary disease)      C. difficile diarrhea  2016      Diverticulitis of intestine with perforation and abscess without bleeding, unspecified part of intestinal tract      PE (pulmonary thromboembolism)  2016      Palpitations      Obesity      Osteoarthritis      Anemia      Colostomy in place      History of atrial fibrillation on coumadin      HTN (hypertension)      Diabetes mellitus      Cerebrovascular accident (CVA)      DVT of lower limb      CVA (cerebral vascular accident)      COPD (chronic obstructive pulmonary disease)      Neuropathy      Diverticulitis      History of appendectomy      H/O:   x2      H/O ovarian cystectomy  b/l      Status post total replacement of both hips      H/O hemicolectomy  2016      History of colostomy reversal      History of colostomy reversal      S/P colostomy      S/P       S/P hip replacement          FAMILY HISTORY:  Family history of COPD (chronic obstructive pulmonary disease)    Family history of chronic kidney disease    Family history of hiatal hernia (Sibling)        Interval Note:  2022:  Pt seen at bedside on [facility].  Discussed her either going on Lovenox tx dose if she is still npo tomorrow or resuming her coumadin if she is allowed po med and no procedures scheduled.   Pt state she is passing gas and her abc d feels much better.     2022:  Pt seen at bedside on [facility].  Patient is allowded clears will start Lovenox treatment dose once INR<2.0   2022: Pt seeen at bedside this am c/o nausea.  Not vomiting.  Discussed with pt the INR of 2.0 and increasing her coumadin to 3mg today.  No concerns.   IMPROVE VTE Individual Risk Assessment    RISK                                                                Points    [x  ] Previous VTE                                                  3    [  ] Thrombophilia                                               2    [x  ] Lower limb paralysis                                      2        (unable to hold up >15 seconds)      [  ] Current Cancer                                              2         (within 6 months)    [  ] Immobilization > 24 hrs                                1    [  ] ICU/CCU stay > 24 hours                              1    [x  ] Age > 60                                                      1    IMPROVE VTE Score ___6______    IMPROVE Score 0-1: Low Risk, No VTE prophylaxis required for most patients, encourage ambulation.   IMPROVE Score 2-3: At risk, pharmacologic VTE prophylaxis is indicated for most patients (in the absence of a contraindication)  IMPROVE Score > or = 4: High Risk, pharmacologic VTE prophylaxis is indicated for most patients (in the absence of a contraindication)    [id number] Score: 7    IMPROVE Bleeding Risk Score: 1.5    Falls Risk:   High (x  )  Mod (  )  Low (  )  crcl: 85.5        cr: .54          BMI: 22.5               Denies any personal or familial history of clotting or bleeding disorders.    Allergies    Cipro (Rash)  Cipro (Unknown)  Spiriva (Rash)  Spiriva (Unknown)    Intolerances        REVIEW OF SYSTEMS    (  )Fever	     (  )Constipation	(  )SOB				(  )Headache	(  )Dysuria  (  )Chills	     (  )Melena	(  )Dyspnea present on exertion	                    (  )Dizziness                    (  )Polyuria  ( x )Nausea	     (  )Hematochezia	(  )Cough			                    (  )Syncope   	(  )Hematuria  (  )Vomiting    (  )Chest Pain	(  )Wheezing			( x )Weakness  (x) thirsty.   (  )Diarrhea     (  )Palpitations	(  )Anorexia			(  )Myalgia       (x) abd discomfort but much better    Pertinent positives in HPI and daily subjective.  All other ROS negative.    Vital Signs Last 24 Hrs  T(C): 36.8 (22 @ 16:44), Max: 36.9 (22 @ 22:17)  T(F): 98.3 (22 @ 16:44), Max: 98.5 (22 @ 22:17)  HR: 87 (22 @ 16:44) (74 - 93)  BP: 136/62 (22 @ 16:44) (136/62 - 168/74)  BP(mean): --  RR: 18 (22 @ 16:44) (18 - 18)  SpO2: 94% (22 @ 16:44) (93% - 95%)  PHYSICAL EXAM:    Constitutional: Appears Well    Neurological: A& O x 3    Skin: Warm    Respiratory and Thorax: normal effort; Breath sounds: normal; No rales/wheezing/rhonchi  	  Cardiovascular: S1, S2, regular, NMBR	    Gastrointestinal: BS + DECREASED x 4Q, nontender, Pt states she is passing gas	    Genitourinary:  Bladder nondistended, nontender    Musculoskeletal:   General Right:   no muscle/joint tenderness,   normal tone, no joint swelling,   ROM: full	    General Left:   no muscle/joint tenderness,   normal tone, no joint swelling,   ROM: limited        Lower extrems:   Right: no calf tenderness              negative shara's sign               + pedal pulses    Left:   no calf tenderness              negative shara's sign               + pedal pulses               + left side residual from CVA IN 2019                                  10.9   6.84  )-----------( 271      ( 14 Dec 2022 08:20 )             35.3       12-14    145  |  113<H>  |  14  ----------------------------<  178<H>  3.6   |  27  |  0.72    Ca    8.5      14 Dec 2022 08:20  Phos  2.4     12-       PTT - ( 14 Dec 2022 08:20 )  PTT:30.5 sec             10.6   5.98  )-----------( 258      ( 13 Dec 2022 08:29 )             34.8       12-    143  |  111<H>  |  17  ----------------------------<  144<H>  3.3<L>   |  27  |  0.77    Ca    8.3<L>      13 Dec 2022 08:29  Phos  2.4     12-12  Mg     2.1     12-    TPro  6.1  /  Alb  2.5<L>  /  TBili  0.3  /  DBili  x   /  AST  21  /  ALT  36  /  AlkPhos  44  12                                     10.7   5.18  )-----------( 251      ( 12 Dec 2022 11:55 )             35.1       12-    145  |  113<H>  |  13  ----------------------------<  122<H>  3.6   |  28  |  0.58    Ca    8.7      12 Dec 2022 11:55  Phos  2.4     12-12  Mg     2.1     12-12    TPro  6.1  /  Alb  2.5<L>  /  TBili  0.3  /  DBili  x   /  AST  21  /  ALT  36  /  AlkPhos  44  12-12  PT/INR - ( 14 Dec 2022 08:20 )   PT: 23.9 sec;   INR: 2.05 ratio    PT/INR - ( 13 Dec 2022 09:56 )   PT: 24.9 sec;   INR: 2.13 ratio    PT/INR - ( 12 Dec 2022 08:27 )   PT: 24.5 sec;   INR: 2.10 ratio         PTT - ( 11 Dec 2022 08:00 )  PTT:36.1 sec				    MEDICATIONS  (STANDING):  aMIOdarone    Tablet 200 milliGRAM(s) Oral daily  atorvastatin 20 milliGRAM(s) Oral at bedtime  budesonide 160 MICROgram(s)/formoterol 4.5 MICROgram(s) Inhaler 2 Puff(s) Inhalation two times a day  dextrose 5% + sodium chloride 0.45%. 1000 milliLiter(s) IV Continuous <Continuous>  dextrose 5%. 1000 milliLiter(s) IV Continuous <Continuous>  dextrose 5%. 1000 milliLiter(s) IV Continuous <Continuous>  dextrose 50% Injectable 25 Gram(s) IV Push once  dextrose 50% Injectable 12.5 Gram(s) IV Push once  dextrose 50% Injectable 25 Gram(s) IV Push once  diltiazem    milliGRAM(s) Oral daily  DULoxetine 60 milliGRAM(s) Oral daily  glucagon  Injectable 1 milliGRAM(s) IntraMuscular once  guaifenesin/dextromethorphan Oral Liquid 5 milliLiter(s) Oral four times a day  insulin glargine Injectable (LANTUS) 7 Unit(s) SubCutaneous at bedtime  insulin lispro (ADMELOG) corrective regimen sliding scale   SubCutaneous three times a day before meals  levothyroxine Injectable 66 MICROGram(s) IV Push at bedtime  metoprolol tartrate 25 milliGRAM(s) Oral two times a day  oxybutynin 5 milliGRAM(s) Oral two times a day  pantoprazole  Injectable 40 milliGRAM(s) IV Push daily  pregabalin 100 milliGRAM(s) Oral three times a day  warfarin 3 milliGRAM(s) Oral daily            DVT Prophylaxis:  LMWH                   (  )  Heparin SQ           (  )  Coumadin             ( x )  Xarelto                  (  )  Eliquis                   (  )  Venodynes           (x  )  Ambulation          (  )  UFH                       (  )  Contraindicated  (  )  EC Aspirin             (  )

## 2022-12-14 NOTE — PROGRESS NOTE ADULT - SUBJECTIVE AND OBJECTIVE BOX
c/c:   abdominal pain/n/v    Pt is a 73 y/o/f w/ PMH of DM2, CVA, Left sided hemiplegia, HTN, HLD, afib on warfarin, HFpEF, DVT/PE, COPD,   diverticulitis s/p Guillermina with SBR (2016), and subsequent colostomy reversal (2017),  hypothyroidism, obesity, OA,  multiple SBOs  in the past with large ventral hernia,  tentatively scheduled for surgery in february presented with L sided abdominal pain, nausea and vomiting. She is admitted to surgery with sbo. Tested positive for RSV.  Hospitalist service consulted for medical comanagement.        Medical progress: Patient notes to me that she often developed SBOs ( it is just matter of time). Denies any HA, CP, SOB. Episode of vomiting overnight. No fevers, chills or shakes.   Complaints: no new complaints  State of mind: normal  - Medicine team is signing off, please re - cosults      Review of system  - All 10 systems reviewed and is as per HPI otherwise negative.       PHYSICAL EXAM:  T(C): 36.9 (13 Dec 2022 22:17), Max: 36.9 (13 Dec 2022 22:17)  T(F): 98.5 (13 Dec 2022 22:17), Max: 98.5 (13 Dec 2022 22:17)  HR: 86 (13 Dec 2022 22:17) (72 - 93)  BP: 168/74 (13 Dec 2022 22:17) (154/74 - 168/74)  RR: 18 (13 Dec 2022 22:17) (18 - 18)  SpO2: 93% (13 Dec 2022 22:17) (93% - 95%)  GENERAL: Comfortable, obese, pleasant, no acute distress  HEAD:  Atraumatic, Normocephalic  EYES: EOMI, PERRLA  HEENT: Moist mucous membranes  NECK: Supple, No JVD  NERVOUS SYSTEM:  Alert & Oriented X3, Left sided hemiplegia.   CHEST/LUNG: Clear to auscultation bilaterally  HEART: Regular rate and rhythm;   ABDOMEN: Soft, large ventral hernia, bs+, non tender  GENITOURINARY- Voiding, no palpable bladder  EXTREMITIES:  No clubbing, cyanosis, or edema  MUSCULOSKELETAL- No muscle tenderness, No joint tenderness  SKIN-no rash    LABS:                        10.7   5.18  )-----------( 251      ( 12 Dec 2022 11:55 )             35.1     12-12    145  |  113<H>  |  13  ----------------------------<  122<H>  3.6   |  28  |  0.58    Ca    8.7      12 Dec 2022 11:55  Phos  2.4     12-12  Mg     2.1     12-12    TPro  6.1  /  Alb  2.5<L>  /  TBili  0.3  /  DBili  x   /  AST  21  /  ALT  36  /  AlkPhos  44  12-12    PT/INR - ( 12 Dec 2022 08:27 )   PT: 24.5 sec;   INR: 2.10 ratio         PTT - ( 11 Dec 2022 08:00 )  PTT:36.1 sec  Urinalysis Basic - ( 10 Dec 2022 20:02 )    Color: Yellow / Appearance: very cloudy / S.020 / pH: x  Gluc: x / Ketone: Moderate  / Bili: Negative / Urobili: Negative   Blood: x / Protein: 100 / Nitrite: Positive   Leuk Esterase: Moderate / RBC: 11-25 /HPF / WBC >50 /HPF   Sq Epi: x / Non Sq Epi: Occasional / Bacteria: TNTC        MEDS  acetaminophen   IVPB .. 1000 milliGRAM(s) IV Intermittent Once PRN  albuterol    90 MICROgram(s) HFA Inhaler 2 Puff(s) Inhalation every 6 hours PRN  aMIOdarone    Tablet 200 milliGRAM(s) Oral daily  atorvastatin 20 milliGRAM(s) Oral at bedtime  budesonide 160 MICROgram(s)/formoterol 4.5 MICROgram(s) Inhaler 2 Puff(s) Inhalation two times a day  dextrose 5%. 1000 milliLiter(s) IV Continuous <Continuous>  dextrose 5%. 1000 milliLiter(s) IV Continuous <Continuous>  dextrose 50% Injectable 25 Gram(s) IV Push once  dextrose 50% Injectable 12.5 Gram(s) IV Push once  dextrose 50% Injectable 25 Gram(s) IV Push once  dextrose Oral Gel 15 Gram(s) Oral once PRN  diltiazem    milliGRAM(s) Oral daily  DULoxetine 60 milliGRAM(s) Oral daily  glucagon  Injectable 1 milliGRAM(s) IntraMuscular once  hydrALAZINE Injectable 10 milliGRAM(s) IV Push every 6 hours PRN  insulin glargine Injectable (LANTUS) 15 Unit(s) SubCutaneous at bedtime  insulin lispro (ADMELOG) corrective regimen sliding scale   SubCutaneous three times a day before meals  ketorolac   Injectable 30 milliGRAM(s) IV Push every 6 hours PRN  levothyroxine Injectable 66 MICROGram(s) IV Push at bedtime  methocarbamol 750 milliGRAM(s) Oral every 6 hours PRN  metoprolol tartrate 25 milliGRAM(s) Oral two times a day  metoprolol tartrate Injectable 5 milliGRAM(s) IV Push every 6 hours  morphine  - Injectable 2 milliGRAM(s) IV Push every 4 hours PRN  ondansetron Injectable 4 milliGRAM(s) IV Push every 6 hours PRN  oxybutynin 5 milliGRAM(s) Oral two times a day  pantoprazole  Injectable 40 milliGRAM(s) IV Push daily  piperacillin/tazobactam IVPB.. 3.375 Gram(s) IV Intermittent Once  pregabalin 100 milliGRAM(s) Oral three times a day  sodium chloride 0.9%. 1000 milliLiter(s) IV Continuous <Continuous>    ASSESSMENT AND PLAN:  72f PMH as above a/w    1. SBO/Large ventral hernia:  -seems to be improving.   -d/w surgery, ok to start clears.   -dc ivf when tolerating po.     2. UTI  -not on abx.   -start rocephin.   -f/u urine cx.     3. Paroxysmal atrial fibrillation:  -continue bb, ccb amiodarone.   -continue bb  -hold lovenox as INR therapeutic.     4 .COPD/ RSV/hronic respiratory failure.   -on nc 2-3L at [facility]  -continue symbicort, albuterol  -guafenisin    4. Hypothyroidism:  -synthroid.     5. HFpEF:  -continue to hold lasix for today.   -monitor closely for s/s of exacerbation.     6. h/o DVT/PE  -dc lovenox as inr therapeutic.   -start once inr<2.0  -dopplers negative here.    7. h/o CVA:  -continue statin  -INR therapeutic.     8. IDDM:  -lantus 7+ss  -    9. DVT px:  -inr therapeutic.   -start therapeutic lovenox once subtherapeutic.            c/c:   abdominal pain/n/v    Pt is a 71 y/o/f w/ PMH of DM2, CVA, Left sided hemiplegia, HTN, HLD, afib on warfarin, HFpEF, DVT/PE, COPD,   diverticulitis s/p Guillermina with SBR (2016), and subsequent colostomy reversal (2017),  hypothyroidism, obesity, OA,  multiple SBOs  in the past with large ventral hernia,  tentatively scheduled for surgery in february presented with L sided abdominal pain, nausea and vomiting. She is admitted to surgery with sbo. Tested positive for RSV.  Hospitalist service consulted for medical comanagement.        Medical progress: Patient notes to me that she often developed SBOs ( it is just matter of time). Denies any HA, CP, SOB. Episode of vomiting overnight. No fevers, chills or shakes.   Complaints: no new complaints  State of mind: normal  - Medicine team is signing off, please re - cosults /  care discussed with medical resident will reconsult if needed      Review of system  - All 10 systems reviewed and is as per HPI otherwise negative.       PHYSICAL EXAM:  T(C): 36.9 (13 Dec 2022 22:17), Max: 36.9 (13 Dec 2022 22:17)  T(F): 98.5 (13 Dec 2022 22:17), Max: 98.5 (13 Dec 2022 22:17)  HR: 86 (13 Dec 2022 22:17) (72 - 93)  BP: 168/74 (13 Dec 2022 22:17) (154/74 - 168/74)  RR: 18 (13 Dec 2022 22:17) (18 - 18)  SpO2: 93% (13 Dec 2022 22:17) (93% - 95%)  GENERAL: Comfortable, obese, pleasant, no acute distress  HEAD:  Atraumatic, Normocephalic  EYES: EOMI, PERRLA  HEENT: Moist mucous membranes  NECK: Supple, No JVD  NERVOUS SYSTEM:  Alert & Oriented X3, Left sided hemiplegia.   CHEST/LUNG: Clear to auscultation bilaterally  HEART: Regular rate and rhythm;   ABDOMEN: Soft, large ventral hernia, bs+, non tender  GENITOURINARY- Voiding, no palpable bladder  EXTREMITIES:  No clubbing, cyanosis, or edema  MUSCULOSKELETAL- No muscle tenderness, No joint tenderness  SKIN-no rash    LABS:                        10.7   5.18  )-----------( 251      ( 12 Dec 2022 11:55 )             35.1     12-12    145  |  113<H>  |  13  ----------------------------<  122<H>  3.6   |  28  |  0.58    Ca    8.7      12 Dec 2022 11:55  Phos  2.4     12-12  Mg     2.1     12-12    TPro  6.1  /  Alb  2.5<L>  /  TBili  0.3  /  DBili  x   /  AST  21  /  ALT  36  /  AlkPhos  44  12-12    PT/INR - ( 12 Dec 2022 08:27 )   PT: 24.5 sec;   INR: 2.10 ratio         PTT - ( 11 Dec 2022 08:00 )  PTT:36.1 sec  Urinalysis Basic - ( 10 Dec 2022 20:02 )    Color: Yellow / Appearance: very cloudy / S.020 / pH: x  Gluc: x / Ketone: Moderate  / Bili: Negative / Urobili: Negative   Blood: x / Protein: 100 / Nitrite: Positive   Leuk Esterase: Moderate / RBC: 11-25 /HPF / WBC >50 /HPF   Sq Epi: x / Non Sq Epi: Occasional / Bacteria: TNTC        MEDS  acetaminophen   IVPB .. 1000 milliGRAM(s) IV Intermittent Once PRN  albuterol    90 MICROgram(s) HFA Inhaler 2 Puff(s) Inhalation every 6 hours PRN  aMIOdarone    Tablet 200 milliGRAM(s) Oral daily  atorvastatin 20 milliGRAM(s) Oral at bedtime  budesonide 160 MICROgram(s)/formoterol 4.5 MICROgram(s) Inhaler 2 Puff(s) Inhalation two times a day  dextrose 5%. 1000 milliLiter(s) IV Continuous <Continuous>  dextrose 5%. 1000 milliLiter(s) IV Continuous <Continuous>  dextrose 50% Injectable 25 Gram(s) IV Push once  dextrose 50% Injectable 12.5 Gram(s) IV Push once  dextrose 50% Injectable 25 Gram(s) IV Push once  dextrose Oral Gel 15 Gram(s) Oral once PRN  diltiazem    milliGRAM(s) Oral daily  DULoxetine 60 milliGRAM(s) Oral daily  glucagon  Injectable 1 milliGRAM(s) IntraMuscular once  hydrALAZINE Injectable 10 milliGRAM(s) IV Push every 6 hours PRN  insulin glargine Injectable (LANTUS) 15 Unit(s) SubCutaneous at bedtime  insulin lispro (ADMELOG) corrective regimen sliding scale   SubCutaneous three times a day before meals  ketorolac   Injectable 30 milliGRAM(s) IV Push every 6 hours PRN  levothyroxine Injectable 66 MICROGram(s) IV Push at bedtime  methocarbamol 750 milliGRAM(s) Oral every 6 hours PRN  metoprolol tartrate 25 milliGRAM(s) Oral two times a day  metoprolol tartrate Injectable 5 milliGRAM(s) IV Push every 6 hours  morphine  - Injectable 2 milliGRAM(s) IV Push every 4 hours PRN  ondansetron Injectable 4 milliGRAM(s) IV Push every 6 hours PRN  oxybutynin 5 milliGRAM(s) Oral two times a day  pantoprazole  Injectable 40 milliGRAM(s) IV Push daily  piperacillin/tazobactam IVPB.. 3.375 Gram(s) IV Intermittent Once  pregabalin 100 milliGRAM(s) Oral three times a day  sodium chloride 0.9%. 1000 milliLiter(s) IV Continuous <Continuous>    ASSESSMENT AND PLAN:  72f PMH as above a/w    1. SBO/Large ventral hernia:  -seems to be improving.   -d/w surgery, ok to start clears.   -dc ivf when tolerating po.     2. UTI  -not on abx.   -start rocephin.   -f/u urine cx.     3. Paroxysmal atrial fibrillation:  -continue bb, ccb amiodarone.   -continue bb  -hold lovenox as INR therapeutic.     4 .COPD/ RSV/hronic respiratory failure.   -on nc 2-3L at   -continue symbicort, albuterol  -guafenisin    4. Hypothyroidism:  -synthroid.     5. HFpEF:  -continue to hold lasix for today.   -monitor closely for s/s of exacerbation.     6. h/o DVT/PE  -dc lovenox as inr therapeutic.   -start once inr<2.0  -dopplers negative here.    7. h/o CVA:  -continue statin  -INR therapeutic.     8. IDDM:  -lantus 7+ss  -    9. DVT px:  -inr therapeutic.   -start therapeutic lovenox once subtherapeutic.            c/c:   abdominal pain/n/v    Pt is a 71 y/o/f w/ PMH of DM2, CVA, Left sided hemiplegia, HTN, HLD, afib on warfarin, HFpEF, DVT/PE, COPD,   diverticulitis s/p Guillermina with SBR (2016), and subsequent colostomy reversal (2017),  hypothyroidism, obesity, OA,  multiple SBOs  in the past with large ventral hernia,  tentatively scheduled for surgery in february presented with L sided abdominal pain, nausea and vomiting. She is admitted to surgery with sbo. Tested positive for RSV.  Hospitalist service consulted for medical comanagement.        Medical progress: Patient notes to me that she often developed SBOs ( it is just matter of time). Denies any HA, CP, SOB. Episode of vomiting overnight. No fevers, chills or shakes.   Complaints: no new complaints  State of mind: normal  - Medicine team is signing off, please re - cosults /  care discussed with medical resident will reconsult if needed. Care discussed with Cardiology      Review of system  - All 10 systems reviewed and is as per HPI otherwise negative.       PHYSICAL EXAM:  T(C): 36.9 (13 Dec 2022 22:17), Max: 36.9 (13 Dec 2022 22:17)  T(F): 98.5 (13 Dec 2022 22:17), Max: 98.5 (13 Dec 2022 22:17)  HR: 86 (13 Dec 2022 22:17) (72 - 93)  BP: 168/74 (13 Dec 2022 22:17) (154/74 - 168/74)  RR: 18 (13 Dec 2022 22:17) (18 - 18)  SpO2: 93% (13 Dec 2022 22:17) (93% - 95%)  GENERAL: Comfortable, obese, pleasant, no acute distress  HEAD:  Atraumatic, Normocephalic  EYES: EOMI, PERRLA  HEENT: Moist mucous membranes  NECK: Supple, No JVD  NERVOUS SYSTEM:  Alert & Oriented X3, Left sided hemiplegia.   CHEST/LUNG: mild diffuse wheezing  HEART: Regular rate and rhythm;   ABDOMEN: Soft, large ventral hernia, bs+, non tender  GENITOURINARY- Voiding, no palpable bladder  EXTREMITIES:  No clubbing, cyanosis, or edema  MUSCULOSKELETAL- No muscle tenderness, No joint tenderness  SKIN-no rash    LABS:                        10.7   5.18  )-----------( 251      ( 12 Dec 2022 11:55 )             35.1     12-12    145  |  113<H>  |  13  ----------------------------<  122<H>  3.6   |  28  |  0.58    Ca    8.7      12 Dec 2022 11:55  Phos  2.4     12-12  Mg     2.1     12-12    TPro  6.1  /  Alb  2.5<L>  /  TBili  0.3  /  DBili  x   /  AST  21  /  ALT  36  /  AlkPhos  44  12-12    PT/INR - ( 12 Dec 2022 08:27 )   PT: 24.5 sec;   INR: 2.10 ratio         PTT - ( 11 Dec 2022 08:00 )  PTT:36.1 sec  Urinalysis Basic - ( 10 Dec 2022 20:02 )    Color: Yellow / Appearance: very cloudy / S.020 / pH: x  Gluc: x / Ketone: Moderate  / Bili: Negative / Urobili: Negative   Blood: x / Protein: 100 / Nitrite: Positive   Leuk Esterase: Moderate / RBC: 11-25 /HPF / WBC >50 /HPF   Sq Epi: x / Non Sq Epi: Occasional / Bacteria: TNTC        MEDS  acetaminophen   IVPB .. 1000 milliGRAM(s) IV Intermittent Once PRN  albuterol    90 MICROgram(s) HFA Inhaler 2 Puff(s) Inhalation every 6 hours PRN  aMIOdarone    Tablet 200 milliGRAM(s) Oral daily  atorvastatin 20 milliGRAM(s) Oral at bedtime  budesonide 160 MICROgram(s)/formoterol 4.5 MICROgram(s) Inhaler 2 Puff(s) Inhalation two times a day  dextrose 5%. 1000 milliLiter(s) IV Continuous <Continuous>  dextrose 5%. 1000 milliLiter(s) IV Continuous <Continuous>  dextrose 50% Injectable 25 Gram(s) IV Push once  dextrose 50% Injectable 12.5 Gram(s) IV Push once  dextrose 50% Injectable 25 Gram(s) IV Push once  dextrose Oral Gel 15 Gram(s) Oral once PRN  diltiazem    milliGRAM(s) Oral daily  DULoxetine 60 milliGRAM(s) Oral daily  glucagon  Injectable 1 milliGRAM(s) IntraMuscular once  hydrALAZINE Injectable 10 milliGRAM(s) IV Push every 6 hours PRN  insulin glargine Injectable (LANTUS) 15 Unit(s) SubCutaneous at bedtime  insulin lispro (ADMELOG) corrective regimen sliding scale   SubCutaneous three times a day before meals  ketorolac   Injectable 30 milliGRAM(s) IV Push every 6 hours PRN  levothyroxine Injectable 66 MICROGram(s) IV Push at bedtime  methocarbamol 750 milliGRAM(s) Oral every 6 hours PRN  metoprolol tartrate 25 milliGRAM(s) Oral two times a day  metoprolol tartrate Injectable 5 milliGRAM(s) IV Push every 6 hours  morphine  - Injectable 2 milliGRAM(s) IV Push every 4 hours PRN  ondansetron Injectable 4 milliGRAM(s) IV Push every 6 hours PRN  oxybutynin 5 milliGRAM(s) Oral two times a day  pantoprazole  Injectable 40 milliGRAM(s) IV Push daily  piperacillin/tazobactam IVPB.. 3.375 Gram(s) IV Intermittent Once  pregabalin 100 milliGRAM(s) Oral three times a day  sodium chloride 0.9%. 1000 milliLiter(s) IV Continuous <Continuous>    ASSESSMENT AND PLAN:  72f PMH as above a/w    1. SBO/Large ventral hernia:  -seems to be improving.   -d/w surgery, ok to start clears.   -dc ivf when tolerating po.     2. UTI  - full course of antibiotics  - urine cultures no growth    3. Paroxysmal atrial fibrillation:  -continue bb, ccb amiodarone.   -continue bb  -hold lovenox as INR therapeutic.   - care discussed with Dr. Jamison    4 .COPD/ RSV/hronic respiratory failure.   -[facility]L [facility]  -continue symbicort, albuterol  -guafenisin    4. Hypothyroidism:  -synthroid.     5. HFpEF:  -continue to hold lasix for today.   -monitor closely for s/s of exacerbation.     6. h/o DVT/PE  - on coumadin  - therapeutic INR    7. h/o CVA:  - continue statin  - INR therapeutic.     8. IDDM:  -lantus 7+ss  -    9. DVT px:  -inr therapeutic.

## 2022-12-14 NOTE — PROGRESS NOTE ADULT - ASSESSMENT
73 yo female with h/o afib on warfarin,[id number] Score: 7, diverticulitis s/p Guillermina with SBR (2016), and subsequent colostomy reversal (2017), DM, CVA with residual L sided hemiplegia, COPD, HTN, DVT, PE (1 yr ago), hypothyroidism, obesity, OA, HLD, ventral hernia and multiple SBO in the past presents with L sided abdominal pain on 12- which started on 12-9-2022.  Pt was dx with SBO. which is now resolving.  Pt is starting on clear liquids today.  Pt has high thrombosis risk and requires anticoagulation treatment dose.       Plan:  :pt cleared to resume coumadin  : Increase dose to 3mg po daily adjust with INR  :le venodynes  :daily cbc/bmp, pt/inr  :will f/u  : Dispo:rehab

## 2022-12-14 NOTE — PROGRESS NOTE ADULT - SUBJECTIVE AND OBJECTIVE BOX
SURGERY DAILY PROGRESS NOTE:     Subjective:  Patient seen and examined at bedside during am rounds.Nausea and vomiting with regular diet, Advised patient to take it slow. Continues passing flatus and having BMs. AVSS. Denies any fevers, chills, n/v/d, chest pain or shortness of breath    Objective:    Vitals:  T(C): 36.9 (12-13 @ 22:17), Max: 36.9 (12-13 @ 22:17)  HR: 86 (12-13 @ 22:17) (74 - 93)  BP: 168/74 (12-13 @ 22:17) (154/74 - 168/74)  RR: 18 (12-13 @ 22:17) (18 - 18)  SpO2: 93% (12-13 @ 22:17) (93% - 95%)      Physical Exam:  General: AAOx3, Well developed, NAD  Chest: Normal respiratory effort  Heart: RRR  Abdomen: Large diastasis, thin abdominal wall, nontender  Neuro/Psych: No localized deficits. Normal speech, normal tone  Skin: Normal, no rashes, no lesions noted.   Extremities: Warm, well perfused, no edema, Pulses intact    12-14 @ 08:20                    10.9  CBC: 6.84>)-------(<271                     35.3                 145 | 113 | 14    CMP:  ----------------------< 178               3.6 | 27 | 0.72                      Ca:8.5  Phos:2.4  Mg:-               -|      |-        LFTs:  ------|-|-----             -|      |-  12-13 @ 08:29                    10.6  CBC: 5.98>)-------(<258                     34.8                 143 | 111 | 17    CMP:  ----------------------< 144               3.3 | 27 | 0.77                      Ca:8.3  Phos:-  Mg:-               -|      |-        LFTs:  ------|-|-----             -|      |-      Culture - Urine (collected 12-12-22 @ 06:50)  Source: Clean Catch Clean Catch (Midstream)  Final Report (12-13-22 @ 23:21):    <10,000 CFU/mL Normal Urogenital Genesis      Current Inpatient Medications:  acetaminophen   IVPB .. 1000 milliGRAM(s) IV Intermittent Once PRN  albuterol    90 MICROgram(s) HFA Inhaler 2 Puff(s) Inhalation every 6 hours PRN  aMIOdarone    Tablet 200 milliGRAM(s) Oral daily  atorvastatin 20 milliGRAM(s) Oral at bedtime  budesonide 160 MICROgram(s)/formoterol 4.5 MICROgram(s) Inhaler 2 Puff(s) Inhalation two times a day  dextrose 5% + sodium chloride 0.45%. 1000 milliLiter(s) (75 mL/Hr) IV Continuous <Continuous>  dextrose 5%. 1000 milliLiter(s) (100 mL/Hr) IV Continuous <Continuous>  dextrose 5%. 1000 milliLiter(s) (50 mL/Hr) IV Continuous <Continuous>  dextrose 50% Injectable 25 Gram(s) IV Push once  dextrose 50% Injectable 12.5 Gram(s) IV Push once  dextrose 50% Injectable 25 Gram(s) IV Push once  dextrose Oral Gel 15 Gram(s) Oral once PRN  diltiazem    milliGRAM(s) Oral daily  DULoxetine 60 milliGRAM(s) Oral daily  glucagon  Injectable 1 milliGRAM(s) IntraMuscular once  guaifenesin/dextromethorphan Oral Liquid 5 milliLiter(s) Oral four times a day  hydrALAZINE Injectable 10 milliGRAM(s) IV Push every 6 hours PRN  insulin glargine Injectable (LANTUS) 7 Unit(s) SubCutaneous at bedtime  insulin lispro (ADMELOG) corrective regimen sliding scale   SubCutaneous three times a day before meals  ketorolac   Injectable 30 milliGRAM(s) IV Push every 6 hours PRN  levothyroxine Injectable 66 MICROGram(s) IV Push at bedtime  methocarbamol 750 milliGRAM(s) Oral every 6 hours PRN  metoprolol tartrate 25 milliGRAM(s) Oral two times a day  morphine  - Injectable 2 milliGRAM(s) IV Push every 4 hours PRN  ondansetron Injectable 4 milliGRAM(s) IV Push every 6 hours PRN  oxybutynin 5 milliGRAM(s) Oral two times a day  pantoprazole  Injectable 40 milliGRAM(s) IV Push daily  pregabalin 100 milliGRAM(s) Oral three times a day  warfarin 3 milliGRAM(s) Oral daily

## 2022-12-14 NOTE — CONSULT NOTE ADULT - SUBJECTIVE AND OBJECTIVE BOX
c/c: abdominal pain/n/v    HPI:  71 yo female with h/o DM2, CVA, Left sided hemiplegia, HTN, HLD, afib on warfarin, HFpEF, DVT/PE, COPD,   diverticulitis s/p Guillermina with SBR (2016), and subsequent colostomy reversal (2017),  hypothyroidism, obesity, OA,  multiple SBOs  in the past with large ventral hernia,  tentatively scheduled for surgery in february presented with L sided abdominal pain, nausea and vomiting. She is admitted to surgery with sbo. Tested positive for RSV.  Hospitalist service consulted for medical comanagement.   Pt seen and examined this am on 3N. Felt well. No further abd pain. no n/v. no bm . no flatus yet. NGT dislodged this am.   No sob/chest pain. +cough. No f/c/r.     Review of system- All 10 systems reviewed and is as per HPI otherwise negative.       VITALS  T(C): 37.2 (22 @ 09:25), Max: 37.6 (12-10-22 @ 23:43)  HR: 79 (22 @ 09:25) (79 - 92)  BP: 132/76 (22 @ 09:25) (126/61 - 161/59)  RR: 18 (22 @ 09:25) (16 - 18)  SpO2: 91% (22 @ 09:25) (91% - 99%)    PHYSICAL EXAM:    GENERAL: Comfortable, obese, pleasant, no acute distress  HEAD:  Atraumatic, Normocephalic  EYES: EOMI, PERRLA  HEENT: Moist mucous membranes  NECK: Supple, No JVD  NERVOUS SYSTEM:  Alert & Oriented X3, Left sided hemiplegia.   CHEST/LUNG: Clear to auscultation bilaterally  HEART: Regular rate and rhythm;   ABDOMEN: Soft, large ventral hernia, hypoactive bs, non tender  GENITOURINARY- Voiding, no palpable bladder  EXTREMITIES:  No clubbing, cyanosis, or edema  MUSCULOSKELETAL- No muscle tenderness, No joint tenderness  SKIN-no rash        LABS:                        11.0   7.44  )-----------( 281      ( 11 Dec 2022 08:00 )             35.5         143  |  110<H>  |  17  ----------------------------<  146<H>  3.7   |  25  |  0.75    Ca    8.5      11 Dec 2022 08:00  Phos  2.7     12-  Mg     2.2     12-11    TPro  8.0  /  Alb  3.3  /  TBili  0.3  /  DBili  x   /  AST  22  /  ALT  45  /  AlkPhos  71  12-10    PT/INR - ( 11 Dec 2022 08:00 )   PT: 24.7 sec;   INR: 2.11 ratio         PTT - ( 11 Dec 2022 08:00 )  PTT:36.1 sec  Urinalysis Basic - ( 10 Dec 2022 20:02 )    Color: Yellow / Appearance: very cloudy / S.020 / pH: x  Gluc: x / Ketone: Moderate  / Bili: Negative / Urobili: Negative   Blood: x / Protein: 100 / Nitrite: Positive   Leuk Esterase: Moderate / RBC: 11-25 /HPF / WBC >50 /HPF   Sq Epi: x / Non Sq Epi: Occasional / Bacteria: TNTC            MEDS  acetaminophen   IVPB .. 1000 milliGRAM(s) IV Intermittent Once PRN  albuterol    90 MICROgram(s) HFA Inhaler 2 Puff(s) Inhalation every 6 hours PRN  aMIOdarone    Tablet 200 milliGRAM(s) Oral daily  atorvastatin 20 milliGRAM(s) Oral at bedtime  budesonide 160 MICROgram(s)/formoterol 4.5 MICROgram(s) Inhaler 2 Puff(s) Inhalation two times a day  dextrose 5%. 1000 milliLiter(s) IV Continuous <Continuous>  dextrose 5%. 1000 milliLiter(s) IV Continuous <Continuous>  dextrose 50% Injectable 25 Gram(s) IV Push once  dextrose 50% Injectable 12.5 Gram(s) IV Push once  dextrose 50% Injectable 25 Gram(s) IV Push once  dextrose Oral Gel 15 Gram(s) Oral once PRN  diltiazem    milliGRAM(s) Oral daily  DULoxetine 60 milliGRAM(s) Oral daily  glucagon  Injectable 1 milliGRAM(s) IntraMuscular once  hydrALAZINE Injectable 10 milliGRAM(s) IV Push every 6 hours PRN  insulin glargine Injectable (LANTUS) 15 Unit(s) SubCutaneous at bedtime  insulin lispro (ADMELOG) corrective regimen sliding scale   SubCutaneous three times a day before meals  ketorolac   Injectable 30 milliGRAM(s) IV Push every 6 hours PRN  levothyroxine Injectable 66 MICROGram(s) IV Push at bedtime  methocarbamol 750 milliGRAM(s) Oral every 6 hours PRN  metoprolol tartrate 25 milliGRAM(s) Oral two times a day  metoprolol tartrate Injectable 5 milliGRAM(s) IV Push every 6 hours  morphine  - Injectable 2 milliGRAM(s) IV Push every 4 hours PRN  ondansetron Injectable 4 milliGRAM(s) IV Push every 6 hours PRN  oxybutynin 5 milliGRAM(s) Oral two times a day  pantoprazole  Injectable 40 milliGRAM(s) IV Push daily  piperacillin/tazobactam IVPB.. 3.375 Gram(s) IV Intermittent Once  pregabalin 100 milliGRAM(s) Oral three times a day  sodium chloride 0.9%. 1000 milliLiter(s) IV Continuous <Continuous>    ASSESSMENT AND PLAN:  72f PMH as above a/w    1. SBO/Large ventral hernia:  -NPO  -IVF  -reinsert NGT if n/v.  -mx per surgery.     2. UTI  -agree with abx.   -f/u cultures.     3. Paroxysmal atrial fibrillation:  -continue bb, ccb amiodarone.   -pt on iv and po bb, will dc iv  -dc lovenox as INR is therapeutic.     4 .COPD/ RSV  -PRN o2  -continue symbicort, albuterol  -start guaifenesin    4. Hypothyroidism:  -synthroid.     5. HFpEF:  -hold lasix while npo.   -monitor closely for s/s of exacerbation.     6. h/o DVT/PE  -dc lovenox as inr therapeutic.   -start once inr<2.0  -dopplers negative here.    7. h/o CVA:  -continue statin  -INR therapeutic.     8. IDDM:  -takes lantus 10 as outpt  -here ordered for 15, will decrease dose to 7 while npo.   -continue ss.    9. DVT px:  -inr therapeutic.   -start therapeutic lovenox once subtherapeutic.       d/w pt, rn.          
71 yo female with h/o afib on warfarin, diverticulitis s/p Guillermina with SBR (2016), and subsequent colostomy reversal (2017), DM, CVA with residual L sided hemiplegia, COPD, HTN, DV, PE (1 yr ago), hypothyroidism, obesity, OA, HLD, ventral hernia and multiple SBO in the past presents with L sided abdominal pain. Acc ot the patient, the pain started yesterday evening, sudden onset, sharp in nature, associated with nausea and 7 episodes of vomiting since yesterday. She denies fever. Her last BM was last night and she passed flatus this AM. She mentions her pain is similar to her previous episodes of SBO. She also mentions she has had pain in her R groin due to a ventral hernia for the past 6 months that is less severe in intensity. She is aware she is high risk for surgery and has been follownig up with a cardiologist and pulmonologist to obtain clearance for an elective hernia repair that she has had for the last 3 years.   She is bedbound and has not walked since her CVA 3 years ago when the rehab and PT was stopped at the nursing home and she stopped making progress with her extremities.     Vital Signs Last 24 Hrs  T(C): 37.6 (10 Dec 2022 13:41), Max: 37.6 (10 Dec 2022 13:41)  T(F): 99.6 (10 Dec 2022 13:41), Max: 99.6 (10 Dec 2022 13:41)  HR: 97 (10 Dec 2022 13:41) (97 - 97)  BP: 131/51 (10 Dec 2022 13:41) (131/51 - 131/51)  BP(mean): --  RR: 20 (10 Dec 2022 13:41) (20 - 20)  SpO2: 95% (10 Dec 2022 13:41) (95% - 95%)    Parameters below as of 10 Dec 2022 13:41  Patient On (Oxygen Delivery Method): room air    Physical Exam:  Pt is AAOx3  General: Well developed, in no acute distress.   Chest: Lungs clear, no rales, no rhonchi, no wheezes.   Heart: RR, no murmurs, no rubs, no gallops.   Abdomen: Soft, distended, mildl diffuse tenderness L> R. Hernia appreciated a the L periumbilical hernia with thinning of the overlying skin and palpable bowel.    Neuro: L Left hemiplegia, R sided motor strength 10/10                          12.5   9.12  )-----------( 341      ( 10 Dec 2022 14:39 )             39.6   12-10    141  |  107  |  18  ----------------------------<  164<H>  4.0   |  29  |  0.85    Ca    9.2      10 Dec 2022 14:39    TPro  8.0  /  Alb  3.3  /  TBili  0.3  /  DBili  x   /  AST  22  /  ALT  45  /  AlkPhos  71  12-10  
  CHIEF COMPLAINT: Patient is a 72y old  Female who presents with a chief complaint of SBO (14 Dec 2022 07:49)      HPI:  71 yo female with h/o afib on warfarin, diverticulitis s/p Guillermina with SBR (2016), and subsequent colostomy reversal (2017), DM, CVA with residual L sided hemiplegia, COPD, HTN, DV, PE (1 yr ago), hypothyroidism, obesity, OA, HLD, ventral hernia and multiple SBO in the past presents with L sided abdominal pain. Acc ot the patient, the pain started yesterday evening, sudden onset, sharp in nature, associated with nausea and 7 episodes of vomiting since yesterday. She denies fever. Her last BM was last night and she passed flatus this AM. She mentions her pain is similar to her previous episodes of SBO. She also mentions she has had pain in her R groin due to a ventral hernia for the past 6 months that is less severe in intensity. She is aware she is high risk for surgery and has been follownig up with a cardiologist and pulmonologist to obtain clearance for an elective hernia repair that she has had for the last 3 years.   She is bedbound and has not walked since her CVA 3 years ago when the rehab and PT was stopped at the nursing home and she stopped making progress with her extremities.  (10 Dec 2022 20:51)    -patient in with SBO, not taking PO meds at this time and history of afib/CVA.   Last seen by me 1 year ago-for medical management of AFib while in for SBO.   Was changed to amiodarone at that time and has been maintained on 200mg since then.  Admitted for SBO and in sinus rhythm at this time.   In addition, patient with rSV infection.  Currently well AC with sinus rhythm on telemetry and taking amiodarone.   BP running high and taking multiple other BP medications.   Last ECHO 1 year ago with normal LV function.   EKG with t-wave  inversions in inferior leads.  Had nausea and vomiting last night, may need medications IV.      PMHx: PAST MEDICAL & SURGICAL HISTORY:  Hypertension  Hypercholesteremia  COPD (chronic obstructive pulmonary disease)  C. difficile diarrhea  2016  Diverticulitis of intestine with perforation and abscess without bleeding, unspecified part of intestinal tract  PE (pulmonary thromboembolism)  2016  Palpitations  Obesity  Osteoarthritis  Anemia  Colostomy in place  History of atrial fibrillation  HTN (hypertension)      Diabetes mellitus      Cerebrovascular accident (CVA)      DVT of lower limb, acute      CVA (cerebral vascular accident)      COPD (chronic obstructive pulmonary disease)      Neuropathy      Diverticulitis      History of appendectomy      H/O:   x2      H/O ovarian cystectomy  b/l      Status post total replacement of both hips      H/O hemicolectomy  2016      History of colostomy reversal      History of colostomy reversal      S/P colostomy      S/P       S/P hip replacement            Soc Hx:       Allergies: Allergies    Cipro (Rash)  Cipro (Unknown)  Spiriva (Rash)  Spiriva (Unknown)    Intolerances          REVIEW OF SYSTEMS:    CONSTITUTIONAL:  weakness, fevers or chills  EYES/ENT: No visual changes;  No vertigo or throat pain   NECK: No pain or stiffness  RESPIRATORY: No cough, wheezing, hemoptysis; No shortness of breath  CARDIOVASCULAR: No chest pain or palpitations        Vital Signs Last 24 Hrs  T(C): 36.9 (13 Dec 2022 22:17), Max: 36.9 (13 Dec 2022 22:17)  T(F): 98.5 (13 Dec 2022 22:17), Max: 98.5 (13 Dec 2022 22:17)  HR: 86 (13 Dec 2022 22:17) (72 - 93)  BP: 168/74 (13 Dec 2022 22:17) (154/74 - 168/74)  BP(mean): --  RR: 18 (13 Dec 2022 22:17) (18 - 18)  SpO2: 93% (13 Dec 2022 22:17) (93% - 95%)    Parameters below as of 13 Dec 2022 22:17  Patient On (Oxygen Delivery Method): nasal cannula        I&O's Summary      CAPILLARY BLOOD GLUCOSE      POCT Blood Glucose.: 144 mg/dL (13 Dec 2022 21:18)  POCT Blood Glucose.: 96 mg/dL (13 Dec 2022 18:27)  POCT Blood Glucose.: 201 mg/dL (13 Dec 2022 12:58)      PHYSICAL EXAM:   Constitutional: NAD, awake and alert, well-developed  HEENT: PERR, EOMI, Normal Hearing, MMM  Neck:  JVD  Respiratory: Breath sounds are clear bilaterally, No wheezing, rales or rhonchi  Cardiovascular: S1 and S2, regular rate and rhythm, Murmurs, gallops or rubs  Gastrointestinal: Bowel Sounds present, tender, distended, guarding, no rebound      MEDICATIONS:  MEDICATIONS  (STANDING):  aMIOdarone    Tablet 200 milliGRAM(s) Oral daily  atorvastatin 20 milliGRAM(s) Oral at bedtime  budesonide 160 MICROgram(s)/formoterol 4.5 MICROgram(s) Inhaler 2 Puff(s) Inhalation two times a day  cefTRIAXone Injectable. 1000 milliGRAM(s) IV Push every 24 hours  dextrose 5% + sodium chloride 0.45%. 1000 milliLiter(s) (75 mL/Hr) IV Continuous <Continuous>  dextrose 5%. 1000 milliLiter(s) (100 mL/Hr) IV Continuous <Continuous>  dextrose 5%. 1000 milliLiter(s) (50 mL/Hr) IV Continuous <Continuous>  dextrose 50% Injectable 25 Gram(s) IV Push once  dextrose 50% Injectable 12.5 Gram(s) IV Push once  dextrose 50% Injectable 25 Gram(s) IV Push once  diltiazem    milliGRAM(s) Oral daily  DULoxetine 60 milliGRAM(s) Oral daily  glucagon  Injectable 1 milliGRAM(s) IntraMuscular once  guaifenesin/dextromethorphan Oral Liquid 5 milliLiter(s) Oral four times a day  insulin glargine Injectable (LANTUS) 7 Unit(s) SubCutaneous at bedtime  insulin lispro (ADMELOG) corrective regimen sliding scale   SubCutaneous three times a day before meals  levothyroxine Injectable 66 MICROGram(s) IV Push at bedtime  metoprolol tartrate 25 milliGRAM(s) Oral two times a day  oxybutynin 5 milliGRAM(s) Oral two times a day  pantoprazole  Injectable 40 milliGRAM(s) IV Push daily  piperacillin/tazobactam IVPB.. 3.375 Gram(s) IV Intermittent Once  pregabalin 100 milliGRAM(s) Oral three times a day  warfarin 2 milliGRAM(s) Oral daily      LABS: All Labs Reviewed:                        10.6     )-----------( 258      ( 13 Dec 2022 08:29 )             34.8     12-13    143  |  111<H>  |  17  ----------------------------<  144<H>  3.3<L>   |  27  |  0.77    Ca    8.3<L>      13 Dec 2022 08:29      PT/INR - ( 13 Dec 2022 09:56 )   PT: 24.9 sec;   INR: 2.13 ratio                 Blood Culture: Organism --  Gram Stain Blood -- Gram Stain --  Specimen Source Clean Catch Clean Catch (Midstream)  Culture-Blood --      lipid profile       RADIOLOGY:    EKG:  sinus rhythm, t inversion in II, III, aVF    Telemetry:  sinus rhtyhm,     ECHO:    
  HPI:    72 y.o.f with h/o afib on warfarin, diverticulitis s/p Guillermina with SBR (2016), and subsequent colostomy reversal (2017), DM, CVA with residual L sided hemiplegia, COPD, HTN, DV, PE (1 yr ago), hypothyroidism, obesity, OA, HLD, ventral hernia and multiple SBO in the past presents with L sided abdominal pain. Acc ot the patient, the pain was sudden onset, sharp in nature, associated with nausea and 7 episodes of vomiting. She denies fever. Her last BM was night before  and she passed flatus in the AM. Her pain was similar to her previous episodes of SBO. She also mentions she has had pain in her R groin due to a ventral hernia for the past 6 months that is less severe in intensity. She is aware she is high risk for surgery and has been follownig up with a cardiologist and pulmonologist to obtain clearance for an elective hernia repair that she has had for the last 3 years. She is bedbound and has not walked since her CVA 3 years ago when the rehab and PT was stopped at the nursing home and she stopped making progress with her extremities. Patient does often developed SBOs, pat followed up with surgery, pat seen for pulmonary eval for wheezing.      PAST MEDICAL & SURGICAL HISTORY:  Hypertension      Hypercholesteremia      COPD (chronic obstructive pulmonary disease)      C. difficile diarrhea  2016      Diverticulitis of intestine with perforation and abscess without bleeding, unspecified part of intestinal tract      PE (pulmonary thromboembolism)  2016      Palpitations      Obesity      Osteoarthritis      Anemia      Colostomy in place      History of atrial fibrillation      HTN (hypertension)      Diabetes mellitus      Cerebrovascular accident (CVA)      DVT of lower limb, acute      CVA (cerebral vascular accident)      COPD (chronic obstructive pulmonary disease)      Neuropathy      Diverticulitis      History of appendectomy      H/O:   x2      H/O ovarian cystectomy  b/l      Status post total replacement of both hips      H/O hemicolectomy  2016      History of colostomy reversal      History of colostomy reversal      S/P colostomy      S/P       S/P hip replacement          Home Medications:  Admelog SoloStar 100 units/mL injectable solution: 10 unit(s) injectable 3 times a day (before meals) (10 Dec 2022 21:52)  Albuterol (Eqv-ProAir HFA) 90 mcg/inh inhalation aerosol: 1 puff(s) inhaled every 6 hours, As Needed (10 Dec 2022 21:52)  amiodarone 200 mg oral tablet: 1 tab(s) orally once a day (10 Dec 2022 21:52)  ascorbic acid 500 mg oral tablet: 2 tab(s) orally once a day (10 Dec 2022 21:52)  atorvastatin 10 mg oral tablet: 1 tab(s) orally once a day (at bedtime) (10 Dec 2022 21:52)  cholecalciferol 1250 mcg (50,000 intl units) oral capsule: 1 cap(s) orally every 4 weeks on Wednesday  (10 Dec 2022 21:52)  Coumadin 1 mg oral tablet: 1 tab(s) orally once a day  ***take with Coumadin 2.5mg to equal a total dose of 3.5mg*** (10 Dec 2022 21:52)  Coumadin 2.5 mg oral tablet: 1 tab(s) orally once a day  ***take with Coumadin 1mg to equal a total dose of 3.5mg*** (10 Dec 2022 21:52)  Cranberry oral tablet: 1 tab(s) orally 2 times a day (10 Dec 2022 21:52)  cyanocobalamin 1000 mcg oral tablet: 1 tab(s) orally once a day (10 Dec 2022 21:52)  dilTIAZem 180 mg/24 hours oral capsule, extended release: 1 cap(s) orally once a day (10 Dec 2022 21:52)  DULoxetine 60 mg oral delayed release capsule: 1 cap(s) orally once a day (10 Dec 2022 21:52)  estrogens, Conjugated Cream 0.625 mg/gm: 1 application vaginal once a day on Monday &amp; Thursday for atrophic vaginitis (10 Dec 2022 21:52)  furosemide 20 mg oral tablet: 1 tab(s) orally once a day (10 Dec 2022 21:52)  glipiZIDE 10 mg oral tablet, extended release: 2 tab(s) orally once a day (10 Dec 2022 21:52)  GlycoLax oral powder for reconstitution: 17 gram(s) orally 2 times a day (10 Dec 2022 21:52)  guaiFENesin 100 mg/5 mL oral liquid: 10 milliliter(s) orally 4 times a day for 5 days (10 Dec 2022 21:52)  ipratropium-albuterol 20 mcg-100 mcg/inh inhalation aerosol: 1 puff(s) inhaled 4 times a day for 5 days (10 Dec 2022 21:52)  levothyroxine 88 mcg (0.088 mg) oral tablet: 1 tab(s) orally once a day (at bedtime) (10 Dec 2022 21:52)  losartan 25 mg oral tablet: 1 tab(s) orally once a day (10 Dec 2022 21:52)  metoprolol tartrate 25 mg oral tablet: 1 tab(s) orally 2 times a day (10 Dec 2022 21:52)  Milk of Magnesia 8% oral suspension: 30 milliliter(s) orally once a day, As Needed (10 Dec 2022 21:52)  oxyCODONE 5 mg oral tablet: 1 tab(s) orally every 4 hours, As Needed (10 Dec 2022 21:52)  pregabalin 100 mg oral capsule: 1 cap(s) orally 3 times a day (10 Dec 2022 21:52)  sennosides-docusate 8.6 mg-50 mg oral tablet: 2 tab(s) orally once a day (at bedtime) (10 Dec 2022 21:52)  Symbicort 160 mcg-4.5 mcg/inh inhalation aerosol: 2 puff(s) inhaled 2 times a day (10 Dec 2022 21:52)  Tradjenta 5 mg oral tablet: 1 tab(s) orally once a day (10 Dec 2022 21:52)  Tylenol 500 mg oral tablet: 2 tab(s) orally every 8 hours (10 Dec 2022 21:52)  Zofran 4 mg oral tablet: 1 tab(s) orally every 6 hours, As Needed  for 3 days (10 Dec 2022 21:52)      MEDICATIONS  (STANDING):  aMIOdarone    Tablet 200 milliGRAM(s) Oral daily  atorvastatin 20 milliGRAM(s) Oral at bedtime  budesonide 160 MICROgram(s)/formoterol 4.5 MICROgram(s) Inhaler 2 Puff(s) Inhalation two times a day  dextrose 5% + sodium chloride 0.45%. 1000 milliLiter(s) (75 mL/Hr) IV Continuous <Continuous>  dextrose 5%. 1000 milliLiter(s) (100 mL/Hr) IV Continuous <Continuous>  dextrose 5%. 1000 milliLiter(s) (50 mL/Hr) IV Continuous <Continuous>  dextrose 50% Injectable 25 Gram(s) IV Push once  dextrose 50% Injectable 12.5 Gram(s) IV Push once  dextrose 50% Injectable 25 Gram(s) IV Push once  diltiazem    milliGRAM(s) Oral daily  DULoxetine 60 milliGRAM(s) Oral daily  glucagon  Injectable 1 milliGRAM(s) IntraMuscular once  guaifenesin/dextromethorphan Oral Liquid 5 milliLiter(s) Oral four times a day  insulin glargine Injectable (LANTUS) 7 Unit(s) SubCutaneous at bedtime  insulin lispro (ADMELOG) corrective regimen sliding scale   SubCutaneous three times a day before meals  levothyroxine Injectable 66 MICROGram(s) IV Push at bedtime  metoprolol tartrate 25 milliGRAM(s) Oral two times a day  oxybutynin 5 milliGRAM(s) Oral two times a day  pantoprazole  Injectable 40 milliGRAM(s) IV Push daily  pregabalin 100 milliGRAM(s) Oral three times a day  warfarin 3 milliGRAM(s) Oral daily    MEDICATIONS  (PRN):  acetaminophen   IVPB .. 1000 milliGRAM(s) IV Intermittent Once PRN Mild Pain (1 - 3)  albuterol    90 MICROgram(s) HFA Inhaler 2 Puff(s) Inhalation every 6 hours PRN Shortness of Breath and/or Wheezing  dextrose Oral Gel 15 Gram(s) Oral once PRN Blood Glucose LESS THAN 70 milliGRAM(s)/deciliter  hydrALAZINE Injectable 10 milliGRAM(s) IV Push every 6 hours PRN if SBP > 145  ketorolac   Injectable 30 milliGRAM(s) IV Push every 6 hours PRN Moderate Pain (4 - 6)  methocarbamol 750 milliGRAM(s) Oral every 6 hours PRN Muscle Spasm  morphine  - Injectable 2 milliGRAM(s) IV Push every 4 hours PRN Severe Pain (7 - 10)  ondansetron Injectable 4 milliGRAM(s) IV Push every 6 hours PRN Nausea      Allergies    Cipro (Rash)  Cipro (Unknown)  Spiriva (Rash)  Spiriva (Unknown)    Intolerances        SOCIAL HISTORY: Denies tobacco, etoh abuse or illicit drug use    FAMILY HISTORY:  Family history of COPD (chronic obstructive pulmonary disease)    Family history of chronic kidney disease    Family history of hiatal hernia (Sibling)        Vital Signs Last 24 Hrs  T(C): 36.9 (13 Dec 2022 22:17), Max: 36.9 (13 Dec 2022 22:17)  T(F): 98.5 (13 Dec 2022 22:17), Max: 98.5 (13 Dec 2022 22:17)  HR: 86 (13 Dec 2022 22:17) (74 - 93)  BP: 156/68 (14 Dec 2022 11:09) (154/74 - 168/74)  BP(mean): --  RR: 18 (13 Dec 2022 22:17) (18 - 18)  SpO2: 93% (13 Dec 2022 22:17) (93% - 95%)    Parameters below as of 13 Dec 2022 22:17  Patient On (Oxygen Delivery Method): nasal cannula            REVIEW OF SYSTEMS:    CONSTITUTIONAL:  As per HPI.  HEENT:  Eyes:  No diplopia or blurred vision. ENT:  No earache, sore throat or runny nose.  CARDIOVASCULAR:  No pressure, squeezing, tightness, heaviness or aching about the chest, neck, axilla or epigastrium.  RESPIRATORY:  No cough, +shortness of breath, PND or orthopnea.  GASTROINTESTINAL:  No nausea, vomiting or diarrhea.  GENITOURINARY:  No dysuria, frequency or urgency.  MUSCULOSKELETAL:  As per HPI.  SKIN:  No change in skin, hair or nails.  NEUROLOGIC:  No paresthesias, fasciculations, seizures or weakness.  PSYCHIATRIC:  No disorder of thought or mood.  ENDOCRINE:  No heat or cold intolerance, polyuria or polydipsia.  HEMATOLOGICAL:  No easy bruising or bleedings:  .     PHYSICAL EXAMINATION:    GENERAL APPEARANCE:  Pt. is not currently dyspneic, in no distress. Pt. is alert, oriented, and pleasant.  HEENT:  Pupils are normal and react normally. No icterus. Mucous membranes well colored.  NECK:  Supple. No lymphadenopathy. Jugular venous pressure not elevated. Carotids equal.   HEART:   The cardiac impulse has a normal quality. Regular. Normal S1 and S2. There are no murmurs, rubs or gallops noted  CHEST:  Chest rhonchi to auscultation. Normal respiratory effort.  ABDOMEN:  Soft and nontender.   EXTREMITIES:  There is no cyanosis, clubbing or edema.   SKIN:  No rash or significant lesions are noted.    LABS:                        10.9   6.84  )-----------( 271      ( 14 Dec 2022 08:20 )             35.3     12-14    145  |  113<H>  |  14  ----------------------------<  178<H>  3.6   |  27  |  0.72    Ca    8.5      14 Dec 2022 08:20  Phos  2.4     12-14        PT/INR - ( 14 Dec 2022 08:20 )   PT: 23.9 sec;   INR: 2.05 ratio         PTT - ( 14 Dec 2022 08:20 )  PTT:30.5 sec              RADIOLOGY & ADDITIONAL STUDIES:     Xray Chest 1 View- PORTABLE-Urgent (Xray Chest 1 View- PORTABLE-Urgent .) (12.10.22 @ 23:45) >  Single view of the chest is compared to 8 hours prior and demonstrates an   NG tube with the tip in the stomach fundus, new.    Cardiac silhouette and pulmonary vasculature are within normal limits   with no consolidation, effusion, pneumothorax or acute osseous finding.    IMPRESSION:  NG tube with the tip inthe stomach fundus, new      US Duplex Venous Lower Ext Complete, Bilateral (12.10.22 @ 21:49) >  No evidence of deep venous thrombosis in either lower extremity.  Limited assessment of the calf veins.    CT Abdomen and Pelvis w/ Oral Cont and w/ IV Cont (12.10.22 @ 19:47) >  IMPRESSION:  Small bowel obstruction, transition point at left side of large complex   ventral hernia.    
HPI:  71 yo female with h/o afib on warfarin, diverticulitis s/p Guillermina with SBR (2016), and subsequent colostomy reversal (2017), DM, CVA with residual L sided hemiplegia, COPD, HTN, DVT, PE (1 yr ago), hypothyroidism, obesity, OA, HLD, ventral hernia and multiple SBO in the past presents with L sided abdominal pain. Acc ot the patient, the pain started yesterday evening, sudden onset, sharp in nature, associated with nausea and 7 episodes of vomiting since yesterday. She denies fever. Her last BM was last night and she passed flatus this AM. She mentions her pain is similar to her previous episodes of SBO. She also mentions she has had pain in her R groin due to a ventral hernia for the past 6 months that is less severe in intensity. She is aware she is high risk for surgery and has been follownig up with a cardiologist and pulmonologist to obtain clearance for an elective hernia repair that she has had for the last 3 years.   She is bedbound and has not walked since her CVA 3 years ago when the rehab and PT was stopped at the nursing home and she stopped making progress with her extremities.  (10 Dec 2022 20:51)      Patient is a 72y old  Female who presents with a chief complaint of SBO (12 Dec 2022 13:41)      Consulted by Dr. Magdalena Matthews for VTE prophylaxis, risk stratification, and anticoagulation management.    PAST MEDICAL & SURGICAL HISTORY:  Hypertension      Hypercholesteremia      COPD (chronic obstructive pulmonary disease)      C. difficile diarrhea  2016      Diverticulitis of intestine with perforation and abscess without bleeding, unspecified part of intestinal tract      PE (pulmonary thromboembolism)  2016      Palpitations      Obesity      Osteoarthritis      Anemia      Colostomy in place      History of atrial fibrillation on coumadin      HTN (hypertension)      Diabetes mellitus      Cerebrovascular accident (CVA)      DVT of lower limb      CVA (cerebral vascular accident)      COPD (chronic obstructive pulmonary disease)      Neuropathy      Diverticulitis      History of appendectomy      H/O:   x2      H/O ovarian cystectomy  b/l      Status post total replacement of both hips      H/O hemicolectomy  2016      History of colostomy reversal      History of colostomy reversal      S/P colostomy      S/P       S/P hip replacement          FAMILY HISTORY:  Family history of COPD (chronic obstructive pulmonary disease)    Family history of chronic kidney disease    Family history of hiatal hernia (Sibling)        Interval Note:  2022:  Pt seen at bedside on [facility].  Discussed her either going on Lovenox tx dose if she is still npo tomorrow or resuming her coumadin if she is allowed po med and no procedures scheduled.   Pt state she is passing gas and her abc d feels much better.         IMPROVE VTE Individual Risk Assessment    RISK                                                                Points    [x  ] Previous VTE                                                  3    [  ] Thrombophilia                                               2    [x  ] Lower limb paralysis                                      2        (unable to hold up >15 seconds)      [  ] Current Cancer                                              2         (within 6 months)    [  ] Immobilization > 24 hrs                                1    [  ] ICU/CCU stay > 24 hours                              1    [x  ] Age > 60                                                      1    IMPROVE VTE Score ___6______    IMPROVE Score 0-1: Low Risk, No VTE prophylaxis required for most patients, encourage ambulation.   IMPROVE Score 2-3: At risk, pharmacologic VTE prophylaxis is indicated for most patients (in the absence of a contraindication)  IMPROVE Score > or = 4: High Risk, pharmacologic VTE prophylaxis is indicated for most patients (in the absence of a contraindication)    [id number] Score: 7    IMPROVE Bleeding Risk Score: 1.5    Falls Risk:   High (x  )  Mod (  )  Low (  )  crcl: 85.5        cr: .54          BMI: 22.5               Denies any personal or familial history of clotting or bleeding disorders.    Allergies    Cipro (Rash)  Cipro (Unknown)  Spiriva (Rash)  Spiriva (Unknown)    Intolerances        REVIEW OF SYSTEMS    (  )Fever	     (  )Constipation	(  )SOB				(  )Headache	(  )Dysuria  (  )Chills	     (  )Melena	(  )Dyspnea present on exertion	                    (  )Dizziness                    (  )Polyuria  (  )Nausea	     (  )Hematochezia	(  )Cough			                    (  )Syncope   	(  )Hematuria  (  )Vomiting    (  )Chest Pain	(  )Wheezing			( x )Weakness  (x) thirsty.   (  )Diarrhea     (  )Palpitations	(  )Anorexia			(  )Myalgia       (x) abd discomfort but much better    Pertinent positives in HPI and daily subjective.  All other ROS negative.      Vital Signs Last 24 Hrs  T(C): 37.3 (12 Dec 2022 08:59), Max: 37.3 (12 Dec 2022 08:59)  T(F): 99.1 (12 Dec 2022 08:59), Max: 99.1 (12 Dec 2022 08:59)  HR: 77 (12 Dec 2022 08:59) (77 - 89)  BP: 146/64 (12 Dec 2022 08:59) (109/71 - 146/64)  BP(mean): --  RR: 19 (12 Dec 2022 08:59) (19 - 19)  SpO2: 90% (12 Dec 2022 08:59) (90% - 95%)    Parameters below as of 12 Dec 2022 08:59  Patient On (Oxygen Delivery Method): nasal cannula  O2 Flow (L/min): 2      PHYSICAL EXAM:    Constitutional: Appears Well    Neurological: A& O x 3    Skin: Warm    Respiratory and Thorax: normal effort; Breath sounds: normal; No rales/wheezing/rhonchi  	  Cardiovascular: S1, S2, regular, NMBR	    Gastrointestinal: BS + DECREASED x 4Q, nontender, Pt states she is passing gas	    Genitourinary:  Bladder nondistended, nontender    Musculoskeletal:   General Right:   no muscle/joint tenderness,   normal tone, no joint swelling,   ROM: full	    General Left:   no muscle/joint tenderness,   normal tone, no joint swelling,   ROM: limited        Lower extrems:   Right: no calf tenderness              negative shara's sign               + pedal pulses    Left:   no calf tenderness              negative shara's sign               + pedal pulses               + left side residual from CVA IN 2019                           10.7   5.18  )-----------( 251      ( 12 Dec 2022 11:55 )             35.1       12-12    145  |  113<H>  |  13  ----------------------------<  122<H>  3.6   |  28  |  0.58    Ca    8.7      12 Dec 2022 11:55  Phos  2.4     12-12  Mg     2.1     12-12    TPro  6.1  /  Alb  2.5<L>  /  TBili  0.3  /  DBili  x   /  AST  21  /  ALT  36  /  AlkPhos  44  12-12      PT/INR - ( 12 Dec 2022 08:27 )   PT: 24.5 sec;   INR: 2.10 ratio         PTT - ( 11 Dec 2022 08:00 )  PTT:36.1 sec				    MEDICATIONS  (STANDING):  aMIOdarone    Tablet 200 milliGRAM(s) Oral daily  atorvastatin 20 milliGRAM(s) Oral at bedtime  budesonide 160 MICROgram(s)/formoterol 4.5 MICROgram(s) Inhaler 2 Puff(s) Inhalation two times a day  cefTRIAXone Injectable. 1000 milliGRAM(s) IV Push every 24 hours  dextrose 5% + sodium chloride 0.45%. 1000 milliLiter(s) IV Continuous <Continuous>  dextrose 5%. 1000 milliLiter(s) IV Continuous <Continuous>  dextrose 5%. 1000 milliLiter(s) IV Continuous <Continuous>  dextrose 50% Injectable 25 Gram(s) IV Push once  dextrose 50% Injectable 12.5 Gram(s) IV Push once  dextrose 50% Injectable 25 Gram(s) IV Push once  diltiazem    milliGRAM(s) Oral daily  DULoxetine 60 milliGRAM(s) Oral daily  glucagon  Injectable 1 milliGRAM(s) IntraMuscular once  guaifenesin/dextromethorphan Oral Liquid 5 milliLiter(s) Oral four times a day  insulin glargine Injectable (LANTUS) 7 Unit(s) SubCutaneous at bedtime  insulin lispro (ADMELOG) corrective regimen sliding scale   SubCutaneous three times a day before meals  levothyroxine Injectable 66 MICROGram(s) IV Push at bedtime  metoprolol tartrate 25 milliGRAM(s) Oral two times a day  oxybutynin 5 milliGRAM(s) Oral two times a day  pantoprazole  Injectable 40 milliGRAM(s) IV Push daily  piperacillin/tazobactam IVPB.. 3.375 Gram(s) IV Intermittent Once  pregabalin 100 milliGRAM(s) Oral three times a day          DVT Prophylaxis:  LMWH                   (hold  )  Heparin SQ           (  )  Coumadin             (  )  Xarelto                  (  )  Eliquis                   (  )  Venodynes           (x  )  Ambulation          (  )  UFH                       (  )  Contraindicated  (  )  EC Aspirin             (  )

## 2022-12-14 NOTE — CONSULT NOTE ADULT - CONSULT REASON
SBO
deisy MICHAEL. Fib, risk stratification and anticoagulation managment
atrial fibrillation
sob
medical comanagement.

## 2022-12-14 NOTE — PROGRESS NOTE ADULT - ASSESSMENT
A/P:  Chronic large ventral/incisional hernia/diastasis rectii with loss of domain  SBO, clinically resolving with flatus and diminished/resolving/resolved abd pain  SBS shows resolution of SBO  Monitor tolerance to low fiber diet  GI/DVT prophylaxis  Hospitalist on consult for medical mgmt  Pain control  Serial abd exams stable and improved  Monitor bowel function  IV antibiotics for UTI  Pt with multiple medical comorbidities including chronic left ext paralysis from CVA, PE, on anticoagulation therapy  Anticoagulation consult: therapeutic lov held      Plan discussed with Dr Tony

## 2022-12-14 NOTE — CONSULT NOTE ADULT - ASSESSMENT
71 yo female with h/o afib on warfarin,[id number] Score: 7, diverticulitis s/p Guillermina with SBR (2016), and subsequent colostomy reversal (2017), DM, CVA with residual L sided hemiplegia, COPD, HTN, DVT, PE (1 yr ago), hypothyroidism, obesity, OA, HLD, ventral hernia and multiple SBO in the past presents with L sided abdominal pain on 12- which started on 12-9-2022.  Pt was dx with SBO. which is now resolving.  Pt is starting on clear liquids today.  Pt has high thrombosis risk and requires anticoagulation treatment dose.       Plan:  Hold ac until pt is sub therapeutic INR (INR 2.11 TODAY) the start on tx dose Lovenox   : When cleard by surg will resume coumadin  :le venodynes  :daily cbc/bmp, pt/inr  :thanks for consulst will f/u
72f PMH as above a/w    PROBLEMS:    COPD/RSV/chronic respiratory failure  SBO/Large ventral hernia  UTI  Paroxysmal atrial fibrillation  Hypothyroidism  H/o DVT/PE  H/O CVA  IDDM    PLAN:    SBO improving-tolerating po   Paroxysmal atrial fibrillation-continue bb-ccb-amiodarone   COPD/ RSV/hronic respiratory failure--on nc 2-3L at [facility]-continue symbicort, albuterol-guafenisin  H/o DVT/PE-on coumadin-therapeutic INR  DVT px:-inr therapeutic   
patient is well controlled from AFib/CAD risk.   PAtient with abnormal EKG-that is similar to prior EKGs (dependent on lead placement) and maintained in sinus rhythm  1-SBP-may need surgery; patient is moderate risk ,but maximall medically treated with proeserved LV function, may go for surgery from cardiac view if needed  5-Kuie-bqoaws controlled, hold AC and place on lovenox as needed  3-CH-HFpEF-off lasix for now, no active signs of CHF  
71 yo female with possible SBO secondary to ventral hernia     Recommendations:     Lactate STAT  CT abdomen with PO and IV contrast   IV hydration   NPO  Pain control PRN  Nausea control PRN  Further Plan pending CT scan     Plan discussed with Dr Matthews

## 2022-12-15 LAB
ALBUMIN SERPL ELPH-MCNC: 2.6 G/DL — LOW (ref 3.3–5)
ALP SERPL-CCNC: 52 U/L — SIGNIFICANT CHANGE UP (ref 40–120)
ALT FLD-CCNC: 28 U/L — SIGNIFICANT CHANGE UP (ref 12–78)
ANION GAP SERPL CALC-SCNC: 5 MMOL/L — SIGNIFICANT CHANGE UP (ref 5–17)
AST SERPL-CCNC: 13 U/L — LOW (ref 15–37)
BILIRUB SERPL-MCNC: 0.3 MG/DL — SIGNIFICANT CHANGE UP (ref 0.2–1.2)
BUN SERPL-MCNC: 16 MG/DL — SIGNIFICANT CHANGE UP (ref 7–23)
CALCIUM SERPL-MCNC: 8.5 MG/DL — SIGNIFICANT CHANGE UP (ref 8.5–10.1)
CHLORIDE SERPL-SCNC: 113 MMOL/L — HIGH (ref 96–108)
CO2 SERPL-SCNC: 27 MMOL/L — SIGNIFICANT CHANGE UP (ref 22–31)
CREAT SERPL-MCNC: 0.7 MG/DL — SIGNIFICANT CHANGE UP (ref 0.5–1.3)
EGFR: 92 ML/MIN/1.73M2 — SIGNIFICANT CHANGE UP
GLUCOSE SERPL-MCNC: 150 MG/DL — HIGH (ref 70–99)
HCT VFR BLD CALC: 35.4 % — SIGNIFICANT CHANGE UP (ref 34.5–45)
HGB BLD-MCNC: 11.2 G/DL — LOW (ref 11.5–15.5)
INR BLD: 2.56 RATIO — HIGH (ref 0.88–1.16)
MAGNESIUM SERPL-MCNC: 2.1 MG/DL — SIGNIFICANT CHANGE UP (ref 1.6–2.6)
MCHC RBC-ENTMCNC: 26.4 PG — LOW (ref 27–34)
MCHC RBC-ENTMCNC: 31.6 GM/DL — LOW (ref 32–36)
MCV RBC AUTO: 83.5 FL — SIGNIFICANT CHANGE UP (ref 80–100)
PHOSPHATE SERPL-MCNC: 2.8 MG/DL — SIGNIFICANT CHANGE UP (ref 2.5–4.5)
PLATELET # BLD AUTO: 254 K/UL — SIGNIFICANT CHANGE UP (ref 150–400)
POTASSIUM SERPL-MCNC: 3.4 MMOL/L — LOW (ref 3.5–5.3)
POTASSIUM SERPL-SCNC: 3.4 MMOL/L — LOW (ref 3.5–5.3)
PROT SERPL-MCNC: 6.6 GM/DL — SIGNIFICANT CHANGE UP (ref 6–8.3)
PROTHROM AB SERPL-ACNC: 30 SEC — HIGH (ref 10.5–13.4)
RBC # BLD: 4.24 M/UL — SIGNIFICANT CHANGE UP (ref 3.8–5.2)
RBC # FLD: 16.5 % — HIGH (ref 10.3–14.5)
SODIUM SERPL-SCNC: 145 MMOL/L — SIGNIFICANT CHANGE UP (ref 135–145)
WBC # BLD: 6.1 K/UL — SIGNIFICANT CHANGE UP (ref 3.8–10.5)
WBC # FLD AUTO: 6.1 K/UL — SIGNIFICANT CHANGE UP (ref 3.8–10.5)

## 2022-12-15 PROCEDURE — 99231 SBSQ HOSP IP/OBS SF/LOW 25: CPT

## 2022-12-15 PROCEDURE — 99232 SBSQ HOSP IP/OBS MODERATE 35: CPT

## 2022-12-15 RX ORDER — WARFARIN SODIUM 2.5 MG/1
1.5 TABLET ORAL DAILY
Refills: 0 | Status: DISCONTINUED | OUTPATIENT
Start: 2022-12-15 | End: 2022-12-16

## 2022-12-15 RX ORDER — POTASSIUM CHLORIDE 20 MEQ
20 PACKET (EA) ORAL
Refills: 0 | Status: COMPLETED | OUTPATIENT
Start: 2022-12-15 | End: 2022-12-15

## 2022-12-15 RX ORDER — FLUTICASONE PROPIONATE 50 MCG
1 SPRAY, SUSPENSION NASAL
Refills: 0 | Status: DISCONTINUED | OUTPATIENT
Start: 2022-12-15 | End: 2022-12-16

## 2022-12-15 RX ORDER — MONTELUKAST 4 MG/1
10 TABLET, CHEWABLE ORAL DAILY
Refills: 0 | Status: DISCONTINUED | OUTPATIENT
Start: 2022-12-15 | End: 2022-12-16

## 2022-12-15 RX ADMIN — Medication 180 MILLIGRAM(S): at 08:48

## 2022-12-15 RX ADMIN — Medication 66 MICROGRAM(S): at 22:37

## 2022-12-15 RX ADMIN — ALBUTEROL 2 PUFF(S): 90 AEROSOL, METERED ORAL at 10:25

## 2022-12-15 RX ADMIN — Medication 30 MILLIGRAM(S): at 08:48

## 2022-12-15 RX ADMIN — Medication 100 MILLIGRAM(S): at 14:59

## 2022-12-15 RX ADMIN — METHOCARBAMOL 750 MILLIGRAM(S): 500 TABLET, FILM COATED ORAL at 22:54

## 2022-12-15 RX ADMIN — Medication 20 MILLIEQUIVALENT(S): at 14:58

## 2022-12-15 RX ADMIN — WARFARIN SODIUM 1.5 MILLIGRAM(S): 2.5 TABLET ORAL at 22:37

## 2022-12-15 RX ADMIN — Medication 100 MILLIGRAM(S): at 22:36

## 2022-12-15 RX ADMIN — MORPHINE SULFATE 2 MILLIGRAM(S): 50 CAPSULE, EXTENDED RELEASE ORAL at 09:33

## 2022-12-15 RX ADMIN — Medication 5 MILLIGRAM(S): at 08:49

## 2022-12-15 RX ADMIN — DULOXETINE HYDROCHLORIDE 60 MILLIGRAM(S): 30 CAPSULE, DELAYED RELEASE ORAL at 08:49

## 2022-12-15 RX ADMIN — AMIODARONE HYDROCHLORIDE 200 MILLIGRAM(S): 400 TABLET ORAL at 08:47

## 2022-12-15 RX ADMIN — MORPHINE SULFATE 2 MILLIGRAM(S): 50 CAPSULE, EXTENDED RELEASE ORAL at 10:33

## 2022-12-15 RX ADMIN — PANTOPRAZOLE SODIUM 40 MILLIGRAM(S): 20 TABLET, DELAYED RELEASE ORAL at 08:48

## 2022-12-15 RX ADMIN — Medication 30 MILLIGRAM(S): at 14:59

## 2022-12-15 RX ADMIN — Medication 600 MILLIGRAM(S): at 22:36

## 2022-12-15 RX ADMIN — BUDESONIDE AND FORMOTEROL FUMARATE DIHYDRATE 2 PUFF(S): 160; 4.5 AEROSOL RESPIRATORY (INHALATION) at 10:32

## 2022-12-15 RX ADMIN — Medication 5 MILLIGRAM(S): at 22:42

## 2022-12-15 RX ADMIN — INSULIN GLARGINE 7 UNIT(S): 100 INJECTION, SOLUTION SUBCUTANEOUS at 22:36

## 2022-12-15 RX ADMIN — ATORVASTATIN CALCIUM 20 MILLIGRAM(S): 80 TABLET, FILM COATED ORAL at 22:37

## 2022-12-15 RX ADMIN — BUDESONIDE AND FORMOTEROL FUMARATE DIHYDRATE 2 PUFF(S): 160; 4.5 AEROSOL RESPIRATORY (INHALATION) at 20:38

## 2022-12-15 RX ADMIN — Medication 25 MILLIGRAM(S): at 22:37

## 2022-12-15 RX ADMIN — MONTELUKAST 10 MILLIGRAM(S): 4 TABLET, CHEWABLE ORAL at 22:38

## 2022-12-15 RX ADMIN — Medication 5 MILLILITER(S): at 00:23

## 2022-12-15 RX ADMIN — Medication 2: at 16:53

## 2022-12-15 RX ADMIN — Medication 2: at 09:06

## 2022-12-15 RX ADMIN — Medication 25 MILLIGRAM(S): at 08:49

## 2022-12-15 RX ADMIN — Medication 100 MILLIGRAM(S): at 06:39

## 2022-12-15 RX ADMIN — Medication 30 MILLIGRAM(S): at 16:00

## 2022-12-15 RX ADMIN — Medication 20 MILLIEQUIVALENT(S): at 16:42

## 2022-12-15 NOTE — PROGRESS NOTE ADULT - ASSESSMENT
72f PMH as above a/w    1. SBO/Large ventral hernia:  -seems to be improving.   -s/p  ivf when tolerating po.     2. UTI  - full course of antibiotics  - urine cultures no growth    3. Paroxysmal atrial fibrillation:  -continue bb, ccb amiodarone.   -continue bb  -hold lovenox as INR therapeutic.   - care discussed with Dr. Jamison    4 .COPD/ RSV/hronic respiratory failure.   -on nc 2-3L at [facility]  -continue symbicort, albuterol  -guafenisin    4. Hypothyroidism:  -synthroid.     5. HFpEF:  -continue to hold lasix for today.   -monitor closely for s/s of exacerbation.     6. h/o DVT/PE  - on coumadin  - therapeutic INR    7. h/o CVA:  - continue statin  - INR therapeutic.     8. IDDM:  -lantus 7+ss  -    9.  UTI  Tx empirically with IV Abxs, completed 3 days   UCX NGTD but was sent after Pt was started on Tx    10. DVT px:  -inr therapeutic.  73 yo female with h/o afib on warfarin, diverticulitis s/p Guillermina with SBR (2016), and subsequent colostomy reversal (2017), DM, CVA with residual L sided hemiplegia, COPD, HTN, DV, PE (1 yr ago), hypothyroidism, obesity, OA, HLD, ventral hernia and multiple SBO admitted for:     1. SBO/Large ventral hernia:  clinically  resolving   d/c IVF  tolerates PO   monitor labs  Zofran PRN     2.     3. Paroxysmal atrial fibrillation:  -continue bb, ccb amiodarone.   -continue coumadin   - INR TX    4 .COPD/ RSV/chronic respiratory failure.   -on nc 2-3L at [facility]  -continue symbicort, albuterol  -add guafenisin  check  CXR     4. Hypothyroidism:  -synthroidism     5. HFpEF:  -continue to hold lasix for today.   -monitor closely for s/s of exacerbation.     6. h/o DVT/PE  - on coumadin  - therapeutic INR    7. h/o CVA:  - continue statin  - INR therapeutic.     8. IDDM:  -lantus 7+ss  -    9.  DVT px:  -inr therapeutic.

## 2022-12-15 NOTE — PROGRESS NOTE ADULT - ASSESSMENT
72f PMH as above a/w    PROBLEMS:    COPD/RSV/chronic respiratory failure  SBO/Large ventral hernia  UTI  Paroxysmal atrial fibrillation  Hypothyroidism  H/o DVT/PE  H/O CVA  IDDM    PLAN:    Add fluticasone/singulair  on symbicort/mucinex  SBO improving-tolerating po   Paroxysmal atrial fibrillation-continue bb-ccb-amiodarone   COPD/ RSV/hronic respiratory failure--on nc 2-3L at [facility]-continue symbicort, albuterol-guafenisin  H/o DVT/PE-on coumadin-therapeutic INR  DVT px:-inr therapeutic

## 2022-12-15 NOTE — PROGRESS NOTE ADULT - SUBJECTIVE AND OBJECTIVE BOX
Subjective:    pat c/o nasal congestion, some cough, lying in bed.    Home Medications:  Admelog SoloStar 100 units/mL injectable solution: 10 unit(s) injectable 3 times a day (before meals) (10 Dec 2022 21:52)  Albuterol (Eqv-ProAir HFA) 90 mcg/inh inhalation aerosol: 1 puff(s) inhaled every 6 hours, As Needed (10 Dec 2022 21:52)  amiodarone 200 mg oral tablet: 1 tab(s) orally once a day (10 Dec 2022 21:52)  ascorbic acid 500 mg oral tablet: 2 tab(s) orally once a day (10 Dec 2022 21:52)  atorvastatin 10 mg oral tablet: 1 tab(s) orally once a day (at bedtime) (10 Dec 2022 21:52)  cholecalciferol 1250 mcg (50,000 intl units) oral capsule: 1 cap(s) orally every 4 weeks on Wednesday  (10 Dec 2022 21:52)  Coumadin 1 mg oral tablet: 1 tab(s) orally once a day  ***take with Coumadin 2.5mg to equal a total dose of 3.5mg*** (10 Dec 2022 21:52)  Coumadin 2.5 mg oral tablet: 1 tab(s) orally once a day  ***take with Coumadin 1mg to equal a total dose of 3.5mg*** (10 Dec 2022 21:52)  Cranberry oral tablet: 1 tab(s) orally 2 times a day (10 Dec 2022 21:52)  cyanocobalamin 1000 mcg oral tablet: 1 tab(s) orally once a day (10 Dec 2022 21:52)  dilTIAZem 180 mg/24 hours oral capsule, extended release: 1 cap(s) orally once a day (10 Dec 2022 21:52)  DULoxetine 60 mg oral delayed release capsule: 1 cap(s) orally once a day (10 Dec 2022 21:52)  estrogens, Conjugated Cream 0.625 mg/gm: 1 application vaginal once a day on Monday &amp; Thursday for atrophic vaginitis (10 Dec 2022 21:52)  furosemide 20 mg oral tablet: 1 tab(s) orally once a day (10 Dec 2022 21:52)  glipiZIDE 10 mg oral tablet, extended release: 2 tab(s) orally once a day (10 Dec 2022 21:52)  GlycoLax oral powder for reconstitution: 17 gram(s) orally 2 times a day (10 Dec 2022 21:52)  guaiFENesin 100 mg/5 mL oral liquid: 10 milliliter(s) orally 4 times a day for 5 days (10 Dec 2022 21:52)  ipratropium-albuterol 20 mcg-100 mcg/inh inhalation aerosol: 1 puff(s) inhaled 4 times a day for 5 days (10 Dec 2022 21:52)  levothyroxine 88 mcg (0.088 mg) oral tablet: 1 tab(s) orally once a day (at bedtime) (10 Dec 2022 21:52)  losartan 25 mg oral tablet: 1 tab(s) orally once a day (10 Dec 2022 21:52)  metoprolol tartrate 25 mg oral tablet: 1 tab(s) orally 2 times a day (10 Dec 2022 21:52)  Milk of Magnesia 8% oral suspension: 30 milliliter(s) orally once a day, As Needed (10 Dec 2022 21:52)  oxyCODONE 5 mg oral tablet: 1 tab(s) orally every 4 hours, As Needed (10 Dec 2022 21:52)  pregabalin 100 mg oral capsule: 1 cap(s) orally 3 times a day (10 Dec 2022 21:52)  sennosides-docusate 8.6 mg-50 mg oral tablet: 2 tab(s) orally once a day (at bedtime) (10 Dec 2022 21:52)  Symbicort 160 mcg-4.5 mcg/inh inhalation aerosol: 2 puff(s) inhaled 2 times a day (10 Dec 2022 21:52)  Tradjenta 5 mg oral tablet: 1 tab(s) orally once a day (10 Dec 2022 21:52)  Tylenol 500 mg oral tablet: 2 tab(s) orally every 8 hours (10 Dec 2022 21:52)  Zofran 4 mg oral tablet: 1 tab(s) orally every 6 hours, As Needed  for 3 days (10 Dec 2022 21:52)    MEDICATIONS  (STANDING):  aMIOdarone    Tablet 200 milliGRAM(s) Oral daily  atorvastatin 20 milliGRAM(s) Oral at bedtime  budesonide 160 MICROgram(s)/formoterol 4.5 MICROgram(s) Inhaler 2 Puff(s) Inhalation two times a day  dextrose 5% + sodium chloride 0.45%. 1000 milliLiter(s) (75 mL/Hr) IV Continuous <Continuous>  dextrose 5%. 1000 milliLiter(s) (100 mL/Hr) IV Continuous <Continuous>  dextrose 5%. 1000 milliLiter(s) (50 mL/Hr) IV Continuous <Continuous>  dextrose 50% Injectable 25 Gram(s) IV Push once  dextrose 50% Injectable 12.5 Gram(s) IV Push once  dextrose 50% Injectable 25 Gram(s) IV Push once  diltiazem    milliGRAM(s) Oral daily  DULoxetine 60 milliGRAM(s) Oral daily  glucagon  Injectable 1 milliGRAM(s) IntraMuscular once  guaiFENesin  milliGRAM(s) Oral every 12 hours  insulin glargine Injectable (LANTUS) 7 Unit(s) SubCutaneous at bedtime  insulin lispro (ADMELOG) corrective regimen sliding scale   SubCutaneous three times a day before meals  levothyroxine Injectable 66 MICROGram(s) IV Push at bedtime  metoprolol tartrate 25 milliGRAM(s) Oral two times a day  oxybutynin 5 milliGRAM(s) Oral two times a day  pantoprazole  Injectable 40 milliGRAM(s) IV Push daily  pregabalin 100 milliGRAM(s) Oral three times a day  warfarin 1.5 milliGRAM(s) Oral daily    MEDICATIONS  (PRN):  acetaminophen   IVPB .. 1000 milliGRAM(s) IV Intermittent Once PRN Mild Pain (1 - 3)  albuterol    90 MICROgram(s) HFA Inhaler 2 Puff(s) Inhalation every 6 hours PRN Shortness of Breath and/or Wheezing  dextrose Oral Gel 15 Gram(s) Oral once PRN Blood Glucose LESS THAN 70 milliGRAM(s)/deciliter  hydrALAZINE Injectable 10 milliGRAM(s) IV Push every 6 hours PRN if SBP > 145  ketorolac   Injectable 30 milliGRAM(s) IV Push every 6 hours PRN Moderate Pain (4 - 6)  methocarbamol 750 milliGRAM(s) Oral every 6 hours PRN Muscle Spasm  morphine  - Injectable 2 milliGRAM(s) IV Push every 4 hours PRN Severe Pain (7 - 10)  ondansetron Injectable 4 milliGRAM(s) IV Push every 6 hours PRN Nausea      Allergies    Cipro (Rash)  Cipro (Unknown)  Spiriva (Rash)  Spiriva (Unknown)    Intolerances        Vital Signs Last 24 Hrs  T(C): 36.7 (15 Dec 2022 15:30), Max: 36.8 (15 Dec 2022 06:16)  T(F): 98.1 (15 Dec 2022 15:30), Max: 98.3 (15 Dec 2022 06:16)  HR: 71 (15 Dec 2022 15:30) (70 - 74)  BP: 148/62 (15 Dec 2022 15:30) (144/53 - 148/62)  BP(mean): --  RR: 18 (15 Dec 2022 15:30) (18 - 18)  SpO2: 95% (15 Dec 2022 15:30) (95% - 96%)    Parameters below as of 15 Dec 2022 15:30  Patient On (Oxygen Delivery Method): nasal cannula  O2 Flow (L/min): 2        PHYSICAL EXAMINATION:    NECK:  Supple. No lymphadenopathy. Jugular venous pressure not elevated. Carotids equal.   HEART:   The cardiac impulse has a normal quality. Reg., Nl S1 and S2.  There are no murmurs, rubs or gallops noted  CHEST:  Chest few rhonchi to auscultation. Normal respiratory effort.  ABDOMEN:  Soft and nontender.   EXTREMITIES:  There is no edema.       LABS:                        11.2   6.10  )-----------( 254      ( 15 Dec 2022 08:18 )             35.4     12-15    145  |  113<H>  |  16  ----------------------------<  150<H>  3.4<L>   |  27  |  0.70    Ca    8.5      15 Dec 2022 08:18  Phos  2.8     12-15  Mg     2.1     12-15    TPro  6.6  /  Alb  2.6<L>  /  TBili  0.3  /  DBili  x   /  AST  13<L>  /  ALT  28  /  AlkPhos  52  12-15    PT/INR - ( 15 Dec 2022 08:18 )   PT: 30.0 sec;   INR: 2.56 ratio         PTT - ( 14 Dec 2022 08:20 )  PTT:30.5 sec

## 2022-12-15 NOTE — PROGRESS NOTE ADULT - SUBJECTIVE AND OBJECTIVE BOX
SURGERY DAILY PROGRESS NOTE:     Subjective:  Patient seen and examined at bedside during am rounds. Was on LRD, had 3 ep of vomiting overnight. 3 episodes of diarrhea. AVSS. Denies any fevers, chills, chest pain or shortness of breath    Objective:    MEDICATIONS  (STANDING):  aMIOdarone    Tablet 200 milliGRAM(s) Oral daily  atorvastatin 20 milliGRAM(s) Oral at bedtime  budesonide 160 MICROgram(s)/formoterol 4.5 MICROgram(s) Inhaler 2 Puff(s) Inhalation two times a day  dextrose 5% + sodium chloride 0.45%. 1000 milliLiter(s) (75 mL/Hr) IV Continuous <Continuous>  dextrose 5%. 1000 milliLiter(s) (100 mL/Hr) IV Continuous <Continuous>  dextrose 5%. 1000 milliLiter(s) (50 mL/Hr) IV Continuous <Continuous>  dextrose 50% Injectable 25 Gram(s) IV Push once  dextrose 50% Injectable 12.5 Gram(s) IV Push once  dextrose 50% Injectable 25 Gram(s) IV Push once  diltiazem    milliGRAM(s) Oral daily  DULoxetine 60 milliGRAM(s) Oral daily  glucagon  Injectable 1 milliGRAM(s) IntraMuscular once  guaifenesin/dextromethorphan Oral Liquid 5 milliLiter(s) Oral four times a day  insulin glargine Injectable (LANTUS) 7 Unit(s) SubCutaneous at bedtime  insulin lispro (ADMELOG) corrective regimen sliding scale   SubCutaneous three times a day before meals  levothyroxine Injectable 66 MICROGram(s) IV Push at bedtime  metoprolol tartrate 25 milliGRAM(s) Oral two times a day  oxybutynin 5 milliGRAM(s) Oral two times a day  pantoprazole  Injectable 40 milliGRAM(s) IV Push daily  pregabalin 100 milliGRAM(s) Oral three times a day  warfarin 1.5 milliGRAM(s) Oral daily    MEDICATIONS  (PRN):  acetaminophen   IVPB .. 1000 milliGRAM(s) IV Intermittent Once PRN Mild Pain (1 - 3)  albuterol    90 MICROgram(s) HFA Inhaler 2 Puff(s) Inhalation every 6 hours PRN Shortness of Breath and/or Wheezing  dextrose Oral Gel 15 Gram(s) Oral once PRN Blood Glucose LESS THAN 70 milliGRAM(s)/deciliter  hydrALAZINE Injectable 10 milliGRAM(s) IV Push every 6 hours PRN if SBP > 145  ketorolac   Injectable 30 milliGRAM(s) IV Push every 6 hours PRN Moderate Pain (4 - 6)  methocarbamol 750 milliGRAM(s) Oral every 6 hours PRN Muscle Spasm  morphine  - Injectable 2 milliGRAM(s) IV Push every 4 hours PRN Severe Pain (7 - 10)  ondansetron Injectable 4 milliGRAM(s) IV Push every 6 hours PRN Nausea      Vital Signs Last 24 Hrs  T(C): 36.8 (15 Dec 2022 06:16), Max: 36.8 (14 Dec 2022 16:44)  T(F): 98.3 (15 Dec 2022 06:16), Max: 98.3 (14 Dec 2022 16:44)  HR: 72 (15 Dec 2022 10:44) (70 - 87)  BP: 144/53 (15 Dec 2022 06:16) (136/62 - 145/68)  BP(mean): --  RR: 18 (15 Dec 2022 06:16) (18 - 18)  SpO2: 96% (15 Dec 2022 06:16) (94% - 96%)    Parameters below as of 15 Dec 2022 10:44  Patient On (Oxygen Delivery Method): nasal cannula          PHYSICAL EXAM   General: AAOx3, Well developed, NAD  Chest: Normal respiratory effort  Heart: RRR  Abdomen: Large diastasis, thin abdominal wall, minimally tender at the hernia site  Neuro/Psych: No localized deficits. Normal speech, normal tone  Skin: Normal, no rashes, no lesions noted.   Extremities: Warm, well perfused, no edema, Pulses intact      I&O's Detail        Daily Weight in k.5 (15 Dec 2022 06:16)    LABS:                        11.2   6.10  )-----------( 254      ( 15 Dec 2022 08:18 )             35.4     12-15    145  |  113<H>  |  16  ----------------------------<  150<H>  3.4<L>   |  27  |  0.70    Ca    8.5      15 Dec 2022 08:18  Phos  2.8     12-15  Mg     2.1     12-15    TPro  6.6  /  Alb  2.6<L>  /  TBili  0.3  /  DBili  x   /  AST  13<L>  /  ALT  28  /  AlkPhos  52  12-15    PT/INR - ( 15 Dec 2022 08:18 )   PT: 30.0 sec;   INR: 2.56 ratio         PTT - ( 14 Dec 2022 08:20 )  PTT:30.5 sec      RADIOLOGY & ADDITIONAL STUDIES:    ASSESSMENT/PLAN:

## 2022-12-15 NOTE — PROGRESS NOTE ADULT - SUBJECTIVE AND OBJECTIVE BOX
CC: SBO      HPI: 71 yo female with h/o afib on warfarin, diverticulitis s/p Guillermina with SBR (2016), and subsequent colostomy reversal (2017), DM, CVA with residual L sided hemiplegia, COPD, HTN, DV, PE (1 yr ago), hypothyroidism, obesity, OA, HLD, ventral hernia and multiple SBO in the past presents with L sided abdominal pain. Acc ot the patient, the pain started yesterday evening, sudden onset, sharp in nature, associated with nausea and 7 episodes of vomiting since yesterday. She denies fever. Her last BM was last night and she passed flatus this AM. She mentions her pain is similar to her previous episodes of SBO. She also mentions she has had pain in her R groin due to a ventral hernia for the past 6 months that is less severe in intensity. She is aware she is high risk for surgery and has been following up with a cardiologist and pulmonologist to obtain clearance for an elective hernia repair that she has had for the last 3 years.   She is bedbound and has not walked since her CVA 3 years ago when the rehab and PT was stopped at the nursing home and she stopped making progress with her extremities.  (10 Dec 2022 20:51)    INTERVAL HPI/ OVERNIGHT EVENTS:    Vital Signs Last 24 Hrs  T(C): 36.8 (15 Dec 2022 06:16), Max: 36.8 (14 Dec 2022 16:44)  T(F): 98.3 (15 Dec 2022 06:16), Max: 98.3 (14 Dec 2022 16:44)  HR: 72 (15 Dec 2022 10:44) (70 - 87)  BP: 144/53 (15 Dec 2022 06:16) (136/62 - 145/68)  RR: 18 (15 Dec 2022 06:16) (18 - 18)  SpO2: 96% (15 Dec 2022 06:16) (94% - 96%)    Parameters below as of 15 Dec 2022 10:44  Patient On (Oxygen Delivery Method): nasal cannula      I&O's Detail    REVIEW OF SYSTEMS:    CONSTITUTIONAL: No weakness, fevers or chills  EYES/ENT: No visual changes;  No vertigo or throat pain   NECK: No pain or stiffness  RESPIRATORY: No cough, wheezing, hemoptysis; No shortness of breath  CARDIOVASCULAR: No chest pain or palpitations  GASTROINTESTINAL: No abdominal or epigastric pain. No nausea, vomiting, or hematemesis; No diarrhea or constipation. No melena or hematochezia.  GENITOURINARY: No dysuria, frequency or hematuria  NEUROLOGICAL: No numbness or weakness  SKIN: No itching, burning, rashes, or lesions   All other review of systems is negative unless indicated above.  PHYSICAL EXAM:    General: Well developed; well nourished; in no acute distress  Eyes: PERRLA, EOMI; conjunctiva and sclera clear  Head: Normocephalic; atraumatic  ENMT: No nasal discharge; airway clear  Neck: Supple; non tender; no masses  Respiratory: No wheezes, rales or rhonchi  Cardiovascular: Regular rate and rhythm. S1 and S2 Normal; No murmurs, gallops or rubs  Gastrointestinal: Soft non-tender non-distended; Normal bowel sounds  Genitourinary: No  suprapubic  tenderness  Extremities: Normal range of motion, No clubbing, cyanosis or edema  Vascular: Peripheral pulses palpable 2+ bilaterally  Neurological: Alert and oriented x4  Skin: Warm and dry. No acute rash  Lymph Nodes: No acute cervical adenopathy  Musculoskeletal: Normal muscle tone, without deformities  Psychiatric: Cooperative and appropriate                            11.2   6.10  )-----------( 254      ( 15 Dec 2022 08:18 )             35.4     15 Dec 2022 08:18    145    |  113    |  16     ----------------------------<  150    3.4     |  27     |  0.70     Ca    8.5        15 Dec 2022 08:18  Phos  2.8       15 Dec 2022 08:18  Mg     2.1       15 Dec 2022 08:18    TPro  6.6    /  Alb  2.6    /  TBili  0.3    /  DBili  x      /  AST  13     /  ALT  28     /  AlkPhos  52     15 Dec 2022 08:18    PT/INR - ( 15 Dec 2022 08:18 )   PT: 30.0 sec;   INR: 2.56 ratio         PTT - ( 14 Dec 2022 08:20 )  PTT:30.5 sec  CAPILLARY BLOOD GLUCOSE      POCT Blood Glucose.: 131 mg/dL (15 Dec 2022 12:17)  POCT Blood Glucose.: 158 mg/dL (15 Dec 2022 09:01)  POCT Blood Glucose.: 116 mg/dL (14 Dec 2022 21:27)  POCT Blood Glucose.: 126 mg/dL (14 Dec 2022 18:27)    LIVER FUNCTIONS - ( 15 Dec 2022 08:18 )  Alb: 2.6 g/dL / Pro: 6.6 gm/dL / ALK PHOS: 52 U/L / ALT: 28 U/L / AST: 13 U/L / GGT: x               MEDICATIONS  (STANDING):  aMIOdarone    Tablet 200 milliGRAM(s) Oral daily  atorvastatin 20 milliGRAM(s) Oral at bedtime  budesonide 160 MICROgram(s)/formoterol 4.5 MICROgram(s) Inhaler 2 Puff(s) Inhalation two times a day  dextrose 5% + sodium chloride 0.45%. 1000 milliLiter(s) (75 mL/Hr) IV Continuous <Continuous>  dextrose 5%. 1000 milliLiter(s) (100 mL/Hr) IV Continuous <Continuous>  dextrose 5%. 1000 milliLiter(s) (50 mL/Hr) IV Continuous <Continuous>  dextrose 50% Injectable 25 Gram(s) IV Push once  dextrose 50% Injectable 12.5 Gram(s) IV Push once  dextrose 50% Injectable 25 Gram(s) IV Push once  diltiazem    milliGRAM(s) Oral daily  DULoxetine 60 milliGRAM(s) Oral daily  glucagon  Injectable 1 milliGRAM(s) IntraMuscular once  guaifenesin/dextromethorphan Oral Liquid 5 milliLiter(s) Oral four times a day  insulin glargine Injectable (LANTUS) 7 Unit(s) SubCutaneous at bedtime  insulin lispro (ADMELOG) corrective regimen sliding scale   SubCutaneous three times a day before meals  levothyroxine Injectable 66 MICROGram(s) IV Push at bedtime  metoprolol tartrate 25 milliGRAM(s) Oral two times a day  oxybutynin 5 milliGRAM(s) Oral two times a day  pantoprazole  Injectable 40 milliGRAM(s) IV Push daily  pregabalin 100 milliGRAM(s) Oral three times a day  warfarin 1.5 milliGRAM(s) Oral daily    MEDICATIONS  (PRN):  acetaminophen   IVPB .. 1000 milliGRAM(s) IV Intermittent Once PRN Mild Pain (1 - 3)  albuterol    90 MICROgram(s) HFA Inhaler 2 Puff(s) Inhalation every 6 hours PRN Shortness of Breath and/or Wheezing  dextrose Oral Gel 15 Gram(s) Oral once PRN Blood Glucose LESS THAN 70 milliGRAM(s)/deciliter  hydrALAZINE Injectable 10 milliGRAM(s) IV Push every 6 hours PRN if SBP > 145  ketorolac   Injectable 30 milliGRAM(s) IV Push every 6 hours PRN Moderate Pain (4 - 6)  methocarbamol 750 milliGRAM(s) Oral every 6 hours PRN Muscle Spasm  morphine  - Injectable 2 milliGRAM(s) IV Push every 4 hours PRN Severe Pain (7 - 10)  ondansetron Injectable 4 milliGRAM(s) IV Push every 6 hours PRN Nausea      RADIOLOGY & ADDITIONAL TESTS: CC: SBO      HPI: 71 yo female with h/o afib on warfarin, diverticulitis s/p Guillermina with SBR (2016), and subsequent colostomy reversal (2017), DM, CVA with residual L sided hemiplegia, COPD, HTN, DV, PE (1 yr ago), hypothyroidism, obesity, OA, HLD, ventral hernia and multiple SBO in the past presents with L sided abdominal pain. Acc ot the patient, the pain started yesterday evening, sudden onset, sharp in nature, associated with nausea and 7 episodes of vomiting since yesterday. She denies fever. Her last BM was last night and she passed flatus this AM. She mentions her pain is similar to her previous episodes of SBO. She also mentions she has had pain in her R groin due to a ventral hernia for the past 6 months that is less severe in intensity. She is aware she is high risk for surgery and has been following up with a cardiologist and pulmonologist to obtain clearance for an elective hernia repair that she has had for the last 3 years.   She is bedbound and has not walked since her CVA 3 years ago when the rehab and PT was stopped at the nursing home and she stopped making progress with her extremities.  (10 Dec 2022 20:51)    INTERVAL HPI/ OVERNIGHT EVENTS: Chart reviewed, Pt was seen and examined, reports tolerates diet, small amount, no N/V, no abd pain. Pt reports cough with phlegm. POC discussed     Vital Signs Last 24 Hrs  T(C): 36.8 (15 Dec 2022 06:16), Max: 36.8 (14 Dec 2022 16:44)  T(F): 98.3 (15 Dec 2022 06:16), Max: 98.3 (14 Dec 2022 16:44)  HR: 72 (15 Dec 2022 10:44) (70 - 87)  BP: 144/53 (15 Dec 2022 06:16) (136/62 - 145/68)  RR: 18 (15 Dec 2022 06:16) (18 - 18)  SpO2: 96% (15 Dec 2022 06:16) (94% - 96%)    Parameters below as of 15 Dec 2022 10:44  Patient On (Oxygen Delivery Method): nasal cannula      REVIEW OF SYSTEMS:  All other review of systems is negative unless indicated above.      PHYSICAL EXAM:  General: Well developed; in no acute distress  Eyes: EOMI; conjunctiva and sclera clear  Head: Normocephalic; atraumatic  Respiratory: mild bibasilar  wheezes, fair air entry   Cardiovascular: Regular rate and rhythm. S1 and S2 Normal;   Gastrointestinal: Soft non-tender non-distended; Normal bowel sounds  Genitourinary: No  suprapubic  tenderness  Extremities: No  edema  Vascular: Peripheral pulses palpable 2+ bilaterally  Neurological: Alert and oriented x3, speech is clear, L sided  paralysis   Musculoskeletal: Normal muscle tone, without deformities  Psychiatric: Cooperative and appropriate    LABS:                         11.2   6.10  )-----------( 254      ( 15 Dec 2022 08:18 )             35.4     15 Dec 2022 08:18    145    |  113    |  16     ----------------------------<  150    3.4     |  27     |  0.70     Ca    8.5        15 Dec 2022 08:18  Phos  2.8       15 Dec 2022 08:18  Mg     2.1       15 Dec 2022 08:18    TPro  6.6    /  Alb  2.6    /  TBili  0.3    /  DBili  x      /  AST  13     /  ALT  28     /  AlkPhos  52     15 Dec 2022 08:18    PT/INR - ( 15 Dec 2022 08:18 )   PT: 30.0 sec;   INR: 2.56 ratio         PTT - ( 14 Dec 2022 08:20 )  PTT:30.5 sec  CAPILLARY BLOOD GLUCOSE      POCT Blood Glucose.: 131 mg/dL (15 Dec 2022 12:17)  POCT Blood Glucose.: 158 mg/dL (15 Dec 2022 09:01)  POCT Blood Glucose.: 116 mg/dL (14 Dec 2022 21:27)  POCT Blood Glucose.: 126 mg/dL (14 Dec 2022 18:27)    LIVER FUNCTIONS - ( 15 Dec 2022 08:18 )  Alb: 2.6 g/dL / Pro: 6.6 gm/dL / ALK PHOS: 52 U/L / ALT: 28 U/L / AST: 13 U/L / GGT: x               MEDICATIONS  (STANDING):  aMIOdarone    Tablet 200 milliGRAM(s) Oral daily  atorvastatin 20 milliGRAM(s) Oral at bedtime  budesonide 160 MICROgram(s)/formoterol 4.5 MICROgram(s) Inhaler 2 Puff(s) Inhalation two times a day  dextrose 5% + sodium chloride 0.45%. 1000 milliLiter(s) (75 mL/Hr) IV Continuous <Continuous>  dextrose 5%. 1000 milliLiter(s) (100 mL/Hr) IV Continuous <Continuous>  dextrose 5%. 1000 milliLiter(s) (50 mL/Hr) IV Continuous <Continuous>  dextrose 50% Injectable 25 Gram(s) IV Push once  dextrose 50% Injectable 12.5 Gram(s) IV Push once  dextrose 50% Injectable 25 Gram(s) IV Push once  diltiazem    milliGRAM(s) Oral daily  DULoxetine 60 milliGRAM(s) Oral daily  glucagon  Injectable 1 milliGRAM(s) IntraMuscular once  guaifenesin/dextromethorphan Oral Liquid 5 milliLiter(s) Oral four times a day  insulin glargine Injectable (LANTUS) 7 Unit(s) SubCutaneous at bedtime  insulin lispro (ADMELOG) corrective regimen sliding scale   SubCutaneous three times a day before meals  levothyroxine Injectable 66 MICROGram(s) IV Push at bedtime  metoprolol tartrate 25 milliGRAM(s) Oral two times a day  oxybutynin 5 milliGRAM(s) Oral two times a day  pantoprazole  Injectable 40 milliGRAM(s) IV Push daily  pregabalin 100 milliGRAM(s) Oral three times a day  warfarin 1.5 milliGRAM(s) Oral daily    MEDICATIONS  (PRN):  acetaminophen   IVPB .. 1000 milliGRAM(s) IV Intermittent Once PRN Mild Pain (1 - 3)  albuterol    90 MICROgram(s) HFA Inhaler 2 Puff(s) Inhalation every 6 hours PRN Shortness of Breath and/or Wheezing  dextrose Oral Gel 15 Gram(s) Oral once PRN Blood Glucose LESS THAN 70 milliGRAM(s)/deciliter  hydrALAZINE Injectable 10 milliGRAM(s) IV Push every 6 hours PRN if SBP > 145  ketorolac   Injectable 30 milliGRAM(s) IV Push every 6 hours PRN Moderate Pain (4 - 6)  methocarbamol 750 milliGRAM(s) Oral every 6 hours PRN Muscle Spasm  morphine  - Injectable 2 milliGRAM(s) IV Push every 4 hours PRN Severe Pain (7 - 10)  ondansetron Injectable 4 milliGRAM(s) IV Push every 6 hours PRN Nausea      RADIOLOGY & ADDITIONAL TESTS:

## 2022-12-15 NOTE — PROGRESS NOTE ADULT - ASSESSMENT
73 yo female with h/o afib on warfarin,[id number] Score: 7, diverticulitis s/p Guillermina with SBR (2016), and subsequent colostomy reversal (2017), DM, CVA with residual L sided hemiplegia, COPD, HTN, DVT, PE (1 yr ago), hypothyroidism, obesity, OA, HLD, ventral hernia and multiple SBO in the past presents with L sided abdominal pain on 12- which started on 12-9-2022.  Pt was dx with SBO. which is now resolving.  Pt is starting on clear liquids today.  Pt has high thrombosis risk and requires anticoagulation treatment dose.       Plan:  :INR 2.56 from 2.0  : Decrease dose to 1.5mg po daily adjust with INR  :le venodynes  :daily cbc/bmp, pt/inr  :will f/u  : Dispo:rehab

## 2022-12-15 NOTE — PROGRESS NOTE ADULT - ASSESSMENT
A/P:     Chronic large ventral/incisional hernia/diastasis rectii with loss of domain  SBO, clinically resolving with flatus and diminished/resolving/resolved abd pain  SBS shows resolution of SBO  Monitor tolerance to low fiber diet  GI/DVT prophylaxis  Nausea control PRN  Hospitalist on consult for medical mgmt  Pain control  Serial abd exams stable and improved  Monitor bowel function  IV antibiotics for UTI  Pt with multiple medical comorbidities including chronic left ext paralysis from CVA, PE, on anticoagulation therapy  Anticoagulation consult: started on warfarin, trending INR      Plan discussed with Dr Tony

## 2022-12-15 NOTE — PROGRESS NOTE ADULT - SUBJECTIVE AND OBJECTIVE BOX
HPI:  71 yo female with h/o afib on warfarin, diverticulitis s/p Guillermina with SBR (2016), and subsequent colostomy reversal (2017), DM, CVA with residual L sided hemiplegia, COPD, HTN, DVT, PE (1 yr ago), hypothyroidism, obesity, OA, HLD, ventral hernia and multiple SBO in the past presents with L sided abdominal pain. Acc ot the patient, the pain started yesterday evening, sudden onset, sharp in nature, associated with nausea and 7 episodes of vomiting since yesterday. She denies fever. Her last BM was last night and she passed flatus this AM. She mentions her pain is similar to her previous episodes of SBO. She also mentions she has had pain in her R groin due to a ventral hernia for the past 6 months that is less severe in intensity. She is aware she is high risk for surgery and has been follownig up with a cardiologist and pulmonologist to obtain clearance for an elective hernia repair that she has had for the last 3 years.   She is bedbound and has not walked since her CVA 3 years ago when the rehab and PT was stopped at the nursing home and she stopped making progress with her extremities.  (10 Dec 2022 20:51)      Patient is a 72y old  Female who presents with a chief complaint of SBO (12 Dec 2022 13:41)      Consulted by Dr. Magdalena Matthews for VTE prophylaxis, risk stratification, and anticoagulation management.    PAST MEDICAL & SURGICAL HISTORY:  Hypertension      Hypercholesteremia      COPD (chronic obstructive pulmonary disease)      C. difficile diarrhea  2016      Diverticulitis of intestine with perforation and abscess without bleeding, unspecified part of intestinal tract      PE (pulmonary thromboembolism)  2016      Palpitations      Obesity      Osteoarthritis      Anemia      Colostomy in place      History of atrial fibrillation on coumadin      HTN (hypertension)      Diabetes mellitus      Cerebrovascular accident (CVA)      DVT of lower limb      CVA (cerebral vascular accident)      COPD (chronic obstructive pulmonary disease)      Neuropathy      Diverticulitis      History of appendectomy      H/O:   x2      H/O ovarian cystectomy  b/l      Status post total replacement of both hips      H/O hemicolectomy  2016      History of colostomy reversal      History of colostomy reversal      S/P colostomy      S/P       S/P hip replacement          FAMILY HISTORY:  Family history of COPD (chronic obstructive pulmonary disease)    Family history of chronic kidney disease    Family history of hiatal hernia (Sibling)        Interval Note:  2022:  Pt seen at bedside on [facility].  Discussed her either going on Lovenox tx dose if she is still npo tomorrow or resuming her coumadin if she is allowed po med and no procedures scheduled.   Pt state she is passing gas and her abc d feels much better.     2022:  Pt seen at bedside on [facility].  Patient is allowded clears will start Lovenox treatment dose once INR<2.0   2022: Pt seeen at bedside this am c/o nausea.  Not vomiting.  Discussed with pt the INR of 2.0 and increasing her coumadin to 3mg today.  No concerns.   12-: Pt seeen at bedside denies any n/v, stated had loose stool 3 times today , able to tolerate regular diet INR results discussed    IMPROVE VTE Individual Risk Assessment    RISK                                                                Points    [x  ] Previous VTE                                                  3    [  ] Thrombophilia                                               2    [x  ] Lower limb paralysis                                      2        (unable to hold up >15 seconds)      [  ] Current Cancer                                              2         (within 6 months)    [  ] Immobilization > 24 hrs                                1    [  ] ICU/CCU stay > 24 hours                              1    [x  ] Age > 60                                                      1    IMPROVE VTE Score ___6______    IMPROVE Score 0-1: Low Risk, No VTE prophylaxis required for most patients, encourage ambulation.   IMPROVE Score 2-3: At risk, pharmacologic VTE prophylaxis is indicated for most patients (in the absence of a contraindication)  IMPROVE Score > or = 4: High Risk, pharmacologic VTE prophylaxis is indicated for most patients (in the absence of a contraindication)    [id number] Score: 7    IMPROVE Bleeding Risk Score: 1.5    Falls Risk:   High (x  )  Mod (  )  Low (  )  crcl: 85.5        cr: .54          BMI: 22.5               Denies any personal or familial history of clotting or bleeding disorders.    Allergies    Cipro (Rash)  Cipro (Unknown)  Spiriva (Rash)  Spiriva (Unknown)    Intolerances        REVIEW OF SYSTEMS    (  )Fever	     (  )Constipation	(  )SOB				(  )Headache	(  )Dysuria  (  )Chills	     (  )Melena	(  )Dyspnea present on exertion	                    (  )Dizziness                    (  )Polyuria  ( x )Nausea	     (  )Hematochezia	(  )Cough			                    (  )Syncope   	(  )Hematuria  (  )Vomiting    (  )Chest Pain	(  )Wheezing			( x )Weakness  (x) thirsty.   (  )Diarrhea     (  )Palpitations	(  )Anorexia			(  )Myalgia       (x) abd discomfort but much better    Pertinent positives in HPI and daily subjective.  All other ROS negative.  Vital Signs Last 24 Hrs  T(C): 36.8 (15 Dec 2022 06:16), Max: 36.8 (14 Dec 2022 16:44)  T(F): 98.3 (15 Dec 2022 06:16), Max: 98.3 (14 Dec 2022 16:44)  HR: 72 (15 Dec 2022 10:44) (70 - 87)  BP: 144/53 (15 Dec 2022 06:16) (136/62 - 145/68)  BP(mean): --  RR: 18 (15 Dec 2022 06:16) (18 - 18)  SpO2: 96% (15 Dec 2022 06:16) (94% - 96%)  PHYSICAL EXAM:    Constitutional: Appears Well    Neurological: A& O x 3    Skin: Warm    Respiratory and Thorax: normal effort; Breath sounds: normal; No rales/wheezing/rhonchi  	  Cardiovascular: S1, S2, regular, NMBR	    Gastrointestinal: BS + DECREASED x 4Q, nontender, Pt states she is passing gas	    Genitourinary:  Bladder nondistended, nontender    Musculoskeletal:   General Right:   no muscle/joint tenderness,   normal tone, no joint swelling,   ROM: full	    General Left:   no muscle/joint tenderness,   normal tone, no joint swelling,   ROM: limited        Lower extrems:   Right: no calf tenderness              negative shara's sign               + pedal pulses    Left:   no calf tenderness              negative shara's sign               + pedal pulses               + left side residual from CVA IN 2019                          11.2   6.10  )-----------( 254      ( 15 Dec 2022 08:18 )             35.4       12-15    145  |  113<H>  |  16  ----------------------------<  150<H>  3.4<L>   |  27  |  0.70    Ca    8.5      15 Dec 2022 08:18  Phos  2.8     12-15  Mg     2.1     12-15    TPro  6.6  /  Alb  2.6<L>  /  TBili  0.3  /  DBili  x   /  AST  13<L>  /  ALT  28  /  AlkPhos  52  12-15           PTT - ( 14 Dec 2022 08:20 )  PTT:30.5 sec                              10.9   6.84  )-----------( 271      ( 14 Dec 2022 08:20 )             35.3       12-14    145  |  113<H>  |  14  ----------------------------<  178<H>  3.6   |  27  |  0.72    Ca    8.5      14 Dec 2022 08:20  Phos  2.4     12-14       PTT - ( 14 Dec 2022 08:20 )  PTT:30.5 sec             10.6   5.98  )-----------( 258      ( 13 Dec 2022 08:29 )             34.8       12-    143  |  111<H>  |  17  ----------------------------<  144<H>  3.3<L>   |  27  |  0.77    Ca    8.3<L>      13 Dec 2022 08:29  Phos  2.4     12-12  Mg     2.1     12-12    TPro  6.1  /  Alb  2.5<L>  /  TBili  0.3  /  DBili  x   /  AST  21  /  ALT  36  /  AlkPhos  44  12-12                                     10.7   5.18  )-----------( 251      ( 12 Dec 2022 11:55 )             35.1       12-12    145  |  113<H>  |  13  ----------------------------<  122<H>  3.6   |  28  |  0.58    Ca    8.7      12 Dec 2022 11:55  Phos  2.4     12-12  Mg     2.1     12-12    TPro  6.1  /  Alb  2.5<L>  /  TBili  0.3  /  DBili  x   /  AST  21  /  ALT  36  /  AlkPhos  44  12-12  PT/INR - ( 15 Dec 2022 08:18 )   PT: 30.0 sec;   INR: 2.56 ratio    PT/INR - ( 14 Dec 2022 08:20 )   PT: 23.9 sec;   INR: 2.05 ratio    PT/INR - ( 13 Dec 2022 09:56 )   PT: 24.9 sec;   INR: 2.13 ratio    PT/INR - ( 12 Dec 2022 08:27 )   PT: 24.5 sec;   INR: 2.10 ratio         PTT - ( 11 Dec 2022 08:00 )  PTT:36.1 sec				    MEDICATIONS  (STANDING):  aMIOdarone    Tablet 200 milliGRAM(s) Oral daily  atorvastatin 20 milliGRAM(s) Oral at bedtime  budesonide 160 MICROgram(s)/formoterol 4.5 MICROgram(s) Inhaler 2 Puff(s) Inhalation two times a day  dextrose 5% + sodium chloride 0.45%. 1000 milliLiter(s) IV Continuous <Continuous>  dextrose 5%. 1000 milliLiter(s) IV Continuous <Continuous>  dextrose 5%. 1000 milliLiter(s) IV Continuous <Continuous>  dextrose 50% Injectable 25 Gram(s) IV Push once  dextrose 50% Injectable 12.5 Gram(s) IV Push once  dextrose 50% Injectable 25 Gram(s) IV Push once  diltiazem    milliGRAM(s) Oral daily  DULoxetine 60 milliGRAM(s) Oral daily  glucagon  Injectable 1 milliGRAM(s) IntraMuscular once  guaifenesin/dextromethorphan Oral Liquid 5 milliLiter(s) Oral four times a day  insulin glargine Injectable (LANTUS) 7 Unit(s) SubCutaneous at bedtime  insulin lispro (ADMELOG) corrective regimen sliding scale   SubCutaneous three times a day before meals  levothyroxine Injectable 66 MICROGram(s) IV Push at bedtime  metoprolol tartrate 25 milliGRAM(s) Oral two times a day  oxybutynin 5 milliGRAM(s) Oral two times a day  pantoprazole  Injectable 40 milliGRAM(s) IV Push daily  pregabalin 100 milliGRAM(s) Oral three times a day  warfarin 1.5 milliGRAM(s) Oral daily            DVT Prophylaxis:  LMWH                   (  )  Heparin SQ           (  )  Coumadin             ( x )  Xarelto                  (  )  Eliquis                   (  )  Venodynes           (x  )  Ambulation          (  )  UFH                       (  )  Contraindicated  (  )  EC Aspirin             (  )

## 2022-12-16 ENCOUNTER — TRANSCRIPTION ENCOUNTER (OUTPATIENT)
Age: 72
End: 2022-12-16

## 2022-12-16 VITALS
HEART RATE: 58 BPM | DIASTOLIC BLOOD PRESSURE: 51 MMHG | SYSTOLIC BLOOD PRESSURE: 139 MMHG | OXYGEN SATURATION: 96 % | TEMPERATURE: 98 F | RESPIRATION RATE: 18 BRPM

## 2022-12-16 LAB
ALBUMIN SERPL ELPH-MCNC: 2.5 G/DL — LOW (ref 3.3–5)
ALP SERPL-CCNC: 56 U/L — SIGNIFICANT CHANGE UP (ref 40–120)
ALT FLD-CCNC: 24 U/L — SIGNIFICANT CHANGE UP (ref 12–78)
ANION GAP SERPL CALC-SCNC: 7 MMOL/L — SIGNIFICANT CHANGE UP (ref 5–17)
AST SERPL-CCNC: 8 U/L — LOW (ref 15–37)
BILIRUB SERPL-MCNC: 0.3 MG/DL — SIGNIFICANT CHANGE UP (ref 0.2–1.2)
BUN SERPL-MCNC: 18 MG/DL — SIGNIFICANT CHANGE UP (ref 7–23)
CALCIUM SERPL-MCNC: 8.5 MG/DL — SIGNIFICANT CHANGE UP (ref 8.5–10.1)
CHLORIDE SERPL-SCNC: 113 MMOL/L — HIGH (ref 96–108)
CO2 SERPL-SCNC: 25 MMOL/L — SIGNIFICANT CHANGE UP (ref 22–31)
CREAT SERPL-MCNC: 0.8 MG/DL — SIGNIFICANT CHANGE UP (ref 0.5–1.3)
EGFR: 78 ML/MIN/1.73M2 — SIGNIFICANT CHANGE UP
GLUCOSE SERPL-MCNC: 137 MG/DL — HIGH (ref 70–99)
HCT VFR BLD CALC: 32.9 % — LOW (ref 34.5–45)
HGB BLD-MCNC: 10 G/DL — LOW (ref 11.5–15.5)
MAGNESIUM SERPL-MCNC: 2.1 MG/DL — SIGNIFICANT CHANGE UP (ref 1.6–2.6)
MCHC RBC-ENTMCNC: 25.6 PG — LOW (ref 27–34)
MCHC RBC-ENTMCNC: 30.4 GM/DL — LOW (ref 32–36)
MCV RBC AUTO: 84.4 FL — SIGNIFICANT CHANGE UP (ref 80–100)
NT-PROBNP SERPL-SCNC: 499 PG/ML — HIGH (ref 0–125)
PHOSPHATE SERPL-MCNC: 3.8 MG/DL — SIGNIFICANT CHANGE UP (ref 2.5–4.5)
PLATELET # BLD AUTO: 264 K/UL — SIGNIFICANT CHANGE UP (ref 150–400)
POTASSIUM SERPL-MCNC: 3.8 MMOL/L — SIGNIFICANT CHANGE UP (ref 3.5–5.3)
POTASSIUM SERPL-SCNC: 3.8 MMOL/L — SIGNIFICANT CHANGE UP (ref 3.5–5.3)
PROT SERPL-MCNC: 6.4 GM/DL — SIGNIFICANT CHANGE UP (ref 6–8.3)
RBC # BLD: 3.9 M/UL — SIGNIFICANT CHANGE UP (ref 3.8–5.2)
RBC # FLD: 16.5 % — HIGH (ref 10.3–14.5)
SARS-COV-2 RNA SPEC QL NAA+PROBE: SIGNIFICANT CHANGE UP
SODIUM SERPL-SCNC: 145 MMOL/L — SIGNIFICANT CHANGE UP (ref 135–145)
WBC # BLD: 6.78 K/UL — SIGNIFICANT CHANGE UP (ref 3.8–10.5)
WBC # FLD AUTO: 6.78 K/UL — SIGNIFICANT CHANGE UP (ref 3.8–10.5)

## 2022-12-16 PROCEDURE — 99232 SBSQ HOSP IP/OBS MODERATE 35: CPT

## 2022-12-16 PROCEDURE — 99231 SBSQ HOSP IP/OBS SF/LOW 25: CPT

## 2022-12-16 PROCEDURE — 71045 X-RAY EXAM CHEST 1 VIEW: CPT | Mod: 26

## 2022-12-16 RX ORDER — FLUTICASONE PROPIONATE 50 MCG
1 SPRAY, SUSPENSION NASAL
Qty: 0 | Refills: 0 | DISCHARGE
Start: 2022-12-16

## 2022-12-16 RX ORDER — WARFARIN SODIUM 2.5 MG/1
1 TABLET ORAL
Qty: 0 | Refills: 0 | DISCHARGE

## 2022-12-16 RX ORDER — METHOCARBAMOL 500 MG/1
1 TABLET, FILM COATED ORAL
Qty: 0 | Refills: 0 | DISCHARGE
Start: 2022-12-16

## 2022-12-16 RX ORDER — WARFARIN SODIUM 2.5 MG/1
1 TABLET ORAL
Qty: 60 | Refills: 0
Start: 2022-12-16 | End: 2023-01-14

## 2022-12-16 RX ORDER — OXYBUTYNIN CHLORIDE 5 MG
1 TABLET ORAL
Qty: 0 | Refills: 0 | DISCHARGE
Start: 2022-12-16

## 2022-12-16 RX ORDER — MONTELUKAST 4 MG/1
1 TABLET, CHEWABLE ORAL
Qty: 0 | Refills: 0 | DISCHARGE
Start: 2022-12-16

## 2022-12-16 RX ORDER — WARFARIN SODIUM 2.5 MG/1
1 TABLET ORAL
Qty: 0 | Refills: 0 | DISCHARGE
Start: 2022-12-16

## 2022-12-16 RX ADMIN — MONTELUKAST 10 MILLIGRAM(S): 4 TABLET, CHEWABLE ORAL at 08:45

## 2022-12-16 RX ADMIN — ALBUTEROL 2 PUFF(S): 90 AEROSOL, METERED ORAL at 08:19

## 2022-12-16 RX ADMIN — PANTOPRAZOLE SODIUM 40 MILLIGRAM(S): 20 TABLET, DELAYED RELEASE ORAL at 08:46

## 2022-12-16 RX ADMIN — Medication 5 MILLIGRAM(S): at 08:45

## 2022-12-16 RX ADMIN — Medication 25 MILLIGRAM(S): at 08:44

## 2022-12-16 RX ADMIN — Medication 100 MILLIGRAM(S): at 05:27

## 2022-12-16 RX ADMIN — BUDESONIDE AND FORMOTEROL FUMARATE DIHYDRATE 2 PUFF(S): 160; 4.5 AEROSOL RESPIRATORY (INHALATION) at 08:20

## 2022-12-16 RX ADMIN — DULOXETINE HYDROCHLORIDE 60 MILLIGRAM(S): 30 CAPSULE, DELAYED RELEASE ORAL at 08:45

## 2022-12-16 RX ADMIN — AMIODARONE HYDROCHLORIDE 200 MILLIGRAM(S): 400 TABLET ORAL at 08:45

## 2022-12-16 RX ADMIN — MORPHINE SULFATE 2 MILLIGRAM(S): 50 CAPSULE, EXTENDED RELEASE ORAL at 10:30

## 2022-12-16 RX ADMIN — Medication 180 MILLIGRAM(S): at 08:45

## 2022-12-16 RX ADMIN — Medication 600 MILLIGRAM(S): at 08:44

## 2022-12-16 RX ADMIN — Medication 1 SPRAY(S): at 05:27

## 2022-12-16 NOTE — PROGRESS NOTE ADULT - ASSESSMENT
71 yo female with h/o afib on warfarin,[id number] Score: 7, diverticulitis s/p Guillermina with SBR (2016), and subsequent colostomy reversal (2017), DM, CVA with residual L sided hemiplegia, COPD, HTN, DVT, PE (1 yr ago), hypothyroidism, obesity, OA, HLD, ventral hernia and multiple SBO in the past presents with L sided abdominal pain on 12- which started on 12-9-2022.  Pt was dx with SBO. which is now resolving.  Pt is starting on clear liquids today.  Pt has high thrombosis risk and requires anticoagulation treatment dose.       Plan:  :INR 3.38 from 2.56  :will hold Coumadin tonight and  adjust dose with INR  :le venodynes  :daily cbc/bmp, pt/inr  :will f/u  : Dispo:rehab

## 2022-12-16 NOTE — DISCHARGE NOTE NURSING/CASE MANAGEMENT/SOCIAL WORK - PATIENT PORTAL LINK FT
You can access the FollowMyHealth Patient Portal offered by [facility] by registering at the following website: [email or web address]. By joining [employer]’s FollowMyHealth portal, you will also be able to view your health information using other applications (apps) compatible with our system.
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
## 2022-12-16 NOTE — PROGRESS NOTE ADULT - SUBJECTIVE AND OBJECTIVE BOX
HPI:  73 yo female with h/o afib on warfarin, diverticulitis s/p Guillermina with SBR (2016), and subsequent colostomy reversal (2017), DM, CVA with residual L sided hemiplegia, COPD, HTN, DVT, PE (1 yr ago), hypothyroidism, obesity, OA, HLD, ventral hernia and multiple SBO in the past presents with L sided abdominal pain. Acc ot the patient, the pain started yesterday evening, sudden onset, sharp in nature, associated with nausea and 7 episodes of vomiting since yesterday. She denies fever. Her last BM was last night and she passed flatus this AM. She mentions her pain is similar to her previous episodes of SBO. She also mentions she has had pain in her R groin due to a ventral hernia for the past 6 months that is less severe in intensity. She is aware she is high risk for surgery and has been follownig up with a cardiologist and pulmonologist to obtain clearance for an elective hernia repair that she has had for the last 3 years.   She is bedbound and has not walked since her CVA 3 years ago when the rehab and PT was stopped at the nursing home and she stopped making progress with her extremities.  (10 Dec 2022 20:51)      Patient is a 72y old  Female who presents with a chief complaint of SBO (12 Dec 2022 13:41)      Consulted by Dr. Magdalena Matthews for VTE prophylaxis, risk stratification, and anticoagulation management.    PAST MEDICAL & SURGICAL HISTORY:  Hypertension      Hypercholesteremia      COPD (chronic obstructive pulmonary disease)      C. difficile diarrhea  2016      Diverticulitis of intestine with perforation and abscess without bleeding, unspecified part of intestinal tract      PE (pulmonary thromboembolism)  2016      Palpitations      Obesity      Osteoarthritis      Anemia      Colostomy in place      History of atrial fibrillation on coumadin      HTN (hypertension)      Diabetes mellitus      Cerebrovascular accident (CVA)      DVT of lower limb      CVA (cerebral vascular accident)      COPD (chronic obstructive pulmonary disease)      Neuropathy      Diverticulitis      History of appendectomy      H/O:   x2      H/O ovarian cystectomy  b/l      Status post total replacement of both hips      H/O hemicolectomy  2016      History of colostomy reversal      History of colostomy reversal      S/P colostomy      S/P       S/P hip replacement          FAMILY HISTORY:  Family history of COPD (chronic obstructive pulmonary disease)    Family history of chronic kidney disease    Family history of hiatal hernia (Sibling)        Interval Note:  2022:  Pt seen at bedside on [facility].  Discussed her either going on Lovenox tx dose if she is still npo tomorrow or resuming her coumadin if she is allowed po med and no procedures scheduled.   Pt state she is passing gas and her abc d feels much better.     2022:  Pt seen at bedside on [facility].  Patient is allowded clears will start Lovenox treatment dose once INR<2.0   2022: Pt seeen at bedside this am c/o nausea.  Not vomiting.  Discussed with pt the INR of 2.0 and increasing her coumadin to 3mg today.  No concerns.   12-: Pt seeen at bedside denies any n/v, stated had loose stool 3 times today , able to tolerate regular diet INR results discussed    2022: Pt seeen at bedside still c/o groin pain, on I/v Morphin, INR 3.3 will hold Coumadin tonight   IMPROVE VTE Individual Risk Assessment    RISK                                                                Points    [x  ] Previous VTE                                                  3    [  ] Thrombophilia                                               2    [x  ] Lower limb paralysis                                      2        (unable to hold up >15 seconds)      [  ] Current Cancer                                              2         (within 6 months)    [  ] Immobilization > 24 hrs                                1    [  ] ICU/CCU stay > 24 hours                              1    [x  ] Age > 60                                                      1    IMPROVE VTE Score ___6______    IMPROVE Score 0-1: Low Risk, No VTE prophylaxis required for most patients, encourage ambulation.   IMPROVE Score 2-3: At risk, pharmacologic VTE prophylaxis is indicated for most patients (in the absence of a contraindication)  IMPROVE Score > or = 4: High Risk, pharmacologic VTE prophylaxis is indicated for most patients (in the absence of a contraindication)    [id number] Score: 7    IMPROVE Bleeding Risk Score: 1.5    Falls Risk:   High (x  )  Mod (  )  Low (  )  crcl: 85.5        cr: .54          BMI: 22.5               Denies any personal or familial history of clotting or bleeding disorders.    Allergies    Cipro (Rash)  Cipro (Unknown)  Spiriva (Rash)  Spiriva (Unknown)    Intolerances        REVIEW OF SYSTEMS    (  )Fever	     (  )Constipation	(  )SOB				(  )Headache	(  )Dysuria  (  )Chills	     (  )Melena	(  )Dyspnea present on exertion	                    (  )Dizziness                    (  )Polyuria  ( x )Nausea	     (  )Hematochezia	(  )Cough			                    (  )Syncope   	(  )Hematuria  (  )Vomiting    (  )Chest Pain	(  )Wheezing			( x )Weakness  (x) thirsty.   (  )Diarrhea     (  )Palpitations	(  )Anorexia			(  )Myalgia       (x) abd discomfort but much better    Pertinent positives in HPI and daily subjective.  All other ROS negative.  Vital Signs Last 24 Hrs  T(C): 36.7 (16 Dec 2022 08:46), Max: 37.1 (15 Dec 2022 21:04)  T(F): 98 (16 Dec 2022 08:46), Max: 98.7 (15 Dec 2022 21:04)  HR: 58 (16 Dec 2022 08:46) (58 - 71)  BP: 139/51 (16 Dec 2022 08:46) (126/55 - 148/62)  BP(mean): --  RR: 18 (16 Dec 2022 08:46) (18 - 18)  SpO2: 96% (16 Dec 2022 08:46) (95% - 96%)  PHYSICAL EXAM:    Constitutional: Appears Well    Neurological: A& O x 3    Skin: Warm    Respiratory and Thorax: normal effort; Breath sounds: normal; No rales/wheezing/rhonchi  	  Cardiovascular: S1, S2, regular, NMBR	    Gastrointestinal: BS + DECREASED x 4Q, nontender, Pt states she is passing gas	    Genitourinary:  Bladder nondistended, nontender    Musculoskeletal:   General Right:   no muscle/joint tenderness,   normal tone, no joint swelling,   ROM: full	    General Left:   no muscle/joint tenderness,   normal tone, no joint swelling,   ROM: limited        Lower extrems:   Right: no calf tenderness              negative shara's sign               + pedal pulses    Left:   no calf tenderness              negative shara's sign               + pedal pulses               + left side residual from CVA IN 2019                         10.0   6.78  )-----------( 264      ( 16 Dec 2022 07:37 )             32.9       12-16    145  |  113<H>  |  18  ----------------------------<  137<H>  3.8   |  25  |  0.80    Ca    8.5      16 Dec 2022 07:37  Phos  3.8     12-16  Mg     2.1     12-16    TPro  6.4  /  Alb  2.5<L>  /  TBili  0.3  /  DBili  x   /  AST  8<L>  /  ALT  24  /  AlkPhos  56  12-16           PTT - ( 16 Dec 2022 10:19 )  PTT:35.5 sec                       11.2   6.10  )-----------( 254      ( 15 Dec 2022 08:18 )             35.4       12-15    145  |  113<H>  |  16  ----------------------------<  150<H>  3.4<L>   |  27  |  0.70    Ca    8.5      15 Dec 2022 08:18  Phos  2.8     12-15  Mg     2.1     12-15    TPro  6.6  /  Alb  2.6<L>  /  TBili  0.3  /  DBili  x   /  AST  13<L>  /  ALT  28  /  AlkPhos  52  12-15           PTT - ( 14 Dec 2022 08:20 )  PTT:30.5 sec                              10.9   6.84  )-----------( 271      ( 14 Dec 2022 08:20 )             35.3       12-14    145  |  113<H>  |  14  ----------------------------<  178<H>  3.6   |  27  |  0.72    Ca    8.5      14 Dec 2022 08:20  Phos  2.4     12-14       PTT - ( 14 Dec 2022 08:20 )  PTT:30.5 sec             10.6   5.98  )-----------( 258      ( 13 Dec 2022 08:29 )             34.8       12-13    143  |  111<H>  |  17  ----------------------------<  144<H>  3.3<L>   |  27  |  0.77    Ca    8.3<L>      13 Dec 2022 08:29  Phos  2.4     12-12  Mg     2.1     12-12    TPro  6.1  /  Alb  2.5<L>  /  TBili  0.3  /  DBili  x   /  AST  21  /  ALT  36  /  AlkPhos  44                                       10.7   5.18  )-----------( 251      ( 12 Dec 2022 11:55 )             35.1           145  |  113<H>  |  13  ----------------------------<  122<H>  3.6   |  28  |  0.58    Ca    8.7      12 Dec 2022 11:55  Phos  2.4       Mg     2.1         TPro  6.1  /  Alb  2.5<L>  /  TBili  0.3  /  DBili  x   /  AST  21  /  ALT  36  /  AlkPhos  44    PT/INR - ( 16 Dec 2022 10:19 )   PT: 39.7 sec;   INR: 3.38 ratio    PT/INR - ( 15 Dec 2022 08:18 )   PT: 30.0 sec;   INR: 2.56 ratio    PT/INR - ( 14 Dec 2022 08:20 )   PT: 23.9 sec;   INR: 2.05 ratio    PT/INR - ( 13 Dec 2022 09:56 )   PT: 24.9 sec;   INR: 2.13 ratio    PT/INR - ( 12 Dec 2022 08:27 )   PT: 24.5 sec;   INR: 2.10 ratio         PTT - ( 11 Dec 2022 08:00 )  PTT:36.1 sec				    MEDICATIONS  (STANDING):  aMIOdarone    Tablet 200 milliGRAM(s) Oral daily  atorvastatin 20 milliGRAM(s) Oral at bedtime  budesonide 160 MICROgram(s)/formoterol 4.5 MICROgram(s) Inhaler 2 Puff(s) Inhalation two times a day  dextrose 5% + sodium chloride 0.45%. 1000 milliLiter(s) IV Continuous <Continuous>  dextrose 5%. 1000 milliLiter(s) IV Continuous <Continuous>  dextrose 5%. 1000 milliLiter(s) IV Continuous <Continuous>  dextrose 50% Injectable 25 Gram(s) IV Push once  dextrose 50% Injectable 12.5 Gram(s) IV Push once  dextrose 50% Injectable 25 Gram(s) IV Push once  diltiazem    milliGRAM(s) Oral daily  DULoxetine 60 milliGRAM(s) Oral daily  glucagon  Injectable 1 milliGRAM(s) IntraMuscular once  guaifenesin/dextromethorphan Oral Liquid 5 milliLiter(s) Oral four times a day  insulin glargine Injectable (LANTUS) 7 Unit(s) SubCutaneous at bedtime  insulin lispro (ADMELOG) corrective regimen sliding scale   SubCutaneous three times a day before meals  levothyroxine Injectable 66 MICROGram(s) IV Push at bedtime  metoprolol tartrate 25 milliGRAM(s) Oral two times a day  oxybutynin 5 milliGRAM(s) Oral two times a day  pantoprazole  Injectable 40 milliGRAM(s) IV Push daily  pregabalin 100 milliGRAM(s) Oral three times a day          DVT Prophylaxis:  LMWH                   (  )  Heparin SQ           (  )  Coumadin             ( x )  Xarelto                  (  )  Eliquis                   (  )  Venodynes           (x  )  Ambulation          (  )  UFH                       (  )  Contraindicated  (  )  EC Aspirin             (  )

## 2022-12-16 NOTE — DISCHARGE NOTE PROVIDER - PROVIDER TOKENS
FREE:[LAST:[primary care provider],PHONE:[(   )    -],FAX:[(   )    -],FOLLOWUP:[2 weeks]],FREE:[LAST:[anticoagulation outpatient],PHONE:[(   )    -],FAX:[(   )    -],FOLLOWUP:[1-3 days]]

## 2022-12-16 NOTE — PROGRESS NOTE ADULT - SUBJECTIVE AND OBJECTIVE BOX
Subjective:    pat better, sitting in bed, no respiratory distress.    Home Medications:  Admelog SoloStar 100 units/mL injectable solution: 10 unit(s) injectable 3 times a day (before meals) (10 Dec 2022 21:52)  Albuterol (Eqv-ProAir HFA) 90 mcg/inh inhalation aerosol: 1 puff(s) inhaled every 6 hours, As Needed (10 Dec 2022 21:52)  amiodarone 200 mg oral tablet: 1 tab(s) orally once a day (10 Dec 2022 21:52)  ascorbic acid 500 mg oral tablet: 2 tab(s) orally once a day (10 Dec 2022 21:52)  atorvastatin 10 mg oral tablet: 1 tab(s) orally once a day (at bedtime) (10 Dec 2022 21:52)  cholecalciferol 1250 mcg (50,000 intl units) oral capsule: 1 cap(s) orally every 4 weeks on Wednesday  (10 Dec 2022 21:52)  Cranberry oral tablet: 1 tab(s) orally 2 times a day (10 Dec 2022 21:52)  cyanocobalamin 1000 mcg oral tablet: 1 tab(s) orally once a day (10 Dec 2022 21:52)  dilTIAZem 180 mg/24 hours oral capsule, extended release: 1 cap(s) orally once a day (10 Dec 2022 21:52)  DULoxetine 60 mg oral delayed release capsule: 1 cap(s) orally once a day (10 Dec 2022 21:52)  estrogens, Conjugated Cream 0.625 mg/gm: 1 application vaginal once a day on Monday &amp; Thursday for atrophic vaginitis (10 Dec 2022 21:52)  fluticasone 50 mcg/inh nasal spray: 1 spray(s) nasal 2 times a day (16 Dec 2022 09:45)  furosemide 20 mg oral tablet: 1 tab(s) orally once a day (10 Dec 2022 21:52)  glipiZIDE 10 mg oral tablet, extended release: 2 tab(s) orally once a day (10 Dec 2022 21:52)  GlycoLax oral powder for reconstitution: 17 gram(s) orally 2 times a day (10 Dec 2022 21:52)  guaiFENesin 100 mg/5 mL oral liquid: 10 milliliter(s) orally 4 times a day for 5 days (10 Dec 2022 21:52)  ipratropium-albuterol 20 mcg-100 mcg/inh inhalation aerosol: 1 puff(s) inhaled 4 times a day for 5 days (10 Dec 2022 21:52)  levothyroxine 88 mcg (0.088 mg) oral tablet: 1 tab(s) orally once a day (at bedtime) (10 Dec 2022 21:52)  losartan 25 mg oral tablet: 1 tab(s) orally once a day (10 Dec 2022 21:52)  methocarbamol 750 mg oral tablet: 1 tab(s) orally every 6 hours, As needed, Muscle Spasm (16 Dec 2022 09:45)  metoprolol tartrate 25 mg oral tablet: 1 tab(s) orally 2 times a day (10 Dec 2022 21:52)  Milk of Magnesia 8% oral suspension: 30 milliliter(s) orally once a day, As Needed (10 Dec 2022 21:52)  montelukast 10 mg oral tablet: 1 tab(s) orally once a day (16 Dec 2022 09:45)  oxybutynin 5 mg oral tablet: 1 tab(s) orally 2 times a day (16 Dec 2022 09:45)  oxyCODONE 5 mg oral tablet: 1 tab(s) orally every 4 hours, As Needed (10 Dec 2022 21:52)  pregabalin 100 mg oral capsule: 1 cap(s) orally 3 times a day (10 Dec 2022 21:52)  sennosides-docusate 8.6 mg-50 mg oral tablet: 2 tab(s) orally once a day (at bedtime) (10 Dec 2022 21:52)  Symbicort 160 mcg-4.5 mcg/inh inhalation aerosol: 2 puff(s) inhaled 2 times a day (10 Dec 2022 21:52)  Tradjenta 5 mg oral tablet: 1 tab(s) orally once a day (10 Dec 2022 21:52)  Tylenol 500 mg oral tablet: 2 tab(s) orally every 8 hours (10 Dec 2022 21:52)  warfarin 1 mg oral tablet: 1 tab(s) orally once a day Hold dose tonight (12/16/2022, INR 3.38) please rechcek INR 12/17 and dose accordingly  (16 Dec 2022 11:28)    MEDICATIONS  (STANDING):  aMIOdarone    Tablet 200 milliGRAM(s) Oral daily  atorvastatin 20 milliGRAM(s) Oral at bedtime  budesonide 160 MICROgram(s)/formoterol 4.5 MICROgram(s) Inhaler 2 Puff(s) Inhalation two times a day  dextrose 5%. 1000 milliLiter(s) (50 mL/Hr) IV Continuous <Continuous>  dextrose 5%. 1000 milliLiter(s) (100 mL/Hr) IV Continuous <Continuous>  dextrose 50% Injectable 25 Gram(s) IV Push once  dextrose 50% Injectable 12.5 Gram(s) IV Push once  dextrose 50% Injectable 25 Gram(s) IV Push once  diltiazem    milliGRAM(s) Oral daily  DULoxetine 60 milliGRAM(s) Oral daily  fluticasone propionate 50 MICROgram(s)/spray Nasal Spray 1 Spray(s) Both Nostrils two times a day  glucagon  Injectable 1 milliGRAM(s) IntraMuscular once  guaiFENesin  milliGRAM(s) Oral every 12 hours  insulin glargine Injectable (LANTUS) 7 Unit(s) SubCutaneous at bedtime  insulin lispro (ADMELOG) corrective regimen sliding scale   SubCutaneous three times a day before meals  levothyroxine Injectable 66 MICROGram(s) IV Push at bedtime  metoprolol tartrate 25 milliGRAM(s) Oral two times a day  montelukast 10 milliGRAM(s) Oral daily  oxybutynin 5 milliGRAM(s) Oral two times a day  pantoprazole  Injectable 40 milliGRAM(s) IV Push daily  pregabalin 100 milliGRAM(s) Oral three times a day    MEDICATIONS  (PRN):  acetaminophen   IVPB .. 1000 milliGRAM(s) IV Intermittent Once PRN Mild Pain (1 - 3)  albuterol    90 MICROgram(s) HFA Inhaler 2 Puff(s) Inhalation every 6 hours PRN Shortness of Breath and/or Wheezing  dextrose Oral Gel 15 Gram(s) Oral once PRN Blood Glucose LESS THAN 70 milliGRAM(s)/deciliter  hydrALAZINE Injectable 10 milliGRAM(s) IV Push every 6 hours PRN if SBP > 145  methocarbamol 750 milliGRAM(s) Oral every 6 hours PRN Muscle Spasm  morphine  - Injectable 2 milliGRAM(s) IV Push every 4 hours PRN Severe Pain (7 - 10)  ondansetron Injectable 4 milliGRAM(s) IV Push every 6 hours PRN Nausea      Allergies    Cipro (Rash)  Cipro (Unknown)  Spiriva (Rash)  Spiriva (Unknown)    Intolerances        Vital Signs Last 24 Hrs  T(C): 36.7 (16 Dec 2022 08:46), Max: 37.1 (15 Dec 2022 21:04)  T(F): 98 (16 Dec 2022 08:46), Max: 98.7 (15 Dec 2022 21:04)  HR: 58 (16 Dec 2022 08:46) (58 - 68)  BP: 139/51 (16 Dec 2022 08:46) (126/55 - 139/51)  BP(mean): --  RR: 18 (16 Dec 2022 08:46) (18 - 18)  SpO2: 96% (16 Dec 2022 08:46) (95% - 96%)    Parameters below as of 16 Dec 2022 08:46  Patient On (Oxygen Delivery Method): nasal cannula  O2 Flow (L/min): 2        PHYSICAL EXAMINATION:    NECK:  Supple. No lymphadenopathy. Jugular venous pressure not elevated. Carotids equal.   HEART:   The cardiac impulse has a normal quality. Reg., Nl S1 and S2.  There are no murmurs, rubs or gallops noted  CHEST:  Chest is clear to auscultation. Normal respiratory effort.  ABDOMEN:  Soft and nontender.   EXTREMITIES:  There is no edema.       LABS:                        10.0   6.78  )-----------( 264      ( 16 Dec 2022 07:37 )             32.9     12-16    145  |  113<H>  |  18  ----------------------------<  137<H>  3.8   |  25  |  0.80    Ca    8.5      16 Dec 2022 07:37  Phos  3.8     12-16  Mg     2.1     12-16    TPro  6.4  /  Alb  2.5<L>  /  TBili  0.3  /  DBili  x   /  AST  8<L>  /  ALT  24  /  AlkPhos  56  12-16    PT/INR - ( 16 Dec 2022 10:19 )   PT: 39.7 sec;   INR: 3.38 ratio         PTT - ( 16 Dec 2022 10:19 )  PTT:35.5 sec

## 2022-12-16 NOTE — DISCHARGE NOTE PROVIDER - CARE PROVIDER_API CALL
primary care provider,   Phone: (   )    -  Fax: (   )    -  Follow Up Time: 2 weeks    anticoagulation outpatient,   Phone: (   )    -  Fax: (   )    -  Follow Up Time: 1-3 days

## 2022-12-16 NOTE — PROGRESS NOTE ADULT - SUBJECTIVE AND OBJECTIVE BOX
CC: SBO      HPI: 73 yo female with h/o afib on warfarin, diverticulitis s/p Guillermina with SBR (2016), and subsequent colostomy reversal (2017), DM, CVA with residual L sided hemiplegia, COPD, HTN, DV, PE (1 yr ago), hypothyroidism, obesity, OA, HLD, ventral hernia and multiple SBO in the past presents with L sided abdominal pain. Acc ot the patient, the pain started yesterday evening, sudden onset, sharp in nature, associated with nausea and 7 episodes of vomiting since yesterday. She denies fever. Her last BM was last night and she passed flatus this AM. She mentions her pain is similar to her previous episodes of SBO. She also mentions she has had pain in her R groin due to a ventral hernia for the past 6 months that is less severe in intensity. She is aware she is high risk for surgery and has been following up with a cardiologist and pulmonologist to obtain clearance for an elective hernia repair that she has had for the last 3 years.   She is bedbound and has not walked since her CVA 3 years ago when the rehab and PT was stopped at the nursing home and she stopped making progress with her extremities.      INTERVAL HPI/ OVERNIGHT EVENTS: Chart reviewed, Pt was seen and examined, reports tolerates diet, small amount, no N/V, no abd pain. Pt reports cough with phlegm. POC discussed     Vital Signs Last 24 Hrs  T(C): 36.7 (16 Dec 2022 08:46), Max: 37.1 (15 Dec 2022 21:04)  T(F): 98 (16 Dec 2022 08:46), Max: 98.7 (15 Dec 2022 21:04)  HR: 58 (16 Dec 2022 08:46) (58 - 71)  BP: 139/51 (16 Dec 2022 08:46) (126/55 - 148/62)  RR: 18 (16 Dec 2022 08:46) (18 - 18)  SpO2: 96% (16 Dec 2022 08:46) (95% - 96%)    Parameters below as of 16 Dec 2022 08:46  Patient On (Oxygen Delivery Method): nasal cannula  O2 Flow (L/min): 2        REVIEW OF SYSTEMS:  All other review of systems is negative unless indicated above.      PHYSICAL EXAM:  General: Well developed; in no acute distress  Eyes: EOMI; conjunctiva and sclera clear  Head: Normocephalic; atraumatic  Respiratory: mild bibasilar  wheezes, fair air entry   Cardiovascular: Regular rate and rhythm. S1 and S2 Normal;   Gastrointestinal: Soft non-tender non-distended; Normal bowel sounds  Genitourinary: No  suprapubic  tenderness  Extremities: No  edema  Vascular: Peripheral pulses palpable 2+ bilaterally  Neurological: Alert and oriented x3, speech is clear, L sided  paralysis   Musculoskeletal: Normal muscle tone, without deformities  Psychiatric: Cooperative and appropriate        LABS:                         10.0   6.78  )-----------( 264      ( 16 Dec 2022 07:37 )             32.9     12-16    145  |  113<H>  |  18  ----------------------------<  137<H>  3.8   |  25  |  0.80    Ca    8.5      16 Dec 2022 07:37  Phos  3.8     12-16  Mg     2.1     12-16    TPro  6.4  /  Alb  2.5<L>  /  TBili  0.3  /  DBili  x   /  AST  8<L>  /  ALT  24  /  AlkPhos  56  12-16      LIVER FUNCTIONS - ( 16 Dec 2022 07:37 )  Alb: 2.5 g/dL / Pro: 6.4 gm/dL / ALK PHOS: 56 U/L / ALT: 24 U/L / AST: 8 U/L / GGT: x           PT/INR - ( 16 Dec 2022 10:19 )   PT: 39.7 sec;   INR: 3.38 ratio       PTT - ( 16 Dec 2022 10:19 )  PTT:35.5 sec                                                      11.2   6.10  )-----------( 254      ( 15 Dec 2022 08:18 )             35.4     15 Dec 2022 08:18    145    |  113    |  16     ----------------------------<  150    3.4     |  27     |  0.70     Ca    8.5        15 Dec 2022 08:18  Phos  2.8       15 Dec 2022 08:18  Mg     2.1       15 Dec 2022 08:18    TPro  6.6    /  Alb  2.6    /  TBili  0.3    /  DBili  x      /  AST  13     /  ALT  28     /  AlkPhos  52     15 Dec 2022 08:18    PT/INR - ( 15 Dec 2022 08:18 )   PT: 30.0 sec;   INR: 2.56 ratio         PTT - ( 14 Dec 2022 08:20 )  PTT:30.5 sec  CAPILLARY BLOOD GLUCOSE      POCT Blood Glucose.: 131 mg/dL (15 Dec 2022 12:17)  POCT Blood Glucose.: 158 mg/dL (15 Dec 2022 09:01)  POCT Blood Glucose.: 116 mg/dL (14 Dec 2022 21:27)  POCT Blood Glucose.: 126 mg/dL (14 Dec 2022 18:27)    LIVER FUNCTIONS - ( 15 Dec 2022 08:18 )  Alb: 2.6 g/dL / Pro: 6.6 gm/dL / ALK PHOS: 52 U/L / ALT: 28 U/L / AST: 13 U/L / GGT: x               MEDICATIONS  (STANDING):  aMIOdarone    Tablet 200 milliGRAM(s) Oral daily  atorvastatin 20 milliGRAM(s) Oral at bedtime  budesonide 160 MICROgram(s)/formoterol 4.5 MICROgram(s) Inhaler 2 Puff(s) Inhalation two times a day  dextrose 5% + sodium chloride 0.45%. 1000 milliLiter(s) (75 mL/Hr) IV Continuous <Continuous>  dextrose 5%. 1000 milliLiter(s) (100 mL/Hr) IV Continuous <Continuous>  dextrose 5%. 1000 milliLiter(s) (50 mL/Hr) IV Continuous <Continuous>  dextrose 50% Injectable 25 Gram(s) IV Push once  dextrose 50% Injectable 12.5 Gram(s) IV Push once  dextrose 50% Injectable 25 Gram(s) IV Push once  diltiazem    milliGRAM(s) Oral daily  DULoxetine 60 milliGRAM(s) Oral daily  glucagon  Injectable 1 milliGRAM(s) IntraMuscular once  guaifenesin/dextromethorphan Oral Liquid 5 milliLiter(s) Oral four times a day  insulin glargine Injectable (LANTUS) 7 Unit(s) SubCutaneous at bedtime  insulin lispro (ADMELOG) corrective regimen sliding scale   SubCutaneous three times a day before meals  levothyroxine Injectable 66 MICROGram(s) IV Push at bedtime  metoprolol tartrate 25 milliGRAM(s) Oral two times a day  oxybutynin 5 milliGRAM(s) Oral two times a day  pantoprazole  Injectable 40 milliGRAM(s) IV Push daily  pregabalin 100 milliGRAM(s) Oral three times a day  warfarin 1.5 milliGRAM(s) Oral daily    MEDICATIONS  (PRN):  acetaminophen   IVPB .. 1000 milliGRAM(s) IV Intermittent Once PRN Mild Pain (1 - 3)  albuterol    90 MICROgram(s) HFA Inhaler 2 Puff(s) Inhalation every 6 hours PRN Shortness of Breath and/or Wheezing  dextrose Oral Gel 15 Gram(s) Oral once PRN Blood Glucose LESS THAN 70 milliGRAM(s)/deciliter  hydrALAZINE Injectable 10 milliGRAM(s) IV Push every 6 hours PRN if SBP > 145  ketorolac   Injectable 30 milliGRAM(s) IV Push every 6 hours PRN Moderate Pain (4 - 6)  methocarbamol 750 milliGRAM(s) Oral every 6 hours PRN Muscle Spasm  morphine  - Injectable 2 milliGRAM(s) IV Push every 4 hours PRN Severe Pain (7 - 10)  ondansetron Injectable 4 milliGRAM(s) IV Push every 6 hours PRN Nausea      RADIOLOGY & ADDITIONAL TESTS: CC: SBO      HPI: 73 yo female with h/o afib on warfarin, diverticulitis s/p Guillermina with SBR (2016), and subsequent colostomy reversal (2017), DM, CVA with residual L sided hemiplegia, COPD, HTN, DV, PE (1 yr ago), hypothyroidism, obesity, OA, HLD, ventral hernia and multiple SBO in the past presents with L sided abdominal pain. Acc ot the patient, the pain started yesterday evening, sudden onset, sharp in nature, associated with nausea and 7 episodes of vomiting since yesterday. She denies fever. Her last BM was last night and she passed flatus this AM. She mentions her pain is similar to her previous episodes of SBO. She also mentions she has had pain in her R groin due to a ventral hernia for the past 6 months that is less severe in intensity. She is aware she is high risk for surgery and has been following up with a cardiologist and pulmonologist to obtain clearance for an elective hernia repair that she has had for the last 3 years.   She is bedbound and has not walked since her CVA 3 years ago when the rehab and PT was stopped at the nursing home and she stopped making progress with her extremities.      INTERVAL HPI/ OVERNIGHT EVENTS:  Pt was seen and examined, cough improving, no SOB. Tolerates diet. Awaiting for d/c     Vital Signs Last 24 Hrs  T(C): 36.7 (16 Dec 2022 08:46), Max: 37.1 (15 Dec 2022 21:04)  T(F): 98 (16 Dec 2022 08:46), Max: 98.7 (15 Dec 2022 21:04)  HR: 58 (16 Dec 2022 08:46) (58 - 71)  BP: 139/51 (16 Dec 2022 08:46) (126/55 - 148/62)  RR: 18 (16 Dec 2022 08:46) (18 - 18)  SpO2: 96% (16 Dec 2022 08:46) (95% - 96%)    Parameters below as of 16 Dec 2022 08:46  Patient On (Oxygen Delivery Method): nasal cannula  O2 Flow (L/min): 2        REVIEW OF SYSTEMS:  All other review of systems is negative unless indicated above.      PHYSICAL EXAM:  General: Well developed; in no acute distress  Eyes: EOMI; conjunctiva and sclera clear  Head: Normocephalic; atraumatic  Respiratory: mild bibasilar  wheezes, fair air entry   Cardiovascular: Regular rate and rhythm. S1 and S2 Normal;   Gastrointestinal: Soft non-tender non-distended; Normal bowel sounds  Genitourinary: No  suprapubic  tenderness  Extremities: No  edema  Vascular: Peripheral pulses palpable 2+ bilaterally  Neurological: Alert and oriented x3, speech is clear, L sided  paralysis   Musculoskeletal: Normal muscle tone, without deformities  Psychiatric: Cooperative and appropriate        LABS:                         10.0   6.78  )-----------( 264      ( 16 Dec 2022 07:37 )             32.9     12-16    145  |  113<H>  |  18  ----------------------------<  137<H>  3.8   |  25  |  0.80    Ca    8.5      16 Dec 2022 07:37  Phos  3.8     12-16  Mg     2.1     12-16    TPro  6.4  /  Alb  2.5<L>  /  TBili  0.3  /  DBili  x   /  AST  8<L>  /  ALT  24  /  AlkPhos  56  12-16    LIVER FUNCTIONS - ( 16 Dec 2022 07:37 )  Alb: 2.5 g/dL / Pro: 6.4 gm/dL / ALK PHOS: 56 U/L / ALT: 24 U/L / AST: 8 U/L / GGT: x           PT/INR - ( 16 Dec 2022 10:19 )   PT: 39.7 sec;   INR: 3.38 ratio         PTT - ( 16 Dec 2022 10:19 )  PTT:35.5 sec                              10.0   6.78  )-----------( 264      ( 16 Dec 2022 07:37 )             32.9     12-16    145  |  113<H>  |  18  ----------------------------<  137<H>  3.8   |  25  |  0.80    Ca    8.5      16 Dec 2022 07:37  Phos  3.8     12-16  Mg     2.1     12-16    TPro  6.4  /  Alb  2.5<L>  /  TBili  0.3  /  DBili  x   /  AST  8<L>  /  ALT  24  /  AlkPhos  56  12-16      LIVER FUNCTIONS - ( 16 Dec 2022 07:37 )  Alb: 2.5 g/dL / Pro: 6.4 gm/dL / ALK PHOS: 56 U/L / ALT: 24 U/L / AST: 8 U/L / GGT: x           PT/INR - ( 16 Dec 2022 10:19 )   PT: 39.7 sec;   INR: 3.38 ratio       PTT - ( 16 Dec 2022 10:19 )  PTT:35.5 sec                          11.2   6.10  )-----------( 254      ( 15 Dec 2022 08:18 )             35.4     15 Dec 2022 08:18    145    |  113    |  16     ----------------------------<  150    3.4     |  27     |  0.70     Ca    8.5        15 Dec 2022 08:18  Phos  2.8       15 Dec 2022 08:18  Mg     2.1       15 Dec 2022 08:18    TPro  6.6    /  Alb  2.6    /  TBili  0.3    /  DBili  x      /  AST  13     /  ALT  28     /  AlkPhos  52     15 Dec 2022 08:18  PT/INR - ( 15 Dec 2022 08:18 )   PT: 30.0 sec;   INR: 2.56 ratio    PTT - ( 14 Dec 2022 08:20 )  PTT:30.5 sec      LIVER FUNCTIONS - ( 15 Dec 2022 08:18 )  Alb: 2.6 g/dL / Pro: 6.6 gm/dL / ALK PHOS: 52 U/L / ALT: 28 U/L / AST: 13 U/L / GGT: x           CAPILLARY BLOOD GLUCOSE:  POCT Blood Glucose.: 125 mg/dL (16 Dec 2022 13:02)  POCT Blood Glucose.: 139 mg/dL (16 Dec 2022 08:03)  POCT Blood Glucose.: 187 mg/dL (15 Dec 2022 22:46)  POCT Blood Glucose.: 164 mg/dL (15 Dec 2022 16:51)      MEDICATIONS  (STANDING):  aMIOdarone    Tablet 200 milliGRAM(s) Oral daily  atorvastatin 20 milliGRAM(s) Oral at bedtime  budesonide 160 MICROgram(s)/formoterol 4.5 MICROgram(s) Inhaler 2 Puff(s) Inhalation two times a day  dextrose 5% + sodium chloride 0.45%. 1000 milliLiter(s) (75 mL/Hr) IV Continuous <Continuous>  dextrose 5%. 1000 milliLiter(s) (100 mL/Hr) IV Continuous <Continuous>  dextrose 5%. 1000 milliLiter(s) (50 mL/Hr) IV Continuous <Continuous>  dextrose 50% Injectable 25 Gram(s) IV Push once  dextrose 50% Injectable 12.5 Gram(s) IV Push once  dextrose 50% Injectable 25 Gram(s) IV Push once  diltiazem    milliGRAM(s) Oral daily  DULoxetine 60 milliGRAM(s) Oral daily  glucagon  Injectable 1 milliGRAM(s) IntraMuscular once  guaifenesin/dextromethorphan Oral Liquid 5 milliLiter(s) Oral four times a day  insulin glargine Injectable (LANTUS) 7 Unit(s) SubCutaneous at bedtime  insulin lispro (ADMELOG) corrective regimen sliding scale   SubCutaneous three times a day before meals  levothyroxine Injectable 66 MICROGram(s) IV Push at bedtime  metoprolol tartrate 25 milliGRAM(s) Oral two times a day  oxybutynin 5 milliGRAM(s) Oral two times a day  pantoprazole  Injectable 40 milliGRAM(s) IV Push daily  pregabalin 100 milliGRAM(s) Oral three times a day  warfarin 1.5 milliGRAM(s) Oral daily    MEDICATIONS  (PRN):  acetaminophen   IVPB .. 1000 milliGRAM(s) IV Intermittent Once PRN Mild Pain (1 - 3)  albuterol    90 MICROgram(s) HFA Inhaler 2 Puff(s) Inhalation every 6 hours PRN Shortness of Breath and/or Wheezing  dextrose Oral Gel 15 Gram(s) Oral once PRN Blood Glucose LESS THAN 70 milliGRAM(s)/deciliter  hydrALAZINE Injectable 10 milliGRAM(s) IV Push every 6 hours PRN if SBP > 145  ketorolac   Injectable 30 milliGRAM(s) IV Push every 6 hours PRN Moderate Pain (4 - 6)  methocarbamol 750 milliGRAM(s) Oral every 6 hours PRN Muscle Spasm  morphine  - Injectable 2 milliGRAM(s) IV Push every 4 hours PRN Severe Pain (7 - 10)  ondansetron Injectable 4 milliGRAM(s) IV Push every 6 hours PRN Nausea      RADIOLOGY & ADDITIONAL TESTS:

## 2022-12-16 NOTE — PROGRESS NOTE ADULT - ASSESSMENT
A/P:     Chronic large ventral/incisional hernia/diastasis rectii with loss of domain  SBO, clinically resolving with flatus and diminished/resolving/resolved abd pain  SBS shows resolution of SBO  Monitor tolerance to low fiber diet  GI/DVT prophylaxis  Nausea control PRN  Hospitalist on consult for medical mgmt  Pain control  Serial abd exams stable and improved  Monitor bowel function  IV antibiotics for UTI  Pt with multiple medical comorbidities including chronic left ext paralysis from CVA, PE, on anticoagulation therapy  Anticoagulation consult: started on warfarin, trending INR  Planned for discharge today      Plan discussed with Dr Tony

## 2022-12-16 NOTE — DISCHARGE NOTE PROVIDER - NSCORESITESY/N_GEN_A_CORE_RD
How Severe Is Your Skin Lesion?: moderate Have Your Skin Lesions Been Treated?: not been treated Is This A New Presentation, Or A Follow-Up?: Skin Lesions Additional History: Patient has no moles of concern. Yes

## 2022-12-16 NOTE — PROGRESS NOTE ADULT - ASSESSMENT
72f PMH as above a/w    PROBLEMS:    COPD/RSV/chronic respiratory failure  SBO/Large ventral hernia  UTI  Paroxysmal atrial fibrillation  Hypothyroidism  H/o DVT/PE  H/O CVA  IDDM    PLAN:    pulmonary stable-decd planning  Fluticasone/singulair  Symbicort/mucinex  SBO improving-tolerating po   Paroxysmal atrial fibrillation-continue bb-ccb-amiodarone   COPD/ RSV/hronic respiratory failure--on nc 2-3L at [facility]-continue symbicort, albuterol-guafenisin  H/o DVT/PE-on coumadin-therapeutic INR  DVT px:-inr therapeutic

## 2022-12-16 NOTE — DISCHARGE NOTE NURSING/CASE MANAGEMENT/SOCIAL WORK - NSDCPEFALRISK_GEN_ALL_CORE
For information on Fall & Injury Prevention, visit: [email or web address] OR  [email or web address] OR  https://www.cdc.gov/steadi/patient.html

## 2022-12-16 NOTE — PROGRESS NOTE ADULT - SUBJECTIVE AND OBJECTIVE BOX
SURGERY DAILY PROGRESS NOTE:     Subjective:  Patient seen and examined at bedside during am rounds. Tolerating LRD without further nausea/vomiting. AVSS. Denies any fevers, chills, chest pain or shortness of breath    Objective:        Vitals:  T(C): 36.7 ( @ 08:46), Max: 37.1 (12-15 @ 21:04)  HR: 58 ( @ 08:46) (58 - 72)  BP: 139/51 ( @ 08:46) (126/55 - 148/62)  RR: 18 ( @ 08:46) (18 - 18)  SpO2: 96% ( @ 08:46) (95% - 96%)        PHYSICAL EXAM   General: AAOx3, Well developed, NAD  Chest: Normal respiratory effort  Heart: RRR  Abdomen: Large diastasis, thin abdominal wall, minimally tender at the hernia site  Neuro/Psych: No localized deficits. Normal speech, normal tone  Skin: Normal, no rashes, no lesions noted.   Extremities: Warm, well perfused, no edema, Pulses intact           @ 07:37                    10.0  CBC: 6.78>)-------(<264                     32.9                 145 | 113 | 18    CMP:  ----------------------< 137               3.8 | 25 | 0.80                      Ca:8.5  Phos:3.8  M.1               0.3|      |8        LFTs:  ------|56|-----             -|      |-  12-15 @ 08:18                    11.2  CBC: 6.10>)-------(<254                     35.4                 145 | 113 | 16    CMP:  ----------------------< 150               3.4 | 27 | 0.70                      Ca:8.5  Phos:2.8  M.1               0.3|      |13        LFTs:  ------|52|-----             -|      |-      Culture - Urine (collected 22 @ 06:50)  Source: Clean Catch Clean Catch (Midstream)  Final Report (22 @ 23:21):    <10,000 CFU/mL Normal Urogenital Genesis      Current Inpatient Medications:  acetaminophen   IVPB .. 1000 milliGRAM(s) IV Intermittent Once PRN  albuterol    90 MICROgram(s) HFA Inhaler 2 Puff(s) Inhalation every 6 hours PRN  aMIOdarone    Tablet 200 milliGRAM(s) Oral daily  atorvastatin 20 milliGRAM(s) Oral at bedtime  budesonide 160 MICROgram(s)/formoterol 4.5 MICROgram(s) Inhaler 2 Puff(s) Inhalation two times a day  dextrose 5%. 1000 milliLiter(s) (50 mL/Hr) IV Continuous <Continuous>  dextrose 5%. 1000 milliLiter(s) (100 mL/Hr) IV Continuous <Continuous>  dextrose 50% Injectable 25 Gram(s) IV Push once  dextrose 50% Injectable 12.5 Gram(s) IV Push once  dextrose 50% Injectable 25 Gram(s) IV Push once  dextrose Oral Gel 15 Gram(s) Oral once PRN  diltiazem    milliGRAM(s) Oral daily  DULoxetine 60 milliGRAM(s) Oral daily  fluticasone propionate 50 MICROgram(s)/spray Nasal Spray 1 Spray(s) Both Nostrils two times a day  glucagon  Injectable 1 milliGRAM(s) IntraMuscular once  guaiFENesin  milliGRAM(s) Oral every 12 hours  hydrALAZINE Injectable 10 milliGRAM(s) IV Push every 6 hours PRN  insulin glargine Injectable (LANTUS) 7 Unit(s) SubCutaneous at bedtime  insulin lispro (ADMELOG) corrective regimen sliding scale   SubCutaneous three times a day before meals  levothyroxine Injectable 66 MICROGram(s) IV Push at bedtime  methocarbamol 750 milliGRAM(s) Oral every 6 hours PRN  metoprolol tartrate 25 milliGRAM(s) Oral two times a day  montelukast 10 milliGRAM(s) Oral daily  morphine  - Injectable 2 milliGRAM(s) IV Push every 4 hours PRN  ondansetron Injectable 4 milliGRAM(s) IV Push every 6 hours PRN  oxybutynin 5 milliGRAM(s) Oral two times a day  pantoprazole  Injectable 40 milliGRAM(s) IV Push daily  pregabalin 100 milliGRAM(s) Oral three times a day  warfarin 1.5 milliGRAM(s) Oral daily

## 2022-12-16 NOTE — PROGRESS NOTE ADULT - ASSESSMENT
73 yo female with h/o afib on warfarin, diverticulitis s/p Guillermina with SBR (2016), and subsequent colostomy reversal (2017), DM, CVA with residual L sided hemiplegia, COPD, HTN, DV, PE (1 yr ago), hypothyroidism, obesity, OA, HLD, ventral hernia and multiple SBO admitted for:     1. SBO/Large ventral hernia:  clinically  resolving   d/c IVF  tolerates PO   monitor labs  Zofran PRN     2.     3. Paroxysmal atrial fibrillation:  -continue bb, ccb amiodarone.   -continue coumadin   - INR TX    4 .COPD/ RSV/chronic respiratory failure.   -on nc 2-3L at [facility]  -continue symbicort, albuterol  -add guafenisin  check  CXR     4. Hypothyroidism:  -synthroidism     5. HFpEF:  -continue to hold lasix for today.   -monitor closely for s/s of exacerbation.     6. h/o DVT/PE  - on coumadin  - therapeutic INR    7. h/o CVA:  - continue statin  - INR therapeutic.     8. IDDM:  -lantus 7+ss  -    9.  DVT px:  -inr therapeutic.  71 yo female with h/o afib on warfarin, diverticulitis s/p Guillermina with SBR (2016), and subsequent colostomy reversal (2017), DM, CVA with residual L sided hemiplegia, COPD, HTN, DV, PE (1 yr ago), hypothyroidism, obesity, OA, HLD, ventral hernia and multiple SBO admitted for:     1. SBO resolved, Large ventral hernia:  clinically improved   d/c IVF  tolerates PO   monitor labs  Zofran PRN     2. UTI  - s/p  course of antibiotics  - urine cultures no growth    3. Paroxysmal atrial fibrillation:  -continue bb, ccb amiodarone.   - INR  3.3 today   - Hold Coumadin tonight     4 .COPD/ RSV/chronic respiratory failure.   -on nc 2-3L at [facility]  -continue symbicort, albuterol  -c/w guafenisin  - CXR reviewed, official report pending     5. Hypothyroidism:  - C/w Synthroid    6.  HFpEF:  -may resume lasix   -f/u with PCP     7. h/o DVT/PE  - on coumadin      8. h/o CVA:  - continue statin  - on coumadin     9. IDDM:  -lantus 7+ss  -    10   DVT px:  -inr therapeutic.         Afree with d/c planning to half-way

## 2022-12-16 NOTE — DISCHARGE NOTE PROVIDER - NSDCMRMEDTOKEN_GEN_ALL_CORE_FT
Admelog SoloStar 100 units/mL injectable solution: 10 unit(s) injectable 3 times a day (before meals)  Albuterol (Eqv-ProAir HFA) 90 mcg/inh inhalation aerosol: 1 puff(s) inhaled every 6 hours, As Needed  amiodarone 200 mg oral tablet: 1 tab(s) orally once a day  ascorbic acid 500 mg oral tablet: 2 tab(s) orally once a day  atorvastatin 10 mg oral tablet: 1 tab(s) orally once a day (at bedtime)  cholecalciferol 1250 mcg (50,000 intl units) oral capsule: 1 cap(s) orally every 4 weeks on Wednesday   Cranberry oral tablet: 1 tab(s) orally 2 times a day  cyanocobalamin 1000 mcg oral tablet: 1 tab(s) orally once a day  dilTIAZem 180 mg/24 hours oral capsule, extended release: 1 cap(s) orally once a day  DULoxetine 60 mg oral delayed release capsule: 1 cap(s) orally once a day  estrogens, Conjugated Cream 0.625 mg/gm: 1 application vaginal once a day on Monday &amp; Thursday for atrophic vaginitis  fluticasone 50 mcg/inh nasal spray: 1 spray(s) nasal 2 times a day  furosemide 20 mg oral tablet: 1 tab(s) orally once a day  glipiZIDE 10 mg oral tablet, extended release: 2 tab(s) orally once a day  GlycoLax oral powder for reconstitution: 17 gram(s) orally 2 times a day  guaiFENesin 100 mg/5 mL oral liquid: 10 milliliter(s) orally 4 times a day for 5 days  ipratropium-albuterol 20 mcg-100 mcg/inh inhalation aerosol: 1 puff(s) inhaled 4 times a day for 5 days  levothyroxine 88 mcg (0.088 mg) oral tablet: 1 tab(s) orally once a day (at bedtime)  losartan 25 mg oral tablet: 1 tab(s) orally once a day  methocarbamol 750 mg oral tablet: 1 tab(s) orally every 6 hours, As needed, Muscle Spasm  metoprolol tartrate 25 mg oral tablet: 1 tab(s) orally 2 times a day  Milk of Magnesia 8% oral suspension: 30 milliliter(s) orally once a day, As Needed  montelukast 10 mg oral tablet: 1 tab(s) orally once a day  oxybutynin 5 mg oral tablet: 1 tab(s) orally 2 times a day  oxyCODONE 5 mg oral tablet: 1 tab(s) orally every 4 hours, As Needed  pregabalin 100 mg oral capsule: 1 cap(s) orally 3 times a day  sennosides-docusate 8.6 mg-50 mg oral tablet: 2 tab(s) orally once a day (at bedtime)  Symbicort 160 mcg-4.5 mcg/inh inhalation aerosol: 2 puff(s) inhaled 2 times a day  Tradjenta 5 mg oral tablet: 1 tab(s) orally once a day  Tylenol 500 mg oral tablet: 2 tab(s) orally every 8 hours  warfarin 1 mg oral tablet: 1 tab(s) orally every 12 hours

## 2022-12-16 NOTE — PROGRESS NOTE ADULT - PROVIDER SPECIALTY LIST ADULT
Anticoag Management
Hospitalist
Anticoag Management
Hospitalist
Surgery
Anticoag Management
Hospitalist
Surgery
Surgery
Anticoag Management
Hospitalist
Pulmonology
Pulmonology
Surgery

## 2022-12-23 DIAGNOSIS — I48.0 PAROXYSMAL ATRIAL FIBRILLATION: ICD-10-CM

## 2022-12-23 DIAGNOSIS — J44.9 CHRONIC OBSTRUCTIVE PULMONARY DISEASE, UNSPECIFIED: ICD-10-CM

## 2022-12-23 DIAGNOSIS — Z74.01 BED CONFINEMENT STATUS: ICD-10-CM

## 2022-12-23 DIAGNOSIS — I50.32 CHRONIC DIASTOLIC (CONGESTIVE) HEART FAILURE: ICD-10-CM

## 2022-12-23 DIAGNOSIS — I11.0 HYPERTENSIVE HEART DISEASE WITH HEART FAILURE: ICD-10-CM

## 2022-12-23 DIAGNOSIS — E11.40 TYPE 2 DIABETES MELLITUS WITH DIABETIC NEUROPATHY, UNSPECIFIED: ICD-10-CM

## 2022-12-23 DIAGNOSIS — Z90.49 ACQUIRED ABSENCE OF OTHER SPECIFIED PARTS OF DIGESTIVE TRACT: ICD-10-CM

## 2022-12-23 DIAGNOSIS — K43.0 INCISIONAL HERNIA WITH OBSTRUCTION, WITHOUT GANGRENE: ICD-10-CM

## 2022-12-23 DIAGNOSIS — Z79.01 LONG TERM (CURRENT) USE OF ANTICOAGULANTS: ICD-10-CM

## 2022-12-23 DIAGNOSIS — Z79.890 HORMONE REPLACEMENT THERAPY: ICD-10-CM

## 2022-12-23 DIAGNOSIS — J96.10 CHRONIC RESPIRATORY FAILURE, UNSPECIFIED WHETHER WITH HYPOXIA OR HYPERCAPNIA: ICD-10-CM

## 2022-12-23 DIAGNOSIS — Z86.711 PERSONAL HISTORY OF PULMONARY EMBOLISM: ICD-10-CM

## 2022-12-23 DIAGNOSIS — M19.90 UNSPECIFIED OSTEOARTHRITIS, UNSPECIFIED SITE: ICD-10-CM

## 2022-12-23 DIAGNOSIS — I25.10 ATHEROSCLEROTIC HEART DISEASE OF NATIVE CORONARY ARTERY WITHOUT ANGINA PECTORIS: ICD-10-CM

## 2022-12-23 DIAGNOSIS — Z79.52 LONG TERM (CURRENT) USE OF SYSTEMIC STEROIDS: ICD-10-CM

## 2022-12-23 DIAGNOSIS — N39.0 URINARY TRACT INFECTION, SITE NOT SPECIFIED: ICD-10-CM

## 2022-12-23 DIAGNOSIS — B97.4 RESPIRATORY SYNCYTIAL VIRUS AS THE CAUSE OF DISEASES CLASSIFIED ELSEWHERE: ICD-10-CM

## 2022-12-23 DIAGNOSIS — Z88.1 ALLERGY STATUS TO OTHER ANTIBIOTIC AGENTS STATUS: ICD-10-CM

## 2022-12-23 DIAGNOSIS — E03.9 HYPOTHYROIDISM, UNSPECIFIED: ICD-10-CM

## 2022-12-23 DIAGNOSIS — Z79.4 LONG TERM (CURRENT) USE OF INSULIN: ICD-10-CM

## 2022-12-23 DIAGNOSIS — I69.354 HEMIPLEGIA AND HEMIPARESIS FOLLOWING CEREBRAL INFARCTION AFFECTING LEFT NON-DOMINANT SIDE: ICD-10-CM

## 2022-12-23 DIAGNOSIS — E78.00 PURE HYPERCHOLESTEROLEMIA, UNSPECIFIED: ICD-10-CM

## 2022-12-23 DIAGNOSIS — Z66 DO NOT RESUSCITATE: ICD-10-CM

## 2022-12-23 DIAGNOSIS — Z96.643 PRESENCE OF ARTIFICIAL HIP JOINT, BILATERAL: ICD-10-CM

## 2022-12-23 DIAGNOSIS — Z88.8 ALLERGY STATUS TO OTHER DRUGS, MEDICAMENTS AND BIOLOGICAL SUBSTANCES STATUS: ICD-10-CM

## 2023-01-14 RX ORDER — NITROFURANTOIN MACROCRYSTAL 50 MG
1 CAPSULE ORAL
Qty: 0 | Refills: 0 | DISCHARGE
Start: 2023-01-14 | End: 2023-01-19

## 2023-01-19 RX ORDER — PHYTONADIONE (VIT K1) 5 MG
0.5 TABLET ORAL
Qty: 0 | Refills: 0 | DISCHARGE
Start: 2023-01-19 | End: 2023-01-19

## 2023-01-20 ENCOUNTER — INPATIENT (INPATIENT)
Facility: HOSPITAL | Age: 73
LOS: 32 days | Discharge: HOSPICE | DRG: 853 | End: 2023-02-22
Attending: SURGERY | Admitting: SURGERY
Payer: MEDICARE

## 2023-01-20 VITALS
HEIGHT: 65 IN | RESPIRATION RATE: 22 BRPM | HEART RATE: 126 BPM | SYSTOLIC BLOOD PRESSURE: 109 MMHG | TEMPERATURE: 98 F | WEIGHT: 216.93 LBS | OXYGEN SATURATION: 96 % | DIASTOLIC BLOOD PRESSURE: 60 MMHG

## 2023-01-20 DIAGNOSIS — Z90.49 ACQUIRED ABSENCE OF OTHER SPECIFIED PARTS OF DIGESTIVE TRACT: Chronic | ICD-10-CM

## 2023-01-20 DIAGNOSIS — Z98.890 OTHER SPECIFIED POSTPROCEDURAL STATES: Chronic | ICD-10-CM

## 2023-01-20 DIAGNOSIS — K56.609 UNSPECIFIED INTESTINAL OBSTRUCTION, UNSPECIFIED AS TO PARTIAL VERSUS COMPLETE OBSTRUCTION: ICD-10-CM

## 2023-01-20 DIAGNOSIS — Z93.3 COLOSTOMY STATUS: Chronic | ICD-10-CM

## 2023-01-20 DIAGNOSIS — Z98.891 HISTORY OF UTERINE SCAR FROM PREVIOUS SURGERY: Chronic | ICD-10-CM

## 2023-01-20 DIAGNOSIS — Z96.649 PRESENCE OF UNSPECIFIED ARTIFICIAL HIP JOINT: Chronic | ICD-10-CM

## 2023-01-20 DIAGNOSIS — Z96.643 PRESENCE OF ARTIFICIAL HIP JOINT, BILATERAL: Chronic | ICD-10-CM

## 2023-01-20 LAB
ALBUMIN SERPL ELPH-MCNC: 2 G/DL — LOW (ref 3.3–5)
ALP SERPL-CCNC: 88 U/L — SIGNIFICANT CHANGE UP (ref 40–120)
ALT FLD-CCNC: 19 U/L — SIGNIFICANT CHANGE UP (ref 12–78)
ANION GAP SERPL CALC-SCNC: 10 MMOL/L — SIGNIFICANT CHANGE UP (ref 5–17)
APPEARANCE UR: ABNORMAL
APTT BLD: 38 SEC — HIGH (ref 27.5–35.5)
AST SERPL-CCNC: 14 U/L — LOW (ref 15–37)
BACTERIA # UR AUTO: ABNORMAL
BASE EXCESS BLDA CALC-SCNC: -5.9 MMOL/L — LOW (ref -2–3)
BASOPHILS # BLD AUTO: 0 K/UL — SIGNIFICANT CHANGE UP (ref 0–0.2)
BASOPHILS NFR BLD AUTO: 0 % — SIGNIFICANT CHANGE UP (ref 0–2)
BILIRUB SERPL-MCNC: 0.6 MG/DL — SIGNIFICANT CHANGE UP (ref 0.2–1.2)
BILIRUB UR-MCNC: NEGATIVE — SIGNIFICANT CHANGE UP
BLD GP AB SCN SERPL QL: SIGNIFICANT CHANGE UP
BLOOD GAS COMMENTS ARTERIAL: SIGNIFICANT CHANGE UP
BUN SERPL-MCNC: 28 MG/DL — HIGH (ref 7–23)
CALCIUM SERPL-MCNC: 9.5 MG/DL — SIGNIFICANT CHANGE UP (ref 8.5–10.1)
CHLORIDE SERPL-SCNC: 101 MMOL/L — SIGNIFICANT CHANGE UP (ref 96–108)
CO2 BLDA-SCNC: 20 MMOL/L — SIGNIFICANT CHANGE UP (ref 19–24)
CO2 SERPL-SCNC: 24 MMOL/L — SIGNIFICANT CHANGE UP (ref 22–31)
COLOR SPEC: ABNORMAL
CREAT SERPL-MCNC: 1.95 MG/DL — HIGH (ref 0.5–1.3)
DIFF PNL FLD: ABNORMAL
EGFR: 27 ML/MIN/1.73M2 — LOW
EOSINOPHIL # BLD AUTO: 0 K/UL — SIGNIFICANT CHANGE UP (ref 0–0.5)
EOSINOPHIL NFR BLD AUTO: 0 % — SIGNIFICANT CHANGE UP (ref 0–6)
EPI CELLS # UR: NEGATIVE — SIGNIFICANT CHANGE UP
FLUAV AG NPH QL: SIGNIFICANT CHANGE UP
FLUBV AG NPH QL: SIGNIFICANT CHANGE UP
GAS PNL BLDA: SIGNIFICANT CHANGE UP
GLUCOSE SERPL-MCNC: 174 MG/DL — HIGH (ref 70–99)
GLUCOSE UR QL: NEGATIVE — SIGNIFICANT CHANGE UP
HCO3 BLDA-SCNC: 19 MMOL/L — LOW (ref 21–28)
HCT VFR BLD CALC: 42.3 % — SIGNIFICANT CHANGE UP (ref 34.5–45)
HGB BLD-MCNC: 13.3 G/DL — SIGNIFICANT CHANGE UP (ref 11.5–15.5)
INR BLD: 2.07 RATIO — HIGH (ref 0.88–1.16)
KETONES UR-MCNC: ABNORMAL
LACTATE SERPL-SCNC: 2.5 MMOL/L — HIGH (ref 0.7–2)
LACTATE SERPL-SCNC: 2.8 MMOL/L — HIGH (ref 0.7–2)
LACTATE SERPL-SCNC: 3.2 MMOL/L — HIGH (ref 0.7–2)
LEUKOCYTE ESTERASE UR-ACNC: ABNORMAL
LIDOCAIN IGE QN: 48 U/L — LOW (ref 73–393)
LYMPHOCYTES # BLD AUTO: 1.51 K/UL — SIGNIFICANT CHANGE UP (ref 1–3.3)
LYMPHOCYTES # BLD AUTO: 6 % — LOW (ref 13–44)
MANUAL SMEAR VERIFICATION: SIGNIFICANT CHANGE UP
MCHC RBC-ENTMCNC: 25.5 PG — LOW (ref 27–34)
MCHC RBC-ENTMCNC: 31.4 GM/DL — LOW (ref 32–36)
MCV RBC AUTO: 81.2 FL — SIGNIFICANT CHANGE UP (ref 80–100)
METAMYELOCYTES # FLD: 2 % — HIGH (ref 0–0)
MONOCYTES # BLD AUTO: 1.51 K/UL — HIGH (ref 0–0.9)
MONOCYTES NFR BLD AUTO: 6 % — SIGNIFICANT CHANGE UP (ref 2–14)
MYELOCYTES NFR BLD: 2 % — HIGH (ref 0–0)
NEUTROPHILS # BLD AUTO: 21.13 K/UL — HIGH (ref 1.8–7.4)
NEUTROPHILS NFR BLD AUTO: 76 % — SIGNIFICANT CHANGE UP (ref 43–77)
NEUTS BAND # BLD: 8 % — SIGNIFICANT CHANGE UP (ref 0–8)
NITRITE UR-MCNC: POSITIVE
NRBC # BLD: 0 /100 — SIGNIFICANT CHANGE UP (ref 0–0)
NRBC # BLD: SIGNIFICANT CHANGE UP /100 WBCS (ref 0–0)
PCO2 BLDA: 33 MMHG — SIGNIFICANT CHANGE UP (ref 32–45)
PH BLDA: 7.36 — SIGNIFICANT CHANGE UP (ref 7.35–7.45)
PH UR: 5 — SIGNIFICANT CHANGE UP (ref 5–8)
PLAT MORPH BLD: NORMAL — SIGNIFICANT CHANGE UP
PLATELET # BLD AUTO: 524 K/UL — HIGH (ref 150–400)
PO2 BLDA: 78 MMHG — LOW (ref 83–108)
POTASSIUM SERPL-MCNC: 3.9 MMOL/L — SIGNIFICANT CHANGE UP (ref 3.5–5.3)
POTASSIUM SERPL-SCNC: 3.9 MMOL/L — SIGNIFICANT CHANGE UP (ref 3.5–5.3)
PROT SERPL-MCNC: 7.3 GM/DL — SIGNIFICANT CHANGE UP (ref 6–8.3)
PROT UR-MCNC: 100
PROTHROM AB SERPL-ACNC: 24.2 SEC — HIGH (ref 10.5–13.4)
RBC # BLD: 5.21 M/UL — HIGH (ref 3.8–5.2)
RBC # FLD: 16.8 % — HIGH (ref 10.3–14.5)
RBC BLD AUTO: NORMAL — SIGNIFICANT CHANGE UP
RBC CASTS # UR COMP ASSIST: ABNORMAL /HPF (ref 0–4)
RSV RNA NPH QL NAA+NON-PROBE: SIGNIFICANT CHANGE UP
SAO2 % BLDA: 97 % — SIGNIFICANT CHANGE UP (ref 94–98)
SARS-COV-2 RNA SPEC QL NAA+PROBE: SIGNIFICANT CHANGE UP
SODIUM SERPL-SCNC: 135 MMOL/L — SIGNIFICANT CHANGE UP (ref 135–145)
SP GR SPEC: 1.02 — SIGNIFICANT CHANGE UP (ref 1.01–1.02)
TOXIC GRANULES BLD QL SMEAR: PRESENT — SIGNIFICANT CHANGE UP
TROPONIN I, HIGH SENSITIVITY RESULT: 14.25 NG/L — SIGNIFICANT CHANGE UP
UROBILINOGEN FLD QL: NEGATIVE — SIGNIFICANT CHANGE UP
WBC # BLD: 25.15 K/UL — HIGH (ref 3.8–10.5)
WBC # FLD AUTO: 25.15 K/UL — HIGH (ref 3.8–10.5)
WBC UR QL: >50 /HPF (ref 0–5)

## 2023-01-20 PROCEDURE — 36430 TRANSFUSION BLD/BLD COMPNT: CPT

## 2023-01-20 PROCEDURE — 83036 HEMOGLOBIN GLYCOSYLATED A1C: CPT

## 2023-01-20 PROCEDURE — 86850 RBC ANTIBODY SCREEN: CPT

## 2023-01-20 PROCEDURE — 97530 THERAPEUTIC ACTIVITIES: CPT | Mod: GP

## 2023-01-20 PROCEDURE — 93010 ELECTROCARDIOGRAM REPORT: CPT

## 2023-01-20 PROCEDURE — 83605 ASSAY OF LACTIC ACID: CPT

## 2023-01-20 PROCEDURE — 36415 COLL VENOUS BLD VENIPUNCTURE: CPT

## 2023-01-20 PROCEDURE — 93971 EXTREMITY STUDY: CPT | Mod: RT

## 2023-01-20 PROCEDURE — 84100 ASSAY OF PHOSPHORUS: CPT

## 2023-01-20 PROCEDURE — 74176 CT ABD & PELVIS W/O CONTRAST: CPT

## 2023-01-20 PROCEDURE — 83880 ASSAY OF NATRIURETIC PEPTIDE: CPT

## 2023-01-20 PROCEDURE — P9047: CPT

## 2023-01-20 PROCEDURE — 86923 COMPATIBILITY TEST ELECTRIC: CPT

## 2023-01-20 PROCEDURE — C9113: CPT

## 2023-01-20 PROCEDURE — 82140 ASSAY OF AMMONIA: CPT

## 2023-01-20 PROCEDURE — 74018 RADEX ABDOMEN 1 VIEW: CPT

## 2023-01-20 PROCEDURE — 82330 ASSAY OF CALCIUM: CPT

## 2023-01-20 PROCEDURE — 84156 ASSAY OF PROTEIN URINE: CPT

## 2023-01-20 PROCEDURE — 36600 WITHDRAWAL OF ARTERIAL BLOOD: CPT

## 2023-01-20 PROCEDURE — 71045 X-RAY EXAM CHEST 1 VIEW: CPT

## 2023-01-20 PROCEDURE — 87077 CULTURE AEROBIC IDENTIFY: CPT

## 2023-01-20 PROCEDURE — C9399: CPT

## 2023-01-20 PROCEDURE — 87635 SARS-COV-2 COVID-19 AMP PRB: CPT

## 2023-01-20 PROCEDURE — 80048 BASIC METABOLIC PNL TOTAL CA: CPT

## 2023-01-20 PROCEDURE — 71250 CT THORAX DX C-: CPT

## 2023-01-20 PROCEDURE — 87206 SMEAR FLUORESCENT/ACID STAI: CPT

## 2023-01-20 PROCEDURE — 87086 URINE CULTURE/COLONY COUNT: CPT

## 2023-01-20 PROCEDURE — 87116 MYCOBACTERIA CULTURE: CPT

## 2023-01-20 PROCEDURE — P9016: CPT

## 2023-01-20 PROCEDURE — 86900 BLOOD TYPING SEROLOGIC ABO: CPT

## 2023-01-20 PROCEDURE — 85027 COMPLETE CBC AUTOMATED: CPT

## 2023-01-20 PROCEDURE — 87102 FUNGUS ISOLATION CULTURE: CPT

## 2023-01-20 PROCEDURE — 84484 ASSAY OF TROPONIN QUANT: CPT

## 2023-01-20 PROCEDURE — 71045 X-RAY EXAM CHEST 1 VIEW: CPT | Mod: 26,77

## 2023-01-20 PROCEDURE — 85730 THROMBOPLASTIN TIME PARTIAL: CPT

## 2023-01-20 PROCEDURE — 94640 AIRWAY INHALATION TREATMENT: CPT

## 2023-01-20 PROCEDURE — 84300 ASSAY OF URINE SODIUM: CPT

## 2023-01-20 PROCEDURE — 92610 EVALUATE SWALLOWING FUNCTION: CPT | Mod: GN

## 2023-01-20 PROCEDURE — 88305 TISSUE EXAM BY PATHOLOGIST: CPT

## 2023-01-20 PROCEDURE — C1889: CPT

## 2023-01-20 PROCEDURE — 93005 ELECTROCARDIOGRAM TRACING: CPT

## 2023-01-20 PROCEDURE — 87040 BLOOD CULTURE FOR BACTERIA: CPT

## 2023-01-20 PROCEDURE — 71045 X-RAY EXAM CHEST 1 VIEW: CPT | Mod: 26

## 2023-01-20 PROCEDURE — 87186 SC STD MICRODIL/AGAR DIL: CPT

## 2023-01-20 PROCEDURE — 83735 ASSAY OF MAGNESIUM: CPT

## 2023-01-20 PROCEDURE — C1751: CPT

## 2023-01-20 PROCEDURE — 86901 BLOOD TYPING SEROLOGIC RH(D): CPT

## 2023-01-20 PROCEDURE — 97166 OT EVAL MOD COMPLEX 45 MIN: CPT | Mod: GO

## 2023-01-20 PROCEDURE — 80053 COMPREHEN METABOLIC PANEL: CPT

## 2023-01-20 PROCEDURE — 86140 C-REACTIVE PROTEIN: CPT

## 2023-01-20 PROCEDURE — 82803 BLOOD GASES ANY COMBINATION: CPT

## 2023-01-20 PROCEDURE — 85610 PROTHROMBIN TIME: CPT

## 2023-01-20 PROCEDURE — 74176 CT ABD & PELVIS W/O CONTRAST: CPT | Mod: 26,MA

## 2023-01-20 PROCEDURE — 82570 ASSAY OF URINE CREATININE: CPT

## 2023-01-20 PROCEDURE — 88302 TISSUE EXAM BY PATHOLOGIST: CPT

## 2023-01-20 PROCEDURE — C9360: CPT

## 2023-01-20 PROCEDURE — 87070 CULTURE OTHR SPECIMN AEROBIC: CPT

## 2023-01-20 PROCEDURE — 70450 CT HEAD/BRAIN W/O DYE: CPT

## 2023-01-20 PROCEDURE — 85652 RBC SED RATE AUTOMATED: CPT

## 2023-01-20 PROCEDURE — 99291 CRITICAL CARE FIRST HOUR: CPT

## 2023-01-20 PROCEDURE — 87075 CULTR BACTERIA EXCEPT BLOOD: CPT

## 2023-01-20 PROCEDURE — 81001 URINALYSIS AUTO W/SCOPE: CPT

## 2023-01-20 PROCEDURE — 87015 SPECIMEN INFECT AGNT CONCNTJ: CPT

## 2023-01-20 PROCEDURE — 80069 RENAL FUNCTION PANEL: CPT

## 2023-01-20 PROCEDURE — 97162 PT EVAL MOD COMPLEX 30 MIN: CPT | Mod: GP

## 2023-01-20 PROCEDURE — 97164 PT RE-EVAL EST PLAN CARE: CPT | Mod: GP

## 2023-01-20 PROCEDURE — 80076 HEPATIC FUNCTION PANEL: CPT

## 2023-01-20 PROCEDURE — 82962 GLUCOSE BLOOD TEST: CPT

## 2023-01-20 PROCEDURE — 85025 COMPLETE CBC W/AUTO DIFF WBC: CPT

## 2023-01-20 PROCEDURE — 88307 TISSUE EXAM BY PATHOLOGIST: CPT

## 2023-01-20 PROCEDURE — 92523 SPEECH SOUND LANG COMPREHEN: CPT | Mod: GN

## 2023-01-20 PROCEDURE — 84478 ASSAY OF TRIGLYCERIDES: CPT

## 2023-01-20 PROCEDURE — 93306 TTE W/DOPPLER COMPLETE: CPT

## 2023-01-20 RX ORDER — PIPERACILLIN AND TAZOBACTAM 4; .5 G/20ML; G/20ML
3.38 INJECTION, POWDER, LYOPHILIZED, FOR SOLUTION INTRAVENOUS ONCE
Refills: 0 | Status: COMPLETED | OUTPATIENT
Start: 2023-01-20 | End: 2023-01-21

## 2023-01-20 RX ORDER — PIPERACILLIN AND TAZOBACTAM 4; .5 G/20ML; G/20ML
3.38 INJECTION, POWDER, LYOPHILIZED, FOR SOLUTION INTRAVENOUS ONCE
Refills: 0 | Status: COMPLETED | OUTPATIENT
Start: 2023-01-20 | End: 2023-01-20

## 2023-01-20 RX ORDER — ONDANSETRON 8 MG/1
4 TABLET, FILM COATED ORAL ONCE
Refills: 0 | Status: COMPLETED | OUTPATIENT
Start: 2023-01-20 | End: 2023-01-20

## 2023-01-20 RX ORDER — SODIUM CHLORIDE 9 MG/ML
3 INJECTION INTRAMUSCULAR; INTRAVENOUS; SUBCUTANEOUS ONCE
Refills: 0 | Status: COMPLETED | OUTPATIENT
Start: 2023-01-20 | End: 2023-01-20

## 2023-01-20 RX ORDER — NOREPINEPHRINE BITARTRATE/D5W 8 MG/250ML
0.05 PLASTIC BAG, INJECTION (ML) INTRAVENOUS
Qty: 8 | Refills: 0 | Status: DISCONTINUED | OUTPATIENT
Start: 2023-01-20 | End: 2023-01-21

## 2023-01-20 RX ORDER — INSULIN LISPRO 100/ML
10 VIAL (ML) SUBCUTANEOUS
Qty: 0 | Refills: 0 | DISCHARGE

## 2023-01-20 RX ORDER — SODIUM CHLORIDE 9 MG/ML
250 INJECTION INTRAMUSCULAR; INTRAVENOUS; SUBCUTANEOUS ONCE
Refills: 0 | Status: COMPLETED | OUTPATIENT
Start: 2023-01-20 | End: 2023-01-20

## 2023-01-20 RX ORDER — CHLORHEXIDINE GLUCONATE 213 G/1000ML
1 SOLUTION TOPICAL
Refills: 0 | Status: DISCONTINUED | OUTPATIENT
Start: 2023-01-20 | End: 2023-02-22

## 2023-01-20 RX ORDER — SODIUM CHLORIDE 9 MG/ML
1000 INJECTION INTRAMUSCULAR; INTRAVENOUS; SUBCUTANEOUS ONCE
Refills: 0 | Status: COMPLETED | OUTPATIENT
Start: 2023-01-20 | End: 2023-01-20

## 2023-01-20 RX ORDER — SODIUM CHLORIDE 9 MG/ML
10 INJECTION INTRAMUSCULAR; INTRAVENOUS; SUBCUTANEOUS
Refills: 0 | Status: DISCONTINUED | OUTPATIENT
Start: 2023-01-20 | End: 2023-02-22

## 2023-01-20 RX ORDER — ACETAMINOPHEN 500 MG
1000 TABLET ORAL ONCE
Refills: 0 | Status: COMPLETED | OUTPATIENT
Start: 2023-01-20 | End: 2023-01-20

## 2023-01-20 RX ORDER — BUDESONIDE AND FORMOTEROL FUMARATE DIHYDRATE 160; 4.5 UG/1; UG/1
2 AEROSOL RESPIRATORY (INHALATION)
Qty: 0 | Refills: 0 | DISCHARGE

## 2023-01-20 RX ORDER — BNT162B2 ORIGINAL AND OMICRON BA.4/BA.5 .1125; .1125 MG/2.25ML; MG/2.25ML
0.3 INJECTION, SUSPENSION INTRAMUSCULAR ONCE
Refills: 0 | Status: DISCONTINUED | OUTPATIENT
Start: 2023-01-20 | End: 2023-02-21

## 2023-01-20 RX ORDER — PANTOPRAZOLE SODIUM 20 MG/1
40 TABLET, DELAYED RELEASE ORAL DAILY
Refills: 0 | Status: DISCONTINUED | OUTPATIENT
Start: 2023-01-20 | End: 2023-02-22

## 2023-01-20 RX ORDER — VANCOMYCIN HCL 1 G
1000 VIAL (EA) INTRAVENOUS ONCE
Refills: 0 | Status: COMPLETED | OUTPATIENT
Start: 2023-01-20 | End: 2023-01-20

## 2023-01-20 RX ORDER — HYDROMORPHONE HYDROCHLORIDE 2 MG/ML
0.5 INJECTION INTRAMUSCULAR; INTRAVENOUS; SUBCUTANEOUS ONCE
Refills: 0 | Status: DISCONTINUED | OUTPATIENT
Start: 2023-01-20 | End: 2023-01-20

## 2023-01-20 RX ORDER — CHLORHEXIDINE GLUCONATE 213 G/1000ML
1 SOLUTION TOPICAL
Refills: 0 | Status: DISCONTINUED | OUTPATIENT
Start: 2023-01-20 | End: 2023-01-21

## 2023-01-20 RX ORDER — CHOLECALCIFEROL (VITAMIN D3) 125 MCG
1 CAPSULE ORAL
Qty: 0 | Refills: 0 | DISCHARGE

## 2023-01-20 RX ADMIN — SODIUM CHLORIDE 3 MILLILITER(S): 9 INJECTION INTRAMUSCULAR; INTRAVENOUS; SUBCUTANEOUS at 17:29

## 2023-01-20 RX ADMIN — SODIUM CHLORIDE 1000 MILLILITER(S): 9 INJECTION INTRAMUSCULAR; INTRAVENOUS; SUBCUTANEOUS at 21:45

## 2023-01-20 RX ADMIN — SODIUM CHLORIDE 1000 MILLILITER(S): 9 INJECTION INTRAMUSCULAR; INTRAVENOUS; SUBCUTANEOUS at 18:55

## 2023-01-20 RX ADMIN — CHLORHEXIDINE GLUCONATE 1 APPLICATION(S): 213 SOLUTION TOPICAL at 23:40

## 2023-01-20 RX ADMIN — HYDROMORPHONE HYDROCHLORIDE 0.5 MILLIGRAM(S): 2 INJECTION INTRAMUSCULAR; INTRAVENOUS; SUBCUTANEOUS at 17:39

## 2023-01-20 RX ADMIN — PANTOPRAZOLE SODIUM 40 MILLIGRAM(S): 20 TABLET, DELAYED RELEASE ORAL at 23:39

## 2023-01-20 RX ADMIN — PIPERACILLIN AND TAZOBACTAM 200 GRAM(S): 4; .5 INJECTION, POWDER, LYOPHILIZED, FOR SOLUTION INTRAVENOUS at 20:00

## 2023-01-20 RX ADMIN — ONDANSETRON 4 MILLIGRAM(S): 8 TABLET, FILM COATED ORAL at 17:38

## 2023-01-20 RX ADMIN — SODIUM CHLORIDE 1000 MILLILITER(S): 9 INJECTION INTRAMUSCULAR; INTRAVENOUS; SUBCUTANEOUS at 20:36

## 2023-01-20 RX ADMIN — PIPERACILLIN AND TAZOBACTAM 200 GRAM(S): 4; .5 INJECTION, POWDER, LYOPHILIZED, FOR SOLUTION INTRAVENOUS at 19:00

## 2023-01-20 RX ADMIN — Medication 400 MILLIGRAM(S): at 19:00

## 2023-01-20 RX ADMIN — Medication 250 MILLIGRAM(S): at 23:39

## 2023-01-20 RX ADMIN — SODIUM CHLORIDE 250 MILLILITER(S): 9 INJECTION INTRAMUSCULAR; INTRAVENOUS; SUBCUTANEOUS at 17:29

## 2023-01-20 NOTE — ED PROVIDER NOTE - PROGRESS NOTE DETAILS
73 yo female with a PMH of CVA with residual L sided paralysis, a fib, copd, htn, s/p Harthmann procedure with reversal was BIBA for abd pain, fever, and vomiting. Pt with ttp to the R abd with decreased bowel sounds. Temp in the ED was greater than 102F. Will check labs, CT, and reeval. -Gavino Hopper PA-C

## 2023-01-20 NOTE — ED ADULT NURSE NOTE - CHIEF COMPLAINT QUOTE
BIBA from [facility] for RLQ pain and r/o SBO. x1 episode of emesis s/p lunch, NH told EMS pt spiked a temperature. hx SBO and resection per EMS. arrived with DNR/DNI MOLST form. pmhx CVA, atrial fibrillation, emphysema, COPD, HTN - wears 2L NC at baseline. EMS reports pt is on coumadin but has not received past few days d/t elevated INR.

## 2023-01-20 NOTE — ED PROVIDER NOTE - ATTENDING CONTRIBUTION TO CARE
Critical care time spent evaluating and reevaluating patient, ordering and interpreting diagnostics, ordering therapeutics, speaking to consultant/admitting MD, reviewing records and documenting. Pt eval, treatment and plan contemporaneously with RAYMUNDO Hopper. Agree with notes. Laci BARBER

## 2023-01-20 NOTE — ED PROVIDER NOTE - OBJECTIVE STATEMENT
73 y/o female with a PMHx of CVA with left sided weakness, atrial fibrillation, emphysema, COPD, HTN - wears 2L NC at baseline BIBA, hx SBO and resection per EMS presents to the ED from [facility] for RLQ pain and r/o SBO. x1 episode of emesis s/p lunch, NH told EMS pt spiked a temperature. Patient arrived with DNR/DNI MOLST form. EMS reports patient is on Coumadin but has not received past few days d/t elevated INR. Patient states her last BM was yesterday.

## 2023-01-20 NOTE — CONSULT NOTE ADULT - ASSESSMENT
Pt is a 73 y/o F pmhx of CVA w, L sided weakness, Afib, emphysema, COPD on 2L NC at home, HTN, who presented to  ED from SNF for RLQ pain and r/o SBO. pt had 1 episode of emesis, w/ diffuse abdominal pain. Found to have elevated WBC, and lactate, w/ CT scan concerning for possible incarcerated ventral hernia. Surgery consulted, ICU consulted.     1. Septic shock   2. Incersarated ventral hernia   3. Urosepsis   4. ALVERTO   5. Encephalopathy   6. Lactic acidosis     Plan:   - Admit to ICU for further management and eval     Neuro: awake and alert, confused at baseline, pain control as needed w/ tylenol PRN.     CV: HD shock state secondary to sepsis, started on levophed for HD support in ED, runs of PVC's in ED, transitioned to phenylephrine, titrate to maintain MAP>65.     Pulm: Satting well on NC, continue to monitor and titrate O2 requirements as needed to maintain SpO2>92%.     GI: Diet: NPO, incarcerated ventral hernia surgery following (Dr. Lopez), deferring surgery at this time, given high risk of decompensation NG tube to suction, continue resuscitation and support.     Renal: ALVERTO in shock state, continue to monitor U/O and maintain >0.5cc/kg/hr, avoid nephrotoxic meds.     Endo: Glucose<180, Mag>2, K>4, for arrythmia suppression.     Heme: Hold DVT PPX in setting of possible surgical intervention. SCD for DVT PPX     ID: Septic shock secondary to Urosepsis vs. incarceration, started on vanco and zosyn, continue to follow cultures, and narrow abx as indicated. Continue to trend markers of infection daily.    Pt is a 73 y/o F pmhx of CVA w, L sided weakness, Afib, emphysema, COPD on 2L NC at home, HTN, who presented to  ED from SNF for RLQ pain and r/o SBO. pt had 1 episode of emesis, w/ diffuse abdominal pain. Found to have elevated WBC, and lactate, w/ CT scan concerning for possible incarcerated ventral hernia. Surgery consulted, ICU consulted.     1. Septic shock   2. Incersarated ventral hernia   3. Urosepsis   4. ALVERTO   5. Encephalopathy   6. Lactic acidosis     Plan:   - Admit to ICU for further management and eval     Neuro: awake and alert, confused at baseline, pain control as needed w/ tylenol PRN.     CV: HD shock state secondary to sepsis, started on levophed for HD support in ED, runs of PVC's in ED, transitioned to phenylephrine, titrate to maintain MAP>65.     Pulm: Satting well on NC, continue to monitor and titrate O2 requirements as needed to maintain SpO2>92%.     GI: Diet: NPO, incarcerated ventral hernia surgery following (Dr. Castro), deferring surgery at this time, given high risk of decompensation NG tube to suction, continue resuscitation and support.     Renal: ALVERTO in shock state, continue to monitor U/O and maintain >0.5cc/kg/hr, avoid nephrotoxic meds.     Endo: Glucose<180, Mag>2, K>4, for arrythmia suppression.     Heme: Hold DVT PPX in setting of possible surgical intervention. SCD for DVT PPX     ID: Septic shock secondary to Urosepsis vs. incarceration, started on vanco and zosyn, continue to follow cultures, and narrow abx as indicated. Continue to trend markers of infection daily.       Case discussed w/ EICU attending Dr. Abrams  Pt is a 73 y/o F pmhx of CVA w, L sided weakness, Afib, emphysema, COPD on 2L NC at home, HTN, who presented to  ED from SNF for RLQ pain and r/o SBO. pt had 1 episode of emesis, w/ diffuse abdominal pain. Found to have elevated WBC, and lactate, w/ CT scan concerning for possible incarcerated ventral hernia. Surgery consulted, ICU consulted.     1. Septic shock   2. Incarcerated ventral hernia   3. Urosepsis   4. ALVERTO   5. Encephalopathy   6. Lactic acidosis     Plan:   - Admit to ICU for further management and eval     Neuro: awake and alert, confused at baseline, pain control as needed w/ tylenol PRN.     CV: HD shock state secondary to sepsis, started on levophed for HD support in ED, runs of PVC's in ED, transitioned to phenylephrine, titrate to maintain MAP>65.     Pulm: Satting well on NC, continue to monitor and titrate O2 requirements as needed to maintain SpO2>92%.     GI: Diet: NPO, incarcerated ventral hernia surgery following (Dr. Castro), deferring surgery at this time, given high risk of decompensation NG tube to suction, continue resuscitation and support.     Renal: ALVERTO in shock state, continue to monitor U/O and maintain >0.5cc/kg/hr, avoid nephrotoxic meds.     Endo: Glucose<180, Mag>2, K>4, for arrythmia suppression.     Heme: Hold DVT PPX in setting of possible surgical intervention. SCD for DVT PPX     ID: Septic shock secondary to Urosepsis vs. incarceration, started on vanco and zosyn, continue to follow cultures, and narrow abx as indicated. Continue to trend markers of infection daily.       Case discussed w/ EICU attending Dr. Abrams

## 2023-01-20 NOTE — PATIENT PROFILE ADULT - FALL HARM RISK - HARM RISK INTERVENTIONS
Assistance with ambulation/Assistance OOB with selected safe patient handling equipment/Communicate Risk of Fall with Harm to all staff/Discuss with provider need for PT consult/Monitor gait and stability/Reinforce activity limits and safety measures with patient and family/Tailored Fall Risk Interventions/Visual Cue: Yellow wristband and red socks/Bed in lowest position, wheels locked, appropriate side rails in place/Call bell, personal items and telephone in reach/Instruct patient to call for assistance before getting out of bed or chair/Non-slip footwear when patient is out of bed/[place] to call system/Physically safe environment - no spills, clutter or unnecessary equipment/Purposeful Proactive Rounding/Room/bathroom lighting operational, light cord in reach

## 2023-01-20 NOTE — CONSULT NOTE ADULT - SUBJECTIVE AND OBJECTIVE BOX
Patient is a 72y old  Female who presents with a chief complaint of bowel obstruction/ischemic mesentery (2023 21:43)      BRIEF HOSPITAL COURSE: Pt is a 73 y/o F pmhx of CVA w, L sided weakness, Afib, emphysema, COPD on 2L NC at home, HTN, who presented to  ED from SNF for RLQ pain and r/o SBO. pt had 1 episode of emesis, w/ diffuse abdominal pain. Found to have elevated WBC, and lactate, w/ CT scan concerning for possible incarcerated ventral hernia. Surgery consulted, ICU consulted.     Events last 24 hours: Pt developed shock state secondary to possible UTI vs. Incarcerated hernia. S/P 3L ICF in ED, refractory hypotension, Started on Levo for HD support.     PAST MEDICAL & SURGICAL HISTORY:  Hypertension      Hypercholesteremia      COPD (chronic obstructive pulmonary disease)      C. difficile diarrhea        Diverticulitis of intestine with perforation and abscess without bleeding, unspecified part of intestinal tract      PE (pulmonary thromboembolism)  2016      Palpitations      Obesity      Osteoarthritis      Anemia      Colostomy in place      History of atrial fibrillation      HTN (hypertension)      Diabetes mellitus      Cerebrovascular accident (CVA)      DVT of lower limb, acute      CVA (cerebral vascular accident)      COPD (chronic obstructive pulmonary disease)      Neuropathy      Diverticulitis      History of appendectomy      H/O:   x2      H/O ovarian cystectomy  b/l      Status post total replacement of both hips      H/O hemicolectomy  2016      History of colostomy reversal      History of colostomy reversal      S/P colostomy      S/P       S/P hip replacement          Review of Systems:  Unable to assess secondary to mentation.     Medications:  piperacillin/tazobactam IVPB.. 3.375 Gram(s) IV Intermittent Once    norepinephrine Infusion 0.05 MICROgram(s)/kG/Min IV Continuous <Continuous>            pantoprazole  Injectable 40 milliGRAM(s) IV Push daily        sodium chloride 0.9% lock flush 10 milliLiter(s) IV Push every 1 hour PRN    coronavirus bivalent (EUA) Booster Vaccine (PFIZER) 0.3 milliLiter(s) IntraMuscular once    chlorhexidine 2% Cloths 1 Application(s) Topical <User Schedule>  chlorhexidine 4% Liquid 1 Application(s) Topical <User Schedule>            ICU Vital Signs Last 24 Hrs  T(C): 37.2 (2023 23:30), Max: 39.3 (2023 17:25)  T(F): 99 (2023 23:30), Max: 102.7 (2023 17:25)  HR: 99 (2023 23:30) (81 - 126)  BP: 121/79 (2023 23:30) (81/51 - 147/46)  BP(mean): 90 (2023 23:30) (90 - 90)  ABP: --  ABP(mean): --  RR: 18 (2023 23:30) (18 - 22)  SpO2: 98% (2023 23:30) (96% - 99%)    O2 Parameters below as of 2023 22:22  Patient On (Oxygen Delivery Method): nasal cannula  O2 Flow (L/min): 2          ABG - ( 2023 21:34 )  pH, Arterial: 7.36  pH, Blood: x     /  pCO2: 33    /  pO2: 78    / HCO3: 19    / Base Excess: -5.9  /  SaO2: 97                  I&O's Detail    2023 07:01  -  2023 00:08  --------------------------------------------------------  IN:    Norepinephrine: 0.8 mL  Total IN: 0.8 mL    OUT:  Total OUT: 0 mL    Total NET: 0.8 mL            LABS:                        13.3   25.15 )-----------( 524      ( 2023 17:12 )             42.3     -    135  |  101  |  28<H>  ----------------------------<  174<H>  3.9   |  24  |  1.95<H>    Ca    9.5      2023 17:12    TPro  7.3  /  Alb  2.0<L>  /  TBili  0.6  /  DBili  x   /  AST  14<L>  /  ALT  19  /  AlkPhos  88            CAPILLARY BLOOD GLUCOSE        PT/INR - ( 2023 17:12 )   PT: 24.2 sec;   INR: 2.07 ratio         PTT - ( 2023 17:12 )  PTT:38.0 sec  Urinalysis Basic - ( 2023 20:03 )    Color: Brown / Appearance: very cloudy / S.020 / pH: x  Gluc: x / Ketone: Trace  / Bili: Negative / Urobili: Negative   Blood: x / Protein: 100 / Nitrite: Positive   Leuk Esterase: Moderate / RBC: 6-10 /HPF / WBC >50 /HPF   Sq Epi: x / Non Sq Epi: Negative / Bacteria: Many      CULTURES:      Physical Examination:    General: Pt laying in hospital bed in no acute distress.  Alert, oriented, interactive, confused, and lethargic     HEENT: Pupils equal, reactive to light.  Symmetric.    PULM: Clear to auscultation bilaterally, no significant sputum production    CVS: Regular rate and rhythm, no murmurs, rubs, or gallops    ABD: Soft, nondistended, nontender, normoactive bowel sounds, no masses    EXT: No edema, nontender    SKIN: Warm and moist.     NEURO: A&Ox2, Strength 1/5 in all extremities, moves all extremities, follows commands.       RADIOLOGY:   < from: CT Abdomen and Pelvis No Cont (23 @ 19:41) >  ACC: [id number] EXAM:  CT ABDOMEN AND PELVIS   ORDERED BY: DOMINGO CORONA     PROCEDURE DATE:  2023          INTERPRETATION:  CLINICAL INFORMATION: Vomiting    COMPARISON: CT scan of the abdomen and pelvis from 12/10/2022    CONTRAST/COMPLICATIONS:  IV Contrast: NONE  Oral Contrast: NONE  Complications: None reported at time of study completion    PROCEDURE:  CT of the Abdomen and Pelvis was performed.  Sagittal and coronal reformats were performed.    FINDINGS:  LOWER CHEST: Atelectatic changes at the lung bases. Vascular   calcifications, including coronary arteries. Distention of the esophagus.   6 mm nodular density in the left lower lobe on series 2 image 15. This   was not appreciated on the prior study.    LIVER: Withinnormal limits.  BILE DUCTS: Normal caliber.  GALLBLADDER: Sludge within the gallbladder versus vicarious excretion of   contrast material.  SPLEEN: Within normal limits.  PANCREAS: Fatty replacement in the pancreas.  ADRENALS: Within normal limits.  KIDNEYS/URETERS: 1 cm nonobstructing stone in the lower pole of the left   kidney    BLADDER: Distended bladder. Question of higher attenuation posteriorly in   the bladder which represent mild blood although this may be created by   artifact from bilateral hip prostheses. Clinical correlation is suggested.  REPRODUCTIVE ORGANS: Grossly unremarkable.    BOWEL: Very large ventral hernia with small and large bowel herniating   anteriorly. The bowel is not fully included on this examination.   Collapsed small bowel is seen in the right lower quadrant and distended   loops are seen in the hernia, highly concerning for small bowel   obstruction. There is collapsed loops adjacent to an anastomosis in the   mid abdomen which may represent a point of obstruction although cannot be   stated with certainty. There is mild ascites in the right side of the   abdomen and due to the very limited evaluation, a closed loop obstruction   cannot be excluded in the correct clinical situation.    A portion of the stomach herniates anteriorly to an additional ventral   hernia.  PERITONEUM: Mild ascites in the right side of the abdomen.  VESSELS: Dense vascular calcifications. The IVC is collapsed which may   represent hypotension.  RETROPERITONEUM/LYMPH NODES: No lymphadenopathy.  ABDOMINAL WALL: Subcutaneous edema.  BONES: Degenerative changes of bone. Bilateral hip prostheses.    IMPRESSION:  Very large ventral hernia containing small and large bowel with   incomplete imaging of the bowel on this study. Collapsed small bowel in   the right lower quadrant is highly concerning for small bowel obstruction   as described above. The exact point of transition is not seen with   certainty although this may be in the mid abdomen superior to the   bladder.Mild ascites is present in the right side of the abdomen which   was not seen previously. A closed loop obstruction or bowel ischemia in   the setting of very dense calcifications cannot be excluded on the basis   of this examination. This was discussed with Dr. Washington in the emergency   room at 8:04 PM on 2023.    6 mm nodular density in the left lower lobe which was not seen   previously. This may be exaggerated by overlapping structures represent   mucous plugging. True nodule cannot entirely be excluded.. Recommend   follow-up chest CT scan in 3 months. This was also discussed with Dr. Washington in the emergency room.    Higher attenuation posteriorly in the bladder which may be due to   artifact. Blood in the bladder is considered much less likely although   clinical correlation is suggested.    Mild esophageal distention. The stomach is not significantly dilated.        --- End of Report ---            MARGARITO WEAVER MD; Attending Radiologist  This document has been electronically signed. 2023  8:16PM            CRITICAL CARE TIME SPENT: 75 minutes assessing presenting problems of acute illness, which pose high probability of life threatening deterioration or end organ damage/dysfunction, as well as medical decision making including initiating plan of care, reviewing data, reviewing radiologic exams, discussing with multidisciplinary team,  discussing goals of care with patient/family, and writing this note.  Non-inclusive of procedures performed,

## 2023-01-20 NOTE — ED PROVIDER NOTE - GASTROINTESTINAL, MLM
Abdomen soft, no guarding. Abdominal distention Tenderness LLQ, RLQ, RUQ.  Hypoactive bowel sounds RLQ, RUQ.

## 2023-01-20 NOTE — ED PROVIDER NOTE - CARE PLAN
Principal Discharge DX:	SBO (small bowel obstruction)  Secondary Diagnosis:	Abdominal pain  Secondary Diagnosis:	Leukocytosis   1

## 2023-01-20 NOTE — ED PROVIDER NOTE - CLINICAL SUMMARY MEDICAL DECISION MAKING FREE TEXT BOX
71 yo female with a PMH of CVA with residual L sided paralysis, a fib, copd, htn, s/p Harthmann procedure with reversal was BIBA for abd pain, fever, and vomiting. Pt with ttp to the R abd with decreased bowel sounds. Temp in the ED was greater than 102F. Will check labs, CT, and reeval. -Gavino Hopper PA-C    Pt with elevated lactate and leukocytosis. Possible bacterial etiology for pt;'s abd pain. Zosyn and IVF ordered.  Pending CT.    BP has been in the 80s and 90s even after IVF. CT resulted showing SBO. Discussed case with Dr. Castro (sx) who came to see pt. NG tube placed by RN. Pt to be admitted and likely going to the OR. Per surgery, would like a 3rd liter of lfuids and rizzo placement. Surgery at bedside, managing pt. -Gavino Hopper PA-C

## 2023-01-20 NOTE — H&P ADULT - ASSESSMENT
72 yr/old female with acute abdomen, secondary to closed loop obstruction  -will require exploratory laparotomy  -ICU consultation/admission  - follow arterial lactate  -troponins  Vanco/zosyn  npo  D/w Dr ravi

## 2023-01-20 NOTE — H&P ADULT - HISTORY OF PRESENT ILLNESS
71 y/o female with a PMHx of CVA with left sided weakness, atrial fibrillation, emphysema, COPD, HTN - wears 2L NC at baseline BIBA, hx SBO and resection per EMS presents to the ED from [facility] for RLQ pain and r/o SBO. x1 episode of emesis, + profound diffuse abdominal pain, febrile

## 2023-01-20 NOTE — ED ADULT TRIAGE NOTE - CHIEF COMPLAINT QUOTE
BIBA from [facility] for RLQ pain and r/o SBO. x1 episode of emesis s/p lunch, NH told EMS pt spiked a temperature. hx SBO and resection per EMS. arrived with DNR/DNI MOLST form. pmhx CVA, atrial fibrillation, emphysema, COPD, HTN - wears 2L NC at baseline. EMS reports pt is on coumadin but has not received past few days d/t elevated INR.

## 2023-01-20 NOTE — H&P ADULT - NSHPPHYSICALEXAM_GEN_ALL_CORE
T(C): 39.3 (01-20-23 @ 17:25), Max: 39.3 (01-20-23 @ 17:25)  HR: 88 (01-20-23 @ 20:34) (88 - 126)  BP: 81/51 (01-20-23 @ 20:34) (81/51 - 109/60)  RR: 18 (01-20-23 @ 20:34) (18 - 22)  SpO2: 96% (01-20-23 @ 20:34) (96% - 96%)    CONSTITUTIONAL: Well groomed, no apparent distress  EYES: PERRLA and symmetric, EOMI, No conjunctival or scleral injection, non-icteric  ENMT: Oral mucosa with moist membranes. Normal dentition; no pharyngeal injection or exudates, left sided facial droop             NECK: Supple, symmetric and without tracheal deviation   RESP: No respiratory distress, no use of accessory muscles; CTA b/l, no WRR  CV: RRR, +S1S2, no MRG; no JVD; no peripheral edema  GI: Soft, + severe tenderness, + rebound/ guarding; no palpable masses; no hepatosplenomegaly;+ hernia palpated+ skin tenting discoloration    SKIN: No rashes or ulcers noted; no subcutaneous nodules or induration palpable  NEURO: CN II-XII intact; normal reflexes in upper and lower extremities, sensation intact in upper and lower extremities + defcit ;eft sided b/l to light touch   PSYCH: Appropriate insight/judgment; A+O x 3, mood and affect appropriate, recent/remote memory intact

## 2023-01-20 NOTE — ED ADULT NURSE NOTE - OBJECTIVE STATEMENT
Assumed care of patient in treatment room 3. Pt a&ox4 presents to ed biba from [facility] for RLQ pain and fever as per nursing home staff. Pt has pmhx of cva with L sided weakness, afib(on coumadin), emphysema, copd, htn, 2L nc at baseline. ems reports 1 episode of emesis during lunch time prior arrival. Pt report last bm 1/19. Pt denies chest pain, sob, numbness/tingling, gu symptoms. pt educated on plan of care, pt able to successfully teach back plan of care to RN, RN will continue to reeducate pt during hospital stay. Assumed care of patient in treatment room 3. Pt a&ox4 presents to ed cinthyaa from [facility] for RLQ pain and fever as per nursing home staff. Pt has pmhx of cva with L sided weakness, afib(on coumadin), emphysema, copd, htn, 2L nc at baseline. ems reports 1 episode of emesis during lunch time prior arrival. Pt report last bm 1/19. Pt denies chest pain, sob, numbness/tingling, gu symptoms. Molst form present with pt DNR/DNI. pt educated on plan of care, pt able to successfully teach back plan of care to RN, RN will continue to reeducate pt during hospital stay. Assumed care of patient in treatment room 3. Pt a&ox4 presents to ed biba from [facility] for RLQ pain and fever as per nursing home staff. Pt has pmhx of cva with L sided weakness, afib(on coumadin), emphysema, copd, htn, 2L nc at baseline. ems reports 1 episode of emesis during lunch time prior arrival. Pt report last bm 1/19. Pt denies chest pain, sob, numbness/tingling, gu symptoms. Molst form present with pt DNR/DNI. pt educated on plan of care, pt able to successfully teach back plan of care to RN, RN will continue to reeducate pt during hospital stay.    2245: Right IJ triple lumen placed by PAPO Taylor pt started on Phenylephrine 1mcg per PAPO Taylor, Levo stopped

## 2023-01-20 NOTE — ED ADULT NURSE NOTE - ED STAT RN HANDOFF DETAILS
Received pt endorsed from VALERIA Shanks, evaluated for fever and ABD pain, pt is admitted to the Surgery/ICU for SBO, central line being placed by PAPO Taylor Right IJ, pending bed placement Received pt endorsed from VALERIA Rashid, evaluated for fever and ABD pain, pt is admitted to the Surgery/ICU for ventral hernia with small and large bowel obstruction rizzo and NGT placed by VALERIA Rashid, central line being placed by PAPO Taylor Right IJ, pending bed placement

## 2023-01-20 NOTE — PATIENT PROFILE ADULT - LOCATION #1
INTERVENTIONAL RADIOLOGY LOCAL ASSESSMENT NOTE:     Patient: Myah Barron  Date: 2020  : 1962 Attending: Leobardo Rojas MD  57 year old   female        PROCEDURE ASSESSMENT- LOCAL ANESTHESIA     Preop Diagnosis:    1. Ascites due to alcoholic cirrhosis (CMS/HCC)        Indications for Procedure: Ascites     Planned Procedure: Paracentesis    Planned Anesthetic:  local     MEDICAL HISTORY / COMORBID CONDITIONS:  Reviewed. See below.     Normal mental status: no     EXAMINATION PERTINENT TO PROCEDURE BEING PERFORMED  Evaluation of operative site: Examination of operative site completed, WNL.      OTHER FINDINGS  Reviewed current medications and allergies yes     PERTINENT LAB / DIAGNOSTIC TESTSPERTINENT LAB / DIAGNOSTIC TESTS  Reviewed. See below.     INFORMED CONSENT  Consent obtained: yes by POA  Risks / benefits, complications, and alternatives explained, questions answered and patient / personal representative agrees to:  procedure listed above and anesthetic listed above     Yovanny Cummings DO           INFORMATION REVIEWED  Past Medical History:   Diagnosis Date   • Alcohol abuse    • Altered mental status, unspecified    • Anxiety    • Ascites    • Cirrhosis (CMS/HCC)    • COPD (chronic obstructive pulmonary disease) (CMS/HCC)    • Diabetes mellitus (CMS/HCC)     type 2   • Encounter for long-term (current) use of insulin (CMS/HCC)    • Gastroesophageal reflux disease    • Heart failure (CMS/HCC)    • Hepatic failure (CMS/HCC) 2014   • History of ESBL E. coli infection 13    UTI   • Hypertension    • Liver cirrhosis, alcoholic (CMS/HCC)    • Personal history of traumatic fracture    • RAD (reactive airway disease)    • Uncomplicated senile dementia (CMS/HCC)    • Urinary tract infection     personal history of UTI     Patient Active Problem List   Diagnosis   • Alcohol abuse, unspecified   • Alcohol withdrawal (CMS/HCC)   • Cirrhosis of liver (CMS/HCC)   • Portal  hypertension (CMS/HCC)   • Ascites   • Hepatic encephalopathy (CMS/HCC)   • Type II or unspecified type diabetes mellitus without mention of complication, not stated as uncontrolled   • Chronic airway obstruction, not elsewhere classified   • Spontaneous bacterial peritonitis (CMS/HCC)   • Hematemesis   • Alcohol abuse   • Confusion      Pertinent Labs  PROTIME (sec)   Date Value   12/20/2019 13.3 (H)      INR (no units)   Date Value   12/20/2019 1.3     WBC (K/mcL)   Date Value   12/23/2019 8.9     RBC (mil/mcL)   Date Value   12/23/2019 3.73 (L)     HCT (%)   Date Value   12/23/2019 36.0     HGB (g/dL)   Date Value   12/23/2019 12.3     PLT (K/mcL)   Date Value   12/23/2019 116 (L)   02/19/2014 62 (L)                R buttock

## 2023-01-20 NOTE — PHARMACOTHERAPY INTERVENTION NOTE - COMMENTS
Medication reconciliation completed.  Reviewed Medication list and confirmed med allergies with list from Care Facility "[facility]"; confirmed with Dr. First [facility].

## 2023-01-20 NOTE — PATIENT PROFILE ADULT - FUNCTIONAL ASSESSMENT - BASIC MOBILITY 6.
2-calculated by average/Not able to assess (calculate score using [facility] averaging method)  1-calculated by average/Not able to assess (calculate score using [facility] averaging method)

## 2023-01-21 LAB
ANION GAP SERPL CALC-SCNC: 10 MMOL/L — SIGNIFICANT CHANGE UP (ref 5–17)
ANION GAP SERPL CALC-SCNC: 8 MMOL/L — SIGNIFICANT CHANGE UP (ref 5–17)
APTT BLD: 35.2 SEC — SIGNIFICANT CHANGE UP (ref 27.5–35.5)
BUN SERPL-MCNC: 32 MG/DL — HIGH (ref 7–23)
BUN SERPL-MCNC: 32 MG/DL — HIGH (ref 7–23)
CALCIUM SERPL-MCNC: 7.8 MG/DL — LOW (ref 8.5–10.1)
CALCIUM SERPL-MCNC: 8.2 MG/DL — LOW (ref 8.5–10.1)
CHLORIDE SERPL-SCNC: 107 MMOL/L — SIGNIFICANT CHANGE UP (ref 96–108)
CHLORIDE SERPL-SCNC: 108 MMOL/L — SIGNIFICANT CHANGE UP (ref 96–108)
CO2 SERPL-SCNC: 17 MMOL/L — LOW (ref 22–31)
CO2 SERPL-SCNC: 21 MMOL/L — LOW (ref 22–31)
CREAT SERPL-MCNC: 1.62 MG/DL — HIGH (ref 0.5–1.3)
CREAT SERPL-MCNC: 1.72 MG/DL — HIGH (ref 0.5–1.3)
EGFR: 31 ML/MIN/1.73M2 — LOW
EGFR: 34 ML/MIN/1.73M2 — LOW
GLUCOSE BLDC GLUCOMTR-MCNC: 133 MG/DL — HIGH (ref 70–99)
GLUCOSE BLDC GLUCOMTR-MCNC: 184 MG/DL — HIGH (ref 70–99)
GLUCOSE BLDC GLUCOMTR-MCNC: 64 MG/DL — LOW (ref 70–99)
GLUCOSE BLDC GLUCOMTR-MCNC: 92 MG/DL — SIGNIFICANT CHANGE UP (ref 70–99)
GLUCOSE SERPL-MCNC: 200 MG/DL — HIGH (ref 70–99)
GLUCOSE SERPL-MCNC: 208 MG/DL — HIGH (ref 70–99)
HCT VFR BLD CALC: 36.9 % — SIGNIFICANT CHANGE UP (ref 34.5–45)
HCT VFR BLD CALC: 37.8 % — SIGNIFICANT CHANGE UP (ref 34.5–45)
HGB BLD-MCNC: 11.4 G/DL — LOW (ref 11.5–15.5)
HGB BLD-MCNC: 11.4 G/DL — LOW (ref 11.5–15.5)
INR BLD: 2.14 RATIO — HIGH (ref 0.88–1.16)
LACTATE SERPL-SCNC: 2.8 MMOL/L — HIGH (ref 0.7–2)
LACTATE SERPL-SCNC: 3 MMOL/L — HIGH (ref 0.7–2)
LACTATE SERPL-SCNC: 3.1 MMOL/L — HIGH (ref 0.7–2)
MAGNESIUM SERPL-MCNC: 2 MG/DL — SIGNIFICANT CHANGE UP (ref 1.6–2.6)
MAGNESIUM SERPL-MCNC: 2.1 MG/DL — SIGNIFICANT CHANGE UP (ref 1.6–2.6)
MCHC RBC-ENTMCNC: 25.6 PG — LOW (ref 27–34)
MCHC RBC-ENTMCNC: 25.7 PG — LOW (ref 27–34)
MCHC RBC-ENTMCNC: 30.2 GM/DL — LOW (ref 32–36)
MCHC RBC-ENTMCNC: 30.9 GM/DL — LOW (ref 32–36)
MCV RBC AUTO: 83.3 FL — SIGNIFICANT CHANGE UP (ref 80–100)
MCV RBC AUTO: 84.9 FL — SIGNIFICANT CHANGE UP (ref 80–100)
PHOSPHATE SERPL-MCNC: 3.6 MG/DL — SIGNIFICANT CHANGE UP (ref 2.5–4.5)
PHOSPHATE SERPL-MCNC: 4.1 MG/DL — SIGNIFICANT CHANGE UP (ref 2.5–4.5)
PLATELET # BLD AUTO: 379 K/UL — SIGNIFICANT CHANGE UP (ref 150–400)
PLATELET # BLD AUTO: 426 K/UL — HIGH (ref 150–400)
POTASSIUM SERPL-MCNC: 3.6 MMOL/L — SIGNIFICANT CHANGE UP (ref 3.5–5.3)
POTASSIUM SERPL-MCNC: 3.8 MMOL/L — SIGNIFICANT CHANGE UP (ref 3.5–5.3)
POTASSIUM SERPL-SCNC: 3.6 MMOL/L — SIGNIFICANT CHANGE UP (ref 3.5–5.3)
POTASSIUM SERPL-SCNC: 3.8 MMOL/L — SIGNIFICANT CHANGE UP (ref 3.5–5.3)
PROTHROM AB SERPL-ACNC: 25 SEC — HIGH (ref 10.5–13.4)
RBC # BLD: 4.43 M/UL — SIGNIFICANT CHANGE UP (ref 3.8–5.2)
RBC # BLD: 4.45 M/UL — SIGNIFICANT CHANGE UP (ref 3.8–5.2)
RBC # FLD: 16.9 % — HIGH (ref 10.3–14.5)
RBC # FLD: 17 % — HIGH (ref 10.3–14.5)
SODIUM SERPL-SCNC: 135 MMOL/L — SIGNIFICANT CHANGE UP (ref 135–145)
SODIUM SERPL-SCNC: 136 MMOL/L — SIGNIFICANT CHANGE UP (ref 135–145)
WBC # BLD: 23.58 K/UL — HIGH (ref 3.8–10.5)
WBC # BLD: 26.94 K/UL — HIGH (ref 3.8–10.5)
WBC # FLD AUTO: 23.58 K/UL — HIGH (ref 3.8–10.5)
WBC # FLD AUTO: 26.94 K/UL — HIGH (ref 3.8–10.5)

## 2023-01-21 PROCEDURE — 99291 CRITICAL CARE FIRST HOUR: CPT

## 2023-01-21 PROCEDURE — 74018 RADEX ABDOMEN 1 VIEW: CPT | Mod: 26

## 2023-01-21 PROCEDURE — 43752 NASAL/OROGASTRIC W/TUBE PLMT: CPT

## 2023-01-21 PROCEDURE — 93306 TTE W/DOPPLER COMPLETE: CPT | Mod: 26

## 2023-01-21 RX ORDER — GLUCAGON INJECTION, SOLUTION 0.5 MG/.1ML
1 INJECTION, SOLUTION SUBCUTANEOUS ONCE
Refills: 0 | Status: DISCONTINUED | OUTPATIENT
Start: 2023-01-21 | End: 2023-01-28

## 2023-01-21 RX ORDER — SODIUM CHLORIDE 9 MG/ML
1000 INJECTION, SOLUTION INTRAVENOUS
Refills: 0 | Status: DISCONTINUED | OUTPATIENT
Start: 2023-01-21 | End: 2023-01-24

## 2023-01-21 RX ORDER — ACETAMINOPHEN 500 MG
1000 TABLET ORAL ONCE
Refills: 0 | Status: COMPLETED | OUTPATIENT
Start: 2023-01-21 | End: 2023-01-21

## 2023-01-21 RX ORDER — DEXTROSE 50 % IN WATER 50 %
15 SYRINGE (ML) INTRAVENOUS ONCE
Refills: 0 | Status: DISCONTINUED | OUTPATIENT
Start: 2023-01-21 | End: 2023-01-28

## 2023-01-21 RX ORDER — DEXTROSE 50 % IN WATER 50 %
25 SYRINGE (ML) INTRAVENOUS ONCE
Refills: 0 | Status: DISCONTINUED | OUTPATIENT
Start: 2023-01-21 | End: 2023-01-28

## 2023-01-21 RX ORDER — PIPERACILLIN AND TAZOBACTAM 4; .5 G/20ML; G/20ML
3.38 INJECTION, POWDER, LYOPHILIZED, FOR SOLUTION INTRAVENOUS EVERY 8 HOURS
Refills: 0 | Status: DISCONTINUED | OUTPATIENT
Start: 2023-01-21 | End: 2023-01-23

## 2023-01-21 RX ORDER — SODIUM CHLORIDE 9 MG/ML
1000 INJECTION, SOLUTION INTRAVENOUS
Refills: 0 | Status: DISCONTINUED | OUTPATIENT
Start: 2023-01-21 | End: 2023-01-28

## 2023-01-21 RX ORDER — HYDROMORPHONE HYDROCHLORIDE 2 MG/ML
0.5 INJECTION INTRAMUSCULAR; INTRAVENOUS; SUBCUTANEOUS ONCE
Refills: 0 | Status: DISCONTINUED | OUTPATIENT
Start: 2023-01-21 | End: 2023-01-21

## 2023-01-21 RX ORDER — HYDROMORPHONE HYDROCHLORIDE 2 MG/ML
0.5 INJECTION INTRAMUSCULAR; INTRAVENOUS; SUBCUTANEOUS EVERY 6 HOURS
Refills: 0 | Status: DISCONTINUED | OUTPATIENT
Start: 2023-01-21 | End: 2023-01-21

## 2023-01-21 RX ORDER — DEXTROSE 50 % IN WATER 50 %
50 SYRINGE (ML) INTRAVENOUS ONCE
Refills: 0 | Status: COMPLETED | OUTPATIENT
Start: 2023-01-21 | End: 2023-01-21

## 2023-01-21 RX ORDER — SODIUM CHLORIDE 9 MG/ML
1000 INJECTION, SOLUTION INTRAVENOUS
Refills: 0 | Status: DISCONTINUED | OUTPATIENT
Start: 2023-01-21 | End: 2023-01-23

## 2023-01-21 RX ORDER — PHENYLEPHRINE HYDROCHLORIDE 10 MG/ML
1 INJECTION INTRAVENOUS
Qty: 160 | Refills: 0 | Status: DISCONTINUED | OUTPATIENT
Start: 2023-01-21 | End: 2023-01-23

## 2023-01-21 RX ORDER — DEXTROSE 50 % IN WATER 50 %
12.5 SYRINGE (ML) INTRAVENOUS ONCE
Refills: 0 | Status: DISCONTINUED | OUTPATIENT
Start: 2023-01-21 | End: 2023-01-28

## 2023-01-21 RX ORDER — SODIUM CHLORIDE 9 MG/ML
1000 INJECTION, SOLUTION INTRAVENOUS ONCE
Refills: 0 | Status: COMPLETED | OUTPATIENT
Start: 2023-01-21 | End: 2023-01-21

## 2023-01-21 RX ORDER — INSULIN LISPRO 100/ML
VIAL (ML) SUBCUTANEOUS EVERY 6 HOURS
Refills: 0 | Status: DISCONTINUED | OUTPATIENT
Start: 2023-01-21 | End: 2023-01-28

## 2023-01-21 RX ORDER — HYDROMORPHONE HYDROCHLORIDE 2 MG/ML
0.5 INJECTION INTRAMUSCULAR; INTRAVENOUS; SUBCUTANEOUS
Refills: 0 | Status: DISCONTINUED | OUTPATIENT
Start: 2023-01-21 | End: 2023-01-28

## 2023-01-21 RX ORDER — DEXTROSE 50 % IN WATER 50 %
25 SYRINGE (ML) INTRAVENOUS ONCE
Refills: 0 | Status: COMPLETED | OUTPATIENT
Start: 2023-01-21 | End: 2023-01-21

## 2023-01-21 RX ORDER — HYDROMORPHONE HYDROCHLORIDE 2 MG/ML
1 INJECTION INTRAMUSCULAR; INTRAVENOUS; SUBCUTANEOUS EVERY 4 HOURS
Refills: 0 | Status: DISCONTINUED | OUTPATIENT
Start: 2023-01-21 | End: 2023-01-27

## 2023-01-21 RX ADMIN — HYDROMORPHONE HYDROCHLORIDE 0.5 MILLIGRAM(S): 2 INJECTION INTRAMUSCULAR; INTRAVENOUS; SUBCUTANEOUS at 10:50

## 2023-01-21 RX ADMIN — PIPERACILLIN AND TAZOBACTAM 25 GRAM(S): 4; .5 INJECTION, POWDER, LYOPHILIZED, FOR SOLUTION INTRAVENOUS at 10:26

## 2023-01-21 RX ADMIN — HYDROMORPHONE HYDROCHLORIDE 0.5 MILLIGRAM(S): 2 INJECTION INTRAMUSCULAR; INTRAVENOUS; SUBCUTANEOUS at 13:05

## 2023-01-21 RX ADMIN — PANTOPRAZOLE SODIUM 40 MILLIGRAM(S): 20 TABLET, DELAYED RELEASE ORAL at 10:27

## 2023-01-21 RX ADMIN — Medication 400 MILLIGRAM(S): at 00:45

## 2023-01-21 RX ADMIN — SODIUM CHLORIDE 60 MILLILITER(S): 9 INJECTION, SOLUTION INTRAVENOUS at 23:50

## 2023-01-21 RX ADMIN — SODIUM CHLORIDE 1000 MILLILITER(S): 9 INJECTION, SOLUTION INTRAVENOUS at 10:26

## 2023-01-21 RX ADMIN — HYDROMORPHONE HYDROCHLORIDE 0.5 MILLIGRAM(S): 2 INJECTION INTRAMUSCULAR; INTRAVENOUS; SUBCUTANEOUS at 12:22

## 2023-01-21 RX ADMIN — HYDROMORPHONE HYDROCHLORIDE 1 MILLIGRAM(S): 2 INJECTION INTRAMUSCULAR; INTRAVENOUS; SUBCUTANEOUS at 21:35

## 2023-01-21 RX ADMIN — HYDROMORPHONE HYDROCHLORIDE 0.5 MILLIGRAM(S): 2 INJECTION INTRAMUSCULAR; INTRAVENOUS; SUBCUTANEOUS at 06:55

## 2023-01-21 RX ADMIN — PIPERACILLIN AND TAZOBACTAM 25 GRAM(S): 4; .5 INJECTION, POWDER, LYOPHILIZED, FOR SOLUTION INTRAVENOUS at 17:28

## 2023-01-21 RX ADMIN — Medication 2: at 05:22

## 2023-01-21 RX ADMIN — HYDROMORPHONE HYDROCHLORIDE 0.5 MILLIGRAM(S): 2 INJECTION INTRAMUSCULAR; INTRAVENOUS; SUBCUTANEOUS at 17:50

## 2023-01-21 RX ADMIN — HYDROMORPHONE HYDROCHLORIDE 0.5 MILLIGRAM(S): 2 INJECTION INTRAMUSCULAR; INTRAVENOUS; SUBCUTANEOUS at 10:27

## 2023-01-21 RX ADMIN — HYDROMORPHONE HYDROCHLORIDE 1 MILLIGRAM(S): 2 INJECTION INTRAMUSCULAR; INTRAVENOUS; SUBCUTANEOUS at 21:21

## 2023-01-21 RX ADMIN — Medication 50 MILLILITER(S): at 23:50

## 2023-01-21 RX ADMIN — HYDROMORPHONE HYDROCHLORIDE 0.5 MILLIGRAM(S): 2 INJECTION INTRAMUSCULAR; INTRAVENOUS; SUBCUTANEOUS at 06:42

## 2023-01-21 RX ADMIN — HYDROMORPHONE HYDROCHLORIDE 0.5 MILLIGRAM(S): 2 INJECTION INTRAMUSCULAR; INTRAVENOUS; SUBCUTANEOUS at 17:26

## 2023-01-21 RX ADMIN — SODIUM CHLORIDE 125 MILLILITER(S): 9 INJECTION, SOLUTION INTRAVENOUS at 11:34

## 2023-01-21 RX ADMIN — SODIUM CHLORIDE 125 MILLILITER(S): 9 INJECTION, SOLUTION INTRAVENOUS at 19:15

## 2023-01-21 RX ADMIN — PHENYLEPHRINE HYDROCHLORIDE 18.3 MICROGRAM(S)/KG/MIN: 10 INJECTION INTRAVENOUS at 01:22

## 2023-01-21 RX ADMIN — SODIUM CHLORIDE 75 MILLILITER(S): 9 INJECTION, SOLUTION INTRAVENOUS at 01:29

## 2023-01-21 RX ADMIN — Medication 1000 MILLIGRAM(S): at 01:00

## 2023-01-21 NOTE — CHART NOTE - NSCHARTNOTEFT_GEN_A_CORE
Patient's existing NGT # 14 Maltese-- too small.  Asked by Dr. Castro to change the NGT to a larger NGT.  # 14fr NGT removed and a # 16fr inserted without incident.  Patient tolerated the procedure well and placement was confirmed via auscultation.

## 2023-01-21 NOTE — PROGRESS NOTE ADULT - SUBJECTIVE AND OBJECTIVE BOX
Patient admitted overnight    HPI:          73 y/o female with a PMHx o             CVA with left sided weakness               atrial fibrillation on Coumadin              OPD,  - wears 2L NC at baseline   was brought in from skilled nursing facility for abdominal pain emesis and weakness  In ED CT abdomen Pelvis showed,  Large ventral hernia with SBO  lactate 3.o  had ALVERTO creatinine to 1.9  hypotension requiring pressors  had cvp placed   has pyuria but no complaints of urinary frequency  This am still compliaing of abdomen pain, u/0 400 last shift   on low dose phenylephrine  lactate still 3.1   Temp 99 this am      PMH:        as above    ROS no chest pain, no sob, c/o persistent abdominal pain , no flatus, no urinary frequency    MEDICATIONS  (STANDING):  chlorhexidine 4% Liquid 1 Application(s) Topical <User Schedule>  coronavirus bivalent (EUA) Booster Vaccine (PFIZER) 0.3 milliLiter(s) IntraMuscular once  dextrose 5%. 1000 milliLiter(s) (50 mL/Hr) IV Continuous <Continuous>  dextrose 5%. 1000 milliLiter(s) (100 mL/Hr) IV Continuous <Continuous>  dextrose 50% Injectable 25 Gram(s) IV Push once  dextrose 50% Injectable 12.5 Gram(s) IV Push once  dextrose 50% Injectable 25 Gram(s) IV Push once  glucagon  Injectable 1 milliGRAM(s) IntraMuscular once  insulin lispro (ADMELOG) corrective regimen sliding scale   SubCutaneous every 6 hours  lactated ringers. 1000 milliLiter(s) (125 mL/Hr) IV Continuous <Continuous>  pantoprazole  Injectable 40 milliGRAM(s) IV Push daily  phenylephrine    Infusion 1 MICROgram(s)/kG/Min (18.3 mL/Hr) IV Continuous <Continuous>  piperacillin/tazobactam IVPB.. 3.375 Gram(s) IV Intermittent every 8 hours    MEDICATIONS  (PRN):  dextrose Oral Gel 15 Gram(s) Oral once PRN Blood Glucose LESS THAN 70 milliGRAM(s)/deciliter  sodium chloride 0.9% lock flush 10 milliLiter(s) IV Push every 1 hour PRN Pre/post blood products, medications, blood draw, and to maintain line patency      Height (cm): 165.1 ( @ 16:08)  Weight (kg): 97.4 ( @ 23:30)  BMI (kg/m2): 35.7 (01-20 @ 23:30)    ICU Vital Signs Last 24 Hrs  T(C): 37.2 (2023 06:00), Max: 39.3 (2023 17:25)  T(F): 98.9 (2023 06:00), Max: 102.7 (2023 17:25)  HR: 83 (2023 07:00) (79 - 126)  BP: 123/61 (2023 07:00) (81/51 - 147/46)  BP(mean): 79 (2023 07:00) (65 - 90)  ABP: --  ABP(mean): --  RR: 19 (2023 07:00) (18 - 22)  SpO2: 100% (2023 07:00) (96% - 100%)    O2 Parameters below as of 2023 07:00  Patient On (Oxygen Delivery Method): nasal cannula  O2 Flow (L/min): 2    Physical Exam    General aler, in bed no distress  HEENT ng tube  neck right ij catheter  lungs clear, on nasal cannula  cv rrr  abdomen - large ventral hernia, discomfort to palpation no guarding or rebound  extremity warm  DAMEON rizzo      I&O's Summary    2023 07:01  -  2023 07:00  --------------------------------------------------------  IN: 817.8 mL / OUT: 547 mL / NET: 270.8 mL                          11.4   26.94 )-----------( 426      ( 2023 05:37 )             36.9           136  |  107  |  32<H>  ----------------------------<  200<H>  3.6   |  21<L>  |  1.62<H>    Ca    8.2<L>      2023 05:37  Phos  3.6       Mg     2.1         TPro  7.3  /  Alb  2.0<L>  /  TBili  0.6  /  DBili  x   /  AST  14<L>  /  ALT  19  /  AlkPhos  88  01-20    ABG - ( 2023 21:34 )  pH, Arterial: 7.36  pH, Blood: x     /  pCO2: 33    /  pO2: 78    / HCO3: 19    / Base Excess: -5.9  /  SaO2: 97          Urinalysis Basic - ( 2023 20:03 )    Color: Brown / Appearance: very cloudy / S.020 / pH: x  Gluc: x / Ketone: Trace  / Bili: Negative / Urobili: Negative   Blood: x / Protein: 100 / Nitrite: Positive   Leuk Esterase: Moderate / RBC: 6-10 /HPF / WBC >50 /HPF   Sq Epi: x / Non Sq Epi: Negative / Bacteria: Many    DVT Prophylaxis: Inr INR                                                                Advanced Directives: DNR/DNI

## 2023-01-21 NOTE — PROGRESS NOTE ADULT - ASSESSMENT
Urosepsis and high grade SBO, likely secondary to adhesions and ventral hernia. Cont IV abx, ICU care, wean pressors as tolerated. IV hydration. NGT- would try larger size tube for better decompression. Family and pt ammenable to surgery if required. Continue medical optimization. I am away till 1/26. Dr Lyn covering

## 2023-01-21 NOTE — PROGRESS NOTE ADULT - ASSESSMENT
Assessment: Patient NPO with NGT due to SBO secondary to ventral hernia. Acutely hypoglycemia on bedside BGM.     Patient was given 1 amp of D50 and changed from LR to D5 LR; will monitor glucose for need to escalate

## 2023-01-21 NOTE — PROGRESS NOTE ADULT - ASSESSMENT
Patient admitted with abdominal pain, nausea and vomitting , dehydration  septic shock, likely related to   incarcerated ventral hernia  also possible UTI    Plan:    1. Aggressive , hydration  2. abx Zosyn  3. NGT  4 hx. copd stable on nasal cannula no infiltrate on cxr  5. afib, rate ok, on phenylephrine, check echo  6. possible uti as well, on zosyn, urine culture pending  7. was seen by surgery who did not feel surgery was indicated at this time, there is concern over persistent lactate and leukocytosis      Abx, ngt trend lactate   Patient admitted with abdominal pain, nausea and vomitting , dehydration  septic shock, likely related to   incarcerated ventral hernia  also possible UTI    Plan:    1. Aggressive , hydration  2. abx Zosyn  3. NGT  4 hx. copd stable on nasal cannula no infiltrate on cxr  5. afib, rate ok, on phenylephrine, check echo  6. possible uti as well, on zosyn, urine culture pending  7. was seen by surgery who did not feel surgery was indicated at this time, there is concern over persistent lactate and leukocytosis      Abx, ngt trend lactate  8. sliding scale for type II diabetes  9. when inr less than 2 will start on IV heparin for afib

## 2023-01-21 NOTE — PROVIDER CONTACT NOTE (EICU) - SITUATION
ICU admission-pt with SBO, poss UTI, sepsis on pressors ICU admission-pt with SBO, UTI, septic shgock on pressors

## 2023-01-21 NOTE — PROGRESS NOTE ADULT - SUBJECTIVE AND OBJECTIVE BOX
HPI:   73 y/o female with a PMHx of CVA with left sided weakness, atrial fibrillation, emphysema, COPD, HTN - wears 2L NC at baseline BIBA, hx SBO and resection per EMS presents to the ED from [facility] for RLQ pain and r/o SBO. x1 episode of emesis, + profound diffuse abdominal pain, febrile   (2023 21:43)      Past Medical History, Family History, Social History:  PAST MEDICAL & SURGICAL HISTORY:  Hypertension      Hypercholesteremia      COPD (chronic obstructive pulmonary disease)      C. difficile diarrhea        Diverticulitis of intestine with perforation and abscess without bleeding, unspecified part of intestinal tract      PE (pulmonary thromboembolism)  2016      Palpitations      Obesity      Osteoarthritis      Anemia      Colostomy in place      History of atrial fibrillation      HTN (hypertension)      Diabetes mellitus      Cerebrovascular accident (CVA)      DVT of lower limb, acute      CVA (cerebral vascular accident)      COPD (chronic obstructive pulmonary disease)      Neuropathy      Diverticulitis      History of appendectomy      H/O:   x2      H/O ovarian cystectomy  b/l      Status post total replacement of both hips      H/O hemicolectomy  2016      History of colostomy reversal      History of colostomy reversal      S/P colostomy      S/P       S/P hip replacement          FAMILY HISTORY:  Family history of COPD (chronic obstructive pulmonary disease)    Family history of chronic kidney disease    Family history of hiatal hernia (Sibling)        Social History:      Interval History: Hypoglycemic event     Physical Exam:  General alert in bed in NAD, in bed no distress  HEENT: Normocephalic, NGT secured in position, R TLC catheter dressed appropriately   Pulm: lungs clear bilaterally on nasal cannula  Cardio: Regular rate and rhythm   abdomen - large ventral hernia, discomfort to palpation no guarding or rebound  extremity warm and well perfused    rizzo in place       Vitals:  Vital Signs Last 24 Hrs  T(C): 37.2 (2023 20:00), Max: 37.7 (2023 17:00)  T(F): 98.9 (2023 20:00), Max: 99.9 (2023 17:00)  HR: 90 (2023 23:00) (79 - 97)  BP: 103/48 (2023 23:00) (94/52 - 126/70)  BP(mean): 64 (2023 23:00) (59 - 95)  RR: 16 (2023 23:00) (13 - )  SpO2: 97% (2023 23:00) (95% - 100%)    I&O:  I&O's Summary    2023 07:01  -  2023 07:00  --------------------------------------------------------  IN: 847.8 mL / OUT: 577 mL / NET: 270.8 mL    2023 07:01  -  2023 23:43  --------------------------------------------------------  IN: 2673 mL / OUT: 1115 mL / NET: 1558 mL        Labs & Radiology:                        11.4   26.94 )-----------( 426      ( 2023 05:37 )             36.9           136  |  107  |  32<H>  ----------------------------<  200<H>  3.6   |  21<L>  |  1.62<H>    Ca    8.2<L>      2023 05:37  Phos  3.6       Mg     2.1         TPro  7.3  /  Alb  2.0<L>  /  TBili  0.6  /  DBili  x   /  AST  14<L>  /  ALT  19  /  AlkPhos  88        LIVER FUNCTIONS - ( 2023 17:12 )  Alb: 2.0 g/dL / Pro: 7.3 gm/dL / ALK PHOS: 88 U/L / ALT: 19 U/L / AST: 14 U/L / GGT: x                   PT/INR - ( 2023 13:44 )   PT: 25.0 sec;   INR: 2.14 ratio         PTT - ( 2023 13:44 )  PTT:35.2 sec        ABG - ( 2023 21:34 )  pH, Arterial: 7.36  pH, Blood: x     /  pCO2: 33    /  pO2: 78    / HCO3: 19    / Base Excess: -5.9  /  SaO2: 97                  CAPILLARY BLOOD GLUCOSE      POCT Blood Glucose.: 64 mg/dL (2023 23:06)  POCT Blood Glucose.: 92 mg/dL (2023 17:31)  POCT Blood Glucose.: 133 mg/dL (2023 12:20)  POCT Blood Glucose.: 184 mg/dL (2023 05:19)      < from: CT Abdomen and Pelvis No Cont (23 @ 19:41) >  IMPRESSION:  Very large ventral hernia containing small and large bowel with   incomplete imaging of the bowel on this study. Collapsed small bowel in   the right lower quadrant is highly concerning for small bowel obstruction   as described above. The exact point of transition is not seen with   certainty although this may be in the mid abdomen superior to the   bladder.Mild ascites is present in the right side of the abdomen which   was not seen previously. A closed loop obstruction or bowel ischemia in   the setting of very dense calcifications cannot be excluded on the basis   of this examination. This was discussed with Dr. Washington in the emergency   room at 8:04 PM on 2023.    6 mm nodular density in the left lower lobe which was not seen   previously. This may be exaggerated by overlapping structures represent   mucous plugging. True nodule cannot entirely be excluded.. Recommend   follow-up chest CT scan in 3 months. This was also discussed with Dr. Washington in the emergency room.    Higher attenuation posteriorly in the bladder which may be due to   artifact. Blood in the bladder is considered much less likely although   clinical correlation is suggested.    Mild esophageal distention. The stomach is not significantly dilated.        --- End of Report ---    < end of copied text >      Medications:  MEDICATIONS  (STANDING):  chlorhexidine 4% Liquid 1 Application(s) Topical <User Schedule>  coronavirus bivalent (EUA) Booster Vaccine (PFIZER) 0.3 milliLiter(s) IntraMuscular once  dextrose 5% + lactated ringers. 1000 milliLiter(s) (60 mL/Hr) IV Continuous <Continuous>  dextrose 5%. 1000 milliLiter(s) (50 mL/Hr) IV Continuous <Continuous>  dextrose 5%. 1000 milliLiter(s) (100 mL/Hr) IV Continuous <Continuous>  dextrose 50% Injectable 25 Gram(s) IV Push once  dextrose 50% Injectable 12.5 Gram(s) IV Push once  dextrose 50% Injectable 25 Gram(s) IV Push once  dextrose 50% Injectable 50 milliLiter(s) IV Push once  dextrose 50% Injectable 25 Gram(s) IV Push once  glucagon  Injectable 1 milliGRAM(s) IntraMuscular once  insulin lispro (ADMELOG) corrective regimen sliding scale   SubCutaneous every 6 hours  lactated ringers. 1000 milliLiter(s) (125 mL/Hr) IV Continuous <Continuous>  pantoprazole  Injectable 40 milliGRAM(s) IV Push daily  phenylephrine    Infusion 1 MICROgram(s)/kG/Min (18.3 mL/Hr) IV Continuous <Continuous>  piperacillin/tazobactam IVPB.. 3.375 Gram(s) IV Intermittent every 8 hours    MEDICATIONS  (PRN):  dextrose Oral Gel 15 Gram(s) Oral once PRN Blood Glucose LESS THAN 70 milliGRAM(s)/deciliter  HYDROmorphone  Injectable 1 milliGRAM(s) IV Push every 4 hours PRN Moderate Pain (4 - 6)  HYDROmorphone  Injectable 0.5 milliGRAM(s) IV Push every 3 hours PRN Severe Pain (7 - 10)  sodium chloride 0.9% lock flush 10 milliLiter(s) IV Push every 1 hour PRN Pre/post blood products, medications, blood draw, and to maintain line patency

## 2023-01-21 NOTE — PROGRESS NOTE ADULT - SUBJECTIVE AND OBJECTIVE BOX
Awake alert, no distress. Abdominal pain persists  VSS on pressors, afeb  lungs- bilat rhonchi  Cor- RRR  Abd- distended soft, tender, no rebound, tympanitic  Ext- no edema

## 2023-01-21 NOTE — PROGRESS NOTE ADULT - SUBJECTIVE AND OBJECTIVE BOX
Pt re evaluated in ICU. She is awake and alert. no new complaints. Central line placed . Pt on pressors. [place] with purulent and bloody urine.    VSS afeb  Abd- distended, tympanitic, soft, tender to palp, no rebound    < from: CT Abdomen and Pelvis No Cont (01.20.23 @ 19:41) >  IMPRESSION:  Very large ventral hernia containing small and large bowel with   incomplete imaging of the bowel on this study. Collapsed small bowel in   the right lower quadrant is highly concerning for small bowel obstruction   as described above. The exact point of transition is not seen with   certainty although this may be in the mid abdomen superior to the   bladder.Mild ascites is present in the right side of the abdomen which   was not seen previously. A closed loop obstruction or bowel ischemia in   the setting of very dense calcifications cannot be excluded on the basis   of this examination. This was discussed with Dr. Washington in the emergency   room at 8:04 PM on 1/20/2023.    6 mm nodular density in the left lower lobe which was not seen   previously. This may be exaggerated by overlapping structures represent   mucous plugging. True nodule cannot entirely be excluded.. Recommend   follow-up chest CT scan in 3 months. This was also discussed with Dr. Washington in the emergency room.    Higher attenuation posteriorly in the bladder which may be due to   artifact. Blood in the bladder is considered much less likely although   clinical correlation is suggested.      < end of copied text >

## 2023-01-21 NOTE — PROVIDER CONTACT NOTE (EICU) - RECOMMENDATIONS
Cont NPO, NGT to LWS, IV hydration w/ LR  Cont vasopressors to keep MAP >65  Cont IV Zosyn pending cultures  FF up labs   GI prophylaxis  Surf ffup  D/w ICU PAPO Taylor

## 2023-01-21 NOTE — PROGRESS NOTE ADULT - ASSESSMENT
74 yo female with urosepsis and high grade SBO secondary to large ventral hernia. Lactate down to 2.5 with hydration. No free air, pneumatosis, pneumobilia to suggest SB infarction or intra abdominal sepsis. Discussed condition with family. Sepsis should be treated with IV abx and hydration. Do not feel emergent abdominal surgery warranted now. However in view of recurrent nature of bowel obstruction due to the hernia, I advised patient  and family that I would gladly address that problem if she gets through this episode and is medically optimized.

## 2023-01-21 NOTE — PROVIDER CONTACT NOTE (EICU) - BACKGROUND
73 yo female with HTN, afib on warfarin, DM 2, COPD on 2 LPM via NC, hx of CVA w/residual left sided hemiplegis, hx diverticulitis s/p Guillermina with SBR (2016), and subsequent colostomy reversal (2017), ventral hernia and hx of episodes of SBO in the past, most recent was previous admit a month ago, presents again to the ED w/ abd pain, vomiting, fever of 102.  CT abd/pelvis reports very large ventral hernia containing small and large bowel. SBO. Urinalysis with +nitrite, +leuk est. P 71 yo female with HTN, afib on warfarin, DM 2, COPD on 2 LPM via NC, hx of CVA w/residual left sided hemiplegis, hx diverticulitis s/p Guillermina with SBR (2016), and subsequent colostomy reversal (2017), ventral hernia and hx of episodes of SBO in the past, most recent was previous admit a month ago, presents again to the ED w/ abd pain, vomiting, fever of 102.  CT abd/pelvis reports very large ventral hernia containing small and large bowel. SBO. Urinalysis with +nitrite, +leuk est. Labs also signif [place] 14946 with left shift, ALVERTO with BUN/crea 28/1.9, elev lactate 3.2.  Pt received 3 LNSD bolus, Zosyn and NGT inserted.  Pt was seen by Surgery.   Pt remained hypotensive and was started on Levophed.

## 2023-01-22 LAB
ANION GAP SERPL CALC-SCNC: 5 MMOL/L — SIGNIFICANT CHANGE UP (ref 5–17)
APTT BLD: 32.8 SEC — SIGNIFICANT CHANGE UP (ref 27.5–35.5)
APTT BLD: 67.7 SEC — HIGH (ref 27.5–35.5)
BUN SERPL-MCNC: 21 MG/DL — SIGNIFICANT CHANGE UP (ref 7–23)
CALCIUM SERPL-MCNC: 8.3 MG/DL — LOW (ref 8.5–10.1)
CHLORIDE SERPL-SCNC: 110 MMOL/L — HIGH (ref 96–108)
CO2 SERPL-SCNC: 27 MMOL/L — SIGNIFICANT CHANGE UP (ref 22–31)
CREAT SERPL-MCNC: 0.54 MG/DL — SIGNIFICANT CHANGE UP (ref 0.5–1.3)
EGFR: 98 ML/MIN/1.73M2 — SIGNIFICANT CHANGE UP
GLUCOSE BLDC GLUCOMTR-MCNC: 101 MG/DL — HIGH (ref 70–99)
GLUCOSE BLDC GLUCOMTR-MCNC: 124 MG/DL — HIGH (ref 70–99)
GLUCOSE BLDC GLUCOMTR-MCNC: 184 MG/DL — HIGH (ref 70–99)
GLUCOSE BLDC GLUCOMTR-MCNC: 22 MG/DL — CRITICAL LOW (ref 70–99)
GLUCOSE BLDC GLUCOMTR-MCNC: 50 MG/DL — CRITICAL LOW (ref 70–99)
GLUCOSE BLDC GLUCOMTR-MCNC: 52 MG/DL — CRITICAL LOW (ref 70–99)
GLUCOSE BLDC GLUCOMTR-MCNC: 63 MG/DL — LOW (ref 70–99)
GLUCOSE BLDC GLUCOMTR-MCNC: 66 MG/DL — LOW (ref 70–99)
GLUCOSE BLDC GLUCOMTR-MCNC: 72 MG/DL — SIGNIFICANT CHANGE UP (ref 70–99)
GLUCOSE BLDC GLUCOMTR-MCNC: 89 MG/DL — SIGNIFICANT CHANGE UP (ref 70–99)
GLUCOSE BLDC GLUCOMTR-MCNC: 90 MG/DL — SIGNIFICANT CHANGE UP (ref 70–99)
GLUCOSE BLDC GLUCOMTR-MCNC: 91 MG/DL — SIGNIFICANT CHANGE UP (ref 70–99)
GLUCOSE BLDC GLUCOMTR-MCNC: 95 MG/DL — SIGNIFICANT CHANGE UP (ref 70–99)
GLUCOSE SERPL-MCNC: 97 MG/DL — SIGNIFICANT CHANGE UP (ref 70–99)
HCT VFR BLD CALC: 29.6 % — LOW (ref 34.5–45)
HCT VFR BLD CALC: 30.8 % — LOW (ref 34.5–45)
HGB BLD-MCNC: 9.1 G/DL — LOW (ref 11.5–15.5)
HGB BLD-MCNC: 9.3 G/DL — LOW (ref 11.5–15.5)
INR BLD: 1.93 RATIO — HIGH (ref 0.88–1.16)
LACTATE SERPL-SCNC: 1.7 MMOL/L — SIGNIFICANT CHANGE UP (ref 0.7–2)
MAGNESIUM SERPL-MCNC: 2.2 MG/DL — SIGNIFICANT CHANGE UP (ref 1.6–2.6)
MCHC RBC-ENTMCNC: 25.3 PG — LOW (ref 27–34)
MCHC RBC-ENTMCNC: 25.5 PG — LOW (ref 27–34)
MCHC RBC-ENTMCNC: 30.2 GM/DL — LOW (ref 32–36)
MCHC RBC-ENTMCNC: 30.7 GM/DL — LOW (ref 32–36)
MCV RBC AUTO: 82.5 FL — SIGNIFICANT CHANGE UP (ref 80–100)
MCV RBC AUTO: 84.6 FL — SIGNIFICANT CHANGE UP (ref 80–100)
PHOSPHATE SERPL-MCNC: 1.2 MG/DL — LOW (ref 2.5–4.5)
PLATELET # BLD AUTO: 277 K/UL — SIGNIFICANT CHANGE UP (ref 150–400)
PLATELET # BLD AUTO: 289 K/UL — SIGNIFICANT CHANGE UP (ref 150–400)
POTASSIUM SERPL-MCNC: 3 MMOL/L — LOW (ref 3.5–5.3)
POTASSIUM SERPL-SCNC: 3 MMOL/L — LOW (ref 3.5–5.3)
PROTHROM AB SERPL-ACNC: 22.5 SEC — HIGH (ref 10.5–13.4)
RBC # BLD: 3.59 M/UL — LOW (ref 3.8–5.2)
RBC # BLD: 3.64 M/UL — LOW (ref 3.8–5.2)
RBC # FLD: 16.7 % — HIGH (ref 10.3–14.5)
RBC # FLD: 16.9 % — HIGH (ref 10.3–14.5)
SODIUM SERPL-SCNC: 142 MMOL/L — SIGNIFICANT CHANGE UP (ref 135–145)
WBC # BLD: 13.69 K/UL — HIGH (ref 3.8–10.5)
WBC # BLD: 16.85 K/UL — HIGH (ref 3.8–10.5)
WBC # FLD AUTO: 13.69 K/UL — HIGH (ref 3.8–10.5)
WBC # FLD AUTO: 16.85 K/UL — HIGH (ref 3.8–10.5)

## 2023-01-22 PROCEDURE — 99291 CRITICAL CARE FIRST HOUR: CPT

## 2023-01-22 RX ORDER — DEXTROSE 50 % IN WATER 50 %
12.5 SYRINGE (ML) INTRAVENOUS ONCE
Refills: 0 | Status: COMPLETED | OUTPATIENT
Start: 2023-01-22 | End: 2023-01-22

## 2023-01-22 RX ORDER — HEPARIN SODIUM 5000 [USP'U]/ML
4000 INJECTION INTRAVENOUS; SUBCUTANEOUS EVERY 6 HOURS
Refills: 0 | Status: DISCONTINUED | OUTPATIENT
Start: 2023-01-22 | End: 2023-01-26

## 2023-01-22 RX ORDER — HEPARIN SODIUM 5000 [USP'U]/ML
INJECTION INTRAVENOUS; SUBCUTANEOUS
Qty: 25000 | Refills: 0 | Status: DISCONTINUED | OUTPATIENT
Start: 2023-01-22 | End: 2023-01-26

## 2023-01-22 RX ORDER — HEPARIN SODIUM 5000 [USP'U]/ML
8000 INJECTION INTRAVENOUS; SUBCUTANEOUS EVERY 6 HOURS
Refills: 0 | Status: DISCONTINUED | OUTPATIENT
Start: 2023-01-22 | End: 2023-01-26

## 2023-01-22 RX ORDER — POTASSIUM PHOSPHATE, MONOBASIC POTASSIUM PHOSPHATE, DIBASIC 236; 224 MG/ML; MG/ML
30 INJECTION, SOLUTION INTRAVENOUS ONCE
Refills: 0 | Status: COMPLETED | OUTPATIENT
Start: 2023-01-22 | End: 2023-01-22

## 2023-01-22 RX ADMIN — HYDROMORPHONE HYDROCHLORIDE 1 MILLIGRAM(S): 2 INJECTION INTRAMUSCULAR; INTRAVENOUS; SUBCUTANEOUS at 09:15

## 2023-01-22 RX ADMIN — PIPERACILLIN AND TAZOBACTAM 25 GRAM(S): 4; .5 INJECTION, POWDER, LYOPHILIZED, FOR SOLUTION INTRAVENOUS at 17:54

## 2023-01-22 RX ADMIN — HYDROMORPHONE HYDROCHLORIDE 1 MILLIGRAM(S): 2 INJECTION INTRAMUSCULAR; INTRAVENOUS; SUBCUTANEOUS at 04:34

## 2023-01-22 RX ADMIN — PIPERACILLIN AND TAZOBACTAM 25 GRAM(S): 4; .5 INJECTION, POWDER, LYOPHILIZED, FOR SOLUTION INTRAVENOUS at 09:15

## 2023-01-22 RX ADMIN — HEPARIN SODIUM 1800 UNIT(S)/HR: 5000 INJECTION INTRAVENOUS; SUBCUTANEOUS at 19:27

## 2023-01-22 RX ADMIN — HYDROMORPHONE HYDROCHLORIDE 1 MILLIGRAM(S): 2 INJECTION INTRAMUSCULAR; INTRAVENOUS; SUBCUTANEOUS at 18:25

## 2023-01-22 RX ADMIN — HYDROMORPHONE HYDROCHLORIDE 1 MILLIGRAM(S): 2 INJECTION INTRAMUSCULAR; INTRAVENOUS; SUBCUTANEOUS at 13:37

## 2023-01-22 RX ADMIN — HEPARIN SODIUM 1800 UNIT(S)/HR: 5000 INJECTION INTRAVENOUS; SUBCUTANEOUS at 21:11

## 2023-01-22 RX ADMIN — HYDROMORPHONE HYDROCHLORIDE 1 MILLIGRAM(S): 2 INJECTION INTRAMUSCULAR; INTRAVENOUS; SUBCUTANEOUS at 10:30

## 2023-01-22 RX ADMIN — PIPERACILLIN AND TAZOBACTAM 25 GRAM(S): 4; .5 INJECTION, POWDER, LYOPHILIZED, FOR SOLUTION INTRAVENOUS at 02:17

## 2023-01-22 RX ADMIN — POTASSIUM PHOSPHATE, MONOBASIC POTASSIUM PHOSPHATE, DIBASIC 83.33 MILLIMOLE(S): 236; 224 INJECTION, SOLUTION INTRAVENOUS at 09:16

## 2023-01-22 RX ADMIN — HYDROMORPHONE HYDROCHLORIDE 1 MILLIGRAM(S): 2 INJECTION INTRAMUSCULAR; INTRAVENOUS; SUBCUTANEOUS at 22:46

## 2023-01-22 RX ADMIN — HYDROMORPHONE HYDROCHLORIDE 1 MILLIGRAM(S): 2 INJECTION INTRAMUSCULAR; INTRAVENOUS; SUBCUTANEOUS at 13:52

## 2023-01-22 RX ADMIN — HYDROMORPHONE HYDROCHLORIDE 1 MILLIGRAM(S): 2 INJECTION INTRAMUSCULAR; INTRAVENOUS; SUBCUTANEOUS at 17:53

## 2023-01-22 RX ADMIN — PANTOPRAZOLE SODIUM 40 MILLIGRAM(S): 20 TABLET, DELAYED RELEASE ORAL at 09:15

## 2023-01-22 RX ADMIN — HEPARIN SODIUM 1800 UNIT(S)/HR: 5000 INJECTION INTRAVENOUS; SUBCUTANEOUS at 14:28

## 2023-01-22 RX ADMIN — Medication 12.5 GRAM(S): at 13:37

## 2023-01-22 RX ADMIN — HYDROMORPHONE HYDROCHLORIDE 1 MILLIGRAM(S): 2 INJECTION INTRAMUSCULAR; INTRAVENOUS; SUBCUTANEOUS at 04:50

## 2023-01-22 RX ADMIN — Medication 12.5 GRAM(S): at 20:03

## 2023-01-22 RX ADMIN — CHLORHEXIDINE GLUCONATE 1 APPLICATION(S): 213 SOLUTION TOPICAL at 06:00

## 2023-01-22 NOTE — DIETITIAN INITIAL EVALUATION ADULT - FEEDING SKILL
age appropriate assistance Normal vision: sees adequately in most situations; can see medication labels, newsprint

## 2023-01-22 NOTE — DIETITIAN INITIAL EVALUATION ADULT - SIGNS/SYMPTOMS
AEB <50% ENN x 5 days, moderate temporal wasting, severe orbital wasting, mild to moderate edema  AEB <50% ENN x 5 days, moderate temporal wasting, severe orbital wasting, moderate edema

## 2023-01-22 NOTE — PROGRESS NOTE ADULT - SUBJECTIVE AND OBJECTIVE BOX
Patient seen and examined at bedside. Patient in mild discomfort. Remains NPO with NGT in place, Nurse reports BM x2 overnight. Nurse denies any acute events. Vitals reviewed and WNL.    Vitals:  T(C): 37.1 ( @ 06:00), Max: 37.7 ( @ 17:00)  HR: 82 ( @ 07:00) (79 - 95)  BP: 104/51 ( @ 07:00) (102/48 - 125/69)  RR: 16 ( @ 07:00) (13 - 21)  SpO2: 100% ( @ 07:00) (95% - 100%)     07:01  -   @ 07:00  --------------------------------------------------------  IN:    dextrose 5% + lactated ringers: 315 mL    Instillation (mL): 110 mL    IV PiggyBack: 300 mL    Lactated Ringers: 1960 mL    Lactated Ringers Bolus: 1000 mL    Phenylephrine: 163 mL  Total IN: 3848 mL    OUT:    Indwelling Catheter - Urethral (mL): 1305 mL    Instillation (mL): 110 mL    Nasogastric/Oral tube (mL): 120 mL  Total OUT: 1535 mL    Total NET: 2313 mL          Physical Exam:  General: AAOx3, Well developed, NAD  Chest: Normal respiratory effort  Heart: RRR  Abdomen: Large ventral hernia with palpable bowel in hernia, Soft, mild tendernesss, no masses  Neuro/Psych: No localized deficits. Normal speech, normal tone  Skin: Normal, no rashes, no lesions noted.   Extremities: Warm, well perfused, no edema, Pulses intact     @ 05:34                    9.1  CBC: 13.69>)-------(<289                     29.6                 142 | 110 | 21    CMP:  ----------------------< 97               3.0 | 27 | 0.54                      Ca:8.3  Phos:1.2  M.2               -|      |-        LFTs:  ------|-|-----             -|      |-  01-21 @ 05:37                    11.4  CBC: 26.94>)-------(<426                     36.9                 136 | 107 | 32    CMP:  ----------------------< 200               3.6 | 21 | 1.62                      Ca:8.2  Phos:3.6  M.1               -|      |-        LFTs:  ------|-|-----             -|      |-   @ 01:27                    11.4  CBC: 23.58>)-------(<379                     37.8                 135 | 108 | 32    CMP:  ----------------------< 208               3.8 | 17 | 1.72                      Ca:7.8  Phos:4.1  M.0               -|      |-        LFTs:  ------|-|-----             -|      |-      Culture - Blood (collected 23 @ 18:02)  Source: .Blood None  Preliminary Report (23 @ 01:02):    No growth to date.    Culture - Blood (collected 23 @ 18:01)  Source: .Blood None  Preliminary Report (23 @ 01:02):    No growth to date.      Current Inpatient Medications:  chlorhexidine 4% Liquid 1 Application(s) Topical <User Schedule>  coronavirus bivalent (EUA) Booster Vaccine (PFIZER) 0.3 milliLiter(s) IntraMuscular once  dextrose 5% + lactated ringers. 1000 milliLiter(s) (60 mL/Hr) IV Continuous <Continuous>  dextrose 5%. 1000 milliLiter(s) (50 mL/Hr) IV Continuous <Continuous>  dextrose 5%. 1000 milliLiter(s) (100 mL/Hr) IV Continuous <Continuous>  dextrose 50% Injectable 25 Gram(s) IV Push once  dextrose 50% Injectable 12.5 Gram(s) IV Push once  dextrose 50% Injectable 25 Gram(s) IV Push once  dextrose Oral Gel 15 Gram(s) Oral once PRN  glucagon  Injectable 1 milliGRAM(s) IntraMuscular once  heparin   Injectable 8000 Unit(s) IV Push every 6 hours PRN  heparin   Injectable 4000 Unit(s) IV Push every 6 hours PRN  heparin  Infusion.  Unit(s)/Hr (18 mL/Hr) IV Continuous <Continuous>  HYDROmorphone  Injectable 1 milliGRAM(s) IV Push every 4 hours PRN  HYDROmorphone  Injectable 0.5 milliGRAM(s) IV Push every 3 hours PRN  insulin lispro (ADMELOG) corrective regimen sliding scale   SubCutaneous every 6 hours  lactated ringers. 1000 milliLiter(s) (125 mL/Hr) IV Continuous <Continuous>  pantoprazole  Injectable 40 milliGRAM(s) IV Push daily  phenylephrine    Infusion 1 MICROgram(s)/kG/Min (18.3 mL/Hr) IV Continuous <Continuous>  piperacillin/tazobactam IVPB.. 3.375 Gram(s) IV Intermittent every 8 hours  potassium phosphate IVPB 30 milliMole(s) IV Intermittent once  sodium chloride 0.9% lock flush 10 milliLiter(s) IV Push every 1 hour PRN

## 2023-01-22 NOTE — DIETITIAN INITIAL EVALUATION ADULT - PERTINENT LABORATORY DATA
01-22    142  |  110<H>  |  21  ----------------------------<  97  3.0<L>   |  27  |  0.54    Ca    8.3<L>      22 Jan 2023 05:34  Phos  1.2     01-22  Mg     2.2     01-22    TPro  7.3  /  Alb  2.0<L>  /  TBili  0.6  /  DBili  x   /  AST  14<L>  /  ALT  19  /  AlkPhos  88  01-20  POCT Blood Glucose.: 91 mg/dL (01-22-23 @ 05:23)  A1C with Estimated Average Glucose Result: 6.0 % (12-11-22 @ 08:00)  A1C with Estimated Average Glucose Result: 6.0 % (03-11-22 @ 09:35)

## 2023-01-22 NOTE — DIETITIAN INITIAL EVALUATION ADULT - NSFNSGIIOFT_GEN_A_CORE
01-21-23 @ 07:01  -  01-22-23 @ 07:00  --------------------------------------------------------  OUT:    Nasogastric/Oral tube (mL): 120 mL  Total OUT: 120 mL    Total NET: -120 mL

## 2023-01-22 NOTE — DIETITIAN INITIAL EVALUATION ADULT - PERTINENT MEDS FT
MEDICATIONS  (STANDING):  chlorhexidine 4% Liquid 1 Application(s) Topical <User Schedule>  coronavirus bivalent (EUA) Booster Vaccine (PFIZER) 0.3 milliLiter(s) IntraMuscular once  dextrose 5% + lactated ringers. 1000 milliLiter(s) (60 mL/Hr) IV Continuous <Continuous>  dextrose 5%. 1000 milliLiter(s) (100 mL/Hr) IV Continuous <Continuous>  dextrose 5%. 1000 milliLiter(s) (50 mL/Hr) IV Continuous <Continuous>  dextrose 50% Injectable 25 Gram(s) IV Push once  dextrose 50% Injectable 12.5 Gram(s) IV Push once  dextrose 50% Injectable 25 Gram(s) IV Push once  glucagon  Injectable 1 milliGRAM(s) IntraMuscular once  heparin  Infusion.  Unit(s)/Hr (18 mL/Hr) IV Continuous <Continuous>  insulin lispro (ADMELOG) corrective regimen sliding scale   SubCutaneous every 6 hours  lactated ringers. 1000 milliLiter(s) (125 mL/Hr) IV Continuous <Continuous>  pantoprazole  Injectable 40 milliGRAM(s) IV Push daily  phenylephrine    Infusion 1 MICROgram(s)/kG/Min (18.3 mL/Hr) IV Continuous <Continuous>  piperacillin/tazobactam IVPB.. 3.375 Gram(s) IV Intermittent every 8 hours    MEDICATIONS  (PRN):  dextrose Oral Gel 15 Gram(s) Oral once PRN Blood Glucose LESS THAN 70 milliGRAM(s)/deciliter  heparin   Injectable 8000 Unit(s) IV Push every 6 hours PRN For aPTT less than 40  heparin   Injectable 4000 Unit(s) IV Push every 6 hours PRN For aPTT between 40 - 57  HYDROmorphone  Injectable 1 milliGRAM(s) IV Push every 4 hours PRN Moderate Pain (4 - 6)  HYDROmorphone  Injectable 0.5 milliGRAM(s) IV Push every 3 hours PRN Severe Pain (7 - 10)  sodium chloride 0.9% lock flush 10 milliLiter(s) IV Push every 1 hour PRN Pre/post blood products, medications, blood draw, and to maintain line patency

## 2023-01-22 NOTE — DIETITIAN INITIAL EVALUATION ADULT - ADD RECOMMEND
1. ADAT to CLD to regular to maximize caloric and protein intake   2. Encourage protein-rich foods, maximize food preferences   3. Monitor bowel movements, if no BM for >3 days, consider implementing bowel regimen.   4. MVI w/ minerals daily to ensure 100% RDA met   5. In v/o malnutrition, consider adding thiamine 100 mg daily  6. Monitor daily wt to track/trend changes; monitor I/Os   7. Confirm goals of care regarding nutrition support   RD will continue to monitor PO intake, labs, hydration, and wt prn.

## 2023-01-22 NOTE — PROGRESS NOTE ADULT - SUBJECTIVE AND OBJECTIVE BOX
Events Overnight:  Patient off phenylephrine, feels a little better, no chest pain, sob, still some ab dominal pain, states she is passing gas     u/0 40-50 cc/hr.    HPI:           73 y/o female with a PMHx o             CVA with left sided weakness               atrial fibrillation on Coumadin              OPD,  - wears 2L NC at baseline   was brought in from skilled nursing facility for abdominal pain emesis and weakness  In ED CT abdomen Pelvis showed,  Large ventral hernia with SBO  lactate 3.o  had ALVERTO creatinine to 1.9 improved with hydration  hypotension requiring pressors titrated off this am  had cvp placed   has pyuria but no complaints of urinary frequency  afebrile this am      PMH:        as above       MEDICATIONS  (STANDING):  chlorhexidine 4% Liquid 1 Application(s) Topical <User Schedule>  coronavirus bivalent (EUA) Booster Vaccine (PFIZER) 0.3 milliLiter(s) IntraMuscular once  dextrose 5% + lactated ringers. 1000 milliLiter(s) (60 mL/Hr) IV Continuous <Continuous>  dextrose 5%. 1000 milliLiter(s) (50 mL/Hr) IV Continuous <Continuous>  dextrose 5%. 1000 milliLiter(s) (100 mL/Hr) IV Continuous <Continuous>  dextrose 50% Injectable 25 Gram(s) IV Push once  dextrose 50% Injectable 12.5 Gram(s) IV Push once  dextrose 50% Injectable 25 Gram(s) IV Push once  glucagon  Injectable 1 milliGRAM(s) IntraMuscular once  heparin  Infusion.  Unit(s)/Hr (18 mL/Hr) IV Continuous <Continuous>  insulin lispro (ADMELOG) corrective regimen sliding scale   SubCutaneous every 6 hours  lactated ringers. 1000 milliLiter(s) (125 mL/Hr) IV Continuous <Continuous>  pantoprazole  Injectable 40 milliGRAM(s) IV Push daily  phenylephrine    Infusion 1 MICROgram(s)/kG/Min (18.3 mL/Hr) IV Continuous <Continuous>  piperacillin/tazobactam IVPB.. 3.375 Gram(s) IV Intermittent every 8 hours  potassium phosphate IVPB 30 milliMole(s) IV Intermittent once    MEDICATIONS  (PRN):  dextrose Oral Gel 15 Gram(s) Oral once PRN Blood Glucose LESS THAN 70 milliGRAM(s)/deciliter  heparin   Injectable 8000 Unit(s) IV Push every 6 hours PRN For aPTT less than 40  heparin   Injectable 4000 Unit(s) IV Push every 6 hours PRN For aPTT between 40 - 57  HYDROmorphone  Injectable 1 milliGRAM(s) IV Push every 4 hours PRN Moderate Pain (4 - 6)  HYDROmorphone  Injectable 0.5 milliGRAM(s) IV Push every 3 hours PRN Severe Pain (7 - 10)  sodium chloride 0.9% lock flush 10 milliLiter(s) IV Push every 1 hour PRN Pre/post blood products, medications, blood draw, and to maintain line patency    ICU Vital Signs Last 24 Hrs  T(C): 37.1 (2023 06:00), Max: 37.7 (2023 17:00)  T(F): 98.8 (2023 06:00), Max: 99.9 (2023 17:00)  HR: 82 (2023 07:00) (79 - 95)  BP: 104/51 (2023 07:00) (102/48 - 126/70)  BP(mean): 67 (2023 07:00) (59 - 95)  ABP: --  ABP(mean): --  RR: 16 (2023 07:00) (13 - 21)  SpO2: 100% (2023 07:00) (95% - 100%)    O2 Parameters below as of 2023 07:00  Patient On (Oxygen Delivery Method): nasal cannula  O2 Flow (L/min): 2    Physical Exam    General alert, in bed no distress  HEENT ng tube, drained 80 cc overnigh  neck right ij catheter  lungs clear, on nasal cannula  cv rrr  abdomen - large ventral hernia, discomfort to palpation no guarding or rebound  extremity warm  DAMEON rizzo    I&O's Summary    2023 07:01  -  2023 07:00  --------------------------------------------------------  IN: 3848 mL / OUT: 1535 mL / NET: 2313 mL                            9.1    13.69 )-----------( 289      ( 2023 05:34 )             29.6       01-22    142  |  110<H>  |  21  ----------------------------<  97  3.0<L>   |  27  |  0.54    Ca    8.3<L>      2023 05:34  Phos  1.2       Mg     2.2         TPro  7.3  /  Alb  2.0<L>  /  TBili  0.6  /  DBili  x   /  AST  14<L>  /  ALT  19  /  AlkPhos  88      ABG - ( 2023 21:34 )  pH, Arterial: 7.36  pH, Blood: x     /  pCO2: 33    /  pO2: 78    / HCO3: 19    / Base Excess: -5.9  /  SaO2: 97        Urinalysis Basic - ( 2023 20:03 )    Color: Brown / Appearance: very cloudy / S.020 / pH: x  Gluc: x / Ketone: Trace  / Bili: Negative / Urobili: Negative   Blood: x / Protein: 100 / Nitrite: Positive   Leuk Esterase: Moderate / RBC: 6-10 /HPF / WBC >50 /HPF   Sq Epi: x / Non Sq Epi: Negative / Bacteria: Many    DVT Prophylaxis:    IV heparin                                                             Advanced Directives: DNR

## 2023-01-22 NOTE — PROGRESS NOTE ADULT - ASSESSMENT
Patient admitted with abdominal pain, nausea and vomitting , dehydration  septic shock, likely related to   incarcerated ventral hernia  also possible UTI  Sepsis     Plan:    1.  SBO - minimal ngt output, states passing gas, ngt for surgery still NPO, being treated conservatively  2.  abx Zosyn awaiting urine culture  3.  hx. copd stable on nasal cannula no infiltrate on cxr  4. afib, rate ok, phenylephrine titrated off check echo, INR 1.9, start iv heparin  5. sliding scale for type II diabetes, had low glucose  6. oob later today   Patient admitted with abdominal pain, nausea and vomitting , dehydration  septic shock, likely related to   incarcerated ventral hernia  also possible UTI  Sepsis     Plan:    1.  SBO - minimal ngt output, states passing gas, ngt for surgery still NPO, being treated conservatively  2.  abx Zosyn awaiting urine culture  3.  hx. copd stable on nasal cannula no infiltrate on cxr  4. afib, rate ok, phenylephrine titrated off , echo ef 55%  INR 1.9, start iv heparin  5. sliding scale for type II diabetes, had low glucose  6. oob later today  7. ALVERTO improved with hydration  8. hypokalemia, hypophosphatemia, replace

## 2023-01-22 NOTE — DIETITIAN INITIAL EVALUATION ADULT - OTHER INFO
73 y/o female with a PMH of CVA with left sided weakness, atrial fibrillation, emphysema, COPD, HTN - wears 2L NC at baseline BIBA, hx SBO and resection per EMS presents to the ED from [facility] for RLQ pain and r/o SBO. x1 episode of emesis, + profound diffuse abdominal pain, febrile    Pt known to RD service (2019). Unable to obtain info 2/2 lethargic upon assessment. Noted w/ NG tube (drained 80 cc overnight) as per critical care note. Surgery resident present at bedside upon RD visit. SBO resolving, BM x 2 overnight and passing flatus, to be started on CLD and consider d/c NGT as per surgical resident note. Pt c/o slight abdominal discomfort. Bed scale wt of 220# taken by RD on 1/22 (1+ and 2+ edema is skewing wt appearance). NFPE reveals moderate temple wasting and severe orbital wasting. See recommendations below.

## 2023-01-22 NOTE — PROGRESS NOTE ADULT - ASSESSMENT
71yo F admitted Urosepsis and SBO, likely secondary to adhesions and ventral hernia.     Plan:  Cont IV abx,   ICU care, wean pressors as tolerated  IV hydration.   NGT to ILWS  Having bowel movements and passing flatus  Repeat AXR in AM  Will evaluate for NGT removal as SBO resolves  Medical management as per primary team    Plan discussed with Dr. Lyn

## 2023-01-23 LAB
ANION GAP SERPL CALC-SCNC: 8 MMOL/L — SIGNIFICANT CHANGE UP (ref 5–17)
APTT BLD: 79.3 SEC — HIGH (ref 27.5–35.5)
BUN SERPL-MCNC: 12 MG/DL — SIGNIFICANT CHANGE UP (ref 7–23)
CALCIUM SERPL-MCNC: 8.2 MG/DL — LOW (ref 8.5–10.1)
CHLORIDE SERPL-SCNC: 109 MMOL/L — HIGH (ref 96–108)
CO2 SERPL-SCNC: 26 MMOL/L — SIGNIFICANT CHANGE UP (ref 22–31)
CREAT SERPL-MCNC: 0.45 MG/DL — LOW (ref 0.5–1.3)
EGFR: 102 ML/MIN/1.73M2 — SIGNIFICANT CHANGE UP
GLUCOSE BLDC GLUCOMTR-MCNC: 133 MG/DL — HIGH (ref 70–99)
GLUCOSE BLDC GLUCOMTR-MCNC: 209 MG/DL — HIGH (ref 70–99)
GLUCOSE BLDC GLUCOMTR-MCNC: 263 MG/DL — HIGH (ref 70–99)
GLUCOSE BLDC GLUCOMTR-MCNC: 96 MG/DL — SIGNIFICANT CHANGE UP (ref 70–99)
GLUCOSE SERPL-MCNC: 98 MG/DL — SIGNIFICANT CHANGE UP (ref 70–99)
HCT VFR BLD CALC: 29.4 % — LOW (ref 34.5–45)
HGB BLD-MCNC: 8.7 G/DL — LOW (ref 11.5–15.5)
MAGNESIUM SERPL-MCNC: 2.1 MG/DL — SIGNIFICANT CHANGE UP (ref 1.6–2.6)
MCHC RBC-ENTMCNC: 24.9 PG — LOW (ref 27–34)
MCHC RBC-ENTMCNC: 29.6 GM/DL — LOW (ref 32–36)
MCV RBC AUTO: 84 FL — SIGNIFICANT CHANGE UP (ref 80–100)
PHOSPHATE SERPL-MCNC: 2.3 MG/DL — LOW (ref 2.5–4.5)
PLATELET # BLD AUTO: 332 K/UL — SIGNIFICANT CHANGE UP (ref 150–400)
POTASSIUM SERPL-MCNC: 3.1 MMOL/L — LOW (ref 3.5–5.3)
POTASSIUM SERPL-SCNC: 3.1 MMOL/L — LOW (ref 3.5–5.3)
RBC # BLD: 3.5 M/UL — LOW (ref 3.8–5.2)
RBC # FLD: 17 % — HIGH (ref 10.3–14.5)
SODIUM SERPL-SCNC: 143 MMOL/L — SIGNIFICANT CHANGE UP (ref 135–145)
WBC # BLD: 14.59 K/UL — HIGH (ref 3.8–10.5)
WBC # FLD AUTO: 14.59 K/UL — HIGH (ref 3.8–10.5)

## 2023-01-23 PROCEDURE — 99233 SBSQ HOSP IP/OBS HIGH 50: CPT

## 2023-01-23 RX ORDER — LOSARTAN POTASSIUM 100 MG/1
25 TABLET, FILM COATED ORAL DAILY
Refills: 0 | Status: DISCONTINUED | OUTPATIENT
Start: 2023-01-23 | End: 2023-02-01

## 2023-01-23 RX ORDER — AMIODARONE HYDROCHLORIDE 400 MG/1
200 TABLET ORAL DAILY
Refills: 0 | Status: DISCONTINUED | OUTPATIENT
Start: 2023-01-23 | End: 2023-02-01

## 2023-01-23 RX ORDER — DILTIAZEM HCL 120 MG
180 CAPSULE, EXT RELEASE 24 HR ORAL DAILY
Refills: 0 | Status: DISCONTINUED | OUTPATIENT
Start: 2023-01-23 | End: 2023-01-31

## 2023-01-23 RX ORDER — ATORVASTATIN CALCIUM 80 MG/1
20 TABLET, FILM COATED ORAL AT BEDTIME
Refills: 0 | Status: DISCONTINUED | OUTPATIENT
Start: 2023-01-23 | End: 2023-02-01

## 2023-01-23 RX ORDER — POTASSIUM PHOSPHATE, MONOBASIC POTASSIUM PHOSPHATE, DIBASIC 236; 224 MG/ML; MG/ML
30 INJECTION, SOLUTION INTRAVENOUS ONCE
Refills: 0 | Status: COMPLETED | OUTPATIENT
Start: 2023-01-23 | End: 2023-01-23

## 2023-01-23 RX ORDER — NYSTATIN CREAM 100000 [USP'U]/G
1 CREAM TOPICAL EVERY 12 HOURS
Refills: 0 | Status: DISCONTINUED | OUTPATIENT
Start: 2023-01-23 | End: 2023-02-22

## 2023-01-23 RX ORDER — MONTELUKAST 4 MG/1
10 TABLET, CHEWABLE ORAL DAILY
Refills: 0 | Status: DISCONTINUED | OUTPATIENT
Start: 2023-01-23 | End: 2023-02-01

## 2023-01-23 RX ORDER — PIPERACILLIN AND TAZOBACTAM 4; .5 G/20ML; G/20ML
3.38 INJECTION, POWDER, LYOPHILIZED, FOR SOLUTION INTRAVENOUS EVERY 8 HOURS
Refills: 0 | Status: DISCONTINUED | OUTPATIENT
Start: 2023-01-23 | End: 2023-01-24

## 2023-01-23 RX ORDER — DULOXETINE HYDROCHLORIDE 30 MG/1
60 CAPSULE, DELAYED RELEASE ORAL DAILY
Refills: 0 | Status: DISCONTINUED | OUTPATIENT
Start: 2023-01-23 | End: 2023-02-01

## 2023-01-23 RX ORDER — NYSTATIN CREAM 100000 [USP'U]/G
1 CREAM TOPICAL
Refills: 0 | Status: DISCONTINUED | OUTPATIENT
Start: 2023-01-23 | End: 2023-02-22

## 2023-01-23 RX ADMIN — POTASSIUM PHOSPHATE, MONOBASIC POTASSIUM PHOSPHATE, DIBASIC 83.33 MILLIMOLE(S): 236; 224 INJECTION, SOLUTION INTRAVENOUS at 10:00

## 2023-01-23 RX ADMIN — HYDROMORPHONE HYDROCHLORIDE 1 MILLIGRAM(S): 2 INJECTION INTRAMUSCULAR; INTRAVENOUS; SUBCUTANEOUS at 10:59

## 2023-01-23 RX ADMIN — HYDROMORPHONE HYDROCHLORIDE 1 MILLIGRAM(S): 2 INJECTION INTRAMUSCULAR; INTRAVENOUS; SUBCUTANEOUS at 02:55

## 2023-01-23 RX ADMIN — DULOXETINE HYDROCHLORIDE 60 MILLIGRAM(S): 30 CAPSULE, DELAYED RELEASE ORAL at 17:43

## 2023-01-23 RX ADMIN — HEPARIN SODIUM 1800 UNIT(S)/HR: 5000 INJECTION INTRAVENOUS; SUBCUTANEOUS at 03:21

## 2023-01-23 RX ADMIN — HEPARIN SODIUM 1800 UNIT(S)/HR: 5000 INJECTION INTRAVENOUS; SUBCUTANEOUS at 05:35

## 2023-01-23 RX ADMIN — HYDROMORPHONE HYDROCHLORIDE 1 MILLIGRAM(S): 2 INJECTION INTRAMUSCULAR; INTRAVENOUS; SUBCUTANEOUS at 04:10

## 2023-01-23 RX ADMIN — NYSTATIN CREAM 1 APPLICATION(S): 100000 CREAM TOPICAL at 22:10

## 2023-01-23 RX ADMIN — LOSARTAN POTASSIUM 25 MILLIGRAM(S): 100 TABLET, FILM COATED ORAL at 14:52

## 2023-01-23 RX ADMIN — HEPARIN SODIUM 1800 UNIT(S)/HR: 5000 INJECTION INTRAVENOUS; SUBCUTANEOUS at 19:06

## 2023-01-23 RX ADMIN — HYDROMORPHONE HYDROCHLORIDE 1 MILLIGRAM(S): 2 INJECTION INTRAMUSCULAR; INTRAVENOUS; SUBCUTANEOUS at 07:20

## 2023-01-23 RX ADMIN — PIPERACILLIN AND TAZOBACTAM 25 GRAM(S): 4; .5 INJECTION, POWDER, LYOPHILIZED, FOR SOLUTION INTRAVENOUS at 18:11

## 2023-01-23 RX ADMIN — HYDROMORPHONE HYDROCHLORIDE 1 MILLIGRAM(S): 2 INJECTION INTRAMUSCULAR; INTRAVENOUS; SUBCUTANEOUS at 06:59

## 2023-01-23 RX ADMIN — HEPARIN SODIUM 1800 UNIT(S)/HR: 5000 INJECTION INTRAVENOUS; SUBCUTANEOUS at 07:07

## 2023-01-23 RX ADMIN — HYDROMORPHONE HYDROCHLORIDE 1 MILLIGRAM(S): 2 INJECTION INTRAMUSCULAR; INTRAVENOUS; SUBCUTANEOUS at 11:20

## 2023-01-23 RX ADMIN — PANTOPRAZOLE SODIUM 40 MILLIGRAM(S): 20 TABLET, DELAYED RELEASE ORAL at 10:17

## 2023-01-23 RX ADMIN — HYDROMORPHONE HYDROCHLORIDE 1 MILLIGRAM(S): 2 INJECTION INTRAMUSCULAR; INTRAVENOUS; SUBCUTANEOUS at 18:00

## 2023-01-23 RX ADMIN — Medication 4: at 17:44

## 2023-01-23 RX ADMIN — HYDROMORPHONE HYDROCHLORIDE 1 MILLIGRAM(S): 2 INJECTION INTRAMUSCULAR; INTRAVENOUS; SUBCUTANEOUS at 00:34

## 2023-01-23 RX ADMIN — HYDROMORPHONE HYDROCHLORIDE 1 MILLIGRAM(S): 2 INJECTION INTRAMUSCULAR; INTRAVENOUS; SUBCUTANEOUS at 17:44

## 2023-01-23 RX ADMIN — PIPERACILLIN AND TAZOBACTAM 25 GRAM(S): 4; .5 INJECTION, POWDER, LYOPHILIZED, FOR SOLUTION INTRAVENOUS at 02:55

## 2023-01-23 RX ADMIN — AMIODARONE HYDROCHLORIDE 200 MILLIGRAM(S): 400 TABLET ORAL at 14:52

## 2023-01-23 RX ADMIN — Medication 6: at 23:29

## 2023-01-23 RX ADMIN — HYDROMORPHONE HYDROCHLORIDE 1 MILLIGRAM(S): 2 INJECTION INTRAMUSCULAR; INTRAVENOUS; SUBCUTANEOUS at 22:10

## 2023-01-23 RX ADMIN — PIPERACILLIN AND TAZOBACTAM 25 GRAM(S): 4; .5 INJECTION, POWDER, LYOPHILIZED, FOR SOLUTION INTRAVENOUS at 10:20

## 2023-01-23 RX ADMIN — SODIUM CHLORIDE 100 MILLILITER(S): 9 INJECTION, SOLUTION INTRAVENOUS at 19:01

## 2023-01-23 RX ADMIN — ATORVASTATIN CALCIUM 20 MILLIGRAM(S): 80 TABLET, FILM COATED ORAL at 22:10

## 2023-01-23 RX ADMIN — MONTELUKAST 10 MILLIGRAM(S): 4 TABLET, CHEWABLE ORAL at 17:43

## 2023-01-23 RX ADMIN — HEPARIN SODIUM 1800 UNIT(S)/HR: 5000 INJECTION INTRAVENOUS; SUBCUTANEOUS at 19:02

## 2023-01-23 RX ADMIN — NYSTATIN CREAM 1 APPLICATION(S): 100000 CREAM TOPICAL at 23:30

## 2023-01-23 RX ADMIN — Medication 180 MILLIGRAM(S): at 14:52

## 2023-01-23 NOTE — PROGRESS NOTE ADULT - ASSESSMENT
Patient admitted with abdominal pain, nausea and vomitting , dehydration  septic shock, likely related to   incarcerated ventral hernia  also possible UTI  Sepsis     Plan:    1.  SBO - minimal ngt output, states passing gas, start clears, being treated conservatively  2.  abx Zosyn awaiting Kleb PNA growing in the UC.  Continue Zosyn PND Sensivities  3.  hx. copd stable on nasal cannula no infiltrate on cxr  4. afib, rate ok, echo ef 55%  INR 1.9, start iv heparin.  Resume Cardizem, Lorsatan, and Lipitor.  Likely resume coumadin on 1/24 if she is tolerating PO  5. sliding scale for type II diabetes  6. oob later today, PT  7. ALVERTO improved with hydration  8. hypokalemia, hypophosphatemia, replace    Nystatin for Fungal rash    GOC: DNR

## 2023-01-23 NOTE — PROGRESS NOTE ADULT - SUBJECTIVE AND OBJECTIVE BOX
Patient seen and examined at bedside. Patient in mild discomfort. Nurse denies any acute events. Vitals reviewed and WNL.    Vital Signs Last 24 Hrs  T(C): 36.8 (23 Jan 2023 08:00), Max: 37.2 (22 Jan 2023 17:00)  T(F): 98.3 (23 Jan 2023 08:00), Max: 98.9 (22 Jan 2023 17:00)  HR: 93 (23 Jan 2023 08:00) (82 - 102)  BP: 156/76 (23 Jan 2023 07:00) (104/48 - 156/76)  BP(mean): 94 (23 Jan 2023 07:00) (64 - 94)  RR: 17 (23 Jan 2023 08:00) (11 - 18)  SpO2: 98% (23 Jan 2023 08:00) (96% - 99%)    Parameters below as of 23 Jan 2023 04:00  Patient On (Oxygen Delivery Method): nasal cannula  O2 Flow (L/min): 2    I&O's Detail    22 Jan 2023 07:01  -  23 Jan 2023 07:00  --------------------------------------------------------  IN:    dextrose 5% + lactated ringers: 1660 mL    Heparin Infusion: 316 mL    IV PiggyBack: 900 mL  Total IN: 2876 mL    OUT:    Indwelling Catheter - Urethral (mL): 990 mL    Lactated Ringers: 0 mL    Nasogastric/Oral tube (mL): 20 mL    Phenylephrine: 0 mL  Total OUT: 1010 mL    Total NET: 1866 mL        Physical Exam:  General: AAOx3, Well developed, NAD  Chest: Normal respiratory effort  Heart: RRR  Abdomen: Large ventral hernia with palpable bowel in hernia, Soft, mild tenderness no masses  Neuro/Psych: No localized deficits. Normal speech, normal tone  Skin: Normal, no rashes, no lesions noted.   Extremities: Warm, well perfused, no edema, Pulses intact                          8.7    14.59 )-----------( 332      ( 23 Jan 2023 06:07 )             29.4   01-23    143  |  109<H>  |  12  ----------------------------<  98  3.1<L>   |  26  |  0.45<L>    Ca    8.2<L>      23 Jan 2023 06:07  Phos  2.3     01-23  Mg     2.1     01-23        Current Inpatient Medications:  chlorhexidine 4% Liquid 1 Application(s) Topical <User Schedule>  coronavirus bivalent (EUA) Booster Vaccine (PFIZER) 0.3 milliLiter(s) IntraMuscular once  dextrose 5% + lactated ringers. 1000 milliLiter(s) (60 mL/Hr) IV Continuous <Continuous>  dextrose 5%. 1000 milliLiter(s) (50 mL/Hr) IV Continuous <Continuous>  dextrose 5%. 1000 milliLiter(s) (100 mL/Hr) IV Continuous <Continuous>  dextrose 50% Injectable 25 Gram(s) IV Push once  dextrose 50% Injectable 12.5 Gram(s) IV Push once  dextrose 50% Injectable 25 Gram(s) IV Push once  dextrose Oral Gel 15 Gram(s) Oral once PRN  glucagon  Injectable 1 milliGRAM(s) IntraMuscular once  heparin   Injectable 8000 Unit(s) IV Push every 6 hours PRN  heparin   Injectable 4000 Unit(s) IV Push every 6 hours PRN  heparin  Infusion.  Unit(s)/Hr (18 mL/Hr) IV Continuous <Continuous>  HYDROmorphone  Injectable 1 milliGRAM(s) IV Push every 4 hours PRN  HYDROmorphone  Injectable 0.5 milliGRAM(s) IV Push every 3 hours PRN  insulin lispro (ADMELOG) corrective regimen sliding scale   SubCutaneous every 6 hours  lactated ringers. 1000 milliLiter(s) (125 mL/Hr) IV Continuous <Continuous>  pantoprazole  Injectable 40 milliGRAM(s) IV Push daily  phenylephrine    Infusion 1 MICROgram(s)/kG/Min (18.3 mL/Hr) IV Continuous <Continuous>  piperacillin/tazobactam IVPB.. 3.375 Gram(s) IV Intermittent every 8 hours  potassium phosphate IVPB 30 milliMole(s) IV Intermittent once  sodium chloride 0.9% lock flush 10 milliLiter(s) IV Push every 1 hour PRN

## 2023-01-23 NOTE — CHART NOTE - NSCHARTNOTEFT_GEN_A_CORE
Dietitian BRIEF Note 1/23/23    *RD re-consulted for possible PPN, as per IDR plan for pt to be advanced to CLD today. Will add LPS BID while on clears and once advanced to fulls will add premier protein shake BID. Would suggest to Maintain aspiration precautions, back of bed >45 degrees.     01-23    143  |  109<H>  |  12  ----------------------------<  98  3.1<L>   |  26  |  0.45<L>    Ca    8.2<L>      23 Jan 2023 06:07  Phos  2.3     01-23  Mg     2.1     01-23    *I&O's Detail    22 Jan 2023 07:01  -  23 Jan 2023 07:00  --------------------------------------------------------  IN:    dextrose 5% + lactated ringers: 1660 mL    Heparin Infusion: 316 mL    IV PiggyBack: 900 mL  Total IN: 2876 mL    OUT:    Indwelling Catheter - Urethral (mL): 990 mL    Lactated Ringers: 0 mL    Nasogastric/Oral tube (mL): 20 mL    Phenylephrine: 0 mL  Total OUT: 1010 mL    Total NET: 1866 mL      *Pt continues to meets criteria for severe protein-calorie malnutrition in context of acute disease  r/t decreased ability to meet increased nutrient needs 2/2 SBO  AEB <50% ENN x 5 days, moderate temporal wasting, severe orbital wasting, moderate edema      RD will continue to monitor PO intake, labs, hydration, and wt prn.   Bushra Soni, MS, RDN, [facility] [phone]  Certified Nutrition

## 2023-01-23 NOTE — PROGRESS NOTE ADULT - ASSESSMENT
73yo F admitted Urosepsis and SBO, likely secondary to adhesions and ventral hernia.     Plan:  IV hydration   Having bowel movements and passing flatus  Continue CLD  Recommend nystatin for pannus  Pain control prn  Continue with ICU care    Plan discussed with Dr. Lyn

## 2023-01-23 NOTE — PROGRESS NOTE ADULT - SUBJECTIVE AND OBJECTIVE BOX
HPI:           71 y/o female with a PMHx o             CVA with left sided weakness               atrial fibrillation on Coumadin              OPD  - wears 2L NC at baseline   was brought in from skilled nursing facility for abdominal pain emesis and weakness  In ED CT abdomen Pelvis showed,  Large ventral hernia with SBO  lactate 3.0  had ALVERTO creatinine to 1.9 improved with hydration  had cvp placed   has pyuria but no complaints of urinary frequency             PAST MEDICAL & SURGICAL HISTORY:  Hypertension      Hypercholesteremia      COPD (chronic obstructive pulmonary disease)      C. difficile diarrhea        Diverticulitis of intestine with perforation and abscess without bleeding, unspecified part of intestinal tract      PE (pulmonary thromboembolism)  2016      Palpitations      Obesity      Osteoarthritis      Anemia      Colostomy in place      History of atrial fibrillation      HTN (hypertension)      Diabetes mellitus      Cerebrovascular accident (CVA)      DVT of lower limb, acute      CVA (cerebral vascular accident)      COPD (chronic obstructive pulmonary disease)      Neuropathy      Diverticulitis      History of appendectomy      H/O:   x2      H/O ovarian cystectomy  b/l      Status post total replacement of both hips      H/O hemicolectomy  2016      History of colostomy reversal      History of colostomy reversal      S/P colostomy      S/P       S/P hip replacement          FAMILY HISTORY:  Family history of COPD (chronic obstructive pulmonary disease)    Family history of chronic kidney disease    Family history of hiatal hernia (Sibling)        Social Hx:    Allergies    Cipro (Rash)  Spiriva (Rash)    Intolerances            ICU Vital Signs Last 24 Hrs  T(C): 36.8 (2023 08:00), Max: 37.2 (2023 17:00)  T(F): 98.3 (2023 08:00), Max: 98.9 (2023 17:00)  HR: 102 (2023 12:00) (85 - 102)  BP: 155/51 (2023 12:00) (104/48 - 176/76)  BP(mean): 80 (2023 12:00) (64 - 99)  ABP: --  ABP(mean): --  RR: 17 (2023 12:00) (11 - 20)  SpO2: 97% (2023 12:00) (96% - 100%)    O2 Parameters below as of 2023 04:00  Patient On (Oxygen Delivery Method): nasal cannula  O2 Flow (L/min): 2              I&O's Summary    2023 07:  -  2023 07:00  --------------------------------------------------------  IN: 2876 mL / OUT: 1010 mL / NET: 1866 mL    2023 07:  -  2023 13:42  --------------------------------------------------------  IN: 200 mL / OUT: 0 mL / NET: 200 mL                              8.7    14.59 )-----------( 332      ( 2023 06:07 )             29.4           143  |  109<H>  |  12  ----------------------------<  98  3.1<L>   |  26  |  0.45<L>    Ca    8.2<L>      2023 06:07  Phos  2.3       Mg     2.1                           MEDICATIONS  (STANDING):  chlorhexidine 4% Liquid 1 Application(s) Topical <User Schedule>  coronavirus bivalent (EUA) Booster Vaccine (PFIZER) 0.3 milliLiter(s) IntraMuscular once  dextrose 5% + lactated ringers. 1000 milliLiter(s) (100 mL/Hr) IV Continuous <Continuous>  dextrose 5%. 1000 milliLiter(s) (50 mL/Hr) IV Continuous <Continuous>  dextrose 5%. 1000 milliLiter(s) (100 mL/Hr) IV Continuous <Continuous>  dextrose 50% Injectable 25 Gram(s) IV Push once  dextrose 50% Injectable 25 Gram(s) IV Push once  dextrose 50% Injectable 12.5 Gram(s) IV Push once  glucagon  Injectable 1 milliGRAM(s) IntraMuscular once  heparin  Infusion.  Unit(s)/Hr (18 mL/Hr) IV Continuous <Continuous>  insulin lispro (ADMELOG) corrective regimen sliding scale   SubCutaneous every 6 hours  nystatin Cream 1 Application(s) Topical two times a day  pantoprazole  Injectable 40 milliGRAM(s) IV Push daily  piperacillin/tazobactam IVPB.. 3.375 Gram(s) IV Intermittent every 8 hours    MEDICATIONS  (PRN):  dextrose Oral Gel 15 Gram(s) Oral once PRN Blood Glucose LESS THAN 70 milliGRAM(s)/deciliter  heparin   Injectable 8000 Unit(s) IV Push every 6 hours PRN For aPTT less than 40  heparin   Injectable 4000 Unit(s) IV Push every 6 hours PRN For aPTT between 40 - 57  HYDROmorphone  Injectable 1 milliGRAM(s) IV Push every 4 hours PRN Moderate Pain (4 - 6)  HYDROmorphone  Injectable 0.5 milliGRAM(s) IV Push every 3 hours PRN Severe Pain (7 - 10)  sodium chloride 0.9% lock flush 10 milliLiter(s) IV Push every 1 hour PRN Pre/post blood products, medications, blood draw, and to maintain line patency      DVT Prophylaxis: UFH    Advanced Directives:  Discussed with:    Visit Information:    ** Time is exclusive of billed procedures and/or teaching and/or routine family updates.

## 2023-01-24 LAB
-  AMIKACIN: SIGNIFICANT CHANGE UP
-  AMOXICILLIN/CLAVULANIC ACID: SIGNIFICANT CHANGE UP
-  AMPICILLIN/SULBACTAM: SIGNIFICANT CHANGE UP
-  AMPICILLIN: SIGNIFICANT CHANGE UP
-  AZTREONAM: SIGNIFICANT CHANGE UP
-  CEFAZOLIN: SIGNIFICANT CHANGE UP
-  CEFEPIME: SIGNIFICANT CHANGE UP
-  CEFOXITIN: SIGNIFICANT CHANGE UP
-  CEFTRIAXONE: SIGNIFICANT CHANGE UP
-  CEFUROXIME: SIGNIFICANT CHANGE UP
-  CIPROFLOXACIN: SIGNIFICANT CHANGE UP
-  ERTAPENEM: SIGNIFICANT CHANGE UP
-  GENTAMICIN: SIGNIFICANT CHANGE UP
-  IMIPENEM: SIGNIFICANT CHANGE UP
-  LEVOFLOXACIN: SIGNIFICANT CHANGE UP
-  MEROPENEM: SIGNIFICANT CHANGE UP
-  NITROFURANTOIN: SIGNIFICANT CHANGE UP
-  PIPERACILLIN/TAZOBACTAM: SIGNIFICANT CHANGE UP
-  TOBRAMYCIN: SIGNIFICANT CHANGE UP
-  TRIMETHOPRIM/SULFAMETHOXAZOLE: SIGNIFICANT CHANGE UP
ANION GAP SERPL CALC-SCNC: 6 MMOL/L — SIGNIFICANT CHANGE UP (ref 5–17)
APTT BLD: 81.8 SEC — HIGH (ref 27.5–35.5)
BUN SERPL-MCNC: 7 MG/DL — SIGNIFICANT CHANGE UP (ref 7–23)
CALCIUM SERPL-MCNC: 8 MG/DL — LOW (ref 8.5–10.1)
CHLORIDE SERPL-SCNC: 109 MMOL/L — HIGH (ref 96–108)
CO2 SERPL-SCNC: 25 MMOL/L — SIGNIFICANT CHANGE UP (ref 22–31)
CREAT SERPL-MCNC: 0.48 MG/DL — LOW (ref 0.5–1.3)
EGFR: 101 ML/MIN/1.73M2 — SIGNIFICANT CHANGE UP
GLUCOSE BLDC GLUCOMTR-MCNC: 202 MG/DL — HIGH (ref 70–99)
GLUCOSE BLDC GLUCOMTR-MCNC: 204 MG/DL — HIGH (ref 70–99)
GLUCOSE BLDC GLUCOMTR-MCNC: 228 MG/DL — HIGH (ref 70–99)
GLUCOSE BLDC GLUCOMTR-MCNC: 250 MG/DL — HIGH (ref 70–99)
GLUCOSE SERPL-MCNC: 243 MG/DL — HIGH (ref 70–99)
HCT VFR BLD CALC: 29.3 % — LOW (ref 34.5–45)
HGB BLD-MCNC: 9.1 G/DL — LOW (ref 11.5–15.5)
MAGNESIUM SERPL-MCNC: 1.9 MG/DL — SIGNIFICANT CHANGE UP (ref 1.6–2.6)
MCHC RBC-ENTMCNC: 25.9 PG — LOW (ref 27–34)
MCHC RBC-ENTMCNC: 31.1 GM/DL — LOW (ref 32–36)
MCV RBC AUTO: 83.5 FL — SIGNIFICANT CHANGE UP (ref 80–100)
METHOD TYPE: SIGNIFICANT CHANGE UP
PHOSPHATE SERPL-MCNC: 2.5 MG/DL — SIGNIFICANT CHANGE UP (ref 2.5–4.5)
PLATELET # BLD AUTO: 332 K/UL — SIGNIFICANT CHANGE UP (ref 150–400)
POTASSIUM SERPL-MCNC: 2.9 MMOL/L — CRITICAL LOW (ref 3.5–5.3)
POTASSIUM SERPL-SCNC: 2.9 MMOL/L — CRITICAL LOW (ref 3.5–5.3)
RBC # BLD: 3.51 M/UL — LOW (ref 3.8–5.2)
RBC # FLD: 17.2 % — HIGH (ref 10.3–14.5)
SODIUM SERPL-SCNC: 140 MMOL/L — SIGNIFICANT CHANGE UP (ref 135–145)
WBC # BLD: 13.17 K/UL — HIGH (ref 3.8–10.5)
WBC # FLD AUTO: 13.17 K/UL — HIGH (ref 3.8–10.5)

## 2023-01-24 PROCEDURE — 99233 SBSQ HOSP IP/OBS HIGH 50: CPT

## 2023-01-24 RX ORDER — POTASSIUM CHLORIDE 20 MEQ
10 PACKET (EA) ORAL
Refills: 0 | Status: COMPLETED | OUTPATIENT
Start: 2023-01-24 | End: 2023-01-24

## 2023-01-24 RX ORDER — LEVOTHYROXINE SODIUM 125 MCG
66 TABLET ORAL AT BEDTIME
Refills: 0 | Status: DISCONTINUED | OUTPATIENT
Start: 2023-01-24 | End: 2023-01-25

## 2023-01-24 RX ORDER — CEFTRIAXONE 500 MG/1
1000 INJECTION, POWDER, FOR SOLUTION INTRAMUSCULAR; INTRAVENOUS EVERY 24 HOURS
Refills: 0 | Status: COMPLETED | OUTPATIENT
Start: 2023-01-24 | End: 2023-01-26

## 2023-01-24 RX ORDER — POTASSIUM CHLORIDE 20 MEQ
40 PACKET (EA) ORAL ONCE
Refills: 0 | Status: COMPLETED | OUTPATIENT
Start: 2023-01-24 | End: 2023-01-24

## 2023-01-24 RX ORDER — ACETAMINOPHEN 500 MG
650 TABLET ORAL ONCE
Refills: 0 | Status: COMPLETED | OUTPATIENT
Start: 2023-01-24 | End: 2023-01-24

## 2023-01-24 RX ORDER — SODIUM CHLORIDE 9 MG/ML
1000 INJECTION, SOLUTION INTRAVENOUS
Refills: 0 | Status: DISCONTINUED | OUTPATIENT
Start: 2023-01-24 | End: 2023-01-24

## 2023-01-24 RX ADMIN — Medication 100 MILLIEQUIVALENT(S): at 07:00

## 2023-01-24 RX ADMIN — Medication 650 MILLIGRAM(S): at 03:57

## 2023-01-24 RX ADMIN — HYDROMORPHONE HYDROCHLORIDE 0.5 MILLIGRAM(S): 2 INJECTION INTRAMUSCULAR; INTRAVENOUS; SUBCUTANEOUS at 10:40

## 2023-01-24 RX ADMIN — Medication 40 MILLIEQUIVALENT(S): at 07:00

## 2023-01-24 RX ADMIN — NYSTATIN CREAM 1 APPLICATION(S): 100000 CREAM TOPICAL at 17:00

## 2023-01-24 RX ADMIN — ATORVASTATIN CALCIUM 20 MILLIGRAM(S): 80 TABLET, FILM COATED ORAL at 23:00

## 2023-01-24 RX ADMIN — NYSTATIN CREAM 1 APPLICATION(S): 100000 CREAM TOPICAL at 06:19

## 2023-01-24 RX ADMIN — NYSTATIN CREAM 1 APPLICATION(S): 100000 CREAM TOPICAL at 10:27

## 2023-01-24 RX ADMIN — PIPERACILLIN AND TAZOBACTAM 25 GRAM(S): 4; .5 INJECTION, POWDER, LYOPHILIZED, FOR SOLUTION INTRAVENOUS at 02:11

## 2023-01-24 RX ADMIN — Medication 180 MILLIGRAM(S): at 10:24

## 2023-01-24 RX ADMIN — HYDROMORPHONE HYDROCHLORIDE 1 MILLIGRAM(S): 2 INJECTION INTRAMUSCULAR; INTRAVENOUS; SUBCUTANEOUS at 15:11

## 2023-01-24 RX ADMIN — Medication 4: at 13:26

## 2023-01-24 RX ADMIN — LOSARTAN POTASSIUM 25 MILLIGRAM(S): 100 TABLET, FILM COATED ORAL at 10:25

## 2023-01-24 RX ADMIN — Medication 4: at 17:00

## 2023-01-24 RX ADMIN — PANTOPRAZOLE SODIUM 40 MILLIGRAM(S): 20 TABLET, DELAYED RELEASE ORAL at 10:25

## 2023-01-24 RX ADMIN — Medication 650 MILLIGRAM(S): at 02:45

## 2023-01-24 RX ADMIN — HEPARIN SODIUM 1800 UNIT(S)/HR: 5000 INJECTION INTRAVENOUS; SUBCUTANEOUS at 07:00

## 2023-01-24 RX ADMIN — PIPERACILLIN AND TAZOBACTAM 25 GRAM(S): 4; .5 INJECTION, POWDER, LYOPHILIZED, FOR SOLUTION INTRAVENOUS at 10:22

## 2023-01-24 RX ADMIN — Medication 66 MICROGRAM(S): at 23:33

## 2023-01-24 RX ADMIN — Medication 4: at 07:13

## 2023-01-24 RX ADMIN — HEPARIN SODIUM 1800 UNIT(S)/HR: 5000 INJECTION INTRAVENOUS; SUBCUTANEOUS at 08:42

## 2023-01-24 RX ADMIN — HYDROMORPHONE HYDROCHLORIDE 1 MILLIGRAM(S): 2 INJECTION INTRAMUSCULAR; INTRAVENOUS; SUBCUTANEOUS at 15:32

## 2023-01-24 RX ADMIN — HYDROMORPHONE HYDROCHLORIDE 1 MILLIGRAM(S): 2 INJECTION INTRAMUSCULAR; INTRAVENOUS; SUBCUTANEOUS at 00:46

## 2023-01-24 RX ADMIN — Medication 4: at 23:08

## 2023-01-24 RX ADMIN — HEPARIN SODIUM 1800 UNIT(S)/HR: 5000 INJECTION INTRAVENOUS; SUBCUTANEOUS at 19:14

## 2023-01-24 RX ADMIN — NYSTATIN CREAM 1 APPLICATION(S): 100000 CREAM TOPICAL at 23:01

## 2023-01-24 RX ADMIN — MONTELUKAST 10 MILLIGRAM(S): 4 TABLET, CHEWABLE ORAL at 10:25

## 2023-01-24 RX ADMIN — SODIUM CHLORIDE 125 MILLILITER(S): 9 INJECTION, SOLUTION INTRAVENOUS at 07:41

## 2023-01-24 RX ADMIN — CHLORHEXIDINE GLUCONATE 1 APPLICATION(S): 213 SOLUTION TOPICAL at 06:43

## 2023-01-24 RX ADMIN — HYDROMORPHONE HYDROCHLORIDE 0.5 MILLIGRAM(S): 2 INJECTION INTRAMUSCULAR; INTRAVENOUS; SUBCUTANEOUS at 10:23

## 2023-01-24 RX ADMIN — Medication 100 MILLIEQUIVALENT(S): at 08:45

## 2023-01-24 RX ADMIN — DULOXETINE HYDROCHLORIDE 60 MILLIGRAM(S): 30 CAPSULE, DELAYED RELEASE ORAL at 10:25

## 2023-01-24 RX ADMIN — Medication 100 MILLIEQUIVALENT(S): at 10:45

## 2023-01-24 RX ADMIN — HYDROMORPHONE HYDROCHLORIDE 1 MILLIGRAM(S): 2 INJECTION INTRAMUSCULAR; INTRAVENOUS; SUBCUTANEOUS at 02:11

## 2023-01-24 RX ADMIN — CEFTRIAXONE 1000 MILLIGRAM(S): 500 INJECTION, POWDER, FOR SOLUTION INTRAMUSCULAR; INTRAVENOUS at 17:01

## 2023-01-24 RX ADMIN — HEPARIN SODIUM 1800 UNIT(S)/HR: 5000 INJECTION INTRAVENOUS; SUBCUTANEOUS at 07:42

## 2023-01-24 RX ADMIN — AMIODARONE HYDROCHLORIDE 200 MILLIGRAM(S): 400 TABLET ORAL at 10:24

## 2023-01-24 NOTE — PROGRESS NOTE ADULT - ASSESSMENT
71yo F admitted Urosepsis and SBO, likely secondary to adhesions and ventral hernia.   Having bowel movements and passing flatus    Plan:  IV hydration   Advance diet as tolerated  Recommend nystatin for pannus  Pain control prn  Continue with ICU care    Plan discussed with Dr. Lyn

## 2023-01-24 NOTE — SWALLOW BEDSIDE ASSESSMENT ADULT - SLP GENERAL OBSERVATIONS
On encounter, obesity was evident. A mild left lip lag was apparent which is chronic. Pt was alert. Her affect was flat. Pt was interactive but communicatively passive. Pt was able to verbalize during communicative probes. At these times, pt's speech output was negatively impacted by dry oral mucosa, many missing teeth and variable fatigue which are issues that are not amenable to traditional ST. Additionally, her verbalizations were linguistically intact and contextually appropriate, but tended to be brief. Pt was able to verbalize some needs on a concrete level when in an alert state. Communicative integrity likely at or close to pre-hospitalization state

## 2023-01-24 NOTE — SWALLOW BEDSIDE ASSESSMENT ADULT - NS SPL SWALLOW CLINIC TRIAL FT
Pt was passive but did willingly accept PO. During intakes, pt exhibited Oropharyngeal Dysphagia which subjectively appeared to be a grossly functional condition with a restricted inventory of modified food consistencies. Overt aspiration signs noted with thin liquids only. Dysphagia in setting of many missing teeth, cardiopulmonary dz and old CVA with residual left debra. Pt was passive but did willingly accept PO. During intakes, pt exhibited Oropharyngeal Dysphagia which subjectively appeared to be a grossly functional condition with a restricted inventory of modified food consistencies. Overt aspiration signs noted with thin liquids only. Dysphagia in setting of many missing teeth, cardiopulmonary dz and old CVA with residual left debra. Dysphagia is chronic/pre-existing. Note that coarse solids not offered given severity of Dysphagia, considering her many missing teeth and given expressed food preferences.

## 2023-01-24 NOTE — PROGRESS NOTE ADULT - SUBJECTIVE AND OBJECTIVE BOX
SURGERY DAILY PROGRESS NOTE:     Subjective:  Patient seen and examined at bedside. Patient in mild discomfort. Nurse denies any acute events. Vitals reviewed and WNL.    Objective:  Vital Signs Last 24 Hrs  T(C): 38.4 (24 Jan 2023 02:00), Max: 38.4 (24 Jan 2023 02:00)  T(F): 101.2 (24 Jan 2023 02:00), Max: 101.2 (24 Jan 2023 02:00)  HR: 98 (24 Jan 2023 08:00) (94 - 115)  BP: 130/55 (24 Jan 2023 08:00) (118/53 - 176/76)  BP(mean): 78 (24 Jan 2023 08:00) (69 - 99)  RR: 22 (24 Jan 2023 08:00) (15 - 24)  SpO2: 97% (24 Jan 2023 08:00) (93% - 100%)    Parameters below as of 24 Jan 2023 00:00  Patient On (Oxygen Delivery Method): nasal cannula  O2 Flow (L/min): 2      I&O's Detail    23 Jan 2023 07:01  -  24 Jan 2023 07:00  --------------------------------------------------------  IN:    dextrose 5% + lactated ringers: 725 mL    Heparin Infusion: 236 mL    IV PiggyBack: 730 mL    Oral Fluid: 400 mL  Total IN: 2091 mL    OUT:    Indwelling Catheter - Urethral (mL): 450 mL  Total OUT: 450 mL    Total NET: 1641 mL          Daily     Daily     PHYSICAL EXAM   Gen: well-appearing, in no acute distress  CV: pulse regularly present   Resp: airway patent, non-labored breathing  Abdomen: Large ventral hernia with palpable bowel in hernia, Soft, mild tenderness no masses  Neuro/Psych: No localized deficits. Normal speech, normal tone  Skin: Normal, no rashes, no lesions noted.   Extremities: Warm, well perfused, no edema, Pulses intact          MEDICATIONS  (STANDING):  aMIOdarone    Tablet 200 milliGRAM(s) Oral daily  atorvastatin 20 milliGRAM(s) Oral at bedtime  chlorhexidine 4% Liquid 1 Application(s) Topical <User Schedule>  coronavirus bivalent (EUA) Booster Vaccine (PFIZER) 0.3 milliLiter(s) IntraMuscular once  dextrose 5%. 1000 milliLiter(s) (100 mL/Hr) IV Continuous <Continuous>  dextrose 5%. 1000 milliLiter(s) (50 mL/Hr) IV Continuous <Continuous>  dextrose 50% Injectable 25 Gram(s) IV Push once  dextrose 50% Injectable 12.5 Gram(s) IV Push once  dextrose 50% Injectable 25 Gram(s) IV Push once  diltiazem    milliGRAM(s) Oral daily  DULoxetine 60 milliGRAM(s) Oral daily  glucagon  Injectable 1 milliGRAM(s) IntraMuscular once  heparin  Infusion.  Unit(s)/Hr (18 mL/Hr) IV Continuous <Continuous>  insulin lispro (ADMELOG) corrective regimen sliding scale   SubCutaneous every 6 hours  losartan 25 milliGRAM(s) Oral daily  montelukast 10 milliGRAM(s) Oral daily  nystatin Cream 1 Application(s) Topical two times a day  nystatin Powder 1 Application(s) Topical every 12 hours  pantoprazole  Injectable 40 milliGRAM(s) IV Push daily  piperacillin/tazobactam IVPB.. 3.375 Gram(s) IV Intermittent every 8 hours  potassium chloride  10 mEq/100 mL IVPB 10 milliEquivalent(s) IV Intermittent every 1 hour    MEDICATIONS  (PRN):  dextrose Oral Gel 15 Gram(s) Oral once PRN Blood Glucose LESS THAN 70 milliGRAM(s)/deciliter  heparin   Injectable 8000 Unit(s) IV Push every 6 hours PRN For aPTT less than 40  heparin   Injectable 4000 Unit(s) IV Push every 6 hours PRN For aPTT between 40 - 57  HYDROmorphone  Injectable 1 milliGRAM(s) IV Push every 4 hours PRN Moderate Pain (4 - 6)  HYDROmorphone  Injectable 0.5 milliGRAM(s) IV Push every 3 hours PRN Severe Pain (7 - 10)  sodium chloride 0.9% lock flush 10 milliLiter(s) IV Push every 1 hour PRN Pre/post blood products, medications, blood draw, and to maintain line patency        LABS:                        9.1    13.17 )-----------( 332      ( 24 Jan 2023 05:25 )             29.3     01-24    140  |  109<H>  |  7   ----------------------------<  243<H>  2.9<LL>   |  25  |  0.48<L>    Ca    8.0<L>      24 Jan 2023 05:25  Phos  2.5     01-24  Mg     1.9     01-24      PTT - ( 24 Jan 2023 05:25 )  PTT:81.8 sec      RADIOLOGY & ADDITIONAL STUDIES:  < from: Xray Abdomen 1 View PORTABLE -Routine (Xray Abdomen 1 View PORTABLE -Routine .) (01.21.23 @ 20:02) >    ACC: [id number] EXAM:  XR ABDOMEN PORTABLE ROUTINE 1V   ORDERED BY: ALDAIR DRAKE     PROCEDURE DATE:  01/21/2023          INTERPRETATION:  History:   Small bowel obstruction    Technique: 3 x-rays of the abdomen portal supine    Comparison: CT from1/20/2023    Findings/  Impression:    There is a hernia in the left abdomen, which contains bowel. The bowel is   dilated, similar to the prior study. The remainder of the abdomen   demonstrates paucity of bowel gas. This may represent an incarcerated   hernia. A nasogastric tube overlies the stomach. There are vascular   calcifications and bilateral hip prosthetics    --- End of Report ---            NAVI DORSEY MD; Attending Interventional Radiologist  This document has been electronically signed. Jan 22 2023 12:12PM    < end of copied text >

## 2023-01-24 NOTE — SWALLOW BEDSIDE ASSESSMENT ADULT - COMMENTS
Pt was admitted to  from [facility] with abdominal discomfort. Abdominal imaging initially notable for a paucity of gas which was in approximation of a hernia. A bowel obstruction was being considered at the time but pt has since passed a bowel movement. Pt also with ALVERTO.  The pt has a selected underlying prior medical history which is remarkable for depression, GERD, COPD, HTN, HLD, DM type 2, polyneuropathy, atrial fibrillation, CHF, Vitamin D deficiency, obesity, OA, anemia, past C-Diff, prior PE and previous SBO/perforated diverticulitis status post colostomy with reversal. Additionally, the pt previously sustained a stroke with resultant chronic left sided weakness as well as chronic Oropharyngeal Dysphagia warranting diet texture modification [facility]. Pt's liquids were thickened to nectar consistency at [facility]. See below for additional prior medical information. Pt was admitted to  from [facility] with abdominal discomfort. Abdominal imaging initially notable for a paucity of gas which was in approximation of a hernia. A bowel obstruction was being considered at the time but pt has since passed a bowel movement. Pt also with ALVERTO and low albumin.  The pt has a selected underlying prior medical history which is remarkable for depression, GERD, COPD, HTN, HLD, DM type 2, polyneuropathy, atrial fibrillation, CHF, Vitamin D deficiency, obesity, OA, anemia, past C-Diff, prior PE and previous SBO/perforated diverticulitis status post colostomy with reversal. Additionally, the pt previously sustained a stroke with resultant chronic left sided weakness as well as chronic Oropharyngeal Dysphagia warranting diet texture modification [facility]. Pt's liquids were thickened to nectar consistency at [facility]. See below for additional prior medical information.

## 2023-01-24 NOTE — PROGRESS NOTE ADULT - ASSESSMENT
Patient admitted with abdominal pain, nausea and vomitting , dehydration  septic shock, likely related to   incarcerated ventral hernia  also possible UTI  Sepsis     Plan:    1.  SBOPassing gas and had a small BM.  Clears, being treated conservatively  2.  Will Change Zosyn as the kleb is resistant to Zosyn  3.  hx. copd stable on nasal cannula no infiltrate on cxr  4. afib, rate ok, echo ef 55%  INR 1.9, start iv heparin.  Resume Cardizem, Lorsatan, and Lipitor.  Likely resume coumadin on 1/24 if she is tolerating PO  5. sliding scale for type II diabetes  6. oob later today, PT  7. ALVERTO improved with hydration  8. Replace Joan      GOC: DNR

## 2023-01-24 NOTE — SWALLOW BEDSIDE ASSESSMENT ADULT - SWALLOW EVAL: DIAGNOSIS
1) On encounter, obesity was evident. A mild left lip lag was apparent which is chronic. Pt was alert. Her affect was flat. Pt was interactive but communicatively passive. Pt was able to verbalize during communicative probes. At these times, pt's speech output was negatively impacted by dry oral mucosa, many missing teeth and variable fatigue which are issues that are not amenable to traditional ST. Additionally, her verbalizations were linguistically intact and contextually appropriate, but tended to be brief. Pt was able to verbalize some needs on a concrete level when in an alert state. Communicative integrity likely at or close to pre-hospitalization state.

## 2023-01-24 NOTE — PROGRESS NOTE ADULT - SUBJECTIVE AND OBJECTIVE BOX
HPI:           73 y/o female with a PMHx o             CVA with left sided weakness               atrial fibrillation on Coumadin              OPD  - wears 2L NC at baseline   was brought in from skilled nursing facility for abdominal pain emesis and weakness  In ED CT abdomen Pelvis showed,  Large ventral hernia with SBO  lactate 3.0  had ALVERTO creatinine to 1.9 improved with hydration  had cvp placed   has pyuria but no complaints of urinary frequency      : No events over night, less pain on exam today, tolerated clears      PAST MEDICAL & SURGICAL HISTORY:  Hypertension      Hypercholesteremia      COPD (chronic obstructive pulmonary disease)      C. difficile diarrhea        Diverticulitis of intestine with perforation and abscess without bleeding, unspecified part of intestinal tract      PE (pulmonary thromboembolism)  2016      Palpitations      Obesity      Osteoarthritis      Anemia      Colostomy in place      History of atrial fibrillation      HTN (hypertension)      Diabetes mellitus      Cerebrovascular accident (CVA)      DVT of lower limb, acute      CVA (cerebral vascular accident)      COPD (chronic obstructive pulmonary disease)      Neuropathy      Diverticulitis      History of appendectomy      H/O:   x2      H/O ovarian cystectomy  b/l      Status post total replacement of both hips      H/O hemicolectomy  2016      History of colostomy reversal      History of colostomy reversal      S/P colostomy      S/P       S/P hip replacement          FAMILY HISTORY:  Family history of COPD (chronic obstructive pulmonary disease)    Family history of chronic kidney disease    Family history of hiatal hernia (Sibling)        Social Hx:    Allergies    Cipro (Rash)  Spiriva (Rash)    Intolerances            ICU Vital Signs Last 24 Hrs  T(C): 37.1 (2023 08:00), Max: 38.4 (2023 02:00)  T(F): 98.8 (2023 08:00), Max: 101.2 (2023 02:00)  HR: 99 (2023 12:00) (98 - 115)  BP: 148/64 (2023 12:00) (118/53 - 152/62)  BP(mean): 88 (2023 12:00) (69 - 94)  ABP: --  ABP(mean): --  RR: 21 (2023 12:00) (15 - 24)  SpO2: 98% (2023 12:00) (96% - 98%)    O2 Parameters below as of 2023 10:00  Patient On (Oxygen Delivery Method): nasal cannula  O2 Flow (L/min): 2              I&O's Summary    2023 07:01  -  2023 07:00  --------------------------------------------------------  IN: 2091 mL / OUT: 450 mL / NET: 1641 mL                              9.1    13.17 )-----------( 332      ( 2023 05:25 )             29.3           140  |  109<H>  |  7   ----------------------------<  243<H>  2.9<LL>   |  25  |  0.48<L>    Ca    8.0<L>      2023 05:25  Phos  2.5       Mg     1.9                           MEDICATIONS  (STANDING):  aMIOdarone    Tablet 200 milliGRAM(s) Oral daily  atorvastatin 20 milliGRAM(s) Oral at bedtime  chlorhexidine 4% Liquid 1 Application(s) Topical <User Schedule>  coronavirus bivalent (EUA) Booster Vaccine (PFIZER) 0.3 milliLiter(s) IntraMuscular once  dextrose 5%. 1000 milliLiter(s) (50 mL/Hr) IV Continuous <Continuous>  dextrose 5%. 1000 milliLiter(s) (100 mL/Hr) IV Continuous <Continuous>  dextrose 50% Injectable 25 Gram(s) IV Push once  dextrose 50% Injectable 12.5 Gram(s) IV Push once  dextrose 50% Injectable 25 Gram(s) IV Push once  diltiazem    milliGRAM(s) Oral daily  DULoxetine 60 milliGRAM(s) Oral daily  glucagon  Injectable 1 milliGRAM(s) IntraMuscular once  heparin  Infusion.  Unit(s)/Hr (18 mL/Hr) IV Continuous <Continuous>  insulin lispro (ADMELOG) corrective regimen sliding scale   SubCutaneous every 6 hours  levothyroxine Injectable 66 MICROGram(s) IV Push at bedtime  losartan 25 milliGRAM(s) Oral daily  montelukast 10 milliGRAM(s) Oral daily  nystatin Cream 1 Application(s) Topical two times a day  nystatin Powder 1 Application(s) Topical every 12 hours  pantoprazole  Injectable 40 milliGRAM(s) IV Push daily  piperacillin/tazobactam IVPB.. 3.375 Gram(s) IV Intermittent every 8 hours    MEDICATIONS  (PRN):  dextrose Oral Gel 15 Gram(s) Oral once PRN Blood Glucose LESS THAN 70 milliGRAM(s)/deciliter  heparin   Injectable 8000 Unit(s) IV Push every 6 hours PRN For aPTT less than 40  heparin   Injectable 4000 Unit(s) IV Push every 6 hours PRN For aPTT between 40 - 57  HYDROmorphone  Injectable 1 milliGRAM(s) IV Push every 4 hours PRN Moderate Pain (4 - 6)  HYDROmorphone  Injectable 0.5 milliGRAM(s) IV Push every 3 hours PRN Severe Pain (7 - 10)  sodium chloride 0.9% lock flush 10 milliLiter(s) IV Push every 1 hour PRN Pre/post blood products, medications, blood draw, and to maintain line patency      DVT Prophylaxis:    Advanced Directives:  Discussed with:    Visit Information:    ** Time is exclusive of billed procedures and/or teaching and/or routine family updates.          CC:    HPI:   73 y/o female with a PMHx of CVA with left sided weakness, atrial fibrillation, emphysema, COPD, HTN - wears 2L NC at baseline BIBA, hx SBO and resection per EMS presents to the ED from [facility] for RLQ pain and r/o SBO. x1 episode of emesis, + profound diffuse abdominal pain, febrile   (2023 21:43)       PAST MEDICAL & SURGICAL HISTORY:  Hypertension      Hypercholesteremia      COPD (chronic obstructive pulmonary disease)      C. difficile diarrhea        Diverticulitis of intestine with perforation and abscess without bleeding, unspecified part of intestinal tract      PE (pulmonary thromboembolism)  2016      Palpitations      Obesity      Osteoarthritis      Anemia      Colostomy in place      History of atrial fibrillation      HTN (hypertension)      Diabetes mellitus      Cerebrovascular accident (CVA)      DVT of lower limb, acute      CVA (cerebral vascular accident)      COPD (chronic obstructive pulmonary disease)      Neuropathy      Diverticulitis      History of appendectomy      H/O:   x2      H/O ovarian cystectomy  b/l      Status post total replacement of both hips      H/O hemicolectomy  2016      History of colostomy reversal      History of colostomy reversal      S/P colostomy      S/P       S/P hip replacement          FAMILY HISTORY:  Family history of COPD (chronic obstructive pulmonary disease)    Family history of chronic kidney disease    Family history of hiatal hernia (Sibling)        Social Hx:    Allergies    Cipro (Rash)  Spiriva (Rash)    Intolerances            ICU Vital Signs Last 24 Hrs  T(C): 37.1 (2023 08:00), Max: 38.4 (2023 02:00)  T(F): 98.8 (2023 08:00), Max: 101.2 (2023 02:00)  HR: 99 (2023 12:00) (98 - 115)  BP: 148/64 (2023 12:00) (118/53 - 152/62)  BP(mean): 88 (2023 12:00) (69 - 94)  ABP: --  ABP(mean): --  RR: 21 (2023 12:00) (15 - 24)  SpO2: 98% (2023 12:00) (96% - 98%)    O2 Parameters below as of 2023 10:00  Patient On (Oxygen Delivery Method): nasal cannula  O2 Flow (L/min): 2              I&O's Summary    2023 07:01  -  2023 07:00  --------------------------------------------------------  IN: 2091 mL / OUT: 450 mL / NET: 1641 mL                              9.1    13.17 )-----------( 332      ( 2023 05:25 )             29.3       01-24    140  |  109<H>  |  7   ----------------------------<  243<H>  2.9<LL>   |  25  |  0.48<L>    Ca    8.0<L>      2023 05:25  Phos  2.5     01-  Mg     1.9     -24                      MEDICATIONS  (STANDING):  aMIOdarone    Tablet 200 milliGRAM(s) Oral daily  atorvastatin 20 milliGRAM(s) Oral at bedtime  chlorhexidine 4% Liquid 1 Application(s) Topical <User Schedule>  coronavirus bivalent (EUA) Booster Vaccine (PFIZER) 0.3 milliLiter(s) IntraMuscular once  dextrose 5%. 1000 milliLiter(s) (50 mL/Hr) IV Continuous <Continuous>  dextrose 5%. 1000 milliLiter(s) (100 mL/Hr) IV Continuous <Continuous>  dextrose 50% Injectable 25 Gram(s) IV Push once  dextrose 50% Injectable 12.5 Gram(s) IV Push once  dextrose 50% Injectable 25 Gram(s) IV Push once  diltiazem    milliGRAM(s) Oral daily  DULoxetine 60 milliGRAM(s) Oral daily  glucagon  Injectable 1 milliGRAM(s) IntraMuscular once  heparin  Infusion.  Unit(s)/Hr (18 mL/Hr) IV Continuous <Continuous>  insulin lispro (ADMELOG) corrective regimen sliding scale   SubCutaneous every 6 hours  levothyroxine Injectable 66 MICROGram(s) IV Push at bedtime  losartan 25 milliGRAM(s) Oral daily  montelukast 10 milliGRAM(s) Oral daily  nystatin Cream 1 Application(s) Topical two times a day  nystatin Powder 1 Application(s) Topical every 12 hours  pantoprazole  Injectable 40 milliGRAM(s) IV Push daily  piperacillin/tazobactam IVPB.. 3.375 Gram(s) IV Intermittent every 8 hours    MEDICATIONS  (PRN):  dextrose Oral Gel 15 Gram(s) Oral once PRN Blood Glucose LESS THAN 70 milliGRAM(s)/deciliter  heparin   Injectable 8000 Unit(s) IV Push every 6 hours PRN For aPTT less than 40  heparin   Injectable 4000 Unit(s) IV Push every 6 hours PRN For aPTT between 40 - 57  HYDROmorphone  Injectable 1 milliGRAM(s) IV Push every 4 hours PRN Moderate Pain (4 - 6)  HYDROmorphone  Injectable 0.5 milliGRAM(s) IV Push every 3 hours PRN Severe Pain (7 - 10)  sodium chloride 0.9% lock flush 10 milliLiter(s) IV Push every 1 hour PRN Pre/post blood products, medications, blood draw, and to maintain line patency      DVT Prophylaxis: UFH    Advanced Directives:  Discussed with:    Visit Information:    ** Time is exclusive of billed procedures and/or teaching and/or routine family updates.

## 2023-01-25 LAB
-  AMIKACIN: SIGNIFICANT CHANGE UP
-  AMOXICILLIN/CLAVULANIC ACID: SIGNIFICANT CHANGE UP
-  AMPICILLIN/SULBACTAM: SIGNIFICANT CHANGE UP
-  AMPICILLIN: SIGNIFICANT CHANGE UP
-  AZTREONAM: SIGNIFICANT CHANGE UP
-  CEFAZOLIN: SIGNIFICANT CHANGE UP
-  CEFEPIME: SIGNIFICANT CHANGE UP
-  CEFOXITIN: SIGNIFICANT CHANGE UP
-  CEFTRIAXONE: SIGNIFICANT CHANGE UP
-  CEFUROXIME: SIGNIFICANT CHANGE UP
-  CIPROFLOXACIN: SIGNIFICANT CHANGE UP
-  ERTAPENEM: SIGNIFICANT CHANGE UP
-  GENTAMICIN: SIGNIFICANT CHANGE UP
-  IMIPENEM: SIGNIFICANT CHANGE UP
-  LEVOFLOXACIN: SIGNIFICANT CHANGE UP
-  MEROPENEM: SIGNIFICANT CHANGE UP
-  NITROFURANTOIN: SIGNIFICANT CHANGE UP
-  PIPERACILLIN/TAZOBACTAM: SIGNIFICANT CHANGE UP
-  TOBRAMYCIN: SIGNIFICANT CHANGE UP
-  TRIMETHOPRIM/SULFAMETHOXAZOLE: SIGNIFICANT CHANGE UP
ANION GAP SERPL CALC-SCNC: 5 MMOL/L — SIGNIFICANT CHANGE UP (ref 5–17)
APTT BLD: 61.3 SEC — HIGH (ref 27.5–35.5)
BUN SERPL-MCNC: 6 MG/DL — LOW (ref 7–23)
CALCIUM SERPL-MCNC: 8 MG/DL — LOW (ref 8.5–10.1)
CHLORIDE SERPL-SCNC: 109 MMOL/L — HIGH (ref 96–108)
CO2 SERPL-SCNC: 26 MMOL/L — SIGNIFICANT CHANGE UP (ref 22–31)
CREAT SERPL-MCNC: 0.43 MG/DL — LOW (ref 0.5–1.3)
CULTURE RESULTS: SIGNIFICANT CHANGE UP
EGFR: 103 ML/MIN/1.73M2 — SIGNIFICANT CHANGE UP
GLUCOSE BLDC GLUCOMTR-MCNC: 188 MG/DL — HIGH (ref 70–99)
GLUCOSE BLDC GLUCOMTR-MCNC: 191 MG/DL — HIGH (ref 70–99)
GLUCOSE BLDC GLUCOMTR-MCNC: 195 MG/DL — HIGH (ref 70–99)
GLUCOSE BLDC GLUCOMTR-MCNC: 201 MG/DL — HIGH (ref 70–99)
GLUCOSE BLDC GLUCOMTR-MCNC: 212 MG/DL — HIGH (ref 70–99)
GLUCOSE SERPL-MCNC: 206 MG/DL — HIGH (ref 70–99)
HCT VFR BLD CALC: 30.8 % — LOW (ref 34.5–45)
HGB BLD-MCNC: 9.4 G/DL — LOW (ref 11.5–15.5)
INR BLD: 1.97 RATIO — HIGH (ref 0.88–1.16)
MAGNESIUM SERPL-MCNC: 1.9 MG/DL — SIGNIFICANT CHANGE UP (ref 1.6–2.6)
MCHC RBC-ENTMCNC: 25.4 PG — LOW (ref 27–34)
MCHC RBC-ENTMCNC: 30.5 GM/DL — LOW (ref 32–36)
MCV RBC AUTO: 83.2 FL — SIGNIFICANT CHANGE UP (ref 80–100)
METHOD TYPE: SIGNIFICANT CHANGE UP
ORGANISM # SPEC MICROSCOPIC CNT: SIGNIFICANT CHANGE UP
PHOSPHATE SERPL-MCNC: 1.5 MG/DL — LOW (ref 2.5–4.5)
PLATELET # BLD AUTO: 368 K/UL — SIGNIFICANT CHANGE UP (ref 150–400)
POTASSIUM SERPL-MCNC: 3.1 MMOL/L — LOW (ref 3.5–5.3)
POTASSIUM SERPL-SCNC: 3.1 MMOL/L — LOW (ref 3.5–5.3)
PROTHROM AB SERPL-ACNC: 23 SEC — HIGH (ref 10.5–13.4)
RBC # BLD: 3.7 M/UL — LOW (ref 3.8–5.2)
RBC # FLD: 17.2 % — HIGH (ref 10.3–14.5)
SODIUM SERPL-SCNC: 140 MMOL/L — SIGNIFICANT CHANGE UP (ref 135–145)
SPECIMEN SOURCE: SIGNIFICANT CHANGE UP
WBC # BLD: 17.47 K/UL — HIGH (ref 3.8–10.5)
WBC # FLD AUTO: 17.47 K/UL — HIGH (ref 3.8–10.5)

## 2023-01-25 PROCEDURE — 99233 SBSQ HOSP IP/OBS HIGH 50: CPT

## 2023-01-25 RX ORDER — POTASSIUM CHLORIDE 20 MEQ
40 PACKET (EA) ORAL ONCE
Refills: 0 | Status: COMPLETED | OUTPATIENT
Start: 2023-01-25 | End: 2023-01-25

## 2023-01-25 RX ORDER — LEVOTHYROXINE SODIUM 125 MCG
88 TABLET ORAL DAILY
Refills: 0 | Status: DISCONTINUED | OUTPATIENT
Start: 2023-01-25 | End: 2023-01-27

## 2023-01-25 RX ORDER — SODIUM CHLORIDE 9 MG/ML
500 INJECTION INTRAMUSCULAR; INTRAVENOUS; SUBCUTANEOUS ONCE
Refills: 0 | Status: COMPLETED | OUTPATIENT
Start: 2023-01-25 | End: 2023-01-25

## 2023-01-25 RX ORDER — WARFARIN SODIUM 2.5 MG/1
2.5 TABLET ORAL ONCE
Refills: 0 | Status: COMPLETED | OUTPATIENT
Start: 2023-01-25 | End: 2023-01-25

## 2023-01-25 RX ORDER — POTASSIUM PHOSPHATE, MONOBASIC POTASSIUM PHOSPHATE, DIBASIC 236; 224 MG/ML; MG/ML
30 INJECTION, SOLUTION INTRAVENOUS EVERY 6 HOURS
Refills: 0 | Status: COMPLETED | OUTPATIENT
Start: 2023-01-25 | End: 2023-01-25

## 2023-01-25 RX ADMIN — CEFTRIAXONE 1000 MILLIGRAM(S): 500 INJECTION, POWDER, FOR SOLUTION INTRAMUSCULAR; INTRAVENOUS at 16:44

## 2023-01-25 RX ADMIN — AMIODARONE HYDROCHLORIDE 200 MILLIGRAM(S): 400 TABLET ORAL at 10:23

## 2023-01-25 RX ADMIN — DULOXETINE HYDROCHLORIDE 60 MILLIGRAM(S): 30 CAPSULE, DELAYED RELEASE ORAL at 10:23

## 2023-01-25 RX ADMIN — SODIUM CHLORIDE 1000 MILLILITER(S): 9 INJECTION INTRAMUSCULAR; INTRAVENOUS; SUBCUTANEOUS at 18:24

## 2023-01-25 RX ADMIN — Medication 4: at 05:41

## 2023-01-25 RX ADMIN — HYDROMORPHONE HYDROCHLORIDE 0.5 MILLIGRAM(S): 2 INJECTION INTRAMUSCULAR; INTRAVENOUS; SUBCUTANEOUS at 15:28

## 2023-01-25 RX ADMIN — HEPARIN SODIUM 1800 UNIT(S)/HR: 5000 INJECTION INTRAVENOUS; SUBCUTANEOUS at 19:15

## 2023-01-25 RX ADMIN — WARFARIN SODIUM 2.5 MILLIGRAM(S): 2.5 TABLET ORAL at 22:45

## 2023-01-25 RX ADMIN — MONTELUKAST 10 MILLIGRAM(S): 4 TABLET, CHEWABLE ORAL at 10:22

## 2023-01-25 RX ADMIN — Medication 4: at 12:08

## 2023-01-25 RX ADMIN — Medication 40 MILLIEQUIVALENT(S): at 10:23

## 2023-01-25 RX ADMIN — HEPARIN SODIUM 1800 UNIT(S)/HR: 5000 INJECTION INTRAVENOUS; SUBCUTANEOUS at 22:26

## 2023-01-25 RX ADMIN — HEPARIN SODIUM 1800 UNIT(S)/HR: 5000 INJECTION INTRAVENOUS; SUBCUTANEOUS at 07:32

## 2023-01-25 RX ADMIN — NYSTATIN CREAM 1 APPLICATION(S): 100000 CREAM TOPICAL at 21:53

## 2023-01-25 RX ADMIN — Medication 2: at 17:21

## 2023-01-25 RX ADMIN — HYDROMORPHONE HYDROCHLORIDE 0.5 MILLIGRAM(S): 2 INJECTION INTRAMUSCULAR; INTRAVENOUS; SUBCUTANEOUS at 21:54

## 2023-01-25 RX ADMIN — NYSTATIN CREAM 1 APPLICATION(S): 100000 CREAM TOPICAL at 10:27

## 2023-01-25 RX ADMIN — NYSTATIN CREAM 1 APPLICATION(S): 100000 CREAM TOPICAL at 05:01

## 2023-01-25 RX ADMIN — HEPARIN SODIUM 1800 UNIT(S)/HR: 5000 INJECTION INTRAVENOUS; SUBCUTANEOUS at 00:19

## 2023-01-25 RX ADMIN — PANTOPRAZOLE SODIUM 40 MILLIGRAM(S): 20 TABLET, DELAYED RELEASE ORAL at 10:23

## 2023-01-25 RX ADMIN — HYDROMORPHONE HYDROCHLORIDE 0.5 MILLIGRAM(S): 2 INJECTION INTRAMUSCULAR; INTRAVENOUS; SUBCUTANEOUS at 22:10

## 2023-01-25 RX ADMIN — HEPARIN SODIUM 1800 UNIT(S)/HR: 5000 INJECTION INTRAVENOUS; SUBCUTANEOUS at 12:40

## 2023-01-25 RX ADMIN — Medication 88 MICROGRAM(S): at 21:53

## 2023-01-25 RX ADMIN — LOSARTAN POTASSIUM 25 MILLIGRAM(S): 100 TABLET, FILM COATED ORAL at 10:23

## 2023-01-25 RX ADMIN — CHLORHEXIDINE GLUCONATE 1 APPLICATION(S): 213 SOLUTION TOPICAL at 05:03

## 2023-01-25 RX ADMIN — NYSTATIN CREAM 1 APPLICATION(S): 100000 CREAM TOPICAL at 17:21

## 2023-01-25 RX ADMIN — POTASSIUM PHOSPHATE, MONOBASIC POTASSIUM PHOSPHATE, DIBASIC 83.33 MILLIMOLE(S): 236; 224 INJECTION, SOLUTION INTRAVENOUS at 17:21

## 2023-01-25 RX ADMIN — HYDROMORPHONE HYDROCHLORIDE 0.5 MILLIGRAM(S): 2 INJECTION INTRAMUSCULAR; INTRAVENOUS; SUBCUTANEOUS at 05:01

## 2023-01-25 RX ADMIN — HYDROMORPHONE HYDROCHLORIDE 0.5 MILLIGRAM(S): 2 INJECTION INTRAMUSCULAR; INTRAVENOUS; SUBCUTANEOUS at 05:30

## 2023-01-25 RX ADMIN — ATORVASTATIN CALCIUM 20 MILLIGRAM(S): 80 TABLET, FILM COATED ORAL at 21:53

## 2023-01-25 RX ADMIN — HEPARIN SODIUM 1800 UNIT(S)/HR: 5000 INJECTION INTRAVENOUS; SUBCUTANEOUS at 07:34

## 2023-01-25 RX ADMIN — Medication 180 MILLIGRAM(S): at 10:23

## 2023-01-25 RX ADMIN — HYDROMORPHONE HYDROCHLORIDE 0.5 MILLIGRAM(S): 2 INJECTION INTRAMUSCULAR; INTRAVENOUS; SUBCUTANEOUS at 15:15

## 2023-01-25 RX ADMIN — POTASSIUM PHOSPHATE, MONOBASIC POTASSIUM PHOSPHATE, DIBASIC 83.33 MILLIMOLE(S): 236; 224 INJECTION, SOLUTION INTRAVENOUS at 12:08

## 2023-01-25 NOTE — CDI QUERY NOTE - [id number]_GEN_ALL_CORE_FT
This patient was documented to have encephalopathy but at baseline mental status:    Consult Note Adult MICU PA 1-20-23 @ 23:58  4. ALVERTO   5. Encephalopathy     Neuro: awake and alert, confused at baseline, pain control as needed w/ tylenol PRN.     Nursing flowsheets reflect the patient has been Disoriented x3 throughout admission.    Can the diagnosis of encephalopathy be clarified?  -Encephalopathy, ruled out  -Encephalopathy ruled in, please specify type:  -Other, please specify:  -Unable to determine.

## 2023-01-25 NOTE — PROGRESS NOTE ADULT - ASSESSMENT
Patient admitted with abdominal pain, nausea and vomitting , dehydration  septic shock, likely related to   incarcerated ventral hernia  also possible UTI  Sepsis     Plan:    1.  SBO Passing gas and had a small BM.  Advanced Diet, being treated conservatively  2.  Rocephin for kleb UTI  3.  hx. copd stable on nasal cannula no infiltrate on cxr  4. afib, rate ok, echo ef 55%  INR 1.9, iv heparin resumed Coumadin today.  Resumed Cardizem, Lorsatan, and Lipitor.    5. sliding scale for type II diabetes  6. oob later today, PT  7. ALVERTO improved with hydration  8. Replace Joan      [facility]: DNR    Transfer to Med Surg  Hospitalist consult called in at 12:37PM

## 2023-01-25 NOTE — PROGRESS NOTE ADULT - SUBJECTIVE AND OBJECTIVE BOX
SURGERY DAILY PROGRESS NOTE:     Subjective:  Patient seen and examined at bedside. Patient in mild discomfort, positive bowel movements. Nurse denies any acute events. Vitals reviewed and WNL.    Objective:  Vital Signs Last 24 Hrs  T(C): 36.9 (25 Jan 2023 06:00), Max: 37.8 (24 Jan 2023 17:00)  T(F): 98.4 (25 Jan 2023 06:00), Max: 100 (24 Jan 2023 17:00)  HR: 91 (25 Jan 2023 08:00) (91 - 104)  BP: 146/62 (25 Jan 2023 08:00) (131/63 - 180/69)  BP(mean): 85 (25 Jan 2023 08:00) (73 - 105)  RR: 26 (25 Jan 2023 08:00) (18 - 29)  SpO2: 99% (25 Jan 2023 08:00) (95% - 99%)    Parameters below as of 25 Jan 2023 08:00  Patient On (Oxygen Delivery Method): nasal cannula  O2 Flow (L/min): 2        I&O's Detail    23 Jan 2023 07:01  -  24 Jan 2023 07:00  --------------------------------------------------------  IN:    dextrose 5% + lactated ringers: 725 mL    Heparin Infusion: 236 mL    IV PiggyBack: 730 mL    Oral Fluid: 400 mL  Total IN: 2091 mL    OUT:    Indwelling Catheter - Urethral (mL): 450 mL  Total OUT: 450 mL    Total NET: 1641 mL      Daily     Daily     PHYSICAL EXAM   Gen: well-appearing, in no acute distress  CV: pulse regularly present   Resp: airway patent, non-labored breathing  Abdomen: Large ventral hernia with palpable bowel in hernia, Soft, mild tenderness no masses  Neuro/Psych: No localized deficits. Normal speech, normal tone  Skin: Normal, no rashes, no lesions noted.   Extremities: Warm, well perfused, no edema, Pulses intact    MEDICATIONS  (STANDING):  aMIOdarone    Tablet 200 milliGRAM(s) Oral daily  atorvastatin 20 milliGRAM(s) Oral at bedtime  cefTRIAXone Injectable. 1000 milliGRAM(s) IV Push every 24 hours  chlorhexidine 4% Liquid 1 Application(s) Topical <User Schedule>  coronavirus bivalent (EUA) Booster Vaccine (PFIZER) 0.3 milliLiter(s) IntraMuscular once  dextrose 5%. 1000 milliLiter(s) (50 mL/Hr) IV Continuous <Continuous>  dextrose 5%. 1000 milliLiter(s) (100 mL/Hr) IV Continuous <Continuous>  dextrose 50% Injectable 25 Gram(s) IV Push once  dextrose 50% Injectable 12.5 Gram(s) IV Push once  dextrose 50% Injectable 25 Gram(s) IV Push once  diltiazem    milliGRAM(s) Oral daily  DULoxetine 60 milliGRAM(s) Oral daily  glucagon  Injectable 1 milliGRAM(s) IntraMuscular once  heparin  Infusion.  Unit(s)/Hr (18 mL/Hr) IV Continuous <Continuous>  insulin lispro (ADMELOG) corrective regimen sliding scale   SubCutaneous every 6 hours  levothyroxine Injectable 66 MICROGram(s) IV Push at bedtime  losartan 25 milliGRAM(s) Oral daily  montelukast 10 milliGRAM(s) Oral daily  nystatin Cream 1 Application(s) Topical two times a day  nystatin Powder 1 Application(s) Topical every 12 hours  pantoprazole  Injectable 40 milliGRAM(s) IV Push daily  potassium chloride   Powder 40 milliEquivalent(s) Oral once  potassium phosphate IVPB 30 milliMole(s) IV Intermittent every 6 hours    MEDICATIONS  (PRN):  dextrose Oral Gel 15 Gram(s) Oral once PRN Blood Glucose LESS THAN 70 milliGRAM(s)/deciliter  heparin   Injectable 8000 Unit(s) IV Push every 6 hours PRN For aPTT less than 40  heparin   Injectable 4000 Unit(s) IV Push every 6 hours PRN For aPTT between 40 - 57  HYDROmorphone  Injectable 1 milliGRAM(s) IV Push every 4 hours PRN Moderate Pain (4 - 6)  HYDROmorphone  Injectable 0.5 milliGRAM(s) IV Push every 3 hours PRN Severe Pain (7 - 10)  sodium chloride 0.9% lock flush 10 milliLiter(s) IV Push every 1 hour PRN Pre/post blood products, medications, blood draw, and to maintain line patency          LABS:                        9.4    17.47 )-----------( 368      ( 25 Jan 2023 07:28 )             30.8     01-25    140  |  109<H>  |  6<L>  ----------------------------<  206<H>  3.1<L>   |  26  |  0.43<L>    Ca    8.0<L>      25 Jan 2023 06:23  Phos  1.5     01-25  Mg     1.9     01-25          RADIOLOGY & ADDITIONAL STUDIES:  < from: Xray Abdomen 1 View PORTABLE -Routine (Xray Abdomen 1 View PORTABLE -Routine .) (01.21.23 @ 20:02) >    ACC: [id number] EXAM:  XR ABDOMEN PORTABLE ROUTINE 1V   ORDERED BY: ALDAIR DRAKE     PROCEDURE DATE:  01/21/2023          INTERPRETATION:  History:   Small bowel obstruction    Technique: 3 x-rays of the abdomen portal supine    Comparison: CT from1/20/2023    Findings/  Impression:    There is a hernia in the left abdomen, which contains bowel. The bowel is   dilated, similar to the prior study. The remainder of the abdomen   demonstrates paucity of bowel gas. This may represent an incarcerated   hernia. A nasogastric tube overlies the stomach. There are vascular   calcifications and bilateral hip prosthetics    --- End of Report ---            NAVI DORSEY MD; Attending Interventional Radiologist  This document has been electronically signed. Jan 22 2023 12:12PM    < end of copied text >   SURGERY DAILY PROGRESS NOTE:     Subjective:  Patient seen and examined at bedside. Patient in mild discomfort, positive bowel movements. Nurse denies any acute events. Vitals reviewed and WNL.    Objective:  Vital Signs Last 24 Hrs  T(C): 36.9 (25 Jan 2023 06:00), Max: 37.8 (24 Jan 2023 17:00)  T(F): 98.4 (25 Jan 2023 06:00), Max: 100 (24 Jan 2023 17:00)  HR: 91 (25 Jan 2023 08:00) (91 - 104)  BP: 146/62 (25 Jan 2023 08:00) (131/63 - 180/69)  BP(mean): 85 (25 Jan 2023 08:00) (73 - 105)  RR: 26 (25 Jan 2023 08:00) (18 - 29)  SpO2: 99% (25 Jan 2023 08:00) (95% - 99%)    Parameters below as of 25 Jan 2023 08:00  Patient On (Oxygen Delivery Method): nasal cannula  O2 Flow (L/min): 2      I&O's Detail    24 Jan 2023 07:01  -  25 Jan 2023 07:00  --------------------------------------------------------  IN:    Heparin Infusion: 182 mL  Total IN: 182 mL    OUT:    Indwelling Catheter - Urethral (mL): 325 mL  Total OUT: 325 mL    Total NET: -143 mL          Daily     Daily     PHYSICAL EXAM   Gen: well-appearing, in no acute distress  CV: pulse regularly present   Resp: airway patent, non-labored breathing  Abdomen: Large ventral hernia with palpable bowel in hernia, Soft, mild tenderness no masses  Neuro/Psych: No localized deficits. Normal speech, normal tone  Skin: Normal, no rashes, no lesions noted.   Extremities: Warm, well perfused, no edema, Pulses intact    MEDICATIONS  (STANDING):  aMIOdarone    Tablet 200 milliGRAM(s) Oral daily  atorvastatin 20 milliGRAM(s) Oral at bedtime  cefTRIAXone Injectable. 1000 milliGRAM(s) IV Push every 24 hours  chlorhexidine 4% Liquid 1 Application(s) Topical <User Schedule>  coronavirus bivalent (EUA) Booster Vaccine (PFIZER) 0.3 milliLiter(s) IntraMuscular once  dextrose 5%. 1000 milliLiter(s) (50 mL/Hr) IV Continuous <Continuous>  dextrose 5%. 1000 milliLiter(s) (100 mL/Hr) IV Continuous <Continuous>  dextrose 50% Injectable 25 Gram(s) IV Push once  dextrose 50% Injectable 12.5 Gram(s) IV Push once  dextrose 50% Injectable 25 Gram(s) IV Push once  diltiazem    milliGRAM(s) Oral daily  DULoxetine 60 milliGRAM(s) Oral daily  glucagon  Injectable 1 milliGRAM(s) IntraMuscular once  heparin  Infusion.  Unit(s)/Hr (18 mL/Hr) IV Continuous <Continuous>  insulin lispro (ADMELOG) corrective regimen sliding scale   SubCutaneous every 6 hours  levothyroxine Injectable 66 MICROGram(s) IV Push at bedtime  losartan 25 milliGRAM(s) Oral daily  montelukast 10 milliGRAM(s) Oral daily  nystatin Cream 1 Application(s) Topical two times a day  nystatin Powder 1 Application(s) Topical every 12 hours  pantoprazole  Injectable 40 milliGRAM(s) IV Push daily  potassium chloride   Powder 40 milliEquivalent(s) Oral once  potassium phosphate IVPB 30 milliMole(s) IV Intermittent every 6 hours    MEDICATIONS  (PRN):  dextrose Oral Gel 15 Gram(s) Oral once PRN Blood Glucose LESS THAN 70 milliGRAM(s)/deciliter  heparin   Injectable 8000 Unit(s) IV Push every 6 hours PRN For aPTT less than 40  heparin   Injectable 4000 Unit(s) IV Push every 6 hours PRN For aPTT between 40 - 57  HYDROmorphone  Injectable 1 milliGRAM(s) IV Push every 4 hours PRN Moderate Pain (4 - 6)  HYDROmorphone  Injectable 0.5 milliGRAM(s) IV Push every 3 hours PRN Severe Pain (7 - 10)  sodium chloride 0.9% lock flush 10 milliLiter(s) IV Push every 1 hour PRN Pre/post blood products, medications, blood draw, and to maintain line patency          LABS:                        9.4    17.47 )-----------( 368      ( 25 Jan 2023 07:28 )             30.8     01-25    140  |  109<H>  |  6<L>  ----------------------------<  206<H>  3.1<L>   |  26  |  0.43<L>    Ca    8.0<L>      25 Jan 2023 06:23  Phos  1.5     01-25  Mg     1.9     01-25

## 2023-01-25 NOTE — PROGRESS NOTE ADULT - ASSESSMENT
73yo F admitted Urosepsis and SBO, likely secondary to adhesions and ventral hernia.   Having bowel movements and passing flatus    Plan:  IV hydration   Advance diet as tolerated  Recommend nystatin for pannus  Pain control prn  Continue with ICU care    Plan discussed with Dr. Lyn 71yo F admitted Urosepsis and SBO, likely secondary to adhesions and ventral hernia.   Having bowel movements and passing flatus    Plan:  IV hydration   Advance diet as tolerated  nystatin for pannus  Pain control prn  Continue with ICU care    Plan discussed with Dr. Lyn

## 2023-01-25 NOTE — PROGRESS NOTE ADULT - SUBJECTIVE AND OBJECTIVE BOX
HPI:           71 y/o female with a PMHx o             CVA with left sided weakness               atrial fibrillation on Coumadin              OPD  - wears 2L NC at baseline   was brought in from skilled nursing facility for abdominal pain emesis and weakness  In ED CT abdomen Pelvis showed,  Large ventral hernia with SBO  lactate 3.0  had ALVERTO creatinine to 1.9 improved with hydration  had cvp placed   has pyuria but no complaints of urinary frequency      : No events over night, less pain on exam today, tolerated clears  : No events over night       PAST MEDICAL & SURGICAL HISTORY:  Hypertension      Hypercholesteremia      COPD (chronic obstructive pulmonary disease)      C. difficile diarrhea        Diverticulitis of intestine with perforation and abscess without bleeding, unspecified part of intestinal tract      PE (pulmonary thromboembolism)  2016      Palpitations      Obesity      Osteoarthritis      Anemia      Colostomy in place      History of atrial fibrillation      HTN (hypertension)      Diabetes mellitus      Cerebrovascular accident (CVA)      DVT of lower limb, acute      CVA (cerebral vascular accident)      COPD (chronic obstructive pulmonary disease)      Neuropathy      Diverticulitis      History of appendectomy      H/O:   x2      H/O ovarian cystectomy  b/l      Status post total replacement of both hips      H/O hemicolectomy  2016      History of colostomy reversal      History of colostomy reversal      S/P colostomy      S/P       S/P hip replacement          FAMILY HISTORY:  Family history of COPD (chronic obstructive pulmonary disease)    Family history of chronic kidney disease    Family history of hiatal hernia (Sibling)        Social Hx:    Allergies    Cipro (Rash)  Spiriva (Rash)    Intolerances            ICU Vital Signs Last 24 Hrs  T(C): 37.7 (2023 10:00), Max: 37.8 (2023 17:00)  T(F): 99.9 (2023 10:00), Max: 100 (2023 17:00)  HR: 96 (2023 10:00) (91 - 102)  BP: 152/67 (2023 10:00) (131/63 - 180/69)  BP(mean): 88 (2023 10:00) (73 - 105)  ABP: --  ABP(mean): --  RR: 24 (2023 10:00) (18 - 29)  SpO2: 100% (2023 10:00) (95% - 100%)    O2 Parameters below as of 2023 08:00  Patient On (Oxygen Delivery Method): nasal cannula  O2 Flow (L/min): 2              I&O's Summary    2023 07:01  -  2023 07:00  --------------------------------------------------------  IN: 182 mL / OUT: 325 mL / NET: -143 mL                              9.4    17.47 )-----------( 368      ( 2023 07:28 )             30.8           140  |  109<H>  |  6<L>  ----------------------------<  206<H>  3.1<L>   |  26  |  0.43<L>    Ca    8.0<L>      2023 06:23  Phos  1.5       Mg     1.9                           MEDICATIONS  (STANDING):  aMIOdarone    Tablet 200 milliGRAM(s) Oral daily  atorvastatin 20 milliGRAM(s) Oral at bedtime  cefTRIAXone Injectable. 1000 milliGRAM(s) IV Push every 24 hours  chlorhexidine 4% Liquid 1 Application(s) Topical <User Schedule>  coronavirus bivalent (EUA) Booster Vaccine (PFIZER) 0.3 milliLiter(s) IntraMuscular once  dextrose 5%. 1000 milliLiter(s) (50 mL/Hr) IV Continuous <Continuous>  dextrose 5%. 1000 milliLiter(s) (100 mL/Hr) IV Continuous <Continuous>  dextrose 50% Injectable 25 Gram(s) IV Push once  dextrose 50% Injectable 12.5 Gram(s) IV Push once  dextrose 50% Injectable 25 Gram(s) IV Push once  diltiazem    milliGRAM(s) Oral daily  DULoxetine 60 milliGRAM(s) Oral daily  glucagon  Injectable 1 milliGRAM(s) IntraMuscular once  heparin  Infusion.  Unit(s)/Hr (18 mL/Hr) IV Continuous <Continuous>  insulin lispro (ADMELOG) corrective regimen sliding scale   SubCutaneous every 6 hours  levothyroxine 88 MICROGram(s) Oral daily  losartan 25 milliGRAM(s) Oral daily  montelukast 10 milliGRAM(s) Oral daily  nystatin Cream 1 Application(s) Topical two times a day  nystatin Powder 1 Application(s) Topical every 12 hours  pantoprazole  Injectable 40 milliGRAM(s) IV Push daily  potassium phosphate IVPB 30 milliMole(s) IV Intermittent every 6 hours  warfarin 2.5 milliGRAM(s) Oral once    MEDICATIONS  (PRN):  dextrose Oral Gel 15 Gram(s) Oral once PRN Blood Glucose LESS THAN 70 milliGRAM(s)/deciliter  heparin   Injectable 8000 Unit(s) IV Push every 6 hours PRN For aPTT less than 40  heparin   Injectable 4000 Unit(s) IV Push every 6 hours PRN For aPTT between 40 - 57  HYDROmorphone  Injectable 1 milliGRAM(s) IV Push every 4 hours PRN Moderate Pain (4 - 6)  HYDROmorphone  Injectable 0.5 milliGRAM(s) IV Push every 3 hours PRN Severe Pain (7 - 10)  sodium chloride 0.9% lock flush 10 milliLiter(s) IV Push every 1 hour PRN Pre/post blood products, medications, blood draw, and to maintain line patency      DVT Prophylaxis: UFH    Advanced Directives:  Discussed with:    Visit Information:    ** Time is exclusive of billed procedures and/or teaching and/or routine family updates.

## 2023-01-26 LAB
ANION GAP SERPL CALC-SCNC: 6 MMOL/L — SIGNIFICANT CHANGE UP (ref 5–17)
APTT BLD: 82.1 SEC — HIGH (ref 27.5–35.5)
BUN SERPL-MCNC: 6 MG/DL — LOW (ref 7–23)
CALCIUM SERPL-MCNC: 7.9 MG/DL — LOW (ref 8.5–10.1)
CHLORIDE SERPL-SCNC: 109 MMOL/L — HIGH (ref 96–108)
CO2 SERPL-SCNC: 29 MMOL/L — SIGNIFICANT CHANGE UP (ref 22–31)
CREAT SERPL-MCNC: 0.44 MG/DL — LOW (ref 0.5–1.3)
CULTURE RESULTS: SIGNIFICANT CHANGE UP
CULTURE RESULTS: SIGNIFICANT CHANGE UP
EGFR: 103 ML/MIN/1.73M2 — SIGNIFICANT CHANGE UP
GLUCOSE BLDC GLUCOMTR-MCNC: 185 MG/DL — HIGH (ref 70–99)
GLUCOSE BLDC GLUCOMTR-MCNC: 193 MG/DL — HIGH (ref 70–99)
GLUCOSE BLDC GLUCOMTR-MCNC: 194 MG/DL — HIGH (ref 70–99)
GLUCOSE BLDC GLUCOMTR-MCNC: 203 MG/DL — HIGH (ref 70–99)
GLUCOSE BLDC GLUCOMTR-MCNC: 203 MG/DL — HIGH (ref 70–99)
GLUCOSE SERPL-MCNC: 215 MG/DL — HIGH (ref 70–99)
HCT VFR BLD CALC: 29.6 % — LOW (ref 34.5–45)
HGB BLD-MCNC: 9.1 G/DL — LOW (ref 11.5–15.5)
INR BLD: 1.94 RATIO — HIGH (ref 0.88–1.16)
MAGNESIUM SERPL-MCNC: 1.8 MG/DL — SIGNIFICANT CHANGE UP (ref 1.6–2.6)
MCHC RBC-ENTMCNC: 25.6 PG — LOW (ref 27–34)
MCHC RBC-ENTMCNC: 30.7 GM/DL — LOW (ref 32–36)
MCV RBC AUTO: 83.4 FL — SIGNIFICANT CHANGE UP (ref 80–100)
PHOSPHATE SERPL-MCNC: 2.6 MG/DL — SIGNIFICANT CHANGE UP (ref 2.5–4.5)
PLATELET # BLD AUTO: 406 K/UL — HIGH (ref 150–400)
POTASSIUM SERPL-MCNC: 3.5 MMOL/L — SIGNIFICANT CHANGE UP (ref 3.5–5.3)
POTASSIUM SERPL-SCNC: 3.5 MMOL/L — SIGNIFICANT CHANGE UP (ref 3.5–5.3)
PROTHROM AB SERPL-ACNC: 22.6 SEC — HIGH (ref 10.5–13.4)
RBC # BLD: 3.55 M/UL — LOW (ref 3.8–5.2)
RBC # FLD: 17.2 % — HIGH (ref 10.3–14.5)
SODIUM SERPL-SCNC: 144 MMOL/L — SIGNIFICANT CHANGE UP (ref 135–145)
SPECIMEN SOURCE: SIGNIFICANT CHANGE UP
SPECIMEN SOURCE: SIGNIFICANT CHANGE UP
WBC # BLD: 15.25 K/UL — HIGH (ref 3.8–10.5)
WBC # FLD AUTO: 15.25 K/UL — HIGH (ref 3.8–10.5)

## 2023-01-26 PROCEDURE — 99233 SBSQ HOSP IP/OBS HIGH 50: CPT

## 2023-01-26 RX ORDER — PIPERACILLIN AND TAZOBACTAM 4; .5 G/20ML; G/20ML
3.38 INJECTION, POWDER, LYOPHILIZED, FOR SOLUTION INTRAVENOUS EVERY 8 HOURS
Refills: 0 | Status: DISCONTINUED | OUTPATIENT
Start: 2023-01-26 | End: 2023-01-27

## 2023-01-26 RX ORDER — WARFARIN SODIUM 2.5 MG/1
3 TABLET ORAL ONCE
Refills: 0 | Status: DISCONTINUED | OUTPATIENT
Start: 2023-01-26 | End: 2023-01-26

## 2023-01-26 RX ORDER — ENOXAPARIN SODIUM 100 MG/ML
40 INJECTION SUBCUTANEOUS EVERY 24 HOURS
Refills: 0 | Status: DISCONTINUED | OUTPATIENT
Start: 2023-01-26 | End: 2023-01-27

## 2023-01-26 RX ORDER — FUROSEMIDE 40 MG
20 TABLET ORAL ONCE
Refills: 0 | Status: COMPLETED | OUTPATIENT
Start: 2023-01-26 | End: 2023-01-26

## 2023-01-26 RX ADMIN — Medication 2: at 11:56

## 2023-01-26 RX ADMIN — NYSTATIN CREAM 1 APPLICATION(S): 100000 CREAM TOPICAL at 18:07

## 2023-01-26 RX ADMIN — PANTOPRAZOLE SODIUM 40 MILLIGRAM(S): 20 TABLET, DELAYED RELEASE ORAL at 10:26

## 2023-01-26 RX ADMIN — LOSARTAN POTASSIUM 25 MILLIGRAM(S): 100 TABLET, FILM COATED ORAL at 10:26

## 2023-01-26 RX ADMIN — Medication 180 MILLIGRAM(S): at 10:26

## 2023-01-26 RX ADMIN — Medication 20 MILLIGRAM(S): at 22:19

## 2023-01-26 RX ADMIN — NYSTATIN CREAM 1 APPLICATION(S): 100000 CREAM TOPICAL at 10:39

## 2023-01-26 RX ADMIN — DULOXETINE HYDROCHLORIDE 60 MILLIGRAM(S): 30 CAPSULE, DELAYED RELEASE ORAL at 10:23

## 2023-01-26 RX ADMIN — Medication 88 MICROGRAM(S): at 05:36

## 2023-01-26 RX ADMIN — Medication 2: at 00:24

## 2023-01-26 RX ADMIN — ENOXAPARIN SODIUM 40 MILLIGRAM(S): 100 INJECTION SUBCUTANEOUS at 22:19

## 2023-01-26 RX ADMIN — Medication 4: at 05:36

## 2023-01-26 RX ADMIN — MONTELUKAST 10 MILLIGRAM(S): 4 TABLET, CHEWABLE ORAL at 10:26

## 2023-01-26 RX ADMIN — NYSTATIN CREAM 1 APPLICATION(S): 100000 CREAM TOPICAL at 22:20

## 2023-01-26 RX ADMIN — HEPARIN SODIUM 1800 UNIT(S)/HR: 5000 INJECTION INTRAVENOUS; SUBCUTANEOUS at 03:40

## 2023-01-26 RX ADMIN — NYSTATIN CREAM 1 APPLICATION(S): 100000 CREAM TOPICAL at 05:36

## 2023-01-26 RX ADMIN — PIPERACILLIN AND TAZOBACTAM 25 GRAM(S): 4; .5 INJECTION, POWDER, LYOPHILIZED, FOR SOLUTION INTRAVENOUS at 22:19

## 2023-01-26 RX ADMIN — Medication 2: at 17:50

## 2023-01-26 RX ADMIN — HEPARIN SODIUM 1800 UNIT(S)/HR: 5000 INJECTION INTRAVENOUS; SUBCUTANEOUS at 07:48

## 2023-01-26 RX ADMIN — AMIODARONE HYDROCHLORIDE 200 MILLIGRAM(S): 400 TABLET ORAL at 10:26

## 2023-01-26 RX ADMIN — CEFTRIAXONE 1000 MILLIGRAM(S): 500 INJECTION, POWDER, FOR SOLUTION INTRAMUSCULAR; INTRAVENOUS at 18:07

## 2023-01-26 NOTE — PROGRESS NOTE ADULT - ASSESSMENT
Patient admitted with abdominal pain, nausea and vomitting , dehydration  septic shock, likely related to   incarcerated ventral hernia  also possible UTI  Sepsis   anasarca/hx chronic diastolic heart failure     Plan:    1.  SBO Passing gas and had a small BM.  Advanced Diet, being treated conservatively  2.  Rocephin for kleb UTI  3.  hx. copd stable on nasal cannula no infiltrate on cxr  4. afib, rate ok, echo ef 55%  INR 1.9, iv heparin resumed Coumadin today.  Resumed Cardizem, Lorsatan, and Lipitor.    5. sliding scale for type II diabetes  6. oob later today, PT  7. ALVERTO improved with hydration  8. Replace Joan      GOC: DNR

## 2023-01-26 NOTE — PROGRESS NOTE ADULT - ATTENDING COMMENTS
Patient was seen and examined, modifications and corrections made  I agree with findings and plan
Patient was seen and examined, modifications and corrections made  I agree with findings and plan    consider d/c ngt  start clears
Patient was seen and examined, modifications and corrections made  I agree with findings and plan    Discussed plans with Dr. Mccormick
Patient was seen and examined, modifications and corrections made  I agree with findings and plan
Patient was seen and examined, modifications and corrections made  I agree with findings and plan

## 2023-01-26 NOTE — PROGRESS NOTE ADULT - SUBJECTIVE AND OBJECTIVE BOX
SURGERY DAILY PROGRESS NOTE:     Subjective:  Patient seen and examined at bedside. Patient in mild discomfort, positive bowel movements. Nurse denies any acute events. Vitals reviewed and WNL.    Objective:  Vital Signs Last 24 Hrs  T(C): 37.6 (2023 08:03), Max: 37.7 (2023 10:00)  T(F): 99.7 (2023 08:03), Max: 99.9 (2023 10:00)  HR: 98 (2023 08:03) (92 - 99)  BP: 156/59 (2023 08:03) (127/51 - 164/66)  BP(mean): 81 (2023 20:00) (71 - 97)  RR: 19 (2023 08:03) (17 - 24)  SpO2: 93% (2023 08:03) (93% - 100%)    Parameters below as of 2023 08:03  Patient On (Oxygen Delivery Method): nasal cannula  O2 Flow (L/min): 2      I&O's Detail    2023 07:01  -  2023 07:00  --------------------------------------------------------  IN:    Heparin Infusion: 259 mL    IV PiggyBack: 500 mL    Sodium Chloride 0.9% Bolus: 500 mL  Total IN: 1259 mL    OUT:    Indwelling Catheter - Urethral (mL): 500 mL  Total OUT: 500 mL    Total NET: 759 mL          Daily     Daily Weight in k.1 (2023 05:09)    PHYSICAL EXAM   Gen: well-appearing, in no acute distress  CV: pulse regularly present   Resp: airway patent, non-labored breathing  Abdomen: Large ventral hernia with palpable bowel in hernia, Soft, mild tenderness no masses  Neuro/Psych: No localized deficits. Normal speech, normal tone  Skin: Normal, no rashes, no lesions noted.   Extremities: Warm, well perfused, no edema, Pulses intact      MEDICATIONS  (STANDING):  aMIOdarone    Tablet 200 milliGRAM(s) Oral daily  atorvastatin 20 milliGRAM(s) Oral at bedtime  cefTRIAXone Injectable. 1000 milliGRAM(s) IV Push every 24 hours  chlorhexidine 4% Liquid 1 Application(s) Topical <User Schedule>  coronavirus bivalent (EUA) Booster Vaccine (PFIZER) 0.3 milliLiter(s) IntraMuscular once  dextrose 5%. 1000 milliLiter(s) (50 mL/Hr) IV Continuous <Continuous>  dextrose 5%. 1000 milliLiter(s) (100 mL/Hr) IV Continuous <Continuous>  dextrose 50% Injectable 25 Gram(s) IV Push once  dextrose 50% Injectable 12.5 Gram(s) IV Push once  dextrose 50% Injectable 25 Gram(s) IV Push once  diltiazem    milliGRAM(s) Oral daily  DULoxetine 60 milliGRAM(s) Oral daily  glucagon  Injectable 1 milliGRAM(s) IntraMuscular once  heparin  Infusion.  Unit(s)/Hr (18 mL/Hr) IV Continuous <Continuous>  insulin lispro (ADMELOG) corrective regimen sliding scale   SubCutaneous every 6 hours  levothyroxine 88 MICROGram(s) Oral daily  losartan 25 milliGRAM(s) Oral daily  montelukast 10 milliGRAM(s) Oral daily  nystatin Cream 1 Application(s) Topical two times a day  nystatin Powder 1 Application(s) Topical every 12 hours  pantoprazole  Injectable 40 milliGRAM(s) IV Push daily    MEDICATIONS  (PRN):  dextrose Oral Gel 15 Gram(s) Oral once PRN Blood Glucose LESS THAN 70 milliGRAM(s)/deciliter  heparin   Injectable 8000 Unit(s) IV Push every 6 hours PRN For aPTT less than 40  heparin   Injectable 4000 Unit(s) IV Push every 6 hours PRN For aPTT between 40 - 57  HYDROmorphone  Injectable 1 milliGRAM(s) IV Push every 4 hours PRN Moderate Pain (4 - 6)  HYDROmorphone  Injectable 0.5 milliGRAM(s) IV Push every 3 hours PRN Severe Pain (7 - 10)  sodium chloride 0.9% lock flush 10 milliLiter(s) IV Push every 1 hour PRN Pre/post blood products, medications, blood draw, and to maintain line patency        LABS:                        9.1    15.25 )-----------( 406      ( 2023 06:28 )             29.6     01-26    144  |  109<H>  |  6<L>  ----------------------------<  215<H>  3.5   |  29  |  0.44<L>    Ca    7.9<L>      2023 06:28  Phos  2.6       Mg     1.8           PT/INR - ( 2023 06:28 )   PT: 22.6 sec;   INR: 1.94 ratio         PTT - ( 2023 06:28 )  PTT:82.1 sec

## 2023-01-26 NOTE — PROGRESS NOTE ADULT - ASSESSMENT
S/p urosepsis, SBO secondary to ventral hernia. Now with shortness of breath, possibly fluid overload, possibly aspiration. Increased temp. Will restart IV abx. Diurese with lasix 20. Cardiology and pulmonary consultation. Will resume care discussion with family. Repeat swallow evaluation as pt may have aspirated.

## 2023-01-26 NOTE — PROGRESS NOTE ADULT - SUBJECTIVE AND OBJECTIVE BOX
Pt seems lethargic, gurgling, denies pain  VSS [id number]    lungs- bilat rales, diminished BS bases  Cor- S1S2  Abd- large ventral hernia, + BS soft non tender  Ext- 1-2 + edema

## 2023-01-26 NOTE — PROGRESS NOTE ADULT - SUBJECTIVE AND OBJECTIVE BOX
73 y/o female with a PMHx o             CVA with left sided weakness               atrial fibrillation on Coumadin              OPD  - wears 2L NC at baseline   was brought in from skilled nursing facility for abdominal pain emesis and weakness  In ED CT abdomen Pelvis showed,  Large ventral hernia with SBO  lactate 3.0  had ALVERTO creatinine to 1.9 improved with hydration  had cvp placed   has pyuria but no complaints of urinary frequency  : No events over night, less pain on exam today, tolerated clears  : No events over night   pt has decreased po intake, passing flatus , arousable, confused       PHYSICAL EXAM:    Daily     Daily Weight in k.1 (2023 05:09)    ICU Vital Signs Last 24 Hrs  T(C): 37.6 (2023 08:03), Max: 37.6 (2023 08:03)  T(F): 99.7 (2023 08:03), Max: 99.7 (2023 08:03)  HR: 98 (2023 08:03) (92 - 99)  BP: 156/59 (2023 08:03) (127/51 - 164/66)  BP(mean): 81 (2023 20:00) (71 - 94)  ABP: --  ABP(mean): --  RR: 19 (2023 08:03) (17 - 21)  SpO2: 93% (2023 08:03) (93% - 100%)    O2 Parameters below as of 2023 08:03  Patient On (Oxygen Delivery Method): nasal cannula  O2 Flow (L/min): 2          Constitutional: NAD  HEENT: Atraumatic, REYES, Normal, No congestion  Respiratory:dreased breath sounds lung bases , fine crackles lung bases , no rhonchi/wheeze  Cardiovascular: N S1S2;   Gastrointestinal: Abdomen soft, non tender, Bowel Ssounds present  Extremities: No edema, peripheral pulses present  Neurological: AAO x 3, no gross focal motor deficits  Skin: positive for anasarca                            9.1    15.25 )-----------( 406      ( 2023 06:28 )             29.6       CBC Full  -  ( 2023 06:28 )  WBC Count : 15.25 K/uL  RBC Count : 3.55 M/uL  Hemoglobin : 9.1 g/dL  Hematocrit : 29.6 %  Platelet Count - Automated : 406 K/uL  Mean Cell Volume : 83.4 fl  Mean Cell Hemoglobin : 25.6 pg  Mean Cell Hemoglobin Concentration : 30.7 gm/dL  Auto Neutrophil # : x  Auto Lymphocyte # : x  Auto Monocyte # : x  Auto Eosinophil # : x  Auto Basophil # : x  Auto Neutrophil % : x  Auto Lymphocyte % : x  Auto Monocyte % : x  Auto Eosinophil % : x  Auto Basophil % : x          144  |  109<H>  |  6<L>  ----------------------------<  215<H>  3.5   |  29  |  0.44<L>    Ca    7.9<L>      2023 06:28  Phos  2.6       Mg     1.8                 PT/INR - ( 2023 06:28 )   PT: 22.6 sec;   INR: 1.94 ratio         PTT - ( 2023 06:28 )  PTT:82.1 sec                  MEDICATIONS  (STANDING):  aMIOdarone    Tablet 200 milliGRAM(s) Oral daily  atorvastatin 20 milliGRAM(s) Oral at bedtime  cefTRIAXone Injectable. 1000 milliGRAM(s) IV Push every 24 hours  chlorhexidine 4% Liquid 1 Application(s) Topical <User Schedule>  coronavirus bivalent (EUA) Booster Vaccine (PFIZER) 0.3 milliLiter(s) IntraMuscular once  dextrose 5%. 1000 milliLiter(s) (50 mL/Hr) IV Continuous <Continuous>  dextrose 5%. 1000 milliLiter(s) (100 mL/Hr) IV Continuous <Continuous>  dextrose 50% Injectable 25 Gram(s) IV Push once  dextrose 50% Injectable 12.5 Gram(s) IV Push once  dextrose 50% Injectable 25 Gram(s) IV Push once  diltiazem    milliGRAM(s) Oral daily  DULoxetine 60 milliGRAM(s) Oral daily  glucagon  Injectable 1 milliGRAM(s) IntraMuscular once  heparin  Infusion.  Unit(s)/Hr (18 mL/Hr) IV Continuous <Continuous>  insulin lispro (ADMELOG) corrective regimen sliding scale   SubCutaneous every 6 hours  levothyroxine 88 MICROGram(s) Oral daily  losartan 25 milliGRAM(s) Oral daily  montelukast 10 milliGRAM(s) Oral daily  nystatin Cream 1 Application(s) Topical two times a day  nystatin Powder 1 Application(s) Topical every 12 hours  pantoprazole  Injectable 40 milliGRAM(s) IV Push daily  warfarin 3 milliGRAM(s) Oral once

## 2023-01-26 NOTE — PROGRESS NOTE ADULT - ASSESSMENT
71yo F admitted Urosepsis and SBO, likely secondary to adhesions and ventral hernia.   Having bowel movements and passing flatus    Plan:  IV hydration   Advance diet as tolerated  nystatin for pannus  Pain control prn  Continue with ICU care    Plan discussed with Dr. Lyn

## 2023-01-27 LAB
AMMONIA BLD-MCNC: 73 UMOL/L — HIGH (ref 11–32)
ANION GAP SERPL CALC-SCNC: 5 MMOL/L — SIGNIFICANT CHANGE UP (ref 5–17)
BASE EXCESS BLDA CALC-SCNC: 4.9 MMOL/L — HIGH (ref -2–3)
BUN SERPL-MCNC: 10 MG/DL — SIGNIFICANT CHANGE UP (ref 7–23)
CALCIUM SERPL-MCNC: 8 MG/DL — LOW (ref 8.5–10.1)
CHLORIDE SERPL-SCNC: 111 MMOL/L — HIGH (ref 96–108)
CO2 BLDA-SCNC: 29 MMOL/L — HIGH (ref 19–24)
CO2 SERPL-SCNC: 26 MMOL/L — SIGNIFICANT CHANGE UP (ref 22–31)
CREAT SERPL-MCNC: 0.78 MG/DL — SIGNIFICANT CHANGE UP (ref 0.5–1.3)
EGFR: 81 ML/MIN/1.73M2 — SIGNIFICANT CHANGE UP
GLUCOSE BLDC GLUCOMTR-MCNC: 197 MG/DL — HIGH (ref 70–99)
GLUCOSE BLDC GLUCOMTR-MCNC: 197 MG/DL — HIGH (ref 70–99)
GLUCOSE BLDC GLUCOMTR-MCNC: 227 MG/DL — HIGH (ref 70–99)
GLUCOSE BLDC GLUCOMTR-MCNC: 228 MG/DL — HIGH (ref 70–99)
GLUCOSE BLDC GLUCOMTR-MCNC: 311 MG/DL — HIGH (ref 70–99)
GLUCOSE SERPL-MCNC: 233 MG/DL — HIGH (ref 70–99)
HCO3 BLDA-SCNC: 28 MMOL/L — SIGNIFICANT CHANGE UP (ref 21–28)
HCT VFR BLD CALC: 36.7 % — SIGNIFICANT CHANGE UP (ref 34.5–45)
HGB BLD-MCNC: 10.9 G/DL — LOW (ref 11.5–15.5)
INR BLD: 2.16 RATIO — HIGH (ref 0.88–1.16)
LACTATE SERPL-SCNC: 2.8 MMOL/L — HIGH (ref 0.7–2)
MAGNESIUM SERPL-MCNC: 1.8 MG/DL — SIGNIFICANT CHANGE UP (ref 1.6–2.6)
MCHC RBC-ENTMCNC: 25.8 PG — LOW (ref 27–34)
MCHC RBC-ENTMCNC: 29.7 GM/DL — LOW (ref 32–36)
MCV RBC AUTO: 87 FL — SIGNIFICANT CHANGE UP (ref 80–100)
NT-PROBNP SERPL-SCNC: 3035 PG/ML — HIGH (ref 0–125)
PCO2 BLDA: 35 MMHG — SIGNIFICANT CHANGE UP (ref 32–45)
PH BLDA: 7.51 — HIGH (ref 7.35–7.45)
PHOSPHATE SERPL-MCNC: 2.7 MG/DL — SIGNIFICANT CHANGE UP (ref 2.5–4.5)
PLATELET # BLD AUTO: 510 K/UL — HIGH (ref 150–400)
PO2 BLDA: 103 MMHG — SIGNIFICANT CHANGE UP (ref 83–108)
POTASSIUM SERPL-MCNC: 3.8 MMOL/L — SIGNIFICANT CHANGE UP (ref 3.5–5.3)
POTASSIUM SERPL-SCNC: 3.8 MMOL/L — SIGNIFICANT CHANGE UP (ref 3.5–5.3)
PROTHROM AB SERPL-ACNC: 25.3 SEC — HIGH (ref 10.5–13.4)
RBC # BLD: 4.22 M/UL — SIGNIFICANT CHANGE UP (ref 3.8–5.2)
RBC # FLD: 18.9 % — HIGH (ref 10.3–14.5)
SAO2 % BLDA: 99 % — HIGH (ref 94–98)
SODIUM SERPL-SCNC: 142 MMOL/L — SIGNIFICANT CHANGE UP (ref 135–145)
WBC # BLD: 19.72 K/UL — HIGH (ref 3.8–10.5)
WBC # FLD AUTO: 19.72 K/UL — HIGH (ref 3.8–10.5)

## 2023-01-27 PROCEDURE — 71250 CT THORAX DX C-: CPT | Mod: 26

## 2023-01-27 PROCEDURE — 93010 ELECTROCARDIOGRAM REPORT: CPT

## 2023-01-27 PROCEDURE — 70450 CT HEAD/BRAIN W/O DYE: CPT | Mod: 26

## 2023-01-27 PROCEDURE — 74176 CT ABD & PELVIS W/O CONTRAST: CPT | Mod: 26

## 2023-01-27 PROCEDURE — 99233 SBSQ HOSP IP/OBS HIGH 50: CPT

## 2023-01-27 PROCEDURE — 71045 X-RAY EXAM CHEST 1 VIEW: CPT | Mod: 26

## 2023-01-27 RX ORDER — SODIUM CHLORIDE 9 MG/ML
1000 INJECTION INTRAMUSCULAR; INTRAVENOUS; SUBCUTANEOUS
Refills: 0 | Status: DISCONTINUED | OUTPATIENT
Start: 2023-01-27 | End: 2023-01-29

## 2023-01-27 RX ORDER — METRONIDAZOLE 500 MG
500 TABLET ORAL EVERY 8 HOURS
Refills: 0 | Status: DISCONTINUED | OUTPATIENT
Start: 2023-01-27 | End: 2023-02-15

## 2023-01-27 RX ORDER — METOPROLOL TARTRATE 50 MG
2.5 TABLET ORAL EVERY 6 HOURS
Refills: 0 | Status: DISCONTINUED | OUTPATIENT
Start: 2023-01-27 | End: 2023-01-27

## 2023-01-27 RX ORDER — FUROSEMIDE 40 MG
40 TABLET ORAL DAILY
Refills: 0 | Status: DISCONTINUED | OUTPATIENT
Start: 2023-01-27 | End: 2023-01-28

## 2023-01-27 RX ORDER — OXYBUTYNIN CHLORIDE 5 MG
5 TABLET ORAL EVERY 12 HOURS
Refills: 0 | Status: DISCONTINUED | OUTPATIENT
Start: 2023-01-27 | End: 2023-02-01

## 2023-01-27 RX ORDER — METOPROLOL TARTRATE 50 MG
25 TABLET ORAL
Refills: 0 | Status: DISCONTINUED | OUTPATIENT
Start: 2023-01-27 | End: 2023-01-27

## 2023-01-27 RX ORDER — METOPROLOL TARTRATE 50 MG
2.5 TABLET ORAL EVERY 6 HOURS
Refills: 0 | Status: DISCONTINUED | OUTPATIENT
Start: 2023-01-27 | End: 2023-01-31

## 2023-01-27 RX ORDER — ACETAMINOPHEN 500 MG
1000 TABLET ORAL ONCE
Refills: 0 | Status: COMPLETED | OUTPATIENT
Start: 2023-01-27 | End: 2023-01-27

## 2023-01-27 RX ORDER — ALBUTEROL 90 UG/1
2 AEROSOL, METERED ORAL EVERY 6 HOURS
Refills: 0 | Status: DISCONTINUED | OUTPATIENT
Start: 2023-01-27 | End: 2023-02-22

## 2023-01-27 RX ORDER — WARFARIN SODIUM 2.5 MG/1
3 TABLET ORAL ONCE
Refills: 0 | Status: DISCONTINUED | OUTPATIENT
Start: 2023-01-27 | End: 2023-01-27

## 2023-01-27 RX ORDER — LEVOTHYROXINE SODIUM 125 MCG
70 TABLET ORAL AT BEDTIME
Refills: 0 | Status: DISCONTINUED | OUTPATIENT
Start: 2023-01-27 | End: 2023-02-22

## 2023-01-27 RX ORDER — CEFEPIME 1 G/1
2000 INJECTION, POWDER, FOR SOLUTION INTRAMUSCULAR; INTRAVENOUS EVERY 12 HOURS
Refills: 0 | Status: DISCONTINUED | OUTPATIENT
Start: 2023-01-27 | End: 2023-02-15

## 2023-01-27 RX ORDER — KETOROLAC TROMETHAMINE 30 MG/ML
15 SYRINGE (ML) INJECTION ONCE
Refills: 0 | Status: DISCONTINUED | OUTPATIENT
Start: 2023-01-27 | End: 2023-01-27

## 2023-01-27 RX ORDER — BUDESONIDE, MICRONIZED 100 %
0.5 POWDER (GRAM) MISCELLANEOUS EVERY 12 HOURS
Refills: 0 | Status: DISCONTINUED | OUTPATIENT
Start: 2023-01-27 | End: 2023-02-22

## 2023-01-27 RX ADMIN — NYSTATIN CREAM 1 APPLICATION(S): 100000 CREAM TOPICAL at 05:09

## 2023-01-27 RX ADMIN — Medication 0.5 MILLIGRAM(S): at 22:12

## 2023-01-27 RX ADMIN — Medication 400 MILLIGRAM(S): at 06:15

## 2023-01-27 RX ADMIN — Medication 4: at 05:52

## 2023-01-27 RX ADMIN — ALBUTEROL 2 PUFF(S): 90 AEROSOL, METERED ORAL at 20:42

## 2023-01-27 RX ADMIN — NYSTATIN CREAM 1 APPLICATION(S): 100000 CREAM TOPICAL at 18:36

## 2023-01-27 RX ADMIN — PIPERACILLIN AND TAZOBACTAM 25 GRAM(S): 4; .5 INJECTION, POWDER, LYOPHILIZED, FOR SOLUTION INTRAVENOUS at 05:08

## 2023-01-27 RX ADMIN — CEFEPIME 100 MILLIGRAM(S): 1 INJECTION, POWDER, FOR SOLUTION INTRAMUSCULAR; INTRAVENOUS at 11:13

## 2023-01-27 RX ADMIN — Medication 4: at 18:42

## 2023-01-27 RX ADMIN — HYDROMORPHONE HYDROCHLORIDE 1 MILLIGRAM(S): 2 INJECTION INTRAMUSCULAR; INTRAVENOUS; SUBCUTANEOUS at 10:34

## 2023-01-27 RX ADMIN — HYDROMORPHONE HYDROCHLORIDE 1 MILLIGRAM(S): 2 INJECTION INTRAMUSCULAR; INTRAVENOUS; SUBCUTANEOUS at 17:03

## 2023-01-27 RX ADMIN — Medication 100 MILLIGRAM(S): at 22:07

## 2023-01-27 RX ADMIN — Medication 2: at 03:17

## 2023-01-27 RX ADMIN — Medication 100 MILLIGRAM(S): at 15:02

## 2023-01-27 RX ADMIN — Medication 105 MILLIGRAM(S): at 13:06

## 2023-01-27 RX ADMIN — Medication 15 MILLIGRAM(S): at 23:37

## 2023-01-27 RX ADMIN — Medication 40 MILLIGRAM(S): at 10:33

## 2023-01-27 RX ADMIN — HYDROMORPHONE HYDROCHLORIDE 1 MILLIGRAM(S): 2 INJECTION INTRAMUSCULAR; INTRAVENOUS; SUBCUTANEOUS at 17:33

## 2023-01-27 RX ADMIN — NYSTATIN CREAM 1 APPLICATION(S): 100000 CREAM TOPICAL at 22:08

## 2023-01-27 RX ADMIN — NYSTATIN CREAM 1 APPLICATION(S): 100000 CREAM TOPICAL at 10:34

## 2023-01-27 RX ADMIN — CEFEPIME 100 MILLIGRAM(S): 1 INJECTION, POWDER, FOR SOLUTION INTRAMUSCULAR; INTRAVENOUS at 22:07

## 2023-01-27 RX ADMIN — Medication 1000 MILLIGRAM(S): at 06:20

## 2023-01-27 RX ADMIN — Medication 105 MILLIGRAM(S): at 18:36

## 2023-01-27 RX ADMIN — Medication 8: at 13:06

## 2023-01-27 RX ADMIN — Medication 15 MILLIGRAM(S): at 23:07

## 2023-01-27 RX ADMIN — PANTOPRAZOLE SODIUM 40 MILLIGRAM(S): 20 TABLET, DELAYED RELEASE ORAL at 10:33

## 2023-01-27 RX ADMIN — HYDROMORPHONE HYDROCHLORIDE 1 MILLIGRAM(S): 2 INJECTION INTRAMUSCULAR; INTRAVENOUS; SUBCUTANEOUS at 11:04

## 2023-01-27 NOTE — SWALLOW BEDSIDE ASSESSMENT ADULT - COMMENTS
Pt was admitted to  from [facility] with abdominal discomfort. Abdominal imaging initially notable for a paucity of gas which was in approximation of a hernia. A bowel obstruction was being considered at the time but pt has since passed a bowel movement. Pt also with ALVERTO and low albumin. Pt was initially seen for speech pathology testing on 1/24/23 at which time acute speech path f/u was not felt to be indicated. A speech pathology RECONSULT was requested to today.  The pt has a selected underlying prior medical history which is remarkable for depression, GERD, COPD, HTN, HLD, DM type 2, polyneuropathy, atrial fibrillation, CHF, Vitamin D deficiency, obesity, OA, anemia, past C-Diff, prior PE and previous SBO/perforated diverticulitis status post colostomy with reversal. Additionally, the pt previously sustained a stroke with resultant chronic left sided weakness as well as chronic Oropharyngeal Dysphagia warranting diet texture modification [facility]. Pt's liquids were thickened to nectar consistency at [facility]. See below for additional prior medical information.

## 2023-01-27 NOTE — CONSULT NOTE ADULT - ASSESSMENT
Patient admitted with abdominal pain, nausea and vomitting , dehydration  septic shock, likely related to     PROBLEMS:    UTI klebsiella pneumoniae and aspiration PNA /SBO- ventral hernia  New R lung nodules - cannot ruleout aspiration PNA   Acute metabolic encephalopathy  Sepsis   Hx copd without exacerbation  Anasarca/acute on  chronic diastolic heart failure       Plan:    Switched po meds to IV where possible   IV lasix 40 daily  IV cefepime /flagyl -for  sepsis ( SBO/UTI)  Blood cx , urine cx   Monitor I/O has rizzo  ABG HYPERVENTILATING  Monitor Cr  DVT PROPHYLASIX

## 2023-01-27 NOTE — CONSULT NOTE ADULT - ASSESSMENT
71 y/o female with h/o old CVA with left sided weakness, atrial fibrillation, emphysema, COPD, HTN, prior SBO and resection was admitted on 1/20 from [facility] for RLQ pain. She was noted with one episode of emesis and diffuse abdominal pain associated with fever. On 1/27 the patient was noted febrile to 102.8F, more lethargic, did not open eyes, did not follow commands. She received cefepime and metronidazole.     1. Febrile syndrome. UTI with KLPN. Right lower lobes nodules Probable pneumonia. SBO ?cause ?related to ventral hernia. Allergy to cipro. Encephalopathy.   -leukocytosis  -obtain BC x 2  -urine c/s noted  -agree with cefepime 2 gm IV q12h and metronidazole 500 mg IV q8h  -reason for abx use and side effects reviewed with patient; monitor BMP   -surgical evaluation appreciated  -conservative care for now  -aspiration precautions  -old chart reviewed to assess prior cultures  -monitor temps  -f/u CBC  -supportive care  2. Other issues:   -care per medicine    d/w Dr. Navarrete

## 2023-01-27 NOTE — SWALLOW BEDSIDE ASSESSMENT ADULT - SLP GENERAL OBSERVATIONS
On encounter, obesity was evident. A mild left lip lag was apparent which is chronic. Pt was arousable but fatigued. Her affect was flat. Pt was interactive but communicatively passive/hypoactive and needed cues to sustain prolonged joint attention. When pt was arousable, she was able to verbalize during communicative probes. At these times, pt's speech output was negatively impacted by dry oral mucosa, many missing teeth and variable fatigue which are issues that are not amenable to traditional ST. Additionally, her verbalizations were linguistically intact and contextually appropriate, but tended to be brief. Pt was able to verbalize some needs on a concrete level when in an alert state which was not always the case. Communicative integrity likely at or close to pre-hospitalization state. On encounter, obesity was evident. A mild left lip lag was apparent which is chronic. Congestion noted at subpharyngeal level in setting of CHF/fluid over load. Pt was arousable but fatigued. Her affect was flat. Pt was interactive but communicatively passive/hypoactive and needed cues to sustain prolonged joint attention. When pt was arousable, she was able to verbalize during communicative probes. At these times, pt's speech output was negatively impacted by dry oral mucosa, many missing teeth and variable fatigue which are issues that are not amenable to traditional ST. Additionally, her verbalizations were linguistically intact and contextually appropriate, but tended to be brief. Pt was able to verbalize some needs on a concrete level when in an alert state which was not always the case. Communicative integrity likely at or close to pre-hospitalization state.

## 2023-01-27 NOTE — PROGRESS NOTE ADULT - SUBJECTIVE AND OBJECTIVE BOX
Pt was tachycardic, febrile and appeared uncomfortable. CT scan from this morning still shows SBO. 18 Fr NGT reinserted. Over 1,100 cc dark gastric fluid drained along with large amount of air. Abdomen now softer. Would continue NGT to suction. Continue IV abx. IVF at 75 cc/hr.

## 2023-01-27 NOTE — CONSULT NOTE ADULT - ASSESSMENT
patient currently in with SBO, currently in sinus rhythm, maintained on amiodarone. patient currently in with SBO, currently in sinus rhythm, maintained on amiodarone.  1-afib-in sinus rhythm, on AC and on amiodarone-get repeat EKG  2-CHF-patient fluid overloaded, ECHO with normal LV function, concern that patient may have fluid retention from other causes  3-CAD-no evidence of CAD patient currently in with SBO, currently in sinus rhythm, maintained on amiodarone.  1-afib-in sinus rhythm, on AC and on amiodarone-get repeat EKG  2-CHF-patient fluid overloaded, ECHO with normal LV function but patient with elevated BNP and anasarca-may indicate some level of diastolic CHF (HFpEF).   Would start lasix and consider farxiga (difficult to give this medication with patient who gets lots of UTIs as this will increase that likelyhoo).     3-CAD-no evidence of CAD

## 2023-01-27 NOTE — PROGRESS NOTE ADULT - ASSESSMENT
Patient admitted with abdominal pain, nausea and vomitting , dehydration  septic shock, likely related to   UTI klebsiella pneumoniae and aspiration PNA /SBO- ventral hernia  Acute metabolic encephalopathy  Sepsis   hx copd without exacerbation  anasarca/acute on  chronic diastolic heart failure       Plan:    -SBO- I spoke with Dr castro - avoid corepack for now as still with SBO,s  switched po meds to IV where possible   IV lasix 40 daily  startcefepime /flagyl -for  sepsis ( SBO/UTI)  blood cx , urine cx   monitor I/O has rizzo  ABG compensated hypercarbia  hx afib, now sinustachycardia, echo ef 55% INR 2.1 hold coumadin as NPO .  BB changed to IV, cardizem, losartan and amiodarone on hold   switch to loevnox SC therpeutic dose once INR<2  monitor Cr  I dw with intensivits Yusuf BARROS - no indication for upgrading to step down at this time , unless BP drops       GOC: DNR  I spoke with son hcp  Dr Castro and Dr Jamison Patient admitted with abdominal pain, nausea and vomitting , dehydration  septic shock, likely related to   UTI klebsiella pneumoniae and aspiration PNA /SBO- ventral hernia  new R lung nodules - cannot ruleout aspiration PNA   Acute metabolic encephalopathy  Sepsis   hx copd without exacerbation  anasarca/acute on  chronic diastolic heart failure       Plan:    -SBO- I spoke with Dr castro - avoid corepack for now as still with SBO,s  switched po meds to IV where possible   IV lasix 40 daily  startcefepime /flagyl -for  sepsis ( SBO/UTI)  blood cx , urine cx   monitor I/O has rizzo  ABG compensated hypercarbia  hx afib, now sinustachycardia, echo ef 55% INR 2.1 hold coumadin as NPO .  BB changed to IV, cardizem, losartan and amiodarone on hold   switch to loevnox SC therpeutic dose once INR<2  monitor Cr  I dw with intensivits Yusuf BARROS - no indication for upgrading to step down at this time , unless BP drops       GOC: DNR  I spoke with son hcp  Dr Castro and Dr Jamison

## 2023-01-27 NOTE — CONSULT NOTE ADULT - SUBJECTIVE AND OBJECTIVE BOX
CHIEF COMPLAINT: Patient is a 72y old  Female who presents with a chief complaint of bowel obstruction/ischemic mesentery (2023 21:15)      HPI:   71 y/o female with a PMHx of CVA with left sided weakness, atrial fibrillation, emphysema, COPD, HTN - wears 2L NC at baseline BIBA, hx SBO and resection per EMS presents to the ED from [facility] for RLQ pain and r/o SBO. x1 episode of emesis, + profound diffuse abdominal pain, febrile   (2023 21:43)    -patient again in with SBO, last seen -patient in with SBO, not taking PO meds at this time and history of afib/CVA.   Last seen in office by me -for medical management of AFib while in for SBO.   Was changed to amiodarone at that time and has been maintained on 200mg since then.  Admitted for SBO and in sinus rhythm at this time.   In addition, patient with rSV infection.  Currently well AC with sinus rhythm on telemetry and taking amiodarone.   BP running high and taking multiple other BP medications.   Last ECHO 1 year ago with normal LV function.   EKG with t-wave  inversions in inferior leads.  Had nausea and vomiting last night, may need medications IV.  Patient currently in sinus rhythm while being treated for SBO    PMHx: PAST MEDICAL & SURGICAL HISTORY:  Hypertension      Hypercholesteremia      COPD (chronic obstructive pulmonary disease)      C. difficile diarrhea        Diverticulitis of intestine with perforation and abscess without bleeding, unspecified part of intestinal tract      PE (pulmonary thromboembolism)  2016      Palpitations      Obesity      Osteoarthritis      Anemia      Colostomy in place      History of atrial fibrillation      HTN (hypertension)      Diabetes mellitus      Cerebrovascular accident (CVA)      DVT of lower limb, acute      CVA (cerebral vascular accident)      COPD (chronic obstructive pulmonary disease)      Neuropathy      Diverticulitis      History of appendectomy      H/O:   x2      H/O ovarian cystectomy  b/l      Status post total replacement of both hips      H/O hemicolectomy  2016      History of colostomy reversal      History of colostomy reversal      S/P colostomy      S/P       S/P hip replacement            Soc Hx:       Allergies: Allergies    Cipro (Rash)  Spiriva (Rash)    Intolerances          REVIEW OF SYSTEMS:    CONSTITUTIONAL: No weakness, fevers or chills  EYES/ENT: No visual changes;  No vertigo or throat pain   NECK: No pain or stiffness  RESPIRATORY: No cough, wheezing, hemoptysis; No shortness of breath  CARDIOVASCULAR: No chest pain or palpitations  GASTROINTESTINAL: No abdominal or epigastric pain. No nausea, vomiting, or hematemesis; No diarrhea or constipation. No melena or hematochezia.  GENITOURINARY: No dysuria, frequency or hematuria  NEUROLOGICAL: No numbness or weakness  SKIN: No itching, burning, rashes, or lesions   All other review of systems is negative unless indicated above    Vital Signs Last 24 Hrs  T(C): 39.3 (2023 05:45), Max: 39.3 (2023 05:45)  T(F): 102.8 (2023 05:45), Max: 102.8 (2023 05:45)  HR: 121 (2023 05:45) (81 - 121)  BP: 136/85 (2023 05:45) (136/85 - 149/76)  BP(mean): --  RR: 20 (2023 05:45) (19 - 20)  SpO2: 97% (2023 05:45) (96% - 98%)    Parameters below as of 2023 05:45  Patient On (Oxygen Delivery Method): nasal cannula  O2 Flow (L/min): 2      I&O's Summary    2023 07:01  -  2023 07:00  --------------------------------------------------------  IN: 0 mL / OUT: 1000 mL / NET: -1000 mL        CAPILLARY BLOOD GLUCOSE      POCT Blood Glucose.: 227 mg/dL (2023 05:45)  POCT Blood Glucose.: 197 mg/dL (2023 03:15)  POCT Blood Glucose.: 185 mg/dL (2023 18:16)  POCT Blood Glucose.: 203 mg/dL (2023 18:12)  POCT Blood Glucose.: 193 mg/dL (2023 11:51)      PHYSICAL EXAM:   Constitutional: NAD, awake and alert, well-developed  HEENT: PERR, EOMI, Normal Hearing, MMM  Neck: Soft and supple, No LAD, No JVD  Respiratory: Breath sounds are clear bilaterally, No wheezing, rales or rhonchi  Cardiovascular: S1 and S2, regular rate and rhythm,  Murmurs, gallops or rubs  Extremities: No peripheral edema  Vascular: 2+ peripheral pulses      MEDICATIONS:  MEDICATIONS  (STANDING):  aMIOdarone    Tablet 200 milliGRAM(s) Oral daily  atorvastatin 20 milliGRAM(s) Oral at bedtime  chlorhexidine 4% Liquid 1 Application(s) Topical <User Schedule>  coronavirus bivalent (EUA) Booster Vaccine (PFIZER) 0.3 milliLiter(s) IntraMuscular once  dextrose 5%. 1000 milliLiter(s) (50 mL/Hr) IV Continuous <Continuous>  dextrose 5%. 1000 milliLiter(s) (100 mL/Hr) IV Continuous <Continuous>  dextrose 50% Injectable 25 Gram(s) IV Push once  dextrose 50% Injectable 12.5 Gram(s) IV Push once  dextrose 50% Injectable 25 Gram(s) IV Push once  diltiazem    milliGRAM(s) Oral daily  DULoxetine 60 milliGRAM(s) Oral daily  enoxaparin Injectable 40 milliGRAM(s) SubCutaneous every 24 hours  glucagon  Injectable 1 milliGRAM(s) IntraMuscular once  insulin lispro (ADMELOG) corrective regimen sliding scale   SubCutaneous every 6 hours  levothyroxine 88 MICROGram(s) Oral daily  losartan 25 milliGRAM(s) Oral daily  montelukast 10 milliGRAM(s) Oral daily  nystatin Cream 1 Application(s) Topical two times a day  nystatin Powder 1 Application(s) Topical every 12 hours  pantoprazole  Injectable 40 milliGRAM(s) IV Push daily  piperacillin/tazobactam IVPB.. 3.375 Gram(s) IV Intermittent every 8 hours      LABS: All Labs Reviewed:                        10.9   19.72 )-----------( 510      ( 2023 06:16 )             36.7     -    142  |  111<H>  |  10  ----------------------------<  233<H>  3.8   |  26  |  0.78    Ca    8.0<L>      2023 06:16  Phos  2.7       Mg     1.8           PT/INR - ( 2023 06:16 )   PT: 25.3 sec;   INR: 2.16 ratio         PTT - ( 2023 06:28 )  PTT:82.1 sec      Serum Pro-Brain Natriuretic Peptide: 3035 pg/mL ( @ 06:16)    Blood Culture:   lipid profile       RADIOLOGY:    EKG:  sinus tachycardia    Telemetry:    ECHO:   There is calcification of anterior mitral valve leaflet. The leaflet   opening is normal.   Mild mitral annular calcification is present.   Mild (1+) mitral regurgitation is present.   EA reversal of the mitral inflow consistent with reduced compliance of   the   left ventricle.   The aortic valve is not well visualized, appears mildly calcified. Valve   opening seems to be normal.   Normal appearing tricuspid valve structure and function.   Trace tricuspid valve regurgitation is present.   Pulmonic valve not well seen.   The left atrium is mildly dilated.   Estimated left ventricular ejection fraction is 55 %.   The left ventricle is normal in size and wall thickness as seen in   limited   views.  Mild septal wall motion abnormalities are noted with preserved left   ventricle function.   Normal appearing right atrium.   Normal appearing right ventricle structure and function.       CHIEF COMPLAINT: Patient is a 72y old  Female who presents with a chief complaint of bowel obstruction/ischemic mesentery (2023 21:15)      HPI:   73 y/o female with a PMHx of CVA with left sided weakness, atrial fibrillation, emphysema, COPD, HTN - wears 2L NC at baseline BIBA, hx SBO and resection per EMS presents to the ED from [facility] for RLQ pain and r/o SBO. x1 episode of emesis, + profound diffuse abdominal pain, febrile   (2023 21:43)    -patient again in with SBO with UTI, somewhat lethargic and delerious with significant edema/anasarca with IJ line-called for evaluation of patient with history of afib, last seen in hospital 22-patient in with SBO, not taking PO meds at that time and history of afib/CVA.   Also seen by me -for medical management of AFib while in for SBO.   Was changed to amiodarone at that time and has been maintained on 200mg since then.  Admitted for SBO and in sinus rhythm at this time.   Currently well AC with sinus tachycardia and taking amiodarone.   BP running high and taking multiple other BP medications.    EKG with t-wave  inversions in inferior leads.  Had nausea and vomiting last night, may need medications IV.  Patient currently in sinus while being treated for SBO.  ECHO done 2 days ago with normal LV function.  EKG done 5 days ago in sinus tach.   D/w nurse today, patient on amiodarone    PMHx: PAST MEDICAL & SURGICAL HISTORY:  Hypertension      Hypercholesteremia      COPD (chronic obstructive pulmonary disease)      C. difficile diarrhea        Diverticulitis of intestine with perforation and abscess without bleeding, unspecified part of intestinal tract      PE (pulmonary thromboembolism)  2016      Palpitations      Obesity      Osteoarthritis      Anemia      Colostomy in place      History of atrial fibrillation      HTN (hypertension)      Diabetes mellitus      Cerebrovascular accident (CVA)      DVT of lower limb, acute      CVA (cerebral vascular accident)      COPD (chronic obstructive pulmonary disease)      Neuropathy      Diverticulitis      History of appendectomy      H/O:   x2      H/O ovarian cystectomy  b/l      Status post total replacement of both hips      H/O hemicolectomy  2016      History of colostomy reversal      History of colostomy reversal      S/P colostomy      S/P       S/P hip replacement            Soc Hx:       Allergies: Allergies    Cipro (Rash)  Spiriva (Rash)    Intolerances          REVIEW OF SYSTEMS:  patient lethargic, barely arousable and not verbal responding to questions    Vital Signs Last 24 Hrs  T(C): 39.3 (2023 05:45), Max: 39.3 (2023 05:45)  T(F): 102.8 (2023 05:45), Max: 102.8 (2023 05:45)  HR: 121 (2023 05:45) (81 - 121)  BP: 136/85 (2023 05:45) (136/85 - 149/76)  BP(mean): --  RR: 20 (2023 05:45) (19 - 20)  SpO2: 97% (2023 05:45) (96% - 98%)    Parameters below as of 2023 05:45  Patient On (Oxygen Delivery Method): nasal cannula  O2 Flow (L/min): 2      I&O's Summary    2023 07:01  -  2023 07:00  --------------------------------------------------------  IN: 0 mL / OUT: 1000 mL / NET: -1000 mL        CAPILLARY BLOOD GLUCOSE      POCT Blood Glucose.: 227 mg/dL (2023 05:45)  POCT Blood Glucose.: 197 mg/dL (2023 03:15)  POCT Blood Glucose.: 185 mg/dL (2023 18:16)  POCT Blood Glucose.: 203 mg/dL (2023 18:12)  POCT Blood Glucose.: 193 mg/dL (2023 11:51)      PHYSICAL EXAM:   Constitutional: NAD, awake and alert, well-developed  HEENT: PERR, EOMI, Normal Hearing, MMM  Neck: JVD  Respiratory: Breath sounds  rales or rhonchi  Cardiovascular: S1 and S2, regular rate and rhythm,  Murmurs, gallops   Extremities:  peripheral edema, anasarca        MEDICATIONS:  MEDICATIONS  (STANDING):  aMIOdarone    Tablet 200 milliGRAM(s) Oral daily  atorvastatin 20 milliGRAM(s) Oral at bedtime  chlorhexidine 4% Liquid 1 Application(s) Topical <User Schedule>  coronavirus bivalent (EUA) Booster Vaccine (PFIZER) 0.3 milliLiter(s) IntraMuscular once  dextrose 5%. 1000 milliLiter(s) (50 mL/Hr) IV Continuous <Continuous>  dextrose 5%. 1000 milliLiter(s) (100 mL/Hr) IV Continuous <Continuous>  dextrose 50% Injectable 25 Gram(s) IV Push once  dextrose 50% Injectable 12.5 Gram(s) IV Push once  dextrose 50% Injectable 25 Gram(s) IV Push once  diltiazem    milliGRAM(s) Oral daily  DULoxetine 60 milliGRAM(s) Oral daily  enoxaparin Injectable 40 milliGRAM(s) SubCutaneous every 24 hours  glucagon  Injectable 1 milliGRAM(s) IntraMuscular once  insulin lispro (ADMELOG) corrective regimen sliding scale   SubCutaneous every 6 hours  levothyroxine 88 MICROGram(s) Oral daily  losartan 25 milliGRAM(s) Oral daily  montelukast 10 milliGRAM(s) Oral daily  nystatin Cream 1 Application(s) Topical two times a day  nystatin Powder 1 Application(s) Topical every 12 hours  pantoprazole  Injectable 40 milliGRAM(s) IV Push daily  piperacillin/tazobactam IVPB.. 3.375 Gram(s) IV Intermittent every 8 hours      LABS: All Labs Reviewed:                        10.9   .72 )-----------( 510      ( 2023 06:16 )             36.7         142  |  111<H>  |  10  ----------------------------<  233<H>  3.8   |  26  |  0.78    Ca    8.0<L>      2023 06:16  Phos  2.7       Mg     1.8           PT/INR - ( 2023 06:16 )   PT: 25.3 sec;   INR: 2.16 ratio         PTT - ( 2023 06:28 )  PTT:82.1 sec      Serum Pro-Brain Natriuretic Peptide: 3035 pg/mL ( @ 06:16)    Blood Culture:   lipid profile       RADIOLOGY:  INTERPRETATION:  INDICATIONS: 72-year-old female with NG tube placement.    Prior examination for comparison: 2022    Technique: AP view of chest    Findings:    Interval placement of nasogastric tube. The tip is projecting over the   stomach. The lungs are clear. There are no pleural effusions. The   cardiomediastinal silhouette is normal. Bones are grossly normal.    IMPRESSION: NG tube in good position. No evidence of acute   cardiopulmonary disease.    --- End of Report ---    EKG:  sinus tachycardia    Telemetry:    ECHO:   There is calcification of anterior mitral valve leaflet. The leaflet   opening is normal.   Mild mitral annular calcification is present.   Mild (1+) mitral regurgitation is present.   EA reversal of the mitral inflow consistent with reduced compliance of   the   left ventricle.   The aortic valve is not well visualized, appears mildly calcified. Valve   opening seems to be normal.   Normal appearing tricuspid valve structure and function.   Trace tricuspid valve regurgitation is present.   Pulmonic valve not well seen.   The left atrium is mildly dilated.   Estimated left ventricular ejection fraction is 55 %.   The left ventricle is normal in size and wall thickness as seen in   limited   views.  Mild septal wall motion abnormalities are noted with preserved left   ventricle function.   Normal appearing right atrium.   Normal appearing right ventricle structure and function.

## 2023-01-27 NOTE — SWALLOW BEDSIDE ASSESSMENT ADULT - SWALLOW EVAL: DIAGNOSIS
1) On encounter, obesity was evident. A mild left lip lag was apparent which is chronic. Pt was arousable but fatigued. Her affect was flat. Pt was interactive but communicatively passive/hypoactive and needed cues to sustain prolonged joint attention. When pt was arousable, she was able to verbalize during communicative probes. At these times, pt's speech output was negatively impacted by dry oral mucosa, many missing teeth and variable fatigue which are issues that are not amenable to traditional ST. Additionally, her verbalizations were linguistically intact and contextually appropriate, but tended to be brief. Pt was able to verbalize some needs on a concrete level when in an alert state which was not always the case. Communicative integrity likely at or close to pre-hospitalization state. 1) On encounter, obesity was evident. A mild left lip lag was apparent which is chronic. Congestion is at subpharyngeal level(pt with CHF/fluid over load). Pt was arousable but fatigued. Her affect was flat. Pt was interactive but communicatively passive/hypoactive and needed cues to sustain prolonged joint attention. When pt was arousable, she was able to verbalize during communicative probes. At these times, pt's speech output was negatively impacted by dry oral mucosa, many missing teeth and variable fatigue which are issues that are not amenable to traditional ST. Additionally, her verbalizations were linguistically intact and contextually appropriate, but tended to be brief. Pt was able to verbalize some needs on a concrete level when in an alert state which was not always the case. Communicative integrity likely at or close to pre-hospitalization state.

## 2023-01-27 NOTE — SWALLOW BEDSIDE ASSESSMENT ADULT - NS SPL SWALLOW CLINIC TRIAL FT
Pt was fatigued/passive but did willingly accept PO. During intakes, pt exhibited Oropharyngeal Dysphagia which subjectively appeared to be a grossly functional condition with a restricted inventory of modified food consistencies when she was alert which was NOT always the case. Overt aspiration signs noted with thin liquids only. Dysphagia in setting of many missing teeth, cardiopulmonary dz and old CVA with residual left debra. Dysphagia is chronic/pre-existing. Note that coarse solids not offered given severity of Dysphagia, considering her many missing teeth and given expressed food preferences.

## 2023-01-27 NOTE — CONSULT NOTE ADULT - SUBJECTIVE AND OBJECTIVE BOX
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 for RLQ pain. She was noted with one episode of emesis and diffuse abdominal pain associated with fever. On  the patient was noted febrile to 102.8F, more lethargic, did not open eyes, did not follow commands. She received cefepime and metronidazole.     PMH: as above  PSH: as above  Meds: per reconciliation sheet, noted below  MEDICATIONS  (STANDING):  aMIOdarone    Tablet 200 milliGRAM(s) Oral daily  atorvastatin 20 milliGRAM(s) Oral at bedtime  buDESOnide    Inhalation Suspension 0.5 milliGRAM(s) Inhalation every 12 hours  cefepime   IVPB 2000 milliGRAM(s) IV Intermittent every 12 hours  chlorhexidine 4% Liquid 1 Application(s) Topical <User Schedule>  coronavirus bivalent (EUA) Booster Vaccine (PFIZER) 0.3 milliLiter(s) IntraMuscular once  dextrose 5%. 1000 milliLiter(s) (100 mL/Hr) IV Continuous <Continuous>  dextrose 5%. 1000 milliLiter(s) (50 mL/Hr) IV Continuous <Continuous>  dextrose 50% Injectable 25 Gram(s) IV Push once  dextrose 50% Injectable 12.5 Gram(s) IV Push once  dextrose 50% Injectable 25 Gram(s) IV Push once  diltiazem    milliGRAM(s) Oral daily  DULoxetine 60 milliGRAM(s) Oral daily  furosemide   Injectable 40 milliGRAM(s) IV Push daily  glucagon  Injectable 1 milliGRAM(s) IntraMuscular once  insulin lispro (ADMELOG) corrective regimen sliding scale   SubCutaneous every 6 hours  levothyroxine Injectable 70 MICROGram(s) IV Push at bedtime  losartan 25 milliGRAM(s) Oral daily  metoprolol tartrate IVPB 2.5 milliGRAM(s) IV Intermittent every 6 hours  metroNIDAZOLE  IVPB 500 milliGRAM(s) IV Intermittent every 8 hours  montelukast 10 milliGRAM(s) Oral daily  nystatin Cream 1 Application(s) Topical two times a day  nystatin Powder 1 Application(s) Topical every 12 hours  pantoprazole  Injectable 40 milliGRAM(s) IV Push daily  pregabalin 100 milliGRAM(s) Oral every 8 hours    MEDICATIONS  (PRN):  albuterol    90 MICROgram(s) HFA Inhaler 2 Puff(s) Inhalation every 6 hours PRN Shortness of Breath and/or Wheezing  dextrose Oral Gel 15 Gram(s) Oral once PRN Blood Glucose LESS THAN 70 milliGRAM(s)/deciliter  HYDROmorphone  Injectable 1 milliGRAM(s) IV Push every 4 hours PRN Moderate Pain (4 - 6)  HYDROmorphone  Injectable 0.5 milliGRAM(s) IV Push every 3 hours PRN Severe Pain (7 - 10)  oxybutynin 5 milliGRAM(s) Oral every 12 hours PRN Bladder spasms  sodium chloride 0.9% lock flush 10 milliLiter(s) IV Push every 1 hour PRN Pre/post blood products, medications, blood draw, and to maintain line patency    Allergies    Cipro (Rash)  Spiriva (Rash)    Intolerances      Social: no smoking, no alcohol, no illegal drugs; no recent travel, no exposure to TB  FAMILY HISTORY:  Family history of COPD (chronic obstructive pulmonary disease)  Family history of chronic kidney disease  Family history of hiatal hernia (Sibling)  no history of premature cardiovascular disease in first degree relatives    ROS: the patient is confused; has abdominal pain  All other systems reviewed and are negative    Vital Signs Last 24 Hrs  T(C): 38.7 (2023 15:24), Max: 39.3 (2023 05:45)  T(F): 101.7 (2023 15:24), Max: 102.8 (2023 05:45)  HR: 114 (2023 15:24) (81 - 121)  BP: 127/65 (2023 15:24) (127/65 - 148/62)  BP(mean): --  RR: 19 (2023 15:24) (19 - 20)  SpO2: 94% (2023 15:24) (94% - 97%)    Parameters below as of 2023 15:24  Patient On (Oxygen Delivery Method): nasal cannula  O2 Flow (L/min): 2    Daily     Daily Weight in k.5 (2023 05:45)    PE:    Constitutional:  No acute distress  HEENT: NC/AT, EOMI, PERRLA, conjunctivae clear; ears and nose atraumatic; pharynx benign  Neck: supple; thyroid not palpable  Back: no tenderness  Respiratory: respiratory effort normal; crackles at bases  Cardiovascular: S1S2 regular, no murmurs  Abdomen: soft, mid abdomen tender, mild distended, positive BS; no liver or spleen organomegaly  Genitourinary: no suprapubic tenderness  Lymphatic: no LN palpable  Musculoskeletal: no muscle tenderness, no joint swelling or tenderness  Extremities: no pedal edema  Neurological/ Psychiatric: confused, judgement and insight impaired; moving all extremities  Skin: no rashes; no palpable lesions    Labs: all available labs reviewed                        10.9   19.72 )-----------( 510      ( 2023 06:16 )             36.7     -    142  |  111<H>  |  10  ----------------------------<  233<H>  3.8   |  26  |  0.78    Ca    8.0<L>      2023 06:16  Phos  2.7       Mg     1.8           Culture - Urine (collected 2023 20:03)  Source: Clean Catch Clean Catch (Midstream)  Final Report (2023 08:05):    >100,000 CFU/ml Klebsiella pneumoniae    Multiple Morphological Strains  Organism: Klebsiella pneumoniae  Klebsiella pneumoniae (2023 08:05)  Organism: Klebsiella pneumoniae (2023 08:05)      -  Amikacin: S <=16      -  Amoxicillin/Clavulanic Acid: S <=8/4      -  Ampicillin: R >16 These ampicillin results predict results for amoxicillin      -  Ampicillin/Sulbactam: S <=4/2 Enterobacter, Klebsiella aerogenes, Citrobacter, and Serratia may develop resistance during prolonged therapy (3-4 days)      -  Aztreonam: S <=4      -  Cefazolin: S <=2 For uncomplicated UTI with K. pneumoniae, E. coli, or P. mirablis: ARABELLA <=16 is sensitive and ARABELLA >=32 is resistant. This also predicts results for oral agents cefaclor, cefdinir, cefpodoxime, cefprozil, cefuroxime axetil, cephalexin and locarbef for uncomplicated UTI. Note that some isolates may be susceptible to these agents while testing resistant to cefazolin.      -  Cefepime: S <=2      -  Cefoxitin: S <=8      -  Ceftriaxone: S <=1 Enterobacter, Klebsiella aerogenes, Citrobacter, and Serratia may develop resistance during prolonged therapy      -  Cefuroxime: S <=4      -  Ciprofloxacin: S <=0.25      -  Ertapenem: S <=0.5      -  Gentamicin: S <=2      -  Imipenem: S <=1      -  Levofloxacin: S <=0.5      -  Meropenem: S <=1      -  Nitrofurantoin: S <=32 Should not be used to treat pyelonephritis      -  Piperacillin/Tazobactam: S <=8      -  Tobramycin: S <=2      -  Trimethoprim/Sulfamethoxazole: S <=0.5/9.5      Method Type: ARABELLA  Organism: Klebsiella pneumoniae (2023 08:05)      -  Amikacin: S <=16      -  Amoxicillin/Clavulanic Acid: S <=8/4 Consider reserving for cystitis when ampicillin/sulbactam is resistant      -  Ampicillin: R >16 These ampicillin results predict results for amoxicillin      -  Ampicillin/Sulbactam: R >16/8 Enterobacter, Klebsiella aerogenes, Citrobacter, and Serratia may develop resistance during prolonged therapy (3-4 days)      -  Aztreonam: S <=4      -  Cefazolin: R >16 For uncomplicated UTI with K. pneumoniae, E. coli, or P. mirablis: ARABELLA <=16 is sensitive and ARABELLA >=32 is resistant. This also predicts results for oral agents cefaclor, cefdinir, cefpodoxime, cefprozil, cefuroxime axetil, cephalexin and locarbef for uncomplicated UTI. Note that some isolates may be susceptible to these agents while testing resistant to cefazolin.      -  Cefepime: S <=2      -  Cefoxitin: S <=8      -  Ceftriaxone: S <=1 Enterobacter, Klebsiella aerogenes, Citrobacter, and Serratia may develop resistance during prolonged therapy      -  Cefuroxime: R >16      -  Ciprofloxacin: S <=0.25      -  Ertapenem: S <=0.5      -  Gentamicin: S <=2      -  Imipenem: S <=1      -  Levofloxacin: S <=0.5      -  Meropenem: S <=1      -  Nitrofurantoin: S <=32 Should not be used to treat pyelonephritis      -  Piperacillin/Tazobactam: R 32      -  Tobramycin: S <=2      -  Trimethoprim/Sulfamethoxazole: S <=0.5/9.5      Method Type: ARABELLA    Culture - Blood (collected 2023 18:02)  Source: .Blood None  Final Report (2023 01:00):    No Growth Final    Culture - Blood (collected 2023 18:01)  Source: .Blood None  Final Report (2023 01:00):    No Growth Final    Radiology: all available radiological tests reviewed    < from: CT Chest No Cont (23 @ 09:53) >  Small bowel obstruction with transition point at the neck of a left lower   quadrant abdominal wall hernia. It is uncertain whether the obstruction   is due to the hernia itself or to adhesions.    Additional massive ventral hernia containing small and large bowel and   epigastric hernia containing stomach.    Right lower lobe nodules new since 2021 and could be   infectious or neoplastic. Follow-up chest CT in 3 months is recommended   to reevaluate.    < end of copied text >      Advanced directives addressed: full resuscitation

## 2023-01-27 NOTE — CONSULT NOTE ADULT - SUBJECTIVE AND OBJECTIVE BOX
HPI:   73 y/o female with a PMHx of CVA with left sided weakness, atrial fibrillation, emphysema, COPD, HTN - wears 2L NC at baseline BIBA, hx SBO and resection per EMS presents to the ED from [facility] for RLQ pain and r/o SBO. x1 episode of emesis, + profound diffuse abdominal pain, febrile   (2023 21:43)      PAST MEDICAL & SURGICAL HISTORY:  Hypertension      Hypercholesteremia      COPD (chronic obstructive pulmonary disease)      C. difficile diarrhea        Diverticulitis of intestine with perforation and abscess without bleeding, unspecified part of intestinal tract      PE (pulmonary thromboembolism)  2016      Palpitations      Obesity      Osteoarthritis      Anemia      Colostomy in place      History of atrial fibrillation      HTN (hypertension)      Diabetes mellitus      Cerebrovascular accident (CVA)      DVT of lower limb, acute      CVA (cerebral vascular accident)      COPD (chronic obstructive pulmonary disease)      Neuropathy      Diverticulitis      History of appendectomy      H/O:   x2      H/O ovarian cystectomy  b/l      Status post total replacement of both hips      H/O hemicolectomy  2016      History of colostomy reversal      History of colostomy reversal      S/P colostomy      S/P       S/P hip replacement          Home Medications:  Albuterol (Eqv-ProAir HFA) 90 mcg/inh inhalation aerosol: 1 puff(s) inhaled every 6 hours, As Needed (2023 16:49)  amiodarone 200 mg oral tablet: 1 tab(s) orally once a day (2023 16:49)  ascorbic acid 500 mg oral tablet: 2 tab(s) orally once a day (2023 16:49)  atorvastatin 10 mg oral tablet: 1 tab(s) orally once a day (at bedtime) (2023 16:49)  benzonatate 100 mg oral capsule: 1 cap(s) orally 3 times a day (2023 21:48)  budesonide 0.5 mg/2 mL inhalation suspension: 2 milliliter(s) inhaled 2 times a day (2023 21:48)  Cepacol Sore Throat 15 mg-3.6 mg mucous membrane lozenge: 1 lozenge mucous membrane every 4 hours, As Needed (2023 21:48)  cholecalciferol 1250 mcg (50,000 intl units) oral capsule: 1 cap(s) orally every 4 weeks on Wednesday  (2023 16:49)  Coumadin 2.5 mg oral tablet: 1 tab(s) orally once a day (at bedtime)  ***ON HOLD from  to *** (2023 21:48)  Cranberry oral tablet: 1 tab(s) orally 2 times a day (2023 16:49)  cyanocobalamin 1000 mcg oral tablet: 1 tab(s) orally once a day (2023 16:49)  dilTIAZem 180 mg/24 hours oral capsule, extended release: 1 cap(s) orally once a day (2023 16:49)  DULoxetine 60 mg oral delayed release capsule: 1 cap(s) orally once a day (2023 16:49)  estradiol 0.1 mg/g vaginal cream: 0.5 gram(s) vaginal 2 times a week on Monday and Thursday (2023 21:48)  fexofenadine 180 mg oral tablet: 1 tab(s) orally once a day (2023 21:48)  fluticasone 50 mcg/inh nasal spray: 1 spray(s) nasal 2 times a day (2023 16:49)  furosemide 20 mg oral tablet: 1 tab(s) orally once a day (2023 16:49)  glipiZIDE 10 mg oral tablet, extended release: 2 tab(s) orally once a day (2023 16:49)  GlycoLax oral powder for reconstitution: 17 gram(s) orally 2 times a day (2023 16:49)  guaiFENesin 100 mg/5 mL oral liquid: 20 milliliter(s) orally every 6 hours (2023 21:48)  HumaLOG KwikPen 100 units/mL injectable solution: 10 unit(s) injectable 3 times a day (before meals) (2023 21:48)  ipratropium-albuterol 20 mcg-100 mcg/inh inhalation aerosol: 1 puff(s) inhaled 4 times a day (2023 16:49)  levothyroxine 88 mcg (0.088 mg) oral tablet: 1 tab(s) orally once a day (at bedtime) (2023 16:49)  losartan 25 mg oral tablet: 1 tab(s) orally once a day (2023 16:49)  Macrobid 100 mg oral capsule: 1 cap(s) orally 2 times a day for 5 days  ***course complete*** (2023 21:48)  methocarbamol 750 mg oral tablet: 1 tab(s) orally every 8 hours, As Needed (2023 21:48)  metoprolol tartrate 25 mg oral tablet: 1 tab(s) orally 2 times a day (2023 16:49)  Milk of Magnesia 8% oral suspension: 30 milliliter(s) orally once a day, As Needed (2023 16:49)  montelukast 10 mg oral tablet: 1 tab(s) orally once a day (at bedtime) (2023 21:48)  ondansetron 4 mg oral tablet: 1 tab(s) orally every 4 hours, As Needed (2023 21:48)  oxybutynin 5 mg oral tablet: 1 tab(s) orally 2 times a day (2023 16:49)  oxyCODONE 5 mg oral tablet: 1 tab(s) orally every 4 hours, As Needed (2023 16:49)  phytonadione 5 mg oral tablet: 0.5 tab(s) orally once  ***given at [facility] once on 23*** (2023 21:48)  pregabalin 100 mg oral capsule: 1 cap(s) orally 3 times a day (2023 16:49)  sennosides-docusate 8.6 mg-50 mg oral tablet: 2 tab(s) orally once a day (at bedtime) (2023 16:49)  Tradjenta 5 mg oral tablet: 1 tab(s) orally once a day (2023 16:49)  Tylenol 500 mg oral tablet: 2 tab(s) orally every 8 hours (2023 16:49)      MEDICATIONS  (STANDING):  aMIOdarone    Tablet 200 milliGRAM(s) Oral daily  atorvastatin 20 milliGRAM(s) Oral at bedtime  chlorhexidine 4% Liquid 1 Application(s) Topical <User Schedule>  coronavirus bivalent (EUA) Booster Vaccine (PFIZER) 0.3 milliLiter(s) IntraMuscular once  dextrose 5%. 1000 milliLiter(s) (50 mL/Hr) IV Continuous <Continuous>  dextrose 5%. 1000 milliLiter(s) (100 mL/Hr) IV Continuous <Continuous>  dextrose 50% Injectable 25 Gram(s) IV Push once  dextrose 50% Injectable 12.5 Gram(s) IV Push once  dextrose 50% Injectable 25 Gram(s) IV Push once  diltiazem    milliGRAM(s) Oral daily  DULoxetine 60 milliGRAM(s) Oral daily  enoxaparin Injectable 40 milliGRAM(s) SubCutaneous every 24 hours  furosemide   Injectable 40 milliGRAM(s) IV Push daily  glucagon  Injectable 1 milliGRAM(s) IntraMuscular once  insulin lispro (ADMELOG) corrective regimen sliding scale   SubCutaneous every 6 hours  levothyroxine 88 MICROGram(s) Oral daily  losartan 25 milliGRAM(s) Oral daily  montelukast 10 milliGRAM(s) Oral daily  nystatin Cream 1 Application(s) Topical two times a day  nystatin Powder 1 Application(s) Topical every 12 hours  pantoprazole  Injectable 40 milliGRAM(s) IV Push daily  piperacillin/tazobactam IVPB.. 3.375 Gram(s) IV Intermittent every 8 hours    MEDICATIONS  (PRN):  dextrose Oral Gel 15 Gram(s) Oral once PRN Blood Glucose LESS THAN 70 milliGRAM(s)/deciliter  HYDROmorphone  Injectable 1 milliGRAM(s) IV Push every 4 hours PRN Moderate Pain (4 - 6)  HYDROmorphone  Injectable 0.5 milliGRAM(s) IV Push every 3 hours PRN Severe Pain (7 - 10)  sodium chloride 0.9% lock flush 10 milliLiter(s) IV Push every 1 hour PRN Pre/post blood products, medications, blood draw, and to maintain line patency      Allergies    Cipro (Rash)  Spiriva (Rash)    Intolerances        SOCIAL HISTORY: Denies tobacco, etoh abuse or illicit drug use    FAMILY HISTORY:  Family history of COPD (chronic obstructive pulmonary disease)    Family history of chronic kidney disease    Family history of hiatal hernia (Sibling)        Vital Signs Last 24 Hrs  T(C): 39.3 (2023 05:45), Max: 39.3 (2023 05:45)  T(F): 102.8 (2023 05:45), Max: 102.8 (2023 05:45)  HR: 121 (2023 05:45) (81 - 121)  BP: 136/85 (2023 05:45) (136/85 - 149/76)  BP(mean): --  RR: 20 (2023 05:45) (19 - 20)  SpO2: 97% (2023 05:45) (96% - 98%)    Parameters below as of 2023 05:45  Patient On (Oxygen Delivery Method): nasal cannula  O2 Flow (L/min): 2          REVIEW OF SYSTEMS:    CONSTITUTIONAL:  As per HPI.  HEENT:  Eyes:  No diplopia or blurred vision. ENT:  No earache, sore throat or runny nose.  CARDIOVASCULAR:  No pressure, squeezing, tightness, heaviness or aching about the chest, neck, axilla or epigastrium.  RESPIRATORY:  No cough, shortness of breath, PND or orthopnea.  GASTROINTESTINAL:  No nausea, vomiting or diarrhea.  GENITOURINARY:  No dysuria, frequency or urgency.  MUSCULOSKELETAL:  As per HPI.  SKIN:  No change in skin, hair or nails.  NEUROLOGIC:  No paresthesias, fasciculations, seizures or weakness.  PSYCHIATRIC:  No disorder of thought or mood.  ENDOCRINE:  No heat or cold intolerance, polyuria or polydipsia.  HEMATOLOGICAL:  No easy bruising or bleedings:  .     PHYSICAL EXAMINATION:    GENERAL APPEARANCE:  Pt. is not currently dyspneic, in no distress. Pt. is alert, oriented, and pleasant.  HEENT:  Pupils are normal and react normally. No icterus. Mucous membranes well colored.  NECK:  Supple. No lymphadenopathy. Jugular venous pressure not elevated. Carotids equal.   HEART:   The cardiac impulse has a normal quality. Regular. Normal S1 and S2. There are no murmurs, rubs or gallops noted  CHEST:  Chest is clear to auscultation. Normal respiratory effort.  ABDOMEN:  Soft and nontender.   EXTREMITIES:  There is no cyanosis, clubbing or edema.   SKIN:  No rash or significant lesions are noted.    LABS:                        10.9   19.72 )-----------( 510      ( 2023 06:16 )             36.7         142  |  111<H>  |  10  ----------------------------<  233<H>  3.8   |  26  |  0.78    Ca    8.0<L>      2023 06:16  Phos  2.7       Mg     1.8             PT/INR - ( 2023 06:16 )   PT: 25.3 sec;   INR: 2.16 ratio         PTT - ( 2023 06:28 )  PTT:82.1 sec              RADIOLOGY & ADDITIONAL STUDIES:        HPI:    71 y/o female with a PMHx of CVA with left sided weakness, atrial fibrillation, emphysema, COPD, HTN - wears 2L NC at baseline BIBA, hx SBO and resection per EMS admitted from [facility] for RLQ pain and r/o SBO. x1 episode of emesis, + profound diffuse abdominal pain, febrile, pat In ED CT abdomen Pelvis showed,  Large ventral hernia with SBO, had ALVERTO creatinine to 1.9 improved with hydration, had cvp placed, conservative rx,  more lethargic, makes sounds to tactile stimuli, does not open eyes, does not follow commands , , gurgling sounds , EKG sinustachycardia, BP stable, pat seen for pulmonary eval for sob, lying in bed, loose cough, on iv zosyn for sepsis & UTI, seen by cardiology, fluids overload.      PAST MEDICAL & SURGICAL HISTORY:  Hypertension      Hypercholesteremia      COPD (chronic obstructive pulmonary disease)      C. difficile diarrhea        Diverticulitis of intestine with perforation and abscess without bleeding, unspecified part of intestinal tract      PE (pulmonary thromboembolism)  2016      Palpitations      Obesity      Osteoarthritis      Anemia      Colostomy in place      History of atrial fibrillation      HTN (hypertension)      Diabetes mellitus      Cerebrovascular accident (CVA)      DVT of lower limb, acute      CVA (cerebral vascular accident)      COPD (chronic obstructive pulmonary disease)      Neuropathy      Diverticulitis      History of appendectomy      H/O:   x2      H/O ovarian cystectomy  b/l      Status post total replacement of both hips      H/O hemicolectomy  2016      History of colostomy reversal      History of colostomy reversal      S/P colostomy      S/P       S/P hip replacement          Home Medications:  Albuterol (Eqv-ProAir HFA) 90 mcg/inh inhalation aerosol: 1 puff(s) inhaled every 6 hours, As Needed (2023 16:49)  amiodarone 200 mg oral tablet: 1 tab(s) orally once a day (2023 16:49)  ascorbic acid 500 mg oral tablet: 2 tab(s) orally once a day (2023 16:49)  atorvastatin 10 mg oral tablet: 1 tab(s) orally once a day (at bedtime) (2023 16:49)  benzonatate 100 mg oral capsule: 1 cap(s) orally 3 times a day (2023 21:48)  budesonide 0.5 mg/2 mL inhalation suspension: 2 milliliter(s) inhaled 2 times a day (2023 21:48)  Cepacol Sore Throat 15 mg-3.6 mg mucous membrane lozenge: 1 lozenge mucous membrane every 4 hours, As Needed (2023 21:48)  cholecalciferol 1250 mcg (50,000 intl units) oral capsule: 1 cap(s) orally every 4 weeks on Wednesday  (2023 16:49)  Coumadin 2.5 mg oral tablet: 1 tab(s) orally once a day (at bedtime)  ***ON HOLD from  to *** (2023 21:48)  Cranberry oral tablet: 1 tab(s) orally 2 times a day (2023 16:49)  cyanocobalamin 1000 mcg oral tablet: 1 tab(s) orally once a day (2023 16:49)  dilTIAZem 180 mg/24 hours oral capsule, extended release: 1 cap(s) orally once a day (2023 16:49)  DULoxetine 60 mg oral delayed release capsule: 1 cap(s) orally once a day (2023 16:49)  estradiol 0.1 mg/g vaginal cream: 0.5 gram(s) vaginal 2 times a week on Monday and Thursday (2023 21:48)  fexofenadine 180 mg oral tablet: 1 tab(s) orally once a day (2023 21:48)  fluticasone 50 mcg/inh nasal spray: 1 spray(s) nasal 2 times a day (2023 16:49)  furosemide 20 mg oral tablet: 1 tab(s) orally once a day (2023 16:49)  glipiZIDE 10 mg oral tablet, extended release: 2 tab(s) orally once a day (2023 16:49)  GlycoLax oral powder for reconstitution: 17 gram(s) orally 2 times a day (2023 16:49)  guaiFENesin 100 mg/5 mL oral liquid: 20 milliliter(s) orally every 6 hours (2023 21:48)  HumaLOG KwikPen 100 units/mL injectable solution: 10 unit(s) injectable 3 times a day (before meals) (2023 21:48)  ipratropium-albuterol 20 mcg-100 mcg/inh inhalation aerosol: 1 puff(s) inhaled 4 times a day (2023 16:49)  levothyroxine 88 mcg (0.088 mg) oral tablet: 1 tab(s) orally once a day (at bedtime) (2023 16:49)  losartan 25 mg oral tablet: 1 tab(s) orally once a day (2023 16:49)  Macrobid 100 mg oral capsule: 1 cap(s) orally 2 times a day for 5 days  ***course complete*** (2023 21:48)  methocarbamol 750 mg oral tablet: 1 tab(s) orally every 8 hours, As Needed (2023 21:48)  metoprolol tartrate 25 mg oral tablet: 1 tab(s) orally 2 times a day (2023 16:49)  Milk of Magnesia 8% oral suspension: 30 milliliter(s) orally once a day, As Needed (2023 16:49)  montelukast 10 mg oral tablet: 1 tab(s) orally once a day (at bedtime) (2023 21:48)  ondansetron 4 mg oral tablet: 1 tab(s) orally every 4 hours, As Needed (2023 21:48)  oxybutynin 5 mg oral tablet: 1 tab(s) orally 2 times a day (2023 16:49)  oxyCODONE 5 mg oral tablet: 1 tab(s) orally every 4 hours, As Needed (2023 16:49)  phytonadione 5 mg oral tablet: 0.5 tab(s) orally once  ***given at [facility] once on 23*** (2023 21:48)  pregabalin 100 mg oral capsule: 1 cap(s) orally 3 times a day (2023 16:49)  sennosides-docusate 8.6 mg-50 mg oral tablet: 2 tab(s) orally once a day (at bedtime) (2023 16:49)  Tradjenta 5 mg oral tablet: 1 tab(s) orally once a day (2023 16:49)  Tylenol 500 mg oral tablet: 2 tab(s) orally every 8 hours (2023 16:49)      MEDICATIONS  (STANDING):  aMIOdarone    Tablet 200 milliGRAM(s) Oral daily  atorvastatin 20 milliGRAM(s) Oral at bedtime  chlorhexidine 4% Liquid 1 Application(s) Topical <User Schedule>  coronavirus bivalent (EUA) Booster Vaccine (PFIZER) 0.3 milliLiter(s) IntraMuscular once  dextrose 5%. 1000 milliLiter(s) (50 mL/Hr) IV Continuous <Continuous>  dextrose 5%. 1000 milliLiter(s) (100 mL/Hr) IV Continuous <Continuous>  dextrose 50% Injectable 25 Gram(s) IV Push once  dextrose 50% Injectable 12.5 Gram(s) IV Push once  dextrose 50% Injectable 25 Gram(s) IV Push once  diltiazem    milliGRAM(s) Oral daily  DULoxetine 60 milliGRAM(s) Oral daily  enoxaparin Injectable 40 milliGRAM(s) SubCutaneous every 24 hours  furosemide   Injectable 40 milliGRAM(s) IV Push daily  glucagon  Injectable 1 milliGRAM(s) IntraMuscular once  insulin lispro (ADMELOG) corrective regimen sliding scale   SubCutaneous every 6 hours  levothyroxine 88 MICROGram(s) Oral daily  losartan 25 milliGRAM(s) Oral daily  montelukast 10 milliGRAM(s) Oral daily  nystatin Cream 1 Application(s) Topical two times a day  nystatin Powder 1 Application(s) Topical every 12 hours  pantoprazole  Injectable 40 milliGRAM(s) IV Push daily  piperacillin/tazobactam IVPB.. 3.375 Gram(s) IV Intermittent every 8 hours    MEDICATIONS  (PRN):  dextrose Oral Gel 15 Gram(s) Oral once PRN Blood Glucose LESS THAN 70 milliGRAM(s)/deciliter  HYDROmorphone  Injectable 1 milliGRAM(s) IV Push every 4 hours PRN Moderate Pain (4 - 6)  HYDROmorphone  Injectable 0.5 milliGRAM(s) IV Push every 3 hours PRN Severe Pain (7 - 10)  sodium chloride 0.9% lock flush 10 milliLiter(s) IV Push every 1 hour PRN Pre/post blood products, medications, blood draw, and to maintain line patency      Allergies    Cipro (Rash)  Spiriva (Rash)    Intolerances        SOCIAL HISTORY: Denies tobacco, etoh abuse or illicit drug use    FAMILY HISTORY:  Family history of COPD (chronic obstructive pulmonary disease)    Family history of chronic kidney disease    Family history of hiatal hernia (Sibling)        Vital Signs Last 24 Hrs  T(C): 39.3 (2023 05:45), Max: 39.3 (2023 05:45)  T(F): 102.8 (2023 05:45), Max: 102.8 (2023 05:45)  HR: 121 (2023 05:45) (81 - 121)  BP: 136/85 (2023 05:45) (136/85 - 149/76)  BP(mean): --  RR: 20 (2023 05:45) (19 - 20)  SpO2: 97% (2023 05:45) (96% - 98%)    Parameters below as of 2023 05:45  Patient On (Oxygen Delivery Method): nasal cannula  O2 Flow (L/min): 2          REVIEW OF SYSTEMS:    CONSTITUTIONAL:  As per HPI.  HEENT:  Eyes:  No diplopia or blurred vision. ENT:  No earache, sore throat or runny nose.  CARDIOVASCULAR:  No pressure, squeezing, tightness, heaviness or aching about the chest, neck, axilla or epigastrium.  RESPIRATORY:  No cough, shortness of breath, PND or orthopnea.  GASTROINTESTINAL:  No nausea, vomiting or diarrhea.  GENITOURINARY:  No dysuria, frequency or urgency.  MUSCULOSKELETAL:  As per HPI.  SKIN:  No change in skin, hair or nails.  NEUROLOGIC:  No paresthesias, fasciculations, seizures or weakness.  PSYCHIATRIC:  No disorder of thought or mood.  ENDOCRINE:  No heat or cold intolerance, polyuria or polydipsia.  HEMATOLOGICAL:  No easy bruising or bleedings:  .     PHYSICAL EXAMINATION:    GENERAL APPEARANCE:  Pt. is not currently dyspneic, in no distress. Pt. is alert, oriented, and pleasant.  HEENT:  Pupils are normal and react normally. No icterus. Mucous membranes well colored.  NECK:  Supple. No lymphadenopathy. Jugular venous pressure not elevated. Carotids equal.   HEART:   The cardiac impulse has a normal quality. Regular. Normal S1 and S2. There are no murmurs, rubs or gallops noted  CHEST:  Chest crackles to auscultation. Normal respiratory effort.  ABDOMEN:  Soft and nontender.   EXTREMITIES:  There is no cyanosis, clubbing or edema.   SKIN:  No rash or significant lesions are noted.    LABS:                        10.9   19.72 )-----------( 510      ( 2023 06:16 )             36.7         142  |  111<H>  |  10  ----------------------------<  233<H>  3.8   |  26  |  0.78    Ca    8.0<L>      2023 06:16  Phos  2.7       Mg     1.8             PT/INR - ( 2023 06:16 )   PT: 25.3 sec;   INR: 2.16 ratio         PTT - ( 2023 06:28 )  PTT:82.1 sec              RADIOLOGY & ADDITIONAL STUDIES:     CT Chest No Cont (23 @ 09:53) >  IMPRESSION:  Small bowel obstruction with transition point at the neck of a left lower   quadrant abdominal wall hernia. It is uncertain whether the obstruction   is due to the hernia itself or to adhesions.    Additional massive ventral hernia containing small and large bowel and   epigastric hernia containing stomach.    Right lower lobe nodules new since 2021 and could be   infectious or neoplastic. Follow-up chest CT in 3 months is recommended   to reevaluate.    CT Head No Cont (23 @ 12:25) >  IMPRESSION: Encephalomalacia and gliosis right posterior temporal lobe.   Old right middle cerebral artery infarct with ex vacuo dilatation of the   body the right lateral ventricle and right-sided wallerian degeneration.   No hemorrhage.      ABG - ( 2023 12:43 )  pH, Arterial: 7.51  pH, Blood: x     /  pCO2: 35    /  pO2: 103   / HCO3: 28    / Base Excess: 4.9   /  SaO2: 99

## 2023-01-27 NOTE — PROGRESS NOTE ADULT - SUBJECTIVE AND OBJECTIVE BOX
73 y/o female with a PMHx o             CVA with left sided weakness               atrial fibrillation on Coumadin              OPD  - wears 2L NC at baseline   was brought in from skilled nursing facility for abdominal pain emesis and weakness  In ED CT abdomen Pelvis showed,  Large ventral hernia with SBO  lactate 3.0  had ALVERTO creatinine to 1.9 improved with hydration  had cvp placed   has pyuria but no complaints of urinary frequency  : No events over night, less pain on exam today, tolerated clears  : No events over night   pt has decreased po intake, passing flatus , arousable, confused    more lethargic, makes sounds to tactile stimuli, does not open eyes, does not follow commands , , gurgling sounds , EKG sinustachycardia, BP stable      PHYSICAL EXAM:Constitutional: lethargic , gurgling sounds   HEENT: Atraumatic, REYES, Normal, No congestion  Respiratory:dreased decreased BS B/l, crackles B/L no rhonchi/wheeze  Cardiovascular: N S1S2;   Gastrointestinal: Abdomen soft, positive for ventral hernia  Bowel Ssounds present  Extremities: No edema, peripheral pulses present  Neurological:lethargic   Skin: positive for anasarca             PHYSICAL EXAM:    Daily     Daily Weight in k.5 (2023 05:45)    ICU Vital Signs Last 24 Hrs  T(C): 38.7 (2023 09:12), Max: 39.3 (2023 05:45)  T(F): 101.7 (2023 09:12), Max: 102.8 (2023 05:45)  HR: 118 (2023 09:12) (81 - 121)  BP: 137/40 (2023 09:12) (136/85 - 149/76)  BP(mean): --  ABP: --  ABP(mean): --  RR: 20 (2023 09:12) (19 - 20)  SpO2: 97% (2023 09:12) (96% - 98%)    O2 Parameters below as of 2023 09:12  Patient On (Oxygen Delivery Method): nasal cannula  O2 Flow (L/min): 2                            10.9   19.72 )-----------( 510      ( 2023 06:16 )             36.7       CBC Full  -  ( 2023 06:16 )  WBC Count : 19.72 K/uL  RBC Count : 4.22 M/uL  Hemoglobin : 10.9 g/dL  Hematocrit : 36.7 %  Platelet Count - Automated : 510 K/uL  Mean Cell Volume : 87.0 fl  Mean Cell Hemoglobin : 25.8 pg  Mean Cell Hemoglobin Concentration : 29.7 gm/dL  Auto Neutrophil # : x  Auto Lymphocyte # : x  Auto Monocyte # : x  Auto Eosinophil # : x  Auto Basophil # : x  Auto Neutrophil % : x  Auto Lymphocyte % : x  Auto Monocyte % : x  Auto Eosinophil % : x  Auto Basophil % : x          142  |  111<H>  |  10  ----------------------------<  233<H>  3.8   |  26  |  0.78    Ca    8.0<L>      2023 06:16  Phos  2.7       Mg     1.8                 PT/INR - ( 2023 06:16 )   PT: 25.3 sec;   INR: 2.16 ratio         PTT - ( 2023 06:28 )  PTT:82.1 sec              ABG - ( 2023 12:43 )  pH, Arterial: 7.51  pH, Blood: x     /  pCO2: 35    /  pO2: 103   / HCO3: 28    / Base Excess: 4.9   /  SaO2: 99                  MEDICATIONS  (STANDING):  aMIOdarone    Tablet 200 milliGRAM(s) Oral daily  atorvastatin 20 milliGRAM(s) Oral at bedtime  buDESOnide    Inhalation Suspension 0.5 milliGRAM(s) Inhalation every 12 hours  cefepime   IVPB 2000 milliGRAM(s) IV Intermittent every 12 hours  chlorhexidine 4% Liquid 1 Application(s) Topical <User Schedule>  coronavirus bivalent (EUA) Booster Vaccine (PFIZER) 0.3 milliLiter(s) IntraMuscular once  dextrose 5%. 1000 milliLiter(s) (50 mL/Hr) IV Continuous <Continuous>  dextrose 5%. 1000 milliLiter(s) (100 mL/Hr) IV Continuous <Continuous>  dextrose 50% Injectable 25 Gram(s) IV Push once  dextrose 50% Injectable 12.5 Gram(s) IV Push once  dextrose 50% Injectable 25 Gram(s) IV Push once  diltiazem    milliGRAM(s) Oral daily  DULoxetine 60 milliGRAM(s) Oral daily  furosemide   Injectable 40 milliGRAM(s) IV Push daily  glucagon  Injectable 1 milliGRAM(s) IntraMuscular once  insulin lispro (ADMELOG) corrective regimen sliding scale   SubCutaneous every 6 hours  levothyroxine Injectable 70 MICROGram(s) IV Push at bedtime  losartan 25 milliGRAM(s) Oral daily  metoprolol tartrate IVPB 2.5 milliGRAM(s) IV Intermittent every 6 hours  metroNIDAZOLE  IVPB 500 milliGRAM(s) IV Intermittent every 8 hours  montelukast 10 milliGRAM(s) Oral daily  nystatin Cream 1 Application(s) Topical two times a day  nystatin Powder 1 Application(s) Topical every 12 hours  pantoprazole  Injectable 40 milliGRAM(s) IV Push daily  pregabalin 100 milliGRAM(s) Oral every 8 hours  warfarin 3 milliGRAM(s) Oral once

## 2023-01-27 NOTE — SWALLOW BEDSIDE ASSESSMENT ADULT - ADDITIONAL RECOMMENDATIONS
1) NUTRITION F/U. PT AT CHRONIC NUTRITION RISK GIVEN IRREVERSIBLE DYSPHAGIA, NEED FOR DIET TEXTURE MODIFICATION, LOW ALBUMIN, AND CONSIDERING THAT SHE TENDS TO VARIABLY BECOME LETHARGIC WHICH HINDERS THE AMOUNT OF TIMES SHE IS ACTUALLY FEEDABLE.        2) KEEP HEAD OF BED AT SOMEWHAT OF AN UPRIGHT ANGLE WHEN LYING DOWN TO GUARD AGAINST SALIVA ASPIRATION. PT DOES HAVE A LONGSTANDING H/O ASPIRATING THIN LIQUIDS AND SALIVA IS A THIN LIQUID. SALIVA ASPIRATION IS NOT ENTIRELY AVOIDABLE. A PEG WOULD NOT ELIMINATE RISK FOR5 SALIVA ASPIRATION. 1) NUTRITION F/U. PT AT CHRONIC NUTRITION RISK GIVEN IRREVERSIBLE DYSPHAGIA, NEED FOR DIET TEXTURE MODIFICATION, LOW ALBUMIN, AND CONSIDERING THAT SHE TENDS TO VARIABLY BECOME LETHARGIC WHICH HINDERS THE AMOUNT OF TIMES SHE IS ACTUALLY FEEDABLE.        2) KEEP HEAD OF BED AT SOMEWHAT OF AN UPRIGHT ANGLE WHEN LYING DOWN TO GUARD AGAINST SALIVA ASPIRATION. PT DOES HAVE A LONGSTANDING H/O ASPIRATING THIN LIQUIDS AND SALIVA IS A THIN LIQUID. SALIVA ASPIRATION IS NOT ENTIRELY AVOIDABLE. A PEG WOULD NOT ELIMINATE RISK FOR5 SALIVA ASPIRATION.    3) PT WITH SUBPHARYNGEAL CONGESTION IN SETTING OF CHF/FLUID OVERLOAD. NO LILIAN AUDIBLE CONGESTION NOTED ON AUSCULTATION IN PHARYNGEAL REGION.

## 2023-01-28 LAB
ALBUMIN SERPL ELPH-MCNC: 1.3 G/DL — LOW (ref 3.3–5)
ALP SERPL-CCNC: 65 U/L — SIGNIFICANT CHANGE UP (ref 40–120)
ALT FLD-CCNC: 12 U/L — SIGNIFICANT CHANGE UP (ref 12–78)
ANION GAP SERPL CALC-SCNC: 2 MMOL/L — LOW (ref 5–17)
ANISOCYTOSIS BLD QL: SIGNIFICANT CHANGE UP
APTT BLD: 34 SEC — SIGNIFICANT CHANGE UP (ref 27.5–35.5)
AST SERPL-CCNC: 14 U/L — LOW (ref 15–37)
BASOPHILS # BLD AUTO: 0 K/UL — SIGNIFICANT CHANGE UP (ref 0–0.2)
BASOPHILS NFR BLD AUTO: 0 % — SIGNIFICANT CHANGE UP (ref 0–2)
BILIRUB DIRECT SERPL-MCNC: 0.1 MG/DL — SIGNIFICANT CHANGE UP (ref 0–0.3)
BILIRUB INDIRECT FLD-MCNC: 0.2 MG/DL — SIGNIFICANT CHANGE UP (ref 0.2–1)
BILIRUB SERPL-MCNC: 0.3 MG/DL — SIGNIFICANT CHANGE UP (ref 0.2–1.2)
BUN SERPL-MCNC: 21 MG/DL — SIGNIFICANT CHANGE UP (ref 7–23)
CALCIUM SERPL-MCNC: 7.2 MG/DL — LOW (ref 8.5–10.1)
CHLORIDE SERPL-SCNC: 112 MMOL/L — HIGH (ref 96–108)
CO2 SERPL-SCNC: 31 MMOL/L — SIGNIFICANT CHANGE UP (ref 22–31)
CREAT SERPL-MCNC: 1.01 MG/DL — SIGNIFICANT CHANGE UP (ref 0.5–1.3)
EGFR: 59 ML/MIN/1.73M2 — LOW
ELLIPTOCYTES BLD QL SMEAR: SLIGHT — SIGNIFICANT CHANGE UP
EOSINOPHIL # BLD AUTO: 0.22 K/UL — SIGNIFICANT CHANGE UP (ref 0–0.5)
EOSINOPHIL NFR BLD AUTO: 2 % — SIGNIFICANT CHANGE UP (ref 0–6)
GLUCOSE BLDC GLUCOMTR-MCNC: 127 MG/DL — HIGH (ref 70–99)
GLUCOSE BLDC GLUCOMTR-MCNC: 160 MG/DL — HIGH (ref 70–99)
GLUCOSE BLDC GLUCOMTR-MCNC: 175 MG/DL — HIGH (ref 70–99)
GLUCOSE BLDC GLUCOMTR-MCNC: 190 MG/DL — HIGH (ref 70–99)
GLUCOSE SERPL-MCNC: 163 MG/DL — HIGH (ref 70–99)
HCT VFR BLD CALC: 29.2 % — LOW (ref 34.5–45)
HGB BLD-MCNC: 9 G/DL — LOW (ref 11.5–15.5)
HYPOCHROMIA BLD QL: SLIGHT — SIGNIFICANT CHANGE UP
INR BLD: 2.39 RATIO — HIGH (ref 0.88–1.16)
LYMPHOCYTES # BLD AUTO: 1.68 K/UL — SIGNIFICANT CHANGE UP (ref 1–3.3)
LYMPHOCYTES # BLD AUTO: 15 % — SIGNIFICANT CHANGE UP (ref 13–44)
MANUAL SMEAR VERIFICATION: SIGNIFICANT CHANGE UP
MCHC RBC-ENTMCNC: 26 PG — LOW (ref 27–34)
MCHC RBC-ENTMCNC: 30.8 GM/DL — LOW (ref 32–36)
MCV RBC AUTO: 84.4 FL — SIGNIFICANT CHANGE UP (ref 80–100)
METAMYELOCYTES # FLD: 1 % — HIGH (ref 0–0)
MONOCYTES # BLD AUTO: 0.67 K/UL — SIGNIFICANT CHANGE UP (ref 0–0.9)
MONOCYTES NFR BLD AUTO: 6 % — SIGNIFICANT CHANGE UP (ref 2–14)
NEUTROPHILS # BLD AUTO: 8.39 K/UL — HIGH (ref 1.8–7.4)
NEUTROPHILS NFR BLD AUTO: 75 % — SIGNIFICANT CHANGE UP (ref 43–77)
NRBC # BLD: 0 /100 — SIGNIFICANT CHANGE UP (ref 0–0)
NRBC # BLD: SIGNIFICANT CHANGE UP /100 WBCS (ref 0–0)
PLAT MORPH BLD: NORMAL — SIGNIFICANT CHANGE UP
PLATELET # BLD AUTO: 392 K/UL — SIGNIFICANT CHANGE UP (ref 150–400)
POTASSIUM SERPL-MCNC: 3.4 MMOL/L — LOW (ref 3.5–5.3)
POTASSIUM SERPL-SCNC: 3.4 MMOL/L — LOW (ref 3.5–5.3)
PROMYELOCYTES # FLD: 1 % — HIGH (ref 0–0)
PROT SERPL-MCNC: 4.9 GM/DL — LOW (ref 6–8.3)
PROTHROM AB SERPL-ACNC: 28 SEC — HIGH (ref 10.5–13.4)
RBC # BLD: 3.46 M/UL — LOW (ref 3.8–5.2)
RBC # FLD: 19.2 % — HIGH (ref 10.3–14.5)
RBC BLD AUTO: ABNORMAL
SODIUM SERPL-SCNC: 145 MMOL/L — SIGNIFICANT CHANGE UP (ref 135–145)
TOXIC GRANULES BLD QL SMEAR: PRESENT — SIGNIFICANT CHANGE UP
WBC # BLD: 11.19 K/UL — HIGH (ref 3.8–10.5)
WBC # FLD AUTO: 11.19 K/UL — HIGH (ref 3.8–10.5)

## 2023-01-28 PROCEDURE — 99233 SBSQ HOSP IP/OBS HIGH 50: CPT

## 2023-01-28 RX ORDER — DEXTROSE 50 % IN WATER 50 %
15 SYRINGE (ML) INTRAVENOUS ONCE
Refills: 0 | Status: DISCONTINUED | OUTPATIENT
Start: 2023-01-28 | End: 2023-02-21

## 2023-01-28 RX ORDER — SODIUM CHLORIDE 9 MG/ML
1000 INJECTION, SOLUTION INTRAVENOUS
Refills: 0 | Status: DISCONTINUED | OUTPATIENT
Start: 2023-01-28 | End: 2023-02-21

## 2023-01-28 RX ORDER — GLUCAGON INJECTION, SOLUTION 0.5 MG/.1ML
1 INJECTION, SOLUTION SUBCUTANEOUS ONCE
Refills: 0 | Status: DISCONTINUED | OUTPATIENT
Start: 2023-01-28 | End: 2023-02-21

## 2023-01-28 RX ORDER — ENOXAPARIN SODIUM 100 MG/ML
40 INJECTION SUBCUTANEOUS EVERY 24 HOURS
Refills: 0 | Status: DISCONTINUED | OUTPATIENT
Start: 2023-01-28 | End: 2023-01-29

## 2023-01-28 RX ORDER — DEXTROSE 50 % IN WATER 50 %
12.5 SYRINGE (ML) INTRAVENOUS ONCE
Refills: 0 | Status: DISCONTINUED | OUTPATIENT
Start: 2023-01-28 | End: 2023-02-21

## 2023-01-28 RX ORDER — ACETAMINOPHEN 500 MG
1000 TABLET ORAL ONCE
Refills: 0 | Status: COMPLETED | OUTPATIENT
Start: 2023-01-28 | End: 2023-01-28

## 2023-01-28 RX ORDER — DEXTROSE 50 % IN WATER 50 %
25 SYRINGE (ML) INTRAVENOUS ONCE
Refills: 0 | Status: DISCONTINUED | OUTPATIENT
Start: 2023-01-28 | End: 2023-02-21

## 2023-01-28 RX ORDER — ACETAMINOPHEN 500 MG
1000 TABLET ORAL EVERY 6 HOURS
Refills: 0 | Status: COMPLETED | OUTPATIENT
Start: 2023-01-28 | End: 2023-01-29

## 2023-01-28 RX ORDER — INSULIN LISPRO 100/ML
VIAL (ML) SUBCUTANEOUS
Refills: 0 | Status: DISCONTINUED | OUTPATIENT
Start: 2023-01-28 | End: 2023-01-29

## 2023-01-28 RX ORDER — FUROSEMIDE 40 MG
20 TABLET ORAL ONCE
Refills: 0 | Status: COMPLETED | OUTPATIENT
Start: 2023-01-28 | End: 2023-01-28

## 2023-01-28 RX ADMIN — Medication 2: at 06:24

## 2023-01-28 RX ADMIN — CEFEPIME 100 MILLIGRAM(S): 1 INJECTION, POWDER, FOR SOLUTION INTRAMUSCULAR; INTRAVENOUS at 23:28

## 2023-01-28 RX ADMIN — HYDROMORPHONE HYDROCHLORIDE 0.5 MILLIGRAM(S): 2 INJECTION INTRAMUSCULAR; INTRAVENOUS; SUBCUTANEOUS at 06:25

## 2023-01-28 RX ADMIN — Medication 400 MILLIGRAM(S): at 06:26

## 2023-01-28 RX ADMIN — Medication 1000 MILLIGRAM(S): at 13:42

## 2023-01-28 RX ADMIN — Medication 1000 MILLIGRAM(S): at 23:45

## 2023-01-28 RX ADMIN — Medication 400 MILLIGRAM(S): at 13:12

## 2023-01-28 RX ADMIN — NYSTATIN CREAM 1 APPLICATION(S): 100000 CREAM TOPICAL at 06:26

## 2023-01-28 RX ADMIN — CEFEPIME 100 MILLIGRAM(S): 1 INJECTION, POWDER, FOR SOLUTION INTRAMUSCULAR; INTRAVENOUS at 10:26

## 2023-01-28 RX ADMIN — Medication 400 MILLIGRAM(S): at 22:55

## 2023-01-28 RX ADMIN — HYDROMORPHONE HYDROCHLORIDE 0.5 MILLIGRAM(S): 2 INJECTION INTRAMUSCULAR; INTRAVENOUS; SUBCUTANEOUS at 00:25

## 2023-01-28 RX ADMIN — Medication 0.5 MILLIGRAM(S): at 21:01

## 2023-01-28 RX ADMIN — ENOXAPARIN SODIUM 40 MILLIGRAM(S): 100 INJECTION SUBCUTANEOUS at 17:23

## 2023-01-28 RX ADMIN — Medication 105 MILLIGRAM(S): at 17:24

## 2023-01-28 RX ADMIN — Medication 2: at 00:06

## 2023-01-28 RX ADMIN — NYSTATIN CREAM 1 APPLICATION(S): 100000 CREAM TOPICAL at 10:44

## 2023-01-28 RX ADMIN — Medication 0.5 MILLIGRAM(S): at 08:25

## 2023-01-28 RX ADMIN — Medication 100 MILLIGRAM(S): at 06:26

## 2023-01-28 RX ADMIN — NYSTATIN CREAM 1 APPLICATION(S): 100000 CREAM TOPICAL at 17:24

## 2023-01-28 RX ADMIN — Medication 400 MILLIGRAM(S): at 02:30

## 2023-01-28 RX ADMIN — Medication 70 MICROGRAM(S): at 01:41

## 2023-01-28 RX ADMIN — NYSTATIN CREAM 1 APPLICATION(S): 100000 CREAM TOPICAL at 23:29

## 2023-01-28 RX ADMIN — Medication 100 MILLIGRAM(S): at 23:28

## 2023-01-28 RX ADMIN — PANTOPRAZOLE SODIUM 40 MILLIGRAM(S): 20 TABLET, DELAYED RELEASE ORAL at 10:28

## 2023-01-28 RX ADMIN — SODIUM CHLORIDE 75 MILLILITER(S): 9 INJECTION INTRAMUSCULAR; INTRAVENOUS; SUBCUTANEOUS at 10:29

## 2023-01-28 RX ADMIN — Medication 105 MILLIGRAM(S): at 00:22

## 2023-01-28 RX ADMIN — Medication 1000 MILLIGRAM(S): at 08:50

## 2023-01-28 RX ADMIN — Medication 105 MILLIGRAM(S): at 06:23

## 2023-01-28 RX ADMIN — Medication 20 MILLIGRAM(S): at 10:46

## 2023-01-28 RX ADMIN — Medication 100 MILLIGRAM(S): at 15:00

## 2023-01-28 RX ADMIN — Medication 70 MICROGRAM(S): at 23:29

## 2023-01-28 RX ADMIN — Medication 105 MILLIGRAM(S): at 12:37

## 2023-01-28 NOTE — PROGRESS NOTE ADULT - SUBJECTIVE AND OBJECTIVE BOX
Subjective:    pat better, s/p ng suction, breathing better.    Home Medications:  Albuterol (Eqv-ProAir HFA) 90 mcg/inh inhalation aerosol: 1 puff(s) inhaled every 6 hours, As Needed (20 Jan 2023 16:49)  amiodarone 200 mg oral tablet: 1 tab(s) orally once a day (20 Jan 2023 16:49)  ascorbic acid 500 mg oral tablet: 2 tab(s) orally once a day (20 Jan 2023 16:49)  atorvastatin 10 mg oral tablet: 1 tab(s) orally once a day (at bedtime) (20 Jan 2023 16:49)  benzonatate 100 mg oral capsule: 1 cap(s) orally 3 times a day (20 Jan 2023 21:48)  budesonide 0.5 mg/2 mL inhalation suspension: 2 milliliter(s) inhaled 2 times a day (20 Jan 2023 21:48)  Cepacol Sore Throat 15 mg-3.6 mg mucous membrane lozenge: 1 lozenge mucous membrane every 4 hours, As Needed (20 Jan 2023 21:48)  cholecalciferol 1250 mcg (50,000 intl units) oral capsule: 1 cap(s) orally every 4 weeks on Wednesday  (20 Jan 2023 16:49)  Coumadin 2.5 mg oral tablet: 1 tab(s) orally once a day (at bedtime)  ***ON HOLD from 1-16 to 1-21*** (20 Jan 2023 21:48)  Cranberry oral tablet: 1 tab(s) orally 2 times a day (20 Jan 2023 16:49)  cyanocobalamin 1000 mcg oral tablet: 1 tab(s) orally once a day (20 Jan 2023 16:49)  dilTIAZem 180 mg/24 hours oral capsule, extended release: 1 cap(s) orally once a day (20 Jan 2023 16:49)  DULoxetine 60 mg oral delayed release capsule: 1 cap(s) orally once a day (20 Jan 2023 16:49)  estradiol 0.1 mg/g vaginal cream: 0.5 gram(s) vaginal 2 times a week on Monday and Thursday (20 Jan 2023 21:48)  fexofenadine 180 mg oral tablet: 1 tab(s) orally once a day (20 Jan 2023 21:48)  fluticasone 50 mcg/inh nasal spray: 1 spray(s) nasal 2 times a day (20 Jan 2023 16:49)  furosemide 20 mg oral tablet: 1 tab(s) orally once a day (20 Jan 2023 16:49)  glipiZIDE 10 mg oral tablet, extended release: 2 tab(s) orally once a day (20 Jan 2023 16:49)  GlycoLax oral powder for reconstitution: 17 gram(s) orally 2 times a day (20 Jan 2023 16:49)  guaiFENesin 100 mg/5 mL oral liquid: 20 milliliter(s) orally every 6 hours (20 Jan 2023 21:48)  HumaLOG KwikPen 100 units/mL injectable solution: 10 unit(s) injectable 3 times a day (before meals) (20 Jan 2023 21:48)  ipratropium-albuterol 20 mcg-100 mcg/inh inhalation aerosol: 1 puff(s) inhaled 4 times a day (20 Jan 2023 16:49)  levothyroxine 88 mcg (0.088 mg) oral tablet: 1 tab(s) orally once a day (at bedtime) (20 Jan 2023 16:49)  losartan 25 mg oral tablet: 1 tab(s) orally once a day (20 Jan 2023 16:49)  Macrobid 100 mg oral capsule: 1 cap(s) orally 2 times a day for 5 days  ***course complete*** (20 Jan 2023 21:48)  methocarbamol 750 mg oral tablet: 1 tab(s) orally every 8 hours, As Needed (20 Jan 2023 21:48)  metoprolol tartrate 25 mg oral tablet: 1 tab(s) orally 2 times a day (20 Jan 2023 16:49)  Milk of Magnesia 8% oral suspension: 30 milliliter(s) orally once a day, As Needed (20 Jan 2023 16:49)  montelukast 10 mg oral tablet: 1 tab(s) orally once a day (at bedtime) (20 Jan 2023 21:48)  ondansetron 4 mg oral tablet: 1 tab(s) orally every 4 hours, As Needed (20 Jan 2023 21:48)  oxybutynin 5 mg oral tablet: 1 tab(s) orally 2 times a day (20 Jan 2023 16:49)  oxyCODONE 5 mg oral tablet: 1 tab(s) orally every 4 hours, As Needed (20 Jan 2023 16:49)  phytonadione 5 mg oral tablet: 0.5 tab(s) orally once  ***given at [facility] once on 1-19-23*** (20 Jan 2023 21:48)  pregabalin 100 mg oral capsule: 1 cap(s) orally 3 times a day (20 Jan 2023 16:49)  sennosides-docusate 8.6 mg-50 mg oral tablet: 2 tab(s) orally once a day (at bedtime) (20 Jan 2023 16:49)  Tradjenta 5 mg oral tablet: 1 tab(s) orally once a day (20 Jan 2023 16:49)  Tylenol 500 mg oral tablet: 2 tab(s) orally every 8 hours (20 Jan 2023 16:49)    MEDICATIONS  (STANDING):  acetaminophen   IVPB .. 1000 milliGRAM(s) IV Intermittent every 6 hours  aMIOdarone    Tablet 200 milliGRAM(s) Oral daily  atorvastatin 20 milliGRAM(s) Oral at bedtime  buDESOnide    Inhalation Suspension 0.5 milliGRAM(s) Inhalation every 12 hours  cefepime   IVPB 2000 milliGRAM(s) IV Intermittent every 12 hours  chlorhexidine 4% Liquid 1 Application(s) Topical <User Schedule>  coronavirus bivalent (EUA) Booster Vaccine (PFIZER) 0.3 milliLiter(s) IntraMuscular once  dextrose 5%. 1000 milliLiter(s) (50 mL/Hr) IV Continuous <Continuous>  dextrose 5%. 1000 milliLiter(s) (100 mL/Hr) IV Continuous <Continuous>  dextrose 50% Injectable 25 Gram(s) IV Push once  dextrose 50% Injectable 12.5 Gram(s) IV Push once  dextrose 50% Injectable 25 Gram(s) IV Push once  diltiazem    milliGRAM(s) Oral daily  DULoxetine 60 milliGRAM(s) Oral daily  glucagon  Injectable 1 milliGRAM(s) IntraMuscular once  insulin lispro (ADMELOG) corrective regimen sliding scale   SubCutaneous every 6 hours  levothyroxine Injectable 70 MICROGram(s) IV Push at bedtime  losartan 25 milliGRAM(s) Oral daily  metoprolol tartrate IVPB 2.5 milliGRAM(s) IV Intermittent every 6 hours  metroNIDAZOLE  IVPB 500 milliGRAM(s) IV Intermittent every 8 hours  montelukast 10 milliGRAM(s) Oral daily  nystatin Cream 1 Application(s) Topical two times a day  nystatin Powder 1 Application(s) Topical every 12 hours  pantoprazole  Injectable 40 milliGRAM(s) IV Push daily  pregabalin 100 milliGRAM(s) Oral every 8 hours  sodium chloride 0.9%. 1000 milliLiter(s) (75 mL/Hr) IV Continuous <Continuous>    MEDICATIONS  (PRN):  albuterol    90 MICROgram(s) HFA Inhaler 2 Puff(s) Inhalation every 6 hours PRN Shortness of Breath and/or Wheezing  dextrose Oral Gel 15 Gram(s) Oral once PRN Blood Glucose LESS THAN 70 milliGRAM(s)/deciliter  HYDROmorphone  Injectable 1 milliGRAM(s) IV Push every 4 hours PRN Moderate Pain (4 - 6)  HYDROmorphone  Injectable 0.5 milliGRAM(s) IV Push every 3 hours PRN Severe Pain (7 - 10)  oxybutynin 5 milliGRAM(s) Oral every 12 hours PRN Bladder spasms  sodium chloride 0.9% lock flush 10 milliLiter(s) IV Push every 1 hour PRN Pre/post blood products, medications, blood draw, and to maintain line patency      Allergies    Cipro (Rash)  Spiriva (Rash)    Intolerances        Vital Signs Last 24 Hrs  T(C): 37.8 (28 Jan 2023 08:50), Max: 39.3 (27 Jan 2023 21:15)  T(F): 100.1 (28 Jan 2023 08:50), Max: 102.7 (27 Jan 2023 21:15)  HR: 89 (28 Jan 2023 12:29) (87 - 115)  BP: 116/59 (28 Jan 2023 12:29) (100/64 - 128/57)  BP(mean): --  RR: 19 (28 Jan 2023 10:27) (19 - 20)  SpO2: 100% (28 Jan 2023 10:27) (94% - 100%)    Parameters below as of 28 Jan 2023 10:27  Patient On (Oxygen Delivery Method): nasal cannula  O2 Flow (L/min): 3        PHYSICAL EXAMINATION:    NECK:  Supple. No lymphadenopathy. Jugular venous pressure not elevated. Carotids equal.   HEART:   The cardiac impulse has a normal quality. Reg., Nl S1 and S2.  There are no murmurs, rubs or gallops noted  CHEST:  Chest crackles to auscultation. Normal respiratory effort.  ABDOMEN:  Soft and nontender.   EXTREMITIES:  There is no edema.       LABS:                        10.9   19.72 )-----------( 510      ( 27 Jan 2023 06:16 )             36.7     01-27    142  |  111<H>  |  10  ----------------------------<  233<H>  3.8   |  26  |  0.78    Ca    8.0<L>      27 Jan 2023 06:16  Phos  2.7     01-27  Mg     1.8     01-27      PT/INR - ( 27 Jan 2023 06:16 )   PT: 25.3 sec;   INR: 2.16 ratio

## 2023-01-28 NOTE — PROGRESS NOTE ADULT - SUBJECTIVE AND OBJECTIVE BOX
71 y/o female with a PMHx o             CVA with left sided weakness               atrial fibrillation on Coumadin              OPD  - wears 2L NC at baseline   was brought in from skilled nursing facility for abdominal pain emesis and weakness  In ED CT abdomen Pelvis showed,  Large ventral hernia with SBO  lactate 3.0  had ALVERTO creatinine to 1.9 improved with hydration  had cvp placed   has pyuria but no complaints of urinary frequency  : No events over night, less pain on exam today, tolerated clears  : No events over night   pt has decreased po intake, passing flatus , arousable, confused    more lethargic, makes sounds to tactile stimuli, does not open eyes, does not follow commands , , gurgling sounds , EKG sinustachycardia, BP stable   slightly more awake, opens eyes spontaneous . non verbal does not follow commands , rizzo obstructed , flushed  300cc output, urine with sediments - hence replaced rizzo catheter , now draining clearer urine , has NGtube, respiratory wise stable,       PHYSICAL EXAM:Constitutional- sleeping but  opens eyes ,  HEENT: Atraumatic, REYES, Normal, No congestion  Respiratory:dreased decreased BS B/l,  no rhonchi/wheeze  Cardiovascular: N S1S2;   Gastrointestinal: Abdomen soft, positive for ventral hernia  Bowel Ssounds present  Extremities: No edema, peripheral pulses present  Neurological slightly more awake, remains nonverbal, does not follow commands   Skin: positive for anasarca            PHYSICAL EXAM:    Daily     Daily Weight in k.2 (2023 06:35)    ICU Vital Signs Last 24 Hrs  T(C): 37.8 (2023 08:50), Max: 39.3 (2023 21:15)  T(F): 100.1 (2023 08:50), Max: 102.7 (2023 21:15)  HR: 89 (2023 12:29) (87 - 115)  BP: 116/59 (2023 12:29) (100/64 - 128/57)  BP(mean): --  ABP: --  ABP(mean): --  RR: 19 (2023 10:27) (19 - 20)  SpO2: 100% (2023 10:27) (94% - 100%)    O2 Parameters below as of 2023 10:27  Patient On (Oxygen Delivery Method): nasal cannula  O2 Flow (L/min): 3                            10.9   19.72 )-----------( 510      ( 2023 06:16 )             36.7       CBC Full  -  ( 2023 06:16 )  WBC Count : 19.72 K/uL  RBC Count : 4.22 M/uL  Hemoglobin : 10.9 g/dL  Hematocrit : 36.7 %  Platelet Count - Automated : 510 K/uL  Mean Cell Volume : 87.0 fl  Mean Cell Hemoglobin : 25.8 pg  Mean Cell Hemoglobin Concentration : 29.7 gm/dL  Auto Neutrophil # : x  Auto Lymphocyte # : x  Auto Monocyte # : x  Auto Eosinophil # : x  Auto Basophil # : x  Auto Neutrophil % : x  Auto Lymphocyte % : x  Auto Monocyte % : x  Auto Eosinophil % : x  Auto Basophil % : x          142  |  111<H>  |  10  ----------------------------<  233<H>  3.8   |  26  |  0.78    Ca    8.0<L>      2023 06:16  Phos  2.7       Mg     1.8                 PT/INR - ( 2023 06:16 )   PT: 25.3 sec;   INR: 2.16 ratio                       ABG - ( 2023 12:43 )  pH, Arterial: 7.51  pH, Blood: x     /  pCO2: 35    /  pO2: 103   / HCO3: 28    / Base Excess: 4.9   /  SaO2: 99                  MEDICATIONS  (STANDING):  acetaminophen   IVPB .. 1000 milliGRAM(s) IV Intermittent every 6 hours  aMIOdarone    Tablet 200 milliGRAM(s) Oral daily  atorvastatin 20 milliGRAM(s) Oral at bedtime  buDESOnide    Inhalation Suspension 0.5 milliGRAM(s) Inhalation every 12 hours  cefepime   IVPB 2000 milliGRAM(s) IV Intermittent every 12 hours  chlorhexidine 4% Liquid 1 Application(s) Topical <User Schedule>  coronavirus bivalent (EUA) Booster Vaccine (PFIZER) 0.3 milliLiter(s) IntraMuscular once  dextrose 5%. 1000 milliLiter(s) (50 mL/Hr) IV Continuous <Continuous>  dextrose 5%. 1000 milliLiter(s) (100 mL/Hr) IV Continuous <Continuous>  dextrose 50% Injectable 25 Gram(s) IV Push once  dextrose 50% Injectable 12.5 Gram(s) IV Push once  dextrose 50% Injectable 25 Gram(s) IV Push once  diltiazem    milliGRAM(s) Oral daily  DULoxetine 60 milliGRAM(s) Oral daily  glucagon  Injectable 1 milliGRAM(s) IntraMuscular once  insulin lispro (ADMELOG) corrective regimen sliding scale   SubCutaneous every 6 hours  levothyroxine Injectable 70 MICROGram(s) IV Push at bedtime  losartan 25 milliGRAM(s) Oral daily  metoprolol tartrate IVPB 2.5 milliGRAM(s) IV Intermittent every 6 hours  metroNIDAZOLE  IVPB 500 milliGRAM(s) IV Intermittent every 8 hours  montelukast 10 milliGRAM(s) Oral daily  nystatin Cream 1 Application(s) Topical two times a day  nystatin Powder 1 Application(s) Topical every 12 hours  pantoprazole  Injectable 40 milliGRAM(s) IV Push daily  pregabalin 100 milliGRAM(s) Oral every 8 hours  sodium chloride 0.9%. 1000 milliLiter(s) (75 mL/Hr) IV Continuous <Continuous>

## 2023-01-28 NOTE — PROGRESS NOTE ADULT - ASSESSMENT
Patient admitted with abdominal pain, nausea and vomitting , dehydration  septic shock, likely related to   UTI klebsiella pneumoniae and aspiration PNA /SBO- ventral hernia  new R lung nodules - cannot ruleout aspiration PNA   Acute metabolic encephalopathy  Sepsis   hx copd without exacerbation  anasarca likely thirdspacing from poor nutrition  /acute on  chronic diastolic heart failure - now seems compensated       Plan:    -SBO- now with NGT to lwo suction  surgery to speak with family for PPN/TPN -   since lungs more clear- would consider slow IV fluids for hydration until PPN can be started   switched po meds to IV where possible ( amiodarone and cardizem on hold)  s/p IV lasix   cefepime /flagyl -for  sepsis ( SBO/UTI)  blood cx , urine cx   monitor I/O has rizzo- replaced rizzo 14fr 1/28  ABG compensated hypercarbia  hx afib, now sinustachycardia, echo ef 55% INR 2.1 hold coumadin as NPO .  BB changed to IV, cardizem, losartan and amiodarone on hold   switch to loevnox SC therpeutic dose once INR<2  pending blood work from today  monitor Cr  I dw with intensivits Yusuf BARROS 1/27 - no indication for upgrading to step down at this time , unless BP drops   I dw with son HCP at bedside who is oral surgeon - would like to continue conservative managemnt- does not seem to be in favor of agressive intervention like surgery  due to high perioperative risk-if patient declines further would consider comfort care  GIC time spent 20mins   GOC: DNR  I spoke with son hcp  Dr Castro and Dr Jamison

## 2023-01-28 NOTE — PROGRESS NOTE ADULT - SUBJECTIVE AND OBJECTIVE BOX
Date of service: 01-28-23 @ 13:51    Lying in bed in NAD  Lethargic  NGT in place  Febrile to 102.7F    ROS: poorly verbal    MEDICATIONS  (STANDING):  acetaminophen   IVPB .. 1000 milliGRAM(s) IV Intermittent every 6 hours  aMIOdarone    Tablet 200 milliGRAM(s) Oral daily  atorvastatin 20 milliGRAM(s) Oral at bedtime  buDESOnide    Inhalation Suspension 0.5 milliGRAM(s) Inhalation every 12 hours  cefepime   IVPB 2000 milliGRAM(s) IV Intermittent every 12 hours  chlorhexidine 4% Liquid 1 Application(s) Topical <User Schedule>  coronavirus bivalent (EUA) Booster Vaccine (PFIZER) 0.3 milliLiter(s) IntraMuscular once  dextrose 5%. 1000 milliLiter(s) (50 mL/Hr) IV Continuous <Continuous>  dextrose 5%. 1000 milliLiter(s) (100 mL/Hr) IV Continuous <Continuous>  dextrose 50% Injectable 25 Gram(s) IV Push once  dextrose 50% Injectable 12.5 Gram(s) IV Push once  dextrose 50% Injectable 25 Gram(s) IV Push once  diltiazem    milliGRAM(s) Oral daily  DULoxetine 60 milliGRAM(s) Oral daily  glucagon  Injectable 1 milliGRAM(s) IntraMuscular once  insulin lispro (ADMELOG) corrective regimen sliding scale   SubCutaneous every 6 hours  levothyroxine Injectable 70 MICROGram(s) IV Push at bedtime  losartan 25 milliGRAM(s) Oral daily  metoprolol tartrate IVPB 2.5 milliGRAM(s) IV Intermittent every 6 hours  metroNIDAZOLE  IVPB 500 milliGRAM(s) IV Intermittent every 8 hours  montelukast 10 milliGRAM(s) Oral daily  nystatin Cream 1 Application(s) Topical two times a day  nystatin Powder 1 Application(s) Topical every 12 hours  pantoprazole  Injectable 40 milliGRAM(s) IV Push daily  pregabalin 100 milliGRAM(s) Oral every 8 hours  sodium chloride 0.9%. 1000 milliLiter(s) (75 mL/Hr) IV Continuous <Continuous>    Vital Signs Last 24 Hrs  T(C): 37.8 (28 Jan 2023 08:50), Max: 39.3 (27 Jan 2023 21:15)  T(F): 100.1 (28 Jan 2023 08:50), Max: 102.7 (27 Jan 2023 21:15)  HR: 89 (28 Jan 2023 12:29) (87 - 115)  BP: 116/59 (28 Jan 2023 12:29) (100/64 - 128/57)  BP(mean): --  RR: 19 (28 Jan 2023 10:27) (19 - 20)  SpO2: 100% (28 Jan 2023 10:27) (94% - 100%)    Parameters below as of 28 Jan 2023 10:27  Patient On (Oxygen Delivery Method): nasal cannula  O2 Flow (L/min): 3     Physical exam:    Constitutional:  No acute distress  HEENT: NC/AT, EOMI, PERRLA, conjunctivae clear; ears and nose atraumatic  Neck: supple; thyroid not palpable  Back: no tenderness  Respiratory: respiratory effort normal; crackles at bases  Cardiovascular: S1S2 regular, no murmurs  Abdomen: soft, mid abdomen tender, mild distended, positive BS  Genitourinary: no suprapubic tenderness  Lymphatic: no LN palpable  Musculoskeletal: no muscle tenderness, no joint swelling or tenderness  Extremities: no pedal edema  Neurological/ Psychiatric: confused, judgement and insight impaired; moving all extremities  Skin: no rashes; no palpable lesions    Labs: reviewed                        10.9   19.72 )-----------( 510      ( 27 Jan 2023 06:16 )             36.7     01-27    142  |  111<H>  |  10  ----------------------------<  233<H>  3.8   |  26  |  0.78    Ca    8.0<L>      27 Jan 2023 06:16  Phos  2.7     01-27  Mg     1.8     01-27      Culture - Blood (collected 27 Jan 2023 06:16)  Source: .Blood None  Preliminary Report (28 Jan 2023 09:01):    No growth to date.    Culture - Blood (collected 27 Jan 2023 06:16)  Source: .Blood None  Preliminary Report (28 Jan 2023 09:01):    No growth to date.    Culture - Urine (collected 20 Jan 2023 20:03)  Source: Clean Catch Clean Catch (Midstream)  Final Report (25 Jan 2023 08:05):    >100,000 CFU/ml Klebsiella pneumoniae    Multiple Morphological Strains  Organism: Klebsiella pneumoniae  Klebsiella pneumoniae (25 Jan 2023 08:05)  Organism: Klebsiella pneumoniae (25 Jan 2023 08:05)      -  Amikacin: S <=16      -  Amoxicillin/Clavulanic Acid: S <=8/4      -  Ampicillin: R >16 These ampicillin results predict results for amoxicillin      -  Ampicillin/Sulbactam: S <=4/2 Enterobacter, Klebsiella aerogenes, Citrobacter, and Serratia may develop resistance during prolonged therapy (3-4 days)      -  Aztreonam: S <=4      -  Cefazolin: S <=2 For uncomplicated UTI with K. pneumoniae, E. coli, or P. mirablis: ARABELLA <=16 is sensitive and ARABELLA >=32 is resistant. This also predicts results for oral agents cefaclor, cefdinir, cefpodoxime, cefprozil, cefuroxime axetil, cephalexin and locarbef for uncomplicated UTI. Note that some isolates may be susceptible to these agents while testing resistant to cefazolin.      -  Cefepime: S <=2      -  Cefoxitin: S <=8      -  Ceftriaxone: S <=1 Enterobacter, Klebsiella aerogenes, Citrobacter, and Serratia may develop resistance during prolonged therapy      -  Cefuroxime: S <=4      -  Ciprofloxacin: S <=0.25      -  Ertapenem: S <=0.5      -  Gentamicin: S <=2      -  Imipenem: S <=1      -  Levofloxacin: S <=0.5      -  Meropenem: S <=1      -  Nitrofurantoin: S <=32 Should not be used to treat pyelonephritis      -  Piperacillin/Tazobactam: S <=8      -  Tobramycin: S <=2      -  Trimethoprim/Sulfamethoxazole: S <=0.5/9.5      Method Type: ARABELLA  Organism: Klebsiella pneumoniae (25 Jan 2023 08:05)      -  Amikacin: S <=16      -  Amoxicillin/Clavulanic Acid: S <=8/4 Consider reserving for cystitis when ampicillin/sulbactam is resistant      -  Ampicillin: R >16 These ampicillin results predict results for amoxicillin      -  Ampicillin/Sulbactam: R >16/8 Enterobacter, Klebsiella aerogenes, Citrobacter, and Serratia may develop resistance during prolonged therapy (3-4 days)      -  Aztreonam: S <=4      -  Cefazolin: R >16 For uncomplicated UTI with K. pneumoniae, E. coli, or P. mirablis: ARABELLA <=16 is sensitive and ARABELLA >=32 is resistant. This also predicts results for oral agents cefaclor, cefdinir, cefpodoxime, cefprozil, cefuroxime axetil, cephalexin and locarbef for uncomplicated UTI. Note that some isolates may be susceptible to these agents while testing resistant to cefazolin.      -  Cefepime: S <=2      -  Cefoxitin: S <=8      -  Ceftriaxone: S <=1 Enterobacter, Klebsiella aerogenes, Citrobacter, and Serratia may develop resistance during prolonged therapy      -  Cefuroxime: R >16      -  Ciprofloxacin: S <=0.25      -  Ertapenem: S <=0.5      -  Gentamicin: S <=2      -  Imipenem: S <=1      -  Levofloxacin: S <=0.5      -  Meropenem: S <=1      -  Nitrofurantoin: S <=32 Should not be used to treat pyelonephritis      -  Piperacillin/Tazobactam: R 32      -  Tobramycin: S <=2      -  Trimethoprim/Sulfamethoxazole: S <=0.5/9.5      Method Type: ARABELLA    Culture - Blood (collected 20 Jan 2023 18:02)  Source: .Blood None  Final Report (26 Jan 2023 01:00):    No Growth Final    Culture - Blood (collected 20 Jan 2023 18:01)  Source: .Blood None  Final Report (26 Jan 2023 01:00):    No Growth Final    Radiology: all available radiological tests reviewed    < from: CT Chest No Cont (01.27.23 @ 09:53) >  Small bowel obstruction with transition point at the neck of a left lower   quadrant abdominal wall hernia. It is uncertain whether the obstruction   is due to the hernia itself or to adhesions.    Additional massive ventral hernia containing small and large bowel and   epigastric hernia containing stomach.    Right lower lobe nodules new since December 04, 2021 and could be   infectious or neoplastic. Follow-up chest CT in 3 months is recommended   to reevaluate.    < end of copied text >      Advanced directives addressed: full resuscitation

## 2023-01-28 NOTE — PROGRESS NOTE ADULT - SUBJECTIVE AND OBJECTIVE BOX
More awake and alert today  VSS T 100.1  lungs- clear  cor- RRR  Abd- softer and decompressed a bit, tender to deep palp  Ext- + anasarca

## 2023-01-28 NOTE — PROGRESS NOTE ADULT - ASSESSMENT
Patient admitted with abdominal pain, nausea and vomitting , dehydration  septic shock, likely related to     PROBLEMS:    UTI klebsiella pneumoniae and aspiration PNA /SBO- ventral hernia  New R lung nodules - cannot ruleout aspiration PNA   Acute metabolic encephalopathy  Sepsis   Hx copd without exacerbation  Anasarca/acute on  chronic diastolic heart failure       Plan:    pulmonary better  NG suction  IV cefepime /flagyl -for  sepsis ( SBO/UTI)  Blood cx , urine cx   Monitor I/O has rizzo  Monitor Cr  DVT PROPHYLASIX

## 2023-01-28 NOTE — PROGRESS NOTE ADULT - ASSESSMENT
71 y/o female with h/o old CVA with left sided weakness, atrial fibrillation, emphysema, COPD, HTN, prior SBO and resection was admitted on 1/20 from [facility] for RLQ pain. She was noted with one episode of emesis and diffuse abdominal pain associated with fever. On 1/27 the patient was noted febrile to 102.8F, more lethargic, did not open eyes, did not follow commands. She received cefepime and metronidazole.     1. Febrile syndrome. UTI with KLPN. Right lower lobes nodules Probable pneumonia. SBO ?cause ?related to ventral hernia. Allergy to cipro. Encephalopathy.   -leukocytosis  -BC x 2 noted  -urine c/s noted  -on cefepime 2 gm IV q12h and metronidazole 500 mg IV q8h # 2  -tolerating abx well so far; no side effects noted  -surgical evaluation appreciated  -conservative care   -aspiration precautions  -continue abx coverage   -monitor abdomen  -monitor temps  -f/u CBC  -supportive care  2. Other issues:   -care per medicine    d/w Dr. Navarrete

## 2023-01-28 NOTE — PROVIDER CONTACT NOTE (OTHER) - ASSESSMENT
Woodruff catheter noted to have no urine output overnight into morning, large amounts of sediment noted.

## 2023-01-28 NOTE — PROGRESS NOTE ADULT - ASSESSMENT
SBO due to adhesions and chronic ventral hernia.. Sepsis originally urinary source. Now not as clear. Cont IV abx. Will  discuss if family would like to start PPN and whether they would be interested in surgery if she were to improve a bit.

## 2023-01-29 LAB
ANION GAP SERPL CALC-SCNC: 5 MMOL/L — SIGNIFICANT CHANGE UP (ref 5–17)
APTT BLD: 34.7 SEC — SIGNIFICANT CHANGE UP (ref 27.5–35.5)
BASOPHILS # BLD AUTO: 0.06 K/UL — SIGNIFICANT CHANGE UP (ref 0–0.2)
BASOPHILS NFR BLD AUTO: 0.5 % — SIGNIFICANT CHANGE UP (ref 0–2)
BUN SERPL-MCNC: 20 MG/DL — SIGNIFICANT CHANGE UP (ref 7–23)
CALCIUM SERPL-MCNC: 7.4 MG/DL — LOW (ref 8.5–10.1)
CHLORIDE SERPL-SCNC: 114 MMOL/L — HIGH (ref 96–108)
CO2 SERPL-SCNC: 29 MMOL/L — SIGNIFICANT CHANGE UP (ref 22–31)
CREAT SERPL-MCNC: 0.76 MG/DL — SIGNIFICANT CHANGE UP (ref 0.5–1.3)
EGFR: 83 ML/MIN/1.73M2 — SIGNIFICANT CHANGE UP
EOSINOPHIL # BLD AUTO: 0.16 K/UL — SIGNIFICANT CHANGE UP (ref 0–0.5)
EOSINOPHIL NFR BLD AUTO: 1.3 % — SIGNIFICANT CHANGE UP (ref 0–6)
GLUCOSE BLDC GLUCOMTR-MCNC: 110 MG/DL — HIGH (ref 70–99)
GLUCOSE BLDC GLUCOMTR-MCNC: 111 MG/DL — HIGH (ref 70–99)
GLUCOSE BLDC GLUCOMTR-MCNC: 143 MG/DL — HIGH (ref 70–99)
GLUCOSE BLDC GLUCOMTR-MCNC: 96 MG/DL — SIGNIFICANT CHANGE UP (ref 70–99)
GLUCOSE SERPL-MCNC: 127 MG/DL — HIGH (ref 70–99)
HCT VFR BLD CALC: 29.6 % — LOW (ref 34.5–45)
HGB BLD-MCNC: 8.8 G/DL — LOW (ref 11.5–15.5)
IMM GRANULOCYTES NFR BLD AUTO: 2.2 % — HIGH (ref 0–0.9)
INR BLD: 2.47 RATIO — HIGH (ref 0.88–1.16)
LYMPHOCYTES # BLD AUTO: 1.81 K/UL — SIGNIFICANT CHANGE UP (ref 1–3.3)
LYMPHOCYTES # BLD AUTO: 14.5 % — SIGNIFICANT CHANGE UP (ref 13–44)
MCHC RBC-ENTMCNC: 25.4 PG — LOW (ref 27–34)
MCHC RBC-ENTMCNC: 29.7 GM/DL — LOW (ref 32–36)
MCV RBC AUTO: 85.5 FL — SIGNIFICANT CHANGE UP (ref 80–100)
MONOCYTES # BLD AUTO: 0.71 K/UL — SIGNIFICANT CHANGE UP (ref 0–0.9)
MONOCYTES NFR BLD AUTO: 5.7 % — SIGNIFICANT CHANGE UP (ref 2–14)
NEUTROPHILS # BLD AUTO: 9.5 K/UL — HIGH (ref 1.8–7.4)
NEUTROPHILS NFR BLD AUTO: 75.8 % — SIGNIFICANT CHANGE UP (ref 43–77)
PLATELET # BLD AUTO: 446 K/UL — HIGH (ref 150–400)
POTASSIUM SERPL-MCNC: 3.4 MMOL/L — LOW (ref 3.5–5.3)
POTASSIUM SERPL-SCNC: 3.4 MMOL/L — LOW (ref 3.5–5.3)
PROTHROM AB SERPL-ACNC: 28.9 SEC — HIGH (ref 10.5–13.4)
RBC # BLD: 3.46 M/UL — LOW (ref 3.8–5.2)
RBC # FLD: 19.2 % — HIGH (ref 10.3–14.5)
SODIUM SERPL-SCNC: 148 MMOL/L — HIGH (ref 135–145)
WBC # BLD: 12.52 K/UL — HIGH (ref 3.8–10.5)
WBC # FLD AUTO: 12.52 K/UL — HIGH (ref 3.8–10.5)

## 2023-01-29 PROCEDURE — 99233 SBSQ HOSP IP/OBS HIGH 50: CPT

## 2023-01-29 RX ORDER — INSULIN LISPRO 100/ML
VIAL (ML) SUBCUTANEOUS EVERY 6 HOURS
Refills: 0 | Status: DISCONTINUED | OUTPATIENT
Start: 2023-01-29 | End: 2023-02-02

## 2023-01-29 RX ORDER — SODIUM CHLORIDE 9 MG/ML
1000 INJECTION INTRAMUSCULAR; INTRAVENOUS; SUBCUTANEOUS
Refills: 0 | Status: DISCONTINUED | OUTPATIENT
Start: 2023-01-29 | End: 2023-01-31

## 2023-01-29 RX ORDER — POTASSIUM CHLORIDE 20 MEQ
10 PACKET (EA) ORAL ONCE
Refills: 0 | Status: COMPLETED | OUTPATIENT
Start: 2023-01-29 | End: 2023-01-29

## 2023-01-29 RX ADMIN — Medication 105 MILLIGRAM(S): at 01:34

## 2023-01-29 RX ADMIN — Medication 105 MILLIGRAM(S): at 21:34

## 2023-01-29 RX ADMIN — Medication 0.5 MILLIGRAM(S): at 20:36

## 2023-01-29 RX ADMIN — SODIUM CHLORIDE 75 MILLILITER(S): 9 INJECTION INTRAMUSCULAR; INTRAVENOUS; SUBCUTANEOUS at 02:07

## 2023-01-29 RX ADMIN — CEFEPIME 100 MILLIGRAM(S): 1 INJECTION, POWDER, FOR SOLUTION INTRAMUSCULAR; INTRAVENOUS at 10:12

## 2023-01-29 RX ADMIN — NYSTATIN CREAM 1 APPLICATION(S): 100000 CREAM TOPICAL at 05:54

## 2023-01-29 RX ADMIN — PANTOPRAZOLE SODIUM 40 MILLIGRAM(S): 20 TABLET, DELAYED RELEASE ORAL at 10:15

## 2023-01-29 RX ADMIN — Medication 105 MILLIGRAM(S): at 08:02

## 2023-01-29 RX ADMIN — Medication 100 MILLIGRAM(S): at 13:42

## 2023-01-29 RX ADMIN — CEFEPIME 100 MILLIGRAM(S): 1 INJECTION, POWDER, FOR SOLUTION INTRAMUSCULAR; INTRAVENOUS at 21:02

## 2023-01-29 RX ADMIN — CHLORHEXIDINE GLUCONATE 1 APPLICATION(S): 213 SOLUTION TOPICAL at 05:53

## 2023-01-29 RX ADMIN — Medication 100 MILLIGRAM(S): at 22:08

## 2023-01-29 RX ADMIN — SODIUM CHLORIDE 50 MILLILITER(S): 9 INJECTION INTRAMUSCULAR; INTRAVENOUS; SUBCUTANEOUS at 18:51

## 2023-01-29 RX ADMIN — Medication 70 MICROGRAM(S): at 21:41

## 2023-01-29 RX ADMIN — NYSTATIN CREAM 1 APPLICATION(S): 100000 CREAM TOPICAL at 17:12

## 2023-01-29 RX ADMIN — Medication 0.5 MILLIGRAM(S): at 08:47

## 2023-01-29 RX ADMIN — NYSTATIN CREAM 1 APPLICATION(S): 100000 CREAM TOPICAL at 21:09

## 2023-01-29 RX ADMIN — Medication 105 MILLIGRAM(S): at 16:21

## 2023-01-29 RX ADMIN — Medication 100 MILLIGRAM(S): at 07:00

## 2023-01-29 RX ADMIN — Medication 100 MILLIEQUIVALENT(S): at 15:06

## 2023-01-29 RX ADMIN — NYSTATIN CREAM 1 APPLICATION(S): 100000 CREAM TOPICAL at 10:13

## 2023-01-29 NOTE — PROGRESS NOTE ADULT - SUBJECTIVE AND OBJECTIVE BOX
71 y/o female with a PMHx o             CVA with left sided weakness               atrial fibrillation on Coumadin              OPD  - wears 2L NC at baseline   was brought in from skilled nursing facility for abdominal pain emesis and weakness  In ED CT abdomen Pelvis showed,  Large ventral hernia with SBO  lactate 3.0  had ALVERTO creatinine to 1.9 improved with hydration  had cvp placed   has pyuria but no complaints of urinary frequency  1/24: No events over night, less pain on exam today, tolerated clears  1/25: No events over night  1/26 pt has decreased po intake, passing flatus , arousable, confused   1/27 more lethargic, makes sounds to tactile stimuli, does not open eyes, does not follow commands , , gurgling sounds , EKG sinustachycardia, BP stable  1/28 slightly more awake, opens eyes spontaneous . non verbal does not follow commands , rizzo obstructed , flushed  300cc output, urine with sediments - hence replaced rizzo catheter , now draining clearer urine , has NGtube, respiratory wise stable,   1/29 more awake today, spokea few words, says not short of breath, replaced rizzo again , on 3L, no respiratory distress      PHYSICAL EXAM:Constitutional- sleeping but  opens eyes ,  HEENT: Atraumatic, REYES, Normal, No congestion  Respiratory:B/L crackles ,  no rhonchi/wheeze  Cardiovascular: N S1S2;   Gastrointestinal: Abdomen soft, positive for ventral hernia  Bowel Ssounds present  Extremities: No edema, peripheral pulses present  Neurological slightly more awake, remains nonverbal, does not follow commands   Skin: positive for anasarca            PHYSICAL EXAM:    Daily     Daily     ICU Vital Signs Last 24 Hrs  T(C): 36.5 (29 Jan 2023 07:43), Max: 37.3 (28 Jan 2023 15:32)  T(F): 97.7 (29 Jan 2023 07:43), Max: 99.2 (28 Jan 2023 15:32)  HR: 88 (29 Jan 2023 07:43) (73 - 88)  BP: 132/60 (29 Jan 2023 07:43) (111/56 - 132/66)  BP(mean): --  ABP: --  ABP(mean): --  RR: 18 (29 Jan 2023 07:43) (18 - 19)  SpO2: 94% (29 Jan 2023 07:43) (94% - 100%)    O2 Parameters below as of 29 Jan 2023 07:43  Patient On (Oxygen Delivery Method): nasal cannula  O2 Flow (L/min): 3                                8.8    12.52 )-----------( 446      ( 29 Jan 2023 06:57 )             29.6       CBC Full  -  ( 29 Jan 2023 06:57 )  WBC Count : 12.52 K/uL  RBC Count : 3.46 M/uL  Hemoglobin : 8.8 g/dL  Hematocrit : 29.6 %  Platelet Count - Automated : 446 K/uL  Mean Cell Volume : 85.5 fl  Mean Cell Hemoglobin : 25.4 pg  Mean Cell Hemoglobin Concentration : 29.7 gm/dL  Auto Neutrophil # : 9.50 K/uL  Auto Lymphocyte # : 1.81 K/uL  Auto Monocyte # : 0.71 K/uL  Auto Eosinophil # : 0.16 K/uL  Auto Basophil # : 0.06 K/uL  Auto Neutrophil % : 75.8 %  Auto Lymphocyte % : 14.5 %  Auto Monocyte % : 5.7 %  Auto Eosinophil % : 1.3 %  Auto Basophil % : 0.5 %      01-29    148<H>  |  114<H>  |  20  ----------------------------<  127<H>  3.4<L>   |  29  |  0.76    Ca    7.4<L>      29 Jan 2023 06:57    TPro  4.9<L>  /  Alb  1.3<L>  /  TBili  0.3  /  DBili  0.1  /  AST  14<L>  /  ALT  12  /  AlkPhos  65  01-28      LIVER FUNCTIONS - ( 28 Jan 2023 19:52 )  Alb: 1.3 g/dL / Pro: 4.9 gm/dL / ALK PHOS: 65 U/L / ALT: 12 U/L / AST: 14 U/L / GGT: x             PT/INR - ( 29 Jan 2023 06:57 )   PT: 28.9 sec;   INR: 2.47 ratio         PTT - ( 29 Jan 2023 06:57 )  PTT:34.7 sec                  MEDICATIONS  (STANDING):  aMIOdarone    Tablet 200 milliGRAM(s) Oral daily  atorvastatin 20 milliGRAM(s) Oral at bedtime  buDESOnide    Inhalation Suspension 0.5 milliGRAM(s) Inhalation every 12 hours  cefepime   IVPB 2000 milliGRAM(s) IV Intermittent every 12 hours  chlorhexidine 4% Liquid 1 Application(s) Topical <User Schedule>  coronavirus bivalent (EUA) Booster Vaccine (PFIZER) 0.3 milliLiter(s) IntraMuscular once  dextrose 5%. 1000 milliLiter(s) (100 mL/Hr) IV Continuous <Continuous>  dextrose 5%. 1000 milliLiter(s) (50 mL/Hr) IV Continuous <Continuous>  dextrose 50% Injectable 25 Gram(s) IV Push once  dextrose 50% Injectable 12.5 Gram(s) IV Push once  dextrose 50% Injectable 25 Gram(s) IV Push once  diltiazem    milliGRAM(s) Oral daily  DULoxetine 60 milliGRAM(s) Oral daily  glucagon  Injectable 1 milliGRAM(s) IntraMuscular once  insulin lispro (ADMELOG) corrective regimen sliding scale   SubCutaneous three times a day before meals  levothyroxine Injectable 70 MICROGram(s) IV Push at bedtime  losartan 25 milliGRAM(s) Oral daily  metoprolol tartrate IVPB 2.5 milliGRAM(s) IV Intermittent every 6 hours  metroNIDAZOLE  IVPB 500 milliGRAM(s) IV Intermittent every 8 hours  montelukast 10 milliGRAM(s) Oral daily  nystatin Cream 1 Application(s) Topical two times a day  nystatin Powder 1 Application(s) Topical every 12 hours  pantoprazole  Injectable 40 milliGRAM(s) IV Push daily  pregabalin 100 milliGRAM(s) Oral every 8 hours

## 2023-01-29 NOTE — PROGRESS NOTE ADULT - ASSESSMENT
Stable. Improved following NGT decompression and hydration. Discuss TPN and surgery with family. Cont abx.

## 2023-01-29 NOTE — PROGRESS NOTE ADULT - ASSESSMENT
1/29 - INR 2.4 stop lovenox SC for  stop IVF -more lung crackles today from volume overload- however no acute respiratory distress- hence keep fluid vol  status quo- stop IVF, hold off on lasix today, if gets more hypoxic will give Iv lasix prn   on NGT suction   IV LCL replace  replaced rizzo again due to blockage of catheter from sediments - switch from 14 fr to 16 or 18      Patient admitted with abdominal pain, nausea and vomitting , dehydration  septic shock, likely related to   UTI klebsiella pneumoniae and aspiration PNA /SBO- ventral hernia  new R lung nodules - cannot ruleout aspiration PNA   Acute metabolic encephalopathy  Sepsis   hx copd without exacerbation  anasarca likely thirdspacing from poor nutrition  /acute on  chronic diastolic heart failure - now seems compensated       Plan:    -SBO- now with NGT to lwo suction  surgery to speak with family for PPN/TPN -   since lungs more clear- would consider slow IV fluids for hydration until PPN can be started   switched po meds to IV where possible ( amiodarone and cardizem on hold)  s/p IV lasix   cefepime /flagyl -for  sepsis ( SBO/UTI)  blood cx , urine cx   monitor I/O has rizzo- replaced rizzo 14fr 1/28  ABG compensated hypercarbia  hx afib, now sinustachycardia, echo ef 55% INR 2.1 hold coumadin as NPO .  BB changed to IV, cardizem, losartan and amiodarone on hold   switch to loevnox SC therpeutic dose once INR<2    monitor Cr  I dw with intensivits Yusuf BARROS 1/27 - no indication for upgrading to step down at this time , unless BP drops   I dw with son HCP at bedside who is oral surgeon - would like to continue conservative managemnt- does not seem to be in favor of agressive intervention like surgery  due to high perioperative risk-if patient declines further would consider comfort care  GIC time spent 20mins   GOC: DNR  I spoke with son hcp  Dr Castro and Dr Jamison

## 2023-01-29 NOTE — PROGRESS NOTE ADULT - SUBJECTIVE AND OBJECTIVE BOX
Date of service: 01-29-23 @ 15:36    Lying in bed in NAD  Lethargic  NGT in place    ROS: no fever or chills; denies pain, limited    MEDICATIONS  (STANDING):  aMIOdarone    Tablet 200 milliGRAM(s) Oral daily  atorvastatin 20 milliGRAM(s) Oral at bedtime  buDESOnide    Inhalation Suspension 0.5 milliGRAM(s) Inhalation every 12 hours  cefepime   IVPB 2000 milliGRAM(s) IV Intermittent every 12 hours  chlorhexidine 4% Liquid 1 Application(s) Topical <User Schedule>  coronavirus bivalent (EUA) Booster Vaccine (PFIZER) 0.3 milliLiter(s) IntraMuscular once  dextrose 5%. 1000 milliLiter(s) (100 mL/Hr) IV Continuous <Continuous>  dextrose 5%. 1000 milliLiter(s) (50 mL/Hr) IV Continuous <Continuous>  dextrose 50% Injectable 25 Gram(s) IV Push once  dextrose 50% Injectable 25 Gram(s) IV Push once  dextrose 50% Injectable 12.5 Gram(s) IV Push once  diltiazem    milliGRAM(s) Oral daily  DULoxetine 60 milliGRAM(s) Oral daily  glucagon  Injectable 1 milliGRAM(s) IntraMuscular once  insulin lispro (ADMELOG) corrective regimen sliding scale   SubCutaneous three times a day before meals  levothyroxine Injectable 70 MICROGram(s) IV Push at bedtime  losartan 25 milliGRAM(s) Oral daily  metoprolol tartrate IVPB 2.5 milliGRAM(s) IV Intermittent every 6 hours  metroNIDAZOLE  IVPB 500 milliGRAM(s) IV Intermittent every 8 hours  montelukast 10 milliGRAM(s) Oral daily  nystatin Cream 1 Application(s) Topical two times a day  nystatin Powder 1 Application(s) Topical every 12 hours  pantoprazole  Injectable 40 milliGRAM(s) IV Push daily  pregabalin 100 milliGRAM(s) Oral every 8 hours    Vital Signs Last 24 Hrs  T(C): 36.5 (29 Jan 2023 07:43), Max: 37.1 (28 Jan 2023 23:15)  T(F): 97.7 (29 Jan 2023 07:43), Max: 98.7 (28 Jan 2023 23:15)  HR: 88 (29 Jan 2023 07:43) (73 - 88)  BP: 132/60 (29 Jan 2023 07:43) (111/56 - 132/66)  BP(mean): --  RR: 18 (29 Jan 2023 07:43) (18 - 19)  SpO2: 94% (29 Jan 2023 07:43) (94% - 99%)    Parameters below as of 29 Jan 2023 07:43  Patient On (Oxygen Delivery Method): nasal cannula  O2 Flow (L/min): 3     Physical exam:    Constitutional:  No acute distress  HEENT: NC/AT, EOMI, PERRLA, conjunctivae clear; ears and nose atraumatic  Neck: supple; thyroid not palpable  Back: no tenderness  Respiratory: respiratory effort normal; crackles at bases  Cardiovascular: S1S2 regular, no murmurs  Abdomen: soft, mid abdomen tender, less distended, positive BS  Genitourinary: no suprapubic tenderness  Lymphatic: no LN palpable  Musculoskeletal: no muscle tenderness, no joint swelling or tenderness  Extremities: no pedal edema  Neurological/ Psychiatric: confused, judgement and insight impaired; moving all extremities  Skin: no rashes; no palpable lesions    Labs: reviewed                        8.8    12.52 )-----------( 446      ( 29 Jan 2023 06:57 )             29.6     01-29    148<H>  |  114<H>  |  20  ----------------------------<  127<H>  3.4<L>   |  29  |  0.76    Ca    7.4<L>      29 Jan 2023 06:57    TPro  4.9<L>  /  Alb  1.3<L>  /  TBili  0.3  /  DBili  0.1  /  AST  14<L>  /  ALT  12  /  AlkPhos  65  01-28                        10.9   19.72 )-----------( 510      ( 27 Jan 2023 06:16 )             36.7     01-27    142  |  111<H>  |  10  ----------------------------<  233<H>  3.8   |  26  |  0.78    Ca    8.0<L>      27 Jan 2023 06:16  Phos  2.7     01-27  Mg     1.8     01-27      Culture - Blood (collected 27 Jan 2023 06:16)  Source: .Blood None  Preliminary Report (28 Jan 2023 09:01):    No growth to date.    Culture - Blood (collected 27 Jan 2023 06:16)  Source: .Blood None  Preliminary Report (28 Jan 2023 09:01):    No growth to date.    Culture - Urine (collected 20 Jan 2023 20:03)  Source: Clean Catch Clean Catch (Midstream)  Final Report (25 Jan 2023 08:05):    >100,000 CFU/ml Klebsiella pneumoniae    Multiple Morphological Strains  Organism: Klebsiella pneumoniae  Klebsiella pneumoniae (25 Jan 2023 08:05)  Organism: Klebsiella pneumoniae (25 Jan 2023 08:05)      -  Amikacin: S <=16      -  Amoxicillin/Clavulanic Acid: S <=8/4      -  Ampicillin: R >16 These ampicillin results predict results for amoxicillin      -  Ampicillin/Sulbactam: S <=4/2 Enterobacter, Klebsiella aerogenes, Citrobacter, and Serratia may develop resistance during prolonged therapy (3-4 days)      -  Aztreonam: S <=4      -  Cefazolin: S <=2 For uncomplicated UTI with K. pneumoniae, E. coli, or P. mirablis: ARABELLA <=16 is sensitive and ARABELLA >=32 is resistant. This also predicts results for oral agents cefaclor, cefdinir, cefpodoxime, cefprozil, cefuroxime axetil, cephalexin and locarbef for uncomplicated UTI. Note that some isolates may be susceptible to these agents while testing resistant to cefazolin.      -  Cefepime: S <=2      -  Cefoxitin: S <=8      -  Ceftriaxone: S <=1 Enterobacter, Klebsiella aerogenes, Citrobacter, and Serratia may develop resistance during prolonged therapy      -  Cefuroxime: S <=4      -  Ciprofloxacin: S <=0.25      -  Ertapenem: S <=0.5      -  Gentamicin: S <=2      -  Imipenem: S <=1      -  Levofloxacin: S <=0.5      -  Meropenem: S <=1      -  Nitrofurantoin: S <=32 Should not be used to treat pyelonephritis      -  Piperacillin/Tazobactam: S <=8      -  Tobramycin: S <=2      -  Trimethoprim/Sulfamethoxazole: S <=0.5/9.5      Method Type: ARABELLA  Organism: Klebsiella pneumoniae (25 Jan 2023 08:05)      -  Amikacin: S <=16      -  Amoxicillin/Clavulanic Acid: S <=8/4 Consider reserving for cystitis when ampicillin/sulbactam is resistant      -  Ampicillin: R >16 These ampicillin results predict results for amoxicillin      -  Ampicillin/Sulbactam: R >16/8 Enterobacter, Klebsiella aerogenes, Citrobacter, and Serratia may develop resistance during prolonged therapy (3-4 days)      -  Aztreonam: S <=4      -  Cefazolin: R >16 For uncomplicated UTI with K. pneumoniae, E. coli, or P. mirablis: ARABELLA <=16 is sensitive and ARABELLA >=32 is resistant. This also predicts results for oral agents cefaclor, cefdinir, cefpodoxime, cefprozil, cefuroxime axetil, cephalexin and locarbef for uncomplicated UTI. Note that some isolates may be susceptible to these agents while testing resistant to cefazolin.      -  Cefepime: S <=2      -  Cefoxitin: S <=8      -  Ceftriaxone: S <=1 Enterobacter, Klebsiella aerogenes, Citrobacter, and Serratia may develop resistance during prolonged therapy      -  Cefuroxime: R >16      -  Ciprofloxacin: S <=0.25      -  Ertapenem: S <=0.5      -  Gentamicin: S <=2      -  Imipenem: S <=1      -  Levofloxacin: S <=0.5      -  Meropenem: S <=1      -  Nitrofurantoin: S <=32 Should not be used to treat pyelonephritis      -  Piperacillin/Tazobactam: R 32      -  Tobramycin: S <=2      -  Trimethoprim/Sulfamethoxazole: S <=0.5/9.5      Method Type: ARABELLA    Culture - Blood (collected 20 Jan 2023 18:02)  Source: .Blood None  Final Report (26 Jan 2023 01:00):    No Growth Final    Culture - Blood (collected 20 Jan 2023 18:01)  Source: .Blood None  Final Report (26 Jan 2023 01:00):    No Growth Final    Radiology: all available radiological tests reviewed    < from: CT Chest No Cont (01.27.23 @ 09:53) >  Small bowel obstruction with transition point at the neck of a left lower   quadrant abdominal wall hernia. It is uncertain whether the obstruction   is due to the hernia itself or to adhesions.    Additional massive ventral hernia containing small and large bowel and   epigastric hernia containing stomach.    Right lower lobe nodules new since December 04, 2021 and could be   infectious or neoplastic. Follow-up chest CT in 3 months is recommended   to reevaluate.    < end of copied text >      Advanced directives addressed: full resuscitation

## 2023-01-29 NOTE — PROGRESS NOTE ADULT - SUBJECTIVE AND OBJECTIVE BOX
Feels well, more awake, alert, responds  VSS afeb  lungs- few rhonchi  Cor- RRR  Abd- + BS soft less distended  Ext- + anasarca

## 2023-01-29 NOTE — PROGRESS NOTE ADULT - SUBJECTIVE AND OBJECTIVE BOX
Subjective:    pat better, lying in bed, awake & alert, NG suction.    Home Medications:  Albuterol (Eqv-ProAir HFA) 90 mcg/inh inhalation aerosol: 1 puff(s) inhaled every 6 hours, As Needed (20 Jan 2023 16:49)  amiodarone 200 mg oral tablet: 1 tab(s) orally once a day (20 Jan 2023 16:49)  ascorbic acid 500 mg oral tablet: 2 tab(s) orally once a day (20 Jan 2023 16:49)  atorvastatin 10 mg oral tablet: 1 tab(s) orally once a day (at bedtime) (20 Jan 2023 16:49)  benzonatate 100 mg oral capsule: 1 cap(s) orally 3 times a day (20 Jan 2023 21:48)  budesonide 0.5 mg/2 mL inhalation suspension: 2 milliliter(s) inhaled 2 times a day (20 Jan 2023 21:48)  Cepacol Sore Throat 15 mg-3.6 mg mucous membrane lozenge: 1 lozenge mucous membrane every 4 hours, As Needed (20 Jan 2023 21:48)  cholecalciferol 1250 mcg (50,000 intl units) oral capsule: 1 cap(s) orally every 4 weeks on Wednesday  (20 Jan 2023 16:49)  Coumadin 2.5 mg oral tablet: 1 tab(s) orally once a day (at bedtime)  ***ON HOLD from 1-16 to 1-21*** (20 Jan 2023 21:48)  Cranberry oral tablet: 1 tab(s) orally 2 times a day (20 Jan 2023 16:49)  cyanocobalamin 1000 mcg oral tablet: 1 tab(s) orally once a day (20 Jan 2023 16:49)  dilTIAZem 180 mg/24 hours oral capsule, extended release: 1 cap(s) orally once a day (20 Jan 2023 16:49)  DULoxetine 60 mg oral delayed release capsule: 1 cap(s) orally once a day (20 Jan 2023 16:49)  estradiol 0.1 mg/g vaginal cream: 0.5 gram(s) vaginal 2 times a week on Monday and Thursday (20 Jan 2023 21:48)  fexofenadine 180 mg oral tablet: 1 tab(s) orally once a day (20 Jan 2023 21:48)  fluticasone 50 mcg/inh nasal spray: 1 spray(s) nasal 2 times a day (20 Jan 2023 16:49)  furosemide 20 mg oral tablet: 1 tab(s) orally once a day (20 Jan 2023 16:49)  glipiZIDE 10 mg oral tablet, extended release: 2 tab(s) orally once a day (20 Jan 2023 16:49)  GlycoLax oral powder for reconstitution: 17 gram(s) orally 2 times a day (20 Jan 2023 16:49)  guaiFENesin 100 mg/5 mL oral liquid: 20 milliliter(s) orally every 6 hours (20 Jan 2023 21:48)  HumaLOG KwikPen 100 units/mL injectable solution: 10 unit(s) injectable 3 times a day (before meals) (20 Jan 2023 21:48)  ipratropium-albuterol 20 mcg-100 mcg/inh inhalation aerosol: 1 puff(s) inhaled 4 times a day (20 Jan 2023 16:49)  levothyroxine 88 mcg (0.088 mg) oral tablet: 1 tab(s) orally once a day (at bedtime) (20 Jan 2023 16:49)  losartan 25 mg oral tablet: 1 tab(s) orally once a day (20 Jan 2023 16:49)  Macrobid 100 mg oral capsule: 1 cap(s) orally 2 times a day for 5 days  ***course complete*** (20 Jan 2023 21:48)  methocarbamol 750 mg oral tablet: 1 tab(s) orally every 8 hours, As Needed (20 Jan 2023 21:48)  metoprolol tartrate 25 mg oral tablet: 1 tab(s) orally 2 times a day (20 Jan 2023 16:49)  Milk of Magnesia 8% oral suspension: 30 milliliter(s) orally once a day, As Needed (20 Jan 2023 16:49)  montelukast 10 mg oral tablet: 1 tab(s) orally once a day (at bedtime) (20 Jan 2023 21:48)  ondansetron 4 mg oral tablet: 1 tab(s) orally every 4 hours, As Needed (20 Jan 2023 21:48)  oxybutynin 5 mg oral tablet: 1 tab(s) orally 2 times a day (20 Jan 2023 16:49)  oxyCODONE 5 mg oral tablet: 1 tab(s) orally every 4 hours, As Needed (20 Jan 2023 16:49)  phytonadione 5 mg oral tablet: 0.5 tab(s) orally once  ***given at [facility] once on 1-19-23*** (20 Jan 2023 21:48)  pregabalin 100 mg oral capsule: 1 cap(s) orally 3 times a day (20 Jan 2023 16:49)  sennosides-docusate 8.6 mg-50 mg oral tablet: 2 tab(s) orally once a day (at bedtime) (20 Jan 2023 16:49)  Tradjenta 5 mg oral tablet: 1 tab(s) orally once a day (20 Jan 2023 16:49)  Tylenol 500 mg oral tablet: 2 tab(s) orally every 8 hours (20 Jan 2023 16:49)    MEDICATIONS  (STANDING):  aMIOdarone    Tablet 200 milliGRAM(s) Oral daily  atorvastatin 20 milliGRAM(s) Oral at bedtime  buDESOnide    Inhalation Suspension 0.5 milliGRAM(s) Inhalation every 12 hours  cefepime   IVPB 2000 milliGRAM(s) IV Intermittent every 12 hours  chlorhexidine 4% Liquid 1 Application(s) Topical <User Schedule>  coronavirus bivalent (EUA) Booster Vaccine (PFIZER) 0.3 milliLiter(s) IntraMuscular once  dextrose 5%. 1000 milliLiter(s) (50 mL/Hr) IV Continuous <Continuous>  dextrose 5%. 1000 milliLiter(s) (100 mL/Hr) IV Continuous <Continuous>  dextrose 50% Injectable 25 Gram(s) IV Push once  dextrose 50% Injectable 12.5 Gram(s) IV Push once  dextrose 50% Injectable 25 Gram(s) IV Push once  diltiazem    milliGRAM(s) Oral daily  DULoxetine 60 milliGRAM(s) Oral daily  enoxaparin Injectable 40 milliGRAM(s) SubCutaneous every 24 hours  glucagon  Injectable 1 milliGRAM(s) IntraMuscular once  insulin lispro (ADMELOG) corrective regimen sliding scale   SubCutaneous three times a day before meals  levothyroxine Injectable 70 MICROGram(s) IV Push at bedtime  losartan 25 milliGRAM(s) Oral daily  metoprolol tartrate IVPB 2.5 milliGRAM(s) IV Intermittent every 6 hours  metroNIDAZOLE  IVPB 500 milliGRAM(s) IV Intermittent every 8 hours  montelukast 10 milliGRAM(s) Oral daily  nystatin Cream 1 Application(s) Topical two times a day  nystatin Powder 1 Application(s) Topical every 12 hours  pantoprazole  Injectable 40 milliGRAM(s) IV Push daily  pregabalin 100 milliGRAM(s) Oral every 8 hours    MEDICATIONS  (PRN):  albuterol    90 MICROgram(s) HFA Inhaler 2 Puff(s) Inhalation every 6 hours PRN Shortness of Breath and/or Wheezing  dextrose Oral Gel 15 Gram(s) Oral once PRN Blood Glucose LESS THAN 70 milliGRAM(s)/deciliter  oxybutynin 5 milliGRAM(s) Oral every 12 hours PRN Bladder spasms  sodium chloride 0.9% lock flush 10 milliLiter(s) IV Push every 1 hour PRN Pre/post blood products, medications, blood draw, and to maintain line patency      Allergies    Cipro (Rash)  Spiriva (Rash)    Intolerances        Vital Signs Last 24 Hrs  T(C): 36.5 (29 Jan 2023 07:43), Max: 37.3 (28 Jan 2023 15:32)  T(F): 97.7 (29 Jan 2023 07:43), Max: 99.2 (28 Jan 2023 15:32)  HR: 88 (29 Jan 2023 07:43) (73 - 88)  BP: 132/60 (29 Jan 2023 07:43) (111/56 - 132/66)  BP(mean): --  RR: 18 (29 Jan 2023 07:43) (18 - 19)  SpO2: 94% (29 Jan 2023 07:43) (94% - 100%)    Parameters below as of 29 Jan 2023 07:43  Patient On (Oxygen Delivery Method): nasal cannula  O2 Flow (L/min): 3        PHYSICAL EXAMINATION:    NECK:  Supple. No lymphadenopathy. Jugular venous pressure not elevated. Carotids equal.   HEART:   The cardiac impulse has a normal quality. Reg., Nl S1 and S2.  There are no murmurs, rubs or gallops noted  CHEST:  Chest crackles to auscultation. Normal respiratory effort.  ABDOMEN:  Soft and nontender.   EXTREMITIES:  There is no edema.       LABS:                        8.8    12.52 )-----------( 446      ( 29 Jan 2023 06:57 )             29.6     01-29    148<H>  |  114<H>  |  20  ----------------------------<  127<H>  3.4<L>   |  29  |  0.76    Ca    7.4<L>      29 Jan 2023 06:57    TPro  4.9<L>  /  Alb  1.3<L>  /  TBili  0.3  /  DBili  0.1  /  AST  14<L>  /  ALT  12  /  AlkPhos  65  01-28    PT/INR - ( 29 Jan 2023 06:57 )   PT: 28.9 sec;   INR: 2.47 ratio         PTT - ( 29 Jan 2023 06:57 )  PTT:34.7 sec

## 2023-01-29 NOTE — PROGRESS NOTE ADULT - ASSESSMENT
71 y/o female with h/o old CVA with left sided weakness, atrial fibrillation, emphysema, COPD, HTN, prior SBO and resection was admitted on 1/20 from [facility] for RLQ pain. She was noted with one episode of emesis and diffuse abdominal pain associated with fever. On 1/27 the patient was noted febrile to 102.8F, more lethargic, did not open eyes, did not follow commands. She received cefepime and metronidazole.     1. Febrile syndrome. UTI with KLPN. Right lower lobes nodules Probable pneumonia. SBO ?cause ?related to ventral hernia. Allergy to cipro. Encephalopathy.   -leukocytosis improving  -abdomen is softer  -BC x 2 noted  -urine c/s noted  -on cefepime 2 gm IV q12h and metronidazole 500 mg IV q8h # 3  -tolerating abx well so far; no side effects noted  -surgical evaluation appreciated  -conservative care   -aspiration precautions  -continue abx coverage   -monitor abdomen  -monitor temps  -f/u CBC  -supportive care  2. Other issues:   -care per medicine    d/w Dr. Navarrete

## 2023-01-29 NOTE — PROGRESS NOTE ADULT - ASSESSMENT
Patient admitted with abdominal pain, nausea and vomitting , dehydration  septic shock, likely related to     PROBLEMS:    UTI klebsiella pneumoniae and aspiration PNA /SBO- ventral hernia  New R lung nodules - cannot ruleout aspiration PNA   Acute metabolic encephalopathy  Sepsis   Hx copd without exacerbation  Anasarca/acute on  chronic diastolic heart failure       Plan:    pulmonary better  NG suction  IV cefepime /flagyl -for  sepsis ( SBO/UTI)  Blood cx , urine cx   Monitor I/O has rizzo  Monitor Cr  D/w staff  DVT PROPHYLASIX

## 2023-01-30 LAB
ANION GAP SERPL CALC-SCNC: 4 MMOL/L — LOW (ref 5–17)
BASOPHILS # BLD AUTO: 0.09 K/UL — SIGNIFICANT CHANGE UP (ref 0–0.2)
BASOPHILS NFR BLD AUTO: 0.9 % — SIGNIFICANT CHANGE UP (ref 0–2)
BUN SERPL-MCNC: 19 MG/DL — SIGNIFICANT CHANGE UP (ref 7–23)
CALCIUM SERPL-MCNC: 7.7 MG/DL — LOW (ref 8.5–10.1)
CHLORIDE SERPL-SCNC: 115 MMOL/L — HIGH (ref 96–108)
CO2 SERPL-SCNC: 29 MMOL/L — SIGNIFICANT CHANGE UP (ref 22–31)
CREAT SERPL-MCNC: 0.59 MG/DL — SIGNIFICANT CHANGE UP (ref 0.5–1.3)
EGFR: 96 ML/MIN/1.73M2 — SIGNIFICANT CHANGE UP
EOSINOPHIL # BLD AUTO: 0.11 K/UL — SIGNIFICANT CHANGE UP (ref 0–0.5)
EOSINOPHIL NFR BLD AUTO: 1.1 % — SIGNIFICANT CHANGE UP (ref 0–6)
GLUCOSE BLDC GLUCOMTR-MCNC: 107 MG/DL — HIGH (ref 70–99)
GLUCOSE BLDC GLUCOMTR-MCNC: 107 MG/DL — HIGH (ref 70–99)
GLUCOSE BLDC GLUCOMTR-MCNC: 108 MG/DL — HIGH (ref 70–99)
GLUCOSE BLDC GLUCOMTR-MCNC: 96 MG/DL — SIGNIFICANT CHANGE UP (ref 70–99)
GLUCOSE SERPL-MCNC: 105 MG/DL — HIGH (ref 70–99)
HCT VFR BLD CALC: 32.9 % — LOW (ref 34.5–45)
HGB BLD-MCNC: 9.9 G/DL — LOW (ref 11.5–15.5)
IMM GRANULOCYTES NFR BLD AUTO: 2.3 % — HIGH (ref 0–0.9)
INR BLD: 2.07 RATIO — HIGH (ref 0.88–1.16)
LYMPHOCYTES # BLD AUTO: 2.29 K/UL — SIGNIFICANT CHANGE UP (ref 1–3.3)
LYMPHOCYTES # BLD AUTO: 22 % — SIGNIFICANT CHANGE UP (ref 13–44)
MCHC RBC-ENTMCNC: 26.1 PG — LOW (ref 27–34)
MCHC RBC-ENTMCNC: 30.1 GM/DL — LOW (ref 32–36)
MCV RBC AUTO: 86.6 FL — SIGNIFICANT CHANGE UP (ref 80–100)
MONOCYTES # BLD AUTO: 0.58 K/UL — SIGNIFICANT CHANGE UP (ref 0–0.9)
MONOCYTES NFR BLD AUTO: 5.6 % — SIGNIFICANT CHANGE UP (ref 2–14)
NEUTROPHILS # BLD AUTO: 7.09 K/UL — SIGNIFICANT CHANGE UP (ref 1.8–7.4)
NEUTROPHILS NFR BLD AUTO: 68.1 % — SIGNIFICANT CHANGE UP (ref 43–77)
PLATELET # BLD AUTO: 494 K/UL — HIGH (ref 150–400)
POTASSIUM SERPL-MCNC: 3.7 MMOL/L — SIGNIFICANT CHANGE UP (ref 3.5–5.3)
POTASSIUM SERPL-SCNC: 3.7 MMOL/L — SIGNIFICANT CHANGE UP (ref 3.5–5.3)
PROTHROM AB SERPL-ACNC: 24.2 SEC — HIGH (ref 10.5–13.4)
RBC # BLD: 3.8 M/UL — SIGNIFICANT CHANGE UP (ref 3.8–5.2)
RBC # FLD: 20.1 % — HIGH (ref 10.3–14.5)
SODIUM SERPL-SCNC: 148 MMOL/L — HIGH (ref 135–145)
WBC # BLD: 10.4 K/UL — SIGNIFICANT CHANGE UP (ref 3.8–10.5)
WBC # FLD AUTO: 10.4 K/UL — SIGNIFICANT CHANGE UP (ref 3.8–10.5)

## 2023-01-30 PROCEDURE — 99232 SBSQ HOSP IP/OBS MODERATE 35: CPT

## 2023-01-30 RX ORDER — DEXTROSE MONOHYDRATE, SODIUM CHLORIDE, AND POTASSIUM CHLORIDE 50; .745; 4.5 G/1000ML; G/1000ML; G/1000ML
1000 INJECTION, SOLUTION INTRAVENOUS
Refills: 0 | Status: DISCONTINUED | OUTPATIENT
Start: 2023-01-30 | End: 2023-01-31

## 2023-01-30 RX ADMIN — NYSTATIN CREAM 1 APPLICATION(S): 100000 CREAM TOPICAL at 21:45

## 2023-01-30 RX ADMIN — Medication 70 MICROGRAM(S): at 21:45

## 2023-01-30 RX ADMIN — NYSTATIN CREAM 1 APPLICATION(S): 100000 CREAM TOPICAL at 09:25

## 2023-01-30 RX ADMIN — CHLORHEXIDINE GLUCONATE 1 APPLICATION(S): 213 SOLUTION TOPICAL at 05:08

## 2023-01-30 RX ADMIN — Medication 100 MILLIGRAM(S): at 17:01

## 2023-01-30 RX ADMIN — NYSTATIN CREAM 1 APPLICATION(S): 100000 CREAM TOPICAL at 19:32

## 2023-01-30 RX ADMIN — Medication 105 MILLIGRAM(S): at 13:26

## 2023-01-30 RX ADMIN — NYSTATIN CREAM 1 APPLICATION(S): 100000 CREAM TOPICAL at 05:07

## 2023-01-30 RX ADMIN — Medication 0.5 MILLIGRAM(S): at 09:19

## 2023-01-30 RX ADMIN — Medication 105 MILLIGRAM(S): at 03:22

## 2023-01-30 RX ADMIN — CEFEPIME 100 MILLIGRAM(S): 1 INJECTION, POWDER, FOR SOLUTION INTRAMUSCULAR; INTRAVENOUS at 12:21

## 2023-01-30 RX ADMIN — Medication 100 MILLIGRAM(S): at 05:06

## 2023-01-30 RX ADMIN — Medication 105 MILLIGRAM(S): at 09:25

## 2023-01-30 RX ADMIN — Medication 105 MILLIGRAM(S): at 18:45

## 2023-01-30 RX ADMIN — PANTOPRAZOLE SODIUM 40 MILLIGRAM(S): 20 TABLET, DELAYED RELEASE ORAL at 09:25

## 2023-01-30 RX ADMIN — CEFEPIME 100 MILLIGRAM(S): 1 INJECTION, POWDER, FOR SOLUTION INTRAMUSCULAR; INTRAVENOUS at 21:45

## 2023-01-30 RX ADMIN — Medication 0.5 MILLIGRAM(S): at 19:47

## 2023-01-30 RX ADMIN — DEXTROSE MONOHYDRATE, SODIUM CHLORIDE, AND POTASSIUM CHLORIDE 50 MILLILITER(S): 50; .745; 4.5 INJECTION, SOLUTION INTRAVENOUS at 18:46

## 2023-01-30 RX ADMIN — Medication 100 MILLIGRAM(S): at 22:50

## 2023-01-30 NOTE — PROGRESS NOTE ADULT - SUBJECTIVE AND OBJECTIVE BOX
71 y/o female with a PMHx o             CVA with left sided weakness               atrial fibrillation on Coumadin              OPD  - wears 2L NC at baseline   was brought in from skilled nursing facility for abdominal pain emesis and weakness  In ED CT abdomen Pelvis showed,  Large ventral hernia with SBO  lactate 3.0  had ALVERTO creatinine to 1.9 improved with hydration  had cvp placed   has pyuria but no complaints of urinary frequency  1/24: No events over night, less pain on exam today, tolerated clears  1/25: No events over night  1/26 pt has decreased po intake, passing flatus , arousable, confused   1/27 more lethargic, makes sounds to tactile stimuli, does not open eyes, does not follow commands , , gurgling sounds , EKG sinustachycardia, BP stable  1/28 slightly more awake, opens eyes spontaneous . non verbal does not follow commands , rizzo obstructed , flushed  300cc output, urine with sediments - hence replaced rizzo catheter , now draining clearer urine , has NGtube, respiratory wise stable,   1/30 more awake today, spokea few words, says not short of breath, , replaced rizzo again on 1/29 to larger fringe catheter as 14 fringe was recurrently blocked due to urine sediments  , on 3L, no respiratory distress, has NGT to low suction          PHYSICAL EXAM:    Daily     Daily     ICU Vital Signs Last 24 Hrs  T(C): 36.8 (30 Jan 2023 08:16), Max: 36.9 (29 Jan 2023 21:16)  T(F): 98.2 (30 Jan 2023 08:16), Max: 98.4 (29 Jan 2023 21:16)  HR: 84 (30 Jan 2023 09:46) (83 - 90)  BP: 150/77 (30 Jan 2023 08:16) (134/72 - 150/77)  BP(mean): --  ABP: --  ABP(mean): --  RR: 18 (30 Jan 2023 08:16) (18 - 19)  SpO2: 99% (30 Jan 2023 09:46) (99% - 99%)    O2 Parameters below as of 30 Jan 2023 09:46  Patient On (Oxygen Delivery Method): nasal cannula,2L    PHYSICAL EXAM:Constitutional- more awake   HEENT: Atraumatic, REYES, Normal, No congestion  Respiratory:B/L crackles ,  no rhonchi/wheeze, normal respiratory effort  Cardiovascular: N S1S2;   Gastrointestinal: Abdomen soft, less distended, positive for ventral hernia  Bowel Ssounds present, Has NGT  Extremities: No edema, peripheral pulses present  Neurological more awake, ,says a few appropriate words, tries to make her needs known , tries to follow 1 step commands   Skin: positive for anasarca                                        9.9    10.40 )-----------( 494      ( 30 Jan 2023 06:55 )             32.9       CBC Full  -  ( 30 Jan 2023 06:55 )  WBC Count : 10.40 K/uL  RBC Count : 3.80 M/uL  Hemoglobin : 9.9 g/dL  Hematocrit : 32.9 %  Platelet Count - Automated : 494 K/uL  Mean Cell Volume : 86.6 fl  Mean Cell Hemoglobin : 26.1 pg  Mean Cell Hemoglobin Concentration : 30.1 gm/dL  Auto Neutrophil # : 7.09 K/uL  Auto Lymphocyte # : 2.29 K/uL  Auto Monocyte # : 0.58 K/uL  Auto Eosinophil # : 0.11 K/uL  Auto Basophil # : 0.09 K/uL  Auto Neutrophil % : 68.1 %  Auto Lymphocyte % : 22.0 %  Auto Monocyte % : 5.6 %  Auto Eosinophil % : 1.1 %  Auto Basophil % : 0.9 %      01-30    148<H>  |  115<H>  |  19  ----------------------------<  105<H>  3.7   |  29  |  0.59    Ca    7.7<L>      30 Jan 2023 06:55    TPro  4.9<L>  /  Alb  1.3<L>  /  TBili  0.3  /  DBili  0.1  /  AST  14<L>  /  ALT  12  /  AlkPhos  65  01-28      LIVER FUNCTIONS - ( 28 Jan 2023 19:52 )  Alb: 1.3 g/dL / Pro: 4.9 gm/dL / ALK PHOS: 65 U/L / ALT: 12 U/L / AST: 14 U/L / GGT: x             PT/INR - ( 30 Jan 2023 06:55 )   PT: 24.2 sec;   INR: 2.07 ratio         PTT - ( 29 Jan 2023 06:57 )  PTT:34.7 sec                  MEDICATIONS  (STANDING):  aMIOdarone    Tablet 200 milliGRAM(s) Oral daily  atorvastatin 20 milliGRAM(s) Oral at bedtime  buDESOnide    Inhalation Suspension 0.5 milliGRAM(s) Inhalation every 12 hours  cefepime   IVPB 2000 milliGRAM(s) IV Intermittent every 12 hours  chlorhexidine 4% Liquid 1 Application(s) Topical <User Schedule>  coronavirus bivalent (EUA) Booster Vaccine (PFIZER) 0.3 milliLiter(s) IntraMuscular once  dextrose 5%. 1000 milliLiter(s) (50 mL/Hr) IV Continuous <Continuous>  dextrose 5%. 1000 milliLiter(s) (100 mL/Hr) IV Continuous <Continuous>  dextrose 50% Injectable 25 Gram(s) IV Push once  dextrose 50% Injectable 12.5 Gram(s) IV Push once  dextrose 50% Injectable 25 Gram(s) IV Push once  diltiazem    milliGRAM(s) Oral daily  DULoxetine 60 milliGRAM(s) Oral daily  glucagon  Injectable 1 milliGRAM(s) IntraMuscular once  insulin lispro (ADMELOG) corrective regimen sliding scale   SubCutaneous every 6 hours  levothyroxine Injectable 70 MICROGram(s) IV Push at bedtime  losartan 25 milliGRAM(s) Oral daily  metoprolol tartrate IVPB 2.5 milliGRAM(s) IV Intermittent every 6 hours  metroNIDAZOLE  IVPB 500 milliGRAM(s) IV Intermittent every 8 hours  montelukast 10 milliGRAM(s) Oral daily  nystatin Cream 1 Application(s) Topical two times a day  nystatin Powder 1 Application(s) Topical every 12 hours  pantoprazole  Injectable 40 milliGRAM(s) IV Push daily  pregabalin 100 milliGRAM(s) Oral every 8 hours  sodium chloride 0.9%. 1000 milliLiter(s) (50 mL/Hr) IV Continuous <Continuous>

## 2023-01-30 NOTE — PROGRESS NOTE ADULT - ASSESSMENT
Medically stable. Discussed with son. Will proceed with laparotomy, lysis of adhesions, incarcerated ventral hernia repair tomorrow.

## 2023-01-30 NOTE — PROGRESS NOTE ADULT - ASSESSMENT
Patient admitted with abdominal pain, nausea and vomitting , dehydration  septic shock, likely related to   UTI klebsiella pneumoniae and aspiration PNA /SBO- ventral hernia  new R lung nodules - cannot ruleout aspiration PNA   Acute metabolic encephalopathy  Sepsis   hx copd without exacerbation  anasarca likely thirdspacing from poor nutrition  /acute on  chronic diastolic heart failure - now seems compensated   Paroxysmal Atrial fibrillation       Plan:    -SBO- now with NGT to lwo suction  surgery ( primary team) to speak with family for PPN/TPN -   since lungs more clear- would consider slow IV fluids for hydration until PPN can be started switched po meds to IV where possible ( amiodarone and cardizem on hold)  s/p IV lasix   cefepime /flagyl -for  sepsis ( SBO/UTI)  blood cx , urine cx   monitor I/O has rizzo- replaced rizzo 14fr 1/28  ABG compensated hypercarbia  hx afib, now sinustachycardia, echo ef 55% INR 2.1 hold coumadin as NPO .  BB changed to IV, cardizem, losartan and amiodarone on hold   switch to loevnox SC therpeutic dose once INR<2  monitor Cr  I dw with intensivits Yusuf BARROS 1/27 - no indication for upgrading to step down at this time , unless BP drops   I dw with son HCP at bedside who is oral surgeon 1/28 - would like to continue conservative managemnt- does not seem to be in favor of agressive intervention like surgery  due to high perioperative risk-if patient declines further would consider comfort care  GOC time spent 20mins   GOC: DNR

## 2023-01-30 NOTE — PROGRESS NOTE ADULT - SUBJECTIVE AND OBJECTIVE BOX
Awake, more alert, no new complaints  VSS afeb  lungs- scattered rhonchi  Cor- RRR  Abd- Soft less distended, non reducible ventral hernia  Ext- 2+ edema

## 2023-01-30 NOTE — PROGRESS NOTE ADULT - ASSESSMENT
71 y/o female with h/o old CVA with left sided weakness, atrial fibrillation, emphysema, COPD, HTN, prior SBO and resection was admitted on 1/20 from [facility] for RLQ pain. She was noted with one episode of emesis and diffuse abdominal pain associated with fever. On 1/27 the patient was noted febrile to 102.8F, more lethargic, did not open eyes, did not follow commands. She received cefepime and metronidazole.     1. Febrile syndrome. UTI with KLPN. Right lower lobes nodules Probable pneumonia. SBO ?cause ?related to ventral hernia. Allergy to cipro. Encephalopathy.   -leukocytosis improving  -abdomen is softer  -BC x 2 noted  -urine c/s noted  -on cefepime 2 gm IV q12h and metronidazole 500 mg IV q8h # 4  -tolerating abx well so far; no side effects noted  -surgical evaluation appreciated  -conservative care   -aspiration precautions  -continue abx coverage   -monitor abdomen  -monitor temps  -f/u CBC  -supportive care  2. Other issues:   -care per medicine    d/w Dr. Navarrete

## 2023-01-30 NOTE — PROGRESS NOTE ADULT - ASSESSMENT
Patient admitted with abdominal pain, nausea and vomitting , dehydration  septic shock, likely related to     PROBLEMS:    UTI klebsiella pneumoniae and aspiration PNA /SBO- ventral hernia  New R lung nodules - cannot ruleout aspiration PNA   Acute metabolic encephalopathy  Sepsis   Hx copd without exacerbation  Anasarca/acute on  chronic diastolic heart failure       Plan:    pulmonary stable  NG suction  IV cefepime /flagyl -for  sepsis ( SBO/UTI)  Blood cx , urine cx   Monitor I/O has rizzo  Monitor Cr  D/w staff  DVT PROPHYLASIX

## 2023-01-30 NOTE — PROGRESS NOTE ADULT - SUBJECTIVE AND OBJECTIVE BOX
Subjective:    pat better, still ng suction, lying in bed.    Home Medications:  Albuterol (Eqv-ProAir HFA) 90 mcg/inh inhalation aerosol: 1 puff(s) inhaled every 6 hours, As Needed (20 Jan 2023 16:49)  amiodarone 200 mg oral tablet: 1 tab(s) orally once a day (20 Jan 2023 16:49)  ascorbic acid 500 mg oral tablet: 2 tab(s) orally once a day (20 Jan 2023 16:49)  atorvastatin 10 mg oral tablet: 1 tab(s) orally once a day (at bedtime) (20 Jan 2023 16:49)  benzonatate 100 mg oral capsule: 1 cap(s) orally 3 times a day (20 Jan 2023 21:48)  budesonide 0.5 mg/2 mL inhalation suspension: 2 milliliter(s) inhaled 2 times a day (20 Jan 2023 21:48)  Cepacol Sore Throat 15 mg-3.6 mg mucous membrane lozenge: 1 lozenge mucous membrane every 4 hours, As Needed (20 Jan 2023 21:48)  cholecalciferol 1250 mcg (50,000 intl units) oral capsule: 1 cap(s) orally every 4 weeks on Wednesday  (20 Jan 2023 16:49)  Coumadin 2.5 mg oral tablet: 1 tab(s) orally once a day (at bedtime)  ***ON HOLD from 1-16 to 1-21*** (20 Jan 2023 21:48)  Cranberry oral tablet: 1 tab(s) orally 2 times a day (20 Jan 2023 16:49)  cyanocobalamin 1000 mcg oral tablet: 1 tab(s) orally once a day (20 Jan 2023 16:49)  dilTIAZem 180 mg/24 hours oral capsule, extended release: 1 cap(s) orally once a day (20 Jan 2023 16:49)  DULoxetine 60 mg oral delayed release capsule: 1 cap(s) orally once a day (20 Jan 2023 16:49)  estradiol 0.1 mg/g vaginal cream: 0.5 gram(s) vaginal 2 times a week on Monday and Thursday (20 Jan 2023 21:48)  fexofenadine 180 mg oral tablet: 1 tab(s) orally once a day (20 Jan 2023 21:48)  fluticasone 50 mcg/inh nasal spray: 1 spray(s) nasal 2 times a day (20 Jan 2023 16:49)  furosemide 20 mg oral tablet: 1 tab(s) orally once a day (20 Jan 2023 16:49)  glipiZIDE 10 mg oral tablet, extended release: 2 tab(s) orally once a day (20 Jan 2023 16:49)  GlycoLax oral powder for reconstitution: 17 gram(s) orally 2 times a day (20 Jan 2023 16:49)  guaiFENesin 100 mg/5 mL oral liquid: 20 milliliter(s) orally every 6 hours (20 Jan 2023 21:48)  HumaLOG KwikPen 100 units/mL injectable solution: 10 unit(s) injectable 3 times a day (before meals) (20 Jan 2023 21:48)  ipratropium-albuterol 20 mcg-100 mcg/inh inhalation aerosol: 1 puff(s) inhaled 4 times a day (20 Jan 2023 16:49)  levothyroxine 88 mcg (0.088 mg) oral tablet: 1 tab(s) orally once a day (at bedtime) (20 Jan 2023 16:49)  losartan 25 mg oral tablet: 1 tab(s) orally once a day (20 Jan 2023 16:49)  Macrobid 100 mg oral capsule: 1 cap(s) orally 2 times a day for 5 days  ***course complete*** (20 Jan 2023 21:48)  methocarbamol 750 mg oral tablet: 1 tab(s) orally every 8 hours, As Needed (20 Jan 2023 21:48)  metoprolol tartrate 25 mg oral tablet: 1 tab(s) orally 2 times a day (20 Jan 2023 16:49)  Milk of Magnesia 8% oral suspension: 30 milliliter(s) orally once a day, As Needed (20 Jan 2023 16:49)  montelukast 10 mg oral tablet: 1 tab(s) orally once a day (at bedtime) (20 Jan 2023 21:48)  ondansetron 4 mg oral tablet: 1 tab(s) orally every 4 hours, As Needed (20 Jan 2023 21:48)  oxybutynin 5 mg oral tablet: 1 tab(s) orally 2 times a day (20 Jan 2023 16:49)  oxyCODONE 5 mg oral tablet: 1 tab(s) orally every 4 hours, As Needed (20 Jan 2023 16:49)  phytonadione 5 mg oral tablet: 0.5 tab(s) orally once  ***given at [facility] once on 1-19-23*** (20 Jan 2023 21:48)  pregabalin 100 mg oral capsule: 1 cap(s) orally 3 times a day (20 Jan 2023 16:49)  sennosides-docusate 8.6 mg-50 mg oral tablet: 2 tab(s) orally once a day (at bedtime) (20 Jan 2023 16:49)  Tradjenta 5 mg oral tablet: 1 tab(s) orally once a day (20 Jan 2023 16:49)  Tylenol 500 mg oral tablet: 2 tab(s) orally every 8 hours (20 Jan 2023 16:49)    MEDICATIONS  (STANDING):  aMIOdarone    Tablet 200 milliGRAM(s) Oral daily  atorvastatin 20 milliGRAM(s) Oral at bedtime  buDESOnide    Inhalation Suspension 0.5 milliGRAM(s) Inhalation every 12 hours  cefepime   IVPB 2000 milliGRAM(s) IV Intermittent every 12 hours  chlorhexidine 4% Liquid 1 Application(s) Topical <User Schedule>  coronavirus bivalent (EUA) Booster Vaccine (PFIZER) 0.3 milliLiter(s) IntraMuscular once  dextrose 5%. 1000 milliLiter(s) (50 mL/Hr) IV Continuous <Continuous>  dextrose 5%. 1000 milliLiter(s) (100 mL/Hr) IV Continuous <Continuous>  dextrose 50% Injectable 25 Gram(s) IV Push once  dextrose 50% Injectable 12.5 Gram(s) IV Push once  dextrose 50% Injectable 25 Gram(s) IV Push once  diltiazem    milliGRAM(s) Oral daily  DULoxetine 60 milliGRAM(s) Oral daily  glucagon  Injectable 1 milliGRAM(s) IntraMuscular once  insulin lispro (ADMELOG) corrective regimen sliding scale   SubCutaneous every 6 hours  levothyroxine Injectable 70 MICROGram(s) IV Push at bedtime  losartan 25 milliGRAM(s) Oral daily  metoprolol tartrate IVPB 2.5 milliGRAM(s) IV Intermittent every 6 hours  metroNIDAZOLE  IVPB 500 milliGRAM(s) IV Intermittent every 8 hours  montelukast 10 milliGRAM(s) Oral daily  nystatin Cream 1 Application(s) Topical two times a day  nystatin Powder 1 Application(s) Topical every 12 hours  pantoprazole  Injectable 40 milliGRAM(s) IV Push daily  pregabalin 100 milliGRAM(s) Oral every 8 hours  sodium chloride 0.9%. 1000 milliLiter(s) (50 mL/Hr) IV Continuous <Continuous>    MEDICATIONS  (PRN):  albuterol    90 MICROgram(s) HFA Inhaler 2 Puff(s) Inhalation every 6 hours PRN Shortness of Breath and/or Wheezing  dextrose Oral Gel 15 Gram(s) Oral once PRN Blood Glucose LESS THAN 70 milliGRAM(s)/deciliter  oxybutynin 5 milliGRAM(s) Oral every 12 hours PRN Bladder spasms  sodium chloride 0.9% lock flush 10 milliLiter(s) IV Push every 1 hour PRN Pre/post blood products, medications, blood draw, and to maintain line patency      Allergies    Cipro (Rash)  Spiriva (Rash)    Intolerances        Vital Signs Last 24 Hrs  T(C): 36.8 (30 Jan 2023 08:16), Max: 37 (29 Jan 2023 16:20)  T(F): 98.2 (30 Jan 2023 08:16), Max: 98.6 (29 Jan 2023 16:20)  HR: 84 (30 Jan 2023 09:46) (83 - 95)  BP: 150/77 (30 Jan 2023 08:16) (134/72 - 150/77)  BP(mean): --  RR: 18 (30 Jan 2023 08:16) (18 - 19)  SpO2: 99% (30 Jan 2023 09:46) (98% - 99%)    Parameters below as of 30 Jan 2023 09:46  Patient On (Oxygen Delivery Method): nasal cannula,2L          PHYSICAL EXAMINATION:    NECK:  Supple. No lymphadenopathy. Jugular venous pressure not elevated. Carotids equal.   HEART:   The cardiac impulse has a normal quality. Reg., Nl S1 and S2.  There are no murmurs, rubs or gallops noted  CHEST:  Chest crackles to auscultation. Normal respiratory effort.  ABDOMEN:  Soft and nontender.   EXTREMITIES:  There is no edema.       LABS:                        9.9    10.40 )-----------( 494      ( 30 Jan 2023 06:55 )             32.9     01-30    148<H>  |  115<H>  |  19  ----------------------------<  105<H>  3.7   |  29  |  0.59    Ca    7.7<L>      30 Jan 2023 06:55    TPro  4.9<L>  /  Alb  1.3<L>  /  TBili  0.3  /  DBili  0.1  /  AST  14<L>  /  ALT  12  /  AlkPhos  65  01-28    PT/INR - ( 30 Jan 2023 06:55 )   PT: 24.2 sec;   INR: 2.07 ratio         PTT - ( 29 Jan 2023 06:57 )  PTT:34.7 sec

## 2023-01-30 NOTE — CDI QUERY NOTE - NSCDIOTHERTXTBX_GEN_ALL_CORE_HH
This patient was documented to have atrial fibrillation:    Progress Note Adult Critical Care Attending 1-24-23 @ 12:07  73 y/o female with a PMHx o             CVA with left sided weakness               atrial fibrillation on Coumadin    4. afib, rate ok, echo ef 55%  INR 1.9, start iv heparin.  Resume Cardizem, Lorsatan, and Lipitor.  Likely resume coumadin on 1/24 if she is tolerating PO    12 Lead ECG (01.20.23 @ 16:29)   Diagnosis Line Sinus tachycardia  Nonspecific ST abnormality    Can the type of atrial fibrillation be specified?  -Paroxysmal atrial fibrillation  -Other type, please specify:  -Unable to determine
This patient was documented to have atrial fibrillation:    PN 1/29 : " hx afib, now sinustachycardia, echo ef 55% INR 2.1 hold coumadin as NPO .  BB changed to IV, cardizem, losartan and amiodarone on hold "    Progress Note Adult Critical Care Attending 1-24-23 @ 12:07  71 y/o female with a PMHx o             CVA with left sided weakness               atrial fibrillation on Coumadin    4. afib, rate ok, echo ef 55%  INR 1.9, start iv heparin.  Resume Cardizem, Lorsatan, and Lipitor.  Likely resume coumadin on 1/24 if she is tolerating PO    12 Lead ECG (01.20.23 @ 16:29)   Diagnosis Line Sinus tachycardia  Nonspecific ST abnormality    Can the type of atrial fibrillation be specified?  - Paroxysmal atrial fibrillation  - Other type, please specify:  - Unable to determine.

## 2023-01-31 LAB
ANION GAP SERPL CALC-SCNC: 6 MMOL/L — SIGNIFICANT CHANGE UP (ref 5–17)
APTT BLD: 32.9 SEC — SIGNIFICANT CHANGE UP (ref 27.5–35.5)
BASOPHILS # BLD AUTO: 0.06 K/UL — SIGNIFICANT CHANGE UP (ref 0–0.2)
BASOPHILS NFR BLD AUTO: 0.7 % — SIGNIFICANT CHANGE UP (ref 0–2)
BLD GP AB SCN SERPL QL: SIGNIFICANT CHANGE UP
BUN SERPL-MCNC: 15 MG/DL — SIGNIFICANT CHANGE UP (ref 7–23)
CALCIUM SERPL-MCNC: 7.7 MG/DL — LOW (ref 8.5–10.1)
CHLORIDE SERPL-SCNC: 113 MMOL/L — HIGH (ref 96–108)
CO2 SERPL-SCNC: 26 MMOL/L — SIGNIFICANT CHANGE UP (ref 22–31)
CREAT SERPL-MCNC: 0.52 MG/DL — SIGNIFICANT CHANGE UP (ref 0.5–1.3)
EGFR: 99 ML/MIN/1.73M2 — SIGNIFICANT CHANGE UP
EOSINOPHIL # BLD AUTO: 0.07 K/UL — SIGNIFICANT CHANGE UP (ref 0–0.5)
EOSINOPHIL NFR BLD AUTO: 0.8 % — SIGNIFICANT CHANGE UP (ref 0–6)
GLUCOSE BLDC GLUCOMTR-MCNC: 120 MG/DL — HIGH (ref 70–99)
GLUCOSE BLDC GLUCOMTR-MCNC: 121 MG/DL — HIGH (ref 70–99)
GLUCOSE BLDC GLUCOMTR-MCNC: 130 MG/DL — HIGH (ref 70–99)
GLUCOSE BLDC GLUCOMTR-MCNC: 154 MG/DL — HIGH (ref 70–99)
GLUCOSE SERPL-MCNC: 135 MG/DL — HIGH (ref 70–99)
HCT VFR BLD CALC: 31.5 % — LOW (ref 34.5–45)
HGB BLD-MCNC: 9.5 G/DL — LOW (ref 11.5–15.5)
IMM GRANULOCYTES NFR BLD AUTO: 1.5 % — HIGH (ref 0–0.9)
INR BLD: 1.77 RATIO — HIGH (ref 0.88–1.16)
INR BLD: 1.87 RATIO — HIGH (ref 0.88–1.16)
LYMPHOCYTES # BLD AUTO: 1.86 K/UL — SIGNIFICANT CHANGE UP (ref 1–3.3)
LYMPHOCYTES # BLD AUTO: 21.5 % — SIGNIFICANT CHANGE UP (ref 13–44)
MANUAL SMEAR VERIFICATION: SIGNIFICANT CHANGE UP
MCHC RBC-ENTMCNC: 26.1 PG — LOW (ref 27–34)
MCHC RBC-ENTMCNC: 30.2 GM/DL — LOW (ref 32–36)
MCV RBC AUTO: 86.5 FL — SIGNIFICANT CHANGE UP (ref 80–100)
MONOCYTES # BLD AUTO: 0.51 K/UL — SIGNIFICANT CHANGE UP (ref 0–0.9)
MONOCYTES NFR BLD AUTO: 5.9 % — SIGNIFICANT CHANGE UP (ref 2–14)
NEUTROPHILS # BLD AUTO: 6.04 K/UL — SIGNIFICANT CHANGE UP (ref 1.8–7.4)
NEUTROPHILS NFR BLD AUTO: 69.6 % — SIGNIFICANT CHANGE UP (ref 43–77)
PLAT MORPH BLD: SIGNIFICANT CHANGE UP
PLATELET # BLD AUTO: 502 K/UL — HIGH (ref 150–400)
POTASSIUM SERPL-MCNC: 3.5 MMOL/L — SIGNIFICANT CHANGE UP (ref 3.5–5.3)
POTASSIUM SERPL-SCNC: 3.5 MMOL/L — SIGNIFICANT CHANGE UP (ref 3.5–5.3)
PROTHROM AB SERPL-ACNC: 20.7 SEC — HIGH (ref 10.5–13.4)
PROTHROM AB SERPL-ACNC: 21.8 SEC — HIGH (ref 10.5–13.4)
RBC # BLD: 3.64 M/UL — LOW (ref 3.8–5.2)
RBC # FLD: 20.3 % — HIGH (ref 10.3–14.5)
RBC BLD AUTO: SIGNIFICANT CHANGE UP
SARS-COV-2 RNA SPEC QL NAA+PROBE: SIGNIFICANT CHANGE UP
SODIUM SERPL-SCNC: 145 MMOL/L — SIGNIFICANT CHANGE UP (ref 135–145)
WBC # BLD: 8.67 K/UL — SIGNIFICANT CHANGE UP (ref 3.8–10.5)
WBC # FLD AUTO: 8.67 K/UL — SIGNIFICANT CHANGE UP (ref 3.8–10.5)

## 2023-01-31 PROCEDURE — 99233 SBSQ HOSP IP/OBS HIGH 50: CPT

## 2023-01-31 PROCEDURE — 88305 TISSUE EXAM BY PATHOLOGIST: CPT | Mod: 26

## 2023-01-31 PROCEDURE — 88307 TISSUE EXAM BY PATHOLOGIST: CPT | Mod: 26

## 2023-01-31 PROCEDURE — 88302 TISSUE EXAM BY PATHOLOGIST: CPT | Mod: 26

## 2023-01-31 RX ORDER — SODIUM CHLORIDE 9 MG/ML
1000 INJECTION, SOLUTION INTRAVENOUS
Refills: 0 | Status: DISCONTINUED | OUTPATIENT
Start: 2023-01-31 | End: 2023-02-03

## 2023-01-31 RX ORDER — SODIUM CHLORIDE 9 MG/ML
1000 INJECTION, SOLUTION INTRAVENOUS
Refills: 0 | Status: DISCONTINUED | OUTPATIENT
Start: 2023-01-31 | End: 2023-01-31

## 2023-01-31 RX ORDER — DILTIAZEM HCL 120 MG
5 CAPSULE, EXT RELEASE 24 HR ORAL
Refills: 0 | Status: DISCONTINUED | OUTPATIENT
Start: 2023-01-31 | End: 2023-01-31

## 2023-01-31 RX ORDER — FENTANYL CITRATE 50 UG/ML
25 INJECTION INTRAVENOUS
Refills: 0 | Status: DISCONTINUED | OUTPATIENT
Start: 2023-01-31 | End: 2023-01-31

## 2023-01-31 RX ORDER — METOPROLOL TARTRATE 50 MG
2.5 TABLET ORAL EVERY 6 HOURS
Refills: 0 | Status: DISCONTINUED | OUTPATIENT
Start: 2023-01-31 | End: 2023-02-01

## 2023-01-31 RX ORDER — HYDROMORPHONE HYDROCHLORIDE 2 MG/ML
0.5 INJECTION INTRAMUSCULAR; INTRAVENOUS; SUBCUTANEOUS
Refills: 0 | Status: DISCONTINUED | OUTPATIENT
Start: 2023-01-31 | End: 2023-01-31

## 2023-01-31 RX ORDER — PHENYLEPHRINE HYDROCHLORIDE 10 MG/ML
0.5 INJECTION INTRAVENOUS
Qty: 40 | Refills: 0 | Status: DISCONTINUED | OUTPATIENT
Start: 2023-01-31 | End: 2023-02-02

## 2023-01-31 RX ADMIN — PHENYLEPHRINE HYDROCHLORIDE 18.3 MICROGRAM(S)/KG/MIN: 10 INJECTION INTRAVENOUS at 22:40

## 2023-01-31 RX ADMIN — Medication 100 MILLIGRAM(S): at 14:28

## 2023-01-31 RX ADMIN — Medication 105 MILLIGRAM(S): at 00:22

## 2023-01-31 RX ADMIN — Medication 105 MILLIGRAM(S): at 05:13

## 2023-01-31 RX ADMIN — CHLORHEXIDINE GLUCONATE 1 APPLICATION(S): 213 SOLUTION TOPICAL at 05:12

## 2023-01-31 RX ADMIN — PANTOPRAZOLE SODIUM 40 MILLIGRAM(S): 20 TABLET, DELAYED RELEASE ORAL at 09:30

## 2023-01-31 RX ADMIN — NYSTATIN CREAM 1 APPLICATION(S): 100000 CREAM TOPICAL at 05:13

## 2023-01-31 RX ADMIN — Medication 105 MILLIGRAM(S): at 11:52

## 2023-01-31 RX ADMIN — LOSARTAN POTASSIUM 25 MILLIGRAM(S): 100 TABLET, FILM COATED ORAL at 09:33

## 2023-01-31 RX ADMIN — NYSTATIN CREAM 1 APPLICATION(S): 100000 CREAM TOPICAL at 09:31

## 2023-01-31 RX ADMIN — MONTELUKAST 10 MILLIGRAM(S): 4 TABLET, CHEWABLE ORAL at 09:32

## 2023-01-31 RX ADMIN — AMIODARONE HYDROCHLORIDE 200 MILLIGRAM(S): 400 TABLET ORAL at 09:32

## 2023-01-31 RX ADMIN — Medication 180 MILLIGRAM(S): at 09:33

## 2023-01-31 RX ADMIN — Medication 100 MILLIGRAM(S): at 14:27

## 2023-01-31 RX ADMIN — DULOXETINE HYDROCHLORIDE 60 MILLIGRAM(S): 30 CAPSULE, DELAYED RELEASE ORAL at 09:33

## 2023-01-31 RX ADMIN — Medication 100 MILLIGRAM(S): at 05:13

## 2023-01-31 RX ADMIN — CEFEPIME 100 MILLIGRAM(S): 1 INJECTION, POWDER, FOR SOLUTION INTRAMUSCULAR; INTRAVENOUS at 09:34

## 2023-01-31 NOTE — PROGRESS NOTE ADULT - SUBJECTIVE AND OBJECTIVE BOX
Date of service: 01-31-23 @ 16:17    Lying in bed in NAD  Has abdominal distension  NGT in place  Low grade fever    ROS: denies dizziness, no HA, no SOB or cough, no abdominal pain, no dysuria, no legs pain, no rashes    MEDICATIONS  (STANDING):  aMIOdarone    Tablet 200 milliGRAM(s) Oral daily  atorvastatin 20 milliGRAM(s) Oral at bedtime  buDESOnide    Inhalation Suspension 0.5 milliGRAM(s) Inhalation every 12 hours  cefepime   IVPB 2000 milliGRAM(s) IV Intermittent every 12 hours  chlorhexidine 4% Liquid 1 Application(s) Topical <User Schedule>  coronavirus bivalent (EUA) Booster Vaccine (PFIZER) 0.3 milliLiter(s) IntraMuscular once  dextrose 5% with potassium chloride 20 mEq/L 1000 milliLiter(s) (50 mL/Hr) IV Continuous <Continuous>  dextrose 5%. 1000 milliLiter(s) (50 mL/Hr) IV Continuous <Continuous>  dextrose 5%. 1000 milliLiter(s) (100 mL/Hr) IV Continuous <Continuous>  dextrose 50% Injectable 25 Gram(s) IV Push once  dextrose 50% Injectable 12.5 Gram(s) IV Push once  dextrose 50% Injectable 25 Gram(s) IV Push once  diltiazem    milliGRAM(s) Oral daily  DULoxetine 60 milliGRAM(s) Oral daily  glucagon  Injectable 1 milliGRAM(s) IntraMuscular once  insulin lispro (ADMELOG) corrective regimen sliding scale   SubCutaneous every 6 hours  levothyroxine Injectable 70 MICROGram(s) IV Push at bedtime  losartan 25 milliGRAM(s) Oral daily  metoprolol tartrate IVPB 2.5 milliGRAM(s) IV Intermittent every 6 hours  metroNIDAZOLE  IVPB 500 milliGRAM(s) IV Intermittent every 8 hours  montelukast 10 milliGRAM(s) Oral daily  nystatin Cream 1 Application(s) Topical two times a day  nystatin Powder 1 Application(s) Topical every 12 hours  pantoprazole  Injectable 40 milliGRAM(s) IV Push daily  pregabalin 100 milliGRAM(s) Oral every 8 hours  sodium chloride 0.9%. 1000 milliLiter(s) (50 mL/Hr) IV Continuous <Continuous>    Vital Signs Last 24 Hrs  T(C): 36.3 (31 Jan 2023 08:08), Max: 37.8 (31 Jan 2023 00:18)  T(F): 97.3 (31 Jan 2023 08:08), Max: 100 (31 Jan 2023 00:18)  HR: 81 (31 Jan 2023 08:08) (81 - 92)  BP: 149/63 (31 Jan 2023 08:08) (141/83 - 149/63)  BP(mean): --  RR: 19 (31 Jan 2023 08:08) (18 - 20)  SpO2: 99% (31 Jan 2023 08:08) (97% - 99%)    Parameters below as of 31 Jan 2023 08:08  Patient On (Oxygen Delivery Method): nasal cannula  O2 Flow (L/min): 2     Physical exam:    Constitutional:  No acute distress  HEENT: NC/AT, EOMI, PERRLA, conjunctivae clear; ears and nose atraumatic  Neck: supple; thyroid not palpable  Back: no tenderness  Respiratory: respiratory effort normal; crackles at bases  Cardiovascular: S1S2 regular, no murmurs  Abdomen: soft, mid abdomen tender, distended, positive BS  Genitourinary: no suprapubic tenderness  Lymphatic: no LN palpable  Musculoskeletal: no muscle tenderness, no joint swelling or tenderness  Extremities: no pedal edema  Neurological/ Psychiatric: confused, judgement and insight impaired; moving all extremities  Skin: no rashes; no palpable lesions    Labs: reviewed                        9.5    8.67  )-----------( 502      ( 31 Jan 2023 09:03 )             31.5     01-31    145  |  113<H>  |  15  ----------------------------<  135<H>  3.5   |  26  |  0.52    Ca    7.7<L>      31 Jan 2023 06:57                        9.9    10.40 )-----------( 494      ( 30 Jan 2023 06:55 )             32.9     01-30    148<H>  |  115<H>  |  19  ----------------------------<  105<H>  3.7   |  29  |  0.59    Ca    7.7<L>      30 Jan 2023 06:55    TPro  4.9<L>  /  Alb  1.3<L>  /  TBili  0.3  /  DBili  0.1  /  AST  14<L>  /  ALT  12  /  AlkPhos  65  01-28               10.9   19.72 )-----------( 510      ( 27 Jan 2023 06:16 )             36.7     01-27    142  |  111<H>  |  10  ----------------------------<  233<H>  3.8   |  26  |  0.78    Ca    8.0<L>      27 Jan 2023 06:16  Phos  2.7     01-27  Mg     1.8     01-27      Culture - Blood (collected 27 Jan 2023 06:16)  Source: .Blood None  Preliminary Report (28 Jan 2023 09:01):    No growth to date.    Culture - Blood (collected 27 Jan 2023 06:16)  Source: .Blood None  Preliminary Report (28 Jan 2023 09:01):    No growth to date.    Culture - Urine (collected 20 Jan 2023 20:03)  Source: Clean Catch Clean Catch (Midstream)  Final Report (25 Jan 2023 08:05):    >100,000 CFU/ml Klebsiella pneumoniae    Multiple Morphological Strains  Organism: Klebsiella pneumoniae  Klebsiella pneumoniae (25 Jan 2023 08:05)  Organism: Klebsiella pneumoniae (25 Jan 2023 08:05)      -  Amikacin: S <=16      -  Amoxicillin/Clavulanic Acid: S <=8/4      -  Ampicillin: R >16 These ampicillin results predict results for amoxicillin      -  Ampicillin/Sulbactam: S <=4/2 Enterobacter, Klebsiella aerogenes, Citrobacter, and Serratia may develop resistance during prolonged therapy (3-4 days)      -  Aztreonam: S <=4      -  Cefazolin: S <=2 For uncomplicated UTI with K. pneumoniae, E. coli, or P. mirablis: ARABELLA <=16 is sensitive and ARABELLA >=32 is resistant. This also predicts results for oral agents cefaclor, cefdinir, cefpodoxime, cefprozil, cefuroxime axetil, cephalexin and locarbef for uncomplicated UTI. Note that some isolates may be susceptible to these agents while testing resistant to cefazolin.      -  Cefepime: S <=2      -  Cefoxitin: S <=8      -  Ceftriaxone: S <=1 Enterobacter, Klebsiella aerogenes, Citrobacter, and Serratia may develop resistance during prolonged therapy      -  Cefuroxime: S <=4      -  Ciprofloxacin: S <=0.25      -  Ertapenem: S <=0.5      -  Gentamicin: S <=2      -  Imipenem: S <=1      -  Levofloxacin: S <=0.5      -  Meropenem: S <=1      -  Nitrofurantoin: S <=32 Should not be used to treat pyelonephritis      -  Piperacillin/Tazobactam: S <=8      -  Tobramycin: S <=2      -  Trimethoprim/Sulfamethoxazole: S <=0.5/9.5      Method Type: ARABELLA  Organism: Klebsiella pneumoniae (25 Jan 2023 08:05)      -  Amikacin: S <=16      -  Amoxicillin/Clavulanic Acid: S <=8/4 Consider reserving for cystitis when ampicillin/sulbactam is resistant      -  Ampicillin: R >16 These ampicillin results predict results for amoxicillin      -  Ampicillin/Sulbactam: R >16/8 Enterobacter, Klebsiella aerogenes, Citrobacter, and Serratia may develop resistance during prolonged therapy (3-4 days)      -  Aztreonam: S <=4      -  Cefazolin: R >16 For uncomplicated UTI with K. pneumoniae, E. coli, or P. mirablis: ARABELLA <=16 is sensitive and ARABELLA >=32 is resistant. This also predicts results for oral agents cefaclor, cefdinir, cefpodoxime, cefprozil, cefuroxime axetil, cephalexin and locarbef for uncomplicated UTI. Note that some isolates may be susceptible to these agents while testing resistant to cefazolin.      -  Cefepime: S <=2      -  Cefoxitin: S <=8      -  Ceftriaxone: S <=1 Enterobacter, Klebsiella aerogenes, Citrobacter, and Serratia may develop resistance during prolonged therapy      -  Cefuroxime: R >16      -  Ciprofloxacin: S <=0.25      -  Ertapenem: S <=0.5      -  Gentamicin: S <=2      -  Imipenem: S <=1      -  Levofloxacin: S <=0.5      -  Meropenem: S <=1      -  Nitrofurantoin: S <=32 Should not be used to treat pyelonephritis      -  Piperacillin/Tazobactam: R 32      -  Tobramycin: S <=2      -  Trimethoprim/Sulfamethoxazole: S <=0.5/9.5      Method Type: ARABELLA    Culture - Blood (collected 20 Jan 2023 18:02)  Source: .Blood None  Final Report (26 Jan 2023 01:00):    No Growth Final    Culture - Blood (collected 20 Jan 2023 18:01)  Source: .Blood None  Final Report (26 Jan 2023 01:00):    No Growth Final    Radiology: all available radiological tests reviewed    < from: CT Chest No Cont (01.27.23 @ 09:53) >  Small bowel obstruction with transition point at the neck of a left lower   quadrant abdominal wall hernia. It is uncertain whether the obstruction   is due to the hernia itself or to adhesions.    Additional massive ventral hernia containing small and large bowel and   epigastric hernia containing stomach.    Right lower lobe nodules new since December 04, 2021 and could be   infectious or neoplastic. Follow-up chest CT in 3 months is recommended   to reevaluate.    < end of copied text >      Advanced directives addressed: full resuscitation

## 2023-01-31 NOTE — PROGRESS NOTE ADULT - ASSESSMENT
73 y/o female with a PMHx of CVA with left sided weakness, atrial fibrillation on Coumadin, COPD  - wears 2L NC at baseline was brought in from skilled nursing facility for abdominal pain emesis and weakness.    # Incarcerated Ventral Hernia  - Still with NGT  - Scheduled for OR today  - Patient RCRC Class IV  - Benefits outweigh risks  - Patient optimized for OR  - Continue cefepime and flagyl    # Afib  - Rate controlled  - Off coumadin  - Continue cardizem  - Continue amiodarone  - Continue metoprolol    # HLD  - Atorvastatin    # COPD  - Stable  - Continue budesonide    # HTN  - Continue losartan    # Depression  - Continue duloxetine    # DVT prophylaxis  - SCDs 71 y/o female with a PMHx of CVA with left sided weakness, atrial fibrillation on Coumadin, COPD  - wears 2L NC at baseline was brought in from skilled nursing facility for abdominal pain emesis and weakness.    # Incarcerated Ventral Hernia with SBO  - Still with NGT  - Scheduled for OR today  - Patient RCRI Class IV  - Benefits outweigh risks  - Patient optimized for OR  - Continue cefepime and flagyl    # Afib  - Rate controlled  - Off coumadin  - Continue cardizem  - Continue amiodarone  - Continue metoprolol    # HLD  - Atorvastatin    # COPD  - Stable  - Continue budesonide    # HTN  - Continue losartan    # Depression  - Continue duloxetine    # DVT prophylaxis  - SCDs

## 2023-01-31 NOTE — PROGRESS NOTE ADULT - SUBJECTIVE AND OBJECTIVE BOX
HOSPITALIST ATTENDING PROGRESS NOTE    Chart and meds reviewed.  Patient seen and examined.    CC: Bowel obstruction    Subjective: No acute issues overnight. Still with NGT. No fever.     All other systems reviewed and found to be negative with the exception of what has been described above.    MEDICATIONS  (STANDING):  aMIOdarone    Tablet 200 milliGRAM(s) Oral daily  atorvastatin 20 milliGRAM(s) Oral at bedtime  buDESOnide    Inhalation Suspension 0.5 milliGRAM(s) Inhalation every 12 hours  cefepime   IVPB 2000 milliGRAM(s) IV Intermittent every 12 hours  chlorhexidine 4% Liquid 1 Application(s) Topical <User Schedule>  coronavirus bivalent (EUA) Booster Vaccine (PFIZER) 0.3 milliLiter(s) IntraMuscular once  dextrose 5% with potassium chloride 20 mEq/L 1000 milliLiter(s) (50 mL/Hr) IV Continuous <Continuous>  dextrose 5%. 1000 milliLiter(s) (100 mL/Hr) IV Continuous <Continuous>  dextrose 5%. 1000 milliLiter(s) (50 mL/Hr) IV Continuous <Continuous>  dextrose 50% Injectable 25 Gram(s) IV Push once  dextrose 50% Injectable 12.5 Gram(s) IV Push once  dextrose 50% Injectable 25 Gram(s) IV Push once  diltiazem    milliGRAM(s) Oral daily  DULoxetine 60 milliGRAM(s) Oral daily  glucagon  Injectable 1 milliGRAM(s) IntraMuscular once  insulin lispro (ADMELOG) corrective regimen sliding scale   SubCutaneous every 6 hours  levothyroxine Injectable 70 MICROGram(s) IV Push at bedtime  losartan 25 milliGRAM(s) Oral daily  metoprolol tartrate IVPB 2.5 milliGRAM(s) IV Intermittent every 6 hours  metroNIDAZOLE  IVPB 500 milliGRAM(s) IV Intermittent every 8 hours  montelukast 10 milliGRAM(s) Oral daily  nystatin Cream 1 Application(s) Topical two times a day  nystatin Powder 1 Application(s) Topical every 12 hours  pantoprazole  Injectable 40 milliGRAM(s) IV Push daily  pregabalin 100 milliGRAM(s) Oral every 8 hours  sodium chloride 0.9%. 1000 milliLiter(s) (50 mL/Hr) IV Continuous <Continuous>    MEDICATIONS  (PRN):  albuterol    90 MICROgram(s) HFA Inhaler 2 Puff(s) Inhalation every 6 hours PRN Shortness of Breath and/or Wheezing  dextrose Oral Gel 15 Gram(s) Oral once PRN Blood Glucose LESS THAN 70 milliGRAM(s)/deciliter  oxybutynin 5 milliGRAM(s) Oral every 12 hours PRN Bladder spasms  sodium chloride 0.9% lock flush 10 milliLiter(s) IV Push every 1 hour PRN Pre/post blood products, medications, blood draw, and to maintain line patency      VITALS:  T(F): 97.3 (01-31-23 @ 08:08), Max: 100 (01-31-23 @ 00:18)  HR: 81 (01-31-23 @ 08:08) (81 - 94)  BP: 149/63 (01-31-23 @ 08:08) (141/83 - 149/63)  RR: 19 (01-31-23 @ 08:08) (18 - 20)  SpO2: 99% (01-31-23 @ 08:08) (97% - 99%)  Wt(kg): --    I&O's Summary    30 Jan 2023 07:01  -  31 Jan 2023 07:00  --------------------------------------------------------  IN: 950 mL / OUT: 1230 mL / NET: -280 mL        CAPILLARY BLOOD GLUCOSE      POCT Blood Glucose.: 120 mg/dL (31 Jan 2023 05:08)  POCT Blood Glucose.: 108 mg/dL (30 Jan 2023 23:25)  POCT Blood Glucose.: 96 mg/dL (30 Jan 2023 17:18)  POCT Blood Glucose.: 107 mg/dL (30 Jan 2023 12:07)      PHYSICAL EXAM:  GEN: Ill appearing, Obese with NGT  HEENT:  pupils equal and reactive, EOMI, no oropharyngeal lesions, erythema, exudates, oral thrush  NECK:   supple, no carotid bruits, no palpable lymph nodes, no thyromegaly  CV:  +S1, +S2, regular, no murmurs or rubs  RESP:   lungs clear to auscultation bilaterally, no wheezing, rales, rhonchi, good air entry bilaterally  BREAST:  not examined  GI:  abdomen distended, no rebound  RECTAL:  not examined  :  not examined  MSK:   normal muscle tone, no atrophy, no rigidity, no contractions  EXT:  no clubbing, no cyanosis, no edema, no calf pain, swelling or erythema  VASCULAR:  pulses equal and symmetric in the upper and lower extremities  NEURO:  AAOX1-2, no focal neurological deficits, follows all commands, able to move extremities spontaneously  SKIN:  no ulcers, lesions or rashes    LABS:                            9.5    8.67  )-----------( 502      ( 31 Jan 2023 09:03 )             31.5     01-31    145  |  113<H>  |  15  ----------------------------<  135<H>  3.5   |  26  |  0.52    Ca    7.7<L>      31 Jan 2023 06:57      PT/INR - ( 31 Jan 2023 09:03 )   PT: 21.8 sec;   INR: 1.87 ratio    PTT - ( 31 Jan 2023 09:03 )  PTT:32.9 sec      < from: CT Head No Cont (01.27.23 @ 12:25) >    IMPRESSION: Encephalomalacia and gliosis right posterior temporal lobe.   Old right middle cerebral artery infarct with ex vacuo dilatation of the   body the right lateral ventricle and right-sided wallerian degeneration.   No hemorrhage.    --- End of Report ---      < end of copied text >  < from: CT Chest No Cont (01.27.23 @ 09:53) >  IMPRESSION:  Small bowel obstruction with transition point at the neck of a left lower   quadrant abdominal wall hernia. It is uncertain whether the obstruction   is due to the hernia itself or to adhesions.    Additional massive ventral hernia containing small and large bowel and   epigastric hernia containing stomach.    Right lower lobe nodules new since December 04, 2021 and could be   infectious or neoplastic. Follow-up chest CT in 3 months is recommended   to reevaluate.        --- End of Report ---    < end of copied text >

## 2023-01-31 NOTE — PROGRESS NOTE ADULT - ASSESSMENT
Patient admitted with abdominal pain, nausea and vomitting , dehydration  septic shock, likely related to     PROBLEMS:    UTI klebsiella pneumoniae and aspiration PNA /SBO- ventral hernia  New R lung nodules - cannot ruleout aspiration PNA   Acute metabolic encephalopathy  Sepsis   Hx copd without exacerbation  Anasarca/acute on  chronic diastolic heart failure       Plan:    pulmonary stable  NG suction-Incarcerated Ventral Hernia with SBO-Scheduled for OR   IV cefepime /flagyl -for  sepsis ( SBO/UTI)  Blood cx , urine cx   Monitor I/O has rizzo  Monitor Cr  D/w staff  DVT PROPHYLASIX

## 2023-01-31 NOTE — CONSULT NOTE ADULT - ASSESSMENT
71 y/o female with a PMHx of CVA with left sided weakness, atrial fibrillation on Coumadin, COPD  - wears 2L NC at baseline admitted for    # Incarcerated Vental Hernia with SBO  # Distributive Shock  # UTI, Klebsiella  # Sepsis  # ALVERTO    DNR/DNI    Neuro:  - Avoid Neuro Deliriogenic / sedative medications  - Lyrica  - Cymbalta  - Prn Pain control    CV:  - Actively Titrating Juan gtt to maintain MAP > 65  - Amio, BB for rate control  - Losartan, hold antihypertensive given pt shock state  - Statin    Pulm:  - Incentive spirometry  - supplemental oxygen as needed to maintain spo2 >88%  - Albuterol, Pulmicort    GI:  - Enteral feeds as tolerated to avoid Stress ulceration and optimize nutritional state when indicated  - NGT LIWS  - Monitor for bowel function  - Wound care as per sx    Renal:  - Preserved Renal Function Continue to monitor Bun/Cr and UOP  - Replacing electrolytes as needed with Goal K> 4, PO> 3, Mg> 2               - Strict I&O's  - Avoid Nephro toxic medication  - LR @ 125    Heme:  - SCDs for DVT/PE ppx     ID:  - Continue Cefepime, Metro  - Microbiology and Radiology reviewed   - trend CBC with diff, CMP  and fever curve    Endo:  - ISS for aggressive glycemic control to limit FS glucose to < 180mg/dl.  - Keep Euthyroid    Critical Care Time (EXCLUSIVE of any non bundled procedures) :  35minutes were spent assessing the patient's presenting problems of acute illness that pose a high probability of life threatening  deterioration or end organ damage / dysfunction.  Medical desicion making includes initiation / continuation of plan or care review data/ labwork/ radiographic study, direct patient care bedside ,  discussions with  consultants regarding care,  evaluation and interpretation of vital signs, any necessary ventilator management,   NIV or BIPAP changes  or initiation,    discussions with multidisipliary team,  am or pm rounds, discussions of goals of care with patient and family all non-inclusive of procedures.    Plan discussed with Dr Cecily ASH

## 2023-01-31 NOTE — CONSULT NOTE ADULT - SUBJECTIVE AND OBJECTIVE BOX
Patient is a 72y old  Female who presents with a chief complaint of bowel obstruction/ischemic mesentery (2023 16:16)      BRIEF HOSPITAL COURSE: 71 y/o female with a PMHx of CVA with left sided weakness, atrial fibrillation, emphysema, COPD, HTN - wears 2L NC at baseline BIBA, hx SBO and resection per EMS presents to the ED from [facility] for RLQ pain and r/o SBO. x1 episode of emesis, + profound diffuse abdominal pain, febrile    Events last 24 hours: Taken to OR for ventral hernia repair, remained hypotensive on Juan gtt, transfer to ICU for need of higher level of care and monitoring.    PAST MEDICAL & SURGICAL HISTORY:  Hypertension      Hypercholesteremia      COPD (chronic obstructive pulmonary disease)      C. difficile diarrhea        Diverticulitis of intestine with perforation and abscess without bleeding, unspecified part of intestinal tract      PE (pulmonary thromboembolism)  2016      Palpitations      Obesity      Osteoarthritis      Anemia      Colostomy in place      History of atrial fibrillation      HTN (hypertension)      Diabetes mellitus      Cerebrovascular accident (CVA)      DVT of lower limb, acute      CVA (cerebral vascular accident)      COPD (chronic obstructive pulmonary disease)      Neuropathy      Diverticulitis      History of appendectomy      H/O:   x2      H/O ovarian cystectomy  b/l      Status post total replacement of both hips      H/O hemicolectomy  2016      History of colostomy reversal      History of colostomy reversal      S/P colostomy      S/P       S/P hip replacement        Allergies    Cipro (Rash)  Spiriva (Rash)    Intolerances      FAMILY HISTORY:  Family history of COPD (chronic obstructive pulmonary disease)    Family history of chronic kidney disease    Family history of hiatal hernia (Sibling)      SOCIAL HISTORY:     Review of Systems:  CONSTITUTIONAL: No fever, chills, or fatigue.  EYES: No eye pain, visual disturbances, or discharge.  ENMT:  No difficulty hearing, tinnitus, or vertigo. No sinus or throat pain.  NECK: No pain or stiffness.  RESPIRATORY: No shortness of breath, cough, or wheezing.  CARDIOVASCULAR: No chest pain, palpitations, dizziness, or leg swelling.  GASTROINTESTINAL: pos abdominal, no  epigastric pain. No nausea, vomiting,  diarrhea, or constipation. No hematemesis, melena, or hematochezia.  GENITOURINARY: No dysuria, frequency, hematuria, or incontinence.  NEUROLOGICAL: No headaches, memory loss, loss of strength, numbness, or tremors.  SKIN: No itching, burning, rashes, or lesions.  MUSCULOSKELETAL: No joint pain or swelling; No muscle, back, or extremity pain.  PSYCHIATRIC: No depression, anxiety, mood swings, or difficulty sleeping.    Medications:  cefepime   IVPB 2000 milliGRAM(s) IV Intermittent every 12 hours  metroNIDAZOLE  IVPB 500 milliGRAM(s) IV Intermittent every 8 hours    aMIOdarone    Tablet 200 milliGRAM(s) Oral daily  losartan 25 milliGRAM(s) Oral daily  metoprolol tartrate Injectable 2.5 milliGRAM(s) IV Push every 6 hours  phenylephrine    Infusion 0.5 MICROgram(s)/kG/Min IV Continuous <Continuous>    albuterol    90 MICROgram(s) HFA Inhaler 2 Puff(s) Inhalation every 6 hours PRN  buDESOnide    Inhalation Suspension 0.5 milliGRAM(s) Inhalation every 12 hours  montelukast 10 milliGRAM(s) Oral daily    DULoxetine 60 milliGRAM(s) Oral daily  pregabalin 100 milliGRAM(s) Oral every 8 hours        pantoprazole  Injectable 40 milliGRAM(s) IV Push daily    oxybutynin 5 milliGRAM(s) Oral every 12 hours PRN    atorvastatin 20 milliGRAM(s) Oral at bedtime  dextrose 50% Injectable 25 Gram(s) IV Push once  dextrose 50% Injectable 12.5 Gram(s) IV Push once  dextrose 50% Injectable 25 Gram(s) IV Push once  dextrose Oral Gel 15 Gram(s) Oral once PRN  glucagon  Injectable 1 milliGRAM(s) IntraMuscular once  insulin lispro (ADMELOG) corrective regimen sliding scale   SubCutaneous every 6 hours  levothyroxine Injectable 70 MICROGram(s) IV Push at bedtime    dextrose 5%. 1000 milliLiter(s) IV Continuous <Continuous>  dextrose 5%. 1000 milliLiter(s) IV Continuous <Continuous>  lactated ringers. 1000 milliLiter(s) IV Continuous <Continuous>  sodium chloride 0.9% lock flush 10 milliLiter(s) IV Push every 1 hour PRN    coronavirus bivalent (EUA) Booster Vaccine (PFIZER) 0.3 milliLiter(s) IntraMuscular once    chlorhexidine 4% Liquid 1 Application(s) Topical <User Schedule>  nystatin Cream 1 Application(s) Topical two times a day  nystatin Powder 1 Application(s) Topical every 12 hours            ICU Vital Signs Last 24 Hrs  T(C): 36.8 (2023 23:00), Max: 37.8 (2023 00:18)  T(F): 98.2 (2023 23:00), Max: 100 (2023 00:18)  HR: 84 (2023 22:45) (81 - 88)  BP: 108/39 (2023 22:45) (79/44 - 149/63)  BP(mean): --  ABP: --  ABP(mean): --  RR: 22 (2023 22:45) (18 - 28)  SpO2: 96% (2023 22:45) (96% - 100%)    O2 Parameters below as of 2023 22:15  Patient On (Oxygen Delivery Method): nasal cannula  O2 Flow (L/min): 4        Vital Signs Last 24 Hrs  T(C): 36.8 (2023 23:00), Max: 37.8 (2023 00:18)  T(F): 98.2 (2023 23:00), Max: 100 (2023 00:18)  HR: 84 (2023 22:45) (81 - 88)  BP: 108/39 (2023 22:45) (79/44 - 149/63)  BP(mean): --  RR: 22 (2023 22:45) (18 - 28)  SpO2: 96% (2023 22:45) (96% - 100%)    Parameters below as of 2023 22:15  Patient On (Oxygen Delivery Method): nasal cannula  O2 Flow (L/min): 4          I&O's Detail    2023 07:01  -  2023 07:00  --------------------------------------------------------  IN:    dextrose 5% with potassium chloride 20 mEq/L: 600 mL    IV PiggyBack: 100 mL    IV PiggyBack: 200 mL    IV PiggyBack: 50 mL  Total IN: 950 mL    OUT:    Indwelling Catheter - Urethral (mL): 1030 mL    Nasogastric/Oral tube (mL): 200 mL  Total OUT: 1230 mL    Total NET: -280 mL      2023 07:01  -  2023 23:45  --------------------------------------------------------  IN:  Total IN: 0 mL    OUT:    Indwelling Catheter - Urethral (mL): 400 mL  Total OUT: 400 mL    Total NET: -400 mL          LABS:                        9.5    8.67  )-----------( 502      ( 2023 09:03 )             31.5         145  |  113<H>  |  15  ----------------------------<  135<H>  3.5   |  26  |  0.52    Ca    7.7<L>      2023 06:57            CAPILLARY BLOOD GLUCOSE      POCT Blood Glucose.: 154 mg/dL (2023 22:25)    PT/INR - ( 2023 09:03 )   PT: 21.8 sec;   INR: 1.87 ratio         PTT - ( 2023 09:03 )  PTT:32.9 sec      CULTURES:  Culture Results:   No growth to date. ( @ 06:16)  Culture Results:   No growth to date. ( @ 06:16)      Physical Examination:    General: Well appearing, lying in bed in NAD.    HEENT: Pupils equal, reactive to light. Symmetric. No scleral icterus or injection.    PULM: Clear to auscultation B/L. No wheezes, rales, or rhonchi appreciated. No significant sputum production or increased respiratory effort.    NECK: Supple, no lymphadenopathy, trachea midline.    CVS: Regular rate and rhythm, no murmurs appreciated, +s1/s2.    ABD: Soft, distended, tender, hypo active bowel sounds.    EXT: No edema, nontender.    SKIN: Warm and well perfused, no rashes noted.    NEURO: Alert, interactive, nonfocal.    RADIOLOGY: < from: CT Head No Cont (23 @ 12:25) >    ACC: [id number] EXAM:  CT BRAIN   ORDERED BY: DIANN INMAN DATE:  2023          INTERPRETATION:  Clinical Indication: Acute encephalopathy    5mm axial sections of the brain were obtained from base to vertex,   without the intravenous administration of contrast material. Coronal and   sagittal computer generated reconstructed views are available.    No prior brain imaging is available for comparison.      There is encephalomalacia and gliosis in the right posterior temporal   lobe and there is lucency in the right frontal parenchyma in the right   middle cerebral artery distribution with ex vacuo dilatation of the body   of the right lateral ventricle. Lucency is also identified in the right   basal ganglia with right-sided wallerian degeneration suggesting an old   right middle cerebral artery infarct. There is no hemorrhage.     Bone window examination is unremarkable. There has been previous   bilateral placement surgery    IMPRESSION: Encephalomalacia and gliosis right posterior temporal lobe.   Old right middle cerebral artery infarct with ex vacuo dilatation of the   body the right lateral ventricle and right-sided wallerian degeneration.   No hemorrhage.    --- End of Report ---            JOSE JURADO MD; Attending Radiologist  This document has been electronically signed. 2023 12:32PM    < end of copied text >

## 2023-01-31 NOTE — BRIEF OPERATIVE NOTE - NSICDXBRIEFPREOP_GEN_ALL_CORE_FT
PRE-OP DIAGNOSIS:  Small bowel obstruction 31-Jan-2023 22:04:59  Brian Castro  Irreducible ventral incisional hernia 31-Jan-2023 22:05:37  Brian Castro

## 2023-01-31 NOTE — PROGRESS NOTE ADULT - ASSESSMENT
73 y/o female with h/o old CVA with left sided weakness, atrial fibrillation, emphysema, COPD, HTN, prior SBO and resection was admitted on 1/20 from [facility] for RLQ pain. She was noted with one episode of emesis and diffuse abdominal pain associated with fever. On 1/27 the patient was noted febrile to 102.8F, more lethargic, did not open eyes, did not follow commands. She received cefepime and metronidazole.     1. Febrile syndrome. UTI with KLPN. Right lower lobes nodules Probable pneumonia. SBO ?cause ?related to ventral hernia. Allergy to cipro. Encephalopathy.   -leukocytosis improving  -abdomen is softer  -BC x 2 noted  -urine c/s noted  -on cefepime 2 gm IV q12h and metronidazole 500 mg IV q8h # 5  -tolerating abx well so far; no side effects noted  -surgical evaluation appreciated - plan for surgery today  -conservative care   -aspiration precautions  -continue abx coverage   -monitor abdomen  -monitor temps  -f/u CBC  -supportive care  2. Other issues:   -care per medicine    d/w Dr. Navarrete

## 2023-01-31 NOTE — PROGRESS NOTE ADULT - SUBJECTIVE AND OBJECTIVE BOX
Subjective:    Home Medications:  Albuterol (Eqv-ProAir HFA) 90 mcg/inh inhalation aerosol: 1 puff(s) inhaled every 6 hours, As Needed (20 Jan 2023 16:49)  amiodarone 200 mg oral tablet: 1 tab(s) orally once a day (20 Jan 2023 16:49)  ascorbic acid 500 mg oral tablet: 2 tab(s) orally once a day (20 Jan 2023 16:49)  atorvastatin 10 mg oral tablet: 1 tab(s) orally once a day (at bedtime) (20 Jan 2023 16:49)  benzonatate 100 mg oral capsule: 1 cap(s) orally 3 times a day (20 Jan 2023 21:48)  budesonide 0.5 mg/2 mL inhalation suspension: 2 milliliter(s) inhaled 2 times a day (20 Jan 2023 21:48)  Cepacol Sore Throat 15 mg-3.6 mg mucous membrane lozenge: 1 lozenge mucous membrane every 4 hours, As Needed (20 Jan 2023 21:48)  cholecalciferol 1250 mcg (50,000 intl units) oral capsule: 1 cap(s) orally every 4 weeks on Wednesday  (20 Jan 2023 16:49)  Coumadin 2.5 mg oral tablet: 1 tab(s) orally once a day (at bedtime)  ***ON HOLD from 1-16 to 1-21*** (20 Jan 2023 21:48)  Cranberry oral tablet: 1 tab(s) orally 2 times a day (20 Jan 2023 16:49)  cyanocobalamin 1000 mcg oral tablet: 1 tab(s) orally once a day (20 Jan 2023 16:49)  dilTIAZem 180 mg/24 hours oral capsule, extended release: 1 cap(s) orally once a day (20 Jan 2023 16:49)  DULoxetine 60 mg oral delayed release capsule: 1 cap(s) orally once a day (20 Jan 2023 16:49)  estradiol 0.1 mg/g vaginal cream: 0.5 gram(s) vaginal 2 times a week on Monday and Thursday (20 Jan 2023 21:48)  fexofenadine 180 mg oral tablet: 1 tab(s) orally once a day (20 Jan 2023 21:48)  fluticasone 50 mcg/inh nasal spray: 1 spray(s) nasal 2 times a day (20 Jan 2023 16:49)  furosemide 20 mg oral tablet: 1 tab(s) orally once a day (20 Jan 2023 16:49)  glipiZIDE 10 mg oral tablet, extended release: 2 tab(s) orally once a day (20 Jan 2023 16:49)  GlycoLax oral powder for reconstitution: 17 gram(s) orally 2 times a day (20 Jan 2023 16:49)  guaiFENesin 100 mg/5 mL oral liquid: 20 milliliter(s) orally every 6 hours (20 Jan 2023 21:48)  HumaLOG KwikPen 100 units/mL injectable solution: 10 unit(s) injectable 3 times a day (before meals) (20 Jan 2023 21:48)  ipratropium-albuterol 20 mcg-100 mcg/inh inhalation aerosol: 1 puff(s) inhaled 4 times a day (20 Jan 2023 16:49)  levothyroxine 88 mcg (0.088 mg) oral tablet: 1 tab(s) orally once a day (at bedtime) (20 Jan 2023 16:49)  losartan 25 mg oral tablet: 1 tab(s) orally once a day (20 Jan 2023 16:49)  Macrobid 100 mg oral capsule: 1 cap(s) orally 2 times a day for 5 days  ***course complete*** (20 Jan 2023 21:48)  methocarbamol 750 mg oral tablet: 1 tab(s) orally every 8 hours, As Needed (20 Jan 2023 21:48)  metoprolol tartrate 25 mg oral tablet: 1 tab(s) orally 2 times a day (20 Jan 2023 16:49)  Milk of Magnesia 8% oral suspension: 30 milliliter(s) orally once a day, As Needed (20 Jan 2023 16:49)  montelukast 10 mg oral tablet: 1 tab(s) orally once a day (at bedtime) (20 Jan 2023 21:48)  ondansetron 4 mg oral tablet: 1 tab(s) orally every 4 hours, As Needed (20 Jan 2023 21:48)  oxybutynin 5 mg oral tablet: 1 tab(s) orally 2 times a day (20 Jan 2023 16:49)  oxyCODONE 5 mg oral tablet: 1 tab(s) orally every 4 hours, As Needed (20 Jan 2023 16:49)  phytonadione 5 mg oral tablet: 0.5 tab(s) orally once  ***given at [facility] once on 1-19-23*** (20 Jan 2023 21:48)  pregabalin 100 mg oral capsule: 1 cap(s) orally 3 times a day (20 Jan 2023 16:49)  sennosides-docusate 8.6 mg-50 mg oral tablet: 2 tab(s) orally once a day (at bedtime) (20 Jan 2023 16:49)  Tradjenta 5 mg oral tablet: 1 tab(s) orally once a day (20 Jan 2023 16:49)  Tylenol 500 mg oral tablet: 2 tab(s) orally every 8 hours (20 Jan 2023 16:49)    MEDICATIONS  (STANDING):  aMIOdarone    Tablet 200 milliGRAM(s) Oral daily  atorvastatin 20 milliGRAM(s) Oral at bedtime  buDESOnide    Inhalation Suspension 0.5 milliGRAM(s) Inhalation every 12 hours  cefepime   IVPB 2000 milliGRAM(s) IV Intermittent every 12 hours  chlorhexidine 4% Liquid 1 Application(s) Topical <User Schedule>  coronavirus bivalent (EUA) Booster Vaccine (PFIZER) 0.3 milliLiter(s) IntraMuscular once  dextrose 5% with potassium chloride 20 mEq/L 1000 milliLiter(s) (50 mL/Hr) IV Continuous <Continuous>  dextrose 5%. 1000 milliLiter(s) (50 mL/Hr) IV Continuous <Continuous>  dextrose 5%. 1000 milliLiter(s) (100 mL/Hr) IV Continuous <Continuous>  dextrose 50% Injectable 25 Gram(s) IV Push once  dextrose 50% Injectable 12.5 Gram(s) IV Push once  dextrose 50% Injectable 25 Gram(s) IV Push once  diltiazem    milliGRAM(s) Oral daily  DULoxetine 60 milliGRAM(s) Oral daily  glucagon  Injectable 1 milliGRAM(s) IntraMuscular once  insulin lispro (ADMELOG) corrective regimen sliding scale   SubCutaneous every 6 hours  levothyroxine Injectable 70 MICROGram(s) IV Push at bedtime  losartan 25 milliGRAM(s) Oral daily  metoprolol tartrate IVPB 2.5 milliGRAM(s) IV Intermittent every 6 hours  metroNIDAZOLE  IVPB 500 milliGRAM(s) IV Intermittent every 8 hours  montelukast 10 milliGRAM(s) Oral daily  nystatin Cream 1 Application(s) Topical two times a day  nystatin Powder 1 Application(s) Topical every 12 hours  pantoprazole  Injectable 40 milliGRAM(s) IV Push daily  pregabalin 100 milliGRAM(s) Oral every 8 hours  sodium chloride 0.9%. 1000 milliLiter(s) (50 mL/Hr) IV Continuous <Continuous>    MEDICATIONS  (PRN):  albuterol    90 MICROgram(s) HFA Inhaler 2 Puff(s) Inhalation every 6 hours PRN Shortness of Breath and/or Wheezing  dextrose Oral Gel 15 Gram(s) Oral once PRN Blood Glucose LESS THAN 70 milliGRAM(s)/deciliter  oxybutynin 5 milliGRAM(s) Oral every 12 hours PRN Bladder spasms  sodium chloride 0.9% lock flush 10 milliLiter(s) IV Push every 1 hour PRN Pre/post blood products, medications, blood draw, and to maintain line patency      Allergies    Cipro (Rash)  Spiriva (Rash)    Intolerances        Vital Signs Last 24 Hrs  T(C): 36.3 (31 Jan 2023 08:08), Max: 37.8 (31 Jan 2023 00:18)  T(F): 97.3 (31 Jan 2023 08:08), Max: 100 (31 Jan 2023 00:18)  HR: 81 (31 Jan 2023 08:08) (81 - 94)  BP: 149/63 (31 Jan 2023 08:08) (141/83 - 149/63)  BP(mean): --  RR: 19 (31 Jan 2023 08:08) (18 - 20)  SpO2: 99% (31 Jan 2023 08:08) (97% - 99%)    Parameters below as of 31 Jan 2023 08:08  Patient On (Oxygen Delivery Method): nasal cannula  O2 Flow (L/min): 2        PHYSICAL EXAMINATION:    NECK:  Supple. No lymphadenopathy. Jugular venous pressure not elevated. Carotids equal.   HEART:   The cardiac impulse has a normal quality. Reg., Nl S1 and S2.  There are no murmurs, rubs or gallops noted  CHEST:  Chest is clear to auscultation. Normal respiratory effort.  ABDOMEN:  Soft and nontender.   EXTREMITIES:  There is no edema.       LABS:                        9.9    10.40 )-----------( 494      ( 30 Jan 2023 06:55 )             32.9     01-31    145  |  113<H>  |  15  ----------------------------<  135<H>  3.5   |  26  |  0.52    Ca    7.7<L>      31 Jan 2023 06:57      PT/INR - ( 31 Jan 2023 06:57 )   PT: see note sec;   INR: see note ratio                    Subjective:    pat better, lying in bed, no new complaint, still NG suction    Home Medications:  Albuterol (Eqv-ProAir HFA) 90 mcg/inh inhalation aerosol: 1 puff(s) inhaled every 6 hours, As Needed (20 Jan 2023 16:49)  amiodarone 200 mg oral tablet: 1 tab(s) orally once a day (20 Jan 2023 16:49)  ascorbic acid 500 mg oral tablet: 2 tab(s) orally once a day (20 Jan 2023 16:49)  atorvastatin 10 mg oral tablet: 1 tab(s) orally once a day (at bedtime) (20 Jan 2023 16:49)  benzonatate 100 mg oral capsule: 1 cap(s) orally 3 times a day (20 Jan 2023 21:48)  budesonide 0.5 mg/2 mL inhalation suspension: 2 milliliter(s) inhaled 2 times a day (20 Jan 2023 21:48)  Cepacol Sore Throat 15 mg-3.6 mg mucous membrane lozenge: 1 lozenge mucous membrane every 4 hours, As Needed (20 Jan 2023 21:48)  cholecalciferol 1250 mcg (50,000 intl units) oral capsule: 1 cap(s) orally every 4 weeks on Wednesday  (20 Jan 2023 16:49)  Coumadin 2.5 mg oral tablet: 1 tab(s) orally once a day (at bedtime)  ***ON HOLD from 1-16 to 1-21*** (20 Jan 2023 21:48)  Cranberry oral tablet: 1 tab(s) orally 2 times a day (20 Jan 2023 16:49)  cyanocobalamin 1000 mcg oral tablet: 1 tab(s) orally once a day (20 Jan 2023 16:49)  dilTIAZem 180 mg/24 hours oral capsule, extended release: 1 cap(s) orally once a day (20 Jan 2023 16:49)  DULoxetine 60 mg oral delayed release capsule: 1 cap(s) orally once a day (20 Jan 2023 16:49)  estradiol 0.1 mg/g vaginal cream: 0.5 gram(s) vaginal 2 times a week on Monday and Thursday (20 Jan 2023 21:48)  fexofenadine 180 mg oral tablet: 1 tab(s) orally once a day (20 Jan 2023 21:48)  fluticasone 50 mcg/inh nasal spray: 1 spray(s) nasal 2 times a day (20 Jan 2023 16:49)  furosemide 20 mg oral tablet: 1 tab(s) orally once a day (20 Jan 2023 16:49)  glipiZIDE 10 mg oral tablet, extended release: 2 tab(s) orally once a day (20 Jan 2023 16:49)  GlycoLax oral powder for reconstitution: 17 gram(s) orally 2 times a day (20 Jan 2023 16:49)  guaiFENesin 100 mg/5 mL oral liquid: 20 milliliter(s) orally every 6 hours (20 Jan 2023 21:48)  HumaLOG KwikPen 100 units/mL injectable solution: 10 unit(s) injectable 3 times a day (before meals) (20 Jan 2023 21:48)  ipratropium-albuterol 20 mcg-100 mcg/inh inhalation aerosol: 1 puff(s) inhaled 4 times a day (20 Jan 2023 16:49)  levothyroxine 88 mcg (0.088 mg) oral tablet: 1 tab(s) orally once a day (at bedtime) (20 Jan 2023 16:49)  losartan 25 mg oral tablet: 1 tab(s) orally once a day (20 Jan 2023 16:49)  Macrobid 100 mg oral capsule: 1 cap(s) orally 2 times a day for 5 days  ***course complete*** (20 Jan 2023 21:48)  methocarbamol 750 mg oral tablet: 1 tab(s) orally every 8 hours, As Needed (20 Jan 2023 21:48)  metoprolol tartrate 25 mg oral tablet: 1 tab(s) orally 2 times a day (20 Jan 2023 16:49)  Milk of Magnesia 8% oral suspension: 30 milliliter(s) orally once a day, As Needed (20 Jan 2023 16:49)  montelukast 10 mg oral tablet: 1 tab(s) orally once a day (at bedtime) (20 Jan 2023 21:48)  ondansetron 4 mg oral tablet: 1 tab(s) orally every 4 hours, As Needed (20 Jan 2023 21:48)  oxybutynin 5 mg oral tablet: 1 tab(s) orally 2 times a day (20 Jan 2023 16:49)  oxyCODONE 5 mg oral tablet: 1 tab(s) orally every 4 hours, As Needed (20 Jan 2023 16:49)  phytonadione 5 mg oral tablet: 0.5 tab(s) orally once  ***given at [facility] on 1-19-23*** (20 Jan 2023 21:48)  pregabalin 100 mg oral capsule: 1 cap(s) orally 3 times a day (20 Jan 2023 16:49)  sennosides-docusate 8.6 mg-50 mg oral tablet: 2 tab(s) orally once a day (at bedtime) (20 Jan 2023 16:49)  Tradjenta 5 mg oral tablet: 1 tab(s) orally once a day (20 Jan 2023 16:49)  Tylenol 500 mg oral tablet: 2 tab(s) orally every 8 hours (20 Jan 2023 16:49)    MEDICATIONS  (STANDING):  aMIOdarone    Tablet 200 milliGRAM(s) Oral daily  atorvastatin 20 milliGRAM(s) Oral at bedtime  buDESOnide    Inhalation Suspension 0.5 milliGRAM(s) Inhalation every 12 hours  cefepime   IVPB 2000 milliGRAM(s) IV Intermittent every 12 hours  chlorhexidine 4% Liquid 1 Application(s) Topical <User Schedule>  coronavirus bivalent (EUA) Booster Vaccine (PFIZER) 0.3 milliLiter(s) IntraMuscular once  dextrose 5% with potassium chloride 20 mEq/L 1000 milliLiter(s) (50 mL/Hr) IV Continuous <Continuous>  dextrose 5%. 1000 milliLiter(s) (50 mL/Hr) IV Continuous <Continuous>  dextrose 5%. 1000 milliLiter(s) (100 mL/Hr) IV Continuous <Continuous>  dextrose 50% Injectable 25 Gram(s) IV Push once  dextrose 50% Injectable 12.5 Gram(s) IV Push once  dextrose 50% Injectable 25 Gram(s) IV Push once  diltiazem    milliGRAM(s) Oral daily  DULoxetine 60 milliGRAM(s) Oral daily  glucagon  Injectable 1 milliGRAM(s) IntraMuscular once  insulin lispro (ADMELOG) corrective regimen sliding scale   SubCutaneous every 6 hours  levothyroxine Injectable 70 MICROGram(s) IV Push at bedtime  losartan 25 milliGRAM(s) Oral daily  metoprolol tartrate IVPB 2.5 milliGRAM(s) IV Intermittent every 6 hours  metroNIDAZOLE  IVPB 500 milliGRAM(s) IV Intermittent every 8 hours  montelukast 10 milliGRAM(s) Oral daily  nystatin Cream 1 Application(s) Topical two times a day  nystatin Powder 1 Application(s) Topical every 12 hours  pantoprazole  Injectable 40 milliGRAM(s) IV Push daily  pregabalin 100 milliGRAM(s) Oral every 8 hours  sodium chloride 0.9%. 1000 milliLiter(s) (50 mL/Hr) IV Continuous <Continuous>    MEDICATIONS  (PRN):  albuterol    90 MICROgram(s) HFA Inhaler 2 Puff(s) Inhalation every 6 hours PRN Shortness of Breath and/or Wheezing  dextrose Oral Gel 15 Gram(s) Oral once PRN Blood Glucose LESS THAN 70 milliGRAM(s)/deciliter  oxybutynin 5 milliGRAM(s) Oral every 12 hours PRN Bladder spasms  sodium chloride 0.9% lock flush 10 milliLiter(s) IV Push every 1 hour PRN Pre/post blood products, medications, blood draw, and to maintain line patency      Allergies    Cipro (Rash)  Spiriva (Rash)    Intolerances        Vital Signs Last 24 Hrs  T(C): 36.3 (31 Jan 2023 08:08), Max: 37.8 (31 Jan 2023 00:18)  T(F): 97.3 (31 Jan 2023 08:08), Max: 100 (31 Jan 2023 00:18)  HR: 81 (31 Jan 2023 08:08) (81 - 94)  BP: 149/63 (31 Jan 2023 08:08) (141/83 - 149/63)  BP(mean): --  RR: 19 (31 Jan 2023 08:08) (18 - 20)  SpO2: 99% (31 Jan 2023 08:08) (97% - 99%)    Parameters below as of 31 Jan 2023 08:08  Patient On (Oxygen Delivery Method): nasal cannula  O2 Flow (L/min): 2        PHYSICAL EXAMINATION:    NECK:  Supple. No lymphadenopathy. Jugular venous pressure not elevated. Carotids equal.   HEART:   The cardiac impulse has a normal quality. Reg., Nl S1 and S2.  There are no murmurs, rubs or gallops noted  CHEST:  Chest is clear to auscultation. Normal respiratory effort.  ABDOMEN:  Soft and nontender.   EXTREMITIES:  There is no edema.       LABS:                        9.9    10.40 )-----------( 494      ( 30 Jan 2023 06:55 )             32.9     01-31    145  |  113<H>  |  15  ----------------------------<  135<H>  3.5   |  26  |  0.52    Ca    7.7<L>      31 Jan 2023 06:57      PT/INR - ( 31 Jan 2023 06:57 )   PT: see note sec;   INR: see note ratio

## 2023-01-31 NOTE — BRIEF OPERATIVE NOTE - NSICDXBRIEFPROCEDURE_GEN_ALL_CORE_FT
PROCEDURES:  Laparotomy for lysis of adhesions 31-Jan-2023 22:00:46  Brian Castro  Small bowel resection 31-Jan-2023 22:01:58  Brian Castro  Reconstruction, abdominal wall, using mesh, with ventral hernia repair if indicated 31-Jan-2023 22:04:46  Brian Castro

## 2023-02-01 LAB
ANION GAP SERPL CALC-SCNC: 13 MMOL/L — SIGNIFICANT CHANGE UP (ref 5–17)
ANION GAP SERPL CALC-SCNC: 15 MMOL/L — SIGNIFICANT CHANGE UP (ref 5–17)
ANION GAP SERPL CALC-SCNC: 7 MMOL/L — SIGNIFICANT CHANGE UP (ref 5–17)
ANISOCYTOSIS BLD QL: SIGNIFICANT CHANGE UP
BASE EXCESS BLDA CALC-SCNC: -9.5 MMOL/L — LOW (ref -2–3)
BASOPHILS # BLD AUTO: 0 K/UL — SIGNIFICANT CHANGE UP (ref 0–0.2)
BASOPHILS NFR BLD AUTO: 0 % — SIGNIFICANT CHANGE UP (ref 0–2)
BLOOD GAS COMMENTS ARTERIAL: SIGNIFICANT CHANGE UP
BUN SERPL-MCNC: 14 MG/DL — SIGNIFICANT CHANGE UP (ref 7–23)
BUN SERPL-MCNC: 16 MG/DL — SIGNIFICANT CHANGE UP (ref 7–23)
BUN SERPL-MCNC: 18 MG/DL — SIGNIFICANT CHANGE UP (ref 7–23)
CA-I BLD-SCNC: 1.01 MMOL/L — LOW (ref 1.15–1.33)
CALCIUM SERPL-MCNC: 7.3 MG/DL — LOW (ref 8.5–10.1)
CALCIUM SERPL-MCNC: 7.6 MG/DL — LOW (ref 8.5–10.1)
CALCIUM SERPL-MCNC: 7.7 MG/DL — LOW (ref 8.5–10.1)
CHLORIDE SERPL-SCNC: 110 MMOL/L — HIGH (ref 96–108)
CHLORIDE SERPL-SCNC: 113 MMOL/L — HIGH (ref 96–108)
CHLORIDE SERPL-SCNC: 113 MMOL/L — HIGH (ref 96–108)
CO2 BLDA-SCNC: 15 MMOL/L — LOW (ref 19–24)
CO2 SERPL-SCNC: 15 MMOL/L — LOW (ref 22–31)
CO2 SERPL-SCNC: 16 MMOL/L — LOW (ref 22–31)
CO2 SERPL-SCNC: 21 MMOL/L — LOW (ref 22–31)
CREAT SERPL-MCNC: 1.06 MG/DL — SIGNIFICANT CHANGE UP (ref 0.5–1.3)
CREAT SERPL-MCNC: 1.1 MG/DL — SIGNIFICANT CHANGE UP (ref 0.5–1.3)
CREAT SERPL-MCNC: 1.3 MG/DL — SIGNIFICANT CHANGE UP (ref 0.5–1.3)
CULTURE RESULTS: SIGNIFICANT CHANGE UP
CULTURE RESULTS: SIGNIFICANT CHANGE UP
EGFR: 44 ML/MIN/1.73M2 — LOW
EGFR: 53 ML/MIN/1.73M2 — LOW
EGFR: 56 ML/MIN/1.73M2 — LOW
EOSINOPHIL # BLD AUTO: 0 K/UL — SIGNIFICANT CHANGE UP (ref 0–0.5)
EOSINOPHIL NFR BLD AUTO: 0 % — SIGNIFICANT CHANGE UP (ref 0–6)
GAS PNL BLDA: SIGNIFICANT CHANGE UP
GLUCOSE BLDC GLUCOMTR-MCNC: 120 MG/DL — HIGH (ref 70–99)
GLUCOSE BLDC GLUCOMTR-MCNC: 122 MG/DL — HIGH (ref 70–99)
GLUCOSE BLDC GLUCOMTR-MCNC: 142 MG/DL — HIGH (ref 70–99)
GLUCOSE BLDC GLUCOMTR-MCNC: 176 MG/DL — HIGH (ref 70–99)
GLUCOSE BLDC GLUCOMTR-MCNC: 201 MG/DL — HIGH (ref 70–99)
GLUCOSE SERPL-MCNC: 147 MG/DL — HIGH (ref 70–99)
GLUCOSE SERPL-MCNC: 155 MG/DL — HIGH (ref 70–99)
GLUCOSE SERPL-MCNC: 183 MG/DL — HIGH (ref 70–99)
GRAM STN FLD: SIGNIFICANT CHANGE UP
HCO3 BLDA-SCNC: 14 MMOL/L — LOW (ref 21–28)
HCT VFR BLD CALC: 24.9 % — LOW (ref 34.5–45)
HCT VFR BLD CALC: 27.5 % — LOW (ref 34.5–45)
HCT VFR BLD CALC: 27.8 % — LOW (ref 34.5–45)
HCT VFR BLD CALC: 31.8 % — LOW (ref 34.5–45)
HGB BLD-MCNC: 7.6 G/DL — LOW (ref 11.5–15.5)
HGB BLD-MCNC: 7.9 G/DL — LOW (ref 11.5–15.5)
HGB BLD-MCNC: 8.2 G/DL — LOW (ref 11.5–15.5)
HGB BLD-MCNC: 9 G/DL — LOW (ref 11.5–15.5)
HYPOCHROMIA BLD QL: SLIGHT — SIGNIFICANT CHANGE UP
LACTATE SERPL-SCNC: 2.4 MMOL/L — HIGH (ref 0.7–2)
LACTATE SERPL-SCNC: 9.3 MMOL/L — CRITICAL HIGH (ref 0.7–2)
LYMPHOCYTES # BLD AUTO: 2.86 K/UL — SIGNIFICANT CHANGE UP (ref 1–3.3)
LYMPHOCYTES # BLD AUTO: 9 % — LOW (ref 13–44)
MAGNESIUM SERPL-MCNC: 1.7 MG/DL — SIGNIFICANT CHANGE UP (ref 1.6–2.6)
MANUAL SMEAR VERIFICATION: SIGNIFICANT CHANGE UP
MCHC RBC-ENTMCNC: 26.1 PG — LOW (ref 27–34)
MCHC RBC-ENTMCNC: 26.4 PG — LOW (ref 27–34)
MCHC RBC-ENTMCNC: 28.3 GM/DL — LOW (ref 32–36)
MCHC RBC-ENTMCNC: 28.7 GM/DL — LOW (ref 32–36)
MCHC RBC-ENTMCNC: 29.5 GM/DL — LOW (ref 32–36)
MCHC RBC-ENTMCNC: 30.5 GM/DL — LOW (ref 32–36)
MCV RBC AUTO: 86.5 FL — SIGNIFICANT CHANGE UP (ref 80–100)
MCV RBC AUTO: 88.5 FL — SIGNIFICANT CHANGE UP (ref 80–100)
MCV RBC AUTO: 90.8 FL — SIGNIFICANT CHANGE UP (ref 80–100)
MCV RBC AUTO: 92.2 FL — SIGNIFICANT CHANGE UP (ref 80–100)
MONOCYTES # BLD AUTO: 0.64 K/UL — SIGNIFICANT CHANGE UP (ref 0–0.9)
MONOCYTES NFR BLD AUTO: 2 % — SIGNIFICANT CHANGE UP (ref 2–14)
NEUTROPHILS # BLD AUTO: 28.26 K/UL — HIGH (ref 1.8–7.4)
NEUTROPHILS NFR BLD AUTO: 87 % — HIGH (ref 43–77)
NEUTS BAND # BLD: 2 % — SIGNIFICANT CHANGE UP (ref 0–8)
NIGHT BLUE STAIN TISS: SIGNIFICANT CHANGE UP
NRBC # BLD: 0 /100 — SIGNIFICANT CHANGE UP (ref 0–0)
NRBC # BLD: SIGNIFICANT CHANGE UP /100 WBCS (ref 0–0)
PCO2 BLDA: 26 MMHG — LOW (ref 32–45)
PH BLDA: 7.35 — SIGNIFICANT CHANGE UP (ref 7.35–7.45)
PHOSPHATE SERPL-MCNC: 4.1 MG/DL — SIGNIFICANT CHANGE UP (ref 2.5–4.5)
PLAT MORPH BLD: NORMAL — SIGNIFICANT CHANGE UP
PLATELET # BLD AUTO: 429 K/UL — HIGH (ref 150–400)
PLATELET # BLD AUTO: 568 K/UL — HIGH (ref 150–400)
PLATELET # BLD AUTO: 633 K/UL — HIGH (ref 150–400)
PLATELET # BLD AUTO: 764 K/UL — HIGH (ref 150–400)
PO2 BLDA: 85 MMHG — SIGNIFICANT CHANGE UP (ref 83–108)
POIKILOCYTOSIS BLD QL AUTO: SLIGHT — SIGNIFICANT CHANGE UP
POLYCHROMASIA BLD QL SMEAR: SLIGHT — SIGNIFICANT CHANGE UP
POTASSIUM SERPL-MCNC: 4.8 MMOL/L — SIGNIFICANT CHANGE UP (ref 3.5–5.3)
POTASSIUM SERPL-MCNC: 4.9 MMOL/L — SIGNIFICANT CHANGE UP (ref 3.5–5.3)
POTASSIUM SERPL-MCNC: 5.1 MMOL/L — SIGNIFICANT CHANGE UP (ref 3.5–5.3)
POTASSIUM SERPL-SCNC: 4.8 MMOL/L — SIGNIFICANT CHANGE UP (ref 3.5–5.3)
POTASSIUM SERPL-SCNC: 4.9 MMOL/L — SIGNIFICANT CHANGE UP (ref 3.5–5.3)
POTASSIUM SERPL-SCNC: 5.1 MMOL/L — SIGNIFICANT CHANGE UP (ref 3.5–5.3)
RBC # BLD: 2.88 M/UL — LOW (ref 3.8–5.2)
RBC # BLD: 3.03 M/UL — LOW (ref 3.8–5.2)
RBC # BLD: 3.14 M/UL — LOW (ref 3.8–5.2)
RBC # BLD: 3.45 M/UL — LOW (ref 3.8–5.2)
RBC # FLD: 20.4 % — HIGH (ref 10.3–14.5)
RBC # FLD: 20.5 % — HIGH (ref 10.3–14.5)
RBC # FLD: 20.8 % — HIGH (ref 10.3–14.5)
RBC # FLD: 20.8 % — HIGH (ref 10.3–14.5)
RBC BLD AUTO: ABNORMAL
SAO2 % BLDA: 99 % — HIGH (ref 94–98)
SODIUM SERPL-SCNC: 141 MMOL/L — SIGNIFICANT CHANGE UP (ref 135–145)
SPECIMEN SOURCE: SIGNIFICANT CHANGE UP
WBC # BLD: 22.69 K/UL — HIGH (ref 3.8–10.5)
WBC # BLD: 31.75 K/UL — HIGH (ref 3.8–10.5)
WBC # BLD: 36.58 K/UL — HIGH (ref 3.8–10.5)
WBC # BLD: 36.6 K/UL — HIGH (ref 3.8–10.5)
WBC # FLD AUTO: 22.69 K/UL — HIGH (ref 3.8–10.5)
WBC # FLD AUTO: 31.75 K/UL — HIGH (ref 3.8–10.5)
WBC # FLD AUTO: 36.58 K/UL — HIGH (ref 3.8–10.5)
WBC # FLD AUTO: 36.6 K/UL — HIGH (ref 3.8–10.5)

## 2023-02-01 PROCEDURE — 99292 CRITICAL CARE ADDL 30 MIN: CPT

## 2023-02-01 PROCEDURE — 71045 X-RAY EXAM CHEST 1 VIEW: CPT | Mod: 26

## 2023-02-01 PROCEDURE — 99291 CRITICAL CARE FIRST HOUR: CPT

## 2023-02-01 RX ORDER — CALCIUM GLUCONATE 100 MG/ML
1 VIAL (ML) INTRAVENOUS ONCE
Refills: 0 | Status: COMPLETED | OUTPATIENT
Start: 2023-02-01 | End: 2023-02-01

## 2023-02-01 RX ORDER — HYDROMORPHONE HYDROCHLORIDE 2 MG/ML
0.5 INJECTION INTRAMUSCULAR; INTRAVENOUS; SUBCUTANEOUS EVERY 4 HOURS
Refills: 0 | Status: DISCONTINUED | OUTPATIENT
Start: 2023-02-01 | End: 2023-02-01

## 2023-02-01 RX ORDER — NOREPINEPHRINE BITARTRATE/D5W 8 MG/250ML
0.05 PLASTIC BAG, INJECTION (ML) INTRAVENOUS
Qty: 16 | Refills: 0 | Status: DISCONTINUED | OUTPATIENT
Start: 2023-02-01 | End: 2023-02-02

## 2023-02-01 RX ORDER — SODIUM CHLORIDE 9 MG/ML
1000 INJECTION, SOLUTION INTRAVENOUS ONCE
Refills: 0 | Status: COMPLETED | OUTPATIENT
Start: 2023-02-01 | End: 2023-02-01

## 2023-02-01 RX ORDER — SODIUM CHLORIDE 9 MG/ML
250 INJECTION, SOLUTION INTRAVENOUS ONCE
Refills: 0 | Status: COMPLETED | OUTPATIENT
Start: 2023-02-01 | End: 2023-02-01

## 2023-02-01 RX ORDER — ACETAMINOPHEN 500 MG
1000 TABLET ORAL ONCE
Refills: 0 | Status: COMPLETED | OUTPATIENT
Start: 2023-02-01 | End: 2023-02-01

## 2023-02-01 RX ORDER — HYDROMORPHONE HYDROCHLORIDE 2 MG/ML
0.5 INJECTION INTRAMUSCULAR; INTRAVENOUS; SUBCUTANEOUS
Refills: 0 | Status: DISCONTINUED | OUTPATIENT
Start: 2023-02-01 | End: 2023-02-08

## 2023-02-01 RX ORDER — MAGNESIUM SULFATE 500 MG/ML
1 VIAL (ML) INJECTION ONCE
Refills: 0 | Status: COMPLETED | OUTPATIENT
Start: 2023-02-01 | End: 2023-02-01

## 2023-02-01 RX ADMIN — NYSTATIN CREAM 1 APPLICATION(S): 100000 CREAM TOPICAL at 17:26

## 2023-02-01 RX ADMIN — Medication 70 MICROGRAM(S): at 21:49

## 2023-02-01 RX ADMIN — Medication 400 MILLIGRAM(S): at 18:14

## 2023-02-01 RX ADMIN — Medication 4.57 MICROGRAM(S)/KG/MIN: at 10:55

## 2023-02-01 RX ADMIN — Medication 1000 MILLIGRAM(S): at 12:00

## 2023-02-01 RX ADMIN — HYDROMORPHONE HYDROCHLORIDE 0.5 MILLIGRAM(S): 2 INJECTION INTRAMUSCULAR; INTRAVENOUS; SUBCUTANEOUS at 12:15

## 2023-02-01 RX ADMIN — Medication 400 MILLIGRAM(S): at 10:56

## 2023-02-01 RX ADMIN — CEFEPIME 100 MILLIGRAM(S): 1 INJECTION, POWDER, FOR SOLUTION INTRAMUSCULAR; INTRAVENOUS at 21:49

## 2023-02-01 RX ADMIN — SODIUM CHLORIDE 1500 MILLILITER(S): 9 INJECTION, SOLUTION INTRAVENOUS at 02:04

## 2023-02-01 RX ADMIN — SODIUM CHLORIDE 1000 MILLILITER(S): 9 INJECTION, SOLUTION INTRAVENOUS at 03:07

## 2023-02-01 RX ADMIN — Medication 100 MILLIGRAM(S): at 07:02

## 2023-02-01 RX ADMIN — Medication 100 MILLIGRAM(S): at 21:48

## 2023-02-01 RX ADMIN — NYSTATIN CREAM 1 APPLICATION(S): 100000 CREAM TOPICAL at 21:49

## 2023-02-01 RX ADMIN — CEFEPIME 100 MILLIGRAM(S): 1 INJECTION, POWDER, FOR SOLUTION INTRAMUSCULAR; INTRAVENOUS at 10:56

## 2023-02-01 RX ADMIN — CHLORHEXIDINE GLUCONATE 1 APPLICATION(S): 213 SOLUTION TOPICAL at 05:34

## 2023-02-01 RX ADMIN — Medication 1000 MILLIGRAM(S): at 18:30

## 2023-02-01 RX ADMIN — Medication 100 MILLIGRAM(S): at 14:09

## 2023-02-01 RX ADMIN — Medication 100 MILLIGRAM(S): at 01:16

## 2023-02-01 RX ADMIN — HYDROMORPHONE HYDROCHLORIDE 0.5 MILLIGRAM(S): 2 INJECTION INTRAMUSCULAR; INTRAVENOUS; SUBCUTANEOUS at 16:00

## 2023-02-01 RX ADMIN — PANTOPRAZOLE SODIUM 40 MILLIGRAM(S): 20 TABLET, DELAYED RELEASE ORAL at 10:55

## 2023-02-01 RX ADMIN — SODIUM CHLORIDE 1000 MILLILITER(S): 9 INJECTION, SOLUTION INTRAVENOUS at 17:45

## 2023-02-01 RX ADMIN — SODIUM CHLORIDE 1000 MILLILITER(S): 9 INJECTION, SOLUTION INTRAVENOUS at 04:07

## 2023-02-01 RX ADMIN — HYDROMORPHONE HYDROCHLORIDE 0.5 MILLIGRAM(S): 2 INJECTION INTRAMUSCULAR; INTRAVENOUS; SUBCUTANEOUS at 18:30

## 2023-02-01 RX ADMIN — Medication 0.5 MILLIGRAM(S): at 21:23

## 2023-02-01 RX ADMIN — SODIUM CHLORIDE 1000 MILLILITER(S): 9 INJECTION, SOLUTION INTRAVENOUS at 14:38

## 2023-02-01 RX ADMIN — HYDROMORPHONE HYDROCHLORIDE 0.5 MILLIGRAM(S): 2 INJECTION INTRAMUSCULAR; INTRAVENOUS; SUBCUTANEOUS at 22:05

## 2023-02-01 RX ADMIN — PHENYLEPHRINE HYDROCHLORIDE 18.3 MICROGRAM(S)/KG/MIN: 10 INJECTION INTRAVENOUS at 02:04

## 2023-02-01 RX ADMIN — NYSTATIN CREAM 1 APPLICATION(S): 100000 CREAM TOPICAL at 10:56

## 2023-02-01 RX ADMIN — CEFEPIME 100 MILLIGRAM(S): 1 INJECTION, POWDER, FOR SOLUTION INTRAMUSCULAR; INTRAVENOUS at 00:54

## 2023-02-01 RX ADMIN — Medication 50 GRAM(S): at 11:39

## 2023-02-01 RX ADMIN — Medication 0.5 MILLIGRAM(S): at 09:15

## 2023-02-01 RX ADMIN — SODIUM CHLORIDE 125 MILLILITER(S): 9 INJECTION, SOLUTION INTRAVENOUS at 04:08

## 2023-02-01 RX ADMIN — PHENYLEPHRINE HYDROCHLORIDE 18.3 MICROGRAM(S)/KG/MIN: 10 INJECTION INTRAVENOUS at 03:10

## 2023-02-01 RX ADMIN — HYDROMORPHONE HYDROCHLORIDE 0.5 MILLIGRAM(S): 2 INJECTION INTRAMUSCULAR; INTRAVENOUS; SUBCUTANEOUS at 21:49

## 2023-02-01 RX ADMIN — Medication 100 GRAM(S): at 11:39

## 2023-02-01 RX ADMIN — NYSTATIN CREAM 1 APPLICATION(S): 100000 CREAM TOPICAL at 01:17

## 2023-02-01 RX ADMIN — HYDROMORPHONE HYDROCHLORIDE 0.5 MILLIGRAM(S): 2 INJECTION INTRAMUSCULAR; INTRAVENOUS; SUBCUTANEOUS at 15:30

## 2023-02-01 RX ADMIN — SODIUM CHLORIDE 125 MILLILITER(S): 9 INJECTION, SOLUTION INTRAVENOUS at 21:47

## 2023-02-01 RX ADMIN — Medication 4.57 MICROGRAM(S)/KG/MIN: at 04:17

## 2023-02-01 RX ADMIN — HYDROMORPHONE HYDROCHLORIDE 0.5 MILLIGRAM(S): 2 INJECTION INTRAMUSCULAR; INTRAVENOUS; SUBCUTANEOUS at 12:00

## 2023-02-01 RX ADMIN — Medication 2: at 17:26

## 2023-02-01 RX ADMIN — Medication 4.57 MICROGRAM(S)/KG/MIN: at 04:22

## 2023-02-01 NOTE — PROGRESS NOTE ADULT - SUBJECTIVE AND OBJECTIVE BOX
Date of service: 02-01-23 @ 14:23    Events noted  s/p surgery with SB resection, lysis of adhesions and hernia repair  Lethargic  Has low grade fever  Leukocytosis is worse    ROS: poorly verbal    MEDICATIONS  (STANDING):  buDESOnide    Inhalation Suspension 0.5 milliGRAM(s) Inhalation every 12 hours  cefepime   IVPB 2000 milliGRAM(s) IV Intermittent every 12 hours  chlorhexidine 4% Liquid 1 Application(s) Topical <User Schedule>  coronavirus bivalent (EUA) Booster Vaccine (PFIZER) 0.3 milliLiter(s) IntraMuscular once  dextrose 5%. 1000 milliLiter(s) (50 mL/Hr) IV Continuous <Continuous>  dextrose 5%. 1000 milliLiter(s) (100 mL/Hr) IV Continuous <Continuous>  dextrose 50% Injectable 25 Gram(s) IV Push once  dextrose 50% Injectable 12.5 Gram(s) IV Push once  dextrose 50% Injectable 25 Gram(s) IV Push once  glucagon  Injectable 1 milliGRAM(s) IntraMuscular once  insulin lispro (ADMELOG) corrective regimen sliding scale   SubCutaneous every 6 hours  lactated ringers. 1000 milliLiter(s) (125 mL/Hr) IV Continuous <Continuous>  levothyroxine Injectable 70 MICROGram(s) IV Push at bedtime  metroNIDAZOLE  IVPB 500 milliGRAM(s) IV Intermittent every 8 hours  norepinephrine Infusion 0.05 MICROgram(s)/kG/Min (4.57 mL/Hr) IV Continuous <Continuous>  nystatin Cream 1 Application(s) Topical two times a day  nystatin Powder 1 Application(s) Topical every 12 hours  pantoprazole  Injectable 40 milliGRAM(s) IV Push daily  phenylephrine    Infusion 0.5 MICROgram(s)/kG/Min (18.3 mL/Hr) IV Continuous <Continuous>    Vital Signs Last 24 Hrs  T(C): 37.7 (01 Feb 2023 12:00), Max: 38.1 (01 Feb 2023 11:00)  T(F): 99.8 (01 Feb 2023 12:00), Max: 100.5 (01 Feb 2023 11:00)  HR: 102 (01 Feb 2023 14:15) (80 - 103)  BP: 101/42 (01 Feb 2023 02:45) (76/45 - 146/74)  BP(mean): 55 (01 Feb 2023 02:45) (43 - 67)  RR: 16 (01 Feb 2023 14:15) (16 - 38)  SpO2: 100% (01 Feb 2023 14:15) (92% - 100%)    Parameters below as of 01 Feb 2023 14:00  Patient On (Oxygen Delivery Method): nasal cannula  O2 Flow (L/min): 3       Physical exam:    Constitutional:  No acute distress  HEENT: NC/AT, EOMI, PERRLA, conjunctivae clear; ears and nose atraumatic  Neck: supple; thyroid not palpable  Back: no tenderness  Respiratory: respiratory effort normal; crackles at bases  Cardiovascular: S1S2 regular, no murmurs  Abdomen: soft, mid abdomen tender, distended, positive BS  Genitourinary: no suprapubic tenderness  Lymphatic: no LN palpable  Musculoskeletal: no muscle tenderness, no joint swelling or tenderness  Extremities: no pedal edema  Neurological/ Psychiatric: confused, judgement and insight impaired; moving all extremities  Skin: no rashes; no palpable lesions    Labs: reviewed                        8.2    36.58 )-----------( 568      ( 01 Feb 2023 05:40 )             27.8     02-01    141  |  113<H>  |  16  ----------------------------<  147<H>  4.9   |  15<L>  |  1.10    Ca    7.3<L>      01 Feb 2023 05:40  Phos  4.1     02-01  Mg     1.7     02-01                        9.5    8.67  )-----------( 502      ( 31 Jan 2023 09:03 )             31.5     01-31    145  |  113<H>  |  15  ----------------------------<  135<H>  3.5   |  26  |  0.52    Ca    7.7<L>      31 Jan 2023 06:57                        9.9    10.40 )-----------( 494      ( 30 Jan 2023 06:55 )             32.9     01-30    148<H>  |  115<H>  |  19  ----------------------------<  105<H>  3.7   |  29  |  0.59    Ca    7.7<L>      30 Jan 2023 06:55    TPro  4.9<L>  /  Alb  1.3<L>  /  TBili  0.3  /  DBili  0.1  /  AST  14<L>  /  ALT  12  /  AlkPhos  65  01-28               10.9   19.72 )-----------( 510      ( 27 Jan 2023 06:16 )             36.7     01-27    142  |  111<H>  |  10  ----------------------------<  233<H>  3.8   |  26  |  0.78    Ca    8.0<L>      27 Jan 2023 06:16  Phos  2.7     01-27  Mg     1.8     01-27      Culture - Blood (collected 27 Jan 2023 06:16)  Source: .Blood None  Preliminary Report (28 Jan 2023 09:01):    No growth to date.    Culture - Blood (collected 27 Jan 2023 06:16)  Source: .Blood None  Preliminary Report (28 Jan 2023 09:01):    No growth to date.    Culture - Urine (collected 20 Jan 2023 20:03)  Source: Clean Catch Clean Catch (Midstream)  Final Report (25 Jan 2023 08:05):    >100,000 CFU/ml Klebsiella pneumoniae    Multiple Morphological Strains  Organism: Klebsiella pneumoniae  Klebsiella pneumoniae (25 Jan 2023 08:05)  Organism: Klebsiella pneumoniae (25 Jan 2023 08:05)      -  Amikacin: S <=16      -  Amoxicillin/Clavulanic Acid: S <=8/4      -  Ampicillin: R >16 These ampicillin results predict results for amoxicillin      -  Ampicillin/Sulbactam: S <=4/2 Enterobacter, Klebsiella aerogenes, Citrobacter, and Serratia may develop resistance during prolonged therapy (3-4 days)      -  Aztreonam: S <=4      -  Cefazolin: S <=2 For uncomplicated UTI with K. pneumoniae, E. coli, or P. mirablis: ARABELLA <=16 is sensitive and ARABELLA >=32 is resistant. This also predicts results for oral agents cefaclor, cefdinir, cefpodoxime, cefprozil, cefuroxime axetil, cephalexin and locarbef for uncomplicated UTI. Note that some isolates may be susceptible to these agents while testing resistant to cefazolin.      -  Cefepime: S <=2      -  Cefoxitin: S <=8      -  Ceftriaxone: S <=1 Enterobacter, Klebsiella aerogenes, Citrobacter, and Serratia may develop resistance during prolonged therapy      -  Cefuroxime: S <=4      -  Ciprofloxacin: S <=0.25      -  Ertapenem: S <=0.5      -  Gentamicin: S <=2      -  Imipenem: S <=1      -  Levofloxacin: S <=0.5      -  Meropenem: S <=1      -  Nitrofurantoin: S <=32 Should not be used to treat pyelonephritis      -  Piperacillin/Tazobactam: S <=8      -  Tobramycin: S <=2      -  Trimethoprim/Sulfamethoxazole: S <=0.5/9.5      Method Type: ARABELLA  Organism: Klebsiella pneumoniae (25 Jan 2023 08:05)      -  Amikacin: S <=16      -  Amoxicillin/Clavulanic Acid: S <=8/4 Consider reserving for cystitis when ampicillin/sulbactam is resistant      -  Ampicillin: R >16 These ampicillin results predict results for amoxicillin      -  Ampicillin/Sulbactam: R >16/8 Enterobacter, Klebsiella aerogenes, Citrobacter, and Serratia may develop resistance during prolonged therapy (3-4 days)      -  Aztreonam: S <=4      -  Cefazolin: R >16 For uncomplicated UTI with K. pneumoniae, E. coli, or P. mirablis: ARABELLA <=16 is sensitive and ARABELLA >=32 is resistant. This also predicts results for oral agents cefaclor, cefdinir, cefpodoxime, cefprozil, cefuroxime axetil, cephalexin and locarbef for uncomplicated UTI. Note that some isolates may be susceptible to these agents while testing resistant to cefazolin.      -  Cefepime: S <=2      -  Cefoxitin: S <=8      -  Ceftriaxone: S <=1 Enterobacter, Klebsiella aerogenes, Citrobacter, and Serratia may develop resistance during prolonged therapy      -  Cefuroxime: R >16      -  Ciprofloxacin: S <=0.25      -  Ertapenem: S <=0.5      -  Gentamicin: S <=2      -  Imipenem: S <=1      -  Levofloxacin: S <=0.5      -  Meropenem: S <=1      -  Nitrofurantoin: S <=32 Should not be used to treat pyelonephritis      -  Piperacillin/Tazobactam: R 32      -  Tobramycin: S <=2      -  Trimethoprim/Sulfamethoxazole: S <=0.5/9.5      Method Type: ARABELLA    Culture - Blood (collected 20 Jan 2023 18:02)  Source: .Blood None  Final Report (26 Jan 2023 01:00):    No Growth Final    Culture - Blood (collected 20 Jan 2023 18:01)  Source: .Blood None  Final Report (26 Jan 2023 01:00):    No Growth Final    Radiology: all available radiological tests reviewed    < from: CT Chest No Cont (01.27.23 @ 09:53) >  Small bowel obstruction with transition point at the neck of a left lower   quadrant abdominal wall hernia. It is uncertain whether the obstruction   is due to the hernia itself or to adhesions.    Additional massive ventral hernia containing small and large bowel and   epigastric hernia containing stomach.    Right lower lobe nodules new since December 04, 2021 and could be   infectious or neoplastic. Follow-up chest CT in 3 months is recommended   to reevaluate.    < end of copied text >      Advanced directives addressed: full resuscitation

## 2023-02-01 NOTE — ADVANCED PRACTICE NURSE CONSULT - ASSESSMENT
This is a 72 year old female admitted to [facility] from [facility] with diffuse abdominal pain. PMHx: Diverticulitis, Neuropathy, COPD, DVT, CVA, DM-2, HTN, A-fib, Colostomy, Anemia, Osteoarthritis, PE, HTN, HLD.    Assessed patient in ICU and patient is laying on a total care sport mattress.    MATI 9  Recommend maintaining Low Air Loss mattress   Hover MAt in place and recommend maintaining to reduce friction and shear.   Noted to the Right Gluteal cleft with total area 2.1cm x 2cm x 0.1cm with an island of epithelization in the center of the wound 0.4cm. wound bed is pink and noted mild bleeding. This can be related to friction and pressure. My recommendation is to apply Triad cream and can with leave open or place the triad and cover with a small foam. Please do not put just a foam over the wound as this will delay wound healing from the adhesive on the back of the foam.   Left Ischium with area 1cm x 2.5cm x 0.1cm partial thickness wound with pink wound bed and noted mild bleeding. This can be related to friction and pressure. My recommendation is to apply Triad cream and can with leave open or place the triad and cover with a small foam. Please do not put just a foam over the wound as this will delay wound healing from the adhesive on the back of the foam.   Patient with NG tube and noted Indwelling catheter.  2-DEA Drain to midline abdomen.   Patient with limited ROM and needed two assist to turn and position. Turn and position every two hours, maintain 1 purple pad under patient for moisture wicking, keep heels elevated of mattress.

## 2023-02-01 NOTE — PROGRESS NOTE ADULT - GASTROINTESTINAL COMMENTS
midline surgical dressing, b/l DEA drains with serosang drainage [As Noted in HPI] : as noted in HPI [Negative] : Neurological

## 2023-02-01 NOTE — PROCEDURE NOTE - NSPROCDETAILS_GEN_ALL_CORE
location identified, draped/prepped, sterile technique used, needle inserted/introduced/positive blood return obtained via catheter/connected to a pressurized flush line/sutured in place/hemostasis with direct pressure, dressing applied/Seldinger technique/all materials/supplies accounted for at end of procedure
guidewire recovered/lumen(s) aspirated and flushed/sterile dressing applied/sterile technique, catheter placed/ultrasound guidance with use of sterile gel and probe cove

## 2023-02-01 NOTE — PROGRESS NOTE ADULT - SUBJECTIVE AND OBJECTIVE BOX
Subjective:    Home Medications:  Albuterol (Eqv-ProAir HFA) 90 mcg/inh inhalation aerosol: 1 puff(s) inhaled every 6 hours, As Needed (20 Jan 2023 16:49)  amiodarone 200 mg oral tablet: 1 tab(s) orally once a day (20 Jan 2023 16:49)  ascorbic acid 500 mg oral tablet: 2 tab(s) orally once a day (20 Jan 2023 16:49)  atorvastatin 10 mg oral tablet: 1 tab(s) orally once a day (at bedtime) (20 Jan 2023 16:49)  benzonatate 100 mg oral capsule: 1 cap(s) orally 3 times a day (20 Jan 2023 21:48)  budesonide 0.5 mg/2 mL inhalation suspension: 2 milliliter(s) inhaled 2 times a day (20 Jan 2023 21:48)  Cepacol Sore Throat 15 mg-3.6 mg mucous membrane lozenge: 1 lozenge mucous membrane every 4 hours, As Needed (20 Jan 2023 21:48)  cholecalciferol 1250 mcg (50,000 intl units) oral capsule: 1 cap(s) orally every 4 weeks on Wednesday  (20 Jan 2023 16:49)  Coumadin 2.5 mg oral tablet: 1 tab(s) orally once a day (at bedtime)  ***ON HOLD from 1-16 to 1-21*** (20 Jan 2023 21:48)  Cranberry oral tablet: 1 tab(s) orally 2 times a day (20 Jan 2023 16:49)  cyanocobalamin 1000 mcg oral tablet: 1 tab(s) orally once a day (20 Jan 2023 16:49)  dilTIAZem 180 mg/24 hours oral capsule, extended release: 1 cap(s) orally once a day (20 Jan 2023 16:49)  DULoxetine 60 mg oral delayed release capsule: 1 cap(s) orally once a day (20 Jan 2023 16:49)  estradiol 0.1 mg/g vaginal cream: 0.5 gram(s) vaginal 2 times a week on Monday and Thursday (20 Jan 2023 21:48)  fexofenadine 180 mg oral tablet: 1 tab(s) orally once a day (20 Jan 2023 21:48)  fluticasone 50 mcg/inh nasal spray: 1 spray(s) nasal 2 times a day (20 Jan 2023 16:49)  furosemide 20 mg oral tablet: 1 tab(s) orally once a day (20 Jan 2023 16:49)  glipiZIDE 10 mg oral tablet, extended release: 2 tab(s) orally once a day (20 Jan 2023 16:49)  GlycoLax oral powder for reconstitution: 17 gram(s) orally 2 times a day (20 Jan 2023 16:49)  guaiFENesin 100 mg/5 mL oral liquid: 20 milliliter(s) orally every 6 hours (20 Jan 2023 21:48)  HumaLOG KwikPen 100 units/mL injectable solution: 10 unit(s) injectable 3 times a day (before meals) (20 Jan 2023 21:48)  ipratropium-albuterol 20 mcg-100 mcg/inh inhalation aerosol: 1 puff(s) inhaled 4 times a day (20 Jan 2023 16:49)  levothyroxine 88 mcg (0.088 mg) oral tablet: 1 tab(s) orally once a day (at bedtime) (20 Jan 2023 16:49)  losartan 25 mg oral tablet: 1 tab(s) orally once a day (20 Jan 2023 16:49)  Macrobid 100 mg oral capsule: 1 cap(s) orally 2 times a day for 5 days  ***course complete*** (20 Jan 2023 21:48)  methocarbamol 750 mg oral tablet: 1 tab(s) orally every 8 hours, As Needed (20 Jan 2023 21:48)  metoprolol tartrate 25 mg oral tablet: 1 tab(s) orally 2 times a day (20 Jan 2023 16:49)  Milk of Magnesia 8% oral suspension: 30 milliliter(s) orally once a day, As Needed (20 Jan 2023 16:49)  montelukast 10 mg oral tablet: 1 tab(s) orally once a day (at bedtime) (20 Jan 2023 21:48)  ondansetron 4 mg oral tablet: 1 tab(s) orally every 4 hours, As Needed (20 Jan 2023 21:48)  oxybutynin 5 mg oral tablet: 1 tab(s) orally 2 times a day (20 Jan 2023 16:49)  oxyCODONE 5 mg oral tablet: 1 tab(s) orally every 4 hours, As Needed (20 Jan 2023 16:49)  phytonadione 5 mg oral tablet: 0.5 tab(s) orally once  ***given at [facility] once on 1-19-23*** (20 Jan 2023 21:48)  pregabalin 100 mg oral capsule: 1 cap(s) orally 3 times a day (20 Jan 2023 16:49)  sennosides-docusate 8.6 mg-50 mg oral tablet: 2 tab(s) orally once a day (at bedtime) (20 Jan 2023 16:49)  Tradjenta 5 mg oral tablet: 1 tab(s) orally once a day (20 Jan 2023 16:49)  Tylenol 500 mg oral tablet: 2 tab(s) orally every 8 hours (20 Jan 2023 16:49)    MEDICATIONS  (STANDING):  aMIOdarone    Tablet 200 milliGRAM(s) Oral daily  atorvastatin 20 milliGRAM(s) Oral at bedtime  buDESOnide    Inhalation Suspension 0.5 milliGRAM(s) Inhalation every 12 hours  cefepime   IVPB 2000 milliGRAM(s) IV Intermittent every 12 hours  chlorhexidine 4% Liquid 1 Application(s) Topical <User Schedule>  coronavirus bivalent (EUA) Booster Vaccine (PFIZER) 0.3 milliLiter(s) IntraMuscular once  dextrose 5%. 1000 milliLiter(s) (100 mL/Hr) IV Continuous <Continuous>  dextrose 5%. 1000 milliLiter(s) (50 mL/Hr) IV Continuous <Continuous>  dextrose 50% Injectable 25 Gram(s) IV Push once  dextrose 50% Injectable 12.5 Gram(s) IV Push once  dextrose 50% Injectable 25 Gram(s) IV Push once  DULoxetine 60 milliGRAM(s) Oral daily  glucagon  Injectable 1 milliGRAM(s) IntraMuscular once  insulin lispro (ADMELOG) corrective regimen sliding scale   SubCutaneous every 6 hours  lactated ringers. 1000 milliLiter(s) (125 mL/Hr) IV Continuous <Continuous>  levothyroxine Injectable 70 MICROGram(s) IV Push at bedtime  losartan 25 milliGRAM(s) Oral daily  metoprolol tartrate Injectable 2.5 milliGRAM(s) IV Push every 6 hours  metroNIDAZOLE  IVPB 500 milliGRAM(s) IV Intermittent every 8 hours  montelukast 10 milliGRAM(s) Oral daily  norepinephrine Infusion 0.05 MICROgram(s)/kG/Min (4.57 mL/Hr) IV Continuous <Continuous>  nystatin Cream 1 Application(s) Topical two times a day  nystatin Powder 1 Application(s) Topical every 12 hours  pantoprazole  Injectable 40 milliGRAM(s) IV Push daily  phenylephrine    Infusion 0.5 MICROgram(s)/kG/Min (18.3 mL/Hr) IV Continuous <Continuous>  pregabalin 100 milliGRAM(s) Oral every 8 hours    MEDICATIONS  (PRN):  albuterol    90 MICROgram(s) HFA Inhaler 2 Puff(s) Inhalation every 6 hours PRN Shortness of Breath and/or Wheezing  dextrose Oral Gel 15 Gram(s) Oral once PRN Blood Glucose LESS THAN 70 milliGRAM(s)/deciliter  oxybutynin 5 milliGRAM(s) Oral every 12 hours PRN Bladder spasms  sodium chloride 0.9% lock flush 10 milliLiter(s) IV Push every 1 hour PRN Pre/post blood products, medications, blood draw, and to maintain line patency      Allergies    Cipro (Rash)  Spiriva (Rash)    Intolerances        Vital Signs Last 24 Hrs  T(C): 36.9 (01 Feb 2023 08:00), Max: 37.1 (01 Feb 2023 04:00)  T(F): 98.5 (01 Feb 2023 08:00), Max: 98.8 (01 Feb 2023 04:00)  HR: 100 (01 Feb 2023 08:00) (80 - 103)  BP: 101/42 (01 Feb 2023 02:45) (76/45 - 146/74)  BP(mean): 55 (01 Feb 2023 02:45) (43 - 67)  RR: 26 (01 Feb 2023 08:00) (18 - 38)  SpO2: 95% (01 Feb 2023 08:00) (92% - 100%)    Parameters below as of 01 Feb 2023 08:00  Patient On (Oxygen Delivery Method): nasal cannula  O2 Flow (L/min): 3        PHYSICAL EXAMINATION:    NECK:  Supple. No lymphadenopathy. Jugular venous pressure not elevated. Carotids equal.   HEART:   The cardiac impulse has a normal quality. Reg., Nl S1 and S2.  There are no murmurs, rubs or gallops noted  CHEST:  Chest is clear to auscultation. Normal respiratory effort.  ABDOMEN:  Soft and nontender.   EXTREMITIES:  There is no edema.       LABS:                        8.2    36.58 )-----------( 568      ( 01 Feb 2023 05:40 )             27.8     02-01    141  |  113<H>  |  16  ----------------------------<  147<H>  4.9   |  15<L>  |  1.10    Ca    7.3<L>      01 Feb 2023 05:40  Phos  4.1     02-01  Mg     1.7     02-01      PT/INR - ( 31 Jan 2023 09:03 )   PT: 21.8 sec;   INR: 1.87 ratio         PTT - ( 31 Jan 2023 09:03 )  PTT:32.9 sec      Culture - Fungal, Tissue (collected 01-31-23 @ 18:07)  Source: .Tissue INTRAPERITONEAL ABCESS FOR CX  Preliminary Report (02-01-23 @ 07:09):    Testing in progress    Culture - Tissue with Gram Stain (collected 01-31-23 @ 18:07)  Source: .Tissue INTRAPERITONEAL ABCESS FOR CX  Gram Stain (02-01-23 @ 04:37):    Rare polymorphonuclear leukocytes seen per low power field    No organisms seen per oil power field           Subjective:    pat s/p abd surgery, sob, given neb, rx, lathergy, required pressors.    Home Medications:  Albuterol (Eqv-ProAir HFA) 90 mcg/inh inhalation aerosol: 1 puff(s) inhaled every 6 hours, As Needed (20 Jan 2023 16:49)  amiodarone 200 mg oral tablet: 1 tab(s) orally once a day (20 Jan 2023 16:49)  ascorbic acid 500 mg oral tablet: 2 tab(s) orally once a day (20 Jan 2023 16:49)  atorvastatin 10 mg oral tablet: 1 tab(s) orally once a day (at bedtime) (20 Jan 2023 16:49)  benzonatate 100 mg oral capsule: 1 cap(s) orally 3 times a day (20 Jan 2023 21:48)  budesonide 0.5 mg/2 mL inhalation suspension: 2 milliliter(s) inhaled 2 times a day (20 Jan 2023 21:48)  Cepacol Sore Throat 15 mg-3.6 mg mucous membrane lozenge: 1 lozenge mucous membrane every 4 hours, As Needed (20 Jan 2023 21:48)  cholecalciferol 1250 mcg (50,000 intl units) oral capsule: 1 cap(s) orally every 4 weeks on Wednesday  (20 Jan 2023 16:49)  Coumadin 2.5 mg oral tablet: 1 tab(s) orally once a day (at bedtime)  ***ON HOLD from 1-16 to 1-21*** (20 Jan 2023 21:48)  Cranberry oral tablet: 1 tab(s) orally 2 times a day (20 Jan 2023 16:49)  cyanocobalamin 1000 mcg oral tablet: 1 tab(s) orally once a day (20 Jan 2023 16:49)  dilTIAZem 180 mg/24 hours oral capsule, extended release: 1 cap(s) orally once a day (20 Jan 2023 16:49)  DULoxetine 60 mg oral delayed release capsule: 1 cap(s) orally once a day (20 Jan 2023 16:49)  estradiol 0.1 mg/g vaginal cream: 0.5 gram(s) vaginal 2 times a week on Monday and Thursday (20 Jan 2023 21:48)  fexofenadine 180 mg oral tablet: 1 tab(s) orally once a day (20 Jan 2023 21:48)  fluticasone 50 mcg/inh nasal spray: 1 spray(s) nasal 2 times a day (20 Jan 2023 16:49)  furosemide 20 mg oral tablet: 1 tab(s) orally once a day (20 Jan 2023 16:49)  glipiZIDE 10 mg oral tablet, extended release: 2 tab(s) orally once a day (20 Jan 2023 16:49)  GlycoLax oral powder for reconstitution: 17 gram(s) orally 2 times a day (20 Jan 2023 16:49)  guaiFENesin 100 mg/5 mL oral liquid: 20 milliliter(s) orally every 6 hours (20 Jan 2023 21:48)  HumaLOG KwikPen 100 units/mL injectable solution: 10 unit(s) injectable 3 times a day (before meals) (20 Jan 2023 21:48)  ipratropium-albuterol 20 mcg-100 mcg/inh inhalation aerosol: 1 puff(s) inhaled 4 times a day (20 Jan 2023 16:49)  levothyroxine 88 mcg (0.088 mg) oral tablet: 1 tab(s) orally once a day (at bedtime) (20 Jan 2023 16:49)  losartan 25 mg oral tablet: 1 tab(s) orally once a day (20 Jan 2023 16:49)  Macrobid 100 mg oral capsule: 1 cap(s) orally 2 times a day for 5 days  ***course complete*** (20 Jan 2023 21:48)  methocarbamol 750 mg oral tablet: 1 tab(s) orally every 8 hours, As Needed (20 Jan 2023 21:48)  metoprolol tartrate 25 mg oral tablet: 1 tab(s) orally 2 times a day (20 Jan 2023 16:49)  Milk of Magnesia 8% oral suspension: 30 milliliter(s) orally once a day, As Needed (20 Jan 2023 16:49)  montelukast 10 mg oral tablet: 1 tab(s) orally once a day (at bedtime) (20 Jan 2023 21:48)  ondansetron 4 mg oral tablet: 1 tab(s) orally every 4 hours, As Needed (20 Jan 2023 21:48)  oxybutynin 5 mg oral tablet: 1 tab(s) orally 2 times a day (20 Jan 2023 16:49)  oxyCODONE 5 mg oral tablet: 1 tab(s) orally every 4 hours, As Needed (20 Jan 2023 16:49)  phytonadione 5 mg oral tablet: 0.5 tab(s) orally once  ***given at [facility] once on 1-19-23*** (20 Jan 2023 21:48)  pregabalin 100 mg oral capsule: 1 cap(s) orally 3 times a day (20 Jan 2023 16:49)  sennosides-docusate 8.6 mg-50 mg oral tablet: 2 tab(s) orally once a day (at bedtime) (20 Jan 2023 16:49)  Tradjenta 5 mg oral tablet: 1 tab(s) orally once a day (20 Jan 2023 16:49)  Tylenol 500 mg oral tablet: 2 tab(s) orally every 8 hours (20 Jan 2023 16:49)    MEDICATIONS  (STANDING):  aMIOdarone    Tablet 200 milliGRAM(s) Oral daily  atorvastatin 20 milliGRAM(s) Oral at bedtime  buDESOnide    Inhalation Suspension 0.5 milliGRAM(s) Inhalation every 12 hours  cefepime   IVPB 2000 milliGRAM(s) IV Intermittent every 12 hours  chlorhexidine 4% Liquid 1 Application(s) Topical <User Schedule>  coronavirus bivalent (EUA) Booster Vaccine (PFIZER) 0.3 milliLiter(s) IntraMuscular once  dextrose 5%. 1000 milliLiter(s) (100 mL/Hr) IV Continuous <Continuous>  dextrose 5%. 1000 milliLiter(s) (50 mL/Hr) IV Continuous <Continuous>  dextrose 50% Injectable 25 Gram(s) IV Push once  dextrose 50% Injectable 12.5 Gram(s) IV Push once  dextrose 50% Injectable 25 Gram(s) IV Push once  DULoxetine 60 milliGRAM(s) Oral daily  glucagon  Injectable 1 milliGRAM(s) IntraMuscular once  insulin lispro (ADMELOG) corrective regimen sliding scale   SubCutaneous every 6 hours  lactated ringers. 1000 milliLiter(s) (125 mL/Hr) IV Continuous <Continuous>  levothyroxine Injectable 70 MICROGram(s) IV Push at bedtime  losartan 25 milliGRAM(s) Oral daily  metoprolol tartrate Injectable 2.5 milliGRAM(s) IV Push every 6 hours  metroNIDAZOLE  IVPB 500 milliGRAM(s) IV Intermittent every 8 hours  montelukast 10 milliGRAM(s) Oral daily  norepinephrine Infusion 0.05 MICROgram(s)/kG/Min (4.57 mL/Hr) IV Continuous <Continuous>  nystatin Cream 1 Application(s) Topical two times a day  nystatin Powder 1 Application(s) Topical every 12 hours  pantoprazole  Injectable 40 milliGRAM(s) IV Push daily  phenylephrine    Infusion 0.5 MICROgram(s)/kG/Min (18.3 mL/Hr) IV Continuous <Continuous>  pregabalin 100 milliGRAM(s) Oral every 8 hours    MEDICATIONS  (PRN):  albuterol    90 MICROgram(s) HFA Inhaler 2 Puff(s) Inhalation every 6 hours PRN Shortness of Breath and/or Wheezing  dextrose Oral Gel 15 Gram(s) Oral once PRN Blood Glucose LESS THAN 70 milliGRAM(s)/deciliter  oxybutynin 5 milliGRAM(s) Oral every 12 hours PRN Bladder spasms  sodium chloride 0.9% lock flush 10 milliLiter(s) IV Push every 1 hour PRN Pre/post blood products, medications, blood draw, and to maintain line patency      Allergies    Cipro (Rash)  Spiriva (Rash)    Intolerances        Vital Signs Last 24 Hrs  T(C): 36.9 (01 Feb 2023 08:00), Max: 37.1 (01 Feb 2023 04:00)  T(F): 98.5 (01 Feb 2023 08:00), Max: 98.8 (01 Feb 2023 04:00)  HR: 100 (01 Feb 2023 08:00) (80 - 103)  BP: 101/42 (01 Feb 2023 02:45) (76/45 - 146/74)  BP(mean): 55 (01 Feb 2023 02:45) (43 - 67)  RR: 26 (01 Feb 2023 08:00) (18 - 38)  SpO2: 95% (01 Feb 2023 08:00) (92% - 100%)    Parameters below as of 01 Feb 2023 08:00  Patient On (Oxygen Delivery Method): nasal cannula  O2 Flow (L/min): 3        PHYSICAL EXAMINATION:    NECK:  Supple. No lymphadenopathy. Jugular venous pressure not elevated. Carotids equal.   HEART:   The cardiac impulse has a normal quality. Reg., Nl S1 and S2.  There are no murmurs, rubs or gallops noted  CHEST:  Chest rhonchi to auscultation. Normal respiratory effort.  ABDOMEN:  Soft and nontender.   EXTREMITIES:  There is no edema.       LABS:                        8.2    36.58 )-----------( 568      ( 01 Feb 2023 05:40 )             27.8     02-01    141  |  113<H>  |  16  ----------------------------<  147<H>  4.9   |  15<L>  |  1.10    Ca    7.3<L>      01 Feb 2023 05:40  Phos  4.1     02-01  Mg     1.7     02-01      PT/INR - ( 31 Jan 2023 09:03 )   PT: 21.8 sec;   INR: 1.87 ratio         PTT - ( 31 Jan 2023 09:03 )  PTT:32.9 sec      Culture - Fungal, Tissue (collected 01-31-23 @ 18:07)  Source: .Tissue INTRAPERITONEAL ABCESS FOR CX  Preliminary Report (02-01-23 @ 07:09):    Testing in progress    Culture - Tissue with Gram Stain (collected 01-31-23 @ 18:07)  Source: .Tissue INTRAPERITONEAL ABCESS FOR CX  Gram Stain (02-01-23 @ 04:37):    Rare polymorphonuclear leukocytes seen per low power field    No organisms seen per oil power field

## 2023-02-01 NOTE — PROVIDER CONTACT NOTE (EICU) - ACTION/TREATMENT ORDERED:
E-alerted by bedside for hypotension pt currently with BP of 79/50, on nidhi gtt at 6mcg and LR running at 75cc/hr, pt is generalized edema unclear if these BP are accurate, order 250cc LR bolus, called bedside providers who will eval pt for central access.

## 2023-02-01 NOTE — PROGRESS NOTE ADULT - ASSESSMENT
71 y/o female with:     SBO now s/p ex lap with extensive RICHARD, ileocolectomy, ventral hernia repair with mesh, POD 1  Hx previous multiple abd surgeries     Septic shock   ALVERTO due to ATN     PMH R sided CVA with L hemiparesis, pA.fib on warfarin, COPD on home O2       Plan:     ICU   IVF resuscitation   Wean pressors for goal MAP>65  Pain mx with dilaudid, tylenol   NPO, NGT suction   Empiric cefepime, flagyl, f/u surgical cx and path   Recent UCx with Kleb, BCx neg   IS  Monitor UO and renal fn  LA improving   Maintain [place], lines   Will resume AC when ok with surgery   DNR, DNI     Plan d/w surgery     Patient critically ill     Son updated at bedside

## 2023-02-01 NOTE — PROCEDURE NOTE - NSINFORMCONSENT_GEN_A_CORE
This was an emergent procedure.
Benefits, risks, and possible complications of procedure explained to patient/caregiver who verbalized understanding and gave verbal consent.
Benefits, risks, and possible complications of procedure explained to patient/caregiver who verbalized understanding and gave verbal consent.
This was an emergent procedure.

## 2023-02-01 NOTE — PROCEDURE NOTE - NSPOSTCAREGUIDE_GEN_A_CORE
Care for catheter as per unit/ICU protocols
Instructed patient/caregiver regarding signs and symptoms of infection
Care for catheter as per unit/ICU protocols
Instructed patient/caregiver regarding signs and symptoms of infection

## 2023-02-01 NOTE — PROGRESS NOTE ADULT - SUBJECTIVE AND OBJECTIVE BOX
Awake alert no distress  afeb, on pressors  lungs- bilat rhonchi  Cor- RRR  Abd- dressing dry, soft non tender, DEA's serosang  Ext- + anasarca

## 2023-02-01 NOTE — PROGRESS NOTE ADULT - ASSESSMENT
71 y/o female with h/o old CVA with left sided weakness, atrial fibrillation, emphysema, COPD, HTN, prior SBO and resection was admitted on 1/20 from [facility] for RLQ pain. She was noted with one episode of emesis and diffuse abdominal pain associated with fever. On 1/27 the patient was noted febrile to 102.8F, more lethargic, did not open eyes, did not follow commands. She received cefepime and metronidazole.     1. Febrile syndrome. UTI with KLPN. Right lower lobes nodules Probable pneumonia. SBO and ventral hernia s/p surgery. Allergy to cipro. Encephalopathy.   -leukocytosis worse likely due to surgery  -low grade fever  -BC x 2 noted  -urine c/s noted  -on cefepime 2 gm IV q12h and metronidazole 500 mg IV q8h # 5  -tolerating abx well so far; no side effects noted  -aspiration precautions  -continue abx coverage   -monitor abdomen  -monitor temps  -f/u CBC  -supportive care  2. Other issues:   -care per medicine    d/w Dr. Navarrete

## 2023-02-01 NOTE — PROGRESS NOTE ADULT - SUBJECTIVE AND OBJECTIVE BOX
Patient is a 72y old  Female who presents with a chief complaint of bowel obstruction/ischemic mesentery (01 Feb 2023 14:22)    24 hour events:     Allergies    Cipro (Rash)  Spiriva (Rash)    Intolerances      REVIEW OF SYSTEMS: SEE BELOW       ICU Vital Signs Last 24 Hrs  T(C): 37.7 (01 Feb 2023 12:00), Max: 38.1 (01 Feb 2023 11:00)  T(F): 99.8 (01 Feb 2023 12:00), Max: 100.5 (01 Feb 2023 11:00)  HR: 99 (01 Feb 2023 15:20) (80 - 104)  BP: 101/42 (01 Feb 2023 02:45) (76/45 - 146/74)  BP(mean): 55 (01 Feb 2023 02:45) (43 - 67)  ABP: 105/47 (01 Feb 2023 15:20) (82/39 - 144/60)  ABP(mean): 68 (01 Feb 2023 15:20) (54 - 91)  RR: 23 (01 Feb 2023 15:20) (16 - 38)  SpO2: 100% (01 Feb 2023 15:20) (92% - 100%)    O2 Parameters below as of 01 Feb 2023 15:00  Patient On (Oxygen Delivery Method): nasal cannula  O2 Flow (L/min): 3          CAPILLARY BLOOD GLUCOSE      POCT Blood Glucose.: 122 mg/dL (01 Feb 2023 12:28)  POCT Blood Glucose.: 120 mg/dL (01 Feb 2023 05:36)  POCT Blood Glucose.: 176 mg/dL (01 Feb 2023 00:39)  POCT Blood Glucose.: 154 mg/dL (31 Jan 2023 22:25)  POCT Blood Glucose.: 121 mg/dL (31 Jan 2023 20:38)      I&O's Summary    31 Jan 2023 07:01  -  01 Feb 2023 07:00  --------------------------------------------------------  IN: 3834.8 mL / OUT: 600 mL / NET: 3234.8 mL    01 Feb 2023 07:01  -  01 Feb 2023 15:38  --------------------------------------------------------  IN: 1400 mL / OUT: 75 mL / NET: 1325 mL            MEDICATIONS  (STANDING):  buDESOnide    Inhalation Suspension 0.5 milliGRAM(s) Inhalation every 12 hours  cefepime   IVPB 2000 milliGRAM(s) IV Intermittent every 12 hours  chlorhexidine 4% Liquid 1 Application(s) Topical <User Schedule>  coronavirus bivalent (EUA) Booster Vaccine (PFIZER) 0.3 milliLiter(s) IntraMuscular once  dextrose 5%. 1000 milliLiter(s) (50 mL/Hr) IV Continuous <Continuous>  dextrose 5%. 1000 milliLiter(s) (100 mL/Hr) IV Continuous <Continuous>  dextrose 50% Injectable 25 Gram(s) IV Push once  dextrose 50% Injectable 12.5 Gram(s) IV Push once  dextrose 50% Injectable 25 Gram(s) IV Push once  glucagon  Injectable 1 milliGRAM(s) IntraMuscular once  insulin lispro (ADMELOG) corrective regimen sliding scale   SubCutaneous every 6 hours  lactated ringers. 1000 milliLiter(s) (125 mL/Hr) IV Continuous <Continuous>  levothyroxine Injectable 70 MICROGram(s) IV Push at bedtime  metroNIDAZOLE  IVPB 500 milliGRAM(s) IV Intermittent every 8 hours  norepinephrine Infusion 0.05 MICROgram(s)/kG/Min (4.57 mL/Hr) IV Continuous <Continuous>  nystatin Cream 1 Application(s) Topical two times a day  nystatin Powder 1 Application(s) Topical every 12 hours  pantoprazole  Injectable 40 milliGRAM(s) IV Push daily  phenylephrine    Infusion 0.5 MICROgram(s)/kG/Min (18.3 mL/Hr) IV Continuous <Continuous>      MEDICATIONS  (PRN):  albuterol    90 MICROgram(s) HFA Inhaler 2 Puff(s) Inhalation every 6 hours PRN Shortness of Breath and/or Wheezing  dextrose Oral Gel 15 Gram(s) Oral once PRN Blood Glucose LESS THAN 70 milliGRAM(s)/deciliter  HYDROmorphone  Injectable 0.5 milliGRAM(s) IV Push every 4 hours PRN Severe Pain (7 - 10)  sodium chloride 0.9% lock flush 10 milliLiter(s) IV Push every 1 hour PRN Pre/post blood products, medications, blood draw, and to maintain line patency      PHYSICAL EXAM: SEE BELOW                          8.2    36.58 )-----------( 568      ( 01 Feb 2023 05:40 )             27.8     Bands 2.0    02-01    141  |  113<H>  |  16  ----------------------------<  147<H>  4.9   |  15<L>  |  1.10    Ca    7.3<L>      01 Feb 2023 05:40  Phos  4.1     02-01  Mg     1.7     02-01      Lactate 9.3           02-01 @ 08:15          PT/INR - ( 31 Jan 2023 09:03 )   PT: 21.8 sec;   INR: 1.87 ratio         PTT - ( 31 Jan 2023 09:03 )  PTT:32.9 sec    .Tissue INTRAPERITONEAL ABCESS FOR CX   Testing in progress   Rare polymorphonuclear leukocytes seen per low power field  No organisms seen per oil power field 01-31 @ 18:07

## 2023-02-01 NOTE — PROCEDURE NOTE - NSINDICATIONS_GEN_A_CORE
Septic shock/critical illness/emergency venous access/hypertonic/irritant infusion/venous access
Septic shock/critical illness/emergency venous access/hypertonic/irritant infusion/venous access
critical illness/emergency venous access/volume resuscitation
arterial puncture to obtain ABG's/critical patient/monitoring purposes

## 2023-02-01 NOTE — PROGRESS NOTE ADULT - ASSESSMENT
S/p extensive RICHARD, ileo colectomy, giant ventral hernia repair with Surgimend. Continue IVF resusitation. Wean pressors as tolerated. IV abx. ICU monitoring. NGT to suction.

## 2023-02-01 NOTE — PROGRESS NOTE ADULT - ASSESSMENT
71 y/o female with a PMHx of CVA with left sided weakness, atrial fibrillation on Coumadin, COPD - wears 2L NC at baseline admitted for    # Incarcerated Vental Hernia with SBO  # Distributive Shock  # UTI, Klebsiella  # Sepsis  # ALVERTO    DNR/DNI    Neuro:  - Avoid Neuro Deliriogenic / sedative medications  - Lyrica  - Cymbalta  - Prn Pain control    CV:  - Worsening shock state, Actively titrating Levo and Juan to maintain MAP > 65  - Pocus of IJ showing complete collapse with respiration, 2L IVF ordered  - Amio, BB for rate control  - Losartan, hold antihypertensive given pt shock state  - Statin    Pulm:  - Incentive spirometry  - supplemental oxygen as needed to maintain spo2 >88%  - Albuterol, Pulmicort  - ABG ordered    GI:  - Enteral feeds as tolerated to avoid Stress ulceration and optimize nutritional state when indicated  - NGT LIWS  - Monitor for bowel function  - Wound care as per sx    Renal:  - Continue to monitor Bun/Cr and UOP  - Replacing electrolytes as needed with Goal K> 4, PO> 3, Mg> 2               - Strict I&O's  - Avoid Nephro toxic medication  - LR @ 125  - Worsening Acidosis, with ALVERTO no indication for HCO3 gtt at this time, most liekly prerenal    Heme:  - SCDs for DVT/PE ppx     ID:  - Continue Cefepime, Metro  - Microbiology and Radiology reviewed   - trend CBC with diff, CMP  and fever curve    Endo:  - ISS for aggressive glycemic control to limit FS glucose to < 180mg/dl.  - Keep Euthyroid    Critical Care Time (EXCLUSIVE of any non bundled procedures) :  40 minutes were spent assessing the patient's presenting problems of acute illness that pose a high probability of life threatening  deterioration or end organ damage / dysfunction.  Medical desicion making includes initiation / continuation of plan or care review data/ labwork/ radiographic study, direct patient care bedside ,  discussions with  consultants regarding care,  evaluation and interpretation of vital signs, any necessary ventilator management,   NIV or BIPAP changes  or initiation,    discussions with multidisipliary team,  am or pm rounds, discussions of goals of care with patient and family all non-inclusive of procedures.

## 2023-02-01 NOTE — PROGRESS NOTE ADULT - ASSESSMENT
Patient admitted with abdominal pain, nausea and vomitting , dehydration  septic shock, likely related to     PROBLEMS:    UTI klebsiella pneumoniae and aspiration PNA /SBO- ventral hernia  New R lung nodules - cannot ruleout aspiration PNA   Acute metabolic encephalopathy  Sepsis   Hx copd without exacerbation  Anasarca/acute on  chronic diastolic heart failure       Plan:    pulmonary stable  NG suction-Incarcerated Ventral Hernia with SBO-Scheduled for OR   IV cefepime /flagyl -for  sepsis ( SBO/UTI)  Blood cx , urine cx   Monitor I/O has rizzo  Monitor Cr  D/w staff  DVT PROPHYLASIX      Patient admitted with abdominal pain, nausea and vomitting , dehydration  septic shock, likely related to     PROBLEMS:    UTI klebsiella pneumoniae and aspiration PNA /SBO- ventral hernia  New R lung nodules - cannot ruleout aspiration PNA   Acute metabolic encephalopathy  Sepsis   Hx copd without exacerbation  Anasarca/acute on  chronic diastolic heart failure       Plan:    pulmonary labile-close monitoring  NG suction-Incarcerated Ventral Hernia with SBO-S/p extensive RICHARD, ileo colectomy, giant ventral hernia repair with Surgimend  IVF resusitation  IV cefepime /flagyl -for  sepsis ( SBO/UTI)  Blood cx , urine cx   Monitor I/O has rizzo  Monitor Cr  D/w staff  DVT PROPHYLASIX

## 2023-02-01 NOTE — PROGRESS NOTE ADULT - SUBJECTIVE AND OBJECTIVE BOX
Patient is a 72y old  Female who presents with a chief complaint of bowel obstruction/ischemic mesentery (2023 23:43)      BRIEF HOSPITAL COURSE: 73 y/o female with a PMHx of CVA with left sided weakness, atrial fibrillation, emphysema, COPD, HTN - wears 2L NC at baseline BIBA, hx SBO and resection per EMS presents to the ED from [facility] for RLQ pain and r/o SBO. x1 episode of emesis, + profound diffuse abdominal pain, febrile  - Taken to OR for ventral hernia repair, remained hypotensive on Juan gtt, transfer to ICU for need of higher level of care and monitoring.    Worsening shock state dual pressor shock, 2L IVF  bolus given     PAST MEDICAL & SURGICAL HISTORY:  Hypertension      Hypercholesteremia      COPD (chronic obstructive pulmonary disease)      C. difficile diarrhea        Diverticulitis of intestine with perforation and abscess without bleeding, unspecified part of intestinal tract      PE (pulmonary thromboembolism)  2016      Palpitations      Obesity      Osteoarthritis      Anemia      Colostomy in place      History of atrial fibrillation      HTN (hypertension)      Diabetes mellitus      Cerebrovascular accident (CVA)      DVT of lower limb, acute      CVA (cerebral vascular accident)      COPD (chronic obstructive pulmonary disease)      Neuropathy      Diverticulitis      History of appendectomy      H/O:   x2      H/O ovarian cystectomy  b/l      Status post total replacement of both hips      H/O hemicolectomy  2016      History of colostomy reversal      History of colostomy reversal      S/P colostomy      S/P       S/P hip replacement          Review of Systems:  JANEL due to clinical condition    Medications:  cefepime   IVPB 2000 milliGRAM(s) IV Intermittent every 12 hours  metroNIDAZOLE  IVPB 500 milliGRAM(s) IV Intermittent every 8 hours    aMIOdarone    Tablet 200 milliGRAM(s) Oral daily  losartan 25 milliGRAM(s) Oral daily  metoprolol tartrate Injectable 2.5 milliGRAM(s) IV Push every 6 hours  norepinephrine Infusion 0.05 MICROgram(s)/kG/Min IV Continuous <Continuous>  phenylephrine    Infusion 0.5 MICROgram(s)/kG/Min IV Continuous <Continuous>    albuterol    90 MICROgram(s) HFA Inhaler 2 Puff(s) Inhalation every 6 hours PRN  buDESOnide    Inhalation Suspension 0.5 milliGRAM(s) Inhalation every 12 hours  montelukast 10 milliGRAM(s) Oral daily    DULoxetine 60 milliGRAM(s) Oral daily  pregabalin 100 milliGRAM(s) Oral every 8 hours        pantoprazole  Injectable 40 milliGRAM(s) IV Push daily    oxybutynin 5 milliGRAM(s) Oral every 12 hours PRN    atorvastatin 20 milliGRAM(s) Oral at bedtime  dextrose 50% Injectable 25 Gram(s) IV Push once  dextrose 50% Injectable 12.5 Gram(s) IV Push once  dextrose 50% Injectable 25 Gram(s) IV Push once  dextrose Oral Gel 15 Gram(s) Oral once PRN  glucagon  Injectable 1 milliGRAM(s) IntraMuscular once  insulin lispro (ADMELOG) corrective regimen sliding scale   SubCutaneous every 6 hours  levothyroxine Injectable 70 MICROGram(s) IV Push at bedtime    dextrose 5%. 1000 milliLiter(s) IV Continuous <Continuous>  dextrose 5%. 1000 milliLiter(s) IV Continuous <Continuous>  lactated ringers. 1000 milliLiter(s) IV Continuous <Continuous>  sodium chloride 0.9% lock flush 10 milliLiter(s) IV Push every 1 hour PRN    coronavirus bivalent (EUA) Booster Vaccine (PFIZER) 0.3 milliLiter(s) IntraMuscular once    chlorhexidine 4% Liquid 1 Application(s) Topical <User Schedule>  nystatin Cream 1 Application(s) Topical two times a day  nystatin Powder 1 Application(s) Topical every 12 hours            ICU Vital Signs Last 24 Hrs  T(C): 37.1 (2023 04:00), Max: 37.1 (2023 04:00)  T(F): 98.8 (2023 04:00), Max: 98.8 (2023 04:00)  HR: 93 (2023 04:30) (80 - 93)  BP: 101/42 (2023 02:30) (79/44 - 149/63)  BP(mean): 55 (2023 02:30) (43 - 67)  ABP: 120/58 (2023 04:30) (82/39 - 121/52)  ABP(mean): 80 (2023 04:30) (54 - 80)  RR: 35 (2023 04:30) (18 - 38)  SpO2: 97% (2023 04:30) (92% - 100%)    O2 Parameters below as of 2023 04:30  Patient On (Oxygen Delivery Method): nasal cannula  O2 Flow (L/min): 2              I&O's Detail    2023 07:01  -  2023 07:00  --------------------------------------------------------  IN:    dextrose 5% with potassium chloride 20 mEq/L: 600 mL    IV PiggyBack: 100 mL    IV PiggyBack: 200 mL    IV PiggyBack: 50 mL  Total IN: 950 mL    OUT:    Indwelling Catheter - Urethral (mL): 1030 mL    Nasogastric/Oral tube (mL): 200 mL  Total OUT: 1230 mL    Total NET: -280 mL      2023 07:01  -  2023 05:03  --------------------------------------------------------  IN:  Total IN: 0 mL    OUT:    Bulb (mL): 20 mL    Bulb (mL): 20 mL    Indwelling Catheter - Urethral (mL): 435 mL  Total OUT: 475 mL    Total NET: -475 mL          LABS:                        7.9    36.60 )-----------( 633      ( 2023 03:40 )             27.5     02-01    141  |  110<H>  |  14  ----------------------------<  183<H>  5.1   |  16<L>  |  1.06    Ca    7.7<L>      2023 01:25            CAPILLARY BLOOD GLUCOSE      POCT Blood Glucose.: 176 mg/dL (2023 00:39)    PT/INR - ( 2023 09:03 )   PT: 21.8 sec;   INR: 1.87 ratio         PTT - ( 2023 09:03 )  PTT:32.9 sec    CULTURES:  Culture Results:   No growth to date. ( @ 06:16)  Culture Results:   No growth to date. ( @ 06:16)      Physical Examination:  General: Well appearing, lying in bed in NAD.    HEENT: Pupils equal, reactive to light. Symmetric. No scleral icterus or injection.    PULM: Clear to auscultation B/L. No wheezes, rales, or rhonchi appreciated. No significant sputum production or increased respiratory effort.    NECK: Supple, no lymphadenopathy, trachea midline.    CVS: Regular rate and rhythm, no murmurs appreciated, +s1/s2.    ABD: Soft, distended, tender, hypo active bowel sounds.    EXT: No edema, nontender.    SKIN: Warm and well perfused, no rashes noted.    NEURO: Alert, interactive, nonfocal.    RADIOLOGY: < from: CT Head No Cont (23 @ 12:25) >    ACC: [id number] EXAM:  CT BRAIN   ORDERED BY: DIANN CHRISTENSEN     PROCEDURE DATE:  2023          INTERPRETATION:  Clinical Indication: Acute encephalopathy    5mm axial sections of the brain were obtained from base to vertex,   without the intravenous administration of contrast material. Coronal and   sagittal computer generated reconstructed views are available.    No prior brain imaging is available for comparison.      There is encephalomalacia and gliosis in the right posterior temporal   lobe and there is lucency in the right frontal parenchyma in the right   middle cerebral artery distribution with ex vacuo dilatation of the body   of the right lateral ventricle. Lucency is also identified in the right   basal ganglia with right-sided wallerian degeneration suggesting an old   right middle cerebral artery infarct. There is no hemorrhage.     Bone window examination is unremarkable. There has been previous   bilateral placement surgery    IMPRESSION: Encephalomalacia and gliosis right posterior temporal lobe.   Old right middle cerebral artery infarct with ex vacuo dilatation of the   body the right lateral ventricle and right-sided wallerian degeneration.   No hemorrhage.    --- End of Report ---    < end of copied text >

## 2023-02-01 NOTE — CHART NOTE - NSCHARTNOTEFT_GEN_A_CORE
STATUS POST:  ex lap, SBO, RICHARD and large ventral hernia repair   POST OPERATIVE DAY #: 0    Patient seen and examined at bedside. alert, limited subjective 2t2 baseline mental status       Vital Signs Last 24 Hrs  T(C): 37.1 (01 Feb 2023 04:00), Max: 37.1 (01 Feb 2023 04:00)  T(F): 98.8 (01 Feb 2023 04:00), Max: 98.8 (01 Feb 2023 04:00)  HR: 100 (01 Feb 2023 06:15) (80 - 100)  BP: 101/42 (01 Feb 2023 02:30) (79/44 - 149/63)  BP(mean): 55 (01 Feb 2023 02:30) (43 - 67)  RR: 29 (01 Feb 2023 06:15) (18 - 38)  SpO2: 99% (01 Feb 2023 06:15) (92% - 100%)      Physical Exam     Gen: NAD, alert  Pulm: crackles in lung b/l, on NC  Heart: borderline tachycardia  Abd: soft, ND, NT, midline incision with dressing in place. no active bleeding, DEA drain x 2 with ~ 10 cc serosanguinous fluid       Parameters below as of 01 Feb 2023 06:15  Patient On (Oxygen Delivery Method): nasal cannula  O2 Flow (L/min): 2    I&O's Summary    30 Jan 2023 07:01  -  31 Jan 2023 07:00  --------------------------------------------------------  IN: 950 mL / OUT: 1230 mL / NET: -280 mL    31 Jan 2023 07:01  -  01 Feb 2023 06:44  --------------------------------------------------------  IN: 3484.8 mL / OUT: 600 mL / NET: 2884.8 mL      I&O's Detail    30 Jan 2023 07:01  -  31 Jan 2023 07:00  --------------------------------------------------------  IN:    dextrose 5% with potassium chloride 20 mEq/L: 600 mL    IV PiggyBack: 100 mL    IV PiggyBack: 200 mL    IV PiggyBack: 50 mL  Total IN: 950 mL    OUT:    Indwelling Catheter - Urethral (mL): 1030 mL    Nasogastric/Oral tube (mL): 200 mL  Total OUT: 1230 mL    Total NET: -280 mL      31 Jan 2023 07:01  -  01 Feb 2023 06:44  --------------------------------------------------------  IN:    IV PiggyBack: 100 mL    IV PiggyBack: 50 mL    Lactated Ringers: 495.7 mL    Lactated Ringers Bolus: 2000 mL    Norepinephrine: 54.4 mL    Phenylephrine: 784.7 mL  Total IN: 3484.8 mL    OUT:    Bulb (mL): 60 mL    Bulb (mL): 70 mL    Indwelling Catheter - Urethral (mL): 440 mL    IV PiggyBack: 0 mL    Nasogastric/Oral tube (mL): 30 mL  Total OUT: 600 mL    Total NET: 2884.8 mL          MEDICATIONS  (STANDING):  aMIOdarone    Tablet 200 milliGRAM(s) Oral daily  atorvastatin 20 milliGRAM(s) Oral at bedtime  buDESOnide    Inhalation Suspension 0.5 milliGRAM(s) Inhalation every 12 hours  cefepime   IVPB 2000 milliGRAM(s) IV Intermittent every 12 hours  chlorhexidine 4% Liquid 1 Application(s) Topical <User Schedule>  coronavirus bivalent (EUA) Booster Vaccine (PFIZER) 0.3 milliLiter(s) IntraMuscular once  dextrose 5%. 1000 milliLiter(s) (50 mL/Hr) IV Continuous <Continuous>  dextrose 5%. 1000 milliLiter(s) (100 mL/Hr) IV Continuous <Continuous>  dextrose 50% Injectable 25 Gram(s) IV Push once  dextrose 50% Injectable 12.5 Gram(s) IV Push once  dextrose 50% Injectable 25 Gram(s) IV Push once  DULoxetine 60 milliGRAM(s) Oral daily  glucagon  Injectable 1 milliGRAM(s) IntraMuscular once  insulin lispro (ADMELOG) corrective regimen sliding scale   SubCutaneous every 6 hours  lactated ringers. 1000 milliLiter(s) (125 mL/Hr) IV Continuous <Continuous>  levothyroxine Injectable 70 MICROGram(s) IV Push at bedtime  losartan 25 milliGRAM(s) Oral daily  metoprolol tartrate Injectable 2.5 milliGRAM(s) IV Push every 6 hours  metroNIDAZOLE  IVPB 500 milliGRAM(s) IV Intermittent every 8 hours  montelukast 10 milliGRAM(s) Oral daily  norepinephrine Infusion 0.05 MICROgram(s)/kG/Min (4.57 mL/Hr) IV Continuous <Continuous>  nystatin Cream 1 Application(s) Topical two times a day  nystatin Powder 1 Application(s) Topical every 12 hours  pantoprazole  Injectable 40 milliGRAM(s) IV Push daily  phenylephrine    Infusion 0.5 MICROgram(s)/kG/Min (18.3 mL/Hr) IV Continuous <Continuous>  pregabalin 100 milliGRAM(s) Oral every 8 hours    MEDICATIONS  (PRN):  albuterol    90 MICROgram(s) HFA Inhaler 2 Puff(s) Inhalation every 6 hours PRN Shortness of Breath and/or Wheezing  dextrose Oral Gel 15 Gram(s) Oral once PRN Blood Glucose LESS THAN 70 milliGRAM(s)/deciliter  oxybutynin 5 milliGRAM(s) Oral every 12 hours PRN Bladder spasms  sodium chloride 0.9% lock flush 10 milliLiter(s) IV Push every 1 hour PRN Pre/post blood products, medications, blood draw, and to maintain line patency      LABS:                        8.2    36.58 )-----------( 568      ( 01 Feb 2023 05:40 )             27.8     02-01    141  |  113<H>  |  16  ----------------------------<  147<H>  4.9   |  15<L>  |  1.10    Ca    7.3<L>      01 Feb 2023 05:40  Phos  4.1     02-01  Mg     1.7     02-01      PT/INR - ( 31 Jan 2023 09:03 )   PT: 21.8 sec;   INR: 1.87 ratio         PTT - ( 31 Jan 2023 09:03 )  PTT:32.9 sec      RADIOLOGY & ADDITIONAL STUDIES:                  A/P: 72y Female with multiple comorbidities, POD#1 s/p ex lap, SBO, RICHARD, large ventral hernia repair (1/31), requiring pressors post-operatively, transferred to ICU for further management     - CV: requiring pressors, nidhi 0.7 / Levo 0.07, s/p 2L bolus post-op, AM H/H 8.2/27.8, trending CBC. borderline tachycardic 100's. continue monitor vitals  - Pulm: extubated in OR post-op. on NC  - GI: NPO/NGT/IVF, monitor NGT output  - : oliguria post-op with 40 cc overnight, rizzo repositioned and flushed. may consider lasix when HD stable   - ID: continue IV abx  - Pain: IV tylenol, dilaudid prn   - Appreciate ICU care    Darling Wharton PA-C

## 2023-02-02 LAB
ALBUMIN SERPL ELPH-MCNC: 1.3 G/DL — LOW (ref 3.3–5)
ALP SERPL-CCNC: 62 U/L — SIGNIFICANT CHANGE UP (ref 40–120)
ALT FLD-CCNC: 94 U/L — HIGH (ref 12–78)
ANION GAP SERPL CALC-SCNC: 4 MMOL/L — LOW (ref 5–17)
ANION GAP SERPL CALC-SCNC: 7 MMOL/L — SIGNIFICANT CHANGE UP (ref 5–17)
AST SERPL-CCNC: 246 U/L — HIGH (ref 15–37)
BILIRUB SERPL-MCNC: 0.3 MG/DL — SIGNIFICANT CHANGE UP (ref 0.2–1.2)
BUN SERPL-MCNC: 20 MG/DL — SIGNIFICANT CHANGE UP (ref 7–23)
BUN SERPL-MCNC: 21 MG/DL — SIGNIFICANT CHANGE UP (ref 7–23)
CALCIUM SERPL-MCNC: 7.3 MG/DL — LOW (ref 8.5–10.1)
CALCIUM SERPL-MCNC: 7.3 MG/DL — LOW (ref 8.5–10.1)
CHLORIDE SERPL-SCNC: 113 MMOL/L — HIGH (ref 96–108)
CHLORIDE SERPL-SCNC: 115 MMOL/L — HIGH (ref 96–108)
CO2 SERPL-SCNC: 22 MMOL/L — SIGNIFICANT CHANGE UP (ref 22–31)
CO2 SERPL-SCNC: 24 MMOL/L — SIGNIFICANT CHANGE UP (ref 22–31)
CREAT SERPL-MCNC: 1.2 MG/DL — SIGNIFICANT CHANGE UP (ref 0.5–1.3)
CREAT SERPL-MCNC: 1.36 MG/DL — HIGH (ref 0.5–1.3)
EGFR: 41 ML/MIN/1.73M2 — LOW
EGFR: 48 ML/MIN/1.73M2 — LOW
GLUCOSE BLDC GLUCOMTR-MCNC: 100 MG/DL — HIGH (ref 70–99)
GLUCOSE BLDC GLUCOMTR-MCNC: 113 MG/DL — HIGH (ref 70–99)
GLUCOSE BLDC GLUCOMTR-MCNC: 52 MG/DL — CRITICAL LOW (ref 70–99)
GLUCOSE BLDC GLUCOMTR-MCNC: 66 MG/DL — LOW (ref 70–99)
GLUCOSE BLDC GLUCOMTR-MCNC: 97 MG/DL — SIGNIFICANT CHANGE UP (ref 70–99)
GLUCOSE SERPL-MCNC: 128 MG/DL — HIGH (ref 70–99)
GLUCOSE SERPL-MCNC: 95 MG/DL — SIGNIFICANT CHANGE UP (ref 70–99)
HCT VFR BLD CALC: 23.3 % — LOW (ref 34.5–45)
HCT VFR BLD CALC: 23.5 % — LOW (ref 34.5–45)
HGB BLD-MCNC: 7.2 G/DL — LOW (ref 11.5–15.5)
HGB BLD-MCNC: 7.4 G/DL — LOW (ref 11.5–15.5)
LACTATE SERPL-SCNC: 1.2 MMOL/L — SIGNIFICANT CHANGE UP (ref 0.7–2)
MAGNESIUM SERPL-MCNC: 1.8 MG/DL — SIGNIFICANT CHANGE UP (ref 1.6–2.6)
MCHC RBC-ENTMCNC: 26.5 PG — LOW (ref 27–34)
MCHC RBC-ENTMCNC: 27.2 PG — SIGNIFICANT CHANGE UP (ref 27–34)
MCHC RBC-ENTMCNC: 30.9 GM/DL — LOW (ref 32–36)
MCHC RBC-ENTMCNC: 31.5 GM/DL — LOW (ref 32–36)
MCV RBC AUTO: 85.7 FL — SIGNIFICANT CHANGE UP (ref 80–100)
MCV RBC AUTO: 86.4 FL — SIGNIFICANT CHANGE UP (ref 80–100)
PHOSPHATE SERPL-MCNC: 2.8 MG/DL — SIGNIFICANT CHANGE UP (ref 2.5–4.5)
PLATELET # BLD AUTO: 394 K/UL — SIGNIFICANT CHANGE UP (ref 150–400)
PLATELET # BLD AUTO: 418 K/UL — HIGH (ref 150–400)
POTASSIUM SERPL-MCNC: 4 MMOL/L — SIGNIFICANT CHANGE UP (ref 3.5–5.3)
POTASSIUM SERPL-MCNC: 4.2 MMOL/L — SIGNIFICANT CHANGE UP (ref 3.5–5.3)
POTASSIUM SERPL-SCNC: 4 MMOL/L — SIGNIFICANT CHANGE UP (ref 3.5–5.3)
POTASSIUM SERPL-SCNC: 4.2 MMOL/L — SIGNIFICANT CHANGE UP (ref 3.5–5.3)
PROT SERPL-MCNC: 4.7 GM/DL — LOW (ref 6–8.3)
RBC # BLD: 2.72 M/UL — LOW (ref 3.8–5.2)
RBC # BLD: 2.72 M/UL — LOW (ref 3.8–5.2)
RBC # FLD: 20.3 % — HIGH (ref 10.3–14.5)
RBC # FLD: 21.7 % — HIGH (ref 10.3–14.5)
SODIUM SERPL-SCNC: 142 MMOL/L — SIGNIFICANT CHANGE UP (ref 135–145)
SODIUM SERPL-SCNC: 143 MMOL/L — SIGNIFICANT CHANGE UP (ref 135–145)
WBC # BLD: 14.54 K/UL — HIGH (ref 3.8–10.5)
WBC # BLD: 16.24 K/UL — HIGH (ref 3.8–10.5)
WBC # FLD AUTO: 14.54 K/UL — HIGH (ref 3.8–10.5)
WBC # FLD AUTO: 16.24 K/UL — HIGH (ref 3.8–10.5)

## 2023-02-02 PROCEDURE — 99291 CRITICAL CARE FIRST HOUR: CPT

## 2023-02-02 PROCEDURE — 99292 CRITICAL CARE ADDL 30 MIN: CPT

## 2023-02-02 RX ORDER — NOREPINEPHRINE BITARTRATE/D5W 8 MG/250ML
0.05 PLASTIC BAG, INJECTION (ML) INTRAVENOUS
Qty: 8 | Refills: 0 | Status: DISCONTINUED | OUTPATIENT
Start: 2023-02-02 | End: 2023-02-03

## 2023-02-02 RX ORDER — ACETAMINOPHEN 500 MG
1000 TABLET ORAL ONCE
Refills: 0 | Status: COMPLETED | OUTPATIENT
Start: 2023-02-02 | End: 2023-02-03

## 2023-02-02 RX ORDER — INSULIN LISPRO 100/ML
VIAL (ML) SUBCUTANEOUS EVERY 6 HOURS
Refills: 0 | Status: DISCONTINUED | OUTPATIENT
Start: 2023-02-02 | End: 2023-02-11

## 2023-02-02 RX ORDER — DEXTROSE 50 % IN WATER 50 %
12.5 SYRINGE (ML) INTRAVENOUS ONCE
Refills: 0 | Status: COMPLETED | OUTPATIENT
Start: 2023-02-02 | End: 2023-02-02

## 2023-02-02 RX ORDER — ACETAMINOPHEN 500 MG
1000 TABLET ORAL ONCE
Refills: 0 | Status: COMPLETED | OUTPATIENT
Start: 2023-02-02 | End: 2023-02-02

## 2023-02-02 RX ORDER — ALBUMIN HUMAN 25 %
50 VIAL (ML) INTRAVENOUS EVERY 6 HOURS
Refills: 0 | Status: COMPLETED | OUTPATIENT
Start: 2023-02-02 | End: 2023-02-04

## 2023-02-02 RX ORDER — HEPARIN SODIUM 5000 [USP'U]/ML
5000 INJECTION INTRAVENOUS; SUBCUTANEOUS EVERY 8 HOURS
Refills: 0 | Status: DISCONTINUED | OUTPATIENT
Start: 2023-02-02 | End: 2023-02-04

## 2023-02-02 RX ORDER — ELECTROLYTE SOLUTION,INJ
1 VIAL (ML) INTRAVENOUS
Refills: 0 | Status: DISCONTINUED | OUTPATIENT
Start: 2023-02-02 | End: 2023-02-02

## 2023-02-02 RX ADMIN — HEPARIN SODIUM 5000 UNIT(S): 5000 INJECTION INTRAVENOUS; SUBCUTANEOUS at 13:55

## 2023-02-02 RX ADMIN — NYSTATIN CREAM 1 APPLICATION(S): 100000 CREAM TOPICAL at 21:01

## 2023-02-02 RX ADMIN — HYDROMORPHONE HYDROCHLORIDE 0.5 MILLIGRAM(S): 2 INJECTION INTRAMUSCULAR; INTRAVENOUS; SUBCUTANEOUS at 11:00

## 2023-02-02 RX ADMIN — NYSTATIN CREAM 1 APPLICATION(S): 100000 CREAM TOPICAL at 06:17

## 2023-02-02 RX ADMIN — Medication 9.13 MICROGRAM(S)/KG/MIN: at 12:59

## 2023-02-02 RX ADMIN — NYSTATIN CREAM 1 APPLICATION(S): 100000 CREAM TOPICAL at 17:42

## 2023-02-02 RX ADMIN — HYDROMORPHONE HYDROCHLORIDE 0.5 MILLIGRAM(S): 2 INJECTION INTRAMUSCULAR; INTRAVENOUS; SUBCUTANEOUS at 00:51

## 2023-02-02 RX ADMIN — SODIUM CHLORIDE 100 MILLILITER(S): 9 INJECTION, SOLUTION INTRAVENOUS at 09:16

## 2023-02-02 RX ADMIN — Medication 400 MILLIGRAM(S): at 11:50

## 2023-02-02 RX ADMIN — CEFEPIME 100 MILLIGRAM(S): 1 INJECTION, POWDER, FOR SOLUTION INTRAMUSCULAR; INTRAVENOUS at 21:01

## 2023-02-02 RX ADMIN — Medication 50 MILLILITER(S): at 19:23

## 2023-02-02 RX ADMIN — Medication 1000 MILLIGRAM(S): at 12:00

## 2023-02-02 RX ADMIN — HYDROMORPHONE HYDROCHLORIDE 0.5 MILLIGRAM(S): 2 INJECTION INTRAMUSCULAR; INTRAVENOUS; SUBCUTANEOUS at 01:06

## 2023-02-02 RX ADMIN — Medication 1 EACH: at 22:51

## 2023-02-02 RX ADMIN — Medication 0.5 MILLIGRAM(S): at 20:55

## 2023-02-02 RX ADMIN — HYDROMORPHONE HYDROCHLORIDE 0.5 MILLIGRAM(S): 2 INJECTION INTRAMUSCULAR; INTRAVENOUS; SUBCUTANEOUS at 04:14

## 2023-02-02 RX ADMIN — HYDROMORPHONE HYDROCHLORIDE 0.5 MILLIGRAM(S): 2 INJECTION INTRAMUSCULAR; INTRAVENOUS; SUBCUTANEOUS at 15:30

## 2023-02-02 RX ADMIN — CHLORHEXIDINE GLUCONATE 1 APPLICATION(S): 213 SOLUTION TOPICAL at 06:16

## 2023-02-02 RX ADMIN — SODIUM CHLORIDE 100 MILLILITER(S): 9 INJECTION, SOLUTION INTRAVENOUS at 14:12

## 2023-02-02 RX ADMIN — Medication 100 MILLIGRAM(S): at 13:55

## 2023-02-02 RX ADMIN — PANTOPRAZOLE SODIUM 40 MILLIGRAM(S): 20 TABLET, DELAYED RELEASE ORAL at 10:02

## 2023-02-02 RX ADMIN — CEFEPIME 100 MILLIGRAM(S): 1 INJECTION, POWDER, FOR SOLUTION INTRAMUSCULAR; INTRAVENOUS at 10:02

## 2023-02-02 RX ADMIN — HEPARIN SODIUM 5000 UNIT(S): 5000 INJECTION INTRAVENOUS; SUBCUTANEOUS at 21:01

## 2023-02-02 RX ADMIN — HYDROMORPHONE HYDROCHLORIDE 0.5 MILLIGRAM(S): 2 INJECTION INTRAMUSCULAR; INTRAVENOUS; SUBCUTANEOUS at 16:00

## 2023-02-02 RX ADMIN — Medication 0.5 MILLIGRAM(S): at 08:38

## 2023-02-02 RX ADMIN — NYSTATIN CREAM 1 APPLICATION(S): 100000 CREAM TOPICAL at 10:03

## 2023-02-02 RX ADMIN — SODIUM CHLORIDE 125 MILLILITER(S): 9 INJECTION, SOLUTION INTRAVENOUS at 06:16

## 2023-02-02 RX ADMIN — Medication 70 MICROGRAM(S): at 21:00

## 2023-02-02 RX ADMIN — HYDROMORPHONE HYDROCHLORIDE 0.5 MILLIGRAM(S): 2 INJECTION INTRAMUSCULAR; INTRAVENOUS; SUBCUTANEOUS at 19:38

## 2023-02-02 RX ADMIN — HYDROMORPHONE HYDROCHLORIDE 0.5 MILLIGRAM(S): 2 INJECTION INTRAMUSCULAR; INTRAVENOUS; SUBCUTANEOUS at 04:09

## 2023-02-02 RX ADMIN — Medication 100 MILLIGRAM(S): at 06:16

## 2023-02-02 RX ADMIN — HYDROMORPHONE HYDROCHLORIDE 0.5 MILLIGRAM(S): 2 INJECTION INTRAMUSCULAR; INTRAVENOUS; SUBCUTANEOUS at 08:30

## 2023-02-02 RX ADMIN — HYDROMORPHONE HYDROCHLORIDE 0.5 MILLIGRAM(S): 2 INJECTION INTRAMUSCULAR; INTRAVENOUS; SUBCUTANEOUS at 10:40

## 2023-02-02 RX ADMIN — HYDROMORPHONE HYDROCHLORIDE 0.5 MILLIGRAM(S): 2 INJECTION INTRAMUSCULAR; INTRAVENOUS; SUBCUTANEOUS at 08:00

## 2023-02-02 RX ADMIN — Medication 50 MILLILITER(S): at 12:13

## 2023-02-02 RX ADMIN — Medication 100 MILLIGRAM(S): at 21:01

## 2023-02-02 RX ADMIN — HYDROMORPHONE HYDROCHLORIDE 0.5 MILLIGRAM(S): 2 INJECTION INTRAMUSCULAR; INTRAVENOUS; SUBCUTANEOUS at 19:23

## 2023-02-02 NOTE — PROGRESS NOTE ADULT - ASSESSMENT
71 y/o female with h/o old CVA with left sided weakness, atrial fibrillation, emphysema, COPD, HTN, prior SBO and resection was admitted on 1/20 from [facility] for RLQ pain. She was noted with one episode of emesis and diffuse abdominal pain associated with fever. On 1/27 the patient was noted febrile to 102.8F, more lethargic, did not open eyes, did not follow commands. She received cefepime and metronidazole.     1. Febrile syndrome. UTI with KLPN. Right lower lobes nodules Probable pneumonia. Intraabdominal hernia related fat necrosis with superimposed infection with GNR. SBO and ventral hernia s/p surgery. Allergy to cipro. Encephalopathy.   -leukocytosis improving  -f/u surgical c/s  -low grade fever  -BC x 2 noted  -urine c/s noted  -on cefepime 2 gm IV q12h and metronidazole 500 mg IV q8h # 6  -tolerating abx well so far; no side effects noted  -aspiration precautions  -continue abx coverage   -monitor abdomen  -monitor temps  -f/u CBC  -supportive care  2. Other issues:   -care per medicine    d/w Dr. Castro

## 2023-02-02 NOTE — PROGRESS NOTE ADULT - ASSESSMENT
73 y/o female with:     SBO now s/p ex lap with extensive RICHARD, ileocolectomy, ventral hernia repair with mesh, POD 1  Hx previous multiple abd surgeries     Septic shock   ALVERTO due to ATN     PMH R sided CVA with L hemiparesis, pA.fib on warfarin, COPD on home O2       Plan:     Off pressor in am, required levo for 2 hours during the day today and now off again   Resp status remains stable   Decrease IVF, add albumin   Start PPN tonight   Maintain NGT and keep NPO - d/w surgery   PT, OT   Empiric cefepime, flagyl  Pain mx   Post-op anemia, likely dilutional, there is no obvious bleeding (non-bloody drainage from NGT, surgical drains), transfused 1 PRBC   IS  Renal fn slightly worse, no urgent need for dialysis   Maintain [place], lines   Start sc heparin for DVT ppx   DNR, DNI     Plan d/w surgery     Patient critically ill     Son updated by phone (Dr Moe Matute, [phone])

## 2023-02-02 NOTE — PROGRESS NOTE ADULT - SUBJECTIVE AND OBJECTIVE BOX
Patient is a 72y old  Female who presents with a chief complaint of bowel obstruction/ischemic mesentery (2023 15:38)      BRIEF HOSPITAL COURSE: ***    Events last 24 hours: ***    PAST MEDICAL & SURGICAL HISTORY:  Hypertension      Hypercholesteremia      COPD (chronic obstructive pulmonary disease)      C. difficile diarrhea        Diverticulitis of intestine with perforation and abscess without bleeding, unspecified part of intestinal tract      PE (pulmonary thromboembolism)  2016      Palpitations      Obesity      Osteoarthritis      Anemia      Colostomy in place      History of atrial fibrillation      HTN (hypertension)      Diabetes mellitus      Cerebrovascular accident (CVA)      DVT of lower limb, acute      CVA (cerebral vascular accident)      COPD (chronic obstructive pulmonary disease)      Neuropathy      Diverticulitis      History of appendectomy      H/O:   x2      H/O ovarian cystectomy  b/l      Status post total replacement of both hips      H/O hemicolectomy  2016      History of colostomy reversal      History of colostomy reversal      S/P colostomy      S/P       S/P hip replacement          Review of Systems:  12 pt ROS negative unless otherwise stated above      Medications:  cefepime   IVPB 2000 milliGRAM(s) IV Intermittent every 12 hours  metroNIDAZOLE  IVPB 500 milliGRAM(s) IV Intermittent every 8 hours    norepinephrine Infusion 0.05 MICROgram(s)/kG/Min IV Continuous <Continuous>  phenylephrine    Infusion 0.5 MICROgram(s)/kG/Min IV Continuous <Continuous>    albuterol    90 MICROgram(s) HFA Inhaler 2 Puff(s) Inhalation every 6 hours PRN  buDESOnide    Inhalation Suspension 0.5 milliGRAM(s) Inhalation every 12 hours    HYDROmorphone  Injectable 0.5 milliGRAM(s) IV Push every 3 hours PRN        pantoprazole  Injectable 40 milliGRAM(s) IV Push daily      dextrose 50% Injectable 25 Gram(s) IV Push once  dextrose 50% Injectable 12.5 Gram(s) IV Push once  dextrose 50% Injectable 25 Gram(s) IV Push once  dextrose Oral Gel 15 Gram(s) Oral once PRN  glucagon  Injectable 1 milliGRAM(s) IntraMuscular once  insulin lispro (ADMELOG) corrective regimen sliding scale   SubCutaneous every 6 hours  levothyroxine Injectable 70 MICROGram(s) IV Push at bedtime    dextrose 5%. 1000 milliLiter(s) IV Continuous <Continuous>  dextrose 5%. 1000 milliLiter(s) IV Continuous <Continuous>  lactated ringers. 1000 milliLiter(s) IV Continuous <Continuous>  sodium chloride 0.9% lock flush 10 milliLiter(s) IV Push every 1 hour PRN    coronavirus bivalent (EUA) Booster Vaccine (PFIZER) 0.3 milliLiter(s) IntraMuscular once    chlorhexidine 4% Liquid 1 Application(s) Topical <User Schedule>  nystatin Cream 1 Application(s) Topical two times a day  nystatin Powder 1 Application(s) Topical every 12 hours            ICU Vital Signs Last 24 Hrs  T(C): 36.7 (2023 00:00), Max: 38.1 (2023 11:00)  T(F): 98 (2023 00:00), Max: 100.5 (2023 11:00)  HR: 94 (2023 01:00) (80 - 104)  BP: 101/42 (2023 02:45) (76/45 - 101/42)  BP(mean): 55 (2023 02:45) (43 - 55)  ABP: 74/66 (2023 01:00) (74/66 - 144/60)  ABP(mean): 72 (2023 01:00) (54 - 91)  RR: 13 (2023 01:00) (13 - 36)  SpO2: 100% (2023 01:00) (92% - 100%)    O2 Parameters below as of 2023 01:00  Patient On (Oxygen Delivery Method): nasal cannula  O2 Flow (L/min): 3          ABG - ( 2023 05:38 )  pH, Arterial: 7.35  pH, Blood: x     /  pCO2: 26    /  pO2: 85    / HCO3: 14    / Base Excess: -9.5  /  SaO2: 99                        LABS:                        7.6    22.69 )-----------( 429      ( 2023 16:15 )             24.9     02-01    141  |  113<H>  |  18  ----------------------------<  155<H>  4.8   |  21<L>  |  1.30    Ca    7.6<L>      2023 16:15  Phos  4.1     02-01  Mg     1.7                 CAPILLARY BLOOD GLUCOSE      POCT Blood Glucose.: 142 mg/dL (2023 23:06)    PT/INR - ( 2023 09:03 )   PT: 21.8 sec;   INR: 1.87 ratio         PTT - ( 2023 09:03 )  PTT:32.9 sec    CULTURES:  Culture Results:   Growth in fluid media only Gram Negative Rods ( @ 18:07)  Culture Results:   Testing in progress ( @ 18:07)  Culture Results:   No Growth Final ( @ 06:16)  Culture Results:   No Growth Final ( @ 06:16)      Physical Examination:    General: No acute distress.  Alert, oriented, interactive, nonfocal    HEENT: Pupils equal, reactive to light.  Symmetric.    PULM: Clear to auscultation bilaterally, no significant sputum production    CVS: Regular rate and rhythm, no murmurs, rubs, or gallops    ABD: Soft, nondistended, nontender, normoactive bowel sounds, no masses    EXT: No edema, nontender    SKIN: Warm and well perfused, no rashes noted.    RADIOLOGY: ***    CRITICAL CARE TIME SPENT: ***   Patient is a 72y old  Female who presents with a chief complaint of bowel obstruction/ischemic mesentery (2023 15:38)      BRIEF HOSPITAL COURSE:   72F with PMHx CVA, pAF on AC, emphysema/COPD on home O2, HTN, SBO with prior resection who presented with abd pain, N/V. Found to have an small bowel obstruction. Taken to OR underwent ELAP, extensive RICHARD, ileocolectomy, y/o female with a PMHx of CVA with left sided weakness, atrial fibrillation, emphysema, COPD, HTN - wears 2L NC at baseline BIBA, hx SBO and resection per EMS presents to the ED from [facility] for RLQ pain and r/o SBO. x1 episode of emesis, + profound diffuse abdominal pain, febrile  - Taken to OR for ventral hernia repair, remained hypotensive on Juan gtt, transfer to ICU for need of higher level of care and monitoring.    Worsening shock state dual pressor shock, 2L IVF  bolus given       Events last 24 hours: ***    PAST MEDICAL & SURGICAL HISTORY:  Hypertension      Hypercholesteremia      COPD (chronic obstructive pulmonary disease)      C. difficile diarrhea        Diverticulitis of intestine with perforation and abscess without bleeding, unspecified part of intestinal tract      PE (pulmonary thromboembolism)  2016      Palpitations      Obesity      Osteoarthritis      Anemia      Colostomy in place      History of atrial fibrillation      HTN (hypertension)      Diabetes mellitus      Cerebrovascular accident (CVA)      DVT of lower limb, acute      CVA (cerebral vascular accident)      COPD (chronic obstructive pulmonary disease)      Neuropathy      Diverticulitis      History of appendectomy      H/O:   x2      H/O ovarian cystectomy  b/l      Status post total replacement of both hips      H/O hemicolectomy  2016      History of colostomy reversal      History of colostomy reversal      S/P colostomy      S/P       S/P hip replacement          Review of Systems:  12 pt ROS negative unless otherwise stated above      Medications:  cefepime   IVPB 2000 milliGRAM(s) IV Intermittent every 12 hours  metroNIDAZOLE  IVPB 500 milliGRAM(s) IV Intermittent every 8 hours    norepinephrine Infusion 0.05 MICROgram(s)/kG/Min IV Continuous <Continuous>  phenylephrine    Infusion 0.5 MICROgram(s)/kG/Min IV Continuous <Continuous>    albuterol    90 MICROgram(s) HFA Inhaler 2 Puff(s) Inhalation every 6 hours PRN  buDESOnide    Inhalation Suspension 0.5 milliGRAM(s) Inhalation every 12 hours    HYDROmorphone  Injectable 0.5 milliGRAM(s) IV Push every 3 hours PRN        pantoprazole  Injectable 40 milliGRAM(s) IV Push daily      dextrose 50% Injectable 25 Gram(s) IV Push once  dextrose 50% Injectable 12.5 Gram(s) IV Push once  dextrose 50% Injectable 25 Gram(s) IV Push once  dextrose Oral Gel 15 Gram(s) Oral once PRN  glucagon  Injectable 1 milliGRAM(s) IntraMuscular once  insulin lispro (ADMELOG) corrective regimen sliding scale   SubCutaneous every 6 hours  levothyroxine Injectable 70 MICROGram(s) IV Push at bedtime    dextrose 5%. 1000 milliLiter(s) IV Continuous <Continuous>  dextrose 5%. 1000 milliLiter(s) IV Continuous <Continuous>  lactated ringers. 1000 milliLiter(s) IV Continuous <Continuous>  sodium chloride 0.9% lock flush 10 milliLiter(s) IV Push every 1 hour PRN    coronavirus bivalent (EUA) Booster Vaccine (PFIZER) 0.3 milliLiter(s) IntraMuscular once    chlorhexidine 4% Liquid 1 Application(s) Topical <User Schedule>  nystatin Cream 1 Application(s) Topical two times a day  nystatin Powder 1 Application(s) Topical every 12 hours            ICU Vital Signs Last 24 Hrs  T(C): 36.7 (2023 00:00), Max: 38.1 (2023 11:00)  T(F): 98 (2023 00:00), Max: 100.5 (2023 11:00)  HR: 94 (2023 01:00) (80 - 104)  BP: 101/42 (2023 02:45) (76/45 - 101/42)  BP(mean): 55 (2023 02:45) (43 - 55)  ABP: 74/66 (2023 01:00) (74/66 - 144/60)  ABP(mean): 72 (2023 01:00) (54 - 91)  RR: 13 (2023 01:00) (13 - 36)  SpO2: 100% (2023 01:00) (92% - 100%)    O2 Parameters below as of 2023 01:00  Patient On (Oxygen Delivery Method): nasal cannula  O2 Flow (L/min): 3          ABG - ( 2023 05:38 )  pH, Arterial: 7.35  pH, Blood: x     /  pCO2: 26    /  pO2: 85    / HCO3: 14    / Base Excess: -9.5  /  SaO2: 99            I&O's Summary    2023 07:01  -  2023 07:00  --------------------------------------------------------  IN: 3834.8 mL / OUT: 600 mL / NET: 3234.8 mL    2023 07:01  -  2023 02:00  --------------------------------------------------------  IN: 4179 mL / OUT: 270 mL / NET: 3909 mL                LABS:                        7.6    22.69 )-----------( 429      ( 2023 16:15 )             24.9     02-01    141  |  113<H>  |  18  ----------------------------<  155<H>  4.8   |  21<L>  |  1.30    Ca    7.6<L>      2023 16:15  Phos  4.1     02-01  Mg     1.7     02-01            CAPILLARY BLOOD GLUCOSE      POCT Blood Glucose.: 142 mg/dL (2023 23:06)    PT/INR - ( 2023 09:03 )   PT: 21.8 sec;   INR: 1.87 ratio         PTT - ( 2023 09:03 )  PTT:32.9 sec    CULTURES:  Culture Results:   Growth in fluid media only Gram Negative Rods ( @ 18:07)  Culture Results:   Testing in progress ( @ 18:07)  Culture Results:   No Growth Final ( @ 06:16)  Culture Results:   No Growth Final ( @ 06:16)      Physical Examination:    General: No acute distress.  Alert, oriented, interactive, nonfocal    HEENT: Pupils equal, reactive to light.  Symmetric.    PULM: Clear to auscultation bilaterally, no significant sputum production    CVS: Regular rate and rhythm, no murmurs, rubs, or gallops    ABD: Soft, nondistended, nontender, normoactive bowel sounds, no masses    EXT: No edema, nontender    SKIN: Warm and well perfused, no rashes noted.    RADIOLOGY:   ACC: [id number] EXAM:  XR CHEST PORTABLE URGENT 1V   ORDERED BY: GENTRY ISIDRO     PROCEDURE DATE:  2023          INTERPRETATION:  AP semierect chest on 2023 at 9:38 AM.   Patient is short of breath.    Heart magnified by technique.    Slight scar left base again noted.    No signs of infiltrate.    Above findings are similar to .    Present film shows NG tube with tip in stomach.    IMPRESSION: NG tube insertion.    --- End of Report ---            BABATUNDE VICTOR MD; Attending Radiologist  This document has been electronically signed. 2023  2:38PM    ACC: [id number] EXAM:  CT ABDOMEN AND PELVIS   ORDERED BY: DWAINE GIBBS     ACC: [id number] EXAM:  CT CHEST   ORDERED BY: DWAINE GIBBS     PROCEDURE DATE:  2023          INTERPRETATION:  CLINICAL INFORMATION: Ventral hernia. Small bowel   obstruction    COMPARISON: CT abdomen/pelvis December 10, 2022, CT chest 2021.    CONTRAST/COMPLICATIONS:  IV Contrast: NONE  Oral Contrast: NONE  Complications: None reported at time of study completion    PROCEDURE:  CT of the Chest, Abdomen and Pelvis was performed.  Sagittal and coronal reformats were performed.    FINDINGS:  CHEST:  LUNGS AND LARGE AIRWAYS: Patent central airways. Nodules in the superior   segment of the right lower lobe measuring 8 (2; 41) and 6 (2; 47) mm.   Stable 3 mm left upper lobe pulmonary nodule (2; 24).Emphysema.  PLEURA: No pleural effusion.  VESSELS: Atherosclerotic changes of the aorta and coronary arteries.    Right internal jugular central venous catheter with tip in the superior   vena cava.  HEART: Heart size is normal. No pericardial effusion.  MEDIASTINUM AND DANNY: No lymphadenopathy. Mildly dilated and fluid-filled   esophagus.  CHEST WALL AND LOWER NECK: Within normal limits.    ABDOMEN AND PELVIS:  LIVER: Within normal limits.  BILE DUCTS: Normal caliber.  GALLBLADDER: High attenuation in the gallbladder lumen suggestive of   sludge.  SPLEEN: Within normal limits.  PANCREAS: Severely atrophic.  ADRENALS: Within normal limits.  KIDNEYS/URETERS: Dystrophic calcification in the lower pole of the left   kidney. No hydronephrosis.    BLADDER/REPRODUCTIVE ORGANS: Limited evaluation of the lower pelvis due   to beam hardening artifact from the patient's right hip arthroplasties.   The gynecologic organs and bladder are not adequately evaluated.    BOWEL/ABDOMINAL WALL: Status post right colon resection with ileocolic   anastomosis. Dilated esophagus, stomach and proximal small bowel with   transition point to collapsed small bowel at the site of a hernia neck in   the left lower quadrant (601; 43), similar in appearance to prior   examination.. No bowel wall thickening or pneumatosis. Epigastric hernia   containing stomach and very large ventral hernia containing small bowel   colon are also noted.    PERITONEUM: No ascites.  VESSELS: Severe atherosclerotic change.  RETROPERITONEUM/LYMPH NODES: No lymphadenopathy.  BONES: Spinal and bilateral shoulder degenerative changes. Bilateral hip   arthroplasties    IMPRESSION:  Small bowel obstruction with transition point at the neck of a left lower   quadrant abdominal wall hernia. It is uncertain whether the obstruction   is due to the hernia itself or to adhesions.    Additional massive ventral hernia containing small and large bowel and   epigastric hernia containing stomach.    Right lower lobe nodules new since 2021 and could be   infectious or neoplastic. Follow-up chest CT in 3 months is recommended   to reevaluate.        --- End of Report ---            CELESTINE KLINE MD; Attending Radiologist  This document has been electronically signed. 2023 10:47AM      CRITICAL CARE TIME SPENT: 35 mins assessing presenting problems of acute illness that poses high probability of life threatening deterioration or end organ damage/dysfunction.  Medical decision making including Initiating plan of care, reviewing data, reviewing radiology, direct patient bedside evaluation and interpretation of vital signs,, discussion with multidisciplinary team, all non inclusive of procedures.   Patient is a 72y old  Female who presents with a chief complaint of bowel obstruction/ischemic mesentery (2023 15:38)      BRIEF HOSPITAL COURSE:   72F with PMHx CVA, pAF on AC, emphysema/COPD on home O2, HTN, SBO with prior resection who presented with abd pain, N/V. Found to have an small bowel obstruction. Taken to OR underwent ELAP, extensive RICHARD, ileocolectomy, ventral hernia repair with MESH. Postop course complicated with ALVERTO, shock requiring pressors.    Events last 24 hours: afebrile, has been weaned off pressors recently, NGT to LIWS. Pt states she is feeling better. Denies CP, SOB. Pain controlled.    PAST MEDICAL & SURGICAL HISTORY:  Hypertension      Hypercholesteremia      COPD (chronic obstructive pulmonary disease)      C. difficile diarrhea        Diverticulitis of intestine with perforation and abscess without bleeding, unspecified part of intestinal tract      PE (pulmonary thromboembolism)  2016      Palpitations      Obesity      Osteoarthritis      Anemia      Colostomy in place      History of atrial fibrillation      HTN (hypertension)      Diabetes mellitus      Cerebrovascular accident (CVA)      DVT of lower limb, acute      CVA (cerebral vascular accident)      COPD (chronic obstructive pulmonary disease)      Neuropathy      Diverticulitis      History of appendectomy      H/O:   x2      H/O ovarian cystectomy  b/l      Status post total replacement of both hips      H/O hemicolectomy  2016      History of colostomy reversal      History of colostomy reversal      S/P colostomy      S/P       S/P hip replacement          Review of Systems:  12 pt ROS negative unless otherwise stated above      Medications:  cefepime   IVPB 2000 milliGRAM(s) IV Intermittent every 12 hours  metroNIDAZOLE  IVPB 500 milliGRAM(s) IV Intermittent every 8 hours    norepinephrine Infusion 0.05 MICROgram(s)/kG/Min IV Continuous <Continuous>  phenylephrine    Infusion 0.5 MICROgram(s)/kG/Min IV Continuous <Continuous>    albuterol    90 MICROgram(s) HFA Inhaler 2 Puff(s) Inhalation every 6 hours PRN  buDESOnide    Inhalation Suspension 0.5 milliGRAM(s) Inhalation every 12 hours    HYDROmorphone  Injectable 0.5 milliGRAM(s) IV Push every 3 hours PRN        pantoprazole  Injectable 40 milliGRAM(s) IV Push daily      dextrose 50% Injectable 25 Gram(s) IV Push once  dextrose 50% Injectable 12.5 Gram(s) IV Push once  dextrose 50% Injectable 25 Gram(s) IV Push once  dextrose Oral Gel 15 Gram(s) Oral once PRN  glucagon  Injectable 1 milliGRAM(s) IntraMuscular once  insulin lispro (ADMELOG) corrective regimen sliding scale   SubCutaneous every 6 hours  levothyroxine Injectable 70 MICROGram(s) IV Push at bedtime    dextrose 5%. 1000 milliLiter(s) IV Continuous <Continuous>  dextrose 5%. 1000 milliLiter(s) IV Continuous <Continuous>  lactated ringers. 1000 milliLiter(s) IV Continuous <Continuous>  sodium chloride 0.9% lock flush 10 milliLiter(s) IV Push every 1 hour PRN    coronavirus bivalent (EUA) Booster Vaccine (PFIZER) 0.3 milliLiter(s) IntraMuscular once    chlorhexidine 4% Liquid 1 Application(s) Topical <User Schedule>  nystatin Cream 1 Application(s) Topical two times a day  nystatin Powder 1 Application(s) Topical every 12 hours            ICU Vital Signs Last 24 Hrs  T(C): 36.7 (2023 00:00), Max: 38.1 (2023 11:00)  T(F): 98 (2023 00:00), Max: 100.5 (2023 11:00)  HR: 94 (2023 01:00) (80 - 104)  BP: 101/42 (2023 02:45) (76/45 - 101/42)  BP(mean): 55 (2023 02:45) (43 - 55)  ABP: 74/66 (2023 01:00) (74/66 - 144/60)  ABP(mean): 72 (2023 01:00) (54 - 91)  RR: 13 (2023 01:00) (13 - 36)  SpO2: 100% (2023 01:00) (92% - 100%)    O2 Parameters below as of 2023 01:00  Patient On (Oxygen Delivery Method): nasal cannula  O2 Flow (L/min): 3          ABG - ( 2023 05:38 )  pH, Arterial: 7.35  pH, Blood: x     /  pCO2: 26    /  pO2: 85    / HCO3: 14    / Base Excess: -9.5  /  SaO2: 99            I&O's Summary    2023 07:01  -  2023 07:00  --------------------------------------------------------  IN: 3834.8 mL / OUT: 600 mL / NET: 3234.8 mL    2023 07:01  -  2023 02:00  --------------------------------------------------------  IN: 4179 mL / OUT: 270 mL / NET: 3909 mL                LABS:                        7.6    22.69 )-----------( 429      ( 2023 16:15 )             24.9     02-01    141  |  113<H>  |  18  ----------------------------<  155<H>  4.8   |  21<L>  |  1.30    Ca    7.6<L>      2023 16:15  Phos  4.1     02-01  Mg     1.7     02-01            CAPILLARY BLOOD GLUCOSE      POCT Blood Glucose.: 142 mg/dL (2023 23:06)    PT/INR - ( 2023 09:03 )   PT: 21.8 sec;   INR: 1.87 ratio         PTT - ( 2023 09:03 )  PTT:32.9 sec    CULTURES:  Culture Results:   Growth in fluid media only Gram Negative Rods ( @ 18:07)  Culture Results:   Testing in progress ( @ 18:07)  Culture Results:   No Growth Final ( @ 06:16)  Culture Results:   No Growth Final ( @ 06:16)      Physical Examination:    General: No acute distress.  Alert, interactive, nonfocal    HEENT: Pupils equal, reactive to light.  Symmetric.    PULM: Diminished BS bilaterally    CVS: Regular rate and rhythm, NSR    ABD: soft, no BS, diffuse tenderness to palpation, no rigidity, midline surgical dressing in place, bilateral DEA drain in place serous drainage.      EXT: +LE and UE edema, nontender    SKIN: Warm and well perfused, no rashes noted.    RADIOLOGY:   ACC: [id number] EXAM:  XR CHEST PORTABLE URGENT 1V   ORDERED BY: GENTRY ISIDRO     PROCEDURE DATE:  2023          INTERPRETATION:  AP semierect chest on 2023 at 9:38 AM.   Patient is short of breath.    Heart magnified by technique.    Slight scar left base again noted.    No signs of infiltrate.    Above findings are similar to .    Present film shows NG tube with tip in stomach.    IMPRESSION: NG tube insertion.    --- End of Report ---            BABATUNDE VICTOR MD; Attending Radiologist  This document has been electronically signed. 2023  2:38PM    ACC: [id number] EXAM:  CT ABDOMEN AND PELVIS   ORDERED BY: DWAINE GIBBS     ACC: [id number] EXAM:  CT CHEST   ORDERED BY: DWAINE GIBBS     PROCEDURE DATE:  2023          INTERPRETATION:  CLINICAL INFORMATION: Ventral hernia. Small bowel   obstruction    COMPARISON: CT abdomen/pelvis December 10, 2022, CT chest 2021.    CONTRAST/COMPLICATIONS:  IV Contrast: NONE  Oral Contrast: NONE  Complications: None reported at time of study completion    PROCEDURE:  CT of the Chest, Abdomen and Pelvis was performed.  Sagittal and coronal reformats were performed.    FINDINGS:  CHEST:  LUNGS AND LARGE AIRWAYS: Patent central airways. Nodules in the superior   segment of the right lower lobe measuring 8 (2; 41) and 6 (2; 47) mm.   Stable 3 mm left upper lobe pulmonary nodule (2; 24).Emphysema.  PLEURA: No pleural effusion.  VESSELS: Atherosclerotic changes of the aorta and coronary arteries.    Right internal jugular central venous catheter with tip in the superior   vena cava.  HEART: Heart size is normal. No pericardial effusion.  MEDIASTINUM AND DANNY: No lymphadenopathy. Mildly dilated and fluid-filled   esophagus.  CHEST WALL AND LOWER NECK: Within normal limits.    ABDOMEN AND PELVIS:  LIVER: Within normal limits.  BILE DUCTS: Normal caliber.  GALLBLADDER: High attenuation in the gallbladder lumen suggestive of   sludge.  SPLEEN: Within normal limits.  PANCREAS: Severely atrophic.  ADRENALS: Within normal limits.  KIDNEYS/URETERS: Dystrophic calcification in the lower pole of the left   kidney. No hydronephrosis.    BLADDER/REPRODUCTIVE ORGANS: Limited evaluation of the lower pelvis due   to beam hardening artifact from the patient's right hip arthroplasties.   The gynecologic organs and bladder are not adequately evaluated.    BOWEL/ABDOMINAL WALL: Status post right colon resection with ileocolic   anastomosis. Dilated esophagus, stomach and proximal small bowel with   transition point to collapsed small bowel at the site of a hernia neck in   the left lower quadrant (601; 43), similar in appearance to prior   examination.. No bowel wall thickening or pneumatosis. Epigastric hernia   containing stomach and very large ventral hernia containing small bowel   colon are also noted.    PERITONEUM: No ascites.  VESSELS: Severe atherosclerotic change.  RETROPERITONEUM/LYMPH NODES: No lymphadenopathy.  BONES: Spinal and bilateral shoulder degenerative changes. Bilateral hip   arthroplasties    IMPRESSION:  Small bowel obstruction with transition point at the neck of a left lower   quadrant abdominal wall hernia. It is uncertain whether the obstruction   is due to the hernia itself or to adhesions.    Additional massive ventral hernia containing small and large bowel and   epigastric hernia containing stomach.    Right lower lobe nodules new since 2021 and could be   infectious or neoplastic. Follow-up chest CT in 3 months is recommended   to reevaluate.        --- End of Report ---            CELESTINE KLINE MD; Attending Radiologist  This document has been electronically signed. 2023 10:47AM      CRITICAL CARE TIME SPENT: 35 mins assessing presenting problems of acute illness that poses high probability of life threatening deterioration or end organ damage/dysfunction.  Medical decision making including Initiating plan of care, reviewing data, reviewing radiology, direct patient bedside evaluation and interpretation of vital signs,, discussion with multidisciplinary team, all non inclusive of procedures.

## 2023-02-02 NOTE — PROGRESS NOTE ADULT - ASSESSMENT
Improved from yesterday. Off pressors. Renal function still behind. Hct down with hydration.  Would transfuse. Cont abx and check OR cultures. TPN for now, but NG output minimal. Can consider DC NGT or tube feeds soon. Continue ICU management.

## 2023-02-02 NOTE — PROGRESS NOTE ADULT - ASSESSMENT
Patient admitted with abdominal pain, nausea and vomitting , dehydration  septic shock, likely related to     PROBLEMS:    UTI klebsiella pneumoniae and aspiration PNA /SBO- ventral hernia  New R lung nodules - cannot ruleout aspiration PNA   Acute metabolic encephalopathy  Sepsis   Hx copd without exacerbation  Anasarca/acute on  chronic diastolic heart failure       Plan:    pulmonary labile-close monitoring  NG suction-Incarcerated Ventral Hernia with SBO-S/p extensive RICHARD, ileo colectomy, giant ventral hernia repair with Surgimend  IVF resusitation  IV cefepime /flagyl -for  sepsis ( SBO/UTI)  Blood cx , urine cx   Monitor I/O has rizzo  Monitor Cr  D/w staff  DVT PROPHYLASIX      Patient admitted with abdominal pain, nausea and vomitting , dehydration  septic shock, likely related to     PROBLEMS:    UTI klebsiella pneumoniae and aspiration PNA /SBO- ventral hernia  New R lung nodules - cannot ruleout aspiration PNA   Acute metabolic encephalopathy  Sepsis   Hx copd without exacerbation  Anasarca/acute on  chronic diastolic heart failure       Plan:    pulmonary stable  NG suction-Incarcerated Ventral Hernia with SBO-S/p extensive RICHARD, ileo colectomy, giant ventral hernia repair with Surgimend  IV cefepime /flagyl -for  sepsis ( SBO/UTI)  Monitor Cr  D/w staff  DVT PROPHYLASIX

## 2023-02-02 NOTE — PROGRESS NOTE ADULT - SUBJECTIVE AND OBJECTIVE BOX
Date of service: 02-02-23 @ 11:55    Lying in bed in [facility]  More alert today  Abdomen feels distended  Has low grade fever    ROS: poorly verbal    MEDICATIONS  (STANDING):  albumin human 25% IVPB 50 milliLiter(s) IV Intermittent every 6 hours  buDESOnide    Inhalation Suspension 0.5 milliGRAM(s) Inhalation every 12 hours  cefepime   IVPB 2000 milliGRAM(s) IV Intermittent every 12 hours  chlorhexidine 4% Liquid 1 Application(s) Topical <User Schedule>  coronavirus bivalent (EUA) Booster Vaccine (PFIZER) 0.3 milliLiter(s) IntraMuscular once  dextrose 5%. 1000 milliLiter(s) (100 mL/Hr) IV Continuous <Continuous>  dextrose 5%. 1000 milliLiter(s) (50 mL/Hr) IV Continuous <Continuous>  dextrose 50% Injectable 25 Gram(s) IV Push once  dextrose 50% Injectable 12.5 Gram(s) IV Push once  dextrose 50% Injectable 25 Gram(s) IV Push once  glucagon  Injectable 1 milliGRAM(s) IntraMuscular once  heparin   Injectable 5000 Unit(s) SubCutaneous every 8 hours  insulin lispro (ADMELOG) corrective regimen sliding scale   SubCutaneous every 6 hours  lactated ringers. 1000 milliLiter(s) (100 mL/Hr) IV Continuous <Continuous>  levothyroxine Injectable 70 MICROGram(s) IV Push at bedtime  metroNIDAZOLE  IVPB 500 milliGRAM(s) IV Intermittent every 8 hours  norepinephrine Infusion 0.05 MICROgram(s)/kG/Min (9.13 mL/Hr) IV Continuous <Continuous>  nystatin Cream 1 Application(s) Topical two times a day  nystatin Powder 1 Application(s) Topical every 12 hours  pantoprazole  Injectable 40 milliGRAM(s) IV Push daily  Parenteral Nutrition - Adult 1 Each (83 mL/Hr) TPN Continuous <Continuous>    Vital Signs Last 24 Hrs  T(C): 37.1 (02 Feb 2023 08:00), Max: 37.7 (01 Feb 2023 12:00)  T(F): 98.7 (02 Feb 2023 08:00), Max: 99.8 (01 Feb 2023 12:00)  HR: 100 (02 Feb 2023 11:00) (87 - 104)  BP: --  BP(mean): --  RR: 18 (02 Feb 2023 11:00) (13 - 29)  SpO2: 100% (02 Feb 2023 11:00) (97% - 100%)    Parameters below as of 02 Feb 2023 11:00  Patient On (Oxygen Delivery Method): nasal cannula  O2 Flow (L/min): 2     Physical exam:    Constitutional:  No acute distress  HEENT: NC/AT, EOMI, PERRLA, conjunctivae clear; ears and nose atraumatic  Neck: supple; thyroid not palpable  Back: no tenderness  Respiratory: respiratory effort normal; crackles at bases  Cardiovascular: S1S2 regular, no murmurs  Abdomen: soft, mid abdomen tender, distended, positive BS  Genitourinary: no suprapubic tenderness  Lymphatic: no LN palpable  Musculoskeletal: no muscle tenderness, no joint swelling or tenderness  Extremities: no pedal edema  Neurological/ Psychiatric: confused, judgement and insight impaired; moving all extremities  Skin: no rashes; no palpable lesions    Labs: reviewed                        7.2    16.24 )-----------( 418      ( 02 Feb 2023 06:20 )             23.3     02-02    142  |  113<H>  |  20  ----------------------------<  128<H>  4.2   |  22  |  1.36<H>    Ca    7.3<L>      02 Feb 2023 06:20  Phos  2.8     02-02  Mg     1.8     02-02    TPro  4.7<L>  /  Alb  1.3<L>  /  TBili  0.3  /  DBili  x   /  AST  246<H>  /  ALT  94<H>  /  AlkPhos  62  02-02                        8.2    36.58 )-----------( 568      ( 01 Feb 2023 05:40 )             27.8     02-01    141  |  113<H>  |  16  ----------------------------<  147<H>  4.9   |  15<L>  |  1.10    Ca    7.3<L>      01 Feb 2023 05:40  Phos  4.1     02-01  Mg     1.7     02-01                        9.9    10.40 )-----------( 494      ( 30 Jan 2023 06:55 )             32.9     01-30    148<H>  |  115<H>  |  19  ----------------------------<  105<H>  3.7   |  29  |  0.59    Ca    7.7<L>      30 Jan 2023 06:55    TPro  4.9<L>  /  Alb  1.3<L>  /  TBili  0.3  /  DBili  0.1  /  AST  14<L>  /  ALT  12  /  AlkPhos  65  01-28               10.9   19.72 )-----------( 510      ( 27 Jan 2023 06:16 )             36.7     01-27    142  |  111<H>  |  10  ----------------------------<  233<H>    Culture - Acid Fast - Tissue w/Smear (collected 31 Jan 2023 18:07)  Source: .Tissue INTRAPERITONEAL ABCESS FOR CX    Culture - Fungal, Tissue (collected 31 Jan 2023 18:07)  Source: .Tissue INTRAPERITONEAL ABCESS FOR CX  Preliminary Report (01 Feb 2023 07:09):    Testing in progress    Culture - Tissue with Gram Stain (collected 31 Jan 2023 18:07)  Source: .Tissue INTRAPERITONEAL ABCESS FOR CX  Gram Stain (01 Feb 2023 04:37):    Rare polymorphonuclear leukocytes seen per low power field    No organisms seen per oil power field  Preliminary Report (01 Feb 2023 22:42):    Growth in fluid media only Gram Negative Rods    Culture - Blood (collected 27 Jan 2023 06:16)  Source: .Blood None  Final Report (01 Feb 2023 09:00):    No Growth Final    Culture - Blood (collected 27 Jan 2023 06:16)  Source: .Blood None  Final Report (01 Feb 2023 09:00):    No Growth Final    3.8   |  26  |  0.78    Ca    8.0<L>      27 Jan 2023 06:16  Phos  2.7     01-27  Mg     1.8     01-27    Radiology: all available radiological tests reviewed    < from: CT Chest No Cont (01.27.23 @ 09:53) >  Small bowel obstruction with transition point at the neck of a left lower   quadrant abdominal wall hernia. It is uncertain whether the obstruction   is due to the hernia itself or to adhesions.    Additional massive ventral hernia containing small and large bowel and   epigastric hernia containing stomach.    Right lower lobe nodules new since December 04, 2021 and could be   infectious or neoplastic. Follow-up chest CT in 3 months is recommended   to reevaluate.    < end of copied text >      Advanced directives addressed: full resuscitation

## 2023-02-02 NOTE — CHART NOTE - NSCHARTNOTEFT_GEN_A_CORE
Clinical Nutrition PN Follow Up Note    * 73 y/o female with a PMH of CVA with left sided weakness, atrial fibrillation, emphysema, COPD, HTN - wears 2L NC at baseline BIBA, hx SBO and resection per EMS presents to the ED from [facility] for RLQ pain and r/o SBO. x1 episode of emesis, + profound diffuse abdominal pain, febrile. Has SBO, NGT to LWS. Taken to OR on  underwent ELAP, extensive RICHARD, ileocolectomy, ventral hernia repair with MESH. Postop course complicated with ALVERTO, shock requiring pressors, transferred to ICU. + anasarca   DNR/ DNI    *current status: weaned off pressors still w/ NGT to LWS in place, NPO.  Has been NPO x 11 days in total of hospital stay.  Discussed during IDR to start PPN today, see recs below.   New wt obtained  244# - wt gain 2/2 severe edema (4+ generalized)  New NFPE: severe temporal, buccal, and orbital wasting. Rest of appearance being skewing by edema     *new pertinent meds: IV albumin, antibiotics, Admelog (received 2 units x 24 hours), LR, pantoprazole, hydromorphone     *labs reviewed; lytes WNL, will start all low in bag and monitor/ adjust based on daily labs.  Will monitor closely for refeeding syndrome.   POCT WNL.   pending triglyceride level to initiate lipids, if <400 can initiate 90g lipids daily.   Bili T WNL for trace elements.         142  |  113<H>  |  20  ----------------------------<  128<H>  4.2   |  22  |  1.36<H>    Ca    7.3<L>      2023 06:20  Phos  2.8       Mg     1.8         TPro  4.7<L>  /  Alb  1.3<L>  /  TBili  0.3  /  DBili  x   /  AST  246<H>  /  ALT  94<H>  /  AlkPhos  62      BMI: BMI (kg/m2): 35.7 (23 @ 23:30)  HbA1c: A1C with Estimated Average Glucose Result: 6.0 % (22 @ 08:00)    BP: 101/42 (23 @ 02:45) (76/45 - 149/63)  Lipid Panel: pending     POCT Blood Glucose.: 113 mg/dL (23 @ 06:13)  POCT Blood Glucose.: 142 mg/dL (23 @ 23:06)  POCT Blood Glucose.: 201 mg/dL (23 @ 17:17)  POCT Blood Glucose.: 122 mg/dL (23 @ 12:28)    *I&O's Detail    2023 07:01  -  2023 07:00  --------------------------------------------------------  IN:    IV PiggyBack: 350 mL    IV PiggyBack: 100 mL    IV PiggyBack: 300 mL    Lactated Ringers: 2657.1 mL    Lactated Ringers Bolus: 2000 mL    Norepinephrine: 92.9 mL    Phenylephrine: 202 mL  Total IN: 5702 mL    OUT:    Bulb (mL): 85 mL    Bulb (mL): 155 mL    Indwelling Catheter - Urethral (mL): 200 mL    Nasogastric/Oral tube (mL): 180 mL  Total OUT: 620 mL    Total NET: 5082 mL      2023 07:01  -  2023 10:16  --------------------------------------------------------  IN:    Lactated Ringers: 326 mL  Total IN: 326 mL    OUT:  Total OUT: 0 mL    Total NET: 326 mL  * fluid status:   *BM (+) on .  pt not on bowel regimen.    *malnutrition: Pt continues to meet criteria for severe protein-calorie malnutrition in context of acute disease r/t decreased ability to meet increased nutrient needs 2/2 SBO AEB <50% ENN x >11 days, severe muscle/ fat wasting, severe edema    Estimated Needs: based on 97.4 Kg (wt from admit - current wts being skewed by severe fluid retention)  Calories: 1948- 2435 Kcal (20- 25 Kcal/Kg)  Protein: 146- 175 g (1.5- 1.8 g/Kg)  Fluids: 1948- 2435 mL (20- 25 mL/Kg)    Diet, NPO (23 @ 21:56) [Active]    Weight History:  Daily Weight in k.6 (2023 06:00)   - #  Height (cm): 165.1 (23 @ 16:08)  Weight (kg): 97.4 (23 @ 23:30) - admit wt ()  BMI (kg/m2): 35.7 (23 @ 23:30)  BSA (m2): 2.04 (23 @ 23:30)    PPN Recommendations: via peripheral venous line   Total Volume: 2000 mL x 24 hours  80 g  Amino Acids  100 g Dextrose  0 g Lipids 20% (pending triglyceride level)  121 mEq Sodium Chloride  6 mEq Sodium Acetate  20 mmol Sodium Phosphate  33 mEq Potassium Chloride  27 mEq Potassium Acetate  0 mmol Potassium Phosphate  0 mEq Calcium Gluconate (CAPS out of PN solution)  8 mEq Magnesium Sulfate  200 mg Thiamine  1 ml Trace Elements  10 ml MVI    Total Calories      660      (Meets    33%  of  Estimated Energy needs  and     53%  Protein needs)     (osmolarity ~885)    Additional Recommendations:    1) Daily weights  2) Strict I & O's  3) Daily lyte checks including magnesium and phos - monitor closely for refeeding syndrome as pt is at HIGH RISK  4) Weekly triglycerides/LFT checks  5) POCT q6hrs; maintain 140-180mg/dL  6) if on PN > 6 days, consider placing PICC line to meet 100% of nutr needs  7) Confirm goals of care regarding LONG-TERM nutrition support - ADAT as per surgery  8) consider d/c-ing IVF/ LR while on PPN    *will continue to monitor and adjust PN prn*  Bushra Soni, MS, RDN, [facility] [phone]  Certified Nutrition  Clinical Nutrition PN Follow Up Note    * 73 y/o female with a PMH of CVA with left sided weakness, atrial fibrillation, emphysema, COPD, HTN - wears 2L NC at baseline BIBA, hx SBO and resection per EMS presents to the ED from [facility] for RLQ pain and r/o SBO. x1 episode of emesis, + profound diffuse abdominal pain, febrile. Has SBO, NGT to LWS. Taken to OR on  underwent ELAP, extensive RICHARD, ileocolectomy, ventral hernia repair with MESH. Postop course complicated with ALVERTO, shock requiring pressors, transferred to ICU. + anasarca   DNR/ DNI    *current status: weaned off pressors still w/ NGT to LWS in place, NPO.  Has been NPO x 11 days in total of hospital stay.  Discussed during IDR to start PPN today 2/2 SBO and prolonged NPO status, see recs below.   New wt obtained /2 244# - wt gain 2/2 severe edema (4+ generalized)  New NFPE: severe temporal, buccal, and orbital wasting. Rest of appearance being skewing by edema     *new pertinent meds: IV albumin, antibiotics, Admelog (received 2 units x 24 hours), LR, pantoprazole, hydromorphone     *labs reviewed; lytes WNL, will start all low in bag and monitor/ adjust based on daily labs.  Will monitor closely for refeeding syndrome.   POCT WNL.   pending triglyceride level to initiate lipids, if <400 can initiate 90g lipids daily.   Bili T WNL for trace elements.         142  |  113<H>  |  20  ----------------------------<  128<H>  4.2   |  22  |  1.36<H>    Ca    7.3<L>      2023 06:20  Phos  2.8       Mg     1.8         TPro  4.7<L>  /  Alb  1.3<L>  /  TBili  0.3  /  DBili  x   /  AST  246<H>  /  ALT  94<H>  /  AlkPhos  62      BMI: BMI (kg/m2): 35.7 (23 @ 23:30)  HbA1c: A1C with Estimated Average Glucose Result: 6.0 % (22 @ 08:00)    BP: 101/42 (23 @ 02:45) (76/45 - 149/63)  Lipid Panel: pending     POCT Blood Glucose.: 113 mg/dL (23 @ 06:13)  POCT Blood Glucose.: 142 mg/dL (23 @ 23:06)  POCT Blood Glucose.: 201 mg/dL (23 @ 17:17)  POCT Blood Glucose.: 122 mg/dL (23 @ 12:28)    *I&O's Detail    2023 07:01  -  2023 07:00  --------------------------------------------------------  IN:    IV PiggyBack: 350 mL    IV PiggyBack: 100 mL    IV PiggyBack: 300 mL    Lactated Ringers: 2657.1 mL    Lactated Ringers Bolus: 2000 mL    Norepinephrine: 92.9 mL    Phenylephrine: 202 mL  Total IN: 5702 mL    OUT:    Bulb (mL): 85 mL    Bulb (mL): 155 mL    Indwelling Catheter - Urethral (mL): 200 mL    Nasogastric/Oral tube (mL): 180 mL  Total OUT: 620 mL    Total NET: 5082 mL      2023 07:01  -  2023 10:16  --------------------------------------------------------  IN:    Lactated Ringers: 326 mL  Total IN: 326 mL    OUT:  Total OUT: 0 mL    Total NET: 326 mL  * fluid status:   *BM (+) on .  pt not on bowel regimen.    *malnutrition: Pt continues to meet criteria for severe protein-calorie malnutrition in context of acute disease r/t decreased ability to meet increased nutrient needs 2/2 SBO AEB <50% ENN x >11 days, severe muscle/ fat wasting, severe edema    Estimated Needs: based on 97.4 Kg (wt from admit - current wts being skewed by severe fluid retention)  Calories: 1948- 2435 Kcal (20- 25 Kcal/Kg)  Protein: 146- 175 g (1.5- 1.8 g/Kg)  Fluids: 1948- 2435 mL (20- 25 mL/Kg)    Diet, NPO (23 @ 21:56) [Active]    Weight History:  Daily Weight in k.6 (2023 06:00)   - #  Height (cm): 165.1 (23 @ 16:08)  Weight (kg): 97.4 (23 @ 23:30) - admit wt ()  BMI (kg/m2): 35.7 (23 @ 23:30)  BSA (m2): 2.04 (23 @ 23:30)    PPN Recommendations: via peripheral venous line   Total Volume: 2000 mL x 24 hours  80 g  Amino Acids  100 g Dextrose  0 g Lipids 20% (pending triglyceride level)  121 mEq Sodium Chloride  6 mEq Sodium Acetate  20 mmol Sodium Phosphate  33 mEq Potassium Chloride  27 mEq Potassium Acetate  0 mmol Potassium Phosphate  0 mEq Calcium Gluconate (CAPS out of PN solution)  8 mEq Magnesium Sulfate  200 mg Thiamine  1 ml Trace Elements  10 ml MVI    Total Calories      660      (Meets    33%  of  Estimated Energy needs  and     53%  Protein needs)     (osmolarity ~885)    Additional Recommendations:    1) Daily weights  2) Strict I & O's  3) Daily lyte checks including magnesium and phos - monitor closely for refeeding syndrome as pt is at HIGH RISK  4) Weekly triglycerides/LFT checks  5) POCT q6hrs; maintain 140-180mg/dL  6) if on PN > 6 days, consider placing PICC line to meet 100% of nutr needs  7) Confirm goals of care regarding LONG-TERM nutrition support - ADAT as per surgery  8) consider d/c-ing IVF/ LR while on PPN    *will continue to monitor and adjust PN prn*  Bushra Soni, MS, RDN, [facility] [phone]  Certified Nutrition

## 2023-02-02 NOTE — PROGRESS NOTE ADULT - SUBJECTIVE AND OBJECTIVE BOX
POD 2  Awake, alert, no acute distress  VSS off pressors, afeb  Cor- RRR  Abd- soft non tender, DEA's serosang  Ext- + anasarca

## 2023-02-02 NOTE — PROGRESS NOTE ADULT - ASSESSMENT
Impression:  1. Small bowel obstruction, sp SBR  2.     Plan:  Patient is a 72y old  Female who presents with a chief complaint of bowel obstruction/ischemic mesentery (2023 15:38)      BRIEF HOSPITAL COURSE: 72y Female  ***    Events last 24 hours: ***    PAST MEDICAL & SURGICAL HISTORY:  Hypertension      Hypercholesteremia      COPD (chronic obstructive pulmonary disease)      C. difficile diarrhea        Diverticulitis of intestine with perforation and abscess without bleeding, unspecified part of intestinal tract      PE (pulmonary thromboembolism)  2016      Palpitations      Obesity      Osteoarthritis      Anemia      Colostomy in place      History of atrial fibrillation      HTN (hypertension)      Diabetes mellitus      Cerebrovascular accident (CVA)      DVT of lower limb, acute      CVA (cerebral vascular accident)      COPD (chronic obstructive pulmonary disease)      Neuropathy      Diverticulitis      History of appendectomy      H/O:   x2      H/O ovarian cystectomy  b/l      Status post total replacement of both hips      H/O hemicolectomy  2016      History of colostomy reversal      History of colostomy reversal      S/P colostomy      S/P       S/P hip replacement            Hosp day #13d    Vent day #        Vital signs / Reviewed and Physical Exam Performed where pertinent and urgently required    Lab / Radiology  studies / ABG / Meds -  reviewed and interpreted into the assessment and treatment plan.      Assessment/Plan/Therapeutic interventions      Neuro - Sedation neuromuscular blockade to facilitate safe ventilation    CV -  Pressor support as needed to maintain MAP 65           Avoiding fluid challenges          QTC monitoring while on Azithromycin and Hydroxychloroquine.    Pulm -  ARDS-NET 4-6cc/kg IBW TV as able to maintain plateau pressures <30               Prone ventilation consideration as feasible  Pa02/Fi02 < 150 on Fi02 >60% and PEEP at least 5                 Vent bundle Reviewed     GI -  PPI  Enteric feeds as tolerated in tandem with NMB and prone ventilation    Renal - Even to negative fluid balance as tolerated by hemodynamics and renal fx.  Feeds to be provided in lieu of IVF.     Heme -  Pharmacologic DVT PPx  in addition to SCD's    ID - ABX discontinuation based on discussion with ID in conjunction with clinical features, culture data, and judicious procalcitonin monitoring.      Endo -  Aggressive glycemic control to limit FS glucose to < 180mg/dl.     Impression:  1. Small bowel obstruction with incarcerated ventral hernia, sp ELAP, RICHARD, ileocolectomy, ventral hernia repair with MESH  2. septic shock  3. ALVERTO  4. klebsiella UTI  5. paroxysmal atrial fibrillation  6. COPD    Plan:  Neuro - Sedation neuromuscular blockade to facilitate safe ventilation    CV -  Pressor support as needed to maintain MAP 65           Avoiding fluid challenges          QTC monitoring while on Azithromycin and Hydroxychloroquine.    Pulm -  ARDS-NET 4-6cc/kg IBW TV as able to maintain plateau pressures <30               Prone ventilation consideration as feasible  Pa02/Fi02 < 150 on Fi02 >60% and PEEP at least 5                 Vent bundle Reviewed     GI -  PPI  Enteric feeds as tolerated in tandem with NMB and prone ventilation    Renal - Even to negative fluid balance as tolerated by hemodynamics and renal fx.  Feeds to be provided in lieu of IVF.     Heme -  Pharmacologic DVT PPx  in addition to SCD's    ID - ABX discontinuation based on discussion with ID in conjunction with clinical features, culture data, and judicious procalcitonin monitoring.      Endo -  Aggressive glycemic control to limit FS glucose to < 180mg/dl.    GOC: DNR/DNI Impression:  1. Small bowel obstruction with incarcerated ventral hernia, sp ELAP, RICHARD, ileocolectomy, ventral hernia repair with MESH  2. septic shock  3. ALVERTO  4. klebsiella UTI  5. paroxysmal atrial fibrillation  6. COPD    Plan:  Neuro - try to avoid neurosuppressants, mult-modal analgesia    CV -  low threshold to restart pressors to keep MAP>65          now in NSR          restart AC when cleared by surgery          keep K~4 and Mg>2 for optimal arrhythmia suppression          lactate downtrended    Pulm -  stable on NC              actively titrate to keep sats 90-92%              bronchodialators/inhaled steroids    GI -  PPI         NPO/NGT         IV hydration         wound care as per surgical team      Renal - Cr elevated to 1.3, baseline 0.5, cont IV hydration, avoid MAP<65, renally adjust all meds, strict I/Os, repeat BMP in am    Heme -  restart AC when cleared by surgical team, no signs of active bleeding, tx for Hbg<7 or signs of active bleeding.    ID - afebrile, cont IV abx as per ID, BCx prior NGTD, OR cx pending, trend WBC, abx adjustments based on discussion with ID in conjunction with             clinical features and culture data.    Endo -  Aggressive glycemic control to limit FS glucose to < 180mg/dl, BS stable.    GOC: DNR/DNI

## 2023-02-02 NOTE — PROGRESS NOTE ADULT - SUBJECTIVE AND OBJECTIVE BOX
Subjective:    Home Medications:  Albuterol (Eqv-ProAir HFA) 90 mcg/inh inhalation aerosol: 1 puff(s) inhaled every 6 hours, As Needed (20 Jan 2023 16:49)  amiodarone 200 mg oral tablet: 1 tab(s) orally once a day (20 Jan 2023 16:49)  ascorbic acid 500 mg oral tablet: 2 tab(s) orally once a day (20 Jan 2023 16:49)  atorvastatin 10 mg oral tablet: 1 tab(s) orally once a day (at bedtime) (20 Jan 2023 16:49)  benzonatate 100 mg oral capsule: 1 cap(s) orally 3 times a day (20 Jan 2023 21:48)  budesonide 0.5 mg/2 mL inhalation suspension: 2 milliliter(s) inhaled 2 times a day (20 Jan 2023 21:48)  Cepacol Sore Throat 15 mg-3.6 mg mucous membrane lozenge: 1 lozenge mucous membrane every 4 hours, As Needed (20 Jan 2023 21:48)  cholecalciferol 1250 mcg (50,000 intl units) oral capsule: 1 cap(s) orally every 4 weeks on Wednesday  (20 Jan 2023 16:49)  Coumadin 2.5 mg oral tablet: 1 tab(s) orally once a day (at bedtime)  ***ON HOLD from 1-16 to 1-21*** (20 Jan 2023 21:48)  Cranberry oral tablet: 1 tab(s) orally 2 times a day (20 Jan 2023 16:49)  cyanocobalamin 1000 mcg oral tablet: 1 tab(s) orally once a day (20 Jan 2023 16:49)  dilTIAZem 180 mg/24 hours oral capsule, extended release: 1 cap(s) orally once a day (20 Jan 2023 16:49)  DULoxetine 60 mg oral delayed release capsule: 1 cap(s) orally once a day (20 Jan 2023 16:49)  estradiol 0.1 mg/g vaginal cream: 0.5 gram(s) vaginal 2 times a week on Monday and Thursday (20 Jan 2023 21:48)  fexofenadine 180 mg oral tablet: 1 tab(s) orally once a day (20 Jan 2023 21:48)  fluticasone 50 mcg/inh nasal spray: 1 spray(s) nasal 2 times a day (20 Jan 2023 16:49)  furosemide 20 mg oral tablet: 1 tab(s) orally once a day (20 Jan 2023 16:49)  glipiZIDE 10 mg oral tablet, extended release: 2 tab(s) orally once a day (20 Jan 2023 16:49)  GlycoLax oral powder for reconstitution: 17 gram(s) orally 2 times a day (20 Jan 2023 16:49)  guaiFENesin 100 mg/5 mL oral liquid: 20 milliliter(s) orally every 6 hours (20 Jan 2023 21:48)  HumaLOG KwikPen 100 units/mL injectable solution: 10 unit(s) injectable 3 times a day (before meals) (20 Jan 2023 21:48)  ipratropium-albuterol 20 mcg-100 mcg/inh inhalation aerosol: 1 puff(s) inhaled 4 times a day (20 Jan 2023 16:49)  levothyroxine 88 mcg (0.088 mg) oral tablet: 1 tab(s) orally once a day (at bedtime) (20 Jan 2023 16:49)  losartan 25 mg oral tablet: 1 tab(s) orally once a day (20 Jan 2023 16:49)  Macrobid 100 mg oral capsule: 1 cap(s) orally 2 times a day for 5 days  ***course complete*** (20 Jan 2023 21:48)  methocarbamol 750 mg oral tablet: 1 tab(s) orally every 8 hours, As Needed (20 Jan 2023 21:48)  metoprolol tartrate 25 mg oral tablet: 1 tab(s) orally 2 times a day (20 Jan 2023 16:49)  Milk of Magnesia 8% oral suspension: 30 milliliter(s) orally once a day, As Needed (20 Jan 2023 16:49)  montelukast 10 mg oral tablet: 1 tab(s) orally once a day (at bedtime) (20 Jan 2023 21:48)  ondansetron 4 mg oral tablet: 1 tab(s) orally every 4 hours, As Needed (20 Jan 2023 21:48)  oxybutynin 5 mg oral tablet: 1 tab(s) orally 2 times a day (20 Jan 2023 16:49)  oxyCODONE 5 mg oral tablet: 1 tab(s) orally every 4 hours, As Needed (20 Jan 2023 16:49)  phytonadione 5 mg oral tablet: 0.5 tab(s) orally once  ***given at [facility] once on 1-19-23*** (20 Jan 2023 21:48)  pregabalin 100 mg oral capsule: 1 cap(s) orally 3 times a day (20 Jan 2023 16:49)  sennosides-docusate 8.6 mg-50 mg oral tablet: 2 tab(s) orally once a day (at bedtime) (20 Jan 2023 16:49)  Tradjenta 5 mg oral tablet: 1 tab(s) orally once a day (20 Jan 2023 16:49)  Tylenol 500 mg oral tablet: 2 tab(s) orally every 8 hours (20 Jan 2023 16:49)    MEDICATIONS  (STANDING):  buDESOnide    Inhalation Suspension 0.5 milliGRAM(s) Inhalation every 12 hours  cefepime   IVPB 2000 milliGRAM(s) IV Intermittent every 12 hours  chlorhexidine 4% Liquid 1 Application(s) Topical <User Schedule>  coronavirus bivalent (EUA) Booster Vaccine (PFIZER) 0.3 milliLiter(s) IntraMuscular once  dextrose 5%. 1000 milliLiter(s) (50 mL/Hr) IV Continuous <Continuous>  dextrose 5%. 1000 milliLiter(s) (100 mL/Hr) IV Continuous <Continuous>  dextrose 50% Injectable 25 Gram(s) IV Push once  dextrose 50% Injectable 12.5 Gram(s) IV Push once  dextrose 50% Injectable 25 Gram(s) IV Push once  glucagon  Injectable 1 milliGRAM(s) IntraMuscular once  insulin lispro (ADMELOG) corrective regimen sliding scale   SubCutaneous every 6 hours  lactated ringers. 1000 milliLiter(s) (125 mL/Hr) IV Continuous <Continuous>  levothyroxine Injectable 70 MICROGram(s) IV Push at bedtime  metroNIDAZOLE  IVPB 500 milliGRAM(s) IV Intermittent every 8 hours  nystatin Cream 1 Application(s) Topical two times a day  nystatin Powder 1 Application(s) Topical every 12 hours  pantoprazole  Injectable 40 milliGRAM(s) IV Push daily    MEDICATIONS  (PRN):  albuterol    90 MICROgram(s) HFA Inhaler 2 Puff(s) Inhalation every 6 hours PRN Shortness of Breath and/or Wheezing  dextrose Oral Gel 15 Gram(s) Oral once PRN Blood Glucose LESS THAN 70 milliGRAM(s)/deciliter  HYDROmorphone  Injectable 0.5 milliGRAM(s) IV Push every 3 hours PRN Severe Pain (7 - 10)  sodium chloride 0.9% lock flush 10 milliLiter(s) IV Push every 1 hour PRN Pre/post blood products, medications, blood draw, and to maintain line patency      Allergies    Cipro (Rash)  Spiriva (Rash)    Intolerances        Vital Signs Last 24 Hrs  T(C): 37.1 (02 Feb 2023 08:00), Max: 38.1 (01 Feb 2023 11:00)  T(F): 98.7 (02 Feb 2023 08:00), Max: 100.5 (01 Feb 2023 11:00)  HR: 96 (02 Feb 2023 08:41) (87 - 104)  BP: --  BP(mean): --  RR: 17 (02 Feb 2023 08:00) (13 - 36)  SpO2: 100% (02 Feb 2023 08:00) (92% - 100%)    Parameters below as of 02 Feb 2023 08:00  Patient On (Oxygen Delivery Method): nasal cannula  O2 Flow (L/min): 3        PHYSICAL EXAMINATION:    NECK:  Supple. No lymphadenopathy. Jugular venous pressure not elevated. Carotids equal.   HEART:   The cardiac impulse has a normal quality. Reg., Nl S1 and S2.  There are no murmurs, rubs or gallops noted  CHEST:  Chest is clear to auscultation. Normal respiratory effort.  ABDOMEN:  Soft and nontender.   EXTREMITIES:  There is no edema.       LABS:                        7.2    16.24 )-----------( 418      ( 02 Feb 2023 06:20 )             23.3     02-02    142  |  113<H>  |  20  ----------------------------<  128<H>  4.2   |  22  |  1.36<H>    Ca    7.3<L>      02 Feb 2023 06:20  Phos  2.8     02-02  Mg     1.8     02-02    TPro  4.7<L>  /  Alb  1.3<L>  /  TBili  0.3  /  DBili  x   /  AST  246<H>  /  ALT  94<H>  /  AlkPhos  62  02-02    PT/INR - ( 31 Jan 2023 09:03 )   PT: 21.8 sec;   INR: 1.87 ratio         PTT - ( 31 Jan 2023 09:03 )  PTT:32.9 sec           Subjective:    pat better, lying in bed, on 3lpm nc sat 95%.    Home Medications:  Albuterol (Eqv-ProAir HFA) 90 mcg/inh inhalation aerosol: 1 puff(s) inhaled every 6 hours, As Needed (20 Jan 2023 16:49)  amiodarone 200 mg oral tablet: 1 tab(s) orally once a day (20 Jan 2023 16:49)  ascorbic acid 500 mg oral tablet: 2 tab(s) orally once a day (20 Jan 2023 16:49)  atorvastatin 10 mg oral tablet: 1 tab(s) orally once a day (at bedtime) (20 Jan 2023 16:49)  benzonatate 100 mg oral capsule: 1 cap(s) orally 3 times a day (20 Jan 2023 21:48)  budesonide 0.5 mg/2 mL inhalation suspension: 2 milliliter(s) inhaled 2 times a day (20 Jan 2023 21:48)  Cepacol Sore Throat 15 mg-3.6 mg mucous membrane lozenge: 1 lozenge mucous membrane every 4 hours, As Needed (20 Jan 2023 21:48)  cholecalciferol 1250 mcg (50,000 intl units) oral capsule: 1 cap(s) orally every 4 weeks on Wednesday  (20 Jan 2023 16:49)  Coumadin 2.5 mg oral tablet: 1 tab(s) orally once a day (at bedtime)  ***ON HOLD from 1-16 to 1-21*** (20 Jan 2023 21:48)  Cranberry oral tablet: 1 tab(s) orally 2 times a day (20 Jan 2023 16:49)  cyanocobalamin 1000 mcg oral tablet: 1 tab(s) orally once a day (20 Jan 2023 16:49)  dilTIAZem 180 mg/24 hours oral capsule, extended release: 1 cap(s) orally once a day (20 Jan 2023 16:49)  DULoxetine 60 mg oral delayed release capsule: 1 cap(s) orally once a day (20 Jan 2023 16:49)  estradiol 0.1 mg/g vaginal cream: 0.5 gram(s) vaginal 2 times a week on Monday and Thursday (20 Jan 2023 21:48)  fexofenadine 180 mg oral tablet: 1 tab(s) orally once a day (20 Jan 2023 21:48)  fluticasone 50 mcg/inh nasal spray: 1 spray(s) nasal 2 times a day (20 Jan 2023 16:49)  furosemide 20 mg oral tablet: 1 tab(s) orally once a day (20 Jan 2023 16:49)  glipiZIDE 10 mg oral tablet, extended release: 2 tab(s) orally once a day (20 Jan 2023 16:49)  GlycoLax oral powder for reconstitution: 17 gram(s) orally 2 times a day (20 Jan 2023 16:49)  guaiFENesin 100 mg/5 mL oral liquid: 20 milliliter(s) orally every 6 hours (20 Jan 2023 21:48)  HumaLOG KwikPen 100 units/mL injectable solution: 10 unit(s) injectable 3 times a day (before meals) (20 Jan 2023 21:48)  ipratropium-albuterol 20 mcg-100 mcg/inh inhalation aerosol: 1 puff(s) inhaled 4 times a day (20 Jan 2023 16:49)  levothyroxine 88 mcg (0.088 mg) oral tablet: 1 tab(s) orally once a day (at bedtime) (20 Jan 2023 16:49)  losartan 25 mg oral tablet: 1 tab(s) orally once a day (20 Jan 2023 16:49)  Macrobid 100 mg oral capsule: 1 cap(s) orally 2 times a day for 5 days  ***course complete*** (20 Jan 2023 21:48)  methocarbamol 750 mg oral tablet: 1 tab(s) orally every 8 hours, As Needed (20 Jan 2023 21:48)  metoprolol tartrate 25 mg oral tablet: 1 tab(s) orally 2 times a day (20 Jan 2023 16:49)  Milk of Magnesia 8% oral suspension: 30 milliliter(s) orally once a day, As Needed (20 Jan 2023 16:49)  montelukast 10 mg oral tablet: 1 tab(s) orally once a day (at bedtime) (20 Jan 2023 21:48)  ondansetron 4 mg oral tablet: 1 tab(s) orally every 4 hours, As Needed (20 Jan 2023 21:48)  oxybutynin 5 mg oral tablet: 1 tab(s) orally 2 times a day (20 Jan 2023 16:49)  oxyCODONE 5 mg oral tablet: 1 tab(s) orally every 4 hours, As Needed (20 Jan 2023 16:49)  phytonadione 5 mg oral tablet: 0.5 tab(s) orally once  ***given at [facility] once on 1-19-23*** (20 Jan 2023 21:48)  pregabalin 100 mg oral capsule: 1 cap(s) orally 3 times a day (20 Jan 2023 16:49)  sennosides-docusate 8.6 mg-50 mg oral tablet: 2 tab(s) orally once a day (at bedtime) (20 Jan 2023 16:49)  Tradjenta 5 mg oral tablet: 1 tab(s) orally once a day (20 Jan 2023 16:49)  Tylenol 500 mg oral tablet: 2 tab(s) orally every 8 hours (20 Jan 2023 16:49)    MEDICATIONS  (STANDING):  buDESOnide    Inhalation Suspension 0.5 milliGRAM(s) Inhalation every 12 hours  cefepime   IVPB 2000 milliGRAM(s) IV Intermittent every 12 hours  chlorhexidine 4% Liquid 1 Application(s) Topical <User Schedule>  coronavirus bivalent (EUA) Booster Vaccine (PFIZER) 0.3 milliLiter(s) IntraMuscular once  dextrose 5%. 1000 milliLiter(s) (50 mL/Hr) IV Continuous <Continuous>  dextrose 5%. 1000 milliLiter(s) (100 mL/Hr) IV Continuous <Continuous>  dextrose 50% Injectable 25 Gram(s) IV Push once  dextrose 50% Injectable 12.5 Gram(s) IV Push once  dextrose 50% Injectable 25 Gram(s) IV Push once  glucagon  Injectable 1 milliGRAM(s) IntraMuscular once  insulin lispro (ADMELOG) corrective regimen sliding scale   SubCutaneous every 6 hours  lactated ringers. 1000 milliLiter(s) (125 mL/Hr) IV Continuous <Continuous>  levothyroxine Injectable 70 MICROGram(s) IV Push at bedtime  metroNIDAZOLE  IVPB 500 milliGRAM(s) IV Intermittent every 8 hours  nystatin Cream 1 Application(s) Topical two times a day  nystatin Powder 1 Application(s) Topical every 12 hours  pantoprazole  Injectable 40 milliGRAM(s) IV Push daily    MEDICATIONS  (PRN):  albuterol    90 MICROgram(s) HFA Inhaler 2 Puff(s) Inhalation every 6 hours PRN Shortness of Breath and/or Wheezing  dextrose Oral Gel 15 Gram(s) Oral once PRN Blood Glucose LESS THAN 70 milliGRAM(s)/deciliter  HYDROmorphone  Injectable 0.5 milliGRAM(s) IV Push every 3 hours PRN Severe Pain (7 - 10)  sodium chloride 0.9% lock flush 10 milliLiter(s) IV Push every 1 hour PRN Pre/post blood products, medications, blood draw, and to maintain line patency      Allergies    Cipro (Rash)  Spiriva (Rash)    Intolerances        Vital Signs Last 24 Hrs  T(C): 37.1 (02 Feb 2023 08:00), Max: 38.1 (01 Feb 2023 11:00)  T(F): 98.7 (02 Feb 2023 08:00), Max: 100.5 (01 Feb 2023 11:00)  HR: 96 (02 Feb 2023 08:41) (87 - 104)  BP: --  BP(mean): --  RR: 17 (02 Feb 2023 08:00) (13 - 36)  SpO2: 100% (02 Feb 2023 08:00) (92% - 100%)    Parameters below as of 02 Feb 2023 08:00  Patient On (Oxygen Delivery Method): nasal cannula  O2 Flow (L/min): 3        PHYSICAL EXAMINATION:    NECK:  Supple. No lymphadenopathy. Jugular venous pressure not elevated. Carotids equal.   HEART:   The cardiac impulse has a normal quality. Reg., Nl S1 and S2.  There are no murmurs, rubs or gallops noted  CHEST:  Chest crackles to auscultation. Normal respiratory effort.  ABDOMEN:  Soft and nontender.   EXTREMITIES:  There is no edema.       LABS:                        7.2    16.24 )-----------( 418      ( 02 Feb 2023 06:20 )             23.3     02-02    142  |  113<H>  |  20  ----------------------------<  128<H>  4.2   |  22  |  1.36<H>    Ca    7.3<L>      02 Feb 2023 06:20  Phos  2.8     02-02  Mg     1.8     02-02    TPro  4.7<L>  /  Alb  1.3<L>  /  TBili  0.3  /  DBili  x   /  AST  246<H>  /  ALT  94<H>  /  AlkPhos  62  02-02    PT/INR - ( 31 Jan 2023 09:03 )   PT: 21.8 sec;   INR: 1.87 ratio         PTT - ( 31 Jan 2023 09:03 )  PTT:32.9 sec

## 2023-02-02 NOTE — PROGRESS NOTE ADULT - SUBJECTIVE AND OBJECTIVE BOX
Patient is a 72y old  Female who presents with a chief complaint of bowel obstruction/ischemic mesentery (02 Feb 2023 11:54)    24 hour events: off pressors in am     Allergies    Cipro (Rash)  Spiriva (Rash)    Intolerances      REVIEW OF SYSTEMS: SEE BELOW       ICU Vital Signs Last 24 Hrs  T(C): 36.9 (02 Feb 2023 16:00), Max: 37.2 (02 Feb 2023 12:00)  T(F): 98.5 (02 Feb 2023 16:00), Max: 99 (02 Feb 2023 12:00)  HR: 90 (02 Feb 2023 18:00) (79 - 100)  BP: 105/40 (02 Feb 2023 17:00) (96/41 - 118/52)  BP(mean): 56 (02 Feb 2023 17:00) (54 - 67)  ABP: 139/60 (02 Feb 2023 18:00) (74/66 - 140/61)  ABP(mean): 91 (02 Feb 2023 18:00) (58 - 96)  RR: 15 (02 Feb 2023 18:00) (13 - 21)  SpO2: 100% (02 Feb 2023 18:00) (87% - 100%)    O2 Parameters below as of 02 Feb 2023 18:00  Patient On (Oxygen Delivery Method): nasal cannula  O2 Flow (L/min): 2          CAPILLARY BLOOD GLUCOSE      POCT Blood Glucose.: 100 mg/dL (02 Feb 2023 17:39)  POCT Blood Glucose.: 97 mg/dL (02 Feb 2023 12:16)  POCT Blood Glucose.: 113 mg/dL (02 Feb 2023 06:13)  POCT Blood Glucose.: 142 mg/dL (01 Feb 2023 23:06)      I&O's Summary    01 Feb 2023 07:01  -  02 Feb 2023 07:00  --------------------------------------------------------  IN: 5702 mL / OUT: 620 mL / NET: 5082 mL    02 Feb 2023 07:01  -  02 Feb 2023 19:03  --------------------------------------------------------  IN: 911 mL / OUT: 275 mL / NET: 636 mL            MEDICATIONS  (STANDING):  albumin human 25% IVPB 50 milliLiter(s) IV Intermittent every 6 hours  buDESOnide    Inhalation Suspension 0.5 milliGRAM(s) Inhalation every 12 hours  cefepime   IVPB 2000 milliGRAM(s) IV Intermittent every 12 hours  chlorhexidine 4% Liquid 1 Application(s) Topical <User Schedule>  coronavirus bivalent (EUA) Booster Vaccine (PFIZER) 0.3 milliLiter(s) IntraMuscular once  dextrose 5%. 1000 milliLiter(s) (100 mL/Hr) IV Continuous <Continuous>  dextrose 5%. 1000 milliLiter(s) (50 mL/Hr) IV Continuous <Continuous>  dextrose 50% Injectable 25 Gram(s) IV Push once  dextrose 50% Injectable 12.5 Gram(s) IV Push once  dextrose 50% Injectable 25 Gram(s) IV Push once  glucagon  Injectable 1 milliGRAM(s) IntraMuscular once  heparin   Injectable 5000 Unit(s) SubCutaneous every 8 hours  insulin lispro (ADMELOG) corrective regimen sliding scale   SubCutaneous every 6 hours  lactated ringers. 1000 milliLiter(s) (100 mL/Hr) IV Continuous <Continuous>  levothyroxine Injectable 70 MICROGram(s) IV Push at bedtime  metroNIDAZOLE  IVPB 500 milliGRAM(s) IV Intermittent every 8 hours  norepinephrine Infusion 0.05 MICROgram(s)/kG/Min (9.13 mL/Hr) IV Continuous <Continuous>  nystatin Cream 1 Application(s) Topical two times a day  nystatin Powder 1 Application(s) Topical every 12 hours  pantoprazole  Injectable 40 milliGRAM(s) IV Push daily  Parenteral Nutrition - Adult 1 Each (83 mL/Hr) TPN Continuous <Continuous>      MEDICATIONS  (PRN):  albuterol    90 MICROgram(s) HFA Inhaler 2 Puff(s) Inhalation every 6 hours PRN Shortness of Breath and/or Wheezing  dextrose Oral Gel 15 Gram(s) Oral once PRN Blood Glucose LESS THAN 70 milliGRAM(s)/deciliter  HYDROmorphone  Injectable 0.5 milliGRAM(s) IV Push every 3 hours PRN Severe Pain (7 - 10)  sodium chloride 0.9% lock flush 10 milliLiter(s) IV Push every 1 hour PRN Pre/post blood products, medications, blood draw, and to maintain line patency      PHYSICAL EXAM: SEE BELOW                          7.2    16.24 )-----------( 418      ( 02 Feb 2023 06:20 )             23.3       02-02    142  |  113<H>  |  20  ----------------------------<  128<H>  4.2   |  22  |  1.36<H>    Ca    7.3<L>      02 Feb 2023 06:20  Phos  2.8     02-02  Mg     1.8     02-02    TPro  4.7<L>  /  Alb  1.3<L>  /  TBili  0.3  /  DBili  x   /  AST  246<H>  /  ALT  94<H>  /  AlkPhos  62  02-02    Lactate 1.2           02-02 @ 06:20              .Tissue INTRAPERITONEAL ABCESS FOR CX   Growth in fluid media only Gram Negative Rods   Rare polymorphonuclear leukocytes seen per low power field  No organisms seen per oil power field 01-31 @ 18:07

## 2023-02-03 LAB
-  AMIKACIN: SIGNIFICANT CHANGE UP
-  AMOXICILLIN/CLAVULANIC ACID: SIGNIFICANT CHANGE UP
-  AMPICILLIN/SULBACTAM: SIGNIFICANT CHANGE UP
-  AMPICILLIN: SIGNIFICANT CHANGE UP
-  AZTREONAM: SIGNIFICANT CHANGE UP
-  CEFAZOLIN: SIGNIFICANT CHANGE UP
-  CEFEPIME: SIGNIFICANT CHANGE UP
-  CEFOXITIN: SIGNIFICANT CHANGE UP
-  CEFTRIAXONE: SIGNIFICANT CHANGE UP
-  CIPROFLOXACIN: SIGNIFICANT CHANGE UP
-  ERTAPENEM: SIGNIFICANT CHANGE UP
-  GENTAMICIN: SIGNIFICANT CHANGE UP
-  IMIPENEM: SIGNIFICANT CHANGE UP
-  LEVOFLOXACIN: SIGNIFICANT CHANGE UP
-  MEROPENEM: SIGNIFICANT CHANGE UP
-  PIPERACILLIN/TAZOBACTAM: SIGNIFICANT CHANGE UP
-  TOBRAMYCIN: SIGNIFICANT CHANGE UP
-  TRIMETHOPRIM/SULFAMETHOXAZOLE: SIGNIFICANT CHANGE UP
ALBUMIN SERPL ELPH-MCNC: 1.8 G/DL — LOW (ref 3.3–5)
ALP SERPL-CCNC: 56 U/L — SIGNIFICANT CHANGE UP (ref 40–120)
ALT FLD-CCNC: 57 U/L — SIGNIFICANT CHANGE UP (ref 12–78)
ANION GAP SERPL CALC-SCNC: 6 MMOL/L — SIGNIFICANT CHANGE UP (ref 5–17)
ANION GAP SERPL CALC-SCNC: 9 MMOL/L — SIGNIFICANT CHANGE UP (ref 5–17)
AST SERPL-CCNC: 82 U/L — HIGH (ref 15–37)
BILIRUB SERPL-MCNC: 0.4 MG/DL — SIGNIFICANT CHANGE UP (ref 0.2–1.2)
BUN SERPL-MCNC: 21 MG/DL — SIGNIFICANT CHANGE UP (ref 7–23)
BUN SERPL-MCNC: 21 MG/DL — SIGNIFICANT CHANGE UP (ref 7–23)
CALCIUM SERPL-MCNC: 7.2 MG/DL — LOW (ref 8.5–10.1)
CALCIUM SERPL-MCNC: 7.8 MG/DL — LOW (ref 8.5–10.1)
CHLORIDE SERPL-SCNC: 111 MMOL/L — HIGH (ref 96–108)
CHLORIDE SERPL-SCNC: 113 MMOL/L — HIGH (ref 96–108)
CO2 SERPL-SCNC: 23 MMOL/L — SIGNIFICANT CHANGE UP (ref 22–31)
CO2 SERPL-SCNC: 24 MMOL/L — SIGNIFICANT CHANGE UP (ref 22–31)
CREAT SERPL-MCNC: 1.19 MG/DL — SIGNIFICANT CHANGE UP (ref 0.5–1.3)
CREAT SERPL-MCNC: 1.28 MG/DL — SIGNIFICANT CHANGE UP (ref 0.5–1.3)
EGFR: 45 ML/MIN/1.73M2 — LOW
EGFR: 49 ML/MIN/1.73M2 — LOW
GLUCOSE BLDC GLUCOMTR-MCNC: 136 MG/DL — HIGH (ref 70–99)
GLUCOSE BLDC GLUCOMTR-MCNC: 138 MG/DL — HIGH (ref 70–99)
GLUCOSE BLDC GLUCOMTR-MCNC: 144 MG/DL — HIGH (ref 70–99)
GLUCOSE BLDC GLUCOMTR-MCNC: 146 MG/DL — HIGH (ref 70–99)
GLUCOSE BLDC GLUCOMTR-MCNC: 164 MG/DL — HIGH (ref 70–99)
GLUCOSE SERPL-MCNC: 142 MG/DL — HIGH (ref 70–99)
GLUCOSE SERPL-MCNC: 180 MG/DL — HIGH (ref 70–99)
HCT VFR BLD CALC: 23.4 % — LOW (ref 34.5–45)
HCT VFR BLD CALC: 24.1 % — LOW (ref 34.5–45)
HCT VFR BLD CALC: 27.3 % — LOW (ref 34.5–45)
HGB BLD-MCNC: 7.2 G/DL — LOW (ref 11.5–15.5)
HGB BLD-MCNC: 7.7 G/DL — LOW (ref 11.5–15.5)
HGB BLD-MCNC: 9 G/DL — LOW (ref 11.5–15.5)
MAGNESIUM SERPL-MCNC: 1.7 MG/DL — SIGNIFICANT CHANGE UP (ref 1.6–2.6)
MAGNESIUM SERPL-MCNC: 2.1 MG/DL — SIGNIFICANT CHANGE UP (ref 1.6–2.6)
MCHC RBC-ENTMCNC: 27.4 PG — SIGNIFICANT CHANGE UP (ref 27–34)
MCHC RBC-ENTMCNC: 27.7 PG — SIGNIFICANT CHANGE UP (ref 27–34)
MCHC RBC-ENTMCNC: 28 PG — SIGNIFICANT CHANGE UP (ref 27–34)
MCHC RBC-ENTMCNC: 30.8 GM/DL — LOW (ref 32–36)
MCHC RBC-ENTMCNC: 32 GM/DL — SIGNIFICANT CHANGE UP (ref 32–36)
MCHC RBC-ENTMCNC: 33 GM/DL — SIGNIFICANT CHANGE UP (ref 32–36)
MCV RBC AUTO: 85 FL — SIGNIFICANT CHANGE UP (ref 80–100)
MCV RBC AUTO: 86.7 FL — SIGNIFICANT CHANGE UP (ref 80–100)
MCV RBC AUTO: 89 FL — SIGNIFICANT CHANGE UP (ref 80–100)
METHOD TYPE: SIGNIFICANT CHANGE UP
PHOSPHATE SERPL-MCNC: 1.8 MG/DL — LOW (ref 2.5–4.5)
PHOSPHATE SERPL-MCNC: 2.1 MG/DL — LOW (ref 2.5–4.5)
PLATELET # BLD AUTO: 386 K/UL — SIGNIFICANT CHANGE UP (ref 150–400)
PLATELET # BLD AUTO: 405 K/UL — HIGH (ref 150–400)
PLATELET # BLD AUTO: 410 K/UL — HIGH (ref 150–400)
POTASSIUM SERPL-MCNC: 3.4 MMOL/L — LOW (ref 3.5–5.3)
POTASSIUM SERPL-MCNC: 3.6 MMOL/L — SIGNIFICANT CHANGE UP (ref 3.5–5.3)
POTASSIUM SERPL-SCNC: 3.4 MMOL/L — LOW (ref 3.5–5.3)
POTASSIUM SERPL-SCNC: 3.6 MMOL/L — SIGNIFICANT CHANGE UP (ref 3.5–5.3)
PROT SERPL-MCNC: 4.9 GM/DL — LOW (ref 6–8.3)
RBC # BLD: 2.63 M/UL — LOW (ref 3.8–5.2)
RBC # BLD: 2.78 M/UL — LOW (ref 3.8–5.2)
RBC # BLD: 3.21 M/UL — LOW (ref 3.8–5.2)
RBC # FLD: 19.1 % — HIGH (ref 10.3–14.5)
RBC # FLD: 20.3 % — HIGH (ref 10.3–14.5)
RBC # FLD: 20.7 % — HIGH (ref 10.3–14.5)
SODIUM SERPL-SCNC: 143 MMOL/L — SIGNIFICANT CHANGE UP (ref 135–145)
SODIUM SERPL-SCNC: 143 MMOL/L — SIGNIFICANT CHANGE UP (ref 135–145)
TRIGL SERPL-MCNC: 108 MG/DL — SIGNIFICANT CHANGE UP
WBC # BLD: 12.73 K/UL — HIGH (ref 3.8–10.5)
WBC # BLD: 14.19 K/UL — HIGH (ref 3.8–10.5)
WBC # BLD: 15.8 K/UL — HIGH (ref 3.8–10.5)
WBC # FLD AUTO: 12.73 K/UL — HIGH (ref 3.8–10.5)
WBC # FLD AUTO: 14.19 K/UL — HIGH (ref 3.8–10.5)
WBC # FLD AUTO: 15.8 K/UL — HIGH (ref 3.8–10.5)

## 2023-02-03 PROCEDURE — 99291 CRITICAL CARE FIRST HOUR: CPT

## 2023-02-03 RX ORDER — POTASSIUM PHOSPHATE, MONOBASIC POTASSIUM PHOSPHATE, DIBASIC 236; 224 MG/ML; MG/ML
15 INJECTION, SOLUTION INTRAVENOUS ONCE
Refills: 0 | Status: COMPLETED | OUTPATIENT
Start: 2023-02-03 | End: 2023-02-03

## 2023-02-03 RX ORDER — I.V. FAT EMULSION 20 G/100ML
0.82 EMULSION INTRAVENOUS
Qty: 80 | Refills: 0 | Status: DISCONTINUED | OUTPATIENT
Start: 2023-02-03 | End: 2023-02-03

## 2023-02-03 RX ORDER — MAGNESIUM SULFATE 500 MG/ML
2 VIAL (ML) INJECTION ONCE
Refills: 0 | Status: COMPLETED | OUTPATIENT
Start: 2023-02-03 | End: 2023-02-03

## 2023-02-03 RX ORDER — FUROSEMIDE 40 MG
20 TABLET ORAL ONCE
Refills: 0 | Status: COMPLETED | OUTPATIENT
Start: 2023-02-03 | End: 2023-02-03

## 2023-02-03 RX ORDER — ELECTROLYTE SOLUTION,INJ
1 VIAL (ML) INTRAVENOUS
Refills: 0 | Status: DISCONTINUED | OUTPATIENT
Start: 2023-02-03 | End: 2023-02-03

## 2023-02-03 RX ORDER — METOPROLOL TARTRATE 50 MG
5 TABLET ORAL EVERY 6 HOURS
Refills: 0 | Status: DISCONTINUED | OUTPATIENT
Start: 2023-02-03 | End: 2023-02-05

## 2023-02-03 RX ORDER — POTASSIUM PHOSPHATE, MONOBASIC POTASSIUM PHOSPHATE, DIBASIC 236; 224 MG/ML; MG/ML
30 INJECTION, SOLUTION INTRAVENOUS ONCE
Refills: 0 | Status: COMPLETED | OUTPATIENT
Start: 2023-02-03 | End: 2023-02-03

## 2023-02-03 RX ADMIN — HEPARIN SODIUM 5000 UNIT(S): 5000 INJECTION INTRAVENOUS; SUBCUTANEOUS at 23:24

## 2023-02-03 RX ADMIN — Medication 70 MICROGRAM(S): at 23:58

## 2023-02-03 RX ADMIN — HYDROMORPHONE HYDROCHLORIDE 0.5 MILLIGRAM(S): 2 INJECTION INTRAMUSCULAR; INTRAVENOUS; SUBCUTANEOUS at 15:30

## 2023-02-03 RX ADMIN — Medication 12.5 GRAM(S): at 00:10

## 2023-02-03 RX ADMIN — Medication 50 MILLILITER(S): at 17:12

## 2023-02-03 RX ADMIN — Medication 100 MILLIGRAM(S): at 05:06

## 2023-02-03 RX ADMIN — POTASSIUM PHOSPHATE, MONOBASIC POTASSIUM PHOSPHATE, DIBASIC 62.5 MILLIMOLE(S): 236; 224 INJECTION, SOLUTION INTRAVENOUS at 07:08

## 2023-02-03 RX ADMIN — Medication 25 GRAM(S): at 09:22

## 2023-02-03 RX ADMIN — Medication 50 MILLILITER(S): at 11:11

## 2023-02-03 RX ADMIN — HEPARIN SODIUM 5000 UNIT(S): 5000 INJECTION INTRAVENOUS; SUBCUTANEOUS at 05:06

## 2023-02-03 RX ADMIN — Medication 0.5 MILLIGRAM(S): at 08:12

## 2023-02-03 RX ADMIN — Medication 100 MILLIGRAM(S): at 23:23

## 2023-02-03 RX ADMIN — Medication 50 MILLILITER(S): at 06:20

## 2023-02-03 RX ADMIN — HYDROMORPHONE HYDROCHLORIDE 0.5 MILLIGRAM(S): 2 INJECTION INTRAMUSCULAR; INTRAVENOUS; SUBCUTANEOUS at 23:46

## 2023-02-03 RX ADMIN — PANTOPRAZOLE SODIUM 40 MILLIGRAM(S): 20 TABLET, DELAYED RELEASE ORAL at 09:23

## 2023-02-03 RX ADMIN — Medication 20 MILLIGRAM(S): at 10:08

## 2023-02-03 RX ADMIN — I.V. FAT EMULSION 33.3 GM/KG/DAY: 20 EMULSION INTRAVENOUS at 23:22

## 2023-02-03 RX ADMIN — CHLORHEXIDINE GLUCONATE 1 APPLICATION(S): 213 SOLUTION TOPICAL at 05:07

## 2023-02-03 RX ADMIN — HYDROMORPHONE HYDROCHLORIDE 0.5 MILLIGRAM(S): 2 INJECTION INTRAMUSCULAR; INTRAVENOUS; SUBCUTANEOUS at 15:03

## 2023-02-03 RX ADMIN — HEPARIN SODIUM 5000 UNIT(S): 5000 INJECTION INTRAVENOUS; SUBCUTANEOUS at 13:41

## 2023-02-03 RX ADMIN — Medication 400 MILLIGRAM(S): at 04:38

## 2023-02-03 RX ADMIN — Medication 1000 MILLIGRAM(S): at 04:53

## 2023-02-03 RX ADMIN — POTASSIUM PHOSPHATE, MONOBASIC POTASSIUM PHOSPHATE, DIBASIC 83.33 MILLIMOLE(S): 236; 224 INJECTION, SOLUTION INTRAVENOUS at 17:12

## 2023-02-03 RX ADMIN — Medication 50 MILLILITER(S): at 00:08

## 2023-02-03 RX ADMIN — Medication 1 EACH: at 23:17

## 2023-02-03 RX ADMIN — CEFEPIME 100 MILLIGRAM(S): 1 INJECTION, POWDER, FOR SOLUTION INTRAMUSCULAR; INTRAVENOUS at 09:22

## 2023-02-03 RX ADMIN — Medication 100 MILLIGRAM(S): at 13:41

## 2023-02-03 RX ADMIN — Medication 0.5 MILLIGRAM(S): at 20:56

## 2023-02-03 RX ADMIN — NYSTATIN CREAM 1 APPLICATION(S): 100000 CREAM TOPICAL at 17:31

## 2023-02-03 RX ADMIN — NYSTATIN CREAM 1 APPLICATION(S): 100000 CREAM TOPICAL at 05:07

## 2023-02-03 RX ADMIN — Medication 5 MILLIGRAM(S): at 23:25

## 2023-02-03 RX ADMIN — NYSTATIN CREAM 1 APPLICATION(S): 100000 CREAM TOPICAL at 23:24

## 2023-02-03 RX ADMIN — CEFEPIME 100 MILLIGRAM(S): 1 INJECTION, POWDER, FOR SOLUTION INTRAMUSCULAR; INTRAVENOUS at 23:23

## 2023-02-03 RX ADMIN — Medication 5 MILLIGRAM(S): at 15:45

## 2023-02-03 RX ADMIN — SODIUM CHLORIDE 50 MILLILITER(S): 9 INJECTION, SOLUTION INTRAVENOUS at 10:08

## 2023-02-03 RX ADMIN — Medication 2: at 17:32

## 2023-02-03 RX ADMIN — NYSTATIN CREAM 1 APPLICATION(S): 100000 CREAM TOPICAL at 09:23

## 2023-02-03 NOTE — CHART NOTE - NSCHARTNOTEFT_GEN_A_CORE
Clinical Nutrition PN Follow Up Note    * 73 y/o female with a PMH of CVA with left sided weakness, atrial fibrillation, emphysema, COPD, HTN - wears 2L NC at baseline BIBA, hx SBO and resection per EMS presents to the ED from [facility] for RLQ pain and r/o SBO. x1 episode of emesis, + profound diffuse abdominal pain, febrile. Has SBO, NGT to LWS. Taken to OR on  underwent ELAP, extensive RICHARD, ileocolectomy, ventral hernia repair with MESH. Postop course complicated with ALVERTO, shock requiring pressors, transferred to ICU. + anasarca   DNR/ DNI    *current status: weaned off pressors still w/ NGT to LWS in place, NPO.  Has been NPO x 11 days in total of hospital stay.  PPN inititated on   SBO and prolonged NPO status.  New wt obtained  244# - wt gain  severe edema (4+ generalized)  New NFPE: severe temporal, buccal, and orbital wasting. Rest of appearance being skewing by edema     *new pertinent meds: IV albumin, antibiotics, Admelog (received 2 units x 24 hours), LR, pantoprazole, hydromorphone     *labs reviewed;   Will monitor closely for refeeding syndrome.  Pt experienced hypoglycemic episodes on .  Pending triglyceride level to initiate lipids, if <400 can initiate 90g lipids daily.   Bili T WNL for trace elements.         143  |  113<H>  |  21  ----------------------------<  142<H>  3.6   |  24  |  1.28    Ca    7.2<L>      2023 05:00  Phos  1.8       Mg     1.7         TPro  4.9<L>  /  Alb  1.8<L>  /  TBili  0.4  /  DBili  x   /  AST  82<H>  /  ALT  57  /  AlkPhos  56  -    POCT Blood Glucose.: 113 mg/dL (23 @ 06:13)  POCT Blood Glucose.: 142 mg/dL (23 @ 23:06)  POCT Blood Glucose.: 201 mg/dL (23 @ 17:17)  POCT Blood Glucose.: 122 mg/dL (23 @ 12:28)    I&O's Detail    2023 07:01  -  2023 07:00  --------------------------------------------------------  IN:    IV PiggyBack: 100 mL    IV PiggyBack: 100 mL    IV PiggyBack: 700 mL    Lactated Ringers: 1265.8 mL    PRBCs (Packed Red Blood Cells): 285 mL  Total IN: 2450.8 mL    OUT:    Bulb (mL): 98 mL    Bulb (mL): 155 mL    Indwelling Catheter - Urethral (mL): 450 mL    Nasogastric/Oral tube (mL): 250 mL  Total OUT: 953 mL    Total NET: 1497.8 mL    *malnutrition: Pt continues to meet criteria for severe protein-calorie malnutrition in context of acute disease r/t decreased ability to meet increased nutrient needs 2/2 SBO AEB <50% ENN x >11 days, severe muscle/ fat wasting, severe edema    Estimated Needs: based on 97.4 Kg (wt from admit - current wts being skewed by severe fluid retention)  Calories: 1948- 2435 Kcal (20- 25 Kcal/Kg)  Protein: 146- 175 g (1.5- 1.8 g/Kg)  Fluids: 1948- 2435 mL (20- 25 mL/Kg)    Diet, NPO (23 @ 21:56) [Active]    Weight History:  Daily Weight in k.6 (2023 06:00)   - #  Height (cm): 165.1 (23 @ 16:08)  Weight (kg): 97.4 (23 @ 23:30) - admit wt ()  BMI (kg/m2): 35.7 (23 @ 23:30)  BSA (m2): 2.04 (23 @ 23:30)    CAPILLARY BLOOD GLUCOSE    Diet, NPO (23 @ 21:56) [Active]  Diet, Regular (23 @ 12:51) [Pending Verification By Attending]      Weight History:  Height (cm): 165.1 (23 @ 16:08)  Weight (kg): 97.4 (23 @ 23:30)  BMI (kg/m2): 35.7 (23 @ 23:30)  BSA (m2): 2.04 (23 @ 23:30)      **PPN recommendations as per pharmacy - see pharmacy communication note    Additional Recommendations:    1) Daily weights  2) Strict I & O's  3) Daily lyte checks including magnesium and phos  4) Weekly triglycerides/LFT checks  5) POCT q6hrs; maintain 140-180mg/dL  6) if on PN > 6 days, consider placing PICC line to meet 100% of nutr needs  or Confirm goals of care regarding LONG-TERM nutrition support  7) Macronutrients for TPN:  g AA,  g lipids, and goal of  g dextrose. Titrate dextrose up 50-75g/day until goal met. Once goal met, can cycle TPN x 12 hours.      Wanda Walters RDN, CDN, [facility]      [phone]   [email or web address]

## 2023-02-03 NOTE — PHYSICAL THERAPY INITIAL EVALUATION ADULT - PASSIVE RANGE OF MOTION EXAMINATION, REHAB EVAL
L UE NTd due to blood running. Hand and wrist WFLs  B ankle PF contractures ,/bilateral upper extremity Passive ROM was WFL (within functional limits)/bilateral lower extremity Passive ROM was WFL (within functional limits)

## 2023-02-03 NOTE — OCCUPATIONAL THERAPY INITIAL EVALUATION ADULT - RANGE OF MOTION EXAMINATION
RUE: SF ~60 degrees, wrist WFL, elbow ~120/130 degrees, hand WFL (weeping edema noted), LUE: SF ~75 degrees, full elbow flexion ~ degrees, extension full, wrist extension ~neutral, wrist flexion WFL, hand: 1st digit IP joint slightly flexed, 2nd digit DIP jt no mvt, PIP fixed in slight flexion, 3rd digit- DIP jt WFL, PIP fixed in slight flexion, 4th digit: DIP minimal movement at DIP,/PIP in slight flexion, 5th digit: DIP WFL, PIP fixed in slight flexion. tremor noted on LUE. RUE: SF ~60 degrees, wrist WFL, elbow ~120/130 degrees, hand WFL (weeping edema noted), LUE: SF ~75 degrees, full elbow flexion ~ degrees, extension full, wrist extension ~neutral, wrist flexion WFL, hand: 1st digit IP joint slightly flexed, 2nd digit DIP jt no mvt, PIP fixed in slight flexion, 3rd digit- DIP jt WFL, PIP fixed in slight flexion, 4th digit: DIP minimal movement/PIP in slight flexion, 5th digit: DIP WFL, PIP fixed in slight flexion. tremor noted on LUE.

## 2023-02-03 NOTE — PHYSICAL THERAPY INITIAL EVALUATION ADULT - DIAGNOSIS, PT EVAL
SBO - minimal ngt output, states passing gas, start clears, being treated conservatively, ischemic mesentery
SBO -s/p ex lap with extensive RICHARD, ileocolectomy, ventral hernia repair with mesh,

## 2023-02-03 NOTE — PROGRESS NOTE ADULT - ASSESSMENT
71 y/o female with:     SBO now s/p ex lap with extensive RICHARD, ileocolectomy, ventral hernia repair with mesh, POD 1  Hx previous multiple abd surgeries     Septic shock   ALVERTO due to ATN     PMH R sided CVA with L hemiparesis, pA.fib on warfarin, COPD on home O2       Plan:     Improving  Off pressor   Resp stable   Transfuse 1 more PRBC today   Continue albumin   Give lasix as developing anasarca   Continue PPN, start trickle feeds through NGT, d/w surgery   Empiric cefepime, flagyl  Renal fn stable  Maintain [place], will d/c central line later today    Start sc heparin for DVT ppx   DNR, DNI     Plan d/w surgery     Patient critically ill     Son updated (Dr Moe Matute, [phone])

## 2023-02-03 NOTE — OCCUPATIONAL THERAPY INITIAL EVALUATION ADULT - NSACTIVITYREC_GEN_A_OT
Recommend daily PROM to b/l UE's for proper positioning, maintain ROM, and prevent contractures. Patient is not an acute OT is not a rehab candidate, not able to follow commands, dependant at baseline for ADL/mobility tasks per medical records. Will d/c from OT services, RN informed.

## 2023-02-03 NOTE — PROGRESS NOTE ADULT - ASSESSMENT
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 She was noted with one episode of emesis and diffuse abdominal pain associated with fever. On 1/27 the patient was noted febrile to 102.8F, more lethargic, did not open eyes, did not follow commands. She received cefepime and metronidazole.     1. Febrile syndrome. UTI with KLPN. Right lower lobes nodules Probable pneumonia. Intraabdominal hernia related fat necrosis with superimposed infection with KLOX. SBO and ventral hernia s/p surgery. Allergy to cipro. Encephalopathy.   -leukocytosis improving  -f/u surgical c/s  -low grade fever  -BC x 2 noted  -urine c/s noted  -on cefepime 2 gm IV q12h and metronidazole 500 mg IV q8h # 7  -tolerating abx well so far; no side effects noted  -aspiration precautions  -continue abx coverage   -monitor abdomen  -monitor temps  -f/u CBC  -supportive care  2. Other issues:   -care per medicine    d/w Dr. Castro

## 2023-02-03 NOTE — PHYSICAL THERAPY INITIAL EVALUATION ADULT - PERTINENT HX OF CURRENT PROBLEM, REHAB EVAL
71 y/o female with a PMHx of CVA with left sided weakness, atrial fibrillation on Coumadin, COPD  - wears 2L NC at baseline. Pt was brought in from skilled nursing facility for abdominal pain emesis and weakness  In ED CT abdomen Pelvis showed,  Large ventral hernia with SBO.
73 y/o female with a PMHx of CVA with left sided weakness, atrial fibrillation on Coumadin, COPD  - wears 2L NC at baseline. Pt was brought in from skilled nursing facility for abdominal pain emesis and weakness  In ED CT abdomen Pelvis showed,  Large ventral hernia with SBO. Now s/p abdominal surgery.

## 2023-02-03 NOTE — OCCUPATIONAL THERAPY INITIAL EVALUATION ADULT - ADDITIONAL COMMENTS
Per [facility]: resident of [facility], pt is bedbound at NH and dependent for all mobility/care. Patient is an unreliable historian, PLOF and history obtained from [facility]: pt resident of [facility], pt is bedbound at NH and dependent for all mobility/care.

## 2023-02-03 NOTE — PHARMACY COMMUNICATION NOTE - COMMENTS
Pharmacy PN Follow Up Note    *new pertinent meds: Supplementation outside PPN bag on morning of 2/3, KPhos 15 mmol and Magnesium sulfate 2gm    *labs reviewed; added KPhos 10 mmol, increased Mg to 10 mEq, triglyceride level resulted (108) so added 80gm lipids    02-03    143  |  113<H>  |  21  ----------------------------<  142<H>  3.6   |  24  |  1.28    Ca    7.2<L>      03 Feb 2023 05:00  Phos  1.8     02-03  Mg     1.7     02-03    TPro  4.9<L>  /  Alb  1.8<L>  /  TBili  0.4  /  DBili  x   /  AST  82<H>  /  ALT  57  /  AlkPhos  56  02-03      BMI: BMI (kg/m2): 35.7 (01-20-23 @ 23:30)  HbA1c: A1C with Estimated Average Glucose Result: 6.0 % (12-11-22 @ 08:00)    Glucose: POCT Blood Glucose.: 136 mg/dL (02-03-23 @ 04:58)    BP: 156/56 (02-03-23 @ 09:15) (76/45 - 163/62)  Lipid Panel: Date/Time: 02-03-23 @ 05:00  Cholesterol, Serum: --  Direct LDL: --  HDL Cholesterol, Serum: --  Total Cholesterol/HDL Ration Measurement: --  Triglycerides, Serum: 108    POCT Blood Glucose.: 136 mg/dL (02-03-23 @ 04:58)  POCT Blood Glucose.: 138 mg/dL (02-03-23 @ 00:45)  POCT Blood Glucose.: 66 mg/dL (02-02-23 @ 23:50)  POCT Blood Glucose.: 52 mg/dL (02-02-23 @ 23:45)  POCT Blood Glucose.: 100 mg/dL (02-02-23 @ 17:39)  POCT Blood Glucose.: 97 mg/dL (02-02-23 @ 12:16)        *I&O's Detail    02 Feb 2023 07:01  -  03 Feb 2023 07:00  --------------------------------------------------------  IN:    IV PiggyBack: 100 mL    IV PiggyBack: 100 mL    IV PiggyBack: 700 mL    Lactated Ringers: 1265.8 mL    PRBCs (Packed Red Blood Cells): 285 mL  Total IN: 2450.8 mL    OUT:    Bulb (mL): 98 mL    Bulb (mL): 155 mL    Indwelling Catheter - Urethral (mL): 450 mL    Nasogastric/Oral tube (mL): 250 mL  Total OUT: 953 mL    Total NET: 1497.8 mL              *will continue to monitor and adjust PN prn*    PPN Recommendations: via peripheral line  Total Volume: 2000 mL  80 g  Amino Acids  100 g Dextrose  80 g Lipids 20%  121 mEq Sodium Chloride  6 mEq Sodium Acetate  20 mmol Sodium Phosphate  33 mEq Potassium Chloride  27 mEq Potassium Acetate  10 mmol Potassium Phosphate  0 mEq Calcium Gluconate  10 mEq Magnesium Sulfate  200 mg Thiamine  1 ml Trace Elements  10 ml MVI    Total Calories: 1460 kcal     (Provides 60% of daily energy requirements and 46% of daily protein requirements)    (osmolarity = 898)

## 2023-02-03 NOTE — PHYSICAL THERAPY INITIAL EVALUATION ADULT - NSACTIVITYREC_GEN_A_PT
Maximal Assistance Lift, such as Jeevan, is recommended for OOB transfers for safe staff and patient handling.

## 2023-02-03 NOTE — OCCUPATIONAL THERAPY INITIAL EVALUATION ADULT - PERTINENT HX OF CURRENT PROBLEM, REHAB EVAL
73 y/o female with a PMHx of CVA with left sided weakness, atrial fibrillation, emphysema, COPD, HTN - wears 2L NC at baseline BIBA, hx SBO and resection per EMS presents to the ED from [facility] for RLQ pain and r/o SBO. x1 episode of emesis, + profound diffuse abdominal pain, febrile. ED CT abdomen Pelvis showed,  Large ventral hernia with SBO. Patient was taken to OR for a ventral hernia repair night of 1/31/23. Due to persistent hypotension requiring Juan drip, pt was transferred to ICU post operatively.

## 2023-02-03 NOTE — PROGRESS NOTE ADULT - SUBJECTIVE AND OBJECTIVE BOX
Patient is a 72y old  Female who presents with a chief complaint of bowel obstruction/ischemic mesentery (03 Feb 2023 14:00)    24 hour events: remains off pressors  resp stable     Allergies    Cipro (Rash)  Spiriva (Rash)    Intolerances      REVIEW OF SYSTEMS: SEE BELOW       ICU Vital Signs Last 24 Hrs  T(C): 36.8 (03 Feb 2023 11:15), Max: 37.4 (03 Feb 2023 02:00)  T(F): 98.2 (03 Feb 2023 11:15), Max: 99.3 (03 Feb 2023 02:00)  HR: 88 (03 Feb 2023 12:00) (82 - 101)  BP: 160/65 (03 Feb 2023 12:00) (105/40 - 163/62)  BP(mean): 87 (03 Feb 2023 12:00) (56 - 88)  ABP: 158/94 (03 Feb 2023 12:00) (92/68 - 170/72)  ABP(mean): 118 (03 Feb 2023 12:00) (62 - 168)  RR: 20 (03 Feb 2023 12:00) (14 - 23)  SpO2: 100% (03 Feb 2023 12:00) (87% - 100%)    O2 Parameters below as of 03 Feb 2023 12:00  Patient On (Oxygen Delivery Method): nasal cannula  O2 Flow (L/min): 2          CAPILLARY BLOOD GLUCOSE      POCT Blood Glucose.: 146 mg/dL (03 Feb 2023 11:15)  POCT Blood Glucose.: 136 mg/dL (03 Feb 2023 04:58)  POCT Blood Glucose.: 138 mg/dL (03 Feb 2023 00:45)  POCT Blood Glucose.: 66 mg/dL (02 Feb 2023 23:50)  POCT Blood Glucose.: 52 mg/dL (02 Feb 2023 23:45)  POCT Blood Glucose.: 100 mg/dL (02 Feb 2023 17:39)      I&O's Summary    02 Feb 2023 07:01  -  03 Feb 2023 07:00  --------------------------------------------------------  IN: 2450.8 mL / OUT: 953 mL / NET: 1497.8 mL    03 Feb 2023 07:01  -  03 Feb 2023 14:12  --------------------------------------------------------  IN: 413 mL / OUT: 1310 mL / NET: -897 mL            MEDICATIONS  (STANDING):  albumin human 25% IVPB 50 milliLiter(s) IV Intermittent every 6 hours  buDESOnide    Inhalation Suspension 0.5 milliGRAM(s) Inhalation every 12 hours  cefepime   IVPB 2000 milliGRAM(s) IV Intermittent every 12 hours  chlorhexidine 4% Liquid 1 Application(s) Topical <User Schedule>  coronavirus bivalent (EUA) Booster Vaccine (PFIZER) 0.3 milliLiter(s) IntraMuscular once  dextrose 5%. 1000 milliLiter(s) (50 mL/Hr) IV Continuous <Continuous>  dextrose 5%. 1000 milliLiter(s) (100 mL/Hr) IV Continuous <Continuous>  dextrose 50% Injectable 25 Gram(s) IV Push once  dextrose 50% Injectable 12.5 Gram(s) IV Push once  dextrose 50% Injectable 25 Gram(s) IV Push once  fat emulsion (Fish Oil and Plant Based) 20% Infusion 0.82 Gm/kG/Day (33.3 mL/Hr) IV Continuous <Continuous>  glucagon  Injectable 1 milliGRAM(s) IntraMuscular once  heparin   Injectable 5000 Unit(s) SubCutaneous every 8 hours  insulin lispro (ADMELOG) corrective regimen sliding scale   SubCutaneous every 6 hours  lactated ringers. 1000 milliLiter(s) (50 mL/Hr) IV Continuous <Continuous>  levothyroxine Injectable 70 MICROGram(s) IV Push at bedtime  metroNIDAZOLE  IVPB 500 milliGRAM(s) IV Intermittent every 8 hours  norepinephrine Infusion 0.05 MICROgram(s)/kG/Min (9.13 mL/Hr) IV Continuous <Continuous>  nystatin Cream 1 Application(s) Topical two times a day  nystatin Powder 1 Application(s) Topical every 12 hours  pantoprazole  Injectable 40 milliGRAM(s) IV Push daily  Parenteral Nutrition - Adult 1 Each (83 mL/Hr) TPN Continuous <Continuous>  Parenteral Nutrition - Adult 1 Each (83 mL/Hr) TPN Continuous <Continuous>      MEDICATIONS  (PRN):  albuterol    90 MICROgram(s) HFA Inhaler 2 Puff(s) Inhalation every 6 hours PRN Shortness of Breath and/or Wheezing  dextrose Oral Gel 15 Gram(s) Oral once PRN Blood Glucose LESS THAN 70 milliGRAM(s)/deciliter  HYDROmorphone  Injectable 0.5 milliGRAM(s) IV Push every 3 hours PRN Severe Pain (7 - 10)  sodium chloride 0.9% lock flush 10 milliLiter(s) IV Push every 1 hour PRN Pre/post blood products, medications, blood draw, and to maintain line patency      PHYSICAL EXAM: SEE BELOW                          7.2    14.19 )-----------( 405      ( 03 Feb 2023 05:00 )             23.4       02-03    143  |  113<H>  |  21  ----------------------------<  142<H>  3.6   |  24  |  1.28    Ca    7.2<L>      03 Feb 2023 05:00  Phos  1.8     02-03  Mg     1.7     02-03    TPro  4.9<L>  /  Alb  1.8<L>  /  TBili  0.4  /  DBili  x   /  AST  82<H>  /  ALT  57  /  AlkPhos  56  02-03              .Tissue INTRAPERITONEAL ABCESS FOR CX   Growth in fluid media only Klebsiella oxytoca/Raoultella ornithinolytica   Rare polymorphonuclear leukocytes seen per low power field  No organisms seen per oil power field 01-31 @ 18:07

## 2023-02-03 NOTE — OCCUPATIONAL THERAPY INITIAL EVALUATION ADULT - GENERAL OBSERVATIONS, REHAB EVAL
Pt rec'd/left Pt rec'd/left semi-supine in bed, ICU lines/drains/monitors intact, alarms on, +NG tube, pink bandages on b/l UE's, edema noted in b/l UE's, +b/l SCD's and heel booties.

## 2023-02-03 NOTE — PROGRESS NOTE ADULT - SUBJECTIVE AND OBJECTIVE BOX
POD 3  Resting comfortably, no major complaints  VSS afeb  lungs- few rhonchi  Cor- RRR  Abd- + BS soft non tender. wound clean, patches of echymosis, lower pole  Ext- + anasarca

## 2023-02-03 NOTE — PHYSICAL THERAPY INITIAL EVALUATION ADULT - GENERAL OBSERVATIONS, REHAB EVAL
Patient received in bed in ICU, awake, talkative. +ICU monitors, +NG tube, +B SCDs. +B CAIR boots, +IV, +O2@2L via nc. +Bloor transfusion running. +Woodruff,+abdominal drains.
Pt rec'd supine in bed, minimally responsive to name only, in no distress, but unable to participate or follow any commands.

## 2023-02-03 NOTE — PROGRESS NOTE ADULT - SUBJECTIVE AND OBJECTIVE BOX
Subjective:    Home Medications:  Albuterol (Eqv-ProAir HFA) 90 mcg/inh inhalation aerosol: 1 puff(s) inhaled every 6 hours, As Needed (20 Jan 2023 16:49)  amiodarone 200 mg oral tablet: 1 tab(s) orally once a day (20 Jan 2023 16:49)  ascorbic acid 500 mg oral tablet: 2 tab(s) orally once a day (20 Jan 2023 16:49)  atorvastatin 10 mg oral tablet: 1 tab(s) orally once a day (at bedtime) (20 Jan 2023 16:49)  benzonatate 100 mg oral capsule: 1 cap(s) orally 3 times a day (20 Jan 2023 21:48)  budesonide 0.5 mg/2 mL inhalation suspension: 2 milliliter(s) inhaled 2 times a day (20 Jan 2023 21:48)  Cepacol Sore Throat 15 mg-3.6 mg mucous membrane lozenge: 1 lozenge mucous membrane every 4 hours, As Needed (20 Jan 2023 21:48)  cholecalciferol 1250 mcg (50,000 intl units) oral capsule: 1 cap(s) orally every 4 weeks on Wednesday  (20 Jan 2023 16:49)  Coumadin 2.5 mg oral tablet: 1 tab(s) orally once a day (at bedtime)  ***ON HOLD from 1-16 to 1-21*** (20 Jan 2023 21:48)  Cranberry oral tablet: 1 tab(s) orally 2 times a day (20 Jan 2023 16:49)  cyanocobalamin 1000 mcg oral tablet: 1 tab(s) orally once a day (20 Jan 2023 16:49)  dilTIAZem 180 mg/24 hours oral capsule, extended release: 1 cap(s) orally once a day (20 Jan 2023 16:49)  DULoxetine 60 mg oral delayed release capsule: 1 cap(s) orally once a day (20 Jan 2023 16:49)  estradiol 0.1 mg/g vaginal cream: 0.5 gram(s) vaginal 2 times a week on Monday and Thursday (20 Jan 2023 21:48)  fexofenadine 180 mg oral tablet: 1 tab(s) orally once a day (20 Jan 2023 21:48)  fluticasone 50 mcg/inh nasal spray: 1 spray(s) nasal 2 times a day (20 Jan 2023 16:49)  furosemide 20 mg oral tablet: 1 tab(s) orally once a day (20 Jan 2023 16:49)  glipiZIDE 10 mg oral tablet, extended release: 2 tab(s) orally once a day (20 Jan 2023 16:49)  GlycoLax oral powder for reconstitution: 17 gram(s) orally 2 times a day (20 Jan 2023 16:49)  guaiFENesin 100 mg/5 mL oral liquid: 20 milliliter(s) orally every 6 hours (20 Jan 2023 21:48)  HumaLOG KwikPen 100 units/mL injectable solution: 10 unit(s) injectable 3 times a day (before meals) (20 Jan 2023 21:48)  ipratropium-albuterol 20 mcg-100 mcg/inh inhalation aerosol: 1 puff(s) inhaled 4 times a day (20 Jan 2023 16:49)  levothyroxine 88 mcg (0.088 mg) oral tablet: 1 tab(s) orally once a day (at bedtime) (20 Jan 2023 16:49)  losartan 25 mg oral tablet: 1 tab(s) orally once a day (20 Jan 2023 16:49)  Macrobid 100 mg oral capsule: 1 cap(s) orally 2 times a day for 5 days  ***course complete*** (20 Jan 2023 21:48)  methocarbamol 750 mg oral tablet: 1 tab(s) orally every 8 hours, As Needed (20 Jan 2023 21:48)  metoprolol tartrate 25 mg oral tablet: 1 tab(s) orally 2 times a day (20 Jan 2023 16:49)  Milk of Magnesia 8% oral suspension: 30 milliliter(s) orally once a day, As Needed (20 Jan 2023 16:49)  montelukast 10 mg oral tablet: 1 tab(s) orally once a day (at bedtime) (20 Jan 2023 21:48)  ondansetron 4 mg oral tablet: 1 tab(s) orally every 4 hours, As Needed (20 Jan 2023 21:48)  oxybutynin 5 mg oral tablet: 1 tab(s) orally 2 times a day (20 Jan 2023 16:49)  oxyCODONE 5 mg oral tablet: 1 tab(s) orally every 4 hours, As Needed (20 Jan 2023 16:49)  phytonadione 5 mg oral tablet: 0.5 tab(s) orally once  ***given at [facility] once on 1-19-23*** (20 Jan 2023 21:48)  pregabalin 100 mg oral capsule: 1 cap(s) orally 3 times a day (20 Jan 2023 16:49)  sennosides-docusate 8.6 mg-50 mg oral tablet: 2 tab(s) orally once a day (at bedtime) (20 Jan 2023 16:49)  Tradjenta 5 mg oral tablet: 1 tab(s) orally once a day (20 Jan 2023 16:49)  Tylenol 500 mg oral tablet: 2 tab(s) orally every 8 hours (20 Jan 2023 16:49)    MEDICATIONS  (STANDING):  albumin human 25% IVPB 50 milliLiter(s) IV Intermittent every 6 hours  buDESOnide    Inhalation Suspension 0.5 milliGRAM(s) Inhalation every 12 hours  cefepime   IVPB 2000 milliGRAM(s) IV Intermittent every 12 hours  chlorhexidine 4% Liquid 1 Application(s) Topical <User Schedule>  coronavirus bivalent (EUA) Booster Vaccine (PFIZER) 0.3 milliLiter(s) IntraMuscular once  dextrose 5%. 1000 milliLiter(s) (50 mL/Hr) IV Continuous <Continuous>  dextrose 5%. 1000 milliLiter(s) (100 mL/Hr) IV Continuous <Continuous>  dextrose 50% Injectable 25 Gram(s) IV Push once  dextrose 50% Injectable 12.5 Gram(s) IV Push once  dextrose 50% Injectable 25 Gram(s) IV Push once  furosemide   Injectable 20 milliGRAM(s) IV Push once  glucagon  Injectable 1 milliGRAM(s) IntraMuscular once  heparin   Injectable 5000 Unit(s) SubCutaneous every 8 hours  insulin lispro (ADMELOG) corrective regimen sliding scale   SubCutaneous every 6 hours  lactated ringers. 1000 milliLiter(s) (50 mL/Hr) IV Continuous <Continuous>  levothyroxine Injectable 70 MICROGram(s) IV Push at bedtime  magnesium sulfate  IVPB 2 Gram(s) IV Intermittent once  metroNIDAZOLE  IVPB 500 milliGRAM(s) IV Intermittent every 8 hours  norepinephrine Infusion 0.05 MICROgram(s)/kG/Min (9.13 mL/Hr) IV Continuous <Continuous>  nystatin Cream 1 Application(s) Topical two times a day  nystatin Powder 1 Application(s) Topical every 12 hours  pantoprazole  Injectable 40 milliGRAM(s) IV Push daily  Parenteral Nutrition - Adult 1 Each (83 mL/Hr) TPN Continuous <Continuous>    MEDICATIONS  (PRN):  albuterol    90 MICROgram(s) HFA Inhaler 2 Puff(s) Inhalation every 6 hours PRN Shortness of Breath and/or Wheezing  dextrose Oral Gel 15 Gram(s) Oral once PRN Blood Glucose LESS THAN 70 milliGRAM(s)/deciliter  HYDROmorphone  Injectable 0.5 milliGRAM(s) IV Push every 3 hours PRN Severe Pain (7 - 10)  sodium chloride 0.9% lock flush 10 milliLiter(s) IV Push every 1 hour PRN Pre/post blood products, medications, blood draw, and to maintain line patency      Allergies    Cipro (Rash)  Spiriva (Rash)    Intolerances        Vital Signs Last 24 Hrs  T(C): 37.4 (03 Feb 2023 06:00), Max: 37.4 (03 Feb 2023 02:00)  T(F): 99.3 (03 Feb 2023 06:00), Max: 99.3 (03 Feb 2023 02:00)  HR: 101 (03 Feb 2023 08:00) (79 - 101)  BP: 156/68 (03 Feb 2023 08:00) (96/41 - 162/63)  BP(mean): 88 (03 Feb 2023 08:00) (54 - 88)  RR: 23 (03 Feb 2023 08:00) (14 - 23)  SpO2: 96% (03 Feb 2023 08:00) (87% - 100%)    Parameters below as of 03 Feb 2023 08:00  Patient On (Oxygen Delivery Method): nasal cannula  O2 Flow (L/min): 2        PHYSICAL EXAMINATION:    NECK:  Supple. No lymphadenopathy. Jugular venous pressure not elevated. Carotids equal.   HEART:   The cardiac impulse has a normal quality. Reg., Nl S1 and S2.  There are no murmurs, rubs or gallops noted  CHEST:  Chest is clear to auscultation. Normal respiratory effort.  ABDOMEN:  Soft and nontender.   EXTREMITIES:  There is no edema.       LABS:                        7.2    14.19 )-----------( 405      ( 03 Feb 2023 05:00 )             23.4     02-03    143  |  113<H>  |  21  ----------------------------<  142<H>  3.6   |  24  |  1.28    Ca    7.2<L>      03 Feb 2023 05:00  Phos  1.8     02-03  Mg     1.7     02-03    TPro  4.9<L>  /  Alb  1.8<L>  /  TBili  0.4  /  DBili  x   /  AST  82<H>  /  ALT  57  /  AlkPhos  56  02-03               Subjective:    pat getting blood transfusion, on TPN, IV fluids, iv lasix post transfusion, on 3lpm nc sat 100%    Home Medications:  Albuterol (Eqv-ProAir HFA) 90 mcg/inh inhalation aerosol: 1 puff(s) inhaled every 6 hours, As Needed (20 Jan 2023 16:49)  amiodarone 200 mg oral tablet: 1 tab(s) orally once a day (20 Jan 2023 16:49)  ascorbic acid 500 mg oral tablet: 2 tab(s) orally once a day (20 Jan 2023 16:49)  atorvastatin 10 mg oral tablet: 1 tab(s) orally once a day (at bedtime) (20 Jan 2023 16:49)  benzonatate 100 mg oral capsule: 1 cap(s) orally 3 times a day (20 Jan 2023 21:48)  budesonide 0.5 mg/2 mL inhalation suspension: 2 milliliter(s) inhaled 2 times a day (20 Jan 2023 21:48)  Cepacol Sore Throat 15 mg-3.6 mg mucous membrane lozenge: 1 lozenge mucous membrane every 4 hours, As Needed (20 Jan 2023 21:48)  cholecalciferol 1250 mcg (50,000 intl units) oral capsule: 1 cap(s) orally every 4 weeks on Wednesday  (20 Jan 2023 16:49)  Coumadin 2.5 mg oral tablet: 1 tab(s) orally once a day (at bedtime)  ***ON HOLD from 1-16 to 1-21*** (20 Jan 2023 21:48)  Cranberry oral tablet: 1 tab(s) orally 2 times a day (20 Jan 2023 16:49)  cyanocobalamin 1000 mcg oral tablet: 1 tab(s) orally once a day (20 Jan 2023 16:49)  dilTIAZem 180 mg/24 hours oral capsule, extended release: 1 cap(s) orally once a day (20 Jan 2023 16:49)  DULoxetine 60 mg oral delayed release capsule: 1 cap(s) orally once a day (20 Jan 2023 16:49)  estradiol 0.1 mg/g vaginal cream: 0.5 gram(s) vaginal 2 times a week on Monday and Thursday (20 Jan 2023 21:48)  fexofenadine 180 mg oral tablet: 1 tab(s) orally once a day (20 Jan 2023 21:48)  fluticasone 50 mcg/inh nasal spray: 1 spray(s) nasal 2 times a day (20 Jan 2023 16:49)  furosemide 20 mg oral tablet: 1 tab(s) orally once a day (20 Jan 2023 16:49)  glipiZIDE 10 mg oral tablet, extended release: 2 tab(s) orally once a day (20 Jan 2023 16:49)  GlycoLax oral powder for reconstitution: 17 gram(s) orally 2 times a day (20 Jan 2023 16:49)  guaiFENesin 100 mg/5 mL oral liquid: 20 milliliter(s) orally every 6 hours (20 Jan 2023 21:48)  HumaLOG KwikPen 100 units/mL injectable solution: 10 unit(s) injectable 3 times a day (before meals) (20 Jan 2023 21:48)  ipratropium-albuterol 20 mcg-100 mcg/inh inhalation aerosol: 1 puff(s) inhaled 4 times a day (20 Jan 2023 16:49)  levothyroxine 88 mcg (0.088 mg) oral tablet: 1 tab(s) orally once a day (at bedtime) (20 Jan 2023 16:49)  losartan 25 mg oral tablet: 1 tab(s) orally once a day (20 Jan 2023 16:49)  Macrobid 100 mg oral capsule: 1 cap(s) orally 2 times a day for 5 days  ***course complete*** (20 Jan 2023 21:48)  methocarbamol 750 mg oral tablet: 1 tab(s) orally every 8 hours, As Needed (20 Jan 2023 21:48)  metoprolol tartrate 25 mg oral tablet: 1 tab(s) orally 2 times a day (20 Jan 2023 16:49)  Milk of Magnesia 8% oral suspension: 30 milliliter(s) orally once a day, As Needed (20 Jan 2023 16:49)  montelukast 10 mg oral tablet: 1 tab(s) orally once a day (at bedtime) (20 Jan 2023 21:48)  ondansetron 4 mg oral tablet: 1 tab(s) orally every 4 hours, As Needed (20 Jan 2023 21:48)  oxybutynin 5 mg oral tablet: 1 tab(s) orally 2 times a day (20 Jan 2023 16:49)  oxyCODONE 5 mg oral tablet: 1 tab(s) orally every 4 hours, As Needed (20 Jan 2023 16:49)  phytonadione 5 mg oral tablet: 0.5 tab(s) orally once  ***given at [facility] once on 1-19-23*** (20 Jan 2023 21:48)  pregabalin 100 mg oral capsule: 1 cap(s) orally 3 times a day (20 Jan 2023 16:49)  sennosides-docusate 8.6 mg-50 mg oral tablet: 2 tab(s) orally once a day (at bedtime) (20 Jan 2023 16:49)  Tradjenta 5 mg oral tablet: 1 tab(s) orally once a day (20 Jan 2023 16:49)  Tylenol 500 mg oral tablet: 2 tab(s) orally every 8 hours (20 Jan 2023 16:49)    MEDICATIONS  (STANDING):  albumin human 25% IVPB 50 milliLiter(s) IV Intermittent every 6 hours  buDESOnide    Inhalation Suspension 0.5 milliGRAM(s) Inhalation every 12 hours  cefepime   IVPB 2000 milliGRAM(s) IV Intermittent every 12 hours  chlorhexidine 4% Liquid 1 Application(s) Topical <User Schedule>  coronavirus bivalent (EUA) Booster Vaccine (PFIZER) 0.3 milliLiter(s) IntraMuscular once  dextrose 5%. 1000 milliLiter(s) (50 mL/Hr) IV Continuous <Continuous>  dextrose 5%. 1000 milliLiter(s) (100 mL/Hr) IV Continuous <Continuous>  dextrose 50% Injectable 25 Gram(s) IV Push once  dextrose 50% Injectable 12.5 Gram(s) IV Push once  dextrose 50% Injectable 25 Gram(s) IV Push once  furosemide   Injectable 20 milliGRAM(s) IV Push once  glucagon  Injectable 1 milliGRAM(s) IntraMuscular once  heparin   Injectable 5000 Unit(s) SubCutaneous every 8 hours  insulin lispro (ADMELOG) corrective regimen sliding scale   SubCutaneous every 6 hours  lactated ringers. 1000 milliLiter(s) (50 mL/Hr) IV Continuous <Continuous>  levothyroxine Injectable 70 MICROGram(s) IV Push at bedtime  magnesium sulfate  IVPB 2 Gram(s) IV Intermittent once  metroNIDAZOLE  IVPB 500 milliGRAM(s) IV Intermittent every 8 hours  norepinephrine Infusion 0.05 MICROgram(s)/kG/Min (9.13 mL/Hr) IV Continuous <Continuous>  nystatin Cream 1 Application(s) Topical two times a day  nystatin Powder 1 Application(s) Topical every 12 hours  pantoprazole  Injectable 40 milliGRAM(s) IV Push daily  Parenteral Nutrition - Adult 1 Each (83 mL/Hr) TPN Continuous <Continuous>    MEDICATIONS  (PRN):  albuterol    90 MICROgram(s) HFA Inhaler 2 Puff(s) Inhalation every 6 hours PRN Shortness of Breath and/or Wheezing  dextrose Oral Gel 15 Gram(s) Oral once PRN Blood Glucose LESS THAN 70 milliGRAM(s)/deciliter  HYDROmorphone  Injectable 0.5 milliGRAM(s) IV Push every 3 hours PRN Severe Pain (7 - 10)  sodium chloride 0.9% lock flush 10 milliLiter(s) IV Push every 1 hour PRN Pre/post blood products, medications, blood draw, and to maintain line patency      Allergies    Cipro (Rash)  Spiriva (Rash)    Intolerances        Vital Signs Last 24 Hrs  T(C): 37.4 (03 Feb 2023 06:00), Max: 37.4 (03 Feb 2023 02:00)  T(F): 99.3 (03 Feb 2023 06:00), Max: 99.3 (03 Feb 2023 02:00)  HR: 101 (03 Feb 2023 08:00) (79 - 101)  BP: 156/68 (03 Feb 2023 08:00) (96/41 - 162/63)  BP(mean): 88 (03 Feb 2023 08:00) (54 - 88)  RR: 23 (03 Feb 2023 08:00) (14 - 23)  SpO2: 96% (03 Feb 2023 08:00) (87% - 100%)    Parameters below as of 03 Feb 2023 08:00  Patient On (Oxygen Delivery Method): nasal cannula  O2 Flow (L/min): 2        PHYSICAL EXAMINATION:    NECK:  Supple. No lymphadenopathy. Jugular venous pressure not elevated. Carotids equal.   HEART:   The cardiac impulse has a normal quality. Reg., Nl S1 and S2.  There are no murmurs, rubs or gallops noted  CHEST:  Chest crackles to auscultation. Normal respiratory effort.  ABDOMEN:  Soft and nontender.   EXTREMITIES:  There is no edema.       LABS:                        7.2    14.19 )-----------( 405      ( 03 Feb 2023 05:00 )             23.4     02-03    143  |  113<H>  |  21  ----------------------------<  142<H>  3.6   |  24  |  1.28    Ca    7.2<L>      03 Feb 2023 05:00  Phos  1.8     02-03  Mg     1.7     02-03    TPro  4.9<L>  /  Alb  1.8<L>  /  TBili  0.4  /  DBili  x   /  AST  82<H>  /  ALT  57  /  AlkPhos  56  02-03

## 2023-02-03 NOTE — PROGRESS NOTE ADULT - SUBJECTIVE AND OBJECTIVE BOX
Date of service: 02-03-23 @ 10:49    Lying in bed in NAD  NGT in place  NPO on PPN  Has low grade fever    ROS: limited, no fever or chills; abdomen feels distended    MEDICATIONS  (STANDING):  albumin human 25% IVPB 50 milliLiter(s) IV Intermittent every 6 hours  buDESOnide    Inhalation Suspension 0.5 milliGRAM(s) Inhalation every 12 hours  cefepime   IVPB 2000 milliGRAM(s) IV Intermittent every 12 hours  chlorhexidine 4% Liquid 1 Application(s) Topical <User Schedule>  coronavirus bivalent (EUA) Booster Vaccine (PFIZER) 0.3 milliLiter(s) IntraMuscular once  dextrose 5%. 1000 milliLiter(s) (50 mL/Hr) IV Continuous <Continuous>  dextrose 5%. 1000 milliLiter(s) (100 mL/Hr) IV Continuous <Continuous>  dextrose 50% Injectable 25 Gram(s) IV Push once  dextrose 50% Injectable 12.5 Gram(s) IV Push once  dextrose 50% Injectable 25 Gram(s) IV Push once  fat emulsion (Fish Oil and Plant Based) 20% Infusion 0.82 Gm/kG/Day (33.3 mL/Hr) IV Continuous <Continuous>  glucagon  Injectable 1 milliGRAM(s) IntraMuscular once  heparin   Injectable 5000 Unit(s) SubCutaneous every 8 hours  insulin lispro (ADMELOG) corrective regimen sliding scale   SubCutaneous every 6 hours  lactated ringers. 1000 milliLiter(s) (50 mL/Hr) IV Continuous <Continuous>  levothyroxine Injectable 70 MICROGram(s) IV Push at bedtime  metroNIDAZOLE  IVPB 500 milliGRAM(s) IV Intermittent every 8 hours  norepinephrine Infusion 0.05 MICROgram(s)/kG/Min (9.13 mL/Hr) IV Continuous <Continuous>  nystatin Cream 1 Application(s) Topical two times a day  nystatin Powder 1 Application(s) Topical every 12 hours  pantoprazole  Injectable 40 milliGRAM(s) IV Push daily  Parenteral Nutrition - Adult 1 Each (83 mL/Hr) TPN Continuous <Continuous>  Parenteral Nutrition - Adult 1 Each (83 mL/Hr) TPN Continuous <Continuous>    Vital Signs Last 24 Hrs  T(C): 37.1 (03 Feb 2023 09:15), Max: 37.4 (03 Feb 2023 02:00)  T(F): 98.8 (03 Feb 2023 09:15), Max: 99.3 (03 Feb 2023 02:00)  HR: 89 (03 Feb 2023 10:00) (79 - 101)  BP: 156/56 (03 Feb 2023 09:15) (96/41 - 163/62)  BP(mean): 82 (03 Feb 2023 09:15) (54 - 88)  RR: 19 (03 Feb 2023 10:00) (14 - 23)  SpO2: 100% (03 Feb 2023 10:00) (87% - 100%)    Parameters below as of 03 Feb 2023 10:00  Patient On (Oxygen Delivery Method): nasal cannula  O2 Flow (L/min): 2     Physical exam:    Constitutional:  No acute distress  HEENT: NC/AT, EOMI, PERRLA, conjunctivae clear; ears and nose atraumatic  Neck: supple; thyroid not palpable  Back: no tenderness  Respiratory: respiratory effort normal; crackles at bases  Cardiovascular: S1S2 regular, no murmurs  Abdomen: soft, mid abdomen tender, distended, positive BS  Genitourinary: no suprapubic tenderness  Lymphatic: no LN palpable  Musculoskeletal: no muscle tenderness, no joint swelling or tenderness  Extremities: no pedal edema  Neurological/ Psychiatric: confused, judgement and insight impaired; moving all extremities  Skin: no rashes; no palpable lesions    Labs: reviewed                        7.2    14.19 )-----------( 405      ( 03 Feb 2023 05:00 )             23.4     02-03    143  |  113<H>  |  21  ----------------------------<  142<H>  3.6   |  24  |  1.28    Ca    7.2<L>      03 Feb 2023 05:00  Phos  1.8     02-03  Mg     1.7     02-03    TPro  4.9<L>  /  Alb  1.8<L>  /  TBili  0.4  /  DBili  x   /  AST  82<H>  /  ALT  57  /  AlkPhos  56  02-03                                   8.2    36.58 )-----------( 568      ( 01 Feb 2023 05:40 )             27.8     02-01    141  |  113<H>  |  16  ----------------------------<  147<H>  4.9   |  15<L>  |  1.10    Ca    7.3<L>      01 Feb 2023 05:40  Phos  4.1     02-01  Mg     1.7     02-01                        9.9    10.40 )-----------( 494      ( 30 Jan 2023 06:55 )             32.9     01-30    148<H>  |  115<H>  |  19  ----------------------------<  105<H>  3.7   |  29  |  0.59    Ca    7.7<L>      30 Jan 2023 06:55    TPro  4.9<L>  /  Alb  1.3<L>  /  TBili  0.3  /  DBili  0.1  /  AST  14<L>  /  ALT  12  /  AlkPhos  65  01-28               10.9   19.72 )-----------( 510      ( 27 Jan 2023 06:16 )             36.7     01-27    142  |  111<H>  |  10  ----------------------------<  233<H>  3.8   |  26  |  0.78    Ca    8.0<L>      27 Jan 2023 06:16  Phos  2.7     01-27  Mg     1.8     01-27      Culture - Acid Fast - Tissue w/Smear (collected 31 Jan 2023 18:07)  Source: .Tissue INTRAPERITONEAL ABCESS FOR CX    Culture - Fungal, Tissue (collected 31 Jan 2023 18:07)  Source: .Tissue INTRAPERITONEAL ABCESS FOR CX  Preliminary Report (01 Feb 2023 07:09):    Testing in progress    Culture - Tissue with Gram Stain (collected 31 Jan 2023 18:07)  Source: .Tissue INTRAPERITONEAL ABCESS FOR CX  Gram Stain (01 Feb 2023 04:37):    Rare polymorphonuclear leukocytes seen per low power field    No organisms seen per oil power field  Preliminary Report (02 Feb 2023 20:01):    Growth in fluid media only Klebsiella oxytoca/Raoultella ornithinolytica    Culture - Blood (collected 27 Jan 2023 06:16)  Source: .Blood None  Final Report (01 Feb 2023 09:00):    No Growth Final    Culture - Blood (collected 27 Jan 2023 06:16)  Source: .Blood None  Final Report (01 Feb 2023 09:00):    No Growth Final        Radiology: all available radiological tests reviewed    < from: CT Chest No Cont (01.27.23 @ 09:53) >  Small bowel obstruction with transition point at the neck of a left lower   quadrant abdominal wall hernia. It is uncertain whether the obstruction   is due to the hernia itself or to adhesions.    Additional massive ventral hernia containing small and large bowel and   epigastric hernia containing stomach.    Right lower lobe nodules new since December 04, 2021 and could be   infectious or neoplastic. Follow-up chest CT in 3 months is recommended   to reevaluate.    < end of copied text >      Advanced directives addressed: full resuscitation

## 2023-02-03 NOTE — PROGRESS NOTE ADULT - ASSESSMENT
Patient admitted with abdominal pain, nausea and vomitting , dehydration  septic shock, likely related to     PROBLEMS:    UTI klebsiella pneumoniae and aspiration PNA /SBO- ventral hernia  New R lung nodules - cannot ruleout aspiration PNA   Acute metabolic encephalopathy  Sepsis   Hx copd without exacerbation  Anasarca/acute on  chronic diastolic heart failure       Plan:    pulmonary stable  NG suction-Incarcerated Ventral Hernia with SBO-S/p extensive RICHARD, ileo colectomy, giant ventral hernia repair with Surgimend  IV cefepime /flagyl -for  sepsis ( SBO/UTI)  Monitor Cr  D/w staff  DVT PROPHYLASIX    Patient admitted with abdominal pain, nausea and vomitting , dehydration  septic shock, likely related to     PROBLEMS:    UTI klebsiella pneumoniae and aspiration PNA /SBO- ventral hernia  New R lung nodules - cannot ruleout aspiration PNA   Acute metabolic encephalopathy  Sepsis   Hx copd without exacerbation  Anasarca/acute on  chronic diastolic heart failure       Plan:    pulmonary stable  Blood transfusion, on TPN, IV fluids, iv lasix post transfusion, on 3lpm nc sat 100%  NG suction-Incarcerated Ventral Hernia with SBO-S/p extensive RICHARD, ileo colectomy, giant ventral hernia repair with Surgimend  IV cefepime /flagyl -for  sepsis ( SBO/UTI)  Monitor Cr  D/w staff  DVT PROPHYLASIX

## 2023-02-03 NOTE — PROGRESS NOTE ADULT - ASSESSMENT
Slowly improving. Transfuse. TPN. Consider trial tube feeds as NG output is minimal. Continue IV abx

## 2023-02-03 NOTE — CHART NOTE - NSCHARTNOTEFT_GEN_A_CORE
* 73 y/o female with a PMH of CVA with left sided weakness, atrial fibrillation, emphysema, COPD, HTN - wears 2L NC at baseline BIBA, hx SBO and resection per EMS presents to the ED from [facility] for RLQ pain and r/o SBO. x1 episode of emesis, + profound diffuse abdominal pain, febrile. Has SBO, NGT to LWS. Taken to OR on 1/31 underwent ELAP, extensive RICHARD, ileocolectomy, ventral hernia repair with MESH. Postop course complicated with ALVERTO, shock requiring pressors, transferred to ICU. + anasarca   DNR/ DNI    *current status: Spoke with Dr. Manuel; will initiate trickle feeds (goal rate 20 cc/hr) to bridge with PPN to optimize nutritional needs. ADAT as per surgery. See below for updated recommendations.    *2+ generalized edema documented; multiple stage II PU's documented; BM (+) on 1/29 - w/o BM x 5 days, not on bowel regimen     *malnutrition: Pt continues to meet criteria for severe protein-calorie malnutrition in context of acute disease r/t decreased ability to meet increased nutrient needs 2/2 SBO AEB <50% ENN x >12 days, severe muscle/ fat wasting, severe edema    Estimated Needs: based on 97.4 Kg (wt from admit - current wts being skewed by severe fluid retention)  Calories: 1948- 2435 Kcal (20- 25 Kcal/Kg)  Protein: 146- 175 g (1.5- 1.8 g/Kg)  Fluids: 1948- 2435 mL (20- 25 mL/Kg)    ADDITIONAL RECOMMENDATIONS  1) Initiate trickle feeds as per Dr. Manuel - Glucerna 1.5 @ 10 cc/hr, increase by 10 cc/hr q10 hours until goal rate of 20 cc/hr is met with. Will provide ~600 kcal, 33 g protein, and 304 mL free water. Consider free water flush to maintain hydration as per MD.   2) Daily weights  3) Strict I & O's  4) Daily lyte checks including magnesium and phos - monitor closely for refeeding syndrome as pt is at HIGH RISK  5) Weekly triglycerides/LFT checks  6) POCT q6hrs; maintain 140-180mg/dL  7) if on PN > 6 days, consider placing PICC line to meet 100% of nutr needs   8) Confirm goals of care regarding LONG-TERM nutrition support  9) consider d/c-ing IVF/ LR while on PPN    *will continue to monitor and adjust plan prn*  Carrol Robles, RDN [phone]

## 2023-02-03 NOTE — PHYSICAL THERAPY INITIAL EVALUATION ADULT - ADDITIONAL COMMENTS
Pt is bedbound at NH and dependent for all mobility/care.
Pt is bedbound at NH and dependent for all mobility/care.

## 2023-02-03 NOTE — OCCUPATIONAL THERAPY INITIAL EVALUATION ADULT - MD ORDER
"OT Evaluate and Treat"- MD orders received. Chart reviewed, contents noted, conferred with VALERIA Torres "OT Evaluate and Treat"- MD orders received. Chart reviewed, contents noted, conferred with VALERIA Westfall, VS: 161/55, 100%, HR 95

## 2023-02-03 NOTE — PHYSICAL THERAPY INITIAL EVALUATION ADULT - MANUAL MUSCLE TESTING RESULTS, REHAB EVAL
mentation, only moved R elbow in 25* of flexion x1 on command./not tested due to
Pt unable to demonstrate any purposeful movement of her extremities/not tested due to

## 2023-02-03 NOTE — PHYSICAL THERAPY INITIAL EVALUATION ADULT - MODALITIES TREATMENT COMMENTS
Held functional mobility as patient receiving blood. Patient not following commands for AROM, Maximal Assistance Lift, such as Jeevan, is recommended for OOB transfers for safe staff and patient handling. Patient is not a rehab candidate, not able to follow commands for an active therapy program. Dependant at baseline. Will d/c from services.  KONRAD, RN informed.
Pt is fully dependent for all mobility and care, not a skilled PT candidate.

## 2023-02-04 LAB
ALBUMIN SERPL ELPH-MCNC: 2.3 G/DL — LOW (ref 3.3–5)
ALP SERPL-CCNC: 70 U/L — SIGNIFICANT CHANGE UP (ref 40–120)
ALT FLD-CCNC: 39 U/L — SIGNIFICANT CHANGE UP (ref 12–78)
ANION GAP SERPL CALC-SCNC: 9 MMOL/L — SIGNIFICANT CHANGE UP (ref 5–17)
AST SERPL-CCNC: 25 U/L — SIGNIFICANT CHANGE UP (ref 15–37)
BILIRUB SERPL-MCNC: 0.5 MG/DL — SIGNIFICANT CHANGE UP (ref 0.2–1.2)
BLD GP AB SCN SERPL QL: SIGNIFICANT CHANGE UP
BUN SERPL-MCNC: 18 MG/DL — SIGNIFICANT CHANGE UP (ref 7–23)
CALCIUM SERPL-MCNC: 7.6 MG/DL — LOW (ref 8.5–10.1)
CHLORIDE SERPL-SCNC: 111 MMOL/L — HIGH (ref 96–108)
CO2 SERPL-SCNC: 22 MMOL/L — SIGNIFICANT CHANGE UP (ref 22–31)
CREAT SERPL-MCNC: 0.94 MG/DL — SIGNIFICANT CHANGE UP (ref 0.5–1.3)
EGFR: 64 ML/MIN/1.73M2 — SIGNIFICANT CHANGE UP
GLUCOSE BLDC GLUCOMTR-MCNC: 164 MG/DL — HIGH (ref 70–99)
GLUCOSE BLDC GLUCOMTR-MCNC: 185 MG/DL — HIGH (ref 70–99)
GLUCOSE BLDC GLUCOMTR-MCNC: 188 MG/DL — HIGH (ref 70–99)
GLUCOSE BLDC GLUCOMTR-MCNC: 192 MG/DL — HIGH (ref 70–99)
GLUCOSE BLDC GLUCOMTR-MCNC: 195 MG/DL — HIGH (ref 70–99)
GLUCOSE SERPL-MCNC: 205 MG/DL — HIGH (ref 70–99)
HCT VFR BLD CALC: 29.1 % — LOW (ref 34.5–45)
HGB BLD-MCNC: 9.5 G/DL — LOW (ref 11.5–15.5)
MAGNESIUM SERPL-MCNC: 1.9 MG/DL — SIGNIFICANT CHANGE UP (ref 1.6–2.6)
MCHC RBC-ENTMCNC: 28.3 PG — SIGNIFICANT CHANGE UP (ref 27–34)
MCHC RBC-ENTMCNC: 32.6 GM/DL — SIGNIFICANT CHANGE UP (ref 32–36)
MCV RBC AUTO: 86.6 FL — SIGNIFICANT CHANGE UP (ref 80–100)
PHOSPHATE SERPL-MCNC: 2.2 MG/DL — LOW (ref 2.5–4.5)
PLATELET # BLD AUTO: 454 K/UL — HIGH (ref 150–400)
POTASSIUM SERPL-MCNC: 3.3 MMOL/L — LOW (ref 3.5–5.3)
POTASSIUM SERPL-SCNC: 3.3 MMOL/L — LOW (ref 3.5–5.3)
PROT SERPL-MCNC: 6 GM/DL — SIGNIFICANT CHANGE UP (ref 6–8.3)
RBC # BLD: 3.36 M/UL — LOW (ref 3.8–5.2)
RBC # FLD: 20.2 % — HIGH (ref 10.3–14.5)
SODIUM SERPL-SCNC: 142 MMOL/L — SIGNIFICANT CHANGE UP (ref 135–145)
WBC # BLD: 7.64 K/UL — SIGNIFICANT CHANGE UP (ref 3.8–10.5)
WBC # FLD AUTO: 7.64 K/UL — SIGNIFICANT CHANGE UP (ref 3.8–10.5)

## 2023-02-04 PROCEDURE — 99233 SBSQ HOSP IP/OBS HIGH 50: CPT

## 2023-02-04 RX ORDER — METOPROLOL TARTRATE 50 MG
5 TABLET ORAL ONCE
Refills: 0 | Status: COMPLETED | OUTPATIENT
Start: 2023-02-04 | End: 2023-02-04

## 2023-02-04 RX ORDER — AMIODARONE HYDROCHLORIDE 400 MG/1
200 TABLET ORAL DAILY
Refills: 0 | Status: DISCONTINUED | OUTPATIENT
Start: 2023-02-04 | End: 2023-02-10

## 2023-02-04 RX ORDER — LOSARTAN POTASSIUM 100 MG/1
25 TABLET, FILM COATED ORAL DAILY
Refills: 0 | Status: DISCONTINUED | OUTPATIENT
Start: 2023-02-04 | End: 2023-02-05

## 2023-02-04 RX ORDER — ATORVASTATIN CALCIUM 80 MG/1
10 TABLET, FILM COATED ORAL AT BEDTIME
Refills: 0 | Status: DISCONTINUED | OUTPATIENT
Start: 2023-02-04 | End: 2023-02-08

## 2023-02-04 RX ORDER — POTASSIUM PHOSPHATE, MONOBASIC POTASSIUM PHOSPHATE, DIBASIC 236; 224 MG/ML; MG/ML
30 INJECTION, SOLUTION INTRAVENOUS ONCE
Refills: 0 | Status: COMPLETED | OUTPATIENT
Start: 2023-02-04 | End: 2023-02-04

## 2023-02-04 RX ORDER — DILTIAZEM HCL 120 MG
60 CAPSULE, EXT RELEASE 24 HR ORAL EVERY 8 HOURS
Refills: 0 | Status: DISCONTINUED | OUTPATIENT
Start: 2023-02-04 | End: 2023-02-05

## 2023-02-04 RX ORDER — ENOXAPARIN SODIUM 100 MG/ML
100 INJECTION SUBCUTANEOUS EVERY 12 HOURS
Refills: 0 | Status: DISCONTINUED | OUTPATIENT
Start: 2023-02-04 | End: 2023-02-04

## 2023-02-04 RX ORDER — POTASSIUM CHLORIDE 20 MEQ
40 PACKET (EA) ORAL ONCE
Refills: 0 | Status: COMPLETED | OUTPATIENT
Start: 2023-02-04 | End: 2023-02-04

## 2023-02-04 RX ORDER — HEPARIN SODIUM 5000 [USP'U]/ML
5000 INJECTION INTRAVENOUS; SUBCUTANEOUS EVERY 8 HOURS
Refills: 0 | Status: DISCONTINUED | OUTPATIENT
Start: 2023-02-04 | End: 2023-02-07

## 2023-02-04 RX ADMIN — Medication 60 MILLIGRAM(S): at 09:29

## 2023-02-04 RX ADMIN — Medication 100 MILLIGRAM(S): at 06:40

## 2023-02-04 RX ADMIN — NYSTATIN CREAM 1 APPLICATION(S): 100000 CREAM TOPICAL at 06:52

## 2023-02-04 RX ADMIN — Medication 0.5 MILLIGRAM(S): at 21:07

## 2023-02-04 RX ADMIN — Medication 5 MILLIGRAM(S): at 18:01

## 2023-02-04 RX ADMIN — Medication 2: at 11:59

## 2023-02-04 RX ADMIN — CHLORHEXIDINE GLUCONATE 1 APPLICATION(S): 213 SOLUTION TOPICAL at 06:42

## 2023-02-04 RX ADMIN — Medication 50 MILLILITER(S): at 00:33

## 2023-02-04 RX ADMIN — Medication 2: at 06:58

## 2023-02-04 RX ADMIN — CEFEPIME 100 MILLIGRAM(S): 1 INJECTION, POWDER, FOR SOLUTION INTRAMUSCULAR; INTRAVENOUS at 23:07

## 2023-02-04 RX ADMIN — LOSARTAN POTASSIUM 25 MILLIGRAM(S): 100 TABLET, FILM COATED ORAL at 13:16

## 2023-02-04 RX ADMIN — Medication 100 MILLIGRAM(S): at 23:07

## 2023-02-04 RX ADMIN — Medication 0.5 MILLIGRAM(S): at 08:26

## 2023-02-04 RX ADMIN — HEPARIN SODIUM 5000 UNIT(S): 5000 INJECTION INTRAVENOUS; SUBCUTANEOUS at 15:30

## 2023-02-04 RX ADMIN — Medication 5 MILLIGRAM(S): at 03:21

## 2023-02-04 RX ADMIN — HYDROMORPHONE HYDROCHLORIDE 0.5 MILLIGRAM(S): 2 INJECTION INTRAMUSCULAR; INTRAVENOUS; SUBCUTANEOUS at 00:01

## 2023-02-04 RX ADMIN — Medication 5 MILLIGRAM(S): at 06:40

## 2023-02-04 RX ADMIN — Medication 100 MILLIGRAM(S): at 13:28

## 2023-02-04 RX ADMIN — NYSTATIN CREAM 1 APPLICATION(S): 100000 CREAM TOPICAL at 23:17

## 2023-02-04 RX ADMIN — POTASSIUM PHOSPHATE, MONOBASIC POTASSIUM PHOSPHATE, DIBASIC 83.33 MILLIMOLE(S): 236; 224 INJECTION, SOLUTION INTRAVENOUS at 15:28

## 2023-02-04 RX ADMIN — Medication 70 MICROGRAM(S): at 23:07

## 2023-02-04 RX ADMIN — NYSTATIN CREAM 1 APPLICATION(S): 100000 CREAM TOPICAL at 10:53

## 2023-02-04 RX ADMIN — CEFEPIME 100 MILLIGRAM(S): 1 INJECTION, POWDER, FOR SOLUTION INTRAMUSCULAR; INTRAVENOUS at 10:53

## 2023-02-04 RX ADMIN — Medication 50 MILLILITER(S): at 06:52

## 2023-02-04 RX ADMIN — NYSTATIN CREAM 1 APPLICATION(S): 100000 CREAM TOPICAL at 18:02

## 2023-02-04 RX ADMIN — HEPARIN SODIUM 5000 UNIT(S): 5000 INJECTION INTRAVENOUS; SUBCUTANEOUS at 06:40

## 2023-02-04 RX ADMIN — PANTOPRAZOLE SODIUM 40 MILLIGRAM(S): 20 TABLET, DELAYED RELEASE ORAL at 10:53

## 2023-02-04 RX ADMIN — Medication 2: at 18:38

## 2023-02-04 RX ADMIN — AMIODARONE HYDROCHLORIDE 200 MILLIGRAM(S): 400 TABLET ORAL at 10:53

## 2023-02-04 RX ADMIN — Medication 5 MILLIGRAM(S): at 23:23

## 2023-02-04 NOTE — PROGRESS NOTE ADULT - SUBJECTIVE AND OBJECTIVE BOX
POD 4  Resting comfortably, no new complaints  VSS afeb  lungs- scattered rales  cor- RRR  Abd- + BS soft non tender, ecchymosis lower portion of wound. Serous drainage. DEA drains- serosang  Ext- + anasarca

## 2023-02-04 NOTE — PROGRESS NOTE ADULT - ASSESSMENT
Continued slow improvement. DC TPN after current bag. Increase tube feeds as tolerated. IV abx. Physical therapy- OOB if possible. Diurese as per CCM. Wound care, drains.  Continued slow improvement. DC TPN after current bag. Increase tube feeds as tolerated. IV abx. Physical therapy- OOB if possible. Diurese as per CCM. Wound care, drains. OK to restart anticoagulation.

## 2023-02-04 NOTE — PROGRESS NOTE ADULT - SUBJECTIVE AND OBJECTIVE BOX
Patient is a 72y old  Female who presents with a chief complaint of bowel obstruction/ischemic mesentery (04 Feb 2023 15:18)    24 hour events: tolerating TF   started having diarrhea overnight, no fever however     Allergies    Cipro (Rash)  Spiriva (Rash)    Intolerances      REVIEW OF SYSTEMS: SEE BELOW       ICU Vital Signs Last 24 Hrs  T(C): 37.7 (04 Feb 2023 16:10), Max: 37.7 (04 Feb 2023 16:10)  T(F): 99.8 (04 Feb 2023 16:10), Max: 99.8 (04 Feb 2023 16:10)  HR: 103 (04 Feb 2023 17:00) (88 - 106)  BP: 160/72 (04 Feb 2023 17:00) (126/68 - 187/73)  BP(mean): 93 (04 Feb 2023 17:00) (82 - 100)  ABP: --  ABP(mean): --  RR: 28 (04 Feb 2023 17:00) (19 - 29)  SpO2: 99% (04 Feb 2023 17:00) (94% - 100%)    O2 Parameters below as of 04 Feb 2023 16:00  Patient On (Oxygen Delivery Method): room air            CAPILLARY BLOOD GLUCOSE      POCT Blood Glucose.: 195 mg/dL (04 Feb 2023 11:56)  POCT Blood Glucose.: 188 mg/dL (04 Feb 2023 06:55)  POCT Blood Glucose.: 192 mg/dL (04 Feb 2023 05:24)  POCT Blood Glucose.: 144 mg/dL (03 Feb 2023 23:40)      I&O's Summary    03 Feb 2023 07:01  -  04 Feb 2023 07:00  --------------------------------------------------------  IN: 3785 mL / OUT: 5070 mL / NET: -1285 mL    04 Feb 2023 07:01  -  04 Feb 2023 18:31  --------------------------------------------------------  IN: 1708 mL / OUT: 2455 mL / NET: -747 mL            MEDICATIONS  (STANDING):  aMIOdarone    Tablet 200 milliGRAM(s) Oral daily  atorvastatin 10 milliGRAM(s) Oral at bedtime  buDESOnide    Inhalation Suspension 0.5 milliGRAM(s) Inhalation every 12 hours  cefepime   IVPB 2000 milliGRAM(s) IV Intermittent every 12 hours  chlorhexidine 4% Liquid 1 Application(s) Topical <User Schedule>  coronavirus bivalent (EUA) Booster Vaccine (PFIZER) 0.3 milliLiter(s) IntraMuscular once  dextrose 5%. 1000 milliLiter(s) (50 mL/Hr) IV Continuous <Continuous>  dextrose 5%. 1000 milliLiter(s) (100 mL/Hr) IV Continuous <Continuous>  dextrose 50% Injectable 25 Gram(s) IV Push once  dextrose 50% Injectable 25 Gram(s) IV Push once  dextrose 50% Injectable 12.5 Gram(s) IV Push once  diltiazem    Tablet 60 milliGRAM(s) Oral every 8 hours  enoxaparin Injectable 100 milliGRAM(s) SubCutaneous every 12 hours  glucagon  Injectable 1 milliGRAM(s) IntraMuscular once  insulin lispro (ADMELOG) corrective regimen sliding scale   SubCutaneous every 6 hours  levothyroxine Injectable 70 MICROGram(s) IV Push at bedtime  losartan 25 milliGRAM(s) Oral daily  metoprolol tartrate Injectable 5 milliGRAM(s) IV Push every 6 hours  metroNIDAZOLE  IVPB 500 milliGRAM(s) IV Intermittent every 8 hours  nystatin Cream 1 Application(s) Topical two times a day  nystatin Powder 1 Application(s) Topical every 12 hours  pantoprazole  Injectable 40 milliGRAM(s) IV Push daily  Parenteral Nutrition - Adult 1 Each (83 mL/Hr) TPN Continuous <Continuous>      MEDICATIONS  (PRN):  albuterol    90 MICROgram(s) HFA Inhaler 2 Puff(s) Inhalation every 6 hours PRN Shortness of Breath and/or Wheezing  dextrose Oral Gel 15 Gram(s) Oral once PRN Blood Glucose LESS THAN 70 milliGRAM(s)/deciliter  HYDROmorphone  Injectable 0.5 milliGRAM(s) IV Push every 3 hours PRN Severe Pain (7 - 10)  sodium chloride 0.9% lock flush 10 milliLiter(s) IV Push every 1 hour PRN Pre/post blood products, medications, blood draw, and to maintain line patency      PHYSICAL EXAM: SEE BELOW                          9.5    7.64  )-----------( 454      ( 04 Feb 2023 06:02 )             29.1       02-04    142  |  111<H>  |  18  ----------------------------<  205<H>  3.3<L>   |  22  |  0.94    Ca    7.6<L>      04 Feb 2023 08:49  Phos  2.2     02-04  Mg     1.9     02-04    TPro  6.0  /  Alb  2.3<L>  /  TBili  0.5  /  DBili  x   /  AST  25  /  ALT  39  /  AlkPhos  70  02-04              .Tissue INTRAPERITONEAL ABCESS FOR CX   Growth in fluid media only Klebsiella oxytoca/Raoultella ornithinolytica   Rare polymorphonuclear leukocytes seen per low power field  No organisms seen per oil power field 01-31 @ 18:07

## 2023-02-04 NOTE — PROGRESS NOTE ADULT - ASSESSMENT
Patient admitted with abdominal pain, nausea and vomitting , dehydration  septic shock, likely related to     PROBLEMS:    UTI klebsiella pneumoniae and aspiration PNA /SBO- ventral hernia  New R lung nodules - cannot ruleout aspiration PNA   Acute metabolic encephalopathy  Sepsis   Hx copd without exacerbation  Anasarca/acute on  chronic diastolic heart failure       Plan:    pulmonary stable  Blood transfusion, on TPN, IV fluids, iv lasix post transfusion, on 3lpm nc sat 100%  NG suction-Incarcerated Ventral Hernia with SBO-S/p extensive RICHARD, ileo colectomy, giant ventral hernia repair with Surgimend  IV cefepime /flagyl -for  sepsis ( SBO/UTI)  Monitor Cr  D/w staff  DVT PROPHYLASIX

## 2023-02-04 NOTE — PROGRESS NOTE ADULT - SUBJECTIVE AND OBJECTIVE BOX
Subjective:    pat better, sitting in bed, ng drainage, no respiratory distress.    Home Medications:  Albuterol (Eqv-ProAir HFA) 90 mcg/inh inhalation aerosol: 1 puff(s) inhaled every 6 hours, As Needed (20 Jan 2023 16:49)  amiodarone 200 mg oral tablet: 1 tab(s) orally once a day (20 Jan 2023 16:49)  ascorbic acid 500 mg oral tablet: 2 tab(s) orally once a day (20 Jan 2023 16:49)  atorvastatin 10 mg oral tablet: 1 tab(s) orally once a day (at bedtime) (20 Jan 2023 16:49)  benzonatate 100 mg oral capsule: 1 cap(s) orally 3 times a day (20 Jan 2023 21:48)  budesonide 0.5 mg/2 mL inhalation suspension: 2 milliliter(s) inhaled 2 times a day (20 Jan 2023 21:48)  Cepacol Sore Throat 15 mg-3.6 mg mucous membrane lozenge: 1 lozenge mucous membrane every 4 hours, As Needed (20 Jan 2023 21:48)  cholecalciferol 1250 mcg (50,000 intl units) oral capsule: 1 cap(s) orally every 4 weeks on Wednesday  (20 Jan 2023 16:49)  Coumadin 2.5 mg oral tablet: 1 tab(s) orally once a day (at bedtime)  ***ON HOLD from 1-16 to 1-21*** (20 Jan 2023 21:48)  Cranberry oral tablet: 1 tab(s) orally 2 times a day (20 Jan 2023 16:49)  cyanocobalamin 1000 mcg oral tablet: 1 tab(s) orally once a day (20 Jan 2023 16:49)  dilTIAZem 180 mg/24 hours oral capsule, extended release: 1 cap(s) orally once a day (20 Jan 2023 16:49)  DULoxetine 60 mg oral delayed release capsule: 1 cap(s) orally once a day (20 Jan 2023 16:49)  estradiol 0.1 mg/g vaginal cream: 0.5 gram(s) vaginal 2 times a week on Monday and Thursday (20 Jan 2023 21:48)  fexofenadine 180 mg oral tablet: 1 tab(s) orally once a day (20 Jan 2023 21:48)  fluticasone 50 mcg/inh nasal spray: 1 spray(s) nasal 2 times a day (20 Jan 2023 16:49)  furosemide 20 mg oral tablet: 1 tab(s) orally once a day (20 Jan 2023 16:49)  glipiZIDE 10 mg oral tablet, extended release: 2 tab(s) orally once a day (20 Jan 2023 16:49)  GlycoLax oral powder for reconstitution: 17 gram(s) orally 2 times a day (20 Jan 2023 16:49)  guaiFENesin 100 mg/5 mL oral liquid: 20 milliliter(s) orally every 6 hours (20 Jan 2023 21:48)  HumaLOG KwikPen 100 units/mL injectable solution: 10 unit(s) injectable 3 times a day (before meals) (20 Jan 2023 21:48)  ipratropium-albuterol 20 mcg-100 mcg/inh inhalation aerosol: 1 puff(s) inhaled 4 times a day (20 Jan 2023 16:49)  levothyroxine 88 mcg (0.088 mg) oral tablet: 1 tab(s) orally once a day (at bedtime) (20 Jan 2023 16:49)  losartan 25 mg oral tablet: 1 tab(s) orally once a day (20 Jan 2023 16:49)  Macrobid 100 mg oral capsule: 1 cap(s) orally 2 times a day for 5 days  ***course complete*** (20 Jan 2023 21:48)  methocarbamol 750 mg oral tablet: 1 tab(s) orally every 8 hours, As Needed (20 Jan 2023 21:48)  metoprolol tartrate 25 mg oral tablet: 1 tab(s) orally 2 times a day (20 Jan 2023 16:49)  Milk of Magnesia 8% oral suspension: 30 milliliter(s) orally once a day, As Needed (20 Jan 2023 16:49)  montelukast 10 mg oral tablet: 1 tab(s) orally once a day (at bedtime) (20 Jan 2023 21:48)  ondansetron 4 mg oral tablet: 1 tab(s) orally every 4 hours, As Needed (20 Jan 2023 21:48)  oxybutynin 5 mg oral tablet: 1 tab(s) orally 2 times a day (20 Jan 2023 16:49)  oxyCODONE 5 mg oral tablet: 1 tab(s) orally every 4 hours, As Needed (20 Jan 2023 16:49)  phytonadione 5 mg oral tablet: 0.5 tab(s) orally once  ***given at [facility] once on 1-19-23*** (20 Jan 2023 21:48)  pregabalin 100 mg oral capsule: 1 cap(s) orally 3 times a day (20 Jan 2023 16:49)  sennosides-docusate 8.6 mg-50 mg oral tablet: 2 tab(s) orally once a day (at bedtime) (20 Jan 2023 16:49)  Tradjenta 5 mg oral tablet: 1 tab(s) orally once a day (20 Jan 2023 16:49)  Tylenol 500 mg oral tablet: 2 tab(s) orally every 8 hours (20 Jan 2023 16:49)    MEDICATIONS  (STANDING):  aMIOdarone    Tablet 200 milliGRAM(s) Oral daily  atorvastatin 10 milliGRAM(s) Oral at bedtime  buDESOnide    Inhalation Suspension 0.5 milliGRAM(s) Inhalation every 12 hours  cefepime   IVPB 2000 milliGRAM(s) IV Intermittent every 12 hours  chlorhexidine 4% Liquid 1 Application(s) Topical <User Schedule>  coronavirus bivalent (EUA) Booster Vaccine (PFIZER) 0.3 milliLiter(s) IntraMuscular once  dextrose 5%. 1000 milliLiter(s) (100 mL/Hr) IV Continuous <Continuous>  dextrose 5%. 1000 milliLiter(s) (50 mL/Hr) IV Continuous <Continuous>  dextrose 50% Injectable 25 Gram(s) IV Push once  dextrose 50% Injectable 12.5 Gram(s) IV Push once  dextrose 50% Injectable 25 Gram(s) IV Push once  diltiazem    Tablet 60 milliGRAM(s) Oral every 8 hours  fat emulsion (Fish Oil and Plant Based) 20% Infusion 0.82 Gm/kG/Day (33.3 mL/Hr) IV Continuous <Continuous>  glucagon  Injectable 1 milliGRAM(s) IntraMuscular once  heparin   Injectable 5000 Unit(s) SubCutaneous every 8 hours  insulin lispro (ADMELOG) corrective regimen sliding scale   SubCutaneous every 6 hours  levothyroxine Injectable 70 MICROGram(s) IV Push at bedtime  losartan 25 milliGRAM(s) Oral daily  metoprolol tartrate Injectable 5 milliGRAM(s) IV Push every 6 hours  metroNIDAZOLE  IVPB 500 milliGRAM(s) IV Intermittent every 8 hours  nystatin Cream 1 Application(s) Topical two times a day  nystatin Powder 1 Application(s) Topical every 12 hours  pantoprazole  Injectable 40 milliGRAM(s) IV Push daily  Parenteral Nutrition - Adult 1 Each (83 mL/Hr) TPN Continuous <Continuous>  potassium phosphate IVPB 30 milliMole(s) IV Intermittent once    MEDICATIONS  (PRN):  albuterol    90 MICROgram(s) HFA Inhaler 2 Puff(s) Inhalation every 6 hours PRN Shortness of Breath and/or Wheezing  dextrose Oral Gel 15 Gram(s) Oral once PRN Blood Glucose LESS THAN 70 milliGRAM(s)/deciliter  HYDROmorphone  Injectable 0.5 milliGRAM(s) IV Push every 3 hours PRN Severe Pain (7 - 10)  sodium chloride 0.9% lock flush 10 milliLiter(s) IV Push every 1 hour PRN Pre/post blood products, medications, blood draw, and to maintain line patency      Allergies    Cipro (Rash)  Spiriva (Rash)    Intolerances        Vital Signs Last 24 Hrs  T(C): 36.2 (04 Feb 2023 10:34), Max: 37 (03 Feb 2023 17:00)  T(F): 97.2 (04 Feb 2023 10:34), Max: 98.6 (03 Feb 2023 17:00)  HR: 98 (04 Feb 2023 12:00) (84 - 100)  BP: 167/72 (04 Feb 2023 12:00) (126/68 - 187/73)  BP(mean): 94 (04 Feb 2023 12:00) (82 - 100)  RR: 29 (04 Feb 2023 12:00) (19 - 29)  SpO2: 98% (04 Feb 2023 12:00) (94% - 100%)    Parameters below as of 04 Feb 2023 12:00  Patient On (Oxygen Delivery Method): room air          PHYSICAL EXAMINATION:    NECK:  Supple. No lymphadenopathy. Jugular venous pressure not elevated. Carotids equal.   HEART:   The cardiac impulse has a normal quality. Reg., Nl S1 and S2.  There are no murmurs, rubs or gallops noted  CHEST:  Chest crackles to auscultation. Normal respiratory effort.  ABDOMEN:  Soft and nontender.   EXTREMITIES:  There is no edema.       LABS:                        9.5    7.64  )-----------( 454      ( 04 Feb 2023 06:02 )             29.1     02-04    142  |  111<H>  |  18  ----------------------------<  205<H>  3.3<L>   |  22  |  0.94    Ca    7.6<L>      04 Feb 2023 08:49  Phos  2.2     02-04  Mg     1.9     02-04    TPro  6.0  /  Alb  2.3<L>  /  TBili  0.5  /  DBili  x   /  AST  25  /  ALT  39  /  AlkPhos  70  02-04

## 2023-02-04 NOTE — PROVIDER CONTACT NOTE (CHANGE IN STATUS NOTIFICATION) - ASSESSMENT
pt alert. VSS. intermittently tachycardic. left DEA previously with high output and had been serosanguinous in color and now noted with fecal matter in suction tubing and bulb and around left DEA site.
Copious stool noted draining from Abd staple line

## 2023-02-04 NOTE — PROGRESS NOTE ADULT - SUBJECTIVE AND OBJECTIVE BOX
Date of service: 02-04-23 @ 10:46    Sitting in bed in NAD  NGT in place  Arousable  Dose not seem in pain    ROS: no fever or chills; poorly verbal    MEDICATIONS  (STANDING):  aMIOdarone    Tablet 200 milliGRAM(s) Oral daily  atorvastatin 10 milliGRAM(s) Oral at bedtime  buDESOnide    Inhalation Suspension 0.5 milliGRAM(s) Inhalation every 12 hours  cefepime   IVPB 2000 milliGRAM(s) IV Intermittent every 12 hours  chlorhexidine 4% Liquid 1 Application(s) Topical <User Schedule>  coronavirus bivalent (EUA) Booster Vaccine (PFIZER) 0.3 milliLiter(s) IntraMuscular once  dextrose 5%. 1000 milliLiter(s) (50 mL/Hr) IV Continuous <Continuous>  dextrose 5%. 1000 milliLiter(s) (100 mL/Hr) IV Continuous <Continuous>  dextrose 50% Injectable 25 Gram(s) IV Push once  dextrose 50% Injectable 12.5 Gram(s) IV Push once  dextrose 50% Injectable 25 Gram(s) IV Push once  diltiazem    Tablet 60 milliGRAM(s) Oral every 8 hours  fat emulsion (Fish Oil and Plant Based) 20% Infusion 0.82 Gm/kG/Day (33.3 mL/Hr) IV Continuous <Continuous>  glucagon  Injectable 1 milliGRAM(s) IntraMuscular once  heparin   Injectable 5000 Unit(s) SubCutaneous every 8 hours  insulin lispro (ADMELOG) corrective regimen sliding scale   SubCutaneous every 6 hours  levothyroxine Injectable 70 MICROGram(s) IV Push at bedtime  losartan 25 milliGRAM(s) Oral daily  metoprolol tartrate Injectable 5 milliGRAM(s) IV Push every 6 hours  metroNIDAZOLE  IVPB 500 milliGRAM(s) IV Intermittent every 8 hours  nystatin Cream 1 Application(s) Topical two times a day  nystatin Powder 1 Application(s) Topical every 12 hours  pantoprazole  Injectable 40 milliGRAM(s) IV Push daily  Parenteral Nutrition - Adult 1 Each (83 mL/Hr) TPN Continuous <Continuous>    Vital Signs Last 24 Hrs  T(C): 36.6 (04 Feb 2023 06:40), Max: 37 (03 Feb 2023 17:00)  T(F): 97.8 (04 Feb 2023 06:40), Max: 98.6 (03 Feb 2023 17:00)  HR: 94 (04 Feb 2023 10:00) (84 - 100)  BP: 155/64 (04 Feb 2023 10:00) (126/68 - 187/73)  BP(mean): 86 (04 Feb 2023 10:00) (82 - 100)  RR: 25 (04 Feb 2023 10:00) (19 - 25)  SpO2: 97% (04 Feb 2023 10:00) (94% - 100%)    Parameters below as of 04 Feb 2023 09:03  Patient On (Oxygen Delivery Method): room air     Physical exam:    Constitutional:  No acute distress  HEENT: NC/AT, EOMI, PERRLA, conjunctivae clear; ears and nose atraumatic  Neck: supple; thyroid not palpable  Back: no tenderness  Respiratory: respiratory effort normal; crackles at bases  Cardiovascular: S1S2 regular, no murmurs  Abdomen: soft, mid abdomen tender, distended, positive BS  Genitourinary: no suprapubic tenderness  Lymphatic: no LN palpable  Musculoskeletal: no muscle tenderness, no joint swelling or tenderness  Extremities: no pedal edema  Neurological/ Psychiatric: confused, judgement and insight impaired; moving all extremities  Skin: no rashes; no palpable lesions    Labs: reviewed                        9.5    7.64  )-----------( 454      ( 04 Feb 2023 06:02 )             29.1     02-03    143  |  111<H>  |  21  ----------------------------<  180<H>  3.4<L>   |  23  |  1.19    Ca    7.8<L>      03 Feb 2023 14:50  Phos  2.1     02-03  Mg     2.1     02-03    TPro  4.9<L>  /  Alb  1.8<L>  /  TBili  0.4  /  DBili  x   /  AST  82<H>  /  ALT  57  /  AlkPhos  56  02-03               9.9    10.40 )-----------( 494      ( 30 Jan 2023 06:55 )             32.9     01-30    148<H>  |  115<H>  |  19  ----------------------------<  105<H>  3.7   |  29  |  0.59    Ca    7.7<L>      30 Jan 2023 06:55    TPro  4.9<L>  /  Alb  1.3<L>  /  TBili  0.3  /  DBili  0.1  /  AST  14<L>  /  ALT  12  /  AlkPhos  65  01-28               10.9   19.72 )-----------( 510      ( 27 Jan 2023 06:16 )             36.7     01-27    142  |  111<H>  |  10  ----------------------------<  233<H>  3.8   |  26  |  0.78    Ca    8.0<L>      27 Jan 2023 06:16  Phos  2.7     01-27  Mg     1.8     01-27      Culture - Acid Fast - Tissue w/Smear (collected 31 Jan 2023 18:07)  Source: .Tissue INTRAPERITONEAL ABCESS FOR CX    Culture - Fungal, Tissue (collected 31 Jan 2023 18:07)  Source: .Tissue INTRAPERITONEAL ABCESS FOR CX  Preliminary Report (01 Feb 2023 07:09):    Testing in progress    Culture - Tissue with Gram Stain (collected 31 Jan 2023 18:07)  Source: .Tissue INTRAPERITONEAL ABCESS FOR CX  Gram Stain (01 Feb 2023 04:37):    Rare polymorphonuclear leukocytes seen per low power field    No organisms seen per oil power field  Preliminary Report (02 Feb 2023 20:01):    Growth in fluid media only Klebsiella oxytoca/Raoultella ornithinolytica  Organism: Klebsiella oxytoca /Raoutella ornithinolytica (03 Feb 2023 17:25)  Organism: Klebsiella oxytoca /Raoutella ornithinolytica (03 Feb 2023 17:25)      -  Amikacin: S <=16      -  Amoxicillin/Clavulanic Acid: I 16/8      -  Ampicillin: R >16 These ampicillin results predict results for amoxicillin      -  Ampicillin/Sulbactam: R >16/8 Enterobacter, Klebsiella aerogenes, Citrobacter, and Serratia may develop resistance during prolonged therapy (3-4 days)      -  Aztreonam: S <=4      -  Cefazolin: R >16 Enterobacter, Klebsiella aerogenes, Citrobacter, and Serratia may develop resistance during prolonged therapy (3-4 days)      -  Cefepime: S <=2      -  Cefoxitin: S <=8      -  Ceftriaxone: S <=1 Enterobacter, Klebsiella aerogenes, Citrobacter, and Serratia may develop resistance during prolonged therapy      -  Ciprofloxacin: S <=0.25      -  Ertapenem: S <=0.5      -  Gentamicin: S <=2      -  Imipenem: S <=1      -  Levofloxacin: S <=0.5      -  Meropenem: S <=1      -  Piperacillin/Tazobactam: R 64      -  Tobramycin: S <=2      -  Trimethoprim/Sulfamethoxazole: S <=0.5/9.5      Method Type: ARABELLA    Culture - Blood (collected 27 Jan 2023 06:16)  Source: .Blood None  Final Report (01 Feb 2023 09:00):    No Growth Final    Culture - Blood (collected 27 Jan 2023 06:16)  Source: .Blood None  Final Report (01 Feb 2023 09:00):    No Growth Final    Radiology: all available radiological tests reviewed    < from: CT Chest No Cont (01.27.23 @ 09:53) >  Small bowel obstruction with transition point at the neck of a left lower   quadrant abdominal wall hernia. It is uncertain whether the obstruction   is due to the hernia itself or to adhesions.    Additional massive ventral hernia containing small and large bowel and   epigastric hernia containing stomach.    Right lower lobe nodules new since December 04, 2021 and could be   infectious or neoplastic. Follow-up chest CT in 3 months is recommended   to reevaluate.    < end of copied text >      Advanced directives addressed: full resuscitation

## 2023-02-04 NOTE — PROVIDER CONTACT NOTE (CHANGE IN STATUS NOTIFICATION) - SITUATION
pt leaking possible fecal matter around left DEA site and in DEA drain to low continuous wall suction.
Copious stool noted draining from Abd staple line

## 2023-02-04 NOTE — PROVIDER CONTACT NOTE (CHANGE IN STATUS NOTIFICATION) - ACTION/TREATMENT ORDERED:
Tube feeding stopped. PA viewed pt. Order obtained for stat CT of abd w oral contrast. L abd DEA attached to wall suction. Dr JENN Castro updated on pt status
Surgical PA aware and came to bedside. DEA now off suction. continue to monitor. no new orders received at this time. MD Manuel also aware of pt status.

## 2023-02-04 NOTE — PROGRESS NOTE ADULT - ASSESSMENT
71 y/o female with:     SBO now s/p ex lap with extensive RICHARD, ileocolectomy, ventral hernia repair with mesh, POD 1  Hx previous multiple abd surgeries     Septic shock   ALVERTO due to ATN     PMH R sided CVA with L hemiparesis, pA.fib on warfarin, COPD on home O2       Plan:     Doing ok   Resp stable, has gurgling from poor secretion mx, keep NPO   Tolerating TF, advance, d/c free water   D/c PPN after today   JPs per surgery   Diarrhea most likely due to TF, no fever and WBC stable, doubt c.diff  Start therapeutic lovenox for hx AFib, d/w surgery   Maintain [place]   PT   Femoral TLC d/c'd   DNR, DNI     Plan d/w surgery

## 2023-02-04 NOTE — PROGRESS NOTE ADULT - ASSESSMENT
71 y/o female with h/o old CVA with left sided weakness, atrial fibrillation, emphysema, COPD, HTN, prior SBO and resection was admitted on 1/20 from [facility] for RLQ pain. She was noted with one episode of emesis and diffuse abdominal pain associated with fever. On 1/27 the patient was noted febrile to 102.8F, more lethargic, did not open eyes, did not follow commands. She received cefepime and metronidazole.     1. Febrile syndrome resolving. UTI with KLPN. Right lower lobes nodules Probable pneumonia. Intraabdominal hernia related fat necrosis with superimposed infection with KLOX. SBO and ventral hernia s/p surgery. Allergy to cipro. Encephalopathy.   -leukocytosis improving  -f/u surgical c/s  -BC x 2 noted  -urine c/s noted  -on cefepime 2 gm IV q12h and metronidazole 500 mg IV q8h # 8  -tolerating abx well so far; no side effects noted  -aspiration precautions  -continue abx coverage   -monitor abdomen  -monitor temps  -f/u CBC  -supportive care  2. Other issues:   -care per medicine    d/w Dr. Castro

## 2023-02-04 NOTE — PROVIDER CONTACT NOTE (CHANGE IN STATUS NOTIFICATION) - RECOMMENDATIONS
Tube feeding stopped. PA viewed pt. Order obtained for stat CT of abd w oral contrast. L abd DEA attached to wall suction. Dr JENN Castro updated on pt status
Stop tube feeds? remove DEA off suction?

## 2023-02-04 NOTE — CHART NOTE - NSCHARTNOTEFT_GEN_A_CORE
Notified by RN that patient's surgical wound appears to be leaking stool. Patient seen and examined at bedside.  Patient POD 4 s/p repair of ventral hernia with RICHARD and bowel resection with anastamosis presents with stool leaking from midline wound. Patient started on tube feeds today. Patient currently stable. No fevers. Mild tachycardia s/p respiratory treatment.      MEDICATIONS  (STANDING):  aMIOdarone    Tablet 200 milliGRAM(s) Oral daily  atorvastatin 10 milliGRAM(s) Oral at bedtime  buDESOnide    Inhalation Suspension 0.5 milliGRAM(s) Inhalation every 12 hours  cefepime   IVPB 2000 milliGRAM(s) IV Intermittent every 12 hours  chlorhexidine 4% Liquid 1 Application(s) Topical <User Schedule>  coronavirus bivalent (EUA) Booster Vaccine (PFIZER) 0.3 milliLiter(s) IntraMuscular once  dextrose 5%. 1000 milliLiter(s) (50 mL/Hr) IV Continuous <Continuous>  dextrose 5%. 1000 milliLiter(s) (100 mL/Hr) IV Continuous <Continuous>  dextrose 50% Injectable 25 Gram(s) IV Push once  dextrose 50% Injectable 12.5 Gram(s) IV Push once  dextrose 50% Injectable 25 Gram(s) IV Push once  diltiazem    Tablet 60 milliGRAM(s) Oral every 8 hours  glucagon  Injectable 1 milliGRAM(s) IntraMuscular once  heparin   Injectable 5000 Unit(s) SubCutaneous every 8 hours  insulin lispro (ADMELOG) corrective regimen sliding scale   SubCutaneous every 6 hours  levothyroxine Injectable 70 MICROGram(s) IV Push at bedtime  losartan 25 milliGRAM(s) Oral daily  metoprolol tartrate Injectable 5 milliGRAM(s) IV Push every 6 hours  metroNIDAZOLE  IVPB 500 milliGRAM(s) IV Intermittent every 8 hours  nystatin Cream 1 Application(s) Topical two times a day  nystatin Powder 1 Application(s) Topical every 12 hours  pantoprazole  Injectable 40 milliGRAM(s) IV Push daily  Parenteral Nutrition - Adult 1 Each (83 mL/Hr) TPN Continuous <Continuous>    MEDICATIONS  (PRN):  albuterol    90 MICROgram(s) HFA Inhaler 2 Puff(s) Inhalation every 6 hours PRN Shortness of Breath and/or Wheezing  dextrose Oral Gel 15 Gram(s) Oral once PRN Blood Glucose LESS THAN 70 milliGRAM(s)/deciliter  HYDROmorphone  Injectable 0.5 milliGRAM(s) IV Push every 3 hours PRN Severe Pain (7 - 10)  sodium chloride 0.9% lock flush 10 milliLiter(s) IV Push every 1 hour PRN Pre/post blood products, medications, blood draw, and to maintain line patency      ICU Vital Signs Last 24 Hrs  T(C): 37.5 (04 Feb 2023 20:49), Max: 37.7 (04 Feb 2023 16:10)  T(F): 99.5 (04 Feb 2023 20:49), Max: 99.8 (04 Feb 2023 16:10)  HR: 103 (04 Feb 2023 23:00) (88 - 106)  BP: 151/72 (04 Feb 2023 23:00) (151/72 - 187/73)  BP(mean): 86 (04 Feb 2023 23:00) (85 - 100)  ABP: --  ABP(mean): --  RR: 29 (04 Feb 2023 23:00) (19 - 30)  SpO2: 100% (04 Feb 2023 23:00) (96% - 100%)    O2 Parameters below as of 04 Feb 2023 21:08  Patient On (Oxygen Delivery Method): room air      Gen: NAD  Abd: NT, soft. midline wound with copious stool drainage. staples in tact.  left and right DEA's in place with serous/stool in bulb.                           9.5    7.64  )-----------( 454      ( 04 Feb 2023 06:02 )             29.1     04 Feb 2023 08:49    142    |  111    |  18     ----------------------------<  205    3.3     |  22     |  0.94     Ca    7.6        04 Feb 2023 08:49  Phos  2.2       04 Feb 2023 08:49  Mg     1.9       04 Feb 2023 08:49    TPro  6.0    /  Alb  2.3    /  TBili  0.5    /  DBili  x      /  AST  25     /  ALT  39     /  AlkPhos  70     04 Feb 2023 08:49    LIVER FUNCTIONS - ( 04 Feb 2023 08:49 )  Alb: 2.3 g/dL / Pro: 6.0 gm/dL / ALK PHOS: 70 U/L / ALT: 39 U/L / AST: 25 U/L / GGT: x             CAPILLARY BLOOD GLUCOSE      POCT Blood Glucose.: 164 mg/dL (04 Feb 2023 23:21)  POCT Blood Glucose.: 185 mg/dL (04 Feb 2023 18:34)  POCT Blood Glucose.: 195 mg/dL (04 Feb 2023 11:56)  POCT Blood Glucose.: 188 mg/dL (04 Feb 2023 06:55)  POCT Blood Glucose.: 192 mg/dL (04 Feb 2023 05:24)        A/P: 72 year old female POD 4 s/p repair of ventral hernia with RICHARD and bowel resection presents with possible anastomotic leak, currently stable  - left DEA placed back on wall suction  - hold tube feeds  - STAT abd ct with po contrast  - Dr. Castro aware Notified by RN that patient's surgical wound appears to be leaking stool. Patient seen and examined at bedside.  Patient POD 4 s/p repair of ventral hernia with RICHARD and bowel resection with anastamosis presents with stool leaking from midline wound. Patient started on tube feeds today. Patient currently stable. No fevers. Mild tachycardia s/p respiratory treatment.      MEDICATIONS  (STANDING):  aMIOdarone    Tablet 200 milliGRAM(s) Oral daily  atorvastatin 10 milliGRAM(s) Oral at bedtime  buDESOnide    Inhalation Suspension 0.5 milliGRAM(s) Inhalation every 12 hours  cefepime   IVPB 2000 milliGRAM(s) IV Intermittent every 12 hours  chlorhexidine 4% Liquid 1 Application(s) Topical <User Schedule>  coronavirus bivalent (EUA) Booster Vaccine (PFIZER) 0.3 milliLiter(s) IntraMuscular once  dextrose 5%. 1000 milliLiter(s) (50 mL/Hr) IV Continuous <Continuous>  dextrose 5%. 1000 milliLiter(s) (100 mL/Hr) IV Continuous <Continuous>  dextrose 50% Injectable 25 Gram(s) IV Push once  dextrose 50% Injectable 12.5 Gram(s) IV Push once  dextrose 50% Injectable 25 Gram(s) IV Push once  diltiazem    Tablet 60 milliGRAM(s) Oral every 8 hours  glucagon  Injectable 1 milliGRAM(s) IntraMuscular once  heparin   Injectable 5000 Unit(s) SubCutaneous every 8 hours  insulin lispro (ADMELOG) corrective regimen sliding scale   SubCutaneous every 6 hours  levothyroxine Injectable 70 MICROGram(s) IV Push at bedtime  losartan 25 milliGRAM(s) Oral daily  metoprolol tartrate Injectable 5 milliGRAM(s) IV Push every 6 hours  metroNIDAZOLE  IVPB 500 milliGRAM(s) IV Intermittent every 8 hours  nystatin Cream 1 Application(s) Topical two times a day  nystatin Powder 1 Application(s) Topical every 12 hours  pantoprazole  Injectable 40 milliGRAM(s) IV Push daily  Parenteral Nutrition - Adult 1 Each (83 mL/Hr) TPN Continuous <Continuous>    MEDICATIONS  (PRN):  albuterol    90 MICROgram(s) HFA Inhaler 2 Puff(s) Inhalation every 6 hours PRN Shortness of Breath and/or Wheezing  dextrose Oral Gel 15 Gram(s) Oral once PRN Blood Glucose LESS THAN 70 milliGRAM(s)/deciliter  HYDROmorphone  Injectable 0.5 milliGRAM(s) IV Push every 3 hours PRN Severe Pain (7 - 10)  sodium chloride 0.9% lock flush 10 milliLiter(s) IV Push every 1 hour PRN Pre/post blood products, medications, blood draw, and to maintain line patency      ICU Vital Signs Last 24 Hrs  T(C): 37.5 (04 Feb 2023 20:49), Max: 37.7 (04 Feb 2023 16:10)  T(F): 99.5 (04 Feb 2023 20:49), Max: 99.8 (04 Feb 2023 16:10)  HR: 103 (04 Feb 2023 23:00) (88 - 106)  BP: 151/72 (04 Feb 2023 23:00) (151/72 - 187/73)  BP(mean): 86 (04 Feb 2023 23:00) (85 - 100)  ABP: --  ABP(mean): --  RR: 29 (04 Feb 2023 23:00) (19 - 30)  SpO2: 100% (04 Feb 2023 23:00) (96% - 100%)    O2 Parameters below as of 04 Feb 2023 21:08  Patient On (Oxygen Delivery Method): room air      Gen: NAD  Abd: NT, soft. midline wound with copious stool drainage. staples in tact.  left and right DEA's in place with serous/stool in bulb.                           9.5    7.64  )-----------( 454      ( 04 Feb 2023 06:02 )             29.1     04 Feb 2023 08:49    142    |  111    |  18     ----------------------------<  205    3.3     |  22     |  0.94     Ca    7.6        04 Feb 2023 08:49  Phos  2.2       04 Feb 2023 08:49  Mg     1.9       04 Feb 2023 08:49    TPro  6.0    /  Alb  2.3    /  TBili  0.5    /  DBili  x      /  AST  25     /  ALT  39     /  AlkPhos  70     04 Feb 2023 08:49    LIVER FUNCTIONS - ( 04 Feb 2023 08:49 )  Alb: 2.3 g/dL / Pro: 6.0 gm/dL / ALK PHOS: 70 U/L / ALT: 39 U/L / AST: 25 U/L / GGT: x             CAPILLARY BLOOD GLUCOSE      POCT Blood Glucose.: 164 mg/dL (04 Feb 2023 23:21)  POCT Blood Glucose.: 185 mg/dL (04 Feb 2023 18:34)  POCT Blood Glucose.: 195 mg/dL (04 Feb 2023 11:56)  POCT Blood Glucose.: 188 mg/dL (04 Feb 2023 06:55)  POCT Blood Glucose.: 192 mg/dL (04 Feb 2023 05:24)        A/P: 72 year old female POD 4 s/p repair of ventral hernia with RICHARD and bowel resection presents with possible anastomotic leak, currently stable  - left DEA placed back on wall suction, nurse placed ostomy over middle of incision  - hold tube feeds  - STAT abd ct with po contrast  - Dr. Castro aware

## 2023-02-04 NOTE — CHART NOTE - NSCHARTNOTEFT_GEN_A_CORE
Clinical Nutrition BRIEF Note    ** 73 y/o female with a PMH of CVA with left sided weakness, atrial fibrillation, emphysema, COPD, HTN - wears 2L NC at baseline BIBA, hx SBO and resection per EMS presents to the ED from [facility] for RLQ pain and r/o SBO. x1 episode of emesis, + profound diffuse abdominal pain, febrile. Has SBO, NGT to [facility]. Taken to OR on 1/31 underwent ELAP, extensive RICHARD, ileocolectomy, ventral hernia repair with MESH. Postop course course complicated with ALVERTO, shock requiring pressors, transferred to ICU. + anasarca   DNR/ DNI    *being followed up for: PPN to be d/c today - will increase TF to goal via NGT. Pt tolerating feeds well, had multiple BM's overnight as per RN    *labs reviewed; 02-03    143  |  111<H>  |  21  ----------------------------<  180<H>  3.4<L>   |  23  |  1.19    Ca    7.8<L>      03 Feb 2023 14:50  Phos  2.1     02-03  Mg     2.1     02-03    TPro  4.9<L>  /  Alb  1.8<L>  /  TBili  0.4  /  DBili  x   /  AST  82<H>  /  ALT  57  /  AlkPhos  56  02-03    *2+ generalized edema documented; multiple stage II PU's documented  *BM documented: 2/3 x 3 - loose    Diet, NPO with Tube Feed:   Tube Feeding Modality: Nasogastric  Glucerna 1.5 Tacos (GLUCERNA1.5)  Total Volume for 24 Hours (mL): 480  Continuous  Starting Tube Feed Rate {mL per Hour}: 10  Increase Tube Feed Rate by (mL): 10     Every 10 hours  Until Goal Tube Feed Rate (mL per Hour): 20  Tube Feed Duration (in Hours): 24  Tube Feed Start Time: 00:00  Free Water Flush  Free Water Flush Instructions:  Consider free water flush to maintain hydration as per MD (02-03-23 @ 14:35)    *malnutrition: Pt continues to meet criteria for severe protein-calorie malnutrition in context of acute disease r/t decreased ability to meet increased nutrient needs 2/2 SBO AEB <50% ENN x >12 days, severe muscle/ fat wasting, severe edema    Estimated Needs: based on 97.4 Kg (wt from admit - current wts being skewed by severe fluid retention)  Calories: 1948- 2435 Kcal (20- 25 Kcal/Kg)  Protein: 146- 175 g (1.5- 1.8 g/Kg)  Fluids: 1948- 2435 mL (20- 25 mL/Kg)      RECOMMENDATIONS  1) change TF rx to above Glucerna 1.5 @ 20 cc/hr, increase by 10 cc/hr q6 hours until goal rate of 70 cc/hr (total volume 1400 mL) met with 3 packets of Prosource TF. Will provide ~ 2220 kcal, 149 g protein, and 1064 mL free water. Consider free water flush of 30 cc/hr (provides an additional 600 mL daily; adjust prn to maintain hydration as per MD)  2) Monitor tolerance; maintain aspiration precautions, back of bed >45 degrees  3) daily wts to track/trend changes  4) monitor lytes/ min and replete prn  5) Monitor bowel movements, if no BM for >3 days, consider implementing bowel regimen  6) Obtain vitamin D 25OH level to assess nutriture  7) Confirm goals of care regarding LONG-TERM nutrition support.     *will continue to monitor and adjust nutrition plan prn*  Bushra Soni MS, RDN, [facility] [phone]  Certified Nutrition

## 2023-02-05 LAB
ANION GAP SERPL CALC-SCNC: 11 MMOL/L — SIGNIFICANT CHANGE UP (ref 5–17)
BUN SERPL-MCNC: 16 MG/DL — SIGNIFICANT CHANGE UP (ref 7–23)
CALCIUM SERPL-MCNC: 7.8 MG/DL — LOW (ref 8.5–10.1)
CHLORIDE SERPL-SCNC: 109 MMOL/L — HIGH (ref 96–108)
CO2 SERPL-SCNC: 20 MMOL/L — LOW (ref 22–31)
CREAT SERPL-MCNC: 0.74 MG/DL — SIGNIFICANT CHANGE UP (ref 0.5–1.3)
CULTURE RESULTS: SIGNIFICANT CHANGE UP
EGFR: 86 ML/MIN/1.73M2 — SIGNIFICANT CHANGE UP
GLUCOSE BLDC GLUCOMTR-MCNC: 118 MG/DL — HIGH (ref 70–99)
GLUCOSE BLDC GLUCOMTR-MCNC: 87 MG/DL — SIGNIFICANT CHANGE UP (ref 70–99)
GLUCOSE BLDC GLUCOMTR-MCNC: 87 MG/DL — SIGNIFICANT CHANGE UP (ref 70–99)
GLUCOSE BLDC GLUCOMTR-MCNC: 92 MG/DL — SIGNIFICANT CHANGE UP (ref 70–99)
GLUCOSE SERPL-MCNC: 120 MG/DL — HIGH (ref 70–99)
HCT VFR BLD CALC: 29.6 % — LOW (ref 34.5–45)
HGB BLD-MCNC: 9.6 G/DL — LOW (ref 11.5–15.5)
MAGNESIUM SERPL-MCNC: 1.6 MG/DL — SIGNIFICANT CHANGE UP (ref 1.6–2.6)
MCHC RBC-ENTMCNC: 27.7 PG — SIGNIFICANT CHANGE UP (ref 27–34)
MCHC RBC-ENTMCNC: 32.4 GM/DL — SIGNIFICANT CHANGE UP (ref 32–36)
MCV RBC AUTO: 85.5 FL — SIGNIFICANT CHANGE UP (ref 80–100)
ORGANISM # SPEC MICROSCOPIC CNT: SIGNIFICANT CHANGE UP
ORGANISM # SPEC MICROSCOPIC CNT: SIGNIFICANT CHANGE UP
PHOSPHATE SERPL-MCNC: 1.8 MG/DL — LOW (ref 2.5–4.5)
PLATELET # BLD AUTO: 337 K/UL — SIGNIFICANT CHANGE UP (ref 150–400)
POTASSIUM SERPL-MCNC: 4.3 MMOL/L — SIGNIFICANT CHANGE UP (ref 3.5–5.3)
POTASSIUM SERPL-SCNC: 4.3 MMOL/L — SIGNIFICANT CHANGE UP (ref 3.5–5.3)
RBC # BLD: 3.46 M/UL — LOW (ref 3.8–5.2)
RBC # FLD: 20.9 % — HIGH (ref 10.3–14.5)
SODIUM SERPL-SCNC: 140 MMOL/L — SIGNIFICANT CHANGE UP (ref 135–145)
SPECIMEN SOURCE: SIGNIFICANT CHANGE UP
WBC # BLD: 5.86 K/UL — SIGNIFICANT CHANGE UP (ref 3.8–10.5)
WBC # FLD AUTO: 5.86 K/UL — SIGNIFICANT CHANGE UP (ref 3.8–10.5)

## 2023-02-05 PROCEDURE — 74176 CT ABD & PELVIS W/O CONTRAST: CPT | Mod: 26

## 2023-02-05 PROCEDURE — 99233 SBSQ HOSP IP/OBS HIGH 50: CPT

## 2023-02-05 RX ORDER — MAGNESIUM SULFATE 500 MG/ML
2 VIAL (ML) INJECTION ONCE
Refills: 0 | Status: COMPLETED | OUTPATIENT
Start: 2023-02-05 | End: 2023-02-05

## 2023-02-05 RX ORDER — SODIUM CHLORIDE 9 MG/ML
1000 INJECTION, SOLUTION INTRAVENOUS
Refills: 0 | Status: DISCONTINUED | OUTPATIENT
Start: 2023-02-05 | End: 2023-02-05

## 2023-02-05 RX ORDER — OCTREOTIDE ACETATE 200 UG/ML
75 INJECTION, SOLUTION INTRAVENOUS; SUBCUTANEOUS EVERY 8 HOURS
Refills: 0 | Status: DISCONTINUED | OUTPATIENT
Start: 2023-02-05 | End: 2023-02-13

## 2023-02-05 RX ORDER — POTASSIUM PHOSPHATE, MONOBASIC POTASSIUM PHOSPHATE, DIBASIC 236; 224 MG/ML; MG/ML
30 INJECTION, SOLUTION INTRAVENOUS ONCE
Refills: 0 | Status: COMPLETED | OUTPATIENT
Start: 2023-02-05 | End: 2023-02-05

## 2023-02-05 RX ORDER — ELECTROLYTE SOLUTION,INJ
1 VIAL (ML) INTRAVENOUS
Refills: 0 | Status: DISCONTINUED | OUTPATIENT
Start: 2023-02-05 | End: 2023-02-05

## 2023-02-05 RX ORDER — I.V. FAT EMULSION 20 G/100ML
0.82 EMULSION INTRAVENOUS
Qty: 80 | Refills: 0 | Status: DISCONTINUED | OUTPATIENT
Start: 2023-02-05 | End: 2023-02-05

## 2023-02-05 RX ORDER — SODIUM CHLORIDE 9 MG/ML
1000 INJECTION, SOLUTION INTRAVENOUS
Refills: 0 | Status: DISCONTINUED | OUTPATIENT
Start: 2023-02-05 | End: 2023-02-06

## 2023-02-05 RX ORDER — ACETAMINOPHEN 500 MG
650 TABLET ORAL EVERY 6 HOURS
Refills: 0 | Status: DISCONTINUED | OUTPATIENT
Start: 2023-02-05 | End: 2023-02-22

## 2023-02-05 RX ADMIN — CHLORHEXIDINE GLUCONATE 1 APPLICATION(S): 213 SOLUTION TOPICAL at 06:03

## 2023-02-05 RX ADMIN — HYDROMORPHONE HYDROCHLORIDE 0.5 MILLIGRAM(S): 2 INJECTION INTRAMUSCULAR; INTRAVENOUS; SUBCUTANEOUS at 23:10

## 2023-02-05 RX ADMIN — Medication 100 MILLIGRAM(S): at 14:19

## 2023-02-05 RX ADMIN — NYSTATIN CREAM 1 APPLICATION(S): 100000 CREAM TOPICAL at 17:11

## 2023-02-05 RX ADMIN — NYSTATIN CREAM 1 APPLICATION(S): 100000 CREAM TOPICAL at 06:44

## 2023-02-05 RX ADMIN — I.V. FAT EMULSION 33.33 GM/KG/DAY: 20 EMULSION INTRAVENOUS at 22:29

## 2023-02-05 RX ADMIN — HEPARIN SODIUM 5000 UNIT(S): 5000 INJECTION INTRAVENOUS; SUBCUTANEOUS at 14:19

## 2023-02-05 RX ADMIN — Medication 100 MILLIGRAM(S): at 05:59

## 2023-02-05 RX ADMIN — SODIUM CHLORIDE 75 MILLILITER(S): 9 INJECTION, SOLUTION INTRAVENOUS at 15:43

## 2023-02-05 RX ADMIN — SODIUM CHLORIDE 75 MILLILITER(S): 9 INJECTION, SOLUTION INTRAVENOUS at 18:57

## 2023-02-05 RX ADMIN — CEFEPIME 100 MILLIGRAM(S): 1 INJECTION, POWDER, FOR SOLUTION INTRAMUSCULAR; INTRAVENOUS at 10:47

## 2023-02-05 RX ADMIN — HEPARIN SODIUM 5000 UNIT(S): 5000 INJECTION INTRAVENOUS; SUBCUTANEOUS at 22:37

## 2023-02-05 RX ADMIN — ATORVASTATIN CALCIUM 10 MILLIGRAM(S): 80 TABLET, FILM COATED ORAL at 22:37

## 2023-02-05 RX ADMIN — HYDROMORPHONE HYDROCHLORIDE 0.5 MILLIGRAM(S): 2 INJECTION INTRAMUSCULAR; INTRAVENOUS; SUBCUTANEOUS at 23:25

## 2023-02-05 RX ADMIN — OCTREOTIDE ACETATE 75 MICROGRAM(S): 200 INJECTION, SOLUTION INTRAVENOUS; SUBCUTANEOUS at 22:35

## 2023-02-05 RX ADMIN — ATORVASTATIN CALCIUM 10 MILLIGRAM(S): 80 TABLET, FILM COATED ORAL at 01:34

## 2023-02-05 RX ADMIN — Medication 650 MILLIGRAM(S): at 06:00

## 2023-02-05 RX ADMIN — CEFEPIME 100 MILLIGRAM(S): 1 INJECTION, POWDER, FOR SOLUTION INTRAMUSCULAR; INTRAVENOUS at 22:35

## 2023-02-05 RX ADMIN — PANTOPRAZOLE SODIUM 40 MILLIGRAM(S): 20 TABLET, DELAYED RELEASE ORAL at 10:47

## 2023-02-05 RX ADMIN — HYDROMORPHONE HYDROCHLORIDE 0.5 MILLIGRAM(S): 2 INJECTION INTRAMUSCULAR; INTRAVENOUS; SUBCUTANEOUS at 18:46

## 2023-02-05 RX ADMIN — Medication 70 MICROGRAM(S): at 22:35

## 2023-02-05 RX ADMIN — NYSTATIN CREAM 1 APPLICATION(S): 100000 CREAM TOPICAL at 23:19

## 2023-02-05 RX ADMIN — Medication 1 EACH: at 22:28

## 2023-02-05 RX ADMIN — Medication 100 MILLIGRAM(S): at 22:37

## 2023-02-05 RX ADMIN — HEPARIN SODIUM 5000 UNIT(S): 5000 INJECTION INTRAVENOUS; SUBCUTANEOUS at 01:34

## 2023-02-05 RX ADMIN — Medication 5 MILLIGRAM(S): at 05:59

## 2023-02-05 RX ADMIN — AMIODARONE HYDROCHLORIDE 200 MILLIGRAM(S): 400 TABLET ORAL at 10:47

## 2023-02-05 RX ADMIN — Medication 25 GRAM(S): at 12:14

## 2023-02-05 RX ADMIN — Medication 60 MILLIGRAM(S): at 01:32

## 2023-02-05 RX ADMIN — POTASSIUM PHOSPHATE, MONOBASIC POTASSIUM PHOSPHATE, DIBASIC 83.33 MILLIMOLE(S): 236; 224 INJECTION, SOLUTION INTRAVENOUS at 15:43

## 2023-02-05 RX ADMIN — NYSTATIN CREAM 1 APPLICATION(S): 100000 CREAM TOPICAL at 10:51

## 2023-02-05 RX ADMIN — Medication 60 MILLIGRAM(S): at 06:00

## 2023-02-05 RX ADMIN — HEPARIN SODIUM 5000 UNIT(S): 5000 INJECTION INTRAVENOUS; SUBCUTANEOUS at 05:59

## 2023-02-05 RX ADMIN — Medication 0.5 MILLIGRAM(S): at 08:28

## 2023-02-05 RX ADMIN — Medication 0.5 MILLIGRAM(S): at 21:12

## 2023-02-05 NOTE — PROGRESS NOTE ADULT - SUBJECTIVE AND OBJECTIVE BOX
Resting comfortably, no new complaints  VSS Tm 100.7, To afeb  lungs- clear  cor- RRR  Abd- soft non tender, enteric drainage from left side DEA and into ostomy bag lower wound  Ext- + anasarca

## 2023-02-05 NOTE — PROGRESS NOTE ADULT - RESPIRATORY
clear to auscultation bilaterally/no wheezes/no rales/no rhonchi
clear to auscultation bilaterally/no wheezes/no respiratory distress
clear to auscultation bilaterally/no wheezes/no respiratory distress
no wheezes/respiratory distress/rhonchi
clear to auscultation bilaterally/no wheezes/no rales/no rhonchi
clear to auscultation bilaterally/no wheezes/no respiratory distress
clear to auscultation bilaterally/no wheezes/no rales/no rhonchi
clear to auscultation bilaterally/no wheezes/no respiratory distress

## 2023-02-05 NOTE — PROGRESS NOTE ADULT - SUBJECTIVE AND OBJECTIVE BOX
Subjective:    pat some spillage around sutures line, no respiratory distress, NG suction cath, family @ bedside.    Home Medications:  Albuterol (Eqv-ProAir HFA) 90 mcg/inh inhalation aerosol: 1 puff(s) inhaled every 6 hours, As Needed (20 Jan 2023 16:49)  amiodarone 200 mg oral tablet: 1 tab(s) orally once a day (20 Jan 2023 16:49)  ascorbic acid 500 mg oral tablet: 2 tab(s) orally once a day (20 Jan 2023 16:49)  atorvastatin 10 mg oral tablet: 1 tab(s) orally once a day (at bedtime) (20 Jan 2023 16:49)  benzonatate 100 mg oral capsule: 1 cap(s) orally 3 times a day (20 Jan 2023 21:48)  budesonide 0.5 mg/2 mL inhalation suspension: 2 milliliter(s) inhaled 2 times a day (20 Jan 2023 21:48)  Cepacol Sore Throat 15 mg-3.6 mg mucous membrane lozenge: 1 lozenge mucous membrane every 4 hours, As Needed (20 Jan 2023 21:48)  cholecalciferol 1250 mcg (50,000 intl units) oral capsule: 1 cap(s) orally every 4 weeks on Wednesday  (20 Jan 2023 16:49)  Coumadin 2.5 mg oral tablet: 1 tab(s) orally once a day (at bedtime)  ***ON HOLD from 1-16 to 1-21*** (20 Jan 2023 21:48)  Cranberry oral tablet: 1 tab(s) orally 2 times a day (20 Jan 2023 16:49)  cyanocobalamin 1000 mcg oral tablet: 1 tab(s) orally once a day (20 Jan 2023 16:49)  dilTIAZem 180 mg/24 hours oral capsule, extended release: 1 cap(s) orally once a day (20 Jan 2023 16:49)  DULoxetine 60 mg oral delayed release capsule: 1 cap(s) orally once a day (20 Jan 2023 16:49)  estradiol 0.1 mg/g vaginal cream: 0.5 gram(s) vaginal 2 times a week on Monday and Thursday (20 Jan 2023 21:48)  fexofenadine 180 mg oral tablet: 1 tab(s) orally once a day (20 Jan 2023 21:48)  fluticasone 50 mcg/inh nasal spray: 1 spray(s) nasal 2 times a day (20 Jan 2023 16:49)  furosemide 20 mg oral tablet: 1 tab(s) orally once a day (20 Jan 2023 16:49)  glipiZIDE 10 mg oral tablet, extended release: 2 tab(s) orally once a day (20 Jan 2023 16:49)  GlycoLax oral powder for reconstitution: 17 gram(s) orally 2 times a day (20 Jan 2023 16:49)  guaiFENesin 100 mg/5 mL oral liquid: 20 milliliter(s) orally every 6 hours (20 Jan 2023 21:48)  HumaLOG KwikPen 100 units/mL injectable solution: 10 unit(s) injectable 3 times a day (before meals) (20 Jan 2023 21:48)  ipratropium-albuterol 20 mcg-100 mcg/inh inhalation aerosol: 1 puff(s) inhaled 4 times a day (20 Jan 2023 16:49)  levothyroxine 88 mcg (0.088 mg) oral tablet: 1 tab(s) orally once a day (at bedtime) (20 Jan 2023 16:49)  losartan 25 mg oral tablet: 1 tab(s) orally once a day (20 Jan 2023 16:49)  Macrobid 100 mg oral capsule: 1 cap(s) orally 2 times a day for 5 days  ***course complete*** (20 Jan 2023 21:48)  methocarbamol 750 mg oral tablet: 1 tab(s) orally every 8 hours, As Needed (20 Jan 2023 21:48)  metoprolol tartrate 25 mg oral tablet: 1 tab(s) orally 2 times a day (20 Jan 2023 16:49)  Milk of Magnesia 8% oral suspension: 30 milliliter(s) orally once a day, As Needed (20 Jan 2023 16:49)  montelukast 10 mg oral tablet: 1 tab(s) orally once a day (at bedtime) (20 Jan 2023 21:48)  ondansetron 4 mg oral tablet: 1 tab(s) orally every 4 hours, As Needed (20 Jan 2023 21:48)  oxybutynin 5 mg oral tablet: 1 tab(s) orally 2 times a day (20 Jan 2023 16:49)  oxyCODONE 5 mg oral tablet: 1 tab(s) orally every 4 hours, As Needed (20 Jan 2023 16:49)  phytonadione 5 mg oral tablet: 0.5 tab(s) orally once  ***given at [facility] once on 1-19-23*** (20 Jan 2023 21:48)  pregabalin 100 mg oral capsule: 1 cap(s) orally 3 times a day (20 Jan 2023 16:49)  sennosides-docusate 8.6 mg-50 mg oral tablet: 2 tab(s) orally once a day (at bedtime) (20 Jan 2023 16:49)  Tradjenta 5 mg oral tablet: 1 tab(s) orally once a day (20 Jan 2023 16:49)  Tylenol 500 mg oral tablet: 2 tab(s) orally every 8 hours (20 Jan 2023 16:49)    MEDICATIONS  (STANDING):  aMIOdarone    Tablet 200 milliGRAM(s) Oral daily  atorvastatin 10 milliGRAM(s) Oral at bedtime  buDESOnide    Inhalation Suspension 0.5 milliGRAM(s) Inhalation every 12 hours  cefepime   IVPB 2000 milliGRAM(s) IV Intermittent every 12 hours  chlorhexidine 4% Liquid 1 Application(s) Topical <User Schedule>  coronavirus bivalent (EUA) Booster Vaccine (PFIZER) 0.3 milliLiter(s) IntraMuscular once  dextrose 5%. 1000 milliLiter(s) (100 mL/Hr) IV Continuous <Continuous>  dextrose 5%. 1000 milliLiter(s) (50 mL/Hr) IV Continuous <Continuous>  dextrose 50% Injectable 25 Gram(s) IV Push once  dextrose 50% Injectable 12.5 Gram(s) IV Push once  dextrose 50% Injectable 25 Gram(s) IV Push once  fat emulsion (Fish Oil and Plant Based) 20% Infusion 0.821 Gm/kG/Day (33.33 mL/Hr) IV Continuous <Continuous>  glucagon  Injectable 1 milliGRAM(s) IntraMuscular once  heparin   Injectable 5000 Unit(s) SubCutaneous every 8 hours  insulin lispro (ADMELOG) corrective regimen sliding scale   SubCutaneous every 6 hours  lactated ringers. 1000 milliLiter(s) (75 mL/Hr) IV Continuous <Continuous>  levothyroxine Injectable 70 MICROGram(s) IV Push at bedtime  metroNIDAZOLE  IVPB 500 milliGRAM(s) IV Intermittent every 8 hours  nystatin Cream 1 Application(s) Topical two times a day  nystatin Powder 1 Application(s) Topical every 12 hours  pantoprazole  Injectable 40 milliGRAM(s) IV Push daily  Parenteral Nutrition - Adult 1 Each (83 mL/Hr) TPN Continuous <Continuous>  potassium phosphate IVPB 30 milliMole(s) IV Intermittent once    MEDICATIONS  (PRN):  acetaminophen     Tablet .. 650 milliGRAM(s) Oral every 6 hours PRN Temp greater or equal to 38C (100.4F)  albuterol    90 MICROgram(s) HFA Inhaler 2 Puff(s) Inhalation every 6 hours PRN Shortness of Breath and/or Wheezing  dextrose Oral Gel 15 Gram(s) Oral once PRN Blood Glucose LESS THAN 70 milliGRAM(s)/deciliter  HYDROmorphone  Injectable 0.5 milliGRAM(s) IV Push every 3 hours PRN Severe Pain (7 - 10)  sodium chloride 0.9% lock flush 10 milliLiter(s) IV Push every 1 hour PRN Pre/post blood products, medications, blood draw, and to maintain line patency      Allergies    Cipro (Rash)  Spiriva (Rash)    Intolerances        Vital Signs Last 24 Hrs  T(C): 36.9 (05 Feb 2023 09:05), Max: 38.2 (05 Feb 2023 06:00)  T(F): 98.5 (05 Feb 2023 09:05), Max: 100.7 (05 Feb 2023 06:00)  HR: 89 (05 Feb 2023 12:00) (89 - 114)  BP: 131/68 (05 Feb 2023 12:00) (110/55 - 171/72)  BP(mean): 83 (05 Feb 2023 12:00) (68 - 109)  RR: 18 (05 Feb 2023 12:00) (18 - 34)  SpO2: 98% (05 Feb 2023 12:00) (94% - 100%)    Parameters below as of 05 Feb 2023 12:00  Patient On (Oxygen Delivery Method): room air          PHYSICAL EXAMINATION:    NECK:  Supple. No lymphadenopathy. Jugular venous pressure not elevated. Carotids equal.   HEART:   The cardiac impulse has a normal quality. Reg., Nl S1 and S2.  There are no murmurs, rubs or gallops noted  CHEST:  Chest crackles to auscultation. Normal respiratory effort.  ABDOMEN:  Soft and nontender.   EXTREMITIES:  There is no edema.       LABS:                        9.6    5.86  )-----------( 337      ( 05 Feb 2023 08:11 )             29.6     02-05    140  |  109<H>  |  16  ----------------------------<  120<H>  4.3   |  20<L>  |  0.74    Ca    7.8<L>      05 Feb 2023 08:11  Phos  1.8     02-05  Mg     1.6     02-05    TPro  6.0  /  Alb  2.3<L>  /  TBili  0.5  /  DBili  x   /  AST  25  /  ALT  39  /  AlkPhos  70  02-04

## 2023-02-05 NOTE — PROGRESS NOTE ADULT - SUBJECTIVE AND OBJECTIVE BOX
Date of service: 02-05-23 @ 10:38    Lying in bed in NAD  NGT in place  Has low grade fever  Abdomen is distended    ROS: no fever or chills; poorly verbal    MEDICATIONS  (STANDING):  aMIOdarone    Tablet 200 milliGRAM(s) Oral daily  atorvastatin 10 milliGRAM(s) Oral at bedtime  buDESOnide    Inhalation Suspension 0.5 milliGRAM(s) Inhalation every 12 hours  cefepime   IVPB 2000 milliGRAM(s) IV Intermittent every 12 hours  chlorhexidine 4% Liquid 1 Application(s) Topical <User Schedule>  coronavirus bivalent (EUA) Booster Vaccine (PFIZER) 0.3 milliLiter(s) IntraMuscular once  dextrose 5%. 1000 milliLiter(s) (100 mL/Hr) IV Continuous <Continuous>  dextrose 5%. 1000 milliLiter(s) (50 mL/Hr) IV Continuous <Continuous>  dextrose 50% Injectable 25 Gram(s) IV Push once  dextrose 50% Injectable 12.5 Gram(s) IV Push once  dextrose 50% Injectable 25 Gram(s) IV Push once  glucagon  Injectable 1 milliGRAM(s) IntraMuscular once  heparin   Injectable 5000 Unit(s) SubCutaneous every 8 hours  insulin lispro (ADMELOG) corrective regimen sliding scale   SubCutaneous every 6 hours  levothyroxine Injectable 70 MICROGram(s) IV Push at bedtime  magnesium sulfate  IVPB 2 Gram(s) IV Intermittent once  metoprolol tartrate Injectable 5 milliGRAM(s) IV Push every 6 hours  metroNIDAZOLE  IVPB 500 milliGRAM(s) IV Intermittent every 8 hours  nystatin Cream 1 Application(s) Topical two times a day  nystatin Powder 1 Application(s) Topical every 12 hours  pantoprazole  Injectable 40 milliGRAM(s) IV Push daily  potassium phosphate IVPB 30 milliMole(s) IV Intermittent once    Vital Signs Last 24 Hrs  T(C): 36.9 (05 Feb 2023 09:05), Max: 38.2 (05 Feb 2023 06:00)  T(F): 98.5 (05 Feb 2023 09:05), Max: 100.7 (05 Feb 2023 06:00)  HR: 95 (05 Feb 2023 10:00) (95 - 114)  BP: 110/55 (05 Feb 2023 10:00) (110/55 - 171/72)  BP(mean): 68 (05 Feb 2023 10:00) (68 - 109)  RR: 29 (05 Feb 2023 10:00) (26 - 34)  SpO2: 95% (05 Feb 2023 10:00) (94% - 100%)    Parameters below as of 05 Feb 2023 08:30  Patient On (Oxygen Delivery Method): nasal cannula     Physical exam:    Constitutional:  No acute distress  HEENT: NC/AT, EOMI, PERRLA, conjunctivae clear; ears and nose atraumatic  Neck: supple; thyroid not palpable  Back: no tenderness  Respiratory: respiratory effort normal; crackles at bases  Cardiovascular: S1S2 regular, no murmurs  Abdomen: soft, mid abdomen tender, distended, positive BS  Genitourinary: no suprapubic tenderness  Lymphatic: no LN palpable  Musculoskeletal: no muscle tenderness, no joint swelling or tenderness  Extremities: no pedal edema  Neurological/ Psychiatric: confused, judgement and insight impaired; moving all extremities  Skin: no rashes; no palpable lesions    Labs: reviewed                        9.6    5.86  )-----------( 337      ( 05 Feb 2023 08:11 )             29.6     02-05    140  |  109<H>  |  16  ----------------------------<  120<H>  4.3   |  20<L>  |  0.74    Ca    7.8<L>      05 Feb 2023 08:11  Phos  1.8     02-05  Mg     1.6     02-05    TPro  6.0  /  Alb  2.3<L>  /  TBili  0.5  /  DBili  x   /  AST  25  /  ALT  39  /  AlkPhos  70  02-04                         9.9    10.40 )-----------( 494      ( 30 Jan 2023 06:55 )             32.9     01-30    148<H>  |  115<H>  |  19  ----------------------------<  105<H>  3.7   |  29  |  0.59    Ca    7.7<L>      30 Jan 2023 06:55    TPro  4.9<L>  /  Alb  1.3<L>  /  TBili  0.3  /  DBili  0.1  /  AST  14<L>  /  ALT  12  /  AlkPhos  65  01-28               10.9   19.72 )-----------( 510      ( 27 Jan 2023 06:16 )             36.7     01-27    142  |  111<H>  |  10  ----------------------------<  233<H>  3.8   |  26  |  0.78    Ca    8.0<L>      27 Jan 2023 06:16  Phos  2.7     01-27  Mg     1.8     01-27      Culture - Acid Fast - Tissue w/Smear (collected 31 Jan 2023 18:07)  Source: .Tissue INTRAPERITONEAL ABCESS FOR CX    Culture - Fungal, Tissue (collected 31 Jan 2023 18:07)  Source: .Tissue INTRAPERITONEAL ABCESS FOR CX  Preliminary Report (01 Feb 2023 07:09):    Testing in progress    Culture - Tissue with Gram Stain (collected 31 Jan 2023 18:07)  Source: .Tissue INTRAPERITONEAL ABCESS FOR CX  Gram Stain (01 Feb 2023 04:37):    Rare polymorphonuclear leukocytes seen per low power field    No organisms seen per oil power field  Preliminary Report (02 Feb 2023 20:01):    Growth in fluid media only Klebsiella oxytoca/Raoultella ornithinolytica  Organism: Klebsiella oxytoca /Raoutella ornithinolytica (03 Feb 2023 17:25)  Organism: Klebsiella oxytoca /Raoutella ornithinolytica (03 Feb 2023 17:25)      -  Amikacin: S <=16      -  Amoxicillin/Clavulanic Acid: I 16/8      -  Ampicillin: R >16 These ampicillin results predict results for amoxicillin      -  Ampicillin/Sulbactam: R >16/8 Enterobacter, Klebsiella aerogenes, Citrobacter, and Serratia may develop resistance during prolonged therapy (3-4 days)      -  Aztreonam: S <=4      -  Cefazolin: R >16 Enterobacter, Klebsiella aerogenes, Citrobacter, and Serratia may develop resistance during prolonged therapy (3-4 days)      -  Cefepime: S <=2      -  Cefoxitin: S <=8      -  Ceftriaxone: S <=1 Enterobacter, Klebsiella aerogenes, Citrobacter, and Serratia may develop resistance during prolonged therapy      -  Ciprofloxacin: S <=0.25      -  Ertapenem: S <=0.5      -  Gentamicin: S <=2      -  Imipenem: S <=1      -  Levofloxacin: S <=0.5      -  Meropenem: S <=1      -  Piperacillin/Tazobactam: R 64      -  Tobramycin: S <=2      -  Trimethoprim/Sulfamethoxazole: S <=0.5/9.5      Method Type: ARABELLA    Culture - Blood (collected 27 Jan 2023 06:16)  Source: .Blood None  Final Report (01 Feb 2023 09:00):    No Growth Final    Culture - Blood (collected 27 Jan 2023 06:16)  Source: .Blood None  Final Report (01 Feb 2023 09:00):    No Growth Final    Radiology: all available radiological tests reviewed    < from: CT Chest No Cont (01.27.23 @ 09:53) >  Small bowel obstruction with transition point at the neck of a left lower   quadrant abdominal wall hernia. It is uncertain whether the obstruction   is due to the hernia itself or to adhesions.    Additional massive ventral hernia containing small and large bowel and   epigastric hernia containing stomach.    Right lower lobe nodules new since December 04, 2021 and could be   infectious or neoplastic. Follow-up chest CT in 3 months is recommended   to reevaluate.    < end of copied text >      Advanced directives addressed: full resuscitation

## 2023-02-05 NOTE — PROGRESS NOTE ADULT - ASSESSMENT
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 She was noted with one episode of emesis and diffuse abdominal pain associated with fever. On 1/27 the patient was noted febrile to 102.8F, more lethargic, did not open eyes, did not follow commands. She received cefepime and metronidazole.     1. Low grade fever. UTI with KLPN. Right lower lobes nodules Probable pneumonia. Intraabdominal hernia related fat necrosis with superimposed infection with KLOX. SBO and ventral hernia s/p surgery. Allergy to cipro. Encephalopathy.   -leukocytosis improving  -f/u surgical c/s  -BC x 2 noted  -urine c/s noted  -on cefepime 2 gm IV q12h and metronidazole 500 mg IV q8h # 9  -tolerating abx well so far; no side effects noted  -aspiration precautions  -continue abx coverage   -monitor abdomen  -monitor temps  -f/u CBC  -supportive care  2. Other issues:   -care per medicine    d/w Dr. Castro

## 2023-02-05 NOTE — PROGRESS NOTE ADULT - ASSESSMENT
Patient admitted with abdominal pain, nausea and vomitting , dehydration  septic shock, likely related to     PROBLEMS:    UTI klebsiella pneumoniae and aspiration PNA /SBO- ventral hernia  New R lung nodules - cannot ruleout aspiration PNA   Acute metabolic encephalopathy  Sepsis   Hx copd without exacerbation  Anasarca/acute on  chronic diastolic heart failure       Plan:    pulmonary stable  Blood transfusion, on TPN, IV fluids, iv lasix post transfusion, on 3lpm nc sat 100%  NG suction-Incarcerated Ventral Hernia with SBO-S/p extensive RICHARD, ileo colectomy, giant ventral hernia repair with Surgimend  IV cefepime /flagyl -for  sepsis ( SBO/UTI)  Monitor Cr  D/w staff/daughter  DVT PROPHYLASIX

## 2023-02-05 NOTE — CHART NOTE - NSCHARTNOTEFT_GEN_A_CORE
Clinical Nutrition PN Follow Up Note    * 73 y/o female with a PMH of CVA with left sided weakness, atrial fibrillation, emphysema, COPD, HTN - wears 2L NC at baseline BIBA, hx SBO and resection per EMS presents to the ED from [facility] for RLQ pain and r/o SBO. x1 episode of emesis, + profound diffuse abdominal pain, febrile. Has SBO, NGT to LWS. Taken to OR on  underwent ELAP, extensive RICHARD, ileocolectomy, ventral hernia repair with MESH. Postop course complicated with ALVERTO, shock requiring pressors, transferred to ICU. + anasarca   DNR/ DNI    *current status: pt started on TF and PPN d/c yesterday - now having loose stools and is leaking out of wounds. TF on hold, PPN to be re-started. Will keep PPN order the same from the last bag and monitor/ adjust based on tomorrow's () labs.  Was NGT to LWS again    *new pertinent meds: statin, admelog (received 6 units x 24 hours), magnesium sulfate, pantoprazole, KPhos (30 mmol x 24 hours), hydromorphone    *labs reviewed;  K and Na WNL; hypophosphatemia; Mg low-end of normal - will start lytes/ min in bag same as previous bag and adjust tomorrow based on new labs.    POCT WNL  Last bili T WNL to c/w trace elements.   Last Triglyceride level WNL for 80g lipids daily - will obtain new level.   corrected Ca WNL, rec'd add 10mEq of Calcium Gluconate outside of PN bag as CAPS is out.     -    140  |  109<H>  |  16  ----------------------------<  120<H>  4.3   |  20<L>  |  0.74    Ca    7.8<L>      2023 08:11  Phos  1.8     02-  Mg     1.6     -    TPro  6.0  /  Alb  2.3<L>  /  TBili  0.5  /  DBili  x   /  AST  25  /  ALT  39  /  AlkPhos  70  -    POCT Blood Glucose.: 118 mg/dL (2023 06:09)  POCT Blood Glucose.: 164 mg/dL (2023 23:21)  POCT Blood Glucose.: 185 mg/dL (2023 18:34)  POCT Blood Glucose.: 195 mg/dL (2023 11:56)      *I&O's Detail    2023 07:01  -  2023 07:00  --------------------------------------------------------  IN:    Fat Emulsion (Fish Oil &amp; Plant Based) 20% Infusion: 235 mL    Glucerna 1.5: 167 mL    Instillation (mL): 285 mL    IV PiggyBack: 150 mL    PPN (Peripheral Parenteral Nutrition): 871 mL  Total IN: 1708 mL    OUT:    Bulb (mL): 350 mL    Bulb (mL): 1200 mL    Indwelling Catheter - Urethral (mL): 2925 mL  Total OUT: 4475 mL    Total NET: -2767 mL  *negative fluid balance: pt with 4+ generalized edema. Will monitor/adjust total PPN volume prn. TF now on hold    *malnutrition: Pt continues to meet criteria for severe protein-calorie malnutrition in context of acute disease r/t decreased ability to meet increased nutrient needs 2/2 SBO AEB <50% ENN x >11 days, severe muscle/ fat wasting, severe edema    Estimated Needs: based on 97.4 Kg (wt from admit - current wts being skewed by severe fluid retention)  Calories: 1948- 2435 Kcal (20- 25 Kcal/Kg)  Protein: 146- 175 g (1.5- 1.8 g/Kg)  Fluids: 1948- 2435 mL (20- 25 mL/Kg)    Weight History:  Daily Weight in k.6 (2023 06:00)   - #  Height (cm): 165.1 (23 @ 16:08)  Weight (kg): 97.4 (23 @ 23:30) - admit wt ()  BMI (kg/m2): 35.7 (23 @ 23:30)  BSA (m2): 2.04 (23 @ 23:30)    Weight History:  Height (cm): 165.1 (23 @ 16:08)  Weight (kg): 97.4 (23 @ 23:30)  BMI (kg/m2): 35.7 (23 @ 23:30)  BSA (m2): 2.04 (23 @ 23:30)    PPN Recommendations: via peripheral line  Total Volume: 2000 mL  80 g  Amino Acids  100 g Dextrose  80 g Lipids 20%  121 mEq Sodium Chloride  6 mEq Sodium Acetate  20 mmol Sodium Phosphate  33 mEq Potassium Chloride  27 mEq Potassium Acetate  10 mmol Potassium Phosphate  0 mEq Calcium Gluconate  10 mEq Magnesium Sulfate  200 mg Thiamine  1 ml Trace Elements  10 ml MVI    Total Calories: 1460 kcal  (Provides 60% of daily energy requirements and 46% of daily protein requirements)    (osmolarity = 898)      Additional Recommendations:    1) Daily weights  2) Strict I & O's  3) Daily lyte checks including magnesium and phos  4) Weekly triglycerides/LFT checks  5) POCT q6hrs; maintain 140-180mg/dL  6) if on PN > 6 days, consider placing PICC line to meet 100% of nutr needs  or Confirm goals of care regarding LONG-TERM nutrition support  7) Re-start TF when able    RD will continue to monitor PPN, labs, hydration, and wt prn.   Bushra Soni MS, RDN, [facility] [phone]  Certified Nutrition  Clinical Nutrition PN Follow Up Note    * 71 y/o female with a PMH of CVA with left sided weakness, atrial fibrillation, emphysema, COPD, HTN - wears 2L NC at baseline BIBA, hx SBO and resection per EMS presents to the ED from [facility] for RLQ pain and r/o SBO. x1 episode of emesis, + profound diffuse abdominal pain, febrile. Has SBO, NGT to LWS. Taken to OR on  underwent ELAP, extensive RICHARD, ileocolectomy, ventral hernia repair with MESH. Postop course complicated with ALVERTO, shock requiring pressors, transferred to ICU. + anasarca   DNR/ DNI    *current status: pt started on TF and PPN d/c yesterday - now having loose stools and is leaking out of wounds. TF on hold, PPN to be re-started. Will keep PPN order the same from the last bag and monitor/ adjust based on tomorrow's () labs.  Has DEA drain.    *new pertinent meds: statin, admelog (received 6 units x 24 hours), magnesium sulfate, pantoprazole, KPhos (30 mmol x 24 hours), hydromorphone    *labs reviewed;  K and Na WNL; hypophosphatemia; Mg low-end of normal - will start lytes/ min in bag same as previous bag and adjust tomorrow based on new labs.    POCT WNL  Last bili T WNL to c/w trace elements.   Last Triglyceride level WNL for 80g lipids daily - will obtain new level.   corrected Ca WNL, rec'd add 10mEq of Calcium Gluconate outside of PN bag as CAPS is out.     -    140  |  109<H>  |  16  ----------------------------<  120<H>  4.3   |  20<L>  |  0.74    Ca    7.8<L>      2023 08:11  Phos  1.8     02-  Mg     1.6     -    TPro  6.0  /  Alb  2.3<L>  /  TBili  0.5  /  DBili  x   /  AST  25  /  ALT  39  /  AlkPhos  70  -    POCT Blood Glucose.: 118 mg/dL (2023 06:09)  POCT Blood Glucose.: 164 mg/dL (2023 23:21)  POCT Blood Glucose.: 185 mg/dL (2023 18:34)  POCT Blood Glucose.: 195 mg/dL (2023 11:56)      *I&O's Detail    2023 07:01  -  2023 07:00  --------------------------------------------------------  IN:    Fat Emulsion (Fish Oil &amp; Plant Based) 20% Infusion: 235 mL    Glucerna 1.5: 167 mL    Instillation (mL): 285 mL    IV PiggyBack: 150 mL    PPN (Peripheral Parenteral Nutrition): 871 mL  Total IN: 1708 mL    OUT:    Bulb (mL): 350 mL    Bulb (mL): 1200 mL    Indwelling Catheter - Urethral (mL): 2925 mL  Total OUT: 4475 mL    Total NET: -2767 mL  *negative fluid balance: pt with 4+ generalized edema. Will monitor/adjust total PPN volume prn. TF now on hold    *malnutrition: Pt continues to meet criteria for severe protein-calorie malnutrition in context of acute disease r/t decreased ability to meet increased nutrient needs 2/2 SBO AEB <50% ENN x >11 days, severe muscle/ fat wasting, severe edema    Estimated Needs: based on 97.4 Kg (wt from admit - current wts being skewed by severe fluid retention)  Calories: 1948- 2435 Kcal (20- 25 Kcal/Kg)  Protein: 146- 175 g (1.5- 1.8 g/Kg)  Fluids: 1948- 2435 mL (20- 25 mL/Kg)    Weight History:  Daily Weight in k.6 (2023 06:00)   - #  Height (cm): 165.1 (23 @ 16:08)  Weight (kg): 97.4 (23 @ 23:30) - admit wt ()  BMI (kg/m2): 35.7 (23 @ 23:30)  BSA (m2): 2.04 (23 @ 23:30)    Weight History:  Height (cm): 165.1 (23 @ 16:08)  Weight (kg): 97.4 (23 @ 23:30)  BMI (kg/m2): 35.7 (23 @ 23:30)  BSA (m2): 2.04 (23 @ 23:30)    PPN Recommendations: via peripheral line  Total Volume: 2000 mL  80 g  Amino Acids  100 g Dextrose  80 g Lipids 20%  121 mEq Sodium Chloride  6 mEq Sodium Acetate  20 mmol Sodium Phosphate  33 mEq Potassium Chloride  27 mEq Potassium Acetate  10 mmol Potassium Phosphate  0 mEq Calcium Gluconate  10 mEq Magnesium Sulfate  200 mg Thiamine  1 ml Trace Elements  10 ml MVI    Total Calories: 1460 kcal  (Provides 60% of daily energy requirements and 46% of daily protein requirements)    (osmolarity = 898)      Additional Recommendations:    1) Daily weights  2) Strict I & O's  3) Daily lyte checks including magnesium and phos  4) Weekly triglycerides/LFT checks  5) POCT q6hrs; maintain 140-180mg/dL  6) if on PN > 6 days, consider placing PICC line to meet 100% of nutr needs  or Confirm goals of care regarding LONG-TERM nutrition support  7) Re-start TF when able    RD will continue to monitor PPN, labs, hydration, and wt prn.   Bushra Soni, MS, RDN, [facility] [phone]  Certified Nutrition

## 2023-02-05 NOTE — PROGRESS NOTE ADULT - PSYCHIATRIC
Verbal/written post procedure instructions were given to patient/caregiver. alert and oriented x3 negative

## 2023-02-05 NOTE — PROGRESS NOTE ADULT - ASSESSMENT
New onset enteric fistula. No evidence of peritonitis on exam. Does not appear toxic. Fistula for moment is controlled. Treat with bowel rest. NGT to suction. TPN. Abx. Will review scan with radiology. Will attempt conservative management for now.

## 2023-02-05 NOTE — PROGRESS NOTE ADULT - ASSESSMENT
73 y/o female with:     SBO now s/p ex lap with extensive RICHARD, ileocolectomy, ventral hernia repair with mesh, POD 1  Hx previous multiple abd surgeries     ALVERTO due to ATN     PMH R sided CVA with L hemiparesis, pA.fib on warfarin, COPD on home O2       Now with EC fistula       Plan:     NPO, will start PPN   NGT and 1 DEA to suction   CT A/P reviewed, further plan per surgery   Continue cefepime, flagyl   HD and resp status ok   WBC stable  Renal fn ok   Hold therapeutic AC until surgical plan defined   SC heparin for DVT ppx   Prognosis poor, patient DNR, DNI     I called her son Dr. Moe Matute ([phone]) and updated him on new events, told him she's NPO, being restarted on PPN and further mx will be per surgery

## 2023-02-06 LAB
ALBUMIN SERPL ELPH-MCNC: 1.5 G/DL — LOW (ref 3.3–5)
ALP SERPL-CCNC: 57 U/L — SIGNIFICANT CHANGE UP (ref 40–120)
ALT FLD-CCNC: 14 U/L — SIGNIFICANT CHANGE UP (ref 12–78)
ANION GAP SERPL CALC-SCNC: 11 MMOL/L — SIGNIFICANT CHANGE UP (ref 5–17)
ANION GAP SERPL CALC-SCNC: 8 MMOL/L — SIGNIFICANT CHANGE UP (ref 5–17)
AST SERPL-CCNC: 9 U/L — LOW (ref 15–37)
BILIRUB SERPL-MCNC: 0.6 MG/DL — SIGNIFICANT CHANGE UP (ref 0.2–1.2)
BUN SERPL-MCNC: 16 MG/DL — SIGNIFICANT CHANGE UP (ref 7–23)
BUN SERPL-MCNC: 17 MG/DL — SIGNIFICANT CHANGE UP (ref 7–23)
CALCIUM SERPL-MCNC: 7.5 MG/DL — LOW (ref 8.5–10.1)
CALCIUM SERPL-MCNC: 7.5 MG/DL — LOW (ref 8.5–10.1)
CHLORIDE SERPL-SCNC: 109 MMOL/L — HIGH (ref 96–108)
CHLORIDE SERPL-SCNC: 110 MMOL/L — HIGH (ref 96–108)
CO2 SERPL-SCNC: 20 MMOL/L — LOW (ref 22–31)
CO2 SERPL-SCNC: 21 MMOL/L — LOW (ref 22–31)
CREAT SERPL-MCNC: 0.67 MG/DL — SIGNIFICANT CHANGE UP (ref 0.5–1.3)
CREAT SERPL-MCNC: 0.68 MG/DL — SIGNIFICANT CHANGE UP (ref 0.5–1.3)
EGFR: 92 ML/MIN/1.73M2 — SIGNIFICANT CHANGE UP
EGFR: 93 ML/MIN/1.73M2 — SIGNIFICANT CHANGE UP
GLUCOSE BLDC GLUCOMTR-MCNC: 160 MG/DL — HIGH (ref 70–99)
GLUCOSE BLDC GLUCOMTR-MCNC: 191 MG/DL — HIGH (ref 70–99)
GLUCOSE BLDC GLUCOMTR-MCNC: 228 MG/DL — HIGH (ref 70–99)
GLUCOSE BLDC GLUCOMTR-MCNC: 245 MG/DL — HIGH (ref 70–99)
GLUCOSE SERPL-MCNC: 197 MG/DL — HIGH (ref 70–99)
GLUCOSE SERPL-MCNC: 197 MG/DL — HIGH (ref 70–99)
HCT VFR BLD CALC: 30.5 % — LOW (ref 34.5–45)
HGB BLD-MCNC: 9.5 G/DL — LOW (ref 11.5–15.5)
MAGNESIUM SERPL-MCNC: 2 MG/DL — SIGNIFICANT CHANGE UP (ref 1.6–2.6)
MAGNESIUM SERPL-MCNC: 2 MG/DL — SIGNIFICANT CHANGE UP (ref 1.6–2.6)
MCHC RBC-ENTMCNC: 27.8 PG — SIGNIFICANT CHANGE UP (ref 27–34)
MCHC RBC-ENTMCNC: 31.1 GM/DL — LOW (ref 32–36)
MCV RBC AUTO: 89.2 FL — SIGNIFICANT CHANGE UP (ref 80–100)
PHOSPHATE SERPL-MCNC: 2.9 MG/DL — SIGNIFICANT CHANGE UP (ref 2.5–4.5)
PHOSPHATE SERPL-MCNC: 2.9 MG/DL — SIGNIFICANT CHANGE UP (ref 2.5–4.5)
PLATELET # BLD AUTO: 323 K/UL — SIGNIFICANT CHANGE UP (ref 150–400)
POTASSIUM SERPL-MCNC: 4.4 MMOL/L — SIGNIFICANT CHANGE UP (ref 3.5–5.3)
POTASSIUM SERPL-MCNC: 4.4 MMOL/L — SIGNIFICANT CHANGE UP (ref 3.5–5.3)
POTASSIUM SERPL-SCNC: 4.4 MMOL/L — SIGNIFICANT CHANGE UP (ref 3.5–5.3)
POTASSIUM SERPL-SCNC: 4.4 MMOL/L — SIGNIFICANT CHANGE UP (ref 3.5–5.3)
PROT SERPL-MCNC: 5.8 GM/DL — LOW (ref 6–8.3)
RBC # BLD: 3.42 M/UL — LOW (ref 3.8–5.2)
RBC # FLD: 20.9 % — HIGH (ref 10.3–14.5)
SODIUM SERPL-SCNC: 139 MMOL/L — SIGNIFICANT CHANGE UP (ref 135–145)
SODIUM SERPL-SCNC: 140 MMOL/L — SIGNIFICANT CHANGE UP (ref 135–145)
TRIGL SERPL-MCNC: 262 MG/DL — HIGH
WBC # BLD: 4.85 K/UL — SIGNIFICANT CHANGE UP (ref 3.8–10.5)
WBC # FLD AUTO: 4.85 K/UL — SIGNIFICANT CHANGE UP (ref 3.8–10.5)

## 2023-02-06 PROCEDURE — 99233 SBSQ HOSP IP/OBS HIGH 50: CPT

## 2023-02-06 RX ORDER — I.V. FAT EMULSION 20 G/100ML
0.83 EMULSION INTRAVENOUS
Qty: 80.84 | Refills: 0 | Status: DISCONTINUED | OUTPATIENT
Start: 2023-02-06 | End: 2023-02-06

## 2023-02-06 RX ORDER — ELECTROLYTE SOLUTION,INJ
1 VIAL (ML) INTRAVENOUS
Refills: 0 | Status: DISCONTINUED | OUTPATIENT
Start: 2023-02-06 | End: 2023-02-06

## 2023-02-06 RX ADMIN — HYDROMORPHONE HYDROCHLORIDE 0.5 MILLIGRAM(S): 2 INJECTION INTRAMUSCULAR; INTRAVENOUS; SUBCUTANEOUS at 12:17

## 2023-02-06 RX ADMIN — ATORVASTATIN CALCIUM 10 MILLIGRAM(S): 80 TABLET, FILM COATED ORAL at 22:54

## 2023-02-06 RX ADMIN — HYDROMORPHONE HYDROCHLORIDE 0.5 MILLIGRAM(S): 2 INJECTION INTRAMUSCULAR; INTRAVENOUS; SUBCUTANEOUS at 23:03

## 2023-02-06 RX ADMIN — HYDROMORPHONE HYDROCHLORIDE 0.5 MILLIGRAM(S): 2 INJECTION INTRAMUSCULAR; INTRAVENOUS; SUBCUTANEOUS at 05:24

## 2023-02-06 RX ADMIN — HEPARIN SODIUM 5000 UNIT(S): 5000 INJECTION INTRAVENOUS; SUBCUTANEOUS at 05:09

## 2023-02-06 RX ADMIN — HYDROMORPHONE HYDROCHLORIDE 0.5 MILLIGRAM(S): 2 INJECTION INTRAMUSCULAR; INTRAVENOUS; SUBCUTANEOUS at 22:48

## 2023-02-06 RX ADMIN — NYSTATIN CREAM 1 APPLICATION(S): 100000 CREAM TOPICAL at 10:15

## 2023-02-06 RX ADMIN — CEFEPIME 100 MILLIGRAM(S): 1 INJECTION, POWDER, FOR SOLUTION INTRAMUSCULAR; INTRAVENOUS at 22:51

## 2023-02-06 RX ADMIN — Medication 2: at 05:10

## 2023-02-06 RX ADMIN — NYSTATIN CREAM 1 APPLICATION(S): 100000 CREAM TOPICAL at 22:53

## 2023-02-06 RX ADMIN — OCTREOTIDE ACETATE 75 MICROGRAM(S): 200 INJECTION, SOLUTION INTRAVENOUS; SUBCUTANEOUS at 15:40

## 2023-02-06 RX ADMIN — HEPARIN SODIUM 5000 UNIT(S): 5000 INJECTION INTRAVENOUS; SUBCUTANEOUS at 22:53

## 2023-02-06 RX ADMIN — HYDROMORPHONE HYDROCHLORIDE 0.5 MILLIGRAM(S): 2 INJECTION INTRAMUSCULAR; INTRAVENOUS; SUBCUTANEOUS at 12:32

## 2023-02-06 RX ADMIN — Medication 0.5 MILLIGRAM(S): at 20:15

## 2023-02-06 RX ADMIN — AMIODARONE HYDROCHLORIDE 200 MILLIGRAM(S): 400 TABLET ORAL at 10:14

## 2023-02-06 RX ADMIN — Medication 4: at 12:15

## 2023-02-06 RX ADMIN — HYDROMORPHONE HYDROCHLORIDE 0.5 MILLIGRAM(S): 2 INJECTION INTRAMUSCULAR; INTRAVENOUS; SUBCUTANEOUS at 05:09

## 2023-02-06 RX ADMIN — Medication 1 EACH: at 22:38

## 2023-02-06 RX ADMIN — Medication 70 MICROGRAM(S): at 22:50

## 2023-02-06 RX ADMIN — Medication 100 MILLIGRAM(S): at 05:11

## 2023-02-06 RX ADMIN — Medication 0.5 MILLIGRAM(S): at 09:10

## 2023-02-06 RX ADMIN — CHLORHEXIDINE GLUCONATE 1 APPLICATION(S): 213 SOLUTION TOPICAL at 05:12

## 2023-02-06 RX ADMIN — HEPARIN SODIUM 5000 UNIT(S): 5000 INJECTION INTRAVENOUS; SUBCUTANEOUS at 15:40

## 2023-02-06 RX ADMIN — I.V. FAT EMULSION 33.7 GM/KG/DAY: 20 EMULSION INTRAVENOUS at 22:44

## 2023-02-06 RX ADMIN — NYSTATIN CREAM 1 APPLICATION(S): 100000 CREAM TOPICAL at 05:11

## 2023-02-06 RX ADMIN — OCTREOTIDE ACETATE 75 MICROGRAM(S): 200 INJECTION, SOLUTION INTRAVENOUS; SUBCUTANEOUS at 22:54

## 2023-02-06 RX ADMIN — Medication 100 MILLIGRAM(S): at 15:39

## 2023-02-06 RX ADMIN — CEFEPIME 100 MILLIGRAM(S): 1 INJECTION, POWDER, FOR SOLUTION INTRAMUSCULAR; INTRAVENOUS at 10:14

## 2023-02-06 RX ADMIN — Medication 4: at 18:21

## 2023-02-06 RX ADMIN — PANTOPRAZOLE SODIUM 40 MILLIGRAM(S): 20 TABLET, DELAYED RELEASE ORAL at 10:14

## 2023-02-06 RX ADMIN — NYSTATIN CREAM 1 APPLICATION(S): 100000 CREAM TOPICAL at 18:21

## 2023-02-06 RX ADMIN — Medication 2: at 23:02

## 2023-02-06 RX ADMIN — OCTREOTIDE ACETATE 75 MICROGRAM(S): 200 INJECTION, SOLUTION INTRAVENOUS; SUBCUTANEOUS at 05:11

## 2023-02-06 RX ADMIN — Medication 100 MILLIGRAM(S): at 22:51

## 2023-02-06 NOTE — PROGRESS NOTE ADULT - SUBJECTIVE AND OBJECTIVE BOX
Date of service: 02-06-23 @ 10:59    Lying in bed in NAD  NGT in place  Noted with abdominal wound discharge   Has low grade fever    ROS: unobtainable    MEDICATIONS  (STANDING):  aMIOdarone    Tablet 200 milliGRAM(s) Oral daily  atorvastatin 10 milliGRAM(s) Oral at bedtime  buDESOnide    Inhalation Suspension 0.5 milliGRAM(s) Inhalation every 12 hours  cefepime   IVPB 2000 milliGRAM(s) IV Intermittent every 12 hours  chlorhexidine 4% Liquid 1 Application(s) Topical <User Schedule>  coronavirus bivalent (EUA) Booster Vaccine (PFIZER) 0.3 milliLiter(s) IntraMuscular once  dextrose 5% + lactated ringers. 1000 milliLiter(s) (75 mL/Hr) IV Continuous <Continuous>  dextrose 5%. 1000 milliLiter(s) (50 mL/Hr) IV Continuous <Continuous>  dextrose 5%. 1000 milliLiter(s) (100 mL/Hr) IV Continuous <Continuous>  dextrose 50% Injectable 25 Gram(s) IV Push once  dextrose 50% Injectable 12.5 Gram(s) IV Push once  dextrose 50% Injectable 25 Gram(s) IV Push once  fat emulsion (Fish Oil and Plant Based) 20% Infusion 0.821 Gm/kG/Day (33.33 mL/Hr) IV Continuous <Continuous>  glucagon  Injectable 1 milliGRAM(s) IntraMuscular once  heparin   Injectable 5000 Unit(s) SubCutaneous every 8 hours  insulin lispro (ADMELOG) corrective regimen sliding scale   SubCutaneous every 6 hours  levothyroxine Injectable 70 MICROGram(s) IV Push at bedtime  metroNIDAZOLE  IVPB 500 milliGRAM(s) IV Intermittent every 8 hours  nystatin Cream 1 Application(s) Topical two times a day  nystatin Powder 1 Application(s) Topical every 12 hours  octreotide  Injectable 75 MICROGram(s) SubCutaneous every 8 hours  pantoprazole  Injectable 40 milliGRAM(s) IV Push daily  Parenteral Nutrition - Adult 1 Each (83 mL/Hr) TPN Continuous <Continuous>    Vital Signs Last 24 Hrs  T(C): 36.9 (06 Feb 2023 08:46), Max: 37.9 (05 Feb 2023 20:44)  T(F): 98.4 (06 Feb 2023 08:46), Max: 100.2 (05 Feb 2023 20:44)  HR: 98 (06 Feb 2023 09:12) (89 - 106)  BP: 164/76 (06 Feb 2023 08:00) (119/60 - 164/76)  BP(mean): 99 (06 Feb 2023 08:00) (73 - 99)  RR: 14 (06 Feb 2023 08:00) (14 - 26)  SpO2: 100% (06 Feb 2023 08:00) (96% - 100%)    Parameters below as of 06 Feb 2023 08:00  Patient On (Oxygen Delivery Method): nasal cannula  O2 Flow (L/min): 2     Physical exam:    Constitutional:  No acute distress  HEENT: NC/AT, EOMI, PERRLA, conjunctivae clear; ears and nose atraumatic  Neck: supple; thyroid not palpable  Back: no tenderness  Respiratory: respiratory effort normal; crackles at bases  Cardiovascular: S1S2 regular, no murmurs  Abdomen: soft, mid abdomen tender, distended, positive BS  abdominal postsurgical wound with fecaloid drainage from drain site   Genitourinary: no suprapubic tenderness  Lymphatic: no LN palpable  Musculoskeletal: no muscle tenderness, no joint swelling or tenderness  Extremities: no pedal edema  Neurological/ Psychiatric: confused, judgement and insight impaired; moving all extremities  Skin: no rashes; no palpable lesions    Labs: reviewed                        9.5    4.85  )-----------( 323      ( 06 Feb 2023 05:57 )             30.5     02-06    139  |  110<H>  |  17  ----------------------------<  197<H>  4.4   |  21<L>  |  0.68    Ca    7.5<L>      06 Feb 2023 05:57  Phos  2.9     02-06  Mg     2.0     02-06    TPro  5.8<L>  /  Alb  1.5<L>  /  TBili  0.6  /  DBili  x   /  AST  9<L>  /  ALT  14  /  AlkPhos  57  02-06                         9.9    10.40 )-----------( 494      ( 30 Jan 2023 06:55 )             32.9     01-30    148<H>  |  115<H>  |  19  ----------------------------<  105<H>  3.7   |  29  |  0.59    Ca    7.7<L>      30 Jan 2023 06:55    TPro  4.9<L>  /  Alb  1.3<L>  /  TBili  0.3  /  DBili  0.1  /  AST  14<L>  /  ALT  12  /  AlkPhos  65  01-28               10.9   19.72 )-----------( 510      ( 27 Jan 2023 06:16 )             36.7     01-27    142  |  111<H>  |  10  ----------------------------<  233<H>  3.8   |  26  |  0.78    Ca    8.0<L>      27 Jan 2023 06:16  Phos  2.7     01-27  Mg     1.8     01-27      Culture - Acid Fast - Tissue w/Smear (collected 31 Jan 2023 18:07)  Source: .Tissue INTRAPERITONEAL ABCESS FOR CX    Culture - Fungal, Tissue (collected 31 Jan 2023 18:07)  Source: .Tissue INTRAPERITONEAL ABCESS FOR CX  Preliminary Report (01 Feb 2023 07:09):    Testing in progress    Culture - Tissue with Gram Stain (collected 31 Jan 2023 18:07)  Source: .Tissue INTRAPERITONEAL ABCESS FOR CX  Gram Stain (01 Feb 2023 04:37):    Rare polymorphonuclear leukocytes seen per low power field    No organisms seen per oil power field  Preliminary Report (02 Feb 2023 20:01):    Growth in fluid media only Klebsiella oxytoca/Raoultella ornithinolytica  Organism: Klebsiella oxytoca /Raoutella ornithinolytica (03 Feb 2023 17:25)  Organism: Klebsiella oxytoca /Raoutella ornithinolytica (03 Feb 2023 17:25)      -  Amikacin: S <=16      -  Amoxicillin/Clavulanic Acid: I 16/8      -  Ampicillin: R >16 These ampicillin results predict results for amoxicillin      -  Ampicillin/Sulbactam: R >16/8 Enterobacter, Klebsiella aerogenes, Citrobacter, and Serratia may develop resistance during prolonged therapy (3-4 days)      -  Aztreonam: S <=4      -  Cefazolin: R >16 Enterobacter, Klebsiella aerogenes, Citrobacter, and Serratia may develop resistance during prolonged therapy (3-4 days)      -  Cefepime: S <=2      -  Cefoxitin: S <=8      -  Ceftriaxone: S <=1 Enterobacter, Klebsiella aerogenes, Citrobacter, and Serratia may develop resistance during prolonged therapy      -  Ciprofloxacin: S <=0.25      -  Ertapenem: S <=0.5      -  Gentamicin: S <=2      -  Imipenem: S <=1      -  Levofloxacin: S <=0.5      -  Meropenem: S <=1      -  Piperacillin/Tazobactam: R 64      -  Tobramycin: S <=2      -  Trimethoprim/Sulfamethoxazole: S <=0.5/9.5      Method Type: ARABELLA    Culture - Blood (collected 27 Jan 2023 06:16)  Source: .Blood None  Final Report (01 Feb 2023 09:00):    No Growth Final    Culture - Blood (collected 27 Jan 2023 06:16)  Source: .Blood None  Final Report (01 Feb 2023 09:00):    No Growth Final    Radiology: all available radiological tests reviewed    < from: CT Chest No Cont (01.27.23 @ 09:53) >  Small bowel obstruction with transition point at the neck of a left lower   quadrant abdominal wall hernia. It is uncertain whether the obstruction   is due to the hernia itself or to adhesions.    Additional massive ventral hernia containing small and large bowel and   epigastric hernia containing stomach.    Right lower lobe nodules new since December 04, 2021 and could be   infectious or neoplastic. Follow-up chest CT in 3 months is recommended   to reevaluate.    < end of copied text >      Advanced directives addressed: full resuscitation

## 2023-02-06 NOTE — PROGRESS NOTE ADULT - ASSESSMENT
ASSESSMENT  71 yo female with PMHx of CVA with left sided weakness, atrial fibrillation, emphysema, COPD, HTN - wears 2L NC at baseline BIBA, hx SBO and resection per EMS presents to the ED from [facility] for RLQ pain  x1 episode of emesis.    Admitted to surgery for SBO    s/p ex lap with RICHARD, ileocolectomy, large ventral hernia repair 1/31  Intra op findings - incarcerated hernia and extensive adhesions.    Post op course c/b   ALVERTO and EC fistula    PLAN  - pain control prn  - BP mildly elevated.   - on 2L NC sating 97% (baseline).   - NPO. PPN. NGT clamped. 2 JPs draining feculant material. 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 keep input = output, replace losses  - Woodruff in place. TOV. Cr stable. monitor I & Os.   - cont IV cefepime and IV metronizadole for abd infection  - DVT ppx- hep sq. SCDs  PT eval    Dispo:     Will discuss with Dr. Mccormick

## 2023-02-06 NOTE — CHART NOTE - NSCHARTNOTEFT_GEN_A_CORE
Clinical Nutrition PN Follow Up Note    * 73 y/o female with a PMH of CVA with left sided weakness, atrial fibrillation, emphysema, COPD, HTN - wears 2L NC at baseline BIBA, hx SBO and resection per EMS presents to the ED from [facility] for RLQ pain and r/o SBO. x1 episode of emesis, + profound diffuse abdominal pain, febrile. Has SBO, NGT to LWS. Taken to OR on  underwent ELAP, extensive RICHARD, ileocolectomy, ventral hernia repair with MESH. Postop course complicated with ALVERTO, shock requiring pressors, transferred to ICU. + anasarca   DNR/ DNI    *: pt started on TF and PPN d/c yesterday - now having loose stools and is leaking out of wounds. TF on hold, PPN to be re-started. Will keep PPN order the same from the last bag and monitor/ adjust based on tomorrow's () labs.  Has DEA drain.    *current status: TF remains on hold, PPN re-initiated yesterday . Remains with NGT and 1 DEA drain to suction. Will c/w PPN.    *new pertinent meds: statin, Admelog (received 2 units x 24 hours), magnesium sulfate, protonix, KPhos (30 mmol x 24 hours), hydromorphone, dextrose + LR (@ 75 mL/hr)    *labs reviewed;  K, Mg, and Na WNL; Phos low-end of normal, continue to monitor. POCT WNL. Triglyceride level WNL (<400) - will c/w 80g lipids daily        139  |  110<H>  |  17  ----------------------------<  197<H>  4.4   |  21<L>  |  0.68    Ca    7.5<L>      2023 05:57  Phos  2.9     -  Mg     2.0     -    TPro  5.8<L>  /  Alb  1.5<L>  /  TBili  0.6  /  DBili  x   /  AST  9<L>  /  ALT  14  /  AlkPhos  57  -    Triglycerides, Serum: 262 mg/dL (02.06.23 @ 05:57)      CAPILLARY BLOOD GLUCOSE  POCT Blood Glucose.: 160 mg/dL (2023 05:03)  POCT Blood Glucose.: 87 mg/dL (2023 23:09)  POCT Blood Glucose.: 87 mg/dL (2023 17:33)  POCT Blood Glucose.: 92 mg/dL (2023 12:36)    *I&O's Detail    2023 07:01  -  2023 07:00  --------------------------------------------------------  IN:    dextrose 5% + lactated ringers: 951 mL    Fat Emulsion (Fish Oil &amp; Plant Based) 20% Infusion: 230 mL    Free Water: 160 mL    IV PiggyBack: 50 mL    IV PiggyBack: 50 mL    IV PiggyBack: 87.9 mL    PPN (Peripheral Parenteral Nutrition): 567 mL  Total IN: 2095.9 mL    OUT:    Bulb (mL): 130 mL    Indwelling Catheter - Urethral (mL): 1350 mL    Nasogastric/Oral tube (mL): 800 mL    Stool (mL): 60 mL  Total OUT: 2340 mL    Total NET: -244.1 mL  *negative fluid balance: noted with 4+ generalized edema. Will monitor/adjust total PPN volume prn. TF on hold 2/2 intolerance    *malnutrition: Pt continues to meet criteria for severe protein-calorie malnutrition in context of acute disease r/t decreased ability to meet increased nutrient needs 2/2 SBO AEB <50% ENN x >11 days, severe muscle/ fat wasting, severe edema    Estimated Needs: based on 97.4 Kg (wt from admit - current wts being skewed by severe fluid retention)  Calories: 1948- 2435 Kcal (20- 25 Kcal/Kg)  Protein: 146- 175 g (1.5- 1.8 g/Kg)  Fluids: 1948- 2435 mL (20- 25 mL/Kg)    Weight History:  Daily Weight in k.6 (2023 06:00)   - #  Height (cm): 165.1 (01-20-23 @ 16:08)  Weight (kg): 97.4 (23 @ 23:30) - admit wt ()  BMI (kg/m2): 35.7 (23 @ 23:30)  BSA (m2): 2.04 (23 @ 23:30)    **PPN recommendations as per pharmacy - see pharmacy communication note    Additional Recommendations:    1) Daily weights  2) Strict I & O's  3) Daily lyte checks including magnesium and phos  4) Weekly triglycerides/LFT checks. Initiate 80 g lipids (TG <400)  5) POCT q6hrs; maintain 140-180mg/dL  6) if on PN > 6 days, consider placing PICC line to meet 100% of nutr needs  7) Confirm goals of care regarding LONG-TERM nutrition support  8) Re-start TF when able    RD will continue to monitor PPN, labs, hydration, and wt prn.   Carrol Robles, MARITAN [phone] Clinical Nutrition PN Follow Up Note    * 73 y/o female with a PMH of CVA with left sided weakness, atrial fibrillation, emphysema, COPD, HTN - wears 2L NC at baseline BIBA, hx SBO and resection per EMS presents to the ED from [facility] for RLQ pain and r/o SBO. x1 episode of emesis, + profound diffuse abdominal pain, febrile. Has SBO, NGT to LWS. Taken to OR on  underwent ELAP, extensive RICHARD, ileocolectomy, ventral hernia repair with MESH. Postop course complicated with ALVERTO, shock requiring pressors, transferred to ICU. + anasarca   DNR/ DNI    *: pt started on TF and PPN d/c yesterday - now having loose stools and is leaking out of wounds. TF on hold, PPN to be re-started. Will keep PPN order the same from the last bag and monitor/ adjust based on tomorrow's () labs.  Has DEA drain.    *current status: TF remains on hold, PPN re-initiated yesterday . Remains with NGT and 1 DEA drain to suction. Will c/w PPN.    *new pertinent meds: statin, Admelog (received 2 units x 24 hours), magnesium sulfate, protonix, KPhos (30 mmol x 24 hours), hydromorphone, dextrose + LR (@ 75 mL/hr)    *labs reviewed;  K, Mg, and Na WNL; Phos low-end of normal, continue to monitor. POCT WNL. Triglyceride level WNL (<400) - will c/w 80g lipids daily        139  |  110<H>  |  17  ----------------------------<  197<H>  4.4   |  21<L>  |  0.68    Ca    7.5<L>      2023 05:57  Phos  2.9     -  Mg     2.0     -    TPro  5.8<L>  /  Alb  1.5<L>  /  TBili  0.6  /  DBili  x   /  AST  9<L>  /  ALT  14  /  AlkPhos  57  -    Triglycerides, Serum: 262 mg/dL (02.06.23 @ 05:57)      CAPILLARY BLOOD GLUCOSE  POCT Blood Glucose.: 160 mg/dL (2023 05:03)  POCT Blood Glucose.: 87 mg/dL (2023 23:09)  POCT Blood Glucose.: 87 mg/dL (2023 17:33)  POCT Blood Glucose.: 92 mg/dL (2023 12:36)    *I&O's Detail    2023 07:01  -  2023 07:00  --------------------------------------------------------  IN:    dextrose 5% + lactated ringers: 951 mL    Fat Emulsion (Fish Oil &amp; Plant Based) 20% Infusion: 230 mL    Free Water: 160 mL    IV PiggyBack: 50 mL    IV PiggyBack: 50 mL    IV PiggyBack: 87.9 mL    PPN (Peripheral Parenteral Nutrition): 567 mL  Total IN: 2095.9 mL    OUT:    Bulb (mL): 130 mL    Indwelling Catheter - Urethral (mL): 1350 mL    Nasogastric/Oral tube (mL): 800 mL    Stool (mL): 60 mL  Total OUT: 2340 mL    Total NET: -244.1 mL  *negative fluid balance: noted with 4+ generalized edema. Will monitor/adjust total PPN volume prn. TF on hold 2/2 intolerance    *malnutrition: Pt continues to meet criteria for severe protein-calorie malnutrition in context of acute disease r/t decreased ability to meet increased nutrient needs 2/2 SBO AEB <50% ENN x >11 days, severe muscle/ fat wasting, severe edema    Estimated Needs: based on 97.4 Kg (wt from admit - current wts being skewed by severe fluid retention)  Calories: 1948- 2435 Kcal (20- 25 Kcal/Kg)  Protein: 146- 175 g (1.5- 1.8 g/Kg)  Fluids: 1948- 2435 mL (20- 25 mL/Kg)    Weight History:  Daily Weight in k.6 (2023 06:00)   - #  Height (cm): 165.1 (01-20-23 @ 16:08)  Weight (kg): 97.4 (23 @ 23:30) - admit wt ()  BMI (kg/m2): 35.7 (23 @ 23:30)  BSA (m2): 2.04 (23 @ 23:30)    **PPN recommendations as per pharmacy - see pharmacy communication note    Additional Recommendations:    1) Daily weights  2) Strict I & O's  3) Daily lyte checks including magnesium and phos  4) Weekly triglycerides/LFT checks. Initiate 80 g lipids (TG <400)  5) POCT q6hrs; maintain 140-180mg/dL  6) if on PN > 6 days, consider placing PICC line to meet 100% of nutr needs  7) Confirm goals of care regarding LONG-TERM nutrition support  8) Re-start TF when able    RD will continue to monitor and adjust PN prn*  Carrol Robles, RDN [phone]

## 2023-02-06 NOTE — PROGRESS NOTE ADULT - SUBJECTIVE AND OBJECTIVE BOX
Subjective:    pat better, still NG suction, fever 100.2, no respiratory distress.    Home Medications:  Albuterol (Eqv-ProAir HFA) 90 mcg/inh inhalation aerosol: 1 puff(s) inhaled every 6 hours, As Needed (20 Jan 2023 16:49)  amiodarone 200 mg oral tablet: 1 tab(s) orally once a day (20 Jan 2023 16:49)  ascorbic acid 500 mg oral tablet: 2 tab(s) orally once a day (20 Jan 2023 16:49)  atorvastatin 10 mg oral tablet: 1 tab(s) orally once a day (at bedtime) (20 Jan 2023 16:49)  benzonatate 100 mg oral capsule: 1 cap(s) orally 3 times a day (20 Jan 2023 21:48)  budesonide 0.5 mg/2 mL inhalation suspension: 2 milliliter(s) inhaled 2 times a day (20 Jan 2023 21:48)  Cepacol Sore Throat 15 mg-3.6 mg mucous membrane lozenge: 1 lozenge mucous membrane every 4 hours, As Needed (20 Jan 2023 21:48)  cholecalciferol 1250 mcg (50,000 intl units) oral capsule: 1 cap(s) orally every 4 weeks on Wednesday  (20 Jan 2023 16:49)  Coumadin 2.5 mg oral tablet: 1 tab(s) orally once a day (at bedtime)  ***ON HOLD from 1-16 to 1-21*** (20 Jan 2023 21:48)  Cranberry oral tablet: 1 tab(s) orally 2 times a day (20 Jan 2023 16:49)  cyanocobalamin 1000 mcg oral tablet: 1 tab(s) orally once a day (20 Jan 2023 16:49)  dilTIAZem 180 mg/24 hours oral capsule, extended release: 1 cap(s) orally once a day (20 Jan 2023 16:49)  DULoxetine 60 mg oral delayed release capsule: 1 cap(s) orally once a day (20 Jan 2023 16:49)  estradiol 0.1 mg/g vaginal cream: 0.5 gram(s) vaginal 2 times a week on Monday and Thursday (20 Jan 2023 21:48)  fexofenadine 180 mg oral tablet: 1 tab(s) orally once a day (20 Jan 2023 21:48)  fluticasone 50 mcg/inh nasal spray: 1 spray(s) nasal 2 times a day (20 Jan 2023 16:49)  furosemide 20 mg oral tablet: 1 tab(s) orally once a day (20 Jan 2023 16:49)  glipiZIDE 10 mg oral tablet, extended release: 2 tab(s) orally once a day (20 Jan 2023 16:49)  GlycoLax oral powder for reconstitution: 17 gram(s) orally 2 times a day (20 Jan 2023 16:49)  guaiFENesin 100 mg/5 mL oral liquid: 20 milliliter(s) orally every 6 hours (20 Jan 2023 21:48)  HumaLOG KwikPen 100 units/mL injectable solution: 10 unit(s) injectable 3 times a day (before meals) (20 Jan 2023 21:48)  ipratropium-albuterol 20 mcg-100 mcg/inh inhalation aerosol: 1 puff(s) inhaled 4 times a day (20 Jan 2023 16:49)  levothyroxine 88 mcg (0.088 mg) oral tablet: 1 tab(s) orally once a day (at bedtime) (20 Jan 2023 16:49)  losartan 25 mg oral tablet: 1 tab(s) orally once a day (20 Jan 2023 16:49)  Macrobid 100 mg oral capsule: 1 cap(s) orally 2 times a day for 5 days  ***course complete*** (20 Jan 2023 21:48)  methocarbamol 750 mg oral tablet: 1 tab(s) orally every 8 hours, As Needed (20 Jan 2023 21:48)  metoprolol tartrate 25 mg oral tablet: 1 tab(s) orally 2 times a day (20 Jan 2023 16:49)  Milk of Magnesia 8% oral suspension: 30 milliliter(s) orally once a day, As Needed (20 Jan 2023 16:49)  montelukast 10 mg oral tablet: 1 tab(s) orally once a day (at bedtime) (20 Jan 2023 21:48)  ondansetron 4 mg oral tablet: 1 tab(s) orally every 4 hours, As Needed (20 Jan 2023 21:48)  oxybutynin 5 mg oral tablet: 1 tab(s) orally 2 times a day (20 Jan 2023 16:49)  oxyCODONE 5 mg oral tablet: 1 tab(s) orally every 4 hours, As Needed (20 Jan 2023 16:49)  phytonadione 5 mg oral tablet: 0.5 tab(s) orally once  ***given at [facility] once on 1-19-23*** (20 Jan 2023 21:48)  pregabalin 100 mg oral capsule: 1 cap(s) orally 3 times a day (20 Jan 2023 16:49)  sennosides-docusate 8.6 mg-50 mg oral tablet: 2 tab(s) orally once a day (at bedtime) (20 Jan 2023 16:49)  Tradjenta 5 mg oral tablet: 1 tab(s) orally once a day (20 Jan 2023 16:49)  Tylenol 500 mg oral tablet: 2 tab(s) orally every 8 hours (20 Jan 2023 16:49)    MEDICATIONS  (STANDING):  aMIOdarone    Tablet 200 milliGRAM(s) Oral daily  atorvastatin 10 milliGRAM(s) Oral at bedtime  buDESOnide    Inhalation Suspension 0.5 milliGRAM(s) Inhalation every 12 hours  cefepime   IVPB 2000 milliGRAM(s) IV Intermittent every 12 hours  chlorhexidine 4% Liquid 1 Application(s) Topical <User Schedule>  coronavirus bivalent (EUA) Booster Vaccine (PFIZER) 0.3 milliLiter(s) IntraMuscular once  dextrose 5%. 1000 milliLiter(s) (50 mL/Hr) IV Continuous <Continuous>  dextrose 5%. 1000 milliLiter(s) (100 mL/Hr) IV Continuous <Continuous>  dextrose 50% Injectable 25 Gram(s) IV Push once  dextrose 50% Injectable 12.5 Gram(s) IV Push once  dextrose 50% Injectable 25 Gram(s) IV Push once  fat emulsion (Fish Oil and Plant Based) 20% Infusion 0.83 Gm/kG/Day (33.7 mL/Hr) IV Continuous <Continuous>  glucagon  Injectable 1 milliGRAM(s) IntraMuscular once  heparin   Injectable 5000 Unit(s) SubCutaneous every 8 hours  insulin lispro (ADMELOG) corrective regimen sliding scale   SubCutaneous every 6 hours  levothyroxine Injectable 70 MICROGram(s) IV Push at bedtime  metroNIDAZOLE  IVPB 500 milliGRAM(s) IV Intermittent every 8 hours  nystatin Cream 1 Application(s) Topical two times a day  nystatin Powder 1 Application(s) Topical every 12 hours  octreotide  Injectable 75 MICROGram(s) SubCutaneous every 8 hours  pantoprazole  Injectable 40 milliGRAM(s) IV Push daily  Parenteral Nutrition - Adult 1 Each (83 mL/Hr) TPN Continuous <Continuous>  Parenteral Nutrition - Adult 1 Each (83 mL/Hr) TPN Continuous <Continuous>    MEDICATIONS  (PRN):  acetaminophen     Tablet .. 650 milliGRAM(s) Oral every 6 hours PRN Temp greater or equal to 38C (100.4F)  albuterol    90 MICROgram(s) HFA Inhaler 2 Puff(s) Inhalation every 6 hours PRN Shortness of Breath and/or Wheezing  dextrose Oral Gel 15 Gram(s) Oral once PRN Blood Glucose LESS THAN 70 milliGRAM(s)/deciliter  HYDROmorphone  Injectable 0.5 milliGRAM(s) IV Push every 3 hours PRN Severe Pain (7 - 10)  sodium chloride 0.9% lock flush 10 milliLiter(s) IV Push every 1 hour PRN Pre/post blood products, medications, blood draw, and to maintain line patency      Allergies    Cipro (Rash)  Spiriva (Rash)    Intolerances        Vital Signs Last 24 Hrs  T(C): 37.7 (06 Feb 2023 17:20), Max: 37.9 (05 Feb 2023 20:44)  T(F): 99.8 (06 Feb 2023 17:20), Max: 100.2 (05 Feb 2023 20:44)  HR: 103 (06 Feb 2023 15:00) (91 - 107)  BP: 162/72 (06 Feb 2023 15:00) (126/67 - 176/77)  BP(mean): 90 (06 Feb 2023 15:00) (81 - 100)  RR: 17 (06 Feb 2023 15:00) (14 - 25)  SpO2: 98% (06 Feb 2023 15:00) (96% - 100%)    Parameters below as of 06 Feb 2023 08:00  Patient On (Oxygen Delivery Method): nasal cannula  O2 Flow (L/min): 2        PHYSICAL EXAMINATION:    NECK:  Supple. No lymphadenopathy. Jugular venous pressure not elevated. Carotids equal.   HEART:   The cardiac impulse has a normal quality. Reg., Nl S1 and S2.  There are no murmurs, rubs or gallops noted  CHEST:  Chest crackles to auscultation. Normal respiratory effort.  ABDOMEN:  Soft and nontender.   EXTREMITIES:  There is no edema.       LABS:                        9.5    4.85  )-----------( 323      ( 06 Feb 2023 05:57 )             30.5     02-06    139  |  110<H>  |  17  ----------------------------<  197<H>  4.4   |  21<L>  |  0.68    Ca    7.5<L>      06 Feb 2023 05:57  Phos  2.9     02-06  Mg     2.0     02-06    TPro  5.8<L>  /  Alb  1.5<L>  /  TBili  0.6  /  DBili  x   /  AST  9<L>  /  ALT  14  /  AlkPhos  57  02-06

## 2023-02-06 NOTE — PROGRESS NOTE ADULT - SUBJECTIVE AND OBJECTIVE BOX
Resting comfortably, no complaints  VSS afeb, Tm99  lungs- clear  cor-RRR  Abd- soft non tender, serosang and bilious drainage from drains  Ext- +2 edema

## 2023-02-06 NOTE — PROGRESS NOTE ADULT - ASSESSMENT
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 Cont TPN, IV abx, drains. Repeat imaging later in week.

## 2023-02-06 NOTE — PROGRESS NOTE ADULT - ASSESSMENT
73 y/o female with h/o old CVA with left sided weakness, atrial fibrillation, emphysema, COPD, HTN, prior SBO and resection was admitted on 1/20 from [facility] for RLQ pain. She was noted with one episode of emesis and diffuse abdominal pain associated with fever. On 1/27 the patient was noted febrile to 102.8F, more lethargic, did not open eyes, did not follow commands. She received cefepime and metronidazole.     1. Intraabdominal hernia related fat necrosis with superimposed infection with KLOX. SBO and ventral hernia s/p surgery complicated with probable colon fistula. UTI with KLPN resolved. Right lower lobes nodules Probable pneumonia. Allergy to cipro. Encephalopathy.   -leukocytosis resolving  -abdominal fistula draining  -BC x 2 noted  -urine c/s noted  -on cefepime 2 gm IV q12h and metronidazole 500 mg IV q8h # 10  -tolerating abx well so far; no side effects noted  -aspiration precautions  -continue abx coverage   -monitor abdomen  -monitor temps  -f/u CBC  -supportive care  2. Other issues:   -care per medicine    d/w Dr. Castro

## 2023-02-06 NOTE — PROGRESS NOTE ADULT - SUBJECTIVE AND OBJECTIVE BOX
Patient is a 72y old  Female who presents with a chief complaint of bowel obstruction/ischemic mesentery (2023 10:58)      BRIEF HOSPITAL COURSE: ***    2/ EC fistula    PAST MEDICAL & SURGICAL HISTORY:  Hypertension  Hypercholesteremia  COPD (chronic obstructive pulmonary disease)  C. difficile diarrhea    Diverticulitis of intestine with perforation and abscess without bleeding, unspecified part of intestinal tract  PE (pulmonary thromboembolism)    Palpitations  Obesity  Osteoarthritis  Anemia  Colostomy in place  History of atrial fibrillation  HTN (hypertension  Diabetes mellitus  Cerebrovascular accident (CVA)  DVT of lower limb, acute  CVA (cerebral vascular accident)  COPD (chronic obstructive pulmonary disease)  Neuropathy  Diverticulitis  History of appendectomy  H/O:   x2  H/O ovarian cystectomy  b/l  Status post total replacement of both hips  H/O hemicolectomy  2016  History of colostomy reversal  History of colostomy reversal  S/P colostomy  S/P C-sectio  S/P hip replacement      Medications:  cefepime   IVPB 2000 milliGRAM(s) IV Intermittent every 12 hours  metroNIDAZOLE  IVPB 500 milliGRAM(s) IV Intermittent every 8 hours  aMIOdarone    Tablet 200 milliGRAM(s) Oral daily  albuterol    90 MICROgram(s) HFA Inhaler 2 Puff(s) Inhalation every 6 hours PRN  buDESOnide    Inhalation Suspension 0.5 milliGRAM(s) Inhalation every 12 hours  acetaminophen     Tablet .. 650 milliGRAM(s) Oral every 6 hours PRN  HYDROmorphone  Injectable 0.5 milliGRAM(s) IV Push every 3 hours PRN  heparin   Injectable 5000 Unit(s) SubCutaneous every 8 hours  pantoprazole  Injectable 40 milliGRAM(s) IV Push daily  atorvastatin 10 milliGRAM(s) Oral at bedtime  dextrose 50% Injectable 25 Gram(s) IV Push once  dextrose 50% Injectable 12.5 Gram(s) IV Push once  dextrose 50% Injectable 25 Gram(s) IV Push once  dextrose Oral Gel 15 Gram(s) Oral once PRN  glucagon  Injectable 1 milliGRAM(s) IntraMuscular once  insulin lispro (ADMELOG) corrective regimen sliding scale   SubCutaneous every 6 hours  levothyroxine Injectable 70 MICROGram(s) IV Push at bedtime  octreotide  Injectable 75 MICROGram(s) SubCutaneous every 8 hours  dextrose 5%. 1000 milliLiter(s) IV Continuous <Continuous>  dextrose 5%. 1000 milliLiter(s) IV Continuous <Continuous>  fat emulsion (Fish Oil and Plant Based) 20% Infusion 0.83 Gm/kG/Day IV Continuous <Continuous>  fat emulsion (Fish Oil and Plant Based) 20% Infusion 0.821 Gm/kG/Day IV Continuous <Continuous>  Parenteral Nutrition - Adult 1 Each TPN Continuous <Continuous>  Parenteral Nutrition - Adult 1 Each TPN Continuous <Continuous>  sodium chloride 0.9% lock flush 10 milliLiter(s) IV Push every 1 hour PRN  coronavirus bivalent (EUA) Booster Vaccine (PFIZER) 0.3 milliLiter(s) IntraMuscular once  chlorhexidine 4% Liquid 1 Application(s) Topical <User Schedule>  nystatin Cream 1 Application(s) Topical two times a day  nystatin Powder 1 Application(s) Topical every 12 hours      ICU Vital Signs Last 24 Hrs  T(C): 36.9 (2023 08:46), Max: 37.9 (2023 20:44)  T(F): 98.4 (2023 08:46), Max: 100.2 (2023 20:44)  HR: 98 (2023 12:00) (91 - 106)  BP: 170/71 (2023 12:00) (126/67 - 176/77)  BP(mean): 94 (2023 12:00) (81 - 100)  ABP: --  ABP(mean): --  RR: 19 (2023 12:00) (14 - 25)  SpO2: 99% (2023 12:00) (96% - 100%)    O2 Parameters below as of 2023 08:00  Patient On (Oxygen Delivery Method): nasal cannula  O2 Flow (L/min): 2      I&O's Detail    2023 07:01  -  2023 07:00  --------------------------------------------------------  IN:    dextrose 5% + lactated ringers: 951 mL    Fat Emulsion (Fish Oil &amp; Plant Based) 20% Infusion: 230 mL    Free Water: 160 mL    IV PiggyBack: 50 mL    IV PiggyBack: 50 mL    IV PiggyBack: 87.9 mL    PPN (Peripheral Parenteral Nutrition): 567 mL  Total IN: 2095.9 mL    OUT:    Bulb (mL): 130 mL    Indwelling Catheter - Urethral (mL): 1350 mL    Nasogastric/Oral tube (mL): 800 mL    Stool (mL): 60 mL  Total OUT: 2340 mL    Total NET: -244.1 mL      LABS:                        9.5    4.85  )-----------( 323      ( 2023 05:57 )             30.5     02-06    139  |  110<H>  |  17  ----------------------------<  197<H>  4.4   |  21<L>  |  0.68    Ca    7.5<L>      2023 05:57  Phos  2.9     02-  Mg     2.0     -06    TPro  5.8<L>  /  Alb  1.5<L>  /  TBili  0.6  /  DBili  x   /  AST  9<L>  /  ALT  14  /  AlkPhos  57  -06    CAPILLARY BLOOD GLUCOSE  POCT Blood Glucose.: 245 mg/dL (2023 12:06)    CULTURES:  Culture Results:   Growth in fluid media only Klebsiella oxytoca/Raoultella ornithinolytica ( @ 18:07)  Culture Results:   Culture is being performed. Fungal cultures are held for 4 weeks. ( @ 18:07)  Culture Results:   Culture is being performed. ( @ 18:07)      Physical Examination:    General: No acute distress.    HEENT: Pupils equal, reactive to light.  Symmetric.  PULM: Clear to auscultation bilaterally, no crackles or wheezinh  NECK: Supple, no lymphadenopathy, trachea midline  CVS: Regular rat and rhythm, no murmurs,   ABD: Soft, nondistended, nontender, normoactive bowel sounds, 2 JPs  EXT: No edema, nontender  SKIN: Warm and well perfused, no rashes noted.  NEURO: sleeping but arousable, answers questions.     DEVICES:   DEA x2  NGT  midline LUE    RADIOLOGY:   < from: CT Abdomen and Pelvis w/ Oral Cont (23 @ 00:28) >    IMPRESSION:  Findings consistent with an enterocutaneous fistula and or small bowel   perforation into the subcutaneous fat    < end of copied text >         Patient is a 72y old  Female who presents with a chief complaint of bowel obstruction/ischemic mesentery (2023 10:58)      BRIEF HOSPITAL COURSE: 71 y/o female with a PMHx of CVA with left sided weakness, atrial fibrillation, emphysema, COPD, HTN - wears 2L NC at baseline BIBA, hx SBO and resection per EMS presents to the ED from [facility] for RLQ pain and r/o SBO. 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 difficile diarrhea    Diverticulitis of intestine with perforation and abscess without bleeding, unspecified part of intestinal tract  PE (pulmonary thromboembolism)    Palpitations  Obesity  Osteoarthritis  Anemia  Colostomy in place  History of atrial fibrillation  HTN (hypertension  Diabetes mellitus  Cerebrovascular accident (CVA)  DVT of lower limb, acute  CVA (cerebral vascular accident)  COPD (chronic obstructive pulmonary disease)  Neuropathy  Diverticulitis  History of appendectomy  H/O:   x2  H/O ovarian cystectomy  b/l  Status post total replacement of both hips  H/O hemicolectomy  2016  History of colostomy reversal  History of colostomy reversal  S/P colostomy  S/P C-sectio  S/P hip replacement      Medications:  cefepime   IVPB 2000 milliGRAM(s) IV Intermittent every 12 hours  metroNIDAZOLE  IVPB 500 milliGRAM(s) IV Intermittent every 8 hours  aMIOdarone    Tablet 200 milliGRAM(s) Oral daily  albuterol    90 MICROgram(s) HFA Inhaler 2 Puff(s) Inhalation every 6 hours PRN  buDESOnide    Inhalation Suspension 0.5 milliGRAM(s) Inhalation every 12 hours  acetaminophen     Tablet .. 650 milliGRAM(s) Oral every 6 hours PRN  HYDROmorphone  Injectable 0.5 milliGRAM(s) IV Push every 3 hours PRN  heparin   Injectable 5000 Unit(s) SubCutaneous every 8 hours  pantoprazole  Injectable 40 milliGRAM(s) IV Push daily  atorvastatin 10 milliGRAM(s) Oral at bedtime  dextrose 50% Injectable 25 Gram(s) IV Push once  dextrose 50% Injectable 12.5 Gram(s) IV Push once  dextrose 50% Injectable 25 Gram(s) IV Push once  dextrose Oral Gel 15 Gram(s) Oral once PRN  glucagon  Injectable 1 milliGRAM(s) IntraMuscular once  insulin lispro (ADMELOG) corrective regimen sliding scale   SubCutaneous every 6 hours  levothyroxine Injectable 70 MICROGram(s) IV Push at bedtime  octreotide  Injectable 75 MICROGram(s) SubCutaneous every 8 hours  dextrose 5%. 1000 milliLiter(s) IV Continuous <Continuous>  dextrose 5%. 1000 milliLiter(s) IV Continuous <Continuous>  fat emulsion (Fish Oil and Plant Based) 20% Infusion 0.83 Gm/kG/Day IV Continuous <Continuous>  fat emulsion (Fish Oil and Plant Based) 20% Infusion 0.821 Gm/kG/Day IV Continuous <Continuous>  Parenteral Nutrition - Adult 1 Each TPN Continuous <Continuous>  Parenteral Nutrition - Adult 1 Each TPN Continuous <Continuous>  sodium chloride 0.9% lock flush 10 milliLiter(s) IV Push every 1 hour PRN  coronavirus bivalent (EUA) Booster Vaccine (PFIZER) 0.3 milliLiter(s) IntraMuscular once  chlorhexidine 4% Liquid 1 Application(s) Topical <User Schedule>  nystatin Cream 1 Application(s) Topical two times a day  nystatin Powder 1 Application(s) Topical every 12 hours      ICU Vital Signs Last 24 Hrs  T(C): 36.9 (2023 08:46), Max: 37.9 (2023 20:44)  T(F): 98.4 (2023 08:46), Max: 100.2 (2023 20:44)  HR: 98 (2023 12:00) (91 - 106)  BP: 170/71 (2023 12:00) (126/67 - 176/77)  BP(mean): 94 (2023 12:00) (81 - 100)  ABP: --  ABP(mean): --  RR: 19 (2023 12:00) (14 - 25)  SpO2: 99% (2023 12:00) (96% - 100%)    O2 Parameters below as of 2023 08:00  Patient On (Oxygen Delivery Method): nasal cannula  O2 Flow (L/min): 2      I&O's Detail    2023 07:01  -  2023 07:00  --------------------------------------------------------  IN:    dextrose 5% + lactated ringers: 951 mL    Fat Emulsion (Fish Oil &amp; Plant Based) 20% Infusion: 230 mL    Free Water: 160 mL    IV PiggyBack: 50 mL    IV PiggyBack: 50 mL    IV PiggyBack: 87.9 mL    PPN (Peripheral Parenteral Nutrition): 567 mL  Total IN: 2095.9 mL    OUT:    Bulb (mL): 130 mL    Indwelling Catheter - Urethral (mL): 1350 mL    Nasogastric/Oral tube (mL): 800 mL    Stool (mL): 60 mL  Total OUT: 2340 mL    Total NET: -244.1 mL      LABS:                        9.5    4.85  )-----------( 323      ( 2023 05:57 )             30.5     02-06    139  |  110<H>  |  17  ----------------------------<  197<H>  4.4   |  21<L>  |  0.68    Ca    7.5<L>      2023 05:57  Phos  2.9     02-06  Mg     2.0     02-06    TPro  5.8<L>  /  Alb  1.5<L>  /  TBili  0.6  /  DBili  x   /  AST  9<L>  /  ALT  14  /  AlkPhos  57  02-06    CAPILLARY BLOOD GLUCOSE  POCT Blood Glucose.: 245 mg/dL (2023 12:06)    CULTURES:  Culture Results:   Growth in fluid media only Klebsiella oxytoca/Raoultella ornithinolytica ( @ 18:07)  Culture Results:   Culture is being performed. Fungal cultures are held for 4 weeks. ( @ 18:07)  Culture Results:   Culture is being performed. ( @ 18:07)      Physical Examination:    General: No acute distress.    HEENT: Pupils equal, reactive to light.  Symmetric.  PULM: Clear to auscultation bilaterally, no crackles or wheezinh  NECK: Supple, no lymphadenopathy, trachea midline  CVS: Regular rat and rhythm, no murmurs,   ABD: Soft, nondistended, nontender, normoactive bowel sounds, 2 JPs  EXT: No edema, nontender  SKIN: Warm and well perfused, no rashes noted.  NEURO: sleeping but arousable, answers questions.     DEVICES:   DEA x2  NGT  midline LUE    RADIOLOGY:   < from: CT Abdomen and Pelvis w/ Oral Cont (23 @ 00:28) >    IMPRESSION:  Findings consistent with an enterocutaneous fistula and or small bowel   perforation into the subcutaneous fat    < end of copied text >

## 2023-02-06 NOTE — PHARMACOTHERAPY INTERVENTION NOTE - COMMENTS
Pharmacy PN Follow Up Note    * 73 y/o female with a PMH of CVA with left sided weakness, atrial fibrillation, emphysema, COPD, HTN - wears 2L NC at baseline BIBA, hx SBO and resection per EMS presents to the ED from [facility] for RLQ pain and r/o SBO. x1 episode of emesis, + profound diffuse abdominal pain, febrile. Has SBO, NGT to LWS. Taken to OR on 1/31 underwent ELAP, extensive RICHARD, ileocolectomy, ventral hernia repair with MESH. Postop course complicated with ALVERTO, shock requiring pressors, transferred to ICU. + anasarca   DNR/ DNI      *current status: TF remains on hold, PPN re-initiated yesterday 2/5. Remains with NGT and 1 DEA drain to suction . Will c/w PPN    *new pertinent meds: statin, Admelog (received 2 units x 24 hours), magnesium sulfate, protonix, KPhos (30 mmol x 24 hours), hydromorphone, dextrose + LR (@ 75 mL/hr)    *labs reviewed; Phos low normal (will increase in PPN bag) all other lytes WNL  02-06    139  |  110<H>  |  17  ----------------------------<  197<H>  4.4   |  21<L>  |  0.68    Ca    7.5<L>      06 Feb 2023 05:57  Phos  2.9     02-06  Mg     2.0     02-06    TPro  5.8<L>  /  Alb  1.5<L>  /  TBili  0.6  /  DBili  x   /  AST  9<L>  /  ALT  14  /  AlkPhos  57  02-06      BMI: BMI (kg/m2): 35.7 (01-20-23 @ 23:30)  HbA1c: A1C with Estimated Average Glucose Result: 6.0 % (12-11-22 @ 08:00)    Glucose: POCT Blood Glucose.: 160 mg/dL (02-06-23 @ 05:03)    BP: 170/68 (02-06-23 @ 11:00) (110/55 - 187/73)  Lipid Panel: Date/Time: 02-06-23 @ 05:57    Triglycerides, Serum: 262    POCT Blood Glucose.: 160 mg/dL (02-06-23 @ 05:03)  POCT Blood Glucose.: 87 mg/dL (02-05-23 @ 23:09)  POCT Blood Glucose.: 87 mg/dL (02-05-23 @ 17:33)  POCT Blood Glucose.: 92 mg/dL (02-05-23 @ 12:36)        *I&O's Detail    05 Feb 2023 07:01  -  06 Feb 2023 07:00  --------------------------------------------------------  IN:    dextrose 5% + lactated ringers: 951 mL    Fat Emulsion (Fish Oil &amp; Plant Based) 20% Infusion: 230 mL    Free Water: 160 mL    IV PiggyBack: 50 mL    IV PiggyBack: 50 mL    IV PiggyBack: 87.9 mL    PPN (Peripheral Parenteral Nutrition): 567 mL  Total IN: 2095.9 mL    OUT:    Bulb (mL): 130 mL    Indwelling Catheter - Urethral (mL): 1350 mL    Nasogastric/Oral tube (mL): 800 mL    Stool (mL): 60 mL  Total OUT: 2340 mL    Total NET: -244.1 mL              *will continue to monitor and adjust PN prn*    PPN Recommendations: via peripheral line  Total Volume: 2000ml  80 g  Amino Acids   100g Dextrose   80g Lipids 20%  121mEq Sodium Chloride  6mEq Sodium Acetate  20mmol Sodium Phosphate  33mEq Potassium Chloride  35mEq Potassium Acetate  20mmol Potassium Phosphate  0mEq Calcium Gluconate  10mEq Magnesium Sulfate  200 mg Thiamine  1ml Trace Elements  10ml MVI    Total Calories  1460 (meets 60% of estimated total daily calorie needs)   meets 45% of estimated total daily protein needs               (osmolarity   ~900)

## 2023-02-07 LAB
ALBUMIN SERPL ELPH-MCNC: 1.3 G/DL — LOW (ref 3.3–5)
ALP SERPL-CCNC: 56 U/L — SIGNIFICANT CHANGE UP (ref 40–120)
ALT FLD-CCNC: 11 U/L — LOW (ref 12–78)
ANION GAP SERPL CALC-SCNC: 8 MMOL/L — SIGNIFICANT CHANGE UP (ref 5–17)
AST SERPL-CCNC: 10 U/L — LOW (ref 15–37)
BASE EXCESS BLDA CALC-SCNC: -1.4 MMOL/L — SIGNIFICANT CHANGE UP (ref -2–3)
BILIRUB SERPL-MCNC: 0.4 MG/DL — SIGNIFICANT CHANGE UP (ref 0.2–1.2)
BLOOD GAS COMMENTS ARTERIAL: SIGNIFICANT CHANGE UP
BUN SERPL-MCNC: 15 MG/DL — SIGNIFICANT CHANGE UP (ref 7–23)
CALCIUM SERPL-MCNC: 7.5 MG/DL — LOW (ref 8.5–10.1)
CHLORIDE SERPL-SCNC: 107 MMOL/L — SIGNIFICANT CHANGE UP (ref 96–108)
CO2 BLDA-SCNC: 22 MMOL/L — SIGNIFICANT CHANGE UP (ref 19–24)
CO2 SERPL-SCNC: 22 MMOL/L — SIGNIFICANT CHANGE UP (ref 22–31)
CREAT SERPL-MCNC: 0.57 MG/DL — SIGNIFICANT CHANGE UP (ref 0.5–1.3)
EGFR: 96 ML/MIN/1.73M2 — SIGNIFICANT CHANGE UP
GLUCOSE BLDC GLUCOMTR-MCNC: 227 MG/DL — HIGH (ref 70–99)
GLUCOSE BLDC GLUCOMTR-MCNC: 253 MG/DL — HIGH (ref 70–99)
GLUCOSE BLDC GLUCOMTR-MCNC: 312 MG/DL — HIGH (ref 70–99)
GLUCOSE BLDC GLUCOMTR-MCNC: 324 MG/DL — HIGH (ref 70–99)
GLUCOSE SERPL-MCNC: 241 MG/DL — HIGH (ref 70–99)
HCO3 BLDA-SCNC: 21 MMOL/L — SIGNIFICANT CHANGE UP (ref 21–28)
HCT VFR BLD CALC: 29.8 % — LOW (ref 34.5–45)
HGB BLD-MCNC: 9.2 G/DL — LOW (ref 11.5–15.5)
MAGNESIUM SERPL-MCNC: 1.5 MG/DL — LOW (ref 1.6–2.6)
MCHC RBC-ENTMCNC: 27.5 PG — SIGNIFICANT CHANGE UP (ref 27–34)
MCHC RBC-ENTMCNC: 30.9 GM/DL — LOW (ref 32–36)
MCV RBC AUTO: 89 FL — SIGNIFICANT CHANGE UP (ref 80–100)
PCO2 BLDA: 28 MMHG — LOW (ref 32–45)
PH BLDA: 7.48 — HIGH (ref 7.35–7.45)
PHOSPHATE SERPL-MCNC: 1.8 MG/DL — LOW (ref 2.5–4.5)
PLATELET # BLD AUTO: 292 K/UL — SIGNIFICANT CHANGE UP (ref 150–400)
PO2 BLDA: 97 MMHG — SIGNIFICANT CHANGE UP (ref 83–108)
POTASSIUM SERPL-MCNC: 4.3 MMOL/L — SIGNIFICANT CHANGE UP (ref 3.5–5.3)
POTASSIUM SERPL-SCNC: 4.3 MMOL/L — SIGNIFICANT CHANGE UP (ref 3.5–5.3)
PROT SERPL-MCNC: 5.6 GM/DL — LOW (ref 6–8.3)
RBC # BLD: 3.35 M/UL — LOW (ref 3.8–5.2)
RBC # FLD: 20.1 % — HIGH (ref 10.3–14.5)
SAO2 % BLDA: 98 % — SIGNIFICANT CHANGE UP (ref 94–98)
SODIUM SERPL-SCNC: 137 MMOL/L — SIGNIFICANT CHANGE UP (ref 135–145)
SURGICAL PATHOLOGY STUDY: SIGNIFICANT CHANGE UP
WBC # BLD: 7.35 K/UL — SIGNIFICANT CHANGE UP (ref 3.8–10.5)
WBC # FLD AUTO: 7.35 K/UL — SIGNIFICANT CHANGE UP (ref 3.8–10.5)

## 2023-02-07 PROCEDURE — 99222 1ST HOSP IP/OBS MODERATE 55: CPT

## 2023-02-07 PROCEDURE — 71045 X-RAY EXAM CHEST 1 VIEW: CPT | Mod: 26

## 2023-02-07 PROCEDURE — 99497 ADVNCD CARE PLAN 30 MIN: CPT | Mod: 25

## 2023-02-07 PROCEDURE — 99233 SBSQ HOSP IP/OBS HIGH 50: CPT

## 2023-02-07 RX ORDER — METOPROLOL TARTRATE 50 MG
2.5 TABLET ORAL ONCE
Refills: 0 | Status: COMPLETED | OUTPATIENT
Start: 2023-02-07 | End: 2023-02-07

## 2023-02-07 RX ORDER — ENOXAPARIN SODIUM 100 MG/ML
100 INJECTION SUBCUTANEOUS EVERY 12 HOURS
Refills: 0 | Status: DISCONTINUED | OUTPATIENT
Start: 2023-02-07 | End: 2023-02-19

## 2023-02-07 RX ORDER — METOPROLOL TARTRATE 50 MG
5 TABLET ORAL EVERY 6 HOURS
Refills: 0 | Status: DISCONTINUED | OUTPATIENT
Start: 2023-02-07 | End: 2023-02-08

## 2023-02-07 RX ORDER — INSULIN GLARGINE 100 [IU]/ML
5 INJECTION, SOLUTION SUBCUTANEOUS ONCE
Refills: 0 | Status: COMPLETED | OUTPATIENT
Start: 2023-02-07 | End: 2023-02-07

## 2023-02-07 RX ORDER — ALBUTEROL 90 UG/1
2.5 AEROSOL, METERED ORAL EVERY 6 HOURS
Refills: 0 | Status: DISCONTINUED | OUTPATIENT
Start: 2023-02-07 | End: 2023-02-21

## 2023-02-07 RX ORDER — POTASSIUM PHOSPHATE, MONOBASIC POTASSIUM PHOSPHATE, DIBASIC 236; 224 MG/ML; MG/ML
30 INJECTION, SOLUTION INTRAVENOUS ONCE
Refills: 0 | Status: DISCONTINUED | OUTPATIENT
Start: 2023-02-07 | End: 2023-02-07

## 2023-02-07 RX ORDER — I.V. FAT EMULSION 20 G/100ML
0.82 EMULSION INTRAVENOUS
Qty: 80 | Refills: 0 | Status: DISCONTINUED | OUTPATIENT
Start: 2023-02-07 | End: 2023-02-08

## 2023-02-07 RX ORDER — ELECTROLYTE SOLUTION,INJ
1 VIAL (ML) INTRAVENOUS
Refills: 0 | Status: DISCONTINUED | OUTPATIENT
Start: 2023-02-07 | End: 2023-02-07

## 2023-02-07 RX ORDER — HYDRALAZINE HCL 50 MG
10 TABLET ORAL ONCE
Refills: 0 | Status: COMPLETED | OUTPATIENT
Start: 2023-02-07 | End: 2023-02-07

## 2023-02-07 RX ORDER — HYDRALAZINE HCL 50 MG
10 TABLET ORAL EVERY 6 HOURS
Refills: 0 | Status: DISCONTINUED | OUTPATIENT
Start: 2023-02-07 | End: 2023-02-21

## 2023-02-07 RX ORDER — INSULIN GLARGINE 100 [IU]/ML
8 INJECTION, SOLUTION SUBCUTANEOUS AT BEDTIME
Refills: 0 | Status: DISCONTINUED | OUTPATIENT
Start: 2023-02-07 | End: 2023-02-10

## 2023-02-07 RX ORDER — ALBUTEROL 90 UG/1
2.5 AEROSOL, METERED ORAL EVERY 6 HOURS
Refills: 0 | Status: DISCONTINUED | OUTPATIENT
Start: 2023-02-07 | End: 2023-02-07

## 2023-02-07 RX ADMIN — OCTREOTIDE ACETATE 75 MICROGRAM(S): 200 INJECTION, SOLUTION INTRAVENOUS; SUBCUTANEOUS at 16:36

## 2023-02-07 RX ADMIN — Medication 100 MILLIGRAM(S): at 15:14

## 2023-02-07 RX ADMIN — CEFEPIME 100 MILLIGRAM(S): 1 INJECTION, POWDER, FOR SOLUTION INTRAMUSCULAR; INTRAVENOUS at 10:26

## 2023-02-07 RX ADMIN — ATORVASTATIN CALCIUM 10 MILLIGRAM(S): 80 TABLET, FILM COATED ORAL at 20:14

## 2023-02-07 RX ADMIN — AMIODARONE HYDROCHLORIDE 200 MILLIGRAM(S): 400 TABLET ORAL at 10:26

## 2023-02-07 RX ADMIN — Medication 10 MILLIGRAM(S): at 06:29

## 2023-02-07 RX ADMIN — Medication 100 MILLIGRAM(S): at 20:09

## 2023-02-07 RX ADMIN — CEFEPIME 100 MILLIGRAM(S): 1 INJECTION, POWDER, FOR SOLUTION INTRAMUSCULAR; INTRAVENOUS at 20:09

## 2023-02-07 RX ADMIN — HYDROMORPHONE HYDROCHLORIDE 0.5 MILLIGRAM(S): 2 INJECTION INTRAMUSCULAR; INTRAVENOUS; SUBCUTANEOUS at 02:05

## 2023-02-07 RX ADMIN — Medication 62.5 MILLIMOLE(S): at 10:27

## 2023-02-07 RX ADMIN — NYSTATIN CREAM 1 APPLICATION(S): 100000 CREAM TOPICAL at 18:11

## 2023-02-07 RX ADMIN — NYSTATIN CREAM 1 APPLICATION(S): 100000 CREAM TOPICAL at 10:27

## 2023-02-07 RX ADMIN — OCTREOTIDE ACETATE 75 MICROGRAM(S): 200 INJECTION, SOLUTION INTRAVENOUS; SUBCUTANEOUS at 20:10

## 2023-02-07 RX ADMIN — Medication 6: at 11:20

## 2023-02-07 RX ADMIN — HEPARIN SODIUM 5000 UNIT(S): 5000 INJECTION INTRAVENOUS; SUBCUTANEOUS at 06:31

## 2023-02-07 RX ADMIN — Medication 2.5 MILLIGRAM(S): at 02:10

## 2023-02-07 RX ADMIN — ALBUTEROL 2.5 MILLIGRAM(S): 90 AEROSOL, METERED ORAL at 21:23

## 2023-02-07 RX ADMIN — Medication 8: at 18:10

## 2023-02-07 RX ADMIN — Medication 0.5 MILLIGRAM(S): at 21:23

## 2023-02-07 RX ADMIN — Medication 0.5 MILLIGRAM(S): at 09:28

## 2023-02-07 RX ADMIN — ALBUTEROL 2.5 MILLIGRAM(S): 90 AEROSOL, METERED ORAL at 13:51

## 2023-02-07 RX ADMIN — HYDROMORPHONE HYDROCHLORIDE 0.5 MILLIGRAM(S): 2 INJECTION INTRAMUSCULAR; INTRAVENOUS; SUBCUTANEOUS at 20:17

## 2023-02-07 RX ADMIN — INSULIN GLARGINE 5 UNIT(S): 100 INJECTION, SOLUTION SUBCUTANEOUS at 11:19

## 2023-02-07 RX ADMIN — NYSTATIN CREAM 1 APPLICATION(S): 100000 CREAM TOPICAL at 06:31

## 2023-02-07 RX ADMIN — INSULIN GLARGINE 8 UNIT(S): 100 INJECTION, SOLUTION SUBCUTANEOUS at 20:18

## 2023-02-07 RX ADMIN — Medication 5 MILLIGRAM(S): at 15:14

## 2023-02-07 RX ADMIN — Medication 100 MILLIGRAM(S): at 06:31

## 2023-02-07 RX ADMIN — CHLORHEXIDINE GLUCONATE 1 APPLICATION(S): 213 SOLUTION TOPICAL at 06:31

## 2023-02-07 RX ADMIN — Medication 2.5 MILLIGRAM(S): at 01:07

## 2023-02-07 RX ADMIN — Medication 70 MICROGRAM(S): at 23:08

## 2023-02-07 RX ADMIN — NYSTATIN CREAM 1 APPLICATION(S): 100000 CREAM TOPICAL at 20:09

## 2023-02-07 RX ADMIN — PANTOPRAZOLE SODIUM 40 MILLIGRAM(S): 20 TABLET, DELAYED RELEASE ORAL at 10:26

## 2023-02-07 RX ADMIN — HYDROMORPHONE HYDROCHLORIDE 0.5 MILLIGRAM(S): 2 INJECTION INTRAMUSCULAR; INTRAVENOUS; SUBCUTANEOUS at 10:36

## 2023-02-07 RX ADMIN — HYDROMORPHONE HYDROCHLORIDE 0.5 MILLIGRAM(S): 2 INJECTION INTRAMUSCULAR; INTRAVENOUS; SUBCUTANEOUS at 02:20

## 2023-02-07 RX ADMIN — I.V. FAT EMULSION 33.33 GM/KG/DAY: 20 EMULSION INTRAVENOUS at 22:57

## 2023-02-07 RX ADMIN — OCTREOTIDE ACETATE 75 MICROGRAM(S): 200 INJECTION, SOLUTION INTRAVENOUS; SUBCUTANEOUS at 06:30

## 2023-02-07 RX ADMIN — Medication 5 MILLIGRAM(S): at 20:08

## 2023-02-07 RX ADMIN — Medication 4: at 06:30

## 2023-02-07 RX ADMIN — ENOXAPARIN SODIUM 100 MILLIGRAM(S): 100 INJECTION SUBCUTANEOUS at 23:08

## 2023-02-07 RX ADMIN — Medication 1 EACH: at 22:52

## 2023-02-07 RX ADMIN — HEPARIN SODIUM 5000 UNIT(S): 5000 INJECTION INTRAVENOUS; SUBCUTANEOUS at 15:14

## 2023-02-07 NOTE — PROGRESS NOTE ADULT - SUBJECTIVE AND OBJECTIVE BOX
Subjective:    pat lying in bed, on NG suction, no respiratory distress.    Home Medications:  Albuterol (Eqv-ProAir HFA) 90 mcg/inh inhalation aerosol: 1 puff(s) inhaled every 6 hours, As Needed (20 Jan 2023 16:49)  amiodarone 200 mg oral tablet: 1 tab(s) orally once a day (20 Jan 2023 16:49)  ascorbic acid 500 mg oral tablet: 2 tab(s) orally once a day (20 Jan 2023 16:49)  atorvastatin 10 mg oral tablet: 1 tab(s) orally once a day (at bedtime) (20 Jan 2023 16:49)  benzonatate 100 mg oral capsule: 1 cap(s) orally 3 times a day (20 Jan 2023 21:48)  budesonide 0.5 mg/2 mL inhalation suspension: 2 milliliter(s) inhaled 2 times a day (20 Jan 2023 21:48)  Cepacol Sore Throat 15 mg-3.6 mg mucous membrane lozenge: 1 lozenge mucous membrane every 4 hours, As Needed (20 Jan 2023 21:48)  cholecalciferol 1250 mcg (50,000 intl units) oral capsule: 1 cap(s) orally every 4 weeks on Wednesday  (20 Jan 2023 16:49)  Coumadin 2.5 mg oral tablet: 1 tab(s) orally once a day (at bedtime)  ***ON HOLD from 1-16 to 1-21*** (20 Jan 2023 21:48)  Cranberry oral tablet: 1 tab(s) orally 2 times a day (20 Jan 2023 16:49)  cyanocobalamin 1000 mcg oral tablet: 1 tab(s) orally once a day (20 Jan 2023 16:49)  dilTIAZem 180 mg/24 hours oral capsule, extended release: 1 cap(s) orally once a day (20 Jan 2023 16:49)  DULoxetine 60 mg oral delayed release capsule: 1 cap(s) orally once a day (20 Jan 2023 16:49)  estradiol 0.1 mg/g vaginal cream: 0.5 gram(s) vaginal 2 times a week on Monday and Thursday (20 Jan 2023 21:48)  fexofenadine 180 mg oral tablet: 1 tab(s) orally once a day (20 Jan 2023 21:48)  fluticasone 50 mcg/inh nasal spray: 1 spray(s) nasal 2 times a day (20 Jan 2023 16:49)  furosemide 20 mg oral tablet: 1 tab(s) orally once a day (20 Jan 2023 16:49)  glipiZIDE 10 mg oral tablet, extended release: 2 tab(s) orally once a day (20 Jan 2023 16:49)  GlycoLax oral powder for reconstitution: 17 gram(s) orally 2 times a day (20 Jan 2023 16:49)  guaiFENesin 100 mg/5 mL oral liquid: 20 milliliter(s) orally every 6 hours (20 Jan 2023 21:48)  HumaLOG KwikPen 100 units/mL injectable solution: 10 unit(s) injectable 3 times a day (before meals) (20 Jan 2023 21:48)  ipratropium-albuterol 20 mcg-100 mcg/inh inhalation aerosol: 1 puff(s) inhaled 4 times a day (20 Jan 2023 16:49)  levothyroxine 88 mcg (0.088 mg) oral tablet: 1 tab(s) orally once a day (at bedtime) (20 Jan 2023 16:49)  losartan 25 mg oral tablet: 1 tab(s) orally once a day (20 Jan 2023 16:49)  Macrobid 100 mg oral capsule: 1 cap(s) orally 2 times a day for 5 days  ***course complete*** (20 Jan 2023 21:48)  methocarbamol 750 mg oral tablet: 1 tab(s) orally every 8 hours, As Needed (20 Jan 2023 21:48)  metoprolol tartrate 25 mg oral tablet: 1 tab(s) orally 2 times a day (20 Jan 2023 16:49)  Milk of Magnesia 8% oral suspension: 30 milliliter(s) orally once a day, As Needed (20 Jan 2023 16:49)  montelukast 10 mg oral tablet: 1 tab(s) orally once a day (at bedtime) (20 Jan 2023 21:48)  ondansetron 4 mg oral tablet: 1 tab(s) orally every 4 hours, As Needed (20 Jan 2023 21:48)  oxybutynin 5 mg oral tablet: 1 tab(s) orally 2 times a day (20 Jan 2023 16:49)  oxyCODONE 5 mg oral tablet: 1 tab(s) orally every 4 hours, As Needed (20 Jan 2023 16:49)  phytonadione 5 mg oral tablet: 0.5 tab(s) orally once  ***given at [facility] on 1-19-23*** (20 Jan 2023 21:48)  pregabalin 100 mg oral capsule: 1 cap(s) orally 3 times a day (20 Jan 2023 16:49)  sennosides-docusate 8.6 mg-50 mg oral tablet: 2 tab(s) orally once a day (at bedtime) (20 Jan 2023 16:49)  Tradjenta 5 mg oral tablet: 1 tab(s) orally once a day (20 Jan 2023 16:49)  Tylenol 500 mg oral tablet: 2 tab(s) orally every 8 hours (20 Jan 2023 16:49)    MEDICATIONS  (STANDING):  albuterol    0.083% 2.5 milliGRAM(s) Nebulizer every 6 hours  aMIOdarone    Tablet 200 milliGRAM(s) Oral daily  atorvastatin 10 milliGRAM(s) Oral at bedtime  buDESOnide    Inhalation Suspension 0.5 milliGRAM(s) Inhalation every 12 hours  cefepime   IVPB 2000 milliGRAM(s) IV Intermittent every 12 hours  chlorhexidine 4% Liquid 1 Application(s) Topical <User Schedule>  coronavirus bivalent (EUA) Booster Vaccine (PFIZER) 0.3 milliLiter(s) IntraMuscular once  dextrose 5%. 1000 milliLiter(s) (100 mL/Hr) IV Continuous <Continuous>  dextrose 5%. 1000 milliLiter(s) (50 mL/Hr) IV Continuous <Continuous>  dextrose 50% Injectable 25 Gram(s) IV Push once  dextrose 50% Injectable 12.5 Gram(s) IV Push once  dextrose 50% Injectable 25 Gram(s) IV Push once  fat emulsion (Fish Oil and Plant Based) 20% Infusion 0.821 Gm/kG/Day (33.33 mL/Hr) IV Continuous <Continuous>  fat emulsion (Fish Oil and Plant Based) 20% Infusion 0.83 Gm/kG/Day (33.7 mL/Hr) IV Continuous <Continuous>  glucagon  Injectable 1 milliGRAM(s) IntraMuscular once  heparin   Injectable 5000 Unit(s) SubCutaneous every 8 hours  insulin glargine Injectable (LANTUS) 8 Unit(s) SubCutaneous at bedtime  insulin lispro (ADMELOG) corrective regimen sliding scale   SubCutaneous every 6 hours  levothyroxine Injectable 70 MICROGram(s) IV Push at bedtime  metoprolol tartrate Injectable 5 milliGRAM(s) IV Push every 6 hours  metroNIDAZOLE  IVPB 500 milliGRAM(s) IV Intermittent every 8 hours  nystatin Cream 1 Application(s) Topical two times a day  nystatin Powder 1 Application(s) Topical every 12 hours  octreotide  Injectable 75 MICROGram(s) SubCutaneous every 8 hours  pantoprazole  Injectable 40 milliGRAM(s) IV Push daily  Parenteral Nutrition - Adult 1 Each (83 mL/Hr) TPN Continuous <Continuous>  Parenteral Nutrition - Adult 1 Each (83 mL/Hr) TPN Continuous <Continuous>    MEDICATIONS  (PRN):  acetaminophen     Tablet .. 650 milliGRAM(s) Oral every 6 hours PRN Temp greater or equal to 38C (100.4F)  albuterol    90 MICROgram(s) HFA Inhaler 2 Puff(s) Inhalation every 6 hours PRN Shortness of Breath and/or Wheezing  dextrose Oral Gel 15 Gram(s) Oral once PRN Blood Glucose LESS THAN 70 milliGRAM(s)/deciliter  hydrALAZINE Injectable 10 milliGRAM(s) IV Push every 6 hours PRN SBP>160  HYDROmorphone  Injectable 0.5 milliGRAM(s) IV Push every 3 hours PRN Severe Pain (7 - 10)  sodium chloride 0.9% lock flush 10 milliLiter(s) IV Push every 1 hour PRN Pre/post blood products, medications, blood draw, and to maintain line patency      Allergies    Cipro (Rash)  Spiriva (Rash)    Intolerances        Vital Signs Last 24 Hrs  T(C): 37.9 (07 Feb 2023 14:27), Max: 37.9 (07 Feb 2023 14:27)  T(F): 100.3 (07 Feb 2023 14:27), Max: 100.3 (07 Feb 2023 14:27)  HR: 108 (07 Feb 2023 14:00) (95 - 110)  BP: 156/77 (07 Feb 2023 14:00) (139/61 - 181/86)  BP(mean): 96 (07 Feb 2023 14:00) (82 - 107)  RR: 21 (07 Feb 2023 14:00) (18 - 27)  SpO2: 99% (07 Feb 2023 14:00) (95% - 99%)    Parameters below as of 07 Feb 2023 13:53  Patient On (Oxygen Delivery Method): nasal cannula,2lpm           PHYSICAL EXAMINATION:    NECK:  Supple. No lymphadenopathy. Jugular venous pressure not elevated. Carotids equal.   HEART:   The cardiac impulse has a normal quality. Reg., Nl S1 and S2.  There are no murmurs, rubs or gallops noted  CHEST:  Chest crackles to auscultation. Normal respiratory effort.  ABDOMEN:  Soft and nontender.   EXTREMITIES:  There is no edema.       LABS:                        9.2    7.35  )-----------( 292      ( 07 Feb 2023 05:16 )             29.8     02-07    137  |  107  |  15  ----------------------------<  241<H>  4.3   |  22  |  0.57    Ca    7.5<L>      07 Feb 2023 05:16  Phos  1.8     02-07  Mg     1.5     02-07    TPro  5.6<L>  /  Alb  1.3<L>  /  TBili  0.4  /  DBili  x   /  AST  10<L>  /  ALT  11<L>  /  AlkPhos  56  02-07

## 2023-02-07 NOTE — PROGRESS NOTE ADULT - SUBJECTIVE AND OBJECTIVE BOX
Feels well, no new complaints  VSS Tm 99.8  lungs- clear  cor-RRR  Abd- + BS soft less tender.Bilious drainage, left drain  Ext- 2+ edema

## 2023-02-07 NOTE — PROGRESS NOTE ADULT - SUBJECTIVE AND OBJECTIVE BOX
Date of service: 02-07-23 @ 10:25    Lying in bed in NAD  Alert  Abdomen is tender  Fistula drainage   Has low grade fever    ROS: poorly verbal    MEDICATIONS  (STANDING):  aMIOdarone    Tablet 200 milliGRAM(s) Oral daily  atorvastatin 10 milliGRAM(s) Oral at bedtime  buDESOnide    Inhalation Suspension 0.5 milliGRAM(s) Inhalation every 12 hours  cefepime   IVPB 2000 milliGRAM(s) IV Intermittent every 12 hours  chlorhexidine 4% Liquid 1 Application(s) Topical <User Schedule>  coronavirus bivalent (EUA) Booster Vaccine (PFIZER) 0.3 milliLiter(s) IntraMuscular once  dextrose 5%. 1000 milliLiter(s) (100 mL/Hr) IV Continuous <Continuous>  dextrose 5%. 1000 milliLiter(s) (50 mL/Hr) IV Continuous <Continuous>  dextrose 50% Injectable 25 Gram(s) IV Push once  dextrose 50% Injectable 12.5 Gram(s) IV Push once  dextrose 50% Injectable 25 Gram(s) IV Push once  fat emulsion (Fish Oil and Plant Based) 20% Infusion 0.821 Gm/kG/Day (33.3 mL/Hr) IV Continuous <Continuous>  fat emulsion (Fish Oil and Plant Based) 20% Infusion 0.83 Gm/kG/Day (33.7 mL/Hr) IV Continuous <Continuous>  glucagon  Injectable 1 milliGRAM(s) IntraMuscular once  heparin   Injectable 5000 Unit(s) SubCutaneous every 8 hours  insulin glargine Injectable (LANTUS) 5 Unit(s) SubCutaneous once  insulin lispro (ADMELOG) corrective regimen sliding scale   SubCutaneous every 6 hours  levothyroxine Injectable 70 MICROGram(s) IV Push at bedtime  metroNIDAZOLE  IVPB 500 milliGRAM(s) IV Intermittent every 8 hours  nystatin Cream 1 Application(s) Topical two times a day  nystatin Powder 1 Application(s) Topical every 12 hours  octreotide  Injectable 75 MICROGram(s) SubCutaneous every 8 hours  pantoprazole  Injectable 40 milliGRAM(s) IV Push daily  Parenteral Nutrition - Adult 1 Each (83 mL/Hr) TPN Continuous <Continuous>  Parenteral Nutrition - Adult 1 Each (83 mL/Hr) TPN Continuous <Continuous>  sodium phosphate 15 milliMole(s)/250 mL IVPB 15 milliMole(s) IV Intermittent once    Vital Signs Last 24 Hrs  T(C): 37.2 (07 Feb 2023 06:00), Max: 37.7 (06 Feb 2023 17:20)  T(F): 98.9 (07 Feb 2023 06:00), Max: 99.8 (06 Feb 2023 17:20)  HR: 103 (07 Feb 2023 08:00) (95 - 107)  BP: 143/64 (07 Feb 2023 08:00) (139/61 - 181/86)  BP(mean): 84 (07 Feb 2023 08:00) (82 - 107)  RR: 23 (07 Feb 2023 08:00) (17 - 23)  SpO2: 96% (07 Feb 2023 08:00) (96% - 99%)    Parameters below as of 07 Feb 2023 04:00  Patient On (Oxygen Delivery Method): nasal cannula  O2 Flow (L/min): 2     Physical exam:    Constitutional:  No acute distress  HEENT: NC/AT, EOMI, PERRLA, conjunctivae clear; ears and nose atraumatic  Neck: supple; thyroid not palpable  Back: no tenderness  Respiratory: respiratory effort normal; crackles at bases  Cardiovascular: S1S2 regular, no murmurs  Abdomen: soft, mid abdomen tender, distended, positive BS  abdominal postsurgical wound with fecaloid drainage from drain site   Genitourinary: no suprapubic tenderness  Lymphatic: no LN palpable  Musculoskeletal: no muscle tenderness, no joint swelling or tenderness  Extremities: no pedal edema  Neurological/ Psychiatric: confused, judgement and insight impaired; moving all extremities  Skin: no rashes; no palpable lesions    Labs: reviewed                        9.5    4.85  )-----------( 323      ( 06 Feb 2023 05:57 )             30.5     02-06    139  |  110<H>  |  17  ----------------------------<  197<H>  4.4   |  21<L>  |  0.68    Ca    7.5<L>      06 Feb 2023 05:57  Phos  2.9     02-06  Mg     2.0     02-06    TPro  5.8<L>  /  Alb  1.5<L>  /  TBili  0.6  /  DBili  x   /  AST  9<L>  /  ALT  14  /  AlkPhos  57  02-06                         9.9    10.40 )-----------( 494      ( 30 Jan 2023 06:55 )             32.9     01-30    148<H>  |  115<H>  |  19  ----------------------------<  105<H>  3.7   |  29  |  0.59    Ca    7.7<L>      30 Jan 2023 06:55    TPro  4.9<L>  /  Alb  1.3<L>  /  TBili  0.3  /  DBili  0.1  /  AST  14<L>  /  ALT  12  /  AlkPhos  65  01-28               10.9   19.72 )-----------( 510      ( 27 Jan 2023 06:16 )             36.7     01-27    142  |  111<H>  |  10  ----------------------------<  233<H>  3.8   |  26  |  0.78    Ca    8.0<L>      27 Jan 2023 06:16  Phos  2.7     01-27  Mg     1.8     01-27      Culture - Acid Fast - Tissue w/Smear (collected 31 Jan 2023 18:07)  Source: .Tissue INTRAPERITONEAL ABCESS FOR CX    Culture - Fungal, Tissue (collected 31 Jan 2023 18:07)  Source: .Tissue INTRAPERITONEAL ABCESS FOR CX  Preliminary Report (01 Feb 2023 07:09):    Testing in progress    Culture - Tissue with Gram Stain (collected 31 Jan 2023 18:07)  Source: .Tissue INTRAPERITONEAL ABCESS FOR CX  Gram Stain (01 Feb 2023 04:37):    Rare polymorphonuclear leukocytes seen per low power field    No organisms seen per oil power field  Preliminary Report (02 Feb 2023 20:01):    Growth in fluid media only Klebsiella oxytoca/Raoultella ornithinolytica  Organism: Klebsiella oxytoca /Raoutella ornithinolytica (03 Feb 2023 17:25)  Organism: Klebsiella oxytoca /Raoutella ornithinolytica (03 Feb 2023 17:25)      -  Amikacin: S <=16      -  Amoxicillin/Clavulanic Acid: I 16/8      -  Ampicillin: R >16 These ampicillin results predict results for amoxicillin      -  Ampicillin/Sulbactam: R >16/8 Enterobacter, Klebsiella aerogenes, Citrobacter, and Serratia may develop resistance during prolonged therapy (3-4 days)      -  Aztreonam: S <=4      -  Cefazolin: R >16 Enterobacter, Klebsiella aerogenes, Citrobacter, and Serratia may develop resistance during prolonged therapy (3-4 days)      -  Cefepime: S <=2      -  Cefoxitin: S <=8      -  Ceftriaxone: S <=1 Enterobacter, Klebsiella aerogenes, Citrobacter, and Serratia may develop resistance during prolonged therapy      -  Ciprofloxacin: S <=0.25      -  Ertapenem: S <=0.5      -  Gentamicin: S <=2      -  Imipenem: S <=1      -  Levofloxacin: S <=0.5      -  Meropenem: S <=1      -  Piperacillin/Tazobactam: R 64      -  Tobramycin: S <=2      -  Trimethoprim/Sulfamethoxazole: S <=0.5/9.5      Method Type: ARABELLA    Culture - Blood (collected 27 Jan 2023 06:16)  Source: .Blood None  Final Report (01 Feb 2023 09:00):    No Growth Final    Culture - Blood (collected 27 Jan 2023 06:16)  Source: .Blood None  Final Report (01 Feb 2023 09:00):    No Growth Final    Radiology: all available radiological tests reviewed    < from: CT Chest No Cont (01.27.23 @ 09:53) >  Small bowel obstruction with transition point at the neck of a left lower   quadrant abdominal wall hernia. It is uncertain whether the obstruction   is due to the hernia itself or to adhesions.    Additional massive ventral hernia containing small and large bowel and   epigastric hernia containing stomach.    Right lower lobe nodules new since December 04, 2021 and could be   infectious or neoplastic. Follow-up chest CT in 3 months is recommended   to reevaluate.    < end of copied text >      Advanced directives addressed: full resuscitation

## 2023-02-07 NOTE — PROGRESS NOTE ADULT - ASSESSMENT
Patient admitted with abdominal pain, nausea and vomitting , dehydration  septic shock, likely related to     PROBLEMS:    UTI klebsiella pneumoniae and aspiration PNA /SBO- ventral hernia  New R lung nodules - cannot ruleout aspiration PNA   Acute metabolic encephalopathy  Sepsis   Hx copd without exacerbation  Anasarca/acute on  chronic diastolic heart failure       Plan:    pulmonary stable  Blood transfusion, on TPN, IV fluids, iv lasix post transfusion, on 3lpm nc sat 100%  NG suction-Incarcerated Ventral Hernia with SBO-S/p extensive RICHARD, ileo colectomy, giant ventral hernia repair with Surgimend-surgery fu for persistant sutures leak  IV cefepime/flagyl -for  sepsis ( SBO/UTI)  Monitor Cr  D/w staff/daughter  DVT PROPHYLASIX

## 2023-02-07 NOTE — CONSULT NOTE ADULT - SUBJECTIVE AND OBJECTIVE BOX
HPI: Pt is a 72y old Female with hx of   Per Notes:     PAIN: ( )Yes   ( )No  Level:  Location:  Intensity:    /10  Quality:  Aggravating Factors:  Alleviating Factors:  Radiation:  Duration/Timing:  Impact on ADLs:    DYSPNEA: ( ) Yes  ( ) No  Level:    PAST MEDICAL & SURGICAL HISTORY:  Hypertension      Hypercholesteremia      COPD (chronic obstructive pulmonary disease)      C. difficile diarrhea        Diverticulitis of intestine with perforation and abscess without bleeding, unspecified part of intestinal tract      PE (pulmonary thromboembolism)  2016      Palpitations      Obesity      Osteoarthritis      Anemia      Colostomy in place      History of atrial fibrillation      HTN (hypertension)      Diabetes mellitus      Cerebrovascular accident (CVA)      DVT of lower limb, acute      CVA (cerebral vascular accident)      COPD (chronic obstructive pulmonary disease)      Neuropathy      Diverticulitis      History of appendectomy      H/O:   x2      H/O ovarian cystectomy  b/l      Status post total replacement of both hips      H/O hemicolectomy  2016      History of colostomy reversal      History of colostomy reversal      S/P colostomy      S/P       S/P hip replacement          SOCIAL HX:    Hx opiate tolerance ( )YES  ( )NO    Baseline ADLs  (Prior to Admission)  ( ) Independent   ( )Dependent     ( ) With Assistance    FAMILY HISTORY:  Family history of COPD (chronic obstructive pulmonary disease)    Family history of chronic kidney disease    Family history of hiatal hernia (Sibling)        Review of Systems:    - CONSTITUTIONAL: Denies appetite change, weight loss, fever and chills. Denies weakness and fatigue   - HEENT: Denies changes in vision and hearing.   - RESPIRATORY: Denies SOB, cough and/or respiratory secretions   - CV: Denies palpitations and CP. Denies edema   - GI: Denies abdominal pain, constipation, nausea, vomiting and diarrhea.   - : Denies dysuria and urinary frequency.   - MSK: Denies back pain, myalgia and joint pain.   - SKIN: Denies rash and pruritus.   - NEUROLOGICAL: Denies headache and syncope.   - PSYCHIATRIC: Denies confusion, recent changes in mood. Denies anxiety and depression. Denies suicidal ideations    All other systems reviewed and negative  Unable to obtain/Limited due to:      PHYSICAL EXAM:    Vital Signs Last 24 Hrs  T(C): 37.2 (2023 06:00), Max: 37.7 (2023 17:20)  T(F): 98.9 (2023 06:00), Max: 99.8 (2023 17:20)  HR: 103 (2023 08:00) (95 - 107)  BP: 143/64 (2023 08:00) (139/61 - 181/86)  BP(mean): 84 (2023 08:00) (82 - 107)  RR: 23 (2023 08:00) (17 - 23)  SpO2: 96% (2023 08:00) (96% - 99%)    Parameters below as of 2023 04:00  Patient On (Oxygen Delivery Method): nasal cannula  O2 Flow (L/min): 2    Daily     Daily     PPSV2:   %  FAST:    - GENERAL: Alert and oriented x 3. No acute distress.   - EYES: EOMI. Anicteric.   - HENT: Moist mucous membranes.   - LUNGS: Clear to auscultation bilaterally. No accessory muscle use. Speaking in complete sentences.   - CARDIOVASCULAR: Regular rate and rhythm. No murmur. No JVD. No edema.   - ABDOMEN: Soft, non-tender and non-distended. No palpable masses. Normal bowel sounds   - EXTREMITIES: No edema. Non-tender.    - SKIN: Warm, no rashes or lesions easily visible.   - NEUROLOGIC: No focal neurological deficits.    - PSYCHIATRIC: Cooperative. Appropriate mood and affect.      LABS:                        9.2    7.35  )-----------( 292      ( 2023 05:16 )             29.8     02-    137  |  107  |  15  ----------------------------<  241<H>  4.3   |  22  |  0.57    Ca    7.5<L>      2023 05:16  Phos  1.8     -  Mg     1.5         TPro  5.6<L>  /  Alb  1.3<L>  /  TBili  0.4  /  DBili  x   /  AST  10<L>  /  ALT  11<L>  /  AlkPhos  56  -07      Albumin: Albumin, Serum: 1.3 g/dL ( @ 05:16)      Allergies    Cipro (Rash)  Spiriva (Rash)    Intolerances      MEDICATIONS  (STANDING):  aMIOdarone    Tablet 200 milliGRAM(s) Oral daily  atorvastatin 10 milliGRAM(s) Oral at bedtime  buDESOnide    Inhalation Suspension 0.5 milliGRAM(s) Inhalation every 12 hours  cefepime   IVPB 2000 milliGRAM(s) IV Intermittent every 12 hours  chlorhexidine 4% Liquid 1 Application(s) Topical <User Schedule>  coronavirus bivalent (EUA) Booster Vaccine (PFIZER) 0.3 milliLiter(s) IntraMuscular once  dextrose 5%. 1000 milliLiter(s) (50 mL/Hr) IV Continuous <Continuous>  dextrose 5%. 1000 milliLiter(s) (100 mL/Hr) IV Continuous <Continuous>  dextrose 50% Injectable 25 Gram(s) IV Push once  dextrose 50% Injectable 12.5 Gram(s) IV Push once  dextrose 50% Injectable 25 Gram(s) IV Push once  fat emulsion (Fish Oil and Plant Based) 20% Infusion 0.83 Gm/kG/Day (33.7 mL/Hr) IV Continuous <Continuous>  fat emulsion (Fish Oil and Plant Based) 20% Infusion 0.821 Gm/kG/Day (33.3 mL/Hr) IV Continuous <Continuous>  glucagon  Injectable 1 milliGRAM(s) IntraMuscular once  heparin   Injectable 5000 Unit(s) SubCutaneous every 8 hours  insulin glargine Injectable (LANTUS) 5 Unit(s) SubCutaneous once  insulin lispro (ADMELOG) corrective regimen sliding scale   SubCutaneous every 6 hours  levothyroxine Injectable 70 MICROGram(s) IV Push at bedtime  metroNIDAZOLE  IVPB 500 milliGRAM(s) IV Intermittent every 8 hours  nystatin Cream 1 Application(s) Topical two times a day  nystatin Powder 1 Application(s) Topical every 12 hours  octreotide  Injectable 75 MICROGram(s) SubCutaneous every 8 hours  pantoprazole  Injectable 40 milliGRAM(s) IV Push daily  Parenteral Nutrition - Adult 1 Each (83 mL/Hr) TPN Continuous <Continuous>  Parenteral Nutrition - Adult 1 Each (83 mL/Hr) TPN Continuous <Continuous>    MEDICATIONS  (PRN):  acetaminophen     Tablet .. 650 milliGRAM(s) Oral every 6 hours PRN Temp greater or equal to 38C (100.4F)  albuterol    90 MICROgram(s) HFA Inhaler 2 Puff(s) Inhalation every 6 hours PRN Shortness of Breath and/or Wheezing  dextrose Oral Gel 15 Gram(s) Oral once PRN Blood Glucose LESS THAN 70 milliGRAM(s)/deciliter  hydrALAZINE Injectable 10 milliGRAM(s) IV Push every 6 hours PRN SBP>160  HYDROmorphone  Injectable 0.5 milliGRAM(s) IV Push every 3 hours PRN Severe Pain (7 - 10)  sodium chloride 0.9% lock flush 10 milliLiter(s) IV Push every 1 hour PRN Pre/post blood products, medications, blood draw, and to maintain line patency      RADIOLOGY/ADDITIONAL STUDIES: HPI: Per Notes: 72F with PMHx CVA, pAF on AC, emphysema/COPD on home O2, HTN, SBO with prior resection who presented with abd pain, N/V. Found to have an small bowel obstruction. Taken to OR underwent ELAP, extensive RICHARD, ileocolectomy, ventral hernia repair with MESH. Postop course complicated with ALVERTO, shock requiring pressors.  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  she is also on TPN.   A MOLST was provided by Michelle indicating DNR/DNI and no feeding tube, no other limitations are documented.   seen at bedside in ICU today, pt is awake and alert.  her voice is soft and her responses are slow.  she denies having any pain at the current time.  she has a wet sounding cough that is not productive.  denies feeling short of breath.    I contacted her son, Moe Matute, by phone.   We discussed Palliative Care team being a supportive team when a patient has ongoing illnesses.  We also discussed that it is not an end of life care service, but can help navigate symptoms and emotional support throughout their hospital stay here. he is aware of his mother's condition and was able to provide more background information about his mother's clinical condition.  she had a SBO (he is unsure of the etiology) 6-7 years ago that resulted in a colostomy, which was later reversed.  her stroke 4 years ago left her without use of her left side.  He tells me that his mother generally has her faculties.  They have discussed advanced directives previously, she wants DNR/DNI and no feeding tube.  (currently, the is a NGT to suction).  He and his sister help his mother with decisions, and at this time, is willing to consider treatment options presented by team.      PAIN: ( )Yes   (x)No  DYSPNEA: ( ) Yes  (x) No      PAST MEDICAL & SURGICAL HISTORY:  Hypertension  Hypercholesteremia  COPD (chronic obstructive pulmonary disease)  C. difficile diarrhea   Diverticulitis of intestine with perforation and abscess without bleeding, unspecified part of intestinal tract  PE (pulmonary thromboembolism) 2016  Palpitations  Obesity  Osteoarthritis  Anemia  Colostomy in place  History of atrial fibrillation  HTN (hypertension)  Diabetes mellitus  Cerebrovascular accident (CVA)  DVT of lower limb, acute  CVA (cerebral vascular accident)  COPD (chronic obstructive pulmonary disease)  Neuropathy  Diverticulitis  History of appendectomy  H/O:  x2  H/O ovarian cystectomy b/l  Status post total replacement of both hips  H/O hemicolectomy 2016  History of colostomy reversal  S/P   S/P hip replacement      SOCIAL HX:    Hx opiate tolerance ( )YES  (x)NO    Baseline ADLs  (Prior to Admission)  ( ) Independent   (x)Dependent     ( ) With Assistance    FAMILY HISTORY:  Family history of COPD (chronic obstructive pulmonary disease)  Family history of chronic kidney disease  Family history of hiatal hernia (Sibling)        Review of Systems:    Unable to obtain/Limited due to: lethargy/ams      PHYSICAL EXAM:    Vital Signs Last 24 Hrs  T(C): 37.2 (2023 06:00), Max: 37.7 (2023 17:20)  T(F): 98.9 (2023 06:00), Max: 99.8 (2023 17:20)  HR: 103 (2023 08:00) (95 - 107)  BP: 143/64 (2023 08:00) (139/61 - 181/86)  BP(mean): 84 (2023 08:00) (82 - 107)  RR: 23 (2023 08:00) (17 - 23)  SpO2: 96% (2023 08:00) (96% - 99%)    Parameters below as of 2023 04:00  Patient On (Oxygen Delivery Method): nasal cannula  O2 Flow (L/min): 2    PPSV2: 40  %  FAST: n/a    - GENERAL: Alert but lethargic. No acute distress.   - EYES: EOMI. Anicteric.   - HENT: Moist mucous membranes.   - LUNGS: Clear to auscultation bilaterally. No accessory muscle use. speaking few words at a time.  wet sounding cough but non-productive.  nasal cannula  - CARDIOVASCULAR: Regular rate and rhythm. No murmur. No JVD.   - ABDOMEN: Soft, non-tender and non-distended. No palpable masses. Normal bowel sounds. + DEA Drain  - EXTREMITIES: + LE edema. Non-tender.    - SKIN: Warm, no rashes or lesions easily visible.   - NEUROLOGIC: not moving left side spontaneously.   - PSYCHIATRIC: Cooperative. lethargic.      LABS:                        9.2    7.35  )-----------( 292      ( 2023 05:16 )             29.8         137  |  107  |  15  ----------------------------<  241<H>  4.3   |  22  |  0.57    Ca    7.5<L>      2023 05:16  Phos  1.8       Mg     1.5         TPro  5.6<L>  /  Alb  1.3<L>  /  TBili  0.4  /  DBili  x   /  AST  10<L>  /  ALT  11<L>  /  AlkPhos  56        Albumin: Albumin, Serum: 1.3 g/dL ( @ 05:16)      Allergies    Cipro (Rash)  Spiriva (Rash)    Intolerances      MEDICATIONS  (STANDING):  aMIOdarone    Tablet 200 milliGRAM(s) Oral daily  atorvastatin 10 milliGRAM(s) Oral at bedtime  buDESOnide    Inhalation Suspension 0.5 milliGRAM(s) Inhalation every 12 hours  cefepime   IVPB 2000 milliGRAM(s) IV Intermittent every 12 hours  chlorhexidine 4% Liquid 1 Application(s) Topical <User Schedule>  coronavirus bivalent (EUA) Booster Vaccine (PFIZER) 0.3 milliLiter(s) IntraMuscular once  dextrose 5%. 1000 milliLiter(s) (50 mL/Hr) IV Continuous <Continuous>  dextrose 5%. 1000 milliLiter(s) (100 mL/Hr) IV Continuous <Continuous>  dextrose 50% Injectable 25 Gram(s) IV Push once  dextrose 50% Injectable 12.5 Gram(s) IV Push once  dextrose 50% Injectable 25 Gram(s) IV Push once  fat emulsion (Fish Oil and Plant Based) 20% Infusion 0.83 Gm/kG/Day (33.7 mL/Hr) IV Continuous <Continuous>  fat emulsion (Fish Oil and Plant Based) 20% Infusion 0.821 Gm/kG/Day (33.3 mL/Hr) IV Continuous <Continuous>  glucagon  Injectable 1 milliGRAM(s) IntraMuscular once  heparin   Injectable 5000 Unit(s) SubCutaneous every 8 hours  insulin glargine Injectable (LANTUS) 5 Unit(s) SubCutaneous once  insulin lispro (ADMELOG) corrective regimen sliding scale   SubCutaneous every 6 hours  levothyroxine Injectable 70 MICROGram(s) IV Push at bedtime  metroNIDAZOLE  IVPB 500 milliGRAM(s) IV Intermittent every 8 hours  nystatin Cream 1 Application(s) Topical two times a day  nystatin Powder 1 Application(s) Topical every 12 hours  octreotide  Injectable 75 MICROGram(s) SubCutaneous every 8 hours  pantoprazole  Injectable 40 milliGRAM(s) IV Push daily  Parenteral Nutrition - Adult 1 Each (83 mL/Hr) TPN Continuous <Continuous>  Parenteral Nutrition - Adult 1 Each (83 mL/Hr) TPN Continuous <Continuous>    MEDICATIONS  (PRN):  acetaminophen     Tablet .. 650 milliGRAM(s) Oral every 6 hours PRN Temp greater or equal to 38C (100.4F)  albuterol    90 MICROgram(s) HFA Inhaler 2 Puff(s) Inhalation every 6 hours PRN Shortness of Breath and/or Wheezing  dextrose Oral Gel 15 Gram(s) Oral once PRN Blood Glucose LESS THAN 70 milliGRAM(s)/deciliter  hydrALAZINE Injectable 10 milliGRAM(s) IV Push every 6 hours PRN SBP>160  HYDROmorphone  Injectable 0.5 milliGRAM(s) IV Push every 3 hours PRN Severe Pain (7 - 10)  sodium chloride 0.9% lock flush 10 milliLiter(s) IV Push every 1 hour PRN Pre/post blood products, medications, blood draw, and to maintain line patency      RADIOLOGY/ADDITIONAL STUDIES:

## 2023-02-07 NOTE — PROGRESS NOTE ADULT - SUBJECTIVE AND OBJECTIVE BOX
Patient is a 72y old  Female who presents with a chief complaint of bowel obstruction/ischemic mesentery (2023 10:58)      BRIEF HOSPITAL COURSE: 73 y/o female with a PMHx of CVA with left sided weakness, atrial fibrillation, emphysema, COPD, HTN - wears 2L NC at baseline BIBA, hx SBO and resection per EMS presents to the ED from [facility] for RLQ pain and r/o SBO. x1 episode of emesis, + profound diffuse abdominal pain, febrile    2/6 EC fistula  2/7 pt more lethargic today. opens eyes, denies pain, does not follow commands    PAST MEDICAL & SURGICAL HISTORY:  Hypertension  Hypercholesteremia  COPD (chronic obstructive pulmonary disease)  C. difficile diarrhea    Diverticulitis of intestine with perforation and abscess without bleeding, unspecified part of intestinal tract  PE (pulmonary thromboembolism)    Palpitations  Obesity  Osteoarthritis  Anemia  Colostomy in place  History of atrial fibrillation  HTN (hypertension  Diabetes mellitus  Cerebrovascular accident (CVA)  DVT of lower limb, acute  CVA (cerebral vascular accident)  COPD (chronic obstructive pulmonary disease)  Neuropathy  Diverticulitis  History of appendectomy  H/O:   x2  H/O ovarian cystectomy  b/l  Status post total replacement of both hips  H/O hemicolectomy  2016  History of colostomy reversal  History of colostomy reversal  S/P colostomy  S/P C-sectio  S/P hip replacement      MEDICATIONS  (STANDING):  albuterol   0.5% 2.5 milliGRAM(s) Nebulizer every 6 hours  aMIOdarone    Tablet 200 milliGRAM(s) Oral daily  atorvastatin 10 milliGRAM(s) Oral at bedtime  buDESOnide    Inhalation Suspension 0.5 milliGRAM(s) Inhalation every 12 hours  cefepime   IVPB 2000 milliGRAM(s) IV Intermittent every 12 hours  chlorhexidine 4% Liquid 1 Application(s) Topical <User Schedule>  coronavirus bivalent (EUA) Booster Vaccine (PFIZER) 0.3 milliLiter(s) IntraMuscular once  dextrose 5%. 1000 milliLiter(s) (100 mL/Hr) IV Continuous <Continuous>  dextrose 5%. 1000 milliLiter(s) (50 mL/Hr) IV Continuous <Continuous>  dextrose 50% Injectable 25 Gram(s) IV Push once  dextrose 50% Injectable 12.5 Gram(s) IV Push once  dextrose 50% Injectable 25 Gram(s) IV Push once  fat emulsion (Fish Oil and Plant Based) 20% Infusion 0.821 Gm/kG/Day (33.33 mL/Hr) IV Continuous <Continuous>  fat emulsion (Fish Oil and Plant Based) 20% Infusion 0.83 Gm/kG/Day (33.7 mL/Hr) IV Continuous <Continuous>  glucagon  Injectable 1 milliGRAM(s) IntraMuscular once  heparin   Injectable 5000 Unit(s) SubCutaneous every 8 hours  insulin glargine Injectable (LANTUS) 8 Unit(s) SubCutaneous at bedtime  insulin lispro (ADMELOG) corrective regimen sliding scale   SubCutaneous every 6 hours  levothyroxine Injectable 70 MICROGram(s) IV Push at bedtime  metoprolol tartrate Injectable 5 milliGRAM(s) IV Push every 6 hours  metroNIDAZOLE  IVPB 500 milliGRAM(s) IV Intermittent every 8 hours  nystatin Cream 1 Application(s) Topical two times a day  nystatin Powder 1 Application(s) Topical every 12 hours  octreotide  Injectable 75 MICROGram(s) SubCutaneous every 8 hours  pantoprazole  Injectable 40 milliGRAM(s) IV Push daily  Parenteral Nutrition - Adult 1 Each (83 mL/Hr) TPN Continuous <Continuous>  Parenteral Nutrition - Adult 1 Each (83 mL/Hr) TPN Continuous <Continuous>      Vital Signs Last 24 Hrs  T(C): 37.2 (2023 09:04), Max: 37.7 (2023 17:20)  T(F): 99 (2023 09:04), Max: 99.8 (2023 17:20)  HR: 105 (2023 09:34) (95 - 107)  BP: 143/64 (2023 08:00) (139/61 - 181/86)  BP(mean): 84 (2023 08:00) (82 - 107)  RR: 23 (2023 08:00) (17 - 23)  SpO2: 97% (2023 09:34) (96% - 99%)    Parameters below as of 2023 09:34  Patient On (Oxygen Delivery Method): nasal cannula,2 L      I&O's Detail    2023 07:01  -  2023 07:00  --------------------------------------------------------  IN:    Fat Emulsion (Fish Oil &amp; Plant Based) 20% Infusion: 233 mL    Free Water: 100 mL    IV PiggyBack: 100 mL    IV PiggyBack: 100 mL    PPN (Peripheral Parenteral Nutrition): 1335 mL  Total IN: 1868 mL    OUT:    Bulb (mL): 10 mL    Indwelling Catheter - Urethral (mL): 300 mL    Nasogastric/Oral tube (mL): 300 mL    Stool (mL): 200 mL    Voided (mL): 800 mL  Total OUT: 1610 mL    Total NET: 258 mL      LABS:                        9.2    7.35  )-----------( 292      ( 2023 05:16 )             29.8     02-07    137  |  107  |  15  ----------------------------<  241<H>  4.3   |  22  |  0.57    Ca    7.5<L>      2023 05:16  Phos  1.8     02-07  Mg     1.5     02-07    TPro  5.6<L>  /  Alb  1.3<L>  /  TBili  0.4  /  DBili  x   /  AST  10<L>  /  ALT  11<L>  /  AlkPhos  56  02-07    LIVER FUNCTIONS - ( 2023 05:16 )  Alb: 1.3 g/dL / Pro: 5.6 gm/dL / ALK PHOS: 56 U/L / ALT: 11 U/L / AST: 10 U/L / GGT: x           ABG - ( 2023 12:14 )  pH, Arterial: 7.48  pH, Blood: x     /  pCO2: 28    /  pO2: 97    / HCO3: 21    / Base Excess: -1.4  /  SaO2: 98          Physical Examination:    General: No acute distress.    HEENT: Pupils equal, reactive to light.  Symmetric.  PULM: course breath sounds b/l. + rhonchi  NECK: Supple, no lymphadenopathy, trachea midline  CVS: Regular rat and rhythm, no murmurs,   ABD: Soft, + distended, nontender, normoactive bowel sounds, 2 JPs. open wound, dehiscence on lower portion of wound. draining feculant material  EXT: + anasarca, dependent edema in LE and UE b/l   SKIN: Warm and well perfused, no rashes noted.  NEURO: sleeping but arousable, answers questions.     DEVICES:   DEA x2  NGT  midline LUE    RADIOLOGY:   < from: CT Abdomen and Pelvis w/ Oral Cont (23 @ 00:28) >    IMPRESSION:  Findings consistent with an enterocutaneous fistula and or small bowel   perforation into the subcutaneous fat    < end of copied text >

## 2023-02-07 NOTE — CHART NOTE - NSCHARTNOTEFT_GEN_A_CORE
Called by nursing overnight for elevated BP consistently SBP>160. Denies HA, blurry vision, CP, SOB.    Brief Hospital Course:     72F with PMHx CVA, pAF on AC, emphysema/COPD on home O2, HTN, SBO with prior resection who presented with abd pain, N/V. Found to have an small bowel obstruction. Taken to OR underwent ELAP, extensive RICHARD, ileocolectomy, ventral hernia repair with MESH. Postop course complicated with ALVERTO, shock requiring pressors.        PAST MEDICAL & SURGICAL HISTORY:  Hypertension      Hypercholesteremia      COPD (chronic obstructive pulmonary disease)      C. difficile diarrhea        Diverticulitis of intestine with perforation and abscess without bleeding, unspecified part of intestinal tract      PE (pulmonary thromboembolism)  2016      Palpitations      Obesity      Osteoarthritis      Anemia      Colostomy in place      History of atrial fibrillation      HTN (hypertension)      Diabetes mellitus      Cerebrovascular accident (CVA)      DVT of lower limb, acute      CVA (cerebral vascular accident)      COPD (chronic obstructive pulmonary disease)      Neuropathy      Diverticulitis      History of appendectomy      H/O:   x2      H/O ovarian cystectomy  b/l      Status post total replacement of both hips      H/O hemicolectomy  2016      History of colostomy reversal      History of colostomy reversal      S/P colostomy      S/P       S/P hip replacement          Review of Systems:  12 pt ROS negative unless otherwise stated above      Medications:  cefepime   IVPB 2000 milliGRAM(s) IV Intermittent every 12 hours  metroNIDAZOLE  IVPB 500 milliGRAM(s) IV Intermittent every 8 hours    aMIOdarone    Tablet 200 milliGRAM(s) Oral daily  hydrALAZINE Injectable 10 milliGRAM(s) IV Push once  hydrALAZINE Injectable 10 milliGRAM(s) IV Push every 6 hours PRN    albuterol    90 MICROgram(s) HFA Inhaler 2 Puff(s) Inhalation every 6 hours PRN  buDESOnide    Inhalation Suspension 0.5 milliGRAM(s) Inhalation every 12 hours    acetaminophen     Tablet .. 650 milliGRAM(s) Oral every 6 hours PRN  HYDROmorphone  Injectable 0.5 milliGRAM(s) IV Push every 3 hours PRN      heparin   Injectable 5000 Unit(s) SubCutaneous every 8 hours    pantoprazole  Injectable 40 milliGRAM(s) IV Push daily      atorvastatin 10 milliGRAM(s) Oral at bedtime  dextrose 50% Injectable 25 Gram(s) IV Push once  dextrose 50% Injectable 12.5 Gram(s) IV Push once  dextrose 50% Injectable 25 Gram(s) IV Push once  dextrose Oral Gel 15 Gram(s) Oral once PRN  glucagon  Injectable 1 milliGRAM(s) IntraMuscular once  insulin lispro (ADMELOG) corrective regimen sliding scale   SubCutaneous every 6 hours  levothyroxine Injectable 70 MICROGram(s) IV Push at bedtime  octreotide  Injectable 75 MICROGram(s) SubCutaneous every 8 hours    dextrose 5%. 1000 milliLiter(s) IV Continuous <Continuous>  dextrose 5%. 1000 milliLiter(s) IV Continuous <Continuous>  fat emulsion (Fish Oil and Plant Based) 20% Infusion 0.83 Gm/kG/Day IV Continuous <Continuous>  Parenteral Nutrition - Adult 1 Each TPN Continuous <Continuous>  sodium chloride 0.9% lock flush 10 milliLiter(s) IV Push every 1 hour PRN    coronavirus bivalent (EUA) Booster Vaccine (PFIZER) 0.3 milliLiter(s) IntraMuscular once    chlorhexidine 4% Liquid 1 Application(s) Topical <User Schedule>  nystatin Cream 1 Application(s) Topical two times a day  nystatin Powder 1 Application(s) Topical every 12 hours            ICU Vital Signs Last 24 Hrs  T(C): 37.7 (2023 17:20), Max: 37.7 (2023 17:20)  T(F): 99.8 (2023 17:20), Max: 99.8 (2023 17:20)  HR: 100 (2023 04:00) (91 - 107)  BP: 159/84 (2023 04:00) (147/70 - 181/86)  BP(mean): 102 (2023 04:00) (87 - 107)  ABP: --  ABP(mean): --  RR: 18 (2023 04:00) (14 - 22)  SpO2: 97% (2023 04:00) (97% - 100%)    O2 Parameters below as of 2023 04:00  Patient On (Oxygen Delivery Method): nasal cannula  O2 Flow (L/min): 2    I&O's Summary    2023 07:01  -  2023 07:00  --------------------------------------------------------  IN: 2095.9 mL / OUT: 2340 mL / NET: -244.1 mL    2023 07:01  -  2023 05:26  --------------------------------------------------------  IN: 900 mL / OUT: 610 mL / NET: 290 mL                    LABS:                        9.5    4.85  )-----------( 323      ( 2023 05:57 )             30.5     02-06    139  |  110<H>  |  17  ----------------------------<  197<H>  4.4   |  21<L>  |  0.68    Ca    7.5<L>      2023 05:57  Phos  2.9     02-06  Mg     2.0     02-06    TPro  5.8<L>  /  Alb  1.5<L>  /  TBili  0.6  /  DBili  x   /  AST  9<L>  /  ALT  14  /  AlkPhos  57  02-06          CAPILLARY BLOOD GLUCOSE      POCT Blood Glucose.: 191 mg/dL (2023 23:00)        CULTURES:  Culture Results:   Growth in fluid media only Klebsiella oxytoca/Raoultella ornithinolytica ( @ 18:07)  Culture Results:   Culture is being performed. Fungal cultures are held for 4 weeks. ( @ 18:07)  Culture Results:   Culture is being performed. ( @ 18:07)    Physical Examination:    General: No acute distress.  sleeing arouses easily with stimulation, nonfocal    HEENT: Pupils equal, reactive to light.  Symmetric.    PULM: Diminished BS bilaterally    CVS: Regular rate and rhythm, NSR    ABD: softly distended, + BS, midline surgical dressing in place, bilateral DEA drain in place serous/ bilious drainage.    EXT: +LE and UE edema    SKIN: Warm and well perfused, no rashes noted.    RADIOLOGY:   ACC: [id number] EXAM:  CT ABDOMEN AND PELVIS OC   ORDERED BY: BHARATHI KYLE     PROCEDURE DATE:  2023          INTERPRETATION:  CLINICAL INFORMATION: Status post small bowel resection   with stool coming through wound    COMPARISON: 2023    CONTRAST/COMPLICATIONS:  IV Contrast: NONE  Oral Contrast: Omnipaque 300  Complications: None reported at time of study completion    PROCEDURE:  CT of the Abdomen and Pelvis was performed.  Sagittal and coronal reformats were performed.    FINDINGS:  LOWER CHEST: Small bilateral pleural effusions with bibasilar   atelectasis. Coronary vascular calcification.    LIVER: Within normal limits.  BILE DUCTS: Normal caliber.  GALLBLADDER: Within normal limits.  SPLEEN: Within normal limits.  PANCREAS: Interval development of likely edema the pancreatic tail with   associated stranding, correlate with amylase and lipase levels to exclude   acute pancreatitis.  ADRENALS: Within normal limits.  KIDNEYS/URETERS: No hydronephrosis bilaterally. Calcification along the   medial left lower pole unchanged. The ureters are unremarkable.    BLADDER: Bladder is collapsed around a Woodruff catheter.  REPRODUCTIVE ORGANS: Mild presacral edema. No pelvic adenopathy.    BOWEL, PERITONEUM AND ABDOMINAL WALL: Status post prior rectal   anastomosis. Multifocal thickened bowel loops likely represent a most of   the small bowel as well as residual left colon. Ventral hernia containing   small bowel with an enteric cutaneous fistula in the anterior lower   pelvis. Contrast is seen freely into the lower pelvis subcutaneous fat   extending to the surgical clips in the anterior mid to lower abdominal   pelvic wall. Additional air seen tracking superiorly in the subcutaneous   fat. No bowel obstruction.  VESSELS: Atherosclerotic changes.  RETROPERITONEUM/LYMPH NODES: No lymphadenopathy.    BONES: Degenerative changes. Bilateral total hip replacements.    IMPRESSION:  Findings consistent with an enterocutaneous fistula and or small bowel   perforation into the subcutaneous fat      --- End of Report ---            JONES LILLY MD; Attending Radiologist  This document has been electronically signed. 2023  1:50PM    Impression:  1. Small bowel obstruction with incarcerated ventral hernia, sp ELAP, RICHARD, ileocolectomy, ventral hernia repair with MESH  2. septic shock- resolved  3. ALVERTO  4. klebsiella UTI  5. paroxysmal atrial fibrillation  6. COPD  7. entercutaneous fistula  8. uncontrolled essential HTN    Plan:  Neuro - mult-modal analgesia    CV -  BP consistently elevated, treated with lopress 2.5mg IV x 2, will add hydralazine 10mg IV [id number] for SBP>160          tele NSR          restart AC when cleared by surgery          keep K~4 and Mg>2 for optimal arrhythmia suppression    Pulm -  stable on NC              actively titrate to keep sats 90-92%              bronchodialators/inhaled steroids    GI -  PPI         NPO/NGT management as per surgical team         PPN         octreotide for EC fistula         wound and drain care as per surgical team      Renal - Cr stable,  avoid MAP<65, renally adjust all meds, strict I/Os, BMP in am    Heme -  restart AC when cleared by surgical team, no signs of active bleeding, tx for Hbg<7 or signs of active bleeding.    ID - afebrile, cont IV abx as per ID with flagyl and cefepime, BCx prior NGTD, OR cx pending, trend WBC, abx adjustments based on discussion with ID in conjunction with             clinical features and culture data.    Endo -  Aggressive glycemic control to limit FS glucose to < 180mg/dl, BS stable, ISS with Q6hour FSG    GOC: DNR/DNI Called by nursing overnight for elevated BP consistently SBP>160. Denies HA, blurry vision, CP, SOB.    Brief Hospital Course:     72F with PMHx CVA, pAF on AC, emphysema/COPD on home O2, HTN, SBO with prior resection who presented with abd pain, N/V. Found to have an small bowel obstruction. Taken to OR underwent ELAP, extensive RICHARD, ileocolectomy, ventral hernia repair with MESH. Postop course complicated with ALVERTO, shock requiring pressors.        PAST MEDICAL & SURGICAL HISTORY:  Hypertension      Hypercholesteremia      COPD (chronic obstructive pulmonary disease)      C. difficile diarrhea        Diverticulitis of intestine with perforation and abscess without bleeding, unspecified part of intestinal tract      PE (pulmonary thromboembolism)  2016      Palpitations      Obesity      Osteoarthritis      Anemia      Colostomy in place      History of atrial fibrillation      HTN (hypertension)      Diabetes mellitus      Cerebrovascular accident (CVA)      DVT of lower limb, acute      CVA (cerebral vascular accident)      COPD (chronic obstructive pulmonary disease)      Neuropathy      Diverticulitis      History of appendectomy      H/O:   x2      H/O ovarian cystectomy  b/l      Status post total replacement of both hips      H/O hemicolectomy  2016      History of colostomy reversal      History of colostomy reversal      S/P colostomy      S/P       S/P hip replacement          Review of Systems:  12 pt ROS negative unless otherwise stated above      Medications:  cefepime   IVPB 2000 milliGRAM(s) IV Intermittent every 12 hours  metroNIDAZOLE  IVPB 500 milliGRAM(s) IV Intermittent every 8 hours    aMIOdarone    Tablet 200 milliGRAM(s) Oral daily  hydrALAZINE Injectable 10 milliGRAM(s) IV Push once  hydrALAZINE Injectable 10 milliGRAM(s) IV Push every 6 hours PRN    albuterol    90 MICROgram(s) HFA Inhaler 2 Puff(s) Inhalation every 6 hours PRN  buDESOnide    Inhalation Suspension 0.5 milliGRAM(s) Inhalation every 12 hours    acetaminophen     Tablet .. 650 milliGRAM(s) Oral every 6 hours PRN  HYDROmorphone  Injectable 0.5 milliGRAM(s) IV Push every 3 hours PRN      heparin   Injectable 5000 Unit(s) SubCutaneous every 8 hours    pantoprazole  Injectable 40 milliGRAM(s) IV Push daily      atorvastatin 10 milliGRAM(s) Oral at bedtime  dextrose 50% Injectable 25 Gram(s) IV Push once  dextrose 50% Injectable 12.5 Gram(s) IV Push once  dextrose 50% Injectable 25 Gram(s) IV Push once  dextrose Oral Gel 15 Gram(s) Oral once PRN  glucagon  Injectable 1 milliGRAM(s) IntraMuscular once  insulin lispro (ADMELOG) corrective regimen sliding scale   SubCutaneous every 6 hours  levothyroxine Injectable 70 MICROGram(s) IV Push at bedtime  octreotide  Injectable 75 MICROGram(s) SubCutaneous every 8 hours    dextrose 5%. 1000 milliLiter(s) IV Continuous <Continuous>  dextrose 5%. 1000 milliLiter(s) IV Continuous <Continuous>  fat emulsion (Fish Oil and Plant Based) 20% Infusion 0.83 Gm/kG/Day IV Continuous <Continuous>  Parenteral Nutrition - Adult 1 Each TPN Continuous <Continuous>  sodium chloride 0.9% lock flush 10 milliLiter(s) IV Push every 1 hour PRN    coronavirus bivalent (EUA) Booster Vaccine (PFIZER) 0.3 milliLiter(s) IntraMuscular once    chlorhexidine 4% Liquid 1 Application(s) Topical <User Schedule>  nystatin Cream 1 Application(s) Topical two times a day  nystatin Powder 1 Application(s) Topical every 12 hours            ICU Vital Signs Last 24 Hrs  T(C): 37.7 (2023 17:20), Max: 37.7 (2023 17:20)  T(F): 99.8 (2023 17:20), Max: 99.8 (2023 17:20)  HR: 100 (2023 04:00) (91 - 107)  BP: 159/84 (2023 04:00) (147/70 - 181/86)  BP(mean): 102 (2023 04:00) (87 - 107)  ABP: --  ABP(mean): --  RR: 18 (2023 04:00) (14 - 22)  SpO2: 97% (2023 04:00) (97% - 100%)    O2 Parameters below as of 2023 04:00  Patient On (Oxygen Delivery Method): nasal cannula  O2 Flow (L/min): 2    I&O's Summary    2023 07:01  -  2023 07:00  --------------------------------------------------------  IN: 2095.9 mL / OUT: 2340 mL / NET: -244.1 mL    2023 07:01  -  2023 05:26  --------------------------------------------------------  IN: 900 mL / OUT: 610 mL / NET: 290 mL                    LABS:                        9.5    4.85  )-----------( 323      ( 2023 05:57 )             30.5     02-06    139  |  110<H>  |  17  ----------------------------<  197<H>  4.4   |  21<L>  |  0.68    Ca    7.5<L>      2023 05:57  Phos  2.9     02-06  Mg     2.0     02-06    TPro  5.8<L>  /  Alb  1.5<L>  /  TBili  0.6  /  DBili  x   /  AST  9<L>  /  ALT  14  /  AlkPhos  57  02-06          CAPILLARY BLOOD GLUCOSE      POCT Blood Glucose.: 191 mg/dL (2023 23:00)        CULTURES:  Culture Results:   Growth in fluid media only Klebsiella oxytoca/Raoultella ornithinolytica ( @ 18:07)  Culture Results:   Culture is being performed. Fungal cultures are held for 4 weeks. ( @ 18:07)  Culture Results:   Culture is being performed. ( @ 18:07)    Physical Examination:    General: No acute distress.  sleeing arouses easily with stimulation, nonfocal    HEENT: Pupils equal, reactive to light.  Symmetric.    PULM: Diminished BS bilaterally    CVS: Regular rate and rhythm, NSR    ABD: softly distended, + BS, midline surgical dressing in place, bilateral DEA drain in place serous/ bilious drainage.    EXT: +LE and UE edema    SKIN: Warm and well perfused, no rashes noted.    RADIOLOGY:   ACC: [id number] EXAM:  CT ABDOMEN AND PELVIS OC   ORDERED BY: BHARATHI KYLE     PROCEDURE DATE:  2023          INTERPRETATION:  CLINICAL INFORMATION: Status post small bowel resection   with stool coming through wound    COMPARISON: 2023    CONTRAST/COMPLICATIONS:  IV Contrast: NONE  Oral Contrast: Omnipaque 300  Complications: None reported at time of study completion    PROCEDURE:  CT of the Abdomen and Pelvis was performed.  Sagittal and coronal reformats were performed.    FINDINGS:  LOWER CHEST: Small bilateral pleural effusions with bibasilar   atelectasis. Coronary vascular calcification.    LIVER: Within normal limits.  BILE DUCTS: Normal caliber.  GALLBLADDER: Within normal limits.  SPLEEN: Within normal limits.  PANCREAS: Interval development of likely edema the pancreatic tail with   associated stranding, correlate with amylase and lipase levels to exclude   acute pancreatitis.  ADRENALS: Within normal limits.  KIDNEYS/URETERS: No hydronephrosis bilaterally. Calcification along the   medial left lower pole unchanged. The ureters are unremarkable.    BLADDER: Bladder is collapsed around a Woodruff catheter.  REPRODUCTIVE ORGANS: Mild presacral edema. No pelvic adenopathy.    BOWEL, PERITONEUM AND ABDOMINAL WALL: Status post prior rectal   anastomosis. Multifocal thickened bowel loops likely represent a most of   the small bowel as well as residual left colon. Ventral hernia containing   small bowel with an enteric cutaneous fistula in the anterior lower   pelvis. Contrast is seen freely into the lower pelvis subcutaneous fat   extending to the surgical clips in the anterior mid to lower abdominal   pelvic wall. Additional air seen tracking superiorly in the subcutaneous   fat. No bowel obstruction.  VESSELS: Atherosclerotic changes.  RETROPERITONEUM/LYMPH NODES: No lymphadenopathy.    BONES: Degenerative changes. Bilateral total hip replacements.    IMPRESSION:  Findings consistent with an enterocutaneous fistula and or small bowel   perforation into the subcutaneous fat      --- End of Report ---            JONES LILLY MD; Attending Radiologist  This document has been electronically signed. 2023  1:50PM    Impression:  1. Small bowel obstruction with incarcerated ventral hernia, sp ELAP, RICHARD, ileocolectomy, ventral hernia repair with MESH  2. septic shock- resolved  3. ALVERTO- resolved  4. klebsiella UTI  5. paroxysmal atrial fibrillation  6. COPD  7. entercutaneous fistula  8. uncontrolled essential HTN    Plan:  Neuro - mult-modal analgesia    CV -  BP consistently elevated, treated with lopress 2.5mg IV x 2, will add hydralazine 10mg IV [id number] for SBP>160          tele NSR          restart AC when cleared by surgery          keep K~4 and Mg>2 for optimal arrhythmia suppression    Pulm -  stable on NC              actively titrate to keep sats 90-92%              bronchodialators/inhaled steroids    GI -  PPI         NPO/NGT management as per surgical team         PPN         octreotide for EC fistula         wound and drain care as per surgical team      Renal - Cr stable,  avoid MAP<65, renally adjust all meds, strict I/Os, BMP in am    Heme -  restart AC when cleared by surgical team, no signs of active bleeding, tx for Hbg<7 or signs of active bleeding.    ID - afebrile, cont IV abx as per ID with flagyl and cefepime, BCx prior NGTD, OR cx pending, trend WBC, abx adjustments based on discussion with ID in conjunction with             clinical features and culture data.    Endo -  Aggressive glycemic control to limit FS glucose to < 180mg/dl, BS stable, ISS with Q6hour FSG    GOC: DNR/DNI

## 2023-02-07 NOTE — CHART NOTE - NSCHARTNOTEFT_GEN_A_CORE
Clinical Nutrition PN Follow Up Note    * 71 y/o female with a PMH of CVA with left sided weakness, atrial fibrillation, emphysema, COPD, HTN - wears 2L NC at baseline BIBA, hx SBO and resection per EMS presents to the ED from [facility] for RLQ pain and r/o SBO. x1 episode of emesis, + profound diffuse abdominal pain, febrile. Has SBO, NGT to LWS. Taken to OR on  underwent ELAP, extensive RICHARD, ileocolectomy, ventral hernia repair with MESH. Postop course complicated with ALVERTO, shock requiring pressors, transferred to ICU. + anasarca   DNR/ DNI    *current status: pt started on TF and PPN d/c yesterday - now having loose stools and is leaking out of wounds. TF on hold, PPN to be re-started. Will keep PPN order the same from the last bag and monitor/ adjust based on tomorrow's () labs.  Has DEA drain.    *new pertinent meds: statin, admelog (received 6 units x 24 hours), magnesium sulfate, pantoprazole, KPhos (30 mmol x 24 hours), hydromorphone    *labs reviewed;  K and Na WNL; hypophosphatemia; Mg low-end of normal - will start lytes/ min in bag same as previous bag and adjust tomorrow based on new labs.    POCT WNL  Last bili T WNL to c/w trace elements.   Last Triglyceride level WNL for 80g lipids daily - will obtain new level.   corrected Ca WNL, rec'd add 10mEq of Calcium Gluconate outside of PN bag as CAPS is out.     -    140  |  109<H>  |  16  ----------------------------<  120<H>  4.3   |  20<L>  |  0.74    Ca    7.8<L>      2023 08:11  Phos  1.8     02-  Mg     1.6     -    TPro  6.0  /  Alb  2.3<L>  /  TBili  0.5  /  DBili  x   /  AST  25  /  ALT  39  /  AlkPhos  70  -    POCT Blood Glucose.: 118 mg/dL (2023 06:09)  POCT Blood Glucose.: 164 mg/dL (2023 23:21)  POCT Blood Glucose.: 185 mg/dL (2023 18:34)  POCT Blood Glucose.: 195 mg/dL (2023 11:56)      *I&O's Detail    2023 07:01  -  2023 07:00  --------------------------------------------------------  IN:    Fat Emulsion (Fish Oil &amp; Plant Based) 20% Infusion: 235 mL    Glucerna 1.5: 167 mL    Instillation (mL): 285 mL    IV PiggyBack: 150 mL    PPN (Peripheral Parenteral Nutrition): 871 mL  Total IN: 1708 mL    OUT:    Bulb (mL): 350 mL    Bulb (mL): 1200 mL    Indwelling Catheter - Urethral (mL): 2925 mL  Total OUT: 4475 mL    Total NET: -2767 mL  *negative fluid balance: pt with 4+ generalized edema. Will monitor/adjust total PPN volume prn. TF now on hold    *malnutrition: Pt continues to meet criteria for severe protein-calorie malnutrition in context of acute disease r/t decreased ability to meet increased nutrient needs 2/2 SBO AEB <50% ENN x >11 days, severe muscle/ fat wasting, severe edema    Estimated Needs: based on 97.4 Kg (wt from admit - current wts being skewed by severe fluid retention)  Calories: 1948- 2435 Kcal (20- 25 Kcal/Kg)  Protein: 146- 175 g (1.5- 1.8 g/Kg)  Fluids: 1948- 2435 mL (20- 25 mL/Kg)    Weight History:  Daily Weight in k.6 (2023 06:00)   - #  Height (cm): 165.1 (23 @ 16:08)  Weight (kg): 97.4 (23 @ 23:30) - admit wt ()  BMI (kg/m2): 35.7 (23 @ 23:30)  BSA (m2): 2.04 (23 @ 23:30)    Weight History:  Height (cm): 165.1 (23 @ 16:08)  Weight (kg): 97.4 (23 @ 23:30)  BMI (kg/m2): 35.7 (23 @ 23:30)  BSA (m2): 2.04 (23 @ 23:30)    PPN Recommendations: via peripheral line  Total Volume: 2000 mL  80 g  Amino Acids  100 g Dextrose  80 g Lipids 20%  121 mEq Sodium Chloride  6 mEq Sodium Acetate  20 mmol Sodium Phosphate  26 mEq Potassium Chloride  20 mEq Potassium Acetate  30 mmol Potassium Phosphate  0 mEq Calcium Gluconate  12 mEq Magnesium Sulfate  200 mg Thiamine  1 ml Trace Elements  10 ml MVI    Total Calories: 1460 kcal  (Provides 60% of daily energy requirements and 46% of daily protein requirements)    (osmolarity = 898)      Additional Recommendations:    1) Daily weights  2) Strict I & O's  3) Daily lyte checks including magnesium and phos  4) Weekly triglycerides/LFT checks  5) POCT q6hrs; maintain 140-180mg/dL  6) if on PN > 6 days, consider placing PICC line to meet 100% of nutr needs  or Confirm goals of care regarding LONG-TERM nutrition support  7) Re-start TF when able    RD will continue to monitor PPN, labs, hydration, and wt prn.   Liss Bruner, MS, RDN, [phone]  Bushra Soni, MS, RDN, [facility] [phone]  Certified Nutrition  Clinical Nutrition PN Follow Up Note    * 71 y/o female with a PMH of CVA with left sided weakness, atrial fibrillation, emphysema, COPD, HTN - wears 2L NC at baseline BIBA, hx SBO and resection per EMS presents to the ED from [facility] for RLQ pain and r/o SBO. x1 episode of emesis, + profound diffuse abdominal pain, febrile. Has SBO, NGT to LWS. Taken to OR on  underwent ELAP, extensive RICHARD, ileocolectomy, ventral hernia repair with MESH. Postop course complicated with ALVERTO, shock requiring pressors, transferred to ICU. + anasarca   DNR/ DNI    *current status: pt started on TF and PPN d/c yesterday - now having loose stools and is leaking out of wounds; with EC fistula. TF on hold, PPN re-started (). Remains with NGT and 1 DEA drain to suction. Will c/w PPN.    *new pertinent meds: Lipitor, Admelog (received 14 units x 24 hours), Synthroid, Sandostatin, Protonix, Dilaudid     *labs reviewed; K+ and Na+ WNL; decreased K+ to 90mEq - if K+ becomes low, will need to replete outside of PN bag. Hypophosphatemia and hypomagnesemia, will increase in bag; monitor and adjust tomorrow based on new labs.  POCTs elevated, will adjust sliding scale outside of PN bag per PAOP Rowe. Last bili T WNL to c/w trace elements. Last TG level WNL to c/w 80g lipids daily. Corrected Ca WNL, rec'd add 10mEq of Calcium Gluconate outside of PN bag as CAPS is out.         137  |  107  |  15  ----------------------------<  241<H>  4.3   |  22  |  0.57    Ca    7.5<L>      2023 05:16  Phos  1.8     -  Mg     1.5         TPro  5.6<L>  /  Alb  1.3<L>  /  TBili  0.4  /  DBili  x   /  AST  10<L>  /  ALT  11<L>  /  AlkPhos  56  -    POCT Blood Glucose.: 227 mg/dL (2023 06:24)  POCT Blood Glucose.: 191 mg/dL (2023 23:00)  POCT Blood Glucose.: 228 mg/dL (2023 18:18)  POCT Blood Glucose.: 245 mg/dL (2023 12:06)    *I&O's Detail    2023 07:01  -  2023 07:00  --------------------------------------------------------  IN:    Fat Emulsion (Fish Oil &amp; Plant Based) 20% Infusion: 233 mL    Free Water: 100 mL    IV PiggyBack: 100 mL    IV PiggyBack: 100 mL    PPN (Peripheral Parenteral Nutrition): 1335 mL  Total IN: 1868 mL    OUT:    Bulb (mL): 10 mL    Indwelling Catheter - Urethral (mL): 300 mL    Nasogastric/Oral tube (mL): 300 mL    Stool (mL): 200 mL    Voided (mL): 800 mL  Total OUT: 1610 mL    Total NET: 258 mL  *Positve fluid balance: pt with 4+ generalized edema. Will monitor/adjust total PPN volume prn. TF now on hold    *malnutrition: Pt continues to meet criteria for severe protein-calorie malnutrition in context of acute disease r/t decreased ability to meet increased nutrient needs 2/2 SBO AEB <50% ENN x >11 days, severe muscle/ fat wasting, severe edema    Estimated Needs: based on 97.4 Kg (wt from admit - current wts being skewed by severe fluid retention)  Calories: 1948- 2435 Kcal (20- 25 Kcal/Kg)  Protein: 146- 175 g (1.5- 1.8 g/Kg)  Fluids: 1948- 2435 mL (20- 25 mL/Kg)    Weight History:  Daily Weight in k.6 (2023 06:00)   - #  Height (cm): 165.1 (23 @ 16:08)  Weight (kg): 97.4 (23 @ 23:30) - admit wt ()  BMI (kg/m2): 35.7 (23 @ 23:30)  BSA (m2): 2.04 (23 @ 23:30)    Weight History:  Height (cm): 165.1 (23 @ 16:08)  Weight (kg): 97.4 (23 @ 23:30)  BMI (kg/m2): 35.7 (23 @ 23:30)  BSA (m2): 2.04 (23 @ 23:30)    PPN Recommendations: via peripheral line  Total Volume: 2000 mL  80 g  Amino Acids  100 g Dextrose  80 g Lipids 20%  121 mEq Sodium Chloride  6 mEq Sodium Acetate  20 mmol Sodium Phosphate  26 mEq Potassium Chloride  20 mEq Potassium Acetate  30 mmol Potassium Phosphate  0 mEq Calcium Gluconate  12 mEq Magnesium Sulfate  200 mg Thiamine  1 ml Trace Elements  10 ml MVI    Total Calories: 1460 kcal  (Provides 60% of daily energy requirements and 46% of daily protein requirements)    (osmolarity = 898)    Additional Recommendations:    1) Daily weights  2) Strict I & O's  3) Daily lyte checks including magnesium and phos  4) Weekly triglycerides/LFT checks  5) POCT q6hrs; maintain 140-180mg/dL  6) Strongly consider placing PICC line to meet 100% of nutr needs  or Confirm goals of care regarding LONG-TERM nutrition support  7) Re-start TF when able    RD will continue to monitor PPN, labs, hydration, and wt prn.   Liss Bruner, MS, RDN, [phone]  Bushra Soni, MS, RDN, [facility] [phone]  Certified Nutrition  Clinical Nutrition PN Follow Up Note    * 71 y/o female with a PMH of CVA with left sided weakness, atrial fibrillation, emphysema, COPD, HTN - wears 2L NC at baseline BIBA, hx SBO and resection per EMS presents to the ED from [facility] for RLQ pain and r/o SBO. x1 episode of emesis, + profound diffuse abdominal pain, febrile. Has SBO, NGT to LWS. Taken to OR on  underwent ELAP, extensive RICHARD, ileocolectomy, ventral hernia repair with MESH. Postop course complicated with ALVERTO, shock requiring pressors, transferred to ICU. + anasarca   DNR/ DNI    *current status: pt started on TF on 2/3 - began having loose stools and was leaking out of wounds; with EC fistula. TF now on hold, PPN re-started (). Remains with NGT and 1 DEA drain to suction. Will c/w PPN.    *new pertinent meds: Lipitor, Admelog (received 14 units x 24 hours), Synthroid, Sandostatin, Protonix, Dilaudid     *labs reviewed; K+ and Na+ WNL; decreased K+ to 90mEq - if K+ becomes low, will need to replete outside of PN bag. Hypophosphatemia and hypomagnesemia, will increase in bag; monitor and adjust tomorrow based on new labs.  POCTs elevated, will adjust sliding scale outside of PN bag per PAPO Rowe - would consider adding insulin to bag to maintain between 140- 180 mg/dL if does not improve w/ SS insulin.   Last bili T WNL to c/w trace elements. Last TG level WNL to c/w 80g lipids daily. Corrected Ca WNL, rec'd add 10mEq of Calcium Gluconate outside of PN bag as CAPS is out.         137  |  107  |  15  ----------------------------<  241<H>  4.3   |  22  |  0.57    Ca    7.5<L>      2023 05:16  Phos  1.8     -  Mg     1.5     -    TPro  5.6<L>  /  Alb  1.3<L>  /  TBili  0.4  /  DBili  x   /  AST  10<L>  /  ALT  11<L>  /  AlkPhos  56      POCT Blood Glucose.: 227 mg/dL (2023 06:24)  POCT Blood Glucose.: 191 mg/dL (2023 23:00)  POCT Blood Glucose.: 228 mg/dL (2023 18:18)  POCT Blood Glucose.: 245 mg/dL (2023 12:06)    *I&O's Detail    2023 07:01  -  2023 07:00  --------------------------------------------------------  IN:    Fat Emulsion (Fish Oil &amp; Plant Based) 20% Infusion: 233 mL    Free Water: 100 mL    IV PiggyBack: 100 mL    IV PiggyBack: 100 mL    PPN (Peripheral Parenteral Nutrition): 1335 mL  Total IN: 1868 mL    OUT:    Bulb (mL): 10 mL    Indwelling Catheter - Urethral (mL): 300 mL    Nasogastric/Oral tube (mL): 300 mL    Stool (mL): 200 mL    Voided (mL): 800 mL  Total OUT: 1610 mL    Total NET: 258 mL  *Positve fluid balance: pt with 4+ generalized edema. Will monitor/adjust total PPN volume prn. TF now on hold    *malnutrition: Pt continues to meet criteria for severe protein-calorie malnutrition in context of acute disease r/t decreased ability to meet increased nutrient needs 2/2 SBO AEB <50% ENN x >11 days, severe muscle/ fat wasting, severe edema    Estimated Needs: based on 97.4 Kg (wt from admit - current wts being skewed by severe fluid retention)  Calories: 1948- 2435 Kcal (20- 25 Kcal/Kg)  Protein: 146- 175 g (1.5- 1.8 g/Kg)  Fluids: 1948- 2435 mL (20- 25 mL/Kg)    Weight History:  Daily Weight in k.6 (2023 06:00)   - #  Height (cm): 165.1 (23 @ 16:08)  Weight (kg): 97.4 (23 @ 23:30) - admit wt ()  BMI (kg/m2): 35.7 (23 @ 23:30)  BSA (m2): 2.04 (23 @ 23:30)    Weight History:  Height (cm): 165.1 (23 @ 16:08)  Weight (kg): 97.4 (23 @ 23:30)  BMI (kg/m2): 35.7 (23 @ 23:30)  BSA (m2): 2.04 (23 @ 23:30)    PPN Recommendations: via peripheral line  Total Volume: 2000 mL  80 g  Amino Acids  100 g Dextrose  80 g Lipids 20%  121 mEq Sodium Chloride  6 mEq Sodium Acetate  20 mmol Sodium Phosphate  26 mEq Potassium Chloride  20 mEq Potassium Acetate  30 mmol Potassium Phosphate  0 mEq Calcium Gluconate  12 mEq Magnesium Sulfate  200 mg Thiamine  1 ml Trace Elements  10 ml MVI    Total Calories: 1460 kcal  (Provides 60% of daily energy requirements and 46% of daily protein requirements)    (osmolarity = 898)    Additional Recommendations:    1) Daily weights  2) Strict I & O's  3) Daily lyte checks including magnesium and phos  4) Weekly triglycerides/LFT checks  5) POCT q6hrs; maintain 140-180mg/dL  6) Strongly consider placing PICC line to meet 100% of nutr needs  or Confirm goals of care regarding LONG-TERM nutrition support  7) Re-start TF when able    RD will continue to monitor PPN, labs, hydration, and wt prn.   Liss Bruner, MS, RDN, [phone]  Bushra Soni, MS, RDN, [facility] [phone]  Certified Nutrition  Clinical Nutrition PN Follow Up Note    * 73 y/o female with a PMH of CVA with left sided weakness, atrial fibrillation, emphysema, COPD, HTN - wears 2L NC at baseline BIBA, hx SBO and resection per EMS presents to the ED from [facility] for RLQ pain and r/o SBO. x1 episode of emesis, + profound diffuse abdominal pain, febrile. Has SBO, NGT to LWS. Taken to OR on  underwent ELAP, extensive RICHARD, ileocolectomy, ventral hernia repair with MESH. Postop course complicated with ALVERTO, shock requiring pressors, transferred to ICU. + anasarca   DNR/ DNI    *current status: pt started on TF on 2/3 - began having loose stools and was leaking out of wounds; with EC fistula. TF now on hold, PPN re-started (). Remains with NGT and 1 DEA drain to suction. Will c/w PPN.    *new pertinent meds: Lipitor, Admelog (received 14 units x 24 hours), Synthroid, Sandostatin, Protonix, Dilaudid     *labs reviewed; K+ and Na+ WNL; decreased K+ to 90mEq - if K+ becomes low, will need to replete outside of PN bag. Hypophosphatemia and hypomagnesemia, will increase in bag; monitor and adjust tomorrow based on new labs.  POCTs elevated, will adjust sliding scale outside of PN bag per PAPO Rowe - would consider adding insulin to bag to maintain between 140- 180 mg/dL if does not improve w/ SS insulin.   Last bili T WNL to c/w trace elements. Last TG level WNL to c/w 80g lipids daily. Corrected Ca WNL, rec'd add 10mEq of Calcium Gluconate outside of PN bag as CAPS is out.         137  |  107  |  15  ----------------------------<  241<H>  4.3   |  22  |  0.57    Ca    7.5<L>      2023 05:16  Phos  1.8     -  Mg     1.5     -    TPro  5.6<L>  /  Alb  1.3<L>  /  TBili  0.4  /  DBili  x   /  AST  10<L>  /  ALT  11<L>  /  AlkPhos  56      POCT Blood Glucose.: 227 mg/dL (2023 06:24)  POCT Blood Glucose.: 191 mg/dL (2023 23:00)  POCT Blood Glucose.: 228 mg/dL (2023 18:18)  POCT Blood Glucose.: 245 mg/dL (2023 12:06)    *I&O's Detail    2023 07:01  -  2023 07:00  --------------------------------------------------------  IN:    Fat Emulsion (Fish Oil &amp; Plant Based) 20% Infusion: 233 mL    Free Water: 100 mL    IV PiggyBack: 100 mL    IV PiggyBack: 100 mL    PPN (Peripheral Parenteral Nutrition): 1335 mL  Total IN: 1868 mL    OUT:    Bulb (mL): 10 mL    Indwelling Catheter - Urethral (mL): 300 mL    Nasogastric/Oral tube (mL): 300 mL    Stool (mL): 200 mL    Voided (mL): 800 mL  Total OUT: 1610 mL    Total NET: 258 mL  *Positve fluid balance: pt with 4+ generalized edema. Will monitor/adjust total PPN volume prn. TF now on hold    *malnutrition: Pt continues to meet criteria for severe protein-calorie malnutrition in context of acute disease r/t decreased ability to meet increased nutrient needs 2/2 SBO AEB <50% ENN x >11 days, severe muscle/ fat wasting, severe edema    Estimated Needs: based on 97.4 Kg (wt from admit - current wts being skewed by severe fluid retention)  Calories: 1948- 2435 Kcal (20- 25 Kcal/Kg)  Protein: 146- 175 g (1.5- 1.8 g/Kg)  Fluids: 1948- 2435 mL (20- 25 mL/Kg)    Weight History:  Daily Weight in k.6 (2023 06:00)   - #  Height (cm): 165.1 (23 @ 16:08)  Weight (kg): 97.4 (23 @ 23:30) - admit wt ()  BMI (kg/m2): 35.7 (23 @ 23:30)  BSA (m2): 2.04 (23 @ 23:30)    Weight History:  Height (cm): 165.1 (23 @ 16:08)  Weight (kg): 97.4 (23 @ 23:30)  BMI (kg/m2): 35.7 (23 @ 23:30)  BSA (m2): 2.04 (23 @ 23:30)    PPN Recommendations: via peripheral line  Total Volume: 2000 mL  80 g  Amino Acids  100 g Dextrose  80 g Lipids 20%  121 mEq Sodium Chloride  6 mEq Sodium Acetate  20 mmol Sodium Phosphate  26 mEq Potassium Chloride  20 mEq Potassium Acetate  30 mmol Potassium Phosphate  0 mEq Calcium Gluconate  12 mEq Magnesium Sulfate  200 mg Thiamine  1 ml Trace Elements  10 ml MVI    Total Calories: 1460 kcal  (Provides 60% of daily energy requirements and 46% of daily protein requirements)    (osmolarity <900)    Additional Recommendations:    1) Daily weights  2) Strict I & O's  3) Daily lyte checks including magnesium and phos  4) Weekly triglycerides/LFT checks  5) POCT q6hrs; maintain 140-180mg/dL  6) Strongly consider placing PICC line to meet 100% of nutr needs  or Confirm goals of care regarding LONG-TERM nutrition support  7) Re-start TF when able    RD will continue to monitor PPN, labs, hydration, and wt prn.   Liss Bruner, MS, RDN, [phone]  Bushra Soni, MS, RDN, [facility] [phone]  Certified Nutrition

## 2023-02-07 NOTE — CONSULT NOTE ADULT - ASSESSMENT
Assessment:     73 y/o female with multiple medical comorbidities including previous SBO admitted for recurrent SBO.  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     Process of Care  --Reviewed dx/treatment problems and alignment with Goals of Care    Physical Aspects of Care  --Pain - Patient denies at this time. c/w current management  --Bowel Regimen - pt with EC Fistuala - monitor output and treatment as per surgery  --Chronic Respiratory Failure - Emphysema on home O2. - No SOB at this time. Comfortable and in NAD.  maintain home supplemental O2   --Nausea Vomiting - denies  --Debility/Weakness - PT as tolerated, OOB to chair, fall precautions  --Acute Metabolic Encephalopathy - treatment of possible underlying infections.  frequent verbal redirection/reorientation.  pain control.  avoid sensorium altering medications when possible.      Psychological and Psychiatric Aspects of Care:   --Grief/Bereavement: emotional support provided  --Hx of psychiatric dx: none  -Pastoral Care Available PRN     Social Aspects of Care  -SW involved     Cultural Aspects  -Primary Language: English    Goals of Care:  see above    Ethical and Legal Aspects: NA  Capacity- I feel pt lacks capacity for major medical decisions at this time.      HCP/Surrogate: children - Moe and Fabby.     Code Status- DNR/DNI  MOLST- in chart -- DNR/DNI and no feeding tube  Dispo Plan- tbd (from SNF)    Discussed With: SICU PAPO Rowe

## 2023-02-07 NOTE — CONSULT NOTE ADULT - CONVERSATION DETAILS
As pt is lethargic and I cannot confidently say she has capacity at this time, I contacted her son, Meo Matute, by phone.   We discussed Palliative Care team being a supportive team when a patient has ongoing illnesses.  We also discussed that it is not an end of life care service, but can help navigate symptoms and emotional support throughout their hospital stay here. he is aware of his mother's condition and was able to provide more background information about his mother's clinical condition.  she had a SBO (he is unsure of the etiology) 6-7 years ago that resulted in a colostomy, which was later reversed.  her stroke 4 years ago left her without use of her left side.  He tells me that his mother generally has her faculties.  They have discussed advanced directives previously, she wants DNR/DNI and no feeding tube.  (currently, the is a NGT to suction).  He and his sister help his mother with decisions, and at this time, is willing to consider treatment options presented by team.

## 2023-02-07 NOTE — PROGRESS NOTE ADULT - ASSESSMENT
ASSESSMENT  73 yo female with PMHx of CVA with left sided weakness, atrial fibrillation, emphysema, COPD, HTN - wears 2L NC at baseline BIBA, hx SBO and resection per EMS presents to the ED from [facility] for RLQ pain  x1 episode of emesis.    Admitted to surgery for SBO    s/p ex lap with RICHARD, ileocolectomy, large ventral hernia repair 1/31  Intra op findings - incarcerated hernia and extensive adhesions.    Post op course c/b   ALVERTO and EC fistula    PLAN  - pain control prn.   - BP mildly elevated. hydralazine overnight. added IV lopressor.  - on 4L NC sating 97% (baseline 2L ). cont budesonide. will check CXR and ABG. added albuterol nebulizers.   - NPO. PPN. NGT clamped. 2 JPs draining feculant material. wound dehisced. no surgical intervention, will rescan abd pelvis at end of week as per surgery.  - EC fistula. cont octreotide. keep input = output, replace losses  - Woodruff removed, voding spontaneously. Cr stable. monitor I & Os.   - hyperglycemia. given lantus 5units this am x1. started lantus 8u qHS. cont ISS. BGMs q6hr  - cont IV cefepime and IV metronizadole for abd infection, peritonitis.   - DVT ppx- hep sq. SCDs. pt on warfarin for Afib. resume full AC when okay with surgery.   PT eval  Palliative consult    Dispo: Downgrade. IV abx. PPN. glucose control. BP control. octreotide for EC fistula    Will discuss with Dr. Mccormick

## 2023-02-07 NOTE — PROGRESS NOTE ADULT - ASSESSMENT
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 She was noted with one episode of emesis and diffuse abdominal pain associated with fever. On 1/27 the patient was noted febrile to 102.8F, more lethargic, did not open eyes, did not follow commands. She received cefepime and metronidazole.     1. Intraabdominal hernia related fat necrosis with superimposed infection with KLOX. 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 UTI with KLPN resolved. Right lower lobes nodules Probable pneumonia. Allergy to cipro. Encephalopathy.   -leukocytosis resolving  -abdominal fistula draining  -BC x 2 noted  -urine c/s noted  -on cefepime 2 gm IV q12h and metronidazole 500 mg IV q8h # 11  -tolerating abx well so far; no side effects noted  -aspiration precautions  -continue abx coverage   -monitor abdomen  -monitor temps  -f/u CBC  -supportive care  2. Other issues:   -care per medicine    d/w Dr. Castro

## 2023-02-07 NOTE — PROGRESS NOTE ADULT - NS ATTEND AMEND GEN_ALL_CORE FT
S/P ilieocolectomy    Plan;  Now with an EC fistula  Continue Octotride  NPo  PPN  Cefepime and flagyl   SQH DVT prophylaxis
S/P Ex lap  Now with an EC fistula    Plan;  Transfer to med surg  NPO  PPN  Palliative Ecal    GOC: DNR/DNI

## 2023-02-08 LAB
ALBUMIN SERPL ELPH-MCNC: 1.3 G/DL — LOW (ref 3.3–5)
ALP SERPL-CCNC: 65 U/L — SIGNIFICANT CHANGE UP (ref 40–120)
ALT FLD-CCNC: 9 U/L — LOW (ref 12–78)
ANION GAP SERPL CALC-SCNC: 7 MMOL/L — SIGNIFICANT CHANGE UP (ref 5–17)
AST SERPL-CCNC: 12 U/L — LOW (ref 15–37)
BILIRUB SERPL-MCNC: 0.3 MG/DL — SIGNIFICANT CHANGE UP (ref 0.2–1.2)
BUN SERPL-MCNC: 15 MG/DL — SIGNIFICANT CHANGE UP (ref 7–23)
CALCIUM SERPL-MCNC: 7.5 MG/DL — LOW (ref 8.5–10.1)
CHLORIDE SERPL-SCNC: 106 MMOL/L — SIGNIFICANT CHANGE UP (ref 96–108)
CO2 SERPL-SCNC: 23 MMOL/L — SIGNIFICANT CHANGE UP (ref 22–31)
CREAT SERPL-MCNC: 0.57 MG/DL — SIGNIFICANT CHANGE UP (ref 0.5–1.3)
EGFR: 96 ML/MIN/1.73M2 — SIGNIFICANT CHANGE UP
GLUCOSE BLDC GLUCOMTR-MCNC: 221 MG/DL — HIGH (ref 70–99)
GLUCOSE BLDC GLUCOMTR-MCNC: 241 MG/DL — HIGH (ref 70–99)
GLUCOSE BLDC GLUCOMTR-MCNC: 253 MG/DL — HIGH (ref 70–99)
GLUCOSE BLDC GLUCOMTR-MCNC: 255 MG/DL — HIGH (ref 70–99)
GLUCOSE BLDC GLUCOMTR-MCNC: 258 MG/DL — HIGH (ref 70–99)
GLUCOSE BLDC GLUCOMTR-MCNC: 294 MG/DL — HIGH (ref 70–99)
GLUCOSE SERPL-MCNC: 287 MG/DL — HIGH (ref 70–99)
HCT VFR BLD CALC: 28.5 % — LOW (ref 34.5–45)
HGB BLD-MCNC: 9.1 G/DL — LOW (ref 11.5–15.5)
MAGNESIUM SERPL-MCNC: 1.4 MG/DL — LOW (ref 1.6–2.6)
MCHC RBC-ENTMCNC: 27.7 PG — SIGNIFICANT CHANGE UP (ref 27–34)
MCHC RBC-ENTMCNC: 31.9 GM/DL — LOW (ref 32–36)
MCV RBC AUTO: 86.6 FL — SIGNIFICANT CHANGE UP (ref 80–100)
PHOSPHATE SERPL-MCNC: 2.3 MG/DL — LOW (ref 2.5–4.5)
PLATELET # BLD AUTO: 278 K/UL — SIGNIFICANT CHANGE UP (ref 150–400)
POTASSIUM SERPL-MCNC: 4.6 MMOL/L — SIGNIFICANT CHANGE UP (ref 3.5–5.3)
POTASSIUM SERPL-SCNC: 4.6 MMOL/L — SIGNIFICANT CHANGE UP (ref 3.5–5.3)
PROT SERPL-MCNC: 5.8 GM/DL — LOW (ref 6–8.3)
RBC # BLD: 3.29 M/UL — LOW (ref 3.8–5.2)
RBC # FLD: 20.3 % — HIGH (ref 10.3–14.5)
SODIUM SERPL-SCNC: 136 MMOL/L — SIGNIFICANT CHANGE UP (ref 135–145)
WBC # BLD: 10.54 K/UL — HIGH (ref 3.8–10.5)
WBC # FLD AUTO: 10.54 K/UL — HIGH (ref 3.8–10.5)

## 2023-02-08 PROCEDURE — 99232 SBSQ HOSP IP/OBS MODERATE 35: CPT

## 2023-02-08 PROCEDURE — 99233 SBSQ HOSP IP/OBS HIGH 50: CPT

## 2023-02-08 RX ORDER — METOPROLOL TARTRATE 50 MG
5 TABLET ORAL EVERY 6 HOURS
Refills: 0 | Status: DISCONTINUED | OUTPATIENT
Start: 2023-02-08 | End: 2023-02-12

## 2023-02-08 RX ORDER — I.V. FAT EMULSION 20 G/100ML
0.82 EMULSION INTRAVENOUS
Qty: 80 | Refills: 0 | Status: DISCONTINUED | OUTPATIENT
Start: 2023-02-08 | End: 2023-02-09

## 2023-02-08 RX ORDER — ELECTROLYTE SOLUTION,INJ
1 VIAL (ML) INTRAVENOUS
Refills: 0 | Status: DISCONTINUED | OUTPATIENT
Start: 2023-02-08 | End: 2023-02-08

## 2023-02-08 RX ORDER — METOPROLOL TARTRATE 50 MG
5 TABLET ORAL ONCE
Refills: 0 | Status: COMPLETED | OUTPATIENT
Start: 2023-02-08 | End: 2023-02-08

## 2023-02-08 RX ORDER — HYDROMORPHONE HYDROCHLORIDE 2 MG/ML
0.5 INJECTION INTRAMUSCULAR; INTRAVENOUS; SUBCUTANEOUS
Refills: 0 | Status: DISCONTINUED | OUTPATIENT
Start: 2023-02-08 | End: 2023-02-15

## 2023-02-08 RX ADMIN — Medication 6: at 18:05

## 2023-02-08 RX ADMIN — Medication 6: at 06:25

## 2023-02-08 RX ADMIN — HYDROMORPHONE HYDROCHLORIDE 0.5 MILLIGRAM(S): 2 INJECTION INTRAMUSCULAR; INTRAVENOUS; SUBCUTANEOUS at 18:20

## 2023-02-08 RX ADMIN — Medication 1 EACH: at 23:52

## 2023-02-08 RX ADMIN — Medication 6: at 00:50

## 2023-02-08 RX ADMIN — Medication 70 MICROGRAM(S): at 22:46

## 2023-02-08 RX ADMIN — HYDROMORPHONE HYDROCHLORIDE 0.5 MILLIGRAM(S): 2 INJECTION INTRAMUSCULAR; INTRAVENOUS; SUBCUTANEOUS at 22:47

## 2023-02-08 RX ADMIN — HYDROMORPHONE HYDROCHLORIDE 0.5 MILLIGRAM(S): 2 INJECTION INTRAMUSCULAR; INTRAVENOUS; SUBCUTANEOUS at 07:58

## 2023-02-08 RX ADMIN — ENOXAPARIN SODIUM 100 MILLIGRAM(S): 100 INJECTION SUBCUTANEOUS at 10:15

## 2023-02-08 RX ADMIN — HYDROMORPHONE HYDROCHLORIDE 0.5 MILLIGRAM(S): 2 INJECTION INTRAMUSCULAR; INTRAVENOUS; SUBCUTANEOUS at 02:01

## 2023-02-08 RX ADMIN — I.V. FAT EMULSION 33.33 GM/KG/DAY: 20 EMULSION INTRAVENOUS at 07:11

## 2023-02-08 RX ADMIN — Medication 6: at 11:58

## 2023-02-08 RX ADMIN — CHLORHEXIDINE GLUCONATE 1 APPLICATION(S): 213 SOLUTION TOPICAL at 06:21

## 2023-02-08 RX ADMIN — I.V. FAT EMULSION 33.33 GM/KG/DAY: 20 EMULSION INTRAVENOUS at 23:52

## 2023-02-08 RX ADMIN — Medication 100 MILLIGRAM(S): at 14:42

## 2023-02-08 RX ADMIN — NYSTATIN CREAM 1 APPLICATION(S): 100000 CREAM TOPICAL at 22:49

## 2023-02-08 RX ADMIN — Medication 110 MILLIGRAM(S): at 02:04

## 2023-02-08 RX ADMIN — NYSTATIN CREAM 1 APPLICATION(S): 100000 CREAM TOPICAL at 06:25

## 2023-02-08 RX ADMIN — PANTOPRAZOLE SODIUM 40 MILLIGRAM(S): 20 TABLET, DELAYED RELEASE ORAL at 10:15

## 2023-02-08 RX ADMIN — NYSTATIN CREAM 1 APPLICATION(S): 100000 CREAM TOPICAL at 10:14

## 2023-02-08 RX ADMIN — ALBUTEROL 2.5 MILLIGRAM(S): 90 AEROSOL, METERED ORAL at 20:28

## 2023-02-08 RX ADMIN — HYDROMORPHONE HYDROCHLORIDE 0.5 MILLIGRAM(S): 2 INJECTION INTRAMUSCULAR; INTRAVENOUS; SUBCUTANEOUS at 18:05

## 2023-02-08 RX ADMIN — ALBUTEROL 2.5 MILLIGRAM(S): 90 AEROSOL, METERED ORAL at 08:59

## 2023-02-08 RX ADMIN — OCTREOTIDE ACETATE 75 MICROGRAM(S): 200 INJECTION, SOLUTION INTRAVENOUS; SUBCUTANEOUS at 06:21

## 2023-02-08 RX ADMIN — ALBUTEROL 2.5 MILLIGRAM(S): 90 AEROSOL, METERED ORAL at 14:28

## 2023-02-08 RX ADMIN — Medication 110 MILLIGRAM(S): at 13:42

## 2023-02-08 RX ADMIN — HYDROMORPHONE HYDROCHLORIDE 0.5 MILLIGRAM(S): 2 INJECTION INTRAMUSCULAR; INTRAVENOUS; SUBCUTANEOUS at 08:13

## 2023-02-08 RX ADMIN — Medication 100 MILLIGRAM(S): at 22:48

## 2023-02-08 RX ADMIN — NYSTATIN CREAM 1 APPLICATION(S): 100000 CREAM TOPICAL at 18:06

## 2023-02-08 RX ADMIN — AMIODARONE HYDROCHLORIDE 200 MILLIGRAM(S): 400 TABLET ORAL at 10:16

## 2023-02-08 RX ADMIN — Medication 0.5 MILLIGRAM(S): at 20:28

## 2023-02-08 RX ADMIN — ALBUTEROL 2.5 MILLIGRAM(S): 90 AEROSOL, METERED ORAL at 02:01

## 2023-02-08 RX ADMIN — HYDROMORPHONE HYDROCHLORIDE 0.5 MILLIGRAM(S): 2 INJECTION INTRAMUSCULAR; INTRAVENOUS; SUBCUTANEOUS at 14:42

## 2023-02-08 RX ADMIN — OCTREOTIDE ACETATE 75 MICROGRAM(S): 200 INJECTION, SOLUTION INTRAVENOUS; SUBCUTANEOUS at 15:40

## 2023-02-08 RX ADMIN — Medication 100 MILLIGRAM(S): at 06:29

## 2023-02-08 RX ADMIN — Medication 110 MILLIGRAM(S): at 06:29

## 2023-02-08 RX ADMIN — HYDROMORPHONE HYDROCHLORIDE 0.5 MILLIGRAM(S): 2 INJECTION INTRAMUSCULAR; INTRAVENOUS; SUBCUTANEOUS at 14:57

## 2023-02-08 RX ADMIN — INSULIN GLARGINE 8 UNIT(S): 100 INJECTION, SOLUTION SUBCUTANEOUS at 22:47

## 2023-02-08 RX ADMIN — ENOXAPARIN SODIUM 100 MILLIGRAM(S): 100 INJECTION SUBCUTANEOUS at 22:48

## 2023-02-08 RX ADMIN — Medication 110 MILLIGRAM(S): at 19:42

## 2023-02-08 RX ADMIN — OCTREOTIDE ACETATE 75 MICROGRAM(S): 200 INJECTION, SOLUTION INTRAVENOUS; SUBCUTANEOUS at 22:47

## 2023-02-08 RX ADMIN — CEFEPIME 100 MILLIGRAM(S): 1 INJECTION, POWDER, FOR SOLUTION INTRAMUSCULAR; INTRAVENOUS at 22:47

## 2023-02-08 RX ADMIN — Medication 0.5 MILLIGRAM(S): at 08:59

## 2023-02-08 RX ADMIN — CEFEPIME 100 MILLIGRAM(S): 1 INJECTION, POWDER, FOR SOLUTION INTRAMUSCULAR; INTRAVENOUS at 10:15

## 2023-02-08 NOTE — PROGRESS NOTE ADULT - SUBJECTIVE AND OBJECTIVE BOX
MARALRACHANA ANGLIN  72y  Female      Patient is a 72y old  Female who presents with a chief complaint of bowel obstruction/ischemic mesentery (08 Feb 2023 09:06)    72F with PMHx CVA, pAF on AC, emphysema/COPD on home O2, HTN, SBO with prior resection who presented with abd pain, N/V. Found to have an small bowel obstruction. Taken to OR underwent ELAP, extensive RICHARD, ileocolectomy, ventral hernia repair with MESH. Postop course complicated with ALVERTO, shock requiring pressors    INTERVAL HPI/OVERNIGHT EVENTS:  Seen and examined, lethargic, opens eyes. Not following commands, appears comfortable      REVIEW OF SYSTEMS:  Unable to 2/2 to mental status    T(C): 36.3 (02-08-23 @ 07:33), Max: 37.9 (02-07-23 @ 14:27)  HR: 99 (02-08-23 @ 11:59) (69 - 103)  BP: 153/83 (02-08-23 @ 11:59) (122/62 - 178/83)  RR: 18 (02-08-23 @ 07:33) (16 - 23)  SpO2: 93% (02-08-23 @ 09:01) (92% - 98%)  Wt(kg): --Vital Signs Last 24 Hrs  T(C): 36.3 (08 Feb 2023 07:33), Max: 37.9 (07 Feb 2023 14:27)  T(F): 97.4 (08 Feb 2023 07:33), Max: 100.3 (07 Feb 2023 14:27)  HR: 99 (08 Feb 2023 11:59) (69 - 103)  BP: 153/83 (08 Feb 2023 11:59) (122/62 - 178/83)  BP(mean): 92 (07 Feb 2023 20:24) (86 - 95)  RR: 18 (08 Feb 2023 07:33) (16 - 23)  SpO2: 93% (08 Feb 2023 09:01) (92% - 98%)    Parameters below as of 08 Feb 2023 09:01  Patient On (Oxygen Delivery Method): nasal cannula,2L        PHYSICAL EXAM:  GENERAL: Obese, lethargic, appears ill  HEAD:  Atraumatic, Normocephalic  ENMT: +NGT+   NECK: Supple, No JVD, Normal thyroid  NERVOUS SYSTEM:  Lethargic, opens eyes, not following commands  CHEST/LUNG: Clear to percussion bilaterally; No rales, rhonchi, wheezing, or rubs  HEART: Regular rate and rhythm; No murmurs, rubs, or gallops  ABDOMEN: Mid abd incision w dressing c/d/i, some surrounding scant erythema  EXTREMITIES:  2+ Peripheral Pulses, No clubbing, cyanosis, or edema  LYMPH: No lymphadenopathy noted  SKIN: No rashes or lesions    Consultant(s) Notes Reviewed:  [x ] YES  [ ] NO  Care Discussed with Consultants/Other Providers [ x] YES  [ ] NO    LABS:                        9.1    10.54 )-----------( 278      ( 08 Feb 2023 05:18 )             28.5     02-08    136  |  106  |  15  ----------------------------<  287<H>  4.6   |  23  |  0.57    Ca    7.5<L>      08 Feb 2023 05:18  Phos  2.3     02-08  Mg     1.4     02-08    TPro  5.8<L>  /  Alb  1.3<L>  /  TBili  0.3  /  DBili  x   /  AST  12<L>  /  ALT  9<L>  /  AlkPhos  65  02-08        CAPILLARY BLOOD GLUCOSE      POCT Blood Glucose.: 294 mg/dL (08 Feb 2023 11:53)  POCT Blood Glucose.: 255 mg/dL (08 Feb 2023 06:16)  POCT Blood Glucose.: 258 mg/dL (08 Feb 2023 00:46)  POCT Blood Glucose.: 324 mg/dL (07 Feb 2023 20:18)  POCT Blood Glucose.: 312 mg/dL (07 Feb 2023 17:59)      ABG - ( 07 Feb 2023 12:14 )  pH, Arterial: 7.48  pH, Blood: x     /  pCO2: 28    /  pO2: 97    / HCO3: 21    / Base Excess: -1.4  /  SaO2: 98                    RADIOLOGY & ADDITIONAL TESTS:    Imaging Personally Reviewed:  [ ] YES  [ ] NO    HEALTH ISSUES - PROBLEM Dx:

## 2023-02-08 NOTE — PROGRESS NOTE ADULT - SUBJECTIVE AND OBJECTIVE BOX
Date of service: 02-08-23 @ 15:05    Lying in bed in NAD  Lethargic  Dose not seem in pain  Lower abdomen is tender  Has low grade fever    ROS: poorly verbal    MEDICATIONS  (STANDING):  albuterol    0.083% 2.5 milliGRAM(s) Nebulizer every 6 hours  aMIOdarone    Tablet 200 milliGRAM(s) Oral daily  atorvastatin 10 milliGRAM(s) Oral at bedtime  buDESOnide    Inhalation Suspension 0.5 milliGRAM(s) Inhalation every 12 hours  cefepime   IVPB 2000 milliGRAM(s) IV Intermittent every 12 hours  chlorhexidine 4% Liquid 1 Application(s) Topical <User Schedule>  coronavirus bivalent (EUA) Booster Vaccine (PFIZER) 0.3 milliLiter(s) IntraMuscular once  dextrose 5%. 1000 milliLiter(s) (100 mL/Hr) IV Continuous <Continuous>  dextrose 5%. 1000 milliLiter(s) (50 mL/Hr) IV Continuous <Continuous>  dextrose 50% Injectable 25 Gram(s) IV Push once  dextrose 50% Injectable 12.5 Gram(s) IV Push once  dextrose 50% Injectable 25 Gram(s) IV Push once  enoxaparin Injectable 100 milliGRAM(s) SubCutaneous every 12 hours  fat emulsion (Fish Oil and Plant Based) 20% Infusion 0.821 Gm/kG/Day (33.33 mL/Hr) IV Continuous <Continuous>  fat emulsion (Fish Oil and Plant Based) 20% Infusion 0.821 Gm/kG/Day (33.33 mL/Hr) IV Continuous <Continuous>  glucagon  Injectable 1 milliGRAM(s) IntraMuscular once  insulin glargine Injectable (LANTUS) 8 Unit(s) SubCutaneous at bedtime  insulin lispro (ADMELOG) corrective regimen sliding scale   SubCutaneous every 6 hours  levothyroxine Injectable 70 MICROGram(s) IV Push at bedtime  metoprolol tartrate IVPB 5 milliGRAM(s) IV Intermittent every 6 hours  metroNIDAZOLE  IVPB 500 milliGRAM(s) IV Intermittent every 8 hours  nystatin Cream 1 Application(s) Topical two times a day  nystatin Powder 1 Application(s) Topical every 12 hours  octreotide  Injectable 75 MICROGram(s) SubCutaneous every 8 hours  pantoprazole  Injectable 40 milliGRAM(s) IV Push daily  Parenteral Nutrition - Adult 1 Each (83 mL/Hr) TPN Continuous <Continuous>  Parenteral Nutrition - Adult 1 Each (83 mL/Hr) TPN Continuous <Continuous>    Vital Signs Last 24 Hrs  T(C): 36.3 (08 Feb 2023 07:33), Max: 37.7 (07 Feb 2023 17:36)  T(F): 97.4 (08 Feb 2023 07:33), Max: 99.8 (07 Feb 2023 17:36)  HR: 90 (08 Feb 2023 14:15) (69 - 103)  BP: 153/83 (08 Feb 2023 11:59) (122/62 - 178/83)  BP(mean): 92 (07 Feb 2023 20:24) (86 - 95)  RR: 18 (08 Feb 2023 07:33) (16 - 23)  SpO2: 92% (08 Feb 2023 14:15) (92% - 98%)    Parameters below as of 08 Feb 2023 14:15  Patient On (Oxygen Delivery Method): nasal cannula,2 L     Physical exam:    Constitutional:  No acute distress  HEENT: NC/AT, EOMI, PERRLA, conjunctivae clear; ears and nose atraumatic  Neck: supple; thyroid not palpable  Back: no tenderness  Respiratory: respiratory effort normal; crackles at bases  Cardiovascular: S1S2 regular, no murmurs  Abdomen: soft, mid abdomen tender, distended, positive BS  abdominal postsurgical wound with fecaloid drainage from drain site   Genitourinary: no suprapubic tenderness  Lymphatic: no LN palpable  Musculoskeletal: no muscle tenderness, no joint swelling or tenderness  Extremities: no pedal edema  Neurological/ Psychiatric: confused, judgement and insight impaired; moving all extremities  Skin: no rashes; no palpable lesions    Labs: reviewed                        9.1    10.54 )-----------( 278      ( 08 Feb 2023 05:18 )             28.5     02-08    136  |  106  |  15  ----------------------------<  287<H>  4.6   |  23  |  0.57    Ca    7.5<L>      08 Feb 2023 05:18  Phos  2.3     02-08  Mg     1.4     02-08    TPro  5.8<L>  /  Alb  1.3<L>  /  TBili  0.3  /  DBili  x   /  AST  12<L>  /  ALT  9<L>  /  AlkPhos  65  02-08                        9.5    4.85  )-----------( 323      ( 06 Feb 2023 05:57 )             30.5     02-06    139  |  110<H>  |  17  ----------------------------<  197<H>  4.4   |  21<L>  |  0.68    Ca    7.5<L>      06 Feb 2023 05:57  Phos  2.9     02-06  Mg     2.0     02-06    TPro  5.8<L>  /  Alb  1.5<L>  /  TBili  0.6  /  DBili  x   /  AST  9<L>  /  ALT  14  /  AlkPhos  57  02-06               10.9   19.72 )-----------( 510      ( 27 Jan 2023 06:16 )             36.7     01-27    142  |  111<H>  |  10  ----------------------------<  233<H>  3.8   |  26  |  0.78    Ca    8.0<L>      27 Jan 2023 06:16  Phos  2.7     01-27  Mg     1.8     01-27      Culture - Acid Fast - Tissue w/Smear (collected 31 Jan 2023 18:07)  Source: .Tissue INTRAPERITONEAL ABCESS FOR CX    Culture - Fungal, Tissue (collected 31 Jan 2023 18:07)  Source: .Tissue INTRAPERITONEAL ABCESS FOR CX  Preliminary Report (01 Feb 2023 07:09):    Testing in progress    Culture - Tissue with Gram Stain (collected 31 Jan 2023 18:07)  Source: .Tissue INTRAPERITONEAL ABCESS FOR CX  Gram Stain (01 Feb 2023 04:37):    Rare polymorphonuclear leukocytes seen per low power field    No organisms seen per oil power field  Preliminary Report (02 Feb 2023 20:01):    Growth in fluid media only Klebsiella oxytoca/Raoultella ornithinolytica  Organism: Klebsiella oxytoca /Raoutella ornithinolytica (03 Feb 2023 17:25)  Organism: Klebsiella oxytoca /Raoutella ornithinolytica (03 Feb 2023 17:25)      -  Amikacin: S <=16      -  Amoxicillin/Clavulanic Acid: I 16/8      -  Ampicillin: R >16 These ampicillin results predict results for amoxicillin      -  Ampicillin/Sulbactam: R >16/8 Enterobacter, Klebsiella aerogenes, Citrobacter, and Serratia may develop resistance during prolonged therapy (3-4 days)      -  Aztreonam: S <=4      -  Cefazolin: R >16 Enterobacter, Klebsiella aerogenes, Citrobacter, and Serratia may develop resistance during prolonged therapy (3-4 days)      -  Cefepime: S <=2      -  Cefoxitin: S <=8      -  Ceftriaxone: S <=1 Enterobacter, Klebsiella aerogenes, Citrobacter, and Serratia may develop resistance during prolonged therapy      -  Ciprofloxacin: S <=0.25      -  Ertapenem: S <=0.5      -  Gentamicin: S <=2      -  Imipenem: S <=1      -  Levofloxacin: S <=0.5      -  Meropenem: S <=1      -  Piperacillin/Tazobactam: R 64      -  Tobramycin: S <=2      -  Trimethoprim/Sulfamethoxazole: S <=0.5/9.5      Method Type: ARABELLA    Culture - Blood (collected 27 Jan 2023 06:16)  Source: .Blood None  Final Report (01 Feb 2023 09:00):    No Growth Final    Culture - Blood (collected 27 Jan 2023 06:16)  Source: .Blood None  Final Report (01 Feb 2023 09:00):    No Growth Final    Radiology: all available radiological tests reviewed    < from: CT Chest No Cont (01.27.23 @ 09:53) >  Small bowel obstruction with transition point at the neck of a left lower   quadrant abdominal wall hernia. It is uncertain whether the obstruction   is due to the hernia itself or to adhesions.    Additional massive ventral hernia containing small and large bowel and   epigastric hernia containing stomach.    Right lower lobe nodules new since December 04, 2021 and could be   infectious or neoplastic. Follow-up chest CT in 3 months is recommended   to reevaluate.    < end of copied text >      Advanced directives addressed: full resuscitation

## 2023-02-08 NOTE — PROGRESS NOTE ADULT - SUBJECTIVE AND OBJECTIVE BOX
Subjective:    seen at bedside, pt is awake and alert.  She remains somewhat lethargic and difficult to understand. denies pain.   she does not appear to be in acute distress  overnight, pt was downgraded from ICU to F.      Review of Systems:  Unable to obtain/Limited due to: AMS        PHYSICAL EXAM:    Vital Signs Last 24 Hrs  T(C): 36.3 (2023 16:05), Max: 37.7 (2023 17:36)  T(F): 97.4 (2023 16:05), Max: 99.8 (2023 17:36)  HR: 96 (2023 16:05) (69 - 103)  BP: 152/73 (2023 16:05) (122/62 - 178/83)  BP(mean): 92 (2023 20:24) (86 - 95)  RR: 18 (2023 16:05) (16 - 23)  SpO2: 92% (2023 16:05) (92% - 98%)    Parameters below as of 2023 16:05  Patient On (Oxygen Delivery Method): nasal cannula      Daily     Daily Weight in k.3 (2023 06:25)        General:  - GENERAL: Alert. No acute distress.  Obese  - EYES: EOMI. Anicteric.   - HENT: Moist mucous membranes.   - LUNGS: Clear to auscultation bilaterally. No accessory muscle use. Speaking in few words at a time  - CARDIOVASCULAR: Regular rate and rhythm. No murmur. No JVD. No edema.   - ABDOMEN: Soft, non-tender and non-distended. + Ostomy bag over EC Fistula.  DEA Drain in place.   - EXTREMITIES: No edema. Non-tender.    - SKIN: Warm, no rashes or lesions on visible skin.       LABS:                        9.1    10.54 )-----------( 278      ( 2023 05:18 )             28.5         136  |  106  |  15  ----------------------------<  287<H>  4.6   |  23  |  0.57    Ca    7.5<L>      2023 05:18  Phos  2.3     -  Mg     1.4         TPro  5.8<L>  /  Alb  1.3<L>  /  TBili  0.3  /  DBili  x   /  AST  12<L>  /  ALT  9<L>  /  AlkPhos  65        Albumin: Albumin, Serum: 1.3 g/dL ( @ 05:18)      Allergies    Cipro (Rash)  Spiriva (Rash)    Intolerances      MEDICATIONS  (STANDING):  albuterol    0.083% 2.5 milliGRAM(s) Nebulizer every 6 hours  aMIOdarone    Tablet 200 milliGRAM(s) Oral daily  atorvastatin 10 milliGRAM(s) Oral at bedtime  buDESOnide    Inhalation Suspension 0.5 milliGRAM(s) Inhalation every 12 hours  cefepime   IVPB 2000 milliGRAM(s) IV Intermittent every 12 hours  chlorhexidine 4% Liquid 1 Application(s) Topical <User Schedule>  coronavirus bivalent (EUA) Booster Vaccine (PFIZER) 0.3 milliLiter(s) IntraMuscular once  dextrose 5%. 1000 milliLiter(s) (50 mL/Hr) IV Continuous <Continuous>  dextrose 5%. 1000 milliLiter(s) (100 mL/Hr) IV Continuous <Continuous>  dextrose 50% Injectable 25 Gram(s) IV Push once  dextrose 50% Injectable 12.5 Gram(s) IV Push once  dextrose 50% Injectable 25 Gram(s) IV Push once  enoxaparin Injectable 100 milliGRAM(s) SubCutaneous every 12 hours  fat emulsion (Fish Oil and Plant Based) 20% Infusion 0.821 Gm/kG/Day (33.33 mL/Hr) IV Continuous <Continuous>  glucagon  Injectable 1 milliGRAM(s) IntraMuscular once  insulin glargine Injectable (LANTUS) 8 Unit(s) SubCutaneous at bedtime  insulin lispro (ADMELOG) corrective regimen sliding scale   SubCutaneous every 6 hours  levothyroxine Injectable 70 MICROGram(s) IV Push at bedtime  metoprolol tartrate IVPB 5 milliGRAM(s) IV Intermittent every 6 hours  metroNIDAZOLE  IVPB 500 milliGRAM(s) IV Intermittent every 8 hours  nystatin Cream 1 Application(s) Topical two times a day  nystatin Powder 1 Application(s) Topical every 12 hours  octreotide  Injectable 75 MICROGram(s) SubCutaneous every 8 hours  pantoprazole  Injectable 40 milliGRAM(s) IV Push daily  Parenteral Nutrition - Adult 1 Each (83 mL/Hr) TPN Continuous <Continuous>  Parenteral Nutrition - Adult 1 Each (83 mL/Hr) TPN Continuous <Continuous>    MEDICATIONS  (PRN):  acetaminophen     Tablet .. 650 milliGRAM(s) Oral every 6 hours PRN Temp greater or equal to 38C (100.4F)  albuterol    90 MICROgram(s) HFA Inhaler 2 Puff(s) Inhalation every 6 hours PRN Shortness of Breath and/or Wheezing  dextrose Oral Gel 15 Gram(s) Oral once PRN Blood Glucose LESS THAN 70 milliGRAM(s)/deciliter  hydrALAZINE Injectable 10 milliGRAM(s) IV Push every 6 hours PRN SBP>160  HYDROmorphone  Injectable 0.5 milliGRAM(s) IV Push every 3 hours PRN Severe Pain (7 - 10)  sodium chloride 0.9% lock flush 10 milliLiter(s) IV Push every 1 hour PRN Pre/post blood products, medications, blood draw, and to maintain line patency      RADIOLOGY:

## 2023-02-08 NOTE — CHART NOTE - NSCHARTNOTEFT_GEN_A_CORE
Clinical Nutrition PN Follow Up Note    * 73 y/o female with a PMH of CVA with left sided weakness, atrial fibrillation, emphysema, COPD, HTN - wears 2L NC at baseline BIBA, hx SBO and resection per EMS presents to the ED from [facility] for RLQ pain and r/o SBO. x1 episode of emesis, + profound diffuse abdominal pain, febrile. Has SBO, NGT to LWS. Taken to OR on  underwent ExLAP, extensive RICHARD, ileocolectomy, ventral hernia repair with MESH. Postop course complicated with ALVERTO, shock requiring pressors, transferred to ICU. + anasarca   DNR/ DNI. Tx from SICU to [facility] ().    *current status: pt started on TF on 2/3 - began having loose stools and was leaking out of wounds; with EC fistula. TF now on hold, PPN re-started (). Remains with NGT and 1 DEA drain to suction. Will c/w PPN.    *new pertinent meds: NaPhos (15mmol), Lipitor, Lantus (13 units x 24 hours), Admelog (received 30 units x 24 hours), Synthroid    *labs reviewed; K+ WNL. Na+ on lower end of normal limits, however when adjusted for hyperglycemia - Na+ WNL. Hypophosphatemia and hypomagnesemia, will increase in bag again; monitor and adjust tomorrow based on new labs. POCTs continue to be elevated, will add 10 units of insulin inside bag.  Last bili T WNL to c/w trace elements. Last TG level WNL to c/w 80g lipids daily. Corrected Ca WNL, rec'd add 10mEq of Calcium Gluconate outside of PN bag as CAPS is out.     -    136  |  106  |  15  ----------------------------<  287<H>  4.6   |  23  |  0.57    Ca    7.5<L>      2023 05:18  Phos  2.3     02-08  Mg     1.4     02-08    TPro  5.8<L>  /  Alb  1.3<L>  /  TBili  0.3  /  DBili  x   /  AST  12<L>  /  ALT  9<L>  /  AlkPhos  65      POCT Blood Glucose.: 255 mg/dL (2023 06:16)  POCT Blood Glucose.: 258 mg/dL (2023 00:46)  POCT Blood Glucose.: 324 mg/dL (2023 20:18)  POCT Blood Glucose.: 312 mg/dL (2023 17:59)  POCT Blood Glucose.: 253 mg/dL (2023 11:07)    *I&O's Detail    2023 07:01  -  2023 07:00  --------------------------------------------------------  IN:    IV PiggyBack: 100 mL    IV PiggyBack: 100 mL  Total IN: 200 mL    OUT:    Bulb (mL): 10 mL    Stool (mL): 10 mL    Voided (mL): 200 mL  Total OUT: 220 mL    Total NET: -20 mL  *Negative fluid balance: pt with +3 generalized, L Leg, and R Leg edema. Will monitor/adjust total PPN volume prn. TF continues to be on hold.     *malnutrition: Pt continues to meet criteria for severe protein-calorie malnutrition in context of acute disease r/t decreased ability to meet increased nutrient needs 2/2 SBO AEB <50% ENN x >11 days, severe muscle/ fat wasting, severe edema    Estimated Needs: based on 97.4 Kg (wt from admit - current wts being skewed by severe fluid retention)  Calories: 1948- 2435 Kcal (20- 25 Kcal/Kg)  Protein: 146- 175 g (1.5- 1.8 g/Kg)  Fluids: 1948- 2435 mL (20- 25 mL/Kg)    Weight History:  Daily Weight in k.6 (2023 06:00)   - #  Height (cm): 165.1 (23 @ 16:08)  Weight (kg): 97.4 (23 @ 23:30) - admit wt ()  BMI (kg/m2): 35.7 (23 @ 23:30)  BSA (m2): 2.04 (23 @ 23:30)    Weight History:  Height (cm): 165.1 (23 @ 16:08)  Weight (kg): 97.4 (23 @ 23:30)  BMI (kg/m2): 35.7 (23 @ 23:30)  BSA (m2): 2.04 (23 @ 23:30)    PPN Recommendations: via peripheral line  Total Volume: 2000 mL  80 g  Amino Acids  100 g Dextrose  80 g Lipids 20%  121 mEq Sodium Chloride  6 mEq Sodium Acetate  20 mmol Sodium Phosphate  19 mEq Potassium Chloride  13 mEq Potassium Acetate  40 mmol Potassium Phosphate  0 mEq Calcium Gluconate  16 mEq Magnesium Sulfate  200 mg Thiamine  1 ml Trace Elements  10 ml MVI    Total Calories: 1460 kcal  (Provides 60% of daily energy requirements and 46% of daily protein requirements)    (osmolarity <900)    Additional Recommendations:    1) Daily weights  2) Strict I & O's  3) Daily lyte checks including magnesium and phos  4) Weekly triglycerides/LFT checks  5) POCT q6hrs; maintain 140-180mg/dL  6) Strongly consider placing PICC line to meet 100% of nutr needs  or Confirm goals of care regarding LONG-TERM nutrition support    RD will continue to monitor PPN, labs, hydration, and wt prn.   Liss Bruner, MS, RDN, [phone]  Bushra Soni, MS, RDN, [facility] [phone]  Certified Nutrition  Clinical Nutrition PN Follow Up Note    * 73 y/o female with a PMH of CVA with left sided weakness, atrial fibrillation, emphysema, COPD, HTN - wears 2L NC at baseline BIBA, hx SBO and resection per EMS presents to the ED from [facility] for RLQ pain and r/o SBO. x1 episode of emesis, + profound diffuse abdominal pain, febrile. Has SBO, NGT to LWS. Taken to OR on  underwent ExLAP, extensive RICHARD, ileocolectomy, ventral hernia repair with MESH. Postop course complicated with ALVERTO, shock requiring pressors, transferred to ICU. + anasarca   DNR/ DNI.   **pt started on TF on 2/3 - began having loose stools and was leaking out of wounds; with EC fistula. TF now on hold, PPN re-started (). Remains with NGT and 1 DEA drain to suction.    *current status:  Tx from SICU to [facility] (). s/p palliative GOC meeting - DNR, DNI, No feeding tube. PPN not addressed in GOC? Still has NGT to LWS.   Considering GOC, would consider to d/c PPN and allow for PO diet if possible, ? comfort care ?  Will c/w PPN as per surg PA    *new pertinent meds: NaPhos (15mmol), Lipitor, Lantus (13 units x 24 hours), Admelog (received 30 units x 24 hours), Synthroid    *labs reviewed; K+ WNL. Na+ on lower end of normal limits, however when adjusted for hyperglycemia - Na+ WNL. Hypophosphatemia and hypomagnesemia, will increase in bag again; monitor and adjust tomorrow based on new labs. POCTs continue to be elevated despite insulin given outside bag and SS adjusted, will add 10 units of insulin inside bag.  Last bili T WNL to c/w trace elements. Last TG level WNL to c/w 80g lipids daily. Corrected Ca WNL, rec'd add 10mEq of Calcium Gluconate outside of PN bag as CAPS is out.         136  |  106  |  15  ----------------------------<  287<H>  4.6   |  23  |  0.57    Ca    7.5<L>      2023 05:18  Phos  2.3     02-08  Mg     1.4     02-08    TPro  5.8<L>  /  Alb  1.3<L>  /  TBili  0.3  /  DBili  x   /  AST  12<L>  /  ALT  9<L>  /  AlkPhos  65  02-08    POCT Blood Glucose.: 255 mg/dL (2023 06:16)  POCT Blood Glucose.: 258 mg/dL (2023 00:46)  POCT Blood Glucose.: 324 mg/dL (2023 20:18)  POCT Blood Glucose.: 312 mg/dL (2023 17:59)  POCT Blood Glucose.: 253 mg/dL (2023 11:07)    *I&O's Detail    2023 07:01  -  2023 07:00  --------------------------------------------------------  IN:    IV PiggyBack: 100 mL    IV PiggyBack: 100 mL  Total IN: 200 mL    OUT:    Bulb (mL): 10 mL    Stool (mL): 10 mL    Voided (mL): 200 mL  Total OUT: 220 mL    Total NET: -20 mL  *Negative fluid balance: pt with +3 generalized, L Leg, and R Leg edema. Will monitor/adjust total PPN volume prn. TF continues to be on hold.     *malnutrition: Pt continues to meet criteria for severe protein-calorie malnutrition in context of acute disease r/t decreased ability to meet increased nutrient needs 2/2 SBO AEB <50% ENN x >11 days, severe muscle/ fat wasting, severe edema    Estimated Needs: based on 97.4 Kg (wt from admit - current wts being skewed by severe fluid retention)  Calories: 1948- 2435 Kcal (20- 25 Kcal/Kg)  Protein: 146- 175 g (1.5- 1.8 g/Kg)  Fluids: 1948- 2435 mL (20- 25 mL/Kg)    Weight History:  Daily Weight in k.6 (2023 06:00)   - 220#  Height (cm): 165.1 (23 @ 16:08)  Weight (kg): 97.4 (23 @ 23:30) - admit wt ()  BMI (kg/m2): 35.7 (23 @ 23:30)  BSA (m2): 2.04 (23 @ 23:30)    Weight History:  Height (cm): 165.1 (23 @ 16:08)  Weight (kg): 97.4 (23 @ 23:30)  BMI (kg/m2): 35.7 (23 @ 23:30)  BSA (m2): 2.04 (23 @ 23:30)    PPN Recommendations: via peripheral line  Total Volume: 2000 mL  80 g  Amino Acids  100 g Dextrose  80 g Lipids 20%  121 mEq Sodium Chloride  6 mEq Sodium Acetate  20 mmol Sodium Phosphate  19 mEq Potassium Chloride  13 mEq Potassium Acetate  40 mmol Potassium Phosphate  0 mEq Calcium Gluconate  16 mEq Magnesium Sulfate  200 mg Thiamine  1 ml Trace Elements  10 ml MVI    Total Calories: 1460 kcal  (Provides 60% of daily energy requirements and 46% of daily protein requirements)    (osmolarity <900)    Additional Recommendations:    1) Daily weights  2) Strict I & O's  3) Daily lyte checks including magnesium and phos  4) Weekly triglycerides/LFT checks  5) POCT q6hrs; maintain 140-180mg/dL  6) Confirm goals of care regarding LONG-TERM nutrition support specifically referring to PPN/TPN - pt is NOT on TPN (meeting <80% ENN x 6 days while on PPN). Would consider to D/C and allow for PO pleasure feeds    RD will continue to monitor PPN, labs, hydration, and wt prn.   Liss Bruner, MS, RDN, [phone]  Bushra Soni, MS, RDN, [facility] [phone]  Certified Nutrition

## 2023-02-08 NOTE — PROGRESS NOTE ADULT - SUBJECTIVE AND OBJECTIVE BOX
Resting comfortably, no distress  VSS afeb  lungs- clear  cor-RRR  Abd- soft, + BS bilious drainage from drain  Ext- + anasarca

## 2023-02-08 NOTE — PROGRESS NOTE ADULT - ASSESSMENT
Assessment:     71 y/o female with multiple medical comorbidities including previous SBO admitted for recurrent SBO.  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     Process of Care  --Reviewed dx/treatment problems and alignment with Goals of Care    Physical Aspects of Care  --Pain - Patient denies at this time.  monitor for non-verbal signs of distress.  c/w current management  --Bowel Regimen - pt with EC [place] - monitor output and treatment as per surgery  --Chronic Respiratory Failure - Emphysema on home O2. - No respiratory distress.  maintain home supplemental O2   --Nausea Vomiting - denies  --Debility/Weakness - PT as tolerated, OOB to chair, fall precautions  --Acute Metabolic Encephalopathy - treatment of possible underlying infections.  frequent verbal redirection/reorientation.  pain control.  avoid sensorium altering medications when possible.      Psychological and Psychiatric Aspects of Care:   --Grief/Bereavement: emotional support provided  --Hx of psychiatric dx: none  -Pastoral Care Available PRN     Social Aspects of Care  -SW involved     Cultural Aspects  -Primary Language: English    Goals of Care:  DNR/DNI in chart (preexisting).  family are agreeable to continuing all other measures to treat reversible issues    Ethical and Legal Aspects: NA  Capacity- I feel pt lacks capacity for major medical decisions at this time.      HCP/Surrogate: children - Moe and Fabby.     Code Status- DNR/DNI  MOLST- in chart -- DNR/DNI and no feeding tube  Dispo Plan- tbd (from SNF)    Discussed With: nursing

## 2023-02-08 NOTE — CHART NOTE - NSCHARTNOTEFT_GEN_A_CORE
Pt seen for abdominal drain leaking and dressing soiled more frequently. 71 Y/O F S/P SBR, Anastomotic leak, EC fistula s/p NGT S/P 2 x Abdominal Dressing required Suction all three line. Pt downgraded last night from SICU. Pt is not having more than 2 line suction on the Med/surge floor. Required Upgraded to SICU today. D/W Dr. Castro Pt seen for abdominal drain leaking and dressing soiled more frequently. 73 Y/O F S/P SBR, Anastomotic leak, EC fistula s/p NGT S/P 2 x Abdominal Dressing required Suction all three line. Pt downgraded last night from SICU. Pt is not having more than 2 line suction on the Med/surge floor. Required Upgraded to SICU today. D/W Dr. Castro, D/W ICU PA

## 2023-02-08 NOTE — PROGRESS NOTE ADULT - SUBJECTIVE AND OBJECTIVE BOX
Subjective:        Home Medications:  Albuterol (Eqv-ProAir HFA) 90 mcg/inh inhalation aerosol: 1 puff(s) inhaled every 6 hours, As Needed (20 Jan 2023 16:49)  amiodarone 200 mg oral tablet: 1 tab(s) orally once a day (20 Jan 2023 16:49)  ascorbic acid 500 mg oral tablet: 2 tab(s) orally once a day (20 Jan 2023 16:49)  atorvastatin 10 mg oral tablet: 1 tab(s) orally once a day (at bedtime) (20 Jan 2023 16:49)  benzonatate 100 mg oral capsule: 1 cap(s) orally 3 times a day (20 Jan 2023 21:48)  budesonide 0.5 mg/2 mL inhalation suspension: 2 milliliter(s) inhaled 2 times a day (20 Jan 2023 21:48)  Cepacol Sore Throat 15 mg-3.6 mg mucous membrane lozenge: 1 lozenge mucous membrane every 4 hours, As Needed (20 Jan 2023 21:48)  cholecalciferol 1250 mcg (50,000 intl units) oral capsule: 1 cap(s) orally every 4 weeks on Wednesday  (20 Jan 2023 16:49)  Coumadin 2.5 mg oral tablet: 1 tab(s) orally once a day (at bedtime)  ***ON HOLD from 1-16 to 1-21*** (20 Jan 2023 21:48)  Cranberry oral tablet: 1 tab(s) orally 2 times a day (20 Jan 2023 16:49)  cyanocobalamin 1000 mcg oral tablet: 1 tab(s) orally once a day (20 Jan 2023 16:49)  dilTIAZem 180 mg/24 hours oral capsule, extended release: 1 cap(s) orally once a day (20 Jan 2023 16:49)  DULoxetine 60 mg oral delayed release capsule: 1 cap(s) orally once a day (20 Jan 2023 16:49)  estradiol 0.1 mg/g vaginal cream: 0.5 gram(s) vaginal 2 times a week on Monday and Thursday (20 Jan 2023 21:48)  fexofenadine 180 mg oral tablet: 1 tab(s) orally once a day (20 Jan 2023 21:48)  fluticasone 50 mcg/inh nasal spray: 1 spray(s) nasal 2 times a day (20 Jan 2023 16:49)  furosemide 20 mg oral tablet: 1 tab(s) orally once a day (20 Jan 2023 16:49)  glipiZIDE 10 mg oral tablet, extended release: 2 tab(s) orally once a day (20 Jan 2023 16:49)  GlycoLax oral powder for reconstitution: 17 gram(s) orally 2 times a day (20 Jan 2023 16:49)  guaiFENesin 100 mg/5 mL oral liquid: 20 milliliter(s) orally every 6 hours (20 Jan 2023 21:48)  HumaLOG KwikPen 100 units/mL injectable solution: 10 unit(s) injectable 3 times a day (before meals) (20 Jan 2023 21:48)  ipratropium-albuterol 20 mcg-100 mcg/inh inhalation aerosol: 1 puff(s) inhaled 4 times a day (20 Jan 2023 16:49)  levothyroxine 88 mcg (0.088 mg) oral tablet: 1 tab(s) orally once a day (at bedtime) (20 Jan 2023 16:49)  losartan 25 mg oral tablet: 1 tab(s) orally once a day (20 Jan 2023 16:49)  Macrobid 100 mg oral capsule: 1 cap(s) orally 2 times a day for 5 days  ***course complete*** (20 Jan 2023 21:48)  methocarbamol 750 mg oral tablet: 1 tab(s) orally every 8 hours, As Needed (20 Jan 2023 21:48)  metoprolol tartrate 25 mg oral tablet: 1 tab(s) orally 2 times a day (20 Jan 2023 16:49)  Milk of Magnesia 8% oral suspension: 30 milliliter(s) orally once a day, As Needed (20 Jan 2023 16:49)  montelukast 10 mg oral tablet: 1 tab(s) orally once a day (at bedtime) (20 Jan 2023 21:48)  ondansetron 4 mg oral tablet: 1 tab(s) orally every 4 hours, As Needed (20 Jan 2023 21:48)  oxybutynin 5 mg oral tablet: 1 tab(s) orally 2 times a day (20 Jan 2023 16:49)  oxyCODONE 5 mg oral tablet: 1 tab(s) orally every 4 hours, As Needed (20 Jan 2023 16:49)  phytonadione 5 mg oral tablet: 0.5 tab(s) orally once  ***given at [facility] once on 1-19-23*** (20 Jan 2023 21:48)  pregabalin 100 mg oral capsule: 1 cap(s) orally 3 times a day (20 Jan 2023 16:49)  sennosides-docusate 8.6 mg-50 mg oral tablet: 2 tab(s) orally once a day (at bedtime) (20 Jan 2023 16:49)  Tradjenta 5 mg oral tablet: 1 tab(s) orally once a day (20 Jan 2023 16:49)  Tylenol 500 mg oral tablet: 2 tab(s) orally every 8 hours (20 Jan 2023 16:49)    MEDICATIONS  (STANDING):  albuterol    0.083% 2.5 milliGRAM(s) Nebulizer every 6 hours  aMIOdarone    Tablet 200 milliGRAM(s) Oral daily  atorvastatin 10 milliGRAM(s) Oral at bedtime  buDESOnide    Inhalation Suspension 0.5 milliGRAM(s) Inhalation every 12 hours  cefepime   IVPB 2000 milliGRAM(s) IV Intermittent every 12 hours  chlorhexidine 4% Liquid 1 Application(s) Topical <User Schedule>  coronavirus bivalent (EUA) Booster Vaccine (PFIZER) 0.3 milliLiter(s) IntraMuscular once  dextrose 5%. 1000 milliLiter(s) (50 mL/Hr) IV Continuous <Continuous>  dextrose 5%. 1000 milliLiter(s) (100 mL/Hr) IV Continuous <Continuous>  dextrose 50% Injectable 25 Gram(s) IV Push once  dextrose 50% Injectable 12.5 Gram(s) IV Push once  dextrose 50% Injectable 25 Gram(s) IV Push once  enoxaparin Injectable 100 milliGRAM(s) SubCutaneous every 12 hours  fat emulsion (Fish Oil and Plant Based) 20% Infusion 0.821 Gm/kG/Day (33.3 mL/Hr) IV Continuous <Continuous>  fat emulsion (Fish Oil and Plant Based) 20% Infusion 0.821 Gm/kG/Day (33.33 mL/Hr) IV Continuous <Continuous>  glucagon  Injectable 1 milliGRAM(s) IntraMuscular once  insulin glargine Injectable (LANTUS) 8 Unit(s) SubCutaneous at bedtime  insulin lispro (ADMELOG) corrective regimen sliding scale   SubCutaneous every 6 hours  levothyroxine Injectable 70 MICROGram(s) IV Push at bedtime  metoprolol tartrate IVPB 5 milliGRAM(s) IV Intermittent every 6 hours  metroNIDAZOLE  IVPB 500 milliGRAM(s) IV Intermittent every 8 hours  nystatin Cream 1 Application(s) Topical two times a day  nystatin Powder 1 Application(s) Topical every 12 hours  octreotide  Injectable 75 MICROGram(s) SubCutaneous every 8 hours  pantoprazole  Injectable 40 milliGRAM(s) IV Push daily  Parenteral Nutrition - Adult 1 Each (83 mL/Hr) TPN Continuous <Continuous>  Parenteral Nutrition - Adult 1 Each (83 mL/Hr) TPN Continuous <Continuous>    MEDICATIONS  (PRN):  acetaminophen     Tablet .. 650 milliGRAM(s) Oral every 6 hours PRN Temp greater or equal to 38C (100.4F)  albuterol    90 MICROgram(s) HFA Inhaler 2 Puff(s) Inhalation every 6 hours PRN Shortness of Breath and/or Wheezing  dextrose Oral Gel 15 Gram(s) Oral once PRN Blood Glucose LESS THAN 70 milliGRAM(s)/deciliter  hydrALAZINE Injectable 10 milliGRAM(s) IV Push every 6 hours PRN SBP>160  HYDROmorphone  Injectable 0.5 milliGRAM(s) IV Push every 3 hours PRN Severe Pain (7 - 10)  sodium chloride 0.9% lock flush 10 milliLiter(s) IV Push every 1 hour PRN Pre/post blood products, medications, blood draw, and to maintain line patency      Allergies    Cipro (Rash)  Spiriva (Rash)    Intolerances        Vital Signs Last 24 Hrs  T(C): 36.3 (08 Feb 2023 07:33), Max: 37.9 (07 Feb 2023 14:27)  T(F): 97.4 (08 Feb 2023 07:33), Max: 100.3 (07 Feb 2023 14:27)  HR: 80 (08 Feb 2023 09:01) (69 - 110)  BP: 122/62 (08 Feb 2023 07:33) (122/62 - 178/83)  BP(mean): 92 (07 Feb 2023 20:24) (86 - 96)  RR: 18 (08 Feb 2023 07:33) (16 - 27)  SpO2: 93% (08 Feb 2023 09:01) (92% - 99%)    Parameters below as of 08 Feb 2023 09:01  Patient On (Oxygen Delivery Method): nasal cannula,2L          PHYSICAL EXAMINATION:    NECK:  Supple. No lymphadenopathy. Jugular venous pressure not elevated. Carotids equal.   HEART:   The cardiac impulse has a normal quality. Reg., Nl S1 and S2.  There are no murmurs, rubs or gallops noted  CHEST:  Chest is clear to auscultation. Normal respiratory effort.  ABDOMEN:  Soft and nontender.   EXTREMITIES:  There is no edema.       LABS:                        9.1    10.54 )-----------( 278      ( 08 Feb 2023 05:18 )             28.5     02-08    136  |  106  |  15  ----------------------------<  287<H>  4.6   |  23  |  0.57    Ca    7.5<L>      08 Feb 2023 05:18  Phos  2.3     02-08  Mg     1.4     02-08    TPro  5.8<L>  /  Alb  1.3<L>  /  TBili  0.3  /  DBili  x   /  AST  12<L>  /  ALT  9<L>  /  AlkPhos  65  02-08               Subjective:    pat lying in bed, NG suction, two abd drains, no respiratory distress.    Home Medications:  Albuterol (Eqv-ProAir HFA) 90 mcg/inh inhalation aerosol: 1 puff(s) inhaled every 6 hours, As Needed (20 Jan 2023 16:49)  amiodarone 200 mg oral tablet: 1 tab(s) orally once a day (20 Jan 2023 16:49)  ascorbic acid 500 mg oral tablet: 2 tab(s) orally once a day (20 Jan 2023 16:49)  atorvastatin 10 mg oral tablet: 1 tab(s) orally once a day (at bedtime) (20 Jan 2023 16:49)  benzonatate 100 mg oral capsule: 1 cap(s) orally 3 times a day (20 Jan 2023 21:48)  budesonide 0.5 mg/2 mL inhalation suspension: 2 milliliter(s) inhaled 2 times a day (20 Jan 2023 21:48)  Cepacol Sore Throat 15 mg-3.6 mg mucous membrane lozenge: 1 lozenge mucous membrane every 4 hours, As Needed (20 Jan 2023 21:48)  cholecalciferol 1250 mcg (50,000 intl units) oral capsule: 1 cap(s) orally every 4 weeks on Wednesday  (20 Jan 2023 16:49)  Coumadin 2.5 mg oral tablet: 1 tab(s) orally once a day (at bedtime)  ***ON HOLD from 1-16 to 1-21*** (20 Jan 2023 21:48)  Cranberry oral tablet: 1 tab(s) orally 2 times a day (20 Jan 2023 16:49)  cyanocobalamin 1000 mcg oral tablet: 1 tab(s) orally once a day (20 Jan 2023 16:49)  dilTIAZem 180 mg/24 hours oral capsule, extended release: 1 cap(s) orally once a day (20 Jan 2023 16:49)  DULoxetine 60 mg oral delayed release capsule: 1 cap(s) orally once a day (20 Jan 2023 16:49)  estradiol 0.1 mg/g vaginal cream: 0.5 gram(s) vaginal 2 times a week on Monday and Thursday (20 Jan 2023 21:48)  fexofenadine 180 mg oral tablet: 1 tab(s) orally once a day (20 Jan 2023 21:48)  fluticasone 50 mcg/inh nasal spray: 1 spray(s) nasal 2 times a day (20 Jan 2023 16:49)  furosemide 20 mg oral tablet: 1 tab(s) orally once a day (20 Jan 2023 16:49)  glipiZIDE 10 mg oral tablet, extended release: 2 tab(s) orally once a day (20 Jan 2023 16:49)  GlycoLax oral powder for reconstitution: 17 gram(s) orally 2 times a day (20 Jan 2023 16:49)  guaiFENesin 100 mg/5 mL oral liquid: 20 milliliter(s) orally every 6 hours (20 Jan 2023 21:48)  HumaLOG KwikPen 100 units/mL injectable solution: 10 unit(s) injectable 3 times a day (before meals) (20 Jan 2023 21:48)  ipratropium-albuterol 20 mcg-100 mcg/inh inhalation aerosol: 1 puff(s) inhaled 4 times a day (20 Jan 2023 16:49)  levothyroxine 88 mcg (0.088 mg) oral tablet: 1 tab(s) orally once a day (at bedtime) (20 Jan 2023 16:49)  losartan 25 mg oral tablet: 1 tab(s) orally once a day (20 Jan 2023 16:49)  Macrobid 100 mg oral capsule: 1 cap(s) orally 2 times a day for 5 days  ***course complete*** (20 Jan 2023 21:48)  methocarbamol 750 mg oral tablet: 1 tab(s) orally every 8 hours, As Needed (20 Jan 2023 21:48)  metoprolol tartrate 25 mg oral tablet: 1 tab(s) orally 2 times a day (20 Jan 2023 16:49)  Milk of Magnesia 8% oral suspension: 30 milliliter(s) orally once a day, As Needed (20 Jan 2023 16:49)  montelukast 10 mg oral tablet: 1 tab(s) orally once a day (at bedtime) (20 Jan 2023 21:48)  ondansetron 4 mg oral tablet: 1 tab(s) orally every 4 hours, As Needed (20 Jan 2023 21:48)  oxybutynin 5 mg oral tablet: 1 tab(s) orally 2 times a day (20 Jan 2023 16:49)  oxyCODONE 5 mg oral tablet: 1 tab(s) orally every 4 hours, As Needed (20 Jan 2023 16:49)  phytonadione 5 mg oral tablet: 0.5 tab(s) orally once  ***given at [facility] once on 1-19-23*** (20 Jan 2023 21:48)  pregabalin 100 mg oral capsule: 1 cap(s) orally 3 times a day (20 Jan 2023 16:49)  sennosides-docusate 8.6 mg-50 mg oral tablet: 2 tab(s) orally once a day (at bedtime) (20 Jan 2023 16:49)  Tradjenta 5 mg oral tablet: 1 tab(s) orally once a day (20 Jan 2023 16:49)  Tylenol 500 mg oral tablet: 2 tab(s) orally every 8 hours (20 Jan 2023 16:49)    MEDICATIONS  (STANDING):  albuterol    0.083% 2.5 milliGRAM(s) Nebulizer every 6 hours  aMIOdarone    Tablet 200 milliGRAM(s) Oral daily  atorvastatin 10 milliGRAM(s) Oral at bedtime  buDESOnide    Inhalation Suspension 0.5 milliGRAM(s) Inhalation every 12 hours  cefepime   IVPB 2000 milliGRAM(s) IV Intermittent every 12 hours  chlorhexidine 4% Liquid 1 Application(s) Topical <User Schedule>  coronavirus bivalent (EUA) Booster Vaccine (PFIZER) 0.3 milliLiter(s) IntraMuscular once  dextrose 5%. 1000 milliLiter(s) (50 mL/Hr) IV Continuous <Continuous>  dextrose 5%. 1000 milliLiter(s) (100 mL/Hr) IV Continuous <Continuous>  dextrose 50% Injectable 25 Gram(s) IV Push once  dextrose 50% Injectable 12.5 Gram(s) IV Push once  dextrose 50% Injectable 25 Gram(s) IV Push once  enoxaparin Injectable 100 milliGRAM(s) SubCutaneous every 12 hours  fat emulsion (Fish Oil and Plant Based) 20% Infusion 0.821 Gm/kG/Day (33.3 mL/Hr) IV Continuous <Continuous>  fat emulsion (Fish Oil and Plant Based) 20% Infusion 0.821 Gm/kG/Day (33.33 mL/Hr) IV Continuous <Continuous>  glucagon  Injectable 1 milliGRAM(s) IntraMuscular once  insulin glargine Injectable (LANTUS) 8 Unit(s) SubCutaneous at bedtime  insulin lispro (ADMELOG) corrective regimen sliding scale   SubCutaneous every 6 hours  levothyroxine Injectable 70 MICROGram(s) IV Push at bedtime  metoprolol tartrate IVPB 5 milliGRAM(s) IV Intermittent every 6 hours  metroNIDAZOLE  IVPB 500 milliGRAM(s) IV Intermittent every 8 hours  nystatin Cream 1 Application(s) Topical two times a day  nystatin Powder 1 Application(s) Topical every 12 hours  octreotide  Injectable 75 MICROGram(s) SubCutaneous every 8 hours  pantoprazole  Injectable 40 milliGRAM(s) IV Push daily  Parenteral Nutrition - Adult 1 Each (83 mL/Hr) TPN Continuous <Continuous>  Parenteral Nutrition - Adult 1 Each (83 mL/Hr) TPN Continuous <Continuous>    MEDICATIONS  (PRN):  acetaminophen     Tablet .. 650 milliGRAM(s) Oral every 6 hours PRN Temp greater or equal to 38C (100.4F)  albuterol    90 MICROgram(s) HFA Inhaler 2 Puff(s) Inhalation every 6 hours PRN Shortness of Breath and/or Wheezing  dextrose Oral Gel 15 Gram(s) Oral once PRN Blood Glucose LESS THAN 70 milliGRAM(s)/deciliter  hydrALAZINE Injectable 10 milliGRAM(s) IV Push every 6 hours PRN SBP>160  HYDROmorphone  Injectable 0.5 milliGRAM(s) IV Push every 3 hours PRN Severe Pain (7 - 10)  sodium chloride 0.9% lock flush 10 milliLiter(s) IV Push every 1 hour PRN Pre/post blood products, medications, blood draw, and to maintain line patency      Allergies    Cipro (Rash)  Spiriva (Rash)    Intolerances        Vital Signs Last 24 Hrs  T(C): 36.3 (08 Feb 2023 07:33), Max: 37.9 (07 Feb 2023 14:27)  T(F): 97.4 (08 Feb 2023 07:33), Max: 100.3 (07 Feb 2023 14:27)  HR: 80 (08 Feb 2023 09:01) (69 - 110)  BP: 122/62 (08 Feb 2023 07:33) (122/62 - 178/83)  BP(mean): 92 (07 Feb 2023 20:24) (86 - 96)  RR: 18 (08 Feb 2023 07:33) (16 - 27)  SpO2: 93% (08 Feb 2023 09:01) (92% - 99%)    Parameters below as of 08 Feb 2023 09:01  Patient On (Oxygen Delivery Method): nasal cannula,2L          PHYSICAL EXAMINATION:    NECK:  Supple. No lymphadenopathy. Jugular venous pressure not elevated. Carotids equal.   HEART:   The cardiac impulse has a normal quality. Reg., Nl S1 and S2.  There are no murmurs, rubs or gallops noted  CHEST:  Chest crackles to auscultation. Normal respiratory effort.  ABDOMEN:  Soft and nontender.   EXTREMITIES:  There is no edema.       LABS:                        9.1    10.54 )-----------( 278      ( 08 Feb 2023 05:18 )             28.5     02-08    136  |  106  |  15  ----------------------------<  287<H>  4.6   |  23  |  0.57    Ca    7.5<L>      08 Feb 2023 05:18  Phos  2.3     02-08  Mg     1.4     02-08    TPro  5.8<L>  /  Alb  1.3<L>  /  TBili  0.3  /  DBili  x   /  AST  12<L>  /  ALT  9<L>  /  AlkPhos  65  02-08

## 2023-02-08 NOTE — PROGRESS NOTE ADULT - ASSESSMENT
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 She was noted with one episode of emesis and diffuse abdominal pain associated with fever. On 1/27 the patient was noted febrile to 102.8F, more lethargic, did not open eyes, did not follow commands. She received cefepime and metronidazole.     1. Intraabdominal hernia related fat necrosis with superimposed infection with KLOX. 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 UTI with KLPN resolved. Right lower lobes nodules Probable pneumonia. Allergy to cipro. Encephalopathy.   -leukocytosis resolving  -abdominal fistula draining  -BC x 2 noted  -urine c/s noted  -on cefepime 2 gm IV q12h and metronidazole 500 mg IV q8h # 12  -tolerating abx well so far; no side effects noted  -aspiration precautions  -continue abx coverage   -monitor abdomen  -monitor temps  -f/u CBC  -supportive care  2. Other issues:   -care per medicine    d/w Dr. Castro

## 2023-02-08 NOTE — PROGRESS NOTE ADULT - ASSESSMENT
Patient admitted with abdominal pain, nausea and vomitting , dehydration  septic shock, likely related to     PROBLEMS:    UTI klebsiella pneumoniae and aspiration PNA /SBO- ventral hernia  New R lung nodules - cannot ruleout aspiration PNA   Acute metabolic encephalopathy  Sepsis   Hx copd without exacerbation  Anasarca/acute on  chronic diastolic heart failure       Plan:    pulmonary stable  Blood transfusion, on TPN, IV fluids, iv lasix post transfusion, on 3lpm nc sat 100%  NG suction-Incarcerated Ventral Hernia with SBO-S/p extensive RICHARD, ileo colectomy, giant ventral hernia repair with Surgimend-surgery fu for persistant sutures leak  IV cefepime/flagyl -for  sepsis ( SBO/UTI)  Monitor Cr  D/w staff/daughter  DVT PROPHYLASIX    Patient admitted with abdominal pain, nausea and vomitting , dehydration  septic shock, likely related to     PROBLEMS:    UTI klebsiella pneumoniae and aspiration PNA /SBO- ventral hernia  New R lung nodules - cannot ruleout aspiration PNA   Acute metabolic encephalopathy  Sepsis   Hx copd without exacerbation  Anasarca/Acute on  chronic diastolic heart failure       Plan:    pulmonary stable  Blood transfusion, on TPN, IV fluids, iv lasix post transfusion  NG suction-Incarcerated Ventral Hernia with SBO-S/p extensive RICHARD, ileo colectomy, giant ventral hernia repair with Surgimend-surgery fu for persistant sutures leak  IV cefepime/flagyl-for  sepsis ( SBO/UTI)  Monitor Cr  D/w staff/daughter  DVT PROPHYLASIX

## 2023-02-08 NOTE — CHART NOTE - NSCHARTNOTEFT_GEN_A_CORE
HPI:   71 y/o female with a PMHx of CVA with left sided weakness, atrial fibrillation, emphysema, COPD, HTN - wears 2L NC at baseline BIBA, hx SBO and resection per EMS presents to the ED from [facility] for RLQ pain and r/o SBO. x1 episode of emesis, + profound diffuse abdominal pain, febrile   (20 Jan 2023 21:43)      PERTINENT PMH REVIEWED:  [ X ] YES [ ] NO           Primary Contact:       Son Moe    HCP [  ] Surrogate [  X ] Guardian [   ]    Mental Status: lacks capacity   Concerns of Depression [  ]  Anxiety [   ]  Baseline ADLs (prior to admission):  Independent [ ] moderately [ ] fully   Dependent   [ ] moderately [ ]fully    Family Meeting: Has taken place with pts family and Dr. Elise, plan to continue with current medical management    Anticipated Grief: Patient [  ] Family [ X ]    Caregiver [place] Assessed: Yes [ X ] No [  ]    Anglican: Hindu     Spiritual Concerns: Not identified     Goals of Care: Continue with current care, DNR/DNI in place     Previous Services: [facility]     ADVANCE DIRECTIVES:  MOLST- in chart -- DNR/DNI and no feeding tube    Anticipated D/C Plan: to be determined, possibly to return to [facility]                     Summary:    This SW spoke with pts son Moe via phone to follow up and provide support. Pt. has been at [facility] fo about 3 or 4 years as per her son. He shared how she has been through a lot over these last 4 years after her stroke. He discussed how being in the nursing home can really take a toll. Feelings explored and support provided. He confirmed that they have set some limits for pt. including DNR/DNI. As of now he wishes to continue with current medical management and see how pt. does. Plan to be further determined, possibly to return to [facility] if appropriate. Emotional support provided. Our team will continue to follow.

## 2023-02-09 LAB
ALBUMIN SERPL ELPH-MCNC: 1.3 G/DL — LOW (ref 3.3–5)
ALP SERPL-CCNC: 65 U/L — SIGNIFICANT CHANGE UP (ref 40–120)
ALT FLD-CCNC: 9 U/L — LOW (ref 12–78)
ANION GAP SERPL CALC-SCNC: 5 MMOL/L — SIGNIFICANT CHANGE UP (ref 5–17)
AST SERPL-CCNC: 16 U/L — SIGNIFICANT CHANGE UP (ref 15–37)
BASOPHILS # BLD AUTO: 0.11 K/UL — SIGNIFICANT CHANGE UP (ref 0–0.2)
BASOPHILS NFR BLD AUTO: 1 % — SIGNIFICANT CHANGE UP (ref 0–2)
BILIRUB SERPL-MCNC: 0.4 MG/DL — SIGNIFICANT CHANGE UP (ref 0.2–1.2)
BUN SERPL-MCNC: 15 MG/DL — SIGNIFICANT CHANGE UP (ref 7–23)
CALCIUM SERPL-MCNC: 7.5 MG/DL — LOW (ref 8.5–10.1)
CHLORIDE SERPL-SCNC: 104 MMOL/L — SIGNIFICANT CHANGE UP (ref 96–108)
CO2 SERPL-SCNC: 24 MMOL/L — SIGNIFICANT CHANGE UP (ref 22–31)
CREAT SERPL-MCNC: 0.54 MG/DL — SIGNIFICANT CHANGE UP (ref 0.5–1.3)
CRP SERPL-MCNC: 148 MG/L — HIGH
EGFR: 98 ML/MIN/1.73M2 — SIGNIFICANT CHANGE UP
EOSINOPHIL # BLD AUTO: 0.34 K/UL — SIGNIFICANT CHANGE UP (ref 0–0.5)
EOSINOPHIL NFR BLD AUTO: 3 % — SIGNIFICANT CHANGE UP (ref 0–6)
ERYTHROCYTE [SEDIMENTATION RATE] IN BLOOD: 118 MM/HR — HIGH (ref 0–20)
GLUCOSE BLDC GLUCOMTR-MCNC: 255 MG/DL — HIGH (ref 70–99)
GLUCOSE BLDC GLUCOMTR-MCNC: 280 MG/DL — HIGH (ref 70–99)
GLUCOSE BLDC GLUCOMTR-MCNC: 282 MG/DL — HIGH (ref 70–99)
GLUCOSE BLDC GLUCOMTR-MCNC: 287 MG/DL — HIGH (ref 70–99)
GLUCOSE SERPL-MCNC: 280 MG/DL — HIGH (ref 70–99)
HCT VFR BLD CALC: 28.4 % — LOW (ref 34.5–45)
HGB BLD-MCNC: 9.3 G/DL — LOW (ref 11.5–15.5)
LYMPHOCYTES # BLD AUTO: 1.14 K/UL — SIGNIFICANT CHANGE UP (ref 1–3.3)
LYMPHOCYTES # BLD AUTO: 10 % — LOW (ref 13–44)
MAGNESIUM SERPL-MCNC: 1.4 MG/DL — LOW (ref 1.6–2.6)
MCHC RBC-ENTMCNC: 27.8 PG — SIGNIFICANT CHANGE UP (ref 27–34)
MCHC RBC-ENTMCNC: 32.7 GM/DL — SIGNIFICANT CHANGE UP (ref 32–36)
MCV RBC AUTO: 84.8 FL — SIGNIFICANT CHANGE UP (ref 80–100)
MONOCYTES # BLD AUTO: 0.57 K/UL — SIGNIFICANT CHANGE UP (ref 0–0.9)
MONOCYTES NFR BLD AUTO: 5 % — SIGNIFICANT CHANGE UP (ref 2–14)
NEUTROPHILS # BLD AUTO: 8.78 K/UL — HIGH (ref 1.8–7.4)
NEUTROPHILS NFR BLD AUTO: 68 % — SIGNIFICANT CHANGE UP (ref 43–77)
NRBC # BLD: SIGNIFICANT CHANGE UP /100 WBCS (ref 0–0)
PHOSPHATE SERPL-MCNC: 2.7 MG/DL — SIGNIFICANT CHANGE UP (ref 2.5–4.5)
PLATELET # BLD AUTO: 265 K/UL — SIGNIFICANT CHANGE UP (ref 150–400)
POTASSIUM SERPL-MCNC: 4.7 MMOL/L — SIGNIFICANT CHANGE UP (ref 3.5–5.3)
POTASSIUM SERPL-SCNC: 4.7 MMOL/L — SIGNIFICANT CHANGE UP (ref 3.5–5.3)
PROT SERPL-MCNC: 5.8 GM/DL — LOW (ref 6–8.3)
RBC # BLD: 3.35 M/UL — LOW (ref 3.8–5.2)
RBC # FLD: 20.2 % — HIGH (ref 10.3–14.5)
SODIUM SERPL-SCNC: 133 MMOL/L — LOW (ref 135–145)
TRIGL SERPL-MCNC: 399 MG/DL — HIGH
WBC # BLD: 11.4 K/UL — HIGH (ref 3.8–10.5)
WBC # FLD AUTO: 11.4 K/UL — HIGH (ref 3.8–10.5)

## 2023-02-09 PROCEDURE — 74176 CT ABD & PELVIS W/O CONTRAST: CPT | Mod: 26

## 2023-02-09 PROCEDURE — 99233 SBSQ HOSP IP/OBS HIGH 50: CPT

## 2023-02-09 RX ORDER — I.V. FAT EMULSION 20 G/100ML
0.82 EMULSION INTRAVENOUS
Qty: 80 | Refills: 0 | Status: DISCONTINUED | OUTPATIENT
Start: 2023-02-09 | End: 2023-02-09

## 2023-02-09 RX ORDER — ELECTROLYTE SOLUTION,INJ
1 VIAL (ML) INTRAVENOUS
Refills: 0 | Status: DISCONTINUED | OUTPATIENT
Start: 2023-02-09 | End: 2023-02-09

## 2023-02-09 RX ADMIN — INSULIN GLARGINE 8 UNIT(S): 100 INJECTION, SOLUTION SUBCUTANEOUS at 22:49

## 2023-02-09 RX ADMIN — NYSTATIN CREAM 1 APPLICATION(S): 100000 CREAM TOPICAL at 10:56

## 2023-02-09 RX ADMIN — Medication 100 MILLIGRAM(S): at 14:25

## 2023-02-09 RX ADMIN — AMIODARONE HYDROCHLORIDE 200 MILLIGRAM(S): 400 TABLET ORAL at 10:55

## 2023-02-09 RX ADMIN — HYDROMORPHONE HYDROCHLORIDE 0.5 MILLIGRAM(S): 2 INJECTION INTRAMUSCULAR; INTRAVENOUS; SUBCUTANEOUS at 23:29

## 2023-02-09 RX ADMIN — NYSTATIN CREAM 1 APPLICATION(S): 100000 CREAM TOPICAL at 17:33

## 2023-02-09 RX ADMIN — Medication 100 MILLIGRAM(S): at 22:48

## 2023-02-09 RX ADMIN — ALBUTEROL 2.5 MILLIGRAM(S): 90 AEROSOL, METERED ORAL at 20:04

## 2023-02-09 RX ADMIN — Medication 1 EACH: at 22:33

## 2023-02-09 RX ADMIN — CEFEPIME 100 MILLIGRAM(S): 1 INJECTION, POWDER, FOR SOLUTION INTRAMUSCULAR; INTRAVENOUS at 10:27

## 2023-02-09 RX ADMIN — HYDROMORPHONE HYDROCHLORIDE 0.5 MILLIGRAM(S): 2 INJECTION INTRAMUSCULAR; INTRAVENOUS; SUBCUTANEOUS at 06:02

## 2023-02-09 RX ADMIN — Medication 100 MILLIGRAM(S): at 06:02

## 2023-02-09 RX ADMIN — CEFEPIME 100 MILLIGRAM(S): 1 INJECTION, POWDER, FOR SOLUTION INTRAMUSCULAR; INTRAVENOUS at 22:48

## 2023-02-09 RX ADMIN — ENOXAPARIN SODIUM 100 MILLIGRAM(S): 100 INJECTION SUBCUTANEOUS at 22:49

## 2023-02-09 RX ADMIN — HYDROMORPHONE HYDROCHLORIDE 0.5 MILLIGRAM(S): 2 INJECTION INTRAMUSCULAR; INTRAVENOUS; SUBCUTANEOUS at 11:40

## 2023-02-09 RX ADMIN — Medication 110 MILLIGRAM(S): at 00:52

## 2023-02-09 RX ADMIN — ALBUTEROL 2.5 MILLIGRAM(S): 90 AEROSOL, METERED ORAL at 15:02

## 2023-02-09 RX ADMIN — Medication 110 MILLIGRAM(S): at 06:00

## 2023-02-09 RX ADMIN — NYSTATIN CREAM 1 APPLICATION(S): 100000 CREAM TOPICAL at 22:49

## 2023-02-09 RX ADMIN — Medication 6: at 17:32

## 2023-02-09 RX ADMIN — Medication 110 MILLIGRAM(S): at 13:20

## 2023-02-09 RX ADMIN — HYDROMORPHONE HYDROCHLORIDE 0.5 MILLIGRAM(S): 2 INJECTION INTRAMUSCULAR; INTRAVENOUS; SUBCUTANEOUS at 02:09

## 2023-02-09 RX ADMIN — OCTREOTIDE ACETATE 75 MICROGRAM(S): 200 INJECTION, SOLUTION INTRAVENOUS; SUBCUTANEOUS at 06:04

## 2023-02-09 RX ADMIN — Medication 70 MICROGRAM(S): at 23:06

## 2023-02-09 RX ADMIN — Medication 4: at 00:10

## 2023-02-09 RX ADMIN — NYSTATIN CREAM 1 APPLICATION(S): 100000 CREAM TOPICAL at 06:04

## 2023-02-09 RX ADMIN — ALBUTEROL 2.5 MILLIGRAM(S): 90 AEROSOL, METERED ORAL at 01:26

## 2023-02-09 RX ADMIN — Medication 0.5 MILLIGRAM(S): at 09:06

## 2023-02-09 RX ADMIN — I.V. FAT EMULSION 33.33 GM/KG/DAY: 20 EMULSION INTRAVENOUS at 09:10

## 2023-02-09 RX ADMIN — Medication 6: at 12:29

## 2023-02-09 RX ADMIN — Medication 0.5 MILLIGRAM(S): at 20:05

## 2023-02-09 RX ADMIN — HYDROMORPHONE HYDROCHLORIDE 0.5 MILLIGRAM(S): 2 INJECTION INTRAMUSCULAR; INTRAVENOUS; SUBCUTANEOUS at 12:05

## 2023-02-09 RX ADMIN — Medication 110 MILLIGRAM(S): at 23:18

## 2023-02-09 RX ADMIN — OCTREOTIDE ACETATE 75 MICROGRAM(S): 200 INJECTION, SOLUTION INTRAVENOUS; SUBCUTANEOUS at 14:45

## 2023-02-09 RX ADMIN — PANTOPRAZOLE SODIUM 40 MILLIGRAM(S): 20 TABLET, DELAYED RELEASE ORAL at 10:36

## 2023-02-09 RX ADMIN — CHLORHEXIDINE GLUCONATE 1 APPLICATION(S): 213 SOLUTION TOPICAL at 06:12

## 2023-02-09 RX ADMIN — I.V. FAT EMULSION 33.33 GM/KG/DAY: 20 EMULSION INTRAVENOUS at 22:34

## 2023-02-09 RX ADMIN — Medication 110 MILLIGRAM(S): at 17:33

## 2023-02-09 RX ADMIN — OCTREOTIDE ACETATE 75 MICROGRAM(S): 200 INJECTION, SOLUTION INTRAVENOUS; SUBCUTANEOUS at 23:18

## 2023-02-09 RX ADMIN — Medication 6: at 23:07

## 2023-02-09 RX ADMIN — ENOXAPARIN SODIUM 100 MILLIGRAM(S): 100 INJECTION SUBCUTANEOUS at 10:56

## 2023-02-09 RX ADMIN — Medication 6: at 06:07

## 2023-02-09 RX ADMIN — ALBUTEROL 2.5 MILLIGRAM(S): 90 AEROSOL, METERED ORAL at 09:06

## 2023-02-09 NOTE — PROGRESS NOTE ADULT - ASSESSMENT
72F with PMHx CVA, pAF on AC, emphysema/COPD on home O2, HTN, SBO with prior resection who presented with abd pain, N/V. Found to have an small bowel obstruction. Taken to OR underwent ELAP, extensive RICHARD, ileocolectomy, ventral hernia repair with MESH. Postop course complicated with ALVERTO, shock requiring pressors    #Abd pain, SBO, Enterocutaneous fistula  S/p OR EXLAP, RICHARD, ileocolectomy, ventral hernia repair w mesh  Dressing c/d/i, surrounding erythema, +Drain  PPN, NGT to suction,  PPI Octreotide  Cefepime, Flagyl  Pain control, IS, Mobilize patient, monitor i and os  Pallative eval- DNR/DNI, no feeding tube....continue with PPN for now   management as per Surg    # UTI Kleb, PNA  Cefepime and flagyl as above  WBC improving, monitor for fevers  ID following    #pAF on AC, HTN  Lovenox 100mg BID  Lopressor IVPB q6  Hydralazine 10mg IVP q6 PRN SBP >160  Amioadrone 200mg daily    #Hypothyroid  Continue w IV synthroid     #DM  Lantus 8  Corrective scale  Monitor fingersticks    #COPD  Albuterol  Pulmicort  Pulm following

## 2023-02-09 NOTE — ADVANCED PRACTICE NURSE CONSULT - ASSESSMENT
This is a 72 year old female admitted on 1/20/2023 for Intestinal obstruction with PMHx: Diverticulitis, neuropathy, COPD, CVA, DVT, CVA, DM-2, HTN, A-Fib, Colostomy, anemia, Osteoarthritis, obesity, C-Diff, PE, HTN, HLD.   On 1/31/2023 patient had an operation see operative note.   Consulted for open abdominal wound;    Wound to midline abdomen 21cm x 6.1cm x 4.2cm with undermining 12-12 o'clock with tracking at 12 o'clock @ 11cm. Noted wound is dehisced with necrotic tissue to midabdomen and stapes still present and pulling. Noted dehisced wound superior to the necrotic tissue 3.8cm x 1.8cm x 0.5cm with white/yellow wound bed. Noted 2 drain to suction to lower abdomen with purulent drainage Cleaned the abdomen with saline and pat dry. Noted the Periwound with erythema and induration. Periwound with skin blistering and peeling. Removed purulent drainage ostomy wafers x's 2 from abdomen.  Patient groaning and grimacing when palpated. Started to remove unrolled janice packing from lower abdomen, noted 2 tied together with green/brown/purulent drainage with malodor. Noted brown malodorous drainage from around both drains.    Call PAPO Puente to assess with me at bedside. He was present for assessment and cleaning of wound.   Cleanse periwound with saline and pat dry.  Applied Stoma Powder and then painted with Cavilon (No Sting Barrier) 2 times  Applied small foam cut around both drains  Repacked Lower wound with 1 unrolled janice with saline   Folded gauze over midline wound and cover with Abdominal pad and secured with 1 large Tegaderm.   Wound care orders already in place by surgery.   My additional recommendation is for periwound protection:  1-Cleanse periwound with saline and pat dry.  2-Applied Stoma Powder and then painted with Cavilon (No Sting Barrier) 2 times  3-Applied small foam cut around both drains    Per PAPO Puente patient will require abdominal scan.   Recommend maintaining a Hover MAt under patient for boosting, transferring and positioning.     MATI 9 and recommend maintaining Low Air Loss Mattress    Hover MAt in place and recommend maintaining to reduce friction and shear.     Patient with limited ROM and needed two assist to turn and position. Turn and position every two hours, maintain 1 purple pad under patient for moisture wicking, keep heels elevated of mattress.     Surgery following. Discussed recommendations with Surgical PA Kwame  I will not be following patient on a daily basis and please reconsult if any other recommendations are warranted.  Thank you!  This is a 72 year old female admitted on 1/20/2023 for Intestinal obstruction with PMHx: Diverticulitis, neuropathy, COPD, CVA, DVT, CVA, DM-2, HTN, A-Fib, Colostomy, anemia, Osteoarthritis, obesity, C-Diff, PE, HTN, HLD.   On 1/31/2023 patient had an operation see operative note.   Consulted for open abdominal wound;    Wound to midline abdomen with fistula and wound at 21cm x 6.1cm x 4.2cm with undermining 12-12 o'clock with tracking at 12 o'clock @ 11cm. Noted wound is dehisced with necrotic tissue to midabdomen and stapes still present and pulling. Noted dehisced wound superior to the necrotic tissue 3.8cm x 1.8cm x 0.5cm with white/yellow wound bed. Noted 2 drain to suction to lower abdomen with purulent drainage Cleaned the abdomen with saline and pat dry. Noted the Periwound with erythema and induration. Periwound with skin blistering and peeling. Removed purulent drainage ostomy wafers x's 2 from abdomen.  Patient groaning and grimacing when palpated. Started to remove unrolled janice packing from lower abdomen, noted 2 tied together with green/brown/purulent drainage with malodor. Noted brown malodorous drainage from around both drains.    Call PAPO Puente to assess with me at bedside. He was present for assessment and cleaning of wound.   Cleanse periwound with saline and pat dry.  Applied Stoma Powder and then painted with Cavilon (No Sting Barrier) 2 times  Applied small foam cut around both drains  Repacked Lower wound with 1 unrolled janice with saline   Folded gauze over midline wound and cover with Abdominal pad and secured with 1 large Tegaderm.   Wound care orders already in place by surgery.   My additional recommendation is for periwound protection:  1-Cleanse periwound with saline and pat dry.  2-Applied Stoma Powder and then painted with Cavilon (No Sting Barrier) 2 times  3-Applied small foam cut around both drains    Per PAPO Puente patient will require abdominal scan.   Recommend maintaining a Hover MAt under patient for boosting, transferring and positioning.     MATI 9 and recommend maintaining Low Air Loss Mattress    Hover MAt in place and recommend maintaining to reduce friction and shear.     Patient with limited ROM and needed two assist to turn and position. Turn and position every two hours, maintain 1 purple pad under patient for moisture wicking, keep heels elevated of mattress.     Surgery following. Discussed recommendations with Surgical PA Kwame  I will not be following patient on a daily basis for daily dressing changes. Please reconsult if any other recommendations are warranted.  Thank you!

## 2023-02-09 NOTE — PROGRESS NOTE ADULT - SUBJECTIVE AND OBJECTIVE BOX
Awake, alert, no distress  VSS T 99  lungs- clear  cor-RRR  Abd- enteric drainage from wound. Staples removed. SB fistula noted  Ext- + anasarca    < from: CT Abdomen and Pelvis w/ Oral Cont (02.09.23 @ 12:59) >  IMPRESSION:  Persistent oral contrast in the left lower anterior abdominal wall at the   level of the surgical staples similar in appearance to the prior study.   Previously seen smaller amount of oral contrast and the right lower   anterior abdominal wall is no longer identified. Source of the contrast   appears to be small bowel which can be tracked to the level of the left   anterior abdominal wall    < end of copied text >

## 2023-02-09 NOTE — PROGRESS NOTE ADULT - SUBJECTIVE AND OBJECTIVE BOX
Date of service: 02-09-23 @ 12:40    Lying in bed in NAD  Weak looking  Has fistula drainage  No fever    ROS: no fever or chills; poorly verbal    MEDICATIONS  (STANDING):  albuterol    0.083% 2.5 milliGRAM(s) Nebulizer every 6 hours  aMIOdarone    Tablet 200 milliGRAM(s) Oral daily  buDESOnide    Inhalation Suspension 0.5 milliGRAM(s) Inhalation every 12 hours  cefepime   IVPB 2000 milliGRAM(s) IV Intermittent every 12 hours  chlorhexidine 4% Liquid 1 Application(s) Topical <User Schedule>  coronavirus bivalent (EUA) Booster Vaccine (PFIZER) 0.3 milliLiter(s) IntraMuscular once  dextrose 5%. 1000 milliLiter(s) (100 mL/Hr) IV Continuous <Continuous>  dextrose 5%. 1000 milliLiter(s) (50 mL/Hr) IV Continuous <Continuous>  dextrose 50% Injectable 25 Gram(s) IV Push once  dextrose 50% Injectable 12.5 Gram(s) IV Push once  dextrose 50% Injectable 25 Gram(s) IV Push once  enoxaparin Injectable 100 milliGRAM(s) SubCutaneous every 12 hours  fat emulsion (Fish Oil and Plant Based) 20% Infusion 0.821 Gm/kG/Day (33.33 mL/Hr) IV Continuous <Continuous>  fat emulsion (Fish Oil and Plant Based) 20% Infusion 0.821 Gm/kG/Day (33.33 mL/Hr) IV Continuous <Continuous>  glucagon  Injectable 1 milliGRAM(s) IntraMuscular once  insulin glargine Injectable (LANTUS) 8 Unit(s) SubCutaneous at bedtime  insulin lispro (ADMELOG) corrective regimen sliding scale   SubCutaneous every 6 hours  levothyroxine Injectable 70 MICROGram(s) IV Push at bedtime  metoprolol tartrate IVPB 5 milliGRAM(s) IV Intermittent every 6 hours  metroNIDAZOLE  IVPB 500 milliGRAM(s) IV Intermittent every 8 hours  nystatin Cream 1 Application(s) Topical two times a day  nystatin Powder 1 Application(s) Topical every 12 hours  octreotide  Injectable 75 MICROGram(s) SubCutaneous every 8 hours  pantoprazole  Injectable 40 milliGRAM(s) IV Push daily  Parenteral Nutrition - Adult 1 Each (83 mL/Hr) TPN Continuous <Continuous>  Parenteral Nutrition - Adult 1 Each (83 mL/Hr) TPN Continuous <Continuous>    Vital Signs Last 24 Hrs  T(C): 37.1 (09 Feb 2023 08:11), Max: 37.1 (09 Feb 2023 06:54)  T(F): 98.7 (09 Feb 2023 08:11), Max: 98.8 (09 Feb 2023 06:54)  HR: 94 (09 Feb 2023 09:09) (90 - 101)  BP: 146/63 (09 Feb 2023 08:11) (132/61 - 163/86)  BP(mean): --  RR: 18 (09 Feb 2023 08:11) (18 - 18)  SpO2: 96% (09 Feb 2023 09:09) (92% - 100%)    Parameters below as of 09 Feb 2023 09:09  Patient On (Oxygen Delivery Method): transtracheal catheter,2L     Physical exam:    Constitutional:  No acute distress  HEENT: NC/AT, EOMI, PERRLA, conjunctivae clear; ears and nose atraumatic  Neck: supple; thyroid not palpable  Back: no tenderness  Respiratory: respiratory effort normal; crackles at bases  Cardiovascular: S1S2 regular, no murmurs  Abdomen: soft, mid abdomen tender, distended, positive BS  abdominal postsurgical wound with fecaloid drainage from drain site   Genitourinary: no suprapubic tenderness  Lymphatic: no LN palpable  Musculoskeletal: no muscle tenderness, no joint swelling or tenderness  Extremities: no pedal edema  Neurological/ Psychiatric: confused, judgement and insight impaired; moving all extremities  Skin: no rashes; no palpable lesions    Labs: reviewed                        9.3    11.40 )-----------( 265      ( 09 Feb 2023 05:22 )             28.4     02-09    133<L>  |  104  |  15  ----------------------------<  280<H>  4.7   |  24  |  0.54    Ca    7.5<L>      09 Feb 2023 05:22  Phos  2.7     02-09  Mg     1.4     02-09    TPro  5.8<L>  /  Alb  1.3<L>  /  TBili  0.4  /  DBili  x   /  AST  16  /  ALT  9<L>  /  AlkPhos  65  02-09    C-Reactive Protein, Serum: 148 mg/L (02-09-23 @ 05:22)                        9.5    4.85  )-----------( 323      ( 06 Feb 2023 05:57 )             30.5     02-06    139  |  110<H>  |  17  ----------------------------<  197<H>  4.4   |  21<L>  |  0.68    Ca    7.5<L>      06 Feb 2023 05:57  Phos  2.9     02-06  Mg     2.0     02-06    TPro  5.8<L>  /  Alb  1.5<L>  /  TBili  0.6  /  DBili  x   /  AST  9<L>  /  ALT  14  /  AlkPhos  57  02-06               10.9   19.72 )-----------( 510      ( 27 Jan 2023 06:16 )             36.7     01-27    142  |  111<H>  |  10  ----------------------------<  233<H>  3.8   |  26  |  0.78    Ca    8.0<L>      27 Jan 2023 06:16  Phos  2.7     01-27  Mg     1.8     01-27      Culture - Acid Fast - Tissue w/Smear (collected 31 Jan 2023 18:07)  Source: .Tissue INTRAPERITONEAL ABCESS FOR CX    Culture - Fungal, Tissue (collected 31 Jan 2023 18:07)  Source: .Tissue INTRAPERITONEAL ABCESS FOR CX  Preliminary Report (01 Feb 2023 07:09):    Testing in progress    Culture - Tissue with Gram Stain (collected 31 Jan 2023 18:07)  Source: .Tissue INTRAPERITONEAL ABCESS FOR CX  Gram Stain (01 Feb 2023 04:37):    Rare polymorphonuclear leukocytes seen per low power field    No organisms seen per oil power field  Preliminary Report (02 Feb 2023 20:01):    Growth in fluid media only Klebsiella oxytoca/Raoultella ornithinolytica  Organism: Klebsiella oxytoca /Raoutella ornithinolytica (03 Feb 2023 17:25)  Organism: Klebsiella oxytoca /Raoutella ornithinolytica (03 Feb 2023 17:25)      -  Amikacin: S <=16      -  Amoxicillin/Clavulanic Acid: I 16/8      -  Ampicillin: R >16 These ampicillin results predict results for amoxicillin      -  Ampicillin/Sulbactam: R >16/8 Enterobacter, Klebsiella aerogenes, Citrobacter, and Serratia may develop resistance during prolonged therapy (3-4 days)      -  Aztreonam: S <=4      -  Cefazolin: R >16 Enterobacter, Klebsiella aerogenes, Citrobacter, and Serratia may develop resistance during prolonged therapy (3-4 days)      -  Cefepime: S <=2      -  Cefoxitin: S <=8      -  Ceftriaxone: S <=1 Enterobacter, Klebsiella aerogenes, Citrobacter, and Serratia may develop resistance during prolonged therapy      -  Ciprofloxacin: S <=0.25      -  Ertapenem: S <=0.5      -  Gentamicin: S <=2      -  Imipenem: S <=1      -  Levofloxacin: S <=0.5      -  Meropenem: S <=1      -  Piperacillin/Tazobactam: R 64      -  Tobramycin: S <=2      -  Trimethoprim/Sulfamethoxazole: S <=0.5/9.5      Method Type: ARABELLA    Culture - Blood (collected 27 Jan 2023 06:16)  Source: .Blood None  Final Report (01 Feb 2023 09:00):    No Growth Final    Culture - Blood (collected 27 Jan 2023 06:16)  Source: .Blood None  Final Report (01 Feb 2023 09:00):    No Growth Final    Radiology: all available radiological tests reviewed    < from: CT Chest No Cont (01.27.23 @ 09:53) >  Small bowel obstruction with transition point at the neck of a left lower   quadrant abdominal wall hernia. It is uncertain whether the obstruction   is due to the hernia itself or to adhesions.    Additional massive ventral hernia containing small and large bowel and   epigastric hernia containing stomach.    Right lower lobe nodules new since December 04, 2021 and could be   infectious or neoplastic. Follow-up chest CT in 3 months is recommended   to reevaluate.    < end of copied text >      Advanced directives addressed: full resuscitation

## 2023-02-09 NOTE — PROGRESS NOTE ADULT - ASSESSMENT
Nutrition Assessment    Type and Reason for Visit: Reassess    Nutrition Assessment: Pt c/o nausea at this time. Dinner preferences obtained with new menu items requested from when she originally ordered. Items will be added to meal tray to encourage intake. Pt encouraged to consume supplements as well.      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 She was noted with one episode of emesis and diffuse abdominal pain associated with fever. On 1/27 the patient was noted febrile to 102.8F, more lethargic, did not open eyes, did not follow commands. She received cefepime and metronidazole.     1. Intraabdominal hernia related fat necrosis with superimposed infection with KLOX. 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 Right lower lobes nodules Probable pneumonia. Allergy to cipro. Encephalopathy.   -leukocytosis resolving  -abdominal fistula draining  -BC x 2 noted  -urine c/s noted  -on cefepime 2 gm IV q12h and metronidazole 500 mg IV q8h # 13  -tolerating abx well so far; no side effects noted  -aspiration precautions  -continue abx coverage for now  -monitor abdomen  -monitor temps  -f/u CBC  -supportive care  2. Other issues:   -care per medicine    d/w Dr. Castro

## 2023-02-09 NOTE — CHART NOTE - NSCHARTNOTEFT_GEN_A_CORE
Clinical Nutrition PN Follow Up Note    * 73 y/o female with a PMH of CVA with left sided weakness, atrial fibrillation, emphysema, COPD, HTN - wears 2L NC at baseline BIBA, hx SBO and resection per EMS presents to the ED from [facility] for RLQ pain and r/o SBO. x1 episode of emesis, + profound diffuse abdominal pain, febrile. Has SBO, NGT to LWS. Taken to OR on  underwent ExLAP, extensive RICHARD, ileocolectomy, ventral hernia repair with MESH. Postop course complicated with ALVERTO, shock requiring pressors, transferred to ICU. + anasarca   DNR/ DNI.   **pt started on TF on 2/3 - began having loose stools and was leaking out of wounds; with EC fistula. TF now on hold, PPN re-started (). Remains with NGT and 1 DEA drain to suction.    *: Tx from SICU to [facility] (). s/p palliative GOC meeting - DNR, DNI, No feeding tube. PPN not addressed in GOC? Still has NGT to LWS.      *current status: Pt c/w PPN. S/p Palliative Care f/u -  family is agreeable to continuing all other measures to treat reversible issues. Spoke with surgical PA, plan to c/w PPN today. Discussed recommendation for PICC placement to help pt meet >/= 80% of ENN via TPN if plan is to c/w PN. If fistula is unable to be reversed, would consider to d/c PPN and allow for PO diet if possible ? comfort care? given GOC    *new pertinent meds: Lantus (8 units x 24 hours), Admelog (28 units x 24 hours), Synthroid, Sandostatin, Protonix, Dilaudid     *labs reviewed; K+ WNL. Hyponatremia noted, however when adjusted for hyperglycemia - Na+ on lower end of normal limits; will c/w current sodium content in PN and monitor and adjust based on tomorrows labs. Phos on lower end of normal limits and hypomagnesemia, will increase in bag again; monitor and adjust tomorrow based on new labs. POCTs continue to be elevated despite insulin given outside bag, SS adjusted, and 10 units of insulin added into bag, will increase insulin to 15 units inside bag. Last bili T WNL to c/w trace elements. Last TG level WNL to c/w 80g lipids daily. Corrected Ca WNL, rec'd add 10mEq of Calcium Gluconate outside of PN bag as CAPS is out.         133<L>  |  104  |  15  ----------------------------<  280<H>  4.7   |  24  |  0.54    Ca    7.5<L>      2023 05:22  Phos  2.7       Mg     1.4         TPro  5.8<L>  /  Alb  1.3<L>  /  TBili  0.4  /  DBili  x   /  AST  16  /  ALT  9<L>  /  AlkPhos  65      POCT Blood Glucose.: 280 mg/dL (2023 06:06)  POCT Blood Glucose.: 241 mg/dL (2023 23:57)  POCT Blood Glucose.: 221 mg/dL (2023 22:42)  POCT Blood Glucose.: 253 mg/dL (2023 17:56)  POCT Blood Glucose.: 294 mg/dL (2023 11:53)    *No I&O's doc'd yesterday ().   *Please document I&O's based on PN protocol    *pt with +3 generalized, and +4 L Leg and R Leg edema. Will monitor/adjust total PPN volume prn. TF continues to be on hold.     *malnutrition: Pt continues to meet criteria for severe protein-calorie malnutrition in context of acute disease r/t decreased ability to meet increased nutrient needs 2/2 SBO AEB <50% ENN x >11 days, severe muscle/ fat wasting, severe edema    Estimated Needs: based on 97.4 Kg (wt from admit - current wts being skewed by severe fluid retention)  Calories: 1948- 2435 Kcal (20- 25 Kcal/Kg)  Protein: 146- 175 g (1.5- 1.8 g/Kg)  Fluids: 1948- 2435 mL (20- 25 mL/Kg)    Weight History:  Daily Weight in k.6 (2023 06:00)   - #  Height (cm): 165.1 (23 @ 16:08)  Weight (kg): 97.4 (23 @ 23:30) - admit wt ()  BMI (kg/m2): 35.7 (23 @ 23:30)  BSA (m2): 2.04 (23 @ 23:30)    Weight History:  Height (cm): 165.1 (23 @ 16:08)  Weight (kg): 97.4 (23 @ 23:30)  BMI (kg/m2): 35.7 (23 @ 23:30)  BSA (m2): 2.04 (23 @ 23:30)    PPN Recommendations: via peripheral line  Total Volume: 2000 mL  80 g  Amino Acids  100 g Dextrose  80 g Lipids 20%  121 mEq Sodium Chloride  6 mEq Sodium Acetate  20 mmol Sodium Phosphate  11 mEq Potassium Chloride  5 mEq Potassium Acetate  50 mmol Potassium Phosphate  0 mEq Calcium Gluconate  20 mEq Magnesium Sulfate  200 mg Thiamine  1 ml Trace Elements  10 ml MVI    Total Calories: 1460 kcal  (Provides 60% of daily energy requirements and 46% of daily protein requirements)    (osmolarity <900)    Additional Recommendations:    1) Daily weights  2) Strict I & O's - please doc I&O's per PN protocol  3) Daily lyte checks including magnesium and phos  4) Weekly triglycerides/LFT checks  5) POCT q6hrs; maintain 140-180mg/dL  6) Confirm goals of care regarding LONG-TERM nutrition support specifically referring to PPN/TPN - pt is NOT on PPN (meeting <80% ENN x 7 days while on PPN). Would consider to D/C and allow for PO pleasure feeds if fistula is irreversible     RD will continue to monitor PPN, labs, hydration, and wt prn.   Liss Bruner, MS, RDN, [phone]  Bushra Soni, MS, RDN, [facility] [phone]  Certified Nutrition  Clinical Nutrition PN Follow Up Note    * 73 y/o female with a PMH of CVA with left sided weakness, atrial fibrillation, emphysema, COPD, HTN - wears 2L NC at baseline BIBA, hx SBO and resection per EMS presents to the ED from [facility] for RLQ pain and r/o SBO. x1 episode of emesis, + profound diffuse abdominal pain, febrile. Has SBO, NGT to LWS. Taken to OR on  underwent ExLAP, extensive RICHARD, ileocolectomy, ventral hernia repair with MESH. Postop course complicated with ALVERTO, shock requiring pressors, transferred to ICU. + anasarca   DNR/ DNI.   **pt started on TF on 2/3 - began having loose stools and was leaking out of wounds; with EC fistula. TF now on hold, PPN re-started (). Remains with NGT and 1 DEA drain to suction.    *: Tx from SICU to [facility] (). s/p palliative GOC meeting - DNR, DNI, No feeding tube. PPN not addressed in GOC? Still has NGT to LWS.      *current status: Pt c/w PPN. S/p Palliative Care f/u -  family is agreeable to continuing all other measures to treat reversible issues. Spoke with surgical PA, plan to c/w PPN today. Discussed recommendation for PICC placement to help pt meet >/= 80% of ENN via TPN if plan is to c/w PN. If fistula is unable to be reversed, would consider to d/c PPN and allow for PO diet if possible ? comfort care? given GOC    *new pertinent meds: Lantus (8 units x 24 hours), Admelog (28 units x 24 hours), Synthroid, Sandostatin, Protonix, Dilaudid     *labs reviewed; K+ WNL. Hyponatremia noted, however when adjusted for hyperglycemia - Na+ on lower end of normal limits; will c/w current sodium content in PN and monitor and adjust based on tomorrows labs. Phos on lower end of normal limits and hypomagnesemia, will increase in bag again; monitor and adjust tomorrow based on new labs. POCTs continue to be elevated despite insulin given outside bag, SS adjusted, and 10 units of insulin added into bag, will increase insulin to 15 units inside bag. Last bili T WNL to c/w trace elements. Last TG level WNL to c/w 80g lipids daily. Corrected Ca WNL, rec'd add 10mEq of Calcium Gluconate outside of PN bag as CAPS is out.         133<L>  |  104  |  15  ----------------------------<  280<H>  4.7   |  24  |  0.54    Ca    7.5<L>      2023 05:22  Phos  2.7       Mg     1.4         TPro  5.8<L>  /  Alb  1.3<L>  /  TBili  0.4  /  DBili  x   /  AST  16  /  ALT  9<L>  /  AlkPhos  65      POCT Blood Glucose.: 280 mg/dL (2023 06:06)  POCT Blood Glucose.: 241 mg/dL (2023 23:57)  POCT Blood Glucose.: 221 mg/dL (2023 22:42)  POCT Blood Glucose.: 253 mg/dL (2023 17:56)  POCT Blood Glucose.: 294 mg/dL (2023 11:53)    *No I&O's doc'd yesterday ().   *Please document I&O's based on PN protocol    *pt with +3 generalized, and +4 L Leg and R Leg edema. Will monitor/adjust total PPN volume prn. TF continues to be on hold.     *malnutrition: Pt continues to meet criteria for severe protein-calorie malnutrition in context of acute disease r/t decreased ability to meet increased nutrient needs 2/2 SBO AEB <50% ENN x >11 days, severe muscle/ fat wasting, severe edema    Estimated Needs: based on 97.4 Kg (wt from admit - current wts being skewed by severe fluid retention)  Calories: 1948- 2435 Kcal (20- 25 Kcal/Kg)  Protein: 146- 175 g (1.5- 1.8 g/Kg)  Fluids: 1948- 2435 mL (20- 25 mL/Kg)    Weight History:  Daily Weight in k.6 (2023 06:00)   - #  Height (cm): 165.1 (23 @ 16:08)  Weight (kg): 97.4 (23 @ 23:30) - admit wt ()  BMI (kg/m2): 35.7 (23 @ 23:30)  BSA (m2): 2.04 (23 @ 23:30)    Weight History:  Height (cm): 165.1 (23 @ 16:08)  Weight (kg): 97.4 (23 @ 23:30)  BMI (kg/m2): 35.7 (23 @ 23:30)  BSA (m2): 2.04 (23 @ 23:30)    PPN Recommendations: via peripheral line  Total Volume: 2000 mL  80 g  Amino Acids  100 g Dextrose  80 g Lipids 20%  121 mEq Sodium Chloride  6 mEq Sodium Acetate  20 mmol Sodium Phosphate  15 mEq Potassium Chloride  9 mEq Potassium Acetate  45 mmol Potassium Phosphate  0 mEq Calcium Gluconate  20 mEq Magnesium Sulfate  200 mg Thiamine  1 ml Trace Elements  10 ml MVI    Total Calories: 1460 kcal  (Provides 60% of daily energy requirements and 46% of daily protein requirements)    (osmolarity <900)    Additional Recommendations:    1) Daily weights  2) Strict I & O's - please doc I&O's per PN protocol  3) Daily lyte checks including magnesium and phos  4) Weekly triglycerides/LFT checks  5) POCT q6hrs; maintain 140-180mg/dL  6) Confirm goals of care regarding LONG-TERM nutrition support specifically referring to PPN/TPN - pt is NOT on PPN (meeting <80% ENN x 7 days while on PPN). Would consider to D/C and allow for PO pleasure feeds if fistula is irreversible     RD will continue to monitor PPN, labs, hydration, and wt prn.   Liss Bruner, MS, RDN, [phone]  Bushra Soni, MS, RDN, [facility] [phone]  Certified Nutrition  Clinical Nutrition PN Follow Up Note    * 73 y/o female with a PMH of CVA with left sided weakness, atrial fibrillation, emphysema, COPD, HTN - wears 2L NC at baseline BIBA, hx SBO and resection per EMS presents to the ED from [facility] for RLQ pain and r/o SBO. x1 episode of emesis, + profound diffuse abdominal pain, febrile. Has SBO, NGT to LWS. Taken to OR on  underwent ExLAP, extensive RICHARD, ileocolectomy, ventral hernia repair with MESH. Postop course complicated with ALVERTO, shock requiring pressors, transferred to ICU. + anasarca   DNR/ DNI.   **pt started on TF on 2/3 - began having loose stools and was leaking out of wounds; with EC fistula. TF now on hold, PPN re-started (). Remains with NGT and 1 DEA drain to suction.    *: Tx from SICU to [facility] (). s/p palliative GOC meeting - DNR, DNI, No feeding tube. PPN not addressed in GOC? Still has NGT to LWS.      *current status: Pt c/w PPN. S/p Palliative Care f/u -  family is agreeable to continuing all other measures to treat reversible issues. Spoke with surgical PA, plan to c/w PPN today. Discussed recommendation for PICC placement to help pt meet >/= 80% of ENN via TPN if plan is to c/w PN. If fistula is unable to be reversed, would consider to d/c PPN and allow for PO diet if possible ? comfort care? given GOC    *new pertinent meds: Lantus (8 units x 24 hours), Admelog (28 units x 24 hours), Synthroid, Sandostatin, Protonix, Dilaudid     *labs reviewed; K+ WNL. Hyponatremia noted, however when adjusted for hyperglycemia - Na+ on lower end of normal limits (136); will c/w current sodium content in PN and monitor and adjust based on tomorrows labs. Phos on lower end of normal limits and hypomagnesemia, will increase both in bag again; monitor and adjust tomorrow based on new labs. POCTs continue to be elevated despite insulin given outside bag, SS adjusted, and 10 units of insulin added into bag yesterday, will increase insulin to 15 units inside bag. Last bili T WNL to c/w trace elements. Last TG level WNL to c/w 80g lipids daily. Corrected Ca WNL, rec'd add 10mEq of Calcium Gluconate outside of PN bag as CAPS is out.         133<L>  |  104  |  15  ----------------------------<  280<H>  4.7   |  24  |  0.54    Ca    7.5<L>      2023 05:22  Phos  2.7       Mg     1.4         TPro  5.8<L>  /  Alb  1.3<L>  /  TBili  0.4  /  DBili  x   /  AST  16  /  ALT  9<L>  /  AlkPhos  65      POCT Blood Glucose.: 280 mg/dL (2023 06:06)  POCT Blood Glucose.: 241 mg/dL (2023 23:57)  POCT Blood Glucose.: 221 mg/dL (2023 22:42)  POCT Blood Glucose.: 253 mg/dL (2023 17:56)  POCT Blood Glucose.: 294 mg/dL (2023 11:53)    *No I&O's doc'd yesterday ().   *Please document I&O's based on PN protocol    *pt with +3 generalized, and +4 L Leg and R Leg edema. Will monitor/adjust total PPN volume prn. TF continues to be on hold.     *malnutrition: Pt continues to meet criteria for severe protein-calorie malnutrition in context of acute disease r/t decreased ability to meet increased nutrient needs 2/2 SBO AEB <50% ENN x >11 days, severe muscle/ fat wasting, severe edema    Estimated Needs: based on 97.4 Kg (wt from admit - current wts being skewed by severe fluid retention)  Calories: 1948- 2435 Kcal (20- 25 Kcal/Kg)  Protein: 146- 175 g (1.5- 1.8 g/Kg)  Fluids: 1948- 2435 mL (20- 25 mL/Kg)    Weight History:  Daily Weight in k.6 (2023 06:00)   - 220#  Height (cm): 165.1 (23 @ 16:08)  Weight (kg): 97.4 (23 @ 23:30) - admit wt ()  BMI (kg/m2): 35.7 (23 @ 23:30)  BSA (m2): 2.04 (23 @ 23:30)    Weight History:  Height (cm): 165.1 (23 @ 16:08)  Weight (kg): 97.4 (23 @ 23:30)  BMI (kg/m2): 35.7 (23 @ 23:30)  BSA (m2): 2.04 (23 @ 23:30)    PPN Recommendations: via peripheral line  Total Volume: 2000 mL  80 g  Amino Acids  100 g Dextrose  80 g Lipids 20%  121 mEq Sodium Chloride  6 mEq Sodium Acetate  20 mmol Sodium Phosphate  15 mEq Potassium Chloride  9 mEq Potassium Acetate  45 mmol Potassium Phosphate  0 mEq Calcium Gluconate  20 mEq Magnesium Sulfate  200 mg Thiamine  1 ml Trace Elements  10 ml MVI    Total Calories: 1460 kcal  (Provides 60% of daily energy requirements and 46% of daily protein requirements)    (osmolarity <900)    Additional Recommendations:    1) Daily weights  2) Strict I & O's - please doc I&O's per PN protocol  3) Daily lyte checks including magnesium and phos  4) Weekly triglycerides/LFT checks  5) POCT q6hrs; maintain 140-180mg/dL  6) Confirm goals of care regarding LONG-TERM nutrition support specifically referring to PPN/TPN - pt is NOT on PPN (meeting <80% ENN x 7 days while on PPN). Would consider to D/C and allow for PO pleasure feeds if fistula is irreversible     RD will continue to monitor PPN, labs, hydration, and wt prn.   Liss Bruner, MS, RDN, [phone]  Bushra Soni, MS, RDN, [facility] [phone]  Certified Nutrition  Clinical Nutrition PN Follow Up Note    * 73 y/o female with a PMH of CVA with left sided weakness, atrial fibrillation, emphysema, COPD, HTN - wears 2L NC at baseline BIBA, hx SBO and resection per EMS presents to the ED from [facility] for RLQ pain and r/o SBO. x1 episode of emesis, + profound diffuse abdominal pain, febrile. Has SBO, NGT to LWS. Taken to OR on  underwent ExLAP, extensive RICHARD, ileocolectomy, ventral hernia repair with MESH. Postop course complicated with ALVERTO, shock requiring pressors, transferred to ICU. + anasarca   DNR/ DNI.   **pt started on TF on 2/3 - began having loose stools and was leaking out of wounds; with EC fistula. TF now on hold, PPN re-started (). Remains with NGT and 1 DEA drain to suction.    *: Tx from SICU to [facility] (). s/p palliative GOC meeting - DNR, DNI, No feeding tube. PPN not addressed in GOC? Still has NGT to LWS.      *current status: Pt c/w PPN. S/p Palliative Care f/u -  family is agreeable to continuing all other measures to treat reversible issues. Spoke with surgical PA, plan to c/w PPN today. Discussed recommendation for PICC placement to help pt meet >/= 80% of ENN via TPN if plan is to c/w PN. If fistula is unable to be reversed, would consider to d/c PPN and allow for PO diet if possible ? comfort care? given GOC    *new pertinent meds: Lantus (8 units x 24 hours), Admelog (28 units x 24 hours), Synthroid, Sandostatin, Protonix, Dilaudid     *labs reviewed; K+ WNL. Hyponatremia noted, however when adjusted for hyperglycemia - Na+ on lower end of normal limits (136); will c/w current sodium content in PN and monitor and adjust based on tomorrows labs. Phos on lower end of normal limits and hypomagnesemia, will increase both in bag again; monitor and adjust tomorrow based on new labs. POCTs continue to be elevated despite insulin given outside bag, SS adjusted, and 10 units of insulin added into bag yesterday, will increase insulin to 15 units inside bag. Last bili T WNL to c/w trace elements. Last TG level WNL to c/w 80g lipids daily. Corrected Ca WNL, rec'd add 10mEq of Calcium Gluconate outside of PN bag as CAPS is out.         133<L>  |  104  |  15  ----------------------------<  280<H>  4.7   |  24  |  0.54    Ca    7.5<L>      2023 05:22  Phos  2.7       Mg     1.4         TPro  5.8<L>  /  Alb  1.3<L>  /  TBili  0.4  /  DBili  x   /  AST  16  /  ALT  9<L>  /  AlkPhos  65      POCT Blood Glucose.: 280 mg/dL (2023 06:06)  POCT Blood Glucose.: 241 mg/dL (2023 23:57)  POCT Blood Glucose.: 221 mg/dL (2023 22:42)  POCT Blood Glucose.: 253 mg/dL (2023 17:56)  POCT Blood Glucose.: 294 mg/dL (2023 11:53)    *No I&O's doc'd yesterday ().   *Please document I&O's based on PN protocol    *pt with +3 generalized, and +4 L Leg and R Leg edema. Will monitor/adjust total PPN volume prn. TF continues to be on hold.     *malnutrition: Pt continues to meet criteria for severe protein-calorie malnutrition in context of acute disease r/t decreased ability to meet increased nutrient needs 2/2 SBO AEB <50% ENN x >11 days, severe muscle/ fat wasting, severe edema    Estimated Needs: based on 97.4 Kg (wt from admit - current wts being skewed by severe fluid retention)  Calories: 1948- 2435 Kcal (20- 25 Kcal/Kg)  Protein: 146- 175 g (1.5- 1.8 g/Kg)  Fluids: 1948- 2435 mL (20- 25 mL/Kg)    Weight History:  Daily Weight in k.6 (2023 06:00)   - 220#  Height (cm): 165.1 (23 @ 16:08)  Weight (kg): 97.4 (23 @ 23:30) - admit wt ()  BMI (kg/m2): 35.7 (23 @ 23:30)  BSA (m2): 2.04 (23 @ 23:30)    Weight History:  Height (cm): 165.1 (23 @ 16:08)  Weight (kg): 97.4 (23 @ 23:30)  BMI (kg/m2): 35.7 (23 @ 23:30)  BSA (m2): 2.04 (23 @ 23:30)    PPN Recommendations: via peripheral line  Total Volume: 2000 mL  80 g  Amino Acids  100 g Dextrose  80 g Lipids 20%  121 mEq Sodium Chloride  6 mEq Sodium Acetate  20 mmol Sodium Phosphate  15 mEq Potassium Chloride  9 mEq Potassium Acetate  45 mmol Potassium Phosphate  0 mEq Calcium Gluconate (CAPS out of PN solution)  20 mEq Magnesium Sulfate  200 mg Thiamine  1 ml Trace Elements  10 ml MVI  15 Units Regular Insulin    Total Calories: 1460 kcal  (Provides 60% of daily energy requirements and 46% of daily protein requirements)    (osmolarity <900)    Additional Recommendations:    1) Daily weights  2) Strict I & O's - please doc I&O's per PN protocol  3) Daily lyte checks including magnesium and phos  4) Weekly triglycerides/LFT checks  5) POCT q6hrs; maintain 140-180mg/dL  6) Confirm goals of care regarding LONG-TERM nutrition support specifically referring to PPN/TPN - pt is NOT on TPN (meeting <80% ENN x 7 days while on PPN). Would consider to D/C and allow for PO pleasure feeds if fistula is irreversible as per GOC     RD will continue to monitor PPN, labs, hydration, and wt prn.   Liss Bruner, MS, RDN, [phone]  Bushra Soni, MS, RDN, [facility] [phone]  Certified Nutrition

## 2023-02-09 NOTE — PROGRESS NOTE ADULT - ASSESSMENT
Left side entero atmospheric fistula. Drainage catheters in place. Needs complex fistula system for wound care, Will discuss with wound care. DC NGT. Continue TPN, abx. Palliative consult.

## 2023-02-09 NOTE — PROGRESS NOTE ADULT - SUBJECTIVE AND OBJECTIVE BOX
RACHANA BARGER  72y  Female      Patient is a 72y old  Female who presents with a chief complaint of bowel obstruction/ischemic mesentery (09 Feb 2023 12:40)      INTERVAL HPI/OVERNIGHT EVENTS:  Seen and examined this AM, lethargic, not following commands  Abdominal surgical site with some dehiscence, surrounding erythema and induration, surg PA at bedside    REVIEW OF SYSTEMS:  Unable to follow commands    T(C): 37.1 (02-09-23 @ 08:11), Max: 37.1 (02-09-23 @ 06:54)  HR: 94 (02-09-23 @ 09:09) (90 - 101)  BP: 146/63 (02-09-23 @ 08:11) (132/61 - 163/86)  RR: 18 (02-09-23 @ 08:11) (18 - 18)  SpO2: 96% (02-09-23 @ 09:09) (92% - 100%)  Wt(kg): --Vital Signs Last 24 Hrs  T(C): 37.1 (09 Feb 2023 08:11), Max: 37.1 (09 Feb 2023 06:54)  T(F): 98.7 (09 Feb 2023 08:11), Max: 98.8 (09 Feb 2023 06:54)  HR: 94 (09 Feb 2023 09:09) (90 - 101)  BP: 146/63 (09 Feb 2023 08:11) (132/61 - 163/86)  BP(mean): --  RR: 18 (09 Feb 2023 08:11) (18 - 18)  SpO2: 96% (09 Feb 2023 09:09) (92% - 100%)    Parameters below as of 09 Feb 2023 09:09  Patient On (Oxygen Delivery Method): transtracheal catheter,2L        PHYSICAL EXAM:  GENERAL: Obese, lethargic, appears ill  HEAD:  Atraumatic, Normocephalic  ENMT: +NGT+   NECK: Supple, No JVD, Normal thyroid  NERVOUS SYSTEM:  Lethargic, opens eyes, not following commands  CHEST/LUNG: Clear to percussion bilaterally; No rales, rhonchi, wheezing, or rubs  HEART: Regular rate and rhythm; No murmurs, rubs, or gallops  ABDOMEN: Mid abd incision some dehiscence in middle, surrounding erythema, induration  EXTREMITIES:  2+ Peripheral Pulses, No clubbing, cyanosis, or edema  LYMPH: No lymphadenopathy noted  SKIN: No rashes or lesions    Consultant(s) Notes Reviewed:  [x ] YES  [ ] NO  Care Discussed with Consultants/Other Providers [ x] YES  [ ] NO  LABS:                        9.3    11.40 )-----------( 265      ( 09 Feb 2023 05:22 )             28.4     02-09    133<L>  |  104  |  15  ----------------------------<  280<H>  4.7   |  24  |  0.54    Ca    7.5<L>      09 Feb 2023 05:22  Phos  2.7     02-09  Mg     1.4     02-09    TPro  5.8<L>  /  Alb  1.3<L>  /  TBili  0.4  /  DBili  x   /  AST  16  /  ALT  9<L>  /  AlkPhos  65  02-09        CAPILLARY BLOOD GLUCOSE      POCT Blood Glucose.: 287 mg/dL (09 Feb 2023 12:26)  POCT Blood Glucose.: 280 mg/dL (09 Feb 2023 06:06)  POCT Blood Glucose.: 241 mg/dL (08 Feb 2023 23:57)  POCT Blood Glucose.: 221 mg/dL (08 Feb 2023 22:42)  POCT Blood Glucose.: 253 mg/dL (08 Feb 2023 17:56)            RADIOLOGY & ADDITIONAL TESTS:    Imaging Personally Reviewed:  [ ] YES  [ ] NO    HEALTH ISSUES - PROBLEM Dx:

## 2023-02-09 NOTE — PROGRESS NOTE ADULT - SUBJECTIVE AND OBJECTIVE BOX
Subjective:    pat condition unchanged, lying in bed, no respiratory distress.    Home Medications:  Albuterol (Eqv-ProAir HFA) 90 mcg/inh inhalation aerosol: 1 puff(s) inhaled every 6 hours, As Needed (20 Jan 2023 16:49)  amiodarone 200 mg oral tablet: 1 tab(s) orally once a day (20 Jan 2023 16:49)  ascorbic acid 500 mg oral tablet: 2 tab(s) orally once a day (20 Jan 2023 16:49)  atorvastatin 10 mg oral tablet: 1 tab(s) orally once a day (at bedtime) (20 Jan 2023 16:49)  benzonatate 100 mg oral capsule: 1 cap(s) orally 3 times a day (20 Jan 2023 21:48)  budesonide 0.5 mg/2 mL inhalation suspension: 2 milliliter(s) inhaled 2 times a day (20 Jan 2023 21:48)  Cepacol Sore Throat 15 mg-3.6 mg mucous membrane lozenge: 1 lozenge mucous membrane every 4 hours, As Needed (20 Jan 2023 21:48)  cholecalciferol 1250 mcg (50,000 intl units) oral capsule: 1 cap(s) orally every 4 weeks on Wednesday  (20 Jan 2023 16:49)  Coumadin 2.5 mg oral tablet: 1 tab(s) orally once a day (at bedtime)  ***ON HOLD from 1-16 to 1-21*** (20 Jan 2023 21:48)  Cranberry oral tablet: 1 tab(s) orally 2 times a day (20 Jan 2023 16:49)  cyanocobalamin 1000 mcg oral tablet: 1 tab(s) orally once a day (20 Jan 2023 16:49)  dilTIAZem 180 mg/24 hours oral capsule, extended release: 1 cap(s) orally once a day (20 Jan 2023 16:49)  DULoxetine 60 mg oral delayed release capsule: 1 cap(s) orally once a day (20 Jan 2023 16:49)  estradiol 0.1 mg/g vaginal cream: 0.5 gram(s) vaginal 2 times a week on Monday and Thursday (20 Jan 2023 21:48)  fexofenadine 180 mg oral tablet: 1 tab(s) orally once a day (20 Jan 2023 21:48)  fluticasone 50 mcg/inh nasal spray: 1 spray(s) nasal 2 times a day (20 Jan 2023 16:49)  furosemide 20 mg oral tablet: 1 tab(s) orally once a day (20 Jan 2023 16:49)  glipiZIDE 10 mg oral tablet, extended release: 2 tab(s) orally once a day (20 Jan 2023 16:49)  GlycoLax oral powder for reconstitution: 17 gram(s) orally 2 times a day (20 Jan 2023 16:49)  guaiFENesin 100 mg/5 mL oral liquid: 20 milliliter(s) orally every 6 hours (20 Jan 2023 21:48)  HumaLOG KwikPen 100 units/mL injectable solution: 10 unit(s) injectable 3 times a day (before meals) (20 Jan 2023 21:48)  ipratropium-albuterol 20 mcg-100 mcg/inh inhalation aerosol: 1 puff(s) inhaled 4 times a day (20 Jan 2023 16:49)  levothyroxine 88 mcg (0.088 mg) oral tablet: 1 tab(s) orally once a day (at bedtime) (20 Jan 2023 16:49)  losartan 25 mg oral tablet: 1 tab(s) orally once a day (20 Jan 2023 16:49)  Macrobid 100 mg oral capsule: 1 cap(s) orally 2 times a day for 5 days  ***course complete*** (20 Jan 2023 21:48)  methocarbamol 750 mg oral tablet: 1 tab(s) orally every 8 hours, As Needed (20 Jan 2023 21:48)  metoprolol tartrate 25 mg oral tablet: 1 tab(s) orally 2 times a day (20 Jan 2023 16:49)  Milk of Magnesia 8% oral suspension: 30 milliliter(s) orally once a day, As Needed (20 Jan 2023 16:49)  montelukast 10 mg oral tablet: 1 tab(s) orally once a day (at bedtime) (20 Jan 2023 21:48)  ondansetron 4 mg oral tablet: 1 tab(s) orally every 4 hours, As Needed (20 Jan 2023 21:48)  oxybutynin 5 mg oral tablet: 1 tab(s) orally 2 times a day (20 Jan 2023 16:49)  oxyCODONE 5 mg oral tablet: 1 tab(s) orally every 4 hours, As Needed (20 Jan 2023 16:49)  phytonadione 5 mg oral tablet: 0.5 tab(s) orally once  ***given at [facility] once on 1-19-23*** (20 Jan 2023 21:48)  pregabalin 100 mg oral capsule: 1 cap(s) orally 3 times a day (20 Jan 2023 16:49)  sennosides-docusate 8.6 mg-50 mg oral tablet: 2 tab(s) orally once a day (at bedtime) (20 Jan 2023 16:49)  Tradjenta 5 mg oral tablet: 1 tab(s) orally once a day (20 Jan 2023 16:49)  Tylenol 500 mg oral tablet: 2 tab(s) orally every 8 hours (20 Jan 2023 16:49)    MEDICATIONS  (STANDING):  albuterol    0.083% 2.5 milliGRAM(s) Nebulizer every 6 hours  aMIOdarone    Tablet 200 milliGRAM(s) Oral daily  buDESOnide    Inhalation Suspension 0.5 milliGRAM(s) Inhalation every 12 hours  cefepime   IVPB 2000 milliGRAM(s) IV Intermittent every 12 hours  chlorhexidine 4% Liquid 1 Application(s) Topical <User Schedule>  coronavirus bivalent (EUA) Booster Vaccine (PFIZER) 0.3 milliLiter(s) IntraMuscular once  dextrose 5%. 1000 milliLiter(s) (50 mL/Hr) IV Continuous <Continuous>  dextrose 5%. 1000 milliLiter(s) (100 mL/Hr) IV Continuous <Continuous>  dextrose 50% Injectable 25 Gram(s) IV Push once  dextrose 50% Injectable 12.5 Gram(s) IV Push once  dextrose 50% Injectable 25 Gram(s) IV Push once  enoxaparin Injectable 100 milliGRAM(s) SubCutaneous every 12 hours  fat emulsion (Fish Oil and Plant Based) 20% Infusion 0.821 Gm/kG/Day (33.33 mL/Hr) IV Continuous <Continuous>  fat emulsion (Fish Oil and Plant Based) 20% Infusion 0.821 Gm/kG/Day (33.33 mL/Hr) IV Continuous <Continuous>  glucagon  Injectable 1 milliGRAM(s) IntraMuscular once  insulin glargine Injectable (LANTUS) 8 Unit(s) SubCutaneous at bedtime  insulin lispro (ADMELOG) corrective regimen sliding scale   SubCutaneous every 6 hours  levothyroxine Injectable 70 MICROGram(s) IV Push at bedtime  metoprolol tartrate IVPB 5 milliGRAM(s) IV Intermittent every 6 hours  metroNIDAZOLE  IVPB 500 milliGRAM(s) IV Intermittent every 8 hours  nystatin Cream 1 Application(s) Topical two times a day  nystatin Powder 1 Application(s) Topical every 12 hours  octreotide  Injectable 75 MICROGram(s) SubCutaneous every 8 hours  pantoprazole  Injectable 40 milliGRAM(s) IV Push daily  Parenteral Nutrition - Adult 1 Each (83 mL/Hr) TPN Continuous <Continuous>  Parenteral Nutrition - Adult 1 Each (83 mL/Hr) TPN Continuous <Continuous>    MEDICATIONS  (PRN):  acetaminophen     Tablet .. 650 milliGRAM(s) Oral every 6 hours PRN Temp greater or equal to 38C (100.4F)  albuterol    90 MICROgram(s) HFA Inhaler 2 Puff(s) Inhalation every 6 hours PRN Shortness of Breath and/or Wheezing  dextrose Oral Gel 15 Gram(s) Oral once PRN Blood Glucose LESS THAN 70 milliGRAM(s)/deciliter  hydrALAZINE Injectable 10 milliGRAM(s) IV Push every 6 hours PRN SBP>160  HYDROmorphone  Injectable 0.5 milliGRAM(s) IV Push every 3 hours PRN Severe Pain (7 - 10)  sodium chloride 0.9% lock flush 10 milliLiter(s) IV Push every 1 hour PRN Pre/post blood products, medications, blood draw, and to maintain line patency      Allergies    Cipro (Rash)  Spiriva (Rash)    Intolerances        Vital Signs Last 24 Hrs  T(C): 37.1 (09 Feb 2023 08:11), Max: 37.1 (09 Feb 2023 06:54)  T(F): 98.7 (09 Feb 2023 08:11), Max: 98.8 (09 Feb 2023 06:54)  HR: 94 (09 Feb 2023 09:09) (92 - 101)  BP: 146/63 (09 Feb 2023 08:11) (132/61 - 163/86)  BP(mean): --  RR: 18 (09 Feb 2023 08:11) (18 - 18)  SpO2: 96% (09 Feb 2023 09:09) (92% - 100%)    Parameters below as of 09 Feb 2023 09:09  Patient On (Oxygen Delivery Method): transtracheal catheter,2L          PHYSICAL EXAMINATION:    NECK:  Supple. No lymphadenopathy. Jugular venous pressure not elevated. Carotids equal.   HEART:   The cardiac impulse has a normal quality. Reg., Nl S1 and S2.  There are no murmurs, rubs or gallops noted  CHEST:  Chest crackles to auscultation. Normal respiratory effort.  ABDOMEN:  Soft and nontender.   EXTREMITIES:  There is no edema.       LABS:                        9.3    11.40 )-----------( 265      ( 09 Feb 2023 05:22 )             28.4     02-09    133<L>  |  104  |  15  ----------------------------<  280<H>  4.7   |  24  |  0.54    Ca    7.5<L>      09 Feb 2023 05:22  Phos  2.7     02-09  Mg     1.4     02-09    TPro  5.8<L>  /  Alb  1.3<L>  /  TBili  0.4  /  DBili  x   /  AST  16  /  ALT  9<L>  /  AlkPhos  65  02-09

## 2023-02-09 NOTE — PROGRESS NOTE ADULT - ASSESSMENT
Patient admitted with abdominal pain, nausea and vomitting , dehydration  septic shock, likely related to     PROBLEMS:    UTI klebsiella pneumoniae and aspiration PNA /SBO- ventral hernia  New R lung nodules - cannot ruleout aspiration PNA   Acute metabolic encephalopathy  Sepsis   Hx copd without exacerbation  Anasarca/Acute on  chronic diastolic heart failure       Plan:    pulmonary stable  Blood transfusion, on TPN, IV fluids, iv lasix post transfusion  NG suction-Incarcerated Ventral Hernia with SBO-S/p extensive RICHARD, ileo colectomy, giant ventral hernia repair with Surgimend-surgery fu for persistant sutures leak  IV cefepime/flagyl-for  sepsis ( SBO/UTI)  Monitor Cr  Surgery fu  D/w staff  DVT PROPHYLASIX

## 2023-02-10 LAB
ALBUMIN SERPL ELPH-MCNC: 1.3 G/DL — LOW (ref 3.3–5)
ALP SERPL-CCNC: 72 U/L — SIGNIFICANT CHANGE UP (ref 40–120)
ALT FLD-CCNC: 11 U/L — LOW (ref 12–78)
ANION GAP SERPL CALC-SCNC: 7 MMOL/L — SIGNIFICANT CHANGE UP (ref 5–17)
AST SERPL-CCNC: 18 U/L — SIGNIFICANT CHANGE UP (ref 15–37)
BASOPHILS # BLD AUTO: 0.15 K/UL — SIGNIFICANT CHANGE UP (ref 0–0.2)
BASOPHILS NFR BLD AUTO: 1.5 % — SIGNIFICANT CHANGE UP (ref 0–2)
BILIRUB SERPL-MCNC: 0.5 MG/DL — SIGNIFICANT CHANGE UP (ref 0.2–1.2)
BUN SERPL-MCNC: 16 MG/DL — SIGNIFICANT CHANGE UP (ref 7–23)
CALCIUM SERPL-MCNC: 7.6 MG/DL — LOW (ref 8.5–10.1)
CHLORIDE SERPL-SCNC: 101 MMOL/L — SIGNIFICANT CHANGE UP (ref 96–108)
CO2 SERPL-SCNC: 23 MMOL/L — SIGNIFICANT CHANGE UP (ref 22–31)
CREAT SERPL-MCNC: 0.62 MG/DL — SIGNIFICANT CHANGE UP (ref 0.5–1.3)
EGFR: 95 ML/MIN/1.73M2 — SIGNIFICANT CHANGE UP
EOSINOPHIL # BLD AUTO: 0.12 K/UL — SIGNIFICANT CHANGE UP (ref 0–0.5)
EOSINOPHIL NFR BLD AUTO: 1.2 % — SIGNIFICANT CHANGE UP (ref 0–6)
GLUCOSE BLDC GLUCOMTR-MCNC: 276 MG/DL — HIGH (ref 70–99)
GLUCOSE BLDC GLUCOMTR-MCNC: 327 MG/DL — HIGH (ref 70–99)
GLUCOSE BLDC GLUCOMTR-MCNC: 351 MG/DL — HIGH (ref 70–99)
GLUCOSE BLDC GLUCOMTR-MCNC: 354 MG/DL — HIGH (ref 70–99)
GLUCOSE SERPL-MCNC: 315 MG/DL — HIGH (ref 70–99)
HCT VFR BLD CALC: 29.5 % — LOW (ref 34.5–45)
HGB BLD-MCNC: 9.7 G/DL — LOW (ref 11.5–15.5)
IMM GRANULOCYTES NFR BLD AUTO: 10.4 % — HIGH (ref 0–0.9)
LYMPHOCYTES # BLD AUTO: 1.34 K/UL — SIGNIFICANT CHANGE UP (ref 1–3.3)
LYMPHOCYTES # BLD AUTO: 13.3 % — SIGNIFICANT CHANGE UP (ref 13–44)
MAGNESIUM SERPL-MCNC: 1.7 MG/DL — SIGNIFICANT CHANGE UP (ref 1.6–2.6)
MCHC RBC-ENTMCNC: 28 PG — SIGNIFICANT CHANGE UP (ref 27–34)
MCHC RBC-ENTMCNC: 32.9 GM/DL — SIGNIFICANT CHANGE UP (ref 32–36)
MCV RBC AUTO: 85.3 FL — SIGNIFICANT CHANGE UP (ref 80–100)
MONOCYTES # BLD AUTO: 0.67 K/UL — SIGNIFICANT CHANGE UP (ref 0–0.9)
MONOCYTES NFR BLD AUTO: 6.6 % — SIGNIFICANT CHANGE UP (ref 2–14)
NEUTROPHILS # BLD AUTO: 6.78 K/UL — SIGNIFICANT CHANGE UP (ref 1.8–7.4)
NEUTROPHILS NFR BLD AUTO: 67 % — SIGNIFICANT CHANGE UP (ref 43–77)
PHOSPHATE SERPL-MCNC: 3 MG/DL — SIGNIFICANT CHANGE UP (ref 2.5–4.5)
PLATELET # BLD AUTO: 297 K/UL — SIGNIFICANT CHANGE UP (ref 150–400)
POTASSIUM SERPL-MCNC: 4.6 MMOL/L — SIGNIFICANT CHANGE UP (ref 3.5–5.3)
POTASSIUM SERPL-SCNC: 4.6 MMOL/L — SIGNIFICANT CHANGE UP (ref 3.5–5.3)
PROT SERPL-MCNC: 6.3 GM/DL — SIGNIFICANT CHANGE UP (ref 6–8.3)
RBC # BLD: 3.46 M/UL — LOW (ref 3.8–5.2)
RBC # FLD: 20.4 % — HIGH (ref 10.3–14.5)
SODIUM SERPL-SCNC: 131 MMOL/L — LOW (ref 135–145)
WBC # BLD: 10.11 K/UL — SIGNIFICANT CHANGE UP (ref 3.8–10.5)
WBC # FLD AUTO: 10.11 K/UL — SIGNIFICANT CHANGE UP (ref 3.8–10.5)

## 2023-02-10 PROCEDURE — 99233 SBSQ HOSP IP/OBS HIGH 50: CPT

## 2023-02-10 RX ORDER — INSULIN GLARGINE 100 [IU]/ML
20 INJECTION, SOLUTION SUBCUTANEOUS AT BEDTIME
Refills: 0 | Status: DISCONTINUED | OUTPATIENT
Start: 2023-02-10 | End: 2023-02-20

## 2023-02-10 RX ORDER — ELECTROLYTE SOLUTION,INJ
1 VIAL (ML) INTRAVENOUS
Refills: 0 | Status: DISCONTINUED | OUTPATIENT
Start: 2023-02-10 | End: 2023-02-10

## 2023-02-10 RX ADMIN — OCTREOTIDE ACETATE 75 MICROGRAM(S): 200 INJECTION, SOLUTION INTRAVENOUS; SUBCUTANEOUS at 05:25

## 2023-02-10 RX ADMIN — ENOXAPARIN SODIUM 100 MILLIGRAM(S): 100 INJECTION SUBCUTANEOUS at 10:03

## 2023-02-10 RX ADMIN — Medication 0.5 MILLIGRAM(S): at 20:21

## 2023-02-10 RX ADMIN — Medication 100 MILLIGRAM(S): at 05:25

## 2023-02-10 RX ADMIN — Medication 6: at 05:31

## 2023-02-10 RX ADMIN — ENOXAPARIN SODIUM 100 MILLIGRAM(S): 100 INJECTION SUBCUTANEOUS at 21:15

## 2023-02-10 RX ADMIN — Medication 110 MILLIGRAM(S): at 13:05

## 2023-02-10 RX ADMIN — NYSTATIN CREAM 1 APPLICATION(S): 100000 CREAM TOPICAL at 21:18

## 2023-02-10 RX ADMIN — CEFEPIME 100 MILLIGRAM(S): 1 INJECTION, POWDER, FOR SOLUTION INTRAMUSCULAR; INTRAVENOUS at 10:03

## 2023-02-10 RX ADMIN — NYSTATIN CREAM 1 APPLICATION(S): 100000 CREAM TOPICAL at 10:03

## 2023-02-10 RX ADMIN — HYDROMORPHONE HYDROCHLORIDE 0.5 MILLIGRAM(S): 2 INJECTION INTRAMUSCULAR; INTRAVENOUS; SUBCUTANEOUS at 12:15

## 2023-02-10 RX ADMIN — HYDROMORPHONE HYDROCHLORIDE 0.5 MILLIGRAM(S): 2 INJECTION INTRAMUSCULAR; INTRAVENOUS; SUBCUTANEOUS at 00:21

## 2023-02-10 RX ADMIN — INSULIN GLARGINE 20 UNIT(S): 100 INJECTION, SOLUTION SUBCUTANEOUS at 22:56

## 2023-02-10 RX ADMIN — CHLORHEXIDINE GLUCONATE 1 APPLICATION(S): 213 SOLUTION TOPICAL at 05:31

## 2023-02-10 RX ADMIN — Medication 0.5 MILLIGRAM(S): at 07:44

## 2023-02-10 RX ADMIN — CEFEPIME 100 MILLIGRAM(S): 1 INJECTION, POWDER, FOR SOLUTION INTRAMUSCULAR; INTRAVENOUS at 21:09

## 2023-02-10 RX ADMIN — Medication 10: at 22:57

## 2023-02-10 RX ADMIN — ALBUTEROL 2.5 MILLIGRAM(S): 90 AEROSOL, METERED ORAL at 20:21

## 2023-02-10 RX ADMIN — Medication 70 MICROGRAM(S): at 22:51

## 2023-02-10 RX ADMIN — Medication 110 MILLIGRAM(S): at 17:37

## 2023-02-10 RX ADMIN — ALBUTEROL 2.5 MILLIGRAM(S): 90 AEROSOL, METERED ORAL at 01:45

## 2023-02-10 RX ADMIN — OCTREOTIDE ACETATE 75 MICROGRAM(S): 200 INJECTION, SOLUTION INTRAVENOUS; SUBCUTANEOUS at 21:18

## 2023-02-10 RX ADMIN — HYDROMORPHONE HYDROCHLORIDE 0.5 MILLIGRAM(S): 2 INJECTION INTRAMUSCULAR; INTRAVENOUS; SUBCUTANEOUS at 05:56

## 2023-02-10 RX ADMIN — Medication 100 MILLIGRAM(S): at 15:01

## 2023-02-10 RX ADMIN — NYSTATIN CREAM 1 APPLICATION(S): 100000 CREAM TOPICAL at 17:25

## 2023-02-10 RX ADMIN — Medication 1 EACH: at 23:32

## 2023-02-10 RX ADMIN — Medication 10: at 17:25

## 2023-02-10 RX ADMIN — PANTOPRAZOLE SODIUM 40 MILLIGRAM(S): 20 TABLET, DELAYED RELEASE ORAL at 10:09

## 2023-02-10 RX ADMIN — NYSTATIN CREAM 1 APPLICATION(S): 100000 CREAM TOPICAL at 05:24

## 2023-02-10 RX ADMIN — ALBUTEROL 2.5 MILLIGRAM(S): 90 AEROSOL, METERED ORAL at 13:43

## 2023-02-10 RX ADMIN — Medication 100 MILLIGRAM(S): at 21:17

## 2023-02-10 RX ADMIN — ALBUTEROL 2.5 MILLIGRAM(S): 90 AEROSOL, METERED ORAL at 07:44

## 2023-02-10 RX ADMIN — Medication 8: at 12:53

## 2023-02-10 RX ADMIN — Medication 110 MILLIGRAM(S): at 05:25

## 2023-02-10 RX ADMIN — OCTREOTIDE ACETATE 75 MICROGRAM(S): 200 INJECTION, SOLUTION INTRAVENOUS; SUBCUTANEOUS at 15:00

## 2023-02-10 NOTE — PROGRESS NOTE ADULT - ASSESSMENT
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 She was noted with one episode of emesis and diffuse abdominal pain associated with fever. On 1/27 the patient was noted febrile to 102.8F, more lethargic, did not open eyes, did not follow commands. She received cefepime and metronidazole.     1. Intraabdominal hernia related fat necrosis with superimposed infection with KLOX. 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 Right lower lobes nodules Probable pneumonia. Allergy to cipro. Encephalopathy.   -leukocytosis resolving  -abdominal fistula draining  -BC x 2 noted  -urine c/s noted  -on cefepime 2 gm IV q12h and metronidazole 500 mg IV q8h # 14  -tolerating abx well so far; no side effects noted  -aspiration precautions  -continue abx coverage for now  -fistular drainage is contained  -monitor abdomen  -monitor temps  -f/u CBC  -supportive care  2. Other issues:   -care per medicine    d/w Dr. Castro

## 2023-02-10 NOTE — CHART NOTE - NSCHARTNOTEFT_GEN_A_CORE
Clinical Nutrition PN Follow Up Note    * 73 y/o female with a PMH of CVA with left sided weakness, atrial fibrillation, emphysema, COPD, HTN - wears 2L NC at baseline BIBA, hx SBO and resection per EMS presents to the ED from [facility] for RLQ pain and r/o SBO. x1 episode of emesis, + profound diffuse abdominal pain, febrile. Has SBO, NGT to LWS. Taken to OR on  underwent ExLAP, extensive RICHARD, ileocolectomy, ventral hernia repair with MESH. Postop course complicated with ALVERTO, shock requiring pressors, transferred to ICU. + anasarca   DNR/ DNI.   **pt started on TF on 2/3 - began having loose stools and was leaking out of wounds; with EC fistula. TF now on hold, PPN re-started (). Remains with NGT and 1 DEA drain to suction.    *: Tx from SICU to [facility] (). s/p palliative GOC meeting - DNR, DNI, No feeding tube. PPN not addressed in GOC? Still has NGT to LWS.    *: Pt c/w PPN. S/p Palliative Care f/u -  family is agreeable to continuing all other measures to treat reversible issues. Spoke with surgical PA, plan to c/w PPN today. Discussed recommendation for PICC placement to help pt meet >/= 80% of ENN via TPN if plan is to c/w PN. If fistula is unable to be reversed, would consider to d/c PPN and allow for PO diet if possible ? comfort care? given GOC    *current status: NGT removed - PPN still not being addressed whether or not to continue or within GOC.   Left side entero atmospheric fistula. Needs complex fistula system for wound care as per Dr. Castro note    *new pertinent meds: Lantus (0 units x 24 hours), Admelog (30 units x 24 hours), Synthroid, Sandostatin, Protonix, Dilaudid     *labs reviewed; K+ WNL. Hyponatremia noted, however when adjusted for hyperglycemia - Na+ on lower end of normal limits (134); will c/w current sodium content in PN and will increase in bag tomorrow if remains low.    Phos now WNL. Hypomagnesemia continues, will increase in bag again; monitor and adjust tomorrow based on new labs.    POCTs continue to be elevated despite insulin given outside bag, SS adjusted, increase insulin in bag to 20 units.    Last bili T WNL to c/w trace elements.     New triglyceride level 399, will remove lipids.   Corrected Ca WNL, rec'd add 10mEq of Calcium Gluconate outside of PN bag as CAPS is out.     02-10    131<L>  |  101  |  16  ----------------------------<  315<H>  4.6   |  23  |  0.62    Ca    7.6<L>      10 Feb 2023 05:26  Phos  3.0     02-10  Mg     1.7     02-10    TPro  6.3  /  Alb  1.3<L>  /  TBili  0.5  /  DBili  x   /  AST  18  /  ALT  11<L>  /  AlkPhos  72  02-10    POCT Blood Glucose.: 276 mg/dL (10 Feb 2023 05:28)  POCT Blood Glucose.: 255 mg/dL (2023 22:20)  POCT Blood Glucose.: 282 mg/dL (2023 17:13)  POCT Blood Glucose.: 287 mg/dL (2023 12:26)    *I&O's Detail - Strict I&O's are rec'd when pt is on PN as per protocol     *pt with +3 generalized, and +4 L Leg and R Leg edema. Will monitor/adjust total PPN volume prn. TF continues to be on hold.     *malnutrition: Pt continues to meet criteria for severe protein-calorie malnutrition in context of acute disease r/t decreased ability to meet increased nutrient needs 2/2 SBO AEB <50% ENN x >11 days, severe muscle/ fat wasting, severe edema    Estimated Needs: based on 97.4 Kg (wt from admit - current wts being skewed by severe fluid retention)  Calories: 1948- 2435 Kcal (20- 25 Kcal/Kg)  Protein: 146- 175 g (1.5- 1.8 g/Kg)  Fluids: 1948- 2435 mL (20- 25 mL/Kg)    Weight History:  Daily Weight in k.6 (2023 06:00)   - 220#  Height (cm): 165.1 (23 @ 16:08)  Weight (kg): 97.4 (23 @ 23:30) - admit wt ()  BMI (kg/m2): 35.7 (23 @ 23:30)  BSA (m2): 2.04 (23 @ 23:30)    Weight History:  Height (cm): 165.1 (23 @ 16:08)  Weight (kg): 97.4 (23 @ 23:30)  BMI (kg/m2): 35.7 (23 @ 23:30)  BSA (m2): 2.04 (23 @ 23:30)    PPN Recommendations: via peripheral line  Total Volume: 2000 mL  80 g  Amino Acids  100 g Dextrose  0 g Lipids 20%  (triglyceride level elevated, keep out of PN bag for now)  121 mEq Sodium Chloride  6 mEq Sodium Acetate  20 mmol Sodium Phosphate  15 mEq Potassium Chloride  9 mEq Potassium Acetate  45 mmol Potassium Phosphate  0 mEq Calcium Gluconate (CAPS out of PN solution)  22 mEq Magnesium Sulfate  200 mg Thiamine  1 ml Trace Elements  10 ml MVI  20 Units Regular Insulin    Total Calories: 660 kcal  (Provides 30% of daily energy requirements and 46% of daily protein requirements)    (osmolarity <900)    Additional Recommendations:    1) Daily weights  2) Strict I & O's - please doc I&O's per PN protocol  3) Daily lyte checks including magnesium and phos  4) Weekly triglycerides/LFT checks  5) POCT q6hrs; maintain 140-180mg/dL  6) Confirm goals of care regarding LONG-TERM nutrition support specifically referring to PPN/TPN - pt is NOT on TPN (meeting <80% ENN x 7 days while on PPN). Would consider to D/C and allow for PO pleasure feeds if fistula is irreversible as per GOC     RD will continue to monitor PPN, labs, hydration, and wt prn.   Bushra Soni, MS, RDN, [facility] [phone]  Certified Nutrition

## 2023-02-10 NOTE — PROGRESS NOTE ADULT - SUBJECTIVE AND OBJECTIVE BOX
Subjective:    Home Medications:  Albuterol (Eqv-ProAir HFA) 90 mcg/inh inhalation aerosol: 1 puff(s) inhaled every 6 hours, As Needed (20 Jan 2023 16:49)  amiodarone 200 mg oral tablet: 1 tab(s) orally once a day (20 Jan 2023 16:49)  ascorbic acid 500 mg oral tablet: 2 tab(s) orally once a day (20 Jan 2023 16:49)  atorvastatin 10 mg oral tablet: 1 tab(s) orally once a day (at bedtime) (20 Jan 2023 16:49)  benzonatate 100 mg oral capsule: 1 cap(s) orally 3 times a day (20 Jan 2023 21:48)  budesonide 0.5 mg/2 mL inhalation suspension: 2 milliliter(s) inhaled 2 times a day (20 Jan 2023 21:48)  Cepacol Sore Throat 15 mg-3.6 mg mucous membrane lozenge: 1 lozenge mucous membrane every 4 hours, As Needed (20 Jan 2023 21:48)  cholecalciferol 1250 mcg (50,000 intl units) oral capsule: 1 cap(s) orally every 4 weeks on Wednesday  (20 Jan 2023 16:49)  Coumadin 2.5 mg oral tablet: 1 tab(s) orally once a day (at bedtime)  ***ON HOLD from 1-16 to 1-21*** (20 Jan 2023 21:48)  Cranberry oral tablet: 1 tab(s) orally 2 times a day (20 Jan 2023 16:49)  cyanocobalamin 1000 mcg oral tablet: 1 tab(s) orally once a day (20 Jan 2023 16:49)  dilTIAZem 180 mg/24 hours oral capsule, extended release: 1 cap(s) orally once a day (20 Jan 2023 16:49)  DULoxetine 60 mg oral delayed release capsule: 1 cap(s) orally once a day (20 Jan 2023 16:49)  estradiol 0.1 mg/g vaginal cream: 0.5 gram(s) vaginal 2 times a week on Monday and Thursday (20 Jan 2023 21:48)  fexofenadine 180 mg oral tablet: 1 tab(s) orally once a day (20 Jan 2023 21:48)  fluticasone 50 mcg/inh nasal spray: 1 spray(s) nasal 2 times a day (20 Jan 2023 16:49)  furosemide 20 mg oral tablet: 1 tab(s) orally once a day (20 Jan 2023 16:49)  glipiZIDE 10 mg oral tablet, extended release: 2 tab(s) orally once a day (20 Jan 2023 16:49)  GlycoLax oral powder for reconstitution: 17 gram(s) orally 2 times a day (20 Jan 2023 16:49)  guaiFENesin 100 mg/5 mL oral liquid: 20 milliliter(s) orally every 6 hours (20 Jan 2023 21:48)  HumaLOG KwikPen 100 units/mL injectable solution: 10 unit(s) injectable 3 times a day (before meals) (20 Jan 2023 21:48)  ipratropium-albuterol 20 mcg-100 mcg/inh inhalation aerosol: 1 puff(s) inhaled 4 times a day (20 Jan 2023 16:49)  levothyroxine 88 mcg (0.088 mg) oral tablet: 1 tab(s) orally once a day (at bedtime) (20 Jan 2023 16:49)  losartan 25 mg oral tablet: 1 tab(s) orally once a day (20 Jan 2023 16:49)  Macrobid 100 mg oral capsule: 1 cap(s) orally 2 times a day for 5 days  ***course complete*** (20 Jan 2023 21:48)  methocarbamol 750 mg oral tablet: 1 tab(s) orally every 8 hours, As Needed (20 Jan 2023 21:48)  metoprolol tartrate 25 mg oral tablet: 1 tab(s) orally 2 times a day (20 Jan 2023 16:49)  Milk of Magnesia 8% oral suspension: 30 milliliter(s) orally once a day, As Needed (20 Jan 2023 16:49)  montelukast 10 mg oral tablet: 1 tab(s) orally once a day (at bedtime) (20 Jan 2023 21:48)  ondansetron 4 mg oral tablet: 1 tab(s) orally every 4 hours, As Needed (20 Jan 2023 21:48)  oxybutynin 5 mg oral tablet: 1 tab(s) orally 2 times a day (20 Jan 2023 16:49)  oxyCODONE 5 mg oral tablet: 1 tab(s) orally every 4 hours, As Needed (20 Jan 2023 16:49)  phytonadione 5 mg oral tablet: 0.5 tab(s) orally once  ***given at [facility] once on 1-19-23*** (20 Jan 2023 21:48)  pregabalin 100 mg oral capsule: 1 cap(s) orally 3 times a day (20 Jan 2023 16:49)  sennosides-docusate 8.6 mg-50 mg oral tablet: 2 tab(s) orally once a day (at bedtime) (20 Jan 2023 16:49)  Tradjenta 5 mg oral tablet: 1 tab(s) orally once a day (20 Jan 2023 16:49)  Tylenol 500 mg oral tablet: 2 tab(s) orally every 8 hours (20 Jan 2023 16:49)    MEDICATIONS  (STANDING):  albuterol    0.083% 2.5 milliGRAM(s) Nebulizer every 6 hours  aMIOdarone    Tablet 200 milliGRAM(s) Oral daily  buDESOnide    Inhalation Suspension 0.5 milliGRAM(s) Inhalation every 12 hours  cefepime   IVPB 2000 milliGRAM(s) IV Intermittent every 12 hours  chlorhexidine 4% Liquid 1 Application(s) Topical <User Schedule>  coronavirus bivalent (EUA) Booster Vaccine (PFIZER) 0.3 milliLiter(s) IntraMuscular once  dextrose 5%. 1000 milliLiter(s) (50 mL/Hr) IV Continuous <Continuous>  dextrose 5%. 1000 milliLiter(s) (100 mL/Hr) IV Continuous <Continuous>  dextrose 50% Injectable 25 Gram(s) IV Push once  dextrose 50% Injectable 12.5 Gram(s) IV Push once  dextrose 50% Injectable 25 Gram(s) IV Push once  enoxaparin Injectable 100 milliGRAM(s) SubCutaneous every 12 hours  fat emulsion (Fish Oil and Plant Based) 20% Infusion 0.821 Gm/kG/Day (33.33 mL/Hr) IV Continuous <Continuous>  glucagon  Injectable 1 milliGRAM(s) IntraMuscular once  insulin glargine Injectable (LANTUS) 20 Unit(s) SubCutaneous at bedtime  insulin lispro (ADMELOG) corrective regimen sliding scale   SubCutaneous every 6 hours  levothyroxine Injectable 70 MICROGram(s) IV Push at bedtime  metoprolol tartrate IVPB 5 milliGRAM(s) IV Intermittent every 6 hours  metroNIDAZOLE  IVPB 500 milliGRAM(s) IV Intermittent every 8 hours  nystatin Cream 1 Application(s) Topical two times a day  nystatin Powder 1 Application(s) Topical every 12 hours  octreotide  Injectable 75 MICROGram(s) SubCutaneous every 8 hours  pantoprazole  Injectable 40 milliGRAM(s) IV Push daily  Parenteral Nutrition - Adult 1 Each (83 mL/Hr) TPN Continuous <Continuous>    MEDICATIONS  (PRN):  acetaminophen     Tablet .. 650 milliGRAM(s) Oral every 6 hours PRN Temp greater or equal to 38C (100.4F)  albuterol    90 MICROgram(s) HFA Inhaler 2 Puff(s) Inhalation every 6 hours PRN Shortness of Breath and/or Wheezing  dextrose Oral Gel 15 Gram(s) Oral once PRN Blood Glucose LESS THAN 70 milliGRAM(s)/deciliter  hydrALAZINE Injectable 10 milliGRAM(s) IV Push every 6 hours PRN SBP>160  HYDROmorphone  Injectable 0.5 milliGRAM(s) IV Push every 3 hours PRN Severe Pain (7 - 10)  sodium chloride 0.9% lock flush 10 milliLiter(s) IV Push every 1 hour PRN Pre/post blood products, medications, blood draw, and to maintain line patency      Allergies    Cipro (Rash)  Spiriva (Rash)    Intolerances        Vital Signs Last 24 Hrs  T(C): 36.4 (10 Feb 2023 08:13), Max: 36.5 (09 Feb 2023 17:02)  T(F): 97.5 (10 Feb 2023 08:13), Max: 97.7 (09 Feb 2023 17:02)  HR: 93 (10 Feb 2023 08:13) (89 - 98)  BP: 136/64 (10 Feb 2023 08:13) (136/64 - 153/71)  BP(mean): --  RR: 19 (10 Feb 2023 08:13) (18 - 19)  SpO2: 95% (10 Feb 2023 08:13) (92% - 96%)    Parameters below as of 10 Feb 2023 08:13  Patient On (Oxygen Delivery Method): nasal cannula          PHYSICAL EXAMINATION:    NECK:  Supple. No lymphadenopathy. Jugular venous pressure not elevated. Carotids equal.   HEART:   The cardiac impulse has a normal quality. Reg., Nl S1 and S2.  There are no murmurs, rubs or gallops noted  CHEST:  Chest is clear to auscultation. Normal respiratory effort.  ABDOMEN:  Soft and nontender.   EXTREMITIES:  There is no edema.       LABS:                        9.7    10.11 )-----------( 297      ( 10 Feb 2023 05:26 )             29.5     02-10    131<L>  |  101  |  16  ----------------------------<  315<H>  4.6   |  23  |  0.62    Ca    7.6<L>      10 Feb 2023 05:26  Phos  3.0     02-10  Mg     1.7     02-10    TPro  6.3  /  Alb  1.3<L>  /  TBili  0.5  /  DBili  x   /  AST  18  /  ALT  11<L>  /  AlkPhos  72  02-10               Subjective:    pat condition unchanged, no respiratory distress.    Home Medications:  Albuterol (Eqv-ProAir HFA) 90 mcg/inh inhalation aerosol: 1 puff(s) inhaled every 6 hours, As Needed (20 Jan 2023 16:49)  amiodarone 200 mg oral tablet: 1 tab(s) orally once a day (20 Jan 2023 16:49)  ascorbic acid 500 mg oral tablet: 2 tab(s) orally once a day (20 Jan 2023 16:49)  atorvastatin 10 mg oral tablet: 1 tab(s) orally once a day (at bedtime) (20 Jan 2023 16:49)  benzonatate 100 mg oral capsule: 1 cap(s) orally 3 times a day (20 Jan 2023 21:48)  budesonide 0.5 mg/2 mL inhalation suspension: 2 milliliter(s) inhaled 2 times a day (20 Jan 2023 21:48)  Cepacol Sore Throat 15 mg-3.6 mg mucous membrane lozenge: 1 lozenge mucous membrane every 4 hours, As Needed (20 Jan 2023 21:48)  cholecalciferol 1250 mcg (50,000 intl units) oral capsule: 1 cap(s) orally every 4 weeks on Wednesday  (20 Jan 2023 16:49)  Coumadin 2.5 mg oral tablet: 1 tab(s) orally once a day (at bedtime)  ***ON HOLD from 1-16 to 1-21*** (20 Jan 2023 21:48)  Cranberry oral tablet: 1 tab(s) orally 2 times a day (20 Jan 2023 16:49)  cyanocobalamin 1000 mcg oral tablet: 1 tab(s) orally once a day (20 Jan 2023 16:49)  dilTIAZem 180 mg/24 hours oral capsule, extended release: 1 cap(s) orally once a day (20 Jan 2023 16:49)  DULoxetine 60 mg oral delayed release capsule: 1 cap(s) orally once a day (20 Jan 2023 16:49)  estradiol 0.1 mg/g vaginal cream: 0.5 gram(s) vaginal 2 times a week on Monday and Thursday (20 Jan 2023 21:48)  fexofenadine 180 mg oral tablet: 1 tab(s) orally once a day (20 Jan 2023 21:48)  fluticasone 50 mcg/inh nasal spray: 1 spray(s) nasal 2 times a day (20 Jan 2023 16:49)  furosemide 20 mg oral tablet: 1 tab(s) orally once a day (20 Jan 2023 16:49)  glipiZIDE 10 mg oral tablet, extended release: 2 tab(s) orally once a day (20 Jan 2023 16:49)  GlycoLax oral powder for reconstitution: 17 gram(s) orally 2 times a day (20 Jan 2023 16:49)  guaiFENesin 100 mg/5 mL oral liquid: 20 milliliter(s) orally every 6 hours (20 Jan 2023 21:48)  HumaLOG KwikPen 100 units/mL injectable solution: 10 unit(s) injectable 3 times a day (before meals) (20 Jan 2023 21:48)  ipratropium-albuterol 20 mcg-100 mcg/inh inhalation aerosol: 1 puff(s) inhaled 4 times a day (20 Jan 2023 16:49)  levothyroxine 88 mcg (0.088 mg) oral tablet: 1 tab(s) orally once a day (at bedtime) (20 Jan 2023 16:49)  losartan 25 mg oral tablet: 1 tab(s) orally once a day (20 Jan 2023 16:49)  Macrobid 100 mg oral capsule: 1 cap(s) orally 2 times a day for 5 days  ***course complete*** (20 Jan 2023 21:48)  methocarbamol 750 mg oral tablet: 1 tab(s) orally every 8 hours, As Needed (20 Jan 2023 21:48)  metoprolol tartrate 25 mg oral tablet: 1 tab(s) orally 2 times a day (20 Jan 2023 16:49)  Milk of Magnesia 8% oral suspension: 30 milliliter(s) orally once a day, As Needed (20 Jan 2023 16:49)  montelukast 10 mg oral tablet: 1 tab(s) orally once a day (at bedtime) (20 Jan 2023 21:48)  ondansetron 4 mg oral tablet: 1 tab(s) orally every 4 hours, As Needed (20 Jan 2023 21:48)  oxybutynin 5 mg oral tablet: 1 tab(s) orally 2 times a day (20 Jan 2023 16:49)  oxyCODONE 5 mg oral tablet: 1 tab(s) orally every 4 hours, As Needed (20 Jan 2023 16:49)  phytonadione 5 mg oral tablet: 0.5 tab(s) orally once  ***given at [facility] once on 1-19-23*** (20 Jan 2023 21:48)  pregabalin 100 mg oral capsule: 1 cap(s) orally 3 times a day (20 Jan 2023 16:49)  sennosides-docusate 8.6 mg-50 mg oral tablet: 2 tab(s) orally once a day (at bedtime) (20 Jan 2023 16:49)  Tradjenta 5 mg oral tablet: 1 tab(s) orally once a day (20 Jan 2023 16:49)  Tylenol 500 mg oral tablet: 2 tab(s) orally every 8 hours (20 Jan 2023 16:49)    MEDICATIONS  (STANDING):  albuterol    0.083% 2.5 milliGRAM(s) Nebulizer every 6 hours  aMIOdarone    Tablet 200 milliGRAM(s) Oral daily  buDESOnide    Inhalation Suspension 0.5 milliGRAM(s) Inhalation every 12 hours  cefepime   IVPB 2000 milliGRAM(s) IV Intermittent every 12 hours  chlorhexidine 4% Liquid 1 Application(s) Topical <User Schedule>  coronavirus bivalent (EUA) Booster Vaccine (PFIZER) 0.3 milliLiter(s) IntraMuscular once  dextrose 5%. 1000 milliLiter(s) (50 mL/Hr) IV Continuous <Continuous>  dextrose 5%. 1000 milliLiter(s) (100 mL/Hr) IV Continuous <Continuous>  dextrose 50% Injectable 25 Gram(s) IV Push once  dextrose 50% Injectable 12.5 Gram(s) IV Push once  dextrose 50% Injectable 25 Gram(s) IV Push once  enoxaparin Injectable 100 milliGRAM(s) SubCutaneous every 12 hours  fat emulsion (Fish Oil and Plant Based) 20% Infusion 0.821 Gm/kG/Day (33.33 mL/Hr) IV Continuous <Continuous>  glucagon  Injectable 1 milliGRAM(s) IntraMuscular once  insulin glargine Injectable (LANTUS) 20 Unit(s) SubCutaneous at bedtime  insulin lispro (ADMELOG) corrective regimen sliding scale   SubCutaneous every 6 hours  levothyroxine Injectable 70 MICROGram(s) IV Push at bedtime  metoprolol tartrate IVPB 5 milliGRAM(s) IV Intermittent every 6 hours  metroNIDAZOLE  IVPB 500 milliGRAM(s) IV Intermittent every 8 hours  nystatin Cream 1 Application(s) Topical two times a day  nystatin Powder 1 Application(s) Topical every 12 hours  octreotide  Injectable 75 MICROGram(s) SubCutaneous every 8 hours  pantoprazole  Injectable 40 milliGRAM(s) IV Push daily  Parenteral Nutrition - Adult 1 Each (83 mL/Hr) TPN Continuous <Continuous>    MEDICATIONS  (PRN):  acetaminophen     Tablet .. 650 milliGRAM(s) Oral every 6 hours PRN Temp greater or equal to 38C (100.4F)  albuterol    90 MICROgram(s) HFA Inhaler 2 Puff(s) Inhalation every 6 hours PRN Shortness of Breath and/or Wheezing  dextrose Oral Gel 15 Gram(s) Oral once PRN Blood Glucose LESS THAN 70 milliGRAM(s)/deciliter  hydrALAZINE Injectable 10 milliGRAM(s) IV Push every 6 hours PRN SBP>160  HYDROmorphone  Injectable 0.5 milliGRAM(s) IV Push every 3 hours PRN Severe Pain (7 - 10)  sodium chloride 0.9% lock flush 10 milliLiter(s) IV Push every 1 hour PRN Pre/post blood products, medications, blood draw, and to maintain line patency      Allergies    Cipro (Rash)  Spiriva (Rash)    Intolerances        Vital Signs Last 24 Hrs  T(C): 36.4 (10 Feb 2023 08:13), Max: 36.5 (09 Feb 2023 17:02)  T(F): 97.5 (10 Feb 2023 08:13), Max: 97.7 (09 Feb 2023 17:02)  HR: 93 (10 Feb 2023 08:13) (89 - 98)  BP: 136/64 (10 Feb 2023 08:13) (136/64 - 153/71)  BP(mean): --  RR: 19 (10 Feb 2023 08:13) (18 - 19)  SpO2: 95% (10 Feb 2023 08:13) (92% - 96%)    Parameters below as of 10 Feb 2023 08:13  Patient On (Oxygen Delivery Method): nasal cannula          PHYSICAL EXAMINATION:    NECK:  Supple. No lymphadenopathy. Jugular venous pressure not elevated. Carotids equal.   HEART:   The cardiac impulse has a normal quality. Reg., Nl S1 and S2.  There are no murmurs, rubs or gallops noted  CHEST:  Chest crackles to auscultation. Normal respiratory effort.  ABDOMEN:  Soft and nontender.   EXTREMITIES:  There is no edema.       LABS:                        9.7    10.11 )-----------( 297      ( 10 Feb 2023 05:26 )             29.5     02-10    131<L>  |  101  |  16  ----------------------------<  315<H>  4.6   |  23  |  0.62    Ca    7.6<L>      10 Feb 2023 05:26  Phos  3.0     02-10  Mg     1.7     02-10    TPro  6.3  /  Alb  1.3<L>  /  TBili  0.5  /  DBili  x   /  AST  18  /  ALT  11<L>  /  AlkPhos  72  02-10

## 2023-02-10 NOTE — PROGRESS NOTE ADULT - SUBJECTIVE AND OBJECTIVE BOX
RACHANA BARGER  72y  Female      Patient is a 72y old  Female who presents with a chief complaint of bowel obstruction/ischemic mesentery (10 Feb 2023 09:07)      INTERVAL HPI/OVERNIGHT EVENTS:  Seen and examined. Lethargic, not responsive. NGT out      REVIEW OF SYSTEMS:  Unable to obtain 2/2 mental status    T(C): 36.4 (02-10-23 @ 08:13), Max: 36.5 (02-09-23 @ 17:02)  HR: 93 (02-10-23 @ 08:13) (89 - 98)  BP: 136/64 (02-10-23 @ 08:13) (136/64 - 153/71)  RR: 19 (02-10-23 @ 08:13) (18 - 19)  SpO2: 95% (02-10-23 @ 08:13) (92% - 95%)  Wt(kg): --Vital Signs Last 24 Hrs  T(C): 36.4 (10 Feb 2023 08:13), Max: 36.5 (09 Feb 2023 17:02)  T(F): 97.5 (10 Feb 2023 08:13), Max: 97.7 (09 Feb 2023 17:02)  HR: 93 (10 Feb 2023 08:13) (89 - 98)  BP: 136/64 (10 Feb 2023 08:13) (136/64 - 153/71)  BP(mean): --  RR: 19 (10 Feb 2023 08:13) (18 - 19)  SpO2: 95% (10 Feb 2023 08:13) (92% - 95%)    Parameters below as of 10 Feb 2023 08:13  Patient On (Oxygen Delivery Method): nasal cannula        PHYSICAL EXAM:  GENERAL: Obese, lethargic, appears ill  HEAD:  Atraumatic, Normocephalic  NECK: Supple, No JVD, Normal thyroid  NERVOUS SYSTEM:  Lethargic, opens eyes, not following commands  CHEST/LUNG: Clear to percussion bilaterally; No rales, rhonchi, wheezing, or rubs  HEART: Regular rate and rhythm; No murmurs, rubs, or gallops  ABDOMEN: Mid abd dressing, NGT? Drain, surrounding erythema, induration  EXTREMITIES:  2+ Peripheral Pulses, No clubbing, cyanosis, or edema  LYMPH: No lymphadenopathy noted  SKIN: No rashes or lesions    Consultant(s) Notes Reviewed:  [x ] YES  [ ] NO  Care Discussed with Consultants/Other Providers [ x] YES  [ ] NO    Consultant(s) Notes Reviewed:  [x ] YES  [ ] NO  Care Discussed with Consultants/Other Providers [ x] YES  [ ] NO    LABS:                        9.7    10.11 )-----------( 297      ( 10 Feb 2023 05:26 )             29.5     02-10    131<L>  |  101  |  16  ----------------------------<  315<H>  4.6   |  23  |  0.62    Ca    7.6<L>      10 Feb 2023 05:26  Phos  3.0     02-10  Mg     1.7     02-10    TPro  6.3  /  Alb  1.3<L>  /  TBili  0.5  /  DBili  x   /  AST  18  /  ALT  11<L>  /  AlkPhos  72  02-10        CAPILLARY BLOOD GLUCOSE      POCT Blood Glucose.: 276 mg/dL (10 Feb 2023 05:28)  POCT Blood Glucose.: 255 mg/dL (09 Feb 2023 22:20)  POCT Blood Glucose.: 282 mg/dL (09 Feb 2023 17:13)  POCT Blood Glucose.: 287 mg/dL (09 Feb 2023 12:26)            RADIOLOGY & ADDITIONAL TESTS:    Imaging Personally Reviewed:  [ ] YES  [ ] NO    HEALTH ISSUES - PROBLEM Dx:

## 2023-02-10 NOTE — PROGRESS NOTE ADULT - SUBJECTIVE AND OBJECTIVE BOX
Date of service: 02-10-23 @ 12:10    Lying in bed in NAD  Has mild abdominal discomfort  Has fistula drainage  No fever    ROS: no fever or chills; poorly verbal    MEDICATIONS  (STANDING):  albuterol    0.083% 2.5 milliGRAM(s) Nebulizer every 6 hours  buDESOnide    Inhalation Suspension 0.5 milliGRAM(s) Inhalation every 12 hours  cefepime   IVPB 2000 milliGRAM(s) IV Intermittent every 12 hours  chlorhexidine 4% Liquid 1 Application(s) Topical <User Schedule>  coronavirus bivalent (EUA) Booster Vaccine (PFIZER) 0.3 milliLiter(s) IntraMuscular once  dextrose 5%. 1000 milliLiter(s) (100 mL/Hr) IV Continuous <Continuous>  dextrose 5%. 1000 milliLiter(s) (50 mL/Hr) IV Continuous <Continuous>  dextrose 50% Injectable 25 Gram(s) IV Push once  dextrose 50% Injectable 12.5 Gram(s) IV Push once  dextrose 50% Injectable 25 Gram(s) IV Push once  enoxaparin Injectable 100 milliGRAM(s) SubCutaneous every 12 hours  fat emulsion (Fish Oil and Plant Based) 20% Infusion 0.821 Gm/kG/Day (33.33 mL/Hr) IV Continuous <Continuous>  glucagon  Injectable 1 milliGRAM(s) IntraMuscular once  insulin glargine Injectable (LANTUS) 20 Unit(s) SubCutaneous at bedtime  insulin lispro (ADMELOG) corrective regimen sliding scale   SubCutaneous every 6 hours  levothyroxine Injectable 70 MICROGram(s) IV Push at bedtime  metoprolol tartrate IVPB 5 milliGRAM(s) IV Intermittent every 6 hours  metroNIDAZOLE  IVPB 500 milliGRAM(s) IV Intermittent every 8 hours  nystatin Cream 1 Application(s) Topical two times a day  nystatin Powder 1 Application(s) Topical every 12 hours  octreotide  Injectable 75 MICROGram(s) SubCutaneous every 8 hours  pantoprazole  Injectable 40 milliGRAM(s) IV Push daily  Parenteral Nutrition - Adult 1 Each (83 mL/Hr) TPN Continuous <Continuous>  Parenteral Nutrition - Adult 1 Each (83 mL/Hr) TPN Continuous <Continuous>    Vital Signs Last 24 Hrs  T(C): 36.4 (10 Feb 2023 08:13), Max: 36.5 (09 Feb 2023 17:02)  T(F): 97.5 (10 Feb 2023 08:13), Max: 97.7 (09 Feb 2023 17:02)  HR: 93 (10 Feb 2023 08:13) (89 - 98)  BP: 136/64 (10 Feb 2023 08:13) (136/64 - 153/71)  BP(mean): --  RR: 19 (10 Feb 2023 08:13) (18 - 19)  SpO2: 95% (10 Feb 2023 08:13) (92% - 95%)    Parameters below as of 10 Feb 2023 08:13  Patient On (Oxygen Delivery Method): nasal cannula     Physical exam:    Constitutional:  No acute distress  HEENT: NC/AT, EOMI, PERRLA, conjunctivae clear; ears and nose atraumatic  Neck: supple; thyroid not palpable  Back: no tenderness  Respiratory: respiratory effort normal; crackles at bases  Cardiovascular: S1S2 regular, no murmurs  Abdomen: soft, mid abdomen tender, distended, positive BS  abdominal postsurgical wound with fecaloid drainage from drain site   Genitourinary: no suprapubic tenderness  Lymphatic: no LN palpable  Musculoskeletal: no muscle tenderness, no joint swelling or tenderness  Extremities: no pedal edema  Neurological/ Psychiatric: confused, judgement and insight impaired; moving all extremities  Skin: no rashes; no palpable lesions    Labs: reviewed                        9.7    10.11 )-----------( 297      ( 10 Feb 2023 05:26 )             29.5     02-10    131<L>  |  101  |  16  ----------------------------<  315<H>  4.6   |  23  |  0.62    Ca    7.6<L>      10 Feb 2023 05:26  Phos  3.0     02-10  Mg     1.7     02-10    TPro  6.3  /  Alb  1.3<L>  /  TBili  0.5  /  DBili  x   /  AST  18  /  ALT  11<L>  /  AlkPhos  72  02-10    C-Reactive Protein, Serum: 148 mg/L (02-09-23 @ 05:22)                        9.5    4.85  )-----------( 323      ( 06 Feb 2023 05:57 )             30.5     02-06    139  |  110<H>  |  17  ----------------------------<  197<H>  4.4   |  21<L>  |  0.68    Ca    7.5<L>      06 Feb 2023 05:57  Phos  2.9     02-06  Mg     2.0     02-06    TPro  5.8<L>  /  Alb  1.5<L>  /  TBili  0.6  /  DBili  x   /  AST  9<L>  /  ALT  14  /  AlkPhos  57  02-06               10.9   19.72 )-----------( 510      ( 27 Jan 2023 06:16 )             36.7     01-27    142  |  111<H>  |  10  ----------------------------<  233<H>  3.8   |  26  |  0.78    Ca    8.0<L>      27 Jan 2023 06:16  Phos  2.7     01-27  Mg     1.8     01-27      Culture - Acid Fast - Tissue w/Smear (collected 31 Jan 2023 18:07)  Source: .Tissue INTRAPERITONEAL ABCESS FOR CX    Culture - Fungal, Tissue (collected 31 Jan 2023 18:07)  Source: .Tissue INTRAPERITONEAL ABCESS FOR CX  Preliminary Report (01 Feb 2023 07:09):    Testing in progress    Culture - Tissue with Gram Stain (collected 31 Jan 2023 18:07)  Source: .Tissue INTRAPERITONEAL ABCESS FOR CX  Gram Stain (01 Feb 2023 04:37):    Rare polymorphonuclear leukocytes seen per low power field    No organisms seen per oil power field  Preliminary Report (02 Feb 2023 20:01):    Growth in fluid media only Klebsiella oxytoca/Raoultella ornithinolytica  Organism: Klebsiella oxytoca /Raoutella ornithinolytica (03 Feb 2023 17:25)  Organism: Klebsiella oxytoca /Raoutella ornithinolytica (03 Feb 2023 17:25)      -  Amikacin: S <=16      -  Amoxicillin/Clavulanic Acid: I 16/8      -  Ampicillin: R >16 These ampicillin results predict results for amoxicillin      -  Ampicillin/Sulbactam: R >16/8 Enterobacter, Klebsiella aerogenes, Citrobacter, and Serratia may develop resistance during prolonged therapy (3-4 days)      -  Aztreonam: S <=4      -  Cefazolin: R >16 Enterobacter, Klebsiella aerogenes, Citrobacter, and Serratia may develop resistance during prolonged therapy (3-4 days)      -  Cefepime: S <=2      -  Cefoxitin: S <=8      -  Ceftriaxone: S <=1 Enterobacter, Klebsiella aerogenes, Citrobacter, and Serratia may develop resistance during prolonged therapy      -  Ciprofloxacin: S <=0.25      -  Ertapenem: S <=0.5      -  Gentamicin: S <=2      -  Imipenem: S <=1      -  Levofloxacin: S <=0.5      -  Meropenem: S <=1      -  Piperacillin/Tazobactam: R 64      -  Tobramycin: S <=2      -  Trimethoprim/Sulfamethoxazole: S <=0.5/9.5      Method Type: ARABELLA    Culture - Blood (collected 27 Jan 2023 06:16)  Source: .Blood None  Final Report (01 Feb 2023 09:00):    No Growth Final    Culture - Blood (collected 27 Jan 2023 06:16)  Source: .Blood None  Final Report (01 Feb 2023 09:00):    No Growth Final    Radiology: all available radiological tests reviewed    < from: CT Chest No Cont (01.27.23 @ 09:53) >  Small bowel obstruction with transition point at the neck of a left lower   quadrant abdominal wall hernia. It is uncertain whether the obstruction   is due to the hernia itself or to adhesions.    Additional massive ventral hernia containing small and large bowel and   epigastric hernia containing stomach.    Right lower lobe nodules new since December 04, 2021 and could be   infectious or neoplastic. Follow-up chest CT in 3 months is recommended   to reevaluate.    < end of copied text >      Advanced directives addressed: full resuscitation

## 2023-02-10 NOTE — PROGRESS NOTE ADULT - SUBJECTIVE AND OBJECTIVE BOX
Awake, alert no distress  VSS Tm 99  lungs- clear  cor- RRR  Abd- + entero atmospheric fistula. SB fistula opening 2 X 4 cm in LLQ. Patched with Soham patch, sutured with 2-0 Prolene  Ext- 2 + edema

## 2023-02-10 NOTE — PROGRESS NOTE ADULT - ASSESSMENT
Entero atmospheric fistula. Cont NPO. TPN. Sandostatin. Wound care. Protect soft tissue while fistula is healing or until a reliable ostomy can be created.

## 2023-02-10 NOTE — PROGRESS NOTE ADULT - ASSESSMENT
72F with PMHx CVA, pAF on AC, emphysema/COPD on home O2, HTN, SBO with prior resection who presented with abd pain, N/V. Found to have an small bowel obstruction. Taken to OR underwent ELAP, extensive RICHARD, ileocolectomy, ventral hernia repair with MESH. Postop course complicated with ALVERTO, shock requiring pressors    #Abd pain, SBO, Enterocutaneous fistula  S/p OR EXLAP, RICHARD, ileocolectomy, ventral hernia repair w mesh  Dressing c/d/i, surrounding erythema, +Drain  PPN, NGT to suction taken out of nare 2/10- placed in abd?  PPI Octreotide  Cefepime, Flagyl  Pain control, IS, Mobilize patient, monitor i and os  Pallative eval- DNR/DNI, no feeding tube....continue with PPN for now   management as per Surg    # UTI Kleb, PNA  Cefepime and flagyl as above  WBC improving, monitor for fevers  ID following    #pAF on AC, HTN  Lovenox 100mg BID  Lopressor IVPB q6  Hydralazine 10mg IVP q6 PRN SBP >160  Amiodarone 200mg Discontinued (2/10)- NGT is out so no PO access- CT A/P w PO contrast shows enteric communication with atmosphere. Discussed with pharmacy no reasonable IV equivalent for PO daily, recommend dc for now and if needed adjust other agents, if afib uncontrolled may need gtt    #Hypothyroid  Continue w IV synthroid     #DM  Increased Lantus from 8 to 20 (2/10)  Corrective scale  Monitor fingersticks    #COPD  Albuterol  Pulmicort  Pulm following

## 2023-02-10 NOTE — PROGRESS NOTE ADULT - ASSESSMENT
Patient admitted with abdominal pain, nausea and vomitting , dehydration  septic shock, likely related to     PROBLEMS:    UTI klebsiella pneumoniae and aspiration PNA /SBO- ventral hernia  New R lung nodules - cannot ruleout aspiration PNA   Acute metabolic encephalopathy  Sepsis   Hx copd without exacerbation  Anasarca/Acute on  chronic diastolic heart failure       Plan:    pulmonary stable  Blood transfusion, on TPN, IV fluids, iv lasix post transfusion  NG suction-Incarcerated Ventral Hernia with SBO-S/p extensive RICHARD, ileo colectomy, giant ventral hernia repair with Surgimend-surgery fu for persistant sutures leak  IV cefepime/flagyl-for  sepsis ( SBO/UTI)  Monitor Cr  Surgery fu  D/w staff  DVT PROPHYLASIX      Patient admitted with abdominal pain, nausea and vomitting , dehydration  septic shock, likely related to     PROBLEMS:    UTI klebsiella pneumoniae and aspiration PNA /SBO- ventral hernia  New R lung nodules - cannot ruleout aspiration PNA   Acute metabolic encephalopathy  Sepsis   Hx copd without exacerbation  Anasarca/Acute on  chronic diastolic heart failure       Plan:    pulmonary stable  Blood transfusion, on TPN, IV fluids, iv lasix post transfusion  NG suction-Incarcerated Ventral Hernia with SBO-S/p extensive RICHARD, ileo colectomy, giant ventral hernia repair with Surgimend-surgery fu for persistant sutures leak-wound care  IV cefepime/flagyl-for  sepsis ( SBO/UTI)  Monitor Cr  Surgery fu  D/w staff  DVT PROPHYLASIX

## 2023-02-11 LAB
A1C WITH ESTIMATED AVERAGE GLUCOSE RESULT: 6.5 % — HIGH (ref 4–5.6)
ALBUMIN SERPL ELPH-MCNC: 1.2 G/DL — LOW (ref 3.3–5)
ALP SERPL-CCNC: 72 U/L — SIGNIFICANT CHANGE UP (ref 40–120)
ALT FLD-CCNC: 9 U/L — LOW (ref 12–78)
ANION GAP SERPL CALC-SCNC: 8 MMOL/L — SIGNIFICANT CHANGE UP (ref 5–17)
AST SERPL-CCNC: 14 U/L — LOW (ref 15–37)
BASOPHILS # BLD AUTO: 0 K/UL — SIGNIFICANT CHANGE UP (ref 0–0.2)
BASOPHILS NFR BLD AUTO: 0 % — SIGNIFICANT CHANGE UP (ref 0–2)
BILIRUB SERPL-MCNC: 0.5 MG/DL — SIGNIFICANT CHANGE UP (ref 0.2–1.2)
BUN SERPL-MCNC: 22 MG/DL — SIGNIFICANT CHANGE UP (ref 7–23)
CALCIUM SERPL-MCNC: 7.7 MG/DL — LOW (ref 8.5–10.1)
CHLORIDE SERPL-SCNC: 104 MMOL/L — SIGNIFICANT CHANGE UP (ref 96–108)
CO2 SERPL-SCNC: 21 MMOL/L — LOW (ref 22–31)
CREAT SERPL-MCNC: 1.01 MG/DL — SIGNIFICANT CHANGE UP (ref 0.5–1.3)
EGFR: 59 ML/MIN/1.73M2 — LOW
EOSINOPHIL # BLD AUTO: 0 K/UL — SIGNIFICANT CHANGE UP (ref 0–0.5)
EOSINOPHIL NFR BLD AUTO: 0 % — SIGNIFICANT CHANGE UP (ref 0–6)
ESTIMATED AVERAGE GLUCOSE: 140 MG/DL — HIGH (ref 68–114)
GLUCOSE BLDC GLUCOMTR-MCNC: 346 MG/DL — HIGH (ref 70–99)
GLUCOSE BLDC GLUCOMTR-MCNC: 382 MG/DL — HIGH (ref 70–99)
GLUCOSE BLDC GLUCOMTR-MCNC: 387 MG/DL — HIGH (ref 70–99)
GLUCOSE BLDC GLUCOMTR-MCNC: 424 MG/DL — HIGH (ref 70–99)
GLUCOSE BLDC GLUCOMTR-MCNC: 455 MG/DL — CRITICAL HIGH (ref 70–99)
GLUCOSE SERPL-MCNC: 384 MG/DL — HIGH (ref 70–99)
HCT VFR BLD CALC: 29.8 % — LOW (ref 34.5–45)
HGB BLD-MCNC: 9.1 G/DL — LOW (ref 11.5–15.5)
LYMPHOCYTES # BLD AUTO: 0.89 K/UL — LOW (ref 1–3.3)
LYMPHOCYTES # BLD AUTO: 8 % — LOW (ref 13–44)
MAGNESIUM SERPL-MCNC: 2.1 MG/DL — SIGNIFICANT CHANGE UP (ref 1.6–2.6)
MCHC RBC-ENTMCNC: 27.1 PG — SIGNIFICANT CHANGE UP (ref 27–34)
MCHC RBC-ENTMCNC: 30.5 GM/DL — LOW (ref 32–36)
MCV RBC AUTO: 88.7 FL — SIGNIFICANT CHANGE UP (ref 80–100)
MONOCYTES # BLD AUTO: 0.22 K/UL — SIGNIFICANT CHANGE UP (ref 0–0.9)
MONOCYTES NFR BLD AUTO: 2 % — SIGNIFICANT CHANGE UP (ref 2–14)
NEUTROPHILS # BLD AUTO: 8.86 K/UL — HIGH (ref 1.8–7.4)
NEUTROPHILS NFR BLD AUTO: 80 % — HIGH (ref 43–77)
NRBC # BLD: SIGNIFICANT CHANGE UP /100 WBCS (ref 0–0)
PHOSPHATE SERPL-MCNC: 4.6 MG/DL — HIGH (ref 2.5–4.5)
PLATELET # BLD AUTO: 295 K/UL — SIGNIFICANT CHANGE UP (ref 150–400)
POTASSIUM SERPL-MCNC: 4.8 MMOL/L — SIGNIFICANT CHANGE UP (ref 3.5–5.3)
POTASSIUM SERPL-SCNC: 4.8 MMOL/L — SIGNIFICANT CHANGE UP (ref 3.5–5.3)
PROT SERPL-MCNC: 5.7 GM/DL — LOW (ref 6–8.3)
RBC # BLD: 3.36 M/UL — LOW (ref 3.8–5.2)
RBC # FLD: 21.2 % — HIGH (ref 10.3–14.5)
SODIUM SERPL-SCNC: 133 MMOL/L — LOW (ref 135–145)
WBC # BLD: 11.07 K/UL — HIGH (ref 3.8–10.5)
WBC # FLD AUTO: 11.07 K/UL — HIGH (ref 3.8–10.5)

## 2023-02-11 PROCEDURE — 99232 SBSQ HOSP IP/OBS MODERATE 35: CPT

## 2023-02-11 RX ORDER — ELECTROLYTE SOLUTION,INJ
1 VIAL (ML) INTRAVENOUS
Refills: 0 | Status: DISCONTINUED | OUTPATIENT
Start: 2023-02-11 | End: 2023-02-11

## 2023-02-11 RX ORDER — I.V. FAT EMULSION 20 G/100ML
0.82 EMULSION INTRAVENOUS
Qty: 80 | Refills: 0 | Status: DISCONTINUED | OUTPATIENT
Start: 2023-02-11 | End: 2023-02-12

## 2023-02-11 RX ORDER — INSULIN LISPRO 100/ML
VIAL (ML) SUBCUTANEOUS EVERY 4 HOURS
Refills: 0 | Status: DISCONTINUED | OUTPATIENT
Start: 2023-02-11 | End: 2023-02-19

## 2023-02-11 RX ORDER — INSULIN HUMAN 100 [IU]/ML
INJECTION, SOLUTION SUBCUTANEOUS EVERY 4 HOURS
Refills: 0 | Status: DISCONTINUED | OUTPATIENT
Start: 2023-02-11 | End: 2023-02-11

## 2023-02-11 RX ORDER — ELECTROLYTE SOLUTION,INJ
1 VIAL (ML) INTRAVENOUS
Refills: 0 | Status: DISCONTINUED | OUTPATIENT
Start: 2023-02-11 | End: 2023-02-12

## 2023-02-11 RX ADMIN — ALBUTEROL 2.5 MILLIGRAM(S): 90 AEROSOL, METERED ORAL at 20:15

## 2023-02-11 RX ADMIN — Medication 0.5 MILLIGRAM(S): at 20:15

## 2023-02-11 RX ADMIN — Medication 8: at 07:01

## 2023-02-11 RX ADMIN — HYDROMORPHONE HYDROCHLORIDE 0.5 MILLIGRAM(S): 2 INJECTION INTRAMUSCULAR; INTRAVENOUS; SUBCUTANEOUS at 14:01

## 2023-02-11 RX ADMIN — CEFEPIME 100 MILLIGRAM(S): 1 INJECTION, POWDER, FOR SOLUTION INTRAMUSCULAR; INTRAVENOUS at 13:38

## 2023-02-11 RX ADMIN — OCTREOTIDE ACETATE 75 MICROGRAM(S): 200 INJECTION, SOLUTION INTRAVENOUS; SUBCUTANEOUS at 06:56

## 2023-02-11 RX ADMIN — Medication 70 MICROGRAM(S): at 23:38

## 2023-02-11 RX ADMIN — ENOXAPARIN SODIUM 100 MILLIGRAM(S): 100 INJECTION SUBCUTANEOUS at 23:32

## 2023-02-11 RX ADMIN — OCTREOTIDE ACETATE 75 MICROGRAM(S): 200 INJECTION, SOLUTION INTRAVENOUS; SUBCUTANEOUS at 23:50

## 2023-02-11 RX ADMIN — Medication 100 MILLIGRAM(S): at 05:09

## 2023-02-11 RX ADMIN — ALBUTEROL 2.5 MILLIGRAM(S): 90 AEROSOL, METERED ORAL at 14:53

## 2023-02-11 RX ADMIN — ALBUTEROL 2.5 MILLIGRAM(S): 90 AEROSOL, METERED ORAL at 01:24

## 2023-02-11 RX ADMIN — CEFEPIME 100 MILLIGRAM(S): 1 INJECTION, POWDER, FOR SOLUTION INTRAMUSCULAR; INTRAVENOUS at 23:39

## 2023-02-11 RX ADMIN — PANTOPRAZOLE SODIUM 40 MILLIGRAM(S): 20 TABLET, DELAYED RELEASE ORAL at 14:01

## 2023-02-11 RX ADMIN — NYSTATIN CREAM 1 APPLICATION(S): 100000 CREAM TOPICAL at 23:50

## 2023-02-11 RX ADMIN — Medication 12: at 23:21

## 2023-02-11 RX ADMIN — Medication 0.5 MILLIGRAM(S): at 08:42

## 2023-02-11 RX ADMIN — Medication 110 MILLIGRAM(S): at 06:52

## 2023-02-11 RX ADMIN — Medication 100 MILLIGRAM(S): at 14:03

## 2023-02-11 RX ADMIN — ALBUTEROL 2.5 MILLIGRAM(S): 90 AEROSOL, METERED ORAL at 08:40

## 2023-02-11 RX ADMIN — NYSTATIN CREAM 1 APPLICATION(S): 100000 CREAM TOPICAL at 05:11

## 2023-02-11 RX ADMIN — Medication 12: at 13:40

## 2023-02-11 RX ADMIN — ENOXAPARIN SODIUM 100 MILLIGRAM(S): 100 INJECTION SUBCUTANEOUS at 13:39

## 2023-02-11 RX ADMIN — INSULIN GLARGINE 20 UNIT(S): 100 INJECTION, SOLUTION SUBCUTANEOUS at 23:17

## 2023-02-11 RX ADMIN — OCTREOTIDE ACETATE 75 MICROGRAM(S): 200 INJECTION, SOLUTION INTRAVENOUS; SUBCUTANEOUS at 13:50

## 2023-02-11 RX ADMIN — Medication 14: at 19:33

## 2023-02-11 NOTE — PROGRESS NOTE ADULT - SUBJECTIVE AND OBJECTIVE BOX
Subjective:    pat awake, ng removed, increase in body edema, on TPN, hoarse cough.    Home Medications:  Albuterol (Eqv-ProAir HFA) 90 mcg/inh inhalation aerosol: 1 puff(s) inhaled every 6 hours, As Needed (20 Jan 2023 16:49)  amiodarone 200 mg oral tablet: 1 tab(s) orally once a day (20 Jan 2023 16:49)  ascorbic acid 500 mg oral tablet: 2 tab(s) orally once a day (20 Jan 2023 16:49)  atorvastatin 10 mg oral tablet: 1 tab(s) orally once a day (at bedtime) (20 Jan 2023 16:49)  benzonatate 100 mg oral capsule: 1 cap(s) orally 3 times a day (20 Jan 2023 21:48)  budesonide 0.5 mg/2 mL inhalation suspension: 2 milliliter(s) inhaled 2 times a day (20 Jan 2023 21:48)  Cepacol Sore Throat 15 mg-3.6 mg mucous membrane lozenge: 1 lozenge mucous membrane every 4 hours, As Needed (20 Jan 2023 21:48)  cholecalciferol 1250 mcg (50,000 intl units) oral capsule: 1 cap(s) orally every 4 weeks on Wednesday  (20 Jan 2023 16:49)  Coumadin 2.5 mg oral tablet: 1 tab(s) orally once a day (at bedtime)  ***ON HOLD from 1-16 to 1-21*** (20 Jan 2023 21:48)  Cranberry oral tablet: 1 tab(s) orally 2 times a day (20 Jan 2023 16:49)  cyanocobalamin 1000 mcg oral tablet: 1 tab(s) orally once a day (20 Jan 2023 16:49)  dilTIAZem 180 mg/24 hours oral capsule, extended release: 1 cap(s) orally once a day (20 Jan 2023 16:49)  DULoxetine 60 mg oral delayed release capsule: 1 cap(s) orally once a day (20 Jan 2023 16:49)  estradiol 0.1 mg/g vaginal cream: 0.5 gram(s) vaginal 2 times a week on Monday and Thursday (20 Jan 2023 21:48)  fexofenadine 180 mg oral tablet: 1 tab(s) orally once a day (20 Jan 2023 21:48)  fluticasone 50 mcg/inh nasal spray: 1 spray(s) nasal 2 times a day (20 Jan 2023 16:49)  furosemide 20 mg oral tablet: 1 tab(s) orally once a day (20 Jan 2023 16:49)  glipiZIDE 10 mg oral tablet, extended release: 2 tab(s) orally once a day (20 Jan 2023 16:49)  GlycoLax oral powder for reconstitution: 17 gram(s) orally 2 times a day (20 Jan 2023 16:49)  guaiFENesin 100 mg/5 mL oral liquid: 20 milliliter(s) orally every 6 hours (20 Jan 2023 21:48)  HumaLOG KwikPen 100 units/mL injectable solution: 10 unit(s) injectable 3 times a day (before meals) (20 Jan 2023 21:48)  ipratropium-albuterol 20 mcg-100 mcg/inh inhalation aerosol: 1 puff(s) inhaled 4 times a day (20 Jan 2023 16:49)  levothyroxine 88 mcg (0.088 mg) oral tablet: 1 tab(s) orally once a day (at bedtime) (20 Jan 2023 16:49)  losartan 25 mg oral tablet: 1 tab(s) orally once a day (20 Jan 2023 16:49)  Macrobid 100 mg oral capsule: 1 cap(s) orally 2 times a day for 5 days  ***course complete*** (20 Jan 2023 21:48)  methocarbamol 750 mg oral tablet: 1 tab(s) orally every 8 hours, As Needed (20 Jan 2023 21:48)  metoprolol tartrate 25 mg oral tablet: 1 tab(s) orally 2 times a day (20 Jan 2023 16:49)  Milk of Magnesia 8% oral suspension: 30 milliliter(s) orally once a day, As Needed (20 Jan 2023 16:49)  montelukast 10 mg oral tablet: 1 tab(s) orally once a day (at bedtime) (20 Jan 2023 21:48)  ondansetron 4 mg oral tablet: 1 tab(s) orally every 4 hours, As Needed (20 Jan 2023 21:48)  oxybutynin 5 mg oral tablet: 1 tab(s) orally 2 times a day (20 Jan 2023 16:49)  oxyCODONE 5 mg oral tablet: 1 tab(s) orally every 4 hours, As Needed (20 Jan 2023 16:49)  phytonadione 5 mg oral tablet: 0.5 tab(s) orally once  ***given at [facility] once on 1-19-23*** (20 Jan 2023 21:48)  pregabalin 100 mg oral capsule: 1 cap(s) orally 3 times a day (20 Jan 2023 16:49)  sennosides-docusate 8.6 mg-50 mg oral tablet: 2 tab(s) orally once a day (at bedtime) (20 Jan 2023 16:49)  Tradjenta 5 mg oral tablet: 1 tab(s) orally once a day (20 Jan 2023 16:49)  Tylenol 500 mg oral tablet: 2 tab(s) orally every 8 hours (20 Jan 2023 16:49)    MEDICATIONS  (STANDING):  albuterol    0.083% 2.5 milliGRAM(s) Nebulizer every 6 hours  buDESOnide    Inhalation Suspension 0.5 milliGRAM(s) Inhalation every 12 hours  cefepime   IVPB 2000 milliGRAM(s) IV Intermittent every 12 hours  chlorhexidine 4% Liquid 1 Application(s) Topical <User Schedule>  coronavirus bivalent (EUA) Booster Vaccine (PFIZER) 0.3 milliLiter(s) IntraMuscular once  dextrose 5%. 1000 milliLiter(s) (100 mL/Hr) IV Continuous <Continuous>  dextrose 5%. 1000 milliLiter(s) (50 mL/Hr) IV Continuous <Continuous>  dextrose 50% Injectable 25 Gram(s) IV Push once  dextrose 50% Injectable 12.5 Gram(s) IV Push once  dextrose 50% Injectable 25 Gram(s) IV Push once  enoxaparin Injectable 100 milliGRAM(s) SubCutaneous every 12 hours  fat emulsion (Fish Oil and Plant Based) 20% Infusion 0.82 Gm/kG/Day (33.33 mL/Hr) IV Continuous <Continuous>  glucagon  Injectable 1 milliGRAM(s) IntraMuscular once  insulin glargine Injectable (LANTUS) 20 Unit(s) SubCutaneous at bedtime  insulin lispro (ADMELOG) corrective regimen sliding scale   SubCutaneous every 6 hours  levothyroxine Injectable 70 MICROGram(s) IV Push at bedtime  metoprolol tartrate IVPB 5 milliGRAM(s) IV Intermittent every 6 hours  metroNIDAZOLE  IVPB 500 milliGRAM(s) IV Intermittent every 8 hours  nystatin Cream 1 Application(s) Topical two times a day  nystatin Powder 1 Application(s) Topical every 12 hours  octreotide  Injectable 75 MICROGram(s) SubCutaneous every 8 hours  pantoprazole  Injectable 40 milliGRAM(s) IV Push daily  Parenteral Nutrition - Adult 1 Each (83 mL/Hr) TPN Continuous <Continuous>  Parenteral Nutrition - Adult 1 Each (83 mL/Hr) TPN Continuous <Continuous>    MEDICATIONS  (PRN):  acetaminophen     Tablet .. 650 milliGRAM(s) Oral every 6 hours PRN Temp greater or equal to 38C (100.4F)  albuterol    90 MICROgram(s) HFA Inhaler 2 Puff(s) Inhalation every 6 hours PRN Shortness of Breath and/or Wheezing  dextrose Oral Gel 15 Gram(s) Oral once PRN Blood Glucose LESS THAN 70 milliGRAM(s)/deciliter  hydrALAZINE Injectable 10 milliGRAM(s) IV Push every 6 hours PRN SBP>160  HYDROmorphone  Injectable 0.5 milliGRAM(s) IV Push every 3 hours PRN Severe Pain (7 - 10)  sodium chloride 0.9% lock flush 10 milliLiter(s) IV Push every 1 hour PRN Pre/post blood products, medications, blood draw, and to maintain line patency      Allergies    Cipro (Rash)  Spiriva (Rash)    Intolerances        Vital Signs Last 24 Hrs  T(C): 37.6 (11 Feb 2023 09:08), Max: 37.6 (11 Feb 2023 09:08)  T(F): 99.6 (11 Feb 2023 09:08), Max: 99.6 (11 Feb 2023 09:08)  HR: 96 (11 Feb 2023 09:08) (96 - 101)  BP: 128/54 (11 Feb 2023 09:08) (107/53 - 128/54)  BP(mean): 76 (11 Feb 2023 06:40) (64 - 76)  RR: 18 (11 Feb 2023 09:08) (18 - 20)  SpO2: 97% (11 Feb 2023 09:08) (95% - 99%)    Parameters below as of 11 Feb 2023 09:08  Patient On (Oxygen Delivery Method): nasal cannula  O2 Flow (L/min): 2        PHYSICAL EXAMINATION:    NECK:  Supple. No lymphadenopathy. Jugular venous pressure not elevated. Carotids equal.   HEART:   The cardiac impulse has a normal quality. Reg., Nl S1 and S2.  There are no murmurs, rubs or gallops noted  CHEST:  Chest crackles to auscultation. Normal respiratory effort.  ABDOMEN:  Soft and nontender.   EXTREMITIES:  There is no edema.       LABS:                        9.1    11.07 )-----------( 295      ( 11 Feb 2023 08:35 )             29.8     02-11    133<L>  |  104  |  22  ----------------------------<  384<H>  4.8   |  21<L>  |  1.01    Ca    7.7<L>      11 Feb 2023 08:35  Phos  4.6     02-11  Mg     2.1     02-11    TPro  5.7<L>  /  Alb  1.2<L>  /  TBili  0.5  /  DBili  x   /  AST  14<L>  /  ALT  9<L>  /  AlkPhos  72  02-11

## 2023-02-11 NOTE — PHARMACOTHERAPY INTERVENTION NOTE - COMMENTS
Pharmacy PN Follow Up Note    *current status: 2/11: RD spoke w/PA Mala Levi [phone], she confirms that Dr. Castro wants to c/w PPN.    *new pertinent meds: Lantus (0 units x 24 hours), Admelog (30 units x 24 hours), Synthroid, Sandostatin, Protonix, Dilaudid    *labs reviewed: K+ and Mg WNL. Na+ on the lower end of normal, will increase sodium content in the PN bag.  Phos on the high end of normal, will decrease content in the bag. POCTs continue to be elevated despite insulin given outside of bag. Continue with POCTs and monitor. Adjust using sliding scale.     02-11    133<L>  |  104  |  22  ----------------------------<  384<H>  4.8   |  21<L>  |  1.01    Ca    7.7<L>      11 Feb 2023 08:35  Phos  4.6     02-11  Mg     2.1     02-11    TPro  5.7<L>  /  Alb  1.2<L>  /  TBili  0.5  /  DBili  x   /  AST  14<L>  /  ALT  9<L>  /  AlkPhos  72  02-11      BMI: BMI (kg/m2): 35.7 (01-20-23 @ 23:30)  HbA1c: A1C with Estimated Average Glucose Result: 6.0 % (12-11-22 @ 08:00)    Glucose: POCT Blood Glucose.: 346 mg/dL (02-11-23 @ 06:58)    BP: 128/54 (02-11-23 @ 09:08) (107/53 - 163/86)  Lipid Panel: Date/Time: 02-09-23 @ 05:22  Cholesterol, Serum: --  Direct LDL: --  HDL Cholesterol, Serum: --  Total Cholesterol/HDL Ration Measurement: --  Triglycerides, Serum: 399    POCT Blood Glucose.: 346 mg/dL (02-11-23 @ 06:58)  POCT Blood Glucose.: 351 mg/dL (02-10-23 @ 22:53)  POCT Blood Glucose.: 354 mg/dL (02-10-23 @ 17:15)  POCT Blood Glucose.: 327 mg/dL (02-10-23 @ 12:08)        *I&O's Detail    10 Feb 2023 07:01  -  11 Feb 2023 07:00  --------------------------------------------------------  IN:    IV PiggyBack: 200 mL    IV PiggyBack: 75 mL    IV PiggyBack: 100 mL    PPN (Peripheral Parenteral Nutrition): 913 mL  Total IN: 1288 mL    OUT:    Bulb (mL): 550 mL    Oral Fluid: 0 mL  Total OUT: 550 mL    Total NET: 738 mL      *will continue to monitor and adjust PN prn*    PPN Recommendations: via peripheral venous line  Total Volume: 2000 mL  80 g  Amino Acids  100 g Dextrose  0 g Lipids 20%  126 mEq Sodium Chloride  11 mEq Sodium Acetate  20 mmol Sodium Phosphate  15 mEq Potassium Chloride  9 mEq Potassium Acetate  40 mmol Potassium Phosphate  0 mEq Calcium Gluconate  22 mEq Magnesium Sulfate  200 mg Thiamine  1 ml Trace Elements  10 ml MVI  Regular Insulin 20 units     Total Calories            (Provides  kcal and  g protein)    (osmolarity)                   Pharmacy PN Follow Up Note    *current status: 2/11: RD spoke w/PA Mala Levi [phone], she confirms that Dr. Castro wants to c/w PPN.    *new pertinent meds: Lantus (0 units x 24 hours), Admelog (30 units x 24 hours), Synthroid, Sandostatin, Protonix, Dilaudid    *labs reviewed: K+ and Mg WNL. Na+ on the lower end of normal, will increase sodium content in the PN bag.  Phos on the high end of normal, will decrease content in the bag. POCTs continue to be elevated despite insulin given outside of bag. Continue with POCTs and monitor. Adjust using sliding scale.     02-11    133<L>  |  104  |  22  ----------------------------<  384<H>  4.8   |  21<L>  |  1.01    Ca    7.7<L>      11 Feb 2023 08:35  Phos  4.6     02-11  Mg     2.1     02-11    TPro  5.7<L>  /  Alb  1.2<L>  /  TBili  0.5  /  DBili  x   /  AST  14<L>  /  ALT  9<L>  /  AlkPhos  72  02-11      BMI: BMI (kg/m2): 35.7 (01-20-23 @ 23:30)  HbA1c: A1C with Estimated Average Glucose Result: 6.0 % (12-11-22 @ 08:00)    Glucose: POCT Blood Glucose.: 346 mg/dL (02-11-23 @ 06:58)    BP: 128/54 (02-11-23 @ 09:08) (107/53 - 163/86)  Lipid Panel: Date/Time: 02-09-23 @ 05:22  Cholesterol, Serum: --  Direct LDL: --  HDL Cholesterol, Serum: --  Total Cholesterol/HDL Ration Measurement: --  Triglycerides, Serum: 399    POCT Blood Glucose.: 346 mg/dL (02-11-23 @ 06:58)  POCT Blood Glucose.: 351 mg/dL (02-10-23 @ 22:53)  POCT Blood Glucose.: 354 mg/dL (02-10-23 @ 17:15)  POCT Blood Glucose.: 327 mg/dL (02-10-23 @ 12:08)        *I&O's Detail    10 Feb 2023 07:01  -  11 Feb 2023 07:00  --------------------------------------------------------  IN:    IV PiggyBack: 200 mL    IV PiggyBack: 75 mL    IV PiggyBack: 100 mL    PPN (Peripheral Parenteral Nutrition): 913 mL  Total IN: 1288 mL    OUT:    Bulb (mL): 550 mL    Oral Fluid: 0 mL  Total OUT: 550 mL    Total NET: 738 mL      *will continue to monitor and adjust PN prn*    PPN Recommendations: via peripheral venous line  Total Volume: 2000 mL  80 g  Amino Acids  100 g Dextrose  0 g Lipids 20%  126 mEq Sodium Chloride  11 mEq Sodium Acetate  20 mmol Sodium Phosphate  15 mEq Potassium Chloride  9 mEq Potassium Acetate  40 mmol Potassium Phosphate  0 mEq Calcium Gluconate  22 mEq Magnesium Sulfate  200 mg Thiamine  1 ml Trace Elements  10 ml MVI  20 units Regular Insulin      Total Calories:  660 kcal  (Provides 30% of daily energy requirements and 46% of daily protein requirements)    (osmolarity <900)                      Pharmacy PN Follow Up Note    *current status: 2/11: RD spoke w/PA Mala Levi [phone], she confirms that Dr. Castro wants to c/w PPN.    *new pertinent meds: Lantus (0 units x 24 hours), Admelog (30 units x 24 hours), Synthroid, Sandostatin, Protonix, Dilaudid    *labs reviewed: K+ and Mg WNL. Na+ on the lower end of normal, will increase sodium content in the PN bag.  Phos on the high end of normal, will decrease content in the bag. POCTs continue to be elevated despite insulin given outside of bag. Continue with POCTs and monitor. Adjust using sliding scale. Triglyceride level on 2/9/23 was 399. Lipids removed on 2/10/23. Will add lipids back on as 80 grams to alternate every other day.  Will request for another triglyceride level.     02-11    133<L>  |  104  |  22  ----------------------------<  384<H>  4.8   |  21<L>  |  1.01    Ca    7.7<L>      11 Feb 2023 08:35  Phos  4.6     02-11  Mg     2.1     02-11    TPro  5.7<L>  /  Alb  1.2<L>  /  TBili  0.5  /  DBili  x   /  AST  14<L>  /  ALT  9<L>  /  AlkPhos  72  02-11      BMI: BMI (kg/m2): 35.7 (01-20-23 @ 23:30)  HbA1c: A1C with Estimated Average Glucose Result: 6.0 % (12-11-22 @ 08:00)    Glucose: POCT Blood Glucose.: 346 mg/dL (02-11-23 @ 06:58)    BP: 128/54 (02-11-23 @ 09:08) (107/53 - 163/86)  Lipid Panel: Date/Time: 02-09-23 @ 05:22  Cholesterol, Serum: --  Direct LDL: --  HDL Cholesterol, Serum: --  Total Cholesterol/HDL Ration Measurement: --  Triglycerides, Serum: 399    POCT Blood Glucose.: 346 mg/dL (02-11-23 @ 06:58)  POCT Blood Glucose.: 351 mg/dL (02-10-23 @ 22:53)  POCT Blood Glucose.: 354 mg/dL (02-10-23 @ 17:15)  POCT Blood Glucose.: 327 mg/dL (02-10-23 @ 12:08)        *I&O's Detail    10 Feb 2023 07:01  -  11 Feb 2023 07:00  --------------------------------------------------------  IN:    IV PiggyBack: 200 mL    IV PiggyBack: 75 mL    IV PiggyBack: 100 mL    PPN (Peripheral Parenteral Nutrition): 913 mL  Total IN: 1288 mL    OUT:    Bulb (mL): 550 mL    Oral Fluid: 0 mL  Total OUT: 550 mL    Total NET: 738 mL      *will continue to monitor and adjust PN prn*    PPN Recommendations: via peripheral venous line  Total Volume: 2000 mL  80 g  Amino Acids  100 g Dextrose  80 g Lipids 20%  126 mEq Sodium Chloride  11 mEq Sodium Acetate  20 mmol Sodium Phosphate  15 mEq Potassium Chloride  9 mEq Potassium Acetate  40 mmol Potassium Phosphate  0 mEq Calcium Gluconate  22 mEq Magnesium Sulfate  200 mg Thiamine  1 ml Trace Elements  10 ml MVI  20 units Regular Insulin      Total Calories:  660 kcal  (Provides 30% of daily energy requirements and 46% of daily protein requirements)    (osmolarity <900)                      Pharmacy PN Follow Up Note    *current status: 2/11: RD spoke w/PA Mala Levi [phone], she confirms that Dr. Castro wants to c/w PPN.    *new pertinent meds: Lantus (0 units x 24 hours), Admelog (30 units x 24 hours), Synthroid, Sandostatin, Protonix, Dilaudid    *labs reviewed: K+ and Mg WNL. Na+ on the lower end of normal, will increase sodium content in the PN bag.  Phos on the high end of normal, will decrease content in the bag. POCTs continue to be elevated despite insulin given outside of bag. Continue with POCTs and monitor. Adjust using sliding scale. Triglyceride level on 2/9/23 was 399. Lipids removed on 2/10/23. Will add lipids back on as 80 grams to alternate every other day.  Will request for another triglyceride level.     02-11    133<L>  |  104  |  22  ----------------------------<  384<H>  4.8   |  21<L>  |  1.01    Ca    7.7<L>      11 Feb 2023 08:35  Phos  4.6     02-11  Mg     2.1     02-11    TPro  5.7<L>  /  Alb  1.2<L>  /  TBili  0.5  /  DBili  x   /  AST  14<L>  /  ALT  9<L>  /  AlkPhos  72  02-11      BMI: BMI (kg/m2): 35.7 (01-20-23 @ 23:30)  HbA1c: A1C with Estimated Average Glucose Result: 6.0 % (12-11-22 @ 08:00)    Glucose: POCT Blood Glucose.: 346 mg/dL (02-11-23 @ 06:58)    BP: 128/54 (02-11-23 @ 09:08) (107/53 - 163/86)  Lipid Panel: Date/Time: 02-09-23 @ 05:22  Cholesterol, Serum: --  Direct LDL: --  HDL Cholesterol, Serum: --  Total Cholesterol/HDL Ration Measurement: --  Triglycerides, Serum: 399    POCT Blood Glucose.: 346 mg/dL (02-11-23 @ 06:58)  POCT Blood Glucose.: 351 mg/dL (02-10-23 @ 22:53)  POCT Blood Glucose.: 354 mg/dL (02-10-23 @ 17:15)  POCT Blood Glucose.: 327 mg/dL (02-10-23 @ 12:08)        *I&O's Detail    10 Feb 2023 07:01  -  11 Feb 2023 07:00  --------------------------------------------------------  IN:    IV PiggyBack: 200 mL    IV PiggyBack: 75 mL    IV PiggyBack: 100 mL    PPN (Peripheral Parenteral Nutrition): 913 mL  Total IN: 1288 mL    OUT:    Bulb (mL): 550 mL    Oral Fluid: 0 mL  Total OUT: 550 mL    Total NET: 738 mL      *will continue to monitor and adjust PN prn*    PPN Recommendations: via peripheral venous line  Total Volume: 2000 mL  80 g  Amino Acids  100 g Dextrose  80 g Lipids 20%  126 mEq Sodium Chloride  11 mEq Sodium Acetate  20 mmol Sodium Phosphate  15 mEq Potassium Chloride  9 mEq Potassium Acetate  40 mmol Potassium Phosphate  0 mEq Calcium Gluconate  22 mEq Magnesium Sulfate  200 mg Thiamine  1 ml Trace Elements  10 ml MVI  20 units Regular Insulin      Total Calories:  1460 kcal  (Provides 60% of daily energy requirements and 46% of daily protein requirements)    (osmolarity <900)

## 2023-02-11 NOTE — PROGRESS NOTE ADULT - SUBJECTIVE AND OBJECTIVE BOX
Awake, alert, comfortable  VSS afeb  lungs- clear  cor-RRR  Abd- soft non tender, dressing intact  Ext- + 2 edema

## 2023-02-11 NOTE — CHART NOTE - NSCHARTNOTEFT_GEN_A_CORE
Clinical Nutrition PN Follow Up Note    * 73 y/o female with a PMH of CVA with left sided weakness, atrial fibrillation, emphysema, COPD, HTN - wears 2L NC at baseline BIBA, hx SBO and resection per EMS presents to the ED from [facility] for RLQ pain and r/o SBO. x1 episode of emesis, + profound diffuse abdominal pain, febrile. Has SBO, NGT to LWS. Taken to OR on  underwent ExLAP, extensive RICHARD, ileocolectomy, ventral hernia repair with MESH. Postop course complicated with ALVERTO, shock requiring pressors, transferred to ICU. + anasarca   DNR/ DNI.   **pt started on TF on 2/3 - began having loose stools and was leaking out of wounds; with EC fistula. TF now on hold, PPN re-started (). Remains with NGT and 1 DEA drain to suction.    *: Tx from SICU to [facility] (). s/p palliative GOC meeting - DNR, DNI, No feeding tube. PPN not addressed in GOC? Still has NGT to LWS.    *: Pt c/w PPN. S/p Palliative Care f/u -  family is agreeable to continuing all other measures to treat reversible issues. Spoke with surgical PA, plan to c/w PPN today. Discussed recommendation for PICC placement to help pt meet >/= 80% of ENN via TPN if plan is to c/w PN. If fistula is unable to be reversed, would consider to d/c PPN and allow for PO diet if possible ? comfort care? given GOC  2/10: NGT removed - PPN still not being addressed whether or not to continue or within GOC.   Left side entero atmospheric fistula. Needs complex fistula system for wound care as per Dr. Castro note    *current status: : RD spoke w/PA Mala Levi [phone], she confirms that Dr. Castro wants to c/w PPN. See below recommendations and pharmacy note for PPN recs.  *new pertinent meds: Lantus (0 units x 24 hours), Admelog (30 units x 24 hours), Synthroid, Sandostatin, Protonix, Dilaudid     *labs reviewed; K+ WNL. Hyponatremia noted, however when adjusted for hyperglycemia - Na+ on lower end of normal limits (134); will c/w current sodium content in PN and will increase in bag tomorrow if remains low.    Phos now WNL. Hypomagnesemia continues, will increase in bag again; monitor and adjust tomorrow based on new labs.    POCTs continue to be elevated despite insulin given outside bag, SS adjusted, increase insulin in bag to 20 units.    Last bili T WNL to c/w trace elements.     New triglyceride level 399, will remove lipids.   Corrected Ca WNL, rec'd add 10mEq of Calcium Gluconate outside of PN bag as CAPS is out.     02-10    131<L>  |  101  |  16  ----------------------------<  315<H>  4.6   |  23  |  0.62    Ca    7.6<L>      10 Feb 2023 05:26  Phos  3.0     02-10  Mg     1.7     02-10    TPro  6.3  /  Alb  1.3<L>  /  TBili  0.5  /  DBili  x   /  AST  18  /  ALT  11<L>  /  AlkPhos  72  0210      131<L>  |  101  |  16  ----------------------------<  315<H>  4.6   |  23  |  0.62    Ca    7.6<L>      10 Feb 2023 05:26  Phos  3.0     02-10  Mg     1.7     02-10    TPro  6.3  /  Alb  1.3<L>  /  TBili  0.5  /  DBili  x   /  AST  18  /  ALT  11<L>  /  AlkPhos  72  02-10    POCT Blood Glucose.: 276 mg/dL (10 Feb 2023 05:28)  POCT Blood Glucose.: 255 mg/dL (2023 22:20)  POCT Blood Glucose.: 282 mg/dL (2023 17:13)  POCT Blood Glucose.: 287 mg/dL (2023 12:26)    *I&O's Detail - Strict I&O's are rec'd when pt is on PN as per protocol     *pt with +3 generalized, and +4 L Leg and R Leg edema. Will monitor/adjust total PPN volume prn. TF continues to be on hold.     *malnutrition: Pt continues to meet criteria for severe protein-calorie malnutrition in context of acute disease r/t decreased ability to meet increased nutrient needs 2/2 SBO AEB <50% ENN x >11 days, severe muscle/ fat wasting, severe edema    Estimated Needs: based on 97.4 Kg (wt from admit - current wts being skewed by severe fluid retention)  Calories: 1948- 2435 Kcal (20- 25 Kcal/Kg)  Protein: 146- 175 g (1.5- 1.8 g/Kg)  Fluids: 1948- 2435 mL (20- 25 mL/Kg)    Weight History:  Daily Weight in k.6 (2023 06:00)   - 220#  Height (cm): 165.1 (23 @ 16:08)  Weight (kg): 97.4 (23 @ 23:30) - admit wt ()  BMI (kg/m2): 35.7 (23 @ 23:30)  BSA (m2): 2.04 (23 @ 23:30)    Weight History:  Height (cm): 165.1 (23 @ 16:08)  Weight (kg): 97.4 (23 @ 23:30)  BMI (kg/m2): 35.7 (23 @ 23:30)  BSA (m2): 2.04 (23 @ 23:30)    PPN Recommendations: via peripheral line. **PPN recommendations as per pharmacy - see pharmacy communication note     Additional Recommendations:    1) Daily weights  2) Strict I & O's - please doc I&O's per PN protocol  3) Daily lyte checks including magnesium and phos  4) Weekly triglycerides/LFT checks  5) POCT q6hrs; maintain 140-180mg/dL  6) Confirm goals of care regarding LONG-TERM nutrition support specifically referring to PPN/TPN - pt is NOT on TPN (meeting <80% ENN x 7 days while on PPN). Would consider to D/C and allow for PO pleasure feeds if fistula is irreversible as per GOC     RD will continue to monitor PPN, labs, hydration, and wt prn.   Brea Johnson, MS, RDN, CDN, [facility] [phone] Clinical Nutrition PN Follow Up Note    * 73 y/o female with a PMH of CVA with left sided weakness, atrial fibrillation, emphysema, COPD, HTN - wears 2L NC at baseline BIBA, hx SBO and resection per EMS presents to the ED from [facility] for RLQ pain and r/o SBO. x1 episode of emesis, + profound diffuse abdominal pain, febrile. Has SBO, NGT to LWS. Taken to OR on  underwent ExLAP, extensive RICHARD, ileocolectomy, ventral hernia repair with MESH. Postop course complicated with ALVERTO, shock requiring pressors, transferred to ICU. + anasarca   DNR/ DNI.   **pt started on TF on 2/3 - began having loose stools and was leaking out of wounds; with EC fistula. TF now on hold, PPN re-started (). Remains with NGT and 1 DEA drain to suction.    *: Tx from SICU to [facility] (). s/p palliative GOC meeting - DNR, DNI, No feeding tube. PPN not addressed in GOC? Still has NGT to LWS.    *: Pt c/w PPN. S/p Palliative Care f/u -  family is agreeable to continuing all other measures to treat reversible issues. Spoke with surgical PA, plan to c/w PPN today. Discussed recommendation for PICC placement to help pt meet >/= 80% of ENN via TPN if plan is to c/w PN. If fistula is unable to be reversed, would consider to d/c PPN and allow for PO diet if possible ? comfort care? given GOC  *2/10: NGT removed - PPN still not being addressed whether or not to continue or within GOC.   Left side entero atmospheric fistula. Needs complex fistula system for wound care as per Dr. Castro note    *current status: : RD spoke w/PA Mala Levi [phone], she confirms that Dr. Castro wants to c/w PPN. See below recommendations and pharmacy note for PPN recs.  *new pertinent meds: Lantus (0 units x 24 hours), Admelog (30 units x 24 hours), Synthroid, Sandostatin, Protonix, Dilaudid     *2/10 labs reviewed; K+ WNL. Hyponatremia noted, however when adjusted for hyperglycemia - Na+ on lower end of normal limits (134); will c/w current sodium content in PN and will increase in bag tomorrow if remains low.    Phos now WNL. Hypomagnesemia continues, will increase in bag again; monitor and adjust tomorrow based on new labs.    POCTs continue to be elevated despite insulin given outside bag, SS adjusted, increase insulin in bag to 20 units.    Last bili T WNL to c/w trace elements.     New triglyceride level 399, will remove lipids.   Corrected Ca WNL, rec'd add 10mEq of Calcium Gluconate outside of PN bag as CAPS is out.       02-10    131<L>  |  101  |  16  ----------------------------<  315<H>  4.6   |  23  |  0.62    Ca    7.6<L>      10 Feb 2023 05:26  Phos  3.0     02-10  Mg     1.7     02-10    TPro  6.3  /  Alb  1.3<L>  /  TBili  0.5  /  DBili  x   /  AST  18  /  ALT  11<L>  /  AlkPhos  72  02-10      131<L>  |  101  |  16  ----------------------------<  315<H>  4.6   |  23  |  0.62    Ca    7.6<L>      10 Feb 2023 05:26  Phos  3.0     02-10  Mg     1.7     02-10    TPro  6.3  /  Alb  1.3<L>  /  TBili  0.5  /  DBili  x   /  AST  18  /  ALT  11<L>  /  AlkPhos  72  10    POCT Blood Glucose.: 276 mg/dL (10 Feb 2023 05:28)  POCT Blood Glucose.: 255 mg/dL (2023 22:20)  POCT Blood Glucose.: 282 mg/dL (2023 17:13)  POCT Blood Glucose.: 287 mg/dL (2023 12:26)    I&O's Detail    10 Feb 2023 07:01  -  2023 07:00  --------------------------------------------------------  IN:    IV PiggyBack: 200 mL    IV PiggyBack: 75 mL    IV PiggyBack: 100 mL    PPN (Peripheral Parenteral Nutrition): 913 mL  Total IN: 1288 mL    OUT:    Bulb (mL): 550 mL    Oral Fluid: 0 mL  Total OUT: 550 mL    Total NET: 738 mL  *I&O's Detail - Strict I&O's are rec'd when pt is on PN as per protocol     *pt with +3 generalized, and +4 L Leg and R Leg edema. Will monitor/adjust total PPN volume prn. TF continues to be on hold.     *malnutrition: Pt continues to meet criteria for severe protein-calorie malnutrition in context of acute disease r/t decreased ability to meet increased nutrient needs 2/2 SBO AEB <50% ENN x >11 days, severe muscle/ fat wasting, severe edema    Estimated Needs: based on 97.4 Kg (wt from admit - current wts being skewed by severe fluid retention)  Calories: 1948- 2435 Kcal (20- 25 Kcal/Kg)  Protein: 146- 175 g (1.5- 1.8 g/Kg)  Fluids: 1948- 2435 mL (20- 25 mL/Kg)    Weight History:  Daily Weight in k.6 (2023 06:00)   - 220#  Height (cm): 165.1 (23 @ 16:08)  Weight (kg): 97.4 (23 @ 23:30) - admit wt ()  BMI (kg/m2): 35.7 (23 @ 23:30)  BSA (m2): 2.04 (23 @ 23:30)    Weight History:  Height (cm): 165.1 (23 @ 16:08)  Weight (kg): 97.4 (23 @ 23:30)  BMI (kg/m2): 35.7 (23 @ 23:30)  BSA (m2): 2.04 (23 @ 23:30)    PPN Recommendations: via peripheral line. **PPN recommendations as per pharmacy - see pharmacy communication note     Additional Recommendations:    1) Daily weights  2) Strict I & O's - please doc I&O's per PN protocol  3) Daily lyte checks including magnesium and phos  4) Weekly triglycerides/LFT checks  5) POCT q6hrs; maintain 140-180mg/dL  6) Confirm goals of care regarding LONG-TERM nutrition support specifically referring to PPN/TPN - pt is NOT on TPN (meeting <80% ENN x 7 days while on PPN). Would consider to D/C and allow for PO pleasure feeds if fistula is irreversible as per GOC     RD will continue to monitor PPN, labs, hydration, and wt prn.   Brea Johnson, MS, RDN, CDN, [facility] [phone]

## 2023-02-11 NOTE — PROGRESS NOTE ADULT - SUBJECTIVE AND OBJECTIVE BOX
RACHANA BARGER  72y  Female      Patient is a 72y old  Female who presents with a chief complaint of bowel obstruction/ischemic mesentery (10 Feb 2023 09:07)      INTERVAL HPI/OVERNIGHT EVENTS:  Seen and examined. AMS, weak.       REVIEW OF SYSTEMS:  Unable to obtain 2/2 mental status    Vital Signs Last 24 Hrs  T(C): 37.6 (11 Feb 2023 09:08), Max: 37.6 (11 Feb 2023 09:08)  T(F): 99.6 (11 Feb 2023 09:08), Max: 99.6 (11 Feb 2023 09:08)  HR: 96 (11 Feb 2023 09:08) (96 - 101)  BP: 128/54 (11 Feb 2023 09:08) (107/53 - 128/54)  BP(mean): 76 (11 Feb 2023 06:40) (64 - 76)  RR: 18 (11 Feb 2023 09:08) (18 - 20)  SpO2: 97% (11 Feb 2023 09:08) (95% - 99%)    Parameters below as of 11 Feb 2023 09:08  Patient On (Oxygen Delivery Method): nasal cannula  O2 Flow (L/min): 2        PHYSICAL EXAM:  GENERAL: Obese, lethargic, appears ill  HEAD:  Atraumatic, Normocephalic  NECK: Supple, No JVD, Normal thyroid  NERVOUS SYSTEM:  Lethargic, opens eyes, not following commands  CHEST/LUNG: Clear to percussion bilaterally; No rales, rhonchi, wheezing, or rubs  HEART: Regular rate and rhythm; No murmurs, rubs, or gallops  ABDOMEN: Mid abd dressing, Drain, surrounding erythema, induration  EXTREMITIES:  2+ Peripheral Pulses, No clubbing, cyanosis, Anasarca ; 2+ leg edema  LYMPH: No lymphadenopathy noted  SKIN: No rashes or lesions    All Labs/EKG/Radiology/Meds reviewed by me.     Lab Results:  CBC  CBC Full  -  ( 11 Feb 2023 08:35 )  WBC Count : 11.07 K/uL  RBC Count : 3.36 M/uL  Hemoglobin : 9.1 g/dL  Hematocrit : 29.8 %  Platelet Count - Automated : 295 K/uL  Mean Cell Volume : 88.7 fl  Mean Cell Hemoglobin : 27.1 pg  Mean Cell Hemoglobin Concentration : 30.5 gm/dL  Auto Neutrophil # : 8.86 K/uL  Auto Lymphocyte # : 0.89 K/uL  Auto Monocyte # : 0.22 K/uL  Auto Eosinophil # : 0.00 K/uL  Auto Basophil # : 0.00 K/uL  Auto Neutrophil % : 80.0 %  Auto Lymphocyte % : 8.0 %  Auto Monocyte % : 2.0 %  Auto Eosinophil % : 0.0 %  Auto Basophil % : 0.0 %    .		Differential:	[] Automated		[] Manual  Chemistry                        9.1    11.07 )-----------( 295      ( 11 Feb 2023 08:35 )             29.8     02-11    133<L>  |  104  |  22  ----------------------------<  384<H>  4.8   |  21<L>  |  1.01    Ca    7.7<L>      11 Feb 2023 08:35  Phos  4.6     02-11  Mg     2.1     02-11    TPro  5.7<L>  /  Alb  1.2<L>  /  TBili  0.5  /  DBili  x   /  AST  14<L>  /  ALT  9<L>  /  AlkPhos  72  02-11    LIVER FUNCTIONS - ( 11 Feb 2023 08:35 )  Alb: 1.2 g/dL / Pro: 5.7 gm/dL / ALK PHOS: 72 U/L / ALT: 9 U/L / AST: 14 U/L / GGT: x                     02-10-23 @ 07:01  -  02-11-23 @ 07:00  --------------------------------------------------------  IN: 1288 mL / OUT: 550 mL / NET: 738 mL        MICROBIOLOGY/CULTURES:      RADIOLOGY RESULTS:    MEDICATIONS  (STANDING):  albuterol    0.083% 2.5 milliGRAM(s) Nebulizer every 6 hours  buDESOnide    Inhalation Suspension 0.5 milliGRAM(s) Inhalation every 12 hours  cefepime   IVPB 2000 milliGRAM(s) IV Intermittent every 12 hours  chlorhexidine 4% Liquid 1 Application(s) Topical <User Schedule>  coronavirus bivalent (EUA) Booster Vaccine (PFIZER) 0.3 milliLiter(s) IntraMuscular once  dextrose 5%. 1000 milliLiter(s) (50 mL/Hr) IV Continuous <Continuous>  dextrose 5%. 1000 milliLiter(s) (100 mL/Hr) IV Continuous <Continuous>  dextrose 50% Injectable 25 Gram(s) IV Push once  dextrose 50% Injectable 12.5 Gram(s) IV Push once  dextrose 50% Injectable 25 Gram(s) IV Push once  enoxaparin Injectable 100 milliGRAM(s) SubCutaneous every 12 hours  fat emulsion (Fish Oil and Plant Based) 20% Infusion 0.82 Gm/kG/Day (33.33 mL/Hr) IV Continuous <Continuous>  glucagon  Injectable 1 milliGRAM(s) IntraMuscular once  insulin glargine Injectable (LANTUS) 20 Unit(s) SubCutaneous at bedtime  insulin lispro (ADMELOG) corrective regimen sliding scale   SubCutaneous every 4 hours  levothyroxine Injectable 70 MICROGram(s) IV Push at bedtime  metoprolol tartrate IVPB 5 milliGRAM(s) IV Intermittent every 6 hours  metroNIDAZOLE  IVPB 500 milliGRAM(s) IV Intermittent every 8 hours  nystatin Cream 1 Application(s) Topical two times a day  nystatin Powder 1 Application(s) Topical every 12 hours  octreotide  Injectable 75 MICROGram(s) SubCutaneous every 8 hours  pantoprazole  Injectable 40 milliGRAM(s) IV Push daily  Parenteral Nutrition - Adult 1 Each (83 mL/Hr) TPN Continuous <Continuous>  Parenteral Nutrition - Adult 1 Each (83 mL/Hr) TPN Continuous <Continuous>    MEDICATIONS  (PRN):  acetaminophen     Tablet .. 650 milliGRAM(s) Oral every 6 hours PRN Temp greater or equal to 38C (100.4F)  albuterol    90 MICROgram(s) HFA Inhaler 2 Puff(s) Inhalation every 6 hours PRN Shortness of Breath and/or Wheezing  dextrose Oral Gel 15 Gram(s) Oral once PRN Blood Glucose LESS THAN 70 milliGRAM(s)/deciliter  hydrALAZINE Injectable 10 milliGRAM(s) IV Push every 6 hours PRN SBP>160  HYDROmorphone  Injectable 0.5 milliGRAM(s) IV Push every 3 hours PRN Severe Pain (7 - 10)  sodium chloride 0.9% lock flush 10 milliLiter(s) IV Push every 1 hour PRN Pre/post blood products, medications, blood draw, and to maintain line patency

## 2023-02-11 NOTE — PROGRESS NOTE ADULT - ASSESSMENT
Patient admitted with abdominal pain, nausea and vomitting , dehydration  septic shock, likely related to     PROBLEMS:    UTI klebsiella pneumoniae and aspiration PNA /SBO- ventral hernia  New R lung nodules - cannot ruleout aspiration PNA   Acute metabolic encephalopathy  Sepsis   Hx copd without exacerbation  Anasarca/Acute on  chronic diastolic heart failure       Plan:    pulmonary stable  Blood transfusion, on TPN, IV fluids, iv lasix prn  NG suction-Incarcerated Ventral Hernia with SBO-S/p extensive RICHARD, ileo colectomy, giant ventral hernia repair with Surgimend-surgery fu for persistant sutures leak-wound care  IV cefepime/flagyl-for  sepsis ( SBO/UTI)  Monitor Cr  Surgery fu  D/w staff  DVT PROPHYLASIX

## 2023-02-11 NOTE — PROGRESS NOTE ADULT - ASSESSMENT
Entero atmospheric fistula, s,p SBO, incarcerated ventral hernia, SBR. Cont TPN. Wound care. Sandostatin.

## 2023-02-12 LAB
ALBUMIN SERPL ELPH-MCNC: 1.1 G/DL — LOW (ref 3.3–5)
ALP SERPL-CCNC: 82 U/L — SIGNIFICANT CHANGE UP (ref 40–120)
ALT FLD-CCNC: 13 U/L — SIGNIFICANT CHANGE UP (ref 12–78)
ANION GAP SERPL CALC-SCNC: 10 MMOL/L — SIGNIFICANT CHANGE UP (ref 5–17)
ANION GAP SERPL CALC-SCNC: 11 MMOL/L — SIGNIFICANT CHANGE UP (ref 5–17)
AST SERPL-CCNC: 26 U/L — SIGNIFICANT CHANGE UP (ref 15–37)
BASOPHILS # BLD AUTO: 0 K/UL — SIGNIFICANT CHANGE UP (ref 0–0.2)
BASOPHILS NFR BLD AUTO: 0 % — SIGNIFICANT CHANGE UP (ref 0–2)
BILIRUB SERPL-MCNC: 0.6 MG/DL — SIGNIFICANT CHANGE UP (ref 0.2–1.2)
BUN SERPL-MCNC: 29 MG/DL — HIGH (ref 7–23)
BUN SERPL-MCNC: 34 MG/DL — HIGH (ref 7–23)
CALCIUM SERPL-MCNC: 7.4 MG/DL — LOW (ref 8.5–10.1)
CALCIUM SERPL-MCNC: 7.6 MG/DL — LOW (ref 8.5–10.1)
CHLORIDE SERPL-SCNC: 104 MMOL/L — SIGNIFICANT CHANGE UP (ref 96–108)
CHLORIDE SERPL-SCNC: 105 MMOL/L — SIGNIFICANT CHANGE UP (ref 96–108)
CO2 SERPL-SCNC: 16 MMOL/L — LOW (ref 22–31)
CO2 SERPL-SCNC: 16 MMOL/L — LOW (ref 22–31)
CREAT ?TM UR-MCNC: 37 MG/DL — SIGNIFICANT CHANGE UP
CREAT SERPL-MCNC: 1.52 MG/DL — HIGH (ref 0.5–1.3)
CREAT SERPL-MCNC: 1.83 MG/DL — HIGH (ref 0.5–1.3)
EGFR: 29 ML/MIN/1.73M2 — LOW
EGFR: 36 ML/MIN/1.73M2 — LOW
EOSINOPHIL # BLD AUTO: 0 K/UL — SIGNIFICANT CHANGE UP (ref 0–0.5)
EOSINOPHIL NFR BLD AUTO: 0 % — SIGNIFICANT CHANGE UP (ref 0–6)
GLUCOSE BLDC GLUCOMTR-MCNC: 384 MG/DL — HIGH (ref 70–99)
GLUCOSE BLDC GLUCOMTR-MCNC: 401 MG/DL — HIGH (ref 70–99)
GLUCOSE BLDC GLUCOMTR-MCNC: 405 MG/DL — HIGH (ref 70–99)
GLUCOSE BLDC GLUCOMTR-MCNC: 409 MG/DL — HIGH (ref 70–99)
GLUCOSE BLDC GLUCOMTR-MCNC: 432 MG/DL — HIGH (ref 70–99)
GLUCOSE BLDC GLUCOMTR-MCNC: 488 MG/DL — CRITICAL HIGH (ref 70–99)
GLUCOSE BLDC GLUCOMTR-MCNC: 531 MG/DL — CRITICAL HIGH (ref 70–99)
GLUCOSE SERPL-MCNC: 433 MG/DL — HIGH (ref 70–99)
GLUCOSE SERPL-MCNC: 518 MG/DL — CRITICAL HIGH (ref 70–99)
HCT VFR BLD CALC: 29.7 % — LOW (ref 34.5–45)
HGB BLD-MCNC: 9 G/DL — LOW (ref 11.5–15.5)
LACTATE SERPL-SCNC: 2.1 MMOL/L — HIGH (ref 0.7–2)
LYMPHOCYTES # BLD AUTO: 0.57 K/UL — LOW (ref 1–3.3)
LYMPHOCYTES # BLD AUTO: 4 % — LOW (ref 13–44)
MAGNESIUM SERPL-MCNC: 2.4 MG/DL — SIGNIFICANT CHANGE UP (ref 1.6–2.6)
MCHC RBC-ENTMCNC: 28.4 PG — SIGNIFICANT CHANGE UP (ref 27–34)
MCHC RBC-ENTMCNC: 30.3 GM/DL — LOW (ref 32–36)
MCV RBC AUTO: 93.7 FL — SIGNIFICANT CHANGE UP (ref 80–100)
MONOCYTES # BLD AUTO: 0.14 K/UL — SIGNIFICANT CHANGE UP (ref 0–0.9)
MONOCYTES NFR BLD AUTO: 1 % — LOW (ref 2–14)
NEUTROPHILS # BLD AUTO: 12.74 K/UL — HIGH (ref 1.8–7.4)
NEUTROPHILS NFR BLD AUTO: 81 % — HIGH (ref 43–77)
NRBC # BLD: SIGNIFICANT CHANGE UP /100 WBCS (ref 0–0)
PHOSPHATE SERPL-MCNC: 6.8 MG/DL — HIGH (ref 2.5–4.5)
PLATELET # BLD AUTO: 307 K/UL — SIGNIFICANT CHANGE UP (ref 150–400)
POTASSIUM SERPL-MCNC: 5.6 MMOL/L — HIGH (ref 3.5–5.3)
POTASSIUM SERPL-MCNC: 6.1 MMOL/L — HIGH (ref 3.5–5.3)
POTASSIUM SERPL-SCNC: 5.6 MMOL/L — HIGH (ref 3.5–5.3)
POTASSIUM SERPL-SCNC: 6.1 MMOL/L — HIGH (ref 3.5–5.3)
PROT ?TM UR-MCNC: 416 MG/DL — HIGH (ref 0–12)
PROT SERPL-MCNC: 5.6 GM/DL — LOW (ref 6–8.3)
PROT/CREAT UR-RTO: 11.2 RATIO — HIGH (ref 0–0.2)
RBC # BLD: 3.17 M/UL — LOW (ref 3.8–5.2)
RBC # FLD: 22.2 % — HIGH (ref 10.3–14.5)
SODIUM SERPL-SCNC: 131 MMOL/L — LOW (ref 135–145)
SODIUM SERPL-SCNC: 131 MMOL/L — LOW (ref 135–145)
WBC # BLD: 14.32 K/UL — HIGH (ref 3.8–10.5)
WBC # FLD AUTO: 14.32 K/UL — HIGH (ref 3.8–10.5)

## 2023-02-12 PROCEDURE — 99233 SBSQ HOSP IP/OBS HIGH 50: CPT

## 2023-02-12 RX ORDER — DEXTROSE 50 % IN WATER 50 %
50 SYRINGE (ML) INTRAVENOUS ONCE
Refills: 0 | Status: COMPLETED | OUTPATIENT
Start: 2023-02-12 | End: 2023-02-12

## 2023-02-12 RX ORDER — METOPROLOL TARTRATE 50 MG
25 TABLET ORAL
Refills: 0 | Status: DISCONTINUED | OUTPATIENT
Start: 2023-02-12 | End: 2023-02-21

## 2023-02-12 RX ORDER — INSULIN LISPRO 100/ML
4 VIAL (ML) SUBCUTANEOUS ONCE
Refills: 0 | Status: COMPLETED | OUTPATIENT
Start: 2023-02-12 | End: 2023-02-12

## 2023-02-12 RX ORDER — SODIUM BICARBONATE 1 MEQ/ML
0.06 SYRINGE (ML) INTRAVENOUS
Qty: 75 | Refills: 0 | Status: DISCONTINUED | OUTPATIENT
Start: 2023-02-12 | End: 2023-02-17

## 2023-02-12 RX ORDER — INSULIN HUMAN 100 [IU]/ML
10 INJECTION, SOLUTION SUBCUTANEOUS ONCE
Refills: 0 | Status: COMPLETED | OUTPATIENT
Start: 2023-02-12 | End: 2023-02-12

## 2023-02-12 RX ORDER — ELECTROLYTE SOLUTION,INJ
1 VIAL (ML) INTRAVENOUS
Refills: 0 | Status: DISCONTINUED | OUTPATIENT
Start: 2023-02-12 | End: 2023-02-12

## 2023-02-12 RX ADMIN — ALBUTEROL 2.5 MILLIGRAM(S): 90 AEROSOL, METERED ORAL at 13:41

## 2023-02-12 RX ADMIN — ENOXAPARIN SODIUM 100 MILLIGRAM(S): 100 INJECTION SUBCUTANEOUS at 21:08

## 2023-02-12 RX ADMIN — CEFEPIME 100 MILLIGRAM(S): 1 INJECTION, POWDER, FOR SOLUTION INTRAMUSCULAR; INTRAVENOUS at 21:08

## 2023-02-12 RX ADMIN — NYSTATIN CREAM 1 APPLICATION(S): 100000 CREAM TOPICAL at 06:33

## 2023-02-12 RX ADMIN — Medication 100 MILLIGRAM(S): at 17:50

## 2023-02-12 RX ADMIN — Medication 14: at 17:44

## 2023-02-12 RX ADMIN — Medication 0.5 MILLIGRAM(S): at 21:37

## 2023-02-12 RX ADMIN — PANTOPRAZOLE SODIUM 40 MILLIGRAM(S): 20 TABLET, DELAYED RELEASE ORAL at 11:41

## 2023-02-12 RX ADMIN — OCTREOTIDE ACETATE 75 MICROGRAM(S): 200 INJECTION, SOLUTION INTRAVENOUS; SUBCUTANEOUS at 17:43

## 2023-02-12 RX ADMIN — INSULIN GLARGINE 20 UNIT(S): 100 INJECTION, SOLUTION SUBCUTANEOUS at 21:09

## 2023-02-12 RX ADMIN — ENOXAPARIN SODIUM 100 MILLIGRAM(S): 100 INJECTION SUBCUTANEOUS at 12:39

## 2023-02-12 RX ADMIN — Medication 50 MILLILITER(S): at 11:50

## 2023-02-12 RX ADMIN — CEFEPIME 100 MILLIGRAM(S): 1 INJECTION, POWDER, FOR SOLUTION INTRAMUSCULAR; INTRAVENOUS at 11:37

## 2023-02-12 RX ADMIN — Medication 0.5 MILLIGRAM(S): at 08:22

## 2023-02-12 RX ADMIN — Medication 100 MILLIGRAM(S): at 01:37

## 2023-02-12 RX ADMIN — Medication 70 MICROGRAM(S): at 21:08

## 2023-02-12 RX ADMIN — NYSTATIN CREAM 1 APPLICATION(S): 100000 CREAM TOPICAL at 12:12

## 2023-02-12 RX ADMIN — Medication 100 MILLIGRAM(S): at 21:09

## 2023-02-12 RX ADMIN — OCTREOTIDE ACETATE 75 MICROGRAM(S): 200 INJECTION, SOLUTION INTRAVENOUS; SUBCUTANEOUS at 21:25

## 2023-02-12 RX ADMIN — OCTREOTIDE ACETATE 75 MICROGRAM(S): 200 INJECTION, SOLUTION INTRAVENOUS; SUBCUTANEOUS at 06:33

## 2023-02-12 RX ADMIN — Medication 100 MILLIGRAM(S): at 09:15

## 2023-02-12 RX ADMIN — HYDROMORPHONE HYDROCHLORIDE 0.5 MILLIGRAM(S): 2 INJECTION INTRAMUSCULAR; INTRAVENOUS; SUBCUTANEOUS at 12:37

## 2023-02-12 RX ADMIN — ALBUTEROL 2.5 MILLIGRAM(S): 90 AEROSOL, METERED ORAL at 21:37

## 2023-02-12 RX ADMIN — Medication 1 EACH: at 00:09

## 2023-02-12 RX ADMIN — Medication 14: at 21:25

## 2023-02-12 RX ADMIN — Medication 1 EACH: at 22:55

## 2023-02-12 RX ADMIN — I.V. FAT EMULSION 33.33 GM/KG/DAY: 20 EMULSION INTRAVENOUS at 00:10

## 2023-02-12 RX ADMIN — Medication 4 UNIT(S): at 23:01

## 2023-02-12 RX ADMIN — Medication 110 MILLIGRAM(S): at 00:29

## 2023-02-12 RX ADMIN — Medication 75 MEQ/KG/HR: at 16:31

## 2023-02-12 RX ADMIN — NYSTATIN CREAM 1 APPLICATION(S): 100000 CREAM TOPICAL at 21:09

## 2023-02-12 RX ADMIN — CHLORHEXIDINE GLUCONATE 1 APPLICATION(S): 213 SOLUTION TOPICAL at 11:37

## 2023-02-12 RX ADMIN — NYSTATIN CREAM 1 APPLICATION(S): 100000 CREAM TOPICAL at 17:45

## 2023-02-12 RX ADMIN — ALBUTEROL 2.5 MILLIGRAM(S): 90 AEROSOL, METERED ORAL at 08:22

## 2023-02-12 RX ADMIN — HYDROMORPHONE HYDROCHLORIDE 0.5 MILLIGRAM(S): 2 INJECTION INTRAMUSCULAR; INTRAVENOUS; SUBCUTANEOUS at 06:45

## 2023-02-12 RX ADMIN — INSULIN HUMAN 10 UNIT(S): 100 INJECTION, SOLUTION SUBCUTANEOUS at 11:38

## 2023-02-12 NOTE — CHART NOTE - NSCHARTNOTEFT_GEN_A_CORE
Clinical Nutrition PN Follow Up Note    * 73 y/o female with a PMH of CVA with left sided weakness, atrial fibrillation, emphysema, COPD, HTN - wears 2L NC at baseline BIBA, hx SBO and resection per EMS presents to the ED from [facility] for RLQ pain and r/o SBO. x1 episode of emesis, + profound diffuse abdominal pain, febrile. Has SBO, NGT to LWS. Taken to OR on  underwent ExLAP, extensive RICHARD, ileocolectomy, ventral hernia repair with MESH. Postop course complicated with ALVERTO, shock requiring pressors, transferred to ICU. + anasarca   DNR/ DNI.   **pt started on TF on 2/3 - began having loose stools and was leaking out of wounds; with EC fistula. TF now on hold, PPN re-started (). Remains with NGT and 1 DEA drain to suction.    *: Tx from SICU to [facility] (). s/p palliative GOC meeting - DNR, DNI, No feeding tube. PPN not addressed in GOC? Still has NGT to LWS.    *: Pt c/w PPN. S/p Palliative Care f/u -  family is agreeable to continuing all other measures to treat reversible issues. Spoke with surgical PA, plan to c/w PPN today. Discussed recommendation for PICC placement to help pt meet >/= 80% of ENN via TPN if plan is to c/w PN. If fistula is unable to be reversed, would consider to d/c PPN and allow for PO diet if possible ? comfort care? given GOC  *2/10: NGT removed - PPN still not being addressed whether or not to continue or within GOC.   Left side entero atmospheric fistula. Needs complex fistula system for wound care as per Dr. Castro note  *: RD spoke w/PA Mala Montgomery [phone], she confirms that Dr. Castro wants to c/w PPN. See below recommendations and pharmacy note for PPN recs.    *current status: : RD spoke w/PAPO Puente x [phone], confirmed that Dr. Castro wants PPN continued tonight. See below recommendations and pharmacy note for PPN recs..     *labs reviewed;         133<L>  |  104  |  22  ----------------------------<  384<H>  4.8   |  21<L>  |  1.01    Ca    7.7<L>      2023 08:35  Phos  4.6       Mg     2.1         TPro  5.7<L>  /  Alb  1.2<L>  /  TBili  0.5  /  DBili  x   /  AST  14<L>  /  ALT  9<L>  /  AlkPhos  72      CAPILLARY BLOOD GLUCOSE      POCT Blood Glucose.: 405 mg/dL (2023 06:14)  POCT Blood Glucose.: 432 mg/dL (2023 06:11)  POCT Blood Glucose.: 382 mg/dL (2023 23:02)  POCT Blood Glucose.: 424 mg/dL (2023 19:17)  POCT Blood Glucose.: 455 mg/dL (2023 19:14)  POCT Blood Glucose.: 387 mg/dL (2023 13:30)    POCT Blood Glucose.: 276 mg/dL (10 Feb 2023 05:28)  POCT Blood Glucose.: 255 mg/dL (2023 22:20)  POCT Blood Glucose.: 282 mg/dL (2023 17:13)  POCT Blood Glucose.: 287 mg/dL (2023 12:26)    I&O's Detail    2023 07:01  -  2023 07:00  --------------------------------------------------------  IN:  Total IN: 0 mL    OUT:    Bulb (mL): 200 mL  Total OUT: 200 mL    Total NET: -200 mL  *I&O's Detail - Strict I&O's are rec'd when pt is on PN as per protocol     *pt with +3 generalized, and +4 B/foot edema. Will monitor/adjust total PPN volume prn.     *malnutrition: Pt continues to meet criteria for severe protein-calorie malnutrition in context of acute disease r/t decreased ability to meet increased nutrient needs 2/2 SBO AEB <50% ENN x >11 days, severe muscle/ fat wasting, severe edema    Estimated Needs: based on 97.4 Kg (wt from admit - current wts being skewed by severe fluid retention)  Calories: 1948- 2435 Kcal (20- 25 Kcal/Kg)  Protein: 146- 175 g (1.5- 1.8 g/Kg)  Fluids: 1948- 2435 mL (20- 25 mL/Kg)      Weight History:  Daily Weight in k.6 (2023 06:00)   - #  Height (cm): 165.1 (23 @ 16:08)  Weight (kg): 97.4 (23 @ 23:30) - admit wt ()  BMI (kg/m2): 35.7 (23 @ 23:30)  BSA (m2): 2.04 (23 @ 23:30)    Weight History:  Height (cm): 165.1 (23 @ 16:08)  Weight (kg): 97.4 (23 @ 23:30)  BMI (kg/m2): 35.7 (23 @ 23:30)  BSA (m2): 2.04 (23 @ 23:30)    PPN Recommendations: via peripheral line. **PPN recommendations as per pharmacy - see pharmacy communication note     Additional Recommendations:    1) Daily weights  2) Strict I & O's - please doc I&O's per PN protocol  3) Daily lyte checks including magnesium and phos  4) Weekly triglycerides/LFT checks  5) POCT q6hrs; maintain 140-180mg/dL  6) Confirm goals of care regarding LONG-TERM nutrition support specifically referring to PPN/TPN - pt is NOT on TPN (meeting <80% ENN x 7 days while on PPN). Would consider to D/C and allow for PO pleasure feeds if fistula is irreversible as per GOC     RD will continue to monitor PPN, labs, hydration, and wt prn.   Brea Johnson, MS, RDN, CDN, FAND [phone] Clinical Nutrition PN Follow Up Note    * 71 y/o female with a PMH of CVA with left sided weakness, atrial fibrillation, emphysema, COPD, HTN - wears 2L NC at baseline BIBA, hx SBO and resection per EMS presents to the ED from [facility] for RLQ pain and r/o SBO. x1 episode of emesis, + profound diffuse abdominal pain, febrile. Has SBO, NGT to LWS. Taken to OR on  underwent ExLAP, extensive RICHARD, ileocolectomy, ventral hernia repair with MESH. Postop course complicated with ALVERTO, shock requiring pressors, transferred to ICU. + anasarca   DNR/ DNI.   **pt started on TF on 2/3 - began having loose stools and was leaking out of wounds; with EC fistula. TF now on hold, PPN re-started (). Remains with NGT and 1 DEA drain to suction.    *: Tx from SICU to [facility] (). s/p palliative GOC meeting - DNR, DNI, No feeding tube. PPN not addressed in GOC? Still has NGT to LWS.    *: Pt c/w PPN. S/p Palliative Care f/u -  family is agreeable to continuing all other measures to treat reversible issues. Spoke with surgical PA, plan to c/w PPN today. Discussed recommendation for PICC placement to help pt meet >/= 80% of ENN via TPN if plan is to c/w PN. If fistula is unable to be reversed, would consider to d/c PPN and allow for PO diet if possible ? comfort care? given GOC  *2/10: NGT removed - PPN still not being addressed whether or not to continue or within GOC.   Left side entero atmospheric fistula. Needs complex fistula system for wound care as per Dr. Castro note  *: RD spoke w/PA Mala Montgomery [phone], she confirms that Dr. Castro wants to c/w PPN. See below recommendations and pharmacy note for PPN recs.    *current status: : RD spoke w/PAPO Puente x [phone], confirmed that Dr. Castro wants PPN continued tonight. See below recommendations and pharmacy note for PPN recs..     *labs reviewed;         131<L>  |  104  |  29<H>  ----------------------------<  433<H>  6.1<H>   |  16<L>  |  1.52<H>    Ca    7.4<L>      2023 05:50  Phos  6.8       Mg     2.4         TPro  5.6<L>  /  Alb  1.1<L>  /  TBili  0.6  /  DBili  x   /  AST  26  /  ALT  13  /  AlkPhos  82      02-    133<L>  |  104  |  22  ----------------------------<  384<H>  4.8   |  21<L>  |  1.01    Ca    7.7<L>      2023 08:35  Phos  4.6     -  Mg     2.1         TPro  5.7<L>  /  Alb  1.2<L>  /  TBili  0.5  /  DBili  x   /  AST  14<L>  /  ALT  9<L>  /  AlkPhos  72  -11    CAPILLARY BLOOD GLUCOSE      POCT Blood Glucose.: 405 mg/dL (2023 06:14)  POCT Blood Glucose.: 432 mg/dL (2023 06:11)  POCT Blood Glucose.: 382 mg/dL (2023 23:02)  POCT Blood Glucose.: 424 mg/dL (2023 19:17)  POCT Blood Glucose.: 455 mg/dL (2023 19:14)  POCT Blood Glucose.: 387 mg/dL (2023 13:30)    POCT Blood Glucose.: 276 mg/dL (10 Feb 2023 05:28)  POCT Blood Glucose.: 255 mg/dL (2023 22:20)  POCT Blood Glucose.: 282 mg/dL (2023 17:13)  POCT Blood Glucose.: 287 mg/dL (2023 12:26)    I&O's Detail    2023 07:01  -  2023 07:00  --------------------------------------------------------  IN:  Total IN: 0 mL    OUT:    Bulb (mL): 200 mL  Total OUT: 200 mL    Total NET: -200 mL  *I&O's Detail - Strict I&O's are rec'd when pt is on PN as per protocol     *pt with +3 generalized, and +4 B/foot edema. Will monitor/adjust total PPN volume prn.     *malnutrition: Pt continues to meet criteria for severe protein-calorie malnutrition in context of acute disease r/t decreased ability to meet increased nutrient needs 2/2 SBO AEB <50% ENN x >11 days, severe muscle/ fat wasting, severe edema    Estimated Needs: based on 97.4 Kg (wt from admit - current wts being skewed by severe fluid retention)  Calories: 1948- 2435 Kcal (20- 25 Kcal/Kg)  Protein: 146- 175 g (1.5- 1.8 g/Kg)  Fluids: 1948- 2435 mL (20- 25 mL/Kg)      Weight History:  Daily Weight in k.6 (2023 06:00)   - #  Height (cm): 165.1 (23 @ 16:08)  Weight (kg): 97.4 (23 @ 23:30) - admit wt ()  BMI (kg/m2): 35.7 (23 @ 23:30)  BSA (m2): 2.04 (23 @ 23:30)    Weight History:  Height (cm): 165.1 (23 @ 16:08)  Weight (kg): 97.4 (23 @ 23:30)  BMI (kg/m2): 35.7 (23 @ 23:30)  BSA (m2): 2.04 (23 @ 23:30)    PPN Recommendations: via peripheral line. **PPN recommendations as per pharmacy - see pharmacy communication note     Additional Recommendations:    1) Daily weights  2) Strict I & O's - please doc I&O's per PN protocol  3) Daily lyte checks including magnesium and phos  4) Weekly triglycerides/LFT checks  5) POCT q6hrs; maintain 140-180mg/dL  6) Confirm goals of care regarding LONG-TERM nutrition support specifically referring to PPN/TPN - pt is NOT on TPN (meeting <80% ENN x 7 days while on PPN). Would consider to D/C and allow for PO pleasure feeds if fistula is irreversible as per GOC     RD will continue to monitor PPN, labs, hydration, and wt prn.   Brea Johnson, MS, RDN, CDN, FAND [phone]

## 2023-02-12 NOTE — CONSULT NOTE ADULT - ASSESSMENT
72F with PMHx CVA, pAF on AC, emphysema/COPD on home O2, HTN, SBO with prior resection who presented with abd pain, N/V. Found to have an small bowel obstruction. Taken to OR underwent ELAP, extensive RICHARD, ileocolectomy, ventral hernia repair with MESH. Postop course complicated with ALVERTO, shock requiring pressors  #Abd pain, SBO, Enterocutaneous fistula on PPN    Renal eval called for rising Cr, K and worsening acidosis     ALVERTO in setting of acidemia, hyperkalemia, and worsening hyperglycemia   check lactate   strict I/o    bladder scan now if able , if not straight cath    IVF    concern for worsening ALVERTO due to possible infection w rising WBC and now renal failure without obvious inciting factors   trend labs today - Cr may continue to rise   ID reconsult      Hyperphoshatemia w  hyperkalemia due to ALVERTO  - unable to take po ,   D5/insuline given as per surgery team    = need to remove k and Phos in PPN - pharmacy aware    = increase acetate in PPN    = hold PPN today until new formulation start    = start IV HCO3 gtt now    = repeat K in 2 hours      Tird spacing w severe hypoalbuminemia   protein supplementation    if Cr stabilizes could give lasix and SPA to help mobilize extracellular fluid    d/w Dr Stokes and RN     Thank you for the courtesy of this consult. We will follow this patient with you.   Management is subject to change if new information becomes available or patient condition changes.                      72F with PMHx CVA, pAF on AC, emphysema/COPD on home O2, HTN, SBO with prior resection who presented with abd pain, N/V. Found to have an small bowel obstruction. Taken to OR underwent ELAP, extensive RICHARD, ileocolectomy, ventral hernia repair with MESH. Postop course complicated with ALVERTO, shock requiring pressors  #Abd pain, SBO, Enterocutaneous fistula on PPN    Renal eval called for rising Cr, K and worsening acidosis     ALVERTO in setting of acidemia, hyperkalemia, and worsening hyperglycemia   check lactate   strict I/o    straight cath    IVF w HCO3   concern for worsening ALVERTO due to possible infection w rising WBC and now renal failure without obvious inciting factors   trend labs today - Cr may continue to rise   ID reconsult      Hyperphoshatemia w  hyperkalemia due to ALVERTO  - unable to take po ,   D5/insuline given as per surgery team   Hyperglcyemia BS > 400    = need to remove k and Phos in PPN - pharmacy aware    = increase acetate in PPN    = hold PPN today until new formulation starts tonight    = start IV HCO3 gtt now    = repeat K in 2 hours    = glycemic control     Third spacing w severe hypoalbuminemia   protein supplementation    if Cr stabilizes could give lasix and SPA to help mobilize extracellular fluid    d/w Dr Stokes and RN     Thank you for the courtesy of this consult. We will follow this patient with you.   Management is subject to change if new information becomes available or patient condition changes.                      72F with PMHx CVA, pAF on AC, emphysema/COPD on home O2, HTN, SBO with prior resection who presented with abd pain, N/V. Found to have an small bowel obstruction. Taken to OR underwent ELAP, extensive RICHARD, ileocolectomy, ventral hernia repair with MESH. Postop course complicated with ALVERTO, shock requiring pressors  #Abd pain, SBO, Enterocutaneous fistula on PPN    Renal eval called for rising Cr, K and worsening acidosis     ALVERTO in setting of acidemia, hyperkalemia, and worsening hyperglycemia   check lactate   strict I/o    straight cath    IVF w HCO3   concern for sepsis w rising WBC , severe hyperglycemia and now new ALVERTO  without obvious nephrotoxins    trend labs today - suspect Cr will continue to rise   would reconsult ID    renal dose meds and abx         Hyperphoshatemia w  hyperkalemia due to ALVERTO  - unable to take po ,   D5/insuline given as per surgery team   Hyperglcyemia BS > 400 also contributing = IVF    = need to remove k and Phos in PPN - pharmacy aware    = increase acetate in PPN    = hold PPN today until new formulation starts tonight    = start IV HCO3 gtt now    = repeat K in 2 hours    = glycemic control per Medicine     Third spacing w severe hypoalbuminemia   protein supplementation     If Cr stabilized could give lasix and SPA to help mobilize extracellular fluid     d/w Dr Stokes and RN     Thank you for the courtesy of this consult. We will follow this patient with you.   Management is subject to change if new information becomes available or patient condition changes.

## 2023-02-12 NOTE — PROGRESS NOTE ADULT - ASSESSMENT
72F with PMHx CVA, pAF on AC, emphysema/COPD on home O2, HTN, SBO with prior resection who presented with abd pain, N/V. Found to have an small bowel obstruction. Taken to OR underwent ELAP, extensive RICHARD, ileocolectomy, ventral hernia repair with MESH. Postop course complicated with ALVERTO, shock requiring pressors    #Abd pain, SBO, Enterocutaneous fistula  S/p OR EXLAP, RICHARD, ileocolectomy, ventral hernia repair w mesh  Dressing c/d/i, surrounding erythema, +Drain  PPN, NGT to suction taken out of nare 2/10- placed in abd?  PPI Octreotide  Cefepime, Flagyl  Pain control, IS, Mobilize patient, monitor i and os  Pallative eval- DNR/DNI, no feeding tube....continue with PPN for now   management as per Surg    # UTI Kleb, PNA  Cefepime and flagyl as above  WBC improving, monitor for fevers  ID following    #pAF on AC, HTN  Lovenox 100mg BID  Lopressor IVPB q6  Hydralazine 10mg IVP q6 PRN SBP >160  Amiodarone 200mg Discontinued (2/10)- NGT is out so no PO access- CT A/P w PO contrast shows enteric communication with atmosphere. Discussed with pharmacy no reasonable IV equivalent for PO daily, recommend dc for now and if needed adjust other agents, if afib uncontrolled may need gtt    #Hypothyroid  Continue w IV synthroid     #DM  Increased Lantus from 8 to 20 (2/10)  Corrective scale  Monitor fingersticks    #COPD  Albuterol  Pulmicort  Pulm following    Hyperkalemia/Metabolic Acidosis:  given d 50 + insulin  hold current PPN infusion.  Pharmacy to make new bag based on current labs  iv fluids with bicarb per renal team    Increased Cr:  monitor   renal eval      Prognosis : poor    palliative care suggested      poc discussed with team, Sx PAPO Puente, Dr. Lima

## 2023-02-12 NOTE — PHARMACOTHERAPY INTERVENTION NOTE - COMMENTS
Pharmacy PN Follow Up Note    *current status:  2/12: RD spoke w/[facility] x [phone], confirmed that Dr. Castro wants PPN continued tonight    *labs reviewed: Hyperkalemia and hyperphosphatemia.  Will remove ALL potassium and phosphate contents from the bag and re-evaluate again tomorrow.  Addressed this concern to [facility].  Mg is trending upwards and therefore will reduce it.  Na+ is low and will add more into the PN bag.  Lipids will be removed today as we are alternating the days.      02-12    131<L>  |  104  |  29<H>  ----------------------------<  433<H>  6.1<H>   |  16<L>  |  1.52<H>    Ca    7.4<L>      12 Feb 2023 05:50  Phos  6.8     02-12  Mg     2.4     02-12    TPro  5.6<L>  /  Alb  1.1<L>  /  TBili  0.6  /  DBili  x   /  AST  26  /  ALT  13  /  AlkPhos  82  02-12      BMI: BMI (kg/m2): 35.7 (01-20-23 @ 23:30)  HbA1c: A1C with Estimated Average Glucose Result: 6.5 % (02-11-23 @ 08:35)    Glucose: POCT Blood Glucose.: 405 mg/dL (02-12-23 @ 06:14)    BP: 118/47 (02-12-23 @ 08:46) (93/54 - 153/71)  Lipid Panel: Date/Time: 02-09-23 @ 05:22  Cholesterol, Serum: --  Direct LDL: --  HDL Cholesterol, Serum: --  Total Cholesterol/HDL Ration Measurement: --  Triglycerides, Serum: 399    POCT Blood Glucose.: 405 mg/dL (02-12-23 @ 06:14)  POCT Blood Glucose.: 432 mg/dL (02-12-23 @ 06:11)  POCT Blood Glucose.: 382 mg/dL (02-11-23 @ 23:02)  POCT Blood Glucose.: 424 mg/dL (02-11-23 @ 19:17)  POCT Blood Glucose.: 455 mg/dL (02-11-23 @ 19:14)  POCT Blood Glucose.: 387 mg/dL (02-11-23 @ 13:30)        *I&O's Detail    11 Feb 2023 07:01  -  12 Feb 2023 07:00  --------------------------------------------------------  IN:  Total IN: 0 mL    OUT:    Bulb (mL): 200 mL  Total OUT: 200 mL    Total NET: -200 mL      *will continue to monitor and adjust PN prn*    PPN Recommendations: via Peripheral venous line   Total Volume: 2000 mL   80 g  Amino Acids  100 g Dextrose  0 g Lipids 20%  147 mEq Sodium Chloride  31 mEq Sodium Acetate  0 mmol Sodium Phosphate  0 mEq Potassium Chloride  0 mEq Potassium Acetate  0 mmol Potassium Phosphate  0 mEq Calcium Gluconate  20 mEq Magnesium Sulfate  200 mg Thiamine  1 ml Trace Elements  10 ml MVI  20 units of Regular Insulin     Total Calories            (Provides  kcal and  g protein)    (osmolarity)        sign               Pharmacy PN Follow Up Note    *current status:  2/12: RD spoke w/[facility] x [phone], confirmed that Dr. Castro wants PPN continued tonight    *labs reviewed: Hyperkalemia and hyperphosphatemia.  Will remove ALL potassium and phosphate contents from the bag and re-evaluate again tomorrow.  Addressed this concern to [facility].  Mg is trending upwards and therefore will reduce it.  Na+ is low and will add more into the PN bag.  Lipids will be removed today as we are alternating the days.      02-12    131<L>  |  104  |  29<H>  ----------------------------<  433<H>  6.1<H>   |  16<L>  |  1.52<H>    Ca    7.4<L>      12 Feb 2023 05:50  Phos  6.8     02-12  Mg     2.4     02-12    TPro  5.6<L>  /  Alb  1.1<L>  /  TBili  0.6  /  DBili  x   /  AST  26  /  ALT  13  /  AlkPhos  82  02-12      BMI: BMI (kg/m2): 35.7 (01-20-23 @ 23:30)  HbA1c: A1C with Estimated Average Glucose Result: 6.5 % (02-11-23 @ 08:35)    Glucose: POCT Blood Glucose.: 405 mg/dL (02-12-23 @ 06:14)    BP: 118/47 (02-12-23 @ 08:46) (93/54 - 153/71)  Lipid Panel: Date/Time: 02-09-23 @ 05:22  Cholesterol, Serum: --  Direct LDL: --  HDL Cholesterol, Serum: --  Total Cholesterol/HDL Ration Measurement: --  Triglycerides, Serum: 399    POCT Blood Glucose.: 405 mg/dL (02-12-23 @ 06:14)  POCT Blood Glucose.: 432 mg/dL (02-12-23 @ 06:11)  POCT Blood Glucose.: 382 mg/dL (02-11-23 @ 23:02)  POCT Blood Glucose.: 424 mg/dL (02-11-23 @ 19:17)  POCT Blood Glucose.: 455 mg/dL (02-11-23 @ 19:14)  POCT Blood Glucose.: 387 mg/dL (02-11-23 @ 13:30)        *I&O's Detail    11 Feb 2023 07:01  -  12 Feb 2023 07:00  --------------------------------------------------------  IN:  Total IN: 0 mL    OUT:    Bulb (mL): 200 mL  Total OUT: 200 mL    Total NET: -200 mL      *will continue to monitor and adjust PN prn*    PPN Recommendations: via Peripheral venous line   Total Volume: 2000 mL   80 g  Amino Acids  100 g Dextrose  0 g Lipids 20%  147 mEq Sodium Chloride  31 mEq Sodium Acetate  0 mmol Sodium Phosphate  0 mEq Potassium Chloride  0 mEq Potassium Acetate  0 mmol Potassium Phosphate  0 mEq Calcium Gluconate  20 mEq Magnesium Sulfate  200 mg Thiamine  1 ml Trace Elements  10 ml MVI  20 units of Regular Insulin     Total Calories: 660 (Provides 27% Estimated daily energy needs and 46% of daily protein requirements)

## 2023-02-12 NOTE — CONSULT NOTE ADULT - SUBJECTIVE AND OBJECTIVE BOX
72F with PMHx CVA, pAF on AC, emphysema/COPD on home O2, HTN, SBO with prior resection who presented with abd pain, N/V. Found to have an small bowel obstruction. Taken to OR underwent ELAP, extensive RICHARD, ileocolectomy, ventral hernia repair with MESH. Postop course complicated with ALVERTO, shock requiring pressors  renal eval called for alverto, hyperkalemia, acidemia    Today pt lethargic, arousable          PAST MEDICAL & SURGICAL HISTORY:  Hypertension    Hypercholesteremia    COPD (chronic obstructive pulmonary disease)    C. difficile diarrhea      Diverticulitis of intestine with perforation and abscess without bleeding, unspecified part of intestinal tract      PE (pulmonary thromboembolism)  2016      Palpitations      Obesity      Osteoarthritis      Anemia      Colostomy in place      History of atrial fibrillation      HTN (hypertension)      Diabetes mellitus      Cerebrovascular accident (CVA)      DVT of lower limb, acute      CVA (cerebral vascular accident)      COPD (chronic obstructive pulmonary disease)      Neuropathy      Diverticulitis      History of appendectomy      H/O:   x2      H/O ovarian cystectomy  b/l      Status post total replacement of both hips      H/O hemicolectomy  2016      History of colostomy reversal      History of colostomy reversal      S/P colostomy      S/P       S/P hip replacement          MEDICATIONS  (STANDING):  albuterol    0.083% 2.5 milliGRAM(s) Nebulizer every 6 hours  buDESOnide    Inhalation Suspension 0.5 milliGRAM(s) Inhalation every 12 hours  cefepime   IVPB 2000 milliGRAM(s) IV Intermittent every 12 hours  chlorhexidine 4% Liquid 1 Application(s) Topical <User Schedule>  coronavirus bivalent (EUA) Booster Vaccine (PFIZER) 0.3 milliLiter(s) IntraMuscular once  dextrose 5%. 1000 milliLiter(s) (50 mL/Hr) IV Continuous <Continuous>  dextrose 5%. 1000 milliLiter(s) (100 mL/Hr) IV Continuous <Continuous>  dextrose 50% Injectable 25 Gram(s) IV Push once  dextrose 50% Injectable 12.5 Gram(s) IV Push once  dextrose 50% Injectable 25 Gram(s) IV Push once  enoxaparin Injectable 100 milliGRAM(s) SubCutaneous every 12 hours  glucagon  Injectable 1 milliGRAM(s) IntraMuscular once  insulin glargine Injectable (LANTUS) 20 Unit(s) SubCutaneous at bedtime  insulin lispro (ADMELOG) corrective regimen sliding scale   SubCutaneous every 4 hours  levothyroxine Injectable 70 MICROGram(s) IV Push at bedtime  metoprolol tartrate 25 milliGRAM(s) Oral two times a day  metroNIDAZOLE  IVPB 500 milliGRAM(s) IV Intermittent every 8 hours  nystatin Cream 1 Application(s) Topical two times a day  nystatin Powder 1 Application(s) Topical every 12 hours  octreotide  Injectable 75 MICROGram(s) SubCutaneous every 8 hours  pantoprazole  Injectable 40 milliGRAM(s) IV Push daily  Parenteral Nutrition - Adult 1 Each (83 mL/Hr) TPN Continuous <Continuous>      Allergies    Cipro (Rash)  Spiriva (Rash)    Intolerances      SOCIAL HISTORY:  UTO    FAMILY HISTORY:  [facility]      REVIEW OF SYSTEMS:  landy, renetta historian     Vital Signs Last 24 Hrs  T(C): 36.5 (2023 08:46), Max: 37.2 (2023 16:06)  T(F): 97.7 (2023 08:46), Max: 98.9 (2023 16:06)  HR: 96 (2023 08:46) (93 - 102)  BP: 118/47 (2023 08:46) (93/54 - 118/47)  BP(mean): --  RR: 20 (2023 23:06) (18 - 20)  SpO2: 98% (2023 08:46) (94% - 98%)    Parameters below as of 2023 08:46  Patient On (Oxygen Delivery Method): nasal cannula      I&O's Detail    2023 07:01  -  2023 07:00  --------------------------------------------------------  IN:  Total IN: 0 mL    OUT:    Bulb (mL): 200 mL  Total OUT: 200 mL    Total NET: -200 mL        PHYSICAL EXAM:    Constitutional: l;ethargic arousable   HEENT: , EOMI,  MM  Neck: No LAD, No JVD  Respiratory: poor AE   Cardiovascular: S1 and S2  Gastrointestinal: distended, wound dressings , drains   Extremities:  peripheral edema 2 +  Neurological: lethargic, arousable   : No Woodruff   Skin: No rashes  Access: Not applicable    LABS:                        9.0    14.32 )-----------( 307      ( 2023 08:44 )             29.7       131    |  104    |  29     ----------------------------<  433       2023 05:50  6.1     |  16     |  1.52     133    |  104    |  22     ----------------------------<  384       2023 08:35  4.8     |  21     |  1.01     131    |  101    |  16     ----------------------------<  315       10 Feb 2023 05:26  4.6     |  23     |  0.62     Ca    7.4        2023 05:50  Ca    7.7        2023 08:35    Phos  6.8       2023 05:50  Phos  4.6       2023 08:35    Mg     2.4       2023 05:50  Mg     2.1       2023 08:35    TPro  5.6    /  Alb  1.1    /  TBili  0.6    /        2023 05:50  DBili  x      /  AST  26     /  ALT  13     /  AlkPhos  82       TPro  5.7    /  Alb  1.2    /  TBili  0.5    /        2023 08:35  DBili  x      /  AST  14     /  ALT  9      /  AlkPhos  72             TPro  5.6<L>  /  Alb  1.1<L>  /  TBili  0.6  /  DBili  x   /  AST  26  /  ALT  13  /  AlkPhos  82  02-12      Urine Studies:          RADIOLOGY & ADDITIONAL STUDIES:                 72F with PMHx CVA, pAF on AC, emphysema/COPD on home O2, HTN, SBO with prior resection who presented with abd pain, N/V. Found to have an small bowel obstruction. Taken to OR underwent ELAP, extensive RICHARD, ileocolectomy, ventral hernia repair with MESH. Postop course complicated with ALVERTO, shock requiring pressors  renal eval called for alverto, hyperkalemia, acidemia    Today pt lethargic, arousable          PAST MEDICAL & SURGICAL HISTORY:  Hypertension    Hypercholesteremia    COPD (chronic obstructive pulmonary disease)    C. difficile diarrhea      Diverticulitis of intestine with perforation and abscess without bleeding, unspecified part of intestinal tract      PE (pulmonary thromboembolism)  2016      Palpitations      Obesity      Osteoarthritis      Anemia      Colostomy in place      History of atrial fibrillation      HTN (hypertension)      Diabetes mellitus      Cerebrovascular accident (CVA)      DVT of lower limb, acute      CVA (cerebral vascular accident)      COPD (chronic obstructive pulmonary disease)      Neuropathy      Diverticulitis      History of appendectomy      H/O:   x2      H/O ovarian cystectomy  b/l      Status post total replacement of both hips      H/O hemicolectomy  2016      History of colostomy reversal      History of colostomy reversal      S/P colostomy      S/P       S/P hip replacement          MEDICATIONS  (STANDING):  albuterol    0.083% 2.5 milliGRAM(s) Nebulizer every 6 hours  buDESOnide    Inhalation Suspension 0.5 milliGRAM(s) Inhalation every 12 hours  cefepime   IVPB 2000 milliGRAM(s) IV Intermittent every 12 hours  chlorhexidine 4% Liquid 1 Application(s) Topical <User Schedule>  coronavirus bivalent (EUA) Booster Vaccine (PFIZER) 0.3 milliLiter(s) IntraMuscular once  dextrose 5%. 1000 milliLiter(s) (50 mL/Hr) IV Continuous <Continuous>  dextrose 5%. 1000 milliLiter(s) (100 mL/Hr) IV Continuous <Continuous>  dextrose 50% Injectable 25 Gram(s) IV Push once  dextrose 50% Injectable 12.5 Gram(s) IV Push once  dextrose 50% Injectable 25 Gram(s) IV Push once  enoxaparin Injectable 100 milliGRAM(s) SubCutaneous every 12 hours  glucagon  Injectable 1 milliGRAM(s) IntraMuscular once  insulin glargine Injectable (LANTUS) 20 Unit(s) SubCutaneous at bedtime  insulin lispro (ADMELOG) corrective regimen sliding scale   SubCutaneous every 4 hours  levothyroxine Injectable 70 MICROGram(s) IV Push at bedtime  metoprolol tartrate 25 milliGRAM(s) Oral two times a day  metroNIDAZOLE  IVPB 500 milliGRAM(s) IV Intermittent every 8 hours  nystatin Cream 1 Application(s) Topical two times a day  nystatin Powder 1 Application(s) Topical every 12 hours  octreotide  Injectable 75 MICROGram(s) SubCutaneous every 8 hours  pantoprazole  Injectable 40 milliGRAM(s) IV Push daily  Parenteral Nutrition - Adult 1 Each (83 mL/Hr) TPN Continuous <Continuous>      Allergies    Cipro (Rash)  Spiriva (Rash)    Intolerances      SOCIAL HISTORY:  UTO    FAMILY HISTORY:  [facility]      REVIEW OF SYSTEMS:  landy, renetta historian     Vital Signs Last 24 Hrs  T(C): 36.5 (2023 08:46), Max: 37.2 (2023 16:06)  T(F): 97.7 (2023 08:46), Max: 98.9 (2023 16:06)  HR: 96 (2023 08:46) (93 - 102)  BP: 118/47 (2023 08:46) (93/54 - 118/47)  BP(mean): --  RR: 20 (2023 23:06) (18 - 20)  SpO2: 98% (2023 08:46) (94% - 98%)    Parameters below as of 2023 08:46  Patient On (Oxygen Delivery Method): nasal cannula      I&O's Detail    2023 07:01  -  2023 07:00  --------------------------------------------------------  IN:  Total IN: 0 mL    OUT:    Bulb (mL): 200 mL  Total OUT: 200 mL    Total NET: -200 mL        PHYSICAL EXAM:    Constitutional: l;ethargic arousable   HEENT: , EOMI,  MM  Neck: No LAD, No JVD  Respiratory: poor AE   Cardiovascular: S1 and S2  Gastrointestinal: distended, wound dressings , drains   Extremities:  peripheral edema 2 + w large blister on foot   Neurological: lethargic, arousable   : No Woodruff   Skin: No rashes  Access: Not applicable    LABS:                        9.0    14.32 )-----------( 307      ( 2023 08:44 )             29.7       131    |  104    |  29     ----------------------------<  433       2023 05:50  6.1     |  16     |  1.52     133    |  104    |  22     ----------------------------<  384       2023 08:35  4.8     |  21     |  1.01     131    |  101    |  16     ----------------------------<  315       10 Feb 2023 05:26  4.6     |  23     |  0.62     Ca    7.4        2023 05:50  Ca    7.7        2023 08:35    Phos  6.8       2023 05:50  Phos  4.6       2023 08:35    Mg     2.4       2023 05:50  Mg     2.1       2023 08:35    TPro  5.6    /  Alb  1.1    /  TBili  0.6    /        2023 05:50  DBili  x      /  AST  26     /  ALT  13     /  AlkPhos  82       TPro  5.7    /  Alb  1.2    /  TBili  0.5    /        2023 08:35  DBili  x      /  AST  14     /  ALT  9      /  AlkPhos  72             TPro  5.6<L>  /  Alb  1.1<L>  /  TBili  0.6  /  DBili  x   /  AST  26  /  ALT  13  /  AlkPhos  82  02-12      Urine Studies:          RADIOLOGY & ADDITIONAL STUDIES:

## 2023-02-12 NOTE — PROGRESS NOTE ADULT - SUBJECTIVE AND OBJECTIVE BOX
Resting comfortably  VSS afeb  lungs- clear  cor-RRR  Abd- soft non tender, dressing dry, bilious drainageffrom drain  Ext- 2+ edema

## 2023-02-12 NOTE — CONSULT NOTE ADULT - CONSULT REQUESTED DATE/TIME
07-Feb-2023 10:32
31-Jan-2023 23:44
12-Feb-2023 13:04
27-Jan-2023 08:20
27-Jan-2023 08:44
27-Jan-2023 17:07
20-Jan-2023 23:59

## 2023-02-12 NOTE — PROGRESS NOTE ADULT - ASSESSMENT
Entero atmospheric fistula.Cont TPN. Sandostatin. Wound care. Attempt to isolate fistula and create ostomy.

## 2023-02-12 NOTE — CONSULT NOTE ADULT - REASON FOR ADMISSION
bowel obstruction/ischemic mesentery

## 2023-02-12 NOTE — PROGRESS NOTE ADULT - SUBJECTIVE AND OBJECTIVE BOX
Subjective:    pat lying in bed, no new complaint.    Home Medications:  Albuterol (Eqv-ProAir HFA) 90 mcg/inh inhalation aerosol: 1 puff(s) inhaled every 6 hours, As Needed (20 Jan 2023 16:49)  amiodarone 200 mg oral tablet: 1 tab(s) orally once a day (20 Jan 2023 16:49)  ascorbic acid 500 mg oral tablet: 2 tab(s) orally once a day (20 Jan 2023 16:49)  atorvastatin 10 mg oral tablet: 1 tab(s) orally once a day (at bedtime) (20 Jan 2023 16:49)  benzonatate 100 mg oral capsule: 1 cap(s) orally 3 times a day (20 Jan 2023 21:48)  budesonide 0.5 mg/2 mL inhalation suspension: 2 milliliter(s) inhaled 2 times a day (20 Jan 2023 21:48)  Cepacol Sore Throat 15 mg-3.6 mg mucous membrane lozenge: 1 lozenge mucous membrane every 4 hours, As Needed (20 Jan 2023 21:48)  cholecalciferol 1250 mcg (50,000 intl units) oral capsule: 1 cap(s) orally every 4 weeks on Wednesday  (20 Jan 2023 16:49)  Coumadin 2.5 mg oral tablet: 1 tab(s) orally once a day (at bedtime)  ***ON HOLD from 1-16 to 1-21*** (20 Jan 2023 21:48)  Cranberry oral tablet: 1 tab(s) orally 2 times a day (20 Jan 2023 16:49)  cyanocobalamin 1000 mcg oral tablet: 1 tab(s) orally once a day (20 Jan 2023 16:49)  dilTIAZem 180 mg/24 hours oral capsule, extended release: 1 cap(s) orally once a day (20 Jan 2023 16:49)  DULoxetine 60 mg oral delayed release capsule: 1 cap(s) orally once a day (20 Jan 2023 16:49)  estradiol 0.1 mg/g vaginal cream: 0.5 gram(s) vaginal 2 times a week on Monday and Thursday (20 Jan 2023 21:48)  fexofenadine 180 mg oral tablet: 1 tab(s) orally once a day (20 Jan 2023 21:48)  fluticasone 50 mcg/inh nasal spray: 1 spray(s) nasal 2 times a day (20 Jan 2023 16:49)  furosemide 20 mg oral tablet: 1 tab(s) orally once a day (20 Jan 2023 16:49)  glipiZIDE 10 mg oral tablet, extended release: 2 tab(s) orally once a day (20 Jan 2023 16:49)  GlycoLax oral powder for reconstitution: 17 gram(s) orally 2 times a day (20 Jan 2023 16:49)  guaiFENesin 100 mg/5 mL oral liquid: 20 milliliter(s) orally every 6 hours (20 Jan 2023 21:48)  HumaLOG KwikPen 100 units/mL injectable solution: 10 unit(s) injectable 3 times a day (before meals) (20 Jan 2023 21:48)  ipratropium-albuterol 20 mcg-100 mcg/inh inhalation aerosol: 1 puff(s) inhaled 4 times a day (20 Jan 2023 16:49)  levothyroxine 88 mcg (0.088 mg) oral tablet: 1 tab(s) orally once a day (at bedtime) (20 Jan 2023 16:49)  losartan 25 mg oral tablet: 1 tab(s) orally once a day (20 Jan 2023 16:49)  Macrobid 100 mg oral capsule: 1 cap(s) orally 2 times a day for 5 days  ***course complete*** (20 Jan 2023 21:48)  methocarbamol 750 mg oral tablet: 1 tab(s) orally every 8 hours, As Needed (20 Jan 2023 21:48)  metoprolol tartrate 25 mg oral tablet: 1 tab(s) orally 2 times a day (20 Jan 2023 16:49)  Milk of Magnesia 8% oral suspension: 30 milliliter(s) orally once a day, As Needed (20 Jan 2023 16:49)  montelukast 10 mg oral tablet: 1 tab(s) orally once a day (at bedtime) (20 Jan 2023 21:48)  ondansetron 4 mg oral tablet: 1 tab(s) orally every 4 hours, As Needed (20 Jan 2023 21:48)  oxybutynin 5 mg oral tablet: 1 tab(s) orally 2 times a day (20 Jan 2023 16:49)  oxyCODONE 5 mg oral tablet: 1 tab(s) orally every 4 hours, As Needed (20 Jan 2023 16:49)  phytonadione 5 mg oral tablet: 0.5 tab(s) orally once  ***given at [facility] once on 1-19-23*** (20 Jan 2023 21:48)  pregabalin 100 mg oral capsule: 1 cap(s) orally 3 times a day (20 Jan 2023 16:49)  sennosides-docusate 8.6 mg-50 mg oral tablet: 2 tab(s) orally once a day (at bedtime) (20 Jan 2023 16:49)  Tradjenta 5 mg oral tablet: 1 tab(s) orally once a day (20 Jan 2023 16:49)  Tylenol 500 mg oral tablet: 2 tab(s) orally every 8 hours (20 Jan 2023 16:49)    MEDICATIONS  (STANDING):  albuterol    0.083% 2.5 milliGRAM(s) Nebulizer every 6 hours  buDESOnide    Inhalation Suspension 0.5 milliGRAM(s) Inhalation every 12 hours  cefepime   IVPB 2000 milliGRAM(s) IV Intermittent every 12 hours  chlorhexidine 4% Liquid 1 Application(s) Topical <User Schedule>  coronavirus bivalent (EUA) Booster Vaccine (PFIZER) 0.3 milliLiter(s) IntraMuscular once  dextrose 5%. 1000 milliLiter(s) (100 mL/Hr) IV Continuous <Continuous>  dextrose 5%. 1000 milliLiter(s) (50 mL/Hr) IV Continuous <Continuous>  dextrose 50% Injectable 25 Gram(s) IV Push once  dextrose 50% Injectable 12.5 Gram(s) IV Push once  dextrose 50% Injectable 25 Gram(s) IV Push once  enoxaparin Injectable 100 milliGRAM(s) SubCutaneous every 12 hours  glucagon  Injectable 1 milliGRAM(s) IntraMuscular once  insulin glargine Injectable (LANTUS) 20 Unit(s) SubCutaneous at bedtime  insulin lispro (ADMELOG) corrective regimen sliding scale   SubCutaneous every 4 hours  levothyroxine Injectable 70 MICROGram(s) IV Push at bedtime  metoprolol tartrate 25 milliGRAM(s) Oral two times a day  metroNIDAZOLE  IVPB 500 milliGRAM(s) IV Intermittent every 8 hours  nystatin Cream 1 Application(s) Topical two times a day  nystatin Powder 1 Application(s) Topical every 12 hours  octreotide  Injectable 75 MICROGram(s) SubCutaneous every 8 hours  pantoprazole  Injectable 40 milliGRAM(s) IV Push daily  Parenteral Nutrition - Adult 1 Each (83 mL/Hr) TPN Continuous <Continuous>    MEDICATIONS  (PRN):  acetaminophen     Tablet .. 650 milliGRAM(s) Oral every 6 hours PRN Temp greater or equal to 38C (100.4F)  albuterol    90 MICROgram(s) HFA Inhaler 2 Puff(s) Inhalation every 6 hours PRN Shortness of Breath and/or Wheezing  dextrose Oral Gel 15 Gram(s) Oral once PRN Blood Glucose LESS THAN 70 milliGRAM(s)/deciliter  hydrALAZINE Injectable 10 milliGRAM(s) IV Push every 6 hours PRN SBP>160  HYDROmorphone  Injectable 0.5 milliGRAM(s) IV Push every 3 hours PRN Severe Pain (7 - 10)  sodium chloride 0.9% lock flush 10 milliLiter(s) IV Push every 1 hour PRN Pre/post blood products, medications, blood draw, and to maintain line patency      Allergies    Cipro (Rash)  Spiriva (Rash)    Intolerances        Vital Signs Last 24 Hrs  T(C): 36.5 (12 Feb 2023 08:46), Max: 37.2 (11 Feb 2023 16:06)  T(F): 97.7 (12 Feb 2023 08:46), Max: 98.9 (11 Feb 2023 16:06)  HR: 96 (12 Feb 2023 08:46) (93 - 102)  BP: 118/47 (12 Feb 2023 08:46) (93/54 - 118/47)  BP(mean): --  RR: 20 (11 Feb 2023 23:06) (18 - 20)  SpO2: 98% (12 Feb 2023 08:46) (94% - 98%)    Parameters below as of 12 Feb 2023 08:46  Patient On (Oxygen Delivery Method): nasal cannula          PHYSICAL EXAMINATION:    NECK:  Supple. No lymphadenopathy. Jugular venous pressure not elevated. Carotids equal.   HEART:   The cardiac impulse has a normal quality. Reg., Nl S1 and S2.  There are no murmurs, rubs or gallops noted  CHEST:  Chest crackles to auscultation. Normal respiratory effort.  ABDOMEN:  Soft and nontender.   EXTREMITIES:  There is no edema.       LABS:                        9.0    14.32 )-----------( 307      ( 12 Feb 2023 08:44 )             29.7     02-12    131<L>  |  104  |  29<H>  ----------------------------<  433<H>  6.1<H>   |  16<L>  |  1.52<H>    Ca    7.4<L>      12 Feb 2023 05:50  Phos  6.8     02-12  Mg     2.4     02-12    TPro  5.6<L>  /  Alb  1.1<L>  /  TBili  0.6  /  DBili  x   /  AST  26  /  ALT  13  /  AlkPhos  82  02-12

## 2023-02-12 NOTE — PROGRESS NOTE ADULT - SUBJECTIVE AND OBJECTIVE BOX
RACHANA BARGER  72y  Female      Patient is a 72y old  Female who presents with a chief complaint of bowel obstruction/ischemic mesentery (10 Feb 2023 09:07)      2/12: pt very weak, not much alert  K 6.1  bicarb 16  Cr1.52    Vital Signs Last 24 Hrs  T(C): 36.5 (12 Feb 2023 08:46), Max: 37.2 (11 Feb 2023 16:06)  T(F): 97.7 (12 Feb 2023 08:46), Max: 98.9 (11 Feb 2023 16:06)  HR: 96 (12 Feb 2023 08:46) (93 - 102)  BP: 118/47 (12 Feb 2023 08:46) (93/54 - 118/47)  BP(mean): --  RR: 20 (11 Feb 2023 23:06) (18 - 20)  SpO2: 98% (12 Feb 2023 08:46) (94% - 98%)    Parameters below as of 12 Feb 2023 08:46  Patient On (Oxygen Delivery Method): nasal cannula            PHYSICAL EXAM:  GENERAL: Obese, lethargic, appears ill  HEAD:  Atraumatic, Normocephalic  NECK: Supple, No JVD, Normal thyroid  NERVOUS SYSTEM:  Lethargic, opens eyes, not following commands  CHEST/LUNG: Clear to percussion bilaterally; No rales, rhonchi, wheezing, or rubs  HEART: Regular rate and rhythm; No murmurs, rubs, or gallops  ABDOMEN: Mid abd dressing, Drain, surrounding erythema, induration  EXTREMITIES:  2+ Peripheral Pulses, No clubbing, cyanosis, Anasarca ; 2+ leg edema  LYMPH: No lymphadenopathy noted  SKIN: No rashes or lesions    All Labs/EKG/Radiology/Meds reviewed    Lab Results:  CBC  CBC Full  -  ( 12 Feb 2023 08:44 )  WBC Count : 14.32 K/uL  RBC Count : 3.17 M/uL  Hemoglobin : 9.0 g/dL  Hematocrit : 29.7 %  Platelet Count - Automated : 307 K/uL  Mean Cell Volume : 93.7 fl  Mean Cell Hemoglobin : 28.4 pg  Mean Cell Hemoglobin Concentration : 30.3 gm/dL  Auto Neutrophil # : 12.74 K/uL  Auto Lymphocyte # : 0.57 K/uL  Auto Monocyte # : 0.14 K/uL  Auto Eosinophil # : 0.00 K/uL  Auto Basophil # : 0.00 K/uL  Auto Neutrophil % : 81.0 %  Auto Lymphocyte % : 4.0 %  Auto Monocyte % : 1.0 %  Auto Eosinophil % : 0.0 %  Auto Basophil % : 0.0 %    .		Differential:	[] Automated		[] Manual  Chemistry  02-12    131<L>  |  104  |  29<H>  ----------------------------<  433<H>  6.1<H>   |  16<L>  |  1.52<H>    Ca    7.4<L>      12 Feb 2023 05:50  Phos  6.8     02-12  Mg     2.4     02-12    TPro  5.6<L>  /  Alb  1.1<L>  /  TBili  0.6  /  DBili  x   /  AST  26  /  ALT  13  /  AlkPhos  82  02-12    LIVER FUNCTIONS - ( 12 Feb 2023 05:50 )  Alb: 1.1 g/dL / Pro: 5.6 gm/dL / ALK PHOS: 82 U/L / ALT: 13 U/L / AST: 26 U/L / GGT: x                 02-11-23 @ 07:01  -  02-12-23 @ 07:00  --------------------------------------------------------  IN: 0 mL / OUT: 200 mL / NET: -200 mL        MEDICATIONS  (STANDING):  albuterol    0.083% 2.5 milliGRAM(s) Nebulizer every 6 hours  buDESOnide    Inhalation Suspension 0.5 milliGRAM(s) Inhalation every 12 hours  cefepime   IVPB 2000 milliGRAM(s) IV Intermittent every 12 hours  chlorhexidine 4% Liquid 1 Application(s) Topical <User Schedule>  coronavirus bivalent (EUA) Booster Vaccine (PFIZER) 0.3 milliLiter(s) IntraMuscular once  dextrose 5%. 1000 milliLiter(s) (50 mL/Hr) IV Continuous <Continuous>  dextrose 5%. 1000 milliLiter(s) (100 mL/Hr) IV Continuous <Continuous>  dextrose 50% Injectable 25 Gram(s) IV Push once  dextrose 50% Injectable 12.5 Gram(s) IV Push once  dextrose 50% Injectable 25 Gram(s) IV Push once  enoxaparin Injectable 100 milliGRAM(s) SubCutaneous every 12 hours  glucagon  Injectable 1 milliGRAM(s) IntraMuscular once  insulin glargine Injectable (LANTUS) 20 Unit(s) SubCutaneous at bedtime  insulin lispro (ADMELOG) corrective regimen sliding scale   SubCutaneous every 4 hours  levothyroxine Injectable 70 MICROGram(s) IV Push at bedtime  metoprolol tartrate 25 milliGRAM(s) Oral two times a day  metroNIDAZOLE  IVPB 500 milliGRAM(s) IV Intermittent every 8 hours  nystatin Cream 1 Application(s) Topical two times a day  nystatin Powder 1 Application(s) Topical every 12 hours  octreotide  Injectable 75 MICROGram(s) SubCutaneous every 8 hours  pantoprazole  Injectable 40 milliGRAM(s) IV Push daily  Parenteral Nutrition - Adult 1 Each (83 mL/Hr) TPN Continuous <Continuous>    MEDICATIONS  (PRN):  acetaminophen     Tablet .. 650 milliGRAM(s) Oral every 6 hours PRN Temp greater or equal to 38C (100.4F)  albuterol    90 MICROgram(s) HFA Inhaler 2 Puff(s) Inhalation every 6 hours PRN Shortness of Breath and/or Wheezing  dextrose Oral Gel 15 Gram(s) Oral once PRN Blood Glucose LESS THAN 70 milliGRAM(s)/deciliter  hydrALAZINE Injectable 10 milliGRAM(s) IV Push every 6 hours PRN SBP>160  HYDROmorphone  Injectable 0.5 milliGRAM(s) IV Push every 3 hours PRN Severe Pain (7 - 10)  sodium chloride 0.9% lock flush 10 milliLiter(s) IV Push every 1 hour PRN Pre/post blood products, medications, blood draw, and to maintain line patency

## 2023-02-12 NOTE — PROGRESS NOTE ADULT - ASSESSMENT
Patient admitted with abdominal pain, nausea and vomitting , dehydration  septic shock, likely related to     PROBLEMS:    UTI klebsiella pneumoniae and aspiration PNA /SBO- ventral hernia  New R lung nodules - cannot ruleout aspiration PNA   Acute metabolic encephalopathy  Sepsis   Hx copd without exacerbation  Anasarca/Acute on  chronic diastolic heart failure       Plan:    pulmonary stable  Blood transfusion, on TPN, IV fluids, iv lasix prn  Incarcerated Ventral Hernia with SBO-S/p extensive RICHARD, ileo colectomy, giant ventral hernia repair with Surgimend-surgery fu for persistant sutures leak-wound care-entero fistula  IV cefepime/flagyl-for  sepsis ( SBO/UTI)  Monitor Cr  Surgery fu  D/w staff  DVT PROPHYLASIX

## 2023-02-13 LAB
ALBUMIN SERPL ELPH-MCNC: 1.1 G/DL — LOW (ref 3.3–5)
ALP SERPL-CCNC: 75 U/L — SIGNIFICANT CHANGE UP (ref 40–120)
ALT FLD-CCNC: 7 U/L — LOW (ref 12–78)
ANION GAP SERPL CALC-SCNC: 10 MMOL/L — SIGNIFICANT CHANGE UP (ref 5–17)
APPEARANCE UR: ABNORMAL
AST SERPL-CCNC: 12 U/L — LOW (ref 15–37)
BACTERIA # UR AUTO: ABNORMAL
BILIRUB SERPL-MCNC: 0.6 MG/DL — SIGNIFICANT CHANGE UP (ref 0.2–1.2)
BILIRUB UR-MCNC: NEGATIVE — SIGNIFICANT CHANGE UP
BUN SERPL-MCNC: 38 MG/DL — HIGH (ref 7–23)
CALCIUM SERPL-MCNC: 7.9 MG/DL — LOW (ref 8.5–10.1)
CHLORIDE SERPL-SCNC: 106 MMOL/L — SIGNIFICANT CHANGE UP (ref 96–108)
CO2 SERPL-SCNC: 18 MMOL/L — LOW (ref 22–31)
COLOR SPEC: ABNORMAL
COMMENT - URINE: SIGNIFICANT CHANGE UP
CREAT SERPL-MCNC: 2.12 MG/DL — HIGH (ref 0.5–1.3)
DIFF PNL FLD: ABNORMAL
EGFR: 24 ML/MIN/1.73M2 — LOW
EPI CELLS # UR: NEGATIVE — SIGNIFICANT CHANGE UP
GLUCOSE BLDC GLUCOMTR-MCNC: 314 MG/DL — HIGH (ref 70–99)
GLUCOSE BLDC GLUCOMTR-MCNC: 334 MG/DL — HIGH (ref 70–99)
GLUCOSE BLDC GLUCOMTR-MCNC: 345 MG/DL — HIGH (ref 70–99)
GLUCOSE BLDC GLUCOMTR-MCNC: 356 MG/DL — HIGH (ref 70–99)
GLUCOSE BLDC GLUCOMTR-MCNC: 359 MG/DL — HIGH (ref 70–99)
GLUCOSE SERPL-MCNC: 372 MG/DL — HIGH (ref 70–99)
GLUCOSE UR QL: NEGATIVE — SIGNIFICANT CHANGE UP
HCT VFR BLD CALC: 27.8 % — LOW (ref 34.5–45)
HGB BLD-MCNC: 8.6 G/DL — LOW (ref 11.5–15.5)
KETONES UR-MCNC: ABNORMAL
LEUKOCYTE ESTERASE UR-ACNC: ABNORMAL
MAGNESIUM SERPL-MCNC: 2.5 MG/DL — SIGNIFICANT CHANGE UP (ref 1.6–2.6)
MCHC RBC-ENTMCNC: 27.6 PG — SIGNIFICANT CHANGE UP (ref 27–34)
MCHC RBC-ENTMCNC: 30.9 GM/DL — LOW (ref 32–36)
MCV RBC AUTO: 89.1 FL — SIGNIFICANT CHANGE UP (ref 80–100)
NITRITE UR-MCNC: NEGATIVE — SIGNIFICANT CHANGE UP
PH UR: 5 — SIGNIFICANT CHANGE UP (ref 5–8)
PHOSPHATE SERPL-MCNC: 7 MG/DL — HIGH (ref 2.5–4.5)
PLATELET # BLD AUTO: 285 K/UL — SIGNIFICANT CHANGE UP (ref 150–400)
POTASSIUM SERPL-MCNC: 4.8 MMOL/L — SIGNIFICANT CHANGE UP (ref 3.5–5.3)
POTASSIUM SERPL-SCNC: 4.8 MMOL/L — SIGNIFICANT CHANGE UP (ref 3.5–5.3)
PROT SERPL-MCNC: 5.7 GM/DL — LOW (ref 6–8.3)
PROT UR-MCNC: 100
RBC # BLD: 3.12 M/UL — LOW (ref 3.8–5.2)
RBC # FLD: 22.3 % — HIGH (ref 10.3–14.5)
RBC CASTS # UR COMP ASSIST: ABNORMAL /HPF (ref 0–4)
SODIUM SERPL-SCNC: 134 MMOL/L — LOW (ref 135–145)
SODIUM UR-SCNC: 88 MMOL/L — SIGNIFICANT CHANGE UP
SP GR SPEC: 1.02 — SIGNIFICANT CHANGE UP (ref 1.01–1.02)
UROBILINOGEN FLD QL: NEGATIVE — SIGNIFICANT CHANGE UP
WBC # BLD: 11.65 K/UL — HIGH (ref 3.8–10.5)
WBC # FLD AUTO: 11.65 K/UL — HIGH (ref 3.8–10.5)
WBC UR QL: >50 /HPF (ref 0–5)

## 2023-02-13 PROCEDURE — 99233 SBSQ HOSP IP/OBS HIGH 50: CPT

## 2023-02-13 PROCEDURE — 93971 EXTREMITY STUDY: CPT | Mod: 26,RT

## 2023-02-13 PROCEDURE — 99497 ADVNCD CARE PLAN 30 MIN: CPT | Mod: 25

## 2023-02-13 RX ORDER — SODIUM CHLORIDE 9 MG/ML
1000 INJECTION INTRAMUSCULAR; INTRAVENOUS; SUBCUTANEOUS
Refills: 0 | Status: DISCONTINUED | OUTPATIENT
Start: 2023-02-13 | End: 2023-02-13

## 2023-02-13 RX ORDER — SODIUM CHLORIDE 9 MG/ML
1000 INJECTION, SOLUTION INTRAVENOUS
Refills: 0 | Status: DISCONTINUED | OUTPATIENT
Start: 2023-02-13 | End: 2023-02-13

## 2023-02-13 RX ORDER — SODIUM CHLORIDE 9 MG/ML
1000 INJECTION, SOLUTION INTRAVENOUS
Refills: 0 | Status: DISCONTINUED | OUTPATIENT
Start: 2023-02-13 | End: 2023-02-17

## 2023-02-13 RX ORDER — ELECTROLYTE SOLUTION,INJ
1 VIAL (ML) INTRAVENOUS
Refills: 0 | Status: DISCONTINUED | OUTPATIENT
Start: 2023-02-13 | End: 2023-02-13

## 2023-02-13 RX ADMIN — Medication 100 MILLIGRAM(S): at 05:20

## 2023-02-13 RX ADMIN — NYSTATIN CREAM 1 APPLICATION(S): 100000 CREAM TOPICAL at 22:38

## 2023-02-13 RX ADMIN — ENOXAPARIN SODIUM 100 MILLIGRAM(S): 100 INJECTION SUBCUTANEOUS at 13:21

## 2023-02-13 RX ADMIN — NYSTATIN CREAM 1 APPLICATION(S): 100000 CREAM TOPICAL at 19:41

## 2023-02-13 RX ADMIN — SODIUM CHLORIDE 100 MILLILITER(S): 9 INJECTION, SOLUTION INTRAVENOUS at 19:42

## 2023-02-13 RX ADMIN — OCTREOTIDE ACETATE 75 MICROGRAM(S): 200 INJECTION, SOLUTION INTRAVENOUS; SUBCUTANEOUS at 05:20

## 2023-02-13 RX ADMIN — ALBUTEROL 2.5 MILLIGRAM(S): 90 AEROSOL, METERED ORAL at 14:37

## 2023-02-13 RX ADMIN — HYDROMORPHONE HYDROCHLORIDE 0.5 MILLIGRAM(S): 2 INJECTION INTRAMUSCULAR; INTRAVENOUS; SUBCUTANEOUS at 13:36

## 2023-02-13 RX ADMIN — Medication 100 MILLIGRAM(S): at 16:56

## 2023-02-13 RX ADMIN — Medication 0.5 MILLIGRAM(S): at 21:32

## 2023-02-13 RX ADMIN — ALBUTEROL 2.5 MILLIGRAM(S): 90 AEROSOL, METERED ORAL at 02:40

## 2023-02-13 RX ADMIN — ALBUTEROL 2.5 MILLIGRAM(S): 90 AEROSOL, METERED ORAL at 09:57

## 2023-02-13 RX ADMIN — Medication 12: at 13:25

## 2023-02-13 RX ADMIN — Medication 10: at 22:20

## 2023-02-13 RX ADMIN — CEFEPIME 100 MILLIGRAM(S): 1 INJECTION, POWDER, FOR SOLUTION INTRAMUSCULAR; INTRAVENOUS at 22:07

## 2023-02-13 RX ADMIN — NYSTATIN CREAM 1 APPLICATION(S): 100000 CREAM TOPICAL at 16:09

## 2023-02-13 RX ADMIN — CEFEPIME 100 MILLIGRAM(S): 1 INJECTION, POWDER, FOR SOLUTION INTRAMUSCULAR; INTRAVENOUS at 15:30

## 2023-02-13 RX ADMIN — OCTREOTIDE ACETATE 75 MICROGRAM(S): 200 INJECTION, SOLUTION INTRAVENOUS; SUBCUTANEOUS at 13:21

## 2023-02-13 RX ADMIN — PANTOPRAZOLE SODIUM 40 MILLIGRAM(S): 20 TABLET, DELAYED RELEASE ORAL at 13:21

## 2023-02-13 RX ADMIN — SODIUM CHLORIDE 65 MILLILITER(S): 9 INJECTION, SOLUTION INTRAVENOUS at 22:46

## 2023-02-13 RX ADMIN — ENOXAPARIN SODIUM 100 MILLIGRAM(S): 100 INJECTION SUBCUTANEOUS at 22:08

## 2023-02-13 RX ADMIN — HYDROMORPHONE HYDROCHLORIDE 0.5 MILLIGRAM(S): 2 INJECTION INTRAMUSCULAR; INTRAVENOUS; SUBCUTANEOUS at 14:06

## 2023-02-13 RX ADMIN — ALBUTEROL 2.5 MILLIGRAM(S): 90 AEROSOL, METERED ORAL at 21:32

## 2023-02-13 RX ADMIN — Medication 10: at 04:21

## 2023-02-13 RX ADMIN — INSULIN GLARGINE 20 UNIT(S): 100 INJECTION, SOLUTION SUBCUTANEOUS at 22:18

## 2023-02-13 RX ADMIN — Medication 10: at 17:41

## 2023-02-13 RX ADMIN — Medication 100 MILLIGRAM(S): at 22:41

## 2023-02-13 RX ADMIN — CHLORHEXIDINE GLUCONATE 1 APPLICATION(S): 213 SOLUTION TOPICAL at 05:21

## 2023-02-13 RX ADMIN — NYSTATIN CREAM 1 APPLICATION(S): 100000 CREAM TOPICAL at 05:21

## 2023-02-13 RX ADMIN — Medication 0.5 MILLIGRAM(S): at 10:01

## 2023-02-13 RX ADMIN — Medication 70 MICROGRAM(S): at 22:14

## 2023-02-13 NOTE — PROGRESS NOTE ADULT - ASSESSMENT
Entero atmospheric fistula. Plan is to attempt to isolate fistula so that a working lasting stoma appliance can drain it. At that point, pt would be allowed to eat again. Until then needs parenteral nutrition. Held right now due to elevated BGM's.Will give IVF until sugar controlled. [place] to gravity for comfort purposes.

## 2023-02-13 NOTE — PROGRESS NOTE ADULT - SUBJECTIVE AND OBJECTIVE BOX
Date of service: 02-13-23 @ 17:19    Lying in bed in NAD  Lethargic  On PPN  Dose not seem in pain    ROS: no fever or chills; poorly verbal    MEDICATIONS  (STANDING):  albuterol    0.083% 2.5 milliGRAM(s) Nebulizer every 6 hours  buDESOnide    Inhalation Suspension 0.5 milliGRAM(s) Inhalation every 12 hours  cefepime   IVPB 2000 milliGRAM(s) IV Intermittent every 12 hours  chlorhexidine 4% Liquid 1 Application(s) Topical <User Schedule>  coronavirus bivalent (EUA) Booster Vaccine (PFIZER) 0.3 milliLiter(s) IntraMuscular once  dextrose 5%. 1000 milliLiter(s) (50 mL/Hr) IV Continuous <Continuous>  dextrose 5%. 1000 milliLiter(s) (100 mL/Hr) IV Continuous <Continuous>  dextrose 50% Injectable 25 Gram(s) IV Push once  dextrose 50% Injectable 12.5 Gram(s) IV Push once  dextrose 50% Injectable 25 Gram(s) IV Push once  enoxaparin Injectable 100 milliGRAM(s) SubCutaneous every 12 hours  glucagon  Injectable 1 milliGRAM(s) IntraMuscular once  insulin glargine Injectable (LANTUS) 20 Unit(s) SubCutaneous at bedtime  insulin lispro (ADMELOG) corrective regimen sliding scale   SubCutaneous every 4 hours  levothyroxine Injectable 70 MICROGram(s) IV Push at bedtime  metoprolol tartrate 25 milliGRAM(s) Oral two times a day  metroNIDAZOLE  IVPB 500 milliGRAM(s) IV Intermittent every 8 hours  nystatin Cream 1 Application(s) Topical two times a day  nystatin Powder 1 Application(s) Topical every 12 hours  octreotide  Injectable 75 MICROGram(s) SubCutaneous every 8 hours  pantoprazole  Injectable 40 milliGRAM(s) IV Push daily  Parenteral Nutrition - Adult 1 Each (83 mL/Hr) TPN Continuous <Continuous>  Parenteral Nutrition - Adult 1 Each (83 mL/Hr) TPN Continuous <Continuous>  sodium bicarbonate  Infusion 0.058 mEq/kG/Hr (75 mL/Hr) IV Continuous <Continuous>  sodium chloride 0.9% 1000 milliLiter(s) (100 mL/Hr) IV Continuous <Continuous>    Vital Signs Last 24 Hrs  T(C): 36.4 (12 Feb 2023 21:00), Max: 36.4 (12 Feb 2023 21:00)  T(F): 97.5 (12 Feb 2023 21:00), Max: 97.5 (12 Feb 2023 21:00)  HR: 70 (12 Feb 2023 21:00) (70 - 90)  BP: 122/55 (12 Feb 2023 21:00) (122/55 - 122/55)  BP(mean): --  RR: 20 (12 Feb 2023 21:00) (20 - 20)  SpO2: 100% (13 Feb 2023 02:30) (97% - 100%)    Parameters below as of 12 Feb 2023 21:00  Patient On (Oxygen Delivery Method): nasal cannula  O2 Flow (L/min): 2       Physical exam:    Constitutional:  No acute distress  HEENT: NC/AT, EOMI, PERRLA, conjunctivae clear; ears and nose atraumatic  Neck: supple; thyroid not palpable  Back: no tenderness  Respiratory: respiratory effort normal; crackles at bases  Cardiovascular: S1S2 regular, no murmurs  Abdomen: soft, mid abdomen tender, distended, positive BS  abdominal postsurgical wound with fecaloid drainage from drain site   Genitourinary: no suprapubic tenderness  Lymphatic: no LN palpable  Musculoskeletal: no muscle tenderness, no joint swelling or tenderness  Extremities: no pedal edema  Neurological/ Psychiatric: confused, judgement and insight impaired; moving all extremities  Skin: no rashes; no palpable lesions    Labs: reviewed                        8.6    11.65 )-----------( 285      ( 13 Feb 2023 04:24 )             27.8     02-13    134<L>  |  106  |  38<H>  ----------------------------<  372<H>  4.8   |  18<L>  |  2.12<H>    Ca    7.9<L>      13 Feb 2023 04:24  Phos  7.0     02-13  Mg     2.5     02-13    TPro  5.7<L>  /  Alb  1.1<L>  /  TBili  0.6  /  DBili  x   /  AST  12<L>  /  ALT  7<L>  /  AlkPhos  75  02-13        C-Reactive Protein, Serum: 148 mg/L (02-09-23 @ 05:22)                        9.7    10.11 )-----------( 297      ( 10 Feb 2023 05:26 )             29.5     02-10    131<L>  |  101  |  16  ----------------------------<  315<H>  4.6   |  23  |  0.62    Ca    7.6<L>      10 Feb 2023 05:26  Phos  3.0     02-10  Mg     1.7     02-10    TPro  6.3  /  Alb  1.3<L>  /  TBili  0.5  /  DBili  x   /  AST  18  /  ALT  11<L>  /  AlkPhos  72  02-10    C-Reactive Protein, Serum: 148 mg/L (02-09-23 @ 05:22)               10.9   19.72 )-----------( 510      ( 27 Jan 2023 06:16 )             36.7     01-27    142  |  111<H>  |  10  ----------------------------<  233<H>  3.8   |  26  |  0.78    Ca    8.0<L>      27 Jan 2023 06:16  Phos  2.7     01-27  Mg     1.8     01-27      Culture - Acid Fast - Tissue w/Smear (collected 31 Jan 2023 18:07)  Source: .Tissue INTRAPERITONEAL ABCESS FOR CX    Culture - Fungal, Tissue (collected 31 Jan 2023 18:07)  Source: .Tissue INTRAPERITONEAL ABCESS FOR CX  Preliminary Report (01 Feb 2023 07:09):    Testing in progress    Culture - Tissue with Gram Stain (collected 31 Jan 2023 18:07)  Source: .Tissue INTRAPERITONEAL ABCESS FOR CX  Gram Stain (01 Feb 2023 04:37):    Rare polymorphonuclear leukocytes seen per low power field    No organisms seen per oil power field  Preliminary Report (02 Feb 2023 20:01):    Growth in fluid media only Klebsiella oxytoca/Raoultella ornithinolytica  Organism: Klebsiella oxytoca /Raoutella ornithinolytica (03 Feb 2023 17:25)  Organism: Klebsiella oxytoca /Raoutella ornithinolytica (03 Feb 2023 17:25)      -  Amikacin: S <=16      -  Amoxicillin/Clavulanic Acid: I 16/8      -  Ampicillin: R >16 These ampicillin results predict results for amoxicillin      -  Ampicillin/Sulbactam: R >16/8 Enterobacter, Klebsiella aerogenes, Citrobacter, and Serratia may develop resistance during prolonged therapy (3-4 days)      -  Aztreonam: S <=4      -  Cefazolin: R >16 Enterobacter, Klebsiella aerogenes, Citrobacter, and Serratia may develop resistance during prolonged therapy (3-4 days)      -  Cefepime: S <=2      -  Cefoxitin: S <=8      -  Ceftriaxone: S <=1 Enterobacter, Klebsiella aerogenes, Citrobacter, and Serratia may develop resistance during prolonged therapy      -  Ciprofloxacin: S <=0.25      -  Ertapenem: S <=0.5      -  Gentamicin: S <=2      -  Imipenem: S <=1      -  Levofloxacin: S <=0.5      -  Meropenem: S <=1      -  Piperacillin/Tazobactam: R 64      -  Tobramycin: S <=2      -  Trimethoprim/Sulfamethoxazole: S <=0.5/9.5      Method Type: ARABELLA    Culture - Blood (collected 27 Jan 2023 06:16)  Source: .Blood None  Final Report (01 Feb 2023 09:00):    No Growth Final    Culture - Blood (collected 27 Jan 2023 06:16)  Source: .Blood None  Final Report (01 Feb 2023 09:00):    No Growth Final    Radiology: all available radiological tests reviewed    < from: CT Chest No Cont (01.27.23 @ 09:53) >  Small bowel obstruction with transition point at the neck of a left lower   quadrant abdominal wall hernia. It is uncertain whether the obstruction   is due to the hernia itself or to adhesions.    Additional massive ventral hernia containing small and large bowel and   epigastric hernia containing stomach.    Right lower lobe nodules new since December 04, 2021 and could be   infectious or neoplastic. Follow-up chest CT in 3 months is recommended   to reevaluate.    < end of copied text >      Advanced directives addressed: full resuscitation

## 2023-02-13 NOTE — PROGRESS NOTE ADULT - ASSESSMENT
Patient admitted with abdominal pain, nausea and vomitting , dehydration  septic shock, likely related to     PROBLEMS:    UTI klebsiella pneumoniae and aspiration PNA /SBO- ventral hernia  New R lung nodules - cannot ruleout aspiration PNA   Acute metabolic encephalopathy  Sepsis   Hx copd without exacerbation  Anasarca/Acute on  chronic diastolic heart failure       Plan:    pulmonary stable  Blood transfusion, on TPN, IV fluids, iv lasix prn  Incarcerated Ventral Hernia with SBO-S/p extensive RICHARD, ileo colectomy, giant ventral hernia repair with Surgimend-surgery fu for persistant sutures leak-wound care-entero fistula  IV cefepime/flagyl-for  sepsis ( SBO/UTI)  Monitor Cr  Surgery fu  D/w staff  DVT PROPHYLASIX  Patient admitted with abdominal pain, nausea and vomitting , dehydration  septic shock, likely related to     PROBLEMS:    UTI klebsiella pneumoniae and aspiration PNA /SBO- ventral hernia  New R lung nodules - cannot rule out aspiration PNA   Acute metabolic encephalopathy  Sepsis   Hx copd without exacerbation  Anasarca/Acute on  chronic diastolic heart failure   Superficial thrombophlebitis right cephalic vein at the right upper arm        Plan:    pulmonary stable  Blood transfusion, on TPN, IV fluids, iv lasix prn  Incarcerated Ventral Hernia with SBO-S/p extensive RICHARD, ileo colectomy, giant ventral hernia repair with Surgimend-surgery fu for persistant sutures leak-wound care-entero fistula  IV cefepime/flagyl-for  sepsis ( SBO/UTI)  Monitor Cr  Surgery fu  D/w staff  DVT PROPHYLASIX

## 2023-02-13 NOTE — PROGRESS NOTE ADULT - ASSESSMENT
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 She was noted with one episode of emesis and diffuse abdominal pain associated with fever. On 1/27 the patient was noted febrile to 102.8F, more lethargic, did not open eyes, did not follow commands. She received cefepime and metronidazole.     1. Intraabdominal hernia related fat necrosis with superimposed infection with KLOX. 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 Right lower lobes nodules Probable pneumonia. Allergy to cipro. Encephalopathy.   -leukocytosis resolving  -abdominal fistula draining  -BC x 2 noted  -urine c/s noted  -on cefepime 2 gm IV q12h and metronidazole 500 mg IV q8h # 17  -tolerating abx well so far; no side effects noted  -aspiration precautions  -continue abx coverage  -fistular drainage is contained  -monitor abdomen  -monitor temps  -f/u CBC  -supportive care  2. Other issues:   -care per medicine    d/w Dr. Castro

## 2023-02-13 NOTE — PROGRESS NOTE ADULT - SUBJECTIVE AND OBJECTIVE BOX
Resting comfortably. Awake, alert, NAD  VSS afeb  Abdominal wound changed. Sharly debrided non viable tissue at bedside. Ostomy appliance applied over fistula site, surrounded by VAC sponge. Pt tolerated well

## 2023-02-13 NOTE — PROGRESS NOTE ADULT - SUBJECTIVE AND OBJECTIVE BOX
Subjective:    Home Medications:  Albuterol (Eqv-ProAir HFA) 90 mcg/inh inhalation aerosol: 1 puff(s) inhaled every 6 hours, As Needed (2023 16:49)  amiodarone 200 mg oral tablet: 1 tab(s) orally once a day (2023 16:49)  ascorbic acid 500 mg oral tablet: 2 tab(s) orally once a day (2023 16:49)  atorvastatin 10 mg oral tablet: 1 tab(s) orally once a day (at bedtime) (2023 16:49)  benzonatate 100 mg oral capsule: 1 cap(s) orally 3 times a day (2023 21:48)  budesonide 0.5 mg/2 mL inhalation suspension: 2 milliliter(s) inhaled 2 times a day (2023 21:48)  Cepacol Sore Throat 15 mg-3.6 mg mucous membrane lozenge: 1 lozenge mucous membrane every 4 hours, As Needed (2023 21:48)  cholecalciferol 1250 mcg (50,000 intl units) oral capsule: 1 cap(s) orally every 4 weeks on Wednesday  (2023 16:49)  Coumadin 2.5 mg oral tablet: 1 tab(s) orally once a day (at bedtime)  ***ON HOLD from  to *** (2023 21:48)  Cranberry oral tablet: 1 tab(s) orally 2 times a day (2023 16:49)  cyanocobalamin 1000 mcg oral tablet: 1 tab(s) orally once a day (2023 16:49)  dilTIAZem 180 mg/24 hours oral capsule, extended release: 1 cap(s) orally once a day (2023 16:49)  DULoxetine 60 mg oral delayed release capsule: 1 cap(s) orally once a day (2023 16:49)  estradiol 0.1 mg/g vaginal cream: 0.5 gram(s) vaginal 2 times a week on Monday and Thursday (2023 21:48)  fexofenadine 180 mg oral tablet: 1 tab(s) orally once a day (2023 21:48)  fluticasone 50 mcg/inh nasal spray: 1 spray(s) nasal 2 times a day (2023 16:49)  furosemide 20 mg oral tablet: 1 tab(s) orally once a day (2023 16:49)  glipiZIDE 10 mg oral tablet, extended release: 2 tab(s) orally once a day (2023 16:49)  GlycoLax oral powder for reconstitution: 17 gram(s) orally 2 times a day (2023 16:49)  guaiFENesin 100 mg/5 mL oral liquid: 20 milliliter(s) orally every 6 hours (2023 21:48)  HumaLOG KwikPen 100 units/mL injectable solution: 10 unit(s) injectable 3 times a day (before meals) (2023 21:48)  ipratropium-albuterol 20 mcg-100 mcg/inh inhalation aerosol: 1 puff(s) inhaled 4 times a day (2023 16:49)  levothyroxine 88 mcg (0.088 mg) oral tablet: 1 tab(s) orally once a day (at bedtime) (2023 16:49)  losartan 25 mg oral tablet: 1 tab(s) orally once a day (2023 16:49)  Macrobid 100 mg oral capsule: 1 cap(s) orally 2 times a day for 5 days  ***course complete*** (2023 21:48)  methocarbamol 750 mg oral tablet: 1 tab(s) orally every 8 hours, As Needed (2023 21:48)  metoprolol tartrate 25 mg oral tablet: 1 tab(s) orally 2 times a day (2023 16:49)  Milk of Magnesia 8% oral suspension: 30 milliliter(s) orally once a day, As Needed (2023 16:49)  montelukast 10 mg oral tablet: 1 tab(s) orally once a day (at bedtime) (2023 21:48)  ondansetron 4 mg oral tablet: 1 tab(s) orally every 4 hours, As Needed (2023 21:48)  oxybutynin 5 mg oral tablet: 1 tab(s) orally 2 times a day (2023 16:49)  oxyCODONE 5 mg oral tablet: 1 tab(s) orally every 4 hours, As Needed (2023 16:49)  phytonadione 5 mg oral tablet: 0.5 tab(s) orally once  ***given at [facility] once on 23*** (2023 21:48)  pregabalin 100 mg oral capsule: 1 cap(s) orally 3 times a day (2023 16:49)  sennosides-docusate 8.6 mg-50 mg oral tablet: 2 tab(s) orally once a day (at bedtime) (2023 16:49)  Tradjenta 5 mg oral tablet: 1 tab(s) orally once a day (2023 16:49)  Tylenol 500 mg oral tablet: 2 tab(s) orally every 8 hours (2023 16:49)    MEDICATIONS  (STANDING):  albuterol    0.083% 2.5 milliGRAM(s) Nebulizer every 6 hours  buDESOnide    Inhalation Suspension 0.5 milliGRAM(s) Inhalation every 12 hours  cefepime   IVPB 2000 milliGRAM(s) IV Intermittent every 12 hours  chlorhexidine 4% Liquid 1 Application(s) Topical <User Schedule>  coronavirus bivalent (EUA) Booster Vaccine (PFIZER) 0.3 milliLiter(s) IntraMuscular once  dextrose 5%. 1000 milliLiter(s) (100 mL/Hr) IV Continuous <Continuous>  dextrose 5%. 1000 milliLiter(s) (50 mL/Hr) IV Continuous <Continuous>  dextrose 50% Injectable 25 Gram(s) IV Push once  dextrose 50% Injectable 12.5 Gram(s) IV Push once  dextrose 50% Injectable 25 Gram(s) IV Push once  enoxaparin Injectable 100 milliGRAM(s) SubCutaneous every 12 hours  glucagon  Injectable 1 milliGRAM(s) IntraMuscular once  insulin glargine Injectable (LANTUS) 20 Unit(s) SubCutaneous at bedtime  insulin lispro (ADMELOG) corrective regimen sliding scale   SubCutaneous every 4 hours  levothyroxine Injectable 70 MICROGram(s) IV Push at bedtime  metoprolol tartrate 25 milliGRAM(s) Oral two times a day  metroNIDAZOLE  IVPB 500 milliGRAM(s) IV Intermittent every 8 hours  nystatin Cream 1 Application(s) Topical two times a day  nystatin Powder 1 Application(s) Topical every 12 hours  octreotide  Injectable 75 MICROGram(s) SubCutaneous every 8 hours  pantoprazole  Injectable 40 milliGRAM(s) IV Push daily  Parenteral Nutrition - Adult 1 Each (83 mL/Hr) TPN Continuous <Continuous>  Parenteral Nutrition - Adult 1 Each (83 mL/Hr) TPN Continuous <Continuous>  sodium bicarbonate  Infusion 0.058 mEq/kG/Hr (75 mL/Hr) IV Continuous <Continuous>    MEDICATIONS  (PRN):  acetaminophen     Tablet .. 650 milliGRAM(s) Oral every 6 hours PRN Temp greater or equal to 38C (100.4F)  albuterol    90 MICROgram(s) HFA Inhaler 2 Puff(s) Inhalation every 6 hours PRN Shortness of Breath and/or Wheezing  dextrose Oral Gel 15 Gram(s) Oral once PRN Blood Glucose LESS THAN 70 milliGRAM(s)/deciliter  hydrALAZINE Injectable 10 milliGRAM(s) IV Push every 6 hours PRN SBP>160  HYDROmorphone  Injectable 0.5 milliGRAM(s) IV Push every 3 hours PRN Severe Pain (7 - 10)  sodium chloride 0.9% lock flush 10 milliLiter(s) IV Push every 1 hour PRN Pre/post blood products, medications, blood draw, and to maintain line patency      Allergies    Cipro (Rash)  Spiriva (Rash)    Intolerances        Vital Signs Last 24 Hrs  T(C): 36.4 (2023 21:00), Max: 36.8 (2023 16:34)  T(F): 97.5 (2023 21:00), Max: 98.2 (2023 16:34)  HR: 70 (2023 21:00) (70 - 92)  BP: 122/55 (2023 21:00) (111/47 - 122/55)  BP(mean): --  RR: 20 (2023 21:00) (20 - 20)  SpO2: 100% (2023 02:30) (97% - 100%)    Parameters below as of 2023 21:00  Patient On (Oxygen Delivery Method): nasal cannula  O2 Flow (L/min): 2        PHYSICAL EXAMINATION:    NECK:  Supple. No lymphadenopathy. Jugular venous pressure not elevated. Carotids equal.   HEART:   The cardiac impulse has a normal quality. Reg., Nl S1 and S2.  There are no murmurs, rubs or gallops noted  CHEST:  Chest is clear to auscultation. Normal respiratory effort.  ABDOMEN:  Soft and nontender.   EXTREMITIES:  There is no edema.       LABS:                        8.6    11.65 )-----------( 285      ( 2023 04:24 )             27.8     02-13    134<L>  |  106  |  38<H>  ----------------------------<  372<H>  4.8   |  18<L>  |  2.12<H>    Ca    7.9<L>      2023 04:24  Phos  7.0     -  Mg     2.5         TPro  5.7<L>  /  Alb  1.1<L>  /  TBili  0.6  /  DBili  x   /  AST  12<L>  /  ALT  7<L>  /  AlkPhos  75  02-13      Urinalysis Basic - ( 2023 06:20 )    Color: Brown / Appearance: very cloudy / S.020 / pH: x  Gluc: x / Ketone: Small  / Bili: Negative / Urobili: Negative   Blood: x / Protein: 100 / Nitrite: Negative   Leuk Esterase: Moderate / RBC: 25-50 /HPF / WBC >50 /HPF   Sq Epi: x / Non Sq Epi: Negative / Bacteria: Many             Subjective:    pat awake, increased leg swelling with fluid retention, no respiratory distress.    Home Medications:  Albuterol (Eqv-ProAir HFA) 90 mcg/inh inhalation aerosol: 1 puff(s) inhaled every 6 hours, As Needed (2023 16:49)  amiodarone 200 mg oral tablet: 1 tab(s) orally once a day (2023 16:49)  ascorbic acid 500 mg oral tablet: 2 tab(s) orally once a day (2023 16:49)  atorvastatin 10 mg oral tablet: 1 tab(s) orally once a day (at bedtime) (2023 16:49)  benzonatate 100 mg oral capsule: 1 cap(s) orally 3 times a day (2023 21:48)  budesonide 0.5 mg/2 mL inhalation suspension: 2 milliliter(s) inhaled 2 times a day (2023 21:48)  Cepacol Sore Throat 15 mg-3.6 mg mucous membrane lozenge: 1 lozenge mucous membrane every 4 hours, As Needed (2023 21:48)  cholecalciferol 1250 mcg (50,000 intl units) oral capsule: 1 cap(s) orally every 4 weeks on Wednesday  (2023 16:49)  Coumadin 2.5 mg oral tablet: 1 tab(s) orally once a day (at bedtime)  ***ON HOLD from  to *** (2023 21:48)  Cranberry oral tablet: 1 tab(s) orally 2 times a day (2023 16:49)  cyanocobalamin 1000 mcg oral tablet: 1 tab(s) orally once a day (2023 16:49)  dilTIAZem 180 mg/24 hours oral capsule, extended release: 1 cap(s) orally once a day (2023 16:49)  DULoxetine 60 mg oral delayed release capsule: 1 cap(s) orally once a day (2023 16:49)  estradiol 0.1 mg/g vaginal cream: 0.5 gram(s) vaginal 2 times a week on Monday and Thursday (2023 21:48)  fexofenadine 180 mg oral tablet: 1 tab(s) orally once a day (2023 21:48)  fluticasone 50 mcg/inh nasal spray: 1 spray(s) nasal 2 times a day (2023 16:49)  furosemide 20 mg oral tablet: 1 tab(s) orally once a day (2023 16:49)  glipiZIDE 10 mg oral tablet, extended release: 2 tab(s) orally once a day (2023 16:49)  GlycoLax oral powder for reconstitution: 17 gram(s) orally 2 times a day (2023 16:49)  guaiFENesin 100 mg/5 mL oral liquid: 20 milliliter(s) orally every 6 hours (2023 21:48)  HumaLOG KwikPen 100 units/mL injectable solution: 10 unit(s) injectable 3 times a day (before meals) (2023 21:48)  ipratropium-albuterol 20 mcg-100 mcg/inh inhalation aerosol: 1 puff(s) inhaled 4 times a day (2023 16:49)  levothyroxine 88 mcg (0.088 mg) oral tablet: 1 tab(s) orally once a day (at bedtime) (2023 16:49)  losartan 25 mg oral tablet: 1 tab(s) orally once a day (2023 16:49)  Macrobid 100 mg oral capsule: 1 cap(s) orally 2 times a day for 5 days  ***course complete*** (2023 21:48)  methocarbamol 750 mg oral tablet: 1 tab(s) orally every 8 hours, As Needed (2023 21:48)  metoprolol tartrate 25 mg oral tablet: 1 tab(s) orally 2 times a day (2023 16:49)  Milk of Magnesia 8% oral suspension: 30 milliliter(s) orally once a day, As Needed (2023 16:49)  montelukast 10 mg oral tablet: 1 tab(s) orally once a day (at bedtime) (2023 21:48)  ondansetron 4 mg oral tablet: 1 tab(s) orally every 4 hours, As Needed (2023 21:48)  oxybutynin 5 mg oral tablet: 1 tab(s) orally 2 times a day (2023 16:49)  oxyCODONE 5 mg oral tablet: 1 tab(s) orally every 4 hours, As Needed (2023 16:49)  phytonadione 5 mg oral tablet: 0.5 tab(s) orally once  ***given at [facility] once on 23*** (2023 21:48)  pregabalin 100 mg oral capsule: 1 cap(s) orally 3 times a day (2023 16:49)  sennosides-docusate 8.6 mg-50 mg oral tablet: 2 tab(s) orally once a day (at bedtime) (2023 16:49)  Tradjenta 5 mg oral tablet: 1 tab(s) orally once a day (2023 16:49)  Tylenol 500 mg oral tablet: 2 tab(s) orally every 8 hours (2023 16:49)    MEDICATIONS  (STANDING):  albuterol    0.083% 2.5 milliGRAM(s) Nebulizer every 6 hours  buDESOnide    Inhalation Suspension 0.5 milliGRAM(s) Inhalation every 12 hours  cefepime   IVPB 2000 milliGRAM(s) IV Intermittent every 12 hours  chlorhexidine 4% Liquid 1 Application(s) Topical <User Schedule>  coronavirus bivalent (EUA) Booster Vaccine (PFIZER) 0.3 milliLiter(s) IntraMuscular once  dextrose 5%. 1000 milliLiter(s) (100 mL/Hr) IV Continuous <Continuous>  dextrose 5%. 1000 milliLiter(s) (50 mL/Hr) IV Continuous <Continuous>  dextrose 50% Injectable 25 Gram(s) IV Push once  dextrose 50% Injectable 12.5 Gram(s) IV Push once  dextrose 50% Injectable 25 Gram(s) IV Push once  enoxaparin Injectable 100 milliGRAM(s) SubCutaneous every 12 hours  glucagon  Injectable 1 milliGRAM(s) IntraMuscular once  insulin glargine Injectable (LANTUS) 20 Unit(s) SubCutaneous at bedtime  insulin lispro (ADMELOG) corrective regimen sliding scale   SubCutaneous every 4 hours  levothyroxine Injectable 70 MICROGram(s) IV Push at bedtime  metoprolol tartrate 25 milliGRAM(s) Oral two times a day  metroNIDAZOLE  IVPB 500 milliGRAM(s) IV Intermittent every 8 hours  nystatin Cream 1 Application(s) Topical two times a day  nystatin Powder 1 Application(s) Topical every 12 hours  octreotide  Injectable 75 MICROGram(s) SubCutaneous every 8 hours  pantoprazole  Injectable 40 milliGRAM(s) IV Push daily  Parenteral Nutrition - Adult 1 Each (83 mL/Hr) TPN Continuous <Continuous>  Parenteral Nutrition - Adult 1 Each (83 mL/Hr) TPN Continuous <Continuous>  sodium bicarbonate  Infusion 0.058 mEq/kG/Hr (75 mL/Hr) IV Continuous <Continuous>    MEDICATIONS  (PRN):  acetaminophen     Tablet .. 650 milliGRAM(s) Oral every 6 hours PRN Temp greater or equal to 38C (100.4F)  albuterol    90 MICROgram(s) HFA Inhaler 2 Puff(s) Inhalation every 6 hours PRN Shortness of Breath and/or Wheezing  dextrose Oral Gel 15 Gram(s) Oral once PRN Blood Glucose LESS THAN 70 milliGRAM(s)/deciliter  hydrALAZINE Injectable 10 milliGRAM(s) IV Push every 6 hours PRN SBP>160  HYDROmorphone  Injectable 0.5 milliGRAM(s) IV Push every 3 hours PRN Severe Pain (7 - 10)  sodium chloride 0.9% lock flush 10 milliLiter(s) IV Push every 1 hour PRN Pre/post blood products, medications, blood draw, and to maintain line patency      Allergies    Cipro (Rash)  Spiriva (Rash)    Intolerances        Vital Signs Last 24 Hrs  T(C): 36.4 (2023 21:00), Max: 36.8 (2023 16:34)  T(F): 97.5 (2023 21:00), Max: 98.2 (2023 16:34)  HR: 70 (2023 21:00) (70 - 92)  BP: 122/55 (2023 21:00) (111/47 - 122/55)  BP(mean): --  RR: 20 (2023 21:00) (20 - 20)  SpO2: 100% (2023 02:30) (97% - 100%)    Parameters below as of 2023 21:00  Patient On (Oxygen Delivery Method): nasal cannula  O2 Flow (L/min): 2        PHYSICAL EXAMINATION:    NECK:  Supple. No lymphadenopathy. Jugular venous pressure not elevated. Carotids equal.   HEART:   The cardiac impulse has a normal quality. Reg., Nl S1 and S2.  There are no murmurs, rubs or gallops noted  CHEST:  Chest crackles to auscultation. Normal respiratory effort.  ABDOMEN:  Soft and nontender.   EXTREMITIES:  There is no edema.       LABS:                        8.6    11.65 )-----------( 285      ( 2023 04:24 )             27.8         134<L>  |  106  |  38<H>  ----------------------------<  372<H>  4.8   |  18<L>  |  2.12<H>    Ca    7.9<L>      2023 04:24  Phos  7.0       Mg     2.5         TPro  5.7<L>  /  Alb  1.1<L>  /  TBili  0.6  /  DBili  x   /  AST  12<L>  /  ALT  7<L>  /  AlkPhos  75        Urinalysis Basic - ( 2023 06:20 )    Color: Brown / Appearance: very cloudy / S.020 / pH: x  Gluc: x / Ketone: Small  / Bili: Negative / Urobili: Negative   Blood: x / Protein: 100 / Nitrite: Negative   Leuk Esterase: Moderate / RBC: 25-50 /HPF / WBC >50 /HPF   Sq Epi: x / Non Sq Epi: Negative / Bacteria: Many        US Duplex Venous Upper Ext Ltd, Right (23 @ 12:01) >  IMPRESSION:  No evidence of right upper extremity deep venous thrombosis.    Superficial thrombophlebitis right cephalic vein at the right upper arm.    Elongated 5 cm x 7 mm hypoechoic region without vascular flow in the   right axillary area may represent focal edema. Please clinically   correlate.

## 2023-02-13 NOTE — ADVANCED PRACTICE NURSE CONSULT - ASSESSMENT
Abdominal Wound       This is a 72 year old female admitted on 1/20/2023 for Intestinal obstruction with PMHx: Diverticulitis, neuropathy, COPD, CVA, DVT, CVA, DM-2, HTN, A-Fib, Colostomy, anemia, Osteoarthritis, obesity, C-Diff, PE, HTN, HLD.   On 1/31/2023 patient had an operation see operative note.   Consulted for open abdominal wound;    Wound to midline abdomen and wound at 21cm x 20cm x 4.2cm with undermining 12-12 o'clock with tracking at 12 o'clock @ 18cm.  Noted the Periwound with erythema and induration.  Together with surgical PAPO Fajardo and Dr Castro, abdominal wound bed cleaned and (see Dr Castro's note). To the abdominal wound bed we placed 4-white VAC sponges to wound base. 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 Over the white foam the Black Granufoam was placed and sensitrac applied. On the LLQ a 2 1/4 " flat wafer and pouch system applied to accept effluent.      Cleanse periwound with saline and pat dry.  Applied Stoma Powder and then painted with Cavilon (No Sting Barrier  My additional recommendation is for periwound protection:  1-Cleanse periwound with saline and pat dry.  2-Applied Stoma Powder and then painted with Cavilon (No Sting Barrier) 2 times  3-Applied small foam cut around both drains    Recommend maintaining a Hover MAt under patient for boosting, transferring and positioning.     MATI 9 and recommend maintaining Low Air Loss Mattress    Hover MAt in place and recommend maintaining to reduce friction and shear.     Patient with limited ROM and needed two assist to turn and position. Turn and position every two hours, maintain 1 purple pad under patient for moisture wicking, keep heels elevated of mattress.

## 2023-02-13 NOTE — ADVANCED PRACTICE NURSE CONSULT - RECOMMEDATIONS
·Cavilon (liquid skin barrier) to coccyx, sacrum Gluteal and inner groin - daily.    ·Recommendation: ·Turn and Reposition Q2H  ·Offload sacral-coccygeal area with positioner pillow, alternate sides Q2H,   ·Incontinence care with repositioning Q2H  -Right  Gluteal  - Cleanse with NS, pat dry, apply TRIAD (Zinc-oxide barrier paste) daily and PRN for soiling: It is alright to leave a thin layer of cream on the skin after cleansing as this will not impede wound healing and cover with a small foam   -Left Ischium: - Cleanse with NS, pat dry, apply TRIAD (Zinc-oxide barrier paste) daily and PRN for soiling: It is alright to leave a thin layer of cream on the skin after cleansing as this will not impede wound healing and cover with a small foam   -Continue turning/positioning patient from side-to-side q2h while in bed, q1h when/if OOB chair, or in accordance w/ pt's plan of care. Utilize pillows and/or corin form pillow to assist w/ turning/positioning. When/if OOB chair, utilize pillows or air waffle chair cushion to offload pressure. If patient a fall risk ensure chair alarm is on and working. Apply Antiskid mat under chair alarm to prevent patient from sliding off chair.   -Continue to offload heels from bed surface with soft pillow under calfs or by applying offloading boots to BLEs. May use foam boots or Total CAIR boot (while in bed please remove the plastic support/when out of bed in chair place the plastic support back in)   -Continue utilizing one underpad underneath patient to contain incontinence episodes; change pad when saturated/soiled. For profuse incontinence use 1 Purple pad under patient accept and wick away moisture.     -Continue nutrition consult for optimal wound healing & nutritional status.   -Assess skin/wound qshift, report changes to primary provider.   -Maintain Low Air Loss Mattress     
-Wound dressing to Abdomen with Wound VAC and 2 1/4 flat ostomy wafer and pouch applied.   -Periwound Recommendations   1-Cleanse periwound with saline and pat dry.  2-Applied Stoma Powder (Wipe off excess) and then painted with Cavilon (No Sting Barrier) 2 times (let Cavilon Dry and repeat)  3-Applied small foam cut around both drains to accept drainage.     ·Cavilon (liquid skin barrier) to coccyx, sacrum Gluteal and inner groin - daily.    ·Recommendation: ·Turn and Reposition Q2H  ·Offload sacral-coccygeal area with positioner pillow, alternate sides Q2H,   ·Incontinence care with repositioning Q2H  -Right  Gluteal  - Cleanse with NS, pat dry, apply TRIAD (Zinc-oxide barrier paste) daily and PRN for soiling: It is alright to leave a thin layer of cream on the skin after cleansing as this will not impede wound healing and cover with a small foam   -Left Ischium: - Cleanse with NS, pat dry, apply TRIAD (Zinc-oxide barrier paste) daily and PRN for soiling: It is alright to leave a thin layer of cream on the skin after cleansing as this will not impede wound healing and cover with a small foam   -Continue turning/positioning patient from side-to-side q2h while in bed, q1h when/if OOB chair, or in accordance w/ pt's plan of care. Utilize pillows and/or corin form pillow to assist w/ turning/positioning. When/if OOB chair, utilize pillows or air waffle chair cushion to offload pressure. If patient a fall risk ensure chair alarm is on and working. Apply Antiskid mat under chair alarm to prevent patient from sliding off chair.   -Continue to offload heels from bed surface with soft pillow under calfs or by applying offloading boots to BLEs. May use foam boots or Total CAIR boot (while in bed please remove the plastic support/when out of bed in chair place the plastic support back in)   -Continue utilizing one underpad underneath patient to contain incontinence episodes; change pad when saturated/soiled. For profuse incontinence use 1 Purple pad under patient accept and wick away moisture.     -Continue nutrition consult for optimal wound healing & nutritional status.   -Assess skin/wound qshift, report changes to primary provider.   -Maintain Low Air Loss Mattress     
-Wound dressing change order placed by surgery  -Periwound Recommendations   1-Cleanse periwound with saline and pat dry.  2-Applied Stoma Powder (Wipe off excess) and then painted with Cavilon (No Sting Barrier) 2 times (let Cavilon Dry and repeat)  3-Applied small foam cut around both drains to accept drainage.     ·Cavilon (liquid skin barrier) to coccyx, sacrum Gluteal and inner groin - daily.    ·Recommendation: ·Turn and Reposition Q2H  ·Offload sacral-coccygeal area with positioner pillow, alternate sides Q2H,   ·Incontinence care with repositioning Q2H  -Right  Gluteal  - Cleanse with NS, pat dry, apply TRIAD (Zinc-oxide barrier paste) daily and PRN for soiling: It is alright to leave a thin layer of cream on the skin after cleansing as this will not impede wound healing and cover with a small foam   -Left Ischium: - Cleanse with NS, pat dry, apply TRIAD (Zinc-oxide barrier paste) daily and PRN for soiling: It is alright to leave a thin layer of cream on the skin after cleansing as this will not impede wound healing and cover with a small foam   -Continue turning/positioning patient from side-to-side q2h while in bed, q1h when/if OOB chair, or in accordance w/ pt's plan of care. Utilize pillows and/or corin form pillow to assist w/ turning/positioning. When/if OOB chair, utilize pillows or air waffle chair cushion to offload pressure. If patient a fall risk ensure chair alarm is on and working. Apply Antiskid mat under chair alarm to prevent patient from sliding off chair.   -Continue to offload heels from bed surface with soft pillow under calfs or by applying offloading boots to BLEs. May use foam boots or Total CAIR boot (while in bed please remove the plastic support/when out of bed in chair place the plastic support back in)   -Continue utilizing one underpad underneath patient to contain incontinence episodes; change pad when saturated/soiled. For profuse incontinence use 1 Purple pad under patient accept and wick away moisture.     -Continue nutrition consult for optimal wound healing & nutritional status.   -Assess skin/wound qshift, report changes to primary provider.   -Maintain Low Air Loss Mattress

## 2023-02-13 NOTE — PROGRESS NOTE ADULT - SUBJECTIVE AND OBJECTIVE BOX
Subjective:  seen at bedside this morning, pt is awake and appears alert, but responses are lethargic, difficult to understand.   since my last visit, it appears that pt's overall condition has worsened -- the surgical wound has dehissed, NGT access lost, Midline access was lost this morning.  her renal function is worsening  spoke with pt's children via phone this afternoon.     Review of Systems:  Unable to obtain/Limited due to:  lethargic/clinical condition        PHYSICAL EXAM:    Vital Signs Last 24 Hrs  T(C): 36.4 (12 Feb 2023 21:00), Max: 36.8 (12 Feb 2023 16:34)  T(F): 97.5 (12 Feb 2023 21:00), Max: 98.2 (12 Feb 2023 16:34)  HR: 70 (12 Feb 2023 21:00) (70 - 92)  BP: 122/55 (12 Feb 2023 21:00) (111/47 - 122/55)  BP(mean): --  RR: 20 (12 Feb 2023 21:00) (20 - 20)  SpO2: 100% (13 Feb 2023 02:30) (97% - 100%)    Parameters below as of 12 Feb 2023 21:00  Patient On (Oxygen Delivery Method): nasal cannula  O2 Flow (L/min): 2      General:  - GENERAL: Alert. No acute distress.   - EYES: EOMI. Anicteric.   - HENT: Moist mucous membranes.   - LUNGS: Clear to auscultation bilaterally. No accessory muscle use.  - CARDIOVASCULAR: Regular rate and rhythm. No murmur. No JVD. No edema.   - ABDOMEN: open midline abdominal wound with foul odor.  + drains.   - EXTREMITIES: + edema  - SKIN: Warm, no rashes or lesions on visible skin.   - PSYCHIATRIC: lethargic, minimally cooperative.       LABS:                        8.6    11.65 )-----------( 285      ( 13 Feb 2023 04:24 )             27.8     02-13    134<L>  |  106  |  38<H>  ----------------------------<  372<H>  4.8   |  18<L>  |  2.12<H>    Ca    7.9<L>      13 Feb 2023 04:24  Phos  7.0     02-13  Mg     2.5     02-13    TPro  5.7<L>  /  Alb  1.1<L>  /  TBili  0.6  /  DBili  x   /  AST  12<L>  /  ALT  7<L>  /  AlkPhos  75  02-13      Albumin: Albumin, Serum: 1.1 g/dL (02-13 @ 04:24)      Allergies    Cipro (Rash)  Spiriva (Rash)    Intolerances      MEDICATIONS  (STANDING):  albuterol    0.083% 2.5 milliGRAM(s) Nebulizer every 6 hours  buDESOnide    Inhalation Suspension 0.5 milliGRAM(s) Inhalation every 12 hours  cefepime   IVPB 2000 milliGRAM(s) IV Intermittent every 12 hours  chlorhexidine 4% Liquid 1 Application(s) Topical <User Schedule>  coronavirus bivalent (EUA) Booster Vaccine (PFIZER) 0.3 milliLiter(s) IntraMuscular once  dextrose 5%. 1000 milliLiter(s) (50 mL/Hr) IV Continuous <Continuous>  dextrose 5%. 1000 milliLiter(s) (100 mL/Hr) IV Continuous <Continuous>  dextrose 50% Injectable 25 Gram(s) IV Push once  dextrose 50% Injectable 12.5 Gram(s) IV Push once  dextrose 50% Injectable 25 Gram(s) IV Push once  enoxaparin Injectable 100 milliGRAM(s) SubCutaneous every 12 hours  glucagon  Injectable 1 milliGRAM(s) IntraMuscular once  insulin glargine Injectable (LANTUS) 20 Unit(s) SubCutaneous at bedtime  insulin lispro (ADMELOG) corrective regimen sliding scale   SubCutaneous every 4 hours  levothyroxine Injectable 70 MICROGram(s) IV Push at bedtime  metoprolol tartrate 25 milliGRAM(s) Oral two times a day  metroNIDAZOLE  IVPB 500 milliGRAM(s) IV Intermittent every 8 hours  nystatin Cream 1 Application(s) Topical two times a day  nystatin Powder 1 Application(s) Topical every 12 hours  octreotide  Injectable 75 MICROGram(s) SubCutaneous every 8 hours  pantoprazole  Injectable 40 milliGRAM(s) IV Push daily  Parenteral Nutrition - Adult 1 Each (83 mL/Hr) TPN Continuous <Continuous>  Parenteral Nutrition - Adult 1 Each (83 mL/Hr) TPN Continuous <Continuous>  sodium bicarbonate  Infusion 0.058 mEq/kG/Hr (75 mL/Hr) IV Continuous <Continuous>  sodium chloride 0.9%. 1000 milliLiter(s) (100 mL/Hr) IV Continuous <Continuous>    MEDICATIONS  (PRN):  acetaminophen     Tablet .. 650 milliGRAM(s) Oral every 6 hours PRN Temp greater or equal to 38C (100.4F)  albuterol    90 MICROgram(s) HFA Inhaler 2 Puff(s) Inhalation every 6 hours PRN Shortness of Breath and/or Wheezing  dextrose Oral Gel 15 Gram(s) Oral once PRN Blood Glucose LESS THAN 70 milliGRAM(s)/deciliter  hydrALAZINE Injectable 10 milliGRAM(s) IV Push every 6 hours PRN SBP>160  HYDROmorphone  Injectable 0.5 milliGRAM(s) IV Push every 3 hours PRN Severe Pain (7 - 10)  sodium chloride 0.9% lock flush 10 milliLiter(s) IV Push every 1 hour PRN Pre/post blood products, medications, blood draw, and to maintain line patency      RADIOLOGY:

## 2023-02-13 NOTE — PROGRESS NOTE ADULT - CONVERSATION DETAILS
spoke with pt's children via phone today.  due to her overall condition, pt was not able to participate.  I reviewed the pt's overall condition and worsening prognosis  we spoke about treatment options moving forward.  appropriate questions were answered to the best of my ability.    thankfully, tp has been hemodynamically stable, althgough the worsening kidney function is concerning.  also, losing IV access could complicate pt's condition, as we may find that we need to place central line to provide TPN.  when discussing options moving forward, I did briefly touch upon hospice care with family, but no decisions were asked for or made    I received a call back from pt's son not too long after our discussion.  He and his sister have agreed   they would not want her to return to the ICU or to get pressors to support BP.  they are OK with attempts to replace IV for TPN.  If the pt's condition were to  continue to worsen, we all agreed to speak about further limitations.

## 2023-02-13 NOTE — PROGRESS NOTE ADULT - SUBJECTIVE AND OBJECTIVE BOX
Patient is a 72y Female who reports no complaints as new, laying in bed      MEDICATIONS  (STANDING):  albuterol    0.083% 2.5 milliGRAM(s) Nebulizer every 6 hours  buDESOnide    Inhalation Suspension 0.5 milliGRAM(s) Inhalation every 12 hours  cefepime   IVPB 2000 milliGRAM(s) IV Intermittent every 12 hours  chlorhexidine 4% Liquid 1 Application(s) Topical <User Schedule>  coronavirus bivalent (EUA) Booster Vaccine (PFIZER) 0.3 milliLiter(s) IntraMuscular once  dextrose 5%. 1000 milliLiter(s) (50 mL/Hr) IV Continuous <Continuous>  dextrose 5%. 1000 milliLiter(s) (100 mL/Hr) IV Continuous <Continuous>  dextrose 50% Injectable 25 Gram(s) IV Push once  dextrose 50% Injectable 12.5 Gram(s) IV Push once  dextrose 50% Injectable 25 Gram(s) IV Push once  enoxaparin Injectable 100 milliGRAM(s) SubCutaneous every 12 hours  glucagon  Injectable 1 milliGRAM(s) IntraMuscular once  insulin glargine Injectable (LANTUS) 20 Unit(s) SubCutaneous at bedtime  insulin lispro (ADMELOG) corrective regimen sliding scale   SubCutaneous every 4 hours  levothyroxine Injectable 70 MICROGram(s) IV Push at bedtime  metoprolol tartrate 25 milliGRAM(s) Oral two times a day  metroNIDAZOLE  IVPB 500 milliGRAM(s) IV Intermittent every 8 hours  nystatin Cream 1 Application(s) Topical two times a day  nystatin Powder 1 Application(s) Topical every 12 hours  pantoprazole  Injectable 40 milliGRAM(s) IV Push daily  Parenteral Nutrition - Adult 1 Each (83 mL/Hr) TPN Continuous <Continuous>  Parenteral Nutrition - Adult 1 Each (83 mL/Hr) TPN Continuous <Continuous>  sodium bicarbonate  Infusion 0.058 mEq/kG/Hr (75 mL/Hr) IV Continuous <Continuous>  sodium chloride 0.9% 1000 milliLiter(s) (100 mL/Hr) IV Continuous <Continuous>    MEDICATIONS  (PRN):  acetaminophen     Tablet .. 650 milliGRAM(s) Oral every 6 hours PRN Temp greater or equal to 38C (100.4F)  albuterol    90 MICROgram(s) HFA Inhaler 2 Puff(s) Inhalation every 6 hours PRN Shortness of Breath and/or Wheezing  dextrose Oral Gel 15 Gram(s) Oral once PRN Blood Glucose LESS THAN 70 milliGRAM(s)/deciliter  hydrALAZINE Injectable 10 milliGRAM(s) IV Push every 6 hours PRN SBP>160  HYDROmorphone  Injectable 0.5 milliGRAM(s) IV Push every 3 hours PRN Severe Pain (7 - 10)  sodium chloride 0.9% lock flush 10 milliLiter(s) IV Push every 1 hour PRN Pre/post blood products, medications, blood draw, and to maintain line patency        T(C): , Max: 36.2 (23 @ 17:17)  T(F): , Max: 97.2 (23 @ 17:17)  HR: 98 (23 @ 17:17)  BP: 117/57 (23 @ 17:17)  BP(mean): --  RR: 19 (23 @ 17:17)  SpO2: 95% (23 @ 17:17)  Wt(kg): --     @ 07:01  -   @ 07:00  --------------------------------------------------------  IN: 0 mL / OUT: 360 mL / NET: -360 mL     @ 07:01  -   @ 21:11  --------------------------------------------------------  IN: 77 mL / OUT: 0 mL / NET: 77 mL          PHYSICAL EXAM:    Constitutional: frail, ill appearing. Unkept  HEENT:  MM  Neck: No LAD, No JVD  Respiratory: dist  Cardiovascular: S1 and S2   Extremities:   peripheral edema, chronic changes/wounds  Neurological: Arousable  : Ranjan          LABS:                        8.6    11.65 )-----------( 285      ( 2023 04:24 )             27.8     2023 04:24    134    |  106    |  38     ----------------------------<  372    4.8     |  18     |  2.12   2023 14:08    131    |  105    |  34     ----------------------------<  518    5.6     |  16     |  1.83   2023 05:50    131    |  104    |  29     ----------------------------<  433    6.1     |  16     |  1.52   2023 08:35    133    |  104    |  22     ----------------------------<  384    4.8     |  21     |  1.01   10 Feb 2023 05:26    131    |  101    |  16     ----------------------------<  315    4.6     |  23     |  0.62     Ca    7.9        2023 04:24  Ca    7.6        2023 14:08  Ca    7.4        2023 05:50  Ca    7.7        2023 08:35  Ca    7.6        10 2023 05:26  Phos  7.0       2023 04:24  Phos  6.8       2023 05:50  Phos  4.6       2023 08:35  Phos  3.0       10 Feb 2023 05:26  Mg     2.5       2023 04:24  Mg     2.4       2023 05:50  Mg     2.1       2023 08:35  Mg     1.7       10 2023 05:26    TPro  5.7    /  Alb  1.1    /  TBili  0.6    /  DBili  x      /  AST  12     /  ALT  7      /  AlkPhos  75     2023 04:24  TPro  5.6    /  Alb  1.1    /  TBili  0.6    /  DBili  x      /  AST  26     /  ALT  13     /  AlkPhos  82     2023 05:50  TPro  5.7    /  Alb  1.2    /  TBili  0.5    /  DBili  x      /  AST  14     /  ALT  9      /  AlkPhos  72     2023 08:35  TPro  6.3    /  Alb  1.3    /  TBili  0.5    /  DBili  x      /  AST  18     /  ALT  11     /  AlkPhos  72     10 2023 05:26          Urine Studies:  Urinalysis Basic - ( 2023 06:20 )    Color: Brown / Appearance: very cloudy / S.020 / pH: x  Gluc: x / Ketone: Small  / Bili: Negative / Urobili: Negative   Blood: x / Protein: 100 / Nitrite: Negative   Leuk Esterase: Moderate / RBC: 25-50 /HPF / WBC >50 /HPF   Sq Epi: x / Non Sq Epi: Negative / Bacteria: Many      Sodium, Random Urine: 88 mmol/L ( @ 19:00)  Creatinine, Random Urine: 37.0 mg/dL ( @ 19:00)  Protein/Creatinine Ratio Calculation: 11.2 Ratio ( @ 19:00)        RADIOLOGY & ADDITIONAL STUDIES:

## 2023-02-13 NOTE — PROGRESS NOTE ADULT - ASSESSMENT
72F with PMHx CVA, pAF on AC, emphysema/COPD on home O2, HTN, SBO with prior resection who presented with abd pain, N/V. Found to have an small bowel obstruction. Taken to OR underwent ELAP, extensive RICHARD, ileocolectomy, ventral hernia repair with MESH. Postop course complicated with ALVERTO, shock requiring pressors    #Abd pain, SBO, Enterocutaneous fistula  S/p OR EXLAP, RICHARD, ileocolectomy, ventral hernia repair w mesh  Dressing c/d/i, surrounding erythema, +Drain  PPN, NGT to suction taken out of nare 2/10- placed in abd?  PPI Octreotide  Cefepime, Flagyl  Pain control, IS, Mobilize patient, monitor i and os  Pallative eval- DNR/DNI, no feeding tube....continue with PPN for now   management as per Surg    # UTI Kleb, PNA  Cefepime and flagyl as above  WBC improving, monitor for fevers  ID following    #pAF on AC, HTN  Lovenox 100mg BID  Lopressor IVPB q6  Hydralazine 10mg IVP q6 PRN SBP >160  Amiodarone 200mg Discontinued (2/10)- NGT is out so no PO access- CT A/P w PO contrast shows enteric communication with atmosphere. Discussed with pharmacy no reasonable IV equivalent for PO daily, recommend dc for now and if needed adjust other agents, if afib uncontrolled may need gtt    # Right Arm Superficial Thrombophlebitis:  warm compresses  no NSAIDS sec to renal failure    #Hypothyroid  Continue w IV synthroid     #DM  Increased Lantus from 8 to 20 (2/10)  Corrective scale  Monitor fingersticks    #COPD  Albuterol  Pulmicort  Pulm following    Hyperkalemia/Metabolic Acidosis:  given d 50 + insulin  hold current PPN infusion.  Pharmacy to make new bag based on current labs  iv fluids with bicarb per renal team  k normalized    ALVERTO: Increased Cr:  monitor   renal eval appreciated      Prognosis : Extremely poor    palliative care       poc discussed with team, Dr. Elise.

## 2023-02-13 NOTE — PROGRESS NOTE ADULT - SUBJECTIVE AND OBJECTIVE BOX
RACHANA BARGER  72y  Female      Patient is a 72y old  Female who presents with a chief complaint of bowel obstruction/ischemic mesentery (10 Feb 2023 09:07)      : pt very weak, not much alert  K 6.1  bicarb 16  Cr1.52    : condition same  right arm swelling; no DVT but sup thrombophlebitis in cephalic vein; warm compresses  Mid line leaking; removed   Cr worsened to 2.12  k 4.8    Vital Signs Last 24 Hrs  T(C): 36.4 (2023 21:00), Max: 36.8 (2023 16:34)  T(F): 97.5 (2023 21:00), Max: 98.2 (2023 16:34)  HR: 70 (2023 21:00) (70 - 92)  BP: 122/55 (2023 21:00) (111/47 - 122/55)  BP(mean): --  RR: 20 (2023 21:00) (20 - 20)  SpO2: 100% (2023 02:30) (97% - 100%)    Parameters below as of 2023 21:00  Patient On (Oxygen Delivery Method): nasal cannula  O2 Flow (L/min): 2        PHYSICAL EXAM:  GENERAL: Obese, lethargic, appears ill  HEAD:  Atraumatic, Normocephalic  NECK: Supple, No JVD, Normal thyroid  NERVOUS SYSTEM:  Lethargic, opens eyes, not following commands  CHEST/LUNG: Clear to percussion bilaterally; No rales, rhonchi, wheezing, or rubs  HEART: Regular rate and rhythm; No murmurs, rubs, or gallops  ABDOMEN: Mid abd dressing, Drain, surrounding erythema, induration  EXTREMITIES:  2+ Peripheral Pulses, No clubbing, cyanosis, Anasarca ; 2+ leg edema  LYMPH: No lymphadenopathy noted  SKIN: No rashes or lesions    All Labs/EKG/Radiology/Meds reviewed    Lab Results:  CBC  CBC Full  -  ( 2023 04:24 )  WBC Count : 11.65 K/uL  RBC Count : 3.12 M/uL  Hemoglobin : 8.6 g/dL  Hematocrit : 27.8 %  Platelet Count - Automated : 285 K/uL  Mean Cell Volume : 89.1 fl  Mean Cell Hemoglobin : 27.6 pg  Mean Cell Hemoglobin Concentration : 30.9 gm/dL  Auto Neutrophil # : x  Auto Lymphocyte # : x  Auto Monocyte # : x  Auto Eosinophil # : x  Auto Basophil # : x  Auto Neutrophil % : x  Auto Lymphocyte % : x  Auto Monocyte % : x  Auto Eosinophil % : x  Auto Basophil % : x    .		Differential:	[] Automated		[] Manual  Chemistry      134<L>  |  106  |  38<H>  ----------------------------<  372<H>  4.8   |  18<L>  |  2.12<H>    Ca    7.9<L>      2023 04:24  Phos  7.0       Mg     2.5         TPro  5.7<L>  /  Alb  1.1<L>  /  TBili  0.6  /  DBili  x   /  AST  12<L>  /  ALT  7<L>  /  AlkPhos  75      LIVER FUNCTIONS - ( 2023 04:24 )  Alb: 1.1 g/dL / Pro: 5.7 gm/dL / ALK PHOS: 75 U/L / ALT: 7 U/L / AST: 12 U/L / GGT: x             Urinalysis Basic - ( 2023 06:20 )    Color: Brown / Appearance: very cloudy / S.020 / pH: x  Gluc: x / Ketone: Small  / Bili: Negative / Urobili: Negative   Blood: x / Protein: 100 / Nitrite: Negative   Leuk Esterase: Moderate / RBC: 25-50 /HPF / WBC >50 /HPF   Sq Epi: x / Non Sq Epi: Negative / Bacteria: Many        23 @ 07:01  -  23 @ 07:00  --------------------------------------------------------  IN: 0 mL / OUT: 360 mL / NET: -360 mL      < from: US Duplex Venous Upper Ext Ltd, Right (23 @ 12:01) >  No evidence of right upper extremity deep venous thrombosis.    Superficial thrombophlebitis right cephalic vein at the right upper arm.    Elongated 5 cm x 7 mm hypoechoic region without vascular flow in the   right axillary area may represent focal edema. Please clinically   correlate.    < end of copied text >        MEDICATIONS  (STANDING):  albuterol    0.083% 2.5 milliGRAM(s) Nebulizer every 6 hours  buDESOnide    Inhalation Suspension 0.5 milliGRAM(s) Inhalation every 12 hours  cefepime   IVPB 2000 milliGRAM(s) IV Intermittent every 12 hours  chlorhexidine 4% Liquid 1 Application(s) Topical <User Schedule>  coronavirus bivalent (EUA) Booster Vaccine (PFIZER) 0.3 milliLiter(s) IntraMuscular once  dextrose 5%. 1000 milliLiter(s) (50 mL/Hr) IV Continuous <Continuous>  dextrose 5%. 1000 milliLiter(s) (100 mL/Hr) IV Continuous <Continuous>  dextrose 50% Injectable 25 Gram(s) IV Push once  dextrose 50% Injectable 12.5 Gram(s) IV Push once  dextrose 50% Injectable 25 Gram(s) IV Push once  enoxaparin Injectable 100 milliGRAM(s) SubCutaneous every 12 hours  glucagon  Injectable 1 milliGRAM(s) IntraMuscular once  insulin glargine Injectable (LANTUS) 20 Unit(s) SubCutaneous at bedtime  insulin lispro (ADMELOG) corrective regimen sliding scale   SubCutaneous every 4 hours  levothyroxine Injectable 70 MICROGram(s) IV Push at bedtime  metoprolol tartrate 25 milliGRAM(s) Oral two times a day  metroNIDAZOLE  IVPB 500 milliGRAM(s) IV Intermittent every 8 hours  nystatin Cream 1 Application(s) Topical two times a day  nystatin Powder 1 Application(s) Topical every 12 hours  octreotide  Injectable 75 MICROGram(s) SubCutaneous every 8 hours  pantoprazole  Injectable 40 milliGRAM(s) IV Push daily  Parenteral Nutrition - Adult 1 Each (83 mL/Hr) TPN Continuous <Continuous>    MEDICATIONS  (PRN):  acetaminophen     Tablet .. 650 milliGRAM(s) Oral every 6 hours PRN Temp greater or equal to 38C (100.4F)  albuterol    90 MICROgram(s) HFA Inhaler 2 Puff(s) Inhalation every 6 hours PRN Shortness of Breath and/or Wheezing  dextrose Oral Gel 15 Gram(s) Oral once PRN Blood Glucose LESS THAN 70 milliGRAM(s)/deciliter  hydrALAZINE Injectable 10 milliGRAM(s) IV Push every 6 hours PRN SBP>160  HYDROmorphone  Injectable 0.5 milliGRAM(s) IV Push every 3 hours PRN Severe Pain (7 - 10)  sodium chloride 0.9% lock flush 10 milliLiter(s) IV Push every 1 hour PRN Pre/post blood products, medications, blood draw, and to maintain line patency

## 2023-02-13 NOTE — PROGRESS NOTE ADULT - ASSESSMENT
72F with PMHx CVA, pAF on AC, emphysema/COPD on home O2, HTN, SBO with prior resection who presented with abd pain, N/V. Found to have an small bowel obstruction. Taken to OR underwent ELAP, extensive RICHARD, ileocolectomy, ventral hernia repair with MESH. Postop course complicated with ALVERTO, shock requiring pressors    ALVERTO in setting of acidemia, hyperkalemia, and worsening hyperglycemia   IVF w HCO3    suspect Cr will continue to rise, hopeful for stability with improving map   renal dose meds and abx         Hyperphoshatemia w  hyperkalemia due to ALVERTO  -optimize ppn, k stabilized today    Third spacing w severe hypoalbuminemia   protein supplementation     If Cr stabilized could give lasix and SPA to help mobilize extracellular fluid     dc with staff, seen earlier and note now

## 2023-02-13 NOTE — PROGRESS NOTE ADULT - ASSESSMENT
Assessment:     71 y/o female with multiple medical comorbidities including previous SBO admitted for recurrent SBO.  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     Process of Care  --Reviewed dx/treatment problems and alignment with Goals of Care    Physical Aspects of Care  --Pain - Patient denies at this time.  monitor for non-verbal signs of distress.  c/w current management  --Bowel Regimen - pt with EC Fistuala - monitor output and treatment as per surgery  --Chronic Respiratory Failure - Emphysema on home O2. - No respiratory distress.  maintain home supplemental O2   --Nausea Vomiting - denies  --Debility/Weakness - PT as tolerated, OOB to chair, fall precautions  --Acute Metabolic Encephalopathy - treatment of possible underlying infections.  frequent verbal redirection/reorientation.  pain control.  avoid sensorium altering medications when possible.      Psychological and Psychiatric Aspects of Care:   --Grief/Bereavement: emotional support provided  --Hx of psychiatric dx: none  -Pastoral Care Available PRN     Social Aspects of Care  -SW involved     Cultural Aspects  -Primary Language: English    Goals of Care:  DNR/DNI in chart (preexisting).  family are agreeable to continuing all other measures to treat reversible issues    Ethical and Legal Aspects: NA  Capacity- I feel pt lacks capacity for major medical decisions at this time.      HCP/Surrogate: children - Moe and Fabby.     Code Status- DNR/DNI  MOLST- in chart -- DNR/DNI and no feeding tube  Dispo Plan- tbd (from SNF)    Discussed With: nursing Assessment:     73 y/o female with multiple medical comorbidities including previous SBO admitted for recurrent SBO.  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     Process of Care  --Reviewed dx/treatment problems and alignment with Goals of Care    Physical Aspects of Care  --Pain - Patient denies at this time.  monitor for non-verbal signs of distress.  c/w current management  --Bowel Regimen - pt with EC Fist[place]a - monitor output and treatment as per surgery  --Chronic Respiratory Failure - Emphysema on home O2. - No respiratory distress.  maintain home supplemental O2   --Nausea Vomiting - denies  --Debility/Weakness - PT as tolerated, OOB to chair, fall precautions  --Acute Metabolic Encephalopathy - treatment of possible underlying infections.  frequent verbal redirection/reorientation.  monitor for pain and treat as needed.  avoid sensorium altering medications when possible.      Psychological and Psychiatric Aspects of Care:   --Grief/Bereavement: emotional support provided  --Hx of psychiatric dx: none  -Pastoral Care Available PRN     Social Aspects of Care  -SW involved     Cultural Aspects  -Primary Language: English    Goals of Care:  DNR/DNI in chart (preexisting).  family are standing to want a more conservative approach.  they would not want her to return to the ICU or to get pressors to support BP.  they are OK with attempts to replace IV for TPN.     Ethical and Legal Aspects: NA  Capacity- I feel pt lacks capacity for major medical decisions at this time.      HCP/Surrogate: children - Moe ([phone]) and Fabby.     Code Status- DNR/DNI  MOLST- in chart -- DNR/DNI and no feeding tube  Dispo Plan- tbd (from SNF)    Discussed With: nursing and Dr. Stokes

## 2023-02-13 NOTE — CHART NOTE - NSCHARTNOTEFT_GEN_A_CORE
Clinical Nutrition PN Follow Up Note    * 71 y/o female with a PMH of CVA with left sided weakness, atrial fibrillation, emphysema, COPD, HTN - wears 2L NC at baseline BIBA, hx SBO and resection per EMS presents to the ED from [facility] for RLQ pain and r/o SBO. x1 episode of emesis, + profound diffuse abdominal pain, febrile. Has SBO, NGT to LWS. Taken to OR on  underwent ExLAP, extensive RICHARD, ileocolectomy, ventral hernia repair with MESH. Postop course complicated with ALVERTO, shock requiring pressors, transferred to ICU. + anasarca   DNR/ DNI.   **pt started on TF on 2/3 - began having loose stools and was leaking out of wounds; with EC fistula. TF now on hold, PPN re-started (). Remains with NGT and 1 DEA drain to suction.    *: Tx from SICU to [facility] (). s/p palliative GOC meeting - DNR, DNI, No feeding tube. PPN not addressed in GOC? Still has NGT to LWS.    *: Pt c/w PPN. S/p Palliative Care f/u -  family is agreeable to continuing all other measures to treat reversible issues. Spoke with surgical PA, plan to c/w PPN today. Discussed recommendation for PICC placement to help pt meet >/= 80% of ENN via TPN if plan is to c/w PN. If fistula is unable to be reversed, would consider to d/c PPN and allow for PO diet if possible ? comfort care? given GOC  *2/10: NGT removed - PPN still not being addressed whether or not to continue or within GOC.   Left side entero atmospheric fistula. Needs complex fistula system for wound care as per Dr. Castro note    *current status: pt c/w with PPN x 10 days. Spoke with Surgical PA Mala Montgomery this morning, plan to c/w PPN this morning. Discussed that if pt c/w need for PN, need to have PICC line placed to initiate TPN as pt has been consistently meeting </=60% of ENN. PA plans to discuss recommendations with Dr. Castro this morning. Also discussed with PA re: BG levels and need to cover outside bag. ?need to tx to ICU for insulin gtt.     *new pertinent meds: Lantus (20 units x 24 hours), Admelog (42 units x 24 hours), Humulin R (10 units x 24 hours), Synthroid, Sandostatin, Protonix, Dilaudid, Sodium Bicarb     *labs reviewed; Mg on higher end of normal limits, will decrease in PN bag. Hyponatremia noted, however when adjusted for hyperglycemia - Na+ WNL (139); will c/w current sodium content in PN. K+ now WNL and Phos elevated, will continue to keep K+ and Phos out of the bag, if K+ is low tomorrow - will add back into bag. POCTs continue to be elevated despite insulin given outside of bag (72 units), will c/w 20 units of insulin inside bag - discussed with surgical PA re: BG levels. Last bili T WNL to c/w trace elements. TG level 399, will continue to not add lipids to bag; recommend to obtain new TG level. Corrected Ca elevated, DO NOT supplement calcium outside of PN bag at this time.        134<L>  |  106  |  38<H>  ----------------------------<  372<H>  4.8   |  18<L>  |  2.12<H>    Ca    7.9<L>      2023 04:24  Phos  7.0       Mg     2.5         TPro  5.7<L>  /  Alb  1.1<L>  /  TBili  0.6  /  DBili  x   /  AST  12<L>  /  ALT  7<L>  /  AlkPhos  75      POCT Blood Glucose.: 359 mg/dL (2023 07:35)  POCT Blood Glucose.: 345 mg/dL (2023 04:15)  POCT Blood Glucose.: 384 mg/dL (2023 22:16)  POCT Blood Glucose.: 401 mg/dL (2023 21:07)  POCT Blood Glucose.: 488 mg/dL (2023 16:54)  POCT Blood Glucose.: 531 mg/dL (2023 14:07)  POCT Blood Glucose.: 409 mg/dL (2023 11:46)    *I&O's Detail    2023 07:01  -  2023 07:00  --------------------------------------------------------  IN:  Total IN: 0 mL    OUT:    Bulb (mL): 60 mL    Stool (mL): 300 mL  Total OUT: 360 mL    Total NET: -360 mL  *negative fluid balance, pt with +3 generalized, and +4 L Foot, R Foot, R Arm, and L Arm edema. Will monitor/adjust total PPN volume prn. TF continues to be on hold.     *malnutrition: Pt continues to meet criteria for severe protein-calorie malnutrition in context of acute disease r/t decreased ability to meet increased nutrient needs 2/2 SBO AEB <50% ENN x >11 days, severe muscle/ fat wasting, severe edema    Estimated Needs: based on 97.4 Kg (wt from admit - current wts being skewed by severe fluid retention)  Calories: 1948- 2435 Kcal (20- 25 Kcal/Kg)  Protein: 146- 175 g (1.5- 1.8 g/Kg)  Fluids: 1948- 2435 mL (20- 25 mL/Kg)    Weight History:  Daily Weight in k.6 (2023 06:00)   - 220#  Height (cm): 165.1 (23 @ 16:08)  Weight (kg): 97.4 (23 @ 23:30) - admit wt ()  BMI (kg/m2): 35.7 (23 @ 23:30)  BSA (m2): 2.04 (23 @ 23:30)    PPN Recommendations: via peripheral line  Total Volume: 2000 mL  80 g  Amino Acids  100 g Dextrose  0 g Lipids 20%  (triglyceride level elevated, keep out of PN bag for now)  150 mEq Sodium Chloride  29 mEq Sodium Acetate  0 mmol Sodium Phosphate  0 mEq Potassium Chloride  0 mEq Potassium Acetate  0 mmol Potassium Phosphate  0 mEq Calcium Gluconate (CAPS out of PN solution)  16 mEq Magnesium Sulfate  200 mg Thiamine  1 ml Trace Elements  10 ml MVI  20 Units Regular Insulin    Total Calories: 660 kcal  (Provides 30% of daily energy requirements and 46% of daily protein requirements)    (osmolarity ~858)    Additional Recommendations:    1) Daily weights  2) Strict I & O's   3) Daily lyte checks including magnesium and phos  4) Weekly triglycerides/LFT checks  5) POCT q6hrs; maintain 140-180mg/dL  6) Confirm goals of care regarding LONG-TERM nutrition support specifically referring to PPN/TPN - pt is NOT on TPN (meeting <80% ENN x 10 days while on PPN). Would consider to D/C and allow for PO pleasure feeds if fistula is irreversible as per GOC     RD will continue to monitor PPN, labs, hydration, and wt prn.   Liss Bruner, MS, RDN, [facility] [phone]  Bushra Soni, MS, RDN, [facility] [phone]  Certified Nutrition  Clinical Nutrition PN Follow Up Note    * 73 y/o female with a PMH of CVA with left sided weakness, atrial fibrillation, emphysema, COPD, HTN - wears 2L NC at baseline BIBA, hx SBO and resection per EMS presents to the ED from [facility] for RLQ pain and r/o SBO. x1 episode of emesis, + profound diffuse abdominal pain, febrile. Has SBO, NGT to LWS. Taken to OR on  underwent ExLAP, extensive RICHARD, ileocolectomy, ventral hernia repair with MESH. Postop course complicated with ALVERTO, shock requiring pressors, transferred to ICU. + anasarca   DNR/ DNI.   **pt started on TF on 2/3 - began having loose stools and was leaking out of wounds; with EC fistula. TF now on hold, PPN re-started (). Remains with NGT and 1 DEA drain to suction.    *: Tx from SICU to [facility] (). s/p palliative GOC meeting - DNR, DNI, No feeding tube. PPN not addressed in GOC? Still has NGT to LWS.    *: Pt c/w PPN. S/p Palliative Care f/u -  family is agreeable to continuing all other measures to treat reversible issues. Spoke with surgical PA, plan to c/w PPN today. Discussed recommendation for PICC placement to help pt meet >/= 80% of ENN via TPN if plan is to c/w PN. If fistula is unable to be reversed, would consider to d/c PPN and allow for PO diet if possible ? comfort care? given GOC  *2/10: NGT removed - PPN still not being addressed whether or not to continue or within GOC.   Left side entero atmospheric fistula. Needs complex fistula system for wound care as per Dr. Castro note    *current status: pt c/w with PPN x 10 days. Spoke with Surgical PA Mala Montgomery this morning, plan to c/w PPN this morning. Discussed that if pt c/w need for PN, need to have PICC line placed to initiate TPN as pt has been consistently meeting </=60% of ENN. PA plans to discuss recommendations with Dr. Castro this morning. Also discussed with PA re: BG levels and need to cover outside bag. ?need to tx to ICU for insulin gtt.     *new pertinent meds: Lantus (20 units x 24 hours), Admelog (42 units x 24 hours), Humulin R (10 units x 24 hours), Synthroid, Sandostatin, Protonix, Dilaudid, Sodium Bicarb     *labs reviewed; Mg on higher end of normal limits, will decrease in PN bag. Hyponatremia noted, however when adjusted for hyperglycemia - Na+ WNL (139); will c/w current sodium content in PN. K+ now WNL and Phos elevated, will continue to keep K+ and Phos out of the bag, if K+ is low tomorrow - will add back into bag. POCTs continue to be elevated despite insulin given outside of bag (72 units total x 24 hours), will c/w 20 units of insulin inside bag - discussed with surgical PA re: BG levels. Last bili T WNL to c/w trace elements. TG level 399, will continue to not add lipids to bag; recommend to obtain new TG level. If triglyceride level downtrends, can consider giving every other day.   Corrected Ca elevated, DO NOT supplement calcium outside of PN bag at this time.        134<L>  |  106  |  38<H>  ----------------------------<  372<H>  4.8   |  18<L>  |  2.12<H>    Ca    7.9<L>      2023 04:24  Phos  7.0       Mg     2.5         TPro  5.7<L>  /  Alb  1.1<L>  /  TBili  0.6  /  DBili  x   /  AST  12<L>  /  ALT  7<L>  /  AlkPhos  75      POCT Blood Glucose.: 359 mg/dL (2023 07:35)  POCT Blood Glucose.: 345 mg/dL (2023 04:15)  POCT Blood Glucose.: 384 mg/dL (2023 22:16)  POCT Blood Glucose.: 401 mg/dL (2023 21:07)  POCT Blood Glucose.: 488 mg/dL (2023 16:54)  POCT Blood Glucose.: 531 mg/dL (2023 14:07)  POCT Blood Glucose.: 409 mg/dL (2023 11:46)    *I&O's Detail    2023 07:01  -  2023 07:00  --------------------------------------------------------  IN:  Total IN: 0 mL    OUT:    Bulb (mL): 60 mL    Stool (mL): 300 mL  Total OUT: 360 mL    Total NET: -360 mL  *negative fluid balance, pt with +3 generalized, and +4 L Foot, R Foot, R Arm, and L Arm edema. Will monitor/adjust total PPN volume prn. TF continues to be on hold.     *malnutrition: Pt continues to meet criteria for severe protein-calorie malnutrition in context of acute disease r/t decreased ability to meet increased nutrient needs 2/2 SBO AEB <50% ENN x >11 days, severe muscle/ fat wasting, severe edema    Estimated Needs: based on 97.4 Kg (wt from admit - current wts being skewed by severe fluid retention)  Calories: 1948- 2435 Kcal (20- 25 Kcal/Kg)  Protein: 146- 175 g (1.5- 1.8 g/Kg)  Fluids: 1948- 2435 mL (20- 25 mL/Kg)    Weight History:  Daily Weight in k.6 (2023 06:00)   - #  Height (cm): 165.1 (23 @ 16:08)  Weight (kg): 97.4 (23 @ 23:30) - admit wt ()  BMI (kg/m2): 35.7 (23 @ 23:30)  BSA (m2): 2.04 (23 @ 23:30)    PPN Recommendations: via peripheral line  Total Volume: 2000 mL  80 g  Amino Acids  100 g Dextrose  0 g Lipids 20%  (triglyceride level elevated, keep out of PN bag for now)  150 mEq Sodium Chloride  29 mEq Sodium Acetate  0 mmol Sodium Phosphate  0 mEq Potassium Chloride  0 mEq Potassium Acetate  0 mmol Potassium Phosphate  0 mEq Calcium Gluconate (CAPS out of PN solution)  16 mEq Magnesium Sulfate  200 mg Thiamine  1 ml Trace Elements  10 ml MVI  20 Units Regular Insulin    Total Calories: 660 kcal  (Provides 30% of daily energy requirements and 46% of daily protein requirements)    (osmolarity ~858)    Additional Recommendations:    1) Daily weights  2) Strict I & O's   3) Daily lyte checks including magnesium and phos  4) Weekly triglycerides/LFT checks  5) POCT q6hrs; maintain 140-180mg/dL  6) Confirm goals of care regarding LONG-TERM nutrition support specifically referring to PPN/TPN - pt is NOT on TPN (meeting <80% ENN x 10 days while on PPN). Would consider to D/C and allow for PO pleasure feeds if fistula is irreversible as per GOC     RD will continue to monitor PPN, labs, hydration, and wt prn.   Liss Bruner, MS, RDN, [facility] [phone]  Bushra Soni, MS, RDN, [facility] [phone]  Certified Nutrition

## 2023-02-14 LAB
ANION GAP SERPL CALC-SCNC: 8 MMOL/L — SIGNIFICANT CHANGE UP (ref 5–17)
BUN SERPL-MCNC: 43 MG/DL — HIGH (ref 7–23)
CALCIUM SERPL-MCNC: 6.8 MG/DL — LOW (ref 8.5–10.1)
CHLORIDE SERPL-SCNC: 113 MMOL/L — HIGH (ref 96–108)
CO2 SERPL-SCNC: 18 MMOL/L — LOW (ref 22–31)
CREAT SERPL-MCNC: 2.5 MG/DL — HIGH (ref 0.5–1.3)
EGFR: 20 ML/MIN/1.73M2 — LOW
GLUCOSE BLDC GLUCOMTR-MCNC: 108 MG/DL — HIGH (ref 70–99)
GLUCOSE BLDC GLUCOMTR-MCNC: 110 MG/DL — HIGH (ref 70–99)
GLUCOSE BLDC GLUCOMTR-MCNC: 110 MG/DL — HIGH (ref 70–99)
GLUCOSE BLDC GLUCOMTR-MCNC: 118 MG/DL — HIGH (ref 70–99)
GLUCOSE BLDC GLUCOMTR-MCNC: 134 MG/DL — HIGH (ref 70–99)
GLUCOSE BLDC GLUCOMTR-MCNC: 171 MG/DL — HIGH (ref 70–99)
GLUCOSE SERPL-MCNC: 190 MG/DL — HIGH (ref 70–99)
HCT VFR BLD CALC: 26 % — LOW (ref 34.5–45)
HGB BLD-MCNC: 8.1 G/DL — LOW (ref 11.5–15.5)
MAGNESIUM SERPL-MCNC: 2.4 MG/DL — SIGNIFICANT CHANGE UP (ref 1.6–2.6)
MCHC RBC-ENTMCNC: 27.6 PG — SIGNIFICANT CHANGE UP (ref 27–34)
MCHC RBC-ENTMCNC: 31.2 GM/DL — LOW (ref 32–36)
MCV RBC AUTO: 88.4 FL — SIGNIFICANT CHANGE UP (ref 80–100)
PHOSPHATE SERPL-MCNC: 6.5 MG/DL — HIGH (ref 2.5–4.5)
PLATELET # BLD AUTO: 277 K/UL — SIGNIFICANT CHANGE UP (ref 150–400)
POTASSIUM SERPL-MCNC: 5.1 MMOL/L — SIGNIFICANT CHANGE UP (ref 3.5–5.3)
POTASSIUM SERPL-SCNC: 5.1 MMOL/L — SIGNIFICANT CHANGE UP (ref 3.5–5.3)
RBC # BLD: 2.94 M/UL — LOW (ref 3.8–5.2)
RBC # FLD: 23.1 % — HIGH (ref 10.3–14.5)
SODIUM SERPL-SCNC: 139 MMOL/L — SIGNIFICANT CHANGE UP (ref 135–145)
WBC # BLD: 12.24 K/UL — HIGH (ref 3.8–10.5)
WBC # FLD AUTO: 12.24 K/UL — HIGH (ref 3.8–10.5)

## 2023-02-14 PROCEDURE — 99232 SBSQ HOSP IP/OBS MODERATE 35: CPT

## 2023-02-14 PROCEDURE — 99233 SBSQ HOSP IP/OBS HIGH 50: CPT

## 2023-02-14 RX ORDER — ELECTROLYTE SOLUTION,INJ
1 VIAL (ML) INTRAVENOUS
Refills: 0 | Status: DISCONTINUED | OUTPATIENT
Start: 2023-02-14 | End: 2023-02-15

## 2023-02-14 RX ORDER — SODIUM CHLORIDE 9 MG/ML
1000 INJECTION, SOLUTION INTRAVENOUS
Refills: 0 | Status: DISCONTINUED | OUTPATIENT
Start: 2023-02-14 | End: 2023-02-14

## 2023-02-14 RX ORDER — SODIUM CHLORIDE 9 MG/ML
1000 INJECTION, SOLUTION INTRAVENOUS
Refills: 0 | Status: DISCONTINUED | OUTPATIENT
Start: 2023-02-14 | End: 2023-02-17

## 2023-02-14 RX ADMIN — ALBUTEROL 2.5 MILLIGRAM(S): 90 AEROSOL, METERED ORAL at 08:09

## 2023-02-14 RX ADMIN — ENOXAPARIN SODIUM 100 MILLIGRAM(S): 100 INJECTION SUBCUTANEOUS at 11:46

## 2023-02-14 RX ADMIN — SODIUM CHLORIDE 100 MILLILITER(S): 9 INJECTION, SOLUTION INTRAVENOUS at 22:00

## 2023-02-14 RX ADMIN — Medication 70 MICROGRAM(S): at 22:56

## 2023-02-14 RX ADMIN — Medication 100 MILLIGRAM(S): at 15:45

## 2023-02-14 RX ADMIN — NYSTATIN CREAM 1 APPLICATION(S): 100000 CREAM TOPICAL at 22:03

## 2023-02-14 RX ADMIN — NYSTATIN CREAM 1 APPLICATION(S): 100000 CREAM TOPICAL at 18:46

## 2023-02-14 RX ADMIN — HYDROMORPHONE HYDROCHLORIDE 0.5 MILLIGRAM(S): 2 INJECTION INTRAMUSCULAR; INTRAVENOUS; SUBCUTANEOUS at 12:32

## 2023-02-14 RX ADMIN — Medication 100 MILLIGRAM(S): at 22:57

## 2023-02-14 RX ADMIN — HYDROMORPHONE HYDROCHLORIDE 0.5 MILLIGRAM(S): 2 INJECTION INTRAMUSCULAR; INTRAVENOUS; SUBCUTANEOUS at 15:25

## 2023-02-14 RX ADMIN — NYSTATIN CREAM 1 APPLICATION(S): 100000 CREAM TOPICAL at 11:47

## 2023-02-14 RX ADMIN — Medication 4: at 05:23

## 2023-02-14 RX ADMIN — Medication 0.5 MILLIGRAM(S): at 08:12

## 2023-02-14 RX ADMIN — CEFEPIME 100 MILLIGRAM(S): 1 INJECTION, POWDER, FOR SOLUTION INTRAMUSCULAR; INTRAVENOUS at 21:55

## 2023-02-14 RX ADMIN — ALBUTEROL 2.5 MILLIGRAM(S): 90 AEROSOL, METERED ORAL at 14:15

## 2023-02-14 RX ADMIN — INSULIN GLARGINE 20 UNIT(S): 100 INJECTION, SOLUTION SUBCUTANEOUS at 22:55

## 2023-02-14 RX ADMIN — NYSTATIN CREAM 1 APPLICATION(S): 100000 CREAM TOPICAL at 05:23

## 2023-02-14 RX ADMIN — CHLORHEXIDINE GLUCONATE 1 APPLICATION(S): 213 SOLUTION TOPICAL at 05:22

## 2023-02-14 RX ADMIN — PANTOPRAZOLE SODIUM 40 MILLIGRAM(S): 20 TABLET, DELAYED RELEASE ORAL at 11:46

## 2023-02-14 RX ADMIN — HYDROMORPHONE HYDROCHLORIDE 0.5 MILLIGRAM(S): 2 INJECTION INTRAMUSCULAR; INTRAVENOUS; SUBCUTANEOUS at 14:55

## 2023-02-14 RX ADMIN — CEFEPIME 100 MILLIGRAM(S): 1 INJECTION, POWDER, FOR SOLUTION INTRAMUSCULAR; INTRAVENOUS at 11:47

## 2023-02-14 RX ADMIN — ALBUTEROL 2.5 MILLIGRAM(S): 90 AEROSOL, METERED ORAL at 20:50

## 2023-02-14 RX ADMIN — Medication 0.5 MILLIGRAM(S): at 20:50

## 2023-02-14 RX ADMIN — Medication 100 MILLIGRAM(S): at 05:21

## 2023-02-14 RX ADMIN — ENOXAPARIN SODIUM 100 MILLIGRAM(S): 100 INJECTION SUBCUTANEOUS at 22:12

## 2023-02-14 RX ADMIN — HYDROMORPHONE HYDROCHLORIDE 0.5 MILLIGRAM(S): 2 INJECTION INTRAMUSCULAR; INTRAVENOUS; SUBCUTANEOUS at 11:43

## 2023-02-14 NOTE — PROGRESS NOTE ADULT - ASSESSMENT
72F with PMHx CVA, pAF on AC, emphysema/COPD on home O2, HTN, SBO with prior resection who presented with abd pain, N/V. Found to have an small bowel obstruction. Taken to OR underwent ELAP, extensive RICHARD, ileocolectomy, ventral hernia repair with MESH. Postop course complicated with ALVERTO, shock requiring pressors    ALVERTO in setting of acidemia, hyperkalemia, and worsening hyperglycemia   IVF to keep +, stop w ppn and extravascular vol increase    suspect Cr will continue to rise, hopeful for stability with improving map and insertion of rizzo   Urology pending   renal dose meds and abx         Hyperphoshatemia w  hyperkalemia due to ALVERTO  -optimize ppn, k stabilized today    Third spacing w severe hypoalbuminemia   protein supplementation      dc with staff, seen earlier and note now

## 2023-02-14 NOTE — PROGRESS NOTE ADULT - ASSESSMENT
Needs IV hydration as Cr up, TPN off. Needs central IV access due to swelling in arms. Consult IR. Will discuss with son. If wound remains stable with VAC and ostomy, will order re evaluation of swallowing and start feeding. Should have [facility] for palliative purposes

## 2023-02-14 NOTE — PROGRESS NOTE ADULT - ASSESSMENT
73 y/o female with h/o old CVA with left sided weakness, atrial fibrillation, emphysema, COPD, HTN, prior SBO and resection was admitted on 1/20 from [facility] for RLQ pain. She was noted with one episode of emesis and diffuse abdominal pain associated with fever. On 1/27 the patient was noted febrile to 102.8F, more lethargic, did not open eyes, did not follow commands. She received cefepime and metronidazole.     1. Intraabdominal hernia related fat necrosis with superimposed infection with KLOX. 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 Right lower lobes nodules Probable pneumonia. Allergy to cipro. Encephalopathy.   -leukocytosis resolving  -abdominal fistula draining  -BC x 2 noted  -urine c/s noted  -on cefepime 2 gm IV q12h and metronidazole 500 mg IV q8h # 18  -tolerating abx well so far; no side effects noted  -aspiration precautions  -continue abx coverage  -fistular drainage is contained  -monitor abdomen  -monitor temps  -f/u CBC  -supportive care  2. Other issues:   -care per medicine    d/w Dr. Castro

## 2023-02-14 NOTE — PROGRESS NOTE ADULT - SUBJECTIVE AND OBJECTIVE BOX
RACHANA BARGER  72y  Female      Patient is a 72y old  Female who presents with a chief complaint of bowel obstruction/ischemic mesentery (10 Feb 2023 09:07)      : pt very weak, not much alert  K 6.1  bicarb 16  Cr1.52    : condition same  right arm swelling; no DVT but sup thrombophlebitis in cephalic vein; warm compresses  Mid line leaking; removed   Cr worsened to 2.12  k 4.8     - no overnight events.  cr continues to worsen.      Vital Signs Last 24 Hrs  T(C): 36.8 (2023 08:08), Max: 36.8 (2023 08:08)  T(F): 98.3 (2023 08:08), Max: 98.3 (2023 08:08)  HR: 91 (2023 08:08) (89 - 98)  BP: 102/50 (2023 08:08) (102/50 - 117/57)  BP(mean): --  RR: 18 (2023 01:14) (18 - 19)  SpO2: 100% (2023 08:08) (95% - 100%)    Parameters below as of 2023 08:08  Patient On (Oxygen Delivery Method): nasal cannula        PHYSICAL EXAM:  GENERAL: Obese, lethargic, appears ill  HEAD:  Atraumatic, Normocephalic  NECK: Supple, No JVD, Normal thyroid  NERVOUS SYSTEM:  Lethargic, opens eyes, not following commands  CHEST/LUNG: Clear to percussion bilaterally; No rales, rhonchi, wheezing, or rubs  HEART: Regular rate and rhythm; No murmurs, rubs, or gallops  ABDOMEN: Mid abd dressing, Drain, surrounding erythema, induration  EXTREMITIES:  2+ Peripheral Pulses, No clubbing, cyanosis, Anasarca ; 2+ leg edema  LYMPH: No lymphadenopathy noted  SKIN: No rashes or lesions                                8.1    12.24 )-----------( 277      ( 2023 08:57 )             26.0     02-14    139  |  113<H>  |  43<H>  ----------------------------<  190<H>  5.1   |  18<L>  |  2.50<H>    Ca    6.8<L>      2023 07:40  Phos  6.5     -  Mg     2.4         TPro  5.7<L>  /  Alb  1.1<L>  /  TBili  0.6  /  DBili  x   /  AST  12<L>  /  ALT  7<L>  /  AlkPhos  75  02-        LIVER FUNCTIONS - ( 2023 04:24 )  Alb: 1.1 g/dL / Pro: 5.7 gm/dL / ALK PHOS: 75 U/L / ALT: 7 U/L / AST: 12 U/L / GGT: x             Urinalysis Basic - ( 2023 06:20 )    Color: Brown / Appearance: very cloudy / S.020 / pH: x  Gluc: x / Ketone: Small  / Bili: Negative / Urobili: Negative   Blood: x / Protein: 100 / Nitrite: Negative   Leuk Esterase: Moderate / RBC: 25-50 /HPF / WBC >50 /HPF   Sq Epi: x / Non Sq Epi: Negative / Bacteria: Many        < from: US Duplex Venous Upper Ext Ltd, Right (23 @ 12:01) >  No evidence of right upper extremity deep venous thrombosis.    Superficial thrombophlebitis right cephalic vein at the right upper arm.    Elongated 5 cm x 7 mm hypoechoic region without vascular flow in the   right axillary area may represent focal edema. Please clinically   correlate.    < end of copied text >

## 2023-02-14 NOTE — PROGRESS NOTE ADULT - SUBJECTIVE AND OBJECTIVE BOX
Patient is a 72y Female who reports no complaints as new. Unable to insert rizzo per staff, retainiing    MEDICATIONS  (STANDING):  albuterol    0.083% 2.5 milliGRAM(s) Nebulizer every 6 hours  buDESOnide    Inhalation Suspension 0.5 milliGRAM(s) Inhalation every 12 hours  cefepime   IVPB 2000 milliGRAM(s) IV Intermittent every 12 hours  chlorhexidine 4% Liquid 1 Application(s) Topical <User Schedule>  coronavirus bivalent (EUA) Booster Vaccine (PFIZER) 0.3 milliLiter(s) IntraMuscular once  dextrose 5%. 1000 milliLiter(s) (100 mL/Hr) IV Continuous <Continuous>  dextrose 5%. 1000 milliLiter(s) (50 mL/Hr) IV Continuous <Continuous>  dextrose 50% Injectable 25 Gram(s) IV Push once  dextrose 50% Injectable 12.5 Gram(s) IV Push once  dextrose 50% Injectable 25 Gram(s) IV Push once  enoxaparin Injectable 100 milliGRAM(s) SubCutaneous every 12 hours  glucagon  Injectable 1 milliGRAM(s) IntraMuscular once  insulin glargine Injectable (LANTUS) 20 Unit(s) SubCutaneous at bedtime  insulin lispro (ADMELOG) corrective regimen sliding scale   SubCutaneous every 4 hours  levothyroxine Injectable 70 MICROGram(s) IV Push at bedtime  metoprolol tartrate 25 milliGRAM(s) Oral two times a day  metroNIDAZOLE  IVPB 500 milliGRAM(s) IV Intermittent every 8 hours  nystatin Cream 1 Application(s) Topical two times a day  nystatin Powder 1 Application(s) Topical every 12 hours  pantoprazole  Injectable 40 milliGRAM(s) IV Push daily  Parenteral Nutrition - Adult 1 Each (83 mL/Hr) TPN Continuous <Continuous>  Parenteral Nutrition - Adult 1 Each (83 mL/Hr) TPN Continuous <Continuous>  sodium bicarbonate  Infusion 0.058 mEq/kG/Hr (75 mL/Hr) IV Continuous <Continuous>  sodium chloride 0.45% 1000 milliLiter(s) (100 mL/Hr) IV Continuous <Continuous>  sodium chloride 0.45% 1000 milliLiter(s) (65 mL/Hr) IV Continuous <Continuous>    MEDICATIONS  (PRN):  acetaminophen     Tablet .. 650 milliGRAM(s) Oral every 6 hours PRN Temp greater or equal to 38C (100.4F)  albuterol    90 MICROgram(s) HFA Inhaler 2 Puff(s) Inhalation every 6 hours PRN Shortness of Breath and/or Wheezing  dextrose Oral Gel 15 Gram(s) Oral once PRN Blood Glucose LESS THAN 70 milliGRAM(s)/deciliter  hydrALAZINE Injectable 10 milliGRAM(s) IV Push every 6 hours PRN SBP>160  HYDROmorphone  Injectable 0.5 milliGRAM(s) IV Push every 3 hours PRN Severe Pain (7 - 10)  sodium chloride 0.9% lock flush 10 milliLiter(s) IV Push every 1 hour PRN Pre/post blood products, medications, blood draw, and to maintain line patency        T(C): , Max: 36.8 (23 @ 08:08)  T(F): , Max: 98.3 (23 @ 08:08)  HR: 97 (23 @ 16:14)  BP: 115/51 (23 @ 21:41)  BP(mean): --  RR: 18 (23 @ 16:14)  SpO2: 100% (23 @ 21:41)  Wt(kg): --     @ 07:01  -   @ 07:00  --------------------------------------------------------  IN: 77 mL / OUT: 125 mL / NET: -48 mL     @ 07:01  -   @ 23:22  --------------------------------------------------------  IN: 175 mL / OUT: 250 mL / NET: -75 mL          PHYSICAL EXAM:    Constitutional: frail, ill appearing  HEENT:  MM  dist  Cardiovascular: S1 and S2   Extremities:  peripheral edema  Neurological: Alert        LABS:                        8.1    12.24 )-----------( 277      ( 2023 08:57 )             26.0     2023 07:40    139    |  113    |  43     ----------------------------<  190    5.1     |  18     |  2.50   2023 04:24    134    |  106    |  38     ----------------------------<  372    4.8     |  18     |  2.12   2023 14:08    131    |  105    |  34     ----------------------------<  518    5.6     |  16     |  1.83   2023 05:50    131    |  104    |  29     ----------------------------<  433    6.1     |  16     |  1.52   2023 08:35    133    |  104    |  22     ----------------------------<  384    4.8     |  21     |  1.01     Ca    6.8        2023 07:40  Ca    7.9        2023 04:24  Ca    7.6        2023 14:08  Ca    7.4        2023 05:50  Ca    7.7        2023 08:35  Phos  6.5       2023 07:40  Phos  7.0       2023 04:24  Phos  6.8       2023 05:50  Phos  4.6       2023 08:35  Mg     2.4       2023 07:40  Mg     2.5       2023 04:24  Mg     2.4       2023 05:50  Mg     2.1       2023 08:35    TPro  5.7    /  Alb  1.1    /  TBili  0.6    /  DBili  x      /  AST  12     /  ALT  7      /  AlkPhos  75     2023 04:24  TPro  5.6    /  Alb  1.1    /  TBili  0.6    /  DBili  x      /  AST  26     /  ALT  13     /  AlkPhos  82     2023 05:50  TPro  5.7    /  Alb  1.2    /  TBili  0.5    /  DBili  x      /  AST  14     /  ALT  9      /  AlkPhos  72     2023 08:35          Urine Studies:  Urinalysis Basic - ( 2023 06:20 )    Color: Brown / Appearance: very cloudy / S.020 / pH: x  Gluc: x / Ketone: Small  / Bili: Negative / Urobili: Negative   Blood: x / Protein: 100 / Nitrite: Negative   Leuk Esterase: Moderate / RBC: 25-50 /HPF / WBC >50 /HPF   Sq Epi: x / Non Sq Epi: Negative / Bacteria: Many            RADIOLOGY & ADDITIONAL STUDIES:

## 2023-02-14 NOTE — PROGRESS NOTE ADULT - SUBJECTIVE AND OBJECTIVE BOX
Date of service: 02-14-23 @ 14:15    Lying in bed in NAD  Has lower abdomen discomfort  Lethargic    ROS: no fever or chills; poorly verbal    MEDICATIONS  (STANDING):  albuterol    0.083% 2.5 milliGRAM(s) Nebulizer every 6 hours  buDESOnide    Inhalation Suspension 0.5 milliGRAM(s) Inhalation every 12 hours  cefepime   IVPB 2000 milliGRAM(s) IV Intermittent every 12 hours  chlorhexidine 4% Liquid 1 Application(s) Topical <User Schedule>  coronavirus bivalent (EUA) Booster Vaccine (PFIZER) 0.3 milliLiter(s) IntraMuscular once  dextrose 5%. 1000 milliLiter(s) (50 mL/Hr) IV Continuous <Continuous>  dextrose 5%. 1000 milliLiter(s) (100 mL/Hr) IV Continuous <Continuous>  dextrose 50% Injectable 25 Gram(s) IV Push once  dextrose 50% Injectable 12.5 Gram(s) IV Push once  dextrose 50% Injectable 25 Gram(s) IV Push once  enoxaparin Injectable 100 milliGRAM(s) SubCutaneous every 12 hours  glucagon  Injectable 1 milliGRAM(s) IntraMuscular once  insulin glargine Injectable (LANTUS) 20 Unit(s) SubCutaneous at bedtime  insulin lispro (ADMELOG) corrective regimen sliding scale   SubCutaneous every 4 hours  levothyroxine Injectable 70 MICROGram(s) IV Push at bedtime  metoprolol tartrate 25 milliGRAM(s) Oral two times a day  metroNIDAZOLE  IVPB 500 milliGRAM(s) IV Intermittent every 8 hours  nystatin Cream 1 Application(s) Topical two times a day  nystatin Powder 1 Application(s) Topical every 12 hours  pantoprazole  Injectable 40 milliGRAM(s) IV Push daily  Parenteral Nutrition - Adult 1 Each (83 mL/Hr) TPN Continuous <Continuous>  Parenteral Nutrition - Adult 1 Each (83 mL/Hr) TPN Continuous <Continuous>  sodium bicarbonate  Infusion 0.058 mEq/kG/Hr (75 mL/Hr) IV Continuous <Continuous>  sodium chloride 0.45% 1000 milliLiter(s) (65 mL/Hr) IV Continuous <Continuous>    Vital Signs Last 24 Hrs  T(C): 36.8 (14 Feb 2023 08:08), Max: 36.8 (14 Feb 2023 08:08)  T(F): 98.3 (14 Feb 2023 08:08), Max: 98.3 (14 Feb 2023 08:08)  HR: 91 (14 Feb 2023 08:08) (89 - 98)  BP: 102/50 (14 Feb 2023 08:08) (102/50 - 117/57)  BP(mean): --  RR: 18 (14 Feb 2023 01:14) (18 - 19)  SpO2: 100% (14 Feb 2023 08:08) (95% - 100%)    Parameters below as of 14 Feb 2023 08:08  Patient On (Oxygen Delivery Method): nasal cannula     Physical exam:    Constitutional:  No acute distress  HEENT: NC/AT, EOMI, PERRLA, conjunctivae clear; ears and nose atraumatic  Neck: supple; thyroid not palpable  Back: no tenderness  Respiratory: respiratory effort normal; crackles at bases  Cardiovascular: S1S2 regular, no murmurs  Abdomen: soft, mid abdomen tender, distended, positive BS  abdominal postsurgical wound with fecaloid drainage from drain site   Genitourinary: no suprapubic tenderness  Lymphatic: no LN palpable  Musculoskeletal: no muscle tenderness, no joint swelling or tenderness  Extremities: no pedal edema  Neurological/ Psychiatric: confused, judgement and insight impaired; moving all extremities  Skin: no rashes; no palpable lesions    Labs: reviewed                        8.1    12.24 )-----------( 277      ( 14 Feb 2023 08:57 )             26.0     02-14    139  |  113<H>  |  43<H>  ----------------------------<  190<H>  5.1   |  18<L>  |  2.50<H>    Ca    6.8<L>      14 Feb 2023 07:40  Phos  6.5     02-14  Mg     2.4     02-14    TPro  5.7<L>  /  Alb  1.1<L>  /  TBili  0.6  /  DBili  x   /  AST  12<L>  /  ALT  7<L>  /  AlkPhos  75  02-13    C-Reactive Protein, Serum: 148 mg/L (02-09-23 @ 05:22)                        8.6    11.65 )-----------( 285      ( 13 Feb 2023 04:24 )             27.8     02-13    134<L>  |  106  |  38<H>  ----------------------------<  372<H>  4.8   |  18<L>  |  2.12<H>    Ca    7.9<L>      13 Feb 2023 04:24  Phos  7.0     02-13  Mg     2.5     02-13    TPro  5.7<L>  /  Alb  1.1<L>  /  TBili  0.6  /  DBili  x   /  AST  12<L>  /  ALT  7<L>  /  AlkPhos  75  02-13    C-Reactive Protein, Serum: 148 mg/L (02-09-23 @ 05:22)               10.9   19.72 )-----------( 510      ( 27 Jan 2023 06:16 )             36.7     01-27    142  |  111<H>  |  10  ----------------------------<  233<H>  3.8   |  26  |  0.78    Ca    8.0<L>      27 Jan 2023 06:16  Phos  2.7     01-27  Mg     1.8     01-27    Culture - Acid Fast - Tissue w/Smear (collected 31 Jan 2023 18:07)  Source: .Tissue INTRAPERITONEAL ABCESS FOR CX    Culture - Fungal, Tissue (collected 31 Jan 2023 18:07)  Source: .Tissue INTRAPERITONEAL ABCESS FOR CX  Preliminary Report (01 Feb 2023 07:09):    Testing in progress    Culture - Tissue with Gram Stain (collected 31 Jan 2023 18:07)  Source: .Tissue INTRAPERITONEAL ABCESS FOR CX  Gram Stain (01 Feb 2023 04:37):    Rare polymorphonuclear leukocytes seen per low power field    No organisms seen per oil power field  Preliminary Report (02 Feb 2023 20:01):    Growth in fluid media only Klebsiella oxytoca/Raoultella ornithinolytica  Organism: Klebsiella oxytoca /Raoutella ornithinolytica (03 Feb 2023 17:25)  Organism: Klebsiella oxytoca /Raoutella ornithinolytica (03 Feb 2023 17:25)      -  Amikacin: S <=16      -  Amoxicillin/Clavulanic Acid: I 16/8      -  Ampicillin: R >16 These ampicillin results predict results for amoxicillin      -  Ampicillin/Sulbactam: R >16/8 Enterobacter, Klebsiella aerogenes, Citrobacter, and Serratia may develop resistance during prolonged therapy (3-4 days)      -  Aztreonam: S <=4      -  Cefazolin: R >16 Enterobacter, Klebsiella aerogenes, Citrobacter, and Serratia may develop resistance during prolonged therapy (3-4 days)      -  Cefepime: S <=2      -  Cefoxitin: S <=8      -  Ceftriaxone: S <=1 Enterobacter, Klebsiella aerogenes, Citrobacter, and Serratia may develop resistance during prolonged therapy      -  Ciprofloxacin: S <=0.25      -  Ertapenem: S <=0.5      -  Gentamicin: S <=2      -  Imipenem: S <=1      -  Levofloxacin: S <=0.5      -  Meropenem: S <=1      -  Piperacillin/Tazobactam: R 64      -  Tobramycin: S <=2      -  Trimethoprim/Sulfamethoxazole: S <=0.5/9.5      Method Type: ARABELLA    Culture - Blood (collected 27 Jan 2023 06:16)  Source: .Blood None  Final Report (01 Feb 2023 09:00):    No Growth Final    Culture - Blood (collected 27 Jan 2023 06:16)  Source: .Blood None  Final Report (01 Feb 2023 09:00):    No Growth Final    Radiology: all available radiological tests reviewed    < from: CT Chest No Cont (01.27.23 @ 09:53) >  Small bowel obstruction with transition point at the neck of a left lower   quadrant abdominal wall hernia. It is uncertain whether the obstruction   is due to the hernia itself or to adhesions.    Additional massive ventral hernia containing small and large bowel and   epigastric hernia containing stomach.    Right lower lobe nodules new since December 04, 2021 and could be   infectious or neoplastic. Follow-up chest CT in 3 months is recommended   to reevaluate.    < end of copied text >      Advanced directives addressed: full resuscitation

## 2023-02-14 NOTE — PROGRESS NOTE ADULT - SUBJECTIVE AND OBJECTIVE BOX
Subjective:    Home Medications:  Albuterol (Eqv-ProAir HFA) 90 mcg/inh inhalation aerosol: 1 puff(s) inhaled every 6 hours, As Needed (2023 16:49)  amiodarone 200 mg oral tablet: 1 tab(s) orally once a day (2023 16:49)  ascorbic acid 500 mg oral tablet: 2 tab(s) orally once a day (2023 16:49)  atorvastatin 10 mg oral tablet: 1 tab(s) orally once a day (at bedtime) (2023 16:49)  benzonatate 100 mg oral capsule: 1 cap(s) orally 3 times a day (2023 21:48)  budesonide 0.5 mg/2 mL inhalation suspension: 2 milliliter(s) inhaled 2 times a day (2023 21:48)  Cepacol Sore Throat 15 mg-3.6 mg mucous membrane lozenge: 1 lozenge mucous membrane every 4 hours, As Needed (2023 21:48)  cholecalciferol 1250 mcg (50,000 intl units) oral capsule: 1 cap(s) orally every 4 weeks on Wednesday  (2023 16:49)  Coumadin 2.5 mg oral tablet: 1 tab(s) orally once a day (at bedtime)  ***ON HOLD from  to *** (2023 21:48)  Cranberry oral tablet: 1 tab(s) orally 2 times a day (2023 16:49)  cyanocobalamin 1000 mcg oral tablet: 1 tab(s) orally once a day (2023 16:49)  dilTIAZem 180 mg/24 hours oral capsule, extended release: 1 cap(s) orally once a day (2023 16:49)  DULoxetine 60 mg oral delayed release capsule: 1 cap(s) orally once a day (2023 16:49)  estradiol 0.1 mg/g vaginal cream: 0.5 gram(s) vaginal 2 times a week on Monday and Thursday (2023 21:48)  fexofenadine 180 mg oral tablet: 1 tab(s) orally once a day (2023 21:48)  fluticasone 50 mcg/inh nasal spray: 1 spray(s) nasal 2 times a day (2023 16:49)  furosemide 20 mg oral tablet: 1 tab(s) orally once a day (2023 16:49)  glipiZIDE 10 mg oral tablet, extended release: 2 tab(s) orally once a day (2023 16:49)  GlycoLax oral powder for reconstitution: 17 gram(s) orally 2 times a day (2023 16:49)  guaiFENesin 100 mg/5 mL oral liquid: 20 milliliter(s) orally every 6 hours (2023 21:48)  HumaLOG KwikPen 100 units/mL injectable solution: 10 unit(s) injectable 3 times a day (before meals) (2023 21:48)  ipratropium-albuterol 20 mcg-100 mcg/inh inhalation aerosol: 1 puff(s) inhaled 4 times a day (2023 16:49)  levothyroxine 88 mcg (0.088 mg) oral tablet: 1 tab(s) orally once a day (at bedtime) (2023 16:49)  losartan 25 mg oral tablet: 1 tab(s) orally once a day (2023 16:49)  Macrobid 100 mg oral capsule: 1 cap(s) orally 2 times a day for 5 days  ***course complete*** (2023 21:48)  methocarbamol 750 mg oral tablet: 1 tab(s) orally every 8 hours, As Needed (2023 21:48)  metoprolol tartrate 25 mg oral tablet: 1 tab(s) orally 2 times a day (2023 16:49)  Milk of Magnesia 8% oral suspension: 30 milliliter(s) orally once a day, As Needed (2023 16:49)  montelukast 10 mg oral tablet: 1 tab(s) orally once a day (at bedtime) (2023 21:48)  ondansetron 4 mg oral tablet: 1 tab(s) orally every 4 hours, As Needed (2023 21:48)  oxybutynin 5 mg oral tablet: 1 tab(s) orally 2 times a day (2023 16:49)  oxyCODONE 5 mg oral tablet: 1 tab(s) orally every 4 hours, As Needed (2023 16:49)  phytonadione 5 mg oral tablet: 0.5 tab(s) orally once  ***given at [facility] once on 23*** (2023 21:48)  pregabalin 100 mg oral capsule: 1 cap(s) orally 3 times a day (2023 16:49)  sennosides-docusate 8.6 mg-50 mg oral tablet: 2 tab(s) orally once a day (at bedtime) (2023 16:49)  Tradjenta 5 mg oral tablet: 1 tab(s) orally once a day (2023 16:49)  Tylenol 500 mg oral tablet: 2 tab(s) orally every 8 hours (2023 16:49)    MEDICATIONS  (STANDING):  albuterol    0.083% 2.5 milliGRAM(s) Nebulizer every 6 hours  buDESOnide    Inhalation Suspension 0.5 milliGRAM(s) Inhalation every 12 hours  cefepime   IVPB 2000 milliGRAM(s) IV Intermittent every 12 hours  chlorhexidine 4% Liquid 1 Application(s) Topical <User Schedule>  coronavirus bivalent (EUA) Booster Vaccine (PFIZER) 0.3 milliLiter(s) IntraMuscular once  dextrose 5%. 1000 milliLiter(s) (50 mL/Hr) IV Continuous <Continuous>  dextrose 5%. 1000 milliLiter(s) (100 mL/Hr) IV Continuous <Continuous>  dextrose 50% Injectable 25 Gram(s) IV Push once  dextrose 50% Injectable 12.5 Gram(s) IV Push once  dextrose 50% Injectable 25 Gram(s) IV Push once  enoxaparin Injectable 100 milliGRAM(s) SubCutaneous every 12 hours  glucagon  Injectable 1 milliGRAM(s) IntraMuscular once  insulin glargine Injectable (LANTUS) 20 Unit(s) SubCutaneous at bedtime  insulin lispro (ADMELOG) corrective regimen sliding scale   SubCutaneous every 4 hours  levothyroxine Injectable 70 MICROGram(s) IV Push at bedtime  metoprolol tartrate 25 milliGRAM(s) Oral two times a day  metroNIDAZOLE  IVPB 500 milliGRAM(s) IV Intermittent every 8 hours  nystatin Cream 1 Application(s) Topical two times a day  nystatin Powder 1 Application(s) Topical every 12 hours  pantoprazole  Injectable 40 milliGRAM(s) IV Push daily  Parenteral Nutrition - Adult 1 Each (83 mL/Hr) TPN Continuous <Continuous>  sodium bicarbonate  Infusion 0.058 mEq/kG/Hr (75 mL/Hr) IV Continuous <Continuous>  sodium chloride 0.45% 1000 milliLiter(s) (65 mL/Hr) IV Continuous <Continuous>    MEDICATIONS  (PRN):  acetaminophen     Tablet .. 650 milliGRAM(s) Oral every 6 hours PRN Temp greater or equal to 38C (100.4F)  albuterol    90 MICROgram(s) HFA Inhaler 2 Puff(s) Inhalation every 6 hours PRN Shortness of Breath and/or Wheezing  dextrose Oral Gel 15 Gram(s) Oral once PRN Blood Glucose LESS THAN 70 milliGRAM(s)/deciliter  hydrALAZINE Injectable 10 milliGRAM(s) IV Push every 6 hours PRN SBP>160  HYDROmorphone  Injectable 0.5 milliGRAM(s) IV Push every 3 hours PRN Severe Pain (7 - 10)  sodium chloride 0.9% lock flush 10 milliLiter(s) IV Push every 1 hour PRN Pre/post blood products, medications, blood draw, and to maintain line patency      Allergies    Cipro (Rash)  Spiriva (Rash)    Intolerances        Vital Signs Last 24 Hrs  T(C): 36.8 (2023 08:08), Max: 36.8 (2023 08:08)  T(F): 98.3 (2023 08:08), Max: 98.3 (2023 08:08)  HR: 91 (2023 08:08) (89 - 98)  BP: 102/50 (2023 08:08) (102/50 - 117/57)  BP(mean): --  RR: 18 (2023 01:14) (18 - 19)  SpO2: 100% (2023 08:08) (95% - 100%)    Parameters below as of 2023 08:08  Patient On (Oxygen Delivery Method): nasal cannula          PHYSICAL EXAMINATION:    NECK:  Supple. No lymphadenopathy. Jugular venous pressure not elevated. Carotids equal.   HEART:   The cardiac impulse has a normal quality. Reg., Nl S1 and S2.  There are no murmurs, rubs or gallops noted  CHEST:  Chest is clear to auscultation. Normal respiratory effort.  ABDOMEN:  Soft and nontender.   EXTREMITIES:  There is no edema.       LABS:                        8.6    11.65 )-----------( 285      ( 2023 04:24 )             27.8     02-14    139  |  113<H>  |  43<H>  ----------------------------<  190<H>  5.1   |  18<L>  |  2.50<H>    Ca    6.8<L>      2023 07:40  Phos  6.5     02-14  Mg     2.4     -14    TPro  5.7<L>  /  Alb  1.1<L>  /  TBili  0.6  /  DBili  x   /  AST  12<L>  /  ALT  7<L>  /  AlkPhos  75  02-13      Urinalysis Basic - ( 2023 06:20 )    Color: Brown / Appearance: very cloudy / S.020 / pH: x  Gluc: x / Ketone: Small  / Bili: Negative / Urobili: Negative   Blood: x / Protein: 100 / Nitrite: Negative   Leuk Esterase: Moderate / RBC: 25-50 /HPF / WBC >50 /HPF   Sq Epi: x / Non Sq Epi: Negative / Bacteria: Many             Subjective:    pat no respiratory distress, lying in bed.    Home Medications:  Albuterol (Eqv-ProAir HFA) 90 mcg/inh inhalation aerosol: 1 puff(s) inhaled every 6 hours, As Needed (2023 16:49)  amiodarone 200 mg oral tablet: 1 tab(s) orally once a day (2023 16:49)  ascorbic acid 500 mg oral tablet: 2 tab(s) orally once a day (2023 16:49)  atorvastatin 10 mg oral tablet: 1 tab(s) orally once a day (at bedtime) (2023 16:49)  benzonatate 100 mg oral capsule: 1 cap(s) orally 3 times a day (2023 21:48)  budesonide 0.5 mg/2 mL inhalation suspension: 2 milliliter(s) inhaled 2 times a day (2023 21:48)  Cepacol Sore Throat 15 mg-3.6 mg mucous membrane lozenge: 1 lozenge mucous membrane every 4 hours, As Needed (2023 21:48)  cholecalciferol 1250 mcg (50,000 intl units) oral capsule: 1 cap(s) orally every 4 weeks on Wednesday  (2023 16:49)  Coumadin 2.5 mg oral tablet: 1 tab(s) orally once a day (at bedtime)  ***ON HOLD from  to *** (2023 21:48)  Cranberry oral tablet: 1 tab(s) orally 2 times a day (2023 16:49)  cyanocobalamin 1000 mcg oral tablet: 1 tab(s) orally once a day (2023 16:49)  dilTIAZem 180 mg/24 hours oral capsule, extended release: 1 cap(s) orally once a day (2023 16:49)  DULoxetine 60 mg oral delayed release capsule: 1 cap(s) orally once a day (2023 16:49)  estradiol 0.1 mg/g vaginal cream: 0.5 gram(s) vaginal 2 times a week on Monday and Thursday (2023 21:48)  fexofenadine 180 mg oral tablet: 1 tab(s) orally once a day (2023 21:48)  fluticasone 50 mcg/inh nasal spray: 1 spray(s) nasal 2 times a day (2023 16:49)  furosemide 20 mg oral tablet: 1 tab(s) orally once a day (2023 16:49)  glipiZIDE 10 mg oral tablet, extended release: 2 tab(s) orally once a day (2023 16:49)  GlycoLax oral powder for reconstitution: 17 gram(s) orally 2 times a day (2023 16:49)  guaiFENesin 100 mg/5 mL oral liquid: 20 milliliter(s) orally every 6 hours (2023 21:48)  HumaLOG KwikPen 100 units/mL injectable solution: 10 unit(s) injectable 3 times a day (before meals) (2023 21:48)  ipratropium-albuterol 20 mcg-100 mcg/inh inhalation aerosol: 1 puff(s) inhaled 4 times a day (2023 16:49)  levothyroxine 88 mcg (0.088 mg) oral tablet: 1 tab(s) orally once a day (at bedtime) (2023 16:49)  losartan 25 mg oral tablet: 1 tab(s) orally once a day (2023 16:49)  Macrobid 100 mg oral capsule: 1 cap(s) orally 2 times a day for 5 days  ***course complete*** (2023 21:48)  methocarbamol 750 mg oral tablet: 1 tab(s) orally every 8 hours, As Needed (2023 21:48)  metoprolol tartrate 25 mg oral tablet: 1 tab(s) orally 2 times a day (2023 16:49)  Milk of Magnesia 8% oral suspension: 30 milliliter(s) orally once a day, As Needed (2023 16:49)  montelukast 10 mg oral tablet: 1 tab(s) orally once a day (at bedtime) (2023 21:48)  ondansetron 4 mg oral tablet: 1 tab(s) orally every 4 hours, As Needed (2023 21:48)  oxybutynin 5 mg oral tablet: 1 tab(s) orally 2 times a day (2023 16:49)  oxyCODONE 5 mg oral tablet: 1 tab(s) orally every 4 hours, As Needed (2023 16:49)  phytonadione 5 mg oral tablet: 0.5 tab(s) orally once  ***given at [facility] once on 23*** (2023 21:48)  pregabalin 100 mg oral capsule: 1 cap(s) orally 3 times a day (2023 16:49)  sennosides-docusate 8.6 mg-50 mg oral tablet: 2 tab(s) orally once a day (at bedtime) (2023 16:49)  Tradjenta 5 mg oral tablet: 1 tab(s) orally once a day (2023 16:49)  Tylenol 500 mg oral tablet: 2 tab(s) orally every 8 hours (2023 16:49)    MEDICATIONS  (STANDING):  albuterol    0.083% 2.5 milliGRAM(s) Nebulizer every 6 hours  buDESOnide    Inhalation Suspension 0.5 milliGRAM(s) Inhalation every 12 hours  cefepime   IVPB 2000 milliGRAM(s) IV Intermittent every 12 hours  chlorhexidine 4% Liquid 1 Application(s) Topical <User Schedule>  coronavirus bivalent (EUA) Booster Vaccine (PFIZER) 0.3 milliLiter(s) IntraMuscular once  dextrose 5%. 1000 milliLiter(s) (50 mL/Hr) IV Continuous <Continuous>  dextrose 5%. 1000 milliLiter(s) (100 mL/Hr) IV Continuous <Continuous>  dextrose 50% Injectable 25 Gram(s) IV Push once  dextrose 50% Injectable 12.5 Gram(s) IV Push once  dextrose 50% Injectable 25 Gram(s) IV Push once  enoxaparin Injectable 100 milliGRAM(s) SubCutaneous every 12 hours  glucagon  Injectable 1 milliGRAM(s) IntraMuscular once  insulin glargine Injectable (LANTUS) 20 Unit(s) SubCutaneous at bedtime  insulin lispro (ADMELOG) corrective regimen sliding scale   SubCutaneous every 4 hours  levothyroxine Injectable 70 MICROGram(s) IV Push at bedtime  metoprolol tartrate 25 milliGRAM(s) Oral two times a day  metroNIDAZOLE  IVPB 500 milliGRAM(s) IV Intermittent every 8 hours  nystatin Cream 1 Application(s) Topical two times a day  nystatin Powder 1 Application(s) Topical every 12 hours  pantoprazole  Injectable 40 milliGRAM(s) IV Push daily  Parenteral Nutrition - Adult 1 Each (83 mL/Hr) TPN Continuous <Continuous>  sodium bicarbonate  Infusion 0.058 mEq/kG/Hr (75 mL/Hr) IV Continuous <Continuous>  sodium chloride 0.45% 1000 milliLiter(s) (65 mL/Hr) IV Continuous <Continuous>    MEDICATIONS  (PRN):  acetaminophen     Tablet .. 650 milliGRAM(s) Oral every 6 hours PRN Temp greater or equal to 38C (100.4F)  albuterol    90 MICROgram(s) HFA Inhaler 2 Puff(s) Inhalation every 6 hours PRN Shortness of Breath and/or Wheezing  dextrose Oral Gel 15 Gram(s) Oral once PRN Blood Glucose LESS THAN 70 milliGRAM(s)/deciliter  hydrALAZINE Injectable 10 milliGRAM(s) IV Push every 6 hours PRN SBP>160  HYDROmorphone  Injectable 0.5 milliGRAM(s) IV Push every 3 hours PRN Severe Pain (7 - 10)  sodium chloride 0.9% lock flush 10 milliLiter(s) IV Push every 1 hour PRN Pre/post blood products, medications, blood draw, and to maintain line patency      Allergies    Cipro (Rash)  Spiriva (Rash)    Intolerances        Vital Signs Last 24 Hrs  T(C): 36.8 (2023 08:08), Max: 36.8 (2023 08:08)  T(F): 98.3 (2023 08:08), Max: 98.3 (2023 08:08)  HR: 91 (2023 08:08) (89 - 98)  BP: 102/50 (2023 08:08) (102/50 - 117/57)  BP(mean): --  RR: 18 (2023 01:14) (18 - 19)  SpO2: 100% (2023 08:08) (95% - 100%)    Parameters below as of 2023 08:08  Patient On (Oxygen Delivery Method): nasal cannula          PHYSICAL EXAMINATION:    NECK:  Supple. No lymphadenopathy. Jugular venous pressure not elevated. Carotids equal.   HEART:   The cardiac impulse has a normal quality. Reg., Nl S1 and S2.  There are no murmurs, rubs or gallops noted  CHEST:  Chest crackles to auscultation. Normal respiratory effort.  ABDOMEN:  Soft and nontender.   EXTREMITIES:  There is no edema.       LABS:                        8.6    11.65 )-----------( 285      ( 2023 04:24 )             27.8     02-14    139  |  113<H>  |  43<H>  ----------------------------<  190<H>  5.1   |  18<L>  |  2.50<H>    Ca    6.8<L>      2023 07:40  Phos  6.5     -  Mg     2.4     -14    TPro  5.7<L>  /  Alb  1.1<L>  /  TBili  0.6  /  DBili  x   /  AST  12<L>  /  ALT  7<L>  /  AlkPhos  75  02-13      Urinalysis Basic - ( 2023 06:20 )    Color: Brown / Appearance: very cloudy / S.020 / pH: x  Gluc: x / Ketone: Small  / Bili: Negative / Urobili: Negative   Blood: x / Protein: 100 / Nitrite: Negative   Leuk Esterase: Moderate / RBC: 25-50 /HPF / WBC >50 /HPF   Sq Epi: x / Non Sq Epi: Negative / Bacteria: Many

## 2023-02-14 NOTE — PROGRESS NOTE ADULT - ASSESSMENT
Patient admitted with abdominal pain, nausea and vomitting , dehydration  septic shock, likely related to     PROBLEMS:    UTI klebsiella pneumoniae and aspiration PNA /SBO- ventral hernia  New R lung nodules - cannot rule out aspiration PNA   Acute metabolic encephalopathy  Sepsis   Hx copd without exacerbation  Anasarca/Acute on  chronic diastolic heart failure   Superficial thrombophlebitis right cephalic vein at the right upper arm        Plan:    pulmonary stable  Blood transfusion, on TPN, IV fluids, iv lasix prn  Incarcerated Ventral Hernia with SBO-S/p extensive RICHARD, ileo colectomy, giant ventral hernia repair with Surgimend-surgery fu for persistant sutures leak-wound care-entero fistula  IV cefepime/flagyl-for  sepsis ( SBO/UTI)  Monitor Cr  Surgery fu  D/w staff  DVT PROPHYLASIX

## 2023-02-14 NOTE — PROGRESS NOTE ADULT - ASSESSMENT
72F with PMHx CVA, pAF on AC, emphysema/COPD on home O2, HTN, SBO with prior resection who presented with abd pain, N/V. Found to have an small bowel obstruction. Taken to OR underwent ELAP, extensive RICHARD, ileocolectomy, ventral hernia repair with MESH. Postop course complicated with ALVERTO, shock requiring pressors    #ALVERTO- worsening  Metabolic Acidosis  - plan as per renal  - continue IVF with bicarb per renal  - hyperkalemia improved will need to monitor closely    #Abd pain, SBO, Enterocutaneous fistula  S/p OR EXLAP, RICHARD, ileocolectomy, ventral hernia repair w mesh  Dressing c/d/i, surrounding erythema, +Drain  PPN, NGT to suction taken out of nare 2/10- placed in abd?  PPI Octreotide  Cefepime, Flagyl  Pain control, IS, Mobilize patient, monitor i and os  Pallative eval- DNR/DNI, no feeding tube....continue with PPN for now   management as per Surg    # UTI Kleb, PNA  Cefepime and flagyl as above  WBC improving, monitor for fevers  ID following    #pAF on AC, HTN  Lovenox 100mg BID  Lopressor IVPB q6  Hydralazine 10mg IVP q6 PRN SBP >160  Amiodarone 200mg Discontinued (2/10)- NGT is out so no PO access- CT A/P w PO contrast shows enteric communication with atmosphere. Discussed with pharmacy no reasonable IV equivalent for PO daily, recommend dc for now and if needed adjust other agents, if afib uncontrolled may need gtt    # Right Arm Superficial Thrombophlebitis:  warm compresses  no NSAIDS sec to renal failure    #Hypothyroid  Continue w IV synthroid     #DM  Increased Lantus from 8 to 20 (2/10)  Corrective scale  Monitor fingersticks    #COPD  Albuterol  Pulmicort  Pulm following    Prognosis : Extremely poor    palliative care       poc discussed with team, Dr. Elise.

## 2023-02-14 NOTE — CHART NOTE - NSCHARTNOTEFT_GEN_A_CORE
Consult order placed for central access for TPN and antibiotics. Patient with right upper extremity welling and left cephalic thrombophlebitis. Will discuss best option for line placement (left upper extremity PICC in basilic or brachial vein) vs tunneled PICC placement.    Please hold AM dose of lovenox pending decision on line placement. IR to follow up in AM.   Please obtain COVID swab (ordered).

## 2023-02-14 NOTE — PROGRESS NOTE ADULT - ASSESSMENT
Assessment:     73 y/o female with multiple medical comorbidities including previous SBO admitted for recurrent SBO.  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     Process of Care  --Reviewed dx/treatment problems and alignment with Goals of Care    Physical Aspects of Care  --Pain - Patient denies at this time.  monitor for non-verbal signs of distress.  c/w current management  --Chronic Respiratory Failure - Emphysema on home O2. - No respiratory distress.  maintain home supplemental O2   --Nausea Vomiting - denies  --Debility/Weakness - PT as tolerated, OOB to chair, fall precautions  --Acute Metabolic Encephalopathy - treatment of possible underlying infections.  frequent verbal redirection/reorientation.  monitor for pain and treat as needed.  avoid sensorium altering medications when possible.   --SBO s/p exlap/paradise complicated by EC Fistula - as per surgery.  currently NPO.  advance as tolerated.      Psychological and Psychiatric Aspects of Care:   --Grief/Bereavement: emotional support provided  --Hx of psychiatric dx: none  -Pastoral Care Available PRN     Social Aspects of Care  -SW involved     Cultural Aspects  -Primary Language: English    Goals of Care:  DNR/DNI in chart (preexisting).  family are willing to a more conservative approach.  they would not want her to return to the ICU or to get pressors to support BP.  they are OK with attempts to replace IV for TPN.  on with antibiotics and other non-invasive treatments.     Ethical and Legal Aspects: NA  Capacity- I feel pt lacks capacity for major medical decisions at this time.      HCP/Surrogate: children - Moe ([phone]) and Fabby.     Code Status- DNR/DNI  MOLST- in chart -- DNR/DNI and no feeding tube  Dispo Plan- tbd (from SNF)    Discussed With: nursing

## 2023-02-14 NOTE — PROGRESS NOTE ADULT - SUBJECTIVE AND OBJECTIVE BOX
TRANSFER - OUT REPORT:    Verbal report given to Jesi(name) on Debo Chris  being transferred to CCU(unit) for routine progression of care       Report consisted of patients Situation, Background, Assessment and   Recommendations(SBAR). Information from the following report(s) SBAR, ED Summary, Procedure Summary, Intake/Output and MAR was reviewed with the receiving nurse. Lines:   Peripheral IV 11/25/21 Left Antecubital (Active)       Peripheral IV 11/25/21 Anterior; Right Forearm (Active)        Opportunity for questions and clarification was provided.       Patient transported with:   Registered Nurse Subjective:    seen at bedside, pt is awake and alert, but remains difficult to understand.  will answer yes/no questions.  denies being in pain  no adverse events noted overnight  she is on BiCarb drip, PPN/TPN on hold until central access can be obtained (i.e. PICC)  yesterday, pt had woundvac placed. she also has fistula into ostomy bag.   prior to the woundvac placement, pt was given PRN of pain medication.  no other pain medications required yesterday.    phone call to pt's son, Moe.  no further questions this morning, appreciative of follow up phone call.    Review of Systems:  Unable to obtain/Limited due to: clinical condition.        PHYSICAL EXAM:    Vital Signs Last 24 Hrs  T(C): 36.8 (14 Feb 2023 08:08), Max: 36.8 (14 Feb 2023 08:08)  T(F): 98.3 (14 Feb 2023 08:08), Max: 98.3 (14 Feb 2023 08:08)  HR: 91 (14 Feb 2023 08:08) (89 - 98)  BP: 102/50 (14 Feb 2023 08:08) (102/50 - 117/57)  BP(mean): --  RR: 18 (14 Feb 2023 01:14) (18 - 19)  SpO2: 100% (14 Feb 2023 08:08) (95% - 100%)    Parameters below as of 14 Feb 2023 08:08  Patient On (Oxygen Delivery Method): nasal cannula      General:  - GENERAL: Alert. No acute distress.   - EYES: EOMI. Anicteric.   - HENT: Moist mucous membranes.   - LUNGS: Clear to auscultation bilaterally. No accessory muscle use. Speaking in complete sentences.   - CARDIOVASCULAR: Regular rate and rhythm. No murmur. No JVD.   - ABDOMEN: + Wound vac with ostomy bag.  Abdomen otherwise soft.   - EXTREMITIES: + LE edema up pt abdomen. Non-tender.    - SKIN: Warm, no rashes or lesions on visible skin.   - NEUROLOGIC: No focal neurological deficits.          LABS:                        8.1    12.24 )-----------( 277      ( 14 Feb 2023 08:57 )             26.0     02-14    139  |  113<H>  |  43<H>  ----------------------------<  190<H>  5.1   |  18<L>  |  2.50<H>    Ca    6.8<L>      14 Feb 2023 07:40  Phos  6.5     02-14  Mg     2.4     02-14    TPro  5.7<L>  /  Alb  1.1<L>  /  TBili  0.6  /  DBili  x   /  AST  12<L>  /  ALT  7<L>  /  AlkPhos  75  02-13      Albumin: Albumin, Serum: 1.1 g/dL (02-13 @ 04:24)      Allergies    Cipro (Rash)  Spiriva (Rash)    Intolerances      MEDICATIONS  (STANDING):  albuterol    0.083% 2.5 milliGRAM(s) Nebulizer every 6 hours  buDESOnide    Inhalation Suspension 0.5 milliGRAM(s) Inhalation every 12 hours  cefepime   IVPB 2000 milliGRAM(s) IV Intermittent every 12 hours  chlorhexidine 4% Liquid 1 Application(s) Topical <User Schedule>  coronavirus bivalent (EUA) Booster Vaccine (PFIZER) 0.3 milliLiter(s) IntraMuscular once  dextrose 5%. 1000 milliLiter(s) (50 mL/Hr) IV Continuous <Continuous>  dextrose 5%. 1000 milliLiter(s) (100 mL/Hr) IV Continuous <Continuous>  dextrose 50% Injectable 25 Gram(s) IV Push once  dextrose 50% Injectable 12.5 Gram(s) IV Push once  dextrose 50% Injectable 25 Gram(s) IV Push once  enoxaparin Injectable 100 milliGRAM(s) SubCutaneous every 12 hours  glucagon  Injectable 1 milliGRAM(s) IntraMuscular once  insulin glargine Injectable (LANTUS) 20 Unit(s) SubCutaneous at bedtime  insulin lispro (ADMELOG) corrective regimen sliding scale   SubCutaneous every 4 hours  levothyroxine Injectable 70 MICROGram(s) IV Push at bedtime  metoprolol tartrate 25 milliGRAM(s) Oral two times a day  metroNIDAZOLE  IVPB 500 milliGRAM(s) IV Intermittent every 8 hours  nystatin Cream 1 Application(s) Topical two times a day  nystatin Powder 1 Application(s) Topical every 12 hours  pantoprazole  Injectable 40 milliGRAM(s) IV Push daily  Parenteral Nutrition - Adult 1 Each (83 mL/Hr) TPN Continuous <Continuous>  Parenteral Nutrition - Adult 1 Each (83 mL/Hr) TPN Continuous <Continuous>  sodium bicarbonate  Infusion 0.058 mEq/kG/Hr (75 mL/Hr) IV Continuous <Continuous>  sodium chloride 0.45% 1000 milliLiter(s) (65 mL/Hr) IV Continuous <Continuous>    MEDICATIONS  (PRN):  acetaminophen     Tablet .. 650 milliGRAM(s) Oral every 6 hours PRN Temp greater or equal to 38C (100.4F)  albuterol    90 MICROgram(s) HFA Inhaler 2 Puff(s) Inhalation every 6 hours PRN Shortness of Breath and/or Wheezing  dextrose Oral Gel 15 Gram(s) Oral once PRN Blood Glucose LESS THAN 70 milliGRAM(s)/deciliter  hydrALAZINE Injectable 10 milliGRAM(s) IV Push every 6 hours PRN SBP>160  HYDROmorphone  Injectable 0.5 milliGRAM(s) IV Push every 3 hours PRN Severe Pain (7 - 10)  sodium chloride 0.9% lock flush 10 milliLiter(s) IV Push every 1 hour PRN Pre/post blood products, medications, blood draw, and to maintain line patency      RADIOLOGY:

## 2023-02-14 NOTE — CHART NOTE - NSCHARTNOTEFT_GEN_A_CORE
Clinical Nutrition PN Follow Up Note    * 73 y/o female with a PMH of CVA with left sided weakness, atrial fibrillation, emphysema, COPD, HTN - wears 2L NC at baseline BIBA, hx SBO and resection per EMS presents to the ED from [facility] for RLQ pain and r/o SBO. x1 episode of emesis, + profound diffuse abdominal pain, febrile. Has SBO, NGT to LWS. Taken to OR on  underwent ExLAP, extensive RICHARD, ileocolectomy, ventral hernia repair with MESH. Postop course complicated with ALVERTO, shock requiring pressors, transferred to ICU. + anasarca   DNR/ DNI.   **pt started on TF on 2/3 - began having loose stools and was leaking out of wounds; with EC fistula. TF now on hold, PPN re-started (). Remains with NGT and 1 DEA drain to suction.    *: Tx from SICU to [facility] (). s/p palliative GOC meeting - DNR, DNI, No feeding tube. PPN not addressed in GOC? Still has NGT to LWS.    *: Pt c/w PPN. S/p Palliative Care f/u -  family is agreeable to continuing all other measures to treat reversible issues. Spoke with surgical PA, plan to c/w PPN today. Discussed recommendation for PICC placement to help pt meet >/= 80% of ENN via TPN if plan is to c/w PN. If fistula is unable to be reversed, would consider to d/c PPN and allow for PO diet if possible ? comfort care? given GOC  *2/10: NGT removed - PPN still not being addressed whether or not to continue or within GOC.   Left side entero atmospheric fistula. Needs complex fistula system for wound care as per Dr. Castro note  *: Spoke with Surgical PA Mala Montgomery this morning, plan to c/w PPN this morning. Discussed that if pt c/w need for PN, need to have PICC line placed to initiate TPN as pt has been consistently meeting </=60% of ENN. PA plans to discuss recommendations with Dr. Castro this morning. Also discussed with PA re: BG levels and need to cover outside bag. ?need to tx to ICU for insulin gtt    *current status: pt c/w with PPN x 11 days. PN held for a short period of time last night 2/ new line needed to be placed. PICC team consulted yesterday for TPN, however PICC not placed. If PICC line is placed today, can run PN through PICC line and transition to TPN tomorrow. Palliative Care visited with family again yesterday (), family okay with attempt at TPN. Per Dr. Castro's note () "Plan is to attempt to isolate fistula so that a working lasting stoma appliance can drain it. At that point, pt would be allowed to eat again. Until then needs parenteral nutrition."     *new pertinent meds: Lantus (20 units x 24 hours), Admelog (46 units x 24 hours), Synthroid, Sandostatin, Protonix, Dilaudid, NaCl    *labs reviewed; Mg on higher end of normal limits but decreased from yesterday, will c/w 16 mEq in PN bag. Na+ WNL, when corrected for hyperglycemic, Na+ still WNL (140) - will c/w current sodium content in PN. K+ still on the higher end of normal limits and Phos elevated, will continue to keep K+ and Phos out of the bag, if K+ is low tomorrow - will add back into bag. POCTs continue to be elevated despite insulin given outside of bag (66 units total x 24 hours), will c/w 20 units of insulin inside bag. Last bili T WNL to c/w trace elements. TG level 399, will continue to not add lipids to bag; recommend to obtain new TG level. If triglyceride level downtrends, can consider giving every other day.  corrected Ca WNL, rec'd add 10mEq of Calcium Gluconate outside of PN bag as CAPS is out.        139  |  113<H>  |  43<H>  ----------------------------<  190<H>  5.1   |  18<L>  |  2.50<H>    Ca    6.8<L>      2023 07:40  Phos  6.5       Mg     2.4     02-14    TPro  5.7<L>  /  Alb  1.1<L>  /  TBili  0.6  /  DBili  x   /  AST  12<L>  /  ALT  7<L>  /  AlkPhos  75  02-13    POCT Blood Glucose.: 171 mg/dL (2023 05:17)  POCT Blood Glucose.: 314 mg/dL (2023 22:17)  POCT Blood Glucose.: 334 mg/dL (2023 17:38)  POCT Blood Glucose.: 356 mg/dL (2023 13:20)    *I&O's Detail    2023 07:01  -  2023 07:00  --------------------------------------------------------  IN:    Other (mL): 77 mL  Total IN: 77 mL    OUT:    VAC (Vacuum Assisted Closure) System (mL): 125 mL  Total OUT: 125 mL    Total NET: -48 mL  *negative fluid balance, pt with +3 generalized, and +4 L Foot, R Foot, R Arm, and L Arm edema. Will monitor/adjust total PPN volume prn. TF continues to be on hold.     *malnutrition: Pt continues to meet criteria for severe protein-calorie malnutrition in context of acute disease r/t decreased ability to meet increased nutrient needs 2/2 SBO AEB <50% ENN x >11 days, severe muscle/ fat wasting, severe edema    Estimated Needs: based on 97.4 Kg (wt from admit - current wts being skewed by severe fluid retention)  Calories: 1948- 2435 Kcal (20- 25 Kcal/Kg)  Protein: 146- 175 g (1.5- 1.8 g/Kg)  Fluids: 1948- 2435 mL (20- 25 mL/Kg)    Weight History:  Daily Weight in k.6 (2023 06:00)   - #  Height (cm): 165.1 (23 @ 16:08)  Weight (kg): 97.4 (23 @ 23:30) - admit wt ()  BMI (kg/m2): 35.7 (23 @ 23:30)  BSA (m2): 2.04 (23 @ 23:30)    PPN Recommendations: via peripheral line  Total Volume: 2000 mL  80 g  Amino Acids  100 g Dextrose  0 g Lipids 20%  (triglyceride level elevated, keep out of PN bag for now)  150 mEq Sodium Chloride  29 mEq Sodium Acetate  0 mmol Sodium Phosphate  0 mEq Potassium Chloride  0 mEq Potassium Acetate  0 mmol Potassium Phosphate  0 mEq Calcium Gluconate (CAPS out of PN solution)  16 mEq Magnesium Sulfate  200 mg Thiamine  1 ml Trace Elements  10 ml MVI  20 Units Regular Insulin    Total Calories: 660 kcal  (Provides 30% of daily energy requirements and 46% of daily protein requirements)    (osmolarity ~858)    Additional Recommendations:    1) Daily weights  2) Strict I & O's   3) Daily lyte checks including magnesium and phos  4) Weekly triglycerides/LFT checks  5) POCT q6hrs; maintain 140-180mg/dL  6) Confirm goals of care regarding LONG-TERM nutrition support specifically referring to PPN/TPN - pt is NOT on TPN (meeting <80% ENN x 11 days while on PPN). Would consider to D/C and allow for PO pleasure feeds if fistula is irreversible as per GOC     RD will continue to monitor PPN, labs, hydration, and wt prn.   Liss Bruner, MS, RDN, [facility] [phone]  Bushra Soni, MS, RDN, [facility] [phone]  Certified Nutrition  Clinical Nutrition PN Follow Up Note    * 71 y/o female with a PMH of CVA with left sided weakness, atrial fibrillation, emphysema, COPD, HTN - wears 2L NC at baseline BIBA, hx SBO and resection per EMS presents to the ED from [facility] for RLQ pain and r/o SBO. x1 episode of emesis, + profound diffuse abdominal pain, febrile. Has SBO, NGT to LWS. Taken to OR on  underwent ExLAP, extensive RICHARD, ileocolectomy, ventral hernia repair with MESH. Postop course complicated with ALVERTO, shock requiring pressors, transferred to ICU. + anasarca   DNR/ DNI.   **pt started on TF on 2/3 - began having loose stools and was leaking out of wounds; with EC fistula. TF now on hold, PPN re-started (). Remains with NGT and 1 DEA drain to suction.    *: Tx from SICU to [facility] (). s/p palliative GOC meeting - DNR, DNI, No feeding tube. PPN not addressed in GOC? Still has NGT to LWS.    *: Pt c/w PPN. S/p Palliative Care f/u -  family is agreeable to continuing all other measures to treat reversible issues. Spoke with surgical PA, plan to c/w PPN today. Discussed recommendation for PICC placement to help pt meet >/= 80% of ENN via TPN if plan is to c/w PN. If fistula is unable to be reversed, would consider to d/c PPN and allow for PO diet if possible ? comfort care? given GOC  *2/10: NGT removed - PPN still not being addressed whether or not to continue or within GOC.   Left side entero atmospheric fistula. Needs complex fistula system for wound care as per Dr. Castro note  *: Spoke with Surgical PA Mala Montgomery this morning, plan to c/w PPN this morning. Discussed that if pt c/w need for PN, need to have PICC line placed to initiate TPN as pt has been consistently meeting </=60% of ENN. PA plans to discuss recommendations with Dr. Castro this morning. Also discussed with PA re: BG levels and need to cover outside bag. ?need to tx to ICU for insulin gtt    *current status: pt c/w with PPN x 11 days. PN held for a short period of time last night 2/ new line needed to be placed. PICC team consulted yesterday for TPN, however PICC not placed. If PICC line is placed today, can run PN through PICC line and transition to TPN tomorrow. Palliative Care visited with family again yesterday (), family okay with attempt at TPN. Per Dr. Castro's note () "Plan is to attempt to isolate fistula so that a working lasting stoma appliance can drain it. At that point, pt would be allowed to eat again. Until then needs parenteral nutrition."     *new pertinent meds: Lantus (20 units x 24 hours), Admelog (46 units x 24 hours), Synthroid, Sandostatin, Protonix, Dilaudid, NaCl    *labs reviewed; Mg on higher end of normal limits but decreased from yesterday, will c/w 16 mEq in PN bag. Na+ WNL, when corrected for hyperglycemic, Na+ still WNL (140) - will c/w current sodium content in PN. K+ still on the higher end of normal limits and Phos elevated, will continue to keep K+ and Phos out of the bag, if K+ is low tomorrow - will add back into bag. POCTs continue to be elevated despite insulin given outside of bag (66 units total x 24 hours), will c/w 20 units of insulin inside bag. Discussed with medical team re: BG level, medical team will manage BG levels outside of bag. Last bili T WNL to c/w trace elements. TG level 399, will continue to not add lipids to bag; recommend to obtain new TG level. If triglyceride level downtrends, can consider giving every other day.  corrected Ca WNL, rec'd add 10mEq of Calcium Gluconate outside of PN bag as CAPS is out.        139  |  113<H>  |  43<H>  ----------------------------<  190<H>  5.1   |  18<L>  |  2.50<H>    Ca    6.8<L>      2023 07:40  Phos  6.5     02-14  Mg     2.4     02-14    TPro  5.7<L>  /  Alb  1.1<L>  /  TBili  0.6  /  DBili  x   /  AST  12<L>  /  ALT  7<L>  /  AlkPhos  75  02-13    POCT Blood Glucose.: 171 mg/dL (2023 05:17)  POCT Blood Glucose.: 314 mg/dL (2023 22:17)  POCT Blood Glucose.: 334 mg/dL (2023 17:38)  POCT Blood Glucose.: 356 mg/dL (2023 13:20)    *I&O's Detail    2023 07:01  -  2023 07:00  --------------------------------------------------------  IN:    Other (mL): 77 mL  Total IN: 77 mL    OUT:    VAC (Vacuum Assisted Closure) System (mL): 125 mL  Total OUT: 125 mL    Total NET: -48 mL  *negative fluid balance, pt with +3 generalized, and +4 L Foot, R Foot, R Arm, and L Arm edema. Will monitor/adjust total PPN volume prn. TF continues to be on hold.     *malnutrition: Pt continues to meet criteria for severe protein-calorie malnutrition in context of acute disease r/t decreased ability to meet increased nutrient needs 2/2 SBO AEB <50% ENN x >11 days, severe muscle/ fat wasting, severe edema    Estimated Needs: based on 97.4 Kg (wt from admit - current wts being skewed by severe fluid retention)  Calories: 1948- 2435 Kcal (20- 25 Kcal/Kg)  Protein: 146- 175 g (1.5- 1.8 g/Kg)  Fluids: 1948- 2435 mL (20- 25 mL/Kg)    Weight History:  Daily Weight in k.6 (2023 06:00)   - #  Height (cm): 165.1 (23 @ 16:08)  Weight (kg): 97.4 (23 @ 23:30) - admit wt ()  BMI (kg/m2): 35.7 (23 @ 23:30)  BSA (m2): 2.04 (23 @ 23:30)    PPN Recommendations: via peripheral line  Total Volume: 2000 mL  80 g  Amino Acids  100 g Dextrose  0 g Lipids 20%  (triglyceride level elevated, keep out of PN bag for now)  150 mEq Sodium Chloride  29 mEq Sodium Acetate  0 mmol Sodium Phosphate  0 mEq Potassium Chloride  0 mEq Potassium Acetate  0 mmol Potassium Phosphate  0 mEq Calcium Gluconate (CAPS out of PN solution)  16 mEq Magnesium Sulfate  200 mg Thiamine  1 ml Trace Elements  10 ml MVI  20 Units Regular Insulin    Total Calories: 660 kcal  (Provides 30% of daily energy requirements and 46% of daily protein requirements)    (osmolarity ~858)    Additional Recommendations:    1) Daily weights  2) Strict I & O's   3) Daily lyte checks including magnesium and phos  4) Weekly triglycerides/LFT checks  5) POCT q6hrs; maintain 140-180mg/dL  6) Confirm goals of care regarding LONG-TERM nutrition support specifically referring to PPN/TPN - pt is NOT on TPN (meeting <80% ENN x 11 days while on PPN). Would consider to D/C and allow for PO pleasure feeds if fistula is irreversible as per GOC     RD will continue to monitor PPN, labs, hydration, and wt prn.   Liss Bruner, MS, RDN, N [phone]  Bushra Soni, MS, RDN, [facility] [phone]  Certified Nutrition

## 2023-02-15 LAB
ALBUMIN SERPL ELPH-MCNC: 1 G/DL — LOW (ref 3.3–5)
ALP SERPL-CCNC: 75 U/L — SIGNIFICANT CHANGE UP (ref 40–120)
ALT FLD-CCNC: <6 U/L — LOW (ref 12–78)
ANION GAP SERPL CALC-SCNC: 10 MMOL/L — SIGNIFICANT CHANGE UP (ref 5–17)
APPEARANCE UR: ABNORMAL
AST SERPL-CCNC: 14 U/L — LOW (ref 15–37)
BACTERIA # UR AUTO: NEGATIVE — SIGNIFICANT CHANGE UP
BILIRUB SERPL-MCNC: 0.8 MG/DL — SIGNIFICANT CHANGE UP (ref 0.2–1.2)
BILIRUB UR-MCNC: NEGATIVE — SIGNIFICANT CHANGE UP
BUN SERPL-MCNC: 48 MG/DL — HIGH (ref 7–23)
CALCIUM SERPL-MCNC: 7.4 MG/DL — LOW (ref 8.5–10.1)
CHLORIDE SERPL-SCNC: 107 MMOL/L — SIGNIFICANT CHANGE UP (ref 96–108)
CO2 SERPL-SCNC: 20 MMOL/L — LOW (ref 22–31)
COLOR SPEC: YELLOW — SIGNIFICANT CHANGE UP
COMMENT - URINE: SIGNIFICANT CHANGE UP
COMMENT - URINE: SIGNIFICANT CHANGE UP
CREAT SERPL-MCNC: 3.08 MG/DL — HIGH (ref 0.5–1.3)
DIFF PNL FLD: ABNORMAL
EGFR: 16 ML/MIN/1.73M2 — LOW
EPI CELLS # UR: SIGNIFICANT CHANGE UP
GLUCOSE BLDC GLUCOMTR-MCNC: 110 MG/DL — HIGH (ref 70–99)
GLUCOSE BLDC GLUCOMTR-MCNC: 120 MG/DL — HIGH (ref 70–99)
GLUCOSE BLDC GLUCOMTR-MCNC: 138 MG/DL — HIGH (ref 70–99)
GLUCOSE BLDC GLUCOMTR-MCNC: 147 MG/DL — HIGH (ref 70–99)
GLUCOSE SERPL-MCNC: 142 MG/DL — HIGH (ref 70–99)
GLUCOSE UR QL: NEGATIVE — SIGNIFICANT CHANGE UP
KETONES UR-MCNC: ABNORMAL
LEUKOCYTE ESTERASE UR-ACNC: ABNORMAL
MAGNESIUM SERPL-MCNC: 2.5 MG/DL — SIGNIFICANT CHANGE UP (ref 1.6–2.6)
NITRITE UR-MCNC: NEGATIVE — SIGNIFICANT CHANGE UP
PH UR: 5 — SIGNIFICANT CHANGE UP (ref 5–8)
PHOSPHATE SERPL-MCNC: 6.3 MG/DL — HIGH (ref 2.5–4.5)
POTASSIUM SERPL-MCNC: 5 MMOL/L — SIGNIFICANT CHANGE UP (ref 3.5–5.3)
POTASSIUM SERPL-SCNC: 5 MMOL/L — SIGNIFICANT CHANGE UP (ref 3.5–5.3)
PROT SERPL-MCNC: 5.2 GM/DL — LOW (ref 6–8.3)
PROT UR-MCNC: 100
RBC CASTS # UR COMP ASSIST: ABNORMAL /HPF (ref 0–4)
SARS-COV-2 RNA SPEC QL NAA+PROBE: SIGNIFICANT CHANGE UP
SODIUM SERPL-SCNC: 137 MMOL/L — SIGNIFICANT CHANGE UP (ref 135–145)
SP GR SPEC: 1.01 — SIGNIFICANT CHANGE UP (ref 1.01–1.02)
TRIGL SERPL-MCNC: 247 MG/DL — HIGH
UROBILINOGEN FLD QL: NEGATIVE — SIGNIFICANT CHANGE UP
WBC UR QL: >50 /HPF (ref 0–5)

## 2023-02-15 PROCEDURE — 99233 SBSQ HOSP IP/OBS HIGH 50: CPT

## 2023-02-15 RX ADMIN — NYSTATIN CREAM 1 APPLICATION(S): 100000 CREAM TOPICAL at 22:45

## 2023-02-15 RX ADMIN — HYDROMORPHONE HYDROCHLORIDE 0.5 MILLIGRAM(S): 2 INJECTION INTRAMUSCULAR; INTRAVENOUS; SUBCUTANEOUS at 23:58

## 2023-02-15 RX ADMIN — Medication 0.5 MILLIGRAM(S): at 20:16

## 2023-02-15 RX ADMIN — HYDROMORPHONE HYDROCHLORIDE 0.5 MILLIGRAM(S): 2 INJECTION INTRAMUSCULAR; INTRAVENOUS; SUBCUTANEOUS at 17:41

## 2023-02-15 RX ADMIN — SODIUM CHLORIDE 100 MILLILITER(S): 9 INJECTION, SOLUTION INTRAVENOUS at 21:35

## 2023-02-15 RX ADMIN — Medication 100 MILLIGRAM(S): at 05:12

## 2023-02-15 RX ADMIN — NYSTATIN CREAM 1 APPLICATION(S): 100000 CREAM TOPICAL at 17:41

## 2023-02-15 RX ADMIN — SODIUM CHLORIDE 100 MILLILITER(S): 9 INJECTION, SOLUTION INTRAVENOUS at 08:22

## 2023-02-15 RX ADMIN — Medication 0.5 MILLIGRAM(S): at 07:52

## 2023-02-15 RX ADMIN — ALBUTEROL 2.5 MILLIGRAM(S): 90 AEROSOL, METERED ORAL at 07:52

## 2023-02-15 RX ADMIN — Medication 70 MICROGRAM(S): at 21:37

## 2023-02-15 RX ADMIN — PANTOPRAZOLE SODIUM 40 MILLIGRAM(S): 20 TABLET, DELAYED RELEASE ORAL at 11:17

## 2023-02-15 RX ADMIN — ALBUTEROL 2.5 MILLIGRAM(S): 90 AEROSOL, METERED ORAL at 13:32

## 2023-02-15 RX ADMIN — INSULIN GLARGINE 20 UNIT(S): 100 INJECTION, SOLUTION SUBCUTANEOUS at 21:52

## 2023-02-15 RX ADMIN — HYDROMORPHONE HYDROCHLORIDE 0.5 MILLIGRAM(S): 2 INJECTION INTRAMUSCULAR; INTRAVENOUS; SUBCUTANEOUS at 12:13

## 2023-02-15 RX ADMIN — NYSTATIN CREAM 1 APPLICATION(S): 100000 CREAM TOPICAL at 05:25

## 2023-02-15 RX ADMIN — ENOXAPARIN SODIUM 100 MILLIGRAM(S): 100 INJECTION SUBCUTANEOUS at 21:39

## 2023-02-15 RX ADMIN — ALBUTEROL 2.5 MILLIGRAM(S): 90 AEROSOL, METERED ORAL at 20:16

## 2023-02-15 RX ADMIN — CEFEPIME 100 MILLIGRAM(S): 1 INJECTION, POWDER, FOR SOLUTION INTRAMUSCULAR; INTRAVENOUS at 11:18

## 2023-02-15 NOTE — PRE PROCEDURE NOTE - PRE PROCEDURE EVALUATION
71yo F with SBO, now s/p surgery referred to IR for venous access 2/2 swelling causing tenuous peripheral IVs. Pt WBC 12.24, would need ID clearance prior to central line placement. Case discussed with Dr. Castro, peripheral acces OK for now.   - Plan for IR midline placement, which will be ok to use for hydration, PPN  - If PICC placement is need for TPN in the future, ID clearance may be required if there is any concern for bacteremia

## 2023-02-15 NOTE — PROGRESS NOTE ADULT - ASSESSMENT
72F with PMHx CVA, pAF on AC, emphysema/COPD on home O2, HTN, SBO with prior resection who presented with abd pain, N/V. Found to have an small bowel obstruction. Taken to OR underwent ELAP, extensive RICHARD, ileocolectomy, ventral hernia repair with MESH. Postop course complicated with ALVERTO, shock requiring pressors     ALVERTO in setting of acidemia, hyperkalemia, and worsening hyperglycemia   IVF yet renal progressing    suspect Cr will continue to rise, hopeful for stability with improving map and insertion of rizzo but not yet   Per staff only SC this AM for 130, unclear output yesterday?   renal dose meds and abx    With co-morbidities and noted discussions, patient would be a poor candidate for aggressive renal care if progression and likely not within stated GOC. This was d/c with Dr HERNÁNDEZ from palliative, he will follow up today with family      Hyperphoshatemia w  hyperkalemia   -optimize ppn, k stabilized today    Third spacing w severe hypoalbuminemia   protein supplementation      dc with RN staff, team and palliative

## 2023-02-15 NOTE — PROGRESS NOTE ADULT - SUBJECTIVE AND OBJECTIVE BOX
Date of service: 02-15-23 @ 13:37    Lying in bed in NAD  Lethargic  Poor urinary output  Has fistula drainage    ROS: no fever or chills; poorly verbal    MEDICATIONS  (STANDING):  albuterol    0.083% 2.5 milliGRAM(s) Nebulizer every 6 hours  buDESOnide    Inhalation Suspension 0.5 milliGRAM(s) Inhalation every 12 hours  cefepime   IVPB 2000 milliGRAM(s) IV Intermittent every 12 hours  chlorhexidine 4% Liquid 1 Application(s) Topical <User Schedule>  coronavirus bivalent (EUA) Booster Vaccine (PFIZER) 0.3 milliLiter(s) IntraMuscular once  dextrose 5%. 1000 milliLiter(s) (50 mL/Hr) IV Continuous <Continuous>  dextrose 5%. 1000 milliLiter(s) (100 mL/Hr) IV Continuous <Continuous>  dextrose 50% Injectable 25 Gram(s) IV Push once  dextrose 50% Injectable 12.5 Gram(s) IV Push once  dextrose 50% Injectable 25 Gram(s) IV Push once  enoxaparin Injectable 100 milliGRAM(s) SubCutaneous every 12 hours  glucagon  Injectable 1 milliGRAM(s) IntraMuscular once  insulin glargine Injectable (LANTUS) 20 Unit(s) SubCutaneous at bedtime  insulin lispro (ADMELOG) corrective regimen sliding scale   SubCutaneous every 4 hours  levothyroxine Injectable 70 MICROGram(s) IV Push at bedtime  metoprolol tartrate 25 milliGRAM(s) Oral two times a day  metroNIDAZOLE  IVPB 500 milliGRAM(s) IV Intermittent every 8 hours  nystatin Cream 1 Application(s) Topical two times a day  nystatin Powder 1 Application(s) Topical every 12 hours  pantoprazole  Injectable 40 milliGRAM(s) IV Push daily  sodium bicarbonate  Infusion 0.058 mEq/kG/Hr (75 mL/Hr) IV Continuous <Continuous>  sodium chloride 0.45% 1000 milliLiter(s) (65 mL/Hr) IV Continuous <Continuous>  sodium chloride 0.45% 1000 milliLiter(s) (100 mL/Hr) IV Continuous <Continuous>    Vital Signs Last 24 Hrs  T(C): 36.3 (15 Feb 2023 07:33), Max: 36.4 (14 Feb 2023 16:14)  T(F): 97.3 (15 Feb 2023 07:33), Max: 97.6 (14 Feb 2023 16:14)  HR: 88 (15 Feb 2023 07:33) (88 - 97)  BP: 106/43 (15 Feb 2023 07:33) (106/43 - 117/48)  BP(mean): --  RR: 18 (14 Feb 2023 21:41) (18 - 18)  SpO2: 100% (15 Feb 2023 07:33) (99% - 100%)    Parameters below as of 15 Feb 2023 07:33  Patient On (Oxygen Delivery Method): nasal cannula     Physical exam:    Constitutional:  No acute distress  HEENT: NC/AT, EOMI, PERRLA, conjunctivae clear; ears and nose atraumatic  Neck: supple; thyroid not palpable  Back: no tenderness  Respiratory: respiratory effort normal; crackles at bases  Cardiovascular: S1S2 regular, no murmurs  Abdomen: soft, mid abdomen tender, distended, positive BS  abdominal postsurgical wound with fecaloid drainage from drain site   Genitourinary: no suprapubic tenderness  Lymphatic: no LN palpable  Musculoskeletal: no muscle tenderness, no joint swelling or tenderness  Extremities: no pedal edema  Neurological/ Psychiatric: confused, judgement and insight impaired; moving all extremities  Skin: no rashes; no palpable lesions    Labs: reviewed                        8.1    12.24 )-----------( 277      ( 14 Feb 2023 08:57 )             26.0     02-15    137  |  107  |  48<H>  ----------------------------<  142<H>  5.0   |  20<L>  |  3.08<H>    Ca    7.4<L>      15 Feb 2023 05:50  Phos  6.3     02-15  Mg     2.5     02-15    TPro  5.2<L>  /  Alb  1.0<L>  /  TBili  0.8  /  DBili  x   /  AST  14<L>  /  ALT  <6<L>  /  AlkPhos  75  02-15    C-Reactive Protein, Serum: 148 mg/L (02-09-23 @ 05:22)                        8.1    12.24 )-----------( 277      ( 14 Feb 2023 08:57 )             26.0     02-14    139  |  113<H>  |  43<H>  ----------------------------<  190<H>  5.1   |  18<L>  |  2.50<H>    Ca    6.8<L>      14 Feb 2023 07:40  Phos  6.5     02-14  Mg     2.4     02-14    TPro  5.7<L>  /  Alb  1.1<L>  /  TBili  0.6  /  DBili  x   /  AST  12<L>  /  ALT  7<L>  /  AlkPhos  75  02-13    C-Reactive Protein, Serum: 148 mg/L (02-09-23 @ 05:22)               10.9   19.72 )-----------( 510      ( 27 Jan 2023 06:16 )             36.7     01-27    142  |  111<H>  |  10  ----------------------------<  233<H>  3.8   |  26  |  0.78    Ca    8.0<L>      27 Jan 2023 06:16  Phos  2.7     01-27  Mg     1.8     01-27    Culture - Acid Fast - Tissue w/Smear (collected 31 Jan 2023 18:07)  Source: .Tissue INTRAPERITONEAL ABCESS FOR CX    Culture - Fungal, Tissue (collected 31 Jan 2023 18:07)  Source: .Tissue INTRAPERITONEAL ABCESS FOR CX  Preliminary Report (01 Feb 2023 07:09):    Testing in progress    Culture - Tissue with Gram Stain (collected 31 Jan 2023 18:07)  Source: .Tissue INTRAPERITONEAL ABCESS FOR CX  Gram Stain (01 Feb 2023 04:37):    Rare polymorphonuclear leukocytes seen per low power field    No organisms seen per oil power field  Preliminary Report (02 Feb 2023 20:01):    Growth in fluid media only Klebsiella oxytoca/Raoultella ornithinolytica  Organism: Klebsiella oxytoca /Raoutella ornithinolytica (03 Feb 2023 17:25)  Organism: Klebsiella oxytoca /Raoutella ornithinolytica (03 Feb 2023 17:25)      -  Amikacin: S <=16      -  Amoxicillin/Clavulanic Acid: I 16/8      -  Ampicillin: R >16 These ampicillin results predict results for amoxicillin      -  Ampicillin/Sulbactam: R >16/8 Enterobacter, Klebsiella aerogenes, Citrobacter, and Serratia may develop resistance during prolonged therapy (3-4 days)      -  Aztreonam: S <=4      -  Cefazolin: R >16 Enterobacter, Klebsiella aerogenes, Citrobacter, and Serratia may develop resistance during prolonged therapy (3-4 days)      -  Cefepime: S <=2      -  Cefoxitin: S <=8      -  Ceftriaxone: S <=1 Enterobacter, Klebsiella aerogenes, Citrobacter, and Serratia may develop resistance during prolonged therapy      -  Ciprofloxacin: S <=0.25      -  Ertapenem: S <=0.5      -  Gentamicin: S <=2      -  Imipenem: S <=1      -  Levofloxacin: S <=0.5      -  Meropenem: S <=1      -  Piperacillin/Tazobactam: R 64      -  Tobramycin: S <=2      -  Trimethoprim/Sulfamethoxazole: S <=0.5/9.5      Method Type: ARABELLA    Culture - Blood (collected 27 Jan 2023 06:16)  Source: .Blood None  Final Report (01 Feb 2023 09:00):    No Growth Final    Culture - Blood (collected 27 Jan 2023 06:16)  Source: .Blood None  Final Report (01 Feb 2023 09:00):    No Growth Final    Radiology: all available radiological tests reviewed    < from: CT Chest No Cont (01.27.23 @ 09:53) >  Small bowel obstruction with transition point at the neck of a left lower   quadrant abdominal wall hernia. It is uncertain whether the obstruction   is due to the hernia itself or to adhesions.    Additional massive ventral hernia containing small and large bowel and   epigastric hernia containing stomach.    Right lower lobe nodules new since December 04, 2021 and could be   infectious or neoplastic. Follow-up chest CT in 3 months is recommended   to reevaluate.    < end of copied text >      Advanced directives addressed: full resuscitation

## 2023-02-15 NOTE — PROGRESS NOTE ADULT - SUBJECTIVE AND OBJECTIVE BOX
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 15: non verbal     ROS unable to obtain     Vital Signs Last 24 Hrs  T(C): 36.3 (15 Feb 2023 16:05), Max: 36.4 (14 Feb 2023 21:41)  T(F): 97.4 (15 Feb 2023 16:05), Max: 97.6 (14 Feb 2023 21:41)  HR: 91 (15 Feb 2023 16:05) (88 - 91)  BP: 104/41 (15 Feb 2023 16:05) (104/41 - 115/51)  RR: 18 (15 Feb 2023 16:05) (18 - 18)  SpO2: 99% (15 Feb 2023 16:05) (99% - 100%)    Parameters below as of 15 Feb 2023 16:05  Patient On (Oxygen Delivery Method): nasal cannula          PHYSICAL EXAM:  GENERAL: Obese, lethargic, appears ill  HEAD:  Atraumatic, Normocephalic  NECK: Supple, No JVD, Normal thyroid  NERVOUS SYSTEM:  Lethargic, opens eyes, not following commands  CHEST/LUNG: Clear to percussion bilaterally; No rales, rhonchi, wheezing, or rubs  HEART: Regular rate and rhythm; No murmurs, rubs, or gallops  ABDOMEN: +colostomy with feces present   EXTREMITIES:  2+ Peripheral Pulses, No clubbing, cyanosis, Anasarca ; 2+ leg edema  LYMPH: No lymphadenopathy noted  SKIN: No rashes or lesions      Labs:                        8.1    12.24 )-----------( 277      ( 14 Feb 2023 08:57 )             26.0     02-15    137  |  107  |  48<H>  ----------------------------<  142<H>  5.0   |  20<L>  |  3.08<H>    Ca    7.4<L>      15 Feb 2023 05:50  Phos  6.3     02-15  Mg     2.5     02-15    TPro  5.2<L>  /  Alb  1.0<L>  /  TBili  0.8  /  DBili  x   /  AST  14<L>  /  ALT  <6<L>  /  AlkPhos  75  02-15       72F with PMHx CVA, pAF on AC, emphysema/COPD on home O2, HTN, SBO with prior resection who presented with abd pain, N/V. Found to have an small bowel obstruction. Taken to OR underwent ELAP, extensive RICHARD, ileocolectomy, ventral hernia repair with MESH. Postop course complicated with ALVERTO, shock requiring pressors    #ALVERTO- worsening likely ATN due to hypoperfusion injury   Metabolic Acidosis  - continue IVF with bicarb per renal  - hyperkalemia improved will need to monitor closely    #Abd pain, SBO, Enterocutaneous fistula  S/p OR EXLAP, RICHARD, ileocolectomy, ventral hernia repair w mesh  Dressing c/d/i, surrounding erythema, +Drain  PPN  PPI Octreotide  Cefepime, Flagyl  Pain control, IS, Mobilize patient, monitor i and os     management as per Surg    # UTI Kleb, PNA  Cefepime and flagyl as above  WBC improving, monitor for fevers  ID following    #pAF on AC, HTN  Lovenox 100mg BID  Lopressor IVPB q6  Hydralazine 10mg IVP q6 PRN SBP >160  Amiodarone 200mg Discontinued (2/10)- NGT is out so no PO access- CT A/P w PO contrast shows enteric communication with atmosphere. Discussed with pharmacy no reasonable IV equivalent for PO daily, recommend dc for now and if needed adjust other agents, if afib uncontrolled may need gtt    # Right Arm Superficial Thrombophlebitis:  warm compresses  no NSAIDS sec to renal failure    #Hypothyroid  Continue w IV synthroid     #DM  Increased Lantus from 8 to 20 (2/10)  Corrective scale  Monitor fingersticks    #COPD  Albuterol  Pulmicort  Pulm following    Prognosis : Extremely poor    palliative care

## 2023-02-15 NOTE — CHART NOTE - NSCHARTNOTEFT_GEN_A_CORE
Dietitian BRIEF Note 2/15/23    Pt has been on PPN x 11 days - was stopped yesterday 2/2 arm swelling and thrombophlebitis. PICC team consulted for PICC line to transition to TPN, IR saw pt yesterday - discussing "best option for line placement"    Will need to hold off on re-ordering PN until line access available. At this point, with being DNR/ DNI/ No feeding tube, family requesting no ICU/ pressors, and declining medical status - would strongly rec'd to d/c PN as evidenced based studies indicate PN is not effective in prolonging survival and improving quality of life. It can also increase risk of complications (such as thrombophlebitis and hyperglycemia which the pt has been experiencing).  Will d/w palliative care team.  However, will provide nutrition/ hydration within GOC.    RD to follow-up tomorrow pending line placement.  Bushra Soni, MS, RDN, [facility] [phone]  Certified Nutrition  Dietitian BRIEF Note 2/15/23    Pt has been on PPN x 11 days (meeting <60% ENN) - was stopped yesterday 2/2 arm swelling and thrombophlebitis. PICC team consulted for PICC line to transition to TPN, IR saw pt yesterday - discussing "best option for line placement"  Seen this AM, peripheral IV x 1 and extended dwell IV x 1 placed - suitable for PPN as per Dr. Corbett. As far as central access - "tunneled IJ PICC or L basilic/brachial PICC are possible.. Would consider placement if TPN deemed necessary once patient is clear of signs of infection so as to avoid CLABSI." Pt would need ID clearance as per IR.  RD discussed w/ Dr. Corbett.     Discussed w/ Dr. Castro and surgical PA - Will hold off on re-ordering PN today until plan of action determined 2/2 infection and poor access. On IVF.  Dr. Castro hoping to be able to advance diet and allow for PO.  At this point, with pt being DNR/ DNI/ No feeding tube, family requesting no ICU/ pressors, and declining medical status - would strongly rec'd to d/c PN as evidenced based studies indicate PN is not effective in prolonging survival and improving quality of life. It can also increase risk of complications (such as thrombophlebitis and hyperglycemia which the pt has been experiencing).  RD d/w palliative (Dr. Elise).  However, will provide nutrition/ hydration within GOC.     RD to follow-up tomorrow pending plan of action.  Bushra Soni, MS, RDN, [facility] [phone]  Certified Nutrition

## 2023-02-15 NOTE — PROGRESS NOTE ADULT - SUBJECTIVE AND OBJECTIVE BOX
Subjective:  seen at bedside this morning, pt appears to be comfortable, no dsitress.  remains soft spoken and difficult to understand.  does not offer complaints.  denies being in pain  she required 2 PRNs of dilaudid over the last 24 hours.   renal function is worsening and UO is poor despite IVF  she does not have central access. pt is not getting parenteral nutrition currently.  I am told by surgery, if cleared by SLP, pt would be allowed to take PO.   spoke with pt's son by phone.  he is understanding of the clinical scenario -- while the wound appears to be healing well with woundvac, the renal function is worsening.    comfortable with current treatment plan as it stands.   I informed son I was able to clarify the capabilities at the hospice unit (something I had mentioned to him in previous days) -- hospice units are generally not able to care for wound vacs.  they are able to provide PPN/TPN and antibiotics on a case by case basis.  given the family's desire to allow more time for healing and the restrictions that would be needed for hospice care at a facility, we are in agreement that hospice would not necessarily be appropriate at this time.     Review of Systems:  Unable to obtain/Limited due to: clinical condition.         PHYSICAL EXAM:    Vital Signs Last 24 Hrs  T(C): 36.3 (15 Feb 2023 07:33), Max: 36.4 (14 Feb 2023 16:14)  T(F): 97.3 (15 Feb 2023 07:33), Max: 97.6 (14 Feb 2023 16:14)  HR: 88 (15 Feb 2023 07:33) (88 - 97)  BP: 106/43 (15 Feb 2023 07:33) (106/43 - 117/48)  BP(mean): --  RR: 18 (14 Feb 2023 21:41) (18 - 18)  SpO2: 100% (15 Feb 2023 07:33) (99% - 100%)    Parameters below as of 15 Feb 2023 07:33  Patient On (Oxygen Delivery Method): nasal cannula        General:  - GENERAL: Alert. No acute distress.   - EYES: EOMI. Anicteric.   - HENT: Moist mucous membranes.   - LUNGS: Clear to auscultation bilaterally. No accessory muscle use. Speaking in complete sentences.   - CARDIOVASCULAR: Regular rate and rhythm. No murmur. No JVD. No edema.   - ABDOMEN: wound vac with ostomy.   - EXTREMITIES: No edema. Non-tender.    - SKIN: Warm, no rashes or lesions on visible skin.   - NEUROLOGIC: No focal neurological deficits.        LABS:                        8.1    12.24 )-----------( 277      ( 14 Feb 2023 08:57 )             26.0     02-15    137  |  107  |  48<H>  ----------------------------<  142<H>  5.0   |  20<L>  |  3.08<H>    Ca    7.4<L>      15 Feb 2023 05:50  Phos  6.3     02-15  Mg     2.5     02-15    TPro  5.2<L>  /  Alb  1.0<L>  /  TBili  0.8  /  DBili  x   /  AST  14<L>  /  ALT  <6<L>  /  AlkPhos  75  02-15      Albumin: Albumin, Serum: 1.0 g/dL (02-15 @ 05:50)      Allergies    Cipro (Rash)  Spiriva (Rash)    Intolerances      MEDICATIONS  (STANDING):  albuterol    0.083% 2.5 milliGRAM(s) Nebulizer every 6 hours  buDESOnide    Inhalation Suspension 0.5 milliGRAM(s) Inhalation every 12 hours  chlorhexidine 4% Liquid 1 Application(s) Topical <User Schedule>  coronavirus bivalent (EUA) Booster Vaccine (PFIZER) 0.3 milliLiter(s) IntraMuscular once  dextrose 5%. 1000 milliLiter(s) (50 mL/Hr) IV Continuous <Continuous>  dextrose 5%. 1000 milliLiter(s) (100 mL/Hr) IV Continuous <Continuous>  dextrose 50% Injectable 25 Gram(s) IV Push once  dextrose 50% Injectable 12.5 Gram(s) IV Push once  dextrose 50% Injectable 25 Gram(s) IV Push once  enoxaparin Injectable 100 milliGRAM(s) SubCutaneous every 12 hours  glucagon  Injectable 1 milliGRAM(s) IntraMuscular once  insulin glargine Injectable (LANTUS) 20 Unit(s) SubCutaneous at bedtime  insulin lispro (ADMELOG) corrective regimen sliding scale   SubCutaneous every 4 hours  levothyroxine Injectable 70 MICROGram(s) IV Push at bedtime  metoprolol tartrate 25 milliGRAM(s) Oral two times a day  nystatin Cream 1 Application(s) Topical two times a day  nystatin Powder 1 Application(s) Topical every 12 hours  pantoprazole  Injectable 40 milliGRAM(s) IV Push daily  sodium bicarbonate  Infusion 0.058 mEq/kG/Hr (75 mL/Hr) IV Continuous <Continuous>  sodium chloride 0.45% 1000 milliLiter(s) (65 mL/Hr) IV Continuous <Continuous>  sodium chloride 0.45% 1000 milliLiter(s) (100 mL/Hr) IV Continuous <Continuous>    MEDICATIONS  (PRN):  acetaminophen     Tablet .. 650 milliGRAM(s) Oral every 6 hours PRN Temp greater or equal to 38C (100.4F)  albuterol    90 MICROgram(s) HFA Inhaler 2 Puff(s) Inhalation every 6 hours PRN Shortness of Breath and/or Wheezing  dextrose Oral Gel 15 Gram(s) Oral once PRN Blood Glucose LESS THAN 70 milliGRAM(s)/deciliter  hydrALAZINE Injectable 10 milliGRAM(s) IV Push every 6 hours PRN SBP>160  HYDROmorphone  Injectable 0.5 milliGRAM(s) IV Push every 3 hours PRN Severe Pain (7 - 10)  sodium chloride 0.9% lock flush 10 milliLiter(s) IV Push every 1 hour PRN Pre/post blood products, medications, blood draw, and to maintain line patency      RADIOLOGY:

## 2023-02-15 NOTE — PROGRESS NOTE ADULT - ASSESSMENT
Entero atmospheric fistula. Treated with wound protection and debridement using VAC, and ostomy over fistula. Ostomy has functioned well. Continue VAC 3X a week until granulation tissue forms. Would repeat swallowing evaluation as pt would be permitted to eat now.

## 2023-02-15 NOTE — CHART NOTE - NSCHARTNOTEFT_GEN_A_CORE
Chart reviewed    Current access is peripheral IV x 1 and extended dwell IV x 1. these are suitable for PPN. If these become tenuous please let IR know and we can place midline if needed.     With regard to central access - tunneled IJ PICC or L basilic/brachial PICC are possible.. Would consider placement if TPN deemed necessary once patient is clear of signs of infection so as to avoid CLABSI

## 2023-02-15 NOTE — PROGRESS NOTE ADULT - SUBJECTIVE AND OBJECTIVE BOX
Subjective:    Home Medications:  Albuterol (Eqv-ProAir HFA) 90 mcg/inh inhalation aerosol: 1 puff(s) inhaled every 6 hours, As Needed (2023 16:49)  amiodarone 200 mg oral tablet: 1 tab(s) orally once a day (2023 16:49)  ascorbic acid 500 mg oral tablet: 2 tab(s) orally once a day (2023 16:49)  atorvastatin 10 mg oral tablet: 1 tab(s) orally once a day (at bedtime) (2023 16:49)  benzonatate 100 mg oral capsule: 1 cap(s) orally 3 times a day (2023 21:48)  budesonide 0.5 mg/2 mL inhalation suspension: 2 milliliter(s) inhaled 2 times a day (2023 21:48)  Cepacol Sore Throat 15 mg-3.6 mg mucous membrane lozenge: 1 lozenge mucous membrane every 4 hours, As Needed (2023 21:48)  cholecalciferol 1250 mcg (50,000 intl units) oral capsule: 1 cap(s) orally every 4 weeks on Wednesday  (2023 16:49)  Coumadin 2.5 mg oral tablet: 1 tab(s) orally once a day (at bedtime)  ***ON HOLD from  to *** (2023 21:48)  Cranberry oral tablet: 1 tab(s) orally 2 times a day (2023 16:49)  cyanocobalamin 1000 mcg oral tablet: 1 tab(s) orally once a day (2023 16:49)  dilTIAZem 180 mg/24 hours oral capsule, extended release: 1 cap(s) orally once a day (2023 16:49)  DULoxetine 60 mg oral delayed release capsule: 1 cap(s) orally once a day (2023 16:49)  estradiol 0.1 mg/g vaginal cream: 0.5 gram(s) vaginal 2 times a week on Monday and Thursday (2023 21:48)  fexofenadine 180 mg oral tablet: 1 tab(s) orally once a day (2023 21:48)  fluticasone 50 mcg/inh nasal spray: 1 spray(s) nasal 2 times a day (2023 16:49)  furosemide 20 mg oral tablet: 1 tab(s) orally once a day (2023 16:49)  glipiZIDE 10 mg oral tablet, extended release: 2 tab(s) orally once a day (2023 16:49)  GlycoLax oral powder for reconstitution: 17 gram(s) orally 2 times a day (2023 16:49)  guaiFENesin 100 mg/5 mL oral liquid: 20 milliliter(s) orally every 6 hours (2023 21:48)  HumaLOG KwikPen 100 units/mL injectable solution: 10 unit(s) injectable 3 times a day (before meals) (2023 21:48)  ipratropium-albuterol 20 mcg-100 mcg/inh inhalation aerosol: 1 puff(s) inhaled 4 times a day (2023 16:49)  levothyroxine 88 mcg (0.088 mg) oral tablet: 1 tab(s) orally once a day (at bedtime) (2023 16:49)  losartan 25 mg oral tablet: 1 tab(s) orally once a day (2023 16:49)  Macrobid 100 mg oral capsule: 1 cap(s) orally 2 times a day for 5 days  ***course complete*** (2023 21:48)  methocarbamol 750 mg oral tablet: 1 tab(s) orally every 8 hours, As Needed (2023 21:48)  metoprolol tartrate 25 mg oral tablet: 1 tab(s) orally 2 times a day (2023 16:49)  Milk of Magnesia 8% oral suspension: 30 milliliter(s) orally once a day, As Needed (2023 16:49)  montelukast 10 mg oral tablet: 1 tab(s) orally once a day (at bedtime) (2023 21:48)  ondansetron 4 mg oral tablet: 1 tab(s) orally every 4 hours, As Needed (2023 21:48)  oxybutynin 5 mg oral tablet: 1 tab(s) orally 2 times a day (2023 16:49)  oxyCODONE 5 mg oral tablet: 1 tab(s) orally every 4 hours, As Needed (2023 16:49)  phytonadione 5 mg oral tablet: 0.5 tab(s) orally once  ***given at [facility] once on 23*** (2023 21:48)  pregabalin 100 mg oral capsule: 1 cap(s) orally 3 times a day (2023 16:49)  sennosides-docusate 8.6 mg-50 mg oral tablet: 2 tab(s) orally once a day (at bedtime) (2023 16:49)  Tradjenta 5 mg oral tablet: 1 tab(s) orally once a day (2023 16:49)  Tylenol 500 mg oral tablet: 2 tab(s) orally every 8 hours (2023 16:49)    MEDICATIONS  (STANDING):  albuterol    0.083% 2.5 milliGRAM(s) Nebulizer every 6 hours  buDESOnide    Inhalation Suspension 0.5 milliGRAM(s) Inhalation every 12 hours  cefepime   IVPB 2000 milliGRAM(s) IV Intermittent every 12 hours  chlorhexidine 4% Liquid 1 Application(s) Topical <User Schedule>  coronavirus bivalent (EUA) Booster Vaccine (PFIZER) 0.3 milliLiter(s) IntraMuscular once  dextrose 5%. 1000 milliLiter(s) (50 mL/Hr) IV Continuous <Continuous>  dextrose 5%. 1000 milliLiter(s) (100 mL/Hr) IV Continuous <Continuous>  dextrose 50% Injectable 25 Gram(s) IV Push once  dextrose 50% Injectable 12.5 Gram(s) IV Push once  dextrose 50% Injectable 25 Gram(s) IV Push once  enoxaparin Injectable 100 milliGRAM(s) SubCutaneous every 12 hours  glucagon  Injectable 1 milliGRAM(s) IntraMuscular once  insulin glargine Injectable (LANTUS) 20 Unit(s) SubCutaneous at bedtime  insulin lispro (ADMELOG) corrective regimen sliding scale   SubCutaneous every 4 hours  levothyroxine Injectable 70 MICROGram(s) IV Push at bedtime  metoprolol tartrate 25 milliGRAM(s) Oral two times a day  metroNIDAZOLE  IVPB 500 milliGRAM(s) IV Intermittent every 8 hours  nystatin Cream 1 Application(s) Topical two times a day  nystatin Powder 1 Application(s) Topical every 12 hours  pantoprazole  Injectable 40 milliGRAM(s) IV Push daily  sodium bicarbonate  Infusion 0.058 mEq/kG/Hr (75 mL/Hr) IV Continuous <Continuous>  sodium chloride 0.45% 1000 milliLiter(s) (65 mL/Hr) IV Continuous <Continuous>  sodium chloride 0.45% 1000 milliLiter(s) (100 mL/Hr) IV Continuous <Continuous>    MEDICATIONS  (PRN):  acetaminophen     Tablet .. 650 milliGRAM(s) Oral every 6 hours PRN Temp greater or equal to 38C (100.4F)  albuterol    90 MICROgram(s) HFA Inhaler 2 Puff(s) Inhalation every 6 hours PRN Shortness of Breath and/or Wheezing  dextrose Oral Gel 15 Gram(s) Oral once PRN Blood Glucose LESS THAN 70 milliGRAM(s)/deciliter  hydrALAZINE Injectable 10 milliGRAM(s) IV Push every 6 hours PRN SBP>160  HYDROmorphone  Injectable 0.5 milliGRAM(s) IV Push every 3 hours PRN Severe Pain (7 - 10)  sodium chloride 0.9% lock flush 10 milliLiter(s) IV Push every 1 hour PRN Pre/post blood products, medications, blood draw, and to maintain line patency      Allergies    Cipro (Rash)  Spiriva (Rash)    Intolerances        Vital Signs Last 24 Hrs  T(C): 36.3 (15 Feb 2023 07:33), Max: 36.4 (2023 16:14)  T(F): 97.3 (15 Feb 2023 07:33), Max: 97.6 (2023 16:14)  HR: 88 (15 Feb 2023 07:33) (88 - 97)  BP: 106/43 (15 Feb 2023 07:33) (106/43 - 117/48)  BP(mean): --  RR: 18 (2023 21:41) (18 - 18)  SpO2: 100% (15 Feb 2023 07:33) (99% - 100%)    Parameters below as of 15 Feb 2023 07:33  Patient On (Oxygen Delivery Method): nasal cannula          PHYSICAL EXAMINATION:    NECK:  Supple. No lymphadenopathy. Jugular venous pressure not elevated. Carotids equal.   HEART:   The cardiac impulse has a normal quality. Reg., Nl S1 and S2.  There are no murmurs, rubs or gallops noted  CHEST:  Chest is clear to auscultation. Normal respiratory effort.  ABDOMEN:  Soft and nontender.   EXTREMITIES:  There is no edema.       LABS:                        8.1    12.24 )-----------( 277      ( 2023 08:57 )             26.0     02-15    137  |  107  |  48<H>  ----------------------------<  142<H>  5.0   |  20<L>  |  3.08<H>    Ca    7.4<L>      15 Feb 2023 05:50  Phos  6.3     -15  Mg     2.5     02-15    TPro  5.2<L>  /  Alb  1.0<L>  /  TBili  0.8  /  DBili  x   /  AST  14<L>  /  ALT  <6<L>  /  AlkPhos  75  02-15      Urinalysis Basic - ( 15 Feb 2023 07:38 )    Color: Yellow / Appearance: very cloudy / S.015 / pH: x  Gluc: x / Ketone: Small  / Bili: Negative / Urobili: Negative   Blood: x / Protein: 100 / Nitrite: Negative   Leuk Esterase: Moderate / RBC: 3-5 /HPF / WBC >50 /HPF   Sq Epi: x / Non Sq Epi: Occasional / Bacteria: Negative             Subjective:    pat awake, on IV bicarbonate drip, waiting TPN drip after picc line.    Home Medications:  Albuterol (Eqv-ProAir HFA) 90 mcg/inh inhalation aerosol: 1 puff(s) inhaled every 6 hours, As Needed (2023 16:49)  amiodarone 200 mg oral tablet: 1 tab(s) orally once a day (2023 16:49)  ascorbic acid 500 mg oral tablet: 2 tab(s) orally once a day (2023 16:49)  atorvastatin 10 mg oral tablet: 1 tab(s) orally once a day (at bedtime) (2023 16:49)  benzonatate 100 mg oral capsule: 1 cap(s) orally 3 times a day (2023 21:48)  budesonide 0.5 mg/2 mL inhalation suspension: 2 milliliter(s) inhaled 2 times a day (2023 21:48)  Cepacol Sore Throat 15 mg-3.6 mg mucous membrane lozenge: 1 lozenge mucous membrane every 4 hours, As Needed (2023 21:48)  cholecalciferol 1250 mcg (50,000 intl units) oral capsule: 1 cap(s) orally every 4 weeks on Wednesday  (2023 16:49)  Coumadin 2.5 mg oral tablet: 1 tab(s) orally once a day (at bedtime)  ***ON HOLD from  to *** (2023 21:48)  Cranberry oral tablet: 1 tab(s) orally 2 times a day (2023 16:49)  cyanocobalamin 1000 mcg oral tablet: 1 tab(s) orally once a day (2023 16:49)  dilTIAZem 180 mg/24 hours oral capsule, extended release: 1 cap(s) orally once a day (2023 16:49)  DULoxetine 60 mg oral delayed release capsule: 1 cap(s) orally once a day (2023 16:49)  estradiol 0.1 mg/g vaginal cream: 0.5 gram(s) vaginal 2 times a week on Monday and Thursday (2023 21:48)  fexofenadine 180 mg oral tablet: 1 tab(s) orally once a day (2023 21:48)  fluticasone 50 mcg/inh nasal spray: 1 spray(s) nasal 2 times a day (2023 16:49)  furosemide 20 mg oral tablet: 1 tab(s) orally once a day (2023 16:49)  glipiZIDE 10 mg oral tablet, extended release: 2 tab(s) orally once a day (2023 16:49)  GlycoLax oral powder for reconstitution: 17 gram(s) orally 2 times a day (2023 16:49)  guaiFENesin 100 mg/5 mL oral liquid: 20 milliliter(s) orally every 6 hours (2023 21:48)  HumaLOG KwikPen 100 units/mL injectable solution: 10 unit(s) injectable 3 times a day (before meals) (2023 21:48)  ipratropium-albuterol 20 mcg-100 mcg/inh inhalation aerosol: 1 puff(s) inhaled 4 times a day (2023 16:49)  levothyroxine 88 mcg (0.088 mg) oral tablet: 1 tab(s) orally once a day (at bedtime) (2023 16:49)  losartan 25 mg oral tablet: 1 tab(s) orally once a day (2023 16:49)  Macrobid 100 mg oral capsule: 1 cap(s) orally 2 times a day for 5 days  ***course complete*** (2023 21:48)  methocarbamol 750 mg oral tablet: 1 tab(s) orally every 8 hours, As Needed (2023 21:48)  metoprolol tartrate 25 mg oral tablet: 1 tab(s) orally 2 times a day (2023 16:49)  Milk of Magnesia 8% oral suspension: 30 milliliter(s) orally once a day, As Needed (2023 16:49)  montelukast 10 mg oral tablet: 1 tab(s) orally once a day (at bedtime) (2023 21:48)  ondansetron 4 mg oral tablet: 1 tab(s) orally every 4 hours, As Needed (2023 21:48)  oxybutynin 5 mg oral tablet: 1 tab(s) orally 2 times a day (2023 16:49)  oxyCODONE 5 mg oral tablet: 1 tab(s) orally every 4 hours, As Needed (2023 16:49)  phytonadione 5 mg oral tablet: 0.5 tab(s) orally once  ***given at [facility] once on 23*** (2023 21:48)  pregabalin 100 mg oral capsule: 1 cap(s) orally 3 times a day (2023 16:49)  sennosides-docusate 8.6 mg-50 mg oral tablet: 2 tab(s) orally once a day (at bedtime) (2023 16:49)  Tradjenta 5 mg oral tablet: 1 tab(s) orally once a day (2023 16:49)  Tylenol 500 mg oral tablet: 2 tab(s) orally every 8 hours (2023 16:49)    MEDICATIONS  (STANDING):  albuterol    0.083% 2.5 milliGRAM(s) Nebulizer every 6 hours  buDESOnide    Inhalation Suspension 0.5 milliGRAM(s) Inhalation every 12 hours  cefepime   IVPB 2000 milliGRAM(s) IV Intermittent every 12 hours  chlorhexidine 4% Liquid 1 Application(s) Topical <User Schedule>  coronavirus bivalent (EUA) Booster Vaccine (PFIZER) 0.3 milliLiter(s) IntraMuscular once  dextrose 5%. 1000 milliLiter(s) (50 mL/Hr) IV Continuous <Continuous>  dextrose 5%. 1000 milliLiter(s) (100 mL/Hr) IV Continuous <Continuous>  dextrose 50% Injectable 25 Gram(s) IV Push once  dextrose 50% Injectable 12.5 Gram(s) IV Push once  dextrose 50% Injectable 25 Gram(s) IV Push once  enoxaparin Injectable 100 milliGRAM(s) SubCutaneous every 12 hours  glucagon  Injectable 1 milliGRAM(s) IntraMuscular once  insulin glargine Injectable (LANTUS) 20 Unit(s) SubCutaneous at bedtime  insulin lispro (ADMELOG) corrective regimen sliding scale   SubCutaneous every 4 hours  levothyroxine Injectable 70 MICROGram(s) IV Push at bedtime  metoprolol tartrate 25 milliGRAM(s) Oral two times a day  metroNIDAZOLE  IVPB 500 milliGRAM(s) IV Intermittent every 8 hours  nystatin Cream 1 Application(s) Topical two times a day  nystatin Powder 1 Application(s) Topical every 12 hours  pantoprazole  Injectable 40 milliGRAM(s) IV Push daily  sodium bicarbonate  Infusion 0.058 mEq/kG/Hr (75 mL/Hr) IV Continuous <Continuous>  sodium chloride 0.45% 1000 milliLiter(s) (65 mL/Hr) IV Continuous <Continuous>  sodium chloride 0.45% 1000 milliLiter(s) (100 mL/Hr) IV Continuous <Continuous>    MEDICATIONS  (PRN):  acetaminophen     Tablet .. 650 milliGRAM(s) Oral every 6 hours PRN Temp greater or equal to 38C (100.4F)  albuterol    90 MICROgram(s) HFA Inhaler 2 Puff(s) Inhalation every 6 hours PRN Shortness of Breath and/or Wheezing  dextrose Oral Gel 15 Gram(s) Oral once PRN Blood Glucose LESS THAN 70 milliGRAM(s)/deciliter  hydrALAZINE Injectable 10 milliGRAM(s) IV Push every 6 hours PRN SBP>160  HYDROmorphone  Injectable 0.5 milliGRAM(s) IV Push every 3 hours PRN Severe Pain (7 - 10)  sodium chloride 0.9% lock flush 10 milliLiter(s) IV Push every 1 hour PRN Pre/post blood products, medications, blood draw, and to maintain line patency      Allergies    Cipro (Rash)  Spiriva (Rash)    Intolerances        Vital Signs Last 24 Hrs  T(C): 36.3 (15 Feb 2023 07:33), Max: 36.4 (2023 16:14)  T(F): 97.3 (15 Feb 2023 07:33), Max: 97.6 (2023 16:14)  HR: 88 (15 Feb 2023 07:33) (88 - 97)  BP: 106/43 (15 Feb 2023 07:33) (106/43 - 117/48)  BP(mean): --  RR: 18 (2023 21:41) (18 - 18)  SpO2: 100% (15 Feb 2023 07:33) (99% - 100%)    Parameters below as of 15 Feb 2023 07:33  Patient On (Oxygen Delivery Method): nasal cannula          PHYSICAL EXAMINATION:    NECK:  Supple. No lymphadenopathy. Jugular venous pressure not elevated. Carotids equal.   HEART:   The cardiac impulse has a normal quality. Reg., Nl S1 and S2.  There are no murmurs, rubs or gallops noted  CHEST:  Chest crackles to auscultation. Normal respiratory effort.  ABDOMEN:  Soft and nontender.   EXTREMITIES:  There is no edema.       LABS:                        8.1    12.24 )-----------( 277      ( 2023 08:57 )             26.0     -15    137  |  107  |  48<H>  ----------------------------<  142<H>  5.0   |  20<L>  |  3.08<H>    Ca    7.4<L>      15 Feb 2023 05:50  Phos  6.3     02-15  Mg     2.5     02-15    TPro  5.2<L>  /  Alb  1.0<L>  /  TBili  0.8  /  DBili  x   /  AST  14<L>  /  ALT  <6<L>  /  AlkPhos  75  02-15      Urinalysis Basic - ( 15 Feb 2023 07:38 )    Color: Yellow / Appearance: very cloudy / S.015 / pH: x  Gluc: x / Ketone: Small  / Bili: Negative / Urobili: Negative   Blood: x / Protein: 100 / Nitrite: Negative   Leuk Esterase: Moderate / RBC: 3-5 /HPF / WBC >50 /HPF   Sq Epi: x / Non Sq Epi: Occasional / Bacteria: Negative

## 2023-02-15 NOTE — PROGRESS NOTE ADULT - SUBJECTIVE AND OBJECTIVE BOX
Patient is a 72y Female who reports no complaints as new.       MEDICATIONS  (STANDING):  albuterol    0.083% 2.5 milliGRAM(s) Nebulizer every 6 hours  buDESOnide    Inhalation Suspension 0.5 milliGRAM(s) Inhalation every 12 hours  cefepime   IVPB 2000 milliGRAM(s) IV Intermittent every 12 hours  chlorhexidine 4% Liquid 1 Application(s) Topical <User Schedule>  coronavirus bivalent (EUA) Booster Vaccine (PFIZER) 0.3 milliLiter(s) IntraMuscular once  dextrose 5%. 1000 milliLiter(s) (50 mL/Hr) IV Continuous <Continuous>  dextrose 5%. 1000 milliLiter(s) (100 mL/Hr) IV Continuous <Continuous>  dextrose 50% Injectable 25 Gram(s) IV Push once  dextrose 50% Injectable 12.5 Gram(s) IV Push once  dextrose 50% Injectable 25 Gram(s) IV Push once  enoxaparin Injectable 100 milliGRAM(s) SubCutaneous every 12 hours  glucagon  Injectable 1 milliGRAM(s) IntraMuscular once  insulin glargine Injectable (LANTUS) 20 Unit(s) SubCutaneous at bedtime  insulin lispro (ADMELOG) corrective regimen sliding scale   SubCutaneous every 4 hours  levothyroxine Injectable 70 MICROGram(s) IV Push at bedtime  metoprolol tartrate 25 milliGRAM(s) Oral two times a day  metroNIDAZOLE  IVPB 500 milliGRAM(s) IV Intermittent every 8 hours  nystatin Cream 1 Application(s) Topical two times a day  nystatin Powder 1 Application(s) Topical every 12 hours  pantoprazole  Injectable 40 milliGRAM(s) IV Push daily  sodium bicarbonate  Infusion 0.058 mEq/kG/Hr (75 mL/Hr) IV Continuous <Continuous>  sodium chloride 0.45% 1000 milliLiter(s) (65 mL/Hr) IV Continuous <Continuous>  sodium chloride 0.45% 1000 milliLiter(s) (100 mL/Hr) IV Continuous <Continuous>    MEDICATIONS  (PRN):  acetaminophen     Tablet .. 650 milliGRAM(s) Oral every 6 hours PRN Temp greater or equal to 38C (100.4F)  albuterol    90 MICROgram(s) HFA Inhaler 2 Puff(s) Inhalation every 6 hours PRN Shortness of Breath and/or Wheezing  dextrose Oral Gel 15 Gram(s) Oral once PRN Blood Glucose LESS THAN 70 milliGRAM(s)/deciliter  hydrALAZINE Injectable 10 milliGRAM(s) IV Push every 6 hours PRN SBP>160  HYDROmorphone  Injectable 0.5 milliGRAM(s) IV Push every 3 hours PRN Severe Pain (7 - 10)  sodium chloride 0.9% lock flush 10 milliLiter(s) IV Push every 1 hour PRN Pre/post blood products, medications, blood draw, and to maintain line patency        T(C): , Max: 36.4 (23 @ 16:14)  T(F): , Max: 97.6 (23 @ 16:14)  HR: 88 (02-15-23 @ 07:33)  BP: 106/43 (02-15-23 @ 07:33)  BP(mean): --  RR: 18 (23 @ 21:41)  SpO2: 100% (02-15-23 @ 07:33)  Wt(kg): --     @ 07:01  -  02-15 @ 07:00  --------------------------------------------------------  IN: 175 mL / OUT: 630 mL / NET: -455 mL      PHYSICAL EXAM:    Constitutional: NAD, chronically ill appearing  HEENT:  MM  dist  Vac abdomen  Cardiovascular: S1 and S2   Gastrointestinal:  NT/ND  Extremities: ++ peripheral edema  Neurological: Alert   : No [place]          LABS:                        8.1    12.24 )-----------( 277      ( 2023 08:57 )             26.0     15 Feb 2023 05:50    137    |  107    |  48     ----------------------------<  142    5.0     |  20     |  3.08   2023 07:40    139    |  113    |  43     ----------------------------<  190    5.1     |  18     |  2.50   2023 04:24    134    |  106    |  38     ----------------------------<  372    4.8     |  18     |  2.12   2023 14:08    131    |  105    |  34     ----------------------------<  518    5.6     |  16     |  1.83   2023 05:50    131    |  104    |  29     ----------------------------<  433    6.1     |  16     |  1.52     Ca    7.4        15 Feb 2023 05:50  Ca    6.8        2023 07:40  Ca    7.9        2023 04:24  Ca    7.6        2023 14:08  Ca    7.4        2023 05:50  Phos  6.3       15 Feb 2023 05:50  Phos  6.5       2023 07:40  Phos  7.0       2023 04:24  Phos  6.8       2023 05:50  Mg     2.5       15 Feb 2023 05:50  Mg     2.4       2023 07:40  Mg     2.5       2023 04:24  Mg     2.4       2023 05:50    TPro  5.2    /  Alb  1.0    /  TBili  0.8    /  DBili  x      /  AST  14     /  ALT  <6     /  AlkPhos  75     15 Feb 2023 05:50  TPro  5.7    /  Alb  1.1    /  TBili  0.6    /  DBili  x      /  AST  12     /  ALT  7      /  AlkPhos  75     2023 04:24  TPro  5.6    /  Alb  1.1    /  TBili  0.6    /  DBili  x      /  AST  26     /  ALT  13     /  AlkPhos  82     2023 05:50          Urine Studies:  Urinalysis Basic - ( 15 Feb 2023 07:38 )    Color: Yellow / Appearance: very cloudy / S.015 / pH: x  Gluc: x / Ketone: Small  / Bili: Negative / Urobili: Negative   Blood: x / Protein: 100 / Nitrite: Negative   Leuk Esterase: Moderate / RBC: 3-5 /HPF / WBC >50 /HPF   Sq Epi: x / Non Sq Epi: Occasional / Bacteria: Negative            RADIOLOGY & ADDITIONAL STUDIES:

## 2023-02-15 NOTE — PROGRESS NOTE ADULT - SUBJECTIVE AND OBJECTIVE BOX
Resting comfortably, NAD  VSS afeb  lungs- clear  cor-RRR  Abd- VAC changed, wound a little . Minor debridement at bedside. Soft tissue is viable. VAC and ostomy reapplied  Ext- 2 + edema

## 2023-02-15 NOTE — PROGRESS NOTE ADULT - ASSESSMENT
Assessment:     71 y/o female with multiple medical comorbidities including previous SBO admitted for recurrent SBO.  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     Process of Care  --Reviewed dx/treatment problems and alignment with Goals of Care    Physical Aspects of Care  --Pain - Patient denies at this time.  monitor for non-verbal signs of distress.  c/w current management  --Chronic Respiratory Failure - Emphysema on home O2. - No respiratory distress.  maintain home supplemental O2   --Nausea Vomiting - denies  --Debility/Weakness - PT as tolerated, OOB to chair, fall precautions  --Acute Metabolic Encephalopathy - treatment of possible underlying infections.  frequent verbal redirection/reorientation.  monitor for pain and treat as needed.  avoid sensorium altering medications when possible.   --SBO s/p exlap/paradise complicated by EC Fistula - as per surgery.  currently NPO.  advance as tolerated.      Psychological and Psychiatric Aspects of Care:   --Grief/Bereavement: emotional support provided  --Hx of psychiatric dx: none  -Pastoral Care Available PRN     Social Aspects of Care  -SW involved     Cultural Aspects  -Primary Language: English    Goals of Care:  DNR/DNI in chart (preexisting).  family are willing to a more conservative approach.  they would not want her to return to the ICU or to get pressors to support BP.  they are OK with attempts to replace IV for TPN.  on with antibiotics and other non-invasive treatments.     Ethical and Legal Aspects: NA  Capacity- I feel pt lacks capacity for major medical decisions at this time.      HCP/Surrogate: children - Moe ([phone]) and Fabby.     Code Status- DNR/DNI  MOLST- in chart -- DNR/DNI and no feeding tube  Dispo Plan- tbd (from SNF)    Discussed With: nursing Assessment:     73 y/o female with multiple medical comorbidities including previous SBO admitted for recurrent SBO.  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     Process of Care  --Reviewed dx/treatment problems and alignment with Goals of Care    Physical Aspects of Care  --Pain - Patient denies at this time.  monitor for non-verbal signs of distress.  c/w current management  --Chronic Respiratory Failure - Emphysema on home O2. - No respiratory distress.  maintain home supplemental O2   --Nausea Vomiting - denies  --Debility/Weakness - PT as tolerated, OOB to chair, fall precautions  --Acute Metabolic Encephalopathy - treatment of possible underlying infections.  frequent verbal redirection/reorientation.  monitor for pain and treat as needed.  avoid sensorium altering medications when possible.   --SBO s/p exlap/paradise complicated by EC Fistula - as per surgery.  currently NPO.  advance as tolerated.      Psychological and Psychiatric Aspects of Care:   --Grief/Bereavement: emotional support provided  --Hx of psychiatric dx: none  -Pastoral Care Available PRN     Social Aspects of Care  -SW involved     Cultural Aspects  -Primary Language: English    Goals of Care:  DNR/DNI in chart (preexisting).  family are willing to a more conservative approach.  they would not want her to return to the ICU or to get pressors to support BP.  they are OK with attempts to replace IV for TPN.  on with antibiotics and other non-invasive treatments.     Ethical and Legal Aspects: NA  Capacity- I feel pt lacks capacity for major medical decisions at this time.      HCP/Surrogate: children - Moe ([phone]) and Fabby.     Code Status- DNR/DNI  MOLST- in chart -- DNR/DNI and no feeding tube  Dispo Plan- tbd (from SNF)    Discussed With: nursing, RD, Nephrology, Surgery

## 2023-02-15 NOTE — PROGRESS NOTE ADULT - ASSESSMENT
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 She was noted with one episode of emesis and diffuse abdominal pain associated with fever. On 1/27 the patient was noted febrile to 102.8F, more lethargic, did not open eyes, did not follow commands. She received cefepime and metronidazole.     1. Intraabdominal hernia related fat necrosis with superimposed infection with KLOX. 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 Right lower lobes nodules Probable pneumonia. Allergy to cipro. Encephalopathy.   -mild leukocytosis  -abdominal fistula draining  -BC x 2 noted  -urine c/s noted  -on cefepime 2 gm IV q12h and metronidazole 500 mg IV q8h # 18  -tolerating abx well so far; no side effects noted  -aspiration precautions  -fistula is likely contained at this time  -doubt theat further abx therapy is still needed  -complete abx therapy  -f/u with surgery  -monitor abdomen  -monitor temps  -f/u CBC  -supportive care  2. Other issues:   -care per medicine    d/w medicine team

## 2023-02-16 LAB
ANION GAP SERPL CALC-SCNC: 6 MMOL/L — SIGNIFICANT CHANGE UP (ref 5–17)
BUN SERPL-MCNC: 51 MG/DL — HIGH (ref 7–23)
CALCIUM SERPL-MCNC: 7.5 MG/DL — LOW (ref 8.5–10.1)
CHLORIDE SERPL-SCNC: 108 MMOL/L — SIGNIFICANT CHANGE UP (ref 96–108)
CO2 SERPL-SCNC: 24 MMOL/L — SIGNIFICANT CHANGE UP (ref 22–31)
CREAT SERPL-MCNC: 3.57 MG/DL — HIGH (ref 0.5–1.3)
EGFR: 13 ML/MIN/1.73M2 — LOW
GLUCOSE BLDC GLUCOMTR-MCNC: 106 MG/DL — HIGH (ref 70–99)
GLUCOSE BLDC GLUCOMTR-MCNC: 75 MG/DL — SIGNIFICANT CHANGE UP (ref 70–99)
GLUCOSE BLDC GLUCOMTR-MCNC: 76 MG/DL — SIGNIFICANT CHANGE UP (ref 70–99)
GLUCOSE BLDC GLUCOMTR-MCNC: 82 MG/DL — SIGNIFICANT CHANGE UP (ref 70–99)
GLUCOSE BLDC GLUCOMTR-MCNC: 82 MG/DL — SIGNIFICANT CHANGE UP (ref 70–99)
GLUCOSE SERPL-MCNC: 95 MG/DL — SIGNIFICANT CHANGE UP (ref 70–99)
POTASSIUM SERPL-MCNC: 4.9 MMOL/L — SIGNIFICANT CHANGE UP (ref 3.5–5.3)
POTASSIUM SERPL-SCNC: 4.9 MMOL/L — SIGNIFICANT CHANGE UP (ref 3.5–5.3)
SODIUM SERPL-SCNC: 138 MMOL/L — SIGNIFICANT CHANGE UP (ref 135–145)

## 2023-02-16 PROCEDURE — 99232 SBSQ HOSP IP/OBS MODERATE 35: CPT

## 2023-02-16 RX ORDER — HYDROMORPHONE HYDROCHLORIDE 2 MG/ML
0.5 INJECTION INTRAMUSCULAR; INTRAVENOUS; SUBCUTANEOUS EVERY 4 HOURS
Refills: 0 | Status: DISCONTINUED | OUTPATIENT
Start: 2023-02-16 | End: 2023-02-22

## 2023-02-16 RX ADMIN — ALBUTEROL 2.5 MILLIGRAM(S): 90 AEROSOL, METERED ORAL at 01:53

## 2023-02-16 RX ADMIN — NYSTATIN CREAM 1 APPLICATION(S): 100000 CREAM TOPICAL at 21:18

## 2023-02-16 RX ADMIN — ALBUTEROL 2.5 MILLIGRAM(S): 90 AEROSOL, METERED ORAL at 07:51

## 2023-02-16 RX ADMIN — HYDROMORPHONE HYDROCHLORIDE 0.5 MILLIGRAM(S): 2 INJECTION INTRAMUSCULAR; INTRAVENOUS; SUBCUTANEOUS at 13:12

## 2023-02-16 RX ADMIN — HYDROMORPHONE HYDROCHLORIDE 0.5 MILLIGRAM(S): 2 INJECTION INTRAMUSCULAR; INTRAVENOUS; SUBCUTANEOUS at 01:00

## 2023-02-16 RX ADMIN — ALBUTEROL 2.5 MILLIGRAM(S): 90 AEROSOL, METERED ORAL at 20:38

## 2023-02-16 RX ADMIN — NYSTATIN CREAM 1 APPLICATION(S): 100000 CREAM TOPICAL at 19:09

## 2023-02-16 RX ADMIN — NYSTATIN CREAM 1 APPLICATION(S): 100000 CREAM TOPICAL at 05:52

## 2023-02-16 RX ADMIN — ENOXAPARIN SODIUM 100 MILLIGRAM(S): 100 INJECTION SUBCUTANEOUS at 21:11

## 2023-02-16 RX ADMIN — Medication 0.5 MILLIGRAM(S): at 07:51

## 2023-02-16 RX ADMIN — ENOXAPARIN SODIUM 100 MILLIGRAM(S): 100 INJECTION SUBCUTANEOUS at 12:39

## 2023-02-16 RX ADMIN — ALBUTEROL 2.5 MILLIGRAM(S): 90 AEROSOL, METERED ORAL at 13:26

## 2023-02-16 RX ADMIN — NYSTATIN CREAM 1 APPLICATION(S): 100000 CREAM TOPICAL at 12:39

## 2023-02-16 RX ADMIN — PANTOPRAZOLE SODIUM 40 MILLIGRAM(S): 20 TABLET, DELAYED RELEASE ORAL at 12:53

## 2023-02-16 RX ADMIN — Medication 70 MICROGRAM(S): at 21:11

## 2023-02-16 RX ADMIN — HYDROMORPHONE HYDROCHLORIDE 0.5 MILLIGRAM(S): 2 INJECTION INTRAMUSCULAR; INTRAVENOUS; SUBCUTANEOUS at 12:39

## 2023-02-16 RX ADMIN — Medication 0.5 MILLIGRAM(S): at 20:38

## 2023-02-16 NOTE — PROGRESS NOTE ADULT - SUBJECTIVE AND OBJECTIVE BOX
Subjective:    seen at bedside this morning, pt is sleeping but arousable.  she appears to be comfortable and overall, her condition appears to be unchanged over the last day  no labs yet today  she took 1 dose of PRN dilaudid overnight per my discussion with nursing. the order has completed and I will renew   VM left for pt's son today      Review of Systems:  Unable to obtain/Limited due to: clinical condition        PHYSICAL EXAM:    Vital Signs Last 24 Hrs  T(C): 36.8 (16 Feb 2023 09:02), Max: 36.8 (15 Feb 2023 23:44)  T(F): 98.3 (16 Feb 2023 09:02), Max: 98.3 (15 Feb 2023 23:44)  HR: 92 (16 Feb 2023 09:02) (66 - 93)  BP: 114/50 (16 Feb 2023 09:02) (103/45 - 131/58)  BP(mean): --  RR: 18 (16 Feb 2023 09:02) (17 - 18)  SpO2: 99% (16 Feb 2023 09:02) (96% - 100%)    Parameters below as of 16 Feb 2023 09:02  Patient On (Oxygen Delivery Method): room air      General:  - GENERAL: Alert. No acute distress.  very soft spoken and of few words.   - EYES: EOMI. Anicteric.   - HENT: Moist mucous membranes.   - LUNGS: Clear to auscultation bilaterally. No accessory muscle use.   - CARDIOVASCULAR: Regular rate and rhythm. No murmur. No JVD.   - ABDOMEN: Soft, non-tender and non-distended. No palpable masses. Normal bowel sounds   - EXTREMITIES: + LE edema. Non-tender.    - SKIN: Warm, no rashes or lesions on visible skin.   - NEUROLOGIC: No focal neurological deficits.    - PSYCHIATRIC: Cooperative.       LABS:    02-15    137  |  107  |  48<H>  ----------------------------<  142<H>  5.0   |  20<L>  |  3.08<H>    Ca    7.4<L>      15 Feb 2023 05:50  Phos  6.3     02-15  Mg     2.5     02-15    TPro  5.2<L>  /  Alb  1.0<L>  /  TBili  0.8  /  DBili  x   /  AST  14<L>  /  ALT  <6<L>  /  AlkPhos  75  02-15      Albumin: Albumin, Serum: 1.0 g/dL (02-15 @ 05:50)      Allergies    Cipro (Rash)  Spiriva (Rash)    Intolerances      MEDICATIONS  (STANDING):  albuterol    0.083% 2.5 milliGRAM(s) Nebulizer every 6 hours  buDESOnide    Inhalation Suspension 0.5 milliGRAM(s) Inhalation every 12 hours  chlorhexidine 4% Liquid 1 Application(s) Topical <User Schedule>  coronavirus bivalent (EUA) Booster Vaccine (PFIZER) 0.3 milliLiter(s) IntraMuscular once  dextrose 5%. 1000 milliLiter(s) (50 mL/Hr) IV Continuous <Continuous>  dextrose 5%. 1000 milliLiter(s) (100 mL/Hr) IV Continuous <Continuous>  dextrose 50% Injectable 25 Gram(s) IV Push once  dextrose 50% Injectable 12.5 Gram(s) IV Push once  dextrose 50% Injectable 25 Gram(s) IV Push once  enoxaparin Injectable 100 milliGRAM(s) SubCutaneous every 12 hours  glucagon  Injectable 1 milliGRAM(s) IntraMuscular once  insulin glargine Injectable (LANTUS) 20 Unit(s) SubCutaneous at bedtime  insulin lispro (ADMELOG) corrective regimen sliding scale   SubCutaneous every 4 hours  levothyroxine Injectable 70 MICROGram(s) IV Push at bedtime  metoprolol tartrate 25 milliGRAM(s) Oral two times a day  nystatin Cream 1 Application(s) Topical two times a day  nystatin Powder 1 Application(s) Topical every 12 hours  pantoprazole  Injectable 40 milliGRAM(s) IV Push daily  sodium bicarbonate  Infusion 0.058 mEq/kG/Hr (75 mL/Hr) IV Continuous <Continuous>  sodium chloride 0.45% 1000 milliLiter(s) (65 mL/Hr) IV Continuous <Continuous>  sodium chloride 0.45% 1000 milliLiter(s) (100 mL/Hr) IV Continuous <Continuous>    MEDICATIONS  (PRN):  acetaminophen     Tablet .. 650 milliGRAM(s) Oral every 6 hours PRN Temp greater or equal to 38C (100.4F)  albuterol    90 MICROgram(s) HFA Inhaler 2 Puff(s) Inhalation every 6 hours PRN Shortness of Breath and/or Wheezing  dextrose Oral Gel 15 Gram(s) Oral once PRN Blood Glucose LESS THAN 70 milliGRAM(s)/deciliter  hydrALAZINE Injectable 10 milliGRAM(s) IV Push every 6 hours PRN SBP>160  HYDROmorphone  Injectable 0.5 milliGRAM(s) IV Push every 4 hours PRN Severe Pain (7 - 10)  sodium chloride 0.9% lock flush 10 milliLiter(s) IV Push every 1 hour PRN Pre/post blood products, medications, blood draw, and to maintain line patency      RADIOLOGY:

## 2023-02-16 NOTE — SWALLOW BEDSIDE ASSESSMENT ADULT - SWALLOW EVAL: RECOMMENDED DIET
STILL SUGGEST A MINCED AND MOIST DIET WITH MILDLY THICK LIQUIDS AS THIS IS CURRENTLY THE LEAST RESTRICTIVE TOLERABLE DIET CONSISTENCIES FROM AN OROPHARYNGEAL SWALLOWING STANCE ON CLINICAL EXAM. NOTE THAT IF PHYSICIAN WISHES TO HAVE PT BE ON A LIQUID DIET FOR GI REASONS, THAN ALL LIQUIDS MUST BE MILDLY THICKENED. NOTE THAT PT SHOULD BE ASSISTED TO FEED AS NEEDED AND FED ONLY WHEN IN AN ALERT STATE WHICH MAY NOT ALWAYS BE THE CASE.
SUGGEST A MINCED AND MOIST DIET WITH MILDLY THICK LIQUIDS AS THIS IS CURRENTLY THE LEAST RESTRICTIVE TOLERABLE DIET CONSISTENCIES FROM AN OROPHARYNGEAL SWALLOWING STANCE ON CLINICAL EXAM. NOTE THAT IF PHYSICIAN WISHES TO HAVE PT BE ON A LIQUID DIET FOR GI REASONS, THAN ALL LIQUIDS MUST BE MILDLY THICKENED. I D/W DR KYLE. NOTE THAT PT SHOULD BE ASSISTED TO FEED AS NEEDED.
STILL SUGGEST A MINCED AND MOIST DIET WITH MILDLY THICK LIQUIDS AS THIS IS CURRENTLY THE LEAST RESTRICTIVE TOLERABLE DIET CONSISTENCIES FROM AN OROPHARYNGEAL SWALLOWING STANCE ON CLINICAL EXAM. NOTE THAT IF PHYSICIAN WISHES TO HAVE PT BE ON A LIQUID DIET FOR GI REASONS, THAN ALL LIQUIDS MUST BE MILDLY THICKENED. NOTE THAT PT SHOULD BE ASSISTED TO FEED AS NEEDED AND FED ONLY WHEN IN AN ALERT STATE WHICH MAY NOT ALWAYS BE THE CASE. PT MAY NOT ALWAYS BE ALERT ENOUGH TO FEED.

## 2023-02-16 NOTE — PROGRESS NOTE ADULT - SUBJECTIVE AND OBJECTIVE BOX
Resting comfortably  VSS afeb  lungs- clear  cor- RRR  Abd- + BS soft, VAC in place, fistula with bilious drainage  Ext- 2+ edema

## 2023-02-16 NOTE — PROGRESS NOTE ADULT - ASSESSMENT
Assessment:     73 y/o female with multiple medical comorbidities including previous SBO admitted for recurrent SBO.  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     Process of Care  --Reviewed dx/treatment problems and alignment with Goals of Care    Physical Aspects of Care  --Pain - Patient denies at this time.  monitor for non-verbal signs of distress.  c/w current management  --Chronic Respiratory Failure - Emphysema on home O2. - No respiratory distress.  maintain home supplemental O2   --Nausea Vomiting - denies  --Debility/Weakness - PT as tolerated, OOB to chair, fall precautions  --Acute Metabolic Encephalopathy - treatment of possible underlying infections.  frequent verbal redirection/reorientation.  monitor for pain and treat as needed.  avoid sensorium altering medications when possible.   --SBO s/p exlap/paradise complicated by EC Fistula - as per surgery.  currently NPO.  advance as tolerated.      Psychological and Psychiatric Aspects of Care:   --Grief/Bereavement: emotional support provided  --Hx of psychiatric dx: none  -Pastoral Care Available PRN     Social Aspects of Care  -SW involved     Cultural Aspects  -Primary Language: English    Goals of Care:  DNR/DNI in chart (preexisting).  family are willing to a more conservative approach.  they would not want her to return to the ICU or to get pressors to support BP.  they are OK with attempts to replace IV for TPN.  on with antibiotics and other non-invasive treatments.     Ethical and Legal Aspects: NA  Capacity- I feel pt lacks capacity for major medical decisions at this time.      HCP/Surrogate: children - Moe ([phone]) and Fabby.     Code Status- DNR/DNI  MOLST- in chart -- DNR/DNI and no feeding tube  Dispo Plan- tbd (from SNF)    Discussed With: nursing, RD, Nephrology, Surgery  Assessment:     73 y/o female with multiple medical comorbidities including previous SBO admitted for recurrent SBO.  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     Process of Care  --Reviewed dx/treatment problems and alignment with Goals of Care    Physical Aspects of Care  --Pain - Patient denies at this time.  monitor for non-verbal signs of distress.  dilaudid IV reordered.   --Chronic Respiratory Failure - Emphysema on home O2. - No respiratory distress.  maintain home supplemental O2   --Nausea Vomiting - denies  --Debility/Weakness - PT as tolerated, OOB to chair, fall precautions  --Acute Metabolic Encephalopathy - treatment of possible underlying infections.  frequent verbal redirection/reorientation.  monitor for pain and treat as needed.  avoid sensorium altering medications when possible.   pending SLP eval for PO trials  --SBO s/p exlap/paradise complicated by EC Fistula - as per surgery.  currently NPO.  advance as tolerated.   --Acute Renal Failure - nephrology is following.  pt getting IVF.      Psychological and Psychiatric Aspects of Care:   --Grief/Bereavement: emotional support provided  --Hx of psychiatric dx: none  -Pastoral Care Available PRN     Social Aspects of Care  -SW involved     Cultural Aspects  -Primary Language: English    Goals of Care:  DNR/DNI in chart (preexisting).  family are willing to a more conservative approach.  they would not want her to return to the ICU or to get pressors to support BP.  they are OK with attempts to replace IV for TPN.  on with antibiotics and other non-invasive treatments.     Ethical and Legal Aspects: NA  Capacity- I feel pt lacks capacity for major medical decisions at this time.      HCP/Surrogate: children - Moe ([phone]) and Fabby.     Code Status- DNR/DNI  MOLST- in chart -- DNR/DNI and no feeding tube  Dispo Plan- tbd (from SNF)    Discussed With: nursing, Medicine Rashmi

## 2023-02-16 NOTE — SWALLOW BEDSIDE ASSESSMENT ADULT - NS SPL SWALLOW CLINIC TRIAL FT
Pt was fatigued/passive but did willingly accept PO. During limited intakes, pt exhibited Oropharyngeal Dysphagia which subjectively appeared to be a grossly functional condition with a restricted inventory of modified food consistencies when she was alert which was NOT always the case. Overt aspiration signs noted with thin liquids only. Dysphagia in setting of many missing teeth, cardiopulmonary dz and old CVA with residual left debra. Dysphagia is chronic/pre-existing. Note that coarse solids not offered given severity of Dysphagia, considering her many missing teeth and given expressed food preferences.

## 2023-02-16 NOTE — SWALLOW BEDSIDE ASSESSMENT ADULT - PHARYNGEAL PHASE
Swallow trigger was timely to mildly latent and laryngeal lift on palpation during swallowing trials was mildly to moderately reduced but felt to be grossly functional with the above modified food consistencies. Post prandial coughing noted with thin liquids, despite attempts to cue to employ compensatory swallow maneuvers. NO behavioral aspiration signs exhibited with mildly thick liquids, puree foods and minced/moist foods.

## 2023-02-16 NOTE — CHART NOTE - NSCHARTNOTEFT_GEN_A_CORE
Dietitian BRIEF Note 2/16/23    PPN being held currently, pending new SLP eval. As per Dr. Castro, if cleared by SLP can start PO diet. Wound vac remains in place.     As per palliative care note yesterday: "I informed son I was able to clarify the capabilities at the hospice unit (something I had mentioned to him in previous days) -- hospice units are generally not able to care for wound vacs.  they are able to provide PPN/TPN and antibiotics on a case by case basis.  given the family's desire to allow more time for healing and the restrictions that would be needed for hospice care at a facility, we are in agreement that hospice would not necessarily be appropriate at this time."    Will hold off on PPN until SLP re-eval. Suggest to advance diet as tolerated per SLP and allow for pleasure feeds. Can add Premier protein shakes TID if able to consume PO.    RD will continue to monitor nutrition status, labs, hydration, and wt prn.  Bushra Soni, MS, RDN, [facility] [phone]  Certified Nutrition

## 2023-02-16 NOTE — SWALLOW BEDSIDE ASSESSMENT ADULT - COMMENTS
The patient was admitted to  from [facility] with abdominal discomfort. Hospital course is notable for SBO status post ex lap with ileocolectomy/ileostomy/ventral hernia status post repair with mesh. Hospital course complicated by enterocutaneous fistula, ALVERTO, UTI, PNA, CHF exacerbation, right arm thrombophlebitis, low albumin, and encephalopathy. Pt was initially seen for speech pathology testing on 1/24/23 and than for a re-exam on 1/27/23 at which times acute speech path f/u was not felt to be indicated. A speech pathology RECONSULT(exam number 3) was requested to today.  Note that pt is now being followed by Palliative Care. The pt has a selected underlying prior medical history which is remarkable for depression, GERD, COPD, HTN, HLD, DM type 2, polyneuropathy, atrial fibrillation, CHF, Vitamin D deficiency, obesity, OA, anemia, past C-Diff, prior PE and previous SBO/perforated diverticulitis status post colostomy with reversal. Additionally, the pt previously sustained a stroke with resultant chronic left sided weakness as well as chronic Oropharyngeal Dysphagia warranting diet texture modification [facility]. Pt's liquids were thickened to nectar consistency at [facility]. See below for additional prior medical information.

## 2023-02-16 NOTE — SWALLOW BEDSIDE ASSESSMENT ADULT - ASR SWALLOW DENTITION
Notable for edentulous maxillary status and presence of natural mandibular dentition with several missing lower teeth.

## 2023-02-16 NOTE — SWALLOW BEDSIDE ASSESSMENT ADULT - SWALLOW EVAL: SECRETION MANAGEMENT
No drooling noted. Testing otherwise limited due to decreased active participation.

## 2023-02-16 NOTE — PROGRESS NOTE ADULT - SUBJECTIVE AND OBJECTIVE BOX
Patient is a 72y old  Female h/o CVA, Bed bound, Obese, who presents with a chief complaint of bowel obstruction/ischemic mesentery (10 Feb 2023 09:07)  -s/p exploratory laparotomy, RICHARD, Colostomy placement  Hosp course complicated by EC fistula, ALVERTO due to ATN    2. 15: non verbal   2.16: non verbal, awake, no obvious distress    ROS unable to obtain     Vital Signs Last 24 Hrs  T(C): 36.8 (16 Feb 2023 09:02), Max: 36.8 (15 Feb 2023 23:44)  T(F): 98.3 (16 Feb 2023 09:02), Max: 98.3 (15 Feb 2023 23:44)  HR: 92 (16 Feb 2023 09:02) (66 - 93)  BP: 114/50 (16 Feb 2023 09:02) (103/45 - 131/58)  RR: 18 (16 Feb 2023 09:02) (17 - 18)  SpO2: 99% (16 Feb 2023 09:02) (96% - 100%)    Parameters below as of 16 Feb 2023 09:02  Patient On (Oxygen Delivery Method): room air        PHYSICAL EXAM:  GENERAL: Obese, lethargic, appears ill  HEAD:  Atraumatic, Normocephalic  NECK: Supple, No JVD, Normal thyroid  NERVOUS SYSTEM:  Lethargic, opens eyes, not following commands  CHEST/LUNG: Clear to percussion bilaterally; No rales, rhonchi, wheezing, or rubs  HEART: Regular rate and rhythm; No murmurs, rubs, or gallops  ABDOMEN: +colostomy with feces present , large wound VAC present on abd  EXTREMITIES:  2+ Peripheral Pulses, No clubbing, cyanosis, Anasarca ; 2+ leg edema  LYMPH: No lymphadenopathy noted  SKIN: No rashes or lesions      Labs:    02-16    138  |  108  |  51<H>  ----------------------------<  95  4.9   |  24  |  3.57<H>    Ca    7.5<L>      16 Feb 2023 13:34  Phos  6.3     02-15  Mg     2.5     02-15    TPro  5.2<L>  /  Alb  1.0<L>  /  TBili  0.8  /  DBili  x   /  AST  14<L>  /  ALT  <6<L>  /  AlkPhos  75  02-15      MEDICATIONS  (STANDING):  albuterol    0.083% 2.5 milliGRAM(s) Nebulizer every 6 hours  buDESOnide    Inhalation Suspension 0.5 milliGRAM(s) Inhalation every 12 hours  chlorhexidine 4% Liquid 1 Application(s) Topical <User Schedule>  coronavirus bivalent (EUA) Booster Vaccine (PFIZER) 0.3 milliLiter(s) IntraMuscular once  enoxaparin Injectable 100 milliGRAM(s) SubCutaneous every 12 hours  glucagon  Injectable 1 milliGRAM(s) IntraMuscular once  insulin glargine Injectable (LANTUS) 20 Unit(s) SubCutaneous at bedtime  insulin lispro (ADMELOG) corrective regimen sliding scale   SubCutaneous every 4 hours  levothyroxine Injectable 70 MICROGram(s) IV Push at bedtime  metoprolol tartrate 25 milliGRAM(s) Oral two times a day  nystatin Cream 1 Application(s) Topical two times a day  nystatin Powder 1 Application(s) Topical every 12 hours  pantoprazole  Injectable 40 milliGRAM(s) IV Push daily  sodium bicarbonate  Infusion 0.058 mEq/kG/Hr (75 mL/Hr) IV Continuous <Continuous>  sodium chloride 0.45% 1000 milliLiter(s) (65 mL/Hr) IV Continuous <Continuous>  sodium chloride 0.45% 1000 milliLiter(s) (100 mL/Hr) IV Continuous <Continuous>     72F with PMHx CVA, pAF on AC, emphysema/COPD on home O2, HTN, SBO with prior resection who presented with abd pain, N/V. Found to have an small bowel obstruction. Taken to OR underwent ELAP, extensive RICHARD, ileocolectomy, ventral hernia repair with MESH. Postop course complicated with ALVERTO, shock requiring pressors    #ALVERTO- worsening likely ATN due to hypoperfusion injury   Acidosis resolved on bicarb  - continue IVF with bicarb per renal  - hyperkalemia improved will need to monitor closely    #Abd pain, SBO, Enterocutaneous fistula  S/p expl laparotomy , RICHARD, ileocolectomy, ventral hernia repair w mesh, colostomy  +wound VAC placed  PPN  PPI   s/p Cefepime/ Flagyl  Pain control, IS, Mobilize patient     management as per Surg    # UTI Kleb, PNA  s/p IV Abx  WBC improving, monitor for fevers  ID following    #pAF on AC, HTN  Lovenox 100mg BID  Lopressor IVPB q6  Hydralazine 10mg IVP q6 PRN SBP >160  Amiodarone 200mg Discontinued (2/10)- NGT is out so no PO access- CT A/P w PO contrast shows enteric communication with atmosphere. Discussed with pharmacy no reasonable IV equivalent for PO daily, recommend dc for now and if needed adjust other agents, if afib uncontrolled may need gtt    # Right Arm Superficial Thrombophlebitis:  warm compresses  no NSAIDS sec to renal failure  on anticoagulation     #Hypothyroid  Continue w IV synthroid     #DM  BGM well controlled,  c/w Lantus 20u/day   Corrective scale  Monitor fingersticks    #COPD  Albuterol  Pulmicort  Pulm following    Prognosis : Extremely poor    palliative care

## 2023-02-16 NOTE — SWALLOW BEDSIDE ASSESSMENT ADULT - ORAL PREPARATORY PHASE
Pt was variably fatigued/passive but did willingly accept PO when in an alert state at times. Oral aperture was decreased when accepting PO and labial grading on utensils was functionally reduced on the left.
Pt was variably fatigued/passive but did willingly accept PO when in an alert state. Oral aperture was decreased when accepting PO and labial grading on utensils was functionally reduced on the left.
Pt was passive but did willingly accept PO. Oral aperture was decreased when accepting PO and labial grading on utensils was functionally reduced on the left.

## 2023-02-16 NOTE — SWALLOW BEDSIDE ASSESSMENT ADULT - ORAL PHASE
Bolus formation/transfer were moderately prolonged and discontinuous but felt to be grossly mechanically functional with several of the above modified food textures. Piecemeal deglutition was evident. Mild tongue debris noted on left > right with minced/moist foods.

## 2023-02-16 NOTE — PROGRESS NOTE ADULT - SUBJECTIVE AND OBJECTIVE BOX
Patient is a 72y Female who reports no complaints   not very verbal    cleared to have po but pt not eating per staff     MEDICATIONS  (STANDING):  albuterol    0.083% 2.5 milliGRAM(s) Nebulizer every 6 hours  buDESOnide    Inhalation Suspension 0.5 milliGRAM(s) Inhalation every 12 hours  chlorhexidine 4% Liquid 1 Application(s) Topical <User Schedule>  coronavirus bivalent (EUA) Booster Vaccine (PFIZER) 0.3 milliLiter(s) IntraMuscular once  dextrose 5%. 1000 milliLiter(s) (50 mL/Hr) IV Continuous <Continuous>  dextrose 5%. 1000 milliLiter(s) (100 mL/Hr) IV Continuous <Continuous>  dextrose 50% Injectable 25 Gram(s) IV Push once  dextrose 50% Injectable 12.5 Gram(s) IV Push once  dextrose 50% Injectable 25 Gram(s) IV Push once  enoxaparin Injectable 100 milliGRAM(s) SubCutaneous every 12 hours  glucagon  Injectable 1 milliGRAM(s) IntraMuscular once  insulin glargine Injectable (LANTUS) 20 Unit(s) SubCutaneous at bedtime  insulin lispro (ADMELOG) corrective regimen sliding scale   SubCutaneous every 4 hours  levothyroxine Injectable 70 MICROGram(s) IV Push at bedtime  metoprolol tartrate 25 milliGRAM(s) Oral two times a day  nystatin Cream 1 Application(s) Topical two times a day  nystatin Powder 1 Application(s) Topical every 12 hours  pantoprazole  Injectable 40 milliGRAM(s) IV Push daily  sodium bicarbonate  Infusion 0.058 mEq/kG/Hr (75 mL/Hr) IV Continuous <Continuous>  sodium chloride 0.45% 1000 milliLiter(s) (100 mL/Hr) IV Continuous <Continuous>      I&O's Detail    2023 07:01  -  2023 07:00  --------------------------------------------------------  IN:    sodium chloride 0.45% w/ Additives: 1000 mL  Total IN: 1000 mL    OUT:    Stool (mL): 300 mL    Ureteral Catheter (mL): 400 mL    VAC (Vacuum Assisted Closure) System (mL): 153 mL  Total OUT: 853 mL    Total NET: 147 mL          PHYSICAL EXAM:    Constitutional: NAD, chronically ill appearing  HEENT:  MM  Resp: distant AE  Vac abdomen  Cardiovascular: S1 and S2   Gastrointestinal:  NT/ND  Extremities: ++ peripheral edema  Neurological: Arousable   :  [place] +          LABS:               138    |  108    |  51     ----------------------------<  95        2023 13:34  4.9     |  24     |  3.57     137    |  107    |  48     ----------------------------<  142       15 Feb 2023 05:50  5.0     |  20     |  3.08       Ca    7.5        2023 13:34    Phos  6.3       15 Feb 2023 05:50    Mg     2.5       15 Feb 2023 05:50        TPro  5.2    /  Alb  1.0    /  TBili  0.8    /        15 Feb 2023 05:50  DBili  x      /  AST  14     /  ALT  <6     /  AlkPhos  75           Urine Studies:  Urinalysis Basic - ( 15 Feb 2023 07:38 )    Color: Yellow / Appearance: very cloudy / S.015 / pH: x  Gluc: x / Ketone: Small  / Bili: Negative / Urobili: Negative   Blood: x / Protein: 100 / Nitrite: Negative   Leuk Esterase: Moderate / RBC: 3-5 /HPF / WBC >50 /HPF   Sq Epi: x / Non Sq Epi: Occasional / Bacteria: Negative          RADIOLOGY & ADDITIONAL STUDIES:

## 2023-02-16 NOTE — SWALLOW BEDSIDE ASSESSMENT ADULT - DIET PRIOR TO ADMI
Pt with h/o Oropharyngeal Dysphagia. Beverage thickener was utilized PTH([place] Consistency).
Pt with h/o Oropharyngeal Dysphagia. Beverage thickener was utilized PTH([place] Consistency).
Pt with h/o Oropharyngeal Dysphagia. Beverage thickener was utilized PTH([place] Consistency).

## 2023-02-16 NOTE — SWALLOW BEDSIDE ASSESSMENT ADULT - SWALLOW EVAL: CRITERIA FOR SKILLED INTERVENTION MET
STILL DO NOT FEEL THAT ACUTE SPEECH PATHOLOGY FOLLOW UP WOULD CHANGE CLINICAL MANAGEMENT/OUTCOME IN HOSPITAL SETTING. PT'S DYSPHAGIA IS A CHRONIC PRE-EXISTING CONDITION FOR WHICH SHE COMPLETED SWALLOW THERAPY PTH AND HER COMMUNICATIVE INTEGRITY IS FELT TO BE AT OR VERY CLOSE TO USUAL STATE WHEN SHE'S ALERT. DO NOT FEEL THATY TRADITIONAL SPEECH PATHOLOGY INTERVENTION WOULD BE OF SIGNIFICANT CLINICAL BENEFIT WHILE SHE IS IN HOSPITAL. AS SUCH, THIS SERVICE WILL NOT ACTIVELY FOLLOW. RECONSULT PRN SHOULD STATUS CHANGE AND CONDITION WARRANT.
STILL DO NOT FEEL THAT ACUTE SPEECH PATHOLOGY FOLLOW UP WOULD CHANGE CLINICAL MANAGEMENT/OUTCOME IN HOSPITAL SETTING. PT'S DYSPHAGIA IS A CHRONIC PRE-EXISTING CONDITION FOR WHICH SHE COMPLETED SWALLOW THERAPY PTH AND HER COMMUNICATIVE INTEGRITY IS FELT TO BE AT OR VERY CLOSE TO USUAL STATE WHEN SHE'S ALERT. DO NOT FEEL THAT TRADITIONAL SPEECH PATHOLOGY INTERVENTION WOULD BE OF SIGNIFICANT CLINICAL BENEFIT WHILE SHE IS IN HOSPITAL. AS SUCH, THIS SERVICE WILL NOT ACTIVELY FOLLOW. RECONSULT PRN SHOULD STATUS CHANGE AND CONDITION WARRANT.
DO NOT FEEL THAT ACUTE SPEECH PATHOLOGY FOLLOW UP WOULD CHANGE CLINICAL MANAGEMENT/OUTCOME IN HOSPITAL SETTING. PT'S DYSPHAGIA IS A CHRONIC PRE-EXISTING CONDITION FOR WHICH SHE COMPLETED SWALLOW THERAPY PTH AND HER COMMUNICATIVE INTEGRITY IS FELT TO BE AT OR VERY CLOSE TO USUAL STATE. DO NOT FEEL THATY TRADITIONAL SPEECH PATHOLOGY INTERVENTION WOULD BE OF SIGNIFICANT BENEFIT WHILE SHE IS IN HOSPITAL. AS SUCH, THIS SERVICE WILL NOT ACTIVELY FOLLOW. RECONSULT PRN SHOULD STATUS CHANGE AND CONDITION WARRANT.

## 2023-02-16 NOTE — SWALLOW BEDSIDE ASSESSMENT ADULT - SLP GENERAL OBSERVATIONS
On encounter, obesity was evident. A mild left lip lag was apparent which is chronic. Congestion is noted at subpharyngeal level(pt with known CHF). She maintained an open mouth posture at rest. Pt was awake but fatigued. Her affect was flat. Pt was interactive but communicatively passive/hypoactive and needed cues to sustain prolonged joint attention. When pt was arousable, she was able to verbalize during communicative probes. At these limited times, pt's speech output was negatively impacted by dry oral mucosa, many missing teeth and variable fatigue which are issues that are not amenable to traditional ST. Additionally, her verbalizations were linguistically intact and contextually appropriate, but tended to be brief/tangential. Pt was able to verbalize some needs on a concrete level when in an alert state which was not always the case. Communicative integrity likely at or close to pre-hospitalization state

## 2023-02-16 NOTE — SWALLOW BEDSIDE ASSESSMENT ADULT - SWALLOW EVAL: FEEDING ASSISTANCE
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 SHE BENEFITTED FROM FEEDING ASSISTANCE AT TIMES.
PT WAS VARIABLY FATIGUED AND PASSIVE. 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 SHE BENEFITTED FROM FEEDING ASSISTANCE AT TIMES. PT NEEDS TO BE ALERT WHEN BEING FED.
PT WAS VARIABLY FATIGUED AND PASSIVE. 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 SHE BENEFITTED FROM FEEDING ASSISTANCE AT TIMES. PT NEEDS TO BE ALERT WHEN BEING FED.

## 2023-02-16 NOTE — PROGRESS NOTE ADULT - ASSESSMENT
72F with PMHx CVA, pAF on AC, emphysema/COPD on home O2, HTN, SBO with prior resection who presented with abd pain, N/V. Found to have an small bowel obstruction. Taken to OR underwent ELAP, extensive RICHARD, ileocolectomy, ventral hernia repair with MESH. Postop course complicated with ALVERTO, shock requiring pressors     ALVERTO in setting of acidemia, hyperkalemia, and worsening hyperglycemia   Renal progressing   suspect Cr will continue to rise, hopeful for stability    renal dose meds and abx    With co-morbidities and noted discussions, patient would be a poor candidate for aggressive renal care if progression and likely not within stated GOC.      Hyperphoshatemia w  hyperkalemia   -optimize ppn, k stabilized today  - start oral binders when taking po    Third spacing w severe hypoalbuminemia   protein supplementation      dw RN staff, team and palliative    (* pt seen earlier, note now)

## 2023-02-16 NOTE — SWALLOW BEDSIDE ASSESSMENT ADULT - SWALLOW EVAL: RECOMMENDED FEEDING/EATING TECHNIQUES
check mouth frequently for oral residue/pocketing/crush medication (when feasible)/position upright (90 degrees)/small sips/bites

## 2023-02-16 NOTE — SWALLOW BEDSIDE ASSESSMENT ADULT - ASR SWALLOW LINGUAL MOBILITY
Effort was reduced when executing volitional tongue actions but no overt lingual focality evident during reflexive flavia movements.

## 2023-02-16 NOTE — SWALLOW BEDSIDE ASSESSMENT ADULT - ASR SWALLOW LABIAL MOBILITY
A chronic mild left lip lag was evident which is pre-existing.

## 2023-02-16 NOTE — PROGRESS NOTE ADULT - ASSESSMENT
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  Treated with wound protection and debridement using VAC, and ostomy over fistula. Ostomy has functioned well. Continue VAC 3X a week until granulation tissue forms. Would repeat swallowing evaluation as pt would be permitted to eat now. Urine output improved with hydration. Follow up labs in morning.

## 2023-02-16 NOTE — SWALLOW BEDSIDE ASSESSMENT ADULT - ADDITIONAL RECOMMENDATIONS
1) SURGERY, HOSPITALIST, PALLIATIVE CARE AND NUTRITION F/U. PT AT CHRONIC NUTRITION RISK GIVEN IRREVERSIBLE DYSPHAGIA, NEED FOR DIET TEXTURE MODIFICATION, LOW ALBUMIN, AND CONSIDERING THAT SHE TENDS TO VARIABLY BECOME LETHARGIC WHICH HINDERS THE AMOUNT OF TIMES SHE IS ACTUALLY FEEDABLE. WITH THAT BEING STATED, I DO NOT FEEL THAT PLACEMENT OF A FEEDING TUBE WOULD NECESSARILY ENHANCE HER LIFE QUALITY GIVEN HER EXTENSIVE COMORBIDITIES.         2) KEEP HEAD OF BED AT SOMEWHAT OF AN UPRIGHT ANGLE WHEN LYING DOWN TO GUARD AGAINST SALIVA ASPIRATION. PT DOES HAVE A LONGSTANDING H/O ASPIRATING THIN LIQUIDS AND SALIVA IS A THIN LIQUID. SALIVA ASPIRATION IS NOT ENTIRELY AVOIDABLE. A PEG WOULD NOT ELIMINATE RISK FOR5 SALIVA ASPIRATION.    3) PT WITH SUBPHARYNGEAL CONGESTION IN SETTING OF CHF.  NO LILIAN AUDIBLE CONGESTION NOTED ON AUSCULTATION IN PHARYNGEAL REGION.

## 2023-02-16 NOTE — SWALLOW BEDSIDE ASSESSMENT ADULT - SWALLOW EVAL: DIAGNOSIS
1) On encounter, obesity was evident. A mild left lip lag was apparent which is chronic. Congestion is noted at subpharyngeal level(pt with known CHF). She maintained an open mouth posture at rest. Pt was awake but fatigued. Her affect was flat. Pt was interactive but communicatively passive/hypoactive and needed cues to sustain prolonged joint attention. When pt was arousable, she was able to verbalize during communicative probes. At these limited times, pt's speech output was negatively impacted by dry oral mucosa, many missing teeth and variable fatigue which are issues that are not amenable to traditional ST. Additionally, her verbalizations were linguistically intact and contextually appropriate, but tended to be brief/tangential. Pt was able to verbalize some needs on a concrete level when in an alert state which was not always the case. Communicative integrity likely at or close to pre-hospitalization state.

## 2023-02-16 NOTE — SWALLOW BEDSIDE ASSESSMENT ADULT - ASR SWALLOW RECOMMEND DIAG
DO NOT FEEL THAT AN MBS WOULD CHANGE CLINICAL MANAGEMENT AT THIS TIME.

## 2023-02-16 NOTE — SWALLOW BEDSIDE ASSESSMENT ADULT - CONSISTENCIES ADMINISTERED
thin liquid/mildly thick/pureed/minced & moist

## 2023-02-16 NOTE — PROGRESS NOTE ADULT - SUBJECTIVE AND OBJECTIVE BOX
Subjective:    pat no new complaint, lying in bed, no respiratory distress.    Home Medications:  Albuterol (Eqv-ProAir HFA) 90 mcg/inh inhalation aerosol: 1 puff(s) inhaled every 6 hours, As Needed (2023 16:49)  amiodarone 200 mg oral tablet: 1 tab(s) orally once a day (2023 16:49)  ascorbic acid 500 mg oral tablet: 2 tab(s) orally once a day (2023 16:49)  atorvastatin 10 mg oral tablet: 1 tab(s) orally once a day (at bedtime) (2023 16:49)  benzonatate 100 mg oral capsule: 1 cap(s) orally 3 times a day (2023 21:48)  budesonide 0.5 mg/2 mL inhalation suspension: 2 milliliter(s) inhaled 2 times a day (2023 21:48)  Cepacol Sore Throat 15 mg-3.6 mg mucous membrane lozenge: 1 lozenge mucous membrane every 4 hours, As Needed (2023 21:48)  cholecalciferol 1250 mcg (50,000 intl units) oral capsule: 1 cap(s) orally every 4 weeks on Wednesday  (2023 16:49)  Coumadin 2.5 mg oral tablet: 1 tab(s) orally once a day (at bedtime)  ***ON HOLD from  to *** (2023 21:48)  Cranberry oral tablet: 1 tab(s) orally 2 times a day (2023 16:49)  cyanocobalamin 1000 mcg oral tablet: 1 tab(s) orally once a day (2023 16:49)  dilTIAZem 180 mg/24 hours oral capsule, extended release: 1 cap(s) orally once a day (2023 16:49)  DULoxetine 60 mg oral delayed release capsule: 1 cap(s) orally once a day (2023 16:49)  estradiol 0.1 mg/g vaginal cream: 0.5 gram(s) vaginal 2 times a week on Monday and Thursday (2023 21:48)  fexofenadine 180 mg oral tablet: 1 tab(s) orally once a day (2023 21:48)  fluticasone 50 mcg/inh nasal spray: 1 spray(s) nasal 2 times a day (2023 16:49)  furosemide 20 mg oral tablet: 1 tab(s) orally once a day (2023 16:49)  glipiZIDE 10 mg oral tablet, extended release: 2 tab(s) orally once a day (2023 16:49)  GlycoLax oral powder for reconstitution: 17 gram(s) orally 2 times a day (2023 16:49)  guaiFENesin 100 mg/5 mL oral liquid: 20 milliliter(s) orally every 6 hours (2023 21:48)  HumaLOG KwikPen 100 units/mL injectable solution: 10 unit(s) injectable 3 times a day (before meals) (2023 21:48)  ipratropium-albuterol 20 mcg-100 mcg/inh inhalation aerosol: 1 puff(s) inhaled 4 times a day (2023 16:49)  levothyroxine 88 mcg (0.088 mg) oral tablet: 1 tab(s) orally once a day (at bedtime) (2023 16:49)  losartan 25 mg oral tablet: 1 tab(s) orally once a day (2023 16:49)  Macrobid 100 mg oral capsule: 1 cap(s) orally 2 times a day for 5 days  ***course complete*** (2023 21:48)  methocarbamol 750 mg oral tablet: 1 tab(s) orally every 8 hours, As Needed (2023 21:48)  metoprolol tartrate 25 mg oral tablet: 1 tab(s) orally 2 times a day (2023 16:49)  Milk of Magnesia 8% oral suspension: 30 milliliter(s) orally once a day, As Needed (2023 16:49)  montelukast 10 mg oral tablet: 1 tab(s) orally once a day (at bedtime) (2023 21:48)  ondansetron 4 mg oral tablet: 1 tab(s) orally every 4 hours, As Needed (2023 21:48)  oxybutynin 5 mg oral tablet: 1 tab(s) orally 2 times a day (2023 16:49)  oxyCODONE 5 mg oral tablet: 1 tab(s) orally every 4 hours, As Needed (2023 16:49)  phytonadione 5 mg oral tablet: 0.5 tab(s) orally once  ***given at [facility] once on 23*** (2023 21:48)  pregabalin 100 mg oral capsule: 1 cap(s) orally 3 times a day (2023 16:49)  sennosides-docusate 8.6 mg-50 mg oral tablet: 2 tab(s) orally once a day (at bedtime) (2023 16:49)  Tradjenta 5 mg oral tablet: 1 tab(s) orally once a day (2023 16:49)  Tylenol 500 mg oral tablet: 2 tab(s) orally every 8 hours (2023 16:49)    MEDICATIONS  (STANDING):  albuterol    0.083% 2.5 milliGRAM(s) Nebulizer every 6 hours  buDESOnide    Inhalation Suspension 0.5 milliGRAM(s) Inhalation every 12 hours  chlorhexidine 4% Liquid 1 Application(s) Topical <User Schedule>  coronavirus bivalent (EUA) Booster Vaccine (PFIZER) 0.3 milliLiter(s) IntraMuscular once  dextrose 5%. 1000 milliLiter(s) (100 mL/Hr) IV Continuous <Continuous>  dextrose 5%. 1000 milliLiter(s) (50 mL/Hr) IV Continuous <Continuous>  dextrose 50% Injectable 25 Gram(s) IV Push once  dextrose 50% Injectable 12.5 Gram(s) IV Push once  dextrose 50% Injectable 25 Gram(s) IV Push once  enoxaparin Injectable 100 milliGRAM(s) SubCutaneous every 12 hours  glucagon  Injectable 1 milliGRAM(s) IntraMuscular once  insulin glargine Injectable (LANTUS) 20 Unit(s) SubCutaneous at bedtime  insulin lispro (ADMELOG) corrective regimen sliding scale   SubCutaneous every 4 hours  levothyroxine Injectable 70 MICROGram(s) IV Push at bedtime  metoprolol tartrate 25 milliGRAM(s) Oral two times a day  nystatin Cream 1 Application(s) Topical two times a day  nystatin Powder 1 Application(s) Topical every 12 hours  pantoprazole  Injectable 40 milliGRAM(s) IV Push daily  sodium bicarbonate  Infusion 0.058 mEq/kG/Hr (75 mL/Hr) IV Continuous <Continuous>  sodium chloride 0.45% 1000 milliLiter(s) (65 mL/Hr) IV Continuous <Continuous>  sodium chloride 0.45% 1000 milliLiter(s) (100 mL/Hr) IV Continuous <Continuous>    MEDICATIONS  (PRN):  acetaminophen     Tablet .. 650 milliGRAM(s) Oral every 6 hours PRN Temp greater or equal to 38C (100.4F)  albuterol    90 MICROgram(s) HFA Inhaler 2 Puff(s) Inhalation every 6 hours PRN Shortness of Breath and/or Wheezing  dextrose Oral Gel 15 Gram(s) Oral once PRN Blood Glucose LESS THAN 70 milliGRAM(s)/deciliter  hydrALAZINE Injectable 10 milliGRAM(s) IV Push every 6 hours PRN SBP>160  HYDROmorphone  Injectable 0.5 milliGRAM(s) IV Push every 4 hours PRN Severe Pain (7 - 10)  sodium chloride 0.9% lock flush 10 milliLiter(s) IV Push every 1 hour PRN Pre/post blood products, medications, blood draw, and to maintain line patency      Allergies    Cipro (Rash)  Spiriva (Rash)    Intolerances        Vital Signs Last 24 Hrs  T(C): 36.3 (2023 16:09), Max: 36.8 (15 Feb 2023 23:44)  T(F): 97.4 (2023 16:09), Max: 98.3 (15 Feb 2023 23:44)  HR: 90 (2023 16:09) (66 - 93)  BP: 115/46 (2023 16:09) (103/45 - 131/58)  BP(mean): 115 (2023 16:09) (115 - 115)  RR: 18 (2023 16:09) (17 - 18)  SpO2: 94% (2023 16:09) (94% - 100%)    Parameters below as of 2023 16:09  Patient On (Oxygen Delivery Method): nasal cannula          PHYSICAL EXAMINATION:    NECK:  Supple. No lymphadenopathy. Jugular venous pressure not elevated. Carotids equal.   HEART:   The cardiac impulse has a normal quality. Reg., Nl S1 and S2.  There are no murmurs, rubs or gallops noted  CHEST:  Chest cracles to auscultation. Normal respiratory effort.  ABDOMEN:  Soft and nontender.   EXTREMITIES:  There is no edema.       LABS:        138  |  108  |  51<H>  ----------------------------<  95  4.9   |  24  |  3.57<H>    Ca    7.5<L>      2023 13:34  Phos  6.3     02-15  Mg     2.5     02-15    TPro  5.2<L>  /  Alb  1.0<L>  /  TBili  0.8  /  DBili  x   /  AST  14<L>  /  ALT  <6<L>  /  AlkPhos  75  02-15      Urinalysis Basic - ( 15 Feb 2023 07:38 )    Color: Yellow / Appearance: very cloudy / S.015 / pH: x  Gluc: x / Ketone: Small  / Bili: Negative / Urobili: Negative   Blood: x / Protein: 100 / Nitrite: Negative   Leuk Esterase: Moderate / RBC: 3-5 /HPF / WBC >50 /HPF   Sq Epi: x / Non Sq Epi: Occasional / Bacteria: Negative

## 2023-02-17 LAB
ALBUMIN SERPL ELPH-MCNC: 0.9 G/DL — LOW (ref 3.3–5)
ALP SERPL-CCNC: 161 U/L — HIGH (ref 40–120)
ALT FLD-CCNC: <6 U/L — LOW (ref 12–78)
ANION GAP SERPL CALC-SCNC: 9 MMOL/L — SIGNIFICANT CHANGE UP (ref 5–17)
AST SERPL-CCNC: 24 U/L — SIGNIFICANT CHANGE UP (ref 15–37)
BASOPHILS # BLD AUTO: 0.11 K/UL — SIGNIFICANT CHANGE UP (ref 0–0.2)
BASOPHILS NFR BLD AUTO: 1 % — SIGNIFICANT CHANGE UP (ref 0–2)
BILIRUB SERPL-MCNC: 0.9 MG/DL — SIGNIFICANT CHANGE UP (ref 0.2–1.2)
BUN SERPL-MCNC: 49 MG/DL — HIGH (ref 7–23)
CALCIUM SERPL-MCNC: 7.1 MG/DL — LOW (ref 8.5–10.1)
CHLORIDE SERPL-SCNC: 108 MMOL/L — SIGNIFICANT CHANGE UP (ref 96–108)
CO2 SERPL-SCNC: 20 MMOL/L — LOW (ref 22–31)
CREAT SERPL-MCNC: 3.78 MG/DL — HIGH (ref 0.5–1.3)
CULTURE RESULTS: SIGNIFICANT CHANGE UP
EGFR: 12 ML/MIN/1.73M2 — LOW
EOSINOPHIL # BLD AUTO: 0.11 K/UL — SIGNIFICANT CHANGE UP (ref 0–0.5)
EOSINOPHIL NFR BLD AUTO: 1 % — SIGNIFICANT CHANGE UP (ref 0–6)
GLUCOSE BLDC GLUCOMTR-MCNC: 116 MG/DL — HIGH (ref 70–99)
GLUCOSE BLDC GLUCOMTR-MCNC: 77 MG/DL — SIGNIFICANT CHANGE UP (ref 70–99)
GLUCOSE BLDC GLUCOMTR-MCNC: 77 MG/DL — SIGNIFICANT CHANGE UP (ref 70–99)
GLUCOSE BLDC GLUCOMTR-MCNC: 85 MG/DL — SIGNIFICANT CHANGE UP (ref 70–99)
GLUCOSE BLDC GLUCOMTR-MCNC: 92 MG/DL — SIGNIFICANT CHANGE UP (ref 70–99)
GLUCOSE SERPL-MCNC: 89 MG/DL — SIGNIFICANT CHANGE UP (ref 70–99)
HCT VFR BLD CALC: 26.1 % — LOW (ref 34.5–45)
HGB BLD-MCNC: 8 G/DL — LOW (ref 11.5–15.5)
LYMPHOCYTES # BLD AUTO: 1.8 K/UL — SIGNIFICANT CHANGE UP (ref 1–3.3)
LYMPHOCYTES # BLD AUTO: 17 % — SIGNIFICANT CHANGE UP (ref 13–44)
MAGNESIUM SERPL-MCNC: 2.2 MG/DL — SIGNIFICANT CHANGE UP (ref 1.6–2.6)
MANUAL SMEAR VERIFICATION: SIGNIFICANT CHANGE UP
MCHC RBC-ENTMCNC: 28 PG — SIGNIFICANT CHANGE UP (ref 27–34)
MCHC RBC-ENTMCNC: 30.7 GM/DL — LOW (ref 32–36)
MCV RBC AUTO: 91.3 FL — SIGNIFICANT CHANGE UP (ref 80–100)
MONOCYTES # BLD AUTO: 0.42 K/UL — SIGNIFICANT CHANGE UP (ref 0–0.9)
MONOCYTES NFR BLD AUTO: 4 % — SIGNIFICANT CHANGE UP (ref 2–14)
MYELOCYTES NFR BLD: 1 % — HIGH (ref 0–0)
NEUTROPHILS # BLD AUTO: 8.03 K/UL — HIGH (ref 1.8–7.4)
NEUTROPHILS NFR BLD AUTO: 75 % — SIGNIFICANT CHANGE UP (ref 43–77)
NEUTS BAND # BLD: 1 % — SIGNIFICANT CHANGE UP (ref 0–8)
NRBC # BLD: 0 /100 — SIGNIFICANT CHANGE UP (ref 0–0)
NRBC # BLD: SIGNIFICANT CHANGE UP /100 WBCS (ref 0–0)
PHOSPHATE SERPL-MCNC: 6.9 MG/DL — HIGH (ref 2.5–4.5)
PLAT MORPH BLD: NORMAL — SIGNIFICANT CHANGE UP
PLATELET # BLD AUTO: 241 K/UL — SIGNIFICANT CHANGE UP (ref 150–400)
POTASSIUM SERPL-MCNC: 5.2 MMOL/L — SIGNIFICANT CHANGE UP (ref 3.5–5.3)
POTASSIUM SERPL-SCNC: 5.2 MMOL/L — SIGNIFICANT CHANGE UP (ref 3.5–5.3)
PROT SERPL-MCNC: 5.2 GM/DL — LOW (ref 6–8.3)
RBC # BLD: 2.86 M/UL — LOW (ref 3.8–5.2)
RBC # FLD: 25.3 % — HIGH (ref 10.3–14.5)
RBC BLD AUTO: NORMAL — SIGNIFICANT CHANGE UP
SODIUM SERPL-SCNC: 137 MMOL/L — SIGNIFICANT CHANGE UP (ref 135–145)
SPECIMEN SOURCE: SIGNIFICANT CHANGE UP
TOXIC GRANULES BLD QL SMEAR: PRESENT — SIGNIFICANT CHANGE UP
WBC # BLD: 10.57 K/UL — HIGH (ref 3.8–10.5)
WBC # FLD AUTO: 10.57 K/UL — HIGH (ref 3.8–10.5)

## 2023-02-17 PROCEDURE — 99232 SBSQ HOSP IP/OBS MODERATE 35: CPT

## 2023-02-17 RX ORDER — SODIUM CHLORIDE 9 MG/ML
1000 INJECTION, SOLUTION INTRAVENOUS
Refills: 0 | Status: DISCONTINUED | OUTPATIENT
Start: 2023-02-17 | End: 2023-02-21

## 2023-02-17 RX ORDER — DEXTROSE 50 % IN WATER 50 %
12.5 SYRINGE (ML) INTRAVENOUS ONCE
Refills: 0 | Status: COMPLETED | OUTPATIENT
Start: 2023-02-17 | End: 2023-02-17

## 2023-02-17 RX ORDER — SODIUM BICARBONATE 1 MEQ/ML
0.06 SYRINGE (ML) INTRAVENOUS
Qty: 75 | Refills: 0 | Status: DISCONTINUED | OUTPATIENT
Start: 2023-02-17 | End: 2023-02-17

## 2023-02-17 RX ORDER — CALCIUM ACETATE 667 MG
667 TABLET ORAL
Refills: 0 | Status: DISCONTINUED | OUTPATIENT
Start: 2023-02-17 | End: 2023-02-21

## 2023-02-17 RX ADMIN — PANTOPRAZOLE SODIUM 40 MILLIGRAM(S): 20 TABLET, DELAYED RELEASE ORAL at 11:20

## 2023-02-17 RX ADMIN — CHLORHEXIDINE GLUCONATE 1 APPLICATION(S): 213 SOLUTION TOPICAL at 11:19

## 2023-02-17 RX ADMIN — ALBUTEROL 2.5 MILLIGRAM(S): 90 AEROSOL, METERED ORAL at 14:11

## 2023-02-17 RX ADMIN — SODIUM CHLORIDE 100 MILLILITER(S): 9 INJECTION, SOLUTION INTRAVENOUS at 15:22

## 2023-02-17 RX ADMIN — ALBUTEROL 2.5 MILLIGRAM(S): 90 AEROSOL, METERED ORAL at 20:55

## 2023-02-17 RX ADMIN — NYSTATIN CREAM 1 APPLICATION(S): 100000 CREAM TOPICAL at 11:26

## 2023-02-17 RX ADMIN — ALBUTEROL 2.5 MILLIGRAM(S): 90 AEROSOL, METERED ORAL at 08:05

## 2023-02-17 RX ADMIN — ENOXAPARIN SODIUM 100 MILLIGRAM(S): 100 INJECTION SUBCUTANEOUS at 11:20

## 2023-02-17 RX ADMIN — Medication 70 MICROGRAM(S): at 21:54

## 2023-02-17 RX ADMIN — ENOXAPARIN SODIUM 100 MILLIGRAM(S): 100 INJECTION SUBCUTANEOUS at 21:53

## 2023-02-17 RX ADMIN — Medication 0.5 MILLIGRAM(S): at 08:05

## 2023-02-17 RX ADMIN — NYSTATIN CREAM 1 APPLICATION(S): 100000 CREAM TOPICAL at 21:54

## 2023-02-17 RX ADMIN — NYSTATIN CREAM 1 APPLICATION(S): 100000 CREAM TOPICAL at 05:04

## 2023-02-17 RX ADMIN — Medication 0.5 MILLIGRAM(S): at 20:55

## 2023-02-17 RX ADMIN — Medication 12.5 MILLILITER(S): at 21:53

## 2023-02-17 RX ADMIN — ALBUTEROL 2.5 MILLIGRAM(S): 90 AEROSOL, METERED ORAL at 01:21

## 2023-02-17 NOTE — PROGRESS NOTE ADULT - SUBJECTIVE AND OBJECTIVE BOX
Subjective:    Home Medications:  Albuterol (Eqv-ProAir HFA) 90 mcg/inh inhalation aerosol: 1 puff(s) inhaled every 6 hours, As Needed (20 Jan 2023 16:49)  amiodarone 200 mg oral tablet: 1 tab(s) orally once a day (20 Jan 2023 16:49)  ascorbic acid 500 mg oral tablet: 2 tab(s) orally once a day (20 Jan 2023 16:49)  atorvastatin 10 mg oral tablet: 1 tab(s) orally once a day (at bedtime) (20 Jan 2023 16:49)  benzonatate 100 mg oral capsule: 1 cap(s) orally 3 times a day (20 Jan 2023 21:48)  budesonide 0.5 mg/2 mL inhalation suspension: 2 milliliter(s) inhaled 2 times a day (20 Jan 2023 21:48)  Cepacol Sore Throat 15 mg-3.6 mg mucous membrane lozenge: 1 lozenge mucous membrane every 4 hours, As Needed (20 Jan 2023 21:48)  cholecalciferol 1250 mcg (50,000 intl units) oral capsule: 1 cap(s) orally every 4 weeks on Wednesday  (20 Jan 2023 16:49)  Coumadin 2.5 mg oral tablet: 1 tab(s) orally once a day (at bedtime)  ***ON HOLD from 1-16 to 1-21*** (20 Jan 2023 21:48)  Cranberry oral tablet: 1 tab(s) orally 2 times a day (20 Jan 2023 16:49)  cyanocobalamin 1000 mcg oral tablet: 1 tab(s) orally once a day (20 Jan 2023 16:49)  dilTIAZem 180 mg/24 hours oral capsule, extended release: 1 cap(s) orally once a day (20 Jan 2023 16:49)  DULoxetine 60 mg oral delayed release capsule: 1 cap(s) orally once a day (20 Jan 2023 16:49)  estradiol 0.1 mg/g vaginal cream: 0.5 gram(s) vaginal 2 times a week on Monday and Thursday (20 Jan 2023 21:48)  fexofenadine 180 mg oral tablet: 1 tab(s) orally once a day (20 Jan 2023 21:48)  fluticasone 50 mcg/inh nasal spray: 1 spray(s) nasal 2 times a day (20 Jan 2023 16:49)  furosemide 20 mg oral tablet: 1 tab(s) orally once a day (20 Jan 2023 16:49)  glipiZIDE 10 mg oral tablet, extended release: 2 tab(s) orally once a day (20 Jan 2023 16:49)  GlycoLax oral powder for reconstitution: 17 gram(s) orally 2 times a day (20 Jan 2023 16:49)  guaiFENesin 100 mg/5 mL oral liquid: 20 milliliter(s) orally every 6 hours (20 Jan 2023 21:48)  HumaLOG KwikPen 100 units/mL injectable solution: 10 unit(s) injectable 3 times a day (before meals) (20 Jan 2023 21:48)  ipratropium-albuterol 20 mcg-100 mcg/inh inhalation aerosol: 1 puff(s) inhaled 4 times a day (20 Jan 2023 16:49)  levothyroxine 88 mcg (0.088 mg) oral tablet: 1 tab(s) orally once a day (at bedtime) (20 Jan 2023 16:49)  losartan 25 mg oral tablet: 1 tab(s) orally once a day (20 Jan 2023 16:49)  Macrobid 100 mg oral capsule: 1 cap(s) orally 2 times a day for 5 days  ***course complete*** (20 Jan 2023 21:48)  methocarbamol 750 mg oral tablet: 1 tab(s) orally every 8 hours, As Needed (20 Jan 2023 21:48)  metoprolol tartrate 25 mg oral tablet: 1 tab(s) orally 2 times a day (20 Jan 2023 16:49)  Milk of Magnesia 8% oral suspension: 30 milliliter(s) orally once a day, As Needed (20 Jan 2023 16:49)  montelukast 10 mg oral tablet: 1 tab(s) orally once a day (at bedtime) (20 Jan 2023 21:48)  ondansetron 4 mg oral tablet: 1 tab(s) orally every 4 hours, As Needed (20 Jan 2023 21:48)  oxybutynin 5 mg oral tablet: 1 tab(s) orally 2 times a day (20 Jan 2023 16:49)  oxyCODONE 5 mg oral tablet: 1 tab(s) orally every 4 hours, As Needed (20 Jan 2023 16:49)  phytonadione 5 mg oral tablet: 0.5 tab(s) orally once  ***given at [facility] once on 1-19-23*** (20 Jan 2023 21:48)  pregabalin 100 mg oral capsule: 1 cap(s) orally 3 times a day (20 Jan 2023 16:49)  sennosides-docusate 8.6 mg-50 mg oral tablet: 2 tab(s) orally once a day (at bedtime) (20 Jan 2023 16:49)  Tradjenta 5 mg oral tablet: 1 tab(s) orally once a day (20 Jan 2023 16:49)  Tylenol 500 mg oral tablet: 2 tab(s) orally every 8 hours (20 Jan 2023 16:49)    MEDICATIONS  (STANDING):  albuterol    0.083% 2.5 milliGRAM(s) Nebulizer every 6 hours  buDESOnide    Inhalation Suspension 0.5 milliGRAM(s) Inhalation every 12 hours  chlorhexidine 4% Liquid 1 Application(s) Topical <User Schedule>  coronavirus bivalent (EUA) Booster Vaccine (PFIZER) 0.3 milliLiter(s) IntraMuscular once  dextrose 5%. 1000 milliLiter(s) (50 mL/Hr) IV Continuous <Continuous>  dextrose 5%. 1000 milliLiter(s) (100 mL/Hr) IV Continuous <Continuous>  dextrose 50% Injectable 25 Gram(s) IV Push once  dextrose 50% Injectable 12.5 Gram(s) IV Push once  dextrose 50% Injectable 25 Gram(s) IV Push once  enoxaparin Injectable 100 milliGRAM(s) SubCutaneous every 12 hours  glucagon  Injectable 1 milliGRAM(s) IntraMuscular once  insulin glargine Injectable (LANTUS) 20 Unit(s) SubCutaneous at bedtime  insulin lispro (ADMELOG) corrective regimen sliding scale   SubCutaneous every 4 hours  levothyroxine Injectable 70 MICROGram(s) IV Push at bedtime  metoprolol tartrate 25 milliGRAM(s) Oral two times a day  nystatin Cream 1 Application(s) Topical two times a day  nystatin Powder 1 Application(s) Topical every 12 hours  pantoprazole  Injectable 40 milliGRAM(s) IV Push daily  sodium bicarbonate  Infusion 0.058 mEq/kG/Hr (75 mL/Hr) IV Continuous <Continuous>  sodium chloride 0.45% 1000 milliLiter(s) (65 mL/Hr) IV Continuous <Continuous>  sodium chloride 0.45% 1000 milliLiter(s) (100 mL/Hr) IV Continuous <Continuous>    MEDICATIONS  (PRN):  acetaminophen     Tablet .. 650 milliGRAM(s) Oral every 6 hours PRN Temp greater or equal to 38C (100.4F)  albuterol    90 MICROgram(s) HFA Inhaler 2 Puff(s) Inhalation every 6 hours PRN Shortness of Breath and/or Wheezing  dextrose Oral Gel 15 Gram(s) Oral once PRN Blood Glucose LESS THAN 70 milliGRAM(s)/deciliter  hydrALAZINE Injectable 10 milliGRAM(s) IV Push every 6 hours PRN SBP>160  HYDROmorphone  Injectable 0.5 milliGRAM(s) IV Push every 4 hours PRN Severe Pain (7 - 10)  sodium chloride 0.9% lock flush 10 milliLiter(s) IV Push every 1 hour PRN Pre/post blood products, medications, blood draw, and to maintain line patency      Allergies    Cipro (Rash)  Spiriva (Rash)    Intolerances        Vital Signs Last 24 Hrs  T(C): 36.6 (16 Feb 2023 20:06), Max: 36.8 (16 Feb 2023 09:02)  T(F): 97.8 (16 Feb 2023 20:06), Max: 98.3 (16 Feb 2023 09:02)  HR: 90 (17 Feb 2023 01:21) (90 - 95)  BP: 138/57 (16 Feb 2023 20:06) (114/50 - 138/57)  BP(mean): 115 (16 Feb 2023 16:09) (115 - 115)  RR: 18 (16 Feb 2023 20:06) (18 - 18)  SpO2: 95% (17 Feb 2023 01:21) (94% - 99%)    Parameters below as of 17 Feb 2023 01:21  Patient On (Oxygen Delivery Method): nasal cannula,2L          PHYSICAL EXAMINATION:    NECK:  Supple. No lymphadenopathy. Jugular venous pressure not elevated. Carotids equal.   HEART:   The cardiac impulse has a normal quality. Reg., Nl S1 and S2.  There are no murmurs, rubs or gallops noted  CHEST:  Chest is clear to auscultation. Normal respiratory effort.  ABDOMEN:  Soft and nontender.   EXTREMITIES:  There is no edema.       LABS:    02-16    138  |  108  |  51<H>  ----------------------------<  95  4.9   |  24  |  3.57<H>    Ca    7.5<L>      16 Feb 2023 13:34                 Subjective:    pat lying in bed, no respiratory distress. On iv bicarbonate drip.    Home Medications:  Albuterol (Eqv-ProAir HFA) 90 mcg/inh inhalation aerosol: 1 puff(s) inhaled every 6 hours, As Needed (20 Jan 2023 16:49)  amiodarone 200 mg oral tablet: 1 tab(s) orally once a day (20 Jan 2023 16:49)  ascorbic acid 500 mg oral tablet: 2 tab(s) orally once a day (20 Jan 2023 16:49)  atorvastatin 10 mg oral tablet: 1 tab(s) orally once a day (at bedtime) (20 Jan 2023 16:49)  benzonatate 100 mg oral capsule: 1 cap(s) orally 3 times a day (20 Jan 2023 21:48)  budesonide 0.5 mg/2 mL inhalation suspension: 2 milliliter(s) inhaled 2 times a day (20 Jan 2023 21:48)  Cepacol Sore Throat 15 mg-3.6 mg mucous membrane lozenge: 1 lozenge mucous membrane every 4 hours, As Needed (20 Jan 2023 21:48)  cholecalciferol 1250 mcg (50,000 intl units) oral capsule: 1 cap(s) orally every 4 weeks on Wednesday  (20 Jan 2023 16:49)  Coumadin 2.5 mg oral tablet: 1 tab(s) orally once a day (at bedtime)  ***ON HOLD from 1-16 to 1-21*** (20 Jan 2023 21:48)  Cranberry oral tablet: 1 tab(s) orally 2 times a day (20 Jan 2023 16:49)  cyanocobalamin 1000 mcg oral tablet: 1 tab(s) orally once a day (20 Jan 2023 16:49)  dilTIAZem 180 mg/24 hours oral capsule, extended release: 1 cap(s) orally once a day (20 Jan 2023 16:49)  DULoxetine 60 mg oral delayed release capsule: 1 cap(s) orally once a day (20 Jan 2023 16:49)  estradiol 0.1 mg/g vaginal cream: 0.5 gram(s) vaginal 2 times a week on Monday and Thursday (20 Jan 2023 21:48)  fexofenadine 180 mg oral tablet: 1 tab(s) orally once a day (20 Jan 2023 21:48)  fluticasone 50 mcg/inh nasal spray: 1 spray(s) nasal 2 times a day (20 Jan 2023 16:49)  furosemide 20 mg oral tablet: 1 tab(s) orally once a day (20 Jan 2023 16:49)  glipiZIDE 10 mg oral tablet, extended release: 2 tab(s) orally once a day (20 Jan 2023 16:49)  GlycoLax oral powder for reconstitution: 17 gram(s) orally 2 times a day (20 Jan 2023 16:49)  guaiFENesin 100 mg/5 mL oral liquid: 20 milliliter(s) orally every 6 hours (20 Jan 2023 21:48)  HumaLOG KwikPen 100 units/mL injectable solution: 10 unit(s) injectable 3 times a day (before meals) (20 Jan 2023 21:48)  ipratropium-albuterol 20 mcg-100 mcg/inh inhalation aerosol: 1 puff(s) inhaled 4 times a day (20 Jan 2023 16:49)  levothyroxine 88 mcg (0.088 mg) oral tablet: 1 tab(s) orally once a day (at bedtime) (20 Jan 2023 16:49)  losartan 25 mg oral tablet: 1 tab(s) orally once a day (20 Jan 2023 16:49)  Macrobid 100 mg oral capsule: 1 cap(s) orally 2 times a day for 5 days  ***course complete*** (20 Jan 2023 21:48)  methocarbamol 750 mg oral tablet: 1 tab(s) orally every 8 hours, As Needed (20 Jan 2023 21:48)  metoprolol tartrate 25 mg oral tablet: 1 tab(s) orally 2 times a day (20 Jan 2023 16:49)  Milk of Magnesia 8% oral suspension: 30 milliliter(s) orally once a day, As Needed (20 Jan 2023 16:49)  montelukast 10 mg oral tablet: 1 tab(s) orally once a day (at bedtime) (20 Jan 2023 21:48)  ondansetron 4 mg oral tablet: 1 tab(s) orally every 4 hours, As Needed (20 Jan 2023 21:48)  oxybutynin 5 mg oral tablet: 1 tab(s) orally 2 times a day (20 Jan 2023 16:49)  oxyCODONE 5 mg oral tablet: 1 tab(s) orally every 4 hours, As Needed (20 Jan 2023 16:49)  phytonadione 5 mg oral tablet: 0.5 tab(s) orally once  ***given at [facility] once on 1-19-23*** (20 Jan 2023 21:48)  pregabalin 100 mg oral capsule: 1 cap(s) orally 3 times a day (20 Jan 2023 16:49)  sennosides-docusate 8.6 mg-50 mg oral tablet: 2 tab(s) orally once a day (at bedtime) (20 Jan 2023 16:49)  Tradjenta 5 mg oral tablet: 1 tab(s) orally once a day (20 Jan 2023 16:49)  Tylenol 500 mg oral tablet: 2 tab(s) orally every 8 hours (20 Jan 2023 16:49)    MEDICATIONS  (STANDING):  albuterol    0.083% 2.5 milliGRAM(s) Nebulizer every 6 hours  buDESOnide    Inhalation Suspension 0.5 milliGRAM(s) Inhalation every 12 hours  chlorhexidine 4% Liquid 1 Application(s) Topical <User Schedule>  coronavirus bivalent (EUA) Booster Vaccine (PFIZER) 0.3 milliLiter(s) IntraMuscular once  dextrose 5%. 1000 milliLiter(s) (50 mL/Hr) IV Continuous <Continuous>  dextrose 5%. 1000 milliLiter(s) (100 mL/Hr) IV Continuous <Continuous>  dextrose 50% Injectable 25 Gram(s) IV Push once  dextrose 50% Injectable 12.5 Gram(s) IV Push once  dextrose 50% Injectable 25 Gram(s) IV Push once  enoxaparin Injectable 100 milliGRAM(s) SubCutaneous every 12 hours  glucagon  Injectable 1 milliGRAM(s) IntraMuscular once  insulin glargine Injectable (LANTUS) 20 Unit(s) SubCutaneous at bedtime  insulin lispro (ADMELOG) corrective regimen sliding scale   SubCutaneous every 4 hours  levothyroxine Injectable 70 MICROGram(s) IV Push at bedtime  metoprolol tartrate 25 milliGRAM(s) Oral two times a day  nystatin Cream 1 Application(s) Topical two times a day  nystatin Powder 1 Application(s) Topical every 12 hours  pantoprazole  Injectable 40 milliGRAM(s) IV Push daily  sodium bicarbonate  Infusion 0.058 mEq/kG/Hr (75 mL/Hr) IV Continuous <Continuous>  sodium chloride 0.45% 1000 milliLiter(s) (65 mL/Hr) IV Continuous <Continuous>  sodium chloride 0.45% 1000 milliLiter(s) (100 mL/Hr) IV Continuous <Continuous>    MEDICATIONS  (PRN):  acetaminophen     Tablet .. 650 milliGRAM(s) Oral every 6 hours PRN Temp greater or equal to 38C (100.4F)  albuterol    90 MICROgram(s) HFA Inhaler 2 Puff(s) Inhalation every 6 hours PRN Shortness of Breath and/or Wheezing  dextrose Oral Gel 15 Gram(s) Oral once PRN Blood Glucose LESS THAN 70 milliGRAM(s)/deciliter  hydrALAZINE Injectable 10 milliGRAM(s) IV Push every 6 hours PRN SBP>160  HYDROmorphone  Injectable 0.5 milliGRAM(s) IV Push every 4 hours PRN Severe Pain (7 - 10)  sodium chloride 0.9% lock flush 10 milliLiter(s) IV Push every 1 hour PRN Pre/post blood products, medications, blood draw, and to maintain line patency      Allergies    Cipro (Rash)  Spiriva (Rash)    Intolerances        Vital Signs Last 24 Hrs  T(C): 36.6 (16 Feb 2023 20:06), Max: 36.8 (16 Feb 2023 09:02)  T(F): 97.8 (16 Feb 2023 20:06), Max: 98.3 (16 Feb 2023 09:02)  HR: 90 (17 Feb 2023 01:21) (90 - 95)  BP: 138/57 (16 Feb 2023 20:06) (114/50 - 138/57)  BP(mean): 115 (16 Feb 2023 16:09) (115 - 115)  RR: 18 (16 Feb 2023 20:06) (18 - 18)  SpO2: 95% (17 Feb 2023 01:21) (94% - 99%)    Parameters below as of 17 Feb 2023 01:21  Patient On (Oxygen Delivery Method): nasal cannula,2L          PHYSICAL EXAMINATION:    NECK:  Supple. No lymphadenopathy. Jugular venous pressure not elevated. Carotids equal.   HEART:   The cardiac impulse has a normal quality. Reg., Nl S1 and S2.  There are no murmurs, rubs or gallops noted  CHEST:  Chest crackles to auscultation. Normal respiratory effort.  ABDOMEN:  Soft and nontender.   EXTREMITIES:  There is no edema.       LABS:    02-16    138  |  108  |  51<H>  ----------------------------<  95  4.9   |  24  |  3.57<H>    Ca    7.5<L>      16 Feb 2023 13:34

## 2023-02-17 NOTE — PROGRESS NOTE ADULT - ASSESSMENT
72F with PMHx CVA, pAF on AC, emphysema/COPD on home O2, HTN, SBO with prior resection who presented with abd pain, N/V. Found to have an small bowel obstruction. Taken to OR underwent ELAP, extensive RICHARD, ileocolectomy, ventral hernia repair with MESH. Postop course complicated with ALVERTO, shock requiring pressors     ALVERTO in setting of acidemia, hyperkalemia, and worsening hyperglycemia   nonoliguric - ATN/SABAS   Cr continues to rise   K rising - low K diet when taking po    PPN off - to encourage po per staff, Renal diet   Patient would be a poor candidate for aggressive renal care if progression and likely not within stated GOC. - left message for son - HCP Dr Rosa  d/w Dr Elise who spoke to son today and states they would not pursue agrresive renal measures ie HD - agree with this as pt unlikely to tolerate HD   Long term prognosis seems poor      Hyperphoshatemia w  hyperkalemia   - start  binders when taking po  - low k  diet  -lokelma PRN    Acidemia   change to bicarb gtt strength     Third spacing w severe hypoalbuminemia   protein supplementation      dw RN staff, team and palliative     Dr Soto coverng weekend and Dr Pearce covering next week

## 2023-02-17 NOTE — PROGRESS NOTE ADULT - ASSESSMENT
Assessment:     71 y/o female with multiple medical comorbidities including previous SBO admitted for recurrent SBO.  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     Process of Care  --Reviewed dx/treatment problems and alignment with Goals of Care    Physical Aspects of Care  --Pain - Patient denies at this time.  monitor for non-verbal signs of distress.  dilaudid IV reordered.   --Chronic Respiratory Failure - Emphysema on home O2. - No respiratory distress.  maintain home supplemental O2   --Nausea Vomiting - denies  --Debility/Weakness - PT as tolerated, OOB to chair, fall precautions  --Acute Metabolic Encephalopathy - treatment of possible underlying infections.  frequent verbal redirection/reorientation.  monitor for pain and treat as needed.  avoid sensorium altering medications when possible.   pending SLP eval for PO trials  --SBO s/p exlap/paradise complicated by EC Fistula - as per surgery.  currently NPO.  advance as tolerated.   --Acute Renal Failure - nephrology is following.  pt getting IVF.      Psychological and Psychiatric Aspects of Care:   --Grief/Bereavement: emotional support provided  --Hx of psychiatric dx: none  -Pastoral Care Available PRN     Social Aspects of Care  -SW involved     Cultural Aspects  -Primary Language: English    Goals of Care:  DNR/DNI in chart (preexisting).  family are willing to a more conservative approach.  they would not want her to return to the ICU or to get pressors to support BP.  they are OK with attempts to replace IV for TPN.  on with antibiotics and other non-invasive treatments.     Ethical and Legal Aspects: NA  Capacity- I feel pt lacks capacity for major medical decisions at this time.      HCP/Surrogate: children - Moe ([phone]) and Fabby.     Code Status- DNR/DNI  MOLST- in chart -- DNR/DNI and no feeding tube  Dispo Plan- tbd (from SNF)    Discussed With: nursing, Medicine Rashmi Assessment:     73 y/o female with multiple medical comorbidities including previous SBO admitted for recurrent SBO.  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     Process of Care  --Reviewed dx/treatment problems and alignment with Goals of Care    Physical Aspects of Care  --Pain - Patient denies at this time.  monitor for non-verbal signs of distress.  dilaudid IV reordered.   --Chronic Respiratory Failure - Emphysema on home O2. - No respiratory distress.  maintain home supplemental O2   --Nausea Vomiting - denies  --Debility/Weakness - PT as tolerated, OOB to chair, fall precautions  --Acute Metabolic Encephalopathy - treatment of possible underlying infections.  frequent verbal redirection/reorientation.  monitor for pain and treat as needed.  avoid sensorium altering medications when possible.   pending SLP eval for PO trials  --SBO s/p exlap/paradise complicated by EC Fistula - as per surgery.  currently NPO.  advance as tolerated.   --Acute Renal Failure - nephrology is following.  pt getting IVF.      Psychological and Psychiatric Aspects of Care:   --Grief/Bereavement: emotional support provided  --Hx of psychiatric dx: none  -Pastoral Care Available PRN     Social Aspects of Care  -SW involved     Cultural Aspects  -Primary Language: English    Goals of Care:  DNR/DNI in chart (preexisting).  family are willing to a more conservative approach.  they would not want her to return to the ICU or to get pressors to support BP.  OK with antibiotics and other non-invasive treatments.   MOLST Updated today to include no HD    Ethical and Legal Aspects: NA  Capacity- pt lacks capacity for major medical decisions at this time.      HCP/Surrogate: children - Moe ([phone]) and Fabby.     Code Status- DNR/DNI  MOLST- in chart -- updated 2/17  Dispo Plan- tbd (from SNF)    Discussed With: nursing, Medicine Dr. Caraballo

## 2023-02-17 NOTE — PROGRESS NOTE ADULT - ASSESSMENT
Overall stable. Giggest obstacle is severe malnutrition and complex wound and ostomy.. Continue VAC. Will change dressing tomorrow.IV hydration. Thick liquids and minced diet as tolerated.

## 2023-02-17 NOTE — PROGRESS NOTE ADULT - SUBJECTIVE AND OBJECTIVE BOX
Patient is a 72y Female who reports no complaints   not very verbal    cleared to have po but pt not eating per staff     MEDICATIONS  (STANDING):  albuterol    0.083% 2.5 milliGRAM(s) Nebulizer every 6 hours  buDESOnide    Inhalation Suspension 0.5 milliGRAM(s) Inhalation every 12 hours  chlorhexidine 4% Liquid 1 Application(s) Topical <User Schedule>  coronavirus bivalent (EUA) Booster Vaccine (PFIZER) 0.3 milliLiter(s) IntraMuscular once  dextrose 5%. 1000 milliLiter(s) (50 mL/Hr) IV Continuous <Continuous>  dextrose 5%. 1000 milliLiter(s) (100 mL/Hr) IV Continuous <Continuous>  dextrose 50% Injectable 25 Gram(s) IV Push once  dextrose 50% Injectable 12.5 Gram(s) IV Push once  dextrose 50% Injectable 25 Gram(s) IV Push once  enoxaparin Injectable 100 milliGRAM(s) SubCutaneous every 12 hours  glucagon  Injectable 1 milliGRAM(s) IntraMuscular once  insulin glargine Injectable (LANTUS) 20 Unit(s) SubCutaneous at bedtime  insulin lispro (ADMELOG) corrective regimen sliding scale   SubCutaneous every 4 hours  levothyroxine Injectable 70 MICROGram(s) IV Push at bedtime  metoprolol tartrate 25 milliGRAM(s) Oral two times a day  nystatin Cream 1 Application(s) Topical two times a day  nystatin Powder 1 Application(s) Topical every 12 hours  pantoprazole  Injectable 40 milliGRAM(s) IV Push daily  sodium bicarbonate  Infusion 0.058 mEq/kG/Hr (75 mL/Hr) IV Continuous <Continuous>  sodium chloride 0.45% 1000 milliLiter(s) (100 mL/Hr) IV Continuous <Continuous>    Vital Signs Last 24 Hrs  T(C): 36.6 (16 Feb 2023 20:06), Max: 36.6 (16 Feb 2023 20:06)  T(F): 97.8 (16 Feb 2023 20:06), Max: 97.8 (16 Feb 2023 20:06)  HR: 90 (17 Feb 2023 01:21) (90 - 95)  BP: 138/57 (16 Feb 2023 20:06) (115/46 - 138/57)  BP(mean): 115 (16 Feb 2023 16:09) (115 - 115)  RR: 18 (16 Feb 2023 20:06) (18 - 18)  SpO2: 95% (17 Feb 2023 01:21) (94% - 95%)    Parameters below as of 17 Feb 2023 01:21  Patient On (Oxygen Delivery Method): nasal cannula,2L  I&O's Detail    16 Feb 2023 07:01  -  17 Feb 2023 07:00  --------------------------------------------------------  IN:    sodium chloride 0.45% w/ Additives: 1000 mL  Total IN: 1000 mL    OUT:    Stool (mL): 300 mL    Ureteral Catheter (mL): 400 mL    VAC (Vacuum Assisted Closure) System (mL): 153 mL  Total OUT: 853 mL    Total NET: 147 mL          PHYSICAL EXAM:    Constitutional: NAD, chronically ill appearing  HEENT:  MM  Resp: distant AE  Vac abdomen  Cardiovascular: S1 and S2   Gastrointestinal:  NT/ND  Extremities: ++ peripheral edema  Neurological: Arousable   :  Woodruff +          LABS:                                     8.0    10.57 )-----------( 241      ( 17 Feb 2023 08:33 )             26.1         137    |  108    |  49     ----------------------------<  89        17 Feb 2023 08:33  5.2     |  20     |  3.78     138    |  108    |  51     ----------------------------<  95        16 Feb 2023 13:34  4.9     |  24     |  3.57     137    |  107    |  48     ----------------------------<  142       15 Feb 2023 05:50  5.0     |  20     |  3.08     Ca    7.1        17 Feb 2023 08:33  Ca    7.5        16 Feb 2023 13:34    Phos  6.9       17 Feb 2023 08:33  Phos  6.3       15 Feb 2023 05:50    Mg     2.2       17 Feb 2023 08:33  Mg     2.5       15 Feb 2023 05:50    TPro  5.2    /  Alb  0.9    /  TBili  0.9    /        17 Feb 2023 08:33  DBili  x      /  AST  24     /  ALT  <6     /  AlkPhos  161      TPro  5.2    /  Alb  1.0    /  TBili  0.8    /        15 Feb 2023 05:50  DBili  x      /  AST  14     /  ALT  <6     /  AlkPhos  75               RADIOLOGY & ADDITIONAL STUDIES:

## 2023-02-17 NOTE — CHART NOTE - NSCHARTNOTESELECT_GEN_ALL_CORE
Dietitian BRIEF Note
Dietitian BRIEF Note
Dietitian Brief Note w/ TF
Event Note
Event Note
Nasogastric Tube Insertion/Event Note
Clinical Nutrition PN Follow Up Note RD
Critical Care/Event Note
Dietitian BRIEF Note
Dietitian BRIEF Note
Dietitian F/U w/ PPN
Dietitian PPN f/u
Dietitian PPN follow up
Dietitian PPN follow up
Dietitian TF Brief Note
Dietitian f/u w/ PPN
Drain leak/Event Note
Event Note
IR Note/Event Note
IR/Event Note
Surgery Post-op Note/Event Note

## 2023-02-17 NOTE — PROGRESS NOTE ADULT - ASSESSMENT
Patient admitted with abdominal pain, nausea and vomitting , dehydration  septic shock, likely related to     PROBLEMS:    UTI klebsiella pneumoniae and aspiration PNA /SBO- ventral hernia  New R lung nodules - cannot rule out aspiration PNA   Acute metabolic encephalopathy  Sepsis   Hx copd without exacerbation  Anasarca/Acute on  chronic diastolic heart failure   Superficial thrombophlebitis right cephalic vein at the right upper arm        Plan:    pulmonary stable  Blood transfusion, on TPN, IV fluids, iv lasix prn  Incarcerated Ventral Hernia with SBO-S/p extensive RICHARD, ileo colectomy, giant ventral hernia repair with Surgimend-surgery fu for persistant sutures leak-wound care-entero fistula  IV cefepime/flagyl-for  sepsis ( SBO/UTI)  Monitor Cr  Surgery fu  D/w staff  DVT PROPHYLASIX    Patient admitted with abdominal pain, nausea and vomitting , dehydration  septic shock, likely related to     PROBLEMS:    UTI klebsiella pneumoniae and aspiration PNA /SBO- ventral hernia  New R lung nodules - cannot rule out aspiration PNA   Acute metabolic encephalopathy  Sepsis   Hx copd without exacerbation  Anasarca/Acute on  chronic diastolic heart failure   Superficial thrombophlebitis right cephalic vein at the right upper arm      Plan:    pulmonary stable  Blood transfusion, on TPN, IV fluids, iv lasix prn  Incarcerated Ventral Hernia with SBO-S/p extensive RICHARD, ileo colectomy, giant ventral hernia repair with Surgimend-surgery fu for persistant sutures leak-wound care-entero fistula  IV cefepime/flagyl-for  sepsis ( SBO/UTI)  Monitor Cr  Surgery fu  D/w staff  DVT PROPHYLASIX

## 2023-02-17 NOTE — PROGRESS NOTE ADULT - SUBJECTIVE AND OBJECTIVE BOX
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 15: non verbal   2.16: non verbal, awake, no obvious distress  2.17: no change in status, minimal urine output    ROS unable to obtain     Vital Signs Last 24 Hrs  T(C): 36.6 (17 Feb 2023 16:00), Max: 36.6 (16 Feb 2023 20:06)  T(F): 97.9 (17 Feb 2023 16:00), Max: 97.9 (17 Feb 2023 16:00)  HR: 97 (17 Feb 2023 16:00) (90 - 97)  BP: 105/39 (17 Feb 2023 16:00) (105/39 - 138/57)  BP(mean): 55 (17 Feb 2023 16:00) (55 - 55)  RR: 18 (17 Feb 2023 16:00) (18 - 18)  SpO2: 98% (17 Feb 2023 16:00) (95% - 98%)    Parameters below as of 17 Feb 2023 16:00  Patient On (Oxygen Delivery Method): nasal cannula  O2 Flow (L/min): 2      PHYSICAL EXAM:  GENERAL: Obese, lethargic, appears ill  HEAD:  Atraumatic, Normocephalic  NECK: Supple, No JVD, Normal thyroid  NERVOUS SYSTEM:  Lethargic, opens eyes, not following commands  CHEST/LUNG: Clear to percussion bilaterally; No rales, rhonchi, wheezing, or rubs  HEART: Regular rate and rhythm; No murmurs, rubs, or gallops  ABDOMEN: +colostomy with feces present , large wound VAC present on abd  EXTREMITIES:  2+ Peripheral Pulses, No clubbing, cyanosis, Anasarca ; 2+ leg edema  LYMPH: No lymphadenopathy noted  SKIN: No rashes or lesions      Labs:    02-16    138  |  108  |  51<H>  ----------------------------<  95  4.9   |  24  |  3.57<H>    Ca    7.5<L>      16 Feb 2023 13:34  Phos  6.3     02-15  Mg     2.5     02-15    TPro  5.2<L>  /  Alb  1.0<L>  /  TBili  0.8  /  DBili  x   /  AST  14<L>  /  ALT  <6<L>  /  AlkPhos  75  02-15      MEDICATIONS  (STANDING):  albuterol    0.083% 2.5 milliGRAM(s) Nebulizer every 6 hours  buDESOnide    Inhalation Suspension 0.5 milliGRAM(s) Inhalation every 12 hours  chlorhexidine 4% Liquid 1 Application(s) Topical <User Schedule>  coronavirus bivalent (EUA) Booster Vaccine (PFIZER) 0.3 milliLiter(s) IntraMuscular once  enoxaparin Injectable 100 milliGRAM(s) SubCutaneous every 12 hours  glucagon  Injectable 1 milliGRAM(s) IntraMuscular once  insulin glargine Injectable (LANTUS) 20 Unit(s) SubCutaneous at bedtime  insulin lispro (ADMELOG) corrective regimen sliding scale   SubCutaneous every 4 hours  levothyroxine Injectable 70 MICROGram(s) IV Push at bedtime  metoprolol tartrate 25 milliGRAM(s) Oral two times a day  nystatin Cream 1 Application(s) Topical two times a day  nystatin Powder 1 Application(s) Topical every 12 hours  pantoprazole  Injectable 40 milliGRAM(s) IV Push daily  sodium bicarbonate  Infusion 0.058 mEq/kG/Hr (75 mL/Hr) IV Continuous <Continuous>  sodium chloride 0.45% 1000 milliLiter(s) (65 mL/Hr) IV Continuous <Continuous>  sodium chloride 0.45% 1000 milliLiter(s) (100 mL/Hr) IV Continuous <Continuous>     72F with PMHx CVA, pAF on AC, emphysema/COPD on home O2, HTN, SBO with prior resection who presented with abd pain, N/V. Found to have an small bowel obstruction. Taken to OR underwent ELAP, extensive RICHARD, ileocolectomy, ventral hernia repair with MESH. Postop course complicated with ALVERTO, shock requiring pressors    #ALVERTO- worsening likely ATN due to hypoperfusion injury   Acidosis resolved on bicarb  - continue IVF with bicarb per renal  - hyperkalemia improved will need to monitor closely  -family declined HD if it comes to that     #Abd pain, SBO, Enterocutaneous fistula  S/p expl laparotomy , RICHARD, ileocolectomy, ventral hernia repair w mesh, colostomy  +wound VAC placed  not eating much  Family declined feeding tube   PPI   IVF  s/p Cefepime/ Flagyl  Pain control, IS, Mobilize patient     management as per Surg    # UTI Kleb, PNA  s/p IV Abx  WBC improving, monitor for fevers  ID following    #pAFib : controlled   Lovenox 100mg BID  Lopressor IVPB q6  Hydralazine 10mg IVP q6 PRN SBP >160  Amiodarone 200mg Discontinued (2/10)- NGT is out so no PO meds     # Right Arm Superficial Thrombophlebitis:  warm compresses  no NSAIDS sec to renal failure  on anticoagulation     #Hypothyroid  Continue w IV synthroid     #DM  BGM well controlled,  c/w Lantus 20u/day   Corrective scale  Monitor fingersticks    #COPD  Albuterol  Pulmicort  Pulm following    Prognosis : Extremely poor    palliative care

## 2023-02-17 NOTE — CHART NOTE - NSCHARTNOTEFT_GEN_A_CORE
Dietitian BRIEF Note 2/17/23    Pt seen by SLP yesterday, who rec'd minced and moist with mildly thick liquids. Will advance diet, will not do PPN/TPN. Will add premier protein shakes daily BID.     RD will continue to monitor PO intake, labs, hydration, and wt prn.  Bushra Soni, MS, RDN, [facility] [phone]  Certified Nutrition

## 2023-02-17 NOTE — PROGRESS NOTE ADULT - SUBJECTIVE AND OBJECTIVE BOX
Resting comfortably  VSS afeb  lungs- clear  cor-RRR  Abd- + BS soft, VAC in place, ostomy functioning  Ext- 2+ edema

## 2023-02-17 NOTE — PROGRESS NOTE ADULT - SUBJECTIVE AND OBJECTIVE BOX
Subjective:      Review of Systems:    - CONSTITUTIONAL: Denies appetite change, fever and chills. Denies weakness and fatigue   - HEENT: Denies changes in vision and hearing.   - RESPIRATORY: Denies SOB, cough and/or respiratory secretions   - CV: Denies palpitations and CP. Denies edema   - GI: Denies abdominal pain, constipation, nausea, vomiting and diarrhea.   - : Denies dysuria and urinary frequency.   - MSK: Denies back pain, myalgia and joint pain.   - SKIN: Denies rash and pruritus.   - NEUROLOGICAL: Denies headache and syncope.   - PSYCHIATRIC: Denies confusion, recent changes in mood. Denies anxiety and depression. Denies suicidal ideations    All other systems reviewed and negative  Unable to obtain/Limited due to:        PHYSICAL EXAM:    Vital Signs Last 24 Hrs  T(C): 36.6 (16 Feb 2023 20:06), Max: 36.6 (16 Feb 2023 20:06)  T(F): 97.8 (16 Feb 2023 20:06), Max: 97.8 (16 Feb 2023 20:06)  HR: 90 (17 Feb 2023 01:21) (90 - 95)  BP: 138/57 (16 Feb 2023 20:06) (115/46 - 138/57)  BP(mean): 115 (16 Feb 2023 16:09) (115 - 115)  RR: 18 (16 Feb 2023 20:06) (18 - 18)  SpO2: 95% (17 Feb 2023 01:21) (94% - 95%)    Parameters below as of 17 Feb 2023 01:21  Patient On (Oxygen Delivery Method): nasal cannula,2L      Daily     Daily     PPSV2:   %  FAST:    General:  - GENERAL: Alert and oriented x 3. No acute distress.   - EYES: EOMI. Anicteric.   - HENT: Moist mucous membranes.   - LUNGS: Clear to auscultation bilaterally. No accessory muscle use. Speaking in complete sentences.   - CARDIOVASCULAR: Regular rate and rhythm. No murmur. No JVD. No edema.   - ABDOMEN: Soft, non-tender and non-distended. No palpable masses. Normal bowel sounds   - EXTREMITIES: No edema. Non-tender.    - SKIN: Warm, no rashes or lesions on visible skin.   - NEUROLOGIC: No focal neurological deficits.    - PSYCHIATRIC: Cooperative. Appropriate mood and affect.      LABS:                        8.0    10.57 )-----------( 241      ( 17 Feb 2023 08:33 )             26.1     02-17    137  |  108  |  49<H>  ----------------------------<  89  5.2   |  20<L>  |  3.78<H>    Ca    7.1<L>      17 Feb 2023 08:33  Phos  6.9     02-17  Mg     2.2     02-17    TPro  5.2<L>  /  Alb  0.9<L>  /  TBili  0.9  /  DBili  x   /  AST  24  /  ALT  <6<L>  /  AlkPhos  161<H>  02-17      Albumin: Albumin, Serum: 0.9 g/dL (02-17 @ 08:33)      Allergies    Cipro (Rash)  Spiriva (Rash)    Intolerances      MEDICATIONS  (STANDING):  albuterol    0.083% 2.5 milliGRAM(s) Nebulizer every 6 hours  buDESOnide    Inhalation Suspension 0.5 milliGRAM(s) Inhalation every 12 hours  chlorhexidine 4% Liquid 1 Application(s) Topical <User Schedule>  coronavirus bivalent (EUA) Booster Vaccine (PFIZER) 0.3 milliLiter(s) IntraMuscular once  dextrose 5%. 1000 milliLiter(s) (50 mL/Hr) IV Continuous <Continuous>  dextrose 5%. 1000 milliLiter(s) (100 mL/Hr) IV Continuous <Continuous>  dextrose 50% Injectable 25 Gram(s) IV Push once  dextrose 50% Injectable 12.5 Gram(s) IV Push once  dextrose 50% Injectable 25 Gram(s) IV Push once  enoxaparin Injectable 100 milliGRAM(s) SubCutaneous every 12 hours  glucagon  Injectable 1 milliGRAM(s) IntraMuscular once  insulin glargine Injectable (LANTUS) 20 Unit(s) SubCutaneous at bedtime  insulin lispro (ADMELOG) corrective regimen sliding scale   SubCutaneous every 4 hours  levothyroxine Injectable 70 MICROGram(s) IV Push at bedtime  metoprolol tartrate 25 milliGRAM(s) Oral two times a day  nystatin Cream 1 Application(s) Topical two times a day  nystatin Powder 1 Application(s) Topical every 12 hours  pantoprazole  Injectable 40 milliGRAM(s) IV Push daily  sodium bicarbonate  Infusion 0.058 mEq/kG/Hr (75 mL/Hr) IV Continuous <Continuous>  sodium chloride 0.45% 1000 milliLiter(s) (65 mL/Hr) IV Continuous <Continuous>  sodium chloride 0.45% 1000 milliLiter(s) (100 mL/Hr) IV Continuous <Continuous>    MEDICATIONS  (PRN):  acetaminophen     Tablet .. 650 milliGRAM(s) Oral every 6 hours PRN Temp greater or equal to 38C (100.4F)  albuterol    90 MICROgram(s) HFA Inhaler 2 Puff(s) Inhalation every 6 hours PRN Shortness of Breath and/or Wheezing  dextrose Oral Gel 15 Gram(s) Oral once PRN Blood Glucose LESS THAN 70 milliGRAM(s)/deciliter  hydrALAZINE Injectable 10 milliGRAM(s) IV Push every 6 hours PRN SBP>160  HYDROmorphone  Injectable 0.5 milliGRAM(s) IV Push every 4 hours PRN Severe Pain (7 - 10)  sodium chloride 0.9% lock flush 10 milliLiter(s) IV Push every 1 hour PRN Pre/post blood products, medications, blood draw, and to maintain line patency      RADIOLOGY:   Subjective:  seen at bedside, pt is awake, lying in bed.  she is essentially non-verbal this morning.  she does not appear to be in any distress  she has been cleared for PO, but as per nursing, is nopt accepting anything.   continues to have poor UO and worsening Cr. the hgb continues to drop very slowly  wound vac functioning appropriately. no adverse overngiht events on chart review  pt took 1 dose of IV dilaudid yesterday    phone call to pt's son, Kandy Moe Dawsonyazcheco.  he was updated on mother's labs.  MOLST updated to include no HD and the No ICU and No Pressors were also added in the additional information section.     Review of Systems:  Unable to obtain/Limited due to: clinical condition        PHYSICAL EXAM:    Vital Signs Last 24 Hrs  T(C): 36.6 (16 Feb 2023 20:06), Max: 36.6 (16 Feb 2023 20:06)  T(F): 97.8 (16 Feb 2023 20:06), Max: 97.8 (16 Feb 2023 20:06)  HR: 90 (17 Feb 2023 01:21) (90 - 95)  BP: 138/57 (16 Feb 2023 20:06) (115/46 - 138/57)  BP(mean): 115 (16 Feb 2023 16:09) (115 - 115)  RR: 18 (16 Feb 2023 20:06) (18 - 18)  SpO2: 95% (17 Feb 2023 01:21) (94% - 95%)    Parameters below as of 17 Feb 2023 01:21  Patient On (Oxygen Delivery Method): nasal cannula,2L      General:  - GENERAL: Alert and oriented x 3. No acute distress.   - EYES: EOMI. Anicteric.   - HENT: Moist mucous membranes.   - LUNGS: Clear to auscultation bilaterally. No accessory muscle use. Speaking in complete sentences.   - CARDIOVASCULAR: Regular rate and rhythm. No murmur. No JVD. No edema.   - ABDOMEN: Soft, non-tender and non-distended. No palpable masses. Normal bowel sounds   - EXTREMITIES: No edema. Non-tender.    - SKIN: Warm, no rashes or lesions on visible skin.   - NEUROLOGIC: No focal neurological deficits.    - PSYCHIATRIC: Cooperative. Appropriate mood and affect.      LABS:                        8.0    10.57 )-----------( 241      ( 17 Feb 2023 08:33 )             26.1     02-17    137  |  108  |  49<H>  ----------------------------<  89  5.2   |  20<L>  |  3.78<H>    Ca    7.1<L>      17 Feb 2023 08:33  Phos  6.9     02-17  Mg     2.2     02-17    TPro  5.2<L>  /  Alb  0.9<L>  /  TBili  0.9  /  DBili  x   /  AST  24  /  ALT  <6<L>  /  AlkPhos  161<H>  02-17      Albumin: Albumin, Serum: 0.9 g/dL (02-17 @ 08:33)      Allergies    Cipro (Rash)  Spiriva (Rash)    Intolerances      MEDICATIONS  (STANDING):  albuterol    0.083% 2.5 milliGRAM(s) Nebulizer every 6 hours  buDESOnide    Inhalation Suspension 0.5 milliGRAM(s) Inhalation every 12 hours  chlorhexidine 4% Liquid 1 Application(s) Topical <User Schedule>  coronavirus bivalent (EUA) Booster Vaccine (PFIZER) 0.3 milliLiter(s) IntraMuscular once  dextrose 5%. 1000 milliLiter(s) (50 mL/Hr) IV Continuous <Continuous>  dextrose 5%. 1000 milliLiter(s) (100 mL/Hr) IV Continuous <Continuous>  dextrose 50% Injectable 25 Gram(s) IV Push once  dextrose 50% Injectable 12.5 Gram(s) IV Push once  dextrose 50% Injectable 25 Gram(s) IV Push once  enoxaparin Injectable 100 milliGRAM(s) SubCutaneous every 12 hours  glucagon  Injectable 1 milliGRAM(s) IntraMuscular once  insulin glargine Injectable (LANTUS) 20 Unit(s) SubCutaneous at bedtime  insulin lispro (ADMELOG) corrective regimen sliding scale   SubCutaneous every 4 hours  levothyroxine Injectable 70 MICROGram(s) IV Push at bedtime  metoprolol tartrate 25 milliGRAM(s) Oral two times a day  nystatin Cream 1 Application(s) Topical two times a day  nystatin Powder 1 Application(s) Topical every 12 hours  pantoprazole  Injectable 40 milliGRAM(s) IV Push daily  sodium bicarbonate  Infusion 0.058 mEq/kG/Hr (75 mL/Hr) IV Continuous <Continuous>  sodium chloride 0.45% 1000 milliLiter(s) (65 mL/Hr) IV Continuous <Continuous>  sodium chloride 0.45% 1000 milliLiter(s) (100 mL/Hr) IV Continuous <Continuous>    MEDICATIONS  (PRN):  acetaminophen     Tablet .. 650 milliGRAM(s) Oral every 6 hours PRN Temp greater or equal to 38C (100.4F)  albuterol    90 MICROgram(s) HFA Inhaler 2 Puff(s) Inhalation every 6 hours PRN Shortness of Breath and/or Wheezing  dextrose Oral Gel 15 Gram(s) Oral once PRN Blood Glucose LESS THAN 70 milliGRAM(s)/deciliter  hydrALAZINE Injectable 10 milliGRAM(s) IV Push every 6 hours PRN SBP>160  HYDROmorphone  Injectable 0.5 milliGRAM(s) IV Push every 4 hours PRN Severe Pain (7 - 10)  sodium chloride 0.9% lock flush 10 milliLiter(s) IV Push every 1 hour PRN Pre/post blood products, medications, blood draw, and to maintain line patency      RADIOLOGY:   Subjective:  seen at bedside, pt is awake, lying in bed.  she is essentially non-verbal this morning.  she does not appear to be in any distress  she has been cleared for PO, but as per nursing, is nopt accepting anything.   continues to have poor UO and worsening Cr. the hgb continues to drop very slowly  wound vac functioning appropriately. no adverse overngiht events on chart review  pt took 1 dose of IV dilaudid yesterday    phone call to pt's son, Dr. Moe Dawsonyazcheco.  he was updated on mother's labs.  MOLST updated to include no HD and the No ICU and No Pressors were also added in the additional information section.     Review of Systems:  Unable to obtain/Limited due to: clinical condition        PHYSICAL EXAM:    Vital Signs Last 24 Hrs  T(C): 36.6 (16 Feb 2023 20:06), Max: 36.6 (16 Feb 2023 20:06)  T(F): 97.8 (16 Feb 2023 20:06), Max: 97.8 (16 Feb 2023 20:06)  HR: 90 (17 Feb 2023 01:21) (90 - 95)  BP: 138/57 (16 Feb 2023 20:06) (115/46 - 138/57)  BP(mean): 115 (16 Feb 2023 16:09) (115 - 115)  RR: 18 (16 Feb 2023 20:06) (18 - 18)  SpO2: 95% (17 Feb 2023 01:21) (94% - 95%)    Parameters below as of 17 Feb 2023 01:21  Patient On (Oxygen Delivery Method): nasal cannula,2L      General:  - GENERAL: Alert. No acute distress.   - EYES: EOMI. Anicteric.   - HENT: Moist mucous membranes.   - LUNGS: Clear to auscultation bilaterally. No accessory muscle use  - CARDIOVASCULAR: Regular rate and rhythm. No murmur. No JVD. No edema.   - ABDOMEN: + Edema  - SKIN: Warm  - PSYCHIATRIC: Not Cooperative      LABS:                        8.0    10.57 )-----------( 241      ( 17 Feb 2023 08:33 )             26.1     02-17    137  |  108  |  49<H>  ----------------------------<  89  5.2   |  20<L>  |  3.78<H>    Ca    7.1<L>      17 Feb 2023 08:33  Phos  6.9     02-17  Mg     2.2     02-17    TPro  5.2<L>  /  Alb  0.9<L>  /  TBili  0.9  /  DBili  x   /  AST  24  /  ALT  <6<L>  /  AlkPhos  161<H>  02-17      Albumin: Albumin, Serum: 0.9 g/dL (02-17 @ 08:33)      Allergies    Cipro (Rash)  Spiriva (Rash)    Intolerances      MEDICATIONS  (STANDING):  albuterol    0.083% 2.5 milliGRAM(s) Nebulizer every 6 hours  buDESOnide    Inhalation Suspension 0.5 milliGRAM(s) Inhalation every 12 hours  chlorhexidine 4% Liquid 1 Application(s) Topical <User Schedule>  coronavirus bivalent (EUA) Booster Vaccine (PFIZER) 0.3 milliLiter(s) IntraMuscular once  dextrose 5%. 1000 milliLiter(s) (50 mL/Hr) IV Continuous <Continuous>  dextrose 5%. 1000 milliLiter(s) (100 mL/Hr) IV Continuous <Continuous>  dextrose 50% Injectable 25 Gram(s) IV Push once  dextrose 50% Injectable 12.5 Gram(s) IV Push once  dextrose 50% Injectable 25 Gram(s) IV Push once  enoxaparin Injectable 100 milliGRAM(s) SubCutaneous every 12 hours  glucagon  Injectable 1 milliGRAM(s) IntraMuscular once  insulin glargine Injectable (LANTUS) 20 Unit(s) SubCutaneous at bedtime  insulin lispro (ADMELOG) corrective regimen sliding scale   SubCutaneous every 4 hours  levothyroxine Injectable 70 MICROGram(s) IV Push at bedtime  metoprolol tartrate 25 milliGRAM(s) Oral two times a day  nystatin Cream 1 Application(s) Topical two times a day  nystatin Powder 1 Application(s) Topical every 12 hours  pantoprazole  Injectable 40 milliGRAM(s) IV Push daily  sodium bicarbonate  Infusion 0.058 mEq/kG/Hr (75 mL/Hr) IV Continuous <Continuous>  sodium chloride 0.45% 1000 milliLiter(s) (65 mL/Hr) IV Continuous <Continuous>  sodium chloride 0.45% 1000 milliLiter(s) (100 mL/Hr) IV Continuous <Continuous>    MEDICATIONS  (PRN):  acetaminophen     Tablet .. 650 milliGRAM(s) Oral every 6 hours PRN Temp greater or equal to 38C (100.4F)  albuterol    90 MICROgram(s) HFA Inhaler 2 Puff(s) Inhalation every 6 hours PRN Shortness of Breath and/or Wheezing  dextrose Oral Gel 15 Gram(s) Oral once PRN Blood Glucose LESS THAN 70 milliGRAM(s)/deciliter  hydrALAZINE Injectable 10 milliGRAM(s) IV Push every 6 hours PRN SBP>160  HYDROmorphone  Injectable 0.5 milliGRAM(s) IV Push every 4 hours PRN Severe Pain (7 - 10)  sodium chloride 0.9% lock flush 10 milliLiter(s) IV Push every 1 hour PRN Pre/post blood products, medications, blood draw, and to maintain line patency      RADIOLOGY:

## 2023-02-18 DIAGNOSIS — K56.609 UNSPECIFIED INTESTINAL OBSTRUCTION, UNSPECIFIED AS TO PARTIAL VERSUS COMPLETE OBSTRUCTION: ICD-10-CM

## 2023-02-18 DIAGNOSIS — E87.20 ACIDOSIS, UNSPECIFIED: ICD-10-CM

## 2023-02-18 DIAGNOSIS — E11.9 TYPE 2 DIABETES MELLITUS WITHOUT COMPLICATIONS: ICD-10-CM

## 2023-02-18 DIAGNOSIS — N17.9 ACUTE KIDNEY FAILURE, UNSPECIFIED: ICD-10-CM

## 2023-02-18 LAB
GLUCOSE BLDC GLUCOMTR-MCNC: 102 MG/DL — HIGH (ref 70–99)
GLUCOSE BLDC GLUCOMTR-MCNC: 119 MG/DL — HIGH (ref 70–99)
GLUCOSE BLDC GLUCOMTR-MCNC: 93 MG/DL — SIGNIFICANT CHANGE UP (ref 70–99)
GLUCOSE BLDC GLUCOMTR-MCNC: 97 MG/DL — SIGNIFICANT CHANGE UP (ref 70–99)

## 2023-02-18 PROCEDURE — 99232 SBSQ HOSP IP/OBS MODERATE 35: CPT

## 2023-02-18 RX ADMIN — ENOXAPARIN SODIUM 100 MILLIGRAM(S): 100 INJECTION SUBCUTANEOUS at 10:20

## 2023-02-18 RX ADMIN — PANTOPRAZOLE SODIUM 40 MILLIGRAM(S): 20 TABLET, DELAYED RELEASE ORAL at 10:22

## 2023-02-18 RX ADMIN — Medication 0.5 MILLIGRAM(S): at 09:15

## 2023-02-18 RX ADMIN — NYSTATIN CREAM 1 APPLICATION(S): 100000 CREAM TOPICAL at 10:20

## 2023-02-18 RX ADMIN — SODIUM CHLORIDE 100 MILLILITER(S): 9 INJECTION, SOLUTION INTRAVENOUS at 03:10

## 2023-02-18 RX ADMIN — NYSTATIN CREAM 1 APPLICATION(S): 100000 CREAM TOPICAL at 10:19

## 2023-02-18 RX ADMIN — SODIUM CHLORIDE 100 MILLILITER(S): 9 INJECTION, SOLUTION INTRAVENOUS at 03:21

## 2023-02-18 RX ADMIN — NYSTATIN CREAM 1 APPLICATION(S): 100000 CREAM TOPICAL at 22:24

## 2023-02-18 RX ADMIN — ALBUTEROL 2.5 MILLIGRAM(S): 90 AEROSOL, METERED ORAL at 09:16

## 2023-02-18 RX ADMIN — ALBUTEROL 2.5 MILLIGRAM(S): 90 AEROSOL, METERED ORAL at 20:22

## 2023-02-18 RX ADMIN — ALBUTEROL 2.5 MILLIGRAM(S): 90 AEROSOL, METERED ORAL at 13:53

## 2023-02-18 RX ADMIN — Medication 0.5 MILLIGRAM(S): at 20:22

## 2023-02-18 RX ADMIN — SODIUM CHLORIDE 100 MILLILITER(S): 9 INJECTION, SOLUTION INTRAVENOUS at 15:55

## 2023-02-18 RX ADMIN — CHLORHEXIDINE GLUCONATE 1 APPLICATION(S): 213 SOLUTION TOPICAL at 10:22

## 2023-02-18 RX ADMIN — ALBUTEROL 2.5 MILLIGRAM(S): 90 AEROSOL, METERED ORAL at 02:27

## 2023-02-18 RX ADMIN — ENOXAPARIN SODIUM 100 MILLIGRAM(S): 100 INJECTION SUBCUTANEOUS at 22:23

## 2023-02-18 RX ADMIN — Medication 70 MICROGRAM(S): at 22:25

## 2023-02-18 RX ADMIN — HYDROMORPHONE HYDROCHLORIDE 0.5 MILLIGRAM(S): 2 INJECTION INTRAMUSCULAR; INTRAVENOUS; SUBCUTANEOUS at 06:38

## 2023-02-18 RX ADMIN — NYSTATIN CREAM 1 APPLICATION(S): 100000 CREAM TOPICAL at 22:23

## 2023-02-18 RX ADMIN — HYDROMORPHONE HYDROCHLORIDE 0.5 MILLIGRAM(S): 2 INJECTION INTRAMUSCULAR; INTRAVENOUS; SUBCUTANEOUS at 06:57

## 2023-02-18 NOTE — PROGRESS NOTE ADULT - SUBJECTIVE AND OBJECTIVE BOX
Patient is a 72y old  Female who presents with a chief complaint of bowel obstruction/ischemic mesentery (18 Feb 2023 13:15)      History, interim events and clinically pertinent issues reviewed; patient interviewed and examined.  Pt unable to provide history 2/2 lethargy  ROS: Unable to obtain 2/2 lethargy    ALLERGIES:  Cipro (Rash)  Spiriva (Rash)    MEDICATIONS  (STANDING):  albuterol    0.083% 2.5 milliGRAM(s) Nebulizer every 6 hours  buDESOnide    Inhalation Suspension 0.5 milliGRAM(s) Inhalation every 12 hours  calcium acetate 667 milliGRAM(s) Oral three times a day with meals  chlorhexidine 4% Liquid 1 Application(s) Topical <User Schedule>  coronavirus bivalent (EUA) Booster Vaccine (PFIZER) 0.3 milliLiter(s) IntraMuscular once  dextrose 5%. 1000 milliLiter(s) (50 mL/Hr) IV Continuous <Continuous>  dextrose 5%. 1000 milliLiter(s) (100 mL/Hr) IV Continuous <Continuous>  dextrose 50% Injectable 25 Gram(s) IV Push once  dextrose 50% Injectable 12.5 Gram(s) IV Push once  dextrose 50% Injectable 25 Gram(s) IV Push once  enoxaparin Injectable 100 milliGRAM(s) SubCutaneous every 12 hours  glucagon  Injectable 1 milliGRAM(s) IntraMuscular once  insulin glargine Injectable (LANTUS) 20 Unit(s) SubCutaneous at bedtime  insulin lispro (ADMELOG) corrective regimen sliding scale   SubCutaneous every 4 hours  levothyroxine Injectable 70 MICROGram(s) IV Push at bedtime  metoprolol tartrate 25 milliGRAM(s) Oral two times a day  nystatin Cream 1 Application(s) Topical two times a day  nystatin Powder 1 Application(s) Topical every 12 hours  pantoprazole  Injectable 40 milliGRAM(s) IV Push daily  sodium chloride 0.45% 1000 milliLiter(s) (100 mL/Hr) IV Continuous <Continuous>    MEDICATIONS  (PRN):  acetaminophen     Tablet .. 650 milliGRAM(s) Oral every 6 hours PRN Temp greater or equal to 38C (100.4F)  albuterol    90 MICROgram(s) HFA Inhaler 2 Puff(s) Inhalation every 6 hours PRN Shortness of Breath and/or Wheezing  dextrose Oral Gel 15 Gram(s) Oral once PRN Blood Glucose LESS THAN 70 milliGRAM(s)/deciliter  hydrALAZINE Injectable 10 milliGRAM(s) IV Push every 6 hours PRN SBP>160  HYDROmorphone  Injectable 0.5 milliGRAM(s) IV Push every 4 hours PRN Severe Pain (7 - 10)  sodium chloride 0.9% lock flush 10 milliLiter(s) IV Push every 1 hour PRN Pre/post blood products, medications, blood draw, and to maintain line patency    Vital Signs Last 24 Hrs  T(F): 97.8 (18 Feb 2023 07:46), Max: 97.9 (17 Feb 2023 16:00)  HR: 84 (18 Feb 2023 09:15) (65 - 98)  BP: 124/50 (18 Feb 2023 07:46) (105/39 - 127/58)  RR: 18 (18 Feb 2023 07:46) (18 - 18)  SpO2: 100% (18 Feb 2023 07:46) (98% - 100%)  I&O's Summary    17 Feb 2023 07:01  -  18 Feb 2023 07:00  --------------------------------------------------------  IN: 1000 mL / OUT: 450 mL / NET: 550 mL              POCT Blood Glucose.: 119 mg/dL (18 Feb 2023 07:30)  POCT Blood Glucose.: 102 mg/dL (18 Feb 2023 05:01)  POCT Blood Glucose.: 116 mg/dL (17 Feb 2023 22:28)  POCT Blood Glucose.: 77 mg/dL (17 Feb 2023 21:11)    PHYSICAL EXAM:  General: NAD, lethargic but opens eyes to verbal / tactile cues. Not following commands or answering questions  ENT: MMM  Neck: Supple, No JVD  Lungs: scattered rhonchi bilat  Cardio: RRR, S1/S2, No murmurs  Abdomen: Soft, Nontender, Nondistended;   Extremities: No calf tenderness, No pitting edema      LABS:                        8.0    10.57 )-----------( 241      ( 17 Feb 2023 08:33 )             26.1       02-17    137  |  108  |  49  ----------------------------<  89  5.2   |  20  |  3.78    Ca    7.1      17 Feb 2023 08:33  Phos  6.9     02-17  Mg     2.2     02-17    TPro  5.2  /  Alb  0.9  /  TBili  0.9  /  DBili  x   /  AST  24  /  ALT  <6  /  AlkPhos  161  02-17 02-15 Chol -- LDL -- HDL -- Trig 247 mg/dL                Culture - Urine (collected 15 Feb 2023 15:30)  Source: Catheterized Catheterized  Final Report (17 Feb 2023 19:19):    50,000 - 99,000 CFU/mL Candida albicans "Susceptibilities not performed"      COVID-19 PCR: Nubia (02-15-23 @ 00:35)  COVID-19 PCR: Nubia (01-31-23 @ 11:30)

## 2023-02-18 NOTE — PROGRESS NOTE ADULT - SUBJECTIVE AND OBJECTIVE BOX
Subjective:    pat better, on iv bicarbonate drip, taking po, no respiratory distress.    Home Medications:  Albuterol (Eqv-ProAir HFA) 90 mcg/inh inhalation aerosol: 1 puff(s) inhaled every 6 hours, As Needed (20 Jan 2023 16:49)  amiodarone 200 mg oral tablet: 1 tab(s) orally once a day (20 Jan 2023 16:49)  ascorbic acid 500 mg oral tablet: 2 tab(s) orally once a day (20 Jan 2023 16:49)  atorvastatin 10 mg oral tablet: 1 tab(s) orally once a day (at bedtime) (20 Jan 2023 16:49)  benzonatate 100 mg oral capsule: 1 cap(s) orally 3 times a day (20 Jan 2023 21:48)  budesonide 0.5 mg/2 mL inhalation suspension: 2 milliliter(s) inhaled 2 times a day (20 Jan 2023 21:48)  Cepacol Sore Throat 15 mg-3.6 mg mucous membrane lozenge: 1 lozenge mucous membrane every 4 hours, As Needed (20 Jan 2023 21:48)  cholecalciferol 1250 mcg (50,000 intl units) oral capsule: 1 cap(s) orally every 4 weeks on Wednesday  (20 Jan 2023 16:49)  Coumadin 2.5 mg oral tablet: 1 tab(s) orally once a day (at bedtime)  ***ON HOLD from 1-16 to 1-21*** (20 Jan 2023 21:48)  Cranberry oral tablet: 1 tab(s) orally 2 times a day (20 Jan 2023 16:49)  cyanocobalamin 1000 mcg oral tablet: 1 tab(s) orally once a day (20 Jan 2023 16:49)  dilTIAZem 180 mg/24 hours oral capsule, extended release: 1 cap(s) orally once a day (20 Jan 2023 16:49)  DULoxetine 60 mg oral delayed release capsule: 1 cap(s) orally once a day (20 Jan 2023 16:49)  estradiol 0.1 mg/g vaginal cream: 0.5 gram(s) vaginal 2 times a week on Monday and Thursday (20 Jan 2023 21:48)  fexofenadine 180 mg oral tablet: 1 tab(s) orally once a day (20 Jan 2023 21:48)  fluticasone 50 mcg/inh nasal spray: 1 spray(s) nasal 2 times a day (20 Jan 2023 16:49)  furosemide 20 mg oral tablet: 1 tab(s) orally once a day (20 Jan 2023 16:49)  glipiZIDE 10 mg oral tablet, extended release: 2 tab(s) orally once a day (20 Jan 2023 16:49)  GlycoLax oral powder for reconstitution: 17 gram(s) orally 2 times a day (20 Jan 2023 16:49)  guaiFENesin 100 mg/5 mL oral liquid: 20 milliliter(s) orally every 6 hours (20 Jan 2023 21:48)  HumaLOG KwikPen 100 units/mL injectable solution: 10 unit(s) injectable 3 times a day (before meals) (20 Jan 2023 21:48)  ipratropium-albuterol 20 mcg-100 mcg/inh inhalation aerosol: 1 puff(s) inhaled 4 times a day (20 Jan 2023 16:49)  levothyroxine 88 mcg (0.088 mg) oral tablet: 1 tab(s) orally once a day (at bedtime) (20 Jan 2023 16:49)  losartan 25 mg oral tablet: 1 tab(s) orally once a day (20 Jan 2023 16:49)  Macrobid 100 mg oral capsule: 1 cap(s) orally 2 times a day for 5 days  ***course complete*** (20 Jan 2023 21:48)  methocarbamol 750 mg oral tablet: 1 tab(s) orally every 8 hours, As Needed (20 Jan 2023 21:48)  metoprolol tartrate 25 mg oral tablet: 1 tab(s) orally 2 times a day (20 Jan 2023 16:49)  Milk of Magnesia 8% oral suspension: 30 milliliter(s) orally once a day, As Needed (20 Jan 2023 16:49)  montelukast 10 mg oral tablet: 1 tab(s) orally once a day (at bedtime) (20 Jan 2023 21:48)  ondansetron 4 mg oral tablet: 1 tab(s) orally every 4 hours, As Needed (20 Jan 2023 21:48)  oxybutynin 5 mg oral tablet: 1 tab(s) orally 2 times a day (20 Jan 2023 16:49)  oxyCODONE 5 mg oral tablet: 1 tab(s) orally every 4 hours, As Needed (20 Jan 2023 16:49)  phytonadione 5 mg oral tablet: 0.5 tab(s) orally once  ***given at [facility] once on 1-19-23*** (20 Jan 2023 21:48)  pregabalin 100 mg oral capsule: 1 cap(s) orally 3 times a day (20 Jan 2023 16:49)  sennosides-docusate 8.6 mg-50 mg oral tablet: 2 tab(s) orally once a day (at bedtime) (20 Jan 2023 16:49)  Tradjenta 5 mg oral tablet: 1 tab(s) orally once a day (20 Jan 2023 16:49)  Tylenol 500 mg oral tablet: 2 tab(s) orally every 8 hours (20 Jan 2023 16:49)    MEDICATIONS  (STANDING):  albuterol    0.083% 2.5 milliGRAM(s) Nebulizer every 6 hours  buDESOnide    Inhalation Suspension 0.5 milliGRAM(s) Inhalation every 12 hours  calcium acetate 667 milliGRAM(s) Oral three times a day with meals  chlorhexidine 4% Liquid 1 Application(s) Topical <User Schedule>  coronavirus bivalent (EUA) Booster Vaccine (PFIZER) 0.3 milliLiter(s) IntraMuscular once  dextrose 5%. 1000 milliLiter(s) (50 mL/Hr) IV Continuous <Continuous>  dextrose 5%. 1000 milliLiter(s) (100 mL/Hr) IV Continuous <Continuous>  dextrose 50% Injectable 25 Gram(s) IV Push once  dextrose 50% Injectable 12.5 Gram(s) IV Push once  dextrose 50% Injectable 25 Gram(s) IV Push once  enoxaparin Injectable 100 milliGRAM(s) SubCutaneous every 12 hours  glucagon  Injectable 1 milliGRAM(s) IntraMuscular once  insulin glargine Injectable (LANTUS) 20 Unit(s) SubCutaneous at bedtime  insulin lispro (ADMELOG) corrective regimen sliding scale   SubCutaneous every 4 hours  levothyroxine Injectable 70 MICROGram(s) IV Push at bedtime  metoprolol tartrate 25 milliGRAM(s) Oral two times a day  nystatin Cream 1 Application(s) Topical two times a day  nystatin Powder 1 Application(s) Topical every 12 hours  pantoprazole  Injectable 40 milliGRAM(s) IV Push daily  sodium chloride 0.45% 1000 milliLiter(s) (100 mL/Hr) IV Continuous <Continuous>    MEDICATIONS  (PRN):  acetaminophen     Tablet .. 650 milliGRAM(s) Oral every 6 hours PRN Temp greater or equal to 38C (100.4F)  albuterol    90 MICROgram(s) HFA Inhaler 2 Puff(s) Inhalation every 6 hours PRN Shortness of Breath and/or Wheezing  dextrose Oral Gel 15 Gram(s) Oral once PRN Blood Glucose LESS THAN 70 milliGRAM(s)/deciliter  hydrALAZINE Injectable 10 milliGRAM(s) IV Push every 6 hours PRN SBP>160  HYDROmorphone  Injectable 0.5 milliGRAM(s) IV Push every 4 hours PRN Severe Pain (7 - 10)  sodium chloride 0.9% lock flush 10 milliLiter(s) IV Push every 1 hour PRN Pre/post blood products, medications, blood draw, and to maintain line patency      Allergies    Cipro (Rash)  Spiriva (Rash)    Intolerances        Vital Signs Last 24 Hrs  T(C): 36.6 (18 Feb 2023 07:46), Max: 36.6 (17 Feb 2023 16:00)  T(F): 97.8 (18 Feb 2023 07:46), Max: 97.9 (17 Feb 2023 16:00)  HR: 84 (18 Feb 2023 09:15) (65 - 98)  BP: 124/50 (18 Feb 2023 07:46) (105/39 - 127/58)  BP(mean): 55 (17 Feb 2023 16:00) (55 - 55)  RR: 18 (18 Feb 2023 07:46) (18 - 18)  SpO2: 100% (18 Feb 2023 07:46) (98% - 100%)    Parameters below as of 18 Feb 2023 09:15  Patient On (Oxygen Delivery Method): nasal cannula          PHYSICAL EXAMINATION:    NECK:  Supple. No lymphadenopathy. Jugular venous pressure not elevated. Carotids equal.   HEART:   The cardiac impulse has a normal quality. Reg., Nl S1 and S2.  There are no murmurs, rubs or gallops noted  CHEST:  Chest crackles to auscultation. Normal respiratory effort.  ABDOMEN:  Soft and nontender.   EXTREMITIES:  There is no edema.       LABS:                        8.0    10.57 )-----------( 241      ( 17 Feb 2023 08:33 )             26.1     02-17    137  |  108  |  49<H>  ----------------------------<  89  5.2   |  20<L>  |  3.78<H>    Ca    7.1<L>      17 Feb 2023 08:33  Phos  6.9     02-17  Mg     2.2     02-17    TPro  5.2<L>  /  Alb  0.9<L>  /  TBili  0.9  /  DBili  x   /  AST  24  /  ALT  <6<L>  /  AlkPhos  161<H>  02-17

## 2023-02-18 NOTE — PROGRESS NOTE ADULT - SUBJECTIVE AND OBJECTIVE BOX
NEPHROLOGY INTERVAL HPI/OVERNIGHT EVENTS:    Date of Service: 23 @ 09:51    Covering for Lukasz Pearce/ Janei  --  HPI :  72F with PMHx CVA, pAF on AC, emphysema/COPD on home O2, HTN, SBO with prior resection who presented with abd pain, N/V. Found to have an small bowel obstruction. Taken to OR underwent ELAP, extensive RICHARD, ileocolectomy, ventral hernia repair with MESH. Postop course complicated with ALVERTO, shock requiring pressors  renal eval called for alverto, hyperkalemia, acidemia    Today pt lethargic, arousable    MEDICATIONS  (STANDING):  albuterol    0.083% 2.5 milliGRAM(s) Nebulizer every 6 hours  buDESOnide    Inhalation Suspension 0.5 milliGRAM(s) Inhalation every 12 hours  calcium acetate 667 milliGRAM(s) Oral three times a day with meals  chlorhexidine 4% Liquid 1 Application(s) Topical <User Schedule>  coronavirus bivalent (EUA) Booster Vaccine (PFIZER) 0.3 milliLiter(s) IntraMuscular once  dextrose 5%. 1000 milliLiter(s) (50 mL/Hr) IV Continuous <Continuous>  dextrose 5%. 1000 milliLiter(s) (100 mL/Hr) IV Continuous <Continuous>  dextrose 50% Injectable 25 Gram(s) IV Push once  dextrose 50% Injectable 12.5 Gram(s) IV Push once  dextrose 50% Injectable 25 Gram(s) IV Push once  enoxaparin Injectable 100 milliGRAM(s) SubCutaneous every 12 hours  glucagon  Injectable 1 milliGRAM(s) IntraMuscular once  insulin glargine Injectable (LANTUS) 20 Unit(s) SubCutaneous at bedtime  insulin lispro (ADMELOG) corrective regimen sliding scale   SubCutaneous every 4 hours  levothyroxine Injectable 70 MICROGram(s) IV Push at bedtime  metoprolol tartrate 25 milliGRAM(s) Oral two times a day  nystatin Cream 1 Application(s) Topical two times a day  nystatin Powder 1 Application(s) Topical every 12 hours  pantoprazole  Injectable 40 milliGRAM(s) IV Push daily  sodium chloride 0.45% 1000 milliLiter(s) (100 mL/Hr) IV Continuous <Continuous>    MEDICATIONS  (PRN):  acetaminophen     Tablet .. 650 milliGRAM(s) Oral every 6 hours PRN Temp greater or equal to 38C (100.4F)  albuterol    90 MICROgram(s) HFA Inhaler 2 Puff(s) Inhalation every 6 hours PRN Shortness of Breath and/or Wheezing  dextrose Oral Gel 15 Gram(s) Oral once PRN Blood Glucose LESS THAN 70 milliGRAM(s)/deciliter  hydrALAZINE Injectable 10 milliGRAM(s) IV Push every 6 hours PRN SBP>160  HYDROmorphone  Injectable 0.5 milliGRAM(s) IV Push every 4 hours PRN Severe Pain (7 - 10)  sodium chloride 0.9% lock flush 10 milliLiter(s) IV Push every 1 hour PRN Pre/post blood products, medications, blood draw, and to maintain line patency          Vital Signs Last 24 Hrs  T(C): 36.6 (2023 07:46), Max: 36.6 (2023 16:00)  T(F): 97.8 (2023 07:46), Max: 97.9 (2023 16:00)  HR: 84 (2023 09:15) (65 - 98)  BP: 124/50 (2023 07:46) (105/39 - 127/58)  BP(mean): 55 (2023 16:00) (55 - 55)  RR: 18 (2023 07:46) (18 - 18)  SpO2: 100% (2023 07:46) (98% - 100%)    Parameters below as of 2023 09:15  Patient On (Oxygen Delivery Method): nasal cannula      Daily     Daily Weight in k.1 (2023 06:14)     @ 07:01  -  -18 @ 07:00  --------------------------------------------------------  IN: 1000 mL / OUT: 450 mL / NET: 550 mL    PHYSICAL EXAM:  GENERAL:   CHEST/LUNG:   HEART:   ABDOMEN:   EXTREMITIES:   SKIN:     LABS:                        8.0    10.57 )-----------( 241      ( 2023 08:33 )             26.1         137  |  108  |  49<H>  ----------------------------<  89  5.2   |  20<L>  |  3.78<H>    Ca    7.1<L>      2023 08:33  Phos  6.9       Mg     2.2         TPro  5.2<L>  /  Alb  0.9<L>  /  TBili  0.9  /  DBili  x   /  AST  24  /  ALT  <6<L>  /  AlkPhos  161<H>                  RADIOLOGY & ADDITIONAL TESTS:   NEPHROLOGY INTERVAL HPI/OVERNIGHT EVENTS:    Date of Service: 23 @ 09:51    Covering for Lukasz Pearce/ Janie  --Remains lethargic.  Diet advanced but minimal po intake per Rn staff.  UO 450cc    HPI :  72F with PMHx CVA, pAF on AC, emphysema/COPD on home O2, HTN, SBO with prior resection who presented with abd pain, N/V. Found to have an small bowel obstruction. Taken to OR underwent ELAP, extensive RICHARD, ileocolectomy, ventral hernia repair with MESH. Postop course complicated with ALVERTO, shock requiring pressors  renal eval called for alverto, hyperkalemia, acidemia    Today pt lethargic, arousable    MEDICATIONS  (STANDING):  albuterol    0.083% 2.5 milliGRAM(s) Nebulizer every 6 hours  buDESOnide    Inhalation Suspension 0.5 milliGRAM(s) Inhalation every 12 hours  calcium acetate 667 milliGRAM(s) Oral three times a day with meals  chlorhexidine 4% Liquid 1 Application(s) Topical <User Schedule>  coronavirus bivalent (EUA) Booster Vaccine (PFIZER) 0.3 milliLiter(s) IntraMuscular once  dextrose 5%. 1000 milliLiter(s) (50 mL/Hr) IV Continuous <Continuous>  dextrose 5%. 1000 milliLiter(s) (100 mL/Hr) IV Continuous <Continuous>  dextrose 50% Injectable 25 Gram(s) IV Push once  dextrose 50% Injectable 12.5 Gram(s) IV Push once  dextrose 50% Injectable 25 Gram(s) IV Push once  enoxaparin Injectable 100 milliGRAM(s) SubCutaneous every 12 hours  glucagon  Injectable 1 milliGRAM(s) IntraMuscular once  insulin glargine Injectable (LANTUS) 20 Unit(s) SubCutaneous at bedtime  insulin lispro (ADMELOG) corrective regimen sliding scale   SubCutaneous every 4 hours  levothyroxine Injectable 70 MICROGram(s) IV Push at bedtime  metoprolol tartrate 25 milliGRAM(s) Oral two times a day  nystatin Cream 1 Application(s) Topical two times a day  nystatin Powder 1 Application(s) Topical every 12 hours  pantoprazole  Injectable 40 milliGRAM(s) IV Push daily  sodium chloride 0.45% 1000 milliLiter(s) (100 mL/Hr) IV Continuous <Continuous>    MEDICATIONS  (PRN):  acetaminophen     Tablet .. 650 milliGRAM(s) Oral every 6 hours PRN Temp greater or equal to 38C (100.4F)  albuterol    90 MICROgram(s) HFA Inhaler 2 Puff(s) Inhalation every 6 hours PRN Shortness of Breath and/or Wheezing  dextrose Oral Gel 15 Gram(s) Oral once PRN Blood Glucose LESS THAN 70 milliGRAM(s)/deciliter  hydrALAZINE Injectable 10 milliGRAM(s) IV Push every 6 hours PRN SBP>160  HYDROmorphone  Injectable 0.5 milliGRAM(s) IV Push every 4 hours PRN Severe Pain (7 - 10)  sodium chloride 0.9% lock flush 10 milliLiter(s) IV Push every 1 hour PRN Pre/post blood products, medications, blood draw, and to maintain line patency    Vital Signs Last 24 Hrs  T(C): 36.6 (2023 07:46), Max: 36.6 (2023 16:00)  T(F): 97.8 (2023 07:46), Max: 97.9 (2023 16:00)  HR: 84 (2023 09:15) (65 - 98)  BP: 124/50 (2023 07:46) (105/39 - 127/58)  BP(mean): 55 (2023 16:00) (55 - 55)  RR: 18 (2023 07:46) (18 - 18)  SpO2: 100% (2023 07:46) (98% - 100%)    Parameters below as of 2023 09:15  Patient On (Oxygen Delivery Method): nasal cannula      Daily     Daily Weight in k.1 (2023 06:14)    -17 @ 07:01  -  -18 @ 07:00  --------------------------------------------------------  IN: 1000 mL / OUT: 450 mL / NET: 550 mL    PHYSICAL EXAM:  GENERAL: Lethargic  CHEST/LUNG: scattered rhonchi  HEART: S1S2 RRR  ABDOMEN: soft  EXTREMITIES: 2+ edema  SKIN:     LABS: No labs today.                        8.0    10.57 )-----------( 241      ( 2023 08:33 )             26.1         137  |  108  |  49<H>  ----------------------------<  89  5.2   |  20<L>  |  3.78<H>    Ca    7.1<L>      2023 08:33  Phos  6.9       Mg     2.2         TPro  5.2<L>  /  Alb  0.9<L>  /  TBili  0.9  /  DBili  x   /  AST  24  /  ALT  <6<L>  /  AlkPhos  161<H>                  RADIOLOGY & ADDITIONAL TESTS:

## 2023-02-18 NOTE — PROGRESS NOTE ADULT - ASSESSMENT
72F with PMHx CVA, pAF on AC, emphysema/COPD on home O2, HTN, SBO with prior resection who presented with abd pain, N/V. Found to have an small bowel obstruction. Taken to OR underwent ELAP, extensive RICHARD, ileocolectomy, ventral hernia repair with MESH. Postop course complicated with ALVERTO, shock requiring pressors     ALVERTO in setting of acidemia, hyperkalemia, and worsening hyperglycemia   nonoliguric - ATN/SABAS   Cr continues to rise   K rising - low K diet when taking po    PPN off - to encourage po per staff, Renal diet   Patient would be a poor candidate for aggressive renal care if progression and likely not within stated GOC. - left message for son - HCP Dr Rosa  d/w Dr Elise who spoke to son today and states they would not pursue agrresive renal measures ie HD - agree with this as pt unlikely to tolerate HD   Long term prognosis seems poor      Hyperphoshatemia w  hyperkalemia   - start  binders when taking po  - low k  diet  -lokelma PRN    Acidemia   change to bicarb gtt strength     Third spacing w severe hypoalbuminemia   protein supplementation      dw RN staff, team and palliative    2/19 SY  --ALVERTO                            72F with PMHx CVA, pAF on AC, emphysema/COPD on home O2, HTN, SBO with prior resection who presented with abd pain, N/V. Found to have an small bowel obstruction. Taken to OR underwent ELAP, extensive RICHARD, ileocolectomy, ventral hernia repair with MESH. Postop course complicated with ALVERTO, shock requiring pressors     ALVERTO in setting of acidemia, hyperkalemia, and worsening hyperglycemia   nonoliguric - ATN/SABAS   Cr continues to rise   K rising - low K diet when taking po    PPN off - to encourage po per staff, Renal diet   Patient would be a poor candidate for aggressive renal care if progression and likely not within stated GOC. - left message for son - HCP Dr Rosa  d/w Dr Elise who spoke to son today and states they would not pursue agrresive renal measures ie HD - agree with this as pt unlikely to tolerate HD   Long term prognosis seems poor      Hyperphoshatemia w  hyperkalemia   - start  binders when taking po  - low k  diet  -lokelma PRN    Acidemia   change to bicarb gtt strength     Third spacing w severe hypoalbuminemia   protein supplementation      dw RN staff, team and palliative    2/19 SY  --ALVERTO /ATN/SABAS : conatinues to worsen with overall poor prognosis with severe malnutrition.     As above, No HD to be considered per pt's son--Dr Moe Matute.    Continue gentle hydration, ariella with very limited po intake  --Trend electrolytes and acid/base.

## 2023-02-18 NOTE — PROGRESS NOTE ADULT - SUBJECTIVE AND OBJECTIVE BOX
Lethargic and less responsive today  VSS afeb  lungs- clear  cor- RRR  Abd- VAC changed. Soft tissue debrided, clean. VAC replaced along with ostomy appliance over fistula.  Ext- 2+ edema

## 2023-02-18 NOTE — PROGRESS NOTE ADULT - ASSESSMENT
72F with PMHx CVA, pAF on AC, emphysema/COPD on home O2, HTN, SBO with prior resection who presented with abd pain, N/V. Found to have an small bowel obstruction. Taken to OR underwent ELAP, extensive RICHARD, ileocolectomy, ventral hernia repair with MESH. Postop course complicated with ALVERTO, shock requiring pressors    #ALVERTO- worsening likely ATN due to hypoperfusion injury   Acidosis resolved on bicarb  - continue IVF with bicarb per renal  - hyperkalemia improved   -family declined HD if it comes to that   - repeat labs tomorrow    #Abd pain, SBO, Enterocutaneous fistula  S/p expl laparotomy , RICHARD, ileocolectomy, ventral hernia repair w mesh, colostomy  +wound VAC placed  not eating much  Family declined feeding tube   PPI   IVF  s/p Cefepime/ Flagyl  Pain control, IS, Mobilize patient  management as per Surg    # UTI Kleb, PNA  s/p IV Abx  WBC improving, monitor for fevers  ID following    #pAFib : controlled   Lovenox 100mg BID  Lopressor IVPB q6  Hydralazine 10mg IVP q6 PRN SBP >160  Amiodarone 200mg Discontinued (2/10)- NGT is out so no PO meds     # Right Arm Superficial Thrombophlebitis:  warm compresses  no NSAIDS sec to renal failure  on anticoagulation     #Hypothyroid  Continue w IV synthroid     #DM  BGM well controlled,  c/w Lantus 20u/day   Corrective scale  Monitor fingersticks    #COPD  Albuterol  Pulmicort  Pulm following    Prognosis : Extremely poor    palliative care appreciated all notes indicate son  Moe Matute  is aware of condition and does not want aggressive care NO HD NO ICU NO PRESSORS DNR DNI on chart and updated by palliative team 2/17

## 2023-02-18 NOTE — PROGRESS NOTE ADULT - ASSESSMENT
Patient admitted with abdominal pain, nausea and vomitting , dehydration  septic shock, likely related to     PROBLEMS:    UTI klebsiella pneumoniae and aspiration PNA /SBO- ventral hernia  New R lung nodules - cannot rule out aspiration PNA   Acute metabolic encephalopathy  Sepsis   Hx copd without exacerbation  Anasarca/Acute on  chronic diastolic heart failure   Superficial thrombophlebitis right cephalic vein at the right upper arm      Plan:    pulmonary stable  iv bicarbonate  po as tolerated  Incarcerated Ventral Hernia with SBO-S/p extensive RICHARD, ileo colectomy, giant ventral hernia repair with Surgimend-surgery fu for persistant sutures leak-wound care-entero fistula  off abx  Monitor Cr  Surgery fu  D/w staff  DVT PROPHYLASIX

## 2023-02-18 NOTE — PROGRESS NOTE ADULT - ASSESSMENT
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 Continue IVF. Diet only if pt awake enough to swallow.

## 2023-02-19 LAB
ALBUMIN SERPL ELPH-MCNC: 0.9 G/DL — LOW (ref 3.3–5)
ALP SERPL-CCNC: 270 U/L — HIGH (ref 40–120)
ALT FLD-CCNC: 8 U/L — LOW (ref 12–78)
ANION GAP SERPL CALC-SCNC: 12 MMOL/L — SIGNIFICANT CHANGE UP (ref 5–17)
AST SERPL-CCNC: 29 U/L — SIGNIFICANT CHANGE UP (ref 15–37)
BASOPHILS # BLD AUTO: 0.12 K/UL — SIGNIFICANT CHANGE UP (ref 0–0.2)
BASOPHILS NFR BLD AUTO: 1 % — SIGNIFICANT CHANGE UP (ref 0–2)
BILIRUB SERPL-MCNC: 1 MG/DL — SIGNIFICANT CHANGE UP (ref 0.2–1.2)
BUN SERPL-MCNC: 56 MG/DL — HIGH (ref 7–23)
CALCIUM SERPL-MCNC: 6.9 MG/DL — LOW (ref 8.5–10.1)
CHLORIDE SERPL-SCNC: 105 MMOL/L — SIGNIFICANT CHANGE UP (ref 96–108)
CO2 SERPL-SCNC: 22 MMOL/L — SIGNIFICANT CHANGE UP (ref 22–31)
CREAT SERPL-MCNC: 3.94 MG/DL — HIGH (ref 0.5–1.3)
EGFR: 12 ML/MIN/1.73M2 — LOW
EOSINOPHIL # BLD AUTO: 0.06 K/UL — SIGNIFICANT CHANGE UP (ref 0–0.5)
EOSINOPHIL NFR BLD AUTO: 0.5 % — SIGNIFICANT CHANGE UP (ref 0–6)
GLUCOSE BLDC GLUCOMTR-MCNC: 78 MG/DL — SIGNIFICANT CHANGE UP (ref 70–99)
GLUCOSE BLDC GLUCOMTR-MCNC: 90 MG/DL — SIGNIFICANT CHANGE UP (ref 70–99)
GLUCOSE BLDC GLUCOMTR-MCNC: 98 MG/DL — SIGNIFICANT CHANGE UP (ref 70–99)
GLUCOSE SERPL-MCNC: 91 MG/DL — SIGNIFICANT CHANGE UP (ref 70–99)
HCT VFR BLD CALC: 25.1 % — LOW (ref 34.5–45)
HCT VFR BLD CALC: 25.9 % — LOW (ref 34.5–45)
HGB BLD-MCNC: 7.6 G/DL — LOW (ref 11.5–15.5)
HGB BLD-MCNC: 7.9 G/DL — LOW (ref 11.5–15.5)
IMM GRANULOCYTES NFR BLD AUTO: 2.2 % — HIGH (ref 0–0.9)
LYMPHOCYTES # BLD AUTO: 17.5 % — SIGNIFICANT CHANGE UP (ref 13–44)
LYMPHOCYTES # BLD AUTO: 2.14 K/UL — SIGNIFICANT CHANGE UP (ref 1–3.3)
MCHC RBC-ENTMCNC: 27.9 PG — SIGNIFICANT CHANGE UP (ref 27–34)
MCHC RBC-ENTMCNC: 28.5 PG — SIGNIFICANT CHANGE UP (ref 27–34)
MCHC RBC-ENTMCNC: 30.3 GM/DL — LOW (ref 32–36)
MCHC RBC-ENTMCNC: 30.5 GM/DL — LOW (ref 32–36)
MCV RBC AUTO: 92.3 FL — SIGNIFICANT CHANGE UP (ref 80–100)
MCV RBC AUTO: 93.5 FL — SIGNIFICANT CHANGE UP (ref 80–100)
MONOCYTES # BLD AUTO: 0.96 K/UL — HIGH (ref 0–0.9)
MONOCYTES NFR BLD AUTO: 7.8 % — SIGNIFICANT CHANGE UP (ref 2–14)
NEUTROPHILS # BLD AUTO: 8.71 K/UL — HIGH (ref 1.8–7.4)
NEUTROPHILS NFR BLD AUTO: 71 % — SIGNIFICANT CHANGE UP (ref 43–77)
PLATELET # BLD AUTO: 227 K/UL — SIGNIFICANT CHANGE UP (ref 150–400)
PLATELET # BLD AUTO: 228 K/UL — SIGNIFICANT CHANGE UP (ref 150–400)
POTASSIUM SERPL-MCNC: 4.2 MMOL/L — SIGNIFICANT CHANGE UP (ref 3.5–5.3)
POTASSIUM SERPL-SCNC: 4.2 MMOL/L — SIGNIFICANT CHANGE UP (ref 3.5–5.3)
PROT SERPL-MCNC: 5.1 GM/DL — LOW (ref 6–8.3)
RBC # BLD: 2.72 M/UL — LOW (ref 3.8–5.2)
RBC # BLD: 2.77 M/UL — LOW (ref 3.8–5.2)
RBC # FLD: 26.3 % — HIGH (ref 10.3–14.5)
RBC # FLD: 26.5 % — HIGH (ref 10.3–14.5)
SODIUM SERPL-SCNC: 139 MMOL/L — SIGNIFICANT CHANGE UP (ref 135–145)
WBC # BLD: 12.26 K/UL — HIGH (ref 3.8–10.5)
WBC # BLD: 12.99 K/UL — HIGH (ref 3.8–10.5)
WBC # FLD AUTO: 12.26 K/UL — HIGH (ref 3.8–10.5)
WBC # FLD AUTO: 12.99 K/UL — HIGH (ref 3.8–10.5)

## 2023-02-19 PROCEDURE — 99232 SBSQ HOSP IP/OBS MODERATE 35: CPT

## 2023-02-19 RX ORDER — ALBUMIN HUMAN 25 %
50 VIAL (ML) INTRAVENOUS EVERY 8 HOURS
Refills: 0 | Status: DISCONTINUED | OUTPATIENT
Start: 2023-02-19 | End: 2023-02-21

## 2023-02-19 RX ADMIN — NYSTATIN CREAM 1 APPLICATION(S): 100000 CREAM TOPICAL at 13:19

## 2023-02-19 RX ADMIN — NYSTATIN CREAM 1 APPLICATION(S): 100000 CREAM TOPICAL at 21:53

## 2023-02-19 RX ADMIN — CHLORHEXIDINE GLUCONATE 1 APPLICATION(S): 213 SOLUTION TOPICAL at 13:19

## 2023-02-19 RX ADMIN — Medication 0.5 MILLIGRAM(S): at 09:12

## 2023-02-19 RX ADMIN — Medication 50 MILLILITER(S): at 21:41

## 2023-02-19 RX ADMIN — NYSTATIN CREAM 1 APPLICATION(S): 100000 CREAM TOPICAL at 13:18

## 2023-02-19 RX ADMIN — ALBUTEROL 2.5 MILLIGRAM(S): 90 AEROSOL, METERED ORAL at 19:55

## 2023-02-19 RX ADMIN — Medication 70 MICROGRAM(S): at 21:41

## 2023-02-19 RX ADMIN — SODIUM CHLORIDE 100 MILLILITER(S): 9 INJECTION, SOLUTION INTRAVENOUS at 02:00

## 2023-02-19 RX ADMIN — Medication 0.5 MILLIGRAM(S): at 19:56

## 2023-02-19 RX ADMIN — PANTOPRAZOLE SODIUM 40 MILLIGRAM(S): 20 TABLET, DELAYED RELEASE ORAL at 13:17

## 2023-02-19 RX ADMIN — ALBUTEROL 2.5 MILLIGRAM(S): 90 AEROSOL, METERED ORAL at 02:37

## 2023-02-19 RX ADMIN — ALBUTEROL 2.5 MILLIGRAM(S): 90 AEROSOL, METERED ORAL at 09:11

## 2023-02-19 NOTE — PROGRESS NOTE ADULT - ASSESSMENT
72F with PMHx CVA, pAF on AC, emphysema/COPD on home O2, HTN, SBO with prior resection who presented with abd pain, N/V. Found to have an small bowel obstruction. Taken to OR underwent ELAP, extensive RICHARD, ileocolectomy, ventral hernia repair with MESH. Postop course complicated with ALVERTO, shock requiring pressors    #ALVERTO- worsening likely ATN due to hypoperfusion injury   Acidosis resolved on bicarb  - continue IVF with bicarb per renal  - hyperkalemia improved   -family declined HD if it comes to that   -will need to decide if full comfort care/ hospice appropriate if onging lab draws are necessary    #Abd pain, SBO, Enterocutaneous fistula  S/p expl laparotomy , RICHARD, ileocolectomy, ventral hernia repair w mesh, colostomy  +wound VAC placed  not eating much  Family declined feeding tube   PPI   IVF  s/p Cefepime/ Flagyl  Pain control, IS, Mobilize patient  management as per Surg    # UTI Kleb, PNA  s/p IV Abx  ID following    #pAFib : controlled   Lovenox 100mg BID  Lopressor IVPB q6  Hydralazine 10mg IVP q6 PRN SBP >160  Amiodarone 200mg Discontinued (2/10)- NGT is out so no PO meds     # Right Arm Superficial Thrombophlebitis:  warm compresses  no NSAIDS sec to renal failure  on anticoagulation     #Hypothyroid  Continue w IV synthroid     #DM  BGM well controlled,  c/w Lantus 20u/day   Corrective scale  has not required coverage and is not eating so will continue fingersticks for now    #COPD  Albuterol  Pulmicort  Pulm following    Prognosis : Extremely poor    palliative care appreciated all notes indicate son  Moe Matute  is aware of condition and does not want aggressive care NO HD NO ICU NO PRESSORS DNR DNI on chart and updated by palliative team 2/17  if continues without significant improvement would consider hospice

## 2023-02-19 NOTE — PROGRESS NOTE ADULT - SUBJECTIVE AND OBJECTIVE BOX
Lethargic, less responsive  VSS afeb  lungs- clear  cor-RRR  Abd- VAC intact. Blood tinged drainage  Ext- 2+ edema

## 2023-02-19 NOTE — PROGRESS NOTE ADULT - ASSESSMENT
72F with PMHx CVA, pAF on AC, emphysema/COPD on home O2, HTN, SBO with prior resection who presented with abd pain, N/V. Found to have an small bowel obstruction. Taken to OR underwent ELAP, extensive RICHARD, ileocolectomy, ventral hernia repair with MESH. Postop course complicated with ALVERTO, shock requiring pressors     ALVERTO in setting of acidemia, hyperkalemia, and worsening hyperglycemia   nonoliguric - ATN/SABAS   Cr continues to rise   K rising - low K diet when taking po    PPN off - to encourage po per staff, Renal diet   Patient would be a poor candidate for aggressive renal care if progression and likely not within stated GOC. - left message for son - HCP Dr Rosa  d/w Dr Elise who spoke to son today and states they would not pursue agrresive renal measures ie HD - agree with this as pt unlikely to tolerate HD   Long term prognosis seems poor      Hyperphoshatemia w  hyperkalemia   - start  binders when taking po  - low k  diet  -lokelma PRN    Acidemia   change to bicarb gtt strength     Third spacing w severe hypoalbuminemia   protein supplementation      dw RN staff, team and palliative    2/18 SY  --ALVERTO /ATN/SABAS : conatinues to worsen with overall poor prognosis with severe malnutrition.     As above, No HD to be considered per pt's son--Dr Moe Matute.    Continue gentle hydration, ariella with very limited po intake  --Trend electrolytes and acid/base.    2/19 SY  --ALVERTO /ATN/SABAS : continued renal deterioration with overall grave prognosis.    Decision made by pt's son against dialysis.  --Severe malnutrition with increasing third spacing of fluid and minimal nutrition at this point.  Trial of SPA to at least stabilize edema.  --Wound care  --Monitor intake.

## 2023-02-19 NOTE — PROGRESS NOTE ADULT - SUBJECTIVE AND OBJECTIVE BOX
NEPHROLOGY INTERVAL HPI/OVERNIGHT EVENTS:    Date of Service: 02-19-23 @ 16:31    Covering for Lukasz Pearce/ Janie  2/19--Per Rn, noted with increased bloody drainage of abdominal wound vac.  Pt less lethargic with slight response today.  Uo 600cc,  Wound vac 400cc  2/18--Remains lethargic.  Diet advanced but minimal po intake per Rn staff.  UO 450cc    HPI :  72F with PMHx CVA, pAF on AC, emphysema/COPD on home O2, HTN, SBO with prior resection who presented with abd pain, N/V. Found to have an small bowel obstruction. Taken to OR underwent ELAP, extensive RICHARD, ileocolectomy, ventral hernia repair with MESH. Postop course complicated with ALVERTO, shock requiring pressors  renal eval called for alverto, hyperkalemia, acidemia    Today pt lethargic, arousable    MEDICATIONS  (STANDING):  albuterol    0.083% 2.5 milliGRAM(s) Nebulizer every 6 hours  buDESOnide    Inhalation Suspension 0.5 milliGRAM(s) Inhalation every 12 hours  calcium acetate 667 milliGRAM(s) Oral three times a day with meals  chlorhexidine 4% Liquid 1 Application(s) Topical <User Schedule>  coronavirus bivalent (EUA) Booster Vaccine (PFIZER) 0.3 milliLiter(s) IntraMuscular once  dextrose 5%. 1000 milliLiter(s) (50 mL/Hr) IV Continuous <Continuous>  dextrose 5%. 1000 milliLiter(s) (100 mL/Hr) IV Continuous <Continuous>  dextrose 50% Injectable 25 Gram(s) IV Push once  dextrose 50% Injectable 12.5 Gram(s) IV Push once  dextrose 50% Injectable 25 Gram(s) IV Push once  enoxaparin Injectable 100 milliGRAM(s) SubCutaneous every 12 hours  glucagon  Injectable 1 milliGRAM(s) IntraMuscular once  insulin glargine Injectable (LANTUS) 20 Unit(s) SubCutaneous at bedtime  insulin lispro (ADMELOG) corrective regimen sliding scale   SubCutaneous every 4 hours  levothyroxine Injectable 70 MICROGram(s) IV Push at bedtime  metoprolol tartrate 25 milliGRAM(s) Oral two times a day  nystatin Cream 1 Application(s) Topical two times a day  nystatin Powder 1 Application(s) Topical every 12 hours  pantoprazole  Injectable 40 milliGRAM(s) IV Push daily  sodium chloride 0.45% 1000 milliLiter(s) (100 mL/Hr) IV Continuous <Continuous>    MEDICATIONS  (PRN):  acetaminophen     Tablet .. 650 milliGRAM(s) Oral every 6 hours PRN Temp greater or equal to 38C (100.4F)  albuterol    90 MICROgram(s) HFA Inhaler 2 Puff(s) Inhalation every 6 hours PRN Shortness of Breath and/or Wheezing  dextrose Oral Gel 15 Gram(s) Oral once PRN Blood Glucose LESS THAN 70 milliGRAM(s)/deciliter  hydrALAZINE Injectable 10 milliGRAM(s) IV Push every 6 hours PRN SBP>160  HYDROmorphone  Injectable 0.5 milliGRAM(s) IV Push every 4 hours PRN Severe Pain (7 - 10)  sodium chloride 0.9% lock flush 10 milliLiter(s) IV Push every 1 hour PRN Pre/post blood products, medications, blood draw, and to maintain line patency    Vital Signs Last 24 Hrs  T(C): 36.8 (19 Feb 2023 15:52), Max: 37.1 (18 Feb 2023 22:18)  T(F): 98.2 (19 Feb 2023 15:52), Max: 98.7 (18 Feb 2023 22:18)  HR: 77 (19 Feb 2023 15:52) (77 - 99)  BP: 117/60 (19 Feb 2023 15:52) (105/50 - 117/60)  BP(mean): --  RR: 18 (19 Feb 2023 15:52) (17 - 18)  SpO2: 94% (19 Feb 2023 15:52) (94% - 100%)    Parameters below as of 19 Feb 2023 15:52  Patient On (Oxygen Delivery Method): nasal cannula       02-18 @ 07:01  -  02-19 @ 07:00  --------------------------------------------------------  IN: 0 mL / OUT: 1000 mL / NET: -1000 mL    PHYSICAL EXAM:  GENERAL: no acute distress  CHEST/LUNG: Scattered rhonchi  HEART: S1S2 RRR  ABDOMEN: distended  EXTREMITIES: 2+ edema  SKIN:     LABS:                        7.6    12.26 )-----------( 227      ( 19 Feb 2023 09:08 )             25.1     02-19    139  |  105  |  56<H>  ----------------------------<  91  4.2   |  22  |  3.94<H>    Ca    6.9<L>      19 Feb 2023 09:08  Phos  7.5     02-19    TPro  5.1<L>  /  Alb  0.9<L>  /  TBili  1.0  /  DBili  x   /  AST  29  /  ALT  8<L>  /  AlkPhos  270<H>  02-19        Phosphorus Level, Serum: 7.5 mg/dL (02-19 @ 09:08)          RADIOLOGY & ADDITIONAL TESTS:

## 2023-02-19 NOTE — PROGRESS NOTE ADULT - SUBJECTIVE AND OBJECTIVE BOX
Patient is a 72y old  Female who presents with a chief complaint of bowel obstruction/ischemic mesentery (19 Feb 2023 04:33)    Events overnight noted, wound vac changed no significant hemorrhage but serosanguinous output yesterday appreciated. Pt remains lethargic but in no distress    ALLERGIES:  Cipro (Rash)  Spiriva (Rash)    MEDICATIONS  (STANDING):  albuterol    0.083% 2.5 milliGRAM(s) Nebulizer every 6 hours  buDESOnide    Inhalation Suspension 0.5 milliGRAM(s) Inhalation every 12 hours  calcium acetate 667 milliGRAM(s) Oral three times a day with meals  chlorhexidine 4% Liquid 1 Application(s) Topical <User Schedule>  coronavirus bivalent (EUA) Booster Vaccine (PFIZER) 0.3 milliLiter(s) IntraMuscular once  dextrose 5%. 1000 milliLiter(s) (50 mL/Hr) IV Continuous <Continuous>  dextrose 5%. 1000 milliLiter(s) (100 mL/Hr) IV Continuous <Continuous>  dextrose 50% Injectable 25 Gram(s) IV Push once  dextrose 50% Injectable 12.5 Gram(s) IV Push once  dextrose 50% Injectable 25 Gram(s) IV Push once  enoxaparin Injectable 100 milliGRAM(s) SubCutaneous every 12 hours  glucagon  Injectable 1 milliGRAM(s) IntraMuscular once  insulin glargine Injectable (LANTUS) 20 Unit(s) SubCutaneous at bedtime  insulin lispro (ADMELOG) corrective regimen sliding scale   SubCutaneous every 4 hours  levothyroxine Injectable 70 MICROGram(s) IV Push at bedtime  metoprolol tartrate 25 milliGRAM(s) Oral two times a day  nystatin Cream 1 Application(s) Topical two times a day  nystatin Powder 1 Application(s) Topical every 12 hours  pantoprazole  Injectable 40 milliGRAM(s) IV Push daily  sodium chloride 0.45% 1000 milliLiter(s) (100 mL/Hr) IV Continuous <Continuous>    MEDICATIONS  (PRN):  acetaminophen     Tablet .. 650 milliGRAM(s) Oral every 6 hours PRN Temp greater or equal to 38C (100.4F)  albuterol    90 MICROgram(s) HFA Inhaler 2 Puff(s) Inhalation every 6 hours PRN Shortness of Breath and/or Wheezing  dextrose Oral Gel 15 Gram(s) Oral once PRN Blood Glucose LESS THAN 70 milliGRAM(s)/deciliter  hydrALAZINE Injectable 10 milliGRAM(s) IV Push every 6 hours PRN SBP>160  HYDROmorphone  Injectable 0.5 milliGRAM(s) IV Push every 4 hours PRN Severe Pain (7 - 10)  sodium chloride 0.9% lock flush 10 milliLiter(s) IV Push every 1 hour PRN Pre/post blood products, medications, blood draw, and to maintain line patency    Vital Signs Last 24 Hrs  T(F): 98 (19 Feb 2023 08:33), Max: 98.7 (18 Feb 2023 22:18)  HR: 98 (19 Feb 2023 08:33) (94 - 99)  BP: 112/51 (19 Feb 2023 08:33) (105/50 - 130/56)  RR: 18 (19 Feb 2023 08:33) (17 - 18)  SpO2: 99% (19 Feb 2023 08:33) (94% - 100%)  I&O's Summary    18 Feb 2023 07:01  -  19 Feb 2023 07:00  --------------------------------------------------------  IN: 0 mL / OUT: 1000 mL / NET: -1000 mL              POCT Blood Glucose.: 98 mg/dL (19 Feb 2023 05:22)  POCT Blood Glucose.: 93 mg/dL (18 Feb 2023 22:02)  POCT Blood Glucose.: 97 mg/dL (18 Feb 2023 15:28)    PHYSICAL EXAM:  General: NAD, lethargic but slightly arousable  ENT: MMM  Neck: Supple, No JVD  Lungs: scattered rhonchi  Cardio: RRR, S1/S2, No murmurs  Abdomen: dressing/vac in place  Extremities: No calf tenderness, +large blister rt foot        LABS:                        8.0    10.57 )-----------( 241      ( 17 Feb 2023 08:33 )             26.1       02-17    137  |  108  |  49  ----------------------------<  89  5.2   |  20  |  3.78    Ca    7.1      17 Feb 2023 08:33  Phos  6.9     02-17  Mg     2.2     02-17    TPro  5.2  /  Alb  0.9  /  TBili  0.9  /  DBili  x   /  AST  24  /  ALT  <6  /  AlkPhos  161  02-17                02-15 Chol -- LDL -- HDL -- Trig 247 mg/dL                Culture - Urine (collected 15 Feb 2023 15:30)  Source: Catheterized Catheterized  Final Report (17 Feb 2023 19:19):    50,000 - 99,000 CFU/mL Candida albicans "Susceptibilities not performed"      COVID-19 PCR: NotDetec (02-15-23 @ 00:35)  COVID-19 PCR: NotDetec (01-31-23 @ 11:30)

## 2023-02-19 NOTE — PROGRESS NOTE ADULT - SUBJECTIVE AND OBJECTIVE BOX
Called by RN to assess pt as wound vac reported to be putting out red blood. Upon arrival to bedside, pt is INAD and calm at bedside. 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  No active exsanguination noted.  VSS retaken at bedside and currently stable.  Wound vac taken off suction.  Will continue to closely monitor and reinforce dressings until f/u with Dr. Castro later this morning.  D/w RN.  Called by RN to assess pt as wound vac reported to be putting out red blood. Upon arrival to bedside, pt is INAD and calm at bedside. 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  No active exsanguination noted.  VSS retaken at bedside and currently stable.  Wound vac taken off suction.  Will continue to closely monitor and reinforce dressings until f/u with Dr. Castro later this morning.  Hold am lovenox.  D/w RN.

## 2023-02-19 NOTE — PROGRESS NOTE ADULT - ASSESSMENT
Patient admitted with abdominal pain, nausea and vomitting , dehydration  septic shock, likely related to     PROBLEMS:    UTI klebsiella pneumoniae and aspiration PNA /SBO- ventral hernia  New R lung nodules - cannot rule out aspiration PNA   Acute metabolic encephalopathy  Sepsis   Hx copd without exacerbation  Anasarca/Acute on  chronic diastolic heart failure   Superficial thrombophlebitis right cephalic vein at the right upper arm      Plan:    pulmonary stable  iv bicarbonate  po as tolerated  Incarcerated Ventral Hernia with SBO-S/p extensive RICHARD, ileo colectomy, giant ventral hernia repair with Surgimend-surgery fu for persistant sutures leak-wound care-entero fistula  off abx  Monitor Cr-3.94  Surgery fu  D/w staff  DVT PROPHYLASIX

## 2023-02-19 NOTE — PROGRESS NOTE ADULT - ASSESSMENT
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 Continue IVF. Diet only if pt awake enough to swallow. Supportive care

## 2023-02-19 NOTE — PROGRESS NOTE ADULT - SUBJECTIVE AND OBJECTIVE BOX
Subjective:    pat blood in the drain, cr worsening, more lathergic, lying in bed.    Home Medications:  Albuterol (Eqv-ProAir HFA) 90 mcg/inh inhalation aerosol: 1 puff(s) inhaled every 6 hours, As Needed (20 Jan 2023 16:49)  amiodarone 200 mg oral tablet: 1 tab(s) orally once a day (20 Jan 2023 16:49)  ascorbic acid 500 mg oral tablet: 2 tab(s) orally once a day (20 Jan 2023 16:49)  atorvastatin 10 mg oral tablet: 1 tab(s) orally once a day (at bedtime) (20 Jan 2023 16:49)  benzonatate 100 mg oral capsule: 1 cap(s) orally 3 times a day (20 Jan 2023 21:48)  budesonide 0.5 mg/2 mL inhalation suspension: 2 milliliter(s) inhaled 2 times a day (20 Jan 2023 21:48)  Cepacol Sore Throat 15 mg-3.6 mg mucous membrane lozenge: 1 lozenge mucous membrane every 4 hours, As Needed (20 Jan 2023 21:48)  cholecalciferol 1250 mcg (50,000 intl units) oral capsule: 1 cap(s) orally every 4 weeks on Wednesday  (20 Jan 2023 16:49)  Coumadin 2.5 mg oral tablet: 1 tab(s) orally once a day (at bedtime)  ***ON HOLD from 1-16 to 1-21*** (20 Jan 2023 21:48)  Cranberry oral tablet: 1 tab(s) orally 2 times a day (20 Jan 2023 16:49)  cyanocobalamin 1000 mcg oral tablet: 1 tab(s) orally once a day (20 Jan 2023 16:49)  dilTIAZem 180 mg/24 hours oral capsule, extended release: 1 cap(s) orally once a day (20 Jan 2023 16:49)  DULoxetine 60 mg oral delayed release capsule: 1 cap(s) orally once a day (20 Jan 2023 16:49)  estradiol 0.1 mg/g vaginal cream: 0.5 gram(s) vaginal 2 times a week on Monday and Thursday (20 Jan 2023 21:48)  fexofenadine 180 mg oral tablet: 1 tab(s) orally once a day (20 Jan 2023 21:48)  fluticasone 50 mcg/inh nasal spray: 1 spray(s) nasal 2 times a day (20 Jan 2023 16:49)  furosemide 20 mg oral tablet: 1 tab(s) orally once a day (20 Jan 2023 16:49)  glipiZIDE 10 mg oral tablet, extended release: 2 tab(s) orally once a day (20 Jan 2023 16:49)  GlycoLax oral powder for reconstitution: 17 gram(s) orally 2 times a day (20 Jan 2023 16:49)  guaiFENesin 100 mg/5 mL oral liquid: 20 milliliter(s) orally every 6 hours (20 Jan 2023 21:48)  HumaLOG KwikPen 100 units/mL injectable solution: 10 unit(s) injectable 3 times a day (before meals) (20 Jan 2023 21:48)  ipratropium-albuterol 20 mcg-100 mcg/inh inhalation aerosol: 1 puff(s) inhaled 4 times a day (20 Jan 2023 16:49)  levothyroxine 88 mcg (0.088 mg) oral tablet: 1 tab(s) orally once a day (at bedtime) (20 Jan 2023 16:49)  losartan 25 mg oral tablet: 1 tab(s) orally once a day (20 Jan 2023 16:49)  Macrobid 100 mg oral capsule: 1 cap(s) orally 2 times a day for 5 days  ***course complete*** (20 Jan 2023 21:48)  methocarbamol 750 mg oral tablet: 1 tab(s) orally every 8 hours, As Needed (20 Jan 2023 21:48)  metoprolol tartrate 25 mg oral tablet: 1 tab(s) orally 2 times a day (20 Jan 2023 16:49)  Milk of Magnesia 8% oral suspension: 30 milliliter(s) orally once a day, As Needed (20 Jan 2023 16:49)  montelukast 10 mg oral tablet: 1 tab(s) orally once a day (at bedtime) (20 Jan 2023 21:48)  ondansetron 4 mg oral tablet: 1 tab(s) orally every 4 hours, As Needed (20 Jan 2023 21:48)  oxybutynin 5 mg oral tablet: 1 tab(s) orally 2 times a day (20 Jan 2023 16:49)  oxyCODONE 5 mg oral tablet: 1 tab(s) orally every 4 hours, As Needed (20 Jan 2023 16:49)  phytonadione 5 mg oral tablet: 0.5 tab(s) orally once  ***given at [facility] once on 1-19-23*** (20 Jan 2023 21:48)  pregabalin 100 mg oral capsule: 1 cap(s) orally 3 times a day (20 Jan 2023 16:49)  sennosides-docusate 8.6 mg-50 mg oral tablet: 2 tab(s) orally once a day (at bedtime) (20 Jan 2023 16:49)  Tradjenta 5 mg oral tablet: 1 tab(s) orally once a day (20 Jan 2023 16:49)  Tylenol 500 mg oral tablet: 2 tab(s) orally every 8 hours (20 Jan 2023 16:49)    MEDICATIONS  (STANDING):  albuterol    0.083% 2.5 milliGRAM(s) Nebulizer every 6 hours  buDESOnide    Inhalation Suspension 0.5 milliGRAM(s) Inhalation every 12 hours  calcium acetate 667 milliGRAM(s) Oral three times a day with meals  chlorhexidine 4% Liquid 1 Application(s) Topical <User Schedule>  coronavirus bivalent (EUA) Booster Vaccine (PFIZER) 0.3 milliLiter(s) IntraMuscular once  dextrose 5%. 1000 milliLiter(s) (50 mL/Hr) IV Continuous <Continuous>  dextrose 5%. 1000 milliLiter(s) (100 mL/Hr) IV Continuous <Continuous>  dextrose 50% Injectable 25 Gram(s) IV Push once  dextrose 50% Injectable 12.5 Gram(s) IV Push once  dextrose 50% Injectable 25 Gram(s) IV Push once  enoxaparin Injectable 100 milliGRAM(s) SubCutaneous every 12 hours  glucagon  Injectable 1 milliGRAM(s) IntraMuscular once  insulin glargine Injectable (LANTUS) 20 Unit(s) SubCutaneous at bedtime  insulin lispro (ADMELOG) corrective regimen sliding scale   SubCutaneous every 4 hours  levothyroxine Injectable 70 MICROGram(s) IV Push at bedtime  metoprolol tartrate 25 milliGRAM(s) Oral two times a day  nystatin Cream 1 Application(s) Topical two times a day  nystatin Powder 1 Application(s) Topical every 12 hours  pantoprazole  Injectable 40 milliGRAM(s) IV Push daily  sodium chloride 0.45% 1000 milliLiter(s) (100 mL/Hr) IV Continuous <Continuous>    MEDICATIONS  (PRN):  acetaminophen     Tablet .. 650 milliGRAM(s) Oral every 6 hours PRN Temp greater or equal to 38C (100.4F)  albuterol    90 MICROgram(s) HFA Inhaler 2 Puff(s) Inhalation every 6 hours PRN Shortness of Breath and/or Wheezing  dextrose Oral Gel 15 Gram(s) Oral once PRN Blood Glucose LESS THAN 70 milliGRAM(s)/deciliter  hydrALAZINE Injectable 10 milliGRAM(s) IV Push every 6 hours PRN SBP>160  HYDROmorphone  Injectable 0.5 milliGRAM(s) IV Push every 4 hours PRN Severe Pain (7 - 10)  sodium chloride 0.9% lock flush 10 milliLiter(s) IV Push every 1 hour PRN Pre/post blood products, medications, blood draw, and to maintain line patency      Allergies    Cipro (Rash)  Spiriva (Rash)    Intolerances        Vital Signs Last 24 Hrs  T(C): 36.7 (19 Feb 2023 08:33), Max: 37.1 (18 Feb 2023 22:18)  T(F): 98 (19 Feb 2023 08:33), Max: 98.7 (18 Feb 2023 22:18)  HR: 86 (19 Feb 2023 09:05) (86 - 99)  BP: 112/51 (19 Feb 2023 08:33) (105/50 - 130/56)  BP(mean): --  RR: 18 (19 Feb 2023 08:33) (17 - 18)  SpO2: 97% (19 Feb 2023 09:05) (94% - 100%)    Parameters below as of 19 Feb 2023 09:05  Patient On (Oxygen Delivery Method): nasal cannula          PHYSICAL EXAMINATION:    NECK:  Supple. No lymphadenopathy. Jugular venous pressure not elevated. Carotids equal.   HEART:   The cardiac impulse has a normal quality. Reg., Nl S1 and S2.  There are no murmurs, rubs or gallops noted  CHEST:  Chest crackles to auscultation. Normal respiratory effort.  ABDOMEN:  Soft and nontender.   EXTREMITIES:  There is no edema.       LABS:                        7.6    12.26 )-----------( 227      ( 19 Feb 2023 09:08 )             25.1     02-19    139  |  105  |  56<H>  ----------------------------<  91  4.2   |  22  |  3.94<H>    Ca    6.9<L>      19 Feb 2023 09:08  Phos  7.5     02-19    TPro  5.1<L>  /  Alb  0.9<L>  /  TBili  1.0  /  DBili  x   /  AST  29  /  ALT  8<L>  /  AlkPhos  270<H>  02-19

## 2023-02-20 LAB
ALBUMIN SERPL ELPH-MCNC: 1.4 G/DL — LOW (ref 3.3–5)
ANION GAP SERPL CALC-SCNC: 14 MMOL/L — SIGNIFICANT CHANGE UP (ref 5–17)
BUN SERPL-MCNC: 58 MG/DL — HIGH (ref 7–23)
CALCIUM SERPL-MCNC: 7 MG/DL — LOW (ref 8.5–10.1)
CHLORIDE SERPL-SCNC: 105 MMOL/L — SIGNIFICANT CHANGE UP (ref 96–108)
CO2 SERPL-SCNC: 23 MMOL/L — SIGNIFICANT CHANGE UP (ref 22–31)
CREAT SERPL-MCNC: 4.03 MG/DL — HIGH (ref 0.5–1.3)
EGFR: 11 ML/MIN/1.73M2 — LOW
GLUCOSE BLDC GLUCOMTR-MCNC: 71 MG/DL — SIGNIFICANT CHANGE UP (ref 70–99)
GLUCOSE BLDC GLUCOMTR-MCNC: 73 MG/DL — SIGNIFICANT CHANGE UP (ref 70–99)
GLUCOSE BLDC GLUCOMTR-MCNC: 77 MG/DL — SIGNIFICANT CHANGE UP (ref 70–99)
GLUCOSE BLDC GLUCOMTR-MCNC: 83 MG/DL — SIGNIFICANT CHANGE UP (ref 70–99)
GLUCOSE SERPL-MCNC: 81 MG/DL — SIGNIFICANT CHANGE UP (ref 70–99)
HCT VFR BLD CALC: 22.5 % — LOW (ref 34.5–45)
HGB BLD-MCNC: 7 G/DL — CRITICAL LOW (ref 11.5–15.5)
MCHC RBC-ENTMCNC: 29.3 PG — SIGNIFICANT CHANGE UP (ref 27–34)
MCHC RBC-ENTMCNC: 31.1 GM/DL — LOW (ref 32–36)
MCV RBC AUTO: 94.1 FL — SIGNIFICANT CHANGE UP (ref 80–100)
PHOSPHATE SERPL-MCNC: 8.2 MG/DL — HIGH (ref 2.5–4.5)
PLATELET # BLD AUTO: 208 K/UL — SIGNIFICANT CHANGE UP (ref 150–400)
POTASSIUM SERPL-MCNC: 4.4 MMOL/L — SIGNIFICANT CHANGE UP (ref 3.5–5.3)
POTASSIUM SERPL-SCNC: 4.4 MMOL/L — SIGNIFICANT CHANGE UP (ref 3.5–5.3)
RBC # BLD: 2.39 M/UL — LOW (ref 3.8–5.2)
RBC # FLD: 26.5 % — HIGH (ref 10.3–14.5)
SODIUM SERPL-SCNC: 142 MMOL/L — SIGNIFICANT CHANGE UP (ref 135–145)
WBC # BLD: 11.66 K/UL — HIGH (ref 3.8–10.5)
WBC # FLD AUTO: 11.66 K/UL — HIGH (ref 3.8–10.5)

## 2023-02-20 PROCEDURE — 99232 SBSQ HOSP IP/OBS MODERATE 35: CPT

## 2023-02-20 RX ADMIN — NYSTATIN CREAM 1 APPLICATION(S): 100000 CREAM TOPICAL at 11:54

## 2023-02-20 RX ADMIN — SODIUM CHLORIDE 100 MILLILITER(S): 9 INJECTION, SOLUTION INTRAVENOUS at 02:07

## 2023-02-20 RX ADMIN — Medication 50 MILLILITER(S): at 15:50

## 2023-02-20 RX ADMIN — CHLORHEXIDINE GLUCONATE 1 APPLICATION(S): 213 SOLUTION TOPICAL at 11:53

## 2023-02-20 RX ADMIN — ALBUTEROL 2.5 MILLIGRAM(S): 90 AEROSOL, METERED ORAL at 19:54

## 2023-02-20 RX ADMIN — Medication 0.5 MILLIGRAM(S): at 19:54

## 2023-02-20 RX ADMIN — NYSTATIN CREAM 1 APPLICATION(S): 100000 CREAM TOPICAL at 21:22

## 2023-02-20 RX ADMIN — ALBUTEROL 2.5 MILLIGRAM(S): 90 AEROSOL, METERED ORAL at 14:42

## 2023-02-20 RX ADMIN — Medication 70 MICROGRAM(S): at 21:23

## 2023-02-20 RX ADMIN — Medication 50 MILLILITER(S): at 05:41

## 2023-02-20 RX ADMIN — Medication 0.5 MILLIGRAM(S): at 09:57

## 2023-02-20 RX ADMIN — Medication 50 MILLILITER(S): at 21:30

## 2023-02-20 RX ADMIN — NYSTATIN CREAM 1 APPLICATION(S): 100000 CREAM TOPICAL at 21:23

## 2023-02-20 RX ADMIN — ALBUTEROL 2.5 MILLIGRAM(S): 90 AEROSOL, METERED ORAL at 09:57

## 2023-02-20 RX ADMIN — ALBUTEROL 2.5 MILLIGRAM(S): 90 AEROSOL, METERED ORAL at 05:58

## 2023-02-20 RX ADMIN — PANTOPRAZOLE SODIUM 40 MILLIGRAM(S): 20 TABLET, DELAYED RELEASE ORAL at 11:53

## 2023-02-20 NOTE — PROVIDER CONTACT NOTE (OTHER) - SITUATION
PT had midline removed halted PPN infusion this shift. Peripheral IV was placed. NS w/ bicarb ordered. Verbal order for Woodruff to gravity drainage by Dr. Castro. 3 attempts to cath. 77 ml residual
Storm is aware that patient is in [facility].  Please fax discharge papers to [phone].
strong odor coming from pt wound vac site, new onset, slight leakage from wound site noted
pt fingerstick was 432 then 405, pt was given 14units of insulin per order and MD notified of finding
pt had not voided throughout shift and on IVF at 100ml/hr
72 F, A&Ox0-1, admitted w/ large ventral hernia, SBO, transfer from ICU. Woodruff catheter in place
Patient has a fever of 100.7 per PCA
pt wound vac putting out concerning blood in drainage
walked into pt room, pt was staring up at ceiling, called pt name three times and pt broke stare. Vital signs 103/45; pulse 66; o2 96 2l NC.had pt night prior and pt had not done this. called surgical

## 2023-02-20 NOTE — PROVIDER CONTACT NOTE (OTHER) - DATE AND TIME:
02-Feb-2023 01:21
12-Feb-2023 06:49
13-Feb-2023 18:46
15-Feb-2023 04:34
20-Feb-2023 01:08
05-Feb-2023 05:49
28-Jan-2023 08:51
16-Feb-2023 05:24
04-Feb-2023 08:37
19-Feb-2023 04:12
20-Feb-2023 05:03

## 2023-02-20 NOTE — PROVIDER CONTACT NOTE (OTHER) - NAME OF MD/NP/PA/DO NOTIFIED:
Cori BARROS
Dr Mary Ibrahim (PCP) Michelle
C Paraso
Dr. Andino
augusto sanz
covering surgical PA
surgical team
PAPO Chacko
covering surgical PA
Rosalba
surgical PA

## 2023-02-20 NOTE — PROGRESS NOTE ADULT - SUBJECTIVE AND OBJECTIVE BOX
Resting comfortably, No acute distress  VSS afeb  lungs- bilat rhonchi upper airway  Cor- RRR  Abd- VAC in place, soft non tender  Ext- + 2 edema

## 2023-02-20 NOTE — PROVIDER CONTACT NOTE (OTHER) - ACTION/TREATMENT ORDERED:
Covering PA notified said to reinforce with Tegaderm, 4x4 and abdominal pad, MD ravi will change dressing site during daily rounds
provider said that is the pt baseline as result of stroke and no actions needed to be taken
MD notified and will come assess pt
Provider made aware, telephone orders prompted, 650mg of tylenol to be given via NGT per verbal order, PRN q6h, will cont to monitor, RN aware
covering PA stated dr ravi aware and requests to keep the wound vac on
Start IVF fluids until 10pm and begin PPN. reattempt rizzo when possible.
Will put in urology consult, possibly exchange rizzo catheter

## 2023-02-20 NOTE — PROVIDER CONTACT NOTE (OTHER) - REASON
pt wound vac putting out red blood
pt appeared lethergic/blank stare
Dr Mary Ibrahim (PCP) Michelle
Woodruff w/ no output
pt finger stick 405
pt no void
sepsis
Fever 100.7
blood in wound vac
PPN, IVF, Woodruff cath
new onset of odor from wound site

## 2023-02-20 NOTE — PROGRESS NOTE ADULT - ASSESSMENT
Patient admitted with abdominal pain, nausea and vomitting , dehydration  septic shock, likely related to     PROBLEMS:    UTI klebsiella pneumoniae and aspiration PNA /SBO- ventral hernia  New R lung nodules - cannot rule out aspiration PNA   Acute metabolic encephalopathy  Sepsis   Hx copd without exacerbation  Anasarca/Acute on  chronic diastolic heart failure   Superficial thrombophlebitis right cephalic vein at the right upper arm      Plan:    pulmonary stable  iv bicarbonate  po as tolerated  Incarcerated Ventral Hernia with SBO-S/p extensive RICHARD, ileo colectomy, giant ventral hernia repair with Surgimend-surgery fu for persistant sutures leak-wound care-entero fistula  off abx  Monitor Cr-3.94  Surgery fu  D/w staff  DVT PROPHYLASIX  Patient admitted with abdominal pain, nausea and vomitting , dehydration  septic shock, likely related to     PROBLEMS:    UTI klebsiella pneumoniae and aspiration PNA /SBO- ventral hernia  New R lung nodules - cannot rule out aspiration PNA   Acute metabolic encephalopathy  Sepsis   Hx copd without exacerbation  Anasarca/Acute on  chronic diastolic heart failure   Superficial thrombophlebitis right cephalic vein at the right upper arm      Plan:    pulmonary stable  Monitor H/H-may need blood transfusion  iv bicarbonate  po as tolerated  Incarcerated Ventral Hernia with SBO-S/p extensive RICHARD, ileo colectomy, giant ventral hernia repair with Surgimend-surgery fu for persistant sutures leak-wound care-entero fistula  off abx  Monitor Cr-3.94  Surgery fu  D/w staff  DVT PROPHYLASIX

## 2023-02-20 NOTE — PROGRESS NOTE ADULT - NSPROGADDITIONALINFOA_GEN_ALL_CORE
I have personally interviewed and examined this patient, reviewed pertinent clinical information, and performed the evaluation and management services provided at today's visit for inpatient medical follow up    I am available to discuss any issues related to the medical care of this patient on the unit, or by phone at [phone]
I have personally interviewed and examined this patient, reviewed pertinent clinical information, and performed the evaluation and management services provided at today's visit for inpatient medical follow up    I am available to discuss any issues related to the medical care of this patient on the unit, or by phone at [phone]
I have personally interviewed and examined this patient, reviewed pertinent clinical information, and performed the evaluation and management services provided at today's visit for inpatient medical follow up    I am available to discuss any issues related to the medical care of this patient on the unit, or by phone at [phone]

## 2023-02-20 NOTE — PROGRESS NOTE ADULT - SUBJECTIVE AND OBJECTIVE BOX
Subjective:    Home Medications:  Albuterol (Eqv-ProAir HFA) 90 mcg/inh inhalation aerosol: 1 puff(s) inhaled every 6 hours, As Needed (20 Jan 2023 16:49)  amiodarone 200 mg oral tablet: 1 tab(s) orally once a day (20 Jan 2023 16:49)  ascorbic acid 500 mg oral tablet: 2 tab(s) orally once a day (20 Jan 2023 16:49)  atorvastatin 10 mg oral tablet: 1 tab(s) orally once a day (at bedtime) (20 Jan 2023 16:49)  benzonatate 100 mg oral capsule: 1 cap(s) orally 3 times a day (20 Jan 2023 21:48)  budesonide 0.5 mg/2 mL inhalation suspension: 2 milliliter(s) inhaled 2 times a day (20 Jan 2023 21:48)  Cepacol Sore Throat 15 mg-3.6 mg mucous membrane lozenge: 1 lozenge mucous membrane every 4 hours, As Needed (20 Jan 2023 21:48)  cholecalciferol 1250 mcg (50,000 intl units) oral capsule: 1 cap(s) orally every 4 weeks on Wednesday  (20 Jan 2023 16:49)  Coumadin 2.5 mg oral tablet: 1 tab(s) orally once a day (at bedtime)  ***ON HOLD from 1-16 to 1-21*** (20 Jan 2023 21:48)  Cranberry oral tablet: 1 tab(s) orally 2 times a day (20 Jan 2023 16:49)  cyanocobalamin 1000 mcg oral tablet: 1 tab(s) orally once a day (20 Jan 2023 16:49)  dilTIAZem 180 mg/24 hours oral capsule, extended release: 1 cap(s) orally once a day (20 Jan 2023 16:49)  DULoxetine 60 mg oral delayed release capsule: 1 cap(s) orally once a day (20 Jan 2023 16:49)  estradiol 0.1 mg/g vaginal cream: 0.5 gram(s) vaginal 2 times a week on Monday and Thursday (20 Jan 2023 21:48)  fexofenadine 180 mg oral tablet: 1 tab(s) orally once a day (20 Jan 2023 21:48)  fluticasone 50 mcg/inh nasal spray: 1 spray(s) nasal 2 times a day (20 Jan 2023 16:49)  furosemide 20 mg oral tablet: 1 tab(s) orally once a day (20 Jan 2023 16:49)  glipiZIDE 10 mg oral tablet, extended release: 2 tab(s) orally once a day (20 Jan 2023 16:49)  GlycoLax oral powder for reconstitution: 17 gram(s) orally 2 times a day (20 Jan 2023 16:49)  guaiFENesin 100 mg/5 mL oral liquid: 20 milliliter(s) orally every 6 hours (20 Jan 2023 21:48)  HumaLOG KwikPen 100 units/mL injectable solution: 10 unit(s) injectable 3 times a day (before meals) (20 Jan 2023 21:48)  ipratropium-albuterol 20 mcg-100 mcg/inh inhalation aerosol: 1 puff(s) inhaled 4 times a day (20 Jan 2023 16:49)  levothyroxine 88 mcg (0.088 mg) oral tablet: 1 tab(s) orally once a day (at bedtime) (20 Jan 2023 16:49)  losartan 25 mg oral tablet: 1 tab(s) orally once a day (20 Jan 2023 16:49)  Macrobid 100 mg oral capsule: 1 cap(s) orally 2 times a day for 5 days  ***course complete*** (20 Jan 2023 21:48)  methocarbamol 750 mg oral tablet: 1 tab(s) orally every 8 hours, As Needed (20 Jan 2023 21:48)  metoprolol tartrate 25 mg oral tablet: 1 tab(s) orally 2 times a day (20 Jan 2023 16:49)  Milk of Magnesia 8% oral suspension: 30 milliliter(s) orally once a day, As Needed (20 Jan 2023 16:49)  montelukast 10 mg oral tablet: 1 tab(s) orally once a day (at bedtime) (20 Jan 2023 21:48)  ondansetron 4 mg oral tablet: 1 tab(s) orally every 4 hours, As Needed (20 Jan 2023 21:48)  oxybutynin 5 mg oral tablet: 1 tab(s) orally 2 times a day (20 Jan 2023 16:49)  oxyCODONE 5 mg oral tablet: 1 tab(s) orally every 4 hours, As Needed (20 Jan 2023 16:49)  phytonadione 5 mg oral tablet: 0.5 tab(s) orally once  ***given at [facility] once on 1-19-23*** (20 Jan 2023 21:48)  pregabalin 100 mg oral capsule: 1 cap(s) orally 3 times a day (20 Jan 2023 16:49)  sennosides-docusate 8.6 mg-50 mg oral tablet: 2 tab(s) orally once a day (at bedtime) (20 Jan 2023 16:49)  Tradjenta 5 mg oral tablet: 1 tab(s) orally once a day (20 Jan 2023 16:49)  Tylenol 500 mg oral tablet: 2 tab(s) orally every 8 hours (20 Jan 2023 16:49)    MEDICATIONS  (STANDING):  albumin human 25% IVPB 50 milliLiter(s) IV Intermittent every 8 hours  albuterol    0.083% 2.5 milliGRAM(s) Nebulizer every 6 hours  buDESOnide    Inhalation Suspension 0.5 milliGRAM(s) Inhalation every 12 hours  calcium acetate 667 milliGRAM(s) Oral three times a day with meals  chlorhexidine 4% Liquid 1 Application(s) Topical <User Schedule>  coronavirus bivalent (EUA) Booster Vaccine (PFIZER) 0.3 milliLiter(s) IntraMuscular once  dextrose 5%. 1000 milliLiter(s) (50 mL/Hr) IV Continuous <Continuous>  dextrose 5%. 1000 milliLiter(s) (100 mL/Hr) IV Continuous <Continuous>  dextrose 50% Injectable 25 Gram(s) IV Push once  dextrose 50% Injectable 12.5 Gram(s) IV Push once  dextrose 50% Injectable 25 Gram(s) IV Push once  glucagon  Injectable 1 milliGRAM(s) IntraMuscular once  insulin glargine Injectable (LANTUS) 20 Unit(s) SubCutaneous at bedtime  levothyroxine Injectable 70 MICROGram(s) IV Push at bedtime  metoprolol tartrate 25 milliGRAM(s) Oral two times a day  nystatin Cream 1 Application(s) Topical two times a day  nystatin Powder 1 Application(s) Topical every 12 hours  pantoprazole  Injectable 40 milliGRAM(s) IV Push daily  sodium chloride 0.45% 1000 milliLiter(s) (100 mL/Hr) IV Continuous <Continuous>    MEDICATIONS  (PRN):  acetaminophen     Tablet .. 650 milliGRAM(s) Oral every 6 hours PRN Temp greater or equal to 38C (100.4F)  albuterol    90 MICROgram(s) HFA Inhaler 2 Puff(s) Inhalation every 6 hours PRN Shortness of Breath and/or Wheezing  dextrose Oral Gel 15 Gram(s) Oral once PRN Blood Glucose LESS THAN 70 milliGRAM(s)/deciliter  hydrALAZINE Injectable 10 milliGRAM(s) IV Push every 6 hours PRN SBP>160  HYDROmorphone  Injectable 0.5 milliGRAM(s) IV Push every 4 hours PRN Severe Pain (7 - 10)  sodium chloride 0.9% lock flush 10 milliLiter(s) IV Push every 1 hour PRN Pre/post blood products, medications, blood draw, and to maintain line patency      Allergies    Cipro (Rash)  Spiriva (Rash)    Intolerances        Vital Signs Last 24 Hrs  T(C): 36.7 (20 Feb 2023 07:14), Max: 37 (19 Feb 2023 22:07)  T(F): 98.1 (20 Feb 2023 07:14), Max: 98.6 (19 Feb 2023 22:07)  HR: 83 (20 Feb 2023 07:14) (77 - 99)  BP: 108/46 (20 Feb 2023 07:14) (104/44 - 117/60)  BP(mean): --  RR: 18 (20 Feb 2023 07:14) (15 - 18)  SpO2: 98% (20 Feb 2023 07:14) (92% - 100%)    Parameters below as of 20 Feb 2023 07:14  Patient On (Oxygen Delivery Method): nasal cannula  O2 Flow (L/min): 2        PHYSICAL EXAMINATION:    NECK:  Supple. No lymphadenopathy. Jugular venous pressure not elevated. Carotids equal.   HEART:   The cardiac impulse has a normal quality. Reg., Nl S1 and S2.  There are no murmurs, rubs or gallops noted  CHEST:  Chest is clear to auscultation. Normal respiratory effort.  ABDOMEN:  Soft and nontender.   EXTREMITIES:  There is no edema.       LABS:                        7.0    11.66 )-----------( 208      ( 20 Feb 2023 07:51 )             22.5     02-20    142  |  105  |  58<H>  ----------------------------<  81  4.4   |  23  |  4.03<H>    Ca    7.0<L>      20 Feb 2023 07:51  Phos  8.2     02-20    TPro  x   /  Alb  1.4<L>  /  TBili  x   /  DBili  x   /  AST  x   /  ALT  x   /  AlkPhos  x   02-20               Subjective:    pat persistant bleed from drain, with low 7.0/22.5, upper airway congestion.    Home Medications:  Albuterol (Eqv-ProAir HFA) 90 mcg/inh inhalation aerosol: 1 puff(s) inhaled every 6 hours, As Needed (20 Jan 2023 16:49)  amiodarone 200 mg oral tablet: 1 tab(s) orally once a day (20 Jan 2023 16:49)  ascorbic acid 500 mg oral tablet: 2 tab(s) orally once a day (20 Jan 2023 16:49)  atorvastatin 10 mg oral tablet: 1 tab(s) orally once a day (at bedtime) (20 Jan 2023 16:49)  benzonatate 100 mg oral capsule: 1 cap(s) orally 3 times a day (20 Jan 2023 21:48)  budesonide 0.5 mg/2 mL inhalation suspension: 2 milliliter(s) inhaled 2 times a day (20 Jan 2023 21:48)  Cepacol Sore Throat 15 mg-3.6 mg mucous membrane lozenge: 1 lozenge mucous membrane every 4 hours, As Needed (20 Jan 2023 21:48)  cholecalciferol 1250 mcg (50,000 intl units) oral capsule: 1 cap(s) orally every 4 weeks on Wednesday  (20 Jan 2023 16:49)  Coumadin 2.5 mg oral tablet: 1 tab(s) orally once a day (at bedtime)  ***ON HOLD from 1-16 to 1-21*** (20 Jan 2023 21:48)  Cranberry oral tablet: 1 tab(s) orally 2 times a day (20 Jan 2023 16:49)  cyanocobalamin 1000 mcg oral tablet: 1 tab(s) orally once a day (20 Jan 2023 16:49)  dilTIAZem 180 mg/24 hours oral capsule, extended release: 1 cap(s) orally once a day (20 Jan 2023 16:49)  DULoxetine 60 mg oral delayed release capsule: 1 cap(s) orally once a day (20 Jan 2023 16:49)  estradiol 0.1 mg/g vaginal cream: 0.5 gram(s) vaginal 2 times a week on Monday and Thursday (20 Jan 2023 21:48)  fexofenadine 180 mg oral tablet: 1 tab(s) orally once a day (20 Jan 2023 21:48)  fluticasone 50 mcg/inh nasal spray: 1 spray(s) nasal 2 times a day (20 Jan 2023 16:49)  furosemide 20 mg oral tablet: 1 tab(s) orally once a day (20 Jan 2023 16:49)  glipiZIDE 10 mg oral tablet, extended release: 2 tab(s) orally once a day (20 Jan 2023 16:49)  GlycoLax oral powder for reconstitution: 17 gram(s) orally 2 times a day (20 Jan 2023 16:49)  guaiFENesin 100 mg/5 mL oral liquid: 20 milliliter(s) orally every 6 hours (20 Jan 2023 21:48)  HumaLOG KwikPen 100 units/mL injectable solution: 10 unit(s) injectable 3 times a day (before meals) (20 Jan 2023 21:48)  ipratropium-albuterol 20 mcg-100 mcg/inh inhalation aerosol: 1 puff(s) inhaled 4 times a day (20 Jan 2023 16:49)  levothyroxine 88 mcg (0.088 mg) oral tablet: 1 tab(s) orally once a day (at bedtime) (20 Jan 2023 16:49)  losartan 25 mg oral tablet: 1 tab(s) orally once a day (20 Jan 2023 16:49)  Macrobid 100 mg oral capsule: 1 cap(s) orally 2 times a day for 5 days  ***course complete*** (20 Jan 2023 21:48)  methocarbamol 750 mg oral tablet: 1 tab(s) orally every 8 hours, As Needed (20 Jan 2023 21:48)  metoprolol tartrate 25 mg oral tablet: 1 tab(s) orally 2 times a day (20 Jan 2023 16:49)  Milk of Magnesia 8% oral suspension: 30 milliliter(s) orally once a day, As Needed (20 Jan 2023 16:49)  montelukast 10 mg oral tablet: 1 tab(s) orally once a day (at bedtime) (20 Jan 2023 21:48)  ondansetron 4 mg oral tablet: 1 tab(s) orally every 4 hours, As Needed (20 Jan 2023 21:48)  oxybutynin 5 mg oral tablet: 1 tab(s) orally 2 times a day (20 Jan 2023 16:49)  oxyCODONE 5 mg oral tablet: 1 tab(s) orally every 4 hours, As Needed (20 Jan 2023 16:49)  phytonadione 5 mg oral tablet: 0.5 tab(s) orally once  ***given at [facility] once on 1-19-23*** (20 Jan 2023 21:48)  pregabalin 100 mg oral capsule: 1 cap(s) orally 3 times a day (20 Jan 2023 16:49)  sennosides-docusate 8.6 mg-50 mg oral tablet: 2 tab(s) orally once a day (at bedtime) (20 Jan 2023 16:49)  Tradjenta 5 mg oral tablet: 1 tab(s) orally once a day (20 Jan 2023 16:49)  Tylenol 500 mg oral tablet: 2 tab(s) orally every 8 hours (20 Jan 2023 16:49)    MEDICATIONS  (STANDING):  albumin human 25% IVPB 50 milliLiter(s) IV Intermittent every 8 hours  albuterol    0.083% 2.5 milliGRAM(s) Nebulizer every 6 hours  buDESOnide    Inhalation Suspension 0.5 milliGRAM(s) Inhalation every 12 hours  calcium acetate 667 milliGRAM(s) Oral three times a day with meals  chlorhexidine 4% Liquid 1 Application(s) Topical <User Schedule>  coronavirus bivalent (EUA) Booster Vaccine (PFIZER) 0.3 milliLiter(s) IntraMuscular once  dextrose 5%. 1000 milliLiter(s) (50 mL/Hr) IV Continuous <Continuous>  dextrose 5%. 1000 milliLiter(s) (100 mL/Hr) IV Continuous <Continuous>  dextrose 50% Injectable 25 Gram(s) IV Push once  dextrose 50% Injectable 12.5 Gram(s) IV Push once  dextrose 50% Injectable 25 Gram(s) IV Push once  glucagon  Injectable 1 milliGRAM(s) IntraMuscular once  insulin glargine Injectable (LANTUS) 20 Unit(s) SubCutaneous at bedtime  levothyroxine Injectable 70 MICROGram(s) IV Push at bedtime  metoprolol tartrate 25 milliGRAM(s) Oral two times a day  nystatin Cream 1 Application(s) Topical two times a day  nystatin Powder 1 Application(s) Topical every 12 hours  pantoprazole  Injectable 40 milliGRAM(s) IV Push daily  sodium chloride 0.45% 1000 milliLiter(s) (100 mL/Hr) IV Continuous <Continuous>    MEDICATIONS  (PRN):  acetaminophen     Tablet .. 650 milliGRAM(s) Oral every 6 hours PRN Temp greater or equal to 38C (100.4F)  albuterol    90 MICROgram(s) HFA Inhaler 2 Puff(s) Inhalation every 6 hours PRN Shortness of Breath and/or Wheezing  dextrose Oral Gel 15 Gram(s) Oral once PRN Blood Glucose LESS THAN 70 milliGRAM(s)/deciliter  hydrALAZINE Injectable 10 milliGRAM(s) IV Push every 6 hours PRN SBP>160  HYDROmorphone  Injectable 0.5 milliGRAM(s) IV Push every 4 hours PRN Severe Pain (7 - 10)  sodium chloride 0.9% lock flush 10 milliLiter(s) IV Push every 1 hour PRN Pre/post blood products, medications, blood draw, and to maintain line patency      Allergies    Cipro (Rash)  Spiriva (Rash)    Intolerances        Vital Signs Last 24 Hrs  T(C): 36.7 (20 Feb 2023 07:14), Max: 37 (19 Feb 2023 22:07)  T(F): 98.1 (20 Feb 2023 07:14), Max: 98.6 (19 Feb 2023 22:07)  HR: 83 (20 Feb 2023 07:14) (77 - 99)  BP: 108/46 (20 Feb 2023 07:14) (104/44 - 117/60)  BP(mean): --  RR: 18 (20 Feb 2023 07:14) (15 - 18)  SpO2: 98% (20 Feb 2023 07:14) (92% - 100%)    Parameters below as of 20 Feb 2023 07:14  Patient On (Oxygen Delivery Method): nasal cannula  O2 Flow (L/min): 2        PHYSICAL EXAMINATION:    NECK:  Supple. No lymphadenopathy. Jugular venous pressure not elevated. Carotids equal.   HEART:   The cardiac impulse has a normal quality. Reg., Nl S1 and S2.  There are no murmurs, rubs or gallops noted  CHEST:  Chest crackles to auscultation. Normal respiratory effort.  ABDOMEN:  Soft and nontender.   EXTREMITIES:  There is no edema.       LABS:                        7.0    11.66 )-----------( 208      ( 20 Feb 2023 07:51 )             22.5     02-20    142  |  105  |  58<H>  ----------------------------<  81  4.4   |  23  |  4.03<H>    Ca    7.0<L>      20 Feb 2023 07:51  Phos  8.2     02-20    TPro  x   /  Alb  1.4<L>  /  TBili  x   /  DBili  x   /  AST  x   /  ALT  x   /  AlkPhos  x   02-20

## 2023-02-20 NOTE — PROGRESS NOTE ADULT - ASSESSMENT
72F with PMHx CVA, pAF on AC, emphysema/COPD on home O2, HTN, SBO with prior resection who presented with abd pain, N/V. Found to have an small bowel obstruction. Taken to OR underwent ELAP, extensive RICHARD, ileocolectomy, ventral hernia repair with MESH. Postop course complicated with ALVERTO, shock requiring pressors    #ALVERTO- worsening likely ATN due to hypoperfusion injury   Acidosis resolved on bicarb  - continue IVF with bicarb per renal  - hyperkalemia RESOLVED  - family declined HD    - will need to decide if full comfort care/ hospice appropriate if onging lab draws are necessary  - message left w/ son Dr Moe Matute 2/20    #acute on chronic blood loss anemia postop   - messge left w/ son Dr Moe Matute 2/20 if ongoing blood draws / transfusions are to continue  - transfuse for Hgb < 7 if family wants    #Abd pain, SBO, Enterocutaneous fistula  S/p expl laparotomy , RICHARD, ileocolectomy, ventral hernia repair w mesh, colostomy  +wound VAC placed  not eating at all  Family declined feeding tube   PPI   IVF  s/p Cefepime/ Flagyl  Pain control, IS, Mobilize patient  wound vac management as per Surg    # UTI Kleb, PNA  s/p IV Abx  ID following    #pAFib : controlled   Lovenox 100mg BID  Lopressor IVPB q6  Hydralazine 10mg IVP q6 PRN SBP >160  Amiodarone 200mg Discontinued (2/10)- NGT is out so no PO meds     # Right Arm Superficial Thrombophlebitis:  warm compresses  no NSAIDS sec to renal failure  on anticoagulation     #Hypothyroid  Continue w IV synthroid     #DM  BGM well controlled,  c/w Lantus 20u/day   Corrective scale  has not required coverage and is not eating so will continue fingersticks for now    #COPD  Albuterol  Pulmicort  Pulm following    Prognosis : Extremely poor    palliative care appreciated all notes indicate son Dr Moe Matute  is aware of condition and does not want aggressive care NO HD NO ICU NO PRESSORS DNR DNI on chart and updated by palliative team 2/17  if continues without significant improvement would consider hospice

## 2023-02-20 NOTE — PROGRESS NOTE ADULT - SUBJECTIVE AND OBJECTIVE BOX
Patient is a 72y old  Female who presents with a chief complaint of bowel obstruction/ischemic mesentery (20 Feb 2023 09:07)    Patient examined and interviewed. There were no acute medical events yesterday or overnight and pt is awake w/ eyes open and not responsive to verbal questioning         ALLERGIES:  Cipro (Rash)  Spiriva (Rash)    MEDICATIONS  (STANDING):  albumin human 25% IVPB 50 milliLiter(s) IV Intermittent every 8 hours  albuterol    0.083% 2.5 milliGRAM(s) Nebulizer every 6 hours  buDESOnide    Inhalation Suspension 0.5 milliGRAM(s) Inhalation every 12 hours  calcium acetate 667 milliGRAM(s) Oral three times a day with meals  chlorhexidine 4% Liquid 1 Application(s) Topical <User Schedule>  coronavirus bivalent (EUA) Booster Vaccine (PFIZER) 0.3 milliLiter(s) IntraMuscular once  dextrose 5%. 1000 milliLiter(s) (50 mL/Hr) IV Continuous <Continuous>  dextrose 5%. 1000 milliLiter(s) (100 mL/Hr) IV Continuous <Continuous>  dextrose 50% Injectable 25 Gram(s) IV Push once  dextrose 50% Injectable 12.5 Gram(s) IV Push once  dextrose 50% Injectable 25 Gram(s) IV Push once  glucagon  Injectable 1 milliGRAM(s) IntraMuscular once  insulin glargine Injectable (LANTUS) 20 Unit(s) SubCutaneous at bedtime  levothyroxine Injectable 70 MICROGram(s) IV Push at bedtime  metoprolol tartrate 25 milliGRAM(s) Oral two times a day  nystatin Cream 1 Application(s) Topical two times a day  nystatin Powder 1 Application(s) Topical every 12 hours  pantoprazole  Injectable 40 milliGRAM(s) IV Push daily  sodium chloride 0.45% 1000 milliLiter(s) (100 mL/Hr) IV Continuous <Continuous>    MEDICATIONS  (PRN):  acetaminophen     Tablet .. 650 milliGRAM(s) Oral every 6 hours PRN Temp greater or equal to 38C (100.4F)  albuterol    90 MICROgram(s) HFA Inhaler 2 Puff(s) Inhalation every 6 hours PRN Shortness of Breath and/or Wheezing  dextrose Oral Gel 15 Gram(s) Oral once PRN Blood Glucose LESS THAN 70 milliGRAM(s)/deciliter  hydrALAZINE Injectable 10 milliGRAM(s) IV Push every 6 hours PRN SBP>160  HYDROmorphone  Injectable 0.5 milliGRAM(s) IV Push every 4 hours PRN Severe Pain (7 - 10)  sodium chloride 0.9% lock flush 10 milliLiter(s) IV Push every 1 hour PRN Pre/post blood products, medications, blood draw, and to maintain line patency    Vital Signs Last 24 Hrs  T(F): 98.1 (20 Feb 2023 07:14), Max: 98.6 (19 Feb 2023 22:07)  HR: 83 (20 Feb 2023 07:14) (77 - 99)  BP: 108/46 (20 Feb 2023 07:14) (104/44 - 117/60)  RR: 18 (20 Feb 2023 07:14) (15 - 18)  SpO2: 98% (20 Feb 2023 07:14) (92% - 100%)  I&O's Summary    19 Feb 2023 07:01  -  20 Feb 2023 07:00  --------------------------------------------------------  IN: 0 mL / OUT: 1075 mL / NET: -1075 mL              POCT Blood Glucose.: 83 mg/dL (20 Feb 2023 05:50)  POCT Blood Glucose.: 90 mg/dL (19 Feb 2023 21:51)  POCT Blood Glucose.: 78 mg/dL (19 Feb 2023 18:25)    PHYSICAL EXAM:  General: NAD, Awake w/ eyes open but not responding  ENT: MMM  Neck: Supple, No JVD  Lungs: rhonchi bilaterally  Cardio: RRR, S1/S2, No murmurs  Abdomen: Soft, Nontender, Nondistended; Bowel sounds present wound vac in place  Extremities: No calf tenderness, +pitting edema UE's LE's      LABS:                        7.0    11.66 )-----------( 208      ( 20 Feb 2023 07:51 )             22.5       02-20    142  |  105  |  58  ----------------------------<  81  4.4   |  23  |  4.03    Ca    7.0      20 Feb 2023 07:51  Phos  8.2     02-20    TPro  x   /  Alb  1.4  /  TBili  x   /  DBili  x   /  AST  x   /  ALT  x   /  AlkPhos  x   02-20                02-15 Chol -- LDL -- HDL -- Trig 247 mg/dL                Culture - Urine (collected 15 Feb 2023 15:30)  Source: Catheterized Catheterized  Final Report (17 Feb 2023 19:19):    50,000 - 99,000 CFU/mL Candida albicans "Susceptibilities not performed"      COVID-19 PCR: NotDetec (02-15-23 @ 00:35)  COVID-19 PCR: NotDetec (01-31-23 @ 11:30)

## 2023-02-21 LAB — GLUCOSE BLDC GLUCOMTR-MCNC: 80 MG/DL — SIGNIFICANT CHANGE UP (ref 70–99)

## 2023-02-21 PROCEDURE — 99232 SBSQ HOSP IP/OBS MODERATE 35: CPT

## 2023-02-21 PROCEDURE — 99233 SBSQ HOSP IP/OBS HIGH 50: CPT

## 2023-02-21 RX ORDER — ROBINUL 0.2 MG/ML
0.2 INJECTION INTRAMUSCULAR; INTRAVENOUS EVERY 4 HOURS
Refills: 0 | Status: DISCONTINUED | OUTPATIENT
Start: 2023-02-21 | End: 2023-02-22

## 2023-02-21 RX ADMIN — ALBUTEROL 2.5 MILLIGRAM(S): 90 AEROSOL, METERED ORAL at 10:06

## 2023-02-21 RX ADMIN — Medication 0.5 MILLIGRAM(S): at 10:09

## 2023-02-21 RX ADMIN — NYSTATIN CREAM 1 APPLICATION(S): 100000 CREAM TOPICAL at 21:46

## 2023-02-21 RX ADMIN — SODIUM CHLORIDE 100 MILLILITER(S): 9 INJECTION, SOLUTION INTRAVENOUS at 00:16

## 2023-02-21 RX ADMIN — PANTOPRAZOLE SODIUM 40 MILLIGRAM(S): 20 TABLET, DELAYED RELEASE ORAL at 18:09

## 2023-02-21 RX ADMIN — NYSTATIN CREAM 1 APPLICATION(S): 100000 CREAM TOPICAL at 18:11

## 2023-02-21 RX ADMIN — ROBINUL 0.2 MILLIGRAM(S): 0.2 INJECTION INTRAMUSCULAR; INTRAVENOUS at 20:57

## 2023-02-21 RX ADMIN — ALBUTEROL 2.5 MILLIGRAM(S): 90 AEROSOL, METERED ORAL at 01:43

## 2023-02-21 RX ADMIN — Medication 0.5 MILLIGRAM(S): at 20:39

## 2023-02-21 NOTE — PROGRESS NOTE ADULT - SUBJECTIVE AND OBJECTIVE BOX
Subjective:      Review of Systems:  Unable to obtain/Limited due to: delirium/clinical condition        PHYSICAL EXAM:    Vital Signs Last 24 Hrs  T(C): 37.9 (20 Feb 2023 23:53), Max: 37.9 (20 Feb 2023 23:53)  T(F): 100.2 (20 Feb 2023 23:53), Max: 100.2 (20 Feb 2023 23:53)  HR: 84 (21 Feb 2023 01:43) (80 - 106)  BP: 104/36 (20 Feb 2023 23:53) (104/36 - 123/37)  BP(mean): --  RR: 19 (20 Feb 2023 23:53) (18 - 19)  SpO2: 94% (21 Feb 2023 01:43) (94% - 98%)    Parameters below as of 21 Feb 2023 10:09  Patient On (Oxygen Delivery Method): nasal cannula,2L        General:  - GENERAL: Alert. essentially non-verbal. No acute distress. obese  - EYES: EOMI. Anicteric.   - HENT: Moist mucous membranes.   - LUNGS: Clear to auscultation bilaterally. No accessory muscle use.  + Upper airway gurgling  - CARDIOVASCULAR: Regular rate and rhythm. No murmur. No JVD.   - ABDOMEN: wound vac over abdomen  - EXTREMITIES: + edema x4. Non-tender.    - SKIN: Warm, no rashes or lesions on visible skin.   - PSYCHIATRIC: not cooperative      LABS:                        7.0    11.66 )-----------( 208      ( 20 Feb 2023 07:51 )             22.5     02-20    142  |  105  |  58<H>  ----------------------------<  81  4.4   |  23  |  4.03<H>    Ca    7.0<L>      20 Feb 2023 07:51  Phos  8.2     02-20    TPro  x   /  Alb  1.4<L>  /  TBili  x   /  DBili  x   /  AST  x   /  ALT  x   /  AlkPhos  x   02-20      Albumin: Albumin, Serum: 1.4 g/dL (02-20 @ 07:51)      Allergies    Cipro (Rash)  Spiriva (Rash)    Intolerances      MEDICATIONS  (STANDING):  albumin human 25% IVPB 50 milliLiter(s) IV Intermittent every 8 hours  albuterol    0.083% 2.5 milliGRAM(s) Nebulizer every 6 hours  buDESOnide    Inhalation Suspension 0.5 milliGRAM(s) Inhalation every 12 hours  calcium acetate 667 milliGRAM(s) Oral three times a day with meals  chlorhexidine 4% Liquid 1 Application(s) Topical <User Schedule>  coronavirus bivalent (EUA) Booster Vaccine (PFIZER) 0.3 milliLiter(s) IntraMuscular once  dextrose 5%. 1000 milliLiter(s) (50 mL/Hr) IV Continuous <Continuous>  dextrose 5%. 1000 milliLiter(s) (100 mL/Hr) IV Continuous <Continuous>  dextrose 50% Injectable 25 Gram(s) IV Push once  dextrose 50% Injectable 12.5 Gram(s) IV Push once  dextrose 50% Injectable 25 Gram(s) IV Push once  glucagon  Injectable 1 milliGRAM(s) IntraMuscular once  levothyroxine Injectable 70 MICROGram(s) IV Push at bedtime  metoprolol tartrate 25 milliGRAM(s) Oral two times a day  nystatin Cream 1 Application(s) Topical two times a day  nystatin Powder 1 Application(s) Topical every 12 hours  pantoprazole  Injectable 40 milliGRAM(s) IV Push daily  sodium chloride 0.45% 1000 milliLiter(s) (100 mL/Hr) IV Continuous <Continuous>    MEDICATIONS  (PRN):  acetaminophen     Tablet .. 650 milliGRAM(s) Oral every 6 hours PRN Temp greater or equal to 38C (100.4F)  albuterol    90 MICROgram(s) HFA Inhaler 2 Puff(s) Inhalation every 6 hours PRN Shortness of Breath and/or Wheezing  dextrose Oral Gel 15 Gram(s) Oral once PRN Blood Glucose LESS THAN 70 milliGRAM(s)/deciliter  hydrALAZINE Injectable 10 milliGRAM(s) IV Push every 6 hours PRN SBP>160  HYDROmorphone  Injectable 0.5 milliGRAM(s) IV Push every 4 hours PRN Severe Pain (7 - 10)  sodium chloride 0.9% lock flush 10 milliLiter(s) IV Push every 1 hour PRN Pre/post blood products, medications, blood draw, and to maintain line patency      RADIOLOGY:   Subjective:  seen at bedside this morning, pt has eyes open, really minimally responsive.  no IV PRN Opiates overnight.   since last encounter, hemoglobin level continues to drop and renal function continues to worsen  yesterday, Hospitalist had discussion with family and outcome was no further transfusions and the family would like to consider hospice care.    Phone call to son at approx [phone] - no answer, message left.   Hospitalist called son around 3pm, again, no answer.     Review of Systems:  Unable to obtain/Limited due to: delirium/clinical condition        PHYSICAL EXAM:    Vital Signs Last 24 Hrs  T(C): 37.9 (20 Feb 2023 23:53), Max: 37.9 (20 Feb 2023 23:53)  T(F): 100.2 (20 Feb 2023 23:53), Max: 100.2 (20 Feb 2023 23:53)  HR: 84 (21 Feb 2023 01:43) (80 - 106)  BP: 104/36 (20 Feb 2023 23:53) (104/36 - 123/37)  BP(mean): --  RR: 19 (20 Feb 2023 23:53) (18 - 19)  SpO2: 94% (21 Feb 2023 01:43) (94% - 98%)    Parameters below as of 21 Feb 2023 10:09  Patient On (Oxygen Delivery Method): nasal cannula,2L        General:  - GENERAL: Alert. essentially non-verbal. No acute distress. obese  - EYES: EOMI. Anicteric.   - HENT: Moist mucous membranes.   - LUNGS: Clear to auscultation bilaterally. No accessory muscle use.  + Upper airway gurgling  - CARDIOVASCULAR: Regular rate and rhythm. No murmur. No JVD.   - ABDOMEN: wound vac over abdomen  - EXTREMITIES: + edema x4. Non-tender.    - SKIN: Warm, no rashes or lesions on visible skin.   - PSYCHIATRIC: not cooperative      LABS:                        7.0    11.66 )-----------( 208      ( 20 Feb 2023 07:51 )             22.5     02-20    142  |  105  |  58<H>  ----------------------------<  81  4.4   |  23  |  4.03<H>    Ca    7.0<L>      20 Feb 2023 07:51  Phos  8.2     02-20    TPro  x   /  Alb  1.4<L>  /  TBili  x   /  DBili  x   /  AST  x   /  ALT  x   /  AlkPhos  x   02-20      Albumin: Albumin, Serum: 1.4 g/dL (02-20 @ 07:51)      Allergies    Cipro (Rash)  Spiriva (Rash)    Intolerances      MEDICATIONS  (STANDING):  albumin human 25% IVPB 50 milliLiter(s) IV Intermittent every 8 hours  albuterol    0.083% 2.5 milliGRAM(s) Nebulizer every 6 hours  buDESOnide    Inhalation Suspension 0.5 milliGRAM(s) Inhalation every 12 hours  calcium acetate 667 milliGRAM(s) Oral three times a day with meals  chlorhexidine 4% Liquid 1 Application(s) Topical <User Schedule>  coronavirus bivalent (EUA) Booster Vaccine (PFIZER) 0.3 milliLiter(s) IntraMuscular once  dextrose 5%. 1000 milliLiter(s) (50 mL/Hr) IV Continuous <Continuous>  dextrose 5%. 1000 milliLiter(s) (100 mL/Hr) IV Continuous <Continuous>  dextrose 50% Injectable 25 Gram(s) IV Push once  dextrose 50% Injectable 12.5 Gram(s) IV Push once  dextrose 50% Injectable 25 Gram(s) IV Push once  glucagon  Injectable 1 milliGRAM(s) IntraMuscular once  levothyroxine Injectable 70 MICROGram(s) IV Push at bedtime  metoprolol tartrate 25 milliGRAM(s) Oral two times a day  nystatin Cream 1 Application(s) Topical two times a day  nystatin Powder 1 Application(s) Topical every 12 hours  pantoprazole  Injectable 40 milliGRAM(s) IV Push daily  sodium chloride 0.45% 1000 milliLiter(s) (100 mL/Hr) IV Continuous <Continuous>    MEDICATIONS  (PRN):  acetaminophen     Tablet .. 650 milliGRAM(s) Oral every 6 hours PRN Temp greater or equal to 38C (100.4F)  albuterol    90 MICROgram(s) HFA Inhaler 2 Puff(s) Inhalation every 6 hours PRN Shortness of Breath and/or Wheezing  dextrose Oral Gel 15 Gram(s) Oral once PRN Blood Glucose LESS THAN 70 milliGRAM(s)/deciliter  hydrALAZINE Injectable 10 milliGRAM(s) IV Push every 6 hours PRN SBP>160  HYDROmorphone  Injectable 0.5 milliGRAM(s) IV Push every 4 hours PRN Severe Pain (7 - 10)  sodium chloride 0.9% lock flush 10 milliLiter(s) IV Push every 1 hour PRN Pre/post blood products, medications, blood draw, and to maintain line patency      RADIOLOGY:

## 2023-02-21 NOTE — PROGRESS NOTE ADULT - SUBJECTIVE AND OBJECTIVE BOX
Progress Note:   · Provider Specialty	Hospitalist    chief complaint of bowel obstruction/ischemic mesentery (20 Feb 2023 09:07)    S:  2/21: Lying in bed, awake, comfortable appearing, minimally verbal, lethargic appearing.    ROS: Unable to obtain due to mentation.      Vital Signs Last 24 Hrs  T(C): 37.9 (20 Feb 2023 23:53), Max: 37.9 (20 Feb 2023 23:53)  T(F): 100.2 (20 Feb 2023 23:53), Max: 100.2 (20 Feb 2023 23:53)  HR: 84 (21 Feb 2023 01:43) (84 - 106)  BP: 104/36 (20 Feb 2023 23:53) (104/36 - 123/37)  BP(mean): --  RR: 19 (20 Feb 2023 23:53) (18 - 19)  SpO2: 94% (21 Feb 2023 01:43) (94% - 98%)    Parameters below as of 21 Feb 2023 10:09  Patient On (Oxygen Delivery Method): nasal cannula,2L      PHYSICAL EXAM:  General: NAD, Awake w/ eyes open but not responding  ENT: MMM  Neck: Supple, No JVD  Lungs: rhonchi bilaterally  Cardio: RRR, S1/S2, No murmurs  Abdomen: Soft, Nontender, Nondistended; Bowel sounds present wound vac in place  Extremities: No calf tenderness, +pitting edema UE's LE's      MEDICATIONS  (STANDING):  buDESOnide    Inhalation Suspension 0.5 milliGRAM(s) Inhalation every 12 hours  chlorhexidine 4% Liquid 1 Application(s) Topical <User Schedule>  levothyroxine Injectable 70 MICROGram(s) IV Push at bedtime  nystatin Cream 1 Application(s) Topical two times a day  nystatin Powder 1 Application(s) Topical every 12 hours  pantoprazole  Injectable 40 milliGRAM(s) IV Push daily    MEDICATIONS  (PRN):  acetaminophen     Tablet .. 650 milliGRAM(s) Oral every 6 hours PRN Temp greater or equal to 38C (100.4F)  albuterol    90 MICROgram(s) HFA Inhaler 2 Puff(s) Inhalation every 6 hours PRN Shortness of Breath and/or Wheezing  HYDROmorphone  Injectable 0.5 milliGRAM(s) IV Push every 4 hours PRN Severe Pain (7 - 10)  sodium chloride 0.9% lock flush 10 milliLiter(s) IV Push every 1 hour PRN Pre/post blood products, medications, blood draw, and to maintain line patency                                7.0    11.66 )-----------( 208      ( 20 Feb 2023 07:51 )             22.5     02-20    142  |  105  |  58<H>  ----------------------------<  81  4.4   |  23  |  4.03<H>    Ca    7.0<L>      20 Feb 2023 07:51  Phos  8.2     02-20    TPro  x   /  Alb  1.4<L>  /  TBili  x   /  DBili  x   /  AST  x   /  ALT  x   /  AlkPhos  x   02-20    CAPILLARY BLOOD GLUCOSE      POCT Blood Glucose.: 80 mg/dL (21 Feb 2023 08:18)  POCT Blood Glucose.: 73 mg/dL (20 Feb 2023 21:16)  POCT Blood Glucose.: 71 mg/dL (20 Feb 2023 16:52)    LIVER FUNCTIONS - ( 20 Feb 2023 07:51 )  Alb: 1.4 g/dL / Pro: x     / ALK PHOS: x     / ALT: x     / AST: x     / GGT: x                 Assessment and Plan:  72F with PMHx CVA, pAF on AC, emphysema/COPD on home O2, HTN, SBO with prior resection who presented with abd pain, N/V. Found to have an small bowel obstruction. Taken to OR underwent ELAP, extensive RICHARD, ileocolectomy, ventral hernia repair with MESH. Postop course complicated with ALVERTO, shock requiring pressors    Pt with multiple organ failure including worsening renal failure, acute on chronic anemia, atrial fibrillation, metabolic encephalopathy in setting SBO s/p extensive surgical intervention now with enterocutaneous fistula and or small bowel perforation with poor wound healing.  - Multiple interdisciplinary teams including nephrology, palliative care team, medicine and surgery have had conversations with son regarding clinical course.  Most recently medical team Dr. Reyes spoke with HCP/son at length yesterday regarding extremely poor prognosis.  It is clear son does not wish to escalate care, no dialysis, no feeding tube, and leaning towards a comfort care approach.   This includes no further blood draws at this time.  Msg left for son by palliative care team to discuss further hospice care options.  In the meantime will continue to keep patient comfortable, until I hear back again from son.      Code status:  -DNR/DNI      Dispo:  Per medicine progress note on 2/20:  "d/w Dr Moe Matute no blood transfusions and no further blood draws. Would like to have ongoing discussion tomorrow w/ palliative team to explore next steps for FULL COMFORT CARE /HOSPICE"  Awaiting call back, in meantime will continue supportive care.  Prognosis : Extremely poor      Nutritional Assessment   · Nutritional Assessment	This patient has been assessed with a concern for Malnutrition and has been determined to have a diagnosis/diagnoses of Severe protein-calorie malnutrition.    This patient is being managed with:   Diet Full Liquid-  Mildly Thick Liquids (MILDTHICKLIQS)  Entered: Feb 16 2023 10:12PM    The following pending diet order is being considered for treatment of Severe protein-calorie malnutrition:  Diet Minced and Moist-  Mildly Thick Liquids (MILDTHICKLIQS)  Entered: Feb 17 2023  7:38AM    Diet Regular-  Entered: Jan 23 2023 12:51PM

## 2023-02-21 NOTE — PROGRESS NOTE ADULT - ASSESSMENT
Patient admitted with abdominal pain, nausea and vomitting , dehydration  septic shock, likely related to     PROBLEMS:    UTI klebsiella pneumoniae and aspiration PNA /SBO- ventral hernia  New R lung nodules - cannot rule out aspiration PNA   Acute metabolic encephalopathy  Sepsis   Hx copd without exacerbation  Anasarca/Acute on  chronic diastolic heart failure   Superficial thrombophlebitis right cephalic vein at the right upper arm      Plan:    pulmonary stable  Monitor H/H-may need blood transfusion  iv bicarbonate  po as tolerated  Incarcerated Ventral Hernia with SBO-S/p extensive RICHARD, ileo colectomy, giant ventral hernia repair with Surgimend-surgery fu for persistant sutures leak-wound care-entero fistula  off abx  Monitor Cr-3.94  Surgery fu  D/w staff  DVT PROPHYLASIX

## 2023-02-21 NOTE — PROGRESS NOTE ADULT - ASSESSMENT
Assessment:     71 y/o female with multiple medical comorbidities including previous SBO admitted for recurrent SBO.  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     Process of Care  --Reviewed dx/treatment problems and alignment with Goals of Care    Physical Aspects of Care  --Pain - monitor for non-verbal signs of distress.  dilaudid IV  PRN  --Chronic Respiratory Failure - Emphysema on home O2. - No respiratory distress.  maintain home supplemental O2   --Nausea Vomiting - denies  --Debility/Weakness - PT as tolerated, OOB to chair, fall precautions  --Acute Metabolic Encephalopathy - treatment of possible underlying infections.  frequent verbal redirection/reorientation.  monitor for pain and treat as needed.  avoid sensorium altering medications when possible.    --SBO s/p exlap/paradise complicated by EC Fistula - as per surgery.  pt requires assistance with feeds.   --Acute Renal Failure - renal function continues to worsen, family have already stated no HD  --Anemia - no further transfutions.      Psychological and Psychiatric Aspects of Care:   --Grief/Bereavement: emotional support provided  --Hx of psychiatric dx: none  -Pastoral Care Available PRN     Social Aspects of Care  -SW involved     Cultural Aspects  -Primary Language: English    Goals of Care:  DNR/DNI in chart (preexisting).  family are willing to a more conservative approach.  they would not want her to return to the ICU or to get pressors to support BP.  OK with antibiotics and other non-invasive treatments.   MOLST Updated today to include no HD  2/21: Further attempts to contact family today are unsuccessful.     Ethical and Legal Aspects: NA  Capacity- pt lacks capacity for major medical decisions at this time.      HCP/Surrogate: children - Moe ([phone]) and Fabby.     Code Status- DNR/DNI  MOLST- in chart -- updated 2/17  Dispo Plan- tbd (from SNF)    Discussed With: nursing, Medicine Consultant Dr. Odom

## 2023-02-21 NOTE — PROGRESS NOTE ADULT - SUBJECTIVE AND OBJECTIVE BOX
Subjective:    Home Medications:  Albuterol (Eqv-ProAir HFA) 90 mcg/inh inhalation aerosol: 1 puff(s) inhaled every 6 hours, As Needed (20 Jan 2023 16:49)  amiodarone 200 mg oral tablet: 1 tab(s) orally once a day (20 Jan 2023 16:49)  ascorbic acid 500 mg oral tablet: 2 tab(s) orally once a day (20 Jan 2023 16:49)  atorvastatin 10 mg oral tablet: 1 tab(s) orally once a day (at bedtime) (20 Jan 2023 16:49)  benzonatate 100 mg oral capsule: 1 cap(s) orally 3 times a day (20 Jan 2023 21:48)  budesonide 0.5 mg/2 mL inhalation suspension: 2 milliliter(s) inhaled 2 times a day (20 Jan 2023 21:48)  Cepacol Sore Throat 15 mg-3.6 mg mucous membrane lozenge: 1 lozenge mucous membrane every 4 hours, As Needed (20 Jan 2023 21:48)  cholecalciferol 1250 mcg (50,000 intl units) oral capsule: 1 cap(s) orally every 4 weeks on Wednesday  (20 Jan 2023 16:49)  Coumadin 2.5 mg oral tablet: 1 tab(s) orally once a day (at bedtime)  ***ON HOLD from 1-16 to 1-21*** (20 Jan 2023 21:48)  Cranberry oral tablet: 1 tab(s) orally 2 times a day (20 Jan 2023 16:49)  cyanocobalamin 1000 mcg oral tablet: 1 tab(s) orally once a day (20 Jan 2023 16:49)  dilTIAZem 180 mg/24 hours oral capsule, extended release: 1 cap(s) orally once a day (20 Jan 2023 16:49)  DULoxetine 60 mg oral delayed release capsule: 1 cap(s) orally once a day (20 Jan 2023 16:49)  estradiol 0.1 mg/g vaginal cream: 0.5 gram(s) vaginal 2 times a week on Monday and Thursday (20 Jan 2023 21:48)  fexofenadine 180 mg oral tablet: 1 tab(s) orally once a day (20 Jan 2023 21:48)  fluticasone 50 mcg/inh nasal spray: 1 spray(s) nasal 2 times a day (20 Jan 2023 16:49)  furosemide 20 mg oral tablet: 1 tab(s) orally once a day (20 Jan 2023 16:49)  glipiZIDE 10 mg oral tablet, extended release: 2 tab(s) orally once a day (20 Jan 2023 16:49)  GlycoLax oral powder for reconstitution: 17 gram(s) orally 2 times a day (20 Jan 2023 16:49)  guaiFENesin 100 mg/5 mL oral liquid: 20 milliliter(s) orally every 6 hours (20 Jan 2023 21:48)  HumaLOG KwikPen 100 units/mL injectable solution: 10 unit(s) injectable 3 times a day (before meals) (20 Jan 2023 21:48)  ipratropium-albuterol 20 mcg-100 mcg/inh inhalation aerosol: 1 puff(s) inhaled 4 times a day (20 Jan 2023 16:49)  levothyroxine 88 mcg (0.088 mg) oral tablet: 1 tab(s) orally once a day (at bedtime) (20 Jan 2023 16:49)  losartan 25 mg oral tablet: 1 tab(s) orally once a day (20 Jan 2023 16:49)  Macrobid 100 mg oral capsule: 1 cap(s) orally 2 times a day for 5 days  ***course complete*** (20 Jan 2023 21:48)  methocarbamol 750 mg oral tablet: 1 tab(s) orally every 8 hours, As Needed (20 Jan 2023 21:48)  metoprolol tartrate 25 mg oral tablet: 1 tab(s) orally 2 times a day (20 Jan 2023 16:49)  Milk of Magnesia 8% oral suspension: 30 milliliter(s) orally once a day, As Needed (20 Jan 2023 16:49)  montelukast 10 mg oral tablet: 1 tab(s) orally once a day (at bedtime) (20 Jan 2023 21:48)  ondansetron 4 mg oral tablet: 1 tab(s) orally every 4 hours, As Needed (20 Jan 2023 21:48)  oxybutynin 5 mg oral tablet: 1 tab(s) orally 2 times a day (20 Jan 2023 16:49)  oxyCODONE 5 mg oral tablet: 1 tab(s) orally every 4 hours, As Needed (20 Jan 2023 16:49)  phytonadione 5 mg oral tablet: 0.5 tab(s) orally once  ***given at [facility] once on 1-19-23*** (20 Jan 2023 21:48)  pregabalin 100 mg oral capsule: 1 cap(s) orally 3 times a day (20 Jan 2023 16:49)  sennosides-docusate 8.6 mg-50 mg oral tablet: 2 tab(s) orally once a day (at bedtime) (20 Jan 2023 16:49)  Tradjenta 5 mg oral tablet: 1 tab(s) orally once a day (20 Jan 2023 16:49)  Tylenol 500 mg oral tablet: 2 tab(s) orally every 8 hours (20 Jan 2023 16:49)    MEDICATIONS  (STANDING):  albumin human 25% IVPB 50 milliLiter(s) IV Intermittent every 8 hours  albuterol    0.083% 2.5 milliGRAM(s) Nebulizer every 6 hours  buDESOnide    Inhalation Suspension 0.5 milliGRAM(s) Inhalation every 12 hours  calcium acetate 667 milliGRAM(s) Oral three times a day with meals  chlorhexidine 4% Liquid 1 Application(s) Topical <User Schedule>  coronavirus bivalent (EUA) Booster Vaccine (PFIZER) 0.3 milliLiter(s) IntraMuscular once  dextrose 5%. 1000 milliLiter(s) (50 mL/Hr) IV Continuous <Continuous>  dextrose 5%. 1000 milliLiter(s) (100 mL/Hr) IV Continuous <Continuous>  dextrose 50% Injectable 25 Gram(s) IV Push once  dextrose 50% Injectable 12.5 Gram(s) IV Push once  dextrose 50% Injectable 25 Gram(s) IV Push once  glucagon  Injectable 1 milliGRAM(s) IntraMuscular once  levothyroxine Injectable 70 MICROGram(s) IV Push at bedtime  metoprolol tartrate 25 milliGRAM(s) Oral two times a day  nystatin Cream 1 Application(s) Topical two times a day  nystatin Powder 1 Application(s) Topical every 12 hours  pantoprazole  Injectable 40 milliGRAM(s) IV Push daily  sodium chloride 0.45% 1000 milliLiter(s) (100 mL/Hr) IV Continuous <Continuous>    MEDICATIONS  (PRN):  acetaminophen     Tablet .. 650 milliGRAM(s) Oral every 6 hours PRN Temp greater or equal to 38C (100.4F)  albuterol    90 MICROgram(s) HFA Inhaler 2 Puff(s) Inhalation every 6 hours PRN Shortness of Breath and/or Wheezing  dextrose Oral Gel 15 Gram(s) Oral once PRN Blood Glucose LESS THAN 70 milliGRAM(s)/deciliter  hydrALAZINE Injectable 10 milliGRAM(s) IV Push every 6 hours PRN SBP>160  HYDROmorphone  Injectable 0.5 milliGRAM(s) IV Push every 4 hours PRN Severe Pain (7 - 10)  sodium chloride 0.9% lock flush 10 milliLiter(s) IV Push every 1 hour PRN Pre/post blood products, medications, blood draw, and to maintain line patency      Allergies    Cipro (Rash)  Spiriva (Rash)    Intolerances        Vital Signs Last 24 Hrs  T(C): 37.9 (20 Feb 2023 23:53), Max: 37.9 (20 Feb 2023 23:53)  T(F): 100.2 (20 Feb 2023 23:53), Max: 100.2 (20 Feb 2023 23:53)  HR: 84 (21 Feb 2023 01:43) (80 - 106)  BP: 104/36 (20 Feb 2023 23:53) (104/36 - 123/37)  BP(mean): --  RR: 19 (20 Feb 2023 23:53) (18 - 19)  SpO2: 94% (21 Feb 2023 01:43) (94% - 98%)    Parameters below as of 21 Feb 2023 01:43  Patient On (Oxygen Delivery Method): nasal cannula,2L          PHYSICAL EXAMINATION:    NECK:  Supple. No lymphadenopathy. Jugular venous pressure not elevated. Carotids equal.   HEART:   The cardiac impulse has a normal quality. Reg., Nl S1 and S2.  There are no murmurs, rubs or gallops noted  CHEST:  Chest is clear to auscultation. Normal respiratory effort.  ABDOMEN:  Soft and nontender.   EXTREMITIES:  There is no edema.       LABS:                        7.0    11.66 )-----------( 208      ( 20 Feb 2023 07:51 )             22.5     02-20    142  |  105  |  58<H>  ----------------------------<  81  4.4   |  23  |  4.03<H>    Ca    7.0<L>      20 Feb 2023 07:51  Phos  8.2     02-20    TPro  x   /  Alb  1.4<L>  /  TBili  x   /  DBili  x   /  AST  x   /  ALT  x   /  AlkPhos  x   02-20               Subjective:    pat still draining blood from drain, congested cough.    Home Medications:  Albuterol (Eqv-ProAir HFA) 90 mcg/inh inhalation aerosol: 1 puff(s) inhaled every 6 hours, As Needed (20 Jan 2023 16:49)  amiodarone 200 mg oral tablet: 1 tab(s) orally once a day (20 Jan 2023 16:49)  ascorbic acid 500 mg oral tablet: 2 tab(s) orally once a day (20 Jan 2023 16:49)  atorvastatin 10 mg oral tablet: 1 tab(s) orally once a day (at bedtime) (20 Jan 2023 16:49)  benzonatate 100 mg oral capsule: 1 cap(s) orally 3 times a day (20 Jan 2023 21:48)  budesonide 0.5 mg/2 mL inhalation suspension: 2 milliliter(s) inhaled 2 times a day (20 Jan 2023 21:48)  Cepacol Sore Throat 15 mg-3.6 mg mucous membrane lozenge: 1 lozenge mucous membrane every 4 hours, As Needed (20 Jan 2023 21:48)  cholecalciferol 1250 mcg (50,000 intl units) oral capsule: 1 cap(s) orally every 4 weeks on Wednesday  (20 Jan 2023 16:49)  Coumadin 2.5 mg oral tablet: 1 tab(s) orally once a day (at bedtime)  ***ON HOLD from 1-16 to 1-21*** (20 Jan 2023 21:48)  Cranberry oral tablet: 1 tab(s) orally 2 times a day (20 Jan 2023 16:49)  cyanocobalamin 1000 mcg oral tablet: 1 tab(s) orally once a day (20 Jan 2023 16:49)  dilTIAZem 180 mg/24 hours oral capsule, extended release: 1 cap(s) orally once a day (20 Jan 2023 16:49)  DULoxetine 60 mg oral delayed release capsule: 1 cap(s) orally once a day (20 Jan 2023 16:49)  estradiol 0.1 mg/g vaginal cream: 0.5 gram(s) vaginal 2 times a week on Monday and Thursday (20 Jan 2023 21:48)  fexofenadine 180 mg oral tablet: 1 tab(s) orally once a day (20 Jan 2023 21:48)  fluticasone 50 mcg/inh nasal spray: 1 spray(s) nasal 2 times a day (20 Jan 2023 16:49)  furosemide 20 mg oral tablet: 1 tab(s) orally once a day (20 Jan 2023 16:49)  glipiZIDE 10 mg oral tablet, extended release: 2 tab(s) orally once a day (20 Jan 2023 16:49)  GlycoLax oral powder for reconstitution: 17 gram(s) orally 2 times a day (20 Jan 2023 16:49)  guaiFENesin 100 mg/5 mL oral liquid: 20 milliliter(s) orally every 6 hours (20 Jan 2023 21:48)  HumaLOG KwikPen 100 units/mL injectable solution: 10 unit(s) injectable 3 times a day (before meals) (20 Jan 2023 21:48)  ipratropium-albuterol 20 mcg-100 mcg/inh inhalation aerosol: 1 puff(s) inhaled 4 times a day (20 Jan 2023 16:49)  levothyroxine 88 mcg (0.088 mg) oral tablet: 1 tab(s) orally once a day (at bedtime) (20 Jan 2023 16:49)  losartan 25 mg oral tablet: 1 tab(s) orally once a day (20 Jan 2023 16:49)  Macrobid 100 mg oral capsule: 1 cap(s) orally 2 times a day for 5 days  ***course complete*** (20 Jan 2023 21:48)  methocarbamol 750 mg oral tablet: 1 tab(s) orally every 8 hours, As Needed (20 Jan 2023 21:48)  metoprolol tartrate 25 mg oral tablet: 1 tab(s) orally 2 times a day (20 Jan 2023 16:49)  Milk of Magnesia 8% oral suspension: 30 milliliter(s) orally once a day, As Needed (20 Jan 2023 16:49)  montelukast 10 mg oral tablet: 1 tab(s) orally once a day (at bedtime) (20 Jan 2023 21:48)  ondansetron 4 mg oral tablet: 1 tab(s) orally every 4 hours, As Needed (20 Jan 2023 21:48)  oxybutynin 5 mg oral tablet: 1 tab(s) orally 2 times a day (20 Jan 2023 16:49)  oxyCODONE 5 mg oral tablet: 1 tab(s) orally every 4 hours, As Needed (20 Jan 2023 16:49)  phytonadione 5 mg oral tablet: 0.5 tab(s) orally once  ***given at [facility] once on 1-19-23*** (20 Jan 2023 21:48)  pregabalin 100 mg oral capsule: 1 cap(s) orally 3 times a day (20 Jan 2023 16:49)  sennosides-docusate 8.6 mg-50 mg oral tablet: 2 tab(s) orally once a day (at bedtime) (20 Jan 2023 16:49)  Tradjenta 5 mg oral tablet: 1 tab(s) orally once a day (20 Jan 2023 16:49)  Tylenol 500 mg oral tablet: 2 tab(s) orally every 8 hours (20 Jan 2023 16:49)    MEDICATIONS  (STANDING):  albumin human 25% IVPB 50 milliLiter(s) IV Intermittent every 8 hours  albuterol    0.083% 2.5 milliGRAM(s) Nebulizer every 6 hours  buDESOnide    Inhalation Suspension 0.5 milliGRAM(s) Inhalation every 12 hours  calcium acetate 667 milliGRAM(s) Oral three times a day with meals  chlorhexidine 4% Liquid 1 Application(s) Topical <User Schedule>  coronavirus bivalent (EUA) Booster Vaccine (PFIZER) 0.3 milliLiter(s) IntraMuscular once  dextrose 5%. 1000 milliLiter(s) (50 mL/Hr) IV Continuous <Continuous>  dextrose 5%. 1000 milliLiter(s) (100 mL/Hr) IV Continuous <Continuous>  dextrose 50% Injectable 25 Gram(s) IV Push once  dextrose 50% Injectable 12.5 Gram(s) IV Push once  dextrose 50% Injectable 25 Gram(s) IV Push once  glucagon  Injectable 1 milliGRAM(s) IntraMuscular once  levothyroxine Injectable 70 MICROGram(s) IV Push at bedtime  metoprolol tartrate 25 milliGRAM(s) Oral two times a day  nystatin Cream 1 Application(s) Topical two times a day  nystatin Powder 1 Application(s) Topical every 12 hours  pantoprazole  Injectable 40 milliGRAM(s) IV Push daily  sodium chloride 0.45% 1000 milliLiter(s) (100 mL/Hr) IV Continuous <Continuous>    MEDICATIONS  (PRN):  acetaminophen     Tablet .. 650 milliGRAM(s) Oral every 6 hours PRN Temp greater or equal to 38C (100.4F)  albuterol    90 MICROgram(s) HFA Inhaler 2 Puff(s) Inhalation every 6 hours PRN Shortness of Breath and/or Wheezing  dextrose Oral Gel 15 Gram(s) Oral once PRN Blood Glucose LESS THAN 70 milliGRAM(s)/deciliter  hydrALAZINE Injectable 10 milliGRAM(s) IV Push every 6 hours PRN SBP>160  HYDROmorphone  Injectable 0.5 milliGRAM(s) IV Push every 4 hours PRN Severe Pain (7 - 10)  sodium chloride 0.9% lock flush 10 milliLiter(s) IV Push every 1 hour PRN Pre/post blood products, medications, blood draw, and to maintain line patency      Allergies    Cipro (Rash)  Spiriva (Rash)    Intolerances        Vital Signs Last 24 Hrs  T(C): 37.9 (20 Feb 2023 23:53), Max: 37.9 (20 Feb 2023 23:53)  T(F): 100.2 (20 Feb 2023 23:53), Max: 100.2 (20 Feb 2023 23:53)  HR: 84 (21 Feb 2023 01:43) (80 - 106)  BP: 104/36 (20 Feb 2023 23:53) (104/36 - 123/37)  BP(mean): --  RR: 19 (20 Feb 2023 23:53) (18 - 19)  SpO2: 94% (21 Feb 2023 01:43) (94% - 98%)    Parameters below as of 21 Feb 2023 01:43  Patient On (Oxygen Delivery Method): nasal cannula,2L          PHYSICAL EXAMINATION:    NECK:  Supple. No lymphadenopathy. Jugular venous pressure not elevated. Carotids equal.   HEART:   The cardiac impulse has a normal quality. Reg., Nl S1 and S2.  There are no murmurs, rubs or gallops noted  CHEST:  Chest crackles to auscultation. Normal respiratory effort.  ABDOMEN:  Soft and nontender.   EXTREMITIES:  There is no edema.       LABS:                        7.0    11.66 )-----------( 208      ( 20 Feb 2023 07:51 )             22.5     02-20    142  |  105  |  58<H>  ----------------------------<  81  4.4   |  23  |  4.03<H>    Ca    7.0<L>      20 Feb 2023 07:51  Phos  8.2     02-20    TPro  x   /  Alb  1.4<L>  /  TBili  x   /  DBili  x   /  AST  x   /  ALT  x   /  AlkPhos  x   02-20

## 2023-02-21 NOTE — PROGRESS NOTE ADULT - SUBJECTIVE AND OBJECTIVE BOX
Resting comfortably but does not respond to commands  VSS afeb  lungs- upper airway rhonchi  cor- RRR  Abd- VAC changed. Tissue bleeding. Ostomy applied over fistula. left upper arm

## 2023-02-21 NOTE — PROGRESS NOTE ADULT - ASSESSMENT
72F with PMHx CVA, pAF on AC, emphysema/COPD on home O2, HTN, SBO with prior resection who presented with abd pain, N/V. Found to have an small bowel obstruction. Taken to OR underwent ELAP, extensive RICHARD, ileocolectomy, ventral hernia repair with MESH. Postop course complicated with ALVETRO, shock requiring pressors     ALVERTO in setting of acidemia, hyperkalemia, and worsening hyperglycemia  Actively dying, ok to stop bicarb gtt w stable last values and lack of IV/difficulties. This and albumin are providing no assist or QOL  Family would not pursue agrresive renal measures ie HD - agree with this as pt unlikely to tolerate HD   Prognosis grim       dw RN staff, team and palliative  Will sign off and no longer actively follow

## 2023-02-21 NOTE — PROGRESS NOTE ADULT - SUBJECTIVE AND OBJECTIVE BOX
Patient is a 72y Female who reports no complaints as new       MEDICATIONS  (STANDING):  albumin human 25% IVPB 50 milliLiter(s) IV Intermittent every 8 hours  albuterol    0.083% 2.5 milliGRAM(s) Nebulizer every 6 hours  buDESOnide    Inhalation Suspension 0.5 milliGRAM(s) Inhalation every 12 hours  calcium acetate 667 milliGRAM(s) Oral three times a day with meals  chlorhexidine 4% Liquid 1 Application(s) Topical <User Schedule>  coronavirus bivalent (EUA) Booster Vaccine (PFIZER) 0.3 milliLiter(s) IntraMuscular once  dextrose 5%. 1000 milliLiter(s) (50 mL/Hr) IV Continuous <Continuous>  dextrose 5%. 1000 milliLiter(s) (100 mL/Hr) IV Continuous <Continuous>  dextrose 50% Injectable 25 Gram(s) IV Push once  dextrose 50% Injectable 12.5 Gram(s) IV Push once  dextrose 50% Injectable 25 Gram(s) IV Push once  glucagon  Injectable 1 milliGRAM(s) IntraMuscular once  levothyroxine Injectable 70 MICROGram(s) IV Push at bedtime  metoprolol tartrate 25 milliGRAM(s) Oral two times a day  nystatin Cream 1 Application(s) Topical two times a day  nystatin Powder 1 Application(s) Topical every 12 hours  pantoprazole  Injectable 40 milliGRAM(s) IV Push daily    MEDICATIONS  (PRN):  acetaminophen     Tablet .. 650 milliGRAM(s) Oral every 6 hours PRN Temp greater or equal to 38C (100.4F)  albuterol    90 MICROgram(s) HFA Inhaler 2 Puff(s) Inhalation every 6 hours PRN Shortness of Breath and/or Wheezing  dextrose Oral Gel 15 Gram(s) Oral once PRN Blood Glucose LESS THAN 70 milliGRAM(s)/deciliter  hydrALAZINE Injectable 10 milliGRAM(s) IV Push every 6 hours PRN SBP>160  HYDROmorphone  Injectable 0.5 milliGRAM(s) IV Push every 4 hours PRN Severe Pain (7 - 10)  sodium chloride 0.9% lock flush 10 milliLiter(s) IV Push every 1 hour PRN Pre/post blood products, medications, blood draw, and to maintain line patency        T(C): , Max: 37.9 (02-20-23 @ 23:53)  T(F): , Max: 100.2 (02-20-23 @ 23:53)  HR: 84 (02-21-23 @ 01:43)  BP: 104/36 (02-20-23 @ 23:53)  BP(mean): --  RR: 19 (02-20-23 @ 23:53)  SpO2: 94% (02-21-23 @ 01:43)  Wt(kg): --    02-20 @ 07:01  -  02-21 @ 07:00  --------------------------------------------------------  IN: 0 mL / OUT: 500 mL / NET: -500 mL          PHYSICAL EXAM:    Constitutional: NAD   HEENT MM  dist  Cardiovascular: S1 and S2   Extremities: anasrca  Neurological: lethargic         LABS:                        7.0    11.66 )-----------( 208      ( 20 Feb 2023 07:51 )             22.5     20 Feb 2023 07:51    142    |  105    |  58     ----------------------------<  81     4.4     |  23     |  4.03   19 Feb 2023 09:08    139    |  105    |  56     ----------------------------<  91     4.2     |  22     |  3.94     Ca    7.0        20 Feb 2023 07:51  Ca    6.9        19 Feb 2023 09:08  Phos  8.2       20 Feb 2023 07:51  Phos  7.5       19 Feb 2023 09:08    TPro  x      /  Alb  1.4    /  TBili  x      /  DBili  x      /  AST  x      /  ALT  x      /  AlkPhos  x      20 Feb 2023 07:51  TPro  5.1    /  Alb  0.9    /  TBili  1.0    /  DBili  x      /  AST  29     /  ALT  8      /  AlkPhos  270    19 Feb 2023 09:08          Urine Studies:          RADIOLOGY & ADDITIONAL STUDIES:

## 2023-02-21 NOTE — PROGRESS NOTE ADULT - NUTRITIONAL ASSESSMENT
This patient has been assessed with a concern for Malnutrition and has been determined to have a diagnosis/diagnoses of Severe protein-calorie malnutrition.    This patient is being managed with:   Diet NPO with Tube Feed-  Tube Feeding Modality: Nasogastric  Glucerna 1.5 Tacos (GLUCERNA1.5)  Total Volume for 24 Hours (mL): 1680  Continuous  Starting Tube Feed Rate {mL per Hour}: 20  Increase Tube Feed Rate by (mL): 10     Every 6 hours  Until Goal Tube Feed Rate (mL per Hour): 70  Tube Feed Duration (in Hours): 24  Tube Feed Start Time: 12:00  No Carb Prosource TF     Qty per Day:  3  Entered: Feb 4 2023 11:57AM    Parenteral Nutrition - Adult-  Entered: Feb  3 2023 10:00PM    fat emulsion (Fish Oil and Plant Based) 20% Infusion-[Known as SMOFLIPID 20% Infusion]  80 Gram(s) in IV Solution 400 milliLiter(s) infuse at 33.3 mL/Hr  Dose Rate: 0.82 Gm/kG/Day Infuse Over: 12 Hours; Stop After 12 Hours  Administration Instructions: Use 1.2 micron in-line filter  Entered: Feb  3 2023 10:00PM    The following pending diet order is being considered for treatment of Severe protein-calorie malnutrition:  Diet Regular-  Entered: Jan 23 2023 12:51PM  
This patient has been assessed with a concern for Malnutrition and has been determined to have a diagnosis/diagnoses of Severe protein-calorie malnutrition.    This patient is being managed with:   Diet NPO-  Entered: Jan 27 2023 10:05PM    The following pending diet order is being considered for treatment of Severe protein-calorie malnutrition:  Diet Regular-  Entered: Jan 23 2023 12:51PM  
This patient has been assessed with a concern for Malnutrition and has been determined to have a diagnosis/diagnoses of Severe protein-calorie malnutrition.    This patient is being managed with:   Diet NPO-  Entered: Jan 27 2023 10:05PM    The following pending diet order is being considered for treatment of Severe protein-calorie malnutrition:  Diet Regular-  Entered: Jan 23 2023 12:51PM  
This patient has been assessed with a concern for Malnutrition and has been determined to have a diagnosis/diagnoses of Severe protein-calorie malnutrition.      The following pending diet order is being considered for treatment of Severe protein-calorie malnutrition:  Diet Regular-  Entered: Jan 23 2023 12:51PM  
This patient has been assessed with a concern for Malnutrition and has been determined to have a diagnosis/diagnoses of Severe protein-calorie malnutrition.    This patient is being managed with:   Diet Full Liquid-  Mildly Thick Liquids (MILDTHICKLIQS)  Entered: Feb 16 2023 10:12PM    The following pending diet order is being considered for treatment of Severe protein-calorie malnutrition:  Diet Minced and Moist-  Mildly Thick Liquids (MILDTHICKLIQS)  Entered: Feb 17 2023  7:38AM    Diet Regular-  Entered: Jan 23 2023 12:51PM  
This patient has been assessed with a concern for Malnutrition and has been determined to have a diagnosis/diagnoses of Severe protein-calorie malnutrition.    This patient is being managed with:   Diet Full Liquid-  Mildly Thick Liquids (MILDTHICKLIQS)  Entered: Feb 16 2023 10:12PM    The following pending diet order is being considered for treatment of Severe protein-calorie malnutrition:  Diet Minced and Moist-  Mildly Thick Liquids (MILDTHICKLIQS)  Entered: Feb 17 2023  7:38AM    Diet Regular-  Entered: Jan 23 2023 12:51PM  
This patient has been assessed with a concern for Malnutrition and has been determined to have a diagnosis/diagnoses of Severe protein-calorie malnutrition.    This patient is being managed with:   Diet NPO-  Entered: Jan 27 2023 10:05PM    The following pending diet order is being considered for treatment of Severe protein-calorie malnutrition:  Diet Regular-  Entered: Jan 23 2023 12:51PM  
This patient has been assessed with a concern for Malnutrition and has been determined to have a diagnosis/diagnoses of Severe protein-calorie malnutrition.    This patient is being managed with:   Diet NPO-  Entered: Jan 27 2023 10:05PM    The following pending diet order is being considered for treatment of Severe protein-calorie malnutrition:  Diet Regular-  Entered: Jan 23 2023 12:51PM  
This patient has been assessed with a concern for Malnutrition and has been determined to have a diagnosis/diagnoses of Severe protein-calorie malnutrition.    This patient is being managed with:   Diet NPO-  Entered: Jan 27 2023 10:05PM    The following pending diet order is being considered for treatment of Severe protein-calorie malnutrition:  Diet Regular-  Entered: Jan 23 2023 12:51PM    This patient has been assessed with a concern for Malnutrition and has been determined to have a diagnosis/diagnoses of Severe protein-calorie malnutrition.    This patient is being managed with:   Diet NPO-  Entered: Jan 27 2023 10:05PM    The following pending diet order is being considered for treatment of Severe protein-calorie malnutrition:  Diet Regular-  Entered: Jan 23 2023 12:51PM  
This patient has been assessed with a concern for Malnutrition and has been determined to have a diagnosis/diagnoses of Severe protein-calorie malnutrition.    This patient is being managed with:   Diet NPO-  Entered: Jan 31 2023  9:56PM    The following pending diet order is being considered for treatment of Severe protein-calorie malnutrition:  Diet Regular-  Entered: Jan 23 2023 12:51PM  
This patient has been assessed with a concern for Malnutrition and has been determined to have a diagnosis/diagnoses of Severe protein-calorie malnutrition.    This patient is being managed with:   Parenteral Nutrition - Adult-  Entered: Feb 10 2023 10:00PM    fat emulsion (Fish Oil and Plant Based) 20% Infusion-[Known as SMOFLIPID 20% Infusion]  80 Gram(s) in IV Solution 400 milliLiter(s) infuse at 33.33 mL/Hr  Dose Rate: 0.821 Gm/kG/Day Infuse Over: 12 Hours; Stop After 12 Hours  Administration Instructions: Use 1.2 micron in-line filter  Entered: Feb 9 2023 10:00PM    Parenteral Nutrition - Adult-  Entered: Feb 9 2023 10:00PM    The following pending diet order is being considered for treatment of Severe protein-calorie malnutrition:  Diet Regular-  Entered: Jan 23 2023 12:51PM  
This patient has been assessed with a concern for Malnutrition and has been determined to have a diagnosis/diagnoses of Severe protein-calorie malnutrition.    This patient is being managed with:   Parenteral Nutrition - Adult-  Entered: Feb 10 2023 10:00PM    fat emulsion (Fish Oil and Plant Based) 20% Infusion-[Known as SMOFLIPID 20% Infusion]  80 Gram(s) in IV Solution 400 milliLiter(s) infuse at 33.33 mL/Hr  Dose Rate: 0.821 Gm/kG/Day Infuse Over: 12 Hours; Stop After 12 Hours  Administration Instructions: Use 1.2 micron in-line filter  Entered: Feb 9 2023 10:00PM    Parenteral Nutrition - Adult-  Entered: Feb 9 2023 10:00PM    The following pending diet order is being considered for treatment of Severe protein-calorie malnutrition:  Diet Regular-  Entered: Jan 23 2023 12:51PM  
This patient has been assessed with a concern for Malnutrition and has been determined to have a diagnosis/diagnoses of Severe protein-calorie malnutrition.    This patient is being managed with:   Parenteral Nutrition - Adult-  Entered: Feb 14 2023 10:00PM    Parenteral Nutrition - Adult-  Entered: Feb 13 2023 10:00PM    The following pending diet order is being considered for treatment of Severe protein-calorie malnutrition:  Diet Regular-  Entered: Jan 23 2023 12:51PM  
This patient has been assessed with a concern for Malnutrition and has been determined to have a diagnosis/diagnoses of Severe protein-calorie malnutrition.    This patient is being managed with:   fat emulsion (Fish Oil and Plant Based) 20% Infusion-[Known as SMOFLIPID 20% Infusion]  80 Gram(s) in IV Solution 400 milliLiter(s) infuse at 33.33 mL/Hr  Dose Rate: 0.821 Gm/kG/Day Infuse Over: 12 Hours; Stop After 12 Hours  Administration Instructions: Use 1.2 micron in-line filter  Entered: Feb 9 2023 10:00PM    Parenteral Nutrition - Adult-  Entered: Feb 9 2023 10:00PM    Parenteral Nutrition - Adult-  Entered: Feb 8 2023 10:00PM    The following pending diet order is being considered for treatment of Severe protein-calorie malnutrition:  Diet Regular-  Entered: Jan 23 2023 12:51PM  
This patient has been assessed with a concern for Malnutrition and has been determined to have a diagnosis/diagnoses of Severe protein-calorie malnutrition.      The following pending diet order is being considered for treatment of Severe protein-calorie malnutrition:  Diet Regular-  Entered: Jan 23 2023 12:51PM  
This patient has been assessed with a concern for Malnutrition and has been determined to have a diagnosis/diagnoses of Severe protein-calorie malnutrition.    This patient is being managed with:   Diet Full Liquid-  Mildly Thick Liquids (MILDTHICKLIQS)  Entered: Feb 16 2023 10:12PM    The following pending diet order is being considered for treatment of Severe protein-calorie malnutrition:  Diet Minced and Moist-  Mildly Thick Liquids (MILDTHICKLIQS)  Entered: Feb 17 2023  7:38AM    Diet Regular-  Entered: Jan 23 2023 12:51PM  
This patient has been assessed with a concern for Malnutrition and has been determined to have a diagnosis/diagnoses of Severe protein-calorie malnutrition.    This patient is being managed with:   Diet Minced and Moist-  Moderately Thick Liquids (MODTHICKLIQS)  Entered: Jan 25 2023 12:33PM    The following pending diet order is being considered for treatment of Severe protein-calorie malnutrition:  Diet Regular-  Entered: Jan 23 2023 12:51PM  
This patient has been assessed with a concern for Malnutrition and has been determined to have a diagnosis/diagnoses of Severe protein-calorie malnutrition.    This patient is being managed with:   Diet Minced and Moist-  Moderately Thick Liquids (MODTHICKLIQS)  Entered: Jan 25 2023 12:33PM    The following pending diet order is being considered for treatment of Severe protein-calorie malnutrition:  Diet Regular-  Entered: Jan 23 2023 12:51PM  
This patient has been assessed with a concern for Malnutrition and has been determined to have a diagnosis/diagnoses of Severe protein-calorie malnutrition.    This patient is being managed with:   Diet NPO-  Entered: Jan 27 2023 10:05PM    The following pending diet order is being considered for treatment of Severe protein-calorie malnutrition:  Diet Regular-  Entered: Jan 23 2023 12:51PM  
This patient has been assessed with a concern for Malnutrition and has been determined to have a diagnosis/diagnoses of Severe protein-calorie malnutrition.    This patient is being managed with:   Diet NPO-  Except Medications  With Ice Chips/Sips of Water  Entered: Jan 26 2023  9:05PM    The following pending diet order is being considered for treatment of Severe protein-calorie malnutrition:  Diet Regular-  Entered: Jan 23 2023 12:51PM  
This patient has been assessed with a concern for Malnutrition and has been determined to have a diagnosis/diagnoses of Severe protein-calorie malnutrition.    This patient is being managed with:   Parenteral Nutrition - Adult-  Entered: Feb 11 2023 10:00PM    fat emulsion (Fish Oil and Plant Based) 20% Infusion-[Known as SMOFLIPID 20% Infusion]  80 Gram(s) in IV Solution 400 milliLiter(s) infuse at 33.33 mL/Hr  Dose Rate: 0.82 Gm/kG/Day Infuse Over: 12 Hours; Stop After 12 Hours  Administration Instructions: Use 1.2 micron in-line filter  Entered: Feb 11 2023 10:00PM    Parenteral Nutrition - Adult-  Entered: Feb 10 2023 10:00PM    The following pending diet order is being considered for treatment of Severe protein-calorie malnutrition:  Diet Regular-  Entered: Jan 23 2023 12:51PM  
This patient has been assessed with a concern for Malnutrition and has been determined to have a diagnosis/diagnoses of Severe protein-calorie malnutrition.    This patient is being managed with:   Parenteral Nutrition - Adult-  Entered: Feb 6 2023 10:00PM    fat emulsion (Fish Oil and Plant Based) 20% Infusion-[Known as SMOFLIPID 20% Infusion]  80.84 Gram(s) in IV Solution 404.2 milliLiter(s) infuse at 33.7 mL/Hr  Dose Rate: 0.83 Gm/kG/Day Infuse Over: 12 Hours; Stop After 12 Hours  Administration Instructions: Use 1.2 micron in-line filter  Entered: Feb 6 2023 10:00PM    Parenteral Nutrition - Adult-  Entered: Feb 5 2023 10:00PM    Diet NPO with Tube Feed-  Tube Feeding Modality: Nasogastric  Glucerna 1.5 Tacos (GLUCERNA1.5)  Total Volume for 24 Hours (mL): 1680  Continuous  Starting Tube Feed Rate {mL per Hour}: 20  Increase Tube Feed Rate by (mL): 10     Every 6 hours  Until Goal Tube Feed Rate (mL per Hour): 70  Tube Feed Duration (in Hours): 24  Tube Feed Start Time: 12:00  No Carb Prosource TF     Qty per Day:  3  Entered: Feb 4 2023  6:43PM    The following pending diet order is being considered for treatment of Severe protein-calorie malnutrition:  Diet Regular-  Entered: Jan 23 2023 12:51PM  
This patient has been assessed with a concern for Malnutrition and has been determined to have a diagnosis/diagnoses of Severe protein-calorie malnutrition.    This patient is being managed with:   fat emulsion (Fish Oil and Plant Based) 20% Infusion-[Known as SMOFLIPID 20% Infusion]  80 Gram(s) in IV Solution 400 milliLiter(s) infuse at 33.33 mL/Hr  Dose Rate: 0.821 Gm/kG/Day Infuse Over: 12 Hours; Stop After 12 Hours  Administration Instructions: Use 1.2 micron in-line filter  Entered: Feb 9 2023 10:00PM    Parenteral Nutrition - Adult-  Entered: Feb 9 2023 10:00PM    Parenteral Nutrition - Adult-  Entered: Feb 8 2023 10:00PM    fat emulsion (Fish Oil and Plant Based) 20% Infusion-[Known as SMOFLIPID 20% Infusion]  80 Gram(s) in IV Solution 400 milliLiter(s) infuse at 33.33 mL/Hr  Dose Rate: 0.821 Gm/kG/Day Infuse Over: 12 Hours; Stop After 12 Hours  Administration Instructions: Use 1.2 micron in-line filter  Entered: Feb 8 2023 10:00PM    The following pending diet order is being considered for treatment of Severe protein-calorie malnutrition:  Diet Regular-  Entered: Jan 23 2023 12:51PM    This patient has been assessed with a concern for Malnutrition and has been determined to have a diagnosis/diagnoses of Severe protein-calorie malnutrition.    This patient is being managed with:   fat emulsion (Fish Oil and Plant Based) 20% Infusion-[Known as SMOFLIPID 20% Infusion]  80 Gram(s) in IV Solution 400 milliLiter(s) infuse at 33.33 mL/Hr  Dose Rate: 0.821 Gm/kG/Day Infuse Over: 12 Hours; Stop After 12 Hours  Administration Instructions: Use 1.2 micron in-line filter  Entered: Feb 9 2023 10:00PM    Parenteral Nutrition - Adult-  Entered: Feb 9 2023 10:00PM    Parenteral Nutrition - Adult-  Entered: Feb 8 2023 10:00PM    fat emulsion (Fish Oil and Plant Based) 20% Infusion-[Known as SMOFLIPID 20% Infusion]  80 Gram(s) in IV Solution 400 milliLiter(s) infuse at 33.33 mL/Hr  Dose Rate: 0.821 Gm/kG/Day Infuse Over: 12 Hours; Stop After 12 Hours  Administration Instructions: Use 1.2 micron in-line filter  Entered: Feb 8 2023 10:00PM    The following pending diet order is being considered for treatment of Severe protein-calorie malnutrition:  Diet Regular-  Entered: Jan 23 2023 12:51PM  
This patient has been assessed with a concern for Malnutrition and has been determined to have a diagnosis/diagnoses of Severe protein-calorie malnutrition.      The following pending diet order is being considered for treatment of Severe protein-calorie malnutrition:  Diet Regular-  Entered: Jan 23 2023 12:51PM  
This patient has been assessed with a concern for Malnutrition and has been determined to have a diagnosis/diagnoses of Severe protein-calorie malnutrition.    This patient is being managed with:   Diet Full Liquid-  Mildly Thick Liquids (MILDTHICKLIQS)  Entered: Feb 16 2023 10:12PM    The following pending diet order is being considered for treatment of Severe protein-calorie malnutrition:  Diet Minced and Moist-  Mildly Thick Liquids (MILDTHICKLIQS)  Entered: Feb 17 2023  7:38AM    Diet Regular-  Entered: Jan 23 2023 12:51PM  
This patient has been assessed with a concern for Malnutrition and has been determined to have a diagnosis/diagnoses of Severe protein-calorie malnutrition.    This patient is being managed with:   Diet NPO-  Entered: Jan 31 2023  9:56PM    The following pending diet order is being considered for treatment of Severe protein-calorie malnutrition:  Diet Regular-  Entered: Jan 23 2023 12:51PM  
This patient has been assessed with a concern for Malnutrition and has been determined to have a diagnosis/diagnoses of Severe protein-calorie malnutrition.    This patient is being managed with:   Diet NPO-  Except Medications  With Ice Chips/Sips of Water  Entered: Jan 26 2023  9:05PM    The following pending diet order is being considered for treatment of Severe protein-calorie malnutrition:  Diet Regular-  Entered: Jan 23 2023 12:51PM  
This patient has been assessed with a concern for Malnutrition and has been determined to have a diagnosis/diagnoses of Severe protein-calorie malnutrition.    This patient is being managed with:   Parenteral Nutrition - Adult-  Entered: Feb 11 2023 10:00PM    fat emulsion (Fish Oil and Plant Based) 20% Infusion-[Known as SMOFLIPID 20% Infusion]  80 Gram(s) in IV Solution 400 milliLiter(s) infuse at 33.33 mL/Hr  Dose Rate: 0.82 Gm/kG/Day Infuse Over: 12 Hours; Stop After 12 Hours  Administration Instructions: Use 1.2 micron in-line filter  Entered: Feb 11 2023 10:00PM    Parenteral Nutrition - Adult-  Entered: Feb 10 2023 10:00PM    The following pending diet order is being considered for treatment of Severe protein-calorie malnutrition:  Diet Regular-  Entered: Jan 23 2023 12:51PM  
This patient has been assessed with a concern for Malnutrition and has been determined to have a diagnosis/diagnoses of Severe protein-calorie malnutrition.    This patient is being managed with:   Parenteral Nutrition - Adult-  Entered: Feb 2 2023 10:00PM    Diet NPO-  Entered: Jan 31 2023  9:56PM    The following pending diet order is being considered for treatment of Severe protein-calorie malnutrition:  Diet Regular-  Entered: Jan 23 2023 12:51PM  
This patient has been assessed with a concern for Malnutrition and has been determined to have a diagnosis/diagnoses of Severe protein-calorie malnutrition.    This patient is being managed with:   Parenteral Nutrition - Adult-  Entered: Feb 2 2023 10:00PM    Diet NPO-  Entered: Jan 31 2023  9:56PM    The following pending diet order is being considered for treatment of Severe protein-calorie malnutrition:  Diet Regular-  Entered: Jan 23 2023 12:51PM  
This patient has been assessed with a concern for Malnutrition and has been determined to have a diagnosis/diagnoses of Severe protein-calorie malnutrition.    This patient is being managed with:   Parenteral Nutrition - Adult-  Entered: Feb 6 2023 10:00PM    fat emulsion (Fish Oil and Plant Based) 20% Infusion-[Known as SMOFLIPID 20% Infusion]  80.84 Gram(s) in IV Solution 404.2 milliLiter(s) infuse at 33.7 mL/Hr  Dose Rate: 0.83 Gm/kG/Day Infuse Over: 12 Hours; Stop After 12 Hours  Administration Instructions: Use 1.2 micron in-line filter  Entered: Feb 6 2023 10:00PM    Parenteral Nutrition - Adult-  Entered: Feb 5 2023 10:00PM    fat emulsion (Fish Oil and Plant Based) 20% Infusion-[Known as SMOFLIPID 20% Infusion]  80 Gram(s) in IV Solution 400 milliLiter(s) infuse at 33.33 mL/Hr  Dose Rate: 0.821 Gm/kG/Day Infuse Over: 12 Hours; Stop After 12 Hours  Administration Instructions: Use 1.2 micron in-line filter  Entered: Feb 5 2023 10:00PM    Diet NPO with Tube Feed-  Tube Feeding Modality: Nasogastric  Glucerna 1.5 Tacos (GLUCERNA1.5)  Total Volume for 24 Hours (mL): 1680  Continuous  Starting Tube Feed Rate {mL per Hour}: 20  Increase Tube Feed Rate by (mL): 10     Every 6 hours  Until Goal Tube Feed Rate (mL per Hour): 70  Tube Feed Duration (in Hours): 24  Tube Feed Start Time: 12:00  No Carb Prosource TF     Qty per Day:  3  Entered: Feb 4 2023  6:43PM    The following pending diet order is being considered for treatment of Severe protein-calorie malnutrition:  Diet Regular-  Entered: Jan 23 2023 12:51PM  
This patient has been assessed with a concern for Malnutrition and has been determined to have a diagnosis/diagnoses of Severe protein-calorie malnutrition.    This patient is being managed with:   Parenteral Nutrition - Adult-  Entered: Feb 7 2023 10:00PM    fat emulsion (Fish Oil and Plant Based) 20% Infusion-[Known as SMOFLIPID 20% Infusion]  80 Gram(s) in IV Solution 400 milliLiter(s) infuse at 33.33 mL/Hr  Dose Rate: 0.821 Gm/kG/Day Infuse Over: 12 Hours; Stop After 12 Hours  Administration Instructions: Use 1.2 micron in-line filter  Entered: Feb 7 2023 10:00PM    Parenteral Nutrition - Adult-  Entered: Feb 6 2023 10:00PM    fat emulsion (Fish Oil and Plant Based) 20% Infusion-[Known as SMOFLIPID 20% Infusion]  80.84 Gram(s) in IV Solution 404.2 milliLiter(s) infuse at 33.7 mL/Hr  Dose Rate: 0.83 Gm/kG/Day Infuse Over: 12 Hours; Stop After 12 Hours  Administration Instructions: Use 1.2 micron in-line filter  Entered: Feb 6 2023 10:00PM    Diet NPO with Tube Feed-  Tube Feeding Modality: Nasogastric  Glucerna 1.5 Tacos (GLUCERNA1.5)  Total Volume for 24 Hours (mL): 1680  Continuous  Starting Tube Feed Rate {mL per Hour}: 20  Increase Tube Feed Rate by (mL): 10     Every 6 hours  Until Goal Tube Feed Rate (mL per Hour): 70  Tube Feed Duration (in Hours): 24  Tube Feed Start Time: 12:00  No Carb Prosource TF     Qty per Day:  3  Entered: Feb 4 2023  6:43PM    The following pending diet order is being considered for treatment of Severe protein-calorie malnutrition:  Diet Regular-  Entered: Jan 23 2023 12:51PM  
This patient has been assessed with a concern for Malnutrition and has been determined to have a diagnosis/diagnoses of Severe protein-calorie malnutrition.    This patient is being managed with:   Parenteral Nutrition - Adult-  Entered: Feb 8 2023 10:00PM    fat emulsion (Fish Oil and Plant Based) 20% Infusion-[Known as SMOFLIPID 20% Infusion]  80 Gram(s) in IV Solution 400 milliLiter(s) infuse at 33.33 mL/Hr  Dose Rate: 0.821 Gm/kG/Day Infuse Over: 12 Hours; Stop After 12 Hours  Administration Instructions: Use 1.2 micron in-line filter  Entered: Feb 8 2023 10:00PM    Parenteral Nutrition - Adult-  Entered: Feb 7 2023 10:00PM    fat emulsion (Fish Oil and Plant Based) 20% Infusion-[Known as SMOFLIPID 20% Infusion]  80 Gram(s) in IV Solution 400 milliLiter(s) infuse at 33.33 mL/Hr  Dose Rate: 0.821 Gm/kG/Day Infuse Over: 12 Hours; Stop After 12 Hours  Administration Instructions: Use 1.2 micron in-line filter  Entered: Feb 7 2023 10:00PM    The following pending diet order is being considered for treatment of Severe protein-calorie malnutrition:  Diet Regular-  Entered: Jan 23 2023 12:51PM  
This patient has been assessed with a concern for Malnutrition and has been determined to have a diagnosis/diagnoses of Severe protein-calorie malnutrition.      The following pending diet order is being considered for treatment of Severe protein-calorie malnutrition:  Diet Regular-  Entered: Jan 23 2023 12:51PM  
This patient has been assessed with a concern for Malnutrition and has been determined to have a diagnosis/diagnoses of Severe protein-calorie malnutrition.    This patient is being managed with:   Diet Clear Liquid-  Entered: Jan 23 2023  1:53PM    The following pending diet order is being considered for treatment of Severe protein-calorie malnutrition:  Diet Regular-  Entered: Jan 23 2023 12:51PM  
This patient has been assessed with a concern for Malnutrition and has been determined to have a diagnosis/diagnoses of Severe protein-calorie malnutrition.    This patient is being managed with:   Diet Full Liquid-  Mildly Thick Liquids (MILDTHICKLIQS)  Entered: Feb 16 2023 10:12PM    The following pending diet order is being considered for treatment of Severe protein-calorie malnutrition:  Diet Minced and Moist-  Mildly Thick Liquids (MILDTHICKLIQS)  Entered: Feb 17 2023  7:38AM    Diet Regular-  Entered: Jan 23 2023 12:51PM  
This patient has been assessed with a concern for Malnutrition and has been determined to have a diagnosis/diagnoses of Severe protein-calorie malnutrition.    This patient is being managed with:   Diet NPO-  Entered: Jan 27 2023 10:05PM    The following pending diet order is being considered for treatment of Severe protein-calorie malnutrition:  Diet Regular-  Entered: Jan 23 2023 12:51PM  
This patient has been assessed with a concern for Malnutrition and has been determined to have a diagnosis/diagnoses of Severe protein-calorie malnutrition.    This patient is being managed with:   Diet NPO-  Entered: Jan 27 2023 10:05PM    The following pending diet order is being considered for treatment of Severe protein-calorie malnutrition:  Diet Regular-  Entered: Jan 23 2023 12:51PM  
This patient has been assessed with a concern for Malnutrition and has been determined to have a diagnosis/diagnoses of Severe protein-calorie malnutrition.    This patient is being managed with:   Parenteral Nutrition - Adult-  Entered: Feb  3 2023 10:00PM    fat emulsion (Fish Oil and Plant Based) 20% Infusion-[Known as SMOFLIPID 20% Infusion]  80 Gram(s) in IV Solution 400 milliLiter(s) infuse at 33.3 mL/Hr  Dose Rate: 0.82 Gm/kG/Day Infuse Over: 12 Hours; Stop After 12 Hours  Administration Instructions: Use 1.2 micron in-line filter  Entered: Feb  3 2023 10:00PM    Parenteral Nutrition - Adult-  Entered: Feb 2 2023 10:00PM    Diet NPO-  Entered: Jan 31 2023  9:56PM    The following pending diet order is being considered for treatment of Severe protein-calorie malnutrition:  Diet Regular-  Entered: Jan 23 2023 12:51PM  
This patient has been assessed with a concern for Malnutrition and has been determined to have a diagnosis/diagnoses of Severe protein-calorie malnutrition.    This patient is being managed with:   Parenteral Nutrition - Adult-  Entered: Feb 12 2023 10:00PM    The following pending diet order is being considered for treatment of Severe protein-calorie malnutrition:  Diet Regular-  Entered: Jan 23 2023 12:51PM  
This patient has been assessed with a concern for Malnutrition and has been determined to have a diagnosis/diagnoses of Severe protein-calorie malnutrition.    This patient is being managed with:   Parenteral Nutrition - Adult-  Entered: Feb 12 2023 10:00PM    The following pending diet order is being considered for treatment of Severe protein-calorie malnutrition:  Diet Regular-  Entered: Jan 23 2023 12:51PM    This patient has been assessed with a concern for Malnutrition and has been determined to have a diagnosis/diagnoses of Severe protein-calorie malnutrition.    This patient is being managed with:   Parenteral Nutrition - Adult-  Entered: Feb 12 2023 10:00PM    The following pending diet order is being considered for treatment of Severe protein-calorie malnutrition:  Diet Regular-  Entered: Jan 23 2023 12:51PM  
This patient has been assessed with a concern for Malnutrition and has been determined to have a diagnosis/diagnoses of Severe protein-calorie malnutrition.    This patient is being managed with:   Parenteral Nutrition - Adult-  Entered: Feb 13 2023 10:00PM    Parenteral Nutrition - Adult-  Entered: Feb 12 2023 10:00PM    The following pending diet order is being considered for treatment of Severe protein-calorie malnutrition:  Diet Regular-  Entered: Jan 23 2023 12:51PM  
This patient has been assessed with a concern for Malnutrition and has been determined to have a diagnosis/diagnoses of Severe protein-calorie malnutrition.    This patient is being managed with:   Parenteral Nutrition - Adult-  Entered: Feb 13 2023 10:00PM    Parenteral Nutrition - Adult-  Entered: Feb 12 2023 10:00PM    The following pending diet order is being considered for treatment of Severe protein-calorie malnutrition:  Diet Regular-  Entered: Jan 23 2023 12:51PM  
This patient has been assessed with a concern for Malnutrition and has been determined to have a diagnosis/diagnoses of Severe protein-calorie malnutrition.    This patient is being managed with:   Parenteral Nutrition - Adult-  Entered: Feb 14 2023 10:00PM    Parenteral Nutrition - Adult-  Entered: Feb 13 2023 10:00PM    The following pending diet order is being considered for treatment of Severe protein-calorie malnutrition:  Diet Regular-  Entered: Jan 23 2023 12:51PM  
This patient has been assessed with a concern for Malnutrition and has been determined to have a diagnosis/diagnoses of Severe protein-calorie malnutrition.    This patient is being managed with:   Parenteral Nutrition - Adult-  Entered: Feb 5 2023 10:00PM    fat emulsion (Fish Oil and Plant Based) 20% Infusion-[Known as SMOFLIPID 20% Infusion]  80 Gram(s) in IV Solution 400 milliLiter(s) infuse at 33.33 mL/Hr  Dose Rate: 0.821 Gm/kG/Day Infuse Over: 12 Hours; Stop After 12 Hours  Administration Instructions: Use 1.2 micron in-line filter  Entered: Feb 5 2023 10:00PM    Diet NPO with Tube Feed-  Tube Feeding Modality: Nasogastric  Glucerna 1.5 Tacos (GLUCERNA1.5)  Total Volume for 24 Hours (mL): 1680  Continuous  Starting Tube Feed Rate {mL per Hour}: 20  Increase Tube Feed Rate by (mL): 10     Every 6 hours  Until Goal Tube Feed Rate (mL per Hour): 70  Tube Feed Duration (in Hours): 24  Tube Feed Start Time: 12:00  No Carb Prosource TF     Qty per Day:  3  Entered: Feb 4 2023  6:43PM    The following pending diet order is being considered for treatment of Severe protein-calorie malnutrition:  Diet Regular-  Entered: Jan 23 2023 12:51PM  
This patient has been assessed with a concern for Malnutrition and has been determined to have a diagnosis/diagnoses of Severe protein-calorie malnutrition.    This patient is being managed with:   Parenteral Nutrition - Adult-  Entered: Feb 5 2023 10:00PM    fat emulsion (Fish Oil and Plant Based) 20% Infusion-[Known as SMOFLIPID 20% Infusion]  80 Gram(s) in IV Solution 400 milliLiter(s) infuse at 33.33 mL/Hr  Dose Rate: 0.821 Gm/kG/Day Infuse Over: 12 Hours; Stop After 12 Hours  Administration Instructions: Use 1.2 micron in-line filter  Entered: Feb 5 2023 10:00PM    Diet NPO with Tube Feed-  Tube Feeding Modality: Nasogastric  Glucerna 1.5 Tacos (GLUCERNA1.5)  Total Volume for 24 Hours (mL): 1680  Continuous  Starting Tube Feed Rate {mL per Hour}: 20  Increase Tube Feed Rate by (mL): 10     Every 6 hours  Until Goal Tube Feed Rate (mL per Hour): 70  Tube Feed Duration (in Hours): 24  Tube Feed Start Time: 12:00  No Carb Prosource TF     Qty per Day:  3  Entered: Feb 4 2023  6:43PM    The following pending diet order is being considered for treatment of Severe protein-calorie malnutrition:  Diet Regular-  Entered: Jan 23 2023 12:51PM  
This patient has been assessed with a concern for Malnutrition and has been determined to have a diagnosis/diagnoses of Severe protein-calorie malnutrition.    This patient is being managed with:   Parenteral Nutrition - Adult-  Entered: Feb 7 2023 10:00PM    fat emulsion (Fish Oil and Plant Based) 20% Infusion-[Known as SMOFLIPID 20% Infusion]  80 Gram(s) in IV Solution 400 milliLiter(s) infuse at 33.33 mL/Hr  Dose Rate: 0.821 Gm/kG/Day Infuse Over: 12 Hours; Stop After 12 Hours  Administration Instructions: Use 1.2 micron in-line filter  Entered: Feb 7 2023 10:00PM    Parenteral Nutrition - Adult-  Entered: Feb 6 2023 10:00PM    Diet NPO with Tube Feed-  Tube Feeding Modality: Nasogastric  Glucerna 1.5 Tacos (GLUCERNA1.5)  Total Volume for 24 Hours (mL): 1680  Continuous  Starting Tube Feed Rate {mL per Hour}: 20  Increase Tube Feed Rate by (mL): 10     Every 6 hours  Until Goal Tube Feed Rate (mL per Hour): 70  Tube Feed Duration (in Hours): 24  Tube Feed Start Time: 12:00  No Carb Prosource TF     Qty per Day:  3  Entered: Feb 4 2023  6:43PM    The following pending diet order is being considered for treatment of Severe protein-calorie malnutrition:  Diet Regular-  Entered: Jan 23 2023 12:51PM  
This patient has been assessed with a concern for Malnutrition and has been determined to have a diagnosis/diagnoses of Severe protein-calorie malnutrition.    This patient is being managed with:   Parenteral Nutrition - Adult-  Entered: Feb 8 2023 10:00PM    fat emulsion (Fish Oil and Plant Based) 20% Infusion-[Known as SMOFLIPID 20% Infusion]  80 Gram(s) in IV Solution 400 milliLiter(s) infuse at 33.33 mL/Hr  Dose Rate: 0.821 Gm/kG/Day Infuse Over: 12 Hours; Stop After 12 Hours  Administration Instructions: Use 1.2 micron in-line filter  Entered: Feb 8 2023 10:00PM    Parenteral Nutrition - Adult-  Entered: Feb 7 2023 10:00PM    The following pending diet order is being considered for treatment of Severe protein-calorie malnutrition:  Diet Regular-  Entered: Jan 23 2023 12:51PM  
This patient has been assessed with a concern for Malnutrition and has been determined to have a diagnosis/diagnoses of Severe protein-calorie malnutrition.    This patient is being managed with:   Diet Full Liquid-  Mildly Thick Liquids (MILDTHICKLIQS)  Entered: Feb 16 2023 10:12PM    The following pending diet order is being considered for treatment of Severe protein-calorie malnutrition:  Diet Minced and Moist-  Mildly Thick Liquids (MILDTHICKLIQS)  Entered: Feb 17 2023  7:38AM    Diet Regular-  Entered: Jan 23 2023 12:51PM  
This patient has been assessed with a concern for Malnutrition and has been determined to have a diagnosis/diagnoses of Severe protein-calorie malnutrition.    This patient is being managed with:   Diet Minced and Moist-  Moderately Thick Liquids (MODTHICKLIQS)  Entered: Jan 24 2023  5:40PM    The following pending diet order is being considered for treatment of Severe protein-calorie malnutrition:  Diet Regular-  Entered: Jan 23 2023 12:51PM  
This patient has been assessed with a concern for Malnutrition and has been determined to have a diagnosis/diagnoses of Severe protein-calorie malnutrition.    This patient is being managed with:   Parenteral Nutrition - Adult-  Entered: Feb  3 2023 10:00PM    fat emulsion (Fish Oil and Plant Based) 20% Infusion-[Known as SMOFLIPID 20% Infusion]  80 Gram(s) in IV Solution 400 milliLiter(s) infuse at 33.3 mL/Hr  Dose Rate: 0.82 Gm/kG/Day Infuse Over: 12 Hours; Stop After 12 Hours  Administration Instructions: Use 1.2 micron in-line filter  Entered: Feb  3 2023 10:00PM    Parenteral Nutrition - Adult-  Entered: Feb 2 2023 10:00PM    Diet NPO-  Entered: Jan 31 2023  9:56PM    The following pending diet order is being considered for treatment of Severe protein-calorie malnutrition:  Diet Regular-  Entered: Jan 23 2023 12:51PM  
This patient has been assessed with a concern for Malnutrition and has been determined to have a diagnosis/diagnoses of Severe protein-calorie malnutrition.    This patient is being managed with:   Diet Clear Liquid-  Entered: Jan 23 2023  1:53PM    The following pending diet order is being considered for treatment of Severe protein-calorie malnutrition:  Diet Regular-  Entered: Jan 23 2023 12:51PM  
This patient has been assessed with a concern for Malnutrition and has been determined to have a diagnosis/diagnoses of Severe protein-calorie malnutrition.    This patient is being managed with:   Diet NPO-  Entered: Jan 20 2023  9:44PM    The following pending diet order is being considered for treatment of Severe protein-calorie malnutrition:  Diet Regular-  Entered: Jan 23 2023 12:51PM  
This patient has been assessed with a concern for Malnutrition and has been determined to have a diagnosis/diagnoses of Severe protein-calorie malnutrition.      The following pending diet order is being considered for treatment of Severe protein-calorie malnutrition:  Diet Regular-  Entered: Jan 23 2023 12:51PM  
This patient has been assessed with a concern for Malnutrition and has been determined to have a diagnosis/diagnoses of Severe protein-calorie malnutrition.    This patient is being managed with:   Diet NPO with Tube Feed-  Tube Feeding Modality: Nasogastric  Glucerna 1.5 Tacos (GLUCERNA1.5)  Total Volume for 24 Hours (mL): 1680  Continuous  Starting Tube Feed Rate {mL per Hour}: 20  Increase Tube Feed Rate by (mL): 10     Every 6 hours  Until Goal Tube Feed Rate (mL per Hour): 70  Tube Feed Duration (in Hours): 24  Tube Feed Start Time: 12:00  No Carb Prosource TF     Qty per Day:  3  Entered: Feb 4 2023 11:57AM    Parenteral Nutrition - Adult-  Entered: Feb  3 2023 10:00PM    The following pending diet order is being considered for treatment of Severe protein-calorie malnutrition:  Diet Regular-  Entered: Jan 23 2023 12:51PM

## 2023-02-22 ENCOUNTER — TRANSCRIPTION ENCOUNTER (OUTPATIENT)
Age: 73
End: 2023-02-22

## 2023-02-22 VITALS
RESPIRATION RATE: 18 BRPM | SYSTOLIC BLOOD PRESSURE: 102 MMHG | TEMPERATURE: 98 F | HEART RATE: 95 BPM | DIASTOLIC BLOOD PRESSURE: 42 MMHG | OXYGEN SATURATION: 94 %

## 2023-02-22 PROCEDURE — 99239 HOSP IP/OBS DSCHRG MGMT >30: CPT

## 2023-02-22 PROCEDURE — 99233 SBSQ HOSP IP/OBS HIGH 50: CPT

## 2023-02-22 RX ORDER — LOSARTAN POTASSIUM 100 MG/1
1 TABLET, FILM COATED ORAL
Qty: 0 | Refills: 0 | DISCHARGE

## 2023-02-22 RX ORDER — PREGABALIN 225 MG/1
1 CAPSULE ORAL
Qty: 0 | Refills: 0 | DISCHARGE

## 2023-02-22 RX ORDER — SCOPALAMINE 1 MG/3D
1 PATCH, EXTENDED RELEASE TRANSDERMAL
Refills: 0 | Status: DISCONTINUED | OUTPATIENT
Start: 2023-02-22 | End: 2023-02-22

## 2023-02-22 RX ORDER — CHOLECALCIFEROL (VITAMIN D3) 125 MCG
1 CAPSULE ORAL
Qty: 0 | Refills: 0 | DISCHARGE

## 2023-02-22 RX ORDER — LINAGLIPTIN 5 MG/1
1 TABLET, FILM COATED ORAL
Qty: 0 | Refills: 0 | DISCHARGE

## 2023-02-22 RX ORDER — AMIODARONE HYDROCHLORIDE 400 MG/1
1 TABLET ORAL
Qty: 0 | Refills: 0 | DISCHARGE

## 2023-02-22 RX ORDER — BENZOCAINE AND MENTHOL 5; 1 G/100ML; G/100ML
1 LIQUID ORAL
Qty: 0 | Refills: 0 | DISCHARGE

## 2023-02-22 RX ORDER — FEXOFENADINE HCL 30 MG
1 TABLET ORAL
Qty: 0 | Refills: 0 | DISCHARGE

## 2023-02-22 RX ORDER — POLYETHYLENE GLYCOL 3350 17 G/17G
17 POWDER, FOR SOLUTION ORAL
Qty: 0 | Refills: 0 | DISCHARGE

## 2023-02-22 RX ORDER — ESTRADIOL 4 UG/1
0.5 INSERT VAGINAL
Qty: 0 | Refills: 0 | DISCHARGE

## 2023-02-22 RX ORDER — ACETAMINOPHEN 500 MG
2 TABLET ORAL
Qty: 0 | Refills: 0 | DISCHARGE
Start: 2023-02-22

## 2023-02-22 RX ORDER — DILTIAZEM HCL 120 MG
1 CAPSULE, EXT RELEASE 24 HR ORAL
Qty: 0 | Refills: 0 | DISCHARGE

## 2023-02-22 RX ORDER — ATORVASTATIN CALCIUM 80 MG/1
1 TABLET, FILM COATED ORAL
Qty: 0 | Refills: 0 | DISCHARGE

## 2023-02-22 RX ORDER — FUROSEMIDE 40 MG
1 TABLET ORAL
Qty: 0 | Refills: 0 | DISCHARGE

## 2023-02-22 RX ORDER — ASCORBIC ACID 60 MG
2 TABLET,CHEWABLE ORAL
Qty: 0 | Refills: 0 | DISCHARGE

## 2023-02-22 RX ORDER — SCOPALAMINE 1 MG/3D
1 PATCH, EXTENDED RELEASE TRANSDERMAL
Qty: 0 | Refills: 0 | DISCHARGE
Start: 2023-02-22

## 2023-02-22 RX ORDER — ROBINUL 0.2 MG/ML
0.2 INJECTION INTRAMUSCULAR; INTRAVENOUS
Qty: 0 | Refills: 0 | DISCHARGE
Start: 2023-02-22

## 2023-02-22 RX ORDER — ONDANSETRON 8 MG/1
1 TABLET, FILM COATED ORAL
Qty: 0 | Refills: 0 | DISCHARGE

## 2023-02-22 RX ORDER — ACETAMINOPHEN 500 MG
2 TABLET ORAL
Qty: 0 | Refills: 0 | DISCHARGE

## 2023-02-22 RX ORDER — WARFARIN SODIUM 2.5 MG/1
1 TABLET ORAL
Qty: 0 | Refills: 0 | DISCHARGE

## 2023-02-22 RX ORDER — LEVOTHYROXINE SODIUM 125 MCG
1 TABLET ORAL
Qty: 0 | Refills: 0 | DISCHARGE

## 2023-02-22 RX ORDER — MAGNESIUM HYDROXIDE 400 MG/1
30 TABLET, CHEWABLE ORAL
Qty: 0 | Refills: 0 | DISCHARGE

## 2023-02-22 RX ORDER — ALBUTEROL 90 UG/1
1 AEROSOL, METERED ORAL
Qty: 0 | Refills: 0 | DISCHARGE

## 2023-02-22 RX ORDER — BUDESONIDE, MICRONIZED 100 %
2 POWDER (GRAM) MISCELLANEOUS
Qty: 0 | Refills: 0 | DISCHARGE

## 2023-02-22 RX ORDER — OXYCODONE HYDROCHLORIDE 5 MG/1
1 TABLET ORAL
Qty: 0 | Refills: 0 | DISCHARGE

## 2023-02-22 RX ORDER — HYDROMORPHONE HYDROCHLORIDE 2 MG/ML
0.5 INJECTION INTRAMUSCULAR; INTRAVENOUS; SUBCUTANEOUS
Qty: 0 | Refills: 0 | DISCHARGE
Start: 2023-02-22

## 2023-02-22 RX ORDER — METOPROLOL TARTRATE 50 MG
1 TABLET ORAL
Qty: 0 | Refills: 0 | DISCHARGE

## 2023-02-22 RX ORDER — INSULIN LISPRO 100/ML
10 VIAL (ML) SUBCUTANEOUS
Qty: 0 | Refills: 0 | DISCHARGE

## 2023-02-22 RX ORDER — DULOXETINE HYDROCHLORIDE 30 MG/1
1 CAPSULE, DELAYED RELEASE ORAL
Qty: 0 | Refills: 0 | DISCHARGE

## 2023-02-22 RX ORDER — SENNOSIDES/DOCUSATE SODIUM 8.6MG-50MG
2 TABLET ORAL
Qty: 0 | Refills: 0 | DISCHARGE

## 2023-02-22 RX ADMIN — NYSTATIN CREAM 1 APPLICATION(S): 100000 CREAM TOPICAL at 09:08

## 2023-02-22 RX ADMIN — HYDROMORPHONE HYDROCHLORIDE 0.5 MILLIGRAM(S): 2 INJECTION INTRAMUSCULAR; INTRAVENOUS; SUBCUTANEOUS at 14:27

## 2023-02-22 RX ADMIN — SCOPALAMINE 1 PATCH: 1 PATCH, EXTENDED RELEASE TRANSDERMAL at 02:44

## 2023-02-22 RX ADMIN — CHLORHEXIDINE GLUCONATE 1 APPLICATION(S): 213 SOLUTION TOPICAL at 09:09

## 2023-02-22 RX ADMIN — HYDROMORPHONE HYDROCHLORIDE 0.5 MILLIGRAM(S): 2 INJECTION INTRAMUSCULAR; INTRAVENOUS; SUBCUTANEOUS at 13:30

## 2023-02-22 RX ADMIN — SCOPALAMINE 1 PATCH: 1 PATCH, EXTENDED RELEASE TRANSDERMAL at 07:54

## 2023-02-22 NOTE — DISCHARGE NOTE PROVIDER - NSDCCPCAREPLAN_GEN_ALL_CORE_FT
PRINCIPAL DISCHARGE DIAGNOSIS  Diagnosis: SBO (small bowel obstruction)  Assessment and Plan of Treatment: post surgical intervention with post op complications and multiple organ failure.  Hospice Care.

## 2023-02-22 NOTE — PROGRESS NOTE ADULT - REASON FOR ADMISSION
bowel obstruction/ischemic mesentery

## 2023-02-22 NOTE — DISCHARGE NOTE PROVIDER - DETAILS OF MALNUTRITION DIAGNOSIS/DIAGNOSES
This patient has been assessed with a concern for Malnutrition and was treated during this hospitalization for the following Nutrition diagnosis/diagnoses:     -  01/22/2023: Severe protein-calorie malnutrition

## 2023-02-22 NOTE — PROGRESS NOTE ADULT - ASSESSMENT
Assessment:     73 y/o female with multiple medical comorbidities including previous SBO admitted for recurrent SBO.  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     Process of Care  --Reviewed dx/treatment problems and alignment with Goals of Care    Physical Aspects of Care  --Pain - monitor for non-verbal signs of distress.  dilaudid IV  PRN  can be given SC if no IV Access.   --Chronic Respiratory Failure - Emphysema on home O2. - on scopolamine patch.  change glycopyrrolate to q8h standing (can be given IV or SC if no IV Access)  --Nausea Vomiting - denies  --Debility/Weakness - PT as tolerated, OOB to chair, fall precautions  --Acute Metabolic Encephalopathy - treatment of possible underlying infections.  frequent verbal redirection/reorientation.  monitor for pain and treat as needed.  scop patch could be causing some delirium, but i think it is more due to her declining condition (renal function, anemia)    --SBO s/p exlap/paradise complicated by EC Fistula - as per surgery.  pt requires assistance with feeds.   please get alternative dressing recommendations in anticipation of transfer to hospice care   --Acute Renal Failure - renal function continues to worsen, family have already stated no HD  --Anemia - no further transfusions   --hypothyroidism - IV Synthroid will be stopped at hospice.     Psychological and Psychiatric Aspects of Care:   --Grief/Bereavement: emotional support provided  --Hx of psychiatric dx: none  -Pastoral Care Available PRN     Social Aspects of Care  -SW involved     Cultural Aspects  -Primary Language: English    Goals of Care:  DNR/DNI in chart (preexisting).  family are willing to a more conservative approach.  they would not want her to return to the ICU or to get pressors to support BP.  OK with antibiotics and other non-invasive treatments.   MOLST Updated today to include no HD  2/21: Further attempts to contact family today are unsuccessful.     Ethical and Legal Aspects: NA  Capacity- pt lacks capacity for major medical decisions at this time.      HCP/Surrogate: children - Moe ([phone]) and Fabby.     Code Status- DNR/DNI  MOLST- in chart -- updated 2/17  Dispo Plan- to place referral to Hospice for inpatient care.     Discussed With: nursing, Medicine Consultant Dr. Odom

## 2023-02-22 NOTE — DISCHARGE NOTE PROVIDER - NSDCCAREPROVSEEN_GEN_ALL_CORE_FT
Matthew, Brian Odom, Ghanshyam Watts, Zbigniew Manriquez, Andre Lima, Justin Elise, Jasvir Brito, Hitesh

## 2023-02-22 NOTE — PROGRESS NOTE ADULT - SUBJECTIVE AND OBJECTIVE BOX
Subjective:    Home Medications:  Albuterol (Eqv-ProAir HFA) 90 mcg/inh inhalation aerosol: 1 puff(s) inhaled every 6 hours, As Needed (20 Jan 2023 16:49)  amiodarone 200 mg oral tablet: 1 tab(s) orally once a day (20 Jan 2023 16:49)  ascorbic acid 500 mg oral tablet: 2 tab(s) orally once a day (20 Jan 2023 16:49)  atorvastatin 10 mg oral tablet: 1 tab(s) orally once a day (at bedtime) (20 Jan 2023 16:49)  benzonatate 100 mg oral capsule: 1 cap(s) orally 3 times a day (20 Jan 2023 21:48)  budesonide 0.5 mg/2 mL inhalation suspension: 2 milliliter(s) inhaled 2 times a day (20 Jan 2023 21:48)  Cepacol Sore Throat 15 mg-3.6 mg mucous membrane lozenge: 1 lozenge mucous membrane every 4 hours, As Needed (20 Jan 2023 21:48)  cholecalciferol 1250 mcg (50,000 intl units) oral capsule: 1 cap(s) orally every 4 weeks on Wednesday  (20 Jan 2023 16:49)  Coumadin 2.5 mg oral tablet: 1 tab(s) orally once a day (at bedtime)  ***ON HOLD from 1-16 to 1-21*** (20 Jan 2023 21:48)  Cranberry oral tablet: 1 tab(s) orally 2 times a day (20 Jan 2023 16:49)  cyanocobalamin 1000 mcg oral tablet: 1 tab(s) orally once a day (20 Jan 2023 16:49)  dilTIAZem 180 mg/24 hours oral capsule, extended release: 1 cap(s) orally once a day (20 Jan 2023 16:49)  DULoxetine 60 mg oral delayed release capsule: 1 cap(s) orally once a day (20 Jan 2023 16:49)  estradiol 0.1 mg/g vaginal cream: 0.5 gram(s) vaginal 2 times a week on Monday and Thursday (20 Jan 2023 21:48)  fexofenadine 180 mg oral tablet: 1 tab(s) orally once a day (20 Jan 2023 21:48)  fluticasone 50 mcg/inh nasal spray: 1 spray(s) nasal 2 times a day (20 Jan 2023 16:49)  furosemide 20 mg oral tablet: 1 tab(s) orally once a day (20 Jan 2023 16:49)  glipiZIDE 10 mg oral tablet, extended release: 2 tab(s) orally once a day (20 Jan 2023 16:49)  GlycoLax oral powder for reconstitution: 17 gram(s) orally 2 times a day (20 Jan 2023 16:49)  guaiFENesin 100 mg/5 mL oral liquid: 20 milliliter(s) orally every 6 hours (20 Jan 2023 21:48)  HumaLOG KwikPen 100 units/mL injectable solution: 10 unit(s) injectable 3 times a day (before meals) (20 Jan 2023 21:48)  ipratropium-albuterol 20 mcg-100 mcg/inh inhalation aerosol: 1 puff(s) inhaled 4 times a day (20 Jan 2023 16:49)  levothyroxine 88 mcg (0.088 mg) oral tablet: 1 tab(s) orally once a day (at bedtime) (20 Jan 2023 16:49)  losartan 25 mg oral tablet: 1 tab(s) orally once a day (20 Jan 2023 16:49)  Macrobid 100 mg oral capsule: 1 cap(s) orally 2 times a day for 5 days  ***course complete*** (20 Jan 2023 21:48)  methocarbamol 750 mg oral tablet: 1 tab(s) orally every 8 hours, As Needed (20 Jan 2023 21:48)  metoprolol tartrate 25 mg oral tablet: 1 tab(s) orally 2 times a day (20 Jan 2023 16:49)  Milk of Magnesia 8% oral suspension: 30 milliliter(s) orally once a day, As Needed (20 Jan 2023 16:49)  montelukast 10 mg oral tablet: 1 tab(s) orally once a day (at bedtime) (20 Jan 2023 21:48)  ondansetron 4 mg oral tablet: 1 tab(s) orally every 4 hours, As Needed (20 Jan 2023 21:48)  oxybutynin 5 mg oral tablet: 1 tab(s) orally 2 times a day (20 Jan 2023 16:49)  oxyCODONE 5 mg oral tablet: 1 tab(s) orally every 4 hours, As Needed (20 Jan 2023 16:49)  phytonadione 5 mg oral tablet: 0.5 tab(s) orally once  ***given at [facility] once on 1-19-23*** (20 Jan 2023 21:48)  pregabalin 100 mg oral capsule: 1 cap(s) orally 3 times a day (20 Jan 2023 16:49)  sennosides-docusate 8.6 mg-50 mg oral tablet: 2 tab(s) orally once a day (at bedtime) (20 Jan 2023 16:49)  Tradjenta 5 mg oral tablet: 1 tab(s) orally once a day (20 Jan 2023 16:49)  Tylenol 500 mg oral tablet: 2 tab(s) orally every 8 hours (20 Jan 2023 16:49)    MEDICATIONS  (STANDING):  buDESOnide    Inhalation Suspension 0.5 milliGRAM(s) Inhalation every 12 hours  chlorhexidine 4% Liquid 1 Application(s) Topical <User Schedule>  levothyroxine Injectable 70 MICROGram(s) IV Push at bedtime  nystatin Cream 1 Application(s) Topical two times a day  nystatin Powder 1 Application(s) Topical every 12 hours  pantoprazole  Injectable 40 milliGRAM(s) IV Push daily  scopolamine 1 mG/72 Hr(s) Patch 1 Patch Transdermal every 72 hours    MEDICATIONS  (PRN):  acetaminophen     Tablet .. 650 milliGRAM(s) Oral every 6 hours PRN Temp greater or equal to 38C (100.4F)  albuterol    90 MICROgram(s) HFA Inhaler 2 Puff(s) Inhalation every 6 hours PRN Shortness of Breath and/or Wheezing  glycopyrrolate Injectable 0.2 milliGRAM(s) IV Push every 4 hours PRN hyper secretions  HYDROmorphone  Injectable 0.5 milliGRAM(s) IV Push every 4 hours PRN Severe Pain (7 - 10)  sodium chloride 0.9% lock flush 10 milliLiter(s) IV Push every 1 hour PRN Pre/post blood products, medications, blood draw, and to maintain line patency      Allergies    Cipro (Rash)  Spiriva (Rash)    Intolerances        Vital Signs Last 24 Hrs  T(C): 36.9 (22 Feb 2023 08:15), Max: 37.1 (21 Feb 2023 16:34)  T(F): 98.4 (22 Feb 2023 08:15), Max: 98.8 (21 Feb 2023 16:34)  HR: 95 (22 Feb 2023 08:15) (95 - 104)  BP: 102/42 (22 Feb 2023 08:15) (102/42 - 107/47)  BP(mean): --  RR: 18 (22 Feb 2023 08:15) (18 - 18)  SpO2: 94% (22 Feb 2023 08:15) (94% - 96%)    Parameters below as of 22 Feb 2023 08:15  Patient On (Oxygen Delivery Method): nasal cannula          PHYSICAL EXAMINATION:    NECK:  Supple. No lymphadenopathy. Jugular venous pressure not elevated. Carotids equal.   HEART:   The cardiac impulse has a normal quality. Reg., Nl S1 and S2.  There are no murmurs, rubs or gallops noted  CHEST:  Chest is clear to auscultation. Normal respiratory effort.  ABDOMEN:  Soft and nontender.   EXTREMITIES:  There is no edema.       LABS:                     Subjective:    pat congested, no IV, DEA drain working.    Home Medications:  Albuterol (Eqv-ProAir HFA) 90 mcg/inh inhalation aerosol: 1 puff(s) inhaled every 6 hours, As Needed (20 Jan 2023 16:49)  amiodarone 200 mg oral tablet: 1 tab(s) orally once a day (20 Jan 2023 16:49)  ascorbic acid 500 mg oral tablet: 2 tab(s) orally once a day (20 Jan 2023 16:49)  atorvastatin 10 mg oral tablet: 1 tab(s) orally once a day (at bedtime) (20 Jan 2023 16:49)  benzonatate 100 mg oral capsule: 1 cap(s) orally 3 times a day (20 Jan 2023 21:48)  budesonide 0.5 mg/2 mL inhalation suspension: 2 milliliter(s) inhaled 2 times a day (20 Jan 2023 21:48)  Cepacol Sore Throat 15 mg-3.6 mg mucous membrane lozenge: 1 lozenge mucous membrane every 4 hours, As Needed (20 Jan 2023 21:48)  cholecalciferol 1250 mcg (50,000 intl units) oral capsule: 1 cap(s) orally every 4 weeks on Wednesday  (20 Jan 2023 16:49)  Coumadin 2.5 mg oral tablet: 1 tab(s) orally once a day (at bedtime)  ***ON HOLD from 1-16 to 1-21*** (20 Jan 2023 21:48)  Cranberry oral tablet: 1 tab(s) orally 2 times a day (20 Jan 2023 16:49)  cyanocobalamin 1000 mcg oral tablet: 1 tab(s) orally once a day (20 Jan 2023 16:49)  dilTIAZem 180 mg/24 hours oral capsule, extended release: 1 cap(s) orally once a day (20 Jan 2023 16:49)  DULoxetine 60 mg oral delayed release capsule: 1 cap(s) orally once a day (20 Jan 2023 16:49)  estradiol 0.1 mg/g vaginal cream: 0.5 gram(s) vaginal 2 times a week on Monday and Thursday (20 Jan 2023 21:48)  fexofenadine 180 mg oral tablet: 1 tab(s) orally once a day (20 Jan 2023 21:48)  fluticasone 50 mcg/inh nasal spray: 1 spray(s) nasal 2 times a day (20 Jan 2023 16:49)  furosemide 20 mg oral tablet: 1 tab(s) orally once a day (20 Jan 2023 16:49)  glipiZIDE 10 mg oral tablet, extended release: 2 tab(s) orally once a day (20 Jan 2023 16:49)  GlycoLax oral powder for reconstitution: 17 gram(s) orally 2 times a day (20 Jan 2023 16:49)  guaiFENesin 100 mg/5 mL oral liquid: 20 milliliter(s) orally every 6 hours (20 Jan 2023 21:48)  HumaLOG KwikPen 100 units/mL injectable solution: 10 unit(s) injectable 3 times a day (before meals) (20 Jan 2023 21:48)  ipratropium-albuterol 20 mcg-100 mcg/inh inhalation aerosol: 1 puff(s) inhaled 4 times a day (20 Jan 2023 16:49)  levothyroxine 88 mcg (0.088 mg) oral tablet: 1 tab(s) orally once a day (at bedtime) (20 Jan 2023 16:49)  losartan 25 mg oral tablet: 1 tab(s) orally once a day (20 Jan 2023 16:49)  Macrobid 100 mg oral capsule: 1 cap(s) orally 2 times a day for 5 days  ***course complete*** (20 Jan 2023 21:48)  methocarbamol 750 mg oral tablet: 1 tab(s) orally every 8 hours, As Needed (20 Jan 2023 21:48)  metoprolol tartrate 25 mg oral tablet: 1 tab(s) orally 2 times a day (20 Jan 2023 16:49)  Milk of Magnesia 8% oral suspension: 30 milliliter(s) orally once a day, As Needed (20 Jan 2023 16:49)  montelukast 10 mg oral tablet: 1 tab(s) orally once a day (at bedtime) (20 Jan 2023 21:48)  ondansetron 4 mg oral tablet: 1 tab(s) orally every 4 hours, As Needed (20 Jan 2023 21:48)  oxybutynin 5 mg oral tablet: 1 tab(s) orally 2 times a day (20 Jan 2023 16:49)  oxyCODONE 5 mg oral tablet: 1 tab(s) orally every 4 hours, As Needed (20 Jan 2023 16:49)  phytonadione 5 mg oral tablet: 0.5 tab(s) orally once  ***given at [facility] once on 1-19-23*** (20 Jan 2023 21:48)  pregabalin 100 mg oral capsule: 1 cap(s) orally 3 times a day (20 Jan 2023 16:49)  sennosides-docusate 8.6 mg-50 mg oral tablet: 2 tab(s) orally once a day (at bedtime) (20 Jan 2023 16:49)  Tradjenta 5 mg oral tablet: 1 tab(s) orally once a day (20 Jan 2023 16:49)  Tylenol 500 mg oral tablet: 2 tab(s) orally every 8 hours (20 Jan 2023 16:49)    MEDICATIONS  (STANDING):  buDESOnide    Inhalation Suspension 0.5 milliGRAM(s) Inhalation every 12 hours  chlorhexidine 4% Liquid 1 Application(s) Topical <User Schedule>  levothyroxine Injectable 70 MICROGram(s) IV Push at bedtime  nystatin Cream 1 Application(s) Topical two times a day  nystatin Powder 1 Application(s) Topical every 12 hours  pantoprazole  Injectable 40 milliGRAM(s) IV Push daily  scopolamine 1 mG/72 Hr(s) Patch 1 Patch Transdermal every 72 hours    MEDICATIONS  (PRN):  acetaminophen     Tablet .. 650 milliGRAM(s) Oral every 6 hours PRN Temp greater or equal to 38C (100.4F)  albuterol    90 MICROgram(s) HFA Inhaler 2 Puff(s) Inhalation every 6 hours PRN Shortness of Breath and/or Wheezing  glycopyrrolate Injectable 0.2 milliGRAM(s) IV Push every 4 hours PRN hyper secretions  HYDROmorphone  Injectable 0.5 milliGRAM(s) IV Push every 4 hours PRN Severe Pain (7 - 10)  sodium chloride 0.9% lock flush 10 milliLiter(s) IV Push every 1 hour PRN Pre/post blood products, medications, blood draw, and to maintain line patency      Allergies    Cipro (Rash)  Spiriva (Rash)    Intolerances        Vital Signs Last 24 Hrs  T(C): 36.9 (22 Feb 2023 08:15), Max: 37.1 (21 Feb 2023 16:34)  T(F): 98.4 (22 Feb 2023 08:15), Max: 98.8 (21 Feb 2023 16:34)  HR: 95 (22 Feb 2023 08:15) (95 - 104)  BP: 102/42 (22 Feb 2023 08:15) (102/42 - 107/47)  BP(mean): --  RR: 18 (22 Feb 2023 08:15) (18 - 18)  SpO2: 94% (22 Feb 2023 08:15) (94% - 96%)    Parameters below as of 22 Feb 2023 08:15  Patient On (Oxygen Delivery Method): nasal cannula          PHYSICAL EXAMINATION:    NECK:  Supple. No lymphadenopathy. Jugular venous pressure not elevated. Carotids equal.   HEART:   The cardiac impulse has a normal quality. Reg., Nl S1 and S2.  There are no murmurs, rubs or gallops noted  CHEST:  Chest crackles to auscultation. Normal respiratory effort.  ABDOMEN:  Soft and nontender.   EXTREMITIES:  There is no edema.       LABS:

## 2023-02-22 NOTE — DISCHARGE NOTE PROVIDER - DISCHARGE DIET
Problem: Physical Therapy Goal  Goal: Physical Therapy Goal  Goals to be met by: 2019    Patient will increase functional independence with mobility by performin. Supine to sit with Modified [place]  2. Sit to supine with Modified [place]  3. Sit to stand transfer with Modified [place]  4. Gait  x 350 feet with Modified [place] using Rolling Walker.    Recommend: SNF at time of discharge.         Outcome: Ongoing (interventions implemented as appropriate)  Pt ambulated ~80 ft with CGA using RW, required 2-3 brief standing rest breaks 2* c/o SOB.         Other Diet Instructions

## 2023-02-22 NOTE — DISCHARGE NOTE PROVIDER - HOSPITAL COURSE
chief complaint of bowel obstruction/ischemic mesentery (20 Feb 2023 09:07)    HPI:   71 y/o female with a PMHx of CVA with left sided weakness, atrial fibrillation, emphysema, COPD, HTN - wears 2L NC at baseline BIBA, hx SBO and resection per EMS presents to the ED from [facility] for RLQ pain and r/o SBO. x1 episode of emesis, + profound diffuse abdominal pain, febrile    Hospital course:  Pt with multiple comorbidities who presents with SBO s/p ex-lap complicated by enterocutaneous fistula.  Pt hospitalized for over 30 days with no clinical improvement.  Pt developed metabolic encephalopathy due to persistent EC fistula, poor wound healing with resultant multiple organ failure including acute renal failure with ATN, worsening anemia, with severe protein calorie malnutrition.  Despite medical care pt continued to declines.  Discussion was had with multiple services including palliative care team regarding poor prognosis and discussion of goals of care.  At this time, with help of HCP/son, she has been made comfort care measures.      ROS: Unable to obtain due to mentation.    Vital Signs Last 24 Hrs  T(C): 36.9 (22 Feb 2023 08:15), Max: 37.1 (21 Feb 2023 16:34)  T(F): 98.4 (22 Feb 2023 08:15), Max: 98.8 (21 Feb 2023 16:34)  HR: 95 (22 Feb 2023 08:15) (95 - 104)  BP: 102/42 (22 Feb 2023 08:15) (102/42 - 107/47)  BP(mean): --  RR: 18 (22 Feb 2023 08:15) (18 - 18)  SpO2: 94% (22 Feb 2023 08:15) (94% - 96%)    Parameters below as of 22 Feb 2023 08:15  Patient On (Oxygen Delivery Method): nasal cannula      PHYSICAL EXAM:  General: NAD, Awake w/ eyes open but not responding  ENT: MMM  Neck: Supple, No JVD  Lungs: rhonchi bilaterally  Cardio: RRR, S1/S2, No murmurs  Abdomen: Soft, Nontender, Nondistended; Bowel sounds present wound vac in place  Extremities: No calf tenderness, +pitting edema UE's LE's    med/labs: Reviewed and interpreted     Assessment and Plan:  72F with PMHx CVA, pAF on AC, emphysema/COPD on home O2, HTN, SBO with prior resection who presented with abd pain, N/V. Found to have an small bowel obstruction. Taken to OR underwent ELAP, extensive RICHARD, ileocolectomy, ventral hernia repair with MESH. Postop course complicated with ALVERTO, shock requiring pressors    Pt with multiple organ failure including worsening renal failure, acute on chronic anemia, atrial fibrillation, metabolic encephalopathy in setting SBO s/p extensive surgical intervention now with enterocutaneous fistula and or small bowel perforation with poor wound healing.  - Multiple interdisciplinary teams including nephrology, palliative care team, medicine and surgery have had conversations with son regarding clinical course.  Most recently medical team Dr. Reyes spoke with HCP/son at length yesterday regarding extremely poor prognosis.  It is clear son does not wish to escalate care, no dialysis, no feeding tube, and leaning towards a comfort care approach.   This includes no further blood draws at this time.  Palliative care had discussion with son on 2/22 regarding hospice care, plan now for comfort measures and hospice care.    Code status:  -DNR/DNI      Dispo:  hospice care    Attending Statement: 40 minutes spent on total encounter and discharge planning.

## 2023-02-22 NOTE — PROGRESS NOTE ADULT - PROVIDER SPECIALTY LIST ADULT
Critical Care
Hospitalist
Infectious Disease
Palliative Care
Pulmonology
Pulmonology
SICU
Surgery
Critical Care
Critical Care
Hospitalist
Infectious Disease
Infectious Disease
Palliative Care
Pulmonology
Surgery
Surgery
Critical Care
Critical Care
Hospitalist
Infectious Disease
Nephrology
Palliative Care
Pulmonology
SICU
Surgery
Critical Care
Hospitalist
Infectious Disease
Nephrology
Nephrology
Palliative Care
Pulmonology
Surgery
Hospitalist
Surgery
Critical Care
Surgery
Critical Care

## 2023-02-22 NOTE — DISCHARGE NOTE NURSING/CASE MANAGEMENT/SOCIAL WORK - HAS THE PATIENT USED TOBACCO IN THE PAST 30 DAYS?
Subjective   History of Present Illness  Patient is a 32-year-old female complaining of left earache as well as sore throat for the past several days.  She also has mild congestion without fever.        Review of Systems  Negative for headache fever coughing vomiting diarrhea or other complaint  Past Medical History:   Diagnosis Date   • Asthma    • Diabetes mellitus (HCC)    • GERD without esophagitis 12/28/2021   • Hypertension    • Mild depression (HCC) 12/28/2021       No Known Allergies    No past surgical history on file.    Family History   Family history unknown: Yes       Social History     Socioeconomic History   • Marital status: Single   Tobacco Use   • Smoking status: Never   • Smokeless tobacco: Never   Vaping Use   • Vaping Use: Never used   Substance and Sexual Activity   • Alcohol use: No   • Drug use: No   • Sexual activity: Yes           Objective   Physical Exam  HEENT exam shows left TM to be erythematous.  Oropharynx has diffuse erythema without exudate.  Uvula is midline.  Neck has no adenopathy.  Lungs are clear.  Heart has regular rhythm without murmur.  Abdomen soft nontender.  Procedures           ED Course                                           MDM  Number of Diagnoses or Management Options  Diagnosis management comments: Patient has left otitis media with nonspecific pharyngitis.  She will be discharged with a prescription for Amoxil and Tylenol 3.  She will see MD for recheck.    Risk of Complications, Morbidity, and/or Mortality  Presenting problems: moderate  Diagnostic procedures: moderate  Management options: moderate    Patient Progress  Patient progress: stable      Final diagnoses:   Left otitis media, unspecified otitis media type   Pharyngitis, unspecified etiology       ED Disposition  ED Disposition     ED Disposition   Discharge    Condition   Stable    Comment   --             No follow-up provider specified.       Medication List      New Prescriptions     acetaminophen-codeine 300-30 MG per tablet  Commonly known as: TYLENOL #3  Take 1 tablet by mouth Every 6 (Six) Hours As Needed for Moderate Pain.     amoxicillin 500 MG capsule  Commonly known as: AMOXIL  Take 2 capsules by mouth 2 (Two) Times a Day.           Where to Get Your Medications      These medications were sent to [facility], [place] - [address] [facility] - [phone]  - [phone] FX  [address] [place] [address] [place] [address]    Phone: [phone]   · acetaminophen-codeine 300-30 MG per tablet  · amoxicillin 500 MG capsule          Lacho Covarrubias MD  10/23/22 0755     No

## 2023-02-22 NOTE — DISCHARGE NOTE PROVIDER - INSTRUCTIONS
small sips of water/liquids, ice chips, hard candy or very small amounts of food via spoon if the patient can still eat or drink

## 2023-02-22 NOTE — PROGRESS NOTE ADULT - ASSESSMENT
Patient admitted with abdominal pain, nausea and vomitting , dehydration  septic shock, likely related to     PROBLEMS:    UTI klebsiella pneumoniae and aspiration PNA /SBO- ventral hernia  New R lung nodules - cannot rule out aspiration PNA   Acute metabolic encephalopathy  Sepsis   Hx copd without exacerbation  Anasarca/Acute on  chronic diastolic heart failure   Superficial thrombophlebitis right cephalic vein at the right upper arm      Plan:    pulmonary stable  Monitor H/H-may need blood transfusion  iv bicarbonate  po as tolerated  Incarcerated Ventral Hernia with SBO-S/p extensive RICHARD, ileo colectomy, giant ventral hernia repair with Surgimend-surgery fu for persistant sutures leak-wound care-entero fistula  off abx  Monitor Cr-3.94  Surgery fu  D/w staff  DVT PROPHYLASIX  Patient admitted with abdominal pain, nausea and vomitting , dehydration  septic shock, likely related to     PROBLEMS:    UTI klebsiella pneumoniae and aspiration PNA /SBO- ventral hernia  New R lung nodules - cannot rule out aspiration PNA   Acute metabolic encephalopathy  Sepsis   Hx copd without exacerbation  Anasarca/Acute on  chronic diastolic heart failure   Superficial thrombophlebitis right cephalic vein at the right upper arm      Plan:    poor prognosis-comfort care  Monitor H/H-may need blood transfusion  No IV  po as tolerated  Incarcerated Ventral Hernia with SBO-S/p extensive RICHARD, ileo colectomy, giant ventral hernia repair with Surgimend-surgery fu for persistant sutures leak-wound care-entero fistula  off abx  Monitor Cr-3.94  Surgery fu  D/w staff  DVT PROPHYLASIX

## 2023-02-22 NOTE — PROGRESS NOTE ADULT - SUBJECTIVE AND OBJECTIVE BOX
Subjective:  seen at bedside this morning, tp is not awake or alert.  she has retained upper airway secretions causing the 'death rattle'.  most recent vitals are stable  no longer reponsive to verbal stimuli.  started on scopolamine patch last night  no PRNs of pain medication.   IV Access was lost    d/w son - he is agreeable to referral to hospice care.  he is aware that at hospice unit, wound vac will be d/c and main focus will be on comfort.     Review of Systems:  Unable to obtain/Limited due to: AMS/clinical condition      PHYSICAL EXAM:    Vital Signs Last 24 Hrs  T(C): 36.9 (22 Feb 2023 08:15), Max: 37.1 (21 Feb 2023 16:34)  T(F): 98.4 (22 Feb 2023 08:15), Max: 98.8 (21 Feb 2023 16:34)  HR: 95 (22 Feb 2023 08:15) (95 - 104)  BP: 102/42 (22 Feb 2023 08:15) (102/42 - 107/47)  BP(mean): --  RR: 18 (22 Feb 2023 08:15) (18 - 18)  SpO2: 94% (22 Feb 2023 08:15) (94% - 96%)    Parameters below as of 22 Feb 2023 08:15  Patient On (Oxygen Delivery Method): nasal cannula    General:  - GENERAL: not alert. mild increased work of breahting  - EYES: Anicteric.   - HENT: Moist mucous membranes.   - LUNGS: scattered ronchi bilaterally. No accessory muscle use.   - CARDIOVASCULAR: Regular rate and rhythm. No murmur. No JVD.  - ABDOMEN: wound vac + ostomy.   - EXTREMITIES: Non-tender.    - SKIN: Warm, no rashes or lesions on visible skin.   - NEUROLOGIC: not alert or cooperative  - PSYCHIATRIC: not alert or cooperative      LABS:            Albumin: Albumin, Serum: 1.4 g/dL (02-20 @ 07:51)      Allergies    Cipro (Rash)  Spiriva (Rash)    Intolerances      MEDICATIONS  (STANDING):  buDESOnide    Inhalation Suspension 0.5 milliGRAM(s) Inhalation every 12 hours  chlorhexidine 4% Liquid 1 Application(s) Topical <User Schedule>  levothyroxine Injectable 70 MICROGram(s) IV Push at bedtime  nystatin Cream 1 Application(s) Topical two times a day  nystatin Powder 1 Application(s) Topical every 12 hours  pantoprazole  Injectable 40 milliGRAM(s) IV Push daily  scopolamine 1 mG/72 Hr(s) Patch 1 Patch Transdermal every 72 hours    MEDICATIONS  (PRN):  acetaminophen     Tablet .. 650 milliGRAM(s) Oral every 6 hours PRN Temp greater or equal to 38C (100.4F)  albuterol    90 MICROgram(s) HFA Inhaler 2 Puff(s) Inhalation every 6 hours PRN Shortness of Breath and/or Wheezing  glycopyrrolate Injectable 0.2 milliGRAM(s) IV Push every 4 hours PRN hyper secretions  HYDROmorphone  Injectable 0.5 milliGRAM(s) IV Push every 4 hours PRN Severe Pain (7 - 10)  sodium chloride 0.9% lock flush 10 milliLiter(s) IV Push every 1 hour PRN Pre/post blood products, medications, blood draw, and to maintain line patency      RADIOLOGY:

## 2023-02-22 NOTE — DISCHARGE NOTE PROVIDER - NSDCMRMEDTOKEN_GEN_ALL_CORE_FT
acetaminophen 325 mg oral tablet: 2 tab(s) orally every 6 hours, As needed, Temp greater or equal to 38C (100.4F)  glycopyrrolate 0.2 mg/mL injectable solution: 0.2 milligram(s) injectable every 4 hours, As Needed  HYDROmorphone: 0.5 milligram(s) intravenous every 4 hours, As Needed  scopolamine: 1 patch transdermal every 72 hours

## 2023-02-22 NOTE — DISCHARGE NOTE NURSING/CASE MANAGEMENT/SOCIAL WORK - NSDCPEFALRISK_GEN_ALL_CORE
For information on Fall & Injury Prevention, visit: [email or web address] OR  [email or web address] OR  https://www.cdc.gov/steadi/patient.html

## 2023-02-27 DIAGNOSIS — B96.1 KLEBSIELLA PNEUMONIAE [K. PNEUMONIAE] AS THE CAUSE OF DISEASES CLASSIFIED ELSEWHERE: ICD-10-CM

## 2023-02-27 DIAGNOSIS — E43 UNSPECIFIED SEVERE PROTEIN-CALORIE MALNUTRITION: ICD-10-CM

## 2023-02-27 DIAGNOSIS — I50.33 ACUTE ON CHRONIC DIASTOLIC (CONGESTIVE) HEART FAILURE: ICD-10-CM

## 2023-02-27 DIAGNOSIS — Z88.8 ALLERGY STATUS TO OTHER DRUGS, MEDICAMENTS AND BIOLOGICAL SUBSTANCES: ICD-10-CM

## 2023-02-27 DIAGNOSIS — E87.6 HYPOKALEMIA: ICD-10-CM

## 2023-02-27 DIAGNOSIS — N17.0 ACUTE KIDNEY FAILURE WITH TUBULAR NECROSIS: ICD-10-CM

## 2023-02-27 DIAGNOSIS — Z74.01 BED CONFINEMENT STATUS: ICD-10-CM

## 2023-02-27 DIAGNOSIS — Z86.718 PERSONAL HISTORY OF OTHER VENOUS THROMBOSIS AND EMBOLISM: ICD-10-CM

## 2023-02-27 DIAGNOSIS — E87.20 ACIDOSIS, UNSPECIFIED: ICD-10-CM

## 2023-02-27 DIAGNOSIS — K63.2 FISTULA OF INTESTINE: ICD-10-CM

## 2023-02-27 DIAGNOSIS — E83.42 HYPOMAGNESEMIA: ICD-10-CM

## 2023-02-27 DIAGNOSIS — E86.0 DEHYDRATION: ICD-10-CM

## 2023-02-27 DIAGNOSIS — R91.8 OTHER NONSPECIFIC ABNORMAL FINDING OF LUNG FIELD: ICD-10-CM

## 2023-02-27 DIAGNOSIS — E78.00 PURE HYPERCHOLESTEROLEMIA, UNSPECIFIED: ICD-10-CM

## 2023-02-27 DIAGNOSIS — Z79.01 LONG TERM (CURRENT) USE OF ANTICOAGULANTS: ICD-10-CM

## 2023-02-27 DIAGNOSIS — Z20.822 CONTACT WITH AND (SUSPECTED) EXPOSURE TO COVID-19: ICD-10-CM

## 2023-02-27 DIAGNOSIS — E83.39 OTHER DISORDERS OF PHOSPHORUS METABOLISM: ICD-10-CM

## 2023-02-27 DIAGNOSIS — Z51.5 ENCOUNTER FOR PALLIATIVE CARE: ICD-10-CM

## 2023-02-27 DIAGNOSIS — I48.0 PAROXYSMAL ATRIAL FIBRILLATION: ICD-10-CM

## 2023-02-27 DIAGNOSIS — K43.6 OTHER AND UNSPECIFIED VENTRAL HERNIA WITH OBSTRUCTION, WITHOUT GANGRENE: ICD-10-CM

## 2023-02-27 DIAGNOSIS — I80.8 PHLEBITIS AND THROMBOPHLEBITIS OF OTHER SITES: ICD-10-CM

## 2023-02-27 DIAGNOSIS — E03.9 HYPOTHYROIDISM, UNSPECIFIED: ICD-10-CM

## 2023-02-27 DIAGNOSIS — E88.09 OTHER DISORDERS OF PLASMA-PROTEIN METABOLISM, NOT ELSEWHERE CLASSIFIED: ICD-10-CM

## 2023-02-27 DIAGNOSIS — M19.90 UNSPECIFIED OSTEOARTHRITIS, UNSPECIFIED SITE: ICD-10-CM

## 2023-02-27 DIAGNOSIS — K55.9 VASCULAR DISORDER OF INTESTINE, UNSPECIFIED: ICD-10-CM

## 2023-02-27 DIAGNOSIS — E11.649 TYPE 2 DIABETES MELLITUS WITH HYPOGLYCEMIA WITHOUT COMA: ICD-10-CM

## 2023-02-27 DIAGNOSIS — Z79.51 LONG TERM (CURRENT) USE OF INHALED STEROIDS: ICD-10-CM

## 2023-02-27 DIAGNOSIS — Z86.711 PERSONAL HISTORY OF PULMONARY EMBOLISM: ICD-10-CM

## 2023-02-27 DIAGNOSIS — A41.9 SEPSIS, UNSPECIFIED ORGANISM: ICD-10-CM

## 2023-02-27 DIAGNOSIS — E11.65 TYPE 2 DIABETES MELLITUS WITH HYPERGLYCEMIA: ICD-10-CM

## 2023-02-27 DIAGNOSIS — E11.40 TYPE 2 DIABETES MELLITUS WITH DIABETIC NEUROPATHY, UNSPECIFIED: ICD-10-CM

## 2023-02-27 DIAGNOSIS — R65.21 SEVERE SEPSIS WITH SEPTIC SHOCK: ICD-10-CM

## 2023-02-27 DIAGNOSIS — E66.9 OBESITY, UNSPECIFIED: ICD-10-CM

## 2023-02-27 DIAGNOSIS — G93.41 METABOLIC ENCEPHALOPATHY: ICD-10-CM

## 2023-02-27 DIAGNOSIS — N39.0 URINARY TRACT INFECTION, SITE NOT SPECIFIED: ICD-10-CM

## 2023-02-27 DIAGNOSIS — Z79.4 LONG TERM (CURRENT) USE OF INSULIN: ICD-10-CM

## 2023-02-27 DIAGNOSIS — Z79.890 HORMONE REPLACEMENT THERAPY: ICD-10-CM

## 2023-02-27 DIAGNOSIS — D62 ACUTE POSTHEMORRHAGIC ANEMIA: ICD-10-CM

## 2023-02-27 DIAGNOSIS — Z16.30 RESISTANCE TO UNSPECIFIED ANTIMICROBIAL DRUGS: ICD-10-CM

## 2023-02-27 DIAGNOSIS — R57.8 OTHER SHOCK: ICD-10-CM

## 2023-02-27 DIAGNOSIS — J43.9 EMPHYSEMA, UNSPECIFIED: ICD-10-CM

## 2023-02-27 DIAGNOSIS — Z88.1 ALLERGY STATUS TO OTHER ANTIBIOTIC AGENTS STATUS: ICD-10-CM

## 2023-02-27 DIAGNOSIS — I11.0 HYPERTENSIVE HEART DISEASE WITH HEART FAILURE: ICD-10-CM

## 2023-02-27 DIAGNOSIS — J69.0 PNEUMONITIS DUE TO INHALATION OF FOOD AND VOMIT: ICD-10-CM

## 2023-02-27 DIAGNOSIS — I69.354 HEMIPLEGIA AND HEMIPARESIS FOLLOWING CEREBRAL INFARCTION AFFECTING LEFT NON-DOMINANT SIDE: ICD-10-CM

## 2023-02-27 DIAGNOSIS — E87.5 HYPERKALEMIA: ICD-10-CM

## 2023-02-27 DIAGNOSIS — Z99.81 DEPENDENCE ON SUPPLEMENTAL OXYGEN: ICD-10-CM

## 2023-02-27 DIAGNOSIS — J96.10 CHRONIC RESPIRATORY FAILURE, UNSPECIFIED WHETHER WITH HYPOXIA OR HYPERCAPNIA: ICD-10-CM

## 2023-02-27 DIAGNOSIS — Z66 DO NOT RESUSCITATE: ICD-10-CM

## 2023-02-27 DIAGNOSIS — Z79.84 LONG TERM (CURRENT) USE OF ORAL HYPOGLYCEMIC DRUGS: ICD-10-CM

## 2023-03-01 LAB
CULTURE RESULTS: SIGNIFICANT CHANGE UP
SPECIMEN SOURCE: SIGNIFICANT CHANGE UP

## 2023-03-08 NOTE — PROGRESS NOTE ADULT - ASSESSMENT
72F with PMHx CVA, pAF on AC, emphysema/COPD on home O2, HTN, SBO with prior resection who presented with abd pain, N/V. Found to have an small bowel obstruction. Taken to OR underwent ELAP, extensive RICHARD, ileocolectomy, ventral hernia repair with MESH. Postop course complicated with ALVERTO, shock requiring pressors    #Abd pain, SBO, Enterocutaneous fistula  S/p OR EXLAP, RICHARD, ileocolectomy, ventral hernia repair w mesh  Dressing c/d/i, surrounding erythema, +Drain  PPN, NGT to suction,  PPI Octreotide  Cefepime, Flagyl  Pain control, IS, Mobilize patient, monitor i and os  Pallative eval- DNR/DNI, no feeding tube....continue with PPN for now   management as per Surg    # UTI Kleb, PNA  Cefepime and flagyl as above  WBC improving, monitor for fevers  ID following    #pAF on AC, HTN  Lovenox 100mg BID  Lopressor IVPB q6  Hydralazine 10mg IVP q6 PRN SBP >160  Amioadrone 200mg daily    #Hypothyroid  Continue w IV synthroid     #DM  Lantus 8  Corrective scale  Monitor fingersticks    #COPD  Albuterol  Pulmicort  Pulm following         Xerosis Aggressive Treatment: I recommended application of Cetaphil or CeraVe numerous times a day and before going to bed to all dry areas. I also prescribed a topical steroid for twice daily use.

## 2023-03-18 LAB
CULTURE RESULTS: SIGNIFICANT CHANGE UP
SPECIMEN SOURCE: SIGNIFICANT CHANGE UP

## 2023-07-21 NOTE — PROCEDURE NOTE - NSPOSTPRCRAD_GEN_A_CORE
post procedure radiography not performed
post-procedure radiography performed
post-procedure radiography performed
Yes

## 2023-11-29 NOTE — PROGRESS NOTE ADULT - SUBJECTIVE AND OBJECTIVE BOX
Department of Anesthesiology  Postprocedure Note    Patient: John Garnica  MRN: [id number]  YOB: 1948  Date of evaluation: 11/29/2023      Procedure Summary     Date: 11/29/23 Room / Location: [facility] LAB 1 [place] [facility] [place]    Anesthesia Start: 2533 Anesthesia Stop: 1440    Procedure: Insert ICD multi Diagnosis:       Cardiomyopathy, unspecified type ([address])      (Cardiomyopathy, unspecified type ([address]) [I42.9])    Providers: Sisi Contreras MD Responsible Provider: Marsha Olguin MD    Anesthesia Type: MAC ASA Status: 4          Anesthesia Type: No value filed.     Sree Phase I:      Sree Phase II:        Anesthesia Post Evaluation    Patient location during evaluation: PACU  Patient participation: complete - patient participated  Level of consciousness: awake  Pain score: 0  Airway patency: patent  Nausea & Vomiting: no nausea  Complications: no  Cardiovascular status: hemodynamically stable  Respiratory status: acceptable  Hydration status: stable  Multimodal analgesia pain management approach Patient is a 72y old  Female who presents with a chief complaint of bowel obstruction/ischemic mesentery (05 Feb 2023 13:39)    24 hour events: found to have feces coming out of surgical incision overnight, seen by surgery, CT obtained     Allergies    Cipro (Rash)  Spiriva (Rash)    Intolerances      REVIEW OF SYSTEMS: SEE BELOW       ICU Vital Signs Last 24 Hrs  T(C): 37.3 (05 Feb 2023 16:41), Max: 38.2 (05 Feb 2023 06:00)  T(F): 99.2 (05 Feb 2023 16:41), Max: 100.7 (05 Feb 2023 06:00)  HR: 103 (05 Feb 2023 18:00) (89 - 114)  BP: 130/67 (05 Feb 2023 18:00) (110/55 - 168/92)  BP(mean): 81 (05 Feb 2023 18:00) (68 - 109)  ABP: --  ABP(mean): --  RR: 25 (05 Feb 2023 18:00) (18 - 34)  SpO2: 100% (05 Feb 2023 18:00) (94% - 100%)    O2 Parameters below as of 05 Feb 2023 16:00  Patient On (Oxygen Delivery Method): room air            CAPILLARY BLOOD GLUCOSE      POCT Blood Glucose.: 87 mg/dL (05 Feb 2023 17:33)  POCT Blood Glucose.: 92 mg/dL (05 Feb 2023 12:36)  POCT Blood Glucose.: 118 mg/dL (05 Feb 2023 06:09)  POCT Blood Glucose.: 164 mg/dL (04 Feb 2023 23:21)      I&O's Summary    04 Feb 2023 07:01  -  05 Feb 2023 07:00  --------------------------------------------------------  IN: 1708 mL / OUT: 4475 mL / NET: -2767 mL    05 Feb 2023 07:01  -  05 Feb 2023 18:35  --------------------------------------------------------  IN: 260 mL / OUT: 1150 mL / NET: -890 mL            MEDICATIONS  (STANDING):  aMIOdarone    Tablet 200 milliGRAM(s) Oral daily  atorvastatin 10 milliGRAM(s) Oral at bedtime  buDESOnide    Inhalation Suspension 0.5 milliGRAM(s) Inhalation every 12 hours  cefepime   IVPB 2000 milliGRAM(s) IV Intermittent every 12 hours  chlorhexidine 4% Liquid 1 Application(s) Topical <User Schedule>  coronavirus bivalent (EUA) Booster Vaccine (PFIZER) 0.3 milliLiter(s) IntraMuscular once  dextrose 5% + lactated ringers. 1000 milliLiter(s) (75 mL/Hr) IV Continuous <Continuous>  dextrose 5%. 1000 milliLiter(s) (100 mL/Hr) IV Continuous <Continuous>  dextrose 5%. 1000 milliLiter(s) (50 mL/Hr) IV Continuous <Continuous>  dextrose 50% Injectable 25 Gram(s) IV Push once  dextrose 50% Injectable 12.5 Gram(s) IV Push once  dextrose 50% Injectable 25 Gram(s) IV Push once  fat emulsion (Fish Oil and Plant Based) 20% Infusion 0.821 Gm/kG/Day (33.33 mL/Hr) IV Continuous <Continuous>  glucagon  Injectable 1 milliGRAM(s) IntraMuscular once  heparin   Injectable 5000 Unit(s) SubCutaneous every 8 hours  insulin lispro (ADMELOG) corrective regimen sliding scale   SubCutaneous every 6 hours  levothyroxine Injectable 70 MICROGram(s) IV Push at bedtime  metroNIDAZOLE  IVPB 500 milliGRAM(s) IV Intermittent every 8 hours  nystatin Cream 1 Application(s) Topical two times a day  nystatin Powder 1 Application(s) Topical every 12 hours  octreotide  Injectable 75 MICROGram(s) SubCutaneous every 8 hours  pantoprazole  Injectable 40 milliGRAM(s) IV Push daily  Parenteral Nutrition - Adult 1 Each (83 mL/Hr) TPN Continuous <Continuous>      MEDICATIONS  (PRN):  acetaminophen     Tablet .. 650 milliGRAM(s) Oral every 6 hours PRN Temp greater or equal to 38C (100.4F)  albuterol    90 MICROgram(s) HFA Inhaler 2 Puff(s) Inhalation every 6 hours PRN Shortness of Breath and/or Wheezing  dextrose Oral Gel 15 Gram(s) Oral once PRN Blood Glucose LESS THAN 70 milliGRAM(s)/deciliter  HYDROmorphone  Injectable 0.5 milliGRAM(s) IV Push every 3 hours PRN Severe Pain (7 - 10)  sodium chloride 0.9% lock flush 10 milliLiter(s) IV Push every 1 hour PRN Pre/post blood products, medications, blood draw, and to maintain line patency      PHYSICAL EXAM: SEE BELOW                          9.6    5.86  )-----------( 337      ( 05 Feb 2023 08:11 )             29.6       02-05    140  |  109<H>  |  16  ----------------------------<  120<H>  4.3   |  20<L>  |  0.74    Ca    7.8<L>      05 Feb 2023 08:11  Phos  1.8     02-05  Mg     1.6     02-05    TPro  6.0  /  Alb  2.3<L>  /  TBili  0.5  /  DBili  x   /  AST  25  /  ALT  39  /  AlkPhos  70  02-04

## 2024-03-27 NOTE — SWALLOW BEDSIDE ASSESSMENT ADULT - SWALLOW EVAL: PROGNOSIS
2) Pt was passive but did willingly accept PO. During intakes, pt exhibited Oropharyngeal Dysphagia which subjectively appeared to be a grossly functional condition with a restricted inventory of modified food consistencies. Overt aspiration signs noted with thin liquids only. Dysphagia in setting of many missing teeth, cardiopulmonary dz and old CVA with residual left debra.
2) Pt was variably fatigued and passive but did willingly accept PO when roused.. During intakes when she was alert, pt still exhibited Oropharyngeal Dysphagia which subjectively appeared to be a grossly functional condition with a restricted inventory of modified food consistencies when roused. Overt aspiration signs still noted only with thin liquids. Dysphagia in setting of many missing teeth, cardiopulmonary dz and old CVA with residual left debra. Dysphagia is pre-existing. Note that pt may not always be alert enough to feed.
2) Pt was variably fatigued and passive but did willingly accept PO when roused. During intakes when she was alert, pt still exhibited Oropharyngeal Dysphagia which subjectively appeared to be a grossly functional condition with a restricted inventory of modified food consistencies when roused. Overt aspiration signs still noted only with thin liquids. Dysphagia in setting of many missing teeth, cardiopulmonary dz and old CVA with residual left debra. Dysphagia is pre-existing. Note that pt may not always be alert enough to feed.
none

## 2024-04-05 NOTE — ED ADULT NURSE NOTE - NS PRO AD NO ADVANCE DIRECTIVE
PT wife called to make a NP appt with Dr. Kumar.  He is being referred by Dr. Cosby for head and neck cancer.  PT wife is current PT of MD.  All records in [facility].  Explained intake process to wife and she verbalized understanding.  Intake form on MD desk.    
No

## 2024-05-06 NOTE — DISCHARGE NOTE NURSING/CASE MANAGEMENT/SOCIAL WORK - PATIENT PORTAL LINK FT
You can access the FollowMyHealth Patient Portal offered by [facility] by registering at the following website: [email or web address]. By joining [employer]’s FollowMyHealth portal, you will also be able to view your health information using other applications (apps) compatible with our system. Negative

## 2024-05-29 NOTE — CONSULT NOTE ADULT - CONSULT REASON
Problem: Adult Inpatient Plan of Care  Goal: Plan of Care Review  Outcome: Progressing  Flowsheets (Taken 5/29/2024 1104)  Plan of Care Reviewed With: patient  Goal: Patient-Specific Goal (Individualized)  Outcome: Progressing  Flowsheets (Taken 5/29/2024 1104)  Individualized Care Needs: none  Anxieties, Fears or Concerns: patient voices no concerns at this time  Patient/Family-Specific Goals (Include Timeframe): tolerate treatment without reaction  Goal: Optimal Comfort and Wellbeing  Outcome: Progressing  Intervention: Provide Person-Centered Care  Flowsheets (Taken 5/29/2024 1104)  Trust Relationship/Rapport:   care explained   reassurance provided   questions encouraged   choices provided   emotional support provided   thoughts/feelings acknowledged   empathic listening provided   questions answered      Steroid-induced hyperglycemia Steroid-induced hyperglycemia/T2DM

## 2024-05-31 NOTE — DISCHARGE NOTE PROVIDER - NPI NUMBER (FOR SYSADMIN USE ONLY) :
[Well Developed] : well developed [Well Nourished] : well nourished [No Acute Distress] : no acute distress [Normal Conjunctiva] : normal conjunctiva [Normal Venous Pressure] : normal venous pressure [No Carotid Bruit] : no carotid bruit [Normal S1, S2] : normal S1, S2 [[id number]] [No Rub] : no rub [No Gallop] : no gallop [Murmur] : murmur [Clear Lung Fields] : clear lung fields [Good Air Entry] : good air entry [No Respiratory Distress] : no respiratory distress  [Soft] : abdomen soft [Non Tender] : non-tender [No Masses/organomegaly] : no masses/organomegaly [Normal Bowel Sounds] : normal bowel sounds [Normal Gait] : normal gait [No Edema] : no edema [No Cyanosis] : no cyanosis [No Clubbing] : no clubbing [No Varicosities] : no varicosities [No Rash] : no rash [No Skin Lesions] : no skin lesions [Moves all extremities] : moves all extremities [No Focal Deficits] : no focal deficits [Normal Speech] : normal speech [Alert and Oriented] : alert and oriented [Normal memory] : normal memory [de-identified] : 2/6 systolic murmur

## 2024-07-22 NOTE — DIETITIAN INITIAL EVALUATION ADULT. - ADHERENCE
none Patient stated that she does add a limited amount of salt when cooking and does not add salt at the table.  She avoids processed foods.  Patient was aware of having an elevated HbA1c from steroids several months ago.  She had been limiting sugar, concentrated sweets and portions of carbohydrates, however, she was doing this for weight control. Patient stated that she does add a limited amount of salt when cooking and does not add salt at the table.  She avoids processed foods.  Patient was aware of having an elevated HbA1c from steroids several months ago.  She had been limiting sugar, concentrated sweets and portions of carbohydrates, however, she had been doing this for a long time for weight control.

## 2024-12-03 NOTE — CONSULT NOTE ADULT - CONSULT REASON
Incarcerated Hernia
afib
Hyperkalemia   ALVERTO
s/p ex lap with ileocolectomy, large hernia repair. Post op course complicated by EC fistula. no surgical intervention  GOC
sob
Hypotension
abdominal pain
Mood disorder

## 2024-12-26 NOTE — ED ADULT TRIAGE NOTE - WEIGHT IN KG
[place]  [address] [facility] [address]      Dear [place]:    We have attempted to contact you regarding scheduling your follow up appointment in which you are due as of 01/14/25 with dr plata. At your earliest convenience, please contact the clinic at [phone] so that we may help schedule your next appointment.    Sincerely,       Luis Plata MD  [address] [address]       98.4

## 2025-01-03 NOTE — PHYSICAL THERAPY INITIAL EVALUATION ADULT - DIAGNOSIS, PT EVAL
Paula Wiley is a 55 year old female presenting with E/R f/u    Refill requested? No    Advance Directives:  not discussed        Latex allergies? No    Medications reviewed and updated.    Social History     Tobacco Use   Smoking Status Never    Passive exposure: Never   Smokeless Tobacco Never              Health Maintenance       Diabetes Eye Exam (Yearly)  Never done    Microalbumin Ratio (Yearly)  Never done    Diabetes Foot Exam (Yearly)  Never done    Hepatitis B Vaccine (1 of 3 - 19+ 3-dose series)  Never done    Colorectal Cancer Screen (View Topic Details)  Never done    Shingles Vaccine (2 of 2)  Overdue since 7/13/2022    Breast Cancer Screening (Every 2 Years)  Scheduled for 1/3/2025    Influenza Vaccine (1)  Overdue since 9/1/2024    COVID-19 Vaccine (4 - 2024-25 season)  Overdue since 9/1/2024           Following review of the above:  Patient wishes to discuss with clinician: Colorectal Cancer Screening, Diabetes Eye Exam, Diabetes Foot Exam, Mammogram, and Microalbumin Ratio    Note: Refer to final orders and clinician documentation.            Decreased functional mobility/capacity secondary to impairments listed below

## 2025-03-25 NOTE — PROVIDER CONTACT NOTE (OTHER) - ASSESSMENT
pt had 8 sec burst of rapid afib. vitals checked /51, SPO2 95%, Temp 101 F, RR 32, .
Denies chest pain and discomfort
NGT placement failed attempt x2
Functional

## 2025-05-29 NOTE — ED PROVIDER NOTE - NS_EDPROVIDERDISPOUSERTYPE_ED_A_ED
--DO NOT REPLY - Sent from PACT - If sent to wrong pool, reroute to P ECO Reroute pool --    General Message: Patient returning  JEANMARIE Durant  Callback #: [phone]   Can a detailed message be left? Yes - Voicemail   Caller has been advised this message will be addressed within:1 business day [high priority]         Attending Attestation (For Attendings USE Only)...

## 2025-06-04 NOTE — PRE-OP CHECKLIST - HEART RATE (BEATS/MIN)
ARRHYTHMIA EDUCATION     Met with patient to review information relating to long qt syndrome, Sudden Cardiac Death, & ICD Insertion    Provided information on the following topics: The Heart's Electrical System, Ischemic Cardiomyopathy, Sudden Cardiac Death, ICD, Post Operative Care of ICD, & ICD DC Instructions     Handouts given on above topics. My card is in the folder, I instructed the patient to call me with any questions and to share the information with her family (who was not present at this time) and they can also call me with any questions.     Time spent with patient 10 minutes.      
84